# Patient Record
Sex: FEMALE | NOT HISPANIC OR LATINO | Employment: OTHER | URBAN - METROPOLITAN AREA
[De-identification: names, ages, dates, MRNs, and addresses within clinical notes are randomized per-mention and may not be internally consistent; named-entity substitution may affect disease eponyms.]

---

## 2017-01-06 DIAGNOSIS — E11.9 DIABETES MELLITUS, TYPE 2 (H): Primary | ICD-10-CM

## 2017-01-06 RX ORDER — BLOOD-GLUCOSE METER
EACH MISCELLANEOUS
Qty: 1 KIT | Refills: 0 | Status: SHIPPED | OUTPATIENT
Start: 2017-01-06

## 2017-01-06 RX ORDER — LANCETS
EACH MISCELLANEOUS
Qty: 3 BOX | Refills: 3 | Status: SHIPPED | OUTPATIENT
Start: 2017-01-06 | End: 2017-12-12

## 2017-01-10 ENCOUNTER — ANTICOAGULATION THERAPY VISIT (OUTPATIENT)
Dept: ANTICOAGULATION | Facility: CLINIC | Age: 57
End: 2017-01-10

## 2017-01-10 DIAGNOSIS — I48.91 ATRIAL FIBRILLATION, UNSPECIFIED TYPE (H): ICD-10-CM

## 2017-01-10 DIAGNOSIS — Z79.01 LONG-TERM (CURRENT) USE OF ANTICOAGULANTS: Primary | ICD-10-CM

## 2017-01-10 LAB — INR PPP: 1.7

## 2017-01-10 NOTE — PROGRESS NOTES
ANTICOAGULATION FOLLOW-UP CLINIC VISIT    Patient Name:  Arianna Siddiqi  Date:  1/10/2017  Contact Type:  Telephone    SUBJECTIVE:     Patient Findings     Positives Unexplained INR or factor level change           OBJECTIVE    INR   Date Value Ref Range Status   01/10/2017 1.7  Final       ASSESSMENT / PLAN  INR assessment SUB    Recheck INR In: 1 WEEK    INR Location [facility]R      Anticoagulation Summary as of 1/10/2017     INR goal 2.0-3.0   Selected INR 1.7! (1/10/2017)   Maintenance plan 4.5 mg (3 mg x 1.5) on Tue; 3 mg (3 mg x 1) all other days   Full instructions 1/10: 6 mg; Otherwise 4.5 mg on Tue; 3 mg all other days   Weekly total 22.5 mg   Plan last modified Ying Hollis RN (12/21/2016)   Next INR check 1/17/2017   Priority INR   Target end date     Indications   Long-term (current) use of anticoagulants [Z79.01] [Z79.01]  Atrial fibrillation (H) [I48.91] [I48.91]         Anticoagulation Episode Summary     INR check location Clinic Lab    Preferred lab     Send INR reminders to [facility] CL[facility]    Comments Patient to establish care with Amelia Angulo  Pt's voicemail box is full and pt is unable to retrieve voice messages       Anticoagulation Care Providers     Provider Role Specialty Phone number    Amelia Angulo MD PHD Responsible [facility] Practice [phone]            See the Encounter Report to view Anticoagulation Flowsheet and Dosing Calendar (Go to Encounters tab in chart review, and find the Anticoagulation Therapy Visit)    Spoke with Soraya WOOD.  She reports no changes in health, diet, medications, no missed warfarin doses.  She drinks one can of Boost/day, this is not a change.  Unexplained drop in INR.    Paradise Pizarro, RN

## 2017-01-17 ENCOUNTER — ANTICOAGULATION THERAPY VISIT (OUTPATIENT)
Dept: ANTICOAGULATION | Facility: CLINIC | Age: 57
End: 2017-01-17

## 2017-01-17 DIAGNOSIS — I48.91 ATRIAL FIBRILLATION, UNSPECIFIED TYPE (H): ICD-10-CM

## 2017-01-17 DIAGNOSIS — Z79.01 LONG-TERM (CURRENT) USE OF ANTICOAGULANTS: Primary | ICD-10-CM

## 2017-01-17 LAB — INR PPP: 2

## 2017-01-17 NOTE — PROGRESS NOTES
ANTICOAGULATION FOLLOW-UP CLINIC VISIT    Patient Name:  Arianna Siddiqi  Date:  1/17/2017  Contact Type:  Telephone    SUBJECTIVE:     Patient Findings     Positives No Problem Findings           OBJECTIVE    INR   Date Value Ref Range Status   01/17/2017 2.0  Final       ASSESSMENT / PLAN  INR assessment THER    Recheck INR In: 2 WEEKS    INR Location Clinic      Anticoagulation Summary as of 1/17/2017     INR goal 2.0-3.0   Selected INR 2.0 (1/17/2017)   Maintenance plan 4.5 mg (3 mg x 1.5) on Tue, Fri; 3 mg (3 mg x 1) all other days   Full instructions 4.5 mg on Tue, Fri; 3 mg all other days   Weekly total 24 mg   Plan last modified Ying Hollis RN (1/17/2017)   Next INR check 1/31/2017   Priority INR   Target end date     Indications   Long-term (current) use of anticoagulants [Z79.01] [Z79.01]  Atrial fibrillation (H) [I48.91] [I48.91]         Anticoagulation Episode Summary     INR check location Clinic Lab    Preferred lab     Send INR reminders to [facility]    Comments Patient to establish care with Amelia Angulo  Pt's voicemail box is full and pt is unable to retrieve voice messages       Anticoagulation Care Providers     Provider Role Specialty Phone number    Amelia Angulo MD PHD Responsible [facility] Practice [phone]            See the Encounter Report to view Anticoagulation Flowsheet and Dosing Calendar (Go to Encounters tab in chart review, and find the Anticoagulation Therapy Visit)    Spoke with Erick WOOD.    Ying Hollis, SONAL Suarez called back reporting that after 2/14 Home Care will not be able to do anymore visits. Pt is unable to go to a clinic to get labs drawn. Filled out paperwork for [facility] and gave ph# for pt to call [facility] will insurance info. BC

## 2017-01-30 ENCOUNTER — MEDICAL CORRESPONDENCE (OUTPATIENT)
Dept: HEALTH INFORMATION MANAGEMENT | Facility: CLINIC | Age: 57
End: 2017-01-30

## 2017-01-31 ENCOUNTER — ANTICOAGULATION THERAPY VISIT (OUTPATIENT)
Dept: ANTICOAGULATION | Facility: CLINIC | Age: 57
End: 2017-01-31

## 2017-01-31 DIAGNOSIS — I48.91 ATRIAL FIBRILLATION, UNSPECIFIED TYPE (H): Primary | ICD-10-CM

## 2017-01-31 DIAGNOSIS — I48.91 ATRIAL FIBRILLATION, UNSPECIFIED TYPE (H): ICD-10-CM

## 2017-01-31 DIAGNOSIS — Z79.01 LONG-TERM (CURRENT) USE OF ANTICOAGULANTS: Primary | ICD-10-CM

## 2017-01-31 LAB — INR PPP: 1.7

## 2017-01-31 RX ORDER — WARFARIN SODIUM 3 MG/1
TABLET ORAL
Qty: 110 TABLET | Refills: 1 | Status: SHIPPED | OUTPATIENT
Start: 2017-01-31 | End: 2017-07-05 | Stop reason: DRUGHIGH

## 2017-01-31 NOTE — PROGRESS NOTES
ANTICOAGULATION FOLLOW-UP CLINIC VISIT    Patient Name:  Arianna Siddiqi  Date:  1/31/2017  Contact Type:  Telephone    SUBJECTIVE:     Patient Findings     Positives No Problem Findings           OBJECTIVE    INR   Date Value Ref Range Status   01/31/2017 1.7  Final       ASSESSMENT / PLAN  INR assessment SUB    Recheck INR In: 1 WEEK    INR Location [facility] INR      Anticoagulation Summary as of 1/31/2017     INR goal 2.0-3.0   Selected INR 1.7! (1/31/2017)   Maintenance plan 4.5 mg (3 mg x 1.5) on Tue, Thu, Sat; 3 mg (3 mg x 1) all other days   Full instructions 4.5 mg on Tue, Thu, Sat; 3 mg all other days   Weekly total 25.5 mg   Plan last modified Carol Hoffman RN (1/31/2017)   Next INR check 2/7/2017   Priority INR   Target end date     Indications   Long-term (current) use of anticoagulants [Z79.01] [Z79.01]  Atrial fibrillation (H) [I48.91] [I48.91]         Anticoagulation Episode Summary     INR check location Clinic Lab    Preferred lab     Send INR reminders to [facility]    Comments 1/17 [employer] paperwork in process  Pt's voicemail box is full and pt is unable to retrieve voice messages       Anticoagulation Care Providers     Provider Role Specialty Phone number    Amelia Angulo MD PHD Responsible Family Practice [phone]            See the Encounter Report to view Anticoagulation Flowsheet and Dosing Calendar (Go to Encounters tab in chart review, and find the Anticoagulation Therapy Visit)  Spoke with [facility] nurse.    Carol Hoffman RN

## 2017-02-02 ENCOUNTER — PRE VISIT (OUTPATIENT)
Dept: PULMONOLOGY | Facility: CLINIC | Age: 57
End: 2017-02-02

## 2017-02-02 NOTE — TELEPHONE ENCOUNTER
1.  Date/reason for appt:  2/15/17   CHF    2.  Referring provider:  Internal, Dr Crabtree   --   Office Visit 11/16/17    3.  Call to patient (Yes / No - short description):  No, referred.  Records reviewed.  All records are in [facility] and imaging is in PACS.

## 2017-02-06 DIAGNOSIS — R06.02 SOB (SHORTNESS OF BREATH): Primary | ICD-10-CM

## 2017-02-07 ENCOUNTER — ANTICOAGULATION THERAPY VISIT (OUTPATIENT)
Dept: ANTICOAGULATION | Facility: CLINIC | Age: 57
End: 2017-02-07

## 2017-02-07 DIAGNOSIS — Z79.01 LONG-TERM (CURRENT) USE OF ANTICOAGULANTS: Primary | ICD-10-CM

## 2017-02-07 DIAGNOSIS — I48.91 ATRIAL FIBRILLATION (H): ICD-10-CM

## 2017-02-07 LAB — INR PPP: 1.9

## 2017-02-14 ENCOUNTER — DOCUMENTATION ONLY (OUTPATIENT)
Dept: CARE COORDINATION | Facility: CLINIC | Age: 57
End: 2017-02-14

## 2017-02-14 ENCOUNTER — ANTICOAGULATION THERAPY VISIT (OUTPATIENT)
Dept: ANTICOAGULATION | Facility: CLINIC | Age: 57
End: 2017-02-14

## 2017-02-14 DIAGNOSIS — I48.91 ATRIAL FIBRILLATION, UNSPECIFIED TYPE (H): ICD-10-CM

## 2017-02-14 DIAGNOSIS — Z79.01 LONG-TERM (CURRENT) USE OF ANTICOAGULANTS: ICD-10-CM

## 2017-02-14 LAB — INR PPP: 2.1

## 2017-02-14 NOTE — MR AVS SNAPSHOT
Arianna VALDIVIA Devang   2/14/2017   Anticoagulation Therapy Visit    Description:  56 year old female   Provider:  Carol Hoffman RN   Department[facility]           INR as of 2/14/2017     Today's INR 2.1      Anticoagulation Summary as of 2/14/2017     INR goal 2.0-3.0   Today's INR 2.1   Full instructions 4.5 mg on Tue, Thu, Sat; 3 mg all other days   Next INR check 2/28/2017    Indications   Long-term (current) use of anticoagulants [Z79.01] [Z79.01]  Atrial fibrillation (H) [I48.91] [I48.91]         February 2017 Details    Sun Mon Tue Wed Thu Fri Sat        1               2               3               4                 5               6               7               8               9               10               11                 12               13               14      4.5 mg   See details      15      3 mg         16      4.5 mg         17      3 mg         18      4.5 mg           19      3 mg         20      3 mg         21      4.5 mg         22      3 mg         23      4.5 mg         24      3 mg         25      4.5 mg           26      3 mg         27      3 mg         28                 Date Details   02/14 This INR check       Date of next INR:  2/28/2017         How to take your warfarin dose     To take:  3 mg Take 1 of the 3 mg tablets.    To take:  4.5 mg Take 1.5 of the 3 mg tablets.

## 2017-02-14 NOTE — PROGRESS NOTES
[facility] now requests orders and shares plan of care/discharge summaries for some patients through [employer].  Please REPLY TO THIS MESSAGE in order to give authorization for orders when needed.  This is considered a verbal order, you will still receive a faxed copy of orders for signature.  Thank you for your assistance in improving collaboration for our patients.      DISCHARGE SUMMARY    Summary of care  This is a 56 year old  female who was admitted to [facility] on 10/21/2016 following a 5 week hospitalization for increasing SOB, and acute respiratory failure.  She has a history of morbid obesity, CHF, AFib, DM 2, hypercholesterolemia, hypertension, and chronic kidney disease. During this home care episode she worked with SN, PT, OT and a HHA.  She had evals form both PT and OT and did not arrange any further visits.  SHe had a Home Health Aide for several weeks but discontinued that service as she did not feel it was neccesary.  She continued with skilled nursing for diet and disease education and INR draws.  She had no falls or ER visits during this episode.  She manages her medications independently.Her INR have been stable regularly within goal.  She recieves dinner at you door meal services.  She is independant with ADLS.  She walks with a two wheeled walker.  Her vital signs today are /64, P 76, R 16, T 97.1, O2 97 on room air.  Lungs sounds clear, heart sounds within normal limits, bowel sounds within normal limits.  She rates her knee pain as a 4 to 7 and her leg pain from a 7 to 10 of 10 for which she takes Gabapentin.  Her INR today is 2.1.  She is to recheck her INR on 2/28 and call it to the coumadin clinic for further instructions.  She will continue to check her own INR in home once [facility] sets up services.  They are scheduled to initiate services with her by the end of the week.  She is deemed appropirate to discharge to self care as of 2/14/2017

## 2017-02-14 NOTE — PROGRESS NOTES
ANTICOAGULATION FOLLOW-UP CLINIC VISIT    Patient Name:  Arianna Siddiqi  Date:  2/14/2017  Contact Type:  Telephone    SUBJECTIVE:     Patient Findings     Comments Patient will be discharged from [facility] , and will doing her INRs on a home monitor hereafter.           OBJECTIVE    INR   Date Value Ref Range Status   02/14/2017 2.1  Final       ASSESSMENT / PLAN  INR assessment THER    Recheck INR In: 2 WEEKS    INR Location [facility]R      Anticoagulation Summary as of 2/14/2017     INR goal 2.0-3.0   Today's INR 2.1   Maintenance plan 4.5 mg (3 mg x 1.5) on Tue, Thu, Sat; 3 mg (3 mg x 1) all other days   Full instructions 4.5 mg on Tue, Thu, Sat; 3 mg all other days   Weekly total 25.5 mg   Plan last modified Carol Hoffman RN (1/31/2017)   Next INR check 2/28/2017   Priority INR   Target end date     Indications   Long-term (current) use of anticoagulants [Z79.01] [Z79.01]  Atrial fibrillation (H) [I48.91] [I48.91]         Anticoagulation Episode Summary     INR check location Clinic Lab    Preferred lab     Send INR reminders to [facility] CL[facility]    Comments 1/17 [employer] to start on 2/28.  Pt's voicemail box is full and pt is unable to retrieve voice messages       Anticoagulation Care Providers     Provider Role Specialty Phone number    Amelia Angulo MD PhD Responsible [facility] Practice [phone]            See the Encounter Report to view Anticoagulation Flowsheet and Dosing Calendar (Go to Encounters tab in chart review, and find the Anticoagulation Therapy Visit)    Spoke with Leticia [facility] nurse.    Carol Hoffman RN

## 2017-02-28 ENCOUNTER — ANTICOAGULATION THERAPY VISIT (OUTPATIENT)
Dept: ANTICOAGULATION | Facility: CLINIC | Age: 57
End: 2017-02-28

## 2017-02-28 DIAGNOSIS — I48.91 ATRIAL FIBRILLATION, UNSPECIFIED TYPE (H): ICD-10-CM

## 2017-02-28 DIAGNOSIS — Z79.01 LONG-TERM (CURRENT) USE OF ANTICOAGULANTS: ICD-10-CM

## 2017-02-28 LAB — INR POINT OF CARE: 2.4 (ref 0.86–1.14)

## 2017-02-28 NOTE — PROGRESS NOTES
ANTICOAGULATION FOLLOW-UP CLINIC VISIT    Patient Name:  Arianna Siddiqi  Date:  2/28/2017  Contact Type:  Telephone    SUBJECTIVE:        OBJECTIVE    INR Protime   Date Value Ref Range Status   02/28/2017 2.4 (A) 0.86 - 1.14 Final       ASSESSMENT / PLAN  INR assessment THER    Recheck INR In: 2 WEEKS    INR Location Home INR      Anticoagulation Summary as of 2/28/2017     INR goal 2.0-3.0   Today's INR 2.4   Maintenance plan 4.5 mg (3 mg x 1.5) on Tue, Thu, Sat; 3 mg (3 mg x 1) all other days   Full instructions 4.5 mg on Tue, Thu, Sat; 3 mg all other days   Weekly total 25.5 mg   Plan last modified Carol Hoffman RN (1/31/2017)   Next INR check 3/14/2017   Priority INR   Target end date     Indications   Long-term (current) use of anticoagulants [Z79.01] [Z79.01]  Atrial fibrillation (H) [I48.91] [I48.91]         Anticoagulation Episode Summary     INR check location Clinic Lab    Preferred lab     Send INR reminders to [facility]    Comments 1/17 [employer] to start on 2/28.  Pt's voicemail box is full and pt is unable to retrieve voice messages       Anticoagulation Care Providers     Provider Role Specialty Phone number    Amelia Angulo MD PhD Responsible [facility] Practice [phone]            See the Encounter Report to view Anticoagulation Flowsheet and Dosing Calendar (Go to Encounters tab in chart review, and find the Anticoagulation Therapy Visit)    Spoke to Arianna with her INR result and dosing recommendation. It's her first time to use home monitoring device herself and it went pretty well. She checked twice and both are 2.4. No missed dose. Keep same dosing (4.5mg on Tue, Thu and Sat, 3mg on all other days) and recheck in 2 weeks.     Shirley Arroyo,PharmD student

## 2017-02-28 NOTE — MR AVS SNAPSHOT
Arianna VALDIVIA Devang   2/28/2017   Anticoagulation Therapy Visit    Description:  56 year old female   Provider:  Sina Lazcano, [facility]   Department:  [facility]           INR as of 2/28/2017     Today's INR 2.4      Anticoagulation Summary as of 2/28/2017     INR goal 2.0-3.0   Today's INR 2.4   Full instructions 4.5 mg on Tue, Thu, Sat; 3 mg all other days   Next INR check 3/14/2017    Indications   Long-term (current) use of anticoagulants [Z79.01] [Z79.01]  Atrial fibrillation (H) [I48.91] [I48.91]         February 2017 Details    Sun Mon Tue Wed Thu Fri Sat        1               2               3               4                 5               6               7               8               9               10               11                 12               13               14               15               16               17               18                 19               20               21               22               23               24               25                 26               27               28      4.5 mg   See details           Date Details   02/28 This INR check               How to take your warfarin dose     To take:  4.5 mg Take 1.5 of the 3 mg tablets.           March 2017 Details    Sun Mon Tue Wed Thu Fri Sat        1      3 mg         2      4.5 mg         3      3 mg         4      4.5 mg           5      3 mg         6      3 mg         7      4.5 mg         8      3 mg         9      4.5 mg         10      3 mg         11      4.5 mg           12      3 mg         13      3 mg         14            15               16               17               18                 19               20               21               22               23               24               25                 26               27               28               29               30               31                 Date Details   No additional details    Date of next INR:  3/14/2017         How to  take your warfarin dose     To take:  3 mg Take 1 of the 3 mg tablets.    To take:  4.5 mg Take 1.5 of the 3 mg tablets.

## 2017-03-14 ENCOUNTER — ANTICOAGULATION THERAPY VISIT (OUTPATIENT)
Dept: ANTICOAGULATION | Facility: CLINIC | Age: 57
End: 2017-03-14

## 2017-03-14 DIAGNOSIS — Z79.01 LONG-TERM (CURRENT) USE OF ANTICOAGULANTS: ICD-10-CM

## 2017-03-14 DIAGNOSIS — I48.91 ATRIAL FIBRILLATION, UNSPECIFIED TYPE (H): ICD-10-CM

## 2017-03-14 LAB — INR PPP: 2.2

## 2017-03-14 NOTE — MR AVS SNAPSHOT
Arianna VALDIVIA Devang   3/14/2017   Anticoagulation Therapy Visit    Description:  56 year old female   Provider:  Ying Hollis RN   [facility]  [facility]           INR as of 3/14/2017     Today's INR 2.2      Anticoagulation Summary as of 3/14/2017     INR goal 2.0-3.0   Today's INR 2.2   Full instructions 4.5 mg on Tue, Thu, Sat; 3 mg all other days   Next INR check 3/28/2017    Indications   Long-term (current) use of anticoagulants [Z79.01] [Z79.01]  Atrial fibrillation (H) [I48.91] [I48.91]         March 2017 Details    Sun Mon Tue Wed Thu Fri Sat        1               2               3               4                 5               6               7               8               9               10               11                 12               13               14      4.5 mg   See details      15      3 mg         16      4.5 mg         17      3 mg         18      4.5 mg           19      3 mg         20      3 mg         21      4.5 mg         22      3 mg         23      4.5 mg         24      3 mg         25      4.5 mg           26      3 mg         27      3 mg         28            29               30               31                 Date Details   03/14 This INR check       Date of next INR:  3/28/2017         How to take your warfarin dose     To take:  3 mg Take 1 of the 3 mg tablets.    To take:  4.5 mg Take 1.5 of the 3 mg tablets.

## 2017-03-14 NOTE — PROGRESS NOTES
ANTICOAGULATION FOLLOW-UP CLINIC VISIT    Patient Name:  Arianna Siddiqi  Date:  3/14/2017  Contact Type:  Telephone    SUBJECTIVE:     Patient Findings     Positives No Problem Findings           OBJECTIVE    INR   Date Value Ref Range Status   03/14/2017 2.2  Final       ASSESSMENT / PLAN  INR assessment THER    Recheck INR In: 2 WEEKS    INR Location Home INR      Anticoagulation Summary as of 3/14/2017     INR goal 2.0-3.0   Today's INR 2.2   Maintenance plan 4.5 mg (3 mg x 1.5) on Tue, Thu, Sat; 3 mg (3 mg x 1) all other days   Full instructions 4.5 mg on Tue, Thu, Sat; 3 mg all other days   Weekly total 25.5 mg   No change documented Ying Hollis, RN   Plan last modified Carol Hoffman RN (1/31/2017)   Next INR check 3/28/2017   Priority INR   Target end date     Indications   Long-term (current) use of anticoagulants [Z79.01] [Z79.01]  Atrial fibrillation (H) [I48.91] [I48.91]         Anticoagulation Episode Summary     INR check location Clinic Lab    Preferred lab     Send INR reminders to [facility]    Comments 1/17 [employer] to start on 2/28.  Pt's voicemail box is full and pt is unable to retrieve voice messages       Anticoagulation Care Providers     Provider Role Specialty Phone number    Amelia Angulo MD PhD Responsible [facility] Practice [phone]            See the Encounter Report to view Anticoagulation Flowsheet and Dosing Calendar (Go to Encounters tab in chart review, and find the Anticoagulation Therapy Visit)    Spoke with Jaycee Hollis, RN

## 2017-03-28 ENCOUNTER — ANTICOAGULATION THERAPY VISIT (OUTPATIENT)
Dept: ANTICOAGULATION | Facility: CLINIC | Age: 57
End: 2017-03-28

## 2017-03-28 DIAGNOSIS — Z79.01 LONG-TERM (CURRENT) USE OF ANTICOAGULANTS: ICD-10-CM

## 2017-03-28 DIAGNOSIS — I48.91 ATRIAL FIBRILLATION, UNSPECIFIED TYPE (H): ICD-10-CM

## 2017-03-28 LAB — INR PPP: 2

## 2017-03-28 NOTE — PROGRESS NOTES
"  ANTICOAGULATION FOLLOW-UP CLINIC VISIT    Patient Name:  Arianna Siddiqi  Date:  3/28/2017  Contact Type:  Telephone    SUBJECTIVE:     Patient Findings     Positives Change in diet/appetite (Patient is wanting to eat more Vit K rich foods, so we made  an adjustment to accommodate this.), OTC meds (Started \"Healthy Hair Skin and Nails\" similar to [employer])           OBJECTIVE    INR   Date Value Ref Range Status   03/28/2017 2.0  Final       ASSESSMENT / PLAN  INR assessment THER    Recheck INR In: 1 WEEK    INR Location Home INR      Anticoagulation Summary as of 3/28/2017     INR goal 2.0-3.0   Today's INR 2.0   Maintenance plan 3 mg (3 mg x 1) on Mon, Fri; 4.5 mg (3 mg x 1.5) all other days   Full instructions 3 mg on Mon, Fri; 4.5 mg all other days   Weekly total 28.5 mg   Plan last modified Carol Hoffman RN (3/28/2017)   Next INR check 4/4/2017   Priority INR   Target end date     Indications   Long-term (current) use of anticoagulants [Z79.01] [Z79.01]  Atrial fibrillation (H) [I48.91] [I48.91]         Anticoagulation Episode Summary     INR check location Clinic Lab    Preferred lab     Send INR reminders to [facility]    Comments 1/17 [employer] to start on 2/28.  Pt's voicemail box is full and pt is unable to retrieve voice messages       Anticoagulation Care Providers     Provider Role Specialty Phone number    Amelia Angulo MD PhD [facility] Practice [phone]            See the Encounter Report to view Anticoagulation Flowsheet and Dosing Calendar (Go to Encounters tab in chart review, and find the Anticoagulation Therapy Visit)    Spoke with patient.    Carol Hoffman RN               "

## 2017-03-28 NOTE — MR AVS SNAPSHOT
Arianna VALDIVIA Devang   3/28/2017   Anticoagulation Therapy Visit    Description:  56 year old female   Provider:  Carol Hoffman RN   Department:  [facility]           INR as of 3/28/2017     Today's INR 2.0      Anticoagulation Summary as of 3/28/2017     INR goal 2.0-3.0   Today's INR 2.0   Full instructions 3 mg on Mon, Fri; 4.5 mg all other days   Next INR check 4/4/2017    Indications   Long-term (current) use of anticoagulants [Z79.01] [Z79.01]  Atrial fibrillation (H) [I48.91] [I48.91]         March 2017 Details    Sun Mon Tue Wed Thu Fri Sat        1               2               3               4                 5               6               7               8               9               10               11                 12               13               14               15               16               17               18                 19               20               21               22               23               24               25                 26               27               28      4.5 mg   See details      29      4.5 mg         30      4.5 mg         31      3 mg           Date Details   03/28 This INR check               How to take your warfarin dose     To take:  3 mg Take 1 of the 3 mg tablets.    To take:  4.5 mg Take 1.5 of the 3 mg tablets.           April 2017 Details    Sun Mon Tue Wed Thu Fri Sat           1      4.5 mg           2      4.5 mg         3      3 mg         4            5               6               7               8                 9               10               11               12               13               14               15                 16               17               18               19               20               21               22                 23               24               25               26               27               28               29                 30                      Date Details   No additional details     Date of next INR:  4/4/2017         How to take your warfarin dose     To take:  3 mg Take 1 of the 3 mg tablets.    To take:  4.5 mg Take 1.5 of the 3 mg tablets.

## 2017-04-04 ENCOUNTER — ANTICOAGULATION THERAPY VISIT (OUTPATIENT)
Dept: ANTICOAGULATION | Facility: CLINIC | Age: 57
End: 2017-04-04

## 2017-04-04 DIAGNOSIS — Z79.01 LONG-TERM (CURRENT) USE OF ANTICOAGULANTS: ICD-10-CM

## 2017-04-04 DIAGNOSIS — I48.91 ATRIAL FIBRILLATION, UNSPECIFIED TYPE (H): ICD-10-CM

## 2017-04-04 LAB — INR PPP: 2.3

## 2017-04-04 NOTE — MR AVS SNAPSHOT
Arianna VALDIVIA Devang   4/4/2017   Anticoagulation Therapy Visit    Description:  56 year old female   Provider:  Carol Hoffman RN   Department[facility]  [facility]           INR as of 4/4/2017     Today's INR 2.3      Anticoagulation Summary as of 4/4/2017     INR goal 2.0-3.0   Today's INR 2.3   Full instructions 4.5 mg on Tue, Thu, Sat; 3 mg all other days   Next INR check 4/18/2017    Indications   Long-term (current) use of anticoagulants [Z79.01] [Z79.01]  Atrial fibrillation (H) [I48.91] [I48.91]         April 2017 Details    Sun Mon Tue Wed Thu Fri Sat           1                 2               3               4      4.5 mg   See details      5      3 mg         6      4.5 mg         7      3 mg         8      4.5 mg           9      3 mg         10      3 mg         11      4.5 mg         12      3 mg         13      4.5 mg         14      3 mg         15      4.5 mg           16      3 mg         17      3 mg         18            19               20               21               22                 23               24               25               26               27               28               29                 30                      Date Details   04/04 This INR check       Date of next INR:  4/18/2017         How to take your warfarin dose     To take:  3 mg Take 1 of the 3 mg tablets.    To take:  4.5 mg Take 1.5 of the 3 mg tablets.

## 2017-04-04 NOTE — PROGRESS NOTES
ANTICOAGULATION FOLLOW-UP CLINIC VISIT    Patient Name:  Arianna Siddiqi  Date:  4/4/2017  Contact Type:  Telephone    SUBJECTIVE:     Patient Findings     Positives No Problem Findings           OBJECTIVE    INR   Date Value Ref Range Status   04/04/2017 2.3  Final       ASSESSMENT / PLAN  INR assessment THER    Recheck INR In: 2 WEEKS    INR Location Clinic      Anticoagulation Summary as of 4/4/2017     INR goal 2.0-3.0   Today's INR 2.3   Maintenance plan 4.5 mg (3 mg x 1.5) on Tue, Thu, Sat; 3 mg (3 mg x 1) all other days   Full instructions 4.5 mg on Tue, Thu, Sat; 3 mg all other days   Weekly total 25.5 mg   Plan last modified Carol Hoffman RN (4/4/2017)   Next INR check 4/18/2017   Priority INR   Target end date     Indications   Long-term (current) use of anticoagulants [Z79.01] [Z79.01]  Atrial fibrillation (H) [I48.91] [I48.91]         Anticoagulation Episode Summary     INR check location Clinic Lab    Preferred lab     Send INR reminders to [facility]    Comments 1/17 [employer] to start on 2/28.  Pt's voicemail box is full and pt is unable to retrieve voice messages       Anticoagulation Care Providers     Provider Role Specialty Phone number    Amelia Angulo MD PhD Responsible [facility] [phone]            See the Encounter Report to view Anticoagulation Flowsheet and Dosing Calendar (Go to Encounters tab in chart review, and find the Anticoagulation Therapy Visit)  Spoke with patient.    Carol Hoffman RN

## 2017-04-18 ENCOUNTER — ANTICOAGULATION THERAPY VISIT (OUTPATIENT)
Dept: ANTICOAGULATION | Facility: CLINIC | Age: 57
End: 2017-04-18

## 2017-04-18 DIAGNOSIS — G62.9 PERIPHERAL POLYNEUROPATHY: ICD-10-CM

## 2017-04-18 DIAGNOSIS — I48.20 CHRONIC ATRIAL FIBRILLATION (H): Primary | ICD-10-CM

## 2017-04-18 DIAGNOSIS — I48.91 ATRIAL FIBRILLATION, UNSPECIFIED TYPE (H): ICD-10-CM

## 2017-04-18 DIAGNOSIS — Z79.01 LONG-TERM (CURRENT) USE OF ANTICOAGULANTS: ICD-10-CM

## 2017-04-18 LAB — INR PPP: 2

## 2017-04-18 RX ORDER — WARFARIN SODIUM 3 MG/1
TABLET ORAL
Qty: 120 TABLET | Refills: 1 | Status: SHIPPED | OUTPATIENT
Start: 2017-04-18 | End: 2017-07-05

## 2017-04-18 NOTE — PROGRESS NOTES
ANTICOAGULATION FOLLOW-UP CLINIC VISIT    Patient Name:  Arianna Siddiqi  Date:  4/18/2017  Contact Type:  Telephone    SUBJECTIVE:     Patient Findings     Positives No Problem Findings           OBJECTIVE    INR   Date Value Ref Range Status   04/18/2017 2.0  Final       ASSESSMENT / PLAN  INR assessment THER    Recheck INR In: 2 WEEKS    INR Location Home INR      Anticoagulation Summary as of 4/18/2017     INR goal 2.0-3.0   Today's INR 2.0   Maintenance plan 4.5 mg (3 mg x 1.5) on Tue, Thu, Sat; 3 mg (3 mg x 1) all other days   Full instructions 4.5 mg on Tue, Thu, Sat; 3 mg all other days   Weekly total 25.5 mg   Plan last modified Carol Hoffman RN (4/4/2017)   Next INR check 5/2/2017   Priority INR   Target end date     Indications   Long-term (current) use of anticoagulants [Z79.01] [Z79.01]  Atrial fibrillation (H) [I48.91] [I48.91]         Anticoagulation Episode Summary     INR check location Clinic Lab    Preferred lab     Send INR reminders to [facility]    Comments 1/17 [employer] to start on 2/28.  Pt's voicemail box is full and pt is unable to retrieve voice messages       Anticoagulation Care Providers     Provider Role Specialty Phone number    Amelia Angulo MD PhD Responsible [facility] [phone]            See the Encounter Report to view Anticoagulation Flowsheet and Dosing Calendar (Go to Encounters tab in chart review, and find the Anticoagulation Therapy Visit)    Spoke with patient.    Carol Hoffman RN

## 2017-04-18 NOTE — MR AVS SNAPSHOT
Arianna VALDIVIA Devang   4/18/2017   Anticoagulation Therapy Visit    Description:  57 year old female   Provider:  Carol Hoffman RN   Department:  [facility]           INR as of 4/18/2017     Today's INR 2.0      Anticoagulation Summary as of 4/18/2017     INR goal 2.0-3.0   Today's INR 2.0   Full instructions 4.5 mg on Tue, Thu, Sat; 3 mg all other days   Next INR check 5/2/2017    Indications   Long-term (current) use of anticoagulants [Z79.01] [Z79.01]  Atrial fibrillation (H) [I48.91] [I48.91]         April 2017 Details    Sun Mon Tue Wed Thu Fri Sat           1                 2               3               4               5               6               7               8                 9               10               11               12               13               14               15                 16               17               18      4.5 mg   See details      19      3 mg         20      4.5 mg         21      3 mg         22      4.5 mg           23      3 mg         24      3 mg         25      4.5 mg         26      3 mg         27      4.5 mg         28      3 mg         29      4.5 mg           30      3 mg                Date Details   04/18 This INR check               How to take your warfarin dose     To take:  3 mg Take 1 of the 3 mg tablets.    To take:  4.5 mg Take 1.5 of the 3 mg tablets.           May 2017 Details    Sun Mon Tue Wed Thu Fri Sat      1      3 mg         2            3               4               5               6                 7               8               9               10               11               12               13                 14               15               16               17               18               19               20                 21               22               23               24               25               26               27                 28               29               30               31                    Date Details   No additional details    Date of next INR:  5/2/2017         How to take your warfarin dose     To take:  3 mg Take 1 of the 3 mg tablets.    To take:  4.5 mg Take 1.5 of the 3 mg tablets.

## 2017-04-20 RX ORDER — GABAPENTIN 100 MG/1
300 CAPSULE ORAL 3 TIMES DAILY
Qty: 270 CAPSULE | Refills: 3 | Status: SHIPPED | OUTPATIENT
Start: 2017-04-20 | End: 2017-11-06

## 2017-05-02 ENCOUNTER — ANTICOAGULATION THERAPY VISIT (OUTPATIENT)
Dept: ANTICOAGULATION | Facility: CLINIC | Age: 57
End: 2017-05-02

## 2017-05-02 DIAGNOSIS — Z79.01 LONG-TERM (CURRENT) USE OF ANTICOAGULANTS: ICD-10-CM

## 2017-05-02 DIAGNOSIS — I48.91 ATRIAL FIBRILLATION, UNSPECIFIED TYPE (H): ICD-10-CM

## 2017-05-02 LAB — INR PPP: 2

## 2017-05-02 NOTE — MR AVS SNAPSHOT
Arianna VALDIVIA Devang   5/2/2017   Anticoagulation Therapy Visit    Description:  57 year old female   Provider:  Carol Hoffman RN   Department:  [facility]           INR as of 5/2/2017     Today's INR 2.0      Anticoagulation Summary as of 5/2/2017     INR goal 2.0-3.0   Today's INR 2.0   Full instructions 4.5 mg on Tue, Thu, Sat; 3 mg all other days   Next INR check 5/16/2017    Indications   Long-term (current) use of anticoagulants [Z79.01] [Z79.01]  Atrial fibrillation (H) [I48.91] [I48.91]         May 2017 Details    Sun Mon Tue Wed Thu Fri Sat      1               2      4.5 mg   See details      3      3 mg         4      4.5 mg         5      3 mg         6      4.5 mg           7      3 mg         8      3 mg         9      4.5 mg         10      3 mg         11      4.5 mg         12      3 mg         13      4.5 mg           14      3 mg         15      3 mg         16            17               18               19               20                 21               22               23               24               25               26               27                 28               29               30               31                   Date Details   05/02 This INR check       Date of next INR:  5/16/2017         How to take your warfarin dose     To take:  3 mg Take 1 of the 3 mg tablets.    To take:  4.5 mg Take 1.5 of the 3 mg tablets.

## 2017-05-02 NOTE — PROGRESS NOTES
ANTICOAGULATION FOLLOW-UP CLINIC VISIT    Patient Name:  Arianna Siddiqi  Date:  5/2/2017  Contact Type:  Telephone    SUBJECTIVE:        OBJECTIVE    INR   Date Value Ref Range Status   05/02/2017 2.0  Final       ASSESSMENT / PLAN  INR assessment THER    Recheck INR In: 2 WEEKS    INR Location Home INR      Anticoagulation Summary as of 5/2/2017     INR goal 2.0-3.0   Today's INR 2.0   Maintenance plan 4.5 mg (3 mg x 1.5) on Tue, Thu, Sat; 3 mg (3 mg x 1) all other days   Full instructions 4.5 mg on Tue, Thu, Sat; 3 mg all other days   Weekly total 25.5 mg   Plan last modified Carol Hoffman RN (4/4/2017)   Next INR check 5/16/2017   Priority INR   Target end date     Indications   Long-term (current) use of anticoagulants [Z79.01] [Z79.01]  Atrial fibrillation (H) [I48.91] [I48.91]         Anticoagulation Episode Summary     INR check location Clinic Lab    Preferred lab     Send INR reminders to [facility]    Comments 1/17 [employer] to start on 2/28.  Pt's voicemail box is full and pt is unable to retrieve voice messages       Anticoagulation Care Providers     Provider Role Specialty Phone number    Amelia Angulo MD PhD Responsible [facility] [phone]            See the Encounter Report to view Anticoagulation Flowsheet and Dosing Calendar (Go to Encounters tab in chart review, and find the Anticoagulation Therapy Visit)    Spoke with patient.    Carol Hoffman RN

## 2017-05-09 DIAGNOSIS — I50.43 ACUTE ON CHRONIC COMBINED SYSTOLIC AND DIASTOLIC CONGESTIVE HEART FAILURE (H): ICD-10-CM

## 2017-05-09 DIAGNOSIS — E87.6 HYPOKALEMIA: ICD-10-CM

## 2017-05-09 DIAGNOSIS — M10.9 GOUT, UNSPECIFIED CAUSE, UNSPECIFIED CHRONICITY, UNSPECIFIED SITE: ICD-10-CM

## 2017-05-12 RX ORDER — ALLOPURINOL 100 MG/1
100 TABLET ORAL 2 TIMES DAILY
Qty: 180 TABLET | Refills: 0 | Status: SHIPPED | OUTPATIENT
Start: 2017-05-12 | End: 2017-08-09

## 2017-05-12 RX ORDER — POTASSIUM CHLORIDE 1500 MG/1
40 TABLET, EXTENDED RELEASE ORAL DAILY
Qty: 180 TABLET | Refills: 0 | Status: SHIPPED | OUTPATIENT
Start: 2017-05-12 | End: 2017-08-09

## 2017-05-12 RX ORDER — METOPROLOL SUCCINATE 50 MG/1
75 TABLET, EXTENDED RELEASE ORAL DAILY
Qty: 135 TABLET | Refills: 0 | Status: SHIPPED | OUTPATIENT
Start: 2017-05-12 | End: 2017-08-11

## 2017-05-16 ENCOUNTER — ANTICOAGULATION THERAPY VISIT (OUTPATIENT)
Dept: ANTICOAGULATION | Facility: CLINIC | Age: 57
End: 2017-05-16

## 2017-05-16 DIAGNOSIS — I48.91 ATRIAL FIBRILLATION, UNSPECIFIED TYPE (H): ICD-10-CM

## 2017-05-16 DIAGNOSIS — Z79.01 LONG-TERM (CURRENT) USE OF ANTICOAGULANTS: ICD-10-CM

## 2017-05-16 LAB — INR PPP: 1.9

## 2017-05-16 NOTE — MR AVS SNAPSHOT
Arianna VALDIVIA Devang   5/16/2017   Anticoagulation Therapy Visit    Description:  57 year old female   Provider:  Tanna Dawkins, RN   Department:  [facility]           INR as of 5/16/2017     Today's INR 1.90!      Anticoagulation Summary as of 5/16/2017     INR goal 2.0-3.0   Today's INR 1.90!   Full instructions 4.5 mg on Tue, Thu, Sat; 3 mg all other days   Next INR check 5/30/2017    Indications   Long-term (current) use of anticoagulants [Z79.01] [Z79.01]  Atrial fibrillation (H) [I48.91] [I48.91]         May 2017 Details    Sun Mon Tue Wed Thu Fri Sat      1               2               3               4               5               6                 7               8               9               10               11               12               13                 14               15               16      4.5 mg   See details      17      3 mg         18      4.5 mg         19      3 mg         20      4.5 mg           21      3 mg         22      3 mg         23      4.5 mg         24      3 mg         25      4.5 mg         26      3 mg         27      4.5 mg           28      3 mg         29      3 mg         30            31                   Date Details   05/16 This INR check       Date of next INR:  5/30/2017         How to take your warfarin dose     To take:  3 mg Take 1 of the 3 mg tablets.    To take:  4.5 mg Take 1.5 of the 3 mg tablets.

## 2017-05-16 NOTE — PROGRESS NOTES
ANTICOAGULATION FOLLOW-UP CLINIC VISIT    Patient Name:  Arianna Siddiqi  Date:  5/16/2017  Contact Type:  Telephone    SUBJECTIVE:     Patient Findings     Positives Other complaints    Comments Pt reports having a cold and isn't taking anything OTC just letting is run it's course            OBJECTIVE    INR   Date Value Ref Range Status   05/16/2017 1.90  Final     Comment:     Home Monitoring Machine        ASSESSMENT / PLAN  INR assessment THER    Recheck INR In: 2 WEEKS    INR Location Home INR      Anticoagulation Summary as of 5/16/2017     INR goal 2.0-3.0   Today's INR 1.90!   Maintenance plan 4.5 mg (3 mg x 1.5) on Tue, Thu, Sat; 3 mg (3 mg x 1) all other days   Full instructions 4.5 mg on Tue, Thu, Sat; 3 mg all other days   Weekly total 25.5 mg   Plan last modified Carol Hoffman RN (4/4/2017)   Next INR check 5/30/2017   Priority INR   Target end date     Indications   Long-term (current) use of anticoagulants [Z79.01] [Z79.01]  Atrial fibrillation (H) [I48.91] [I48.91]         Anticoagulation Episode Summary     INR check location Clinic Lab    Preferred lab     [facility]    Comments 1/17 [employer] to start on 2/28.  Pt's voicemail box is full and pt is unable to retrieve voice messages       Anticoagulation Care Providers     Provider Role Specialty Phone number    mAelia Angulo MD PhD Responsible [facility] [phone]            See the Encounter Report to view Anticoagulation Flowsheet and Dosing Calendar (Go to Encounters tab in chart review, and find the Anticoagulation Therapy Visit)    Spoke with patient. Gave them their lab results and new warfarin recommendation.  No changes in health, medication, or diet. No missed doses, no falls. No signs or symptoms of bleed or clotting.     Tanna Dawkins, SONAL

## 2017-05-30 ENCOUNTER — ANTICOAGULATION THERAPY VISIT (OUTPATIENT)
Dept: ANTICOAGULATION | Facility: CLINIC | Age: 57
End: 2017-05-30

## 2017-05-30 DIAGNOSIS — Z79.01 LONG-TERM (CURRENT) USE OF ANTICOAGULANTS: ICD-10-CM

## 2017-05-30 DIAGNOSIS — I48.91 ATRIAL FIBRILLATION, UNSPECIFIED TYPE (H): ICD-10-CM

## 2017-05-30 LAB — INR PPP: 1.4

## 2017-05-30 NOTE — PROGRESS NOTES
ANTICOAGULATION FOLLOW-UP CLINIC VISIT    Patient Name:  Arianna Siddiqi  Date:  5/30/2017  Contact Type:  Telephone    SUBJECTIVE:     Patient Findings     Positives Change in diet/appetite ([place] with her C recently.)           OBJECTIVE    INR   Date Value Ref Range Status   05/30/2017 1.4  Final       ASSESSMENT / PLAN  INR assessment SUB    Recheck INR In: 6 DAYS    INR Location Home INR      Anticoagulation Summary as of 5/30/2017     INR goal 2.0-3.0   Today's INR 1.4!   Maintenance plan 4.5 mg (3 mg x 1.5) on Tue, Thu, Sat; 3 mg (3 mg x 1) all other days   Full instructions 5/30: 6 mg; 5/31: 6 mg; Otherwise 4.5 mg on Tue, Thu, Sat; 3 mg all other days   Weekly total 25.5 mg   Plan last modified Carol Hoffman RN (4/4/2017)   Next INR check 6/5/2017   Priority INR   Target end date     Indications   Long-term (current) use of anticoagulants [Z79.01] [Z79.01]  Atrial fibrillation (H) [I48.91] [I48.91]         Anticoagulation Episode Summary     INR check location Clinic Lab    Preferred lab     Send INR reminders to [facility]    Comments 1/17 [employer] to start on 2/28.  Pt's voicemail box is full and pt is unable to retrieve voice messages       Anticoagulation Care Providers     Provider Role Specialty Phone number    Amelia Angulo MD PhD Responsible [facility] [phone]            See the Encounter Report to view Anticoagulation Flowsheet and Dosing Calendar (Go to Encounters tab in chart review, and find the Anticoagulation Therapy Visit)  Spoke with patient.    Carol Hoffman, SONAL

## 2017-05-30 NOTE — MR AVS SNAPSHOT
Arianna VALDIVIA Devang   5/30/2017   Anticoagulation Therapy Visit    Description:  57 year old female   Provider:  Carol Hoffman RN   Department:  [facility]           INR as of 5/30/2017     Today's INR 1.4!      Anticoagulation Summary as of 5/30/2017     INR goal 2.0-3.0   Today's INR 1.4!   Full instructions 5/30: 6 mg; 5/31: 6 mg; Otherwise 4.5 mg on Tue, Thu, Sat; 3 mg all other days   Next INR check 6/5/2017    Indications   Long-term (current) use of anticoagulants [Z79.01] [Z79.01]  Atrial fibrillation (H) [I48.91] [I48.91]         May 2017 Details    Sun Mon Tue Wed Thu Fri Sat      1               2               3               4               5               6                 7               8               9               10               11               12               13                 14               15               16               17               18               19               20                 21               22               23               24               25               26               27                 28               29               30      6 mg   See details      31      6 mg             Date Details   05/30 This INR check               How to take your warfarin dose     To take:  6 mg Take 2 of the 3 mg tablets.           June 2017 Details    Sun Mon Tue Wed Thu Fri Sat         1      4.5 mg         2      3 mg         3      4.5 mg           4      3 mg         5            6               7               8               9               10                 11               12               13               14               15               16               17                 18               19               20               21               22               23               24                 25               26               27               28               29               30                 Date Details   No additional details    Date of next INR:  6/5/2017          How to take your warfarin dose     To take:  3 mg Take 1 of the 3 mg tablets.    To take:  4.5 mg Take 1.5 of the 3 mg tablets.

## 2017-06-05 ENCOUNTER — ANTICOAGULATION THERAPY VISIT (OUTPATIENT)
Dept: ANTICOAGULATION | Facility: CLINIC | Age: 57
End: 2017-06-05

## 2017-06-05 DIAGNOSIS — I48.91 ATRIAL FIBRILLATION, UNSPECIFIED TYPE (H): ICD-10-CM

## 2017-06-05 DIAGNOSIS — Z79.01 LONG-TERM (CURRENT) USE OF ANTICOAGULANTS: ICD-10-CM

## 2017-06-05 LAB — INR PPP: 2.4

## 2017-06-05 NOTE — PROGRESS NOTES
ANTICOAGULATION FOLLOW-UP CLINIC VISIT    Patient Name:  Arianna Siddiqi  Date:  6/5/2017  Contact Type:  Telephone    SUBJECTIVE:     Patient Findings     Positives No Problem Findings           OBJECTIVE    INR   Date Value Ref Range Status   06/05/2017 2.4  Final       ASSESSMENT / PLAN  INR assessment THER    Recheck INR In: 8 DAYS    INR Location Clinic      Anticoagulation Summary as of 6/5/2017     INR goal 2.0-3.0   Today's INR 2.4   Maintenance plan 4.5 mg (3 mg x 1.5) on Tue, Thu, Sat; 3 mg (3 mg x 1) all other days   Full instructions 4.5 mg on Tue, Thu, Sat; 3 mg all other days   Weekly total 25.5 mg   Plan last modified Carol Hoffman RN (4/4/2017)   Next INR check 6/13/2017   Priority INR   Target end date     Indications   Long-term (current) use of anticoagulants [Z79.01] [Z79.01]  Atrial fibrillation (H) [I48.91] [I48.91]         Anticoagulation Episode Summary     INR check location Clinic Lab    Preferred lab     Send INR reminders to [facility]    Comments 1/17 [employer] to start on 2/28.  Pt's voicemail box is full and pt is unable to retrieve voice messages       Anticoagulation Care Providers     Provider Role Specialty Phone number    Amelia Angulo MD PhD Responsible [facility] [phone]            See the Encounter Report to view Anticoagulation Flowsheet and Dosing Calendar (Go to Encounters tab in chart review, and find the Anticoagulation Therapy Visit)    Spoke with patient.    Carol Hoffman RN

## 2017-06-05 NOTE — MR AVS SNAPSHOT
Arianna VALDIVIA Devang   6/5/2017   Anticoagulation Therapy Visit    Description:  57 year old female   Provider:  Carol Hoffman RN   Department:  [facility]           INR as of 6/5/2017     Today's INR 2.4      Anticoagulation Summary as of 6/5/2017     INR goal 2.0-3.0   Today's INR 2.4   Full instructions 4.5 mg on Tue, Thu, Sat; 3 mg all other days   Next INR check 6/13/2017    Indications   Long-term (current) use of anticoagulants [Z79.01] [Z79.01]  Atrial fibrillation (H) [I48.91] [I48.91]         June 2017 Details    Sun Mon Tue Wed Thu Fri Sat         1               2               3                 4               5      3 mg   See details      6      4.5 mg         7      3 mg         8      4.5 mg         9      3 mg         10      4.5 mg           11      3 mg         12      3 mg         13            14               15               16               17                 18               19               20               21               22               23               24                 25               26               27               28               29               30                 Date Details   06/05 This INR check       Date of next INR:  6/13/2017         How to take your warfarin dose     To take:  3 mg Take 1 of the 3 mg tablets.    To take:  4.5 mg Take 1.5 of the 3 mg tablets.

## 2017-06-10 ENCOUNTER — HEALTH MAINTENANCE LETTER (OUTPATIENT)
Age: 57
End: 2017-06-10

## 2017-06-13 ENCOUNTER — ANTICOAGULATION THERAPY VISIT (OUTPATIENT)
Dept: ANTICOAGULATION | Facility: CLINIC | Age: 57
End: 2017-06-13

## 2017-06-13 DIAGNOSIS — I48.91 ATRIAL FIBRILLATION, UNSPECIFIED TYPE (H): ICD-10-CM

## 2017-06-13 DIAGNOSIS — Z79.01 LONG-TERM (CURRENT) USE OF ANTICOAGULANTS: ICD-10-CM

## 2017-06-13 LAB — INR PPP: 1.8

## 2017-06-13 NOTE — PROGRESS NOTES
ANTICOAGULATION FOLLOW-UP CLINIC VISIT    Patient Name:  Arianna Siddiqi  Date:  6/13/2017  Contact Type:  Telephone    SUBJECTIVE:     Patient Findings     Positives No Problem Findings           OBJECTIVE    INR   Date Value Ref Range Status   06/13/2017 1.8  Final       ASSESSMENT / PLAN  INR assessment SUB    Recheck INR In: 1 WEEK    INR Location Home INR      Anticoagulation Summary as of 6/13/2017     INR goal 2.0-3.0   Today's INR 1.8!   Maintenance plan 3 mg (3 mg x 1) on Mon, Wed, Fri; 4.5 mg (3 mg x 1.5) all other days   Full instructions 3 mg on Mon, Wed, Fri; 4.5 mg all other days   Weekly total 27 mg   Plan last modified Carol Hoffman RN (6/13/2017)   Next INR check 6/20/2017   Priority INR   Target end date     Indications   Long-term (current) use of anticoagulants [Z79.01] [Z79.01]  Atrial fibrillation (H) [I48.91] [I48.91]         Anticoagulation Episode Summary     INR check location Clinic Lab    Preferred lab     Send INR reminders to [facility]    Comments 1/17 [employer] Monitoring to start on 2/28.  Pt's voicemail box is full and pt is unable to retrieve voice messages       Anticoagulation Care Providers     Provider Role Specialty Phone number    Amelia Angulo MD PhD Responsible [facility] [phone]            See the Encounter Report to view Anticoagulation Flowsheet and Dosing Calendar (Go to Encounters tab in chart review, and find the Anticoagulation Therapy Visit)    Spoke with patient.    Carol Hoffman RN

## 2017-06-13 NOTE — MR AVS SNAPSHOT
Arianna VALDIVIA Devang   6/13/2017   Anticoagulation Therapy Visit    Description:  57 year old female   Provider:  Carol Hoffman RN   Department:  [facility]           INR as of 6/13/2017     Today's INR 1.8!      Anticoagulation Summary as of 6/13/2017     INR goal 2.0-3.0   Today's INR 1.8!   Full instructions 3 mg on Mon, Wed, Fri; 4.5 mg all other days   Next INR check 6/20/2017    Indications   Long-term (current) use of anticoagulants [Z79.01] [Z79.01]  Atrial fibrillation (H) [I48.91] [I48.91]         June 2017 Details    Sun Mon Tue Wed Thu Fri Sat         1               2               3                 4               5               6               7               8               9               10                 11               12               13      4.5 mg   See details      14      3 mg         15      4.5 mg         16      3 mg         17      4.5 mg           18      4.5 mg         19      3 mg         20            21               22               23               24                 25               26               27               28               29               30                 Date Details   06/13 This INR check       Date of next INR:  6/20/2017         How to take your warfarin dose     To take:  3 mg Take 1 of the 3 mg tablets.    To take:  4.5 mg Take 1.5 of the 3 mg tablets.

## 2017-06-20 ENCOUNTER — ANTICOAGULATION THERAPY VISIT (OUTPATIENT)
Dept: ANTICOAGULATION | Facility: CLINIC | Age: 57
End: 2017-06-20

## 2017-06-20 DIAGNOSIS — Z79.01 LONG-TERM (CURRENT) USE OF ANTICOAGULANTS: ICD-10-CM

## 2017-06-20 DIAGNOSIS — I48.91 ATRIAL FIBRILLATION, UNSPECIFIED TYPE (H): ICD-10-CM

## 2017-06-20 LAB — INR PPP: 1.9

## 2017-06-20 NOTE — PROGRESS NOTES
ANTICOAGULATION FOLLOW-UP CLINIC VISIT    Patient Name:  Arianna Siddiqi  Date:  6/20/2017  Contact Type:  Telephone    SUBJECTIVE:     Patient Findings     Positives No Problem Findings           OBJECTIVE    INR   Date Value Ref Range Status   06/20/2017 1.9  Final       ASSESSMENT / PLAN  INR assessment SUB    Recheck INR In: 2 WEEKS    INR Location Home INR      Anticoagulation Summary as of 6/20/2017     INR goal 2.0-3.0   Today's INR 1.9!   Maintenance plan 3 mg (3 mg x 1) on Mon, Wed, Fri; 4.5 mg (3 mg x 1.5) all other days   Full instructions 3 mg on Mon, Wed, Fri; 4.5 mg all other days   Weekly total 27 mg   Plan last modified Carol Hoffman RN (6/13/2017)   Next INR check 7/5/2017   Priority INR   Target end date     Indications   Long-term (current) use of anticoagulants [Z79.01] [Z79.01]  Atrial fibrillation (H) [I48.91] [I48.91]         Anticoagulation Episode Summary     INR check location Clinic Lab    Preferred lab     Send INR reminders to [facility]    Comments 1/17 [employer] Monitoring to start on 2/28.  Pt's voicemail box is full and pt is unable to retrieve voice messages       Anticoagulation Care Providers     Provider Role Specialty Phone number    Amelia Angulo MD PhD Responsible [facility] [phone]            See the Encounter Report to view Anticoagulation Flowsheet and Dosing Calendar (Go to Encounters tab in chart review, and find the Anticoagulation Therapy Visit)  Spoke with patient.    Carol Hoffman RN

## 2017-06-20 NOTE — MR AVS SNAPSHOT
Arianna VALDIVIA Devang   6/20/2017   Anticoagulation Therapy Visit    Description:  57 year old female   Provider:  Carol Hoffman RN   Department:  [facility]           INR as of 6/20/2017     Today's INR 1.9!      Anticoagulation Summary as of 6/20/2017     INR goal 2.0-3.0   Today's INR 1.9!   Full instructions 3 mg on Mon, Wed, Fri; 4.5 mg all other days   Next INR check 7/5/2017    Indications   Long-term (current) use of anticoagulants [Z79.01] [Z79.01]  Atrial fibrillation (H) [I48.91] [I48.91]         June 2017 Details    Sun Mon Tue Wed Thu Fri Sat         1               2               3                 4               5               6               7               8               9               10                 11               12               13               14               15               16               17                 18               19               20      4.5 mg   See details      21      3 mg         22      4.5 mg         23      3 mg         24      4.5 mg           25      4.5 mg         26      3 mg         27      4.5 mg         28      3 mg         29      4.5 mg         30      3 mg           Date Details   06/20 This INR check               How to take your warfarin dose     To take:  3 mg Take 1 of the 3 mg tablets.    To take:  4.5 mg Take 1.5 of the 3 mg tablets.           July 2017 Details    Sun Mon Tue Wed Thu Fri Sat           1      4.5 mg           2      4.5 mg         3      3 mg         4      4.5 mg         5            6               7               8                 9               10               11               12               13               14               15                 16               17               18               19               20               21               22                 23               24               25               26               27               28               29                 30               31                      Date Details   No additional details    Date of next INR:  7/5/2017         How to take your warfarin dose     To take:  3 mg Take 1 of the 3 mg tablets.    To take:  4.5 mg Take 1.5 of the 3 mg tablets.

## 2017-07-05 ENCOUNTER — ANTICOAGULATION THERAPY VISIT (OUTPATIENT)
Dept: ANTICOAGULATION | Facility: CLINIC | Age: 57
End: 2017-07-05

## 2017-07-05 DIAGNOSIS — I48.91 ATRIAL FIBRILLATION, UNSPECIFIED TYPE (H): ICD-10-CM

## 2017-07-05 DIAGNOSIS — Z79.01 LONG-TERM (CURRENT) USE OF ANTICOAGULANTS: ICD-10-CM

## 2017-07-05 DIAGNOSIS — I48.20 CHRONIC ATRIAL FIBRILLATION (H): ICD-10-CM

## 2017-07-05 LAB — INR PPP: 2.5

## 2017-07-05 RX ORDER — WARFARIN SODIUM 3 MG/1
TABLET ORAL
Qty: 180 TABLET | Refills: 1 | Status: SHIPPED | OUTPATIENT
Start: 2017-07-05 | End: 2017-11-06

## 2017-07-05 NOTE — MR AVS SNAPSHOT
Arianna VALDIVIA Devang   7/5/2017   Anticoagulation Therapy Visit    Description:  57 year old female   Provider:  Tanna Dawkins, RN   Department:  [facility]           INR as of 7/5/2017     Today's INR 2.50      Anticoagulation Summary as of 7/5/2017     INR goal 2.0-3.0   Today's INR 2.50   Full instructions 3 mg on Mon, Wed, Fri; 4.5 mg all other days   Next INR check 7/18/2017    Indications   Long-term (current) use of anticoagulants [Z79.01] [Z79.01]  Atrial fibrillation (H) [I48.91] [I48.91]         July 2017 Details    Sun Mon Tue Wed Thu Fri Sat           1                 2               3               4               5      3 mg   See details      6      4.5 mg         7      3 mg         8      4.5 mg           9      4.5 mg         10      3 mg         11      4.5 mg         12      3 mg         13      4.5 mg         14      3 mg         15      4.5 mg           16      4.5 mg         17      3 mg         18            19               20               21               22                 23               24               25               26               27               28               29                 30               31                     Date Details   07/05 This INR check       Date of next INR:  7/18/2017         How to take your warfarin dose     To take:  3 mg Take 1 of the 3 mg tablets.    To take:  4.5 mg Take 1.5 of the 3 mg tablets.

## 2017-07-05 NOTE — PROGRESS NOTES
ANTICOAGULATION FOLLOW-UP CLINIC VISIT    Patient Name:  Arianna Siddiqi  Date:  7/5/2017  Contact Type:  Telephone    SUBJECTIVE:     Patient Findings     Positives No Problem Findings           OBJECTIVE    INR   Date Value Ref Range Status   07/05/2017 2.50  Final     Comment:     Home Monitoring Machine        ASSESSMENT / PLAN  INR assessment THER    Recheck INR In: 2 WEEKS    INR Location Home INR      Anticoagulation Summary as of 7/5/2017     INR goal 2.0-3.0   Today's INR 2.50   Maintenance plan 3 mg (3 mg x 1) on Mon, Wed, Fri; 4.5 mg (3 mg x 1.5) all other days   Full instructions 3 mg on Mon, Wed, Fri; 4.5 mg all other days   Weekly total 27 mg   Plan last modified Carol Hoffman RN (6/13/2017)   Next INR check 7/18/2017   Priority INR   Target end date     Indications   Long-term (current) use of anticoagulants [Z79.01] [Z79.01]  Atrial fibrillation (H) [I48.91] [I48.91]         Anticoagulation Episode Summary     INR check location Clinic Lab    Preferred lab     Send INR reminders to [facility]    Comments 1/17 [employer] Monitoring to start on 2/28.  Pt's voicemail box is full and pt is unable to retrieve voice messages       Anticoagulation Care Providers     Provider Role Specialty Phone number    Amelia Angulo MD PhD Responsible [facility] [phone]            See the Encounter Report to view Anticoagulation Flowsheet and Dosing Calendar (Go to Encounters tab in chart review, and find the Anticoagulation Therapy Visit)    Spoke with patient. Gave them their lab results and new warfarin recommendation.  No changes in health, medication, or diet. No missed doses, no falls. No signs or symptoms of bleed or clotting.     Sending another prescription for Warfarin 3mg tablets to [facility]     Tanna Dawkins RN

## 2017-07-18 ENCOUNTER — ANTICOAGULATION THERAPY VISIT (OUTPATIENT)
Dept: ANTICOAGULATION | Facility: CLINIC | Age: 57
End: 2017-07-18

## 2017-07-18 DIAGNOSIS — I48.91 ATRIAL FIBRILLATION, UNSPECIFIED TYPE (H): ICD-10-CM

## 2017-07-18 DIAGNOSIS — Z79.01 LONG-TERM (CURRENT) USE OF ANTICOAGULANTS: ICD-10-CM

## 2017-07-18 LAB — INR PPP: 2.7

## 2017-07-18 NOTE — MR AVS SNAPSHOT
Arianna VALDIVIA Devang   7/18/2017   Anticoagulation Therapy Visit    Description:  57 year old female   Provider:  Carol Hoffman RN   Department:  [facility]           INR as of 7/18/2017     Today's INR 2.7      Anticoagulation Summary as of 7/18/2017     INR goal 2.0-3.0   Today's INR 2.7   Full instructions 3 mg on Mon, Wed, Fri; 4.5 mg all other days   Next INR check 8/1/2017    Indications   Long-term (current) use of anticoagulants [Z79.01] [Z79.01]  Atrial fibrillation (H) [I48.91] [I48.91]         July 2017 Details    Sun Mon Tue Wed Thu Fri Sat           1                 2               3               4               5               6               7               8                 9               10               11               12               13               14               15                 16               17               18      4.5 mg   See details      19      3 mg         20      4.5 mg         21      3 mg         22      4.5 mg           23      4.5 mg         24      3 mg         25      4.5 mg         26      3 mg         27      4.5 mg         28      3 mg         29      4.5 mg           30      4.5 mg         31      3 mg               Date Details   07/18 This INR check               How to take your warfarin dose     To take:  3 mg Take 1 of the 3 mg tablets.    To take:  4.5 mg Take 1.5 of the 3 mg tablets.           August 2017 Details    Sun Mon Tue Wed Thu Fri Sat       1            2               3               4               5                 6               7               8               9               10               11               12                 13               14               15               16               17               18               19                 20               21               22               23               24               25               26                 27               28               29               30                31                  Date Details   No additional details    Date of next INR:  8/1/2017         How to take your warfarin dose     To take:  4.5 mg Take 1.5 of the 3 mg tablets.

## 2017-07-18 NOTE — PROGRESS NOTES
ANTICOAGULATION FOLLOW-UP CLINIC VISIT    Patient Name:  Arianna Siddiqi  Date:  7/18/2017  Contact Type:  Telephone    SUBJECTIVE:     Patient Findings     Positives No Problem Findings           OBJECTIVE    INR   Date Value Ref Range Status   07/18/2017 2.7  Final       ASSESSMENT / PLAN  INR assessment THER    Recheck INR In: 2 WEEKS    INR Location Home INR      Anticoagulation Summary as of 7/18/2017     INR goal 2.0-3.0   Today's INR 2.7   Maintenance plan 3 mg (3 mg x 1) on Mon, Wed, Fri; 4.5 mg (3 mg x 1.5) all other days   Full instructions 3 mg on Mon, Wed, Fri; 4.5 mg all other days   Weekly total 27 mg   Plan last modified Carol Hoffman RN (6/13/2017)   Next INR check 8/1/2017   Priority INR   Target end date     Indications   Long-term (current) use of anticoagulants [Z79.01] [Z79.01]  Atrial fibrillation (H) [I48.91] [I48.91]         Anticoagulation Episode Summary     INR check location Clinic Lab    Preferred lab     Send INR reminders to [facility]    Comments 1/17 [employer] Home Monitoring to start on 2/28.  Pt's voicemail box is full and pt is unable to retrieve voice messages       Anticoagulation Care Providers     Provider Role Specialty Phone number    Amelia Angulo MD PhD [facility] [phone]            See the Encounter Report to view Anticoagulation Flowsheet and Dosing Calendar (Go to Encounters tab in chart review, and find the Anticoagulation Therapy Visit)  Spoke with patient.    Carol Hoffman RN

## 2017-08-01 ENCOUNTER — ANTICOAGULATION THERAPY VISIT (OUTPATIENT)
Dept: ANTICOAGULATION | Facility: CLINIC | Age: 57
End: 2017-08-01

## 2017-08-01 DIAGNOSIS — Z79.01 LONG-TERM (CURRENT) USE OF ANTICOAGULANTS: ICD-10-CM

## 2017-08-01 DIAGNOSIS — I48.91 ATRIAL FIBRILLATION, UNSPECIFIED TYPE (H): ICD-10-CM

## 2017-08-01 LAB — INR PPP: 2.4

## 2017-08-01 NOTE — PROGRESS NOTES
ANTICOAGULATION FOLLOW-UP CLINIC VISIT    Patient Name:  Arianna Siddiqi  Date:  8/1/2017  Contact Type:  Telephone    SUBJECTIVE:     Patient Findings     Positives No Problem Findings           OBJECTIVE    INR   Date Value Ref Range Status   08/01/2017 2.4  Final       ASSESSMENT / PLAN  INR assessment THER    Recheck INR In: 3 WEEKS    INR Location Home INR      Anticoagulation Summary as of 8/1/2017     INR goal 2.0-3.0   Today's INR 2.4   Maintenance plan 3 mg (3 mg x 1) on Mon, Wed, Fri; 4.5 mg (3 mg x 1.5) all other days   Full instructions 3 mg on Mon, Wed, Fri; 4.5 mg all other days   Weekly total 27 mg   Plan last modified Carol Hoffman RN (6/13/2017)   Next INR check 8/22/2017   Priority INR   Target end date     Indications   Long-term (current) use of anticoagulants [Z79.01] [Z79.01]  Atrial fibrillation (H) [I48.91] [I48.91]         Anticoagulation Episode Summary     INR check location Clinic Lab    Preferred lab     Send INR reminders to [facility]    Comments 1/17 [employer] Home Monitoring to start on 2/28.  Pt's voicemail box is full and pt is unable to retrieve voice messages       Anticoagulation Care Providers     Provider Role Specialty Phone number    Amelia Angulo MD PhD [facility] [phone]            See the Encounter Report to view Anticoagulation Flowsheet and Dosing Calendar (Go to Encounters tab in chart review, and find the Anticoagulation Therapy Visit)  Spoke with patient.    Carol Hoffman RN

## 2017-08-01 NOTE — MR AVS SNAPSHOT
Arianna VALDIVIA Devang   8/1/2017   Anticoagulation Therapy Visit    Description:  57 year old female   Provider:  Carol Hoffman RN   Department:  [facility]           INR as of 8/1/2017     Today's INR 2.4      Anticoagulation Summary as of 8/1/2017     INR goal 2.0-3.0   Today's INR 2.4   Full instructions 3 mg on Mon, Wed, Fri; 4.5 mg all other days   Next INR check 8/22/2017    Indications   Long-term (current) use of anticoagulants [Z79.01] [Z79.01]  Atrial fibrillation (H) [I48.91] [I48.91]         August 2017 Details    Sun Mon Tue Wed Thu Fri Sat       1      4.5 mg   See details      2      3 mg         3      4.5 mg         4      3 mg         5      4.5 mg           6      4.5 mg         7      3 mg         8      4.5 mg         9      3 mg         10      4.5 mg         11      3 mg         12      4.5 mg           13      4.5 mg         14      3 mg         15      4.5 mg         16      3 mg         17      4.5 mg         18      3 mg         19      4.5 mg           20      4.5 mg         21      3 mg         22            23               24               25               26                 27               28               29               30               31                  Date Details   08/01 This INR check       Date of next INR:  8/22/2017         How to take your warfarin dose     To take:  3 mg Take 1 of the 3 mg tablets.    To take:  4.5 mg Take 1.5 of the 3 mg tablets.

## 2017-08-09 DIAGNOSIS — E87.6 HYPOKALEMIA: ICD-10-CM

## 2017-08-09 DIAGNOSIS — I50.43 ACUTE ON CHRONIC COMBINED SYSTOLIC AND DIASTOLIC CONGESTIVE HEART FAILURE (H): ICD-10-CM

## 2017-08-09 DIAGNOSIS — M10.9 GOUT, UNSPECIFIED CAUSE, UNSPECIFIED CHRONICITY, UNSPECIFIED SITE: ICD-10-CM

## 2017-08-09 NOTE — TELEPHONE ENCOUNTER
potassium chloride       Last Written Prescription Date:  5/12/17  Last Fill Quantity: 180,   # refills: 0  Last Office Visit : 11/16/16  EM MORSE  Future Office visit:  NONE     allopurinol       Last Written Prescription Date:  5/12/17  Last Fill Quantity: 180,   # refills: 0     CBC RESULTS:   Recent Labs   Lab Test  11/16/16   1316   WBC  7.4   RBC  2.98*   HGB  7.4*   HCT  26.5*   MCV  89   MCH  24.8*   MCHC  27.9*   RDW  22.1*   PLT  364     Creatinine   Date Value Ref Range Status   11/16/2016 0.82 0.52 - 1.04 mg/dL Final

## 2017-08-10 ENCOUNTER — TELEPHONE (OUTPATIENT)
Dept: NURSING | Facility: CLINIC | Age: 57
End: 2017-08-10

## 2017-08-10 ENCOUNTER — NURSE TRIAGE (OUTPATIENT)
Dept: NURSING | Facility: CLINIC | Age: 57
End: 2017-08-10

## 2017-08-10 DIAGNOSIS — I50.43 ACUTE ON CHRONIC COMBINED SYSTOLIC AND DIASTOLIC CONGESTIVE HEART FAILURE (H): ICD-10-CM

## 2017-08-10 RX ORDER — METOPROLOL SUCCINATE 50 MG/1
TABLET, EXTENDED RELEASE ORAL
Qty: 135 TABLET | Refills: 0 | Status: CANCELLED | OUTPATIENT
Start: 2017-08-10

## 2017-08-10 NOTE — TELEPHONE ENCOUNTER
"Clinic Action Needed:Yes  Reason for Call: Patient needs a refill of her Metoprolol, Potassium, and Allopurinol. She only has enough for one more day. There are orders \"pending\" under the medication tab.  Routed to: *[facility] Refills - RAF aGmez RN  [facility]      "

## 2017-08-10 NOTE — TELEPHONE ENCOUNTER
Reason for Disposition    Caller requesting a NON-URGENT new prescription or refill and triager unable to refill per unit policy     Patient has enough for one more day.    Protocols used: MEDICATION QUESTION CALL-ADULT-

## 2017-08-11 RX ORDER — POTASSIUM CHLORIDE 1500 MG/1
40 TABLET, EXTENDED RELEASE ORAL DAILY
Qty: 60 TABLET | Refills: 0 | Status: SHIPPED | OUTPATIENT
Start: 2017-08-11 | End: 2017-08-11

## 2017-08-11 RX ORDER — ALLOPURINOL 100 MG/1
100 TABLET ORAL 2 TIMES DAILY
Qty: 60 TABLET | Refills: 0 | Status: SHIPPED | OUTPATIENT
Start: 2017-08-11 | End: 2017-08-11

## 2017-08-11 RX ORDER — ALLOPURINOL 100 MG/1
100 TABLET ORAL 2 TIMES DAILY
Qty: 120 TABLET | Refills: 0 | Status: SHIPPED | OUTPATIENT
Start: 2017-08-11 | End: 2017-11-10

## 2017-08-11 RX ORDER — METOPROLOL SUCCINATE 50 MG/1
75 TABLET, EXTENDED RELEASE ORAL DAILY
Qty: 90 TABLET | Refills: 0 | Status: SHIPPED | OUTPATIENT
Start: 2017-08-11 | End: 2017-11-10

## 2017-08-11 RX ORDER — POTASSIUM CHLORIDE 1500 MG/1
40 TABLET, EXTENDED RELEASE ORAL DAILY
Qty: 120 TABLET | Refills: 0 | Status: SHIPPED | OUTPATIENT
Start: 2017-08-11 | End: 2017-11-10

## 2017-08-11 RX ORDER — METOPROLOL SUCCINATE 50 MG/1
75 TABLET, EXTENDED RELEASE ORAL DAILY
Qty: 45 TABLET | Refills: 0 | Status: SHIPPED | OUTPATIENT
Start: 2017-08-11 | End: 2017-08-11

## 2017-08-11 NOTE — TELEPHONE ENCOUNTER
Patient informed of  departure. She is going to re establish care with . I have gotten approval to refill her medication until next available appointment.  Sejal Copeland RN

## 2017-08-22 ENCOUNTER — ANTICOAGULATION THERAPY VISIT (OUTPATIENT)
Dept: ANTICOAGULATION | Facility: CLINIC | Age: 57
End: 2017-08-22

## 2017-08-22 DIAGNOSIS — I48.91 ATRIAL FIBRILLATION, UNSPECIFIED TYPE (H): ICD-10-CM

## 2017-08-22 DIAGNOSIS — Z79.01 LONG-TERM (CURRENT) USE OF ANTICOAGULANTS: ICD-10-CM

## 2017-08-22 LAB — INR PPP: 2.2

## 2017-08-22 NOTE — MR AVS SNAPSHOT
Arianna VALDIVIA Devang   8/22/2017   Anticoagulation Therapy Visit    Description:  57 year old female   Provider:  Carol Hoffman RN   Department:  [facility]           INR as of 8/22/2017     Today's INR 2.2      Anticoagulation Summary as of 8/22/2017     INR goal 2.0-3.0   Today's INR 2.2   Full instructions 3 mg on Mon, Wed, Fri; 4.5 mg all other days   Next INR check 9/19/2017    Indications   Long-term (current) use of anticoagulants [Z79.01] [Z79.01]  Atrial fibrillation (H) [I48.91] [I48.91]         August 2017 Details    Sun Mon Tue Wed Thu Fri Sat       1               2               3               4               5                 6               7               8               9               10               11               12                 13               14               15               16               17               18               19                 20               21               22      4.5 mg   See details      23      3 mg         24      4.5 mg         25      3 mg         26      4.5 mg           27      4.5 mg         28      3 mg         29      4.5 mg         30      3 mg         31      4.5 mg            Date Details   08/22 This INR check               How to take your warfarin dose     To take:  3 mg Take 1 of the 3 mg tablets.    To take:  4.5 mg Take 1.5 of the 3 mg tablets.           September 2017 Details    Sun Mon Tue Wed Thu Fri Sat          1      3 mg         2      4.5 mg           3      4.5 mg         4      3 mg         5      4.5 mg         6      3 mg         7      4.5 mg         8      3 mg         9      4.5 mg           10      4.5 mg         11      3 mg         12      4.5 mg         13      3 mg         14      4.5 mg         15      3 mg         16      4.5 mg           17      4.5 mg         18      3 mg         19            20               21               22               23                 24               25               26                27               28               29               30                Date Details   No additional details    Date of next INR:  9/19/2017         How to take your warfarin dose     To take:  3 mg Take 1 of the 3 mg tablets.    To take:  4.5 mg Take 1.5 of the 3 mg tablets.

## 2017-08-22 NOTE — PROGRESS NOTES
ANTICOAGULATION FOLLOW-UP CLINIC VISIT    Patient Name:  Arianna Siddiqi  Date:  8/22/2017  Contact Type:  Telephone    SUBJECTIVE:     Patient Findings     Comments Pt has had a touch of GI flu, and had an emesis on Friday.  Starting to feel better today.           OBJECTIVE    INR   Date Value Ref Range Status   08/22/2017 2.2  Final       ASSESSMENT / PLAN  INR assessment THER    Recheck INR In: 4 WEEKS    INR Location Home INR      Anticoagulation Summary as of 8/22/2017     INR goal 2.0-3.0   Today's INR 2.2   Maintenance plan 3 mg (3 mg x 1) on Mon, Wed, Fri; 4.5 mg (3 mg x 1.5) all other days   Full instructions 3 mg on Mon, Wed, Fri; 4.5 mg all other days   Weekly total 27 mg   Plan last modified Carol Hoffman RN (6/13/2017)   Next INR check 9/19/2017   Priority INR   Target end date     Indications   Long-term (current) use of anticoagulants [Z79.01] [Z79.01]  Atrial fibrillation (H) [I48.91] [I48.91]         Anticoagulation Episode Summary     INR check location Clinic Lab    Preferred lab     Send INR reminders to [facility]    Comments 1/17 [employer] Home Monitoring to start on 2/28.  Pt's voicemail box is full and pt is unable to retrieve voice messages       Anticoagulation Care Providers     Provider Role Specialty Phone number    Amelia Angulo MD PhD Responsible [facility] [phone]            See the Encounter Report to view Anticoagulation Flowsheet and Dosing Calendar (Go to Encounters tab in chart review, and find the Anticoagulation Therapy Visit)    Spoke with patient.    Carol Hoffman RN

## 2017-09-05 ENCOUNTER — ANTICOAGULATION THERAPY VISIT (OUTPATIENT)
Dept: ANTICOAGULATION | Facility: CLINIC | Age: 57
End: 2017-09-05

## 2017-09-05 DIAGNOSIS — I48.91 ATRIAL FIBRILLATION, UNSPECIFIED TYPE (H): ICD-10-CM

## 2017-09-05 DIAGNOSIS — Z79.01 LONG-TERM (CURRENT) USE OF ANTICOAGULANTS: ICD-10-CM

## 2017-09-05 LAB — INR PPP: 2.1

## 2017-09-05 NOTE — PROGRESS NOTES
"  ANTICOAGULATION FOLLOW-UP CLINIC VISIT    Patient Name:  Arianna Siddiqi  Date:  9/5/2017  Contact Type:  Telephone    SUBJECTIVE:     Patient Findings     Positives No Problem Findings    Comments Arianna reports that [employer] has been calling her instructing her to check her INR Q 2 weeks. \"You signed a contract.\"           OBJECTIVE    INR   Date Value Ref Range Status   09/05/2017 2.1  Final       ASSESSMENT / PLAN  INR assessment THER    Recheck INR In: 2 WEEKS    INR Location Home INR      Anticoagulation Summary as of 9/5/2017     INR goal 2.0-3.0   Today's INR 2.1   Maintenance plan 3 mg (3 mg x 1) on Mon, Wed, Fri; 4.5 mg (3 mg x 1.5) all other days   Full instructions 3 mg on Mon, Wed, Fri; 4.5 mg all other days   Weekly total 27 mg   No change documented Ying Hollis RN   Plan last modified Carol Hoffman RN (6/13/2017)   Next INR check 9/19/2017   Priority INR   Target end date     Indications   Long-term (current) use of anticoagulants [Z79.01] [Z79.01]  Atrial fibrillation (H) [I48.91] [I48.91]         Anticoagulation Episode Summary     INR check location Clinic Lab    Preferred lab     Send INR reminders to [facility]    Comments 1/17 [employer] Home Monitoring to start on 2/28.  Pt's voicemail box is full and pt is unable to retrieve voice messages       Anticoagulation Care Providers     Provider Role Specialty Phone number    Amelia Angulo MD PhD Responsible [facility] [phone]            See the Encounter Report to view Anticoagulation Flowsheet and Dosing Calendar (Go to Encounters tab in chart review, and find the Anticoagulation Therapy Visit)    Spoke with Arianna.    Ying Hollis RN               "

## 2017-09-05 NOTE — MR AVS SNAPSHOT
Arianna VALDIVIA Devang   9/5/2017   Anticoagulation Therapy Visit    Description:  57 year old female   Provider:  Ying Hollis, RN   Department:  [facility]           INR as of 9/5/2017     Today's INR 2.1      Anticoagulation Summary as of 9/5/2017     INR goal 2.0-3.0   Today's INR 2.1   Full instructions 3 mg on Mon, Wed, Fri; 4.5 mg all other days   Next INR check 9/19/2017    Indications   Long-term (current) use of anticoagulants [Z79.01] [Z79.01]  Atrial fibrillation (H) [I48.91] [I48.91]         September 2017 Details    Sun Mon Tue Wed Thu Fri Sat          1               2                 3               4               5      4.5 mg   See details      6      3 mg         7      4.5 mg         8      3 mg         9      4.5 mg           10      4.5 mg         11      3 mg         12      4.5 mg         13      3 mg         14      4.5 mg         15      3 mg         16      4.5 mg           17      4.5 mg         18      3 mg         19            20               21               22               23                 24               25               26               27               28               29               30                Date Details   09/05 This INR check       Date of next INR:  9/19/2017         How to take your warfarin dose     To take:  3 mg Take 1 of the 3 mg tablets.    To take:  4.5 mg Take 1.5 of the 3 mg tablets.

## 2017-09-15 ENCOUNTER — MEDICAL CORRESPONDENCE (OUTPATIENT)
Dept: HEALTH INFORMATION MANAGEMENT | Facility: CLINIC | Age: 57
End: 2017-09-15

## 2017-09-19 ENCOUNTER — ANTICOAGULATION THERAPY VISIT (OUTPATIENT)
Dept: ANTICOAGULATION | Facility: CLINIC | Age: 57
End: 2017-09-19

## 2017-09-19 DIAGNOSIS — I48.91 ATRIAL FIBRILLATION, UNSPECIFIED TYPE (H): ICD-10-CM

## 2017-09-19 DIAGNOSIS — Z79.01 LONG-TERM (CURRENT) USE OF ANTICOAGULANTS: ICD-10-CM

## 2017-09-19 LAB — INR PPP: 2.3

## 2017-09-19 NOTE — MR AVS SNAPSHOT
Arianna VALDIVIA Devang   9/19/2017   Anticoagulation Therapy Visit    Description:  57 year old female   Provider:  Carol Hoffman RN   Department:  [facility]           INR as of 9/19/2017     Today's INR 2.3      Anticoagulation Summary as of 9/19/2017     INR goal 2.0-3.0   Today's INR 2.3   Full instructions 3 mg on Mon, Wed, Fri; 4.5 mg all other days   Next INR check 10/3/2017    Indications   Long-term (current) use of anticoagulants [Z79.01] [Z79.01]  Atrial fibrillation (H) [I48.91] [I48.91]         September 2017 Details    Sun Mon Tue Wed Thu Fri Sat          1               2                 3               4               5               6               7               8               9                 10               11               12               13               14               15               16                 17               18               19      4.5 mg   See details      20      3 mg         21      4.5 mg         22      3 mg         23      4.5 mg           24      4.5 mg         25      3 mg         26      4.5 mg         27      3 mg         28      4.5 mg         29      3 mg         30      4.5 mg          Date Details   09/19 This INR check               How to take your warfarin dose     To take:  3 mg Take 1 of the 3 mg tablets.    To take:  4.5 mg Take 1.5 of the 3 mg tablets.           October 2017 Details    Sun Mon Tue Wed Thu Fri Sat     1      4.5 mg         2      3 mg         3            4               5               6               7                 8               9               10               11               12               13               14                 15               16               17               18               19               20               21                 22               23               24               25               26               27               28                 29               30               31                     Date Details   No additional details    Date of next INR:  10/3/2017         How to take your warfarin dose     To take:  3 mg Take 1 of the 3 mg tablets.    To take:  4.5 mg Take 1.5 of the 3 mg tablets.

## 2017-09-19 NOTE — PROGRESS NOTES
ANTICOAGULATION FOLLOW-UP CLINIC VISIT    Patient Name:  Arianna Siddiqi  Date:  9/19/2017  Contact Type:  Telephone    SUBJECTIVE:     Patient Findings     Positives No Problem Findings           OBJECTIVE    INR   Date Value Ref Range Status   09/19/2017 2.3  Final       ASSESSMENT / PLAN  INR assessment THER    Recheck INR In: 2 WEEKS    INR Location Home INR      Anticoagulation Summary as of 9/19/2017     INR goal 2.0-3.0   Today's INR 2.3   Maintenance plan 3 mg (3 mg x 1) on Mon, Wed, Fri; 4.5 mg (3 mg x 1.5) all other days   Full instructions 3 mg on Mon, Wed, Fri; 4.5 mg all other days   Weekly total 27 mg   Plan last modified Carol Hoffman RN (6/13/2017)   Next INR check 10/3/2017   Priority INR   Target end date     Indications   Long-term (current) use of anticoagulants [Z79.01] [Z79.01]  Atrial fibrillation (H) [I48.91] [I48.91]         Anticoagulation Episode Summary     INR check location Clinic Lab    Preferred lab     Send INR reminders to [facility]    Comments 1/17 [employer] to start on 2/28.  Pt's voicemail box is full and pt is unable to retrieve voice messages       Anticoagulation Care Providers     Provider Role Specialty Phone number    Amelia Angulo MD PhD [facility] [phone]            See the Encounter Report to view Anticoagulation Flowsheet and Dosing Calendar (Go to Encounters tab in chart review, and find the Anticoagulation Therapy Visit)  Spoke with patient.    Carol Hoffman RN

## 2017-10-03 ENCOUNTER — ANTICOAGULATION THERAPY VISIT (OUTPATIENT)
Dept: ANTICOAGULATION | Facility: CLINIC | Age: 57
End: 2017-10-03

## 2017-10-03 DIAGNOSIS — I48.91 ATRIAL FIBRILLATION, UNSPECIFIED TYPE (H): ICD-10-CM

## 2017-10-03 DIAGNOSIS — Z79.01 LONG-TERM (CURRENT) USE OF ANTICOAGULANTS: ICD-10-CM

## 2017-10-03 LAB — INR PPP: 1.7

## 2017-10-03 NOTE — MR AVS SNAPSHOT
Arianna VALDIVIA Devang   10/3/2017   Anticoagulation Therapy Visit    Description:  57 year old female   Provider:  Paradise Pizarro, RN   Department:  [facility]           INR as of 10/3/2017     Today's INR 1.7!      Anticoagulation Summary as of 10/3/2017     INR goal 2.0-3.0   Today's INR 1.7!   Full instructions 10/3: 6 mg; Otherwise 3 mg on Mon, Fri; 4.5 mg all other days   Next INR check 10/10/2017    Indications   Long-term (current) use of anticoagulants [Z79.01] [Z79.01]  Atrial fibrillation (H) [I48.91] [I48.91]         October 2017 Details    Sun Mon Tue Wed Thu Fri Sat     1               2               3      6 mg   See details      4      4.5 mg         5      4.5 mg         6      3 mg         7      4.5 mg           8      4.5 mg         9      3 mg         10            11               12               13               14                 15               16               17               18               19               20               21                 22               23               24               25               26               27               28                 29               30               31                    Date Details   10/03 This INR check       Date of next INR:  10/10/2017         How to take your warfarin dose     To take:  3 mg Take 1 of the 3 mg tablets.    To take:  4.5 mg Take 1.5 of the 3 mg tablets.    To take:  6 mg Take 2 of the 3 mg tablets.

## 2017-10-10 ENCOUNTER — ANTICOAGULATION THERAPY VISIT (OUTPATIENT)
Dept: ANTICOAGULATION | Facility: CLINIC | Age: 57
End: 2017-10-10

## 2017-10-10 DIAGNOSIS — I48.91 ATRIAL FIBRILLATION, UNSPECIFIED TYPE (H): ICD-10-CM

## 2017-10-10 DIAGNOSIS — Z79.01 LONG-TERM (CURRENT) USE OF ANTICOAGULANTS: ICD-10-CM

## 2017-10-10 LAB — INR PPP: 2.4

## 2017-10-10 NOTE — MR AVS SNAPSHOT
Arianna VALDIVIA Devang   10/10/2017   Anticoagulation Therapy Visit    Description:  57 year old female   Provider:  Paradise Pizarro, RN   Department:  [facility]           INR as of 10/10/2017     Today's INR 2.4      Anticoagulation Summary as of 10/10/2017     INR goal 2.0-3.0   Today's INR 2.4   Full instructions 3 mg on Mon, Fri; 4.5 mg all other days   Next INR check 10/17/2017    Indications   Long-term (current) use of anticoagulants [Z79.01] [Z79.01]  Atrial fibrillation (H) [I48.91] [I48.91]         October 2017 Details    Sun Mon Tue Wed Thu Fri Sat     1               2               3               4               5               6               7                 8               9               10      4.5 mg   See details      11      4.5 mg         12      4.5 mg         13      3 mg         14      4.5 mg           15      4.5 mg         16      3 mg         17            18               19               20               21                 22               23               24               25               26               27               28                 29               30               31                    Date Details   10/10 This INR check       Date of next INR:  10/17/2017         How to take your warfarin dose     To take:  3 mg Take 1 of the 3 mg tablets.    To take:  4.5 mg Take 1.5 of the 3 mg tablets.

## 2017-10-10 NOTE — PROGRESS NOTES
ANTICOAGULATION FOLLOW-UP CLINIC VISIT    Patient Name:  Arianna Siddiqi  Date:  10/10/2017  Contact Type:  Telephone    SUBJECTIVE:     Patient Findings     Comments Continues drinking about 3 cans Ensure/week           OBJECTIVE    INR   Date Value Ref Range Status   10/10/2017 2.4  Final       ASSESSMENT / PLAN  INR assessment THER    Recheck INR In: 1 WEEK    INR Location Home INR      Anticoagulation Summary as of 10/10/2017     INR goal 2.0-3.0   Today's INR 2.4   Maintenance plan 3 mg (3 mg x 1) on Mon, Fri; 4.5 mg (3 mg x 1.5) all other days   Full instructions 3 mg on Mon, Fri; 4.5 mg all other days   Weekly total 28.5 mg   No change documented Paradise Pizarro RN   Plan last modified Paradise Pizarro RN (10/3/2017)   Next INR check 10/17/2017   Priority INR   Target end date     Indications   Long-term (current) use of anticoagulants [Z79.01] [Z79.01]  Atrial fibrillation (H) [I48.91] [I48.91]         Anticoagulation Episode Summary     INR check location Clinic Lab    Preferred lab     Send INR reminders to [facility]    Comments 1/17 [employer] to start on 2/28.  Pt's voicemail box is full and pt is unable to retrieve voice messages       Anticoagulation Care Providers     Provider Role Specialty Phone number    Amelia Angulo MD PhD Responsible [facility] [phone]            See the Encounter Report to view Anticoagulation Flowsheet and Dosing Calendar (Go to Encounters tab in chart review, and find the Anticoagulation Therapy Visit)    Spoke with Jaycee Pizarro, RN

## 2017-10-17 ENCOUNTER — ANTICOAGULATION THERAPY VISIT (OUTPATIENT)
Dept: ANTICOAGULATION | Facility: CLINIC | Age: 57
End: 2017-10-17

## 2017-10-17 DIAGNOSIS — Z79.01 LONG-TERM (CURRENT) USE OF ANTICOAGULANTS: ICD-10-CM

## 2017-10-17 DIAGNOSIS — I48.91 ATRIAL FIBRILLATION, UNSPECIFIED TYPE (H): ICD-10-CM

## 2017-10-17 LAB — INR PPP: 2.2

## 2017-10-17 NOTE — MR AVS SNAPSHOT
Arianna VALDIVIA Devang   10/17/2017   Anticoagulation Therapy Visit    Description:  57 year old female   Provider:  Paradise Pizarro, RN   Department:  [facility]           INR as of 10/17/2017     Today's INR 2.2      Anticoagulation Summary as of 10/17/2017     INR goal 2.0-3.0   Today's INR 2.2   Full instructions 3 mg on Mon, Fri; 4.5 mg all other days   Next INR check 10/31/2017    Indications   Long-term (current) use of anticoagulants [Z79.01] [Z79.01]  Atrial fibrillation (H) [I48.91] [I48.91]         October 2017 Details    Sun Mon Tue Wed Thu Fri Sat     1               2               3               4               5               6               7                 8               9               10               11               12               13               14                 15               16               17      4.5 mg   See details      18      4.5 mg         19      4.5 mg         20      3 mg         21      4.5 mg           22      4.5 mg         23      3 mg         24      4.5 mg         25      4.5 mg         26      4.5 mg         27      3 mg         28      4.5 mg           29      4.5 mg         30      3 mg         31                 Date Details   10/17 This INR check       Date of next INR:  10/31/2017         How to take your warfarin dose     To take:  3 mg Take 1 of the 3 mg tablets.    To take:  4.5 mg Take 1.5 of the 3 mg tablets.

## 2017-10-17 NOTE — PROGRESS NOTES
ANTICOAGULATION FOLLOW-UP CLINIC VISIT    Patient Name:  Arianna Siddiqi  Date:  10/17/2017  Contact Type:  Telephone    SUBJECTIVE:     Patient Findings     Positives No Problem Findings           OBJECTIVE    INR   Date Value Ref Range Status   10/17/2017 2.2  Final       ASSESSMENT / PLAN  INR assessment THER    Recheck INR In: 2 WEEKS    INR Location Home INR      Anticoagulation Summary as of 10/17/2017     INR goal 2.0-3.0   Today's INR 2.2   Maintenance plan 3 mg (3 mg x 1) on Mon, Fri; 4.5 mg (3 mg x 1.5) all other days   Full instructions 3 mg on Mon, Fri; 4.5 mg all other days   Weekly total 28.5 mg   No change documented Paradise Pizarro RN   Plan last modified Paradise Pizarro RN (10/3/2017)   Next INR check 10/31/2017   Priority INR   Target end date     Indications   Long-term (current) use of anticoagulants [Z79.01] [Z79.01]  Atrial fibrillation (H) [I48.91] [I48.91]         Anticoagulation Episode Summary     INR check location Clinic Lab    Preferred lab     Send INR reminders to [facility]    Comments 1/17 [employer] to start on 2/28.  Pt's voicemail box is full and pt is unable to retrieve voice messages       Anticoagulation Care Providers     Provider Role Specialty Phone number    Amelia Angulo MD PhD Responsible [facility] [phone]            See the Encounter Report to view Anticoagulation Flowsheet and Dosing Calendar (Go to Encounters tab in chart review, and find the Anticoagulation Therapy Visit)    Spoke with Arianna.  She reports no changes in health, diet, medications.      Paradise Pizarro RN

## 2017-10-31 ENCOUNTER — ANTICOAGULATION THERAPY VISIT (OUTPATIENT)
Dept: ANTICOAGULATION | Facility: CLINIC | Age: 57
End: 2017-10-31

## 2017-10-31 DIAGNOSIS — Z79.01 LONG-TERM (CURRENT) USE OF ANTICOAGULANTS: ICD-10-CM

## 2017-10-31 DIAGNOSIS — I48.91 ATRIAL FIBRILLATION, UNSPECIFIED TYPE (H): ICD-10-CM

## 2017-10-31 DIAGNOSIS — I48.20 CHRONIC ATRIAL FIBRILLATION (H): Primary | ICD-10-CM

## 2017-10-31 LAB — INR PPP: 2.1

## 2017-10-31 NOTE — PROGRESS NOTES
ANTICOAGULATION FOLLOW-UP CLINIC VISIT    Patient Name:  Arianna Siddiqi  Date:  10/31/2017  Contact Type:  Telephone    SUBJECTIVE:     Patient Findings     Positives No Problem Findings           OBJECTIVE    INR   Date Value Ref Range Status   10/31/2017 2.1  Final       ASSESSMENT / PLAN  INR assessment THER    Recheck INR In: 2 WEEKS    INR Location Home INR      Anticoagulation Summary as of 10/31/2017     INR goal 2.0-3.0   Today's INR 2.1   Maintenance plan 3 mg (3 mg x 1) on Mon, Fri; 4.5 mg (3 mg x 1.5) all other days   Full instructions 3 mg on Mon, Fri; 4.5 mg all other days   Weekly total 28.5 mg   Plan last modified Paradise Pizarro RN (10/3/2017)   Next INR check 11/14/2017   Priority INR   Target end date     Indications   Long-term (current) use of anticoagulants [Z79.01] [Z79.01]  Atrial fibrillation (H) [I48.91] [I48.91]         Anticoagulation Episode Summary     INR check location Clinic Lab    Preferred lab     Send INR reminders to [facility]    Comments 1/17 [employer] to start on 2/28.  Pt's voicemail box is full and pt is unable to retrieve voice messages       Anticoagulation Care Providers     Provider Role Specialty Phone number    Amelia Angulo MD PhD Responsible [facility] [phone]            See the Encounter Report to view Anticoagulation Flowsheet and Dosing Calendar (Go to Encounters tab in chart review, and find the Anticoagulation Therapy Visit)    Spoke with patient.    Carol Hoffman RN

## 2017-10-31 NOTE — MR AVS SNAPSHOT
Arianna VALDIVIA Devang   10/31/2017   Anticoagulation Therapy Visit    Description:  57 year old female   Provider:  Carol Hoffman RN   Department:  [facility]           INR as of 10/31/2017     Today's INR 2.1      Anticoagulation Summary as of 10/31/2017     INR goal 2.0-3.0   Today's INR 2.1   Full instructions 3 mg on Mon, Fri; 4.5 mg all other days   Next INR check 11/14/2017    Indications   Long-term (current) use of anticoagulants [Z79.01] [Z79.01]  Atrial fibrillation (H) [I48.91] [I48.91]         October 2017 Details    Sun Mon Tue Wed Thu Fri Sat     1               2               3               4               5               6               7                 8               9               10               11               12               13               14                 15               16               17               18               19               20               21                 22               23               24               25               26               27               28                 29               30               31      4.5 mg   See details           Date Details   10/31 This INR check               How to take your warfarin dose     To take:  4.5 mg Take 1.5 of the 3 mg tablets.           November 2017 Details    Sun Mon Tue Wed Thu Fri Sat        1      4.5 mg         2      4.5 mg         3      3 mg         4      4.5 mg           5      4.5 mg         6      3 mg         7      4.5 mg         8      4.5 mg         9      4.5 mg         10      3 mg         11      4.5 mg           12      4.5 mg         13      3 mg         14            15               16               17               18                 19               20               21               22               23               24               25                 26               27               28               29               30                  Date Details   No additional details     Date of next INR:  11/14/2017         How to take your warfarin dose     To take:  3 mg Take 1 of the 3 mg tablets.    To take:  4.5 mg Take 1.5 of the 3 mg tablets.

## 2017-11-06 DIAGNOSIS — I48.91 ATRIAL FIBRILLATION, UNSPECIFIED TYPE (H): ICD-10-CM

## 2017-11-06 DIAGNOSIS — E78.5 HYPERLIPIDEMIA, UNSPECIFIED HYPERLIPIDEMIA TYPE: ICD-10-CM

## 2017-11-06 DIAGNOSIS — E11.49 TYPE 2 DIABETES MELLITUS WITH OTHER NEUROLOGIC COMPLICATION, WITHOUT LONG-TERM CURRENT USE OF INSULIN (H): ICD-10-CM

## 2017-11-06 DIAGNOSIS — I50.43 ACUTE ON CHRONIC COMBINED SYSTOLIC AND DIASTOLIC CONGESTIVE HEART FAILURE (H): ICD-10-CM

## 2017-11-06 DIAGNOSIS — I48.20 CHRONIC ATRIAL FIBRILLATION (H): ICD-10-CM

## 2017-11-06 DIAGNOSIS — I50.22 CHRONIC SYSTOLIC HEART FAILURE (H): ICD-10-CM

## 2017-11-06 DIAGNOSIS — G62.9 PERIPHERAL POLYNEUROPATHY: ICD-10-CM

## 2017-11-06 RX ORDER — WARFARIN SODIUM 3 MG/1
TABLET ORAL
Qty: 180 TABLET | Refills: 0 | Status: SHIPPED | OUTPATIENT
Start: 2017-11-06 | End: 2017-12-12

## 2017-11-07 DIAGNOSIS — M10.9 GOUT, UNSPECIFIED CAUSE, UNSPECIFIED CHRONICITY, UNSPECIFIED SITE: ICD-10-CM

## 2017-11-07 DIAGNOSIS — I50.43 ACUTE ON CHRONIC COMBINED SYSTOLIC AND DIASTOLIC CONGESTIVE HEART FAILURE (H): ICD-10-CM

## 2017-11-09 RX ORDER — ALLOPURINOL 100 MG/1
100 TABLET ORAL 2 TIMES DAILY
Qty: 60 TABLET | Refills: 0 | OUTPATIENT
Start: 2017-11-09

## 2017-11-09 RX ORDER — METOPROLOL SUCCINATE 50 MG/1
75 TABLET, EXTENDED RELEASE ORAL DAILY
Qty: 45 TABLET | Refills: 0 | OUTPATIENT
Start: 2017-11-09

## 2017-11-10 DIAGNOSIS — I50.22 CHRONIC SYSTOLIC HEART FAILURE (H): ICD-10-CM

## 2017-11-10 DIAGNOSIS — I50.43 ACUTE ON CHRONIC COMBINED SYSTOLIC AND DIASTOLIC CONGESTIVE HEART FAILURE (H): ICD-10-CM

## 2017-11-10 DIAGNOSIS — E11.49 TYPE 2 DIABETES MELLITUS WITH OTHER NEUROLOGIC COMPLICATION, WITHOUT LONG-TERM CURRENT USE OF INSULIN (H): ICD-10-CM

## 2017-11-10 DIAGNOSIS — E78.5 HYPERLIPIDEMIA, UNSPECIFIED HYPERLIPIDEMIA TYPE: ICD-10-CM

## 2017-11-10 DIAGNOSIS — E87.6 HYPOKALEMIA: ICD-10-CM

## 2017-11-10 DIAGNOSIS — I48.91 ATRIAL FIBRILLATION, UNSPECIFIED TYPE (H): ICD-10-CM

## 2017-11-10 RX ORDER — GABAPENTIN 100 MG/1
CAPSULE ORAL
Qty: 270 CAPSULE | Refills: 0 | Status: SHIPPED | OUTPATIENT
Start: 2017-11-10 | End: 2017-11-29

## 2017-11-10 RX ORDER — BUMETANIDE 2 MG/1
TABLET ORAL
Qty: 30 TABLET | Refills: 0 | Status: SHIPPED | OUTPATIENT
Start: 2017-11-10 | End: 2017-12-12

## 2017-11-10 RX ORDER — LISINOPRIL 5 MG/1
TABLET ORAL
Qty: 30 TABLET | Refills: 0 | Status: SHIPPED | OUTPATIENT
Start: 2017-11-10 | End: 2017-12-12

## 2017-11-10 RX ORDER — POTASSIUM CHLORIDE 1500 MG/1
40 TABLET, EXTENDED RELEASE ORAL DAILY
Qty: 60 TABLET | Refills: 0 | Status: SHIPPED | OUTPATIENT
Start: 2017-11-10 | End: 2017-12-12

## 2017-11-10 RX ORDER — SITAGLIPTIN 50 MG/1
TABLET, FILM COATED ORAL
Qty: 30 TABLET | Refills: 0 | Status: SHIPPED | OUTPATIENT
Start: 2017-11-10 | End: 2017-12-12

## 2017-11-10 RX ORDER — BLOOD SUGAR DIAGNOSTIC
STRIP MISCELLANEOUS
Qty: 100 STRIP | Refills: 0 | Status: SHIPPED | OUTPATIENT
Start: 2017-11-10 | End: 2017-12-12

## 2017-11-10 RX ORDER — ALLOPURINOL 100 MG/1
100 TABLET ORAL 2 TIMES DAILY
Qty: 60 TABLET | Refills: 0 | Status: SHIPPED | OUTPATIENT
Start: 2017-11-10 | End: 2017-12-12

## 2017-11-10 RX ORDER — METOPROLOL SUCCINATE 50 MG/1
75 TABLET, EXTENDED RELEASE ORAL DAILY
Qty: 45 TABLET | Refills: 0 | Status: SHIPPED | OUTPATIENT
Start: 2017-11-10 | End: 2017-12-12

## 2017-11-10 RX ORDER — SIMVASTATIN 20 MG
TABLET ORAL
Qty: 30 TABLET | Refills: 0 | Status: SHIPPED | OUTPATIENT
Start: 2017-11-10 | End: 2017-12-12

## 2017-11-14 ENCOUNTER — ANTICOAGULATION THERAPY VISIT (OUTPATIENT)
Dept: ANTICOAGULATION | Facility: CLINIC | Age: 57
End: 2017-11-14

## 2017-11-14 DIAGNOSIS — I48.91 ATRIAL FIBRILLATION, UNSPECIFIED TYPE (H): ICD-10-CM

## 2017-11-14 DIAGNOSIS — Z79.01 LONG-TERM (CURRENT) USE OF ANTICOAGULANTS: ICD-10-CM

## 2017-11-14 LAB — INR PPP: 2.2

## 2017-11-14 NOTE — PROGRESS NOTES
"  ANTICOAGULATION FOLLOW-UP CLINIC VISIT    Patient Name:  Arianna Siddiqi  Date:  11/14/2017  Contact Type:  Telephone    SUBJECTIVE:     Patient Findings     Positives No Problem Findings    Comments Patient upset with clinic appt being cancelled today because she \"was 6 minutes late\".             OBJECTIVE    INR   Date Value Ref Range Status   11/14/2017 2.2  Final       ASSESSMENT / PLAN  INR assessment THER    Recheck INR In: 2 WEEKS    INR Location Clinic      Anticoagulation Summary as of 11/14/2017     INR goal 2.0-3.0   Today's INR 2.2   Maintenance plan 3 mg (3 mg x 1) on Mon, Fri; 4.5 mg (3 mg x 1.5) all other days   Full instructions 3 mg on Mon, Fri; 4.5 mg all other days   Weekly total 28.5 mg   No change documented Paradise Pizarro RN   Plan last modified Paradise Pizarro RN (10/3/2017)   Next INR check 11/28/2017   Priority INR   Target end date     Indications   Long-term (current) use of anticoagulants [Z79.01] [Z79.01]  Atrial fibrillation (H) [I48.91] [I48.91]         Anticoagulation Episode Summary     INR check location Clinic Lab    Preferred lab     [facility]    Comments 1/17 [employer] to start on 2/28.  Pt's voicemail box is full and pt is unable to retrieve voice messages       Anticoagulation Care Providers     Provider Role Specialty Phone number    Amelia Angulo MD PhD Responsible [facility] [phone]            See the Encounter Report to view Anticoagulation Flowsheet and Dosing Calendar (Go to Encounters tab in chart review, and find the Anticoagulation Therapy Visit)    Spoke with Jaycee Pizarro RN               "

## 2017-11-14 NOTE — MR AVS SNAPSHOT
Arianna VALDIVIA Devang   11/14/2017   Anticoagulation Therapy Visit    Description:  57 year old female   Provider:  Paradise Pizarro, RN   Department:  [facility]           INR as of 11/14/2017     Today's INR 2.2      Anticoagulation Summary as of 11/14/2017     INR goal 2.0-3.0   Today's INR 2.2   Full instructions 3 mg on Mon, Fri; 4.5 mg all other days   Next INR check 11/28/2017    Indications   Long-term (current) use of anticoagulants [Z79.01] [Z79.01]  Atrial fibrillation (H) [I48.91] [I48.91]         November 2017 Details    Sun Mon Tue Wed Thu Fri Sat        1               2               3               4                 5               6               7               8               9               10               11                 12               13               14      4.5 mg   See details      15      4.5 mg         16      4.5 mg         17      3 mg         18      4.5 mg           19      4.5 mg         20      3 mg         21      4.5 mg         22      4.5 mg         23      4.5 mg         24      3 mg         25      4.5 mg           26      4.5 mg         27      3 mg         28            29               30                  Date Details   11/14 This INR check       Date of next INR:  11/28/2017         How to take your warfarin dose     To take:  3 mg Take 1 of the 3 mg tablets.    To take:  4.5 mg Take 1.5 of the 3 mg tablets.

## 2017-11-16 RX ORDER — SIMVASTATIN 20 MG
TABLET ORAL
Qty: 90 TABLET | Refills: 3 | OUTPATIENT
Start: 2017-11-16

## 2017-11-16 RX ORDER — BUMETANIDE 2 MG/1
TABLET ORAL
Qty: 90 TABLET | Refills: 3 | OUTPATIENT
Start: 2017-11-16

## 2017-11-16 RX ORDER — LISINOPRIL 5 MG/1
TABLET ORAL
Qty: 90 TABLET | Refills: 3 | OUTPATIENT
Start: 2017-11-16

## 2017-11-16 RX ORDER — SITAGLIPTIN 50 MG/1
TABLET, FILM COATED ORAL
Qty: 90 TABLET | Refills: 3 | OUTPATIENT
Start: 2017-11-16

## 2017-11-28 ENCOUNTER — ANTICOAGULATION THERAPY VISIT (OUTPATIENT)
Dept: ANTICOAGULATION | Facility: CLINIC | Age: 57
End: 2017-11-28

## 2017-11-28 DIAGNOSIS — I48.91 ATRIAL FIBRILLATION, UNSPECIFIED TYPE (H): ICD-10-CM

## 2017-11-28 DIAGNOSIS — Z79.01 LONG-TERM (CURRENT) USE OF ANTICOAGULANTS: ICD-10-CM

## 2017-11-28 LAB — INR PPP: 3.3

## 2017-11-28 NOTE — PROGRESS NOTES
ANTICOAGULATION FOLLOW-UP CLINIC VISIT    Patient Name:  Arianna Siddiqi  Date:  11/28/2017  Contact Type:  Telephone    SUBJECTIVE:     Patient Findings     Positives No Problem Findings           OBJECTIVE    INR   Date Value Ref Range Status   11/28/2017 3.3  Final       ASSESSMENT / PLAN  INR assessment SUPRA    Recheck INR In: 2 WEEKS    INR Location Home INR      Anticoagulation Summary as of 11/28/2017     INR goal 2.0-3.0   Today's INR 3.3!   Maintenance plan 3 mg (3 mg x 1) on Mon, Fri; 4.5 mg (3 mg x 1.5) all other days   Full instructions 11/28: 3 mg; Otherwise 3 mg on Mon, Fri; 4.5 mg all other days   Weekly total 28.5 mg   Plan last modified Paradise Pizarro RN (10/3/2017)   Next INR check 12/12/2017   Priority INR   Target end date     Indications   Long-term (current) use of anticoagulants [Z79.01] [Z79.01]  Atrial fibrillation (H) [I48.91] [I48.91]         Anticoagulation Episode Summary     INR check location Clinic Lab    Preferred lab     Send INR reminders to [facility]    Comments 1/17 [employer] to start on 2/28.  Pt's voicemail box is full and pt is unable to retrieve voice messages       Anticoagulation Care Providers     Provider Role Specialty Phone number    Amelia Angulo MD PhD Responsible [facility] [phone]            See the Encounter Report to view Anticoagulation Flowsheet and Dosing Calendar (Go to Encounters tab in chart review, and find the Anticoagulation Therapy Visit)  Spoke with patient.    Carol Hoffman RN

## 2017-11-28 NOTE — MR AVS SNAPSHOT
Arianna VALDIVIA Devang   11/28/2017   Anticoagulation Therapy Visit    Description:  57 year old female   Provider:  Carol Hoffman RN   Department:  [facility]           INR as of 11/28/2017     Today's INR 3.3!      Anticoagulation Summary as of 11/28/2017     INR goal 2.0-3.0   Today's INR 3.3!   Full instructions 11/28: 3 mg; Otherwise 3 mg on Mon, Fri; 4.5 mg all other days   Next INR check 12/12/2017    Indications   Long-term (current) use of anticoagulants [Z79.01] [Z79.01]  Atrial fibrillation (H) [I48.91] [I48.91]         November 2017 Details    Sun Mon Tue Wed Thu Fri Sat        1               2               3               4                 5               6               7               8               9               10               11                 12               13               14               15               16               17               18                 19               20               21               22               23               24               25                 26               27               28      3 mg   See details      29      4.5 mg         30      4.5 mg            Date Details   11/28 This INR check               How to take your warfarin dose     To take:  3 mg Take 1 of the 3 mg tablets.    To take:  4.5 mg Take 1.5 of the 3 mg tablets.           December 2017 Details    Sun Mon Tue Wed Thu Fri Sat          1      3 mg         2      4.5 mg           3      4.5 mg         4      3 mg         5      4.5 mg         6      4.5 mg         7      4.5 mg         8      3 mg         9      4.5 mg           10      4.5 mg         11      3 mg         12            13               14               15               16                 17               18               19               20               21               22               23                 24               25               26               27               28               29               30                  31                      Date Details   No additional details    Date of next INR:  12/12/2017         How to take your warfarin dose     To take:  3 mg Take 1 of the 3 mg tablets.    To take:  4.5 mg Take 1.5 of the 3 mg tablets.

## 2017-11-29 DIAGNOSIS — G62.9 PERIPHERAL POLYNEUROPATHY: ICD-10-CM

## 2017-11-29 RX ORDER — GABAPENTIN 100 MG/1
CAPSULE ORAL
Qty: 270 CAPSULE | Refills: 0 | Status: SHIPPED | OUTPATIENT
Start: 2017-11-29 | End: 2017-12-12

## 2017-11-29 NOTE — TELEPHONE ENCOUNTER
gabapentin (NEURONTIN) 100 MG capsule  Last Written Prescription Date:  11/10/17  Last Fill Quantity:270,   # refills: 0  Last Office Visit : 11/16/16  Future Office visit:  12/12/17    Routing  Because:   gabapentin (NEURONTIN) 100 MG capsule. Not on new protocol. Pt has appt as new w/ DR Henry  12/12/17.

## 2017-12-11 DIAGNOSIS — I48.20 CHRONIC ATRIAL FIBRILLATION (H): ICD-10-CM

## 2017-12-11 DIAGNOSIS — I48.91 ATRIAL FIBRILLATION, UNSPECIFIED TYPE (H): ICD-10-CM

## 2017-12-11 DIAGNOSIS — I50.43 ACUTE ON CHRONIC COMBINED SYSTOLIC AND DIASTOLIC CONGESTIVE HEART FAILURE (H): ICD-10-CM

## 2017-12-11 DIAGNOSIS — M10.9 GOUT, UNSPECIFIED CAUSE, UNSPECIFIED CHRONICITY, UNSPECIFIED SITE: ICD-10-CM

## 2017-12-11 DIAGNOSIS — E11.49 TYPE 2 DIABETES MELLITUS WITH OTHER NEUROLOGIC COMPLICATION, WITHOUT LONG-TERM CURRENT USE OF INSULIN (H): ICD-10-CM

## 2017-12-11 DIAGNOSIS — E78.5 HYPERLIPIDEMIA, UNSPECIFIED HYPERLIPIDEMIA TYPE: ICD-10-CM

## 2017-12-11 DIAGNOSIS — I50.22 CHRONIC SYSTOLIC HEART FAILURE (H): ICD-10-CM

## 2017-12-11 DIAGNOSIS — E87.6 HYPOKALEMIA: ICD-10-CM

## 2017-12-11 RX ORDER — POTASSIUM CHLORIDE 1500 MG/1
40 TABLET, EXTENDED RELEASE ORAL DAILY
Qty: 14 TABLET | Refills: 0 | Status: CANCELLED | OUTPATIENT
Start: 2017-12-11

## 2017-12-11 RX ORDER — WARFARIN SODIUM 3 MG/1
TABLET ORAL
Qty: 11 TABLET | Refills: 0 | Status: CANCELLED | OUTPATIENT
Start: 2017-12-11

## 2017-12-11 RX ORDER — METOPROLOL SUCCINATE 50 MG/1
75 TABLET, EXTENDED RELEASE ORAL DAILY
Qty: 11 TABLET | Refills: 0 | Status: CANCELLED | OUTPATIENT
Start: 2017-12-11

## 2017-12-11 RX ORDER — SIMVASTATIN 20 MG
20 TABLET ORAL AT BEDTIME
Qty: 7 TABLET | Refills: 0 | Status: CANCELLED | OUTPATIENT
Start: 2017-12-11

## 2017-12-11 RX ORDER — LISINOPRIL 5 MG/1
5 TABLET ORAL DAILY
Qty: 7 TABLET | Refills: 0 | Status: CANCELLED | OUTPATIENT
Start: 2017-12-11

## 2017-12-11 RX ORDER — ALLOPURINOL 100 MG/1
100 TABLET ORAL 2 TIMES DAILY
Qty: 14 TABLET | Refills: 0 | Status: CANCELLED | OUTPATIENT
Start: 2017-12-11

## 2017-12-11 RX ORDER — BUMETANIDE 2 MG/1
2 TABLET ORAL DAILY
Qty: 7 TABLET | Refills: 0 | Status: CANCELLED | OUTPATIENT
Start: 2017-12-11

## 2017-12-11 NOTE — TELEPHONE ENCOUNTER
Will change pended qty to 1 week and then clinic RN will wait to see if patient is able to make it to tomorrows appointment before sending refills to pharmacy.      Kathleen M Doege RN

## 2017-12-11 NOTE — TELEPHONE ENCOUNTER
Spoke with patient. She has only been seen in [facility] once, and that was in November, 2016. Discussed with [facility] supervisor, Essence Copeland, who said that prescriptions could be refilled for additional week, but patient needs to get in for appointment during that time. Patient said that she will see if she feels up to making it to appointment tomorrow. If not, will call back.

## 2017-12-11 NOTE — TELEPHONE ENCOUNTER
Patient called and stated that she is very ill today and does not believe she will be able to make it in for tomorrows appointment. She had been told when she arrived too late to be seen for her last appointment that she must be seen to get any further refills.    Medications are pended for 30 days and will be routed to the clinic RN.    Kathleen M Doege RN

## 2017-12-12 ENCOUNTER — TELEPHONE (OUTPATIENT)
Dept: INTERNAL MEDICINE | Facility: CLINIC | Age: 57
End: 2017-12-12

## 2017-12-12 ENCOUNTER — ANTICOAGULATION THERAPY VISIT (OUTPATIENT)
Dept: ANTICOAGULATION | Facility: CLINIC | Age: 57
End: 2017-12-12

## 2017-12-12 ENCOUNTER — OFFICE VISIT (OUTPATIENT)
Dept: INTERNAL MEDICINE | Facility: CLINIC | Age: 57
End: 2017-12-12

## 2017-12-12 VITALS
DIASTOLIC BLOOD PRESSURE: 62 MMHG | WEIGHT: 293 LBS | SYSTOLIC BLOOD PRESSURE: 114 MMHG | BODY MASS INDEX: 73.6 KG/M2 | HEART RATE: 77 BPM | RESPIRATION RATE: 16 BRPM

## 2017-12-12 DIAGNOSIS — N92.0 MENORRHAGIA WITH REGULAR CYCLE: ICD-10-CM

## 2017-12-12 DIAGNOSIS — I50.22 CHRONIC SYSTOLIC HEART FAILURE (H): ICD-10-CM

## 2017-12-12 DIAGNOSIS — D50.0 IRON DEFICIENCY ANEMIA DUE TO CHRONIC BLOOD LOSS: ICD-10-CM

## 2017-12-12 DIAGNOSIS — I48.91 ATRIAL FIBRILLATION, UNSPECIFIED TYPE (H): ICD-10-CM

## 2017-12-12 DIAGNOSIS — E11.49 TYPE 2 DIABETES MELLITUS WITH OTHER NEUROLOGIC COMPLICATION, WITHOUT LONG-TERM CURRENT USE OF INSULIN (H): ICD-10-CM

## 2017-12-12 DIAGNOSIS — E78.2 MIXED HYPERLIPIDEMIA: ICD-10-CM

## 2017-12-12 DIAGNOSIS — I50.9 CONGESTIVE HEART FAILURE, UNSPECIFIED CONGESTIVE HEART FAILURE CHRONICITY, UNSPECIFIED CONGESTIVE HEART FAILURE TYPE: ICD-10-CM

## 2017-12-12 DIAGNOSIS — I48.91 ATRIAL FIBRILLATION (H): ICD-10-CM

## 2017-12-12 DIAGNOSIS — I10 BENIGN ESSENTIAL HYPERTENSION: ICD-10-CM

## 2017-12-12 DIAGNOSIS — E11.29 TYPE 2 DIABETES MELLITUS WITH OTHER DIABETIC KIDNEY COMPLICATION, WITHOUT LONG-TERM CURRENT USE OF INSULIN (H): ICD-10-CM

## 2017-12-12 DIAGNOSIS — Z79.01 LONG-TERM (CURRENT) USE OF ANTICOAGULANTS: ICD-10-CM

## 2017-12-12 DIAGNOSIS — I48.20 CHRONIC ATRIAL FIBRILLATION (H): ICD-10-CM

## 2017-12-12 DIAGNOSIS — E87.6 HYPOKALEMIA: ICD-10-CM

## 2017-12-12 DIAGNOSIS — Z13.5 SCREENING FOR DIABETIC RETINOPATHY: ICD-10-CM

## 2017-12-12 DIAGNOSIS — Z23 NEED FOR PROPHYLACTIC VACCINATION AND INOCULATION AGAINST INFLUENZA: ICD-10-CM

## 2017-12-12 DIAGNOSIS — E66.2 OBESITY HYPOVENTILATION SYNDROME (H): ICD-10-CM

## 2017-12-12 DIAGNOSIS — Z12.31 VISIT FOR SCREENING MAMMOGRAM: ICD-10-CM

## 2017-12-12 DIAGNOSIS — Z12.11 SCREEN FOR COLON CANCER: ICD-10-CM

## 2017-12-12 DIAGNOSIS — G62.9 PERIPHERAL POLYNEUROPATHY: ICD-10-CM

## 2017-12-12 DIAGNOSIS — N18.9 CHRONIC KIDNEY DISEASE, UNSPECIFIED CKD STAGE: ICD-10-CM

## 2017-12-12 DIAGNOSIS — I50.9 CONGESTIVE HEART FAILURE, UNSPECIFIED CONGESTIVE HEART FAILURE CHRONICITY, UNSPECIFIED CONGESTIVE HEART FAILURE TYPE: Primary | ICD-10-CM

## 2017-12-12 DIAGNOSIS — M10.9 GOUT, UNSPECIFIED CAUSE, UNSPECIFIED CHRONICITY, UNSPECIFIED SITE: ICD-10-CM

## 2017-12-12 DIAGNOSIS — E11.22 TYPE 2 DIABETES MELLITUS WITH CHRONIC KIDNEY DISEASE, WITHOUT LONG-TERM CURRENT USE OF INSULIN, UNSPECIFIED CKD STAGE (H): ICD-10-CM

## 2017-12-12 DIAGNOSIS — E66.01 MORBID OBESITY (H): ICD-10-CM

## 2017-12-12 DIAGNOSIS — Z11.59 NEED FOR HEPATITIS C SCREENING TEST: ICD-10-CM

## 2017-12-12 DIAGNOSIS — E78.5 HYPERLIPIDEMIA, UNSPECIFIED HYPERLIPIDEMIA TYPE: ICD-10-CM

## 2017-12-12 DIAGNOSIS — M79.605 PAIN OF LEFT LOWER EXTREMITY: ICD-10-CM

## 2017-12-12 DIAGNOSIS — I50.43 ACUTE ON CHRONIC COMBINED SYSTOLIC AND DIASTOLIC CONGESTIVE HEART FAILURE (H): ICD-10-CM

## 2017-12-12 DIAGNOSIS — R06.02 SOB (SHORTNESS OF BREATH): Primary | ICD-10-CM

## 2017-12-12 LAB
ALBUMIN SERPL-MCNC: 2.8 G/DL (ref 3.4–5)
ALP SERPL-CCNC: 82 U/L (ref 40–150)
ALT SERPL W P-5'-P-CCNC: 12 U/L (ref 0–50)
ANION GAP SERPL CALCULATED.3IONS-SCNC: 6 MMOL/L (ref 3–14)
AST SERPL W P-5'-P-CCNC: 13 U/L (ref 0–45)
BASOPHILS # BLD AUTO: 0 10E9/L (ref 0–0.2)
BASOPHILS NFR BLD AUTO: 0.2 %
BILIRUB SERPL-MCNC: 0.3 MG/DL (ref 0.2–1.3)
BUN SERPL-MCNC: 14 MG/DL (ref 7–30)
CALCIUM SERPL-MCNC: 8.4 MG/DL (ref 8.5–10.1)
CHLORIDE SERPL-SCNC: 103 MMOL/L (ref 94–109)
CHOLEST SERPL-MCNC: 100 MG/DL
CO2 SERPL-SCNC: 28 MMOL/L (ref 20–32)
CREAT SERPL-MCNC: 1.1 MG/DL (ref 0.52–1.04)
DIFFERENTIAL METHOD BLD: ABNORMAL
EOSINOPHIL # BLD AUTO: 0.1 10E9/L (ref 0–0.7)
EOSINOPHIL NFR BLD AUTO: 1.2 %
ERYTHROCYTE [DISTWIDTH] IN BLOOD BY AUTOMATED COUNT: 19.4 % (ref 10–15)
GFR SERPL CREATININE-BSD FRML MDRD: 51 ML/MIN/1.7M2
GLUCOSE SERPL-MCNC: 87 MG/DL (ref 70–99)
HCT VFR BLD AUTO: 19.5 % (ref 35–47)
HCV AB SERPL QL IA: NONREACTIVE
HDLC SERPL-MCNC: 58 MG/DL
HGB BLD-MCNC: 5 G/DL (ref 11.7–15.7)
IMM GRANULOCYTES # BLD: 0 10E9/L (ref 0–0.4)
IMM GRANULOCYTES NFR BLD: 0.4 %
INR PPP: 1.77 (ref 0.86–1.14)
LDLC SERPL CALC-MCNC: 24 MG/DL
LYMPHOCYTES # BLD AUTO: 1.4 10E9/L (ref 0.8–5.3)
LYMPHOCYTES NFR BLD AUTO: 14.4 %
MCH RBC QN AUTO: 22.9 PG (ref 26.5–33)
MCHC RBC AUTO-ENTMCNC: 25.6 G/DL (ref 31.5–36.5)
MCV RBC AUTO: 89 FL (ref 78–100)
MONOCYTES # BLD AUTO: 0.5 10E9/L (ref 0–1.3)
MONOCYTES NFR BLD AUTO: 5.5 %
NEUTROPHILS # BLD AUTO: 7.4 10E9/L (ref 1.6–8.3)
NEUTROPHILS NFR BLD AUTO: 78.3 %
NONHDLC SERPL-MCNC: 42 MG/DL
NRBC # BLD AUTO: 0 10*3/UL
NRBC BLD AUTO-RTO: 0 /100
PLATELET # BLD AUTO: 287 10E9/L (ref 150–450)
PLATELET # BLD EST: ABNORMAL 10*3/UL
POTASSIUM SERPL-SCNC: 4 MMOL/L (ref 3.4–5.3)
PROT SERPL-MCNC: 7.5 G/DL (ref 6.8–8.8)
RBC # BLD AUTO: 2.18 10E12/L (ref 3.8–5.2)
SODIUM SERPL-SCNC: 138 MMOL/L (ref 133–144)
TRIGL SERPL-MCNC: 87 MG/DL
WBC # BLD AUTO: 9.5 10E9/L (ref 4–11)

## 2017-12-12 PROCEDURE — G0472 HEP C SCREEN HIGH RISK/OTHER: HCPCS | Performed by: FAMILY MEDICINE

## 2017-12-12 RX ORDER — METOPROLOL SUCCINATE 50 MG/1
75 TABLET, EXTENDED RELEASE ORAL DAILY
Qty: 134 TABLET | Refills: 0 | Status: SHIPPED | OUTPATIENT
Start: 2017-12-12 | End: 2018-03-06

## 2017-12-12 RX ORDER — BUMETANIDE 2 MG/1
2 TABLET ORAL DAILY
Qty: 90 TABLET | Refills: 0 | Status: SHIPPED | OUTPATIENT
Start: 2017-12-12 | End: 2018-03-06

## 2017-12-12 RX ORDER — ALLOPURINOL 100 MG/1
100 TABLET ORAL 2 TIMES DAILY
Qty: 180 TABLET | Refills: 0 | Status: SHIPPED | OUTPATIENT
Start: 2017-12-12 | End: 2018-03-06

## 2017-12-12 RX ORDER — LISINOPRIL 5 MG/1
5 TABLET ORAL DAILY
Qty: 90 TABLET | Refills: 0 | Status: SHIPPED | OUTPATIENT
Start: 2017-12-12 | End: 2018-03-06

## 2017-12-12 RX ORDER — FERROUS GLUCONATE 324(37.5)
324 TABLET ORAL 3 TIMES DAILY
Qty: 270 TABLET | Refills: 1 | Status: SHIPPED | OUTPATIENT
Start: 2017-12-12 | End: 2018-04-20

## 2017-12-12 RX ORDER — LANCETS
EACH MISCELLANEOUS
Qty: 100 EACH | Refills: 3 | Status: SHIPPED | OUTPATIENT
Start: 2017-12-12 | End: 2019-04-02

## 2017-12-12 RX ORDER — SIMVASTATIN 20 MG
20 TABLET ORAL AT BEDTIME
Qty: 30 TABLET | Refills: 0 | Status: SHIPPED | OUTPATIENT
Start: 2017-12-12 | End: 2018-01-11

## 2017-12-12 RX ORDER — ACETAMINOPHEN 325 MG/1
650 TABLET ORAL EVERY 4 HOURS PRN
Qty: 100 TABLET | Refills: 0 | Status: SHIPPED | OUTPATIENT
Start: 2017-12-12 | End: 2018-03-06

## 2017-12-12 RX ORDER — GABAPENTIN 100 MG/1
CAPSULE ORAL
Qty: 270 CAPSULE | Refills: 0 | Status: SHIPPED | OUTPATIENT
Start: 2017-12-12 | End: 2018-02-09

## 2017-12-12 RX ORDER — POTASSIUM CHLORIDE 1500 MG/1
40 TABLET, EXTENDED RELEASE ORAL DAILY
Qty: 180 TABLET | Refills: 0 | Status: SHIPPED | OUTPATIENT
Start: 2017-12-12 | End: 2018-03-06

## 2017-12-12 RX ORDER — WARFARIN SODIUM 3 MG/1
TABLET ORAL
Qty: 180 TABLET | Refills: 0 | Status: SHIPPED | OUTPATIENT
Start: 2017-12-12 | End: 2018-03-06

## 2017-12-12 ASSESSMENT — PAIN SCALES - GENERAL: PAINLEVEL: MILD PAIN (3)

## 2017-12-12 NOTE — NURSING NOTE
Chief Complaint   Patient presents with     Medication Update     patient states she is here for a medication refill and derm problem     .kflsig

## 2017-12-12 NOTE — TELEPHONE ENCOUNTER
I called pt to update on critical labs for Hgb and Hct. Provider advises ER visit for treatment as no availability in infusion clinic. Unable to reach patient, unable to leave message as system not accepting messages.    Dottie Joyce RN    I spoke to pt today around 1446. Informed pt of low Hgb and Hct, advised ER visit for treatment. Pt stated her numbers are typically low. Informed pt that her lab results are lower than last year, and that treatment is typically blood infusion. Pt stated she already takes iron TID. Again informed pt that Yoselin is recommending [facility] infusion, but that since infusion clinic cannot add pt to schedule, she should follow up in ER. Pt stated that she would try to get a ride to the ER, stated she does not want to call 911 or call for an ambulance. Advised pt to try to obtain ride. She stated she would try to find a ride, but would like to know what can be done if she can't find one. Again advised transport to ER. Will ask provider to follow up with pt.     Per Yoselin, social work will try to find transport for pt.    Dottie Joyce, RN

## 2017-12-12 NOTE — NURSING NOTE
"Injectable Influenza Immunization Documentation    1.  Has the patient received the information for the injectable influenza vaccine? YES     2. Is the patient 6 months of age or older? YES     3. Does the patient have any of the following contraindications?         Severe allergy to eggs? No     Severe allergic reaction to previous influenza vaccines? No   Severe allergy to latex? No       History of Guillain-[place]ens[place] syndrome? No     Currently have a temperature greater than 100.4F? No        4.  Severely egg allergic patients should have flu vaccine eligibility assessed by an MD, RN, or pharmacist, and those who received flu vaccine should be observed for 15 min by an MD, RN, Pharmacist, Medical Technician, or member of clinic staff.\": YES    5. Latex-allergic patients should be given latex-free influenza vaccine No. Please reference the Vaccine latex table to determine if your clinic s product is latex-containing.       Vaccination given by JACKIE LONG at 11:52 AM on 12/12/2017.          "

## 2017-12-12 NOTE — MR AVS SNAPSHOT
Arianna VALDIVIA Devang   12/12/2017   Anticoagulation Therapy Visit    Description:  57 year old female   Provider:  Sina Lazcano, [facility]   Department:  [facility]           INR as of 12/12/2017     Today's INR 1.77!      Anticoagulation Summary as of 12/12/2017     INR goal 2.0-3.0   Today's INR 1.77!   Full instructions 3 mg on Mon, Fri; 4.5 mg all other days   Next INR check 12/19/2017    Indications   Long-term (current) use of anticoagulants [Z79.01] [Z79.01]  Atrial fibrillation (H) [I48.91] [I48.91]         December 2017 Details    Sun Mon Tue Wed Thu Fri Sat          1               2                 3               4               5               6               7               8               9                 10               11               12      4.5 mg   See details      13      4.5 mg         14      4.5 mg         15      3 mg         16      4.5 mg           17      4.5 mg         18      3 mg         19            20               21               22               23                 24               25               26               27               28               29               30                 31                      Date Details   12/12 This INR check       Date of next INR:  12/19/2017         How to take your warfarin dose     To take:  3 mg Take 1 of the 3 mg tablets.    To take:  4.5 mg Take 1.5 of the 3 mg tablets.

## 2017-12-12 NOTE — PROGRESS NOTES
Rooming Note    Health Maintenance  Health Maintenance Due   Topic Date Due     HF ACTION PLAN Q3 YR  1960     FOOT EXAM Q1 YEAR  04/17/1960     EYE EXAM Q1 YEAR  04/17/1960     MICROALBUMIN Q1 YEAR  04/17/1960     TETANUS IMMUNIZATION (SYSTEM ASSIGNED)  04/17/1977     HEPATITIS C SCREENING  04/17/1977     PAP SCREENING Q3 YR (SYSTEM ASSIGNED)  04/17/1980     MAMMO SCREEN Q2 YR (SYSTEM ASSIGNED)  04/17/2009     COLON CANCER SCREEN (SYSTEM ASSIGNED)  04/17/2009     ADVANCE DIRECTIVE PLANNING Q5 YRS  04/17/2014     A1C Q6 MO  03/10/2017     BMP Q6 MOS  05/16/2017     INFLUENZA VACCINE (SYSTEM ASSIGNED)  09/01/2017     LIPID MONITORING Q1 YEAR  09/11/2017     ALT Q1 YR  09/13/2017     CREATININE Q1 YEAR  11/16/2017     CBC Q1 YR  11/16/2017   Health maintenance items discussed and ordered/forwarded to PCP    JACKIE LONG at 10:59 AM on 12/12/2017.

## 2017-12-12 NOTE — PROGRESS NOTES
"Arianna Siddiqi is a 57 year old female who comes in for    CC: re-establish care, medication refills  HPI:    Ms. Siddiqi previously saw Dr. Crabtree [facility]. She has not been seen in >1 year, and is needing refills on her medications.    She has a history of CHF, DMII, CKD, CARLOS on BiPAP, atrial fib on warfarin, morbid obesity, HTN, hyperlipidemia, anemia, gout, insomnia, and poor adherence to medical therapy.     She is feeling well overall. Does feel tired with minimal activity.     She is currently on her menses, does not believe she has ever gone through menopause. Her period stopped for 1-2 months, was on Norethindrone for 3 years, now her bleeding is heavier. She is apparently followed by Dr. Pendleton, her gynecologist at [facility], believes she saw in Feb-March this year, but in [facility] it appears it has been >2 years since she has seen Gyn. She is on warfarin and aspirin.     At her last visit, she was recommended to schedule with pulmonology d/t her obesity hypoventilation syndrome, but has not done this yet.  She continues with her BiPap.    Rash--hands and forearms, started ~1 week ago, with \"baby bumps\" on the back of her hands, was itchy. Tried hydrocortisone cream and Eucerin lotion, rash seems to be fading.  She monitors her INR at home. Goal is 2-3 for her a-fib, but she typically has been running just below this.  She does not have a cardiologist to follow her CHF. She lost 60 lbs water weight while hospitalized last year. Her weight has been gradually increasing over the past year.     DM--\"well controlled\" checks BGs every morning. Will feel dizzy if her glucose gets low. Typically range .  She has cancelled f/u appointments here d/t difficulty getting a ride.  Her sister is her emergency contact, but she recently had surgery and is not able to drive.     A few years ago she learned she was adopted, which was a shock to her. She gained weight after this, \"went on a downward " "spiral,\" and was drinking more heavily. She does not know her family medical history.    Other issues discussed today:     Patient Active Problem List   Diagnosis     Acute respiratory acidosis     Anemia     Chronic kidney disease     Diabetes mellitus (H)     Gout     Hyperlipidemia     Hypertension     Morbid (severe) obesity due to excess calories (H)     CHF (congestive heart failure) (H)     Long-term (current) use of anticoagulants [Z79.01]     Atrial fibrillation (H) [I48.91]     Hypercapnemia       Current Outpatient Prescriptions   Medication Sig Dispense Refill     gabapentin (NEURONTIN) 100 MG capsule TAKE 3 CAPSULES (300 MG) BY MOUTH 3 TIMES DAILY 270 capsule 0     JANUVIA 50 MG tablet TAKE 1 TABLET BY MOUTH DAILY 30 tablet 0     lisinopril (PRINIVIL/ZESTRIL) 5 MG tablet TAKE 1 TABLET BY MOUTH DAILY 30 tablet 0     simvastatin (ZOCOR) 20 MG tablet TAKE 1 TABLET BY MOUTH AT BEDTIME 30 tablet 0     bumetanide (BUMEX) 2 MG tablet TAKE 1 TABLET BY MOUTH DAILY 30 tablet 0     aspirin 81 MG EC tablet TAKE 1 TABLET BY MOUTH DAILY 30 tablet 0     ACCU-CHEK NINA PLUS test strip USE TO TEST BLOOD SUGARS 3 TIMES DAILY OR AS DIRECTED. 100 strip 0     potassium chloride SA (KLOR-CON) 20 MEQ CR tablet Take 2 tablets (40 mEq) by mouth daily 60 tablet 0     allopurinol (ZYLOPRIM) 100 MG tablet Take 1 tablet (100 mg) by mouth 2 times daily Please establish care with new provider for further refills. 60 tablet 0     metoprolol (TOPROL-XL) 50 MG 24 hr tablet Take 1.5 tablets (75 mg) by mouth daily Needs to establish with new pcp 45 tablet 0     warfarin (COUMADIN) 3 MG tablet TAKE 1-1 & 1/2 TABLETS BY MOUTH DAILY FOR AS DIRECTED 180 tablet 0     blood glucose monitoring (ACCU-CHEK NINA PLUS) meter device kit Use to test blood sugars 3 times daily or as directed. 1 kit 0     blood glucose monitoring (SOFTCLIX) lancets Use to test blood sugar 3 times daily or as directed. 3 Box 3     ferrous gluconate 325 (36 FE) MG TABS " Take 1 tablet by mouth 3 times daily 90 tablet 11     acetaminophen (TYLENOL) 325 MG tablet Take 2 tablets (650 mg) by mouth every 4 hours as needed for mild pain 100 tablet          ALLERGIES: Penicillins    PAST MEDICAL HX:   Past Medical History:   Diagnosis Date     CHF (congestive heart failure) (H)      CKD (chronic kidney disease)      Compliance poor      Diabetes mellitus (H)      Gout      Hypertension      Insomnia      Morbid obesity (H)      CARLOS treated with BiPAP        PAST SURGICAL HX: No past surgical history on file.    IMMUNIZATION HX:   Immunization History   Administered Date(s) Administered     Influenza Vaccine IM 3yrs+ 4 Valent IIV4 09/21/2016     Pneumococcal 23 valent 09/22/2016     TDAP Vaccine (Boostrix) 05/01/2013       SOCIAL HX:   Social History     Social History Narrative       ROS:   CONSTITUTIONAL: no fatigue, no unexpected change in weight  SKIN: see HPI, no worrisome moles, no worrisome lesions  EYES: no acute vision problems or changes  ENT: no ear problems, no mouth problems, no throat problems  RESP: no significant cough, no shortness of breath  CV: no chest pain, no palpitations, no new or worsening peripheral edema  GI: no nausea, no vomiting, no constipation, +diarrhea    OBJECTIVE:  /62  Pulse 77  Resp 16   Wt Readings from Last 1 Encounters:   10/19/16 (!) 159.3 kg (351 lb 1.6 oz)     Constitutional: morbidly obese woman in wheelchair, no acute distress  Eyes: anicteric, conjunctiva pink, normal extra-ocular movements   Ears, Nose and Throat: tympanic membranes pearly gray with positive light reflex, EACs clear bilaterally, nose clear and free of lesions, throat clear, mucosa pink and moist, missing multiple teeth  Neck: supple with full range of motion, no thyromegaly, no lymphadenopathy   Cardiovascular: regular rate and rhythm, normal S1 and S2, no murmurs, rubs or gallops, peripheral pulses full and symmetric, cap refill <2 sec  Respiratory: clear to  auscultation with good air movement bilaterally, no wheezes or crackles, non-labored  Gastrointestinal: positive bowel sounds, nontender, no hepatosplenomegaly, no masses   Musculoskeletal: 3+ edema in BLE  Skin: no concerning lesions, no jaundice, temp normal, faint erythematous rash on bilateral dorsal hands  Neurological: cranial nerves grossly intact, speech is clear, no tremor   Psychological: appropriate mood, demonstrates fair judgment and logical thought process      ASSESSMENT/PLAN:    1. Congestive heart failure, unspecified congestive heart failure chronicity, unspecified congestive heart failure type (H)    2. Screen for colon cancer    3. Need for hepatitis C screening test    4. Visit for screening mammogram    5. Screening for diabetic retinopathy    6. Need for prophylactic vaccination and inoculation against influenza    7. Benign essential hypertension    8. Chronic kidney disease, unspecified CKD stage    9. Mixed hyperlipidemia    10. Type 2 diabetes mellitus with other diabetic kidney complication, without long-term current use of insulin (H)    11. Obesity hypoventilation syndrome (H)    12. Morbid obesity (H)    13. Menorrhagia with regular cycle    14. Peripheral polyneuropathy    15. Type 2 diabetes mellitus with other neurologic complication, without long-term current use of insulin (H)    16. Chronic systolic heart failure (H)    17. Hyperlipidemia, unspecified hyperlipidemia type    18. Acute on chronic combined systolic and diastolic congestive heart failure (H)    19. Atrial fibrillation, unspecified type (H)    20. Hypokalemia    21. Gout, unspecified cause, unspecified chronicity, unspecified site    22. Chronic atrial fibrillation (H)    23. Type 2 diabetes mellitus with chronic kidney disease, without long-term current use of insulin, unspecified CKD stage (H)    24. Iron deficiency anemia due to chronic blood loss    25. Pain of left lower extremity      Med refills provided. Labs  "checked today.  Referrals for pulmonology given hypoventilation syndrome, and [facility] for her CHF. Recommend follow-up with gynecology and obtaining a pelvic US given her vaginal bleeding. Should stop the aspirin.    After pt had left the clinic, I was notified regarding critical hemoglobin of 5.0. Ms. Siddiqi was contacted over the phone about this result. Unfortunately we were not able to schedule her in the infusion clinic today for a blood transfusion (consent would need to be obtained). She says she has been anemic \"for 20 years\", and refuses to go to the ED for transfusion and work-up for her blood loss, understanding the risk that she has vaginal bleeding on Warfarin. I discussed that on review of her records, her hemoglobin has never been this low before. Consulted with social work to see about transportation. I asked if I could talk with her sister (her emergency contact), but she refused, and I recommended that we then call an ambulance to transport her to the ED. Again, she refused this. She is coherent and appears capable of making her own decisions. She says she is \"too tired\" to leave the house again today; I emphasized that her tiredness is from her severe anemia, and she should feel better once she receives PRBCs. I discussed the risks of leaving this untreated, including death. She states she will maybe go to the ED tomorrow.     FOLLOW UP: following ED visit.  ALMA Arango CNP    "

## 2017-12-12 NOTE — TELEPHONE ENCOUNTER
"Social Work Brief Intervention  [facility]    Data/Intervention:    Patient Name:  Arianna Siddiqi  /Age:  1960 (57 year old)    Visit Type: telephone  Referral Source: [facility] provider, Yoselin Shook  Reason for Referral:  Transportation to ED    Collaborated With:    -Patient  -Yoselin Shook    Patient Concerns/Issues:   Patient was in clinic today and had labs drawn. Results came back at critical levels and per provider, Yoselin Shook, would like Patient to be transported to the ED. Patient does not drive and does not have anyone who can bring her.  called Patient who said that she has an [place] spenddown of $400/month and cannot use [facility] unless the spenddown is met. Patient does not want to pay that much out of pocket. Patient reported she found a ride service for $25/hour. She used that ride service for her appointment this morning. Patient reported she cannot use them with this short of notice and reported that she is \"too tired\" to come back to [place]. She reported she cannot handle leaving the house again today. Patient declined assistance with setting up a taxi and did not want to call 911. Patient reported she can come to the ED tomorrow.     Intervention/Education/Resources Provided:  Transportation services were declined by the Patient.  spoke with Yoselin Shook to update that Patient does not want to come to the ED today and that if Yoselin feels Patient is in imminent risk, to call 911 for Patient.     Assessment/Plan:  Provider, Yoselin Shook to decide the next steps for Patient's care.    Taty Barrow [facility]  Outpatient Specialty Clinics  Direct Phone: [phone]  Pager:  [phone]            "

## 2017-12-12 NOTE — MR AVS SNAPSHOT
After Visit Summary   12/12/2017    Arianna Siddiqi    MRN: [id number]           Patient Information     Date Of Birth          1960        Visit Information        Provider Department      12/12/2017 11:00 AM Yoselin Shook APRN [facility]        Today's Diagnoses     Congestive heart failure, unspecified congestive heart failure chronicity, unspecified congestive heart failure type (H)    -  1    Screen for colon cancer        Need for hepatitis C screening test        Visit for screening mammogram        Screening for diabetic retinopathy        Need for prophylactic vaccination and inoculation against influenza        Benign essential hypertension        Chronic kidney disease, unspecified CKD stage        Mixed hyperlipidemia        Type 2 diabetes mellitus with other diabetic kidney complication, without long-term current use of insulin (H)        Obesity hypoventilation syndrome (H)        Morbid obesity (H)        Menorrhagia with regular cycle        Peripheral polyneuropathy        Type 2 diabetes mellitus with other neurologic complication, without long-term current use of insulin (H)        Chronic systolic heart failure (H)        Hyperlipidemia, unspecified hyperlipidemia type        Acute on chronic combined systolic and diastolic congestive heart failure (H)        Atrial fibrillation, unspecified type (H)        Hypokalemia        Gout, unspecified cause, unspecified chronicity, unspecified site        Chronic atrial fibrillation (H)        Type 2 diabetes mellitus with chronic kidney disease, without long-term current use of insulin, unspecified CKD stage (H)        Iron deficiency anemia due to chronic blood loss        Pain of left lower extremity          Care Instructions    [facility]: [phone]     [facility] Medication Refill Request Information:  * Please contact your pharmacy regarding ANY request for medication  refills.  ** [facility] Prescription Fax = [phone]  * Please allow 3 business days for routine medication refills.  * Please allow 5 business days for controlled substance medication refills.     [facility] Test Result notification information:  *You will be notified with in 7-10 days of your appointment day regarding the results of your test.  If you are on MyChart you will be notified as soon as the provider has reviewed the results and signed off on them.            Follow-ups after your visit        Additional Services     BARIATRIC ADULT REFERRAL       Your provider has referred you to: [facility] [phone]. [email or web address]    Please be aware that coverage of these services is subject to the terms and limitations of your health insurance plan.  Call member services at your health plan with any benefit or coverage questions.      Please bring the following with you to your appointment:      (1) List of current medications   (2) This referral request   (3) Any documents/labs given to you for this referral            CARDIOLOGY EVAL ADULT REFERRAL       Your provider has referred you to:  [facility]: [facility] [phone]   [email or web address]    Please be aware that coverage of these services is subject to the terms and limitations of your health insurance plan.  Call member services at your health plan with any benefit or coverage questions.      Type of Referral:  Cardiology Follow Up    Timeframe requested:  Within 1 month    Please bring the following to your appointment:  >>   Any x-rays, CTs or MRIs which have been performed.  Contact the facility where they were done to arrange for  prior to your scheduled appointment.    >>   List of current medications  >>   This  referral request   >>   Any documents/labs given to you for this referral            OPHTHALMOLOGY ADULT REFERRAL       Your provider has referred you to: [facility]: [facility] [phone]   [email or web address]    Please be aware that coverage of these services is subject to the terms and limitations of your health insurance plan.  Call member services at your health plan with any benefit or coverage questions.      Please bring the following with you to your appointment:    (1) Any X-Rays, CTs or MRIs which have been performed.  Contact the facility where they were done to arrange for  prior to your scheduled appointment.    (2) List of current medications  (3) This referral request   (4) Any documents/labs given to you for this referral            PULMONARY MEDICINE REFERRAL       Your provider has referred you to: [facility]: [place] [facility] [place] [phone]   [email or web address]  [facility]: [facility] [phone]   [email or web address]    Please be aware that coverage of these services is subject to the terms and limitations of your health insurance plan.  Call member services at your health plan with any benefit or coverage questions.      Please bring the following with you to your appointment:    (1) Any X-Rays, CTs or MRIs which have been performed.  Contact the facility where they were done to arrange for  prior to your scheduled appointment.    (2) List of current medications   (3) This referral request   (4) Any documents/labs given to you for this referral                  Follow-up notes from your care team     Return in about 6 weeks (around 1/23/2018).      Your next 10 appointments already scheduled     Dec 12, 2017  1:00 PM CST   LAB with  LAB    Health Lab ([facility])    [address]  [address] [facility] [address]   [phone]           Please do not eat 10-12 hours before your appointment if you are coming in fasting for labs on lipids, cholesterol, or glucose (sugar). This does not apply to pregnant women. Water, hot tea and black coffee (with nothing added) are okay. Do not drink other fluids, diet soda or chew gum.            Jan 17, 2018 12:30 PM CST   FULL PULMONARY FUNCTION with  PFL C   [facility] Pulmonary Function Testing ([facility])    [address]  [address] [facility] [address]   [phone]            Jan 17, 2018  1:35 PM CST   (Arrive by 1:20 PM)   New Patient Visit with Warren Cano MD   [facility] ([facility])    [address]  [address] [facility] [address]   [phone]            Jan 23, 2018  9:30 AM CST   NEW RETINA with Susanne Burnham MD   Eye Clinic ([facility])    [address]  [address]  [address] [facility] [address]  [facility] [address]   [phone]            Jan 23, 2018  1:30 PM CST   (Arrive by 1:15 PM)   Return Visit with ALMA Conroy CNP   [facility] ([facility])    [address] [place] [address]  [address] [facility] [address]   [phone]              Future tests that were ordered for you today     Open Future Orders        Priority Expected Expires Ordered    CBC with platelets differential Routine 12/12/2017 12/26/2017 12/12/2017    Hepatitis C Screen Reflex to HCV RNA Quant and Genotype Routine 12/12/2017 12/12/2018 12/12/2017    MA SCREENING DIGITAL BILAT - Future  (s+30) Routine  12/12/2018 12/12/2017    HEMOGLOBIN A1C -(Today) Routine 12/12/2017 12/12/2018 12/12/2017    Lipid panel reflex to direct LDL Fasting Routine 12/12/2017 12/12/2018 12/12/2017    Albumin Random Urine Quantitative with Creat Ratio Routine  2017    Fecal colorectal cancer screen FIT - Future (S+30) Routine 2017    Comprehensive metabolic panel Routine 2017            Who to contact     Please call your clinic at [phone] to:    Ask questions about your health    Make or cancel appointments    Discuss your medicines    Learn about your test results    Speak to your doctor   If you have compliments or concerns about an experience at your clinic, or if you wish to file a complaint, please contact [facility] Patient Relations at [phone] or email us at [email or web address]         Additional Information About Your Visit        [employer] Information     [employer] is an electronic gateway that provides easy, online access to your medical records. With [employer], you can request a clinic appointment, read your test results, renew a prescription or communicate with your care team.     To sign up for [employer] visit the website at [email or web address]   You will be asked to enter the access code listed below, as well as some personal information. Please follow the directions to create your username and password.     Your access code is: [email or web address]  Expires: 2017  5:30 AM     Your access code will  in 90 days. If you need help or a new code, please contact your [facility] or call [phone] for assistance.        Care EveryWhere ID     This is your Care EveryWhere ID. This could be used by other organizations to access your [place] medical records  [id number]        Your Vitals Were     Pulse Respirations BMI (Body Mass Index)             77 16 73.6 kg/m2          Blood Pressure from Last 3 Encounters:   17 114/62   16 110/70   10/19/16 136/71    Weight from Last 3 Encounters:   17 (!) 182.5 kg (402 lb 6.4 oz)   10/19/16 (!) 159.3 kg (351 lb 1.6 oz)   10/09/16 (!) 163.9  kg (361 lb 6.4 oz)              We Performed the Following     BARIATRIC ADULT REFERRAL     CARDIOLOGY EVAL ADULT REFERRAL     FLU VACCINE, 3 YRS +, IM  [[id number]]     OPHTHALMOLOGY ADULT REFERRAL     PULMONARY MEDICINE REFERRAL          Today's Medication Changes          These changes are accurate as of: 12/12/17 12:10 PM.  If you have any questions, ask your nurse or doctor.               These medicines have changed or have updated prescriptions.        Dose/Directions    allopurinol 100 MG tablet   Commonly known as:  ZYLOPRIM   This may have changed:  additional instructions   Used for:  Gout, unspecified cause, unspecified chronicity, unspecified site   Changed by:  Yoselin Shook APRN CNP        Dose:  100 mg   Take 1 tablet (100 mg) by mouth 2 times daily   Quantity:  180 tablet   Refills:  0       aspirin 81 MG EC tablet   This may have changed:  See the new instructions.   Used for:  Atrial fibrillation, unspecified type (H), Acute on chronic combined systolic and diastolic congestive heart failure (H)   Changed by:  Yoselin Shook APRN CNP        Dose:  81 mg   Take 1 tablet (81 mg) by mouth daily   Quantity:  90 tablet   Refills:  0       blood glucose monitoring test strip   Commonly known as:  ACCU-CHEK NINA PLUS   This may have changed:  See the new instructions.   Used for:  Type 2 diabetes mellitus with other neurologic complication, without long-term current use of insulin (H)   Changed by:  Yoselin Shook APRN CNP        Dose:  100 strip   100 strips by In Vitro route 2 times daily Use to test blood sugar 2 times daily or as directed.   Quantity:  100 strip   Refills:  3       bumetanide 2 MG tablet   Commonly known as:  BUMEX   This may have changed:  See the new instructions.   Used for:  Acute on chronic combined systolic and diastolic congestive heart failure (H)   Changed by:  Yoselin Shook APRN CNP        Dose:  2 mg   Take 1 tablet (2 mg) by mouth daily    Quantity:  90 tablet   Refills:  0       Ferrous Gluconate 324 (37.5 FE) MG Tabs   This may have changed:    - medication strength  - how much to take   Used for:  Iron deficiency anemia due to chronic blood loss   Changed by:  Yoselin Shook APRN CNP        Dose:  324 mg   Take 324 mg by mouth 3 times daily   Quantity:  270 tablet   Refills:  1       lisinopril 5 MG tablet   Commonly known as:  PRINIVIL/ZESTRIL   This may have changed:  See the new instructions.   Used for:  Chronic systolic heart failure (H)   Changed by:  Yoselin Shook APRN CNP        Dose:  5 mg   Take 1 tablet (5 mg) by mouth daily   Quantity:  90 tablet   Refills:  0       metoprolol 50 MG 24 hr tablet   Commonly known as:  TOPROL-XL   This may have changed:  additional instructions   Used for:  Acute on chronic combined systolic and diastolic congestive heart failure (H)   Changed by:  Yoselin Shook APRN CNP        Dose:  75 mg   Take 1.5 tablets (75 mg) by mouth daily   Quantity:  134 tablet   Refills:  0       simvastatin 20 MG tablet   Commonly known as:  ZOCOR   This may have changed:  See the new instructions.   Used for:  Hyperlipidemia, unspecified hyperlipidemia type   Changed by:  Yoselin Shook APRN CNP        Dose:  20 mg   Take 1 tablet (20 mg) by mouth At Bedtime   Quantity:  30 tablet   Refills:  0       sitagliptin 50 MG tablet   Commonly known as:  JANUVIA   This may have changed:  See the new instructions.   Used for:  Type 2 diabetes mellitus with other neurologic complication, without long-term current use of insulin (H)   Changed by:  Yoselin Shook APRN CNP        Dose:  50 mg   Take 1 tablet (50 mg) by mouth daily   Quantity:  90 tablet   Refills:  0       warfarin 3 MG tablet   Commonly known as:  COUMADIN   This may have changed:  See the new instructions.   Used for:  Chronic atrial fibrillation (H)   Changed by:  Yoselin Shook APRN CNP        TAKE 1-1 & 1/2 TABLETS  BY MOUTH DAILY FOR AS DIRECTED   Quantity:  180 tablet   Refills:  0            Where to get your medicines      These medications were sent to [facility]/pharmacy #[id number] - [facility] [address]  [address] [address]     Phone:  [phone]     acetaminophen 325 MG tablet    allopurinol 100 MG tablet    aspirin 81 MG EC tablet    blood glucose monitoring lancets    blood glucose monitoring test strip    bumetanide 2 MG tablet    Ferrous Gluconate 324 (37.5 FE) MG Tabs    gabapentin 100 MG capsule    lisinopril 5 MG tablet    metoprolol 50 MG 24 hr tablet    potassium chloride SA 20 MEQ CR tablet    simvastatin 20 MG tablet    sitagliptin 50 MG tablet    warfarin 3 MG tablet                Primary Care Provider    None Specified       No primary provider on file.        Equal Access to Services     JAYLEN ATWOOD : Gaetano Lynne, waaxda roz, qaybta kaalmada jeremiah, nancy haider . So [facility] [phone].    ATENCIÓN: Si habla español, tiene a palma disposición servicios gratuitos de asistencia lingüística. [facility] [phone].    We comply with applicable federal civil rights laws and [place] laws. We do not discriminate on the basis of race, color, national origin, age, disability, sex, sexual orientation, or gender identity.            Thank you!     Thank you for choosing [facility]  for your care. Our goal is always to provide you with excellent care. Hearing back from our patients is one way we can continue to improve our services. Please take a few minutes to complete the written survey that you may receive in the mail after your visit with us. Thank you!             Your Updated Medication List - Protect others around you: Learn how to safely use, store and throw away your medicines at www.disposemymeds.org.          This list is accurate as of: 12/12/17 12:10 PM.  Always use your most recent  med list.                   Brand Name Dispense Instructions for use Diagnosis    acetaminophen 325 MG tablet    TYLENOL    100 tablet    Take 2 tablets (650 mg) by mouth every 4 hours as needed for mild pain    Pain of left lower extremity, Acute on chronic combined systolic and diastolic congestive heart failure (H)       allopurinol 100 MG tablet    ZYLOPRIM    180 tablet    Take 1 tablet (100 mg) by mouth 2 times daily    Gout, unspecified cause, unspecified chronicity, unspecified site       aspirin 81 MG EC tablet     90 tablet    Take 1 tablet (81 mg) by mouth daily    Atrial fibrillation, unspecified type (H), Acute on chronic combined systolic and diastolic congestive heart failure (H)       blood glucose monitoring lancets     100 each    Use to test blood sugar 3 times daily or as directed.    Type 2 diabetes mellitus with chronic kidney disease, without long-term current use of insulin, unspecified CKD stage (H)       blood glucose monitoring meter device kit     1 kit    Use to test blood sugars 3 times daily or as directed.    Diabetes mellitus, type 2 (H)       blood glucose monitoring test strip    ACCU-CHEK NINA PLUS    100 strip    100 strips by In Vitro route 2 times daily Use to test blood sugar 2 times daily or as directed.    Type 2 diabetes mellitus with other neurologic complication, without long-term current use of insulin (H)       bumetanide 2 MG tablet    BUMEX    90 tablet    Take 1 tablet (2 mg) by mouth daily    Acute on chronic combined systolic and diastolic congestive heart failure (H)       Ferrous Gluconate 324 (37.5 FE) MG Tabs     270 tablet    Take 324 mg by mouth 3 times daily    Iron deficiency anemia due to chronic blood loss       gabapentin 100 MG capsule    NEURONTIN    270 capsule    TAKE 3 CAPSULES (300 MG) BY MOUTH 3 TIMES DAILY    Peripheral polyneuropathy       lisinopril 5 MG tablet    PRINIVIL/ZESTRIL    90 tablet    Take 1 tablet (5 mg) by mouth daily    Chronic  systolic heart failure (H)       metoprolol 50 MG 24 hr tablet    TOPROL-XL    134 tablet    Take 1.5 tablets (75 mg) by mouth daily    Acute on chronic combined systolic and diastolic congestive heart failure (H)       potassium chloride SA 20 MEQ CR tablet    KLOR-CON    180 tablet    Take 2 tablets (40 mEq) by mouth daily    Hypokalemia       simvastatin 20 MG tablet    ZOCOR    30 tablet    Take 1 tablet (20 mg) by mouth At Bedtime    Hyperlipidemia, unspecified hyperlipidemia type       sitagliptin 50 MG tablet    JANUVIA    90 tablet    Take 1 tablet (50 mg) by mouth daily    Type 2 diabetes mellitus with other neurologic complication, without long-term current use of insulin (H)       warfarin 3 MG tablet    COUMADIN    180 tablet    TAKE 1-1 & 1/2 TABLETS BY MOUTH DAILY FOR AS DIRECTED    Chronic atrial fibrillation (H)

## 2017-12-12 NOTE — PATIENT INSTRUCTIONS
[facility]: [phone]     [facility] Medication Refill Request Information:  * Please contact your pharmacy regarding ANY request for medication refills.  ** [facility] Prescription Fax = [phone]  * Please allow 3 business days for routine medication refills.  * Please allow 5 business days for controlled substance medication refills.     [facility] Test Result notification information:  *You will be notified with in 7-10 days of your appointment day regarding the results of your test.  If you are on MyChart you will be notified as soon as the provider has reviewed the results and signed off on them.

## 2017-12-14 ENCOUNTER — TELEPHONE (OUTPATIENT)
Dept: INTERNAL MEDICINE | Facility: CLINIC | Age: 57
End: 2017-12-14

## 2017-12-14 NOTE — TELEPHONE ENCOUNTER
"Called Ms. Siddiqi to check in and see if she was able to go to a local ED. She did not go yesterday because she was achy and tired. She does not want to follow-up in clinic until her scheduled visit in January because she has \"a lot going on right now.\" I emphasized again that she cannot wait 6 weeks to determine her source of bleeding, and that she needs to stop her warfarin and receive blood transfusions. Discussed need to evaluate for uterine fibroid or cancer screenings. She agrees to go to the ED for further evaluation/work-up.  Yoselin Shook, APRN CNP    "

## 2017-12-19 ENCOUNTER — ANTICOAGULATION THERAPY VISIT (OUTPATIENT)
Dept: ANTICOAGULATION | Facility: CLINIC | Age: 57
End: 2017-12-19

## 2017-12-19 DIAGNOSIS — I48.91 ATRIAL FIBRILLATION (H): ICD-10-CM

## 2017-12-19 DIAGNOSIS — Z79.01 LONG-TERM (CURRENT) USE OF ANTICOAGULANTS: ICD-10-CM

## 2017-12-19 LAB — INR PPP: 2

## 2017-12-19 NOTE — MR AVS SNAPSHOT
Arianna VALDIVIA Devang   12/19/2017   Anticoagulation Therapy Visit    Description:  57 year old female   Provider:  Espinoza Rodriguez, RN   Department:  [facility]           INR as of 12/19/2017     Today's INR 2.0      Anticoagulation Summary as of 12/19/2017     INR goal 2.0-3.0   Today's INR 2.0   Full instructions 3 mg on Mon, Fri; 4.5 mg all other days   Next INR check 1/2/2018    Indications   Long-term (current) use of anticoagulants [Z79.01] [Z79.01]  Atrial fibrillation (H) [I48.91] [I48.91]         December 2017 Details    Sun Mon Tue Wed Thu Fri Sat          1               2                 3               4               5               6               7               8               9                 10               11               12               13               14               15               16                 17               18               19      4.5 mg   See details      20      4.5 mg         21      4.5 mg         22      3 mg         23      4.5 mg           24      4.5 mg         25      3 mg         26      4.5 mg         27      4.5 mg         28      4.5 mg         29      3 mg         30      4.5 mg           31      4.5 mg                Date Details   12/19 This INR check               How to take your warfarin dose     To take:  3 mg Take 1 of the 3 mg tablets.    To take:  4.5 mg Take 1.5 of the 3 mg tablets.           January 2018 Details    Sun Mon Tue Wed Thu Fri Sat      1      3 mg         2            3               4               5               6                 7               8               9               10               11               12               13                 14               15               16               17               18               19               20                 21               22               23               24               25               26               27                 28               29               30                31                   Date Details   No additional details    Date of next INR:  1/2/2018         How to take your warfarin dose     To take:  3 mg Take 1 of the 3 mg tablets.    To take:  4.5 mg Take 1.5 of the 3 mg tablets.

## 2017-12-19 NOTE — PROGRESS NOTES
ANTICOAGULATION FOLLOW-UP CLINIC VISIT    Patient Name:  Arianna Siddiqi  Date:  12/19/2017  Contact Type:  Telephone    SUBJECTIVE:     Patient Findings     Positives No Problem Findings    Comments Arianna phoned the [facility], she will check an INR at home in two weeks.             OBJECTIVE    INR   Date Value Ref Range Status   12/19/2017 2.0  Final     Comment:     Home monitoring       ASSESSMENT / PLAN  INR assessment THER    Recheck INR In: 2 WEEKS    INR Location Home INR      Anticoagulation Summary as of 12/19/2017     INR goal 2.0-3.0   Today's INR 2.0   Maintenance plan 3 mg (3 mg x 1) on Mon, Fri; 4.5 mg (3 mg x 1.5) all other days   Full instructions 3 mg on Mon, Fri; 4.5 mg all other days   Weekly total 28.5 mg   No change documented Espinoza Rodriguez RN   Plan last modified Paradise Pizarro RN (10/3/2017)   Next INR check 1/2/2018   Priority INR   Target end date     Indications   Long-term (current) use of anticoagulants [Z79.01] [Z79.01]  Atrial fibrillation (H) [I48.91] [I48.91]         Anticoagulation Episode Summary     INR check location Clinic Lab    Preferred lab     [facility]    Comments 1/17 [employer] [facility] to start on 2/28.  Pt's voicemail box is full and pt is unable to retrieve voice messages       Anticoagulation Care Providers     Provider Role Specialty Phone number    Amelia Angulo MD PhD Responsible [facility] [phone]            See the Encounter Report to view Anticoagulation Flowsheet and Dosing Calendar (Go to Encounters tab in chart review, and find the Anticoagulation Therapy Visit)    Spoke with patient. Gave them their lab results and new warfarin recommendation.  No changes in health, medication, or diet. No missed doses, no falls. No signs or symptoms of bleed or clotting.    Espinoza Rodriguez, RN

## 2018-01-01 NOTE — PROGRESS NOTES
ANTICOAGULATION FOLLOW-UP CLINIC VISIT    Patient Name:  Arianna Siddiqi  Date:  2/7/2017  Contact Type:  Telephone    SUBJECTIVE:        OBJECTIVE    INR   Date Value Ref Range Status   02/07/2017 1.9  Final       ASSESSMENT / PLAN  INR assessment THER    Recheck INR In: 1 WEEK    INR Location Homecare INR      Anticoagulation Summary as of 2/7/2017     INR goal 2.0-3.0   Selected INR 1.9! (2/7/2017)   Maintenance plan 4.5 mg (3 mg x 1.5) on Tue, Thu, Sat; 3 mg (3 mg x 1) all other days   Full instructions 4.5 mg on Tue, Thu, Sat; 3 mg all other days   Weekly total 25.5 mg   Plan last modified Carol Hoffman RN (1/31/2017)   Next INR check 2/14/2017   Priority INR   Target end date     Indications   Long-term (current) use of anticoagulants [Z79.01] [Z79.01]  Atrial fibrillation (H) [I48.91] [I48.91]         Anticoagulation Episode Summary     INR check location Clinic Lab    Preferred lab     Send INR reminders to [facility]    Comments 1/17 [employer] paperwork in process  Pt's voicemail box is full and pt is unable to retrieve voice messages       Anticoagulation Care Providers     Provider Role Specialty Phone number    Amelia Angulo MD PHD Responsible Family Practice [phone]            See the Encounter Report to view Anticoagulation Flowsheet and Dosing Calendar (Go to Encounters tab in chart review, and find the Anticoagulation Therapy Visit)    Spoke with Home care RN Erick. She said Arianna reports Insulin injection sites are bleeding more. More Nausea. Not feeling right.    Sina Lazcano [facility]                 
 nursery

## 2018-01-02 ENCOUNTER — ANTICOAGULATION THERAPY VISIT (OUTPATIENT)
Dept: ANTICOAGULATION | Facility: CLINIC | Age: 58
End: 2018-01-02

## 2018-01-02 DIAGNOSIS — I48.91 ATRIAL FIBRILLATION (H): ICD-10-CM

## 2018-01-02 DIAGNOSIS — Z79.01 LONG-TERM (CURRENT) USE OF ANTICOAGULANTS: ICD-10-CM

## 2018-01-02 LAB — INR PPP: 2.7

## 2018-01-02 NOTE — PROGRESS NOTES
ANTICOAGULATION FOLLOW-UP CLINIC VISIT    Patient Name:  Arianna Siddiqi  Date:  1/2/2018  Contact Type:  Telephone    SUBJECTIVE:     Patient Findings     Positives Other complaints (URI X3 -4 days,  Not taking any OTC meds.)           OBJECTIVE    INR   Date Value Ref Range Status   01/02/2018 2.7  Final       ASSESSMENT / PLAN  INR assessment THER    Recheck INR In: 2 WEEKS    INR Location Home INR      Anticoagulation Summary as of 1/2/2018     INR goal 2.0-3.0   Today's INR 2.7   Maintenance plan 3 mg (3 mg x 1) on Mon, Fri; 4.5 mg (3 mg x 1.5) all other days   Full instructions 3 mg on Mon, Fri; 4.5 mg all other days   Weekly total 28.5 mg   Plan last modified Paradise Pizarro RN (10/3/2017)   Next INR check 1/16/2018   Priority INR   Target end date     Indications   Long-term (current) use of anticoagulants [Z79.01] [Z79.01]  Atrial fibrillation (H) [I48.91] [I48.91]         Anticoagulation Episode Summary     INR check location Clinic Lab    Preferred lab     Send INR reminders to [facility]    Comments 1/17 [employer] to start on 2/28.  Pt's voicemail box is full and pt is unable to retrieve voice messages       Anticoagulation Care Providers     Provider Role Specialty Phone number    Amelia Angulo MD PhD Responsible [facility] [phone]            See the Encounter Report to view Anticoagulation Flowsheet and Dosing Calendar (Go to Encounters tab in chart review, and find the Anticoagulation Therapy Visit)    Spoke with patient.    Carol Hoffman RN

## 2018-01-02 NOTE — MR AVS SNAPSHOT
Arianna VALDIVIA Devang   1/2/2018   Anticoagulation Therapy Visit    Description:  57 year old female   Provider:  Carol Hoffman RN   Department:  [facility]           INR as of 1/2/2018     Today's INR 2.7      Anticoagulation Summary as of 1/2/2018     INR goal 2.0-3.0   Today's INR 2.7   Full instructions 3 mg on Mon, Fri; 4.5 mg all other days   Next INR check 1/16/2018    Indications   Long-term (current) use of anticoagulants [Z79.01] [Z79.01]  Atrial fibrillation (H) [I48.91] [I48.91]         January 2018 Details    Sun Mon Tue Wed Thu Fri Sat      1               2      4.5 mg   See details      3      4.5 mg         4      4.5 mg         5      3 mg         6      4.5 mg           7      4.5 mg         8      3 mg         9      4.5 mg         10      4.5 mg         11      4.5 mg         12      3 mg         13      4.5 mg           14      4.5 mg         15      3 mg         16            17               18               19               20                 21               22               23               24               25               26               27                 28               29               30               31                   Date Details   01/02 This INR check       Date of next INR:  1/16/2018         How to take your warfarin dose     To take:  3 mg Take 1 of the 3 mg tablets.    To take:  4.5 mg Take 1.5 of the 3 mg tablets.

## 2018-01-08 NOTE — TELEPHONE ENCOUNTER
APPT INFO    Date /Time: 1/17/18, 1:35pm   Reason for Appt: Congestive heart congestive failure    Ref Provider/Clinic: DEBBIE BELTRÁN   Are there internal records? Yes/No?  IF YES, list clinic names: [facility]   Are there outside records? Yes/No? no   Patient Contact (Y/N) & Call Details: No, referred    Action:

## 2018-01-11 DIAGNOSIS — E78.5 HYPERLIPIDEMIA, UNSPECIFIED HYPERLIPIDEMIA TYPE: ICD-10-CM

## 2018-01-12 RX ORDER — SIMVASTATIN 20 MG
20 TABLET ORAL AT BEDTIME
Qty: 90 TABLET | Refills: 3 | Status: SHIPPED | OUTPATIENT
Start: 2018-01-12 | End: 2018-12-06

## 2018-01-15 ENCOUNTER — PRE VISIT (OUTPATIENT)
Dept: CARDIOLOGY | Facility: CLINIC | Age: 58
End: 2018-01-15

## 2018-01-15 DIAGNOSIS — I50.9 CHF (CONGESTIVE HEART FAILURE) (H): Primary | ICD-10-CM

## 2018-01-16 ENCOUNTER — ANTICOAGULATION THERAPY VISIT (OUTPATIENT)
Dept: ANTICOAGULATION | Facility: CLINIC | Age: 58
End: 2018-01-16

## 2018-01-16 DIAGNOSIS — I48.91 ATRIAL FIBRILLATION (H): ICD-10-CM

## 2018-01-16 DIAGNOSIS — Z79.01 LONG-TERM (CURRENT) USE OF ANTICOAGULANTS: ICD-10-CM

## 2018-01-16 LAB — INR PPP: 3.2

## 2018-01-16 NOTE — PROGRESS NOTES
ANTICOAGULATION FOLLOW-UP CLINIC VISIT    Patient Name:  Arianna Siddiqi  Date:  1/16/2018  Contact Type:  Telephone    SUBJECTIVE:     Patient Findings     Positives Unexplained INR or factor level change           OBJECTIVE    INR   Date Value Ref Range Status   01/16/2018 3.2  Final       ASSESSMENT / PLAN  INR assessment SUPRA    Recheck INR In: 2 WEEKS    INR Location Outside lab      Anticoagulation Summary as of 1/16/2018     INR goal 2.0-3.0   Today's INR 3.2!   Maintenance plan 3 mg (3 mg x 1) on Mon, Fri; 4.5 mg (3 mg x 1.5) all other days   Full instructions 3 mg on Mon, Fri; 4.5 mg all other days   Weekly total 28.5 mg   No change documented Ying Hollis, RN   Plan last modified Paradise Pizarro RN (10/3/2017)   Next INR check 1/30/2018   Priority INR   Target end date     Indications   Long-term (current) use of anticoagulants [Z79.01] [Z79.01]  Atrial fibrillation (H) [I48.91] [I48.91]         Anticoagulation Episode Summary     INR check location Clinic Lab    Preferred lab     Send INR reminders to [facility]    Comments 1/17 [employer] to start on 2/28.  Pt's voicemail box is full and pt is unable to retrieve voice messages       Anticoagulation Care Providers     Provider Role Specialty Phone number    Amelia Angulo MD PhD Responsible [facility] [phone]            See the Encounter Report to view Anticoagulation Flowsheet and Dosing Calendar (Go to Encounters tab in chart review, and find the Anticoagulation Therapy Visit)    Left message for patient with results and dosing recommendations. Asked patient to call back to report any missed doses, falls, signs and symptoms of bleeding or clotting, any changes in health, medication, or diet. Asked patient to call back with any questions or concerns.     Ying Hollis, SONAL

## 2018-01-16 NOTE — MR AVS SNAPSHOT
Arianna VALDIVIA Devang   1/16/2018   Anticoagulation Therapy Visit    Description:  57 year old female   Provider:  Ying Hollis, RN   [facility]           INR as of 1/16/2018     Today's INR 3.2!      Anticoagulation Summary as of 1/16/2018     INR goal 2.0-3.0   Today's INR 3.2!   Full instructions 3 mg on Mon, Fri; 4.5 mg all other days   Next INR check 1/30/2018    Indications   Long-term (current) use of anticoagulants [Z79.01] [Z79.01]  Atrial fibrillation (H) [I48.91] [I48.91]         January 2018 Details    Sun Mon Tue Wed Thu Fri Sat      1               2               3               4               5               6                 7               8               9               10               11               12               13                 14               15               16      4.5 mg   See details      17      4.5 mg         18      4.5 mg         19      3 mg         20      4.5 mg           21      4.5 mg         22      3 mg         23      4.5 mg         24      4.5 mg         25      4.5 mg         26      3 mg         27      4.5 mg           28      4.5 mg         29      3 mg         30            31                   Date Details   01/16 This INR check       Date of next INR:  1/30/2018         How to take your warfarin dose     To take:  3 mg Take 1 of the 3 mg tablets.    To take:  4.5 mg Take 1.5 of the 3 mg tablets.

## 2018-01-17 ENCOUNTER — PRE VISIT (OUTPATIENT)
Dept: PULMONOLOGY | Facility: CLINIC | Age: 58
End: 2018-01-17

## 2018-01-30 ENCOUNTER — ANTICOAGULATION THERAPY VISIT (OUTPATIENT)
Dept: ANTICOAGULATION | Facility: CLINIC | Age: 58
End: 2018-01-30

## 2018-01-30 DIAGNOSIS — Z79.01 LONG-TERM (CURRENT) USE OF ANTICOAGULANTS: ICD-10-CM

## 2018-01-30 DIAGNOSIS — I48.91 ATRIAL FIBRILLATION (H): ICD-10-CM

## 2018-01-30 LAB — INR PPP: 2.5

## 2018-01-30 NOTE — MR AVS SNAPSHOT
Arianna VALDIVIA Devang   1/30/2018   Anticoagulation Therapy Visit    Description:  57 year old female   Provider:  Ying Hollis, RN   Department:  [facility]           INR as of 1/30/2018     Today's INR 2.5      Anticoagulation Summary as of 1/30/2018     INR goal 2.0-3.0   Today's INR 2.5   Full instructions 3 mg on Mon, Fri; 4.5 mg all other days   Next INR check 2/6/2018    Indications   Long-term (current) use of anticoagulants [Z79.01] [Z79.01]  Atrial fibrillation (H) [I48.91] [I48.91]         January 2018 Details    Sun Mon Tue Wed Thu Fri Sat      1               2               3               4               5               6                 7               8               9               10               11               12               13                 14               15               16               17               18               19               20                 21               22               23               24               25               26               27                 28               29               30      4.5 mg   See details      31      4.5 mg             Date Details   01/30 This INR check               How to take your warfarin dose     To take:  4.5 mg Take 1.5 of the 3 mg tablets.           February 2018 Details    Sun Mon Tue Wed Thu Fri Sat         1      4.5 mg         2      3 mg         3      4.5 mg           4      4.5 mg         5      3 mg         6            7               8               9               10                 11               12               13               14               15               16               17                 18               19               20               21               22               23               24                 25               26               27               28                   Date Details   No additional details    Date of next INR:  2/6/2018         How to take your warfarin dose     To take:   3 mg Take 1 of the 3 mg tablets.    To take:  4.5 mg Take 1.5 of the 3 mg tablets.

## 2018-01-30 NOTE — PROGRESS NOTES
ANTICOAGULATION FOLLOW-UP CLINIC VISIT    Patient Name:  Arianna Siddiqi  Date:  1/30/2018  Contact Type:  Telephone    SUBJECTIVE:     Patient Findings     Positives No Problem Findings           OBJECTIVE    INR   Date Value Ref Range Status   01/30/2018 2.5  Final       ASSESSMENT / PLAN  INR assessment THER    Recheck INR In: 2 WEEKS    INR Location Clinic      Anticoagulation Summary as of 1/30/2018     INR goal 2.0-3.0   Today's INR 2.5   Maintenance plan 3 mg (3 mg x 1) on Mon, Fri; 4.5 mg (3 mg x 1.5) all other days   Full instructions 3 mg on Mon, Fri; 4.5 mg all other days   Weekly total 28.5 mg   Plan last modified Paradise Pizarro RN (10/3/2017)   Next INR check 2/6/2018   Priority INR   Target end date     Indications   Long-term (current) use of anticoagulants [Z79.01] [Z79.01]  Atrial fibrillation (H) [I48.91] [I48.91]         Anticoagulation Episode Summary     INR check location Clinic Lab    Preferred lab     Send INR reminders to [facility]    Comments 1/17 [employer] to start on 2/28.  Pt's voicemail box is full and pt is unable to retrieve voice messages       Anticoagulation Care Providers     Provider Role Specialty Phone number    Amelia Angulo MD PhD Responsible [facility] [phone]            See the Encounter Report to view Anticoagulation Flowsheet and Dosing Calendar (Go to Encounters tab in chart review, and find the Anticoagulation Therapy Visit)    Spoke with AriannaMeghan Hollis, RN

## 2018-02-09 DIAGNOSIS — G62.9 PERIPHERAL POLYNEUROPATHY: ICD-10-CM

## 2018-02-11 NOTE — TELEPHONE ENCOUNTER
gabapentin (NEURONTIN) 100 MG capsule  Last Written Prescription Date:  12/12/17  Last Fill Quantity: 270,   # refills: 0  Last Office Visit : 12/12/17  Future Office visit:  none    Routed because: not on protocol

## 2018-02-13 ENCOUNTER — ANTICOAGULATION THERAPY VISIT (OUTPATIENT)
Dept: ANTICOAGULATION | Facility: CLINIC | Age: 58
End: 2018-02-13

## 2018-02-13 DIAGNOSIS — Z79.01 LONG-TERM (CURRENT) USE OF ANTICOAGULANTS: ICD-10-CM

## 2018-02-13 DIAGNOSIS — I48.91 ATRIAL FIBRILLATION, UNSPECIFIED TYPE (H): ICD-10-CM

## 2018-02-13 LAB — INR PPP: 3.5

## 2018-02-13 RX ORDER — GABAPENTIN 100 MG/1
CAPSULE ORAL
Qty: 810 CAPSULE | Refills: 0 | Status: SHIPPED | OUTPATIENT
Start: 2018-02-13 | End: 2018-04-20

## 2018-02-13 NOTE — PROGRESS NOTES
ANTICOAGULATION FOLLOW-UP CLINIC VISIT    Patient Name:  Arianna Siddiqi  Date:  2/13/2018  Contact Type:  Telephone    SUBJECTIVE:     Patient Findings     Positives Change in diet/appetite (pt plans to eat one moderate serving of broccolli today.)           OBJECTIVE    INR   Date Value Ref Range Status   02/13/2018 3.5  Final       ASSESSMENT / PLAN  INR assessment SUPRA    Recheck INR In: 2 WEEKS    INR Location Home INR      Anticoagulation Summary as of 2/13/2018     INR goal 2.0-3.0   Today's INR 3.5!   Maintenance plan 3 mg (3 mg x 1) on Mon, Fri; 4.5 mg (3 mg x 1.5) all other days   Full instructions 2/13: 3 mg; Otherwise 3 mg on Mon, Fri; 4.5 mg all other days   Weekly total 28.5 mg   Plan last modified Paradise Pizarro RN (10/3/2017)   Next INR check 2/27/2018   Priority INR   Target end date     Indications   Long-term (current) use of anticoagulants [Z79.01] [Z79.01]  Atrial fibrillation (H) [I48.91] [I48.91]         Anticoagulation Episode Summary     INR check location Clinic Lab    Preferred lab     Send INR reminders to [facility]    Comments 1/17 [employer] [facility] to start on 2/28.  Pt's voicemail box is full and pt is unable to retrieve voice messages       Anticoagulation Care Providers     Provider Role Specialty Phone number    Amelia Angulo MD PhD Responsible [facility] [phone]            See the Encounter Report to view Anticoagulation Flowsheet and Dosing Calendar (Go to Encounters tab in chart review, and find the Anticoagulation Therapy Visit)    Spoke with patient.    Carol Hoffman RN

## 2018-02-13 NOTE — MR AVS SNAPSHOT
Arianna VALDIVIA Devang   2/13/2018   Anticoagulation Therapy Visit    Description:  57 year old female   Provider:  Carol Hoffman RN   Department:  [facility]           INR as of 2/13/2018     Today's INR 3.5!      Anticoagulation Summary as of 2/13/2018     INR goal 2.0-3.0   Today's INR 3.5!   Full instructions 2/13: 3 mg; Otherwise 3 mg on Mon, Fri; 4.5 mg all other days   Next INR check 2/27/2018    Indications   Long-term (current) use of anticoagulants [Z79.01] [Z79.01]  Atrial fibrillation (H) [I48.91] [I48.91]         February 2018 Details    Sun Mon Tue Wed Thu Fri Sat         1               2               3                 4               5               6               7               8               9               10                 11               12               13      3 mg   See details      14      4.5 mg         15      4.5 mg         16      3 mg         17      4.5 mg           18      4.5 mg         19      3 mg         20      4.5 mg         21      4.5 mg         22      4.5 mg         23      3 mg         24      4.5 mg           25      4.5 mg         26      3 mg         27            28                   Date Details   02/13 This INR check       Date of next INR:  2/27/2018         How to take your warfarin dose     To take:  3 mg Take 1 of the 3 mg tablets.    To take:  4.5 mg Take 1.5 of the 3 mg tablets.

## 2018-02-27 ENCOUNTER — ANTICOAGULATION THERAPY VISIT (OUTPATIENT)
Dept: ANTICOAGULATION | Facility: CLINIC | Age: 58
End: 2018-02-27

## 2018-02-27 DIAGNOSIS — Z79.01 LONG-TERM (CURRENT) USE OF ANTICOAGULANTS: ICD-10-CM

## 2018-02-27 DIAGNOSIS — I48.91 ATRIAL FIBRILLATION, UNSPECIFIED TYPE (H): ICD-10-CM

## 2018-02-27 LAB — INR PPP: 2.9

## 2018-02-27 NOTE — PROGRESS NOTES
ANTICOAGULATION FOLLOW-UP CLINIC VISIT    Patient Name:  Arianna Siddiqi  Date:  2/27/2018  Contact Type:  Telephone    SUBJECTIVE:     Patient Findings     Positives Other complaints (lingering cough.)           OBJECTIVE    INR   Date Value Ref Range Status   02/27/2018 2.9  Final       ASSESSMENT / PLAN  INR assessment THER    Recheck INR In: 2 WEEKS    INR Location Home INR      Anticoagulation Summary as of 2/27/2018     INR goal 2.0-3.0   Today's INR 2.9   Maintenance plan 3 mg (3 mg x 1) on Mon, Fri; 4.5 mg (3 mg x 1.5) all other days   Full instructions 2/27: 3 mg; Otherwise 3 mg on Mon, Fri; 4.5 mg all other days   Weekly total 28.5 mg   Plan last modified Paradise Pizarro RN (10/3/2017)   Next INR check 3/13/2018   Priority INR   Target end date     Indications   Long-term (current) use of anticoagulants [Z79.01] [Z79.01]  Atrial fibrillation (H) [I48.91] [I48.91]         Anticoagulation Episode Summary     INR check location Clinic Lab    Preferred lab     Send INR reminders to [facility]    Comments 1/17 [employer] to start on 2/28.  Pt's voicemail box is full and pt is unable to retrieve voice messages       Anticoagulation Care Providers     Provider Role Specialty Phone number    Amelia Angulo MD PhD [facility] [phone]            See the Encounter Report to view Anticoagulation Flowsheet and Dosing Calendar (Go to Encounters tab in chart review, and find the Anticoagulation Therapy Visit)  Spoke with patient.    Carol Hoffman RN

## 2018-02-27 NOTE — MR AVS SNAPSHOT
Arianna VALDIVIA Devang   2/27/2018   Anticoagulation Therapy Visit    Description:  57 year old female   Provider:  Carol Hoffman RN   Department:  [facility]           INR as of 2/27/2018     Today's INR 2.9      Anticoagulation Summary as of 2/27/2018     INR goal 2.0-3.0   Today's INR 2.9   Full instructions 2/27: 3 mg; Otherwise 3 mg on Mon, Fri; 4.5 mg all other days   Next INR check 3/13/2018    Indications   Long-term (current) use of anticoagulants [Z79.01] [Z79.01]  Atrial fibrillation (H) [I48.91] [I48.91]         February 2018 Details    Sun Mon Tue Wed Thu Fri Sat         1               2               3                 4               5               6               7               8               9               10                 11               12               13               14               15               16               17                 18               19               20               21               22               23               24                 25               26               27      3 mg   See details      28      4.5 mg             Date Details   02/27 This INR check               How to take your warfarin dose     To take:  3 mg Take 1 of the 3 mg tablets.    To take:  4.5 mg Take 1.5 of the 3 mg tablets.           March 2018 Details    Sun Mon Tue Wed Thu Fri Sat         1      4.5 mg         2      3 mg         3      4.5 mg           4      4.5 mg         5      3 mg         6      4.5 mg         7      4.5 mg         8      4.5 mg         9      3 mg         10      4.5 mg           11      4.5 mg         12      3 mg         13            14               15               16               17                 18               19               20               21               22               23               24                 25               26               27               28               29               30               31                Date Details    No additional details    Date of next INR:  3/13/2018         How to take your warfarin dose     To take:  3 mg Take 1 of the 3 mg tablets.    To take:  4.5 mg Take 1.5 of the 3 mg tablets.

## 2018-03-06 DIAGNOSIS — M10.9 GOUT, UNSPECIFIED CAUSE, UNSPECIFIED CHRONICITY, UNSPECIFIED SITE: ICD-10-CM

## 2018-03-06 DIAGNOSIS — E11.49 TYPE 2 DIABETES MELLITUS WITH OTHER NEUROLOGIC COMPLICATION, WITHOUT LONG-TERM CURRENT USE OF INSULIN (H): ICD-10-CM

## 2018-03-06 DIAGNOSIS — I48.20 CHRONIC ATRIAL FIBRILLATION (H): ICD-10-CM

## 2018-03-06 DIAGNOSIS — M79.605 PAIN OF LEFT LOWER EXTREMITY: ICD-10-CM

## 2018-03-06 DIAGNOSIS — I50.22 CHRONIC SYSTOLIC HEART FAILURE (H): ICD-10-CM

## 2018-03-06 DIAGNOSIS — E87.6 HYPOKALEMIA: ICD-10-CM

## 2018-03-06 DIAGNOSIS — I50.43 ACUTE ON CHRONIC COMBINED SYSTOLIC AND DIASTOLIC CONGESTIVE HEART FAILURE (H): ICD-10-CM

## 2018-03-06 RX ORDER — WARFARIN SODIUM 3 MG/1
TABLET ORAL
Qty: 180 TABLET | Refills: 1 | Status: SHIPPED | OUTPATIENT
Start: 2018-03-06 | End: 2018-11-01

## 2018-03-06 RX ORDER — ACETAMINOPHEN 325 MG/1
650 TABLET ORAL EVERY 4 HOURS PRN
Qty: 100 TABLET | Refills: 5 | Status: SHIPPED | OUTPATIENT
Start: 2018-03-06 | End: 2018-11-02

## 2018-03-06 RX ORDER — WARFARIN SODIUM 3 MG/1
TABLET ORAL
Qty: 180 TABLET | Refills: 0 | Status: CANCELLED | OUTPATIENT
Start: 2018-03-06

## 2018-03-06 RX ORDER — METOPROLOL SUCCINATE 50 MG/1
75 TABLET, EXTENDED RELEASE ORAL DAILY
Qty: 135 TABLET | Refills: 2 | Status: SHIPPED | OUTPATIENT
Start: 2018-03-06 | End: 2018-11-02

## 2018-03-06 RX ORDER — POTASSIUM CHLORIDE 1500 MG/1
40 TABLET, EXTENDED RELEASE ORAL DAILY
Qty: 180 TABLET | Refills: 2 | Status: SHIPPED | OUTPATIENT
Start: 2018-03-06 | End: 2018-11-02

## 2018-03-06 NOTE — TELEPHONE ENCOUNTER
acetaminophen (TYLENOL) 325 MG tablet      Last Written Prescription Date:  12/12/17  Last Fill Quantity: 100,   # refills: o  Passed protocol    potassium chloride SA (KLOR-CON) 20 MEQ CR tablet      Last Written Prescription Date:  12/12/17  Last Fill Quantity: 180,   # refills: 0  Passed protocol    metoprolol (TOPROL-XL) 50 MG 24 hr tablet      Last Written Prescription Date:  12/12/17  Last Fill Quantity: 134,   # refills: 0  Passed protocol     allopurinol (ZYLOPRIM) 100 MG tablet      Last Written Prescription Date:  12/12/17  Last Fill Quantity: 180,   # refills: 0  Failed protocol: Abnormal labs- Creatinine & Uric Acid      lisinopril (PRINIVIL/ZESTRIL) 5 MG tablet      Last Written Prescription Date:  12/12/17  Last Fill Quantity: 90,   # refills: 0  Failed protocol: Abnormal lab- Creatinine    bumetanide (BUMEX) 2 MG tablet      Last Written Prescription Date:  12/12/17  Last Fill Quantity: 90,   # refills: 0  Failed protocol: Abnormal lab- Creatinine    sitagliptin (JANUVIA) 50 MG tablet      Last Written Prescription Date:  12/12/17  Last Fill Quantity: 50,   # refills: 0  Failed protocol: Labs-Abnormal Creatinine, A1C overdue, Microalbumin- not on file    aspirin 81 MG tablet      Last Written Prescription Date:  *not on med list  Med not on med list  Removed from med list by PCP stating alternate therapy, will not be filled.     Last Office Visit : 12/12/17  Future Office visit:  none    (refill request for Warfarin routed to the [facility])

## 2018-03-06 NOTE — TELEPHONE ENCOUNTER
warfarin (COUMADIN) 3 MG tablet      Last Written Prescription Date:  12/12/17  Last Fill Quantity: 180,   # refills: 0  Last Office Visit : 2/27/18 anti-coag clinic       Routing refill request to provider for review/approval because:  Med managed by [facility]

## 2018-03-08 RX ORDER — ALLOPURINOL 100 MG/1
100 TABLET ORAL 2 TIMES DAILY
Qty: 60 TABLET | Refills: 0 | Status: SHIPPED | OUTPATIENT
Start: 2018-03-08 | End: 2018-03-12

## 2018-03-08 RX ORDER — LISINOPRIL 5 MG/1
5 TABLET ORAL DAILY
Qty: 30 TABLET | Refills: 0 | Status: SHIPPED | OUTPATIENT
Start: 2018-03-08 | End: 2018-03-12

## 2018-03-08 RX ORDER — BUMETANIDE 2 MG/1
2 TABLET ORAL DAILY
Qty: 30 TABLET | Refills: 0 | Status: SHIPPED | OUTPATIENT
Start: 2018-03-08 | End: 2018-03-12

## 2018-03-09 ENCOUNTER — NURSE TRIAGE (OUTPATIENT)
Dept: NURSING | Facility: CLINIC | Age: 58
End: 2018-03-09

## 2018-03-10 NOTE — TELEPHONE ENCOUNTER
Patient calls stating she had called her clinic requesting refills on her medications, but was only given a 30 day supply. Patient states she was last seen in December 2017. Patient stated she did have an appointment scheduled that she was ten minutes late for and she was not able to be seen. Patient states she has to hire a car service to get to appointments and then needs a wheelchair to get to the check in desk for appointments. Patient is frustrated that she was not seen, when she was late. Patient will call her clinic on Monday to get a 90 day supply of her medications and speak with a supervisor about her scheduling problems for office visits.  Reason for Disposition    Caller requesting a NON-URGENT new prescription or refill and triager unable to refill per unit policy    Additional Information    Negative: [1] Caller is not with the adult (patient) AND [2] reporting urgent symptoms    Negative: Lab result questions    Medication questions    Negative: Drug overdose and nurse unable to answer question    Negative: Caller requesting information not related to medicine    Negative: Caller requesting a prescription for Strep throat and has a positive culture result    Negative: Rash while taking a medication or within 3 days of stopping it    Negative: Immunization reaction suspected    Negative: [1] Asthma and [2] having symptoms of asthma (cough, wheezing, etc)    Negative: MORE THAN A DOUBLE DOSE of a prescription or over-the-counter (OTC) drug    Negative: [1] DOUBLE DOSE (an extra dose or lesser amount) of over-the-counter (OTC) drug AND [2] any symptoms (e.g., dizziness, nausea, pain, sleepiness)    Negative: [1] DOUBLE DOSE (an extra dose or lesser amount) of prescription drug AND [2] any symptoms (e.g., dizziness, nausea, pain, sleepiness)    Negative: Took another person's prescription drug    Negative: [1] DOUBLE DOSE (an extra dose or lesser amount) of prescription drug AND [2] NO symptoms  "(Exception: a double dose of antibiotics)    Negative: Diabetes drug error or overdose (e.g., insulin or extra dose)    Negative: [1] Request for URGENT new prescription or refill of \"essential\" medication (i.e., likelihood of harm to patient if not taken) AND [2] triager unable to fill per unit policy    Negative: [1] Prescription not at pharmacy AND [2] was prescribed today by PCP    Negative: Pharmacy calling with prescription questions and triager unable to answer question    Negative: Caller has URGENT medication question about med that PCP prescribed and triager unable to answer question    Negative: Caller has NON-URGENT medication question about med that PCP prescribed and triager unable to answer question    Protocols used: MEDICATION QUESTION CALL-ADULT-, INFORMATION ONLY CALL-ADULT-    "

## 2018-03-12 ENCOUNTER — TELEPHONE (OUTPATIENT)
Dept: INTERNAL MEDICINE | Facility: CLINIC | Age: 58
End: 2018-03-12

## 2018-03-12 DIAGNOSIS — I50.43 ACUTE ON CHRONIC COMBINED SYSTOLIC AND DIASTOLIC CONGESTIVE HEART FAILURE (H): ICD-10-CM

## 2018-03-12 DIAGNOSIS — G62.9 PERIPHERAL POLYNEUROPATHY: ICD-10-CM

## 2018-03-12 DIAGNOSIS — E11.49 TYPE 2 DIABETES MELLITUS WITH OTHER NEUROLOGIC COMPLICATION, WITHOUT LONG-TERM CURRENT USE OF INSULIN (H): ICD-10-CM

## 2018-03-12 DIAGNOSIS — I50.22 CHRONIC SYSTOLIC HEART FAILURE (H): ICD-10-CM

## 2018-03-12 DIAGNOSIS — M10.9 GOUT, UNSPECIFIED CAUSE, UNSPECIFIED CHRONICITY, UNSPECIFIED SITE: ICD-10-CM

## 2018-03-12 RX ORDER — GABAPENTIN 100 MG/1
CAPSULE ORAL
Qty: 810 CAPSULE | Refills: 1 | Status: CANCELLED | OUTPATIENT
Start: 2018-03-12

## 2018-03-12 RX ORDER — LISINOPRIL 5 MG/1
5 TABLET ORAL DAILY
Qty: 90 TABLET | Refills: 0 | Status: SHIPPED | OUTPATIENT
Start: 2018-03-12 | End: 2018-06-08

## 2018-03-12 RX ORDER — BUMETANIDE 2 MG/1
2 TABLET ORAL DAILY
Qty: 90 TABLET | Refills: 0 | Status: SHIPPED | OUTPATIENT
Start: 2018-03-12 | End: 2018-06-13

## 2018-03-12 RX ORDER — ALLOPURINOL 100 MG/1
100 TABLET ORAL 2 TIMES DAILY
Qty: 180 TABLET | Refills: 0 | Status: SHIPPED | OUTPATIENT
Start: 2018-03-12 | End: 2018-05-09

## 2018-03-12 NOTE — TELEPHONE ENCOUNTER
I spoke to pharmacy regarding gabapentin refill request. Rx was prescribed by Neema Montemayor on 2/13, per  does not appear pt has picked up this rx yet.  Pharmacy confirmed pt does have a 90-day supply currently ready for pick-up. Refill not needed at this time.    Dottie Joyce RN

## 2018-03-12 NOTE — TELEPHONE ENCOUNTER
gabapentin (NEURONTIN) 100 MG capsule      Last Written Prescription Date:  2/13/18  Last Fill Quantity: 810,   # refills: 0  Last Office Visit : 12/12/17  Future Office visit:  none    Routing refill request to provider for review/approval because:  Drug not on the refill protocol

## 2018-03-12 NOTE — TELEPHONE ENCOUNTER
I spoke to Arianna today regarding 30-day refills that she was given. Arianna stated that she needs to have 90-day refills, stated that she always gets 90-day refills and it is cheaper for her this way. I informed pt that we will often limit supply when follow up is needed. Pt was advised to follow up and be seen in ED following last appt as her CBC showed very low Hgb. No more recent labs or follow up with [facility]. Pt canceled appts with pulmonary and cardiology. Pt stated she did go to an ED after her last appointment in primary care and was reportedly told a transfusion was not needed as her provider did not place orders. I asked for clarification, but pt stated she couldn't remember any other details.  Informed pt that a 90-day supply can be given for cost purposes, but she does need to follow up in clinic for further refills and for reassessment. Informed pt that no further refills will be given at this time beyond the 90-day supply, advised pt to schedule a follow up visit.     Pt stated that she has difficulties with transportation, and it costs her money every time she needs to be seen in clinic. Pt voiced frustration with needing appointments. Pt stated she was a few minutes late for an appointment in November and was turned away. Stated this created extra costs for her as she had to pay for transportation for that missed visit and the follow up visit with Yoselin in December. Pt stated she has to plan transportation at least 1-2 months in advance, has difficulty making any appointments on short notice. I informed pt that a 90-day refill would be sent on her medications filled last week, so that will allow time to schedule and set up transportation. Stated that she does need to schedule follow up in clinic, however.    Pt then voiced frustration with our clinic and with provider. Stated she did not care for the last provider she saw and doesn't want to see her anymore. I informed pt that is entirely her choice,  and she is more than welcome to schedule with another provider in the clinic or in another clinic. I again informed pt that a 90-day supply will be given. She can choose to follow up in primary care with any provider, or she can choose to be seen in another clinic. Pt voiced acknowledgement that she can schedule with another provider.     Dottie Joyce RN

## 2018-03-13 ENCOUNTER — ANTICOAGULATION THERAPY VISIT (OUTPATIENT)
Dept: ANTICOAGULATION | Facility: CLINIC | Age: 58
End: 2018-03-13

## 2018-03-13 DIAGNOSIS — Z79.01 LONG-TERM (CURRENT) USE OF ANTICOAGULANTS: ICD-10-CM

## 2018-03-13 DIAGNOSIS — I48.91 ATRIAL FIBRILLATION (H): ICD-10-CM

## 2018-03-13 LAB — INR PPP: 2.5

## 2018-03-13 NOTE — PROGRESS NOTES
ANTICOAGULATION FOLLOW-UP CLINIC VISIT    Patient Name:  Arianna Siddiqi  Date:  3/13/2018  Contact Type:  Telephone    SUBJECTIVE:     Patient Findings     Positives No Problem Findings           OBJECTIVE    INR   Date Value Ref Range Status   03/13/2018 2.5  Final       ASSESSMENT / PLAN  INR assessment THER    Recheck INR In: 2 WEEKS    INR Location Home INR      Anticoagulation Summary as of 3/13/2018     INR goal 2.0-3.0   Today's INR 2.5   Maintenance plan 3 mg (3 mg x 1) on Mon, Wed, Fri; 4.5 mg (3 mg x 1.5) all other days   Full instructions 3 mg on Mon, Wed, Fri; 4.5 mg all other days   Weekly total 27 mg   Plan last modified Sina Lazcano RPH (3/13/2018)   Next INR check 3/27/2018   Priority INR   Target end date     Indications   Long-term (current) use of anticoagulants [Z79.01] [Z79.01]  Atrial fibrillation (H) [I48.91] [I48.91]         Anticoagulation Episode Summary     INR check location Clinic Lab    Preferred lab     Send INR reminders to [facility]    Comments 1/17 [employer] [facility] to start on 2/28.  Pt's voicemail box is full and pt is unable to retrieve voice messages       Anticoagulation Care Providers     Provider Role Specialty Phone number    Amelia Angulo MD PhD Responsible [facility] [phone]            See the Encounter Report to view Anticoagulation Flowsheet and Dosing Calendar (Go to Encounters tab in chart review, and find the Anticoagulation Therapy Visit)    Spoke with patient. Gave them their new warfarin recommendation.  No changes in health, medication, or diet. No missed doses, no falls. No signs or symptoms of bleed or clotting.      Sina Lazcano RPH

## 2018-03-13 NOTE — MR AVS SNAPSHOT
Arianna VALDIVIA Devang   3/13/2018   Anticoagulation Therapy Visit    Description:  57 year old female   Provider:  Sina Lazcano [facility]   Department:  [facility]           INR as of 3/13/2018     Today's INR 2.5      Anticoagulation Summary as of 3/13/2018     INR goal 2.0-3.0   Today's INR 2.5   Full instructions 3 mg on Mon, Wed, Fri; 4.5 mg all other days   Next INR check 3/27/2018    Indications   Long-term (current) use of anticoagulants [Z79.01] [Z79.01]  Atrial fibrillation (H) [I48.91] [I48.91]         March 2018 Details    Sun Mon Tue Wed Thu Fri Sat         1               2               3                 4               5               6               7               8               9               10                 11               12               13      4.5 mg   See details      14      3 mg         15      4.5 mg         16      3 mg         17      4.5 mg           18      4.5 mg         19      3 mg         20      4.5 mg         21      3 mg         22      4.5 mg         23      3 mg         24      4.5 mg           25      4.5 mg         26      3 mg         27            28               29               30               31                Date Details   03/13 This INR check       Date of next INR:  3/27/2018         How to take your warfarin dose     To take:  3 mg Take 1 of the 3 mg tablets.    To take:  4.5 mg Take 1.5 of the 3 mg tablets.

## 2018-03-22 ENCOUNTER — TELEPHONE (OUTPATIENT)
Dept: INTERNAL MEDICINE | Facility: CLINIC | Age: 58
End: 2018-03-22

## 2018-03-22 NOTE — TELEPHONE ENCOUNTER
----- Message from Vee Chase RN sent at 3/21/2018  9:52 AM CDT -----  Regarding: FW: Rx request follow up   Contact: [phone]  I tried to reach this pt , voice mail is full.  She hasn't seen  since 2016 .  Vee Chase RN    ----- Message -----     From: Cr Beckman     Sent: 3/20/2018  10:25 AM       To: Vee Chase RN  Subject: Rx request follow up                             Pt states she got most of her refill but did not get her Aspirin. Wants to see if this can be refilled too. It looks like her Aspirin has been discontinued.     Re: aspirin 81 MG EC tablet     Pt states she did not want to speak with Dottie. Looks like she will be re-establishing care with Dr. Angulo. Can you help to see what to do with this.     She does not want to come in to be evaluated, states it cost too much for her.

## 2018-03-22 NOTE — TELEPHONE ENCOUNTER
I spoke to Arianna regarding her request for an aspirin refill. Informed pt that medication was removed from her medication list by Yoselin during clinic appointment, refill cannot be given. Pt stated she was never informed that she should not be taking aspirin. I apologized that she was not updated during the clinic visit, informed her that per the notes, it appears Yoselin dc'd medication stating pt is on alternate therapy. I stated it is likely r/t pt being on warfarin and pt also having low hgb with bleeding during last clinic appt. Pt voiced understanding. Pt is to continue warfarin and continue following up with INR clinic. She voiced understanding.    Pt then discussed call from last week. She stated she does not have an issue with any RN personally, stated she was simply confused as to why the refill could not be given. Pt stated she is used to [facility] where she typically received refills for an entire year. I informed pt that often times refills can be given for 6 months or a year, but it depends on the situation. Informed her that since she had a complicated visit (Hgb was low at 5.0), limiting refills can be necessary for proper follow up. Informed pt that we just want to ensure we are giving her the appropriate care. Informed her that for uncomplicated visits, greater refills are often times allowable. Pt voiced understanding. She stated she will keep her upcoming appointment with Dr. Angulo, confirmed she does have enough medication to last until that time. Pt also set up a visit with cardiology that day. No further questions or concerns at this time. Pt thanked me for call.    Dottie Joyce, SONAL

## 2018-03-27 ENCOUNTER — ANTICOAGULATION THERAPY VISIT (OUTPATIENT)
Dept: ANTICOAGULATION | Facility: CLINIC | Age: 58
End: 2018-03-27

## 2018-03-27 DIAGNOSIS — Z79.01 LONG-TERM (CURRENT) USE OF ANTICOAGULANTS: ICD-10-CM

## 2018-03-27 DIAGNOSIS — I48.91 ATRIAL FIBRILLATION, UNSPECIFIED TYPE (H): ICD-10-CM

## 2018-03-27 LAB — INR PPP: 2.6

## 2018-03-27 NOTE — PROGRESS NOTES
ANTICOAGULATION FOLLOW-UP CLINIC VISIT    Patient Name:  Arianna Siddiqi  Date:  3/27/2018  Contact Type:  Telephone    SUBJECTIVE:     Patient Findings     Positives Change in medications (D/Harris aspirin 81mg), Other complaints (Continues to have cold symptoms)           OBJECTIVE    INR   Date Value Ref Range Status   03/27/2018 2.6  Final       ASSESSMENT / PLAN  INR assessment THER    Recheck INR In: 2 WEEKS    INR Location Home[facility] INR      Anticoagulation Summary as of 3/27/2018     INR goal 2.0-3.0   Today's INR 2.6   Maintenance plan 3 mg (3 mg x 1) on Mon, Wed, Fri; 4.5 mg (3 mg x 1.5) all other days   Full instructions 3 mg on Mon, Wed, Fri; 4.5 mg all other days   Weekly total 27 mg   Plan last modified Sina Lazcano, [facility] (3/13/2018)   Next INR check 4/10/2018   Priority INR   Target end date     Indications   Long-term (current) use of anticoagulants [Z79.01] [Z79.01]  Atrial fibrillation (H) [I48.91] [I48.91]         Anticoagulation Episode Summary     INR check location Clinic Lab    Preferred lab     Send INR reminders to [facility]    Comments 1/17 [employer] [facility] Monitoring to start on 2/28.  Pt's voicemail box is full and pt is unable to retrieve voice messages       Anticoagulation Care Providers     Provider Role Specialty Phone number    Amelia Angulo MD PhD [facility] [phone]            See the Encounter Report to view Anticoagulation Flowsheet and Dosing Calendar (Go to Encounters tab in chart review, and find the Anticoagulation Therapy Visit)    Spoke with patient.    Carol Hoffman RN

## 2018-03-27 NOTE — MR AVS SNAPSHOT
Arianna VALDIVIA Devang   3/27/2018   Anticoagulation Therapy Visit    Description:  57 year old female   Provider:  Carol Hoffman RN   Department:  [facility]           INR as of 3/27/2018     Today's INR 2.6      Anticoagulation Summary as of 3/27/2018     INR goal 2.0-3.0   Today's INR 2.6   Full instructions 3 mg on Mon, Wed, Fri; 4.5 mg all other days   Next INR check 4/10/2018    Indications   Long-term (current) use of anticoagulants [Z79.01] [Z79.01]  Atrial fibrillation (H) [I48.91] [I48.91]         March 2018 Details    Sun Mon Tue Wed Thu Fri Sat         1               2               3                 4               5               6               7               8               9               10                 11               12               13               14               15               16               17                 18               19               20               21               22               23               24                 25               26               27      4.5 mg   See details      28      3 mg         29      4.5 mg         30      3 mg         31      4.5 mg          Date Details   03/27 This INR check               How to take your warfarin dose     To take:  3 mg Take 1 of the 3 mg tablets.    To take:  4.5 mg Take 1.5 of the 3 mg tablets.           April 2018 Details    Sun Mon Tue Wed Thu Fri Sat     1      4.5 mg         2      3 mg         3      4.5 mg         4      3 mg         5      4.5 mg         6      3 mg         7      4.5 mg           8      4.5 mg         9      3 mg         10            11               12               13               14                 15               16               17               18               19               20               21                 22               23               24               25               26               27               28                 29               30                     Date  Details   No additional details    Date of next INR:  4/10/2018         How to take your warfarin dose     To take:  3 mg Take 1 of the 3 mg tablets.    To take:  4.5 mg Take 1.5 of the 3 mg tablets.

## 2018-04-10 ENCOUNTER — ANTICOAGULATION THERAPY VISIT (OUTPATIENT)
Dept: ANTICOAGULATION | Facility: CLINIC | Age: 58
End: 2018-04-10

## 2018-04-10 DIAGNOSIS — Z79.01 LONG-TERM (CURRENT) USE OF ANTICOAGULANTS: ICD-10-CM

## 2018-04-10 DIAGNOSIS — I48.91 ATRIAL FIBRILLATION, UNSPECIFIED TYPE (H): ICD-10-CM

## 2018-04-10 LAB — INR PPP: 3.4

## 2018-04-10 NOTE — MR AVS SNAPSHOT
Arianna VALDIVIA Devang   4/10/2018   Anticoagulation Therapy Visit    Description:  57 year old female   Provider:  Tanna Dawkins, RN   Department:  [facility]           INR as of 4/10/2018     Today's INR 3.40!      Anticoagulation Summary as of 4/10/2018     INR goal 2.0-3.0   Today's INR 3.40!   Full instructions 4/10: 3 mg; Otherwise 3 mg on Mon, Wed, Fri; 4.5 mg all other days   Next INR check 4/17/2018    Indications   Long-term (current) use of anticoagulants [Z79.01] [Z79.01]  Atrial fibrillation (H) [I48.91] [I48.91]         April 2018 Details    Sun Mon Tue Wed Thu Fri Sat     1               2               3               4               5               6               7                 8               9               10      3 mg   See details      11      3 mg         12      4.5 mg         13      3 mg         14      4.5 mg           15      4.5 mg         16      3 mg         17            18               19               20               21                 22               23               24               25               26               27               28                 29               30                     Date Details   04/10 This INR check       Date of next INR:  4/17/2018         How to take your warfarin dose     To take:  3 mg Take 1 of the 3 mg tablets.    To take:  4.5 mg Take 1.5 of the 3 mg tablets.

## 2018-04-10 NOTE — PROGRESS NOTES
ANTICOAGULATION FOLLOW-UP CLINIC VISIT    Patient Name:  Arianna Siddiqi  Date:  4/10/2018  Contact Type:  Telephone    SUBJECTIVE:     Patient Findings     Positives Unexplained INR or factor level change    Comments Pt denies any changes and the only thing that was different was she ate a lot of sushi last night.           OBJECTIVE    INR   Date Value Ref Range Status   04/10/2018 3.40  Final     Comment:     Home Care        ASSESSMENT / PLAN  INR assessment SUPRA    Recheck INR In: 1 WEEK    INR Location Home INR      Anticoagulation Summary as of 4/10/2018     INR goal 2.0-3.0   Today's INR 3.40!   Maintenance plan 3 mg (3 mg x 1) on Mon, Wed, Fri; 4.5 mg (3 mg x 1.5) all other days   Full instructions 4/10: 3 mg; Otherwise 3 mg on Mon, Wed, Fri; 4.5 mg all other days   Weekly total 27 mg   Plan last modified Sina Lazcano, [facility] (3/13/2018)   Next INR check 4/17/2018   Priority INR   Target end date     Indications   Long-term (current) use of anticoagulants [Z79.01] [Z79.01]  Atrial fibrillation (H) [I48.91] [I48.91]         Anticoagulation Episode Summary     INR check location Clinic Lab    Preferred lab     [facility]    Comments 1/17 [employer] [facility] Monitoring to start on 2/28.  Pt's voicemail box is full and pt is unable to retrieve voice messages       Anticoagulation Care Providers     Provider Role Specialty Phone number    Amelia Angulo MD PhD Responsible [facility] [phone]            See the Encounter Report to view Anticoagulation Flowsheet and Dosing Calendar (Go to Encounters tab in chart review, and find the Anticoagulation Therapy Visit)    Spoke with patient. Gave them their lab results and new warfarin recommendation.  No changes in health, medication, or diet. No missed doses, no falls. No signs or symptoms of bleed or clotting.     Tanna Dawkins RN

## 2018-04-17 ENCOUNTER — ANTICOAGULATION THERAPY VISIT (OUTPATIENT)
Dept: ANTICOAGULATION | Facility: CLINIC | Age: 58
End: 2018-04-17

## 2018-04-17 DIAGNOSIS — I48.91 ATRIAL FIBRILLATION (H): ICD-10-CM

## 2018-04-17 DIAGNOSIS — Z79.01 LONG-TERM (CURRENT) USE OF ANTICOAGULANTS: ICD-10-CM

## 2018-04-17 LAB — INR PPP: 3

## 2018-04-17 NOTE — PROGRESS NOTES
ANTICOAGULATION FOLLOW-UP CLINIC VISIT    Patient Name:  Arianna Siddiqi  Date:  4/17/2018  Contact Type:  Telephone    SUBJECTIVE:     Patient Findings     Positives No Problem Findings           OBJECTIVE    INR   Date Value Ref Range Status   04/10/2018 3.40  Final     Comment:     Home Care        ASSESSMENT / PLAN  INR assessment THER    Recheck INR In: 2 WEEKS    INR Location Home INR      Anticoagulation Summary as of 4/17/2018     INR goal 2.0-3.0   Today's INR    Maintenance plan 3 mg (3 mg x 1) on Mon, Wed, Fri; 4.5 mg (3 mg x 1.5) all other days   Full instructions 3 mg on Mon, Wed, Fri; 4.5 mg all other days   Weekly total 27 mg   Plan last modified Sina Lazcano RPH (3/13/2018)   Next INR check 5/1/2018   Priority INR   Target end date     Indications   Long-term (current) use of anticoagulants [Z79.01] [Z79.01]  Atrial fibrillation (H) [I48.91] [I48.91]         Anticoagulation Episode Summary     INR check location Clinic Lab    Preferred lab     Send INR reminders to [facility]    Comments 1/17 [employer] [facility] Monitoring to start on 2/28.  Pt's voicemail box is full and pt is unable to retrieve voice messages       Anticoagulation Care Providers     Provider Role Specialty Phone number    Amelia Angulo MD PhD Responsible Family [facility] [phone]            See the Encounter Report to view Anticoagulation Flowsheet and Dosing Calendar (Go to Encounters tab in chart review, and find the Anticoagulation Therapy Visit)    Spoke with patient. Gave them their lab results and new warfarin recommendation.  No changes in health, medication, or diet. No missed doses, no falls. No signs or symptoms of bleed or clotting.      Sina Lazcano RPH

## 2018-04-17 NOTE — MR AVS SNAPSHOT
Arianna VALDIVIA Devang   4/17/2018   Anticoagulation Therapy Visit    Description:  58 year old female   Provider:  Sina Lazcano [facility]   Department:  [facility]           INR as of 4/17/2018     Today's INR       Anticoagulation Summary as of 4/17/2018     INR goal 2.0-3.0   Today's INR    Full instructions 3 mg on Mon, Wed, Fri; 4.5 mg all other days   Next INR check 5/1/2018    Indications   Long-term (current) use of anticoagulants [Z79.01] [Z79.01]  Atrial fibrillation (H) [I48.91] [I48.91]         April 2018 Details    Sun Mon Tue Wed Thu Fri Sat     1               2               3               4               5               6               7                 8               9               10               11               12               13               14                 15               16               17      4.5 mg   See details      18      3 mg         19      4.5 mg         20      3 mg         21      4.5 mg           22      4.5 mg         23      3 mg         24      4.5 mg         25      3 mg         26      4.5 mg         27      3 mg         28      4.5 mg           29      4.5 mg         30      3 mg               Date Details   04/17 This INR check               How to take your warfarin dose     To take:  3 mg Take 1 of the 3 mg tablets.    To take:  4.5 mg Take 1.5 of the 3 mg tablets.           May 2018 Details    Sun Mon Tue Wed Thu Fri Sat       1            2               3               4               5                 6               7               8               9               10               11               12                 13               14               15               16               17               18               19                 20               21               22               23               24               25               26                 27               28               29               30               31                  Date Details    No additional details    Date of next INR:  5/1/2018         How to take your warfarin dose     To take:  4.5 mg Take 1.5 of the 3 mg tablets.

## 2018-04-20 ENCOUNTER — TELEPHONE (OUTPATIENT)
Dept: INTERNAL MEDICINE | Facility: CLINIC | Age: 58
End: 2018-04-20

## 2018-04-20 DIAGNOSIS — D50.0 IRON DEFICIENCY ANEMIA DUE TO CHRONIC BLOOD LOSS: ICD-10-CM

## 2018-04-20 DIAGNOSIS — G62.9 PERIPHERAL POLYNEUROPATHY: ICD-10-CM

## 2018-04-20 RX ORDER — GABAPENTIN 100 MG/1
CAPSULE ORAL
Qty: 810 CAPSULE | Refills: 0 | Status: SHIPPED | OUTPATIENT
Start: 2018-04-20 | End: 2018-08-31

## 2018-04-20 RX ORDER — FERROUS GLUCONATE 324(37.5)
324 TABLET ORAL 3 TIMES DAILY
Qty: 270 TABLET | Refills: 0 | Status: SHIPPED | OUTPATIENT
Start: 2018-04-20 | End: 2018-08-07

## 2018-04-20 NOTE — TELEPHONE ENCOUNTER
April 20, 2018  10:04 AM    I called and spoke with patient about her concerns with care and refills. Patient will be seeing  on 5/30 to re establish care.  She will discuss need to frequent return visits due to transportation problems. Patient will run out of 2 medications prior to that appointment. Iron and Gabapentin. I informed her taht I will put through a refill for both medications and she can discuss further with provider at visit. Patient verbalized understanding.    Sejal Copeland RN

## 2018-05-01 ENCOUNTER — ANTICOAGULATION THERAPY VISIT (OUTPATIENT)
Dept: ANTICOAGULATION | Facility: CLINIC | Age: 58
End: 2018-05-01

## 2018-05-01 DIAGNOSIS — Z79.01 LONG-TERM (CURRENT) USE OF ANTICOAGULANTS: ICD-10-CM

## 2018-05-01 DIAGNOSIS — I48.91 ATRIAL FIBRILLATION (H): ICD-10-CM

## 2018-05-01 LAB — INR PPP: 3

## 2018-05-01 NOTE — MR AVS SNAPSHOT
Arianna VALDIVIA Devang   5/1/2018   Anticoagulation Therapy Visit    Description:  58 year old female   Provider:  Sina Lazcano, [facility]   Department:  [facility]           INR as of 5/1/2018     Today's INR 3.0      Anticoagulation Summary as of 5/1/2018     INR goal 2.0-3.0   Today's INR 3.0   Full instructions 3 mg on Mon, Wed, Fri; 4.5 mg all other days   Next INR check     Indications   Long-term (current) use of anticoagulants [Z79.01] [Z79.01]  Atrial fibrillation (H) [I48.91] [I48.91]         May 2018 Details    Sun Mon Tue Wed Thu Fri Sat       1      4.5 mg   See details      2      3 mg         3      4.5 mg         4      3 mg         5      4.5 mg           6      4.5 mg         7      3 mg         8      4.5 mg         9      3 mg         10      4.5 mg         11      3 mg         12      4.5 mg           13      4.5 mg         14      3 mg         15      4.5 mg         16      3 mg         17      4.5 mg         18      3 mg         19      4.5 mg           20      4.5 mg         21      3 mg         22      4.5 mg         23      3 mg         24      4.5 mg         25      3 mg         26      4.5 mg           27      4.5 mg         28      3 mg         29      4.5 mg         30      3 mg         31      4.5 mg            Date Details   05/01 This INR check      Date of next INR: No date specified         How to take your warfarin dose     To take:  3 mg Take 1 of the 3 mg tablets.    To take:  4.5 mg Take 1.5 of the 3 mg tablets.

## 2018-05-01 NOTE — PROGRESS NOTES
ANTICOAGULATION FOLLOW-UP CLINIC VISIT    Patient Name:  Arianna Siddiqi  Date:  5/1/2018  Contact Type:  Telephone    SUBJECTIVE:     Patient Findings     Comments Patient is recoverying from a chest cold, runny nose, Headache. Not on any medications , drinking a lot of water, OJ, soup. Appetite is lower           OBJECTIVE    INR   Date Value Ref Range Status   05/01/2018 3.0  Final       ASSESSMENT / PLAN  INR assessment THER    Recheck INR In: 2 WEEKS    INR Location Home INR      Anticoagulation Summary as of 5/1/2018     INR goal 2.0-3.0   Today's INR 3.0   Maintenance plan 3 mg (3 mg x 1) on Mon, Wed, Fri; 4.5 mg (3 mg x 1.5) all other days   Full instructions 3 mg on Mon, Wed, Fri; 4.5 mg all other days   Weekly total 27 mg   Plan last modified Sina Lazcano RPH (3/13/2018)   Next INR check    Priority INR   Target end date     Indications   Long-term (current) use of anticoagulants [Z79.01] [Z79.01]  Atrial fibrillation (H) [I48.91] [I48.91]         Anticoagulation Episode Summary     INR check location Clinic Lab    Preferred lab     Send INR reminders to [facility]    Comments 1/17 [employer] to start on 2/28.  Pt's voicemail box is full and pt is unable to retrieve voice messages       Anticoagulation Care Providers     Provider Role Specialty Phone number    Amelia Angulo MD PhD Responsible [facility] [phone]            See the Encounter Report to view Anticoagulation Flowsheet and Dosing Calendar (Go to Encounters tab in chart review, and find the Anticoagulation Therapy Visit)    Spoke with patient.  No missed doses, no falls. No signs or symptoms of bleed or clotting.  INR same as last time      Sina Lazcano RPH             Addendum 5/4/18 Pt is getting an INR on 5/15. Tanna Dawkins RN

## 2018-05-04 DIAGNOSIS — I50.22 CHRONIC SYSTOLIC HEART FAILURE (H): ICD-10-CM

## 2018-05-04 DIAGNOSIS — I50.43 ACUTE ON CHRONIC COMBINED SYSTOLIC AND DIASTOLIC CONGESTIVE HEART FAILURE (H): ICD-10-CM

## 2018-05-06 NOTE — TELEPHONE ENCOUNTER
bumetanide (BUMEX) 2 MG tablet      Last Written Prescription Date:  3/12/18  Last Fill Quantity: 90,   # refills: 0    lisinopril (PRINIVIL/ZESTRIL) 5 MG tablet      Last Written Prescription Date:  3/12/18  Last Fill Quantity: 90,   # refills: 0    Last Office Visit : 12/12/17  Future Office visit:  5/30/18 Deedee Meneses MD     Routing refill request to provider for review/approval because:  Both med's failed protocol- abnormal lab Creatinine

## 2018-05-08 DIAGNOSIS — E11.49 TYPE 2 DIABETES MELLITUS WITH OTHER NEUROLOGIC COMPLICATION, WITHOUT LONG-TERM CURRENT USE OF INSULIN (H): ICD-10-CM

## 2018-05-08 DIAGNOSIS — M10.9 GOUT, UNSPECIFIED CAUSE, UNSPECIFIED CHRONICITY, UNSPECIFIED SITE: ICD-10-CM

## 2018-05-08 RX ORDER — LISINOPRIL 5 MG/1
5 TABLET ORAL DAILY
Qty: 90 TABLET | Refills: 0 | OUTPATIENT
Start: 2018-05-08

## 2018-05-08 RX ORDER — BUMETANIDE 2 MG/1
2 TABLET ORAL DAILY
Qty: 90 TABLET | Refills: 0 | OUTPATIENT
Start: 2018-05-08

## 2018-05-09 NOTE — TELEPHONE ENCOUNTER
Patient has follow up in [facility] :appointment on 5/30/2018.       Failed lab protocol, route for approval.

## 2018-05-10 RX ORDER — ALLOPURINOL 100 MG/1
100 TABLET ORAL 2 TIMES DAILY
Qty: 180 TABLET | Refills: 0 | Status: SHIPPED | OUTPATIENT
Start: 2018-05-10 | End: 2018-11-02

## 2018-05-10 NOTE — TELEPHONE ENCOUNTER
Per provider, medications refused, patient must follow up in clinic. Patient received 90-day supply on 3/12, should have enough medication to last until clinic appointment on 5/30. Patient is overdue for visit and repeat labs. Refill refused. Pharmacy notified of medication refusal.    Dottie Joyce RN

## 2018-05-10 NOTE — TELEPHONE ENCOUNTER
[facility] Call Center    Phone Message    May a detailed message be left on voicemail: yes    Reason for Call: Medication Refill Request    Has the patient contacted the pharmacy for the refill? Yes   Name of medication being requested:   bumetanide (BUMEX) 2 MG tablet 90 tablet and    lisinopril (PRINIVIL/ZESTRIL) 5 MG tablet 90 tablet               Provider who prescribed the medication: Kev  Pharmacy: On file  Date medication is needed: 6/9/18         Action Taken: Message routed to:  [facility] Pt would like filled on 6/9/18 so she gets them before she runs out

## 2018-05-15 ENCOUNTER — ANTICOAGULATION THERAPY VISIT (OUTPATIENT)
Dept: ANTICOAGULATION | Facility: CLINIC | Age: 58
End: 2018-05-15

## 2018-05-15 DIAGNOSIS — Z79.01 LONG-TERM (CURRENT) USE OF ANTICOAGULANTS: ICD-10-CM

## 2018-05-15 DIAGNOSIS — I48.91 ATRIAL FIBRILLATION, UNSPECIFIED TYPE (H): ICD-10-CM

## 2018-05-15 LAB — INR PPP: 2

## 2018-05-15 NOTE — MR AVS SNAPSHOT
Arianna VALDIVIA Devang   5/15/2018   Anticoagulation Therapy Visit    Description:  58 year old female   Provider:  Carol Hoffman RN   Department:  [facility]           INR as of 5/15/2018     Today's INR 2.0      Anticoagulation Summary as of 5/15/2018     INR goal 2.0-3.0   Today's INR 2.0   Full instructions 3 mg on Mon, Wed, Fri; 4.5 mg all other days   Next INR check 5/29/2018    Indications   Long-term (current) use of anticoagulants [Z79.01] [Z79.01]  Atrial fibrillation (H) [I48.91] [I48.91]         May 2018 Details    Sun Mon Tue Wed Thu Fri Sat       1               2               3               4               5                 6               7               8               9               10               11               12                 13               14               15      4.5 mg   See details      16      3 mg         17      4.5 mg         18      3 mg         19      4.5 mg           20      4.5 mg         21      3 mg         22      4.5 mg         23      3 mg         24      4.5 mg         25      3 mg         26      4.5 mg           27      4.5 mg         28      3 mg         29            30               31                  Date Details   05/15 This INR check       Date of next INR:  5/29/2018         How to take your warfarin dose     To take:  3 mg Take 1 of the 3 mg tablets.    To take:  4.5 mg Take 1.5 of the 3 mg tablets.

## 2018-05-15 NOTE — PROGRESS NOTES
ANTICOAGULATION FOLLOW-UP CLINIC VISIT    Patient Name:  Arianna Siddiqi  Date:  5/15/2018  Contact Type:  Telephone    SUBJECTIVE:     Patient Findings     Positives Other complaints (Getting over an URI), OTC meds (ASA 81mg D/Harris)           OBJECTIVE    INR   Date Value Ref Range Status   05/15/2018 2.0  Final       ASSESSMENT / PLAN  INR assessment THER    Recheck INR In: 2 WEEKS    INR Location Home INR      Anticoagulation Summary as of 5/15/2018     INR goal 2.0-3.0   Today's INR 2.0   Maintenance plan 3 mg (3 mg x 1) on Mon, Wed, Fri; 4.5 mg (3 mg x 1.5) all other days   Full instructions 3 mg on Mon, Wed, Fri; 4.5 mg all other days   Weekly total 27 mg   Plan last modified Sina Lazcano, [facility] (3/13/2018)   Next INR check 5/29/2018   Priority INR   Target end date     Indications   Long-term (current) use of anticoagulants [Z79.01] [Z79.01]  Atrial fibrillation (H) [I48.91] [I48.91]         Anticoagulation Episode Summary     INR check location Clinic Lab    Preferred lab     Send INR reminders to [facility]    Comments 1/17 [employer] Monitoring to start on 2/28.  Pt's voicemail box is full and pt is unable to retrieve voice messages       Anticoagulation Care Providers     Provider Role Specialty Phone number    Amelia Angulo MD PhD [facility] [phone]            See the Encounter Report to view Anticoagulation Flowsheet and Dosing Calendar (Go to Encounters tab in chart review, and find the Anticoagulation Therapy Visit)  Spoke with patient.    Carol Hoffman RN

## 2018-05-29 ENCOUNTER — TELEPHONE (OUTPATIENT)
Dept: INTERNAL MEDICINE | Facility: CLINIC | Age: 58
End: 2018-05-29

## 2018-05-29 ENCOUNTER — ANTICOAGULATION THERAPY VISIT (OUTPATIENT)
Dept: ANTICOAGULATION | Facility: CLINIC | Age: 58
End: 2018-05-29

## 2018-05-29 DIAGNOSIS — Z79.01 LONG-TERM (CURRENT) USE OF ANTICOAGULANTS: ICD-10-CM

## 2018-05-29 DIAGNOSIS — I48.91 ATRIAL FIBRILLATION (H): ICD-10-CM

## 2018-05-29 LAB — INR PPP: 3.2

## 2018-05-29 NOTE — MR AVS SNAPSHOT
Arianna VALDIVIA Devang   5/29/2018   Anticoagulation Therapy Visit    Description:  58 year old female   Provider:  Sina Lazcano, [facility]   Department:  [facility]           INR as of 5/29/2018     Today's INR 3.2!      Anticoagulation Summary as of 5/29/2018     INR goal 2.0-3.0   Today's INR 3.2!   Full warfarin instructions 3 mg on Mon, Wed, Fri; 4.5 mg all other days   Next INR check 6/12/2018    Indications   Long-term (current) use of anticoagulants [Z79.01] [Z79.01]  Atrial fibrillation (H) [I48.91] [I48.91]         May 2018 Details    Sun Mon Tue Wed Thu Fri Sat       1               2               3               4               5                 6               7               8               9               10               11               12                 13               14               15               16               17               18               19                 20               21               22               23               24               25               26                 27               28               29      4.5 mg   See details      30      3 mg         31      4.5 mg            Date Details   05/29 This INR check               How to take your warfarin dose     To take:  3 mg Take 1 of the 3 mg tablets.    To take:  4.5 mg Take 1.5 of the 3 mg tablets.           June 2018 Details    Sun Mon Tue Wed Thu Fri Sat          1      3 mg         2      4.5 mg           3      4.5 mg         4      3 mg         5      4.5 mg         6      3 mg         7      4.5 mg         8      3 mg         9      4.5 mg           10      4.5 mg         11      3 mg         12            13               14               15               16                 17               18               19               20               21               22               23                 24               25               26               27               28               29               30                 Date Details   No additional details    Date of next INR:  6/12/2018         How to take your warfarin dose     To take:  3 mg Take 1 of the 3 mg tablets.    To take:  4.5 mg Take 1.5 of the 3 mg tablets.

## 2018-05-29 NOTE — TELEPHONE ENCOUNTER
[facility] Call Center    Phone Message    May a detailed message be left on voicemail: no    Reason for Call: Symptoms or Concerns     If patient has red-flag symptoms, warm transfer to triage line    Current symptom or concern: ***    Symptoms have been present for: {DAYS/WEEK(S)/MONTH(S):274696}    Has patient previously been seen for this? {NO OR YES:665719}    By {provider's name}: ***    Date: ***    Are there any new or worsening symptoms? {NO/YES:258329}    Other: ***     Action Taken: [facility]

## 2018-05-29 NOTE — PROGRESS NOTES
ANTICOAGULATION FOLLOW-UP CLINIC VISIT    Patient Name:  Arianna Siddiqi  Date:  5/29/2018  Contact Type:  Telephone    SUBJECTIVE:     Patient Findings     Comments Patient reports some difficulty breathing due to heat. Worried that her CHF maybe worse. I advised her to contact her provider with her concerns           OBJECTIVE    INR   Date Value Ref Range Status   05/29/2018 3.2  Final       ASSESSMENT / PLAN  No question data found.  Anticoagulation Summary as of 5/29/2018     INR goal 2.0-3.0   Today's INR 3.2!   Warfarin maintenance plan 3 mg (3 mg x 1) on Mon, Wed, Fri; 4.5 mg (3 mg x 1.5) all other days   Full warfarin instructions 3 mg on Mon, Wed, Fri; 4.5 mg all other days   Weekly warfarin total 27 mg   Plan last modified Sina Lazcano RPH (3/13/2018)   Next INR check 6/12/2018   Priority INR   Target end date     Indications   Long-term (current) use of anticoagulants [Z79.01] [Z79.01]  Atrial fibrillation (H) [I48.91] [I48.91]         Anticoagulation Episode Summary     INR check location Clinic Lab    Preferred lab     Send INR reminders to [facility]    Comments 1/17 [employer] Monitoring to start on 2/28.  Pt's voicemail box is full and pt is unable to retrieve voice messages       Anticoagulation Care Providers     Provider Role Specialty Phone number    Amelia Angulo MD PhD [facility] [phone]            See the Encounter Report to view Anticoagulation Flowsheet and Dosing Calendar (Go to Encounters tab in chart review, and find the Anticoagulation Therapy Visit)    Spoke with patient. Gave them their  new warfarin recommendation.  No changes in  medication, or diet. No missed doses, no falls. No signs or symptoms of bleed or clotting.      Sina Lazcano RPH

## 2018-05-29 NOTE — TELEPHONE ENCOUNTER
[facility] Call Center    Phone Message    May a detailed message be left on voicemail: no    Reason for Call: Other: Pt requesting call back to discuss scripts of Lisinopril and Bumetanide. Pt stated she does not want a call back from Dottie     Action Taken: Message routed to:  [facility] primary care     May 29, 2018  3:12 PM    I called and spoke with patient about her medication refill requests. I explained to patient that when we spoke on 4/20 that I was only able to authorize her RX until her visit with to establish care with  on 5/30.  At that time patient agreed to this appointment date/time. Patient informed that Yoselin is declining any further refills until she comes in for an appointment and patient has yet to establish with Dr. Angulo.  She stated she had to cancel for another appointment that was important. Patient offered to come in and see any other provider ASAP and she declined stating that she has transportation issues and her next available is not until August.  I offered to call transportation service and try and get her a ride soon, but patient declined assistance with any type of help. Patient stated she will just stop her medication until she comes in. I explained my concern and the importance of taking her medication, but her lack of coming to appointments is also important in her care.  Patient offered to call back at any time and we can get her an appointment sooner.    Sejal Copeland RN

## 2018-05-31 DIAGNOSIS — I50.43 ACUTE ON CHRONIC COMBINED SYSTOLIC AND DIASTOLIC CONGESTIVE HEART FAILURE (H): ICD-10-CM

## 2018-06-01 RX ORDER — BUMETANIDE 2 MG/1
2 TABLET ORAL DAILY
Qty: 90 TABLET | Refills: 2 | OUTPATIENT
Start: 2018-06-01

## 2018-06-01 NOTE — TELEPHONE ENCOUNTER
bumetanide (BUMEX) 2 MG tablet  Last Written Prescription Date:  3/12/18  Last Fill Quantity: 90,   # refills: 0  Last Office Visit : 12/12/17  Future Office visit:  8/1/18      Corwin  because: liz

## 2018-06-08 DIAGNOSIS — I50.22 CHRONIC SYSTOLIC HEART FAILURE (H): ICD-10-CM

## 2018-06-10 NOTE — TELEPHONE ENCOUNTER
lisinopril  5 MG   Last Written Prescription Date:  3/12/18  Last Fill Quantity: 90,   # refills: 0  Last Office Visit : 12/12/17  Future Office visit:  8/1/18    Routing refill request to provider for review/approval because:  JAY IQBAL

## 2018-06-12 ENCOUNTER — ANTICOAGULATION THERAPY VISIT (OUTPATIENT)
Dept: ANTICOAGULATION | Facility: CLINIC | Age: 58
End: 2018-06-12

## 2018-06-12 DIAGNOSIS — Z79.01 LONG-TERM (CURRENT) USE OF ANTICOAGULANTS: ICD-10-CM

## 2018-06-12 DIAGNOSIS — I48.91 ATRIAL FIBRILLATION, UNSPECIFIED TYPE (H): ICD-10-CM

## 2018-06-12 LAB — INR PPP: 2.2

## 2018-06-12 RX ORDER — LISINOPRIL 5 MG/1
5 TABLET ORAL DAILY
Qty: 30 TABLET | Refills: 0 | Status: SHIPPED | OUTPATIENT
Start: 2018-06-12 | End: 2018-08-06

## 2018-06-12 NOTE — MR AVS SNAPSHOT
Arianna VALDIVIA Devang   6/12/2018   Anticoagulation Therapy Visit    Description:  58 year old female   Provider:  Carol Hoffman RN   Department:  [facility]           INR as of 6/12/2018     Today's INR 2.2      Anticoagulation Summary as of 6/12/2018     INR goal 2.0-3.0   Today's INR 2.2   Full warfarin instructions 3 mg on Mon, Wed, Fri; 4.5 mg all other days   Next INR check 6/26/2018    Indications   Long-term (current) use of anticoagulants [Z79.01] [Z79.01]  Atrial fibrillation (H) [I48.91] [I48.91]         June 2018 Details    Sun Mon Tue Wed Thu Fri Sat          1               2                 3               4               5               6               7               8               9                 10               11               12      4.5 mg   See details      13      3 mg         14      4.5 mg         15      3 mg         16      4.5 mg           17      4.5 mg         18      3 mg         19      4.5 mg         20      3 mg         21      4.5 mg         22      3 mg         23      4.5 mg           24      4.5 mg         25      3 mg         26            27               28               29               30                Date Details   06/12 This INR check       Date of next INR:  6/26/2018         How to take your warfarin dose     To take:  3 mg Take 1 of the 3 mg tablets.    To take:  4.5 mg Take 1.5 of the 3 mg tablets.

## 2018-06-12 NOTE — PROGRESS NOTES
ANTICOAGULATION FOLLOW-UP CLINIC VISIT    Patient Name:  Arianna Siddiqi  Date:  6/12/2018  Contact Type:  Telephone    SUBJECTIVE:     Patient Findings     Positives No Problem Findings           OBJECTIVE    INR   Date Value Ref Range Status   06/12/2018 2.2  Final       ASSESSMENT / PLAN  INR assessment THER    Recheck INR In: 2 WEEKS    INR Location Home INR      Anticoagulation Summary as of 6/12/2018     INR goal 2.0-3.0   Today's INR 2.2   Warfarin maintenance plan 3 mg (3 mg x 1) on Mon, Wed, Fri; 4.5 mg (3 mg x 1.5) all other days   Full warfarin instructions 3 mg on Mon, Wed, Fri; 4.5 mg all other days   Weekly warfarin total 27 mg   Plan last modified Sina Lazcano, [facility] (3/13/2018)   Next INR check 6/26/2018   Priority INR   Target end date     Indications   Long-term (current) use of anticoagulants [Z79.01] [Z79.01]  Atrial fibrillation (H) [I48.91] [I48.91]         Anticoagulation Episode Summary     INR check location Clinic Lab    Preferred lab     Send INR reminders to [facility]    Comments 1/17 [employer] Monitoring to start on 2/28.  Pt's voicemail box is full and pt is unable to retrieve voice messages       Anticoagulation Care Providers     Provider Role Specialty Phone number    Amelia Angulo MD PhD [facility] [phone]            See the Encounter Report to view Anticoagulation Flowsheet and Dosing Calendar (Go to Encounters tab in chart review, and find the Anticoagulation Therapy Visit)    Spoke with patient.    Carol Hoffman RN

## 2018-06-13 ENCOUNTER — TELEPHONE (OUTPATIENT)
Dept: INTERNAL MEDICINE | Facility: CLINIC | Age: 58
End: 2018-06-13

## 2018-06-13 DIAGNOSIS — I50.43 ACUTE ON CHRONIC COMBINED SYSTOLIC AND DIASTOLIC CONGESTIVE HEART FAILURE (H): ICD-10-CM

## 2018-06-13 RX ORDER — BUMETANIDE 2 MG/1
2 TABLET ORAL DAILY
Qty: 50 TABLET | Refills: 0 | Status: SHIPPED | OUTPATIENT
Start: 2018-06-13 | End: 2018-08-09

## 2018-06-13 NOTE — TELEPHONE ENCOUNTER
[facility] Call Center    Phone Message    May a detailed message be left on voicemail: yes    Reason for Call: Other: Pt called to inquire about her refill of bumetanide (BUMEX) 2 MG tablet. Stated the pharmacy refilled her lisinopril but not the bumetanide. Pt is confused about why it was not refilled because she scheduled an appointment with Dr Angulo on 8/1. She stated she spoke with Sejal Copealnd on 5/29; requesting a call back.     Action Taken: Message routed to:  [facility]       Consulted with Dr Angulo and RX for 50 tablets of Bumex ordered. Pt needs to be seen in clinic for additional refills. Pt called and plan of care discussed.  Danette Mayfield RN 1:56 PM on 6/13/2018.

## 2018-07-10 ENCOUNTER — ANTICOAGULATION THERAPY VISIT (OUTPATIENT)
Dept: ANTICOAGULATION | Facility: CLINIC | Age: 58
End: 2018-07-10

## 2018-07-10 DIAGNOSIS — I48.91 ATRIAL FIBRILLATION (H): ICD-10-CM

## 2018-07-10 DIAGNOSIS — Z79.01 LONG-TERM (CURRENT) USE OF ANTICOAGULANTS: ICD-10-CM

## 2018-07-10 LAB — INR PPP: 2.5

## 2018-07-10 NOTE — PROGRESS NOTES
ANTICOAGULATION FOLLOW-UP CLINIC VISIT    Patient Name:  Arianna Siddiqi  Date:  7/10/2018  Contact Type:  Telephone    SUBJECTIVE:     Patient Findings     Positives No Problem Findings           OBJECTIVE    INR   Date Value Ref Range Status   07/10/2018 2.50  Final     Comment:     Home Monitoring Machine        ASSESSMENT / PLAN  INR assessment THER    Recheck INR In: 2 WEEKS    INR Location Home INR      Anticoagulation Summary as of 7/10/2018     INR goal 2.0-3.0   Today's INR 2.50   Warfarin maintenance plan 3 mg (3 mg x 1) on Mon, Wed, Fri; 4.5 mg (3 mg x 1.5) all other days   Full warfarin instructions 3 mg on Mon, Wed, Fri; 4.5 mg all other days   Weekly warfarin total 27 mg   Plan last modified Sina Lazcano, [facility] (3/13/2018)   Next INR check 7/24/2018   Priority INR   Target end date     Indications   Long-term (current) use of anticoagulants [Z79.01] [Z79.01]  Atrial fibrillation (H) [I48.91] [I48.91]         Anticoagulation Episode Summary     INR check location Clinic Lab    Preferred lab     Send INR reminders to [facility]    Comments 1/17 [employer] Monitoring to start on 2/28.  Pt's voicemail box is full and pt is unable to retrieve voice messages       Anticoagulation Care Providers     Provider Role Specialty Phone number    Amelia Angulo MD PhD Responsible [facility] [phone]            See the Encounter Report to view Anticoagulation Flowsheet and Dosing Calendar (Go to Encounters tab in chart review, and find the Anticoagulation Therapy Visit)    Spoke with patient. Gave them their lab results and new warfarin recommendation.  No changes in health, medication, or diet. No missed doses, no falls. No signs or symptoms of bleed or clotting.     Tanna Dawkins RN

## 2018-07-10 NOTE — MR AVS SNAPSHOT
Arianna VALDIVIA Devang   7/10/2018   Anticoagulation Therapy Visit    Description:  58 year old female   Provider:  Tanna Dawkins, RN   Department:   [facility]           INR as of 7/10/2018     Today's INR 2.50      Anticoagulation Summary as of 7/10/2018     INR goal 2.0-3.0   Today's INR 2.50   Full warfarin instructions 3 mg on Mon, Wed, Fri; 4.5 mg all other days   Next INR check 7/24/2018    Indications   Long-term (current) use of anticoagulants [Z79.01] [Z79.01]  Atrial fibrillation (H) [I48.91] [I48.91]         July 2018 Details    Sun Mon Tue Wed Thu Fri Sat     1               2               3               4               5               6               7                 8               9               10      4.5 mg   See details      11      3 mg         12      4.5 mg         13      3 mg         14      4.5 mg           15      4.5 mg         16      3 mg         17      4.5 mg         18      3 mg         19      4.5 mg         20      3 mg         21      4.5 mg           22      4.5 mg         23      3 mg         24            25               26               27               28                 29               30               31                    Date Details   07/10 This INR check       Date of next INR:  7/24/2018         How to take your warfarin dose     To take:  3 mg Take 1 of the 3 mg tablets.    To take:  4.5 mg Take 1.5 of the 3 mg tablets.

## 2018-07-24 ENCOUNTER — ANTICOAGULATION THERAPY VISIT (OUTPATIENT)
Dept: ANTICOAGULATION | Facility: CLINIC | Age: 58
End: 2018-07-24

## 2018-07-24 DIAGNOSIS — I48.91 ATRIAL FIBRILLATION (H): ICD-10-CM

## 2018-07-24 DIAGNOSIS — Z79.01 LONG-TERM (CURRENT) USE OF ANTICOAGULANTS: ICD-10-CM

## 2018-07-24 LAB — INR PPP: 3

## 2018-07-24 NOTE — MR AVS SNAPSHOT
Arianna VALDIVIA Devang   7/24/2018   Anticoagulation Therapy Visit    Description:  58 year old female   Provider:  Tanna Dawkins, RN   Department:  [facility]           INR as of 7/24/2018     Today's INR 3.00      Anticoagulation Summary as of 7/24/2018     INR goal 2.0-3.0   Today's INR 3.00   Full warfarin instructions 3 mg on Mon, Wed, Fri; 4.5 mg all other days   Next INR check 8/7/2018    Indications   Long-term (current) use of anticoagulants [Z79.01] [Z79.01]  Atrial fibrillation (H) [I48.91] [I48.91]         July 2018 Details    Sun Mon Tue Wed Thu Fri Sat     1               2               3               4               5               6               7                 8               9               10               11               12               13               14                 15               16               17               18               19               20               21                 22               23               24      4.5 mg   See details      25      3 mg         26      4.5 mg         27      3 mg         28      4.5 mg           29      4.5 mg         30      3 mg         31      4.5 mg              Date Details   07/24 This INR check               How to take your warfarin dose     To take:  3 mg Take 1 of the 3 mg tablets.    To take:  4.5 mg Take 1.5 of the 3 mg tablets.           August 2018 Details    Sun Mon Tue Wed Thu Fri Sat        1      3 mg         2      4.5 mg         3      3 mg         4      4.5 mg           5      4.5 mg         6      3 mg         7            8               9               10               11                 12               13               14               15               16               17               18                 19               20               21               22               23               24               25                 26               27               28               29               30               31                  Date Details   No additional details    Date of next INR:  8/7/2018         How to take your warfarin dose     To take:  3 mg Take 1 of the 3 mg tablets.    To take:  4.5 mg Take 1.5 of the 3 mg tablets.

## 2018-07-24 NOTE — PROGRESS NOTES
ANTICOAGULATION FOLLOW-UP CLINIC VISIT    Patient Name:  Arinana Siddiqi  Date:  7/24/2018  Contact Type:  Telephone    SUBJECTIVE:     Patient Findings     Positives No Problem Findings           OBJECTIVE    INR   Date Value Ref Range Status   07/24/2018 3.00  Final     Comment:     Home Monitoring Machine        ASSESSMENT / PLAN  INR assessment THER    Recheck INR In: 2 WEEKS    INR Location Home INR      Anticoagulation Summary as of 7/24/2018     INR goal 2.0-3.0   Today's INR 3.00   Warfarin maintenance plan 3 mg (3 mg x 1) on Mon, Wed, Fri; 4.5 mg (3 mg x 1.5) all other days   Full warfarin instructions 3 mg on Mon, Wed, Fri; 4.5 mg all other days   Weekly warfarin total 27 mg   Plan last modified Sina Lazcano, [facility] (3/13/2018)   Next INR check 8/7/2018   Priority INR   Target end date     Indications   Long-term (current) use of anticoagulants [Z79.01] [Z79.01]  Atrial fibrillation (H) [I48.91] [I48.91]         Anticoagulation Episode Summary     INR check location Clinic Lab    Preferred lab     Send INR reminders to [facility]    Comments 1/17 [employer] Home Monitoring to start on 2/28.  Pt's voicemail box is full and pt is unable to retrieve voice messages       Anticoagulation Care Providers     Provider Role Specialty Phone number    Amelia Angulo MD PhD Responsible [facility] [phone]            See the Encounter Report to view Anticoagulation Flowsheet and Dosing Calendar (Go to Encounters tab in chart review, and find the Anticoagulation Therapy Visit)    Spoke with patient. Gave them their lab results and new warfarin recommendation.  No changes in health, medication, or diet. No missed doses, no falls. No signs or symptoms of bleed or clotting.     Tanna Dawkins RN

## 2018-08-06 DIAGNOSIS — I50.22 CHRONIC SYSTOLIC HEART FAILURE (H): ICD-10-CM

## 2018-08-07 ENCOUNTER — ANTICOAGULATION THERAPY VISIT (OUTPATIENT)
Dept: ANTICOAGULATION | Facility: CLINIC | Age: 58
End: 2018-08-07

## 2018-08-07 DIAGNOSIS — I48.91 ATRIAL FIBRILLATION (H): ICD-10-CM

## 2018-08-07 DIAGNOSIS — Z79.01 LONG-TERM (CURRENT) USE OF ANTICOAGULANTS: ICD-10-CM

## 2018-08-07 DIAGNOSIS — D50.0 IRON DEFICIENCY ANEMIA DUE TO CHRONIC BLOOD LOSS: ICD-10-CM

## 2018-08-07 DIAGNOSIS — I50.22 CHRONIC SYSTOLIC HEART FAILURE (H): ICD-10-CM

## 2018-08-07 LAB — INR PPP: 1.9

## 2018-08-07 RX ORDER — LISINOPRIL 5 MG/1
5 TABLET ORAL DAILY
Qty: 90 TABLET | Refills: 0 | Status: SHIPPED | OUTPATIENT
Start: 2018-08-07 | End: 2018-11-01

## 2018-08-07 NOTE — TELEPHONE ENCOUNTER
LISINOPRIL 5 MG TABLET   Last Written Prescription Date:  6/12/2018  Last Fill Quantity: 30,   # refills: 0  Last Office Visit : 12/12/2018  Future Office visit:  10/24/2018    Routing refill request to provider for review/approval because:  ruben Hinojosa cancelled appts   12/12/17   1243    RASHEED  1.10*

## 2018-08-07 NOTE — PROGRESS NOTES
ANTICOAGULATION FOLLOW-UP CLINIC VISIT    Patient Name:  Arianna Siddiqi  Date:  8/7/2018  Contact Type:  Telephone    SUBJECTIVE:     Patient Findings     Positives Other complaints    Comments Pt reporting that she is unsure if she is coming down with a cold, but is having sore throat and wheezing. Thinks this is due to A/C and will call us if she goes in and get diagnosed with a cold.            OBJECTIVE    INR   Date Value Ref Range Status   08/07/2018 1.90  Final     Comment:     Home Monitoring Machine        ASSESSMENT / PLAN  INR assessment THER    Recheck INR In: 2 WEEKS    INR Location Home INR      Anticoagulation Summary as of 8/7/2018     INR goal 2.0-3.0   Today's INR 1.90!   Warfarin maintenance plan 3 mg (3 mg x 1) on Mon, Wed, Fri; 4.5 mg (3 mg x 1.5) all other days   Full warfarin instructions 3 mg on Mon, Wed, Fri; 4.5 mg all other days   Weekly warfarin total 27 mg   Plan last modified Sina Lazcano, [facility] (3/13/2018)   Next INR check 8/21/2018   Priority INR   Target end date     Indications   Long-term (current) use of anticoagulants [Z79.01] [Z79.01]  Atrial fibrillation (H) [I48.91] [I48.91]         Anticoagulation Episode Summary     INR check location Clinic Lab    Preferred lab     Send INR reminders to [facility]    Comments 1/17 [employer] Home Monitoring to start on 2/28.  Pt's voicemail box is full and pt is unable to retrieve voice messages       Anticoagulation Care Providers     Provider Role Specialty Phone number    Amelia Angulo MD PhD Responsible [facility] [phone]            See the Encounter Report to view Anticoagulation Flowsheet and Dosing Calendar (Go to Encounters tab in chart review, and find the Anticoagulation Therapy Visit)    Spoke with patient. Gave them their lab results and new warfarin recommendation.  No changes in health, medication, or diet. No missed doses, no falls. No signs or symptoms of bleed or clotting.    Tanna Dawkins RN

## 2018-08-07 NOTE — MR AVS SNAPSHOT
Arianna VALDIVIA Devang   8/7/2018   Anticoagulation Therapy Visit    Description:  58 year old female   Provider:  Tanna Dawkins, RN   Department:  [facility]           INR as of 8/7/2018     Today's INR 1.90!      Anticoagulation Summary as of 8/7/2018     INR goal 2.0-3.0   Today's INR 1.90!   Full warfarin instructions 3 mg on Mon, Wed, Fri; 4.5 mg all other days   Next INR check 8/21/2018    Indications   Long-term (current) use of anticoagulants [Z79.01] [Z79.01]  Atrial fibrillation (H) [I48.91] [I48.91]         August 2018 Details    Sun Mon Tue Wed Thu Fri Sat        1               2               3               4                 5               6               7      4.5 mg   See details      8      3 mg         9      4.5 mg         10      3 mg         11      4.5 mg           12      4.5 mg         13      3 mg         14      4.5 mg         15      3 mg         16      4.5 mg         17      3 mg         18      4.5 mg           19      4.5 mg         20      3 mg         21            22               23               24               25                 26               27               28               29               30               31                 Date Details   08/07 This INR check       Date of next INR:  8/21/2018         How to take your warfarin dose     To take:  3 mg Take 1 of the 3 mg tablets.    To take:  4.5 mg Take 1.5 of the 3 mg tablets.

## 2018-08-07 NOTE — TELEPHONE ENCOUNTER
"Note from 5/10/18    \"Per provider, medications refused, patient must follow up in clinic. Patient received 90-day supply on 3/12, should have enough medication to last until clinic appointment on 5/30. Patient is overdue for visit and repeat labs. Refill refused. Pharmacy notified of medication refusal.     Dottie Joyce RN\"    Patient was given a limited fill on 6/8/18.    Since next office visit is on 10/24/18, given bridge amount to last until that next appt.    Rahel Ram RN on 8/7/2018 at 5:00 PM    "

## 2018-08-08 RX ORDER — FERROUS GLUCONATE 324(38)MG
324 TABLET ORAL 3 TIMES DAILY
Qty: 270 TABLET | Refills: 1 | Status: SHIPPED | OUTPATIENT
Start: 2018-08-08 | End: 2019-04-12

## 2018-08-08 RX ORDER — LISINOPRIL 5 MG/1
TABLET ORAL
Qty: 30 TABLET | Refills: 0 | OUTPATIENT
Start: 2018-08-08

## 2018-08-09 DIAGNOSIS — E11.49 TYPE 2 DIABETES MELLITUS WITH OTHER NEUROLOGIC COMPLICATION, WITHOUT LONG-TERM CURRENT USE OF INSULIN (H): ICD-10-CM

## 2018-08-09 DIAGNOSIS — I50.43 ACUTE ON CHRONIC COMBINED SYSTOLIC AND DIASTOLIC CONGESTIVE HEART FAILURE (H): ICD-10-CM

## 2018-08-09 NOTE — TELEPHONE ENCOUNTER
bumetanide (BUMEX) 2 MG tablet  Last Written Prescription Date:  6/13/18  Last Fill Quantity: 50,   # refills: 0  Last Office Visit : 12/12/17  Future Office visit: 10/24/18     sitagliptin (JANUVIA) 50 MG tablet  Last Written Prescription Date:  5/10/18  Last Fill Quantity: 90,   # refills: 0

## 2018-08-10 RX ORDER — BUMETANIDE 2 MG/1
2 TABLET ORAL DAILY
Qty: 90 TABLET | Refills: 0 | Status: SHIPPED | OUTPATIENT
Start: 2018-08-10 | End: 2018-11-01

## 2018-08-21 ENCOUNTER — ANTICOAGULATION THERAPY VISIT (OUTPATIENT)
Dept: ANTICOAGULATION | Facility: CLINIC | Age: 58
End: 2018-08-21

## 2018-08-21 DIAGNOSIS — I48.91 ATRIAL FIBRILLATION (H): ICD-10-CM

## 2018-08-21 DIAGNOSIS — Z79.01 LONG-TERM (CURRENT) USE OF ANTICOAGULANTS: ICD-10-CM

## 2018-08-21 LAB — INR PPP: 2.5

## 2018-08-21 NOTE — PROGRESS NOTES
ANTICOAGULATION FOLLOW-UP CLINIC VISIT    Patient Name:  Arianna Siddiqi  Date:  8/21/2018  Contact Type:  Telephone    SUBJECTIVE:     Patient Findings     Comments Arianna has an upset stomach today--some diarrhea.  She will call the [facility] if is continues more than just today.           OBJECTIVE    INR   Date Value Ref Range Status   08/21/2018 2.5  Final       ASSESSMENT / PLAN  INR assessment THER    Recheck INR In: 2 WEEKS    INR Location Home INR      Anticoagulation Summary as of 8/21/2018     INR goal 2.0-3.0   Today's INR 2.5   Warfarin maintenance plan 3 mg (3 mg x 1) on Mon, Wed, Fri; 4.5 mg (3 mg x 1.5) all other days   Full warfarin instructions 3 mg on Mon, Wed, Fri; 4.5 mg all other days   Weekly warfarin total 27 mg   No change documented Paradise Pizarro RN   Plan last modified Sina Lazcano, [facility] (3/13/2018)   Next INR check 9/4/2018   Priority INR   Target end date     Indications   Long-term (current) use of anticoagulants [Z79.01] [Z79.01]  Atrial fibrillation (H) [I48.91] [I48.91]         Anticoagulation Episode Summary     INR check location Clinic Lab    Preferred lab     Send [facility]R reminders to [facility]    Comments 1/17 [employer] Home Monitoring to start on 2/28.  Pt's voicemail box is full and pt is unable to retrieve voice messages       Anticoagulation Care Providers     Provider Role Specialty Phone number    Amelia Angulo MD PhD Responsible [facility] [phone]            See the Encounter Report to view Anticoagulation Flowsheet and Dosing Calendar (Go to Encounters tab in chart review, and find the Anticoagulation Therapy Visit)    Spoke with Arianna.  She hasn't felt well this morning, has some diarrhea.  She will call the [facility] if this continues more than today.      Paradise Pizarro RN

## 2018-08-21 NOTE — MR AVS SNAPSHOT
Arianna VALDIVIA Devang   8/21/2018   Anticoagulation Therapy Visit    Description:  58 year old female   Provider:  Paradise Pizarro, RN   Department:  [facility]           INR as of 8/21/2018     Today's INR 2.5      Anticoagulation Summary as of 8/21/2018     INR goal 2.0-3.0   Today's INR 2.5   Full warfarin instructions 3 mg on Mon, Wed, Fri; 4.5 mg all other days   Next INR check 9/4/2018    Indications   Long-term (current) use of anticoagulants [Z79.01] [Z79.01]  Atrial fibrillation (H) [I48.91] [I48.91]         August 2018 Details    Sun Mon Tue Wed Thu Fri Sat        1               2               3               4                 5               6               7               8               9               10               11                 12               13               14               15               16               17               18                 19               20               21      4.5 mg   See details      22      3 mg         23      4.5 mg         24      3 mg         25      4.5 mg           26      4.5 mg         27      3 mg         28      4.5 mg         29      3 mg         30      4.5 mg         31      3 mg           Date Details   08/21 This INR check               How to take your warfarin dose     To take:  3 mg Take 1 of the 3 mg tablets.    To take:  4.5 mg Take 1.5 of the 3 mg tablets.           September 2018 Details    Sun Mon Tue Wed Thu Fri Sat           1      4.5 mg           2      4.5 mg         3      3 mg         4            5               6               7               8                 9               10               11               12               13               14               15                 16               17               18               19               20               21               22                 23               24               25               26               27               28               29                 30                       Date Details   No additional details    Date of next INR:  9/4/2018         How to take your warfarin dose     To take:  3 mg Take 1 of the 3 mg tablets.    To take:  4.5 mg Take 1.5 of the 3 mg tablets.

## 2018-08-31 ENCOUNTER — TELEPHONE (OUTPATIENT)
Dept: INTERNAL MEDICINE | Facility: CLINIC | Age: 58
End: 2018-08-31

## 2018-08-31 DIAGNOSIS — G62.9 PERIPHERAL POLYNEUROPATHY: ICD-10-CM

## 2018-09-04 ENCOUNTER — ANTICOAGULATION THERAPY VISIT (OUTPATIENT)
Dept: ANTICOAGULATION | Facility: CLINIC | Age: 58
End: 2018-09-04

## 2018-09-04 DIAGNOSIS — I48.91 ATRIAL FIBRILLATION (H): ICD-10-CM

## 2018-09-04 DIAGNOSIS — Z79.01 LONG-TERM (CURRENT) USE OF ANTICOAGULANTS: ICD-10-CM

## 2018-09-04 LAB — INR PPP: 2.2

## 2018-09-04 RX ORDER — GABAPENTIN 100 MG/1
300 CAPSULE ORAL 3 TIMES DAILY
Qty: 810 CAPSULE | Refills: 0 | Status: SHIPPED | OUTPATIENT
Start: 2018-09-04 | End: 2018-11-15

## 2018-09-04 NOTE — MR AVS SNAPSHOT
Arianna Siddiqi   9/4/2018   Anticoagulation Therapy Visit    Description:  58 year old female   Provider:  Espinoza Rodriguez, RN   Department:   [facility]           INR as of 9/4/2018     Today's INR 2.2      Anticoagulation Summary as of 9/4/2018     INR goal 2.0-3.0   Today's INR 2.2   Full warfarin instructions 3 mg on Mon, Wed, Fri; 4.5 mg all other days   Next INR check 9/18/2018    Indications   Long-term (current) use of anticoagulants [Z79.01] [Z79.01]  Atrial fibrillation (H) [I48.91] [I48.91]         September 2018 Details    Sun Mon Tue Wed Thu Fri Sat           1                 2               3               4      4.5 mg   See details      5      3 mg         6      4.5 mg         7      3 mg         8      4.5 mg           9      4.5 mg         10      3 mg         11      4.5 mg         12      3 mg         13      4.5 mg         14      3 mg         15      4.5 mg           16      4.5 mg         17      3 mg         18            19               20               21               22                 23               24               25               26               27               28               29                 30                      Date Details   09/04 This INR check       Date of next INR:  9/18/2018         How to take your warfarin dose     To take:  3 mg Take 1 of the 3 mg tablets.    To take:  4.5 mg Take 1.5 of the 3 mg tablets.

## 2018-09-04 NOTE — TELEPHONE ENCOUNTER
gabapentin (NEURONTIN) 100 MG capsule   Last Written Prescription Date:  4/20/18  Last Fill Quantity: 810,   # refills: 0  Last Office Visit : 12/12/17  Future Office visit:  10/24/18    Routing because:  Not on protocol

## 2018-09-04 NOTE — PROGRESS NOTES
ANTICOAGULATION FOLLOW-UP CLINIC VISIT    Patient Name:  Arianna Siddiqi  Date:  9/4/2018  Contact Type:  Telephone    SUBJECTIVE:     Patient Findings     Positives No Problem Findings           OBJECTIVE    INR   Date Value Ref Range Status   09/04/2018 2.2  Final     Comment:     Home monitor       ASSESSMENT / PLAN  INR assessment THER    Recheck INR In: 2 WEEKS    INR Location Home INR      Anticoagulation Summary as of 9/4/2018     INR goal 2.0-3.0   Today's INR 2.2   Warfarin maintenance plan 3 mg (3 mg x 1) on Mon, Wed, Fri; 4.5 mg (3 mg x 1.5) all other days   Full warfarin instructions 3 mg on Mon, Wed, Fri; 4.5 mg all other days   Weekly warfarin total 27 mg   No change documented Espinoza Rodriguez RN   Plan last modified Sina Lazcano, [facility] (3/13/2018)   Next INR check 9/18/2018   Priority INR   Target end date     Indications   Long-term (current) use of anticoagulants [Z79.01] [Z79.01]  Atrial fibrillation (H) [I48.91] [I48.91]         Anticoagulation Episode Summary     INR check location Clinic Lab    Preferred lab     Send INR reminders to [facility]    Comments 1/17 [employer] Home Monitoring to start on 2/28.  Pt's voicemail box is full and pt is unable to retrieve voice messages       Anticoagulation Care Providers     Provider Role Specialty Phone number    Amelia Angulo MD PhD [facility] [phone]            See the Encounter Report to view Anticoagulation Flowsheet and Dosing Calendar (Go to Encounters tab in chart review, and find the Anticoagulation Therapy Visit)    Spoke with patient. Gave them their lab results and new warfarin recommendation.  No changes in health, medication, or diet. No missed doses, no falls. No signs or symptoms of bleed or clotting.    Espinoza Rodriguez, RN

## 2018-09-04 NOTE — TELEPHONE ENCOUNTER
RN called patient and verified that she does have an appt with Dr. Angulo for 10/24/18.  RN let patient know the Gabapentin would be refilled for a 90 day supply.    Rahel Ram RN on 9/4/2018 at 12:26 PM

## 2018-09-06 DIAGNOSIS — M10.9 GOUT, UNSPECIFIED CAUSE, UNSPECIFIED CHRONICITY, UNSPECIFIED SITE: ICD-10-CM

## 2018-09-06 NOTE — TELEPHONE ENCOUNTER
allopurinol (ZYLOPRIM) 100 MG   Last Written Prescription Date:  5/10/18  Last Fill Quantity: 180,   # refills: 0  Last Office Visit : 12/12/17  Future Office visit:  10/24/18    Routing refill request to provider for review/approval because:   30 DAY RF  OVER DUE LAB/URIC ACID 2016 (REPEAT )  & ABN CR

## 2018-09-07 RX ORDER — ALLOPURINOL 100 MG/1
100 TABLET ORAL 2 TIMES DAILY
Qty: 60 TABLET | Refills: 0 | Status: CANCELLED | OUTPATIENT
Start: 2018-09-07

## 2018-09-07 NOTE — TELEPHONE ENCOUNTER
Refill denied. Patient has not been seen in clinic since December 2017 despite being advised to follow up sooner. Patient has canceled and rescheduled multiple appointments. No refill at this time, must be seen in clinic.    Dottie Joyce RN

## 2018-09-18 ENCOUNTER — ANTICOAGULATION THERAPY VISIT (OUTPATIENT)
Dept: ANTICOAGULATION | Facility: CLINIC | Age: 58
End: 2018-09-18

## 2018-09-18 DIAGNOSIS — I48.91 ATRIAL FIBRILLATION (H): ICD-10-CM

## 2018-09-18 DIAGNOSIS — Z79.01 LONG-TERM (CURRENT) USE OF ANTICOAGULANTS: ICD-10-CM

## 2018-09-18 LAB — INR PPP: 2.6

## 2018-09-18 NOTE — MR AVS SNAPSHOT
Arianna VALDIVIA Devang   9/18/2018   Anticoagulation Therapy Visit    Description:  58 year old female   Provider:  Sina Lazcano, [facility]   Department:  [facility]           INR as of 9/18/2018     Today's INR 2.6      Anticoagulation Summary as of 9/18/2018     INR goal 2.0-3.0   Today's INR 2.6   Full warfarin instructions 3 mg on Mon, Wed, Fri; 4.5 mg all other days   Next INR check 10/2/2018    Indications   Long-term (current) use of anticoagulants [Z79.01] [Z79.01]  Atrial fibrillation (H) [I48.91] [I48.91]         September 2018 Details    Sun Mon Tue Wed Thu Fri Sat           1                 2               3               4               5               6               7               8                 9               10               11               12               13               14               15                 16               17               18      4.5 mg   See details      19      3 mg         20      4.5 mg         21      3 mg         22      4.5 mg           23      4.5 mg         24      3 mg         25      4.5 mg         26      3 mg         27      4.5 mg         28      3 mg         29      4.5 mg           30      4.5 mg                Date Details   09/18 This INR check               How to take your warfarin dose     To take:  3 mg Take 1 of the 3 mg tablets.    To take:  4.5 mg Take 1.5 of the 3 mg tablets.           October 2018 Details    Sun Mon Tue Wed Thu Fri Sat      1      3 mg         2            3               4               5               6                 7               8               9               10               11               12               13                 14               15               16               17               18               19               20                 21               22               23               24               25               26               27                 28               29               30                31                   Date Details   No additional details    Date of next INR:  10/2/2018         How to take your warfarin dose     To take:  3 mg Take 1 of the 3 mg tablets.    To take:  4.5 mg Take 1.5 of the 3 mg tablets.

## 2018-09-18 NOTE — PROGRESS NOTES
ANTICOAGULATION FOLLOW-UP CLINIC VISIT    Patient Name:  Arianna Siddiqi  Date:  9/18/2018  Contact Type:  Telephone    SUBJECTIVE:     Patient Findings     Comments Having difficulty sleeping wants to try Tylenol PM.  Has a cough, getting a cold.           OBJECTIVE    INR   Date Value Ref Range Status   09/18/2018 2.6  Final       ASSESSMENT / PLAN  INR assessment THER    Recheck INR In: 2 WEEKS    INR Location Home INR      Anticoagulation Summary as of 9/18/2018     INR goal 2.0-3.0   Today's INR 2.6   Warfarin maintenance plan 3 mg (3 mg x 1) on Mon, Wed, Fri; 4.5 mg (3 mg x 1.5) all other days   Full warfarin instructions 3 mg on Mon, Wed, Fri; 4.5 mg all other days   Weekly warfarin total 27 mg   Plan last modified Sina Lazcano RPH (3/13/2018)   Next INR check 10/2/2018   Priority INR   Target end date     Indications   Long-term (current) use of anticoagulants [Z79.01] [Z79.01]  Atrial fibrillation (H) [I48.91] [I48.91]         Anticoagulation Episode Summary     INR check location Clinic Lab    Preferred lab     Send INR reminders to [facility]    Comments 1/17 [employer] Home Monitoring to start on 2/28.  Pt's voicemail box is full and pt is unable to retrieve voice messages       Anticoagulation Care Providers     Provider Role Specialty Phone number    Amelia Angulo MD PhD [facility] [phone]            See the Encounter Report to view Anticoagulation Flowsheet and Dosing Calendar (Go to Encounters tab in chart review, and find the Anticoagulation Therapy Visit)    Spoke with patient. Gave them their lab results and new warfarin recommendation.  No missed doses, no falls. No signs or symptoms of bleed or clotting.        Sina Lazcano RPH

## 2018-10-02 ENCOUNTER — ANTICOAGULATION THERAPY VISIT (OUTPATIENT)
Dept: ANTICOAGULATION | Facility: CLINIC | Age: 58
End: 2018-10-02

## 2018-10-02 DIAGNOSIS — I48.91 ATRIAL FIBRILLATION (H): ICD-10-CM

## 2018-10-02 LAB — INR PPP: 2.9

## 2018-10-02 NOTE — MR AVS SNAPSHOT
Arianna VALDIVIA Devang   10/2/2018   Anticoagulation Therapy Visit    Description:  58 year old female   Provider:  Paradise Pizarro, RN   Department:  [facility]           INR as of 10/2/2018     Today's INR 2.9      Anticoagulation Summary as of 10/2/2018     INR goal 2.0-3.0   Today's INR 2.9   Full warfarin instructions 3 mg on Mon, Wed, Fri; 4.5 mg all other days   Next INR check 10/16/2018    Indications   Long-term (current) use of anticoagulants [Z79.01] [Z79.01]  Atrial fibrillation (H) [I48.91] [I48.91]         October 2018 Details    Sun Mon Tue Wed Thu Fri Sat      1               2      4.5 mg   See details      3      3 mg         4      4.5 mg         5      3 mg         6      4.5 mg           7      4.5 mg         8      3 mg         9      4.5 mg         10      3 mg         11      4.5 mg         12      3 mg         13      4.5 mg           14      4.5 mg         15      3 mg         16            17               18               19               20                 21               22               23               24               25               26               27                 28               29               30               31                   Date Details   10/02 This INR check       Date of next INR:  10/16/2018         How to take your warfarin dose     To take:  3 mg Take 1 of the 3 mg tablets.    To take:  4.5 mg Take 1.5 of the 3 mg tablets.

## 2018-10-02 NOTE — PROGRESS NOTES
ANTICOAGULATION FOLLOW-UP CLINIC VISIT    Patient Name:  Arianna Siddiqi  Date:  10/2/2018  Contact Type:  Telephone    SUBJECTIVE:     Patient Findings     Positives No Problem Findings           OBJECTIVE    INR   Date Value Ref Range Status   10/02/2018 2.9  Final       ASSESSMENT / PLAN  INR assessment THER    Recheck INR In: 2 WEEKS    INR Location Home INR      Anticoagulation Summary as of 10/2/2018     INR goal 2.0-3.0   Today's INR 2.9   Warfarin maintenance plan 3 mg (3 mg x 1) on Mon, Wed, Fri; 4.5 mg (3 mg x 1.5) all other days   Full warfarin instructions 3 mg on Mon, Wed, Fri; 4.5 mg all other days   Weekly warfarin total 27 mg   No change documented Paradise Pziarro RN   Plan last modified Sina Lazcano, [facility] (3/13/2018)   Next INR check 10/16/2018   Priority INR   Target end date     Indications   Long-term (current) use of anticoagulants [Z79.01] [Z79.01]  Atrial fibrillation (H) [I48.91] [I48.91]         Anticoagulation Episode Summary     INR check location Clinic Lab    Preferred lab     Send INR reminders to [facility]    Comments 1/17 [employer] Home Monitoring to start on 2/28.  Pt's voicemail box is full and pt is unable to retrieve voice messages       Anticoagulation Care Providers     Provider Role Specialty Phone number    Amelia Angulo MD PhD Responsible [facility] [phone]            See the Encounter Report to view Anticoagulation Flowsheet and Dosing Calendar (Go to Encounters tab in chart review, and find the Anticoagulation Therapy Visit)    Spoke with Arianna.  She reports no changes in health, diet, medications.      Paradise Pizarro RN

## 2018-10-16 ENCOUNTER — ANTICOAGULATION THERAPY VISIT (OUTPATIENT)
Dept: ANTICOAGULATION | Facility: CLINIC | Age: 58
End: 2018-10-16

## 2018-10-16 DIAGNOSIS — I48.91 ATRIAL FIBRILLATION, UNSPECIFIED TYPE (H): ICD-10-CM

## 2018-10-16 LAB — INR PPP: 2.8

## 2018-10-16 NOTE — PROGRESS NOTES
ANTICOAGULATION FOLLOW-UP CLINIC VISIT    Patient Name:  Arianna Siddiqi  Date:  10/16/2018  Contact Type:  Telephone    SUBJECTIVE:     Patient Findings     Positives OTC meds (Occassionally takes Ty PM for sleep.)           OBJECTIVE    INR   Date Value Ref Range Status   10/16/2018 2.8  Final       ASSESSMENT / PLAN  INR assessment THER    Recheck INR In: 2 WEEKS    INR Location Home INR      Anticoagulation Summary as of 10/16/2018     INR goal 2.0-3.0   Today's INR 2.8   Warfarin maintenance plan 3 mg (3 mg x 1) on Mon, Wed, Fri; 4.5 mg (3 mg x 1.5) all other days   Full warfarin instructions 3 mg on Mon, Wed, Fri; 4.5 mg all other days   Weekly warfarin total 27 mg   Plan last modified Sina Lazcano, [facility] (3/13/2018)   Next INR check 10/30/2018   Priority INR   Target end date     Indications   Long-term (current) use of anticoagulants [Z79.01] [Z79.01]  Atrial fibrillation (H) [I48.91] [I48.91]         Anticoagulation Episode Summary     INR check location Clinic Lab    Preferred lab     Send INR reminders to [facility]    Comments 1/17 [employer] Home Monitoring to start on 2/28.  Pt's voicemail box is full and pt is unable to retrieve voice messages       Anticoagulation Care Providers     Provider Role Specialty Phone number    Amelia Angulo MD PhD [facility] [phone]            See the Encounter Report to view Anticoagulation Flowsheet and Dosing Calendar (Go to Encounters tab in chart review, and find the Anticoagulation Therapy Visit)  Spoke with patient.    Carol Hoffman RN

## 2018-10-16 NOTE — MR AVS SNAPSHOT
Arianna VALDIVIA Devang   10/16/2018   Anticoagulation Therapy Visit    Description:  58 year old female   Provider:  Carol Hoffman RN   Department:  [facility]           INR as of 10/16/2018     Today's INR 2.8      Anticoagulation Summary as of 10/16/2018     INR goal 2.0-3.0   Today's INR 2.8   Full warfarin instructions 3 mg on Mon, Wed, Fri; 4.5 mg all other days   Next INR check 10/30/2018    Indications   Long-term (current) use of anticoagulants [Z79.01] [Z79.01]  Atrial fibrillation (H) [I48.91] [I48.91]         October 2018 Details    Sun Mon Tue Wed Thu Fri Sat      1               2               3               4               5               6                 7               8               9               10               11               12               13                 14               15               16      4.5 mg   See details      17      3 mg         18      4.5 mg         19      3 mg         20      4.5 mg           21      4.5 mg         22      3 mg         23      4.5 mg         24      3 mg         25      4.5 mg         26      3 mg         27      4.5 mg           28      4.5 mg         29      3 mg         30            31                   Date Details   10/16 This INR check       Date of next INR:  10/30/2018         How to take your warfarin dose     To take:  3 mg Take 1 of the 3 mg tablets.    To take:  4.5 mg Take 1.5 of the 3 mg tablets.

## 2018-10-30 ENCOUNTER — ANTICOAGULATION THERAPY VISIT (OUTPATIENT)
Dept: ANTICOAGULATION | Facility: CLINIC | Age: 58
End: 2018-10-30

## 2018-10-30 DIAGNOSIS — I48.91 ATRIAL FIBRILLATION (H): ICD-10-CM

## 2018-10-30 LAB — INR PPP: 2.8 (ref 0.8–1.2)

## 2018-10-30 NOTE — MR AVS SNAPSHOT
Arianna VALDIVIA Devang   10/30/2018   Anticoagulation Therapy Visit    Description:  58 year old female   Provider:  Sina Lazcano, [facility]   Department:  [facility]           INR as of 10/30/2018     Today's INR 2.8      Anticoagulation Summary as of 10/30/2018     INR goal 2.0-3.0   Today's INR 2.8   Full warfarin instructions 3 mg on Mon, Wed, Fri; 4.5 mg all other days   Next INR check     Indications   Long-term (current) use of anticoagulants [Z79.01] [Z79.01]  Atrial fibrillation (H) [I48.91] [I48.91]         October 2018 Details    Sun Mon Tue Wed Thu Fri Sat      1               2               3               4               5               6                 7               8               9               10               11               12               13                 14               15               16               17               18               19               20                 21               22               23               24               25               26               27                 28               29               30      4.5 mg   See details      31      3 mg             Date Details   10/30 This INR check      Date of next INR: No date specified         How to take your warfarin dose     To take:  3 mg Take 1 of the 3 mg tablets.    To take:  4.5 mg Take 1.5 of the 3 mg tablets.

## 2018-10-30 NOTE — PROGRESS NOTES
Spoke with patient. Gave them their lab results and new warfarin recommendation.  No changes in health, medication, or diet. No missed doses, no falls. No signs or symptoms of bleed or clotting.

## 2018-11-01 DIAGNOSIS — E11.49 TYPE 2 DIABETES MELLITUS WITH OTHER NEUROLOGIC COMPLICATION, WITHOUT LONG-TERM CURRENT USE OF INSULIN (H): ICD-10-CM

## 2018-11-01 DIAGNOSIS — G62.9 PERIPHERAL POLYNEUROPATHY: ICD-10-CM

## 2018-11-01 DIAGNOSIS — I48.20 CHRONIC ATRIAL FIBRILLATION (H): ICD-10-CM

## 2018-11-01 DIAGNOSIS — I50.43 ACUTE ON CHRONIC COMBINED SYSTOLIC AND DIASTOLIC CONGESTIVE HEART FAILURE (H): ICD-10-CM

## 2018-11-01 DIAGNOSIS — I50.22 CHRONIC SYSTOLIC HEART FAILURE (H): ICD-10-CM

## 2018-11-02 DIAGNOSIS — I50.43 ACUTE ON CHRONIC COMBINED SYSTOLIC AND DIASTOLIC CONGESTIVE HEART FAILURE (H): ICD-10-CM

## 2018-11-02 DIAGNOSIS — M10.9 GOUT, UNSPECIFIED CAUSE, UNSPECIFIED CHRONICITY, UNSPECIFIED SITE: ICD-10-CM

## 2018-11-02 DIAGNOSIS — E87.6 HYPOKALEMIA: ICD-10-CM

## 2018-11-02 DIAGNOSIS — M79.605 PAIN OF LEFT LOWER EXTREMITY: ICD-10-CM

## 2018-11-02 RX ORDER — METOPROLOL SUCCINATE 50 MG/1
75 TABLET, EXTENDED RELEASE ORAL DAILY
Qty: 90 TABLET | Refills: 0 | Status: SHIPPED | OUTPATIENT
Start: 2018-11-02 | End: 2019-01-31

## 2018-11-02 RX ORDER — LISINOPRIL 5 MG/1
5 TABLET ORAL DAILY
Qty: 30 TABLET | Refills: 0 | Status: SHIPPED | OUTPATIENT
Start: 2018-11-02 | End: 2018-12-10

## 2018-11-02 RX ORDER — ACETAMINOPHEN 325 MG/1
650 TABLET ORAL EVERY 4 HOURS PRN
Qty: 100 TABLET | Refills: 1 | Status: SHIPPED | OUTPATIENT
Start: 2018-11-02 | End: 2019-03-11

## 2018-11-02 RX ORDER — POTASSIUM CHLORIDE 1500 MG/1
40 TABLET, EXTENDED RELEASE ORAL DAILY
Qty: 120 TABLET | Refills: 0 | Status: SHIPPED | OUTPATIENT
Start: 2018-11-02 | End: 2018-12-19

## 2018-11-02 RX ORDER — BUMETANIDE 2 MG/1
2 TABLET ORAL DAILY
Qty: 30 TABLET | Refills: 0 | Status: SHIPPED | OUTPATIENT
Start: 2018-11-02 | End: 2018-12-10

## 2018-11-02 RX ORDER — GABAPENTIN 100 MG/1
CAPSULE ORAL
Qty: 810 CAPSULE | Refills: 0 | OUTPATIENT
Start: 2018-11-02

## 2018-11-02 RX ORDER — WARFARIN SODIUM 3 MG/1
TABLET ORAL
Qty: 180 TABLET | Refills: 1 | Status: ON HOLD | OUTPATIENT
Start: 2018-11-02 | End: 2020-01-16

## 2018-11-02 NOTE — TELEPHONE ENCOUNTER
warfarin (COUMADIN) 3 MG tablet     Last Written Prescription Date:  3-6-18  Last Fill Quantity: 180,   # refills: 1  Last Office Visit : 12-27-17  Future Office visit:  12-7-18    Routing refill request to Anti coag clinic for review/approval because:  Failed protocol abnormal INR      lisinopril (PRINIVIL/ZESTRIL) 5 MG tablet      Last Written Prescription Date:  8-7-18  Last Fill Quantity: 90,   # refills: 0    Routing refill request to RN for review/approval because:  Drug failed protocol.  Abnormal Lab:  Cr    bumetanide (BUMEX) 2 MG tablet      Last Written Prescription Date:  8-10-18  Last Fill Quantity: 90,   # refills: 0    Routing refill request to RN for review/approval because:  Drug failed protocol  Abnormal lab: Cr    JANUVIA 50 MG tablet      Last Written Prescription Date:  8-10-18  Last Fill Quantity: 90,   # refills: 0    Routing refill request to provider for review/approval because:  Drug failed  Protocol  Abnormal lab: Cr  Overdue lab Hgb A1c, Micro Albumin  BP-no reading last 12 months  Past due appt- has cancelled multiple appt.

## 2018-11-02 NOTE — TELEPHONE ENCOUNTER
allopurinol (ZYLOPRIM) 100 MG tablet   Last Written Prescription Date:  5/10/18  Last Fill Quantity: 180,   # refills: 0  Last Office Visit : 12/12/17  Future Office visit:  12/7/18    Routing refill request to provider for review/approval because:  Alba CAO, uric acid past due

## 2018-11-07 RX ORDER — ALLOPURINOL 100 MG/1
100 TABLET ORAL 2 TIMES DAILY
Qty: 60 TABLET | Refills: 0 | Status: ON HOLD | OUTPATIENT
Start: 2018-11-07 | End: 2020-02-21

## 2018-11-13 ENCOUNTER — ANTICOAGULATION THERAPY VISIT (OUTPATIENT)
Dept: ANTICOAGULATION | Facility: CLINIC | Age: 58
End: 2018-11-13

## 2018-11-13 DIAGNOSIS — I48.91 ATRIAL FIBRILLATION (H): ICD-10-CM

## 2018-11-13 LAB — INR PPP: 3.1 (ref 0.8–1.2)

## 2018-11-13 NOTE — PROGRESS NOTES
ANTICOAGULATION FOLLOW-UP CLINIC VISIT    Patient Name:  Arianna Siddiqi  Date:  11/13/2018  Contact Type:  Telephone    SUBJECTIVE:     Patient Findings     Comments Arianna had some nausea and vomiting 11/10/18, just lasted one day.  She has got a new box of test strips(after recall).    She will eat a serving of dark green vegetables today.           OBJECTIVE    INR   Date Value Ref Range Status   11/13/2018 3.1 (A) 0.8 - 1.2 Final       ASSESSMENT / PLAN  INR assessment THER    Recheck INR In: 2 WEEKS    INR Location Home INR      Anticoagulation Summary as of 11/13/2018     INR goal 2.0-3.0   Today's INR 3.1!   Warfarin maintenance plan 3 mg (3 mg x 1) on Mon, Wed, Fri; 4.5 mg (3 mg x 1.5) all other days   Full warfarin instructions 3 mg on Mon, Wed, Fri; 4.5 mg all other days   Weekly warfarin total 27 mg   No change documented Paradise Pizarro RN   Plan last modified Sina Lazcano, [facility] (3/13/2018)   Next INR check 11/27/2018   Priority INR   Target end date     Indications   Long-term (current) use of anticoagulants [Z79.01] [Z79.01]  Atrial fibrillation (H) [I48.91] [I48.91]         Anticoagulation Episode Summary     INR check location Clinic Lab    Preferred lab     Send INR reminders to [facility]    Comments 1/17 [employer] Monitoring to start on 2/28.  Pt's voicemail box is full and pt is unable to retrieve voice messages       Anticoagulation Care Providers     Provider Role Specialty Phone number    Amelia Angulo MD PhD [facility] [phone]            See the Encounter Report to view Anticoagulation Flowsheet and Dosing Calendar (Go to Encounters tab in chart review, and find the Anticoagulation Therapy Visit)    Spoke with Arianna.  She had some nausea and vomiting 11/10/18, just lasted one day.  She has got a new box of test strips(after recall).    She will eat a serving of dark green vegetables today.    Paradise Pizarro RN

## 2018-11-15 DIAGNOSIS — G62.9 PERIPHERAL POLYNEUROPATHY: ICD-10-CM

## 2018-11-19 NOTE — TELEPHONE ENCOUNTER
gabapentin (NEURONTIN) 100 MG capsule      Last Written Prescription Date:  9-4-18  Last Fill Quantity: 810,   # refills: 0  Last Office Visit : 12-12-18  Future Office visit:  12-7-18    Routing refill request to provider for review/approval because:  Drug not on the [facility], [facility] or [facility] refill protocol.

## 2018-11-20 NOTE — TELEPHONE ENCOUNTER
Last filled on 9/4/18 with 90 day supply. Next refill date is 12/3/18, but pt needs to be seen for further refills. She has an upcoming appt on 12/7 for re establishing a care with Dr. Salamanca.

## 2018-11-27 ENCOUNTER — ANTICOAGULATION THERAPY VISIT (OUTPATIENT)
Dept: ANTICOAGULATION | Facility: CLINIC | Age: 58
End: 2018-11-27

## 2018-11-27 DIAGNOSIS — I48.91 ATRIAL FIBRILLATION (H): ICD-10-CM

## 2018-11-27 LAB — INR PPP: 2.2

## 2018-11-27 NOTE — MR AVS SNAPSHOT
Arianna VALDIVIA Devang   11/27/2018   Anticoagulation Therapy Visit    Description:  58 year old female   Provider:  Espinoza Rodriguez, RN   Department:  [facility]           INR as of 11/27/2018     Today's INR 2.2      Anticoagulation Summary as of 11/27/2018     INR goal 2.0-3.0   Today's INR 2.2   Full warfarin instructions 3 mg on Mon, Wed, Fri; 4.5 mg all other days   Next INR check 12/7/2018    Indications   Long-term (current) use of anticoagulants [Z79.01] [Z79.01]  Atrial fibrillation (H) [I48.91] [I48.91]         November 2018 Details    Sun Mon Tue Wed Thu Fri Sat         1               2               3                 4               5               6               7               8               9               10                 11               12               13               14               15               16               17                 18               19               20               21               22               23               24                 25               26               27      4.5 mg   See details      28      3 mg         29      4.5 mg         30      3 mg           Date Details   11/27 This INR check               How to take your warfarin dose     To take:  3 mg Take 1 of the 3 mg tablets.    To take:  4.5 mg Take 1.5 of the 3 mg tablets.           December 2018 Details    Sun Mon Tue Wed Thu Fri Sat           1      4.5 mg           2      4.5 mg         3      3 mg         4      4.5 mg         5      3 mg         6      4.5 mg         7            8                 9               10               11               12               13               14               15                 16               17               18               19               20               21               22                 23               24               25               26               27               28               29                 30               31                      Date Details   No additional details    Date of next INR:  12/7/2018         How to take your warfarin dose     To take:  3 mg Take 1 of the 3 mg tablets.    To take:  4.5 mg Take 1.5 of the 3 mg tablets.

## 2018-11-27 NOTE — PROGRESS NOTES
ANTICOAGULATION FOLLOW-UP CLINIC VISIT    Patient Name:  Arianna Siddiqi  Date:  11/27/2018  Contact Type:  Telephone    SUBJECTIVE:     Patient Findings     Positives No Problem Findings           OBJECTIVE    INR   Date Value Ref Range Status   11/27/2018 2.2  Final     Comment:     Home INR monitor       ASSESSMENT / PLAN  INR assessment THER    Recheck INR In: 10 DAYS    INR Location Home INR      Anticoagulation Summary as of 11/27/2018     INR goal 2.0-3.0   Today's INR 2.2   Warfarin maintenance plan 3 mg (3 mg x 1) on Mon, Wed, Fri; 4.5 mg (3 mg x 1.5) all other days   Full warfarin instructions 3 mg on Mon, Wed, Fri; 4.5 mg all other days   Weekly warfarin total 27 mg   No change documented Espinoza Rodriguez RN   Plan last modified Sina Lazcano, [facility] (3/13/2018)   Next INR check 12/7/2018   Priority INR   Target end date     Indications   Long-term (current) use of anticoagulants [Z79.01] [Z79.01]  Atrial fibrillation (H) [I48.91] [I48.91]         Anticoagulation Episode Summary     INR check location Clinic Lab    Preferred lab     Send INR reminders to [facility]    Comments 1/17 [employer] Monitoring to start on 2/28.  Pt's voicemail box is full and pt is unable to retrieve voice messages       Anticoagulation Care Providers     Provider Role Specialty Phone number    Amelia Angulo MD PhD [facility] [phone]            See the Encounter Report to view Anticoagulation Flowsheet and Dosing Calendar (Go to Encounters tab in chart review, and find the Anticoagulation Therapy Visit)    Spoke with patient. Gave them their lab results and new warfarin recommendation.  No changes in health, medication, or diet. No missed doses, no falls. No signs or symptoms of bleed or clotting.    Espinoza Rodriguez, RN

## 2018-11-29 ENCOUNTER — PATIENT OUTREACH (OUTPATIENT)
Dept: CARE COORDINATION | Facility: CLINIC | Age: 58
End: 2018-11-29

## 2018-12-05 DIAGNOSIS — I50.22 CHRONIC SYSTOLIC HEART FAILURE (H): ICD-10-CM

## 2018-12-05 DIAGNOSIS — E11.49 TYPE 2 DIABETES MELLITUS WITH OTHER NEUROLOGIC COMPLICATION, WITHOUT LONG-TERM CURRENT USE OF INSULIN (H): ICD-10-CM

## 2018-12-05 DIAGNOSIS — I50.43 ACUTE ON CHRONIC COMBINED SYSTOLIC AND DIASTOLIC CONGESTIVE HEART FAILURE (H): ICD-10-CM

## 2018-12-06 DIAGNOSIS — E78.5 HYPERLIPIDEMIA, UNSPECIFIED HYPERLIPIDEMIA TYPE: ICD-10-CM

## 2018-12-07 DIAGNOSIS — I50.22 CHRONIC SYSTOLIC HEART FAILURE (H): ICD-10-CM

## 2018-12-07 DIAGNOSIS — E11.49 TYPE 2 DIABETES MELLITUS WITH OTHER NEUROLOGIC COMPLICATION, WITHOUT LONG-TERM CURRENT USE OF INSULIN (H): Primary | ICD-10-CM

## 2018-12-07 DIAGNOSIS — I50.43 ACUTE ON CHRONIC COMBINED SYSTOLIC AND DIASTOLIC CONGESTIVE HEART FAILURE (H): ICD-10-CM

## 2018-12-07 NOTE — TELEPHONE ENCOUNTER
[facility]    Phone Message    May a detailed message be left on voicemail: yes    Reason for Call: Medication Refill Request    Has the patient contacted the pharmacy for the refill? Yes   Name of medication being requested:   bumetanide (BUMEX) 2 MG tablet  lisinopril (PRINIVIL/ZESTRIL) 5 MG tablet  sitagliptin (JANUVIA) 50 MG tablet  Provider who prescribed the medication: Yoselin Shook  Pharmacy: [address] [address]  Date medication is needed: ASAP         Action Taken: Message routed to:  [facility] PCC/Med Refill Team

## 2018-12-07 NOTE — TELEPHONE ENCOUNTER
[facility] Call [facility]    Phone Message    May a detailed message be left on voicemail: yes    Reason for Call: Medication Refill Request    Has the patient contacted the pharmacy for the refill? Yes   Name of medication being requested: simvastatin (ZOCOR) 20 MG tablet  Provider who prescribed the medication: Yoselin Shook   Pharmacy: [id number] [address]  Date medication is needed: ASAP, Pharmacy is stating they received a request from the patient to refill the prescription but we are stating she received a years worth of refills. Pharmacy is stating they have used all of her refills and she is requesting a new prescription for the medication. Please contact the pharmacy at the number provided or the patient at the number on file. Please Advise.          Action Taken: Message routed to:  [facility] CHRISTY

## 2018-12-08 RX ORDER — SIMVASTATIN 20 MG
20 TABLET ORAL AT BEDTIME
Qty: 90 TABLET | Refills: 0 | Status: ON HOLD | OUTPATIENT
Start: 2018-12-08 | End: 2020-01-27

## 2018-12-10 RX ORDER — LISINOPRIL 5 MG/1
5 TABLET ORAL DAILY
Qty: 14 TABLET | Refills: 0 | Status: SHIPPED | OUTPATIENT
Start: 2018-12-10 | End: 2018-12-19

## 2018-12-10 RX ORDER — SITAGLIPTIN 50 MG/1
TABLET, FILM COATED ORAL
Qty: 90 TABLET | Refills: 0 | OUTPATIENT
Start: 2018-12-10

## 2018-12-10 RX ORDER — BUMETANIDE 2 MG/1
TABLET ORAL
Qty: 90 TABLET | Refills: 0 | OUTPATIENT
Start: 2018-12-10

## 2018-12-10 RX ORDER — BUMETANIDE 2 MG/1
2 TABLET ORAL DAILY
Qty: 14 TABLET | Refills: 0 | Status: SHIPPED | OUTPATIENT
Start: 2018-12-10 | End: 2018-12-19

## 2018-12-10 RX ORDER — LISINOPRIL 5 MG/1
TABLET ORAL
Qty: 90 TABLET | Refills: 0 | OUTPATIENT
Start: 2018-12-10

## 2018-12-10 NOTE — TELEPHONE ENCOUNTER
Last Clinic Visit: 12-12-17    14 day maico refill sent to the pharmacy - including instructions for patient to call the clinic and schedule an appointment.    Scheduling has been notified to contact the pt for appointment.      Kathleen M Doege RN

## 2018-12-11 ENCOUNTER — ANTICOAGULATION THERAPY VISIT (OUTPATIENT)
Dept: ANTICOAGULATION | Facility: CLINIC | Age: 58
End: 2018-12-11

## 2018-12-11 DIAGNOSIS — I48.91 ATRIAL FIBRILLATION (H): ICD-10-CM

## 2018-12-11 LAB — INR PPP: 2.7 (ref 0.8–1.2)

## 2018-12-11 NOTE — PROGRESS NOTES
ANTICOAGULATION FOLLOW-UP CLINIC VISIT    Patient Name:  Arianna Siddiqi  Date:  2018  Contact Type:  Telephone    SUBJECTIVE:     Patient Findings     Positives:   No Problem Findings           OBJECTIVE    INR   Date Value Ref Range Status   2018 2.7 (A) 0.8 - 1.2 Final       ASSESSMENT / PLAN  INR assessment THER    Recheck INR In: 2 WEEKS    INR Location Home INR      Anticoagulation Summary  As of 2018    INR goal:   2.0-3.0   TTR:   74.7 % (2.1 y)   INR used for dosin.7 (2018)   Warfarin maintenance plan:   3 mg (3 mg x 1) every Mon, Wed, Fri; 4.5 mg (3 mg x 1.5) all other days   Full warfarin instructions:   3 mg every Mon, Wed, Fri; 4.5 mg all other days   Weekly warfarin total:   27 mg   No change documented:   Paradise Pizarro RN   Plan last modified:   Sina Lazcano, [facility] (3/13/2018)   Next INR check:   2018   Priority:   INR   Target end date:       Indications    Long-term (current) use of anticoagulants [Z79.01] [Z79.01]  Atrial fibrillation (H) [I48.91] [I48.91]             Anticoagulation Episode Summary     INR check location:   Clinic Lab    Preferred lab:       Send INR reminders to:   [facility]    Comments:    [employer] Monitoring to start on .  Pt's voicemail box is full and pt is unable to retrieve voice messages       Anticoagulation Care Providers     Provider Role Specialty Phone number    Amelia Angulo MD PhD Responsible [facility] [phone]            See the Encounter Report to view Anticoagulation Flowsheet and Dosing Calendar (Go to Encounters tab in chart review, and find the Anticoagulation Therapy Visit)    Spoke with Arianna.  She reports no changes in health, diet, medications.      Paradise Pizarro, RN

## 2018-12-12 RX ORDER — GABAPENTIN 100 MG/1
300 CAPSULE ORAL 3 TIMES DAILY
Qty: 90 CAPSULE | Refills: 0 | Status: SHIPPED | OUTPATIENT
Start: 2018-12-12 | End: 2018-12-19

## 2018-12-16 ENCOUNTER — TELEPHONE (OUTPATIENT)
Dept: INTERNAL MEDICINE | Facility: CLINIC | Age: 58
End: 2018-12-16

## 2018-12-16 DIAGNOSIS — I50.22 CHRONIC SYSTOLIC HEART FAILURE (H): ICD-10-CM

## 2018-12-16 DIAGNOSIS — I50.43 ACUTE ON CHRONIC COMBINED SYSTOLIC AND DIASTOLIC CONGESTIVE HEART FAILURE (H): ICD-10-CM

## 2018-12-16 DIAGNOSIS — E11.49 TYPE 2 DIABETES MELLITUS WITH OTHER NEUROLOGIC COMPLICATION, WITHOUT LONG-TERM CURRENT USE OF INSULIN (H): ICD-10-CM

## 2018-12-16 DIAGNOSIS — G62.9 PERIPHERAL POLYNEUROPATHY: ICD-10-CM

## 2018-12-16 DIAGNOSIS — E87.6 HYPOKALEMIA: ICD-10-CM

## 2018-12-18 RX ORDER — GABAPENTIN 100 MG/1
300 CAPSULE ORAL 3 TIMES DAILY
Qty: 270 CAPSULE | Refills: 2 | OUTPATIENT
Start: 2018-12-18

## 2018-12-18 NOTE — TELEPHONE ENCOUNTER
No refills to be given. Patient has been repeatedly advised to follow up in clinic and has failed to do so. No refills should be filled under Yoselin Shook's name. Patient needs to follow up in clinic.    Januvia was sent to the pharmacy by refill team this morning. I called the pharmacy to cancel the additional refills per discussion with Yoselin Shook. Patient needs to follow up in clinic. I will request a clinic coordinator to assist patient in scheduling a sooner appointment. Patient has canceled/rescheduled multiple follow up visits.    Dottie Joyce RN

## 2018-12-18 NOTE — TELEPHONE ENCOUNTER
gabapentin (NEURONTIN) 100 MG capsule      Last Written Prescription Date:  12/12/18  Last Fill Quantity: 90,   # refills: 0  Last Office Visit : 12/12/17  Dr Shook  Future Office visit:  3/13/19 *Re-establishing with new provider-Dr Angulo     Routing refill request to provider for review/approval because:  Drug not on the refill protocol.        Januvia: Labs past due- LDL, Microalbumin, A1C, Creatinine.  Message routed to clinic CMA  *Refill sent thru pending appointment date- 30 day increments (several cancelled appointments to re-establish care with Dr Angulo)

## 2018-12-19 RX ORDER — BUMETANIDE 2 MG/1
2 TABLET ORAL DAILY
Qty: 30 TABLET | Refills: 1 | Status: SHIPPED | OUTPATIENT
Start: 2018-12-19 | End: 2019-03-08

## 2018-12-19 RX ORDER — GABAPENTIN 100 MG/1
300 CAPSULE ORAL 3 TIMES DAILY
Qty: 90 CAPSULE | Refills: 0 | Status: ON HOLD | OUTPATIENT
Start: 2018-12-19 | End: 2020-02-09

## 2018-12-19 RX ORDER — POTASSIUM CHLORIDE 1500 MG/1
40 TABLET, EXTENDED RELEASE ORAL DAILY
Qty: 60 TABLET | Refills: 1 | Status: SHIPPED | OUTPATIENT
Start: 2018-12-19 | End: 2019-07-24

## 2018-12-19 RX ORDER — LISINOPRIL 5 MG/1
5 TABLET ORAL DAILY
Qty: 30 TABLET | Refills: 1 | Status: SHIPPED | OUTPATIENT
Start: 2018-12-19 | End: 2019-06-28

## 2018-12-20 NOTE — TELEPHONE ENCOUNTER
[facility]    Phone Message    May a detailed message be left on voicemail: yes    Reason for Call: Other: Pt states that the pharmacy still has not recieved her refills.  Pt requests that her refills last her until 1/30 her next appt. Please call this Pt back.     Action Taken: Message routed to:  [facility] CHRISTY

## 2018-12-21 NOTE — TELEPHONE ENCOUNTER
Pharmacy was called, and refills have been received for all 5 Rxs and patient has enough to last until next appt on 1/30/18.  Pharmacy has contacted patient to let her know refills can be picked up.    Rahel Ram RN on 12/21/2018 at 8:34 AM

## 2018-12-26 ENCOUNTER — ANTICOAGULATION THERAPY VISIT (OUTPATIENT)
Dept: ANTICOAGULATION | Facility: CLINIC | Age: 58
End: 2018-12-26

## 2018-12-26 DIAGNOSIS — I48.91 ATRIAL FIBRILLATION (H): ICD-10-CM

## 2018-12-26 LAB — INR POINT OF CARE: 3 (ref 0.86–1.14)

## 2018-12-26 NOTE — PROGRESS NOTES
ANTICOAGULATION FOLLOW-UP CLINIC VISIT    Patient Name:  Arianna Siddiqi  Date:  12/26/2018  Contact Type:  .telephone  SUBJECTIVE:        OBJECTIVE    INR Protime   Date Value Ref Range Status   12/26/2018 3 (A) 0.86 - 1.14 Final       ASSESSMENT / PLAN  No question data found.  Anticoagulation Summary  As of 12/26/2018    INR goal:   2.0-3.0   TTR:   75.2 % (2.2 y)   INR used for dosing:   3 (12/26/2018)   Warfarin maintenance plan:   3 mg (3 mg x 1) every Mon, Wed, Fri; 4.5 mg (3 mg x 1.5) all other days   Full warfarin instructions:   3 mg every Mon, Wed, Fri; 4.5 mg all other days   Weekly warfarin total:   27 mg   No change documented:   Rahel Muñiz RN   Plan last modified:   Snia Lazcano [facility] (3/13/2018)   Next INR check:   1/9/2019   Priority:   INR   Target end date:       Indications    Long-term (current) use of anticoagulants [Z79.01] [Z79.01]  Atrial fibrillation (H) [I48.91] [I48.91]             Anticoagulation Episode Summary     INR check location:   Clinic Lab    Preferred lab:       Send INR reminders to:   [facility]    Comments:   1/17 [employer] Monitoring to start on 2/28.  Pt's voicemail box is full and pt is unable to retrieve voice messages       Anticoagulation Care Providers     Provider Role Specialty Phone number    Amelia Agnulo MD PhD [facility] [phone]            See the Encounter Report to view Anticoagulation Flowsheet and Dosing Calendar (Go to Encounters tab in chart review, and find the Anticoagulation Therapy Visit)    Message is left on Fito's phone as unable to leave a message on the pt's phone    Rahel Muñiz RN    Addendum 12/26/18  I spoke with Arianna today.  No changes in health, medication, or diet. No missed doses, no falls. No signs or symptoms of bleed or clotting.  Jayjay Lazcano [facility]

## 2019-01-08 ENCOUNTER — ANTICOAGULATION THERAPY VISIT (OUTPATIENT)
Dept: ANTICOAGULATION | Facility: CLINIC | Age: 59
End: 2019-01-08

## 2019-01-08 DIAGNOSIS — I48.91 ATRIAL FIBRILLATION (H): ICD-10-CM

## 2019-01-08 LAB — INR PPP: 2.9

## 2019-01-08 RX ORDER — WARFARIN SODIUM 3 MG/1
TABLET ORAL
Qty: 180 TABLET | Refills: 1 | Status: SHIPPED | OUTPATIENT
Start: 2019-01-08 | End: 2019-07-03

## 2019-01-08 NOTE — PROGRESS NOTES
ANTICOAGULATION FOLLOW-UP CLINIC VISIT    Patient Name:  Arianna Siddiqi  Date:  2019  Contact Type:  Telephone    SUBJECTIVE:     Patient Findings     Positives:   No Problem Findings    Comments:   Spoke to patient.  Refilled 3mg tablets at [facility] pharmacy.  No change to patient's Coumadin dose recommended.           OBJECTIVE    INR   Date Value Ref Range Status   2019 2.9  Final     Comment:     Home monitor       ASSESSMENT / PLAN  INR assessment THER    Recheck INR In: 2 WEEKS    INR Location Home INR      Anticoagulation Summary  As of 2019    INR goal:   2.0-3.0   TTR:   75.6 % (2.2 y)   INR used for dosin.9 (2019)   Warfarin maintenance plan:   3 mg (3 mg x 1) every Mon, Wed, Fri; 4.5 mg (3 mg x 1.5) all other days   Full warfarin instructions:   3 mg every Mon, Wed, Fri; 4.5 mg all other days   Weekly warfarin total:   27 mg   No change documented:   Espinoza Rodriguez RN   Plan last modified:   Sina Lazcano, [facility] (3/13/2018)   Next INR check:   2019   Priority:   INR   Target end date:       Indications    Long-term (current) use of anticoagulants [Z79.01] [Z79.01]  Atrial fibrillation (H) [I48.91] [I48.91]             Anticoagulation Episode Summary     INR check location:   Clinic Lab    Preferred lab:       Send INR reminders to:   [facility]    Comments:    [employer] Monitoring to start on .  Pt's voicemail box is full and pt is unable to retrieve voice messages       Anticoagulation Care Providers     Provider Role Specialty Phone number    Amelia Angulo MD PhD Responsible [facility] Practice [phone]            See the Encounter Report to view Anticoagulation Flowsheet and Dosing Calendar (Go to Encounters tab in chart review, and find the Anticoagulation Therapy Visit)    Spoke with patient. Gave them their lab results and new warfarin recommendation.  No changes in health, medication, or diet. No missed doses, no falls. No signs or symptoms of  bleed or clotting.    Espinoza Rodriguez, RN

## 2019-01-22 ENCOUNTER — ANTICOAGULATION THERAPY VISIT (OUTPATIENT)
Dept: ANTICOAGULATION | Facility: CLINIC | Age: 59
End: 2019-01-22

## 2019-01-22 DIAGNOSIS — I48.91 ATRIAL FIBRILLATION (H): ICD-10-CM

## 2019-01-22 LAB — INR PPP: 2.1 (ref 0.9–1.1)

## 2019-01-22 NOTE — PROGRESS NOTES
ANTICOAGULATION FOLLOW-UP CLINIC VISIT    Patient Name:  Arianna Siddiqi  Date:  2019  Contact Type:  Telephone    SUBJECTIVE:     Patient Findings     Positives:   No Problem Findings           OBJECTIVE    INR   Date Value Ref Range Status   2019 2.1 (A) 0.9 - 1.1 Final       ASSESSMENT / PLAN  INR assessment THER    Recheck INR In: 2 WEEKS    INR Location Home INR      Anticoagulation Summary  As of 2019    INR goal:   2.0-3.0   TTR:   76.0 % (2.3 y)   INR used for dosin.1 (2019)   Warfarin maintenance plan:   3 mg (3 mg x 1) every Mon, Wed, Fri; 4.5 mg (3 mg x 1.5) all other days   Full warfarin instructions:   3 mg every Mon, Wed, Fri; 4.5 mg all other days   Weekly warfarin total:   27 mg   Plan last modified:   Sina Lazcano [facility] (3/13/2018)   Next INR check:   2019   Priority:   INR   Target end date:       Indications    Long-term (current) use of anticoagulants [Z79.01] [Z79.01]  Atrial fibrillation (H) [I48.91] [I48.91]             Anticoagulation Episode Summary     INR check location:   Clinic Lab    Preferred lab:       Send INR reminders to:   [facility]    Comments:    [employer] Monitoring to start on .  Pt's voicemail box is full and pt is unable to retrieve voice messages       Anticoagulation Care Providers     Provider Role Specialty Phone number    Amelia Angulo MD PhD Responsible [facility] [phone]            See the Encounter Report to view Anticoagulation Flowsheet and Dosing Calendar (Go to Encounters tab in chart review, and find the Anticoagulation Therapy Visit)    Spoke with patient and answered multiple questions. No changes in health, medication, or diet. No missed doses or signs/symptoms of bleeding or bruising.     Ileana Castillo, [facility] Resident

## 2019-01-31 DIAGNOSIS — I50.43 ACUTE ON CHRONIC COMBINED SYSTOLIC AND DIASTOLIC CONGESTIVE HEART FAILURE (H): ICD-10-CM

## 2019-02-02 RX ORDER — METOPROLOL SUCCINATE 50 MG/1
75 TABLET, EXTENDED RELEASE ORAL DAILY
Qty: 72 TABLET | Refills: 0 | Status: SHIPPED | OUTPATIENT
Start: 2019-02-02 | End: 2019-07-29

## 2019-02-05 ENCOUNTER — ANTICOAGULATION THERAPY VISIT (OUTPATIENT)
Dept: ANTICOAGULATION | Facility: CLINIC | Age: 59
End: 2019-02-05

## 2019-02-05 DIAGNOSIS — I48.91 ATRIAL FIBRILLATION (H): ICD-10-CM

## 2019-02-05 LAB — INR PPP: 2.9 (ref 0.9–1.1)

## 2019-02-05 NOTE — PROGRESS NOTES
ANTICOAGULATION FOLLOW-UP CLINIC VISIT    Patient Name:  Arianna Siddiqi  Date:  2019  Contact Type:  Telephone    SUBJECTIVE:     Patient Findings     Positives:   No Problem Findings           OBJECTIVE    INR   Date Value Ref Range Status   2019 2.9 (A) 0.9 - 1.1 Final       ASSESSMENT / PLAN  INR assessment THER    Recheck INR In: 2 WEEKS    INR Location Home INR      Anticoagulation Summary  As of 2019    INR goal:   2.0-3.0   TTR:   76.4 % (2.3 y)   INR used for dosin.9 (2019)   Warfarin maintenance plan:   3 mg (3 mg x 1) every Mon, Wed, Fri; 4.5 mg (3 mg x 1.5) all other days   Full warfarin instructions:   3 mg every Mon, Wed, Fri; 4.5 mg all other days   Weekly warfarin total:   27 mg   No change documented:   Paradise Pizarro RN   Plan last modified:   Sian Lazcano, [facility] (3/13/2018)   Next INR check:   2019   Priority:   INR   Target end date:       Indications    Long-term (current) use of anticoagulants [Z79.01] [Z79.01]  Atrial fibrillation (H) [I48.91] [I48.91]             Anticoagulation Episode Summary     INR check location:   Clinic Lab    Preferred lab:       Send INR reminders to:   [facility]    Comments:    [employer] Monitoring to start on .  Pt's voicemail box is full and pt is unable to retrieve voice messages       Anticoagulation Care Providers     Provider Role Specialty Phone number    Amelia Angulo MD PhD Responsible [facility] [phone]            See the Encounter Report to view Anticoagulation Flowsheet and Dosing Calendar (Go to Encounters tab in chart review, and find the Anticoagulation Therapy Visit)    Spoke with Arianna.  She reports no changes in health, diet, medications.    Paradise Pizarro, RN

## 2019-02-07 DIAGNOSIS — E11.49 TYPE 2 DIABETES MELLITUS WITH OTHER NEUROLOGIC COMPLICATION, WITHOUT LONG-TERM CURRENT USE OF INSULIN (H): ICD-10-CM

## 2019-02-10 NOTE — TELEPHONE ENCOUNTER
sitagliptin (JANUVIA) 50 MG tablet   Last Written Prescription Date:  12/19/18  Last Fill Quantity: 30,   # refills: 1  Last Office Visit : 12/12/17  Future Office visit: 3/20/19    Routing refill request to provider for review/approval because:labs, LDL , microalbumin A1C, creat

## 2019-02-19 ENCOUNTER — ANTICOAGULATION THERAPY VISIT (OUTPATIENT)
Dept: ANTICOAGULATION | Facility: CLINIC | Age: 59
End: 2019-02-19

## 2019-02-19 DIAGNOSIS — I48.91 ATRIAL FIBRILLATION (H): ICD-10-CM

## 2019-02-19 LAB — INR PPP: 2.3 (ref 0.9–1.1)

## 2019-02-19 NOTE — PROGRESS NOTES
ANTICOAGULATION FOLLOW-UP CLINIC VISIT    Patient Name:  Arianna Siddiqi  Date:  2019  Contact Type:  Telephone    SUBJECTIVE:     Patient Findings     Positives:   No Problem Findings           OBJECTIVE    INR   Date Value Ref Range Status   2019 2.30 (A) 0.9 - 1.1 Final     Comment:     Home Monitoring Machine        ASSESSMENT / PLAN  INR assessment THER    Recheck INR In: 2 WEEKS    INR Location Home INR      Anticoagulation Summary  As of 2019    INR goal:   2.0-3.0   TTR:   76.8 % (2.3 y)   INR used for dosin.30 (2019)   Warfarin maintenance plan:   3 mg (3 mg x 1) every Mon, Wed, Fri; 4.5 mg (3 mg x 1.5) all other days   Full warfarin instructions:   3 mg every Mon, Wed, Fri; 4.5 mg all other days   Weekly warfarin total:   27 mg   Plan last modified:   Sina Lazcano [facility] (3/13/2018)   Next INR check:   3/5/2019   Priority:   INR   Target end date:       Indications    Long-term (current) use of anticoagulants [Z79.01] [Z79.01]  Atrial fibrillation (H) [I48.91] [I48.91]             Anticoagulation Episode Summary     INR check location:   Clinic Lab    Preferred lab:       Send INR reminders to:   [facility]    Comments:    [employer] Monitoring to start on .  Pt's voicemail box is full and pt is unable to retrieve voice messages       Anticoagulation Care Providers     Provider Role Specialty Phone number    Amelia Angulo MD PhD Responsible [facility] [phone]            See the Encounter Report to view Anticoagulation Flowsheet and Dosing Calendar (Go to Encounters tab in chart review, and find the Anticoagulation Therapy Visit)    Spoke with patient. Gave them their lab results and new warfarin recommendation.  No changes in health, medication, or diet. No missed doses, no falls. No signs or symptoms of bleed or clotting.     Tanna Dawkins RN

## 2019-03-05 ENCOUNTER — ANTICOAGULATION THERAPY VISIT (OUTPATIENT)
Dept: ANTICOAGULATION | Facility: CLINIC | Age: 59
End: 2019-03-05

## 2019-03-05 DIAGNOSIS — I48.91 ATRIAL FIBRILLATION (H): ICD-10-CM

## 2019-03-05 LAB — INR PPP: 2.5 (ref 0.9–1.1)

## 2019-03-05 NOTE — PROGRESS NOTES
ANTICOAGULATION FOLLOW-UP CLINIC VISIT    Patient Name:  Arianna Siddiqi  Date:  3/5/2019  Contact Type:  Telephone    SUBJECTIVE:     Patient Findings     Positives:   No Problem Findings           OBJECTIVE    INR   Date Value Ref Range Status   2019 2.5 (A) 0.9 - 1.1 Final       ASSESSMENT / PLAN  INR assessment THER    Recheck INR In: 2 WEEKS    INR Location Clinic      Anticoagulation Summary  As of 3/5/2019    INR goal:   2.0-3.0   TTR:   77.2 % (2.4 y)   INR used for dosin.5 (3/5/2019)   Warfarin maintenance plan:   3 mg (3 mg x 1) every Mon, Wed, Fri; 4.5 mg (3 mg x 1.5) all other days   Full warfarin instructions:   3 mg every Mon, Wed, Fri; 4.5 mg all other days   Weekly warfarin total:   27 mg   No change documented:   Paradise Pizarro RN   Plan last modified:   Sina Lazcano, [facility] (3/13/2018)   Next INR check:   3/19/2019   Priority:   INR   Target end date:       Indications    Long-term (current) use of anticoagulants [Z79.01] [Z79.01]  Atrial fibrillation (H) [I48.91] [I48.91]             Anticoagulation Episode Summary     INR check location:   Clinic Lab    Preferred lab:       Send INR reminders to:   [facility]    Comments:    [employer] Monitoring to start on .  Pt's voicemail box is full and pt is unable to retrieve voice messages       Anticoagulation Care Providers     Provider Role Specialty Phone number    Amelia Angulo MD PhD Responsible [facility] [phone]            See the Encounter Report to view Anticoagulation Flowsheet and Dosing Calendar (Go to Encounters tab in chart review, and find the Anticoagulation Therapy Visit)    Spoke with Arianna.  She reports no changes in health,diet, medications.      Paradise Pizarro, RN

## 2019-03-08 DIAGNOSIS — I50.43 ACUTE ON CHRONIC COMBINED SYSTOLIC AND DIASTOLIC CONGESTIVE HEART FAILURE (H): ICD-10-CM

## 2019-03-11 DIAGNOSIS — G62.9 PERIPHERAL POLYNEUROPATHY: ICD-10-CM

## 2019-03-11 DIAGNOSIS — I50.43 ACUTE ON CHRONIC COMBINED SYSTOLIC AND DIASTOLIC CONGESTIVE HEART FAILURE (H): ICD-10-CM

## 2019-03-11 DIAGNOSIS — M79.605 PAIN OF LEFT LOWER EXTREMITY: ICD-10-CM

## 2019-03-11 RX ORDER — BUMETANIDE 2 MG/1
TABLET ORAL
Qty: 30 TABLET | Refills: 1 | Status: SHIPPED | OUTPATIENT
Start: 2019-03-11 | End: 2019-07-29

## 2019-03-11 NOTE — TELEPHONE ENCOUNTER
Last Clinic Visit: 12/12/2017 [facility]  Overdue appointment and labs. Future appointment 5/8/19.  Creat abnormal 1.1 from 12/12/17, provider reviewed. 2 month refill provided to get to appointment. Caroline in clinic notified overdue for BP and labs.

## 2019-03-13 RX ORDER — GABAPENTIN 100 MG/1
300 CAPSULE ORAL 3 TIMES DAILY
Qty: 270 CAPSULE | Refills: 0 | OUTPATIENT
Start: 2019-03-13

## 2019-03-13 RX ORDER — METOPROLOL SUCCINATE 50 MG/1
75 TABLET, EXTENDED RELEASE ORAL DAILY
Qty: 72 TABLET | Refills: 0 | OUTPATIENT
Start: 2019-03-13

## 2019-03-13 NOTE — TELEPHONE ENCOUNTER
neurontin   Last Written Prescription Date:  12/19/18  Last Fill Quantity: 90  # refills: 0  Last Office Visit : 12/12/17  Future Office visit:  5/8/19    Routing refill request to nurse for review/approval because:  Drug not on the [facility], [facility] or [facility] refill protocol or controlled substance

## 2019-03-14 RX ORDER — ACETAMINOPHEN 325 MG/1
650 TABLET ORAL EVERY 4 HOURS PRN
Qty: 100 TABLET | Refills: 0 | Status: SHIPPED | OUTPATIENT
Start: 2019-03-14 | End: 2019-04-12

## 2019-03-19 ENCOUNTER — ANTICOAGULATION THERAPY VISIT (OUTPATIENT)
Dept: ANTICOAGULATION | Facility: CLINIC | Age: 59
End: 2019-03-19

## 2019-03-19 DIAGNOSIS — I48.91 ATRIAL FIBRILLATION (H): ICD-10-CM

## 2019-03-19 LAB — INR PPP: 2.8

## 2019-03-19 NOTE — PROGRESS NOTES
ANTICOAGULATION FOLLOW-UP CLINIC VISIT    Patient Name:  Arianna Siddiqi  Date:  3/19/2019  Contact Type:  Telephone    SUBJECTIVE:     Patient Findings     Comments:   No Problems found. No Changes in Health, Medications, or diet. No Signs of bruising, bleeding or clotting.           OBJECTIVE    INR   Date Value Ref Range Status   2019 2.8  Final       ASSESSMENT / PLAN  INR assessment THER    Recheck INR In: 2 WEEKS    INR Location Home INR      Anticoagulation Summary  As of 3/19/2019    INR goal:   2.0-3.0   TTR:   77.6 % (2.4 y)   INR used for dosin.8 (3/19/2019)   Warfarin maintenance plan:   3 mg (3 mg x 1) every Mon, Wed, Fri; 4.5 mg (3 mg x 1.5) all other days   Full warfarin instructions:   3 mg every Mon, Wed, Fri; 4.5 mg all other days   Weekly warfarin total:   27 mg   Plan last modified:   Sina Lazcano RPH (3/13/2018)   Next INR check:   2019   Priority:   INR   Target end date:       Indications    Long-term (current) use of anticoagulants [Z79.01] [Z79.01]  Atrial fibrillation (H) [I48.91] [I48.91]             Anticoagulation Episode Summary     INR check location:   Clinic Lab    Preferred lab:       Send INR reminders to:   [facility]    Comments:    [employer] Monitoring to start on .  Pt's voicemail box is full and pt is unable to retrieve voice messages       Anticoagulation Care Providers     Provider Role Specialty Phone number    Amelia Angulo MD PhD [facility] [phone]            See the Encounter Report to view Anticoagulation Flowsheet and Dosing Calendar (Go to Encounters tab in chart review, and find the Anticoagulation Therapy Visit)    Spoke with patient. Gave them their lab results and new warfarin recommendation.  No changes in health, medication, or diet. No missed doses, no falls. No signs or symptoms of bleed or clotting.        Sina Lazcano RPH

## 2019-04-02 ENCOUNTER — ANTICOAGULATION THERAPY VISIT (OUTPATIENT)
Dept: ANTICOAGULATION | Facility: CLINIC | Age: 59
End: 2019-04-02

## 2019-04-02 DIAGNOSIS — E11.22 TYPE 2 DIABETES MELLITUS WITH CHRONIC KIDNEY DISEASE, WITHOUT LONG-TERM CURRENT USE OF INSULIN, UNSPECIFIED CKD STAGE (H): ICD-10-CM

## 2019-04-02 DIAGNOSIS — I48.91 ATRIAL FIBRILLATION (H): ICD-10-CM

## 2019-04-02 LAB — INR PPP: 2.8

## 2019-04-02 RX ORDER — LANCETS
EACH MISCELLANEOUS
Qty: 300 EACH | Refills: 0 | Status: SHIPPED | OUTPATIENT
Start: 2019-04-02

## 2019-04-02 NOTE — TELEPHONE ENCOUNTER
Patient is overdue for follow-up in clinic. Must keep appointment next month for future refills.  ALMA Arango CNP

## 2019-04-02 NOTE — PROGRESS NOTES
ANTICOAGULATION FOLLOW-UP CLINIC VISIT    Patient Name:  Arianna Siddiqi  Date:  2019  Contact Type:  Telephone    SUBJECTIVE:     Patient Findings     Comments:   No Problems found. No Changes in Health, Medications, or diet. No Signs of bruising, bleeding or clotting.           OBJECTIVE    INR   Date Value Ref Range Status   2019 2.8  Final       ASSESSMENT / PLAN  INR assessment THER    Recheck INR In: 2 WEEKS    INR Location Home INR      Anticoagulation Summary  As of 2019    INR goal:   2.0-3.0   TTR:   77.9 % (2.4 y)   INR used for dosin.8 (2019)   Warfarin maintenance plan:   3 mg (3 mg x 1) every Mon, Wed, Fri; 4.5 mg (3 mg x 1.5) all other days   Full warfarin instructions:   3 mg every Mon, Wed, Fri; 4.5 mg all other days   Weekly warfarin total:   27 mg   Plan last modified:   Sina Lazcano RPH (3/13/2018)   Next INR check:   2019   Priority:   INR   Target end date:       Indications    Long-term (current) use of anticoagulants [Z79.01] [Z79.01]  Atrial fibrillation (H) [I48.91] [I48.91]             Anticoagulation Episode Summary     INR check location:   Clinic Lab    Preferred lab:       Send INR reminders to:   [facility]    Comments:    [employer] Monitoring to start on .  Pt's voicemail box is full and pt is unable to retrieve voice messages       Anticoagulation Care Providers     Provider Role Specialty Phone number    Amelia Angulo MD PhD [facility] [phone]            See the Encounter Report to view Anticoagulation Flowsheet and Dosing Calendar (Go to Encounters tab in chart review, and find the Anticoagulation Therapy Visit)    Spoke with patient. Gave them their lab results and new warfarin recommendation.  No changes in health, medication, or diet. No missed doses, no falls. No signs or symptoms of bleed or clotting.      Sina Lazcano RPH

## 2019-04-04 DIAGNOSIS — E78.5 HYPERLIPIDEMIA, UNSPECIFIED HYPERLIPIDEMIA TYPE: ICD-10-CM

## 2019-04-04 RX ORDER — SIMVASTATIN 20 MG
20 TABLET ORAL AT BEDTIME
Qty: 90 TABLET | Refills: 0 | OUTPATIENT
Start: 2019-04-04

## 2019-04-04 NOTE — TELEPHONE ENCOUNTER
Patient must be seen for any refills. Patient last seen in 2017 and at that time provider recommended close follow up. Patient has been notified several times that an appointment is needed. She has canceled several appointments. No refills to be given.    Pharmacy updated of denials.    Dottie Joyce RN

## 2019-04-11 DIAGNOSIS — I50.22 CHRONIC SYSTOLIC HEART FAILURE (H): ICD-10-CM

## 2019-04-11 DIAGNOSIS — I50.43 ACUTE ON CHRONIC COMBINED SYSTOLIC AND DIASTOLIC CONGESTIVE HEART FAILURE (H): ICD-10-CM

## 2019-04-12 DIAGNOSIS — I50.43 ACUTE ON CHRONIC COMBINED SYSTOLIC AND DIASTOLIC CONGESTIVE HEART FAILURE (H): ICD-10-CM

## 2019-04-12 DIAGNOSIS — M79.605 PAIN OF LEFT LOWER EXTREMITY: ICD-10-CM

## 2019-04-12 DIAGNOSIS — D50.0 IRON DEFICIENCY ANEMIA DUE TO CHRONIC BLOOD LOSS: ICD-10-CM

## 2019-04-15 RX ORDER — BUMETANIDE 2 MG/1
2 TABLET ORAL DAILY
Qty: 30 TABLET | Refills: 0 | OUTPATIENT
Start: 2019-04-15

## 2019-04-15 RX ORDER — LISINOPRIL 5 MG/1
5 TABLET ORAL DAILY
Qty: 30 TABLET | Refills: 0 | OUTPATIENT
Start: 2019-04-15

## 2019-04-15 RX ORDER — FERROUS GLUCONATE 324(38)MG
324 TABLET ORAL 3 TIMES DAILY
Qty: 90 TABLET | Refills: 0 | Status: ON HOLD | OUTPATIENT
Start: 2019-04-15 | End: 2020-01-16

## 2019-04-15 NOTE — TELEPHONE ENCOUNTER
Medications refused. Patient must be evaluated in clinic for any medication refills.    Dottie Joyce RN

## 2019-04-15 NOTE — TELEPHONE ENCOUNTER
Last Clinic Visit: 12/12/2017 [facility]  Failed protocol for overdue Hgb. Last Hgb 5.0!  Appointment pending 5/8/19. 30 day maico approved and appointment reminder on Rx. FYI to clinic RN

## 2019-04-15 NOTE — TELEPHONE ENCOUNTER
bumetanide (BUMEX) 2 MG tablet  Last Written Prescription Date:  3/11/19  Last Fill Quantity: 30,   # refills: 1  Last Office Visit : 12/12/17  Future Office visit:  5/8/19    lisinopril (PRINIVIL/ZESTRIL) 5 MG tablet   Last Written Prescription Date:  12/19/18  Last Fill Quantity: 30,   # refills: 1         Routing refill request to provider for review/approval because:   Past due  Creatinine  Sodium   Potassium

## 2019-04-16 ENCOUNTER — ANTICOAGULATION THERAPY VISIT (OUTPATIENT)
Dept: ANTICOAGULATION | Facility: CLINIC | Age: 59
End: 2019-04-16

## 2019-04-16 DIAGNOSIS — I48.91 ATRIAL FIBRILLATION (H): ICD-10-CM

## 2019-04-16 LAB — INR PPP: 2.7 (ref 0.8–1.1)

## 2019-04-16 RX ORDER — ACETAMINOPHEN 325 MG/1
TABLET ORAL
Qty: 100 TABLET | Refills: 0 | Status: ON HOLD | OUTPATIENT
Start: 2019-04-16 | End: 2020-01-16

## 2019-04-16 NOTE — PROGRESS NOTES
ANTICOAGULATION FOLLOW-UP CLINIC VISIT    Patient Name:  Arianna Siddiqi  Date:  2019  Contact Type:  Telephone    SUBJECTIVE:     Patient Findings     Comments:   No Problems found. No Changes in Health, Medications, or diet. No Signs of bruising, bleeding or clotting.           OBJECTIVE    INR   Date Value Ref Range Status   2019 2.7 (A) 0.8 - 1.1 Final       ASSESSMENT / PLAN  INR assessment THER    Recheck INR In: 2 WEEKS    INR Location Home INR      Anticoagulation Summary  As of 2019    INR goal:   2.0-3.0   TTR:   78.3 % (2.5 y)   INR used for dosin.7 (2019)   Warfarin maintenance plan:   3 mg (3 mg x 1) every Mon, Wed, Fri; 4.5 mg (3 mg x 1.5) all other days   Full warfarin instructions:   3 mg every Mon, Wed, Fri; 4.5 mg all other days   Weekly warfarin total:   27 mg   Plan last modified:   Sina Lazcano RPH (3/13/2018)   Next INR check:   2019   Priority:   INR   Target end date:       Indications    Long-term (current) use of anticoagulants [Z79.01] [Z79.01]  Atrial fibrillation (H) [I48.91] [I48.91]             Anticoagulation Episode Summary     INR check location:   Clinic Lab    Preferred lab:       Send INR reminders to:   [facility]    Comments:    [employer] Monitoring to start on .  Pt's voicemail box is full and pt is unable to retrieve voice messages       Anticoagulation Care Providers     Provider Role Specialty Phone number    Amelia Angulo MD PhD Responsible [facility] [phone]            See the Encounter Report to view Anticoagulation Flowsheet and Dosing Calendar (Go to Encounters tab in chart review, and find the Anticoagulation Therapy Visit)    Spoke with patient. Gave them their lab results and new warfarin recommendation.  No changes in health, medication, or diet. No missed doses, no falls. No signs or symptoms of bleed or clotting.      Sina Lazcano RPH

## 2019-04-16 NOTE — TELEPHONE ENCOUNTER
Pharmacy was called and medication refill was cancelled, as patient needs to be seen before any further refills will be provided.  See telephone encounter from 4/4/19.    Rahel Ram RN on 4/16/2019 at 9:04 AM

## 2019-04-16 NOTE — TELEPHONE ENCOUNTER
Last Clinic Visit: 12/12/2017 [facility]

## 2019-04-16 NOTE — TELEPHONE ENCOUNTER
Pharmacy was called and medication refill was cancelled, as patient needs to be seen before any further refills will be provided.  See telephone encounter from 4/4/19.    Rahel Ram RN on 4/16/2019 at 9:03 AM

## 2019-04-19 ENCOUNTER — TELEPHONE (OUTPATIENT)
Dept: FAMILY MEDICINE | Facility: CLINIC | Age: 59
End: 2019-04-19

## 2019-04-19 NOTE — TELEPHONE ENCOUNTER
[facility]    Phone Message    May a detailed message be left on voicemail: yes    Reason for Call: Medication Refill Request    Has the patient contacted the pharmacy for the refill? Yes   Name of medication being requested: Medication Refills  Provider who prescribed the medication: Multiple [facility] providers  Pharmacy:   Date medication is needed: Patient stated she will run out of there medication by Tuesday of next week (04/23/19) stated she cant wait for her appointment on 05/08 for refills would like to know her options.         Action Taken: Message routed to:  [facility]

## 2019-04-22 NOTE — TELEPHONE ENCOUNTER
Called Arianna, advised her that the MD will not place a refill until the appointment on 5/8. She said she was disappointed with her service here. She asked what she should do in the meantime, I advised if she was starting to have symptoms to seek an urgent care or ER.  Mayelin Mueller on 4/22/2019 at 11:33 AM

## 2019-04-30 ENCOUNTER — ANTICOAGULATION THERAPY VISIT (OUTPATIENT)
Dept: ANTICOAGULATION | Facility: CLINIC | Age: 59
End: 2019-04-30

## 2019-04-30 DIAGNOSIS — I48.91 ATRIAL FIBRILLATION (H): ICD-10-CM

## 2019-04-30 LAB — INR PPP: 2.1 (ref 0.9–1.1)

## 2019-04-30 NOTE — PROGRESS NOTES
ANTICOAGULATION FOLLOW-UP CLINIC VISIT    Patient Name:  Arianna Siddiqi  Date:  2019  Contact Type:  Telephone    SUBJECTIVE:     Patient Findings     Comments:   Pt is establishing care with a new PCP Dr. Deedee Meneses on . Sent pool message to change orders to reflect new PCP           OBJECTIVE    INR   Date Value Ref Range Status   2019 2.10 (A) 0.9 - 1.1 Final     Comment:     Home Monitoring Machine        ASSESSMENT / PLAN  INR assessment THER    Recheck INR In: 2 WEEKS    INR Location Home INR      Anticoagulation Summary  As of 2019    INR goal:   2.0-3.0   TTR:   78.6 % (2.5 y)   INR used for dosin.10 (2019)   Warfarin maintenance plan:   3 mg (3 mg x 1) every Mon, Wed, Fri; 4.5 mg (3 mg x 1.5) all other days   Full warfarin instructions:   3 mg every Mon, Wed, Fri; 4.5 mg all other days   Weekly warfarin total:   27 mg   Plan last modified:   Sina Lazcano, [facility] (3/13/2018)   Next INR check:   2019   Priority:   INR   Target end date:       Indications    Long-term (current) use of anticoagulants [Z79.01] [Z79.01]  Atrial fibrillation (H) [I48.91] [I48.91]             Anticoagulation Episode Summary     INR check location:   Clinic Lab    Preferred lab:       Send INR reminders to:   [facility]    Comments:    [employer] Monitoring to start on .  Pt's voicemail box is full and pt is unable to retrieve voice messages       Anticoagulation Care Providers     Provider Role Specialty Phone number    Amelia Angulo MD PhD [facility] [phone]            See the Encounter Report to view Anticoagulation Flowsheet and Dosing Calendar (Go to Encounters tab in chart review, and find the Anticoagulation Therapy Visit)    Spoke with patient. Gave them their lab results and new warfarin recommendation.  No changes in health, medication, or diet. No missed doses, no falls. No signs or symptoms of bleed or clotting.     Tanna Dawkins  RN

## 2019-05-14 ENCOUNTER — ANTICOAGULATION THERAPY VISIT (OUTPATIENT)
Dept: ANTICOAGULATION | Facility: CLINIC | Age: 59
End: 2019-05-14

## 2019-05-14 DIAGNOSIS — I48.91 ATRIAL FIBRILLATION (H): ICD-10-CM

## 2019-05-14 LAB — INR PPP: 2.9

## 2019-05-14 NOTE — PROGRESS NOTES
ANTICOAGULATION FOLLOW-UP CLINIC VISIT    Patient Name:  Arianna Siddiqi  Date:  2019  Contact Type:  Telephone    SUBJECTIVE:  Patient Findings         Clinical Outcomes     Negatives:   Major bleeding event, Thromboembolic event, Anticoagulation-related hospital admission, Anticoagulation-related ED visit, Anticoagulation-related fatality           OBJECTIVE    INR   Date Value Ref Range Status   2019 2.9  Final     Comment:     Home INR monitor       ASSESSMENT / PLAN  INR assessment THER    Recheck INR In: 2 WEEKS    INR Location Home INR      Anticoagulation Summary  As of 2019    INR goal:   2.0-3.0   TTR:   78.9 % (2.6 y)   INR used for dosin.9 (2019)   Warfarin maintenance plan:   3 mg (3 mg x 1) every Mon, Wed, Fri; 4.5 mg (3 mg x 1.5) all other days   Full warfarin instructions:   3 mg every Mon, Wed, Fri; 4.5 mg all other days   Weekly warfarin total:   27 mg   No change documented:   Espinoza Rodriguez RN   Plan last modified:   Sina Lazcano, [facility] (3/13/2018)   Next INR check:   2019   Priority:   INR   Target end date:       Indications    Long-term (current) use of anticoagulants [Z79.01] [Z79.01]  Atrial fibrillation (H) [I48.91] [I48.91]             Anticoagulation Episode Summary     INR check location:   Clinic Lab    Preferred lab:       Send INR reminders to:   [facility]    Comments:    [employer] Home Monitoring to start on .  Pt's voicemail box is full and pt is unable to retrieve voice messages       Anticoagulation Care Providers     Provider Role Specialty Phone number    Amelia Angulo MD PhD [facility] [phone]            See the Encounter Report to view Anticoagulation Flowsheet and Dosing Calendar (Go to Encounters tab in chart review, and find the Anticoagulation Therapy Visit)    Spoke to Arianna.  Did not recommend any changes to dosing.  Patient is comfortable with the pattern.  Arianna has an appointment on  at  the clinic.  Please take into consideration at next INR draw on 5/28.     Espinoza Rodriguez, RN

## 2019-05-28 ENCOUNTER — ANTICOAGULATION THERAPY VISIT (OUTPATIENT)
Dept: ANTICOAGULATION | Facility: CLINIC | Age: 59
End: 2019-05-28

## 2019-05-28 DIAGNOSIS — I48.91 ATRIAL FIBRILLATION (H): ICD-10-CM

## 2019-05-28 LAB — INR PPP: 2.8 (ref 0.9–1.1)

## 2019-05-28 NOTE — PROGRESS NOTES
ANTICOAGULATION FOLLOW-UP CLINIC VISIT    Patient Name:  Arianna Siddiqi  Date:  2019  Contact Type:  Telephone    SUBJECTIVE:  Patient Findings     Comments:   Next INR will be at her appointment with Dr. Deedee Meneses        Clinical Outcomes     Negatives:   Major bleeding event, Thromboembolic event, Anticoagulation-related hospital admission, Anticoagulation-related ED visit, Anticoagulation-related fatality    Comments:   Next INR will be at her appointment with Dr. Deedee Meneses           OBJECTIVE    INR   Date Value Ref Range Status   2019 2.80 (A) 0.9 - 1.1 Final     Comment:     Home Monitoring Machine        ASSESSMENT / PLAN  INR assessment THER    Recheck INR In: 2 WEEKS    INR Location Home INR      Anticoagulation Summary  As of 2019    INR goal:   2.0-3.0   TTR:   79.2 % (2.6 y)   INR used for dosin.80 (2019)   Warfarin maintenance plan:   3 mg (3 mg x 1) every Mon, Wed, Fri; 4.5 mg (3 mg x 1.5) all other days   Full warfarin instructions:   3 mg every Mon, Wed, Fri; 4.5 mg all other days   Weekly warfarin total:   27 mg   Plan last modified:   Sina Lazcano, [facility] (3/13/2018)   Next INR check:   2019   Priority:   INR   Target end date:       Indications    Long-term (current) use of anticoagulants [Z79.01] [Z79.01]  Atrial fibrillation (H) [I48.91] [I48.91]             Anticoagulation Episode Summary     INR check location:   Clinic Lab    Preferred lab:       Send INR reminders to:   [facility]    Comments:    [employer] Home Monitoring to start on .  Pt's voicemail box is full and pt is unable to retrieve voice messages       Anticoagulation Care Providers     Provider Role Specialty Phone number    Amelia Angulo MD PhD Responsible Family Practice [phone]            See the Encounter Report to view Anticoagulation Flowsheet and Dosing Calendar (Go to Encounters tab in chart review, and find the Anticoagulation Therapy Visit)    Spoke with  patient. Gave them their lab results and new warfarin recommendation.  No changes in health, medication, or diet. No missed doses, no falls. No signs or symptoms of bleed or clotting.     Tanna Dawkins RN

## 2019-06-11 ENCOUNTER — ANTICOAGULATION THERAPY VISIT (OUTPATIENT)
Dept: ANTICOAGULATION | Facility: CLINIC | Age: 59
End: 2019-06-11

## 2019-06-11 DIAGNOSIS — I48.91 ATRIAL FIBRILLATION (H): ICD-10-CM

## 2019-06-11 LAB — INR PPP: 3

## 2019-06-11 NOTE — PROGRESS NOTES
ANTICOAGULATION FOLLOW-UP CLINIC VISIT    Patient Name:  Arianna Siddiqi  Date:  6/11/2019  Contact Type:  Telephone    SUBJECTIVE:  Patient Findings     Comments:   Today result is from [employer] home monitor.  Spoke to Arianna.  She will get an INR on Friday at the [place].  Did not adjust Coumadin.        Clinical Outcomes     Comments:   Today result is from [employer] home monitor.  Spoke to Arianna.  She will get an INR on Friday at the [place].  Did not adjust Coumadin.           OBJECTIVE    INR   Date Value Ref Range Status   06/11/2019 3.0  Final     Comment:     Home monitor       ASSESSMENT / PLAN  INR assessment THER    Recheck INR In: 3 DAYS    INR Location Home INR      Anticoagulation Summary  As of 6/11/2019    INR goal:   2.0-3.0   TTR:   79.5 % (2.6 y)   INR used for dosing:   3.0 (6/11/2019)   Warfarin maintenance plan:   3 mg (3 mg x 1) every Mon, Wed, Fri; 4.5 mg (3 mg x 1.5) all other days   Full warfarin instructions:   3 mg every Mon, Wed, Fri; 4.5 mg all other days   Weekly warfarin total:   27 mg   No change documented:   Espinoza Rodriguez, RN   Plan last modified:   Sina Lazcano [facility] (3/13/2018)   Next INR check:   6/14/2019   Priority:   INR   Target end date:       Indications    Long-term (current) use of anticoagulants [Z79.01] [Z79.01]  Atrial fibrillation (H) [I48.91] [I48.91]             Anticoagulation Episode Summary     INR check location:   Clinic Lab    Preferred lab:       Send INR reminders to:   [facility]    Comments:   1/17 [employer] Home Monitoring to start on 2/28.  Pt's voicemail box is full and pt is unable to retrieve voice messages       Anticoagulation Care Providers     Provider Role Specialty Phone number    Amelia Angulo MD PhD Responsible Family Practice [phone]            See the Encounter Report to view Anticoagulation Flowsheet and Dosing Calendar (Go to Encounters tab in chart review, and find the Anticoagulation Therapy Visit)    Spoke with  patient. Gave them their lab results and new warfarin recommendation.  No changes in health, medication, or diet. No missed doses, no falls. No signs or symptoms of bleed or clotting.    Espinoza Rodriguez, RN

## 2019-06-14 NOTE — PROGRESS NOTES
Addendum 6/14/19    Patient cancelled her appt to see Dr Deedee Meneses on 6/14/19.  We have a current INR Referral signed by Dr Angulo.  Patient was last seen in Primary Care Clinic on 12/12/17    I spoke with Arianna today she said her ride cancelled and  she does not drive and does not have the resources for a Taxi.  She has rescheduled her appt for 7/9/19 with Dr Henry.    On 7/9/19 we will nee to change our INR referral orders to a new provider    If patient needs more warfarin only give enough to get through her appt on 7/9/19    Jayjay Lazcano [facility]

## 2019-06-20 ENCOUNTER — TELEPHONE (OUTPATIENT)
Dept: INTERNAL MEDICINE | Facility: CLINIC | Age: 59
End: 2019-06-20

## 2019-06-20 DIAGNOSIS — M79.605 PAIN OF LEFT LOWER EXTREMITY: ICD-10-CM

## 2019-06-20 DIAGNOSIS — I50.43 ACUTE ON CHRONIC COMBINED SYSTOLIC AND DIASTOLIC CONGESTIVE HEART FAILURE (H): ICD-10-CM

## 2019-06-20 DIAGNOSIS — D50.0 IRON DEFICIENCY ANEMIA DUE TO CHRONIC BLOOD LOSS: ICD-10-CM

## 2019-06-20 DIAGNOSIS — E87.6 HYPOKALEMIA: ICD-10-CM

## 2019-06-20 NOTE — TELEPHONE ENCOUNTER
[facility]    Phone Message    May a detailed message be left on voicemail: yes, Pt states she is needing the medication asap, Pt states she had to reschedule apt due to MD had cancelled.     Reason for Call: Medication Refill Request    Has the patient contacted the pharmacy for the refill? Yes   Name of medication being requested:   -metoprolol succinate ER (TOPROL-XL) 50 MG 24 hr tablet,  -lisinopril (PRINIVIL/ZESTRIL) 5 MG tablet,  -bumetanide (BUMEX) 2 MG tablet,  -allopurinol (ZYLOPRIM) 100 MG tablet,  -potassium chloride ER (KLOR-CON) 20 MEQ CR tablet,  -Iron  Provider who prescribed the medication:art cr  Pharmacy: [id number] [address]  Date medication is needed: ASAP, Pt states does not want to be out of medication         Action Taken: Message routed to:  [facility] pcc

## 2019-06-21 DIAGNOSIS — E11.49 TYPE 2 DIABETES MELLITUS WITH OTHER NEUROLOGIC COMPLICATION, WITHOUT LONG-TERM CURRENT USE OF INSULIN (H): ICD-10-CM

## 2019-06-21 DIAGNOSIS — G62.9 PERIPHERAL POLYNEUROPATHY: ICD-10-CM

## 2019-06-21 DIAGNOSIS — I50.22 CHRONIC SYSTOLIC HEART FAILURE (H): ICD-10-CM

## 2019-06-24 RX ORDER — FERROUS GLUCONATE 324(38)MG
324 TABLET ORAL 3 TIMES DAILY
Qty: 270 TABLET | Refills: 1 | OUTPATIENT
Start: 2019-06-24

## 2019-06-24 RX ORDER — POTASSIUM CHLORIDE 1500 MG/1
TABLET, EXTENDED RELEASE ORAL
Qty: 60 TABLET | Refills: 1 | OUTPATIENT
Start: 2019-06-24

## 2019-06-24 RX ORDER — BUMETANIDE 2 MG/1
TABLET ORAL
Qty: 30 TABLET | Refills: 1 | OUTPATIENT
Start: 2019-06-24

## 2019-06-24 RX ORDER — LISINOPRIL 5 MG/1
TABLET ORAL
Qty: 30 TABLET | Refills: 1 | OUTPATIENT
Start: 2019-06-24

## 2019-06-24 RX ORDER — ACETAMINOPHEN 325 MG/1
TABLET ORAL
Qty: 100 TABLET | Refills: 0 | OUTPATIENT
Start: 2019-06-24

## 2019-06-24 RX ORDER — GABAPENTIN 100 MG/1
300 CAPSULE ORAL 3 TIMES DAILY
Qty: 810 CAPSULE | Refills: 0 | OUTPATIENT
Start: 2019-06-24

## 2019-06-24 NOTE — TELEPHONE ENCOUNTER
Patient has not been evaluated for >18 months. Must follow up in clinic for further refills.     Dottie Joyce RN

## 2019-06-24 NOTE — TELEPHONE ENCOUNTER
Patient's voicemail box is full, so I was unable to leave a message.    Patient has not been evaluated in clinic since December 2017. Patient has previously been notified many times that she needs to be evaluated in clinic prior to receiving any refills. Appointment 7/9 was rescheduled due to provider availability. Appointments with other providers were/are available on 7/9, though patient declined. Sooner appointments with other providers are also available, though patient had declined. Patient has canceled numerous appointments over the past year and a half. No refills can be given until patient is evaluated.    Dottie Joyce, RN

## 2019-06-24 NOTE — TELEPHONE ENCOUNTER
Patient called clinic, and we discussed that we are unable to fill any medications until she is evaluated in clinic. I informed Arianna that I understand appointment on 7/9 was canceled due to the clinician, though other providers did, and still do, have availability that day. Arianna does not want to see a resident, male provider, or NP. Her appointment was rescheduled to 7/17. Arianna then canceled the appointment on 7/17, rescheduled to 7/31.    Arianna and I discussed that it has been over 18 months since she was evaluated. The clinic is unable to provide any refills until she is evaluated. Arianna reported it takes 3 weeks for her to set up transportation. I voiced understanding of this, though informed her we are still unable to provide refills without evaluation, which Arianna has been informed of in the past.     I advised Arianna to keep her upcoming appointment. I informed her that sooner appointments with other providers are available if she would like sooner evaluation. I mentioned that we are happy to see her in our clinic, though she can establish elsewhere if she prefers or if another clinic location is more convenient.     Dottie Joyce RN

## 2019-06-25 ENCOUNTER — ANTICOAGULATION THERAPY VISIT (OUTPATIENT)
Dept: ANTICOAGULATION | Facility: CLINIC | Age: 59
End: 2019-06-25

## 2019-06-25 DIAGNOSIS — I48.91 ATRIAL FIBRILLATION (H): ICD-10-CM

## 2019-06-25 DIAGNOSIS — Z79.01 LONG TERM CURRENT USE OF ANTICOAGULANT THERAPY: ICD-10-CM

## 2019-06-25 LAB — INR PPP: 2.7 (ref 0.9–1.1)

## 2019-06-25 NOTE — PROGRESS NOTES
Pt rescheduled her appointment on 7/17 to 7/31 with Dr. Deedee Meneses. Please put in new INR referral orders under Dr. Deedee Meneses. Changing provider with [employer] Monitoring Machine also.

## 2019-06-25 NOTE — PROGRESS NOTES
ANTICOAGULATION FOLLOW-UP CLINIC VISIT    Patient Name:  Arianna Siddiqi  Date:  2019  Contact Type:  Telephone    SUBJECTIVE:  Patient Findings         Clinical Outcomes     Negatives:   Major bleeding event, Thromboembolic event, Anticoagulation-related hospital admission, Anticoagulation-related ED visit, Anticoagulation-related fatality           OBJECTIVE    INR   Date Value Ref Range Status   2019 2.70 (A) 0.9 - 1.1 Final     Comment:     Home Monitoring Machine        ASSESSMENT / PLAN  INR assessment THER    Recheck INR In: 2 WEEKS    INR Location Home INR      Anticoagulation Summary  As of 2019    INR goal:   2.0-3.0   TTR:   79.8 % (2.7 y)   INR used for dosin.70 (2019)   Warfarin maintenance plan:   3 mg (3 mg x 1) every Mon, Wed, Fri; 4.5 mg (3 mg x 1.5) all other days   Full warfarin instructions:   3 mg every Mon, Wed, Fri; 4.5 mg all other days   Weekly warfarin total:   27 mg   Plan last modified:   Sina Lazcano, [facility] (3/13/2018)   Next INR check:   2019   Priority:   INR   Target end date:       Indications    Long-term (current) use of anticoagulants [Z79.01] [Z79.01]  Atrial fibrillation (H) [I48.91] [I48.91]             Anticoagulation Episode Summary     INR check location:   Clinic Lab    Preferred lab:       Send INR reminders to:   [facility]    Comments:    [employer] Monitoring to start on .  Pt's voicemail box is full and pt is unable to retrieve voice messages       Anticoagulation Care Providers     Provider Role Specialty Phone number    Amelia Angulo MD PhD [facility] [phone]            See the Encounter Report to view Anticoagulation Flowsheet and Dosing Calendar (Go to Encounters tab in chart review, and find the Anticoagulation Therapy Visit)    Spoke with patient. Gave them their lab results and new warfarin recommendation.  No changes in health, medication, or diet. No missed doses, no falls. No signs  or symptoms of bleed or clotting.     Tanna Dawkins, RN

## 2019-06-28 DIAGNOSIS — I50.22 CHRONIC SYSTOLIC HEART FAILURE (H): ICD-10-CM

## 2019-06-28 DIAGNOSIS — D50.0 IRON DEFICIENCY ANEMIA DUE TO CHRONIC BLOOD LOSS: ICD-10-CM

## 2019-07-02 DIAGNOSIS — I50.43 ACUTE ON CHRONIC COMBINED SYSTOLIC AND DIASTOLIC CONGESTIVE HEART FAILURE (H): ICD-10-CM

## 2019-07-03 NOTE — TELEPHONE ENCOUNTER
ferrous gluconate (FERGON) 324 (38 Fe) MG tablet      Last Written Prescription Date:  4/15/19  Last Fill Quantity: 90,   # refills: 0  Last Office Visit : 12/12/17  Future Office visit:  7/31/19    Routing refill request to provider for review/approval because:  Per protocol      lisinopril (PRINIVIL/ZESTRIL) 5 MG tablet      Last Written Prescription Date:  12/19/18  Last Fill Quantity: 30,   # refills: 1  Last Office Visit : 12/12/17  Future Office visit:  7/31/19    Routing refill request to provider for review/approval because:  Per protocol

## 2019-07-05 RX ORDER — LISINOPRIL 5 MG/1
5 TABLET ORAL DAILY
Qty: 30 TABLET | Refills: 0 | Status: ON HOLD | OUTPATIENT
Start: 2019-07-05 | End: 2020-01-27

## 2019-07-05 RX ORDER — FERROUS GLUCONATE 324(38)MG
324 TABLET ORAL 3 TIMES DAILY
Qty: 90 TABLET | Refills: 0 | Status: ON HOLD | OUTPATIENT
Start: 2019-07-05 | End: 2020-02-09

## 2019-07-07 RX ORDER — BUMETANIDE 2 MG/1
TABLET ORAL
Qty: 30 TABLET | Refills: 1 | OUTPATIENT
Start: 2019-07-07

## 2019-07-08 DIAGNOSIS — E87.6 HYPOKALEMIA: ICD-10-CM

## 2019-07-08 DIAGNOSIS — M79.605 PAIN OF LEFT LOWER EXTREMITY: ICD-10-CM

## 2019-07-08 DIAGNOSIS — I50.43 ACUTE ON CHRONIC COMBINED SYSTOLIC AND DIASTOLIC CONGESTIVE HEART FAILURE (H): ICD-10-CM

## 2019-07-08 DIAGNOSIS — D50.0 IRON DEFICIENCY ANEMIA DUE TO CHRONIC BLOOD LOSS: ICD-10-CM

## 2019-07-09 ENCOUNTER — ANTICOAGULATION THERAPY VISIT (OUTPATIENT)
Dept: ANTICOAGULATION | Facility: CLINIC | Age: 59
End: 2019-07-09

## 2019-07-09 DIAGNOSIS — Z79.01 LONG TERM CURRENT USE OF ANTICOAGULANT THERAPY: ICD-10-CM

## 2019-07-09 DIAGNOSIS — I48.91 ATRIAL FIBRILLATION (H): ICD-10-CM

## 2019-07-09 LAB — INR PPP: 2.3 (ref 0.9–1.1)

## 2019-07-09 NOTE — PROGRESS NOTES
ANTICOAGULATION FOLLOW-UP CLINIC VISIT    Patient Name:  Arianna Siddiqi  Date:  2019  Contact Type:  Telephone    SUBJECTIVE:         OBJECTIVE    INR   Date Value Ref Range Status   2019 2.3 (A) 0.9 - 1.1 Final       ASSESSMENT / PLAN  INR assessment THER    Recheck INR In: 2 WEEKS    INR Location Clinic      Anticoagulation Summary  As of 2019    INR goal:   2.0-3.0   TTR:   80.1 % (2.7 y)   INR used for dosin.3 (2019)   Warfarin maintenance plan:   3 mg (3 mg x 1) every Mon, Wed, Fri; 4.5 mg (3 mg x 1.5) all other days   Full warfarin instructions:   3 mg every Mon, Wed, Fri; 4.5 mg all other days   Weekly warfarin total:   27 mg   No change documented:   Paradise Pizarro RN   Plan last modified:   Sina Lazcano, [facility] (3/13/2018)   Next INR check:   2019   Priority:   INR   Target end date:       Indications    Long-term (current) use of anticoagulants [Z79.01] [Z79.01]  Atrial fibrillation (H) [I48.91] [I48.91]             Anticoagulation Episode Summary     INR check location:   Clinic Lab    Preferred lab:       Send INR reminders to:   [facility]    Comments:    [employer] Monitoring to start on .  Pt's voicemail box is full and pt is unable to retrieve voice messages       Anticoagulation Care Providers     Provider Role Specialty Phone number    Amelia Angulo MD PhD Responsible [facility] [phone]            See the Encounter Report to view Anticoagulation Flowsheet and Dosing Calendar (Go to Encounters tab in chart review, and find the Anticoagulation Therapy Visit)    Spoke with Arianna.  She reports no changes in health, diet, medications.  She will continue same warfarin dose and recheck INR in 2 weeks.      Paradise Pizarro RN

## 2019-07-11 RX ORDER — METOPROLOL SUCCINATE 50 MG/1
75 TABLET, EXTENDED RELEASE ORAL DAILY
Qty: 72 TABLET | Refills: 0 | OUTPATIENT
Start: 2019-07-11

## 2019-07-11 RX ORDER — FERROUS GLUCONATE 324(38)MG
324 TABLET ORAL 3 TIMES DAILY
Qty: 90 TABLET | Refills: 0
Start: 2019-07-11

## 2019-07-11 RX ORDER — ACETAMINOPHEN 325 MG/1
TABLET ORAL
Qty: 100 TABLET | Refills: 0 | OUTPATIENT
Start: 2019-07-11

## 2019-07-11 RX ORDER — POTASSIUM CHLORIDE 1500 MG/1
TABLET, EXTENDED RELEASE ORAL
Qty: 60 TABLET | Refills: 0 | OUTPATIENT
Start: 2019-07-11

## 2019-07-11 NOTE — TELEPHONE ENCOUNTER
Patient must be seen in clinic for refills, has not been evaluated in >18 months. Appointment currently scheduled for 7/31/19. No refills until evaluated.    Dottie Joyce RN

## 2019-07-11 NOTE — TELEPHONE ENCOUNTER
[facility]  Last Written Prescription Date: 12/19/18  Last Fill Quantity: 60   # refills: 1  Last Office Visit : 12/12/17  Future Office visit: 7/31/19  Ferrous Gluconate  Last written 7/5/19  #90   Routing refill request to nurse for review/approval because:  Drug failed the [facility], [facility] or [facility] refill protocol

## 2019-07-11 NOTE — TELEPHONE ENCOUNTER
MAPAP    Last Written Prescription Date:  4/16/19  Last Fill Quantity: 100 # refills: 0  Last Office Visit : 12/12/17  Future Office visit:  7/31/19    Routing refill request to nurse for review/approval because:  Drug failed the [facility], [facility] or [facility] refill protocol last visit 12/2017

## 2019-07-16 DIAGNOSIS — E87.6 HYPOKALEMIA: ICD-10-CM

## 2019-07-17 ENCOUNTER — PRE VISIT (OUTPATIENT)
Dept: INTERNAL MEDICINE | Facility: CLINIC | Age: 59
End: 2019-07-17

## 2019-07-17 NOTE — TELEPHONE ENCOUNTER
potassium chloride ER (KLOR-CON) 20 MEQ CR tablet      Last Written Prescription Date:  12/19/18  Last Fill Quantity: 60,   # refills: 1  Last Office Visit : 12/12/17  Future Office visit:  7/31/19    Routing refill request to provider for review/approval because:  Failed protocol.    Appointment > 12 months    Lab overdue - K+   Lab Test 12/12/17  1243   POTASSIUM 4.0

## 2019-07-17 NOTE — TELEPHONE ENCOUNTER
[facility]  Dept: [phone]     Patient: Arianna Siddiqi   : 1960  MRN: [id number]  Encounter: 19       Pre Visit Assessment    Health Maintenance Due   Topic Date Due     HF ACTION PLAN  1960     MICROALBUMIN  1960     DIABETIC FOOT EXAM  1959     ADVANCE CARE PLANNING  1959     EYE EXAM  1959     MAMMO SCREENING  1959     PAP  1959     COLONOSCOPY  1969     HIV SCREENING  1974     ZOSTER IMMUNIZATION (1 of 2) 2009     A1C  03/10/2017     BMP  2018     TSH W/FREE T4 REFLEX  2018     ALT  2018     LIPID  2018     CBC  2018     PHQ-2  2019     Chart Reviewed for Labs needed/Health Maintenance: Yes   If labs needed, orders placed:  No,   Lab appointment scheduled:  N/A    Notes:  Provider will address need for test at the time of appointment.    Patient confirmed they will attend appointment:  N/A  Appointment rescheduled?  N/A      Nasrin Orta at 8:34 AM on 2019

## 2019-07-18 RX ORDER — POTASSIUM CHLORIDE 1500 MG/1
TABLET, EXTENDED RELEASE ORAL
Qty: 60 TABLET | Refills: 1 | OUTPATIENT
Start: 2019-07-18

## 2019-07-18 NOTE — TELEPHONE ENCOUNTER
Needs refill on Bumex, Metoprolol and Klor Con.  Pt called and refill requests are denied. Pt has not been seen by PCP or had labs drawn for BMP in over 17 months.  Pt has an appt set up for 07/31/2019 with Dr Meneses. Strongly suggested to pt that she needs to f/u with both  and labs. Pt states she DOES have transportation set up for 07/31/2019. Consulted with Dr Meneses and medication refills denied-Dr Meneses has never seen pt in clinic.  Pt called and numbers given for questions or concerns.  Danette Mayfield RN 11:42 AM on 7/18/2019.

## 2019-07-23 ENCOUNTER — ANTICOAGULATION THERAPY VISIT (OUTPATIENT)
Dept: ANTICOAGULATION | Facility: CLINIC | Age: 59
End: 2019-07-23

## 2019-07-23 DIAGNOSIS — Z79.01 LONG TERM CURRENT USE OF ANTICOAGULANT THERAPY: ICD-10-CM

## 2019-07-23 DIAGNOSIS — I48.91 ATRIAL FIBRILLATION (H): ICD-10-CM

## 2019-07-23 LAB — INR POINT OF CARE: 2.4 (ref 0.86–1.14)

## 2019-07-23 NOTE — PROGRESS NOTES
ANTICOAGULATION FOLLOW-UP CLINIC VISIT    Patient Name:  Arianna Siddiqi  Date:  2019  Contact Type:  Telephone    SUBJECTIVE:         OBJECTIVE    INR Protime   Date Value Ref Range Status   2019 2.4 (A) 0.86 - 1.14 Final       ASSESSMENT / PLAN  INR assessment THER    Recheck INR In: 2 WEEKS    INR Location Home INR      Anticoagulation Summary  As of 2019    INR goal:   2.0-3.0   TTR:   80.4 % (2.8 y)   INR used for dosin.4 (2019)   Warfarin maintenance plan:   3 mg (3 mg x 1) every Mon, Wed, Fri; 4.5 mg (3 mg x 1.5) all other days   Full warfarin instructions:   3 mg every Mon, Wed, Fri; 4.5 mg all other days   Weekly warfarin total:   27 mg   No change documented:   Rahel Muñiz RN   Plan last modified:   Sina Lazcano [facility] (3/13/2018)   Next INR check:   2019   Priority:   INR   Target end date:       Indications    Long-term (current) use of anticoagulants [Z79.01] [Z79.01]  Atrial fibrillation (H) [I48.91] [I48.91]             Anticoagulation Episode Summary     INR check location:   Clinic Lab    Preferred lab:       Send INR reminders to:   [facility]    Comments:    [employer] Home Monitoring to start on .  Pt's voicemail box is full and pt is unable to retrieve voice messages       Anticoagulation Care Providers     Provider Role Specialty Phone number    Amelia Angulo MD PhD [facility] [phone]            See the Encounter Report to view Anticoagulation Flowsheet and Dosing Calendar (Go to Encounters tab in chart review, and find the Anticoagulation Therapy Visit)    Left message for patient with results and dosing recommendations. Asked patient to call back to report any missed doses, falls, signs and symptoms of bleeding or clotting, any changes in health, medication, or diet. Asked patient to call back with any questions or concerns.      Rahel Muñiz RN

## 2019-07-24 DIAGNOSIS — E87.6 HYPOKALEMIA: ICD-10-CM

## 2019-07-25 RX ORDER — POTASSIUM CHLORIDE 1500 MG/1
40 TABLET, EXTENDED RELEASE ORAL DAILY
Qty: 180 TABLET | Refills: 0 | Status: ON HOLD | OUTPATIENT
Start: 2019-07-25 | End: 2020-01-27

## 2019-07-25 NOTE — TELEPHONE ENCOUNTER
Last Clinic Visit: 12/12/2017  [facility]  Scheduling has been notified to contact the pt for appointment.  Potassium overdue - clinic notified

## 2019-07-29 DIAGNOSIS — I50.43 ACUTE ON CHRONIC COMBINED SYSTOLIC AND DIASTOLIC CONGESTIVE HEART FAILURE (H): ICD-10-CM

## 2019-07-29 NOTE — TELEPHONE ENCOUNTER
Message from pharmacy:  Metoprolol Succ ER 50 mg tablet & Bumetanide 2 mg tablet   Fax received from [facility] requesting a refill of both meds with enough to get through until appointment scheduled on 07/31/19.     Dr Meneses previously refused these refills until seen in clinic.  Patient establishing care with Dr Meneses 7/31/19. Pended Rx's for 2 day supply only as requested-- if want to hold the refills until seen- adjust qty and # refills        Pt canceled appt with Dr Meneses. Has appt with Neema Montemayor. Needing refills on medication. Appt at the end of September with Neema Montemayor.

## 2019-07-30 RX ORDER — BUMETANIDE 2 MG/1
TABLET ORAL
Qty: 2 TABLET | Refills: 0 | Status: SHIPPED | OUTPATIENT
Start: 2019-07-30 | End: 2019-08-21

## 2019-07-30 RX ORDER — METOPROLOL SUCCINATE 50 MG/1
75 TABLET, EXTENDED RELEASE ORAL DAILY
Qty: 3 TABLET | Refills: 0 | Status: ON HOLD | OUTPATIENT
Start: 2019-07-30 | End: 2020-02-09

## 2019-07-31 DIAGNOSIS — I50.43 ACUTE ON CHRONIC COMBINED SYSTOLIC AND DIASTOLIC CONGESTIVE HEART FAILURE (H): ICD-10-CM

## 2019-07-31 NOTE — PROGRESS NOTES
Addendum 7/31/19    Patient rescheduled her appointment to see a PCP for 9/26/19  We need a new INR referral so that we can follow her.     Jayjay Lazcano [facility]

## 2019-08-01 ENCOUNTER — MEDICAL CORRESPONDENCE (OUTPATIENT)
Dept: HEALTH INFORMATION MANAGEMENT | Facility: CLINIC | Age: 59
End: 2019-08-01

## 2019-08-02 DIAGNOSIS — I50.43 ACUTE ON CHRONIC COMBINED SYSTOLIC AND DIASTOLIC CONGESTIVE HEART FAILURE (H): ICD-10-CM

## 2019-08-02 DIAGNOSIS — D50.0 IRON DEFICIENCY ANEMIA DUE TO CHRONIC BLOOD LOSS: ICD-10-CM

## 2019-08-02 DIAGNOSIS — I50.22 CHRONIC SYSTOLIC HEART FAILURE (H): ICD-10-CM

## 2019-08-02 RX ORDER — BUMETANIDE 2 MG/1
TABLET ORAL
Qty: 30 TABLET | Refills: 0 | Status: ON HOLD | OUTPATIENT
Start: 2019-08-02 | End: 2020-01-27

## 2019-08-02 NOTE — TELEPHONE ENCOUNTER
bumetanide (BUMEX) 2 MG tablet      Last Written Prescription Date:  7/30/19  Last Fill Quantity: 2,   # refills: 0  Last Office Visit : 12/12/17  Future Office visit:  9/26/19    Routing refill request to provider for review/approval because:  Failed protocol:  Overdue labs / office visit - 12/12/17

## 2019-08-03 DIAGNOSIS — E11.49 TYPE 2 DIABETES MELLITUS WITH OTHER NEUROLOGIC COMPLICATION, WITHOUT LONG-TERM CURRENT USE OF INSULIN (H): ICD-10-CM

## 2019-08-04 DIAGNOSIS — G62.9 PERIPHERAL POLYNEUROPATHY: ICD-10-CM

## 2019-08-05 RX ORDER — SITAGLIPTIN 50 MG/1
TABLET, FILM COATED ORAL
Qty: 30 TABLET | Refills: 0 | OUTPATIENT
Start: 2019-08-05

## 2019-08-05 RX ORDER — FERROUS GLUCONATE 324(38)MG
TABLET ORAL
Qty: 90 TABLET | Refills: 0 | OUTPATIENT
Start: 2019-08-05

## 2019-08-05 RX ORDER — GABAPENTIN 100 MG/1
CAPSULE ORAL
Qty: 90 CAPSULE | Refills: 0 | OUTPATIENT
Start: 2019-08-05

## 2019-08-05 RX ORDER — METOPROLOL SUCCINATE 50 MG/1
TABLET, EXTENDED RELEASE ORAL
Qty: 3 TABLET | Refills: 0 | OUTPATIENT
Start: 2019-08-05

## 2019-08-05 RX ORDER — LISINOPRIL 5 MG/1
TABLET ORAL
Qty: 30 TABLET | Refills: 0 | OUTPATIENT
Start: 2019-08-05

## 2019-08-05 NOTE — TELEPHONE ENCOUNTER
lisinopril (PRINIVIL/ZESTRIL) 5 MG tablet      Last Written Prescription Date:  7/5/19  Last Fill Quantity: 30,   # refills: 0  Last Office Visit : 12/12/17  Future Office visit:  9/26/19    ferrous gluconate (FERGON) 324 (38 Fe) MG tablet      Last Written Prescription Date:  7/5/19  Last Fill Quantity: 90,   # refills: 0    FYI to [facility] for overdue labs.    Routing refill request to provider for review/approval because:  Failed protocol.  Overdue labs/ appointment

## 2019-08-05 NOTE — TELEPHONE ENCOUNTER
Bumex rx from 8/02/19 (30-day supply) was canceled. Patient has not been evaluated since 2017. No refills to be given until she is evaluated in clinic.    Dottie Joyce RN

## 2019-08-05 NOTE — TELEPHONE ENCOUNTER
Patient has not been evaluated in clinic since 12/12/17. Patient has canceled multiple appointments. No refills to be given until patient is evaluated in clinic.    Dottie Joyce RN

## 2019-08-06 ENCOUNTER — ANTICOAGULATION THERAPY VISIT (OUTPATIENT)
Dept: ANTICOAGULATION | Facility: CLINIC | Age: 59
End: 2019-08-06

## 2019-08-06 DIAGNOSIS — I48.91 ATRIAL FIBRILLATION (H): ICD-10-CM

## 2019-08-06 DIAGNOSIS — Z79.01 LONG TERM CURRENT USE OF ANTICOAGULANT THERAPY: ICD-10-CM

## 2019-08-06 NOTE — PROGRESS NOTES
Addendum 9/17/19  Received a fax report INR on 9/17/19 is 2.8   No call was made to this patient.  We can not follow patient until she establishes care with a provider who will be responsible for her anticoagulation therapy.        Addendum 9/3/19   Received a fax reporting patient's INR of 2.7 today.   No call was made to this patient.  We can not follow patient until she establishes care with a provider who will be responsible for her anticoagulation therapy.    Jayjay Lazcano [facility]    ADDENDUM from 8/20:  A fax rec'd from [employer] today with INR result of 2.9.  Writer speaks with [facility] LIONEL Lazcano re: how to proceed, see previous notations.  Writer attempts to call the pt & unable to leave a message as the phone is currently not taking messages.  Previous documentation in Epic noted re: pt needing to see  Provider, and history of cancelling appointments.  No contact is made with the pt today, despite attempt.  Sridhar PRUITT        8/6/19    Spoke to Arianna.  She tested on her home monitor today.  INR is 2.8.  This writer reported to patient that the [facility] is unable to give her a recommendation.  She needs to be seen.  Arianna verbalized understanding.  Did not update calendar.    Espinoza Rodriguez RN

## 2019-08-14 DIAGNOSIS — I50.22 CHRONIC SYSTOLIC HEART FAILURE (H): ICD-10-CM

## 2019-08-14 DIAGNOSIS — D50.0 IRON DEFICIENCY ANEMIA DUE TO CHRONIC BLOOD LOSS: ICD-10-CM

## 2019-08-17 RX ORDER — FERROUS GLUCONATE 324(38)MG
324 TABLET ORAL 3 TIMES DAILY
Qty: 100 TABLET | Refills: 5 | OUTPATIENT
Start: 2019-08-17

## 2019-08-17 RX ORDER — LISINOPRIL 5 MG/1
TABLET ORAL
Qty: 30 TABLET | Refills: 0 | OUTPATIENT
Start: 2019-08-17

## 2019-08-20 DIAGNOSIS — I50.43 ACUTE ON CHRONIC COMBINED SYSTOLIC AND DIASTOLIC CONGESTIVE HEART FAILURE (H): ICD-10-CM

## 2019-08-20 DIAGNOSIS — M79.605 PAIN OF LEFT LOWER EXTREMITY: ICD-10-CM

## 2019-08-21 RX ORDER — ACETAMINOPHEN 325 MG/1
TABLET ORAL
Qty: 30 TABLET | Refills: 0 | Status: ON HOLD | OUTPATIENT
Start: 2019-08-21 | End: 2020-01-16

## 2019-08-21 RX ORDER — BUMETANIDE 2 MG/1
TABLET ORAL
Qty: 14 TABLET | Refills: 0 | OUTPATIENT
Start: 2019-08-21

## 2019-08-21 NOTE — TELEPHONE ENCOUNTER
bumetanide (BUMEX) 2 MG tablet      Last Written Prescription Date:  8/2/19  Last Fill Quantity: 30,   # refills: 0  Last Office Visit : 12/12/17  Future Office visit:  9/26/19    Routing refill request to provider for review/approval because:  Overdue appointment

## 2019-08-21 NOTE — TELEPHONE ENCOUNTER
MAPAP 325 MG tablet      Last Written Prescription Date:  4/16/19  Last Fill Quantity: 100,   # refills: 0  Last Office Visit : 12/12/17  Future Office visit:  9/26/19    Routing refill request to provider for review/approval because:    Overdue visit

## 2019-08-24 DIAGNOSIS — I50.43 ACUTE ON CHRONIC COMBINED SYSTOLIC AND DIASTOLIC CONGESTIVE HEART FAILURE (H): ICD-10-CM

## 2019-08-27 NOTE — TELEPHONE ENCOUNTER
bumetanide (BUMEX) 2 MG tablet      Last Written Prescription Date:  8/2/19  Last Fill Quantity: 30,   # refills: 0  Last Office Visit : 12/12/17  Future Office visit:  9/26/19    Routing refill request to provider for review/approval because:  Office visit needed.

## 2019-09-05 RX ORDER — BUMETANIDE 2 MG/1
TABLET ORAL
Qty: 30 TABLET | Refills: 0 | OUTPATIENT
Start: 2019-09-05

## 2019-09-09 DIAGNOSIS — I50.22 CHRONIC SYSTOLIC HEART FAILURE (H): ICD-10-CM

## 2019-09-10 DIAGNOSIS — M79.605 PAIN OF LEFT LOWER EXTREMITY: ICD-10-CM

## 2019-09-10 DIAGNOSIS — D50.0 IRON DEFICIENCY ANEMIA DUE TO CHRONIC BLOOD LOSS: ICD-10-CM

## 2019-09-10 DIAGNOSIS — E87.6 HYPOKALEMIA: ICD-10-CM

## 2019-09-10 DIAGNOSIS — I50.43 ACUTE ON CHRONIC COMBINED SYSTOLIC AND DIASTOLIC CONGESTIVE HEART FAILURE (H): ICD-10-CM

## 2019-09-10 DIAGNOSIS — E11.49 TYPE 2 DIABETES MELLITUS WITH OTHER NEUROLOGIC COMPLICATION, WITHOUT LONG-TERM CURRENT USE OF INSULIN (H): ICD-10-CM

## 2019-09-10 RX ORDER — LISINOPRIL 5 MG/1
5 TABLET ORAL DAILY
Qty: 90 TABLET | Refills: 0 | OUTPATIENT
Start: 2019-09-10

## 2019-09-11 RX ORDER — FERROUS GLUCONATE 324(38)MG
TABLET ORAL
Qty: 90 TABLET | Refills: 0 | OUTPATIENT
Start: 2019-09-11

## 2019-09-11 RX ORDER — POTASSIUM CHLORIDE 1500 MG/1
40 TABLET, EXTENDED RELEASE ORAL DAILY
Qty: 180 TABLET | Refills: 0 | OUTPATIENT
Start: 2019-09-11

## 2019-09-11 RX ORDER — ACETAMINOPHEN 325 MG/1
TABLET ORAL
Qty: 100 TABLET | Refills: 0 | OUTPATIENT
Start: 2019-09-11

## 2019-09-11 RX ORDER — SITAGLIPTIN 50 MG/1
TABLET, FILM COATED ORAL
Qty: 30 TABLET | Refills: 0 | OUTPATIENT
Start: 2019-09-11

## 2019-09-11 RX ORDER — BUMETANIDE 2 MG/1
TABLET ORAL
Qty: 14 TABLET | Refills: 0 | OUTPATIENT
Start: 2019-09-11

## 2019-09-26 NOTE — PROGRESS NOTES
Addendum 9/26/19    Patient cancelled appt with PCP Neema Montemayor today.  If Arianna does not come to the appt on 10/10/19 She will be inactivated from our monitoring service. She also needs a new INR referral.    Jayjay Romero [facility]

## 2019-10-10 ENCOUNTER — ANTICOAGULATION THERAPY VISIT (OUTPATIENT)
Dept: ANTICOAGULATION | Facility: CLINIC | Age: 59
End: 2019-10-10

## 2019-10-10 DIAGNOSIS — I48.91 ATRIAL FIBRILLATION (H): ICD-10-CM

## 2019-10-10 DIAGNOSIS — Z79.01 LONG TERM CURRENT USE OF ANTICOAGULANT THERAPY: ICD-10-CM

## 2019-10-10 NOTE — PROGRESS NOTES
10/10/19  Patient is being inactivated from the [facility] Monitoring Service. They have been non compliant in having the necessary lab work done for their anticoagulation therapy and have not responded to our letters or telephone calls.    Espinoza RodriguezRN    Addendum 10/14/19  Arianna has not seen her Primary Care Provider as requested. She has cancelled numerous  Appointments to be seen in Primary Care. We have told Arianna in the past that we can not monitor her warfarin until she reestablishes care. With a provider.    I spoke with Arianna today and she said she is going back to [facility] for her medical care. I will call [employer] and had her home INR Monitor taken away. Arianna understood that was necessary and was OK with it.  Jayjay Lazcano [facility]

## 2019-10-10 NOTE — Clinical Note
Update from [facility].  Inactivated patient from medication monitoring service per Jayjay Lazcano [facility]. Instructions.  XANDER, ACC RN

## 2019-11-15 DIAGNOSIS — I50.43 ACUTE ON CHRONIC COMBINED SYSTOLIC AND DIASTOLIC CONGESTIVE HEART FAILURE (H): ICD-10-CM

## 2019-11-15 DIAGNOSIS — M79.605 PAIN OF LEFT LOWER EXTREMITY: ICD-10-CM

## 2019-11-15 DIAGNOSIS — I50.22 CHRONIC SYSTOLIC HEART FAILURE (H): ICD-10-CM

## 2019-11-15 DIAGNOSIS — E11.49 TYPE 2 DIABETES MELLITUS WITH OTHER NEUROLOGIC COMPLICATION, WITHOUT LONG-TERM CURRENT USE OF INSULIN (H): ICD-10-CM

## 2019-11-15 DIAGNOSIS — D50.0 IRON DEFICIENCY ANEMIA DUE TO CHRONIC BLOOD LOSS: ICD-10-CM

## 2019-11-15 DIAGNOSIS — G62.9 PERIPHERAL POLYNEUROPATHY: ICD-10-CM

## 2019-11-15 DIAGNOSIS — E87.6 HYPOKALEMIA: ICD-10-CM

## 2019-11-18 RX ORDER — ACETAMINOPHEN 325 MG/1
TABLET ORAL
Qty: 100 TABLET | Refills: 0 | OUTPATIENT
Start: 2019-11-18

## 2019-11-18 RX ORDER — GABAPENTIN 100 MG/1
300 CAPSULE ORAL 3 TIMES DAILY
Qty: 810 CAPSULE | Refills: 0 | OUTPATIENT
Start: 2019-11-18

## 2019-11-18 RX ORDER — POTASSIUM CHLORIDE 1500 MG/1
40 TABLET, EXTENDED RELEASE ORAL DAILY
Qty: 60 TABLET | Refills: 0 | OUTPATIENT
Start: 2019-11-18

## 2019-11-18 RX ORDER — METOPROLOL SUCCINATE 50 MG/1
TABLET, EXTENDED RELEASE ORAL
OUTPATIENT
Start: 2019-11-18

## 2019-11-18 RX ORDER — FERROUS GLUCONATE 324(38)MG
TABLET ORAL
Qty: 90 TABLET | Refills: 0 | OUTPATIENT
Start: 2019-11-18

## 2019-11-18 RX ORDER — BUMETANIDE 2 MG/1
TABLET ORAL
Qty: 14 TABLET | Refills: 0 | OUTPATIENT
Start: 2019-11-18

## 2019-11-18 RX ORDER — LISINOPRIL 5 MG/1
TABLET ORAL
Qty: 30 TABLET | Refills: 0 | OUTPATIENT
Start: 2019-11-18

## 2019-11-18 RX ORDER — SITAGLIPTIN 50 MG/1
TABLET, FILM COATED ORAL
Qty: 30 TABLET | Refills: 0 | OUTPATIENT
Start: 2019-11-18

## 2019-11-18 NOTE — TELEPHONE ENCOUNTER
GABAPENTIN 100 MG CAPSULE    Last Written Prescription Date:  12/19/2018  Last Fill Quantity: 90,   # refills: 0  Last Office Visit : 12/12/2017  Future Office visit:  None    Routing refill request to provider for review/approval because:  Drug not on the [facility] or [facility] refill protocol or controlled substance  Over due for Office Visit & Labs      POTASSIUM CL ER 20 MEQ TABLET   Last Written Prescription Date:  7/5/2019  Last Fill Quantity: 30,   # refills: 0  Last Office Visit : 12/12/2017  Future Office visit:  None    Routing refill request to provider for review/approval because:  Over due Lab draw (BMP) & Office Visit         60 Tabs pended & sent to Provider  Nurse - this is a second refill request for medication with out or overdue labs. With last request pt was given 90 day maico and Caroline notified.     Sending to clinic for review         Brigida Velasquez RN  Central Triage Red Flags/Med Refills

## 2019-11-18 NOTE — TELEPHONE ENCOUNTER
MAPAP 325 MG TABLET   Last Written Prescription Date:  8/21/2019  Last Fill Quantity: 30,   # refills: 0  Last Office Visit : 12/12/2017  Future Office visit:  None    Routing refill request to provider for review/approval because:  Over due for Office Visit & Labs  Nurse - this is a [place] refill request for medication with out or overdue labs. With last request pt was given 30 day maico and Caroline notified.    Brigida Velasquez RN  Central Triage Red Flags/Med Refills

## 2019-11-18 NOTE — TELEPHONE ENCOUNTER
METOPROLOL SUCC ER 50 MG TAB    Last Written Prescription Date:  7/30/2019  Last Fill Quantity: 3,   # refills: 0  Last Office Visit : 12/12/2017  Future Office visit:  None    Routing refill request to provider for review/approval because:  Gap in office visits, Gap in Med regimen, Over due for Labs, & per PCP's note.  Pt needed to be seen before refills.  Was only given 3 Tabs back in July.    Sending to PCP for review.      Per Dr. Montemayor's note      Must be seen 7/31/19 for further refills. Dispense enough medication as requested until seen       Brigida Velasquez RN  Central Triage Red Flags/Med Refills

## 2019-11-18 NOTE — TELEPHONE ENCOUNTER
ferrous gluconate (FERGON) 324 (38 Fe) MG tablet      Last Written Prescription Date:  7/5/19  Last Fill Quantity: 90,   # refills: 0  Last Office Visit : 12/12/17  Future Office visit:  none    sitagliptin (JANUVIA) 50 MG tablet      Last Written Prescription Date:  12/19/18  Last Fill Quantity: 30,   # refills: 1    lisinopril (PRINIVIL/ZESTRIL) 5 MG tablet      Last Written Prescription Date:  7/5/19  Last Fill Quantity: 30,   # refills: 0  Routing refill request to provider for review/approval because:    bumetanide (BUMEX) 2 MG tablet  Last Written Prescription Date:  8/2/19  Last Fill Quantity: 30,   # refills: 0    Routing refill request to provider for review/approval because:    Overdue appointment - Per last refill(s), no more refills patient needs to be seen.     Overdue labs and appointment

## 2019-11-18 NOTE — TELEPHONE ENCOUNTER
Patient has not been evaluated in clinic since 2017. No refills will be provided.    Dottie Gutierrez RN ([facility])

## 2019-12-31 NOTE — TELEPHONE ENCOUNTER
Fax received from the pharmacy regarding simvastatin. Per pharmacy staff, patient has not filled statin therapy in the last 180 days. Pharmacy staff would like to know if it's appropriate to restart therapy.    Patient has not been evaluated in clinic since 2017. Patient will either need to follow up in clinic, or the pharmacy will need to check with her current primary if she has established elsewhere. Pharmacy updated with this information today at [id number].    Dottie Gutierrez RN ([facility])

## 2020-01-15 ENCOUNTER — HOSPITAL ENCOUNTER (INPATIENT)
Facility: CLINIC | Age: 60
LOS: 12 days | Discharge: NURSING, REHAB OR LONG-TERM CARE | DRG: 291 | End: 2020-01-27
Attending: EMERGENCY MEDICINE | Admitting: INTERNAL MEDICINE
Payer: MEDICARE

## 2020-01-15 ENCOUNTER — APPOINTMENT (OUTPATIENT)
Dept: GENERAL RADIOLOGY | Facility: CLINIC | Age: 60
DRG: 291 | End: 2020-01-15
Attending: EMERGENCY MEDICINE
Payer: MEDICARE

## 2020-01-15 DIAGNOSIS — I50.43 ACUTE ON CHRONIC COMBINED SYSTOLIC AND DIASTOLIC CONGESTIVE HEART FAILURE (H): ICD-10-CM

## 2020-01-15 DIAGNOSIS — R06.89 HYPERCAPNIA: ICD-10-CM

## 2020-01-15 DIAGNOSIS — M62.838 MUSCLE SPASM: ICD-10-CM

## 2020-01-15 DIAGNOSIS — E87.6 HYPOKALEMIA: ICD-10-CM

## 2020-01-15 DIAGNOSIS — I50.9 ACUTE ON CHRONIC CONGESTIVE HEART FAILURE, UNSPECIFIED HEART FAILURE TYPE (H): ICD-10-CM

## 2020-01-15 DIAGNOSIS — J96.02 ACUTE RESPIRATORY ACIDOSIS (H): ICD-10-CM

## 2020-01-15 DIAGNOSIS — K59.00 CONSTIPATION, UNSPECIFIED CONSTIPATION TYPE: Primary | ICD-10-CM

## 2020-01-15 DIAGNOSIS — E87.70 HYPERVOLEMIA, UNSPECIFIED HYPERVOLEMIA TYPE: ICD-10-CM

## 2020-01-15 DIAGNOSIS — D50.0 IRON DEFICIENCY ANEMIA DUE TO CHRONIC BLOOD LOSS: ICD-10-CM

## 2020-01-15 DIAGNOSIS — G47.00 INSOMNIA, UNSPECIFIED TYPE: ICD-10-CM

## 2020-01-15 DIAGNOSIS — G93.6: ICD-10-CM

## 2020-01-15 LAB
ALBUMIN SERPL-MCNC: 2.9 G/DL (ref 3.4–5)
ALP SERPL-CCNC: 97 U/L (ref 40–150)
ALT SERPL W P-5'-P-CCNC: 23 U/L (ref 0–50)
ANION GAP SERPL CALCULATED.3IONS-SCNC: 2 MMOL/L (ref 3–14)
APTT PPP: 48 SEC (ref 22–37)
AST SERPL W P-5'-P-CCNC: 25 U/L (ref 0–45)
BASOPHILS # BLD AUTO: 0.1 10E9/L (ref 0–0.2)
BASOPHILS NFR BLD AUTO: 0.7 %
BILIRUB SERPL-MCNC: 0.6 MG/DL (ref 0.2–1.3)
BUN SERPL-MCNC: 10 MG/DL (ref 7–30)
CA-I BLD-SCNC: 4.8 MG/DL (ref 4.4–5.2)
CALCIUM SERPL-MCNC: 9.1 MG/DL (ref 8.5–10.1)
CHLORIDE SERPL-SCNC: 102 MMOL/L (ref 94–109)
CO2 BLDCOV-SCNC: 35 MMOL/L (ref 21–28)
CO2 BLDCOV-SCNC: 36 MMOL/L (ref 21–28)
CO2 SERPL-SCNC: 34 MMOL/L (ref 20–32)
CREAT BLD-MCNC: 0.9 MG/DL (ref 0.52–1.04)
CREAT SERPL-MCNC: 0.92 MG/DL (ref 0.52–1.04)
DIFFERENTIAL METHOD BLD: ABNORMAL
EOSINOPHIL # BLD AUTO: 0.1 10E9/L (ref 0–0.7)
EOSINOPHIL NFR BLD AUTO: 1.9 %
ERYTHROCYTE [DISTWIDTH] IN BLOOD BY AUTOMATED COUNT: 22.9 % (ref 10–15)
GFR SERPL CREATININE-BSD FRML MDRD: 64 ML/MIN/{1.73_M2}
GFR SERPL CREATININE-BSD FRML MDRD: 67 ML/MIN/{1.73_M2}
GLUCOSE BLD-MCNC: 105 MG/DL (ref 70–99)
GLUCOSE SERPL-MCNC: 103 MG/DL (ref 70–99)
HCT VFR BLD AUTO: 45.2 % (ref 35–47)
HCT VFR BLD CALC: 45 %PCV (ref 35–47)
HGB BLD CALC-MCNC: 15.3 G/DL (ref 11.7–15.7)
HGB BLD-MCNC: 12 G/DL (ref 11.7–15.7)
IMM GRANULOCYTES # BLD: 0 10E9/L (ref 0–0.4)
IMM GRANULOCYTES NFR BLD: 0.3 %
INR PPP: 2.06 (ref 0.86–1.14)
LACTATE BLD-SCNC: 1.4 MMOL/L (ref 0.7–2.1)
LYMPHOCYTES # BLD AUTO: 0.8 10E9/L (ref 0.8–5.3)
LYMPHOCYTES NFR BLD AUTO: 10.6 %
MCH RBC QN AUTO: 23.6 PG (ref 26.5–33)
MCHC RBC AUTO-ENTMCNC: 26.5 G/DL (ref 31.5–36.5)
MCV RBC AUTO: 89 FL (ref 78–100)
MONOCYTES # BLD AUTO: 0.6 10E9/L (ref 0–1.3)
MONOCYTES NFR BLD AUTO: 7.7 %
NEUTROPHILS # BLD AUTO: 5.8 10E9/L (ref 1.6–8.3)
NEUTROPHILS NFR BLD AUTO: 78.8 %
NRBC # BLD AUTO: 0 10*3/UL
NRBC BLD AUTO-RTO: 0 /100
NT-PROBNP SERPL-MCNC: 5391 PG/ML (ref 0–900)
PCO2 BLDV: 61 MM HG (ref 40–50)
PCO2 BLDV: 65 MM HG (ref 40–50)
PH BLDV: 7.36 PH (ref 7.32–7.43)
PH BLDV: 7.36 PH (ref 7.32–7.43)
PLATELET # BLD AUTO: 270 10E9/L (ref 150–450)
PO2 BLDV: 38 MM HG (ref 25–47)
PO2 BLDV: 40 MM HG (ref 25–47)
POTASSIUM BLD-SCNC: 4.6 MMOL/L (ref 3.4–5.3)
POTASSIUM SERPL-SCNC: 4.1 MMOL/L (ref 3.4–5.3)
PROCALCITONIN SERPL-MCNC: <0.05 NG/ML
PROT SERPL-MCNC: 8.2 G/DL (ref 6.8–8.8)
RBC # BLD AUTO: 5.08 10E12/L (ref 3.8–5.2)
SAO2 % BLDV FROM PO2: 67 %
SAO2 % BLDV FROM PO2: 72 %
SODIUM BLD-SCNC: 139 MMOL/L (ref 133–144)
SODIUM SERPL-SCNC: 137 MMOL/L (ref 133–144)
TROPONIN I BLD-MCNC: 0 UG/L (ref 0–0.08)
TSH SERPL DL<=0.005 MIU/L-ACNC: 2.64 MU/L (ref 0.4–4)
WBC # BLD AUTO: 7.3 10E9/L (ref 4–11)

## 2020-01-15 PROCEDURE — 84443 ASSAY THYROID STIM HORMONE: CPT | Performed by: EMERGENCY MEDICINE

## 2020-01-15 PROCEDURE — 87077 CULTURE AEROBIC IDENTIFY: CPT | Performed by: EMERGENCY MEDICINE

## 2020-01-15 PROCEDURE — 40000275 ZZH STATISTIC RCP TIME EA 10 MIN

## 2020-01-15 PROCEDURE — 85025 COMPLETE CBC W/AUTO DIFF WBC: CPT | Performed by: EMERGENCY MEDICINE

## 2020-01-15 PROCEDURE — 25000128 H RX IP 250 OP 636

## 2020-01-15 PROCEDURE — 99223 1ST HOSP IP/OBS HIGH 75: CPT | Mod: AI | Performed by: INTERNAL MEDICINE

## 2020-01-15 PROCEDURE — 87186 SC STD MICRODIL/AGAR DIL: CPT | Performed by: EMERGENCY MEDICINE

## 2020-01-15 PROCEDURE — 99285 EMERGENCY DEPT VISIT HI MDM: CPT | Mod: 25 | Performed by: EMERGENCY MEDICINE

## 2020-01-15 PROCEDURE — 96365 THER/PROPH/DIAG IV INF INIT: CPT | Performed by: EMERGENCY MEDICINE

## 2020-01-15 PROCEDURE — 82803 BLOOD GASES ANY COMBINATION: CPT

## 2020-01-15 PROCEDURE — 96375 TX/PRO/DX INJ NEW DRUG ADDON: CPT | Performed by: EMERGENCY MEDICINE

## 2020-01-15 PROCEDURE — 87181 SC STD AGAR DILUTION PER AGT: CPT | Performed by: EMERGENCY MEDICINE

## 2020-01-15 PROCEDURE — 83880 ASSAY OF NATRIURETIC PEPTIDE: CPT | Performed by: EMERGENCY MEDICINE

## 2020-01-15 PROCEDURE — 36415 COLL VENOUS BLD VENIPUNCTURE: CPT | Performed by: EMERGENCY MEDICINE

## 2020-01-15 PROCEDURE — 93005 ELECTROCARDIOGRAM TRACING: CPT | Performed by: EMERGENCY MEDICINE

## 2020-01-15 PROCEDURE — 93010 ELECTROCARDIOGRAM REPORT: CPT | Mod: Z6 | Performed by: EMERGENCY MEDICINE

## 2020-01-15 PROCEDURE — 84484 ASSAY OF TROPONIN QUANT: CPT

## 2020-01-15 PROCEDURE — 94660 CPAP INITIATION&MGMT: CPT

## 2020-01-15 PROCEDURE — 84145 PROCALCITONIN (PCT): CPT | Performed by: EMERGENCY MEDICINE

## 2020-01-15 PROCEDURE — 40000501 ZZHCL STATISTIC HEMATOCRIT ED POCT

## 2020-01-15 PROCEDURE — 85730 THROMBOPLASTIN TIME PARTIAL: CPT | Performed by: EMERGENCY MEDICINE

## 2020-01-15 PROCEDURE — 87800 DETECT AGNT MULT DNA DIREC: CPT | Performed by: EMERGENCY MEDICINE

## 2020-01-15 PROCEDURE — 71045 X-RAY EXAM CHEST 1 VIEW: CPT

## 2020-01-15 PROCEDURE — 40000498 ZZHCL STATISTIC POTASSIUM ED POCT

## 2020-01-15 PROCEDURE — 82330 ASSAY OF CALCIUM: CPT

## 2020-01-15 PROCEDURE — 85610 PROTHROMBIN TIME: CPT | Performed by: EMERGENCY MEDICINE

## 2020-01-15 PROCEDURE — 80053 COMPREHEN METABOLIC PANEL: CPT | Performed by: EMERGENCY MEDICINE

## 2020-01-15 PROCEDURE — 83605 ASSAY OF LACTIC ACID: CPT

## 2020-01-15 PROCEDURE — 87040 BLOOD CULTURE FOR BACTERIA: CPT | Performed by: EMERGENCY MEDICINE

## 2020-01-15 PROCEDURE — 82565 ASSAY OF CREATININE: CPT

## 2020-01-15 PROCEDURE — 99291 CRITICAL CARE FIRST HOUR: CPT | Mod: 25 | Performed by: EMERGENCY MEDICINE

## 2020-01-15 PROCEDURE — 25000128 H RX IP 250 OP 636: Performed by: EMERGENCY MEDICINE

## 2020-01-15 PROCEDURE — 40000497 ZZHCL STATISTIC SODIUM ED POCT

## 2020-01-15 PROCEDURE — 40000502 ZZHCL STATISTIC GLUCOSE ED POCT

## 2020-01-15 PROCEDURE — 12000004 ZZH R&B IMCU UMMC

## 2020-01-15 PROCEDURE — 96366 THER/PROPH/DIAG IV INF ADDON: CPT | Performed by: EMERGENCY MEDICINE

## 2020-01-15 RX ORDER — NITROGLYCERIN 20 MG/100ML
.07-2 INJECTION INTRAVENOUS CONTINUOUS
Status: DISCONTINUED | OUTPATIENT
Start: 2020-01-15 | End: 2020-01-16

## 2020-01-15 RX ORDER — AMOXICILLIN 250 MG
1 CAPSULE ORAL 2 TIMES DAILY
Status: DISCONTINUED | OUTPATIENT
Start: 2020-01-15 | End: 2020-01-27 | Stop reason: HOSPADM

## 2020-01-15 RX ORDER — NICOTINE POLACRILEX 4 MG
15-30 LOZENGE BUCCAL
Status: DISCONTINUED | OUTPATIENT
Start: 2020-01-15 | End: 2020-01-27 | Stop reason: HOSPADM

## 2020-01-15 RX ORDER — FUROSEMIDE 10 MG/ML
80 INJECTION INTRAMUSCULAR; INTRAVENOUS ONCE
Status: COMPLETED | OUTPATIENT
Start: 2020-01-15 | End: 2020-01-15

## 2020-01-15 RX ORDER — DEXTROSE MONOHYDRATE 25 G/50ML
25-50 INJECTION, SOLUTION INTRAVENOUS
Status: DISCONTINUED | OUTPATIENT
Start: 2020-01-15 | End: 2020-01-27 | Stop reason: HOSPADM

## 2020-01-15 RX ORDER — NITROGLYCERIN 20 MG/100ML
INJECTION INTRAVENOUS
Status: COMPLETED
Start: 2020-01-15 | End: 2020-01-15

## 2020-01-15 RX ORDER — ACETAMINOPHEN 325 MG/1
650 TABLET ORAL EVERY 4 HOURS PRN
Status: DISCONTINUED | OUTPATIENT
Start: 2020-01-15 | End: 2020-01-19

## 2020-01-15 RX ORDER — LIDOCAINE 40 MG/G
CREAM TOPICAL
Status: DISCONTINUED | OUTPATIENT
Start: 2020-01-15 | End: 2020-01-16

## 2020-01-15 RX ORDER — NALOXONE HYDROCHLORIDE 0.4 MG/ML
.1-.4 INJECTION, SOLUTION INTRAMUSCULAR; INTRAVENOUS; SUBCUTANEOUS
Status: DISCONTINUED | OUTPATIENT
Start: 2020-01-15 | End: 2020-01-27 | Stop reason: HOSPADM

## 2020-01-15 RX ORDER — MORPHINE SULFATE 2 MG/ML
1 INJECTION, SOLUTION INTRAMUSCULAR; INTRAVENOUS EVERY 30 MIN PRN
Status: DISCONTINUED | OUTPATIENT
Start: 2020-01-15 | End: 2020-01-16

## 2020-01-15 RX ORDER — POLYETHYLENE GLYCOL 3350 17 G/17G
17 POWDER, FOR SOLUTION ORAL DAILY PRN
Status: DISCONTINUED | OUTPATIENT
Start: 2020-01-15 | End: 2020-01-27 | Stop reason: HOSPADM

## 2020-01-15 RX ORDER — AMOXICILLIN 250 MG
2 CAPSULE ORAL 2 TIMES DAILY
Status: DISCONTINUED | OUTPATIENT
Start: 2020-01-15 | End: 2020-01-27 | Stop reason: HOSPADM

## 2020-01-15 RX ADMIN — NITROGLYCERIN 0.07 MCG/KG/MIN: 20 INJECTION INTRAVENOUS at 21:52

## 2020-01-15 RX ADMIN — FUROSEMIDE 80 MG: 10 INJECTION, SOLUTION INTRAVENOUS at 22:40

## 2020-01-15 RX ADMIN — MORPHINE SULFATE 1 MG: 2 INJECTION, SOLUTION INTRAMUSCULAR; INTRAVENOUS at 22:18

## 2020-01-16 ENCOUNTER — APPOINTMENT (OUTPATIENT)
Dept: GENERAL RADIOLOGY | Facility: CLINIC | Age: 60
DRG: 291 | End: 2020-01-16
Payer: MEDICARE

## 2020-01-16 ENCOUNTER — APPOINTMENT (OUTPATIENT)
Dept: CARDIOLOGY | Facility: CLINIC | Age: 60
DRG: 291 | End: 2020-01-16
Payer: MEDICARE

## 2020-01-16 LAB
ALBUMIN SERPL-MCNC: 2.8 G/DL (ref 3.4–5)
ALBUMIN UR-MCNC: NEGATIVE MG/DL
ALP SERPL-CCNC: 97 U/L (ref 40–150)
ALT SERPL W P-5'-P-CCNC: 20 U/L (ref 0–50)
ANION GAP SERPL CALCULATED.3IONS-SCNC: 2 MMOL/L (ref 3–14)
ANION GAP SERPL CALCULATED.3IONS-SCNC: 4 MMOL/L (ref 3–14)
APPEARANCE UR: CLEAR
AST SERPL W P-5'-P-CCNC: 26 U/L (ref 0–45)
BASE EXCESS BLDA CALC-SCNC: 4.9 MMOL/L
BASE EXCESS BLDA CALC-SCNC: 5.1 MMOL/L
BASE EXCESS BLDV CALC-SCNC: 5.3 MMOL/L
BASE EXCESS BLDV CALC-SCNC: 6.5 MMOL/L
BASE EXCESS BLDV CALC-SCNC: 6.8 MMOL/L
BASE EXCESS BLDV CALC-SCNC: 7.5 MMOL/L
BASE EXCESS BLDV CALC-SCNC: 9.5 MMOL/L
BILIRUB SERPL-MCNC: 0.4 MG/DL (ref 0.2–1.3)
BILIRUB UR QL STRIP: NEGATIVE
BUN SERPL-MCNC: 10 MG/DL (ref 7–30)
BUN SERPL-MCNC: 8 MG/DL (ref 7–30)
CALCIUM SERPL-MCNC: 8.8 MG/DL (ref 8.5–10.1)
CALCIUM SERPL-MCNC: 8.8 MG/DL (ref 8.5–10.1)
CHLORIDE SERPL-SCNC: 100 MMOL/L (ref 94–109)
CHLORIDE SERPL-SCNC: 101 MMOL/L (ref 94–109)
CO2 SERPL-SCNC: 34 MMOL/L (ref 20–32)
CO2 SERPL-SCNC: 35 MMOL/L (ref 20–32)
COLOR UR AUTO: NORMAL
CREAT SERPL-MCNC: 0.85 MG/DL (ref 0.52–1.04)
CREAT SERPL-MCNC: 1.14 MG/DL (ref 0.52–1.04)
ERYTHROCYTE [DISTWIDTH] IN BLOOD BY AUTOMATED COUNT: 22.2 % (ref 10–15)
ERYTHROCYTE [DISTWIDTH] IN BLOOD BY AUTOMATED COUNT: 22.4 % (ref 10–15)
FLUAV+FLUBV RNA SPEC QL NAA+PROBE: NEGATIVE
FLUAV+FLUBV RNA SPEC QL NAA+PROBE: NEGATIVE
GFR SERPL CREATININE-BSD FRML MDRD: 52 ML/MIN/{1.73_M2}
GFR SERPL CREATININE-BSD FRML MDRD: 74 ML/MIN/{1.73_M2}
GLUCOSE BLDC GLUCOMTR-MCNC: 100 MG/DL (ref 70–99)
GLUCOSE BLDC GLUCOMTR-MCNC: 148 MG/DL (ref 70–99)
GLUCOSE BLDC GLUCOMTR-MCNC: 150 MG/DL (ref 70–99)
GLUCOSE BLDC GLUCOMTR-MCNC: 83 MG/DL (ref 70–99)
GLUCOSE BLDC GLUCOMTR-MCNC: 92 MG/DL (ref 70–99)
GLUCOSE SERPL-MCNC: 86 MG/DL (ref 70–99)
GLUCOSE SERPL-MCNC: 96 MG/DL (ref 70–99)
GLUCOSE UR STRIP-MCNC: NEGATIVE MG/DL
HCO3 BLD-SCNC: 36 MMOL/L (ref 21–28)
HCO3 BLD-SCNC: 37 MMOL/L (ref 21–28)
HCO3 BLDV-SCNC: 37 MMOL/L (ref 21–28)
HCO3 BLDV-SCNC: 38 MMOL/L (ref 21–28)
HCO3 BLDV-SCNC: 38 MMOL/L (ref 21–28)
HCO3 BLDV-SCNC: 39 MMOL/L (ref 21–28)
HCO3 BLDV-SCNC: 39 MMOL/L (ref 21–28)
HCT VFR BLD AUTO: 46.5 % (ref 35–47)
HCT VFR BLD AUTO: 46.6 % (ref 35–47)
HGB BLD-MCNC: 11.5 G/DL (ref 11.7–15.7)
HGB BLD-MCNC: 11.6 G/DL (ref 11.7–15.7)
HGB UR QL STRIP: NEGATIVE
INR PPP: 2.27 (ref 0.86–1.14)
INR PPP: 2.45 (ref 0.86–1.14)
INTERPRETATION ECG - MUSE: NORMAL
KETONES UR STRIP-MCNC: NEGATIVE MG/DL
LEUKOCYTE ESTERASE UR QL STRIP: NEGATIVE
MAGNESIUM SERPL-MCNC: 1.7 MG/DL (ref 1.6–2.3)
MCH RBC QN AUTO: 23.7 PG (ref 26.5–33)
MCH RBC QN AUTO: 23.7 PG (ref 26.5–33)
MCHC RBC AUTO-ENTMCNC: 24.7 G/DL (ref 31.5–36.5)
MCHC RBC AUTO-ENTMCNC: 24.9 G/DL (ref 31.5–36.5)
MCV RBC AUTO: 95 FL (ref 78–100)
MCV RBC AUTO: 96 FL (ref 78–100)
NITRATE UR QL: NEGATIVE
O2/TOTAL GAS SETTING VFR VENT: 4 %
O2/TOTAL GAS SETTING VFR VENT: 60 %
O2/TOTAL GAS SETTING VFR VENT: 66 %
O2/TOTAL GAS SETTING VFR VENT: 80 %
O2/TOTAL GAS SETTING VFR VENT: 90 %
O2/TOTAL GAS SETTING VFR VENT: ABNORMAL %
O2/TOTAL GAS SETTING VFR VENT: ABNORMAL %
PCO2 BLD: 107 MM HG (ref 35–45)
PCO2 BLD: 98 MM HG (ref 35–45)
PCO2 BLDV: 103 MM HG (ref 40–50)
PCO2 BLDV: 104 MM HG (ref 40–50)
PCO2 BLDV: 107 MM HG (ref 40–50)
PCO2 BLDV: 108 MM HG (ref 40–50)
PCO2 BLDV: 62 MM HG (ref 40–50)
PH BLD: 7.15 PH (ref 7.35–7.45)
PH BLD: 7.18 PH (ref 7.35–7.45)
PH BLDV: 7.15 PH (ref 7.32–7.43)
PH BLDV: 7.16 PH (ref 7.32–7.43)
PH BLDV: 7.18 PH (ref 7.32–7.43)
PH BLDV: 7.19 PH (ref 7.32–7.43)
PH BLDV: 7.38 PH (ref 7.32–7.43)
PH UR STRIP: 7 PH (ref 5–7)
PLATELET # BLD AUTO: 250 10E9/L (ref 150–450)
PLATELET # BLD AUTO: 258 10E9/L (ref 150–450)
PO2 BLD: 148 MM HG (ref 80–105)
PO2 BLD: 69 MM HG (ref 80–105)
PO2 BLDV: 41 MM HG (ref 25–47)
PO2 BLDV: 46 MM HG (ref 25–47)
PO2 BLDV: 51 MM HG (ref 25–47)
PO2 BLDV: 63 MM HG (ref 25–47)
PO2 BLDV: 64 MM HG (ref 25–47)
POTASSIUM SERPL-SCNC: 3.6 MMOL/L (ref 3.4–5.3)
POTASSIUM SERPL-SCNC: 4.4 MMOL/L (ref 3.4–5.3)
PROT SERPL-MCNC: 8.1 G/DL (ref 6.8–8.8)
RBC # BLD AUTO: 4.85 10E12/L (ref 3.8–5.2)
RBC # BLD AUTO: 4.9 10E12/L (ref 3.8–5.2)
RSV RNA SPEC NAA+PROBE: NEGATIVE
SODIUM SERPL-SCNC: 137 MMOL/L (ref 133–144)
SODIUM SERPL-SCNC: 138 MMOL/L (ref 133–144)
SOURCE: NORMAL
SP GR UR STRIP: 1 (ref 1–1.03)
SPECIMEN SOURCE: NORMAL
UROBILINOGEN UR STRIP-MCNC: NORMAL MG/DL (ref 0–2)
WBC # BLD AUTO: 7.7 10E9/L (ref 4–11)
WBC # BLD AUTO: 8.1 10E9/L (ref 4–11)

## 2020-01-16 PROCEDURE — 40000274 ZZH STATISTIC RCP CONSULT EA 30 MIN

## 2020-01-16 PROCEDURE — 36415 COLL VENOUS BLD VENIPUNCTURE: CPT | Performed by: STUDENT IN AN ORGANIZED HEALTH CARE EDUCATION/TRAINING PROGRAM

## 2020-01-16 PROCEDURE — 36415 COLL VENOUS BLD VENIPUNCTURE: CPT | Performed by: INTERNAL MEDICINE

## 2020-01-16 PROCEDURE — 93306 TTE W/DOPPLER COMPLETE: CPT | Mod: 26 | Performed by: INTERNAL MEDICINE

## 2020-01-16 PROCEDURE — 25500064 ZZH RX 255 OP 636: Performed by: INTERNAL MEDICINE

## 2020-01-16 PROCEDURE — 25000132 ZZH RX MED GY IP 250 OP 250 PS 637: Mod: GY | Performed by: STUDENT IN AN ORGANIZED HEALTH CARE EDUCATION/TRAINING PROGRAM

## 2020-01-16 PROCEDURE — 36569 INSJ PICC 5 YR+ W/O IMAGING: CPT

## 2020-01-16 PROCEDURE — 25000128 H RX IP 250 OP 636: Performed by: STUDENT IN AN ORGANIZED HEALTH CARE EDUCATION/TRAINING PROGRAM

## 2020-01-16 PROCEDURE — 80053 COMPREHEN METABOLIC PANEL: CPT | Performed by: STUDENT IN AN ORGANIZED HEALTH CARE EDUCATION/TRAINING PROGRAM

## 2020-01-16 PROCEDURE — 27211414 ZZ H ADHESIVE SKIN CLOSURE, DERMABOND

## 2020-01-16 PROCEDURE — 85610 PROTHROMBIN TIME: CPT | Performed by: STUDENT IN AN ORGANIZED HEALTH CARE EDUCATION/TRAINING PROGRAM

## 2020-01-16 PROCEDURE — 99233 SBSQ HOSP IP/OBS HIGH 50: CPT | Mod: GC | Performed by: INTERNAL MEDICINE

## 2020-01-16 PROCEDURE — 94660 CPAP INITIATION&MGMT: CPT

## 2020-01-16 PROCEDURE — 40000275 ZZH STATISTIC RCP TIME EA 10 MIN

## 2020-01-16 PROCEDURE — 71045 X-RAY EXAM CHEST 1 VIEW: CPT

## 2020-01-16 PROCEDURE — 27211393 ZZH DRESSING, STATSEAL DISC

## 2020-01-16 PROCEDURE — 36600 WITHDRAWAL OF ARTERIAL BLOOD: CPT

## 2020-01-16 PROCEDURE — 87631 RESP VIRUS 3-5 TARGETS: CPT | Performed by: STUDENT IN AN ORGANIZED HEALTH CARE EDUCATION/TRAINING PROGRAM

## 2020-01-16 PROCEDURE — 25000125 ZZHC RX 250: Performed by: STUDENT IN AN ORGANIZED HEALTH CARE EDUCATION/TRAINING PROGRAM

## 2020-01-16 PROCEDURE — 82803 BLOOD GASES ANY COMBINATION: CPT | Performed by: INTERNAL MEDICINE

## 2020-01-16 PROCEDURE — 40000281 ZZH STATISTIC TRANSPORT TIME EA 15 MIN

## 2020-01-16 PROCEDURE — 27211417 ZZ H KIT, VPS RHYTHM STYLET

## 2020-01-16 PROCEDURE — 40000986 XR CHEST PORT 1 VW

## 2020-01-16 PROCEDURE — 85610 PROTHROMBIN TIME: CPT | Performed by: INTERNAL MEDICINE

## 2020-01-16 PROCEDURE — 82803 BLOOD GASES ANY COMBINATION: CPT | Performed by: STUDENT IN AN ORGANIZED HEALTH CARE EDUCATION/TRAINING PROGRAM

## 2020-01-16 PROCEDURE — 81003 URINALYSIS AUTO W/O SCOPE: CPT | Performed by: EMERGENCY MEDICINE

## 2020-01-16 PROCEDURE — 25000128 H RX IP 250 OP 636: Performed by: EMERGENCY MEDICINE

## 2020-01-16 PROCEDURE — 00000146 ZZHCL STATISTIC GLUCOSE BY METER IP

## 2020-01-16 PROCEDURE — 71045 X-RAY EXAM CHEST 1 VIEW: CPT | Mod: 77

## 2020-01-16 PROCEDURE — 36592 COLLECT BLOOD FROM PICC: CPT | Performed by: STUDENT IN AN ORGANIZED HEALTH CARE EDUCATION/TRAINING PROGRAM

## 2020-01-16 PROCEDURE — 83735 ASSAY OF MAGNESIUM: CPT | Performed by: STUDENT IN AN ORGANIZED HEALTH CARE EDUCATION/TRAINING PROGRAM

## 2020-01-16 PROCEDURE — 99291 CRITICAL CARE FIRST HOUR: CPT | Performed by: INTERNAL MEDICINE

## 2020-01-16 PROCEDURE — 99291 CRITICAL CARE FIRST HOUR: CPT | Mod: 25 | Performed by: INTERNAL MEDICINE

## 2020-01-16 PROCEDURE — 85027 COMPLETE CBC AUTOMATED: CPT | Performed by: STUDENT IN AN ORGANIZED HEALTH CARE EDUCATION/TRAINING PROGRAM

## 2020-01-16 PROCEDURE — 12000004 ZZH R&B IMCU UMMC

## 2020-01-16 PROCEDURE — 25000132 ZZH RX MED GY IP 250 OP 250 PS 637: Mod: GY | Performed by: INTERNAL MEDICINE

## 2020-01-16 PROCEDURE — 27210300 ZZH CANNULA HIGH FLOW, ADULT

## 2020-01-16 PROCEDURE — 25000131 ZZH RX MED GY IP 250 OP 636 PS 637: Mod: GY | Performed by: STUDENT IN AN ORGANIZED HEALTH CARE EDUCATION/TRAINING PROGRAM

## 2020-01-16 PROCEDURE — 80048 BASIC METABOLIC PNL TOTAL CA: CPT | Performed by: STUDENT IN AN ORGANIZED HEALTH CARE EDUCATION/TRAINING PROGRAM

## 2020-01-16 PROCEDURE — 27211389 ZZ H KIT, 5 FR DL BIOFLO OPEN ENDED PICC

## 2020-01-16 RX ORDER — FUROSEMIDE 10 MG/ML
80 INJECTION INTRAMUSCULAR; INTRAVENOUS
Status: DISCONTINUED | OUTPATIENT
Start: 2020-01-16 | End: 2020-01-16

## 2020-01-16 RX ORDER — GABAPENTIN 300 MG/1
300 CAPSULE ORAL 3 TIMES DAILY
Status: DISCONTINUED | OUTPATIENT
Start: 2020-01-16 | End: 2020-01-16

## 2020-01-16 RX ORDER — ONDANSETRON 4 MG/1
4 TABLET, ORALLY DISINTEGRATING ORAL
Status: COMPLETED | OUTPATIENT
Start: 2020-01-16 | End: 2020-01-16

## 2020-01-16 RX ORDER — FERROUS GLUCONATE 324(38)MG
324 TABLET ORAL 3 TIMES DAILY
Status: DISCONTINUED | OUTPATIENT
Start: 2020-01-16 | End: 2020-01-27 | Stop reason: HOSPADM

## 2020-01-16 RX ORDER — LIDOCAINE 40 MG/G
CREAM TOPICAL
Status: DISCONTINUED | OUTPATIENT
Start: 2020-01-16 | End: 2020-01-16

## 2020-01-16 RX ORDER — SIMVASTATIN 20 MG
20 TABLET ORAL AT BEDTIME
Status: DISCONTINUED | OUTPATIENT
Start: 2020-01-16 | End: 2020-01-16

## 2020-01-16 RX ORDER — ASPIRIN 81 MG/1
81 TABLET, CHEWABLE ORAL DAILY
Status: ON HOLD | COMMUNITY
End: 2020-01-27

## 2020-01-16 RX ORDER — FUROSEMIDE 10 MG/ML
40 INJECTION INTRAMUSCULAR; INTRAVENOUS ONCE
Status: COMPLETED | OUTPATIENT
Start: 2020-01-16 | End: 2020-01-16

## 2020-01-16 RX ORDER — LISINOPRIL 2.5 MG/1
5 TABLET ORAL DAILY
Status: DISCONTINUED | OUTPATIENT
Start: 2020-01-16 | End: 2020-01-22

## 2020-01-16 RX ORDER — ALLOPURINOL 100 MG/1
200 TABLET ORAL DAILY
Status: DISCONTINUED | OUTPATIENT
Start: 2020-01-16 | End: 2020-01-27 | Stop reason: HOSPADM

## 2020-01-16 RX ORDER — WARFARIN SODIUM 3 MG/1
3 TABLET ORAL
Status: COMPLETED | OUTPATIENT
Start: 2020-01-16 | End: 2020-01-16

## 2020-01-16 RX ORDER — LISINOPRIL 10 MG/1
10 TABLET ORAL DAILY
Status: DISCONTINUED | OUTPATIENT
Start: 2020-01-16 | End: 2020-01-16

## 2020-01-16 RX ORDER — ATORVASTATIN CALCIUM 20 MG/1
20 TABLET, FILM COATED ORAL EVERY EVENING
Status: DISCONTINUED | OUTPATIENT
Start: 2020-01-16 | End: 2020-01-27 | Stop reason: HOSPADM

## 2020-01-16 RX ORDER — HEPARIN SODIUM,PORCINE 10 UNIT/ML
5-10 VIAL (ML) INTRAVENOUS
Status: DISCONTINUED | OUTPATIENT
Start: 2020-01-16 | End: 2020-01-27 | Stop reason: HOSPADM

## 2020-01-16 RX ORDER — ONDANSETRON 2 MG/ML
4 INJECTION INTRAMUSCULAR; INTRAVENOUS EVERY 6 HOURS PRN
Status: DISCONTINUED | OUTPATIENT
Start: 2020-01-16 | End: 2020-01-27 | Stop reason: HOSPADM

## 2020-01-16 RX ORDER — LIDOCAINE 40 MG/G
CREAM TOPICAL
Status: DISCONTINUED | OUTPATIENT
Start: 2020-01-16 | End: 2020-01-27 | Stop reason: HOSPADM

## 2020-01-16 RX ORDER — MULTIVITAMIN,THERAPEUTIC
1 TABLET ORAL DAILY
Status: ON HOLD | COMMUNITY
End: 2020-02-09

## 2020-01-16 RX ORDER — ACETAMINOPHEN 500 MG
500-1000 TABLET ORAL EVERY 6 HOURS PRN
Status: ON HOLD | COMMUNITY
End: 2020-02-21

## 2020-01-16 RX ORDER — HEPARIN SODIUM,PORCINE 10 UNIT/ML
5-10 VIAL (ML) INTRAVENOUS EVERY 24 HOURS
Status: DISCONTINUED | OUTPATIENT
Start: 2020-01-16 | End: 2020-01-27 | Stop reason: HOSPADM

## 2020-01-16 RX ORDER — METOPROLOL SUCCINATE 50 MG/1
50 TABLET, EXTENDED RELEASE ORAL DAILY
Status: DISCONTINUED | OUTPATIENT
Start: 2020-01-16 | End: 2020-01-16

## 2020-01-16 RX ORDER — HEPARIN SODIUM,PORCINE 10 UNIT/ML
2-5 VIAL (ML) INTRAVENOUS
Status: COMPLETED | OUTPATIENT
Start: 2020-01-16 | End: 2020-01-23

## 2020-01-16 RX ORDER — METOPROLOL SUCCINATE 25 MG/1
25 TABLET, EXTENDED RELEASE ORAL DAILY
Status: DISCONTINUED | OUTPATIENT
Start: 2020-01-16 | End: 2020-01-18

## 2020-01-16 RX ORDER — FUROSEMIDE 10 MG/ML
40 INJECTION INTRAMUSCULAR; INTRAVENOUS
Status: DISCONTINUED | OUTPATIENT
Start: 2020-01-16 | End: 2020-01-17

## 2020-01-16 RX ADMIN — ALLOPURINOL 200 MG: 100 TABLET ORAL at 08:12

## 2020-01-16 RX ADMIN — MICONAZOLE NITRATE: 20 POWDER TOPICAL at 08:22

## 2020-01-16 RX ADMIN — HUMAN ALBUMIN MICROSPHERES AND PERFLUTREN 6 ML: 10; .22 INJECTION, SOLUTION INTRAVENOUS at 09:15

## 2020-01-16 RX ADMIN — METOPROLOL SUCCINATE 25 MG: 25 TABLET, EXTENDED RELEASE ORAL at 08:20

## 2020-01-16 RX ADMIN — Medication 4.5 MG: at 07:15

## 2020-01-16 RX ADMIN — MORPHINE SULFATE 1 MG: 2 INJECTION, SOLUTION INTRAMUSCULAR; INTRAVENOUS at 01:36

## 2020-01-16 RX ADMIN — ONDANSETRON 4 MG: 2 INJECTION INTRAMUSCULAR; INTRAVENOUS at 22:01

## 2020-01-16 RX ADMIN — LIDOCAINE HYDROCHLORIDE 5 ML: 10 INJECTION, SOLUTION EPIDURAL; INFILTRATION; INTRACAUDAL; PERINEURAL at 17:11

## 2020-01-16 RX ADMIN — SIMVASTATIN 20 MG: 20 TABLET, FILM COATED ORAL at 02:22

## 2020-01-16 RX ADMIN — MICONAZOLE NITRATE: 20 POWDER TOPICAL at 21:17

## 2020-01-16 RX ADMIN — MORPHINE SULFATE 1 MG: 2 INJECTION, SOLUTION INTRAMUSCULAR; INTRAVENOUS at 08:33

## 2020-01-16 RX ADMIN — ONDANSETRON 4 MG: 4 TABLET, ORALLY DISINTEGRATING ORAL at 08:12

## 2020-01-16 RX ADMIN — Medication 5 ML: at 21:07

## 2020-01-16 RX ADMIN — INSULIN ASPART 1 UNITS: 100 INJECTION, SOLUTION INTRAVENOUS; SUBCUTANEOUS at 12:43

## 2020-01-16 RX ADMIN — FUROSEMIDE 40 MG: 10 INJECTION, SOLUTION INTRAVENOUS at 21:50

## 2020-01-16 RX ADMIN — INSULIN ASPART 1 UNITS: 100 INJECTION, SOLUTION INTRAVENOUS; SUBCUTANEOUS at 16:40

## 2020-01-16 RX ADMIN — GABAPENTIN 300 MG: 300 CAPSULE ORAL at 08:14

## 2020-01-16 RX ADMIN — FUROSEMIDE 40 MG: 10 INJECTION, SOLUTION INTRAVENOUS at 16:34

## 2020-01-16 RX ADMIN — Medication 5 ML: at 18:33

## 2020-01-16 RX ADMIN — SODIUM CHLORIDE, PRESERVATIVE FREE 10 ML: 5 INJECTION INTRAVENOUS at 20:53

## 2020-01-16 RX ADMIN — Medication 5 ML: at 22:11

## 2020-01-16 RX ADMIN — MORPHINE SULFATE 1 MG: 2 INJECTION, SOLUTION INTRAMUSCULAR; INTRAVENOUS at 03:59

## 2020-01-16 RX ADMIN — LISINOPRIL 5 MG: 2.5 TABLET ORAL at 08:18

## 2020-01-16 RX ADMIN — WARFARIN SODIUM 3 MG: 3 TABLET ORAL at 19:15

## 2020-01-16 RX ADMIN — FUROSEMIDE 40 MG: 10 INJECTION, SOLUTION INTRAVENOUS at 08:21

## 2020-01-16 RX ADMIN — PROCHLORPERAZINE EDISYLATE 5 MG: 5 INJECTION INTRAMUSCULAR; INTRAVENOUS at 09:32

## 2020-01-16 RX ADMIN — FERROUS GLUCONATE 324 MG: 324 TABLET ORAL at 08:13

## 2020-01-16 ASSESSMENT — ACTIVITIES OF DAILY LIVING (ADL)
ADLS_ACUITY_SCORE: 14
ADLS_ACUITY_SCORE: 13
ADLS_ACUITY_SCORE: 13
ADLS_ACUITY_SCORE: 14
ADLS_ACUITY_SCORE: 13

## 2020-01-16 ASSESSMENT — ENCOUNTER SYMPTOMS: SHORTNESS OF BREATH: 1

## 2020-01-16 ASSESSMENT — PAIN DESCRIPTION - DESCRIPTORS
DESCRIPTORS: HEADACHE
DESCRIPTORS: HEADACHE

## 2020-01-16 NOTE — PROVIDER NOTIFICATION
Time of notification: 10:15 AM  Provider notified: Alireza 5  Patient status: Increased O2 requirements. Sating 90-93% on 15L Oxi-Plus. Emesis x3. One time dose of Zofran and IV Compazine given. RT at bedside initiating HFNC.  Temp:  [97.6  F (36.4  C)-98.3  F (36.8  C)] 97.6  F (36.4  C)  Pulse:  [71-96] 84  Heart Rate:  [73-96] 85  Resp:  [17-34] 18  BP: (120-182)/() 120/67  FiO2 (%):  [23 %-30 %] 23 %  SpO2:  [76 %-100 %] 91 %  Orders received: No new orders at this time. Will continue with POC and notify team with any changes.

## 2020-01-16 NOTE — PROGRESS NOTES
[facility], [place]    Progress Note - Alireza 5 Service        Date of Admission:  1/15/2020    Assessment & Plan   59 year old female with medical history significant for HFrEF, T2DM, CKD, HTN, gout, OHS/CARLOS, morbid obesity who had not been seeing doctors for years (though could demonstrate some degree of medication compliance), presented to the ED on 1/15 with dyspnea.     # Acute hypoxic hypercarbic respiratory failure secondary to HFrEF exacerbation and obesity hyperventilation syndrome  Presented with dyspnea. In the setting of known OHS and uncontrolled hypertension. Appeared hypervolemic on exam. CXR revealed cardiomegaly and pulmonary edema. NT-proBNP 5391. VBG with pCO2 61. Flu negative. Previous TTE 09/2016 showed EF of 40-45% with mild diffuse hypokinesis. Questionable if that is due to ischemic cardiomyopathy. With regard to OHS/CARLOS, patient was referred to be evaluated by pulmonology since 2017 but that has never happened. Has been on BIPAP at night. Per pt, possibly as much as 60 lbs above baseline   - S/p lasix 80 mg IV x 1dose in ED   - Lasix 40 mg IV BID; goal I/O net negative at least 3L/day  - Bipap PRN  - TTE ordered  - Consider cardiology referral for ischemic evaluation   - Metoprolol succinate 25 mg po qday   - Daily weight and strict I/O     # Hypertensive emergency   BP 170s/90s on arrival, along with pulmonary edema. Patient was started on NTG gtt in the ED. SBP dropped from 170s to 120s during floor transfer. Likely related to volume overload  - stop NTG gtt  - Home lisinopril  - Metoprolol as above      # T2DM  No recent hemoglobin A1C. Home regimen: sitagliptin 50 mg po qday.  - A1C in AM  - hold sitagliptin; pt might benefit from metformin as well but would defer to PCP  - medium sliding scale insulin  - fingerstick glucose qid  - hypoglycemia protocol     # Paroxysmal atrial fibrillation   EKG on admission was in NSR but noted afib back in 2016. Has  been anticoagulated with warfarin. INR 2.06 on admission. Rate control with metoprolol succinate 75 mg po qday.  - pharmacy to dose warfarin   - continue metoprolol succinate 25 mg po qday  - INR daily      # Gout   - uric acid in AM  - continue allopurinol 200 mg po qday     # Intertrigo: miconazole powder     FEN: cardiac and carb consistent diet  DVT Prophylaxis:  On warfarin   Disposition: pending clinical improvement, diuresis  Code Status: Full     Diet: Low Saturated Fat Na <2400 mg    Fluids: 1500 restrict  Lines: PIV  DVT Prophylaxis: Warfarin  Baig Catheter: in place, indication: Strict 1-2 Hour I&O  Code Status: Full Code      Disposition Plan   Expected discharge: > 7 days, recommended to prior living arrangement once appropriately diuresed     Entered: Gina Pruett MD 01/16/2020, 4:55 PM       The patient's care was discussed with MD Mary Segal[address], [place]  Pager: [phone]  Please see sticky note for cross cover information  ______________________________________________________________________    Interval History   Breathing improved ON and off bipap sating well on nasal canula. Continues to have nausea and be uncomfortable. Notes breathing feels somewhat improved. Denies chest pain.    Data reviewed today: I reviewed all medications, new labs and imaging results over the last 24 hours.  Physical Exam   Vital Signs: Temp: 95.9  F (35.5  C) Temp src: Axillary BP: 108/68 Pulse: 99 Heart Rate: 102 Resp: 23 SpO2: 98 %  Oxygen Delivery: Other (Comments)(40 LPM)  Weight: 413 lbs 5.8 oz     General: morbidly obese, on BIPAP  HEENT:  PERRL, EOMI, MMM with out pharyngeal erythema  Cardiac: RRR. No m/r/g. Normal S1, S2. DP2+ bilaterally  Pulm: crackles at bilateral lung bases (R>L)  Abd: obese, +BS, soft, non distended, non tender  Skin: bruising at L groin under the pannus along with intertrigo  MSK: 2+ pitting edema at both legs and  feet  Neuro: AAO, no focal deficits  Psych: normal mood, full affect    Data   Reviewed

## 2020-01-16 NOTE — PROGRESS NOTES
"CLINICAL NUTRITION SERVICES - ASSESSMENT NOTE     Nutrition Prescription    RECOMMENDATIONS FOR MDs/PROVIDERS TO ORDER:  Please order hemoglobin A1C (last checked 2016)     Malnutrition Status:    Unable to assess     Recommendations already ordered by Registered Dietitian (RD):  None at this time     Future/Additional Recommendations:  Monitor need/willingess for low sodium education   Recommend outpatient follow up with weight management if patient desires.      REASON FOR ASSESSMENT  Arianna Siddiqi is a/an 59 year old female assessed by the dietitian for Provider Order - Morbid obesity     CLINICAL HISTORY   history of heart failure with reduced ejection fraction LVEF 40 to 45%, type 2 diabetes mellitus, chronic kidney disease stage I-II    NUTRITION HISTORY  Patient last seen by inpatient RD in 2016. Received low sodium education at that time.   Patient was sitting up in bed today but reported she was \"out of it\" and declined to talk to writer at this time.     CURRENT NUTRITION ORDERS  Diet: Low Saturated Fat/2400 mg Sodium  Intake/Tolerance: N/A     LABS  Labs reviewed    MEDICATIONS  Ferrous gluconate   Lasix  Novolog   Senna-Docusate    ANTHROPOMETRICS  Height: 5'2\" (per previous note 2016)  Most Recent Weight: (!) 187.5 kg (413 lb 5.8 oz)    IBW: 50 kg  BMI: Obesity Grade III BMI >40  Weight History:   Wt Readings from Last 15 Encounters:   01/16/20 (!) 187.5 kg (413 lb 5.8 oz)   12/12/17 (!) 182.5 kg (402 lb 6.4 oz)   10/19/16 (!) 159.3 kg (351 lb 1.6 oz)   10/09/16 (!) 163.9 kg (361 lb 6.4 oz)   10/05/16 (!) 162 kg (357 lb 1.6 oz)   09/23/16 (!) 167.5 kg (369 lb 4.3 oz)       Dosing Weight: 50 kg (IBW)     ASSESSED NUTRITION NEEDS  Estimated Energy Needs: 7357-4441 kcals/day (30 - 35 kcals/kg)  Justification: Obese  Estimated Protein Needs:  grams protein/day (1.5 - 2 grams of pro/kg)  Justification: Maintenance, increased needs due to obesity  Estimated Fluid Needs: Per Primary team    Justification: " Heart failure     MALNUTRITION  % Intake: Unable to assess  % Weight Loss: Unable to assess  Subcutaneous Fat Loss: Unable to assess  Muscle Loss: Unable to assess  Fluid Accumulation/Edema: Mild  Malnutrition Diagnosis: Unable to determine due to inability to complete assessment     NUTRITION DIAGNOSIS  Predicted excessive nutrient intake (sodium/fluid) related to CHF and CKD    INTERVENTIONS  Implementation  Nutrition Education: Not appropriate at this time due to patient condition   Collaboration with other providers     Goals  Patient to list 5 high sodium sodium and 5 ways to reduce sodium in diet      Monitoring/Evaluation  Progress toward goals will be monitored and evaluated per protocol.    Valerie Minaya RD, ABRIL  6B pager: [phone]     no

## 2020-01-16 NOTE — ED TRIAGE NOTES
Arrived to ED d/t SOB, hx of CHF, SOB started early this AM, worsened throughout the day, tried her CPAP at home with no improvement, EMS started CPAP and gave 4 sprays of nitroglycerin, , O2 sats 99% on CPAP, RR 32

## 2020-01-16 NOTE — PROVIDER NOTIFICATION
Time of notification: 12:55 PM  Provider notified: Gina Pruett  Patient status: Desated to 60% on HFNC at 100% FiO2 transferring via mechanical lift bed to bed. Bagged for 5 minutes with sats improving to 90%. HFNC placed at 100% FiO2 while maintaining sats 90-93%. Increased lethargy, arouses to voice/gentle tough. C/o SOB.  Temp:  [97.6  F (36.4  C)-98.3  F (36.8  C)] 97.8  F (36.6  C)  Pulse:  [71-96] 84  Heart Rate:  [] 102  Resp:  [17-34] 20  BP: (120-182)/() 145/84  FiO2 (%):  [23 %-100 %] 80 %  SpO2:  [76 %-100 %] 90 %  Orders received: STAT ABG ordered. BiPAP initiated with sitter at bedside for safety. Will continue with POC and notify team with any changes.

## 2020-01-16 NOTE — PROGRESS NOTES
SPIRITUAL HEALTH SERVICES  SPIRITUAL ASSESSMENT Progress Note  [facility] [place] [place]    REASON FOR CHAPLAINCY CARE: Hospital  request    Short interaction with Arianna as she indicated experiencing being in physical pain due to her desire to be repositioned in bed. She request follow-up chaplaincy care today.    PLAN: I will follow-up this afternoon and will inform the unit  as well.    Sergo Oconnor, MPH, M.Div., [facility]  Staff   Pager [phone]  [facility] remains available 24/7 for emergent requests/referrals, either by having the switchboard page the on-call  or by entering an ASAP/STAT consult in Epic (this will also page the on-call ).

## 2020-01-16 NOTE — ED NOTES
Bed: ED01  Expected date: 1/15/20  Expected time:   Means of arrival: Ambulance  Comments:  [address]  59 y F  CHF exacerbation, CPAP  Red

## 2020-01-16 NOTE — PROCEDURES
[facility], [place]    Double Lumen PICC Placement  Date/Time: 1/16/2020 5:25 PM  Performed by: Genie Burdick RN  Authorized by: Christine Mclaughlin MD   Indications: vascular access    UNIVERSAL PROTOCOL   Site Marked: Yes  Prior Images Obtained and Reviewed:  Yes  Required items: Required blood products, implants, devices and special equipment available    Patient identity confirmed:  Verbally with patient and arm band  NA - No sedation, light sedation, or local anesthesia  Confirmation Checklist:  Patient's identity using two indicators, relevant allergies, procedure was appropriate and matched the consent or emergent situation and correct equipment/implants were available  Time out: Immediately prior to the procedure a time out was called    Universal Protocol: the [facility] Universal Protocol was followed    Preparation: Patient was prepped and draped in usual sterile fashion           ANESTHESIA    Local Anesthetic: Lidocaine 1% without epinephrine  Anesthetic Total (mL):  3.5      SEDATION    Patient Sedated: No        Preparation: skin prepped with ChloraPrep  Skin prep agent: skin prep agent completely dried prior to procedure  Sterile barriers: maximum sterile barriers were used: cap, mask, sterile gown, sterile gloves, and large sterile sheet  Hand hygiene: hand hygiene performed prior to central venous catheter insertion  Type of line used: Power PICC  Catheter type: double lumen  Lumen type: non-valved  Catheter size: 5 Fr  : BioFlo.  Lot number: [id number]  Placement method: venipuncture, MST, ultrasound and tip confirmation system  Number of attempts: 1  Successful placement: yes  Orientation: right  Location: basilic vein (0.50 cm vein diameter)  : unable tomeasure, patient was uncooperative.  Visible catheter length: 1  Internal length: 41 cm  Total catheter length: 42  Dressing and securement: statlock, sterile dressing applied, thrombin hemostasis patch  applied and glue  PROCEDURE   Patient Tolerance:  Patient tolerated the procedure well with no immediate complications

## 2020-01-16 NOTE — PLAN OF CARE
Neuro: Lethargic, arouses to voice/gentle shaking. Restless at times. Inconsistent following commands. Sitter at bedside for safety.  Cardiac: SR/ST with HR's 's. SBP - 140's. Afebrile.   Respiratory: Sating >90% on % BiPAP. LS diminished.   GI/: Adequate UOP via antunez. No BM.  Diet/appetite: Tolerating Low Saturated Fat Na diet. Appetite poor. C/o of continuous nausea. Emesis x3. One time does of Zofran and IV Compazine given.  Activity: Assist of 2 + mechanical lift. Repositioned q2hrs + PRN.  Pain: C/o HA. Refused PRN Tylenol.   Skin: No new deficits noted.   LDA's: L PIV - SL, R PIV - SL.    Plan: Will continue with POC and notify primary team with any changes.

## 2020-01-16 NOTE — H&P
INTERNAL MEDICINE HISTORY & PHYSICAL   Arianna Siddiqi ([id number]) admitted on 1/15/2020  Primary care provider: No Ref-Primary, Physician  Physician Attestation   I, Christine Mclaughlin MD, saw this patient with the resident and agree with the resident/fellow's findings and plan of care as documented in the note.       I personally reviewed vital signs, medications, labs, and imaging.     Key findings: 59-year-old female who presented to the hospital for further evaluation of dyspnea at rest.  The patient has history of heart failure with reduced ejection fraction LVEF 40 to 45%, type 2 diabetes mellitus, chronic kidney disease stage I-II, documented history of nonadherence to medications, possible obstructive sleep apnea/obesity hypoventilation syndrome, and obesity class III.  The patient was found to have bilateral basal rales, S1-S2 and S3 gallop, and bilateral lower extremity pitting edema.  No JVD was noted.  Her BNP level was elevated.  Her echocardiography was reviewed.  In the ED, the patient was started on nitro drip given her systolic blood pressure of 170 and as 80 vasodilator and was also given 80 mg of intravenous furosemide.  The patient was also started on BiPAP.  The patient was admitted for further work-up and treatment.     1. Acute hypoxic hypercarbic respiratory failure secondary to acute decompensation of heart failure with reduced ejection fraction last known LVEF 40 to 45%, all are POA:  This is the main problem that led to this admission.  -Repeat echocardiography since her last study was in 2016   -Outpatient cardiology referral for ischemia evaluation  -Outpatient pulmonology referral for evaluation for obstructive sleep apnea/obesity hypoventilation syndrome  -Strict input and output  -Plan to discontinue BiPAP after achieving adequate diuresis with at least 2 L and lack of use of accessory muscles for breathing  -Started intravenous furosemide 80 mg twice daily  -Increase lisinopril to 10  mg daily from 5 mg daily that the patient was on prior to admission in an attempt to decrease her uncontrolled hypertension that may have led to this this decompensation and any return discontinue the nitro drip  -Cautious balance of medication that may decrease her blood pressure in the setting of a possible poor adherence so that not to decrease her systolic blood pressure more than 25% from her baseline so as not reduce her cerebral perfusion  -Continue metoprolol succinate 25 mg daily  -Spironolactone can be initiated outpatient  -If the patient shows evidence of compliance with her medications moving forward, and Entresto can be a good option instead of lisinopril  -No need for device therapy     Christine Mclaughlin MD  Date of Service (when I saw the patient): 01/15/20       Chief Complaint:     Dyspnea         History of Present Illness     Arianna Siddiqi is a 59 year old female with medical history significant for HFrEF, T2DM, CKD, HTN, gout, OHS/CARLOS, morbid obesity who presented to the ED with dyspnea.    Patient had not been seeing doctors for 2 years. She stated she had enough medication refills, has been getting meds through [facility] and has been taking it religiously though previous notes had mentioned about her poor compliance.  Patient reports having a flulike symptoms 3 to 4 weeks ago.  Those symptoms seem to be improving over time.  However, she noticed that she started to retain more fluid in her abdomen and thigh over the past couple weeks. About a week ago, she started to have orthopnea and dyspnea on exertion. At baseline, she was able to walk around her house. But over the past week, she could not walk even 5-10 feet distance. She felt like the symptoms would progressively get worse. She decided to call the ambulance.    ED course : Patient was brought in by the ambulance. Was put on CPAP en route, given 4 sprays of nitroglycerin. On arrival at the ED, patient was tachypnic with RR of 32, hypertensive  with SBP in 170s. Exam and CXR consistent with pulmonary edema. NT-proBNP was 5k. Troponin negative. EKG with NSR, T wave inversion in V1-V3. She was started on NTG gtt for BP control and given lasix 80 mg IV. Baig's catheter was also placed given her body habitus that she was unable to role herself to urinate.     ROS (+) headache, flu-like symptoms, SOB, PERDOOM, dry cough, diarrhea  ROS (-) Fever, chills, night sweats, lumps, bumps, rashes   Sore throat, abdominal pain, Nausea, vomitting, constipation    Remainder of 12pt-ROS negative except mentioned above         Past Medical History     Past Medical History:   Diagnosis Date     CHF (congestive heart failure) (H)      CKD (chronic kidney disease)      Compliance poor      Diabetes mellitus (H)      Gout      Hypertension      Insomnia      Morbid obesity (H)      CARLOS treated with BiPAP           Past Surgical History     History reviewed. No pertinent surgical history.          Medications     Prior to Admission Medications   Prior to Admission Medications   Prescriptions Last Dose Informant Patient Reported? Taking?   MAPAP 325 MG tablet   No No   Sig: TAKE 2 TABLETS (650 MG) BY MOUTH EVERY 4 HOURS AS NEEDED FOR MILD PAIN   MAPAP 325 MG tablet   No No   Sig: TAKE 2 TABLETS (650 MG) BY MOUTH EVERY 4 HOURS AS NEEDED FOR MILD PAIN   allopurinol (ZYLOPRIM) 100 MG tablet   No No   Sig: Take 1 tablet (100 mg) by mouth 2 times daily Patient needs to see primary provider and have labs for further refills.   blood glucose monitoring (ACCU-CHEK NINA PLUS) meter device kit   No No   Sig: Use to test blood sugars 3 times daily or as directed.   blood glucose monitoring (ACCU-CHEK NINA PLUS) test strip   No No   Si strips by [employer] route 2 times daily Use to test blood sugar 2 times daily or as directed.   blood glucose monitoring (SOFTCLIX) lancets   No No   Sig: Use to test blood sugar 3 times daily or as directed.   bumetanide (BUMEX) 2 MG tablet   No No   Sig:  TAKE 1 TABLET (2 MG) BY MOUTH DAILY   ferrous gluconate (FERGON) 324 (38 Fe) MG tablet   No No   Sig: TAKE 1 TABLET (324 MG) BY MOUTH 3 TIMES DAILY   ferrous gluconate (FERGON) 324 (38 Fe) MG tablet   No No   Sig: Take 1 tablet (324 mg) by mouth 3 times daily Patient needs to see primary provider and have labs for further refills.   gabapentin (NEURONTIN) 100 MG capsule   No No   Sig: Take 3 capsules (300 mg) by mouth 3 times daily Patient needs to see primary provider for further refills.   lisinopril (PRINIVIL/ZESTRIL) 5 MG tablet   No No   Sig: Take 1 tablet (5 mg) by mouth daily Patient needs to see primary provider and have labs for further refills.   metoprolol succinate ER (TOPROL-XL) 50 MG 24 hr tablet   No No   Sig: Take 1.5 tablets (75 mg) by mouth daily *Must be seen 7/31/19 for further refills. Dispense enough medication as requested until seen   potassium chloride ER (K-TAB) 20 MEQ CR tablet   No No   Sig: Take 2 tablets (40 mEq) by mouth daily   simvastatin (ZOCOR) 20 MG tablet   No No   Sig: Take 1 tablet (20 mg) by mouth At Bedtime   sitagliptin (JANUVIA) 50 MG tablet   No No   Sig: Take 1 tablet (50 mg) by mouth daily Patient needs to see primary provider and have labs for further refills.   sitagliptin (JANUVIA) 50 MG tablet   No No   Sig: Take 1 tablet (50 mg) by mouth daily   sitagliptin (JANUVIA) 50 MG tablet   No No   Sig: Take 1 tablet (50 mg) by mouth daily   warfarin (COUMADIN) 3 MG tablet   No No   Sig: TAKE ONE TO ONE AND ONE-HALF TABLETS BY MOUTH DAILY AS DIRECTED   warfarin (COUMADIN) 3 MG tablet   No No   Sig: TAKE 1-1.5 TABLETS DAILY OR AS DIRECTED BY THE COUMADIN CLINIC.      Facility-Administered Medications: None          Allergies     Allergies   Allergies   Allergen Reactions     Penicillins Itching and Rash          Social History     Social History     Tobacco Use     Smoking status: Former Smoker     Packs/day: 1.50     Years: 20.00     Pack years: 30.00     Types: Cigarettes      Last attempt to quit: 2002     Years since quittin.0     Smokeless tobacco: Never Used   Substance Use Topics     Alcohol use: No     Alcohol/week: 0.0 standard drinks     Drug use: No            Family History     Family History   Adopted: Yes   Family history unknown: Yes          Vitals and Exam     Physical Exam   Vital Signs: Temp: 98.3  F (36.8  C) Temp src: Oral BP: 133/74 Pulse: 76 Heart Rate: 78 Resp: 19 SpO2: 99 % O2 Device: BiPAP/CPAP      General: morbidly obese, on BIPAP  HEENT:  PERRL, EOMI, MMM with out pharyngeal erythema  Cardiac: RRR. No m/r/g. Normal S1, S2. DP2+ bilaterally  Pulm: crackles at bilateral lung bases (R>L)  Abd: obese, +BS, soft, non distended, non tender  Skin: bruising at L groin under the pannus along with intertrigo  MSK: 2+ pitting edema at both legs and feet  Neuro: CN grossly intact, no focal deficits  Psych: normal mood, full affect         Labs     CBC  Recent Labs   Lab 01/15/20  2203 01/15/20  2147   WBC  --  7.3   RBC  --  5.08   HGB 15.3 12.0   HCT  --  45.2   MCV  --  89   MCH  --  23.6*   MCHC  --  26.5*   RDW  --  22.9*   PLT  --  270       BMP  Recent Labs   Lab 01/15/20  2207 01/15/20  2203 01/15/20  2147   NA  --  139 137   POTASSIUM  --  4.6 4.1   CHLORIDE  --   --  102   CO2  --   --  34*   ANIONGAP  --   --  2*   GLC  --  105* 103*   BUN  --   --  10   CR  --   --  0.92   GFRESTIMATED 64  --  67   GFRESTBLACK 77  --  78   KADEN  --   --  9.1        INR  Recent Labs   Lab 01/15/20  2147   INR 2.06*       Liver panel  Recent Labs   Lab 01/15/20  2147   PROTTOTAL 8.2   ALBUMIN 2.9*   BILITOTAL 0.6   ALKPHOS 97   AST 25   ALT 23     Imaging/procedure results:  Recent Results (from the past 48 hour(s))   XR Chest Port 1 View    Narrative    EXAMINATION: XR CHEST PORT 1 VW, 1/15/2020 9:55 PM    COMPARISON: 10/8/2016    HISTORY: sob    FINDINGS: Heart is enlarged. Pulmonary vasculature is indistinct.  Dilation of the main pulmonary artery. Interstitial  predominant  opacities      Impression    IMPRESSION: Cardiomegaly and pulmonary edema.     TINY CRISTINA MD     EKG 1/15/2020  NSR, T wave inversion V1-V3, no ST-T changes          Assessment and Plans     59 year old female with medical history significant for HFrEF, T2DM, CKD, HTN, gout, OHS/CARLOS, morbid obesity who had not been seeing doctors for years (though could demonstrate some degree of medication compliance), presented to the ED on 1/15 with dyspnea.     # Acute hypoxic hypercarbic respiratory failure secondary to HFrEF exacerbation and obesity hyperventilation syndrome  Presented with dyspnea. In the setting of known OHS and uncontrolled hypertension. Appeared hypervolemic on exam. CXR revealed cardiomegaly and pulmonary edema. NT-proBNP 5391. VBG with pCO2 61. Flu negative. Previous TTE 09/2016 showed EF of 40-45% with mild diffuse hypokinesis. Questionable if that is due to ischemic cardiomyopathy. With regard to OHS/CARLOS, patient was referred to be evaluated by pulmonology since 2017 but that has never happened. Has been on BIPAP at night.   - S/p lasix 80 mg IV x 1dose in ED.   - Lasix 80 mg IV BID; goal I/O net negative at least 2L/day  - continue BIPAP for now; and pt might require BIPAP at night after volume overload is under control (will consider discontinue BIPAP once I/O net negative 2L and no accessory muscle use for breathing)  - TTE in AM  - Consider cardiology referral for ischemic evaluation   - Continue metoprolol succinate 75 mg po qday   - increase lisinopril to 10 mg po qday  - Daily weight and strict I/O  - Consider pulmonary referral as outpatient  - Pharmacy consult for med reconciliation    # Hypertensive emergency   BP 170s/90s on arrival, along with pulmonary edema. Patient was started on NTG gtt in the ED. SBP dropped from 170s to 120s during floor transfer.   - stop NTG gtt  - start lisinopril as above    # T2DM  No recent hemoglobin A1C. Home regimen: sitagliptin 50 mg po  qday.  - A1C in AM  - hold sitagliptin; pt might benefit from metformin as well but would defer to PCP  - medium sliding scale insulin  - fingerstick glucose qid  - hypoglycemia protocol    # Paroxysmal atrial fibrillation   EKG on admission was in NSR but noted afib back in 2016. Has been anticoagulated with warfarin. INR 2.06 on admission. Rate control with metoprolol succinate 75 mg po qday.  - pharmacy to dose warfarin   - continue metoprolol succinate 75 mg po qday  - INR daily     # Gout   - uric acid in AM  - continue allopurinol 200 mg po qday    # Intertrigo: miconazole powder    FEN: cardiac and carb consistent diet  DVT Prophylaxis:  On warfarin   Disposition: pending clinical improvement, diuresis  Code Status: Full    Seen and discussed with Dr. Mclaughlin who agrees with above assessment and plan.    Bravo Smith MD  PGY-3 Internal Medicine  Pager [phone]

## 2020-01-16 NOTE — PHARMACY-ANTICOAGULATION SERVICE
Clinical Pharmacy - Warfarin Dosing Consult     Pharmacy has been consulted to manage this patient s warfarin therapy.  Indication: Atrial Fibrillation  Therapy Goal: INR 2-3  OP Anticoag Clinic: Pt reports it is with [facility], however per chart review it is unclear if [facility] is still seeing pt  Warfarin Prior to Admission: Yes  Warfarin PTA Regimen: Per pt report; 4.5 mg on Sun, Tue, Thu, Sat, and 3 mg on Mon, Wed, Fri  Significant drug interactions: Allopurinol may increase the risk of bleeding  Recent documented change in oral intake/nutrition: Unknown  Dose Comments: INR 2.06, pt's last dose was on evening of 1/14. Will resume home regimen and give 4.5 mg now.    INR   Date Value Ref Range Status   01/15/2020 2.06 (H) 0.86 - 1.14 Final     INR Protime   Date Value Ref Range Status   07/23/2019 2.4 (A) 0.86 - 1.14 Final       Recommend warfarin 4.5 mg today.  Pharmacy will monitor Arianna Siddiqi daily and order warfarin doses to achieve specified goal.      Please contact pharmacy as soon as possible if the warfarin needs to be held for a procedure or if the warfarin goals change.

## 2020-01-16 NOTE — PROVIDER NOTIFICATION
-------------------CRITICAL LAB VALUE-------------------    Lab Value: pH - 7.15, pCO2 - 107  Time of notification: 1:45 PM  MD notified: Gina Pruett  Patient status:  Temp:  [97.6  F (36.4  C)-98.3  F (36.8  C)] 97.8  F (36.6  C)  Pulse:  [71-96] 84  Heart Rate:  [] 102  Resp:  [17-34] 20  BP: (120-182)/() 145/84  FiO2 (%):  [23 %-100 %] 60 %  SpO2:  [76 %-100 %] 92 %  Orders received: STAT VBG ordered. Will continue with POC and notify team with any changes.

## 2020-01-16 NOTE — PROVIDER NOTIFICATION
-------------------CRITICAL LAB VALUE-------------------    Lab Value: pH - 7.18, pCO2 - 98  Time of notification: 2:59 PM  MD notified: Gina Pruett  Patient status:  Temp:  [97.6  F (36.4  C)-98.3  F (36.8  C)] 97.8  F (36.6  C)  Pulse:  [71-96] 84  Heart Rate:  [] 102  Resp:  [17-34] 20  BP: (120-182)/() 145/84  FiO2 (%):  [23 %-100 %] 100 %  SpO2:  [76 %-100 %] 97 %  Orders received: No new orders at this time. Will continue with POC and notify team with any changes.

## 2020-01-16 NOTE — PROVIDER NOTIFICATION
-------------------CRITICAL LAB VALUE-------------------    Lab Value: Ph - 7.15, PCO2 - 107  Time of notification: 2:25 PM  MD notified: Gina Pruett  Patient status:  Temp:  [97.6  F (36.4  C)-98.3  F (36.8  C)] 97.8  F (36.6  C)  Pulse:  [71-96] 84  Heart Rate:  [] 102  Resp:  [17-34] 20  BP: (120-182)/() 145/84  FiO2 (%):  [23 %-100 %] 100 %  SpO2:  [76 %-100 %] 97 %  Orders received: Repeat VGB in 30 min. Will continue with POC and notify team with any changes.

## 2020-01-16 NOTE — PROGRESS NOTES
[facility], [place]  Acute Event Note - [facility] Hospitalist Service    Date of Admission:  1/15/2020  Today's Date: 01/16/2020    Called to patient's bedside for Rapid Response and Acutely Ill patient.    This is a 59 year old female with multiple comorbidities including Chronic Systolic Heart Failure and Morbid obesity who was admitted on 1/15/2020 for acute on chronic systolic heart failure. Nursing staff noted that patient had developed decreased level of consciousness, respiratory distress and increased oxygen demands around 1500.     Patient was endorsing malaise but otherwise disoriented.      Objective Data  Bedside exam was significant for lethargic, toxic appearing, tachypneic, decreased breath sounds    Pertinent vitals and labs include Hypoxia below 85%, RR greater than 20, afebrile    Last Arterial Blood Gas: Acidemia, Hypercapnia  pH Arterial   Date Value Ref Range Status   01/16/2020 7.18 (LL) 7.35 - 7.45 pH Final     Comment:     Critical Value called to and read back by  NOTIFIED [employer] [id number] AT 1456 ON 6B BY EK       pCO2 Arterial   Date Value Ref Range Status   01/16/2020 98 (HH) 35 - 45 mm Hg Final     Comment:     Critical Value called to and read back by  NOTIFIED CANDICE VOGT [id number] AT 1456 ON 6B BY EK       pO2 Arterial   Date Value Ref Range Status   01/16/2020 148 (H) 80 - 105 mm Hg Final     Bicarbonate Arterial   Date Value Ref Range Status   01/16/2020 36 (H) 21 - 28 mmol/L Final     Base Excess Art   Date Value Ref Range Status   01/16/2020 4.9 mmol/L Final     Comment:     Abnormal Result, Ref range: -9.0 to 1.8       The following diagnostics were ordered by me -- CXR - severe pulmonary edema with small R pleural effusion    Assessment and Plan:  This patient is critically ill with Acute Hypoxic/Hypercapnic Respiratory Failure, Flash Pulmonary Edema, Acute Systolic Heart Failure and Acute Encephalopathy with Obtundation likely due to CO2 retention and  persisent pulmonary edema worsened by other comorbidities including Chronic Systolic Heart Failure and Morbid Obesity. Features of acidemia and hypercapnia are alarming that patient is at imminent risk of life-threatening organ failure. Acute deterioration is being managed by the following critical interventions:  Full Face Noninvasive Positive Pressure (BiPAP), IV furosemide, 1:1 sitter, and central line access.     Case was discussed with ICU fellow. Patient will be in Step Down Unit. Patient may ultimately required endotracheal intubation but at high risk of prolonged intubation.       I have personally seen and examine the patient and spent 45 minutes rendering Critical Care Services solely for this patient.     Anshul Voss MD  Pager: [phone]

## 2020-01-16 NOTE — PHARMACY-ADMISSION MEDICATION HISTORY
"Admission medication history interview status for the 1/15/2020 admission is complete. See Epic admission navigator for allergy information, pharmacy, prior to admission medications and immunization status.     Medication history interview sources:  patient and [facility]    Changes made to PTA medication list (reason)  Added: multivitamin and aspirin 81 mg per patient  Deleted: ferrous gluconate (duplicate), acetaminophen 325 mg (duplicate), sitagliptin 50 mg (duplicate x 2), warfarin (duplicate)  Changed: Acetaminophen 325 mg (Take 2 tablets by mouth every 4 hours as needed) to acetaminophen 500 mg (Take 2 tablets by mouth every 6 hours as needed)    Additional medication history information:  - patient states she uses a pill box and does not miss any doses of her mediations. However the refill history from [facility] do support full medication adherence. Additionally, per Epic, there have been numerous refill requests have been denied due to lack of PCP follow up visits. Anticoagulation clinic note from 10/10/19 also states \"Patient is being inactivated from the [facility]ticoagulation [facility] Monitoring Service. They have been non compliant in having the necessary lab work done for their anticoagulation therapy and have not responded to our letters or telephone calls.\" It was reported she planned to go to [facility] for further medical care. It is unclear if she established herself as a patient with their warfarin clinic.    [facility] Fill History:   ---allopurinol (ZYLOPRIM) 100 MG tablet Last filled: 12/30/18 for 60 tabs  ---bumetanide (Bumex) 2 mg tablet Last filled: 8/2019 for 30 tabs  ---ferrous gluconate (FERGON)324 (38 Fe) mg tablet Last filled: 6/7/18 for 270 tabs  ---gabapentin (NEURONTIN) 100 mg capsule last filled: 7/2/19 for 90 caps  ---lisinopril (ZESTRIL) 5 mg tablet Last filled: 7/5/19 for 30 tabs  ---metoprolol succinate ER (TOPROL-XL) 50 MG 24 HR tablet Last filled 7/30 for 3 tabs  ---potassium chloride " ER (K-TAB) 20 MEQ CR tab last filled 7/2/19 for 180 tabs  ---Simvastatin (ZORCOR) 20 mg tab last filled: 12/22/2018 for 90 tabs  ---sitagliptin (JANUVIA) 50 mg tab last filled 7/7/19 for 30 tabs  ---warfarin (COUMADIN) 3 mg last filled 10/19/19 for 155 tabs    - Patient has been monitoring her own INR with a machine and calling the result in. She states that she takes 1.5 tabs(4.5 mg) every Sunday, Tuesday, Thursday, and Saturday and 1 tab (3 mg) Monday, Wednesday, and Friday. INR is therapeutic at admission.      Prior to Admission medications    Medication Sig Last Dose Taking? Auth Provider   acetaminophen (TYLENOL) 500 MG tablet Take 500-1,000 mg by mouth every 6 hours as needed for mild pain Past Week Yes Unknown, Entered By History   allopurinol (ZYLOPRIM) 100 MG tablet   Take 1 tablet (100 mg) by mouth 2 times daily Patient needs to see primary provider and have labs for further refills. 1/15/2020 at AM Yes Arianna Salamanca MD   aspirin (ASA) 81 MG chewable tablet Take 81 mg by mouth daily 1/15/2020 at AM Yes Unknown, Entered By History   bumetanide (BUMEX) 2 MG tablet TAKE 1 TABLET (2 MG) BY MOUTH DAILY 1/15/2020 at AM Yes Yoselin Shook APRN CNP   ferrous gluconate (FERGON) 324 (38 Fe) MG tablet Take 1 tablet (324 mg) by mouth 3 times daily Patient needs to see primary provider and have labs for further refills. 1/15/2020 at NOON Yes Yoselin Shook APRN CNP   gabapentin (NEURONTIN) 100 MG capsule Take 3 capsules (300 mg) by mouth 3 times daily Patient needs to see primary provider for further refills. 1/15/2020 at noon Yes Amelia Angulo MD PhD   lisinopril (PRINIVIL/ZESTRIL) 5 MG tablet Take 1 tablet (5 mg) by mouth daily Patient needs to see primary provider and have labs for further refills. 1/15/2020 at AM Yes Yoselin Shook APRN CNP   metoprolol succinate ER (TOPROL-XL) 50 MG 24 hr tablet Take 1.5 tablets (75 mg) by mouth daily *Must be seen 7/31/19 for further  refills. Dispense enough medication as requested until seen 1/15/2020 at AM Yes Neema Montemayor APRN CNP   multivitamin, therapeutic (THERA-VIT) TABS tablet Take 1 tablet by mouth daily 1/15/2020 at AM Yes Unknown, Entered By History   potassium chloride ER (K-TAB) 20 MEQ CR tablet Take 2 tablets (40 mEq) by mouth daily 1/15/2020 at AM Yes Amelia Angulo MD PhD   simvastatin (ZOCOR) 20 MG tablet Take 1 tablet (20 mg) by mouth At Bedtime 1/14/2020 at PM Yes Yoselin Shook APRN CNP   sitagliptin (JANUVIA) 50 MG tablet Take 1 tablet (50 mg) by mouth daily Patient needs to see primary provider and have labs for further refills. 1/15/2020 at AM Yes Yoselin Shook APRN CNP   warfarin (COUMADIN) 3 MG tablet TAKE 1-1.5 TABLETS DAILY OR AS DIRECTED BY THE COUMADIN CLINIC. 1/15/2020 at AM Yes Amelia Angulo MD PhD   blood glucose monitoring (ACCU-CHEK NINA PLUS) meter device kit Use to test blood sugars 3 times daily or as directed.   Yuko Crabtree MD   blood glucose monitoring (SOFTCLIX) lancets Use to test blood sugar 3 times daily or as directed.   Yoselin Shook APRN CNP         Medication history completed by:   Taty Harley  Pharm.D. Candidate  January 16, 2020    ===========================  Agree with medication history and note written by Taty Harley, PharmD Candidate  Jessica Linares, KerwinD, BCPS  Pager [phone]

## 2020-01-16 NOTE — PLAN OF CARE
Admission          1/16/2020  0043   -----------------------------------------------------------  Reason for admission: Respiratory failure from CHF exacerbation  Primary team notified of pt arrival.  Admitted from: ED  Via: stretcher  Accompanied by: self  Belongings: clothing in closet, pure on bedside table  Admission Profile: complete  Teaching: orientation to unit and call light- call light within reach, call don't fall, use of console, meal times, when to call for the RN, and enforced importance of safety   Access: 2 PIV  Telemetry:Placed on pt  2 RN Skin Assessment Completed By: Mikaela Gracia and Jazmin BARAJAS  Patient has excoriation and blanchable redness on bottom and in left abdominal skin fold.  Barrier cream put on the former and interdry placed in the latter.     Temp:  [97.6  F (36.4  C)-98.3  F (36.8  C)] 97.6  F (36.4  C)  Pulse:  [71-96] 84  Heart Rate:  [73-96] 85  Resp:  [17-34] 18  BP: (120-182)/() 120/67  FiO2 (%):  [23 %-30 %] 23 %  SpO2:  [93 %-100 %] 94 %     Neuro: A&Ox4.   Cardiac: SR with HR in 80s.  BP 120s-130s/60s. VSS.  3370ml diuresed from pt since arriving to unit.   Respiratory: Sating >92% on 4L NC; weaned from 30% Bipap.  Waiting on VBG results to see if therapy is effective.  Diet/appetite: NPO.  Activity: Max assist to help pt move is patient is not moving self.  Encourage patient to participate as much as possible.    Pain: Complaints of excruciating headache to team and nursing staff; PRN morphine given.  Skin: Excoriation on bottom and in left abdominal skin fold.  Lower extremities are extremely dry and flaky.    LDA's: Right and Left PIV SL.    Plan: Continue with POC. Notify primary team with changes.

## 2020-01-16 NOTE — ED NOTES
[facility] [place]   ED Nurse to Floor Handoff     Arianna iSddiqi is a 59 year old female who speaks English and lives alone,  in a home  They arrived in the ED by ambulance from home    ED Chief Complaint: Respiratory Distress    ED Dx;   Final diagnoses:   Acute on chronic congestive heart failure, unspecified heart failure type (H)         Needed?: No    Allergies:   Allergies   Allergen Reactions     Penicillins Itching and Rash   .  Past Medical Hx:   Past Medical History:   Diagnosis Date     CHF (congestive heart failure) (H)      CKD (chronic kidney disease)      Compliance poor      Diabetes mellitus (H)      Gout      Hypertension      Insomnia      Morbid obesity (H)      CARLOS treated with BiPAP       Baseline Mental status: WDL  Current Mental Status changes: at basesline    Infection present or suspected this encounter: no  Sepsis suspected: No  Isolation type: No active isolations     Activity level - Baseline/Home:  Unknown  Activity Level - Current:   Unknown    Bariatric equipment needed?: Yes    In the ED these meds were given:   Medications   nitroGLYcerin 50 mg in D5W 250 mL (adult std) infusion (0.08 mcg/kg/min × 196.8 kg (Dosing Weight) Intravenous Rate/Dose Change 1/15/20 2221)   morphine (PF) injection 1 mg (1 mg Intravenous Given 1/15/20 2218)       Drips running?  Yes    Home pump  No    Current LDAs  Peripheral IV 01/15/20 Left Upper forearm (Active)   Number of days: 0       Peripheral IV 01/15/20 Right Lower forearm (Active)   Site Assessment [facility] 1/15/2020  9:56 PM   Number of days: 0       Wound 09/24/16 Inner Buttocks Skin tear excoriation, OA between gluteal folds/saccral  (Active)   Number of days: 1208       Wound 09/24/16 Bilateral;Outer Abdomen Shear injury moisture related (Active)   Number of days: 1208       Wound 10/11/16 Posterior Buttocks Other (comment) monitor reddened blanchable area (Active)   Number of days: 1191       Labs results:    Labs Ordered and Resulted from Time of ED Arrival Up to the Time of Departure from the ED   CBC WITH PLATELETS DIFFERENTIAL - Abnormal; Notable for the following components:       Result Value    MCH 23.6 (*)     MCHC 26.5 (*)     RDW 22.9 (*)     All other components within normal limits   COMPREHENSIVE METABOLIC PANEL - Abnormal; Notable for the following components:    Carbon Dioxide 34 (*)     Anion Gap 2 (*)     Glucose 103 (*)     Albumin 2.9 (*)     All other components within normal limits   NT PROBNP INPATIENT - Abnormal; Notable for the following components:    N-Terminal Pro BNP Inpatient 5,391 (*)     All other components within normal limits   INR - Abnormal; Notable for the following components:    INR 2.06 (*)     All other components within normal limits   PARTIAL THROMBOPLASTIN TIME - Abnormal; Notable for the following components:    PTT 48 (*)     All other components within normal limits   ISTAT GASES ELEC ICA GLUC ALLEN POCT - Abnormal; Notable for the following components:    PCO2 Venous 61 (*)     Bicarbonate Venous 35 (*)     Glucose 105 (*)     All other components within normal limits   ISTAT  GASES LACTATE ALLEN POCT - Abnormal; Notable for the following components:    PCO2 Venous 65 (*)     Bicarbonate Venous 36 (*)     All other components within normal limits   TSH WITH FREE T4 REFLEX   PROCALCITONIN   CREATININE POCT   UA MACROSCOPIC WITH REFLEX TO MICRO AND CULTURE   PERIPHERAL IV CATHETER   ISTAT TROPONIN NURSING POCT   ISTAT CREATININE NURSING POCT   ISTAT CG4 GASES LACTATE ALLEN NURSING POCT   ISTAT CG8 GAS ELEC ICA GLUC ALLEN NURSE POCT   CARDIAC CONTINUOUS MONITORING   TROPONIN POCT   BLOOD CULTURE   BLOOD CULTURE       Imaging Studies:   Recent Results (from the past 24 hour(s))   XR Chest Port 1 View    Narrative    EXAMINATION: XR CHEST PORT 1 VW, 1/15/2020 9:55 PM    COMPARISON: 10/8/2016    HISTORY: sob    FINDINGS: Heart is enlarged. Pulmonary vasculature is indistinct.  Dilation  of the main pulmonary artery. Interstitial predominant  opacities      Impression    IMPRESSION: Cardiomegaly and pulmonary edema.     TINY CRISTINA MD       Recent vital signs:   /74   Pulse 76   Temp 98.3  F (36.8  C) (Oral)   Resp 19   SpO2 99%     [place] Coma Scale Score: 15 (01/15/20 2143)       Cardiac Rhythm: Normal Sinus  Pt needs tele? Yes- ordered in ED  Skin/wound Issues: None    Code Status: Full Code    Pain control: fair    Nausea control: pt had none    Abnormal labs/tests/findings requiring intervention: BNP 5391    Family present during ED course? No   Family Comments/Social Situation comments: n/a    Tasks needing completion: None    Aura Carranza RN  asc -- [phone] [place] ED  [phone] [facility] ED

## 2020-01-17 PROBLEM — N17.8 OTHER ACUTE KIDNEY FAILURE (H): Status: ACTIVE | Noted: 2020-01-17

## 2020-01-17 PROBLEM — N17.9 ACUTE KIDNEY FAILURE, UNSPECIFIED (H): Status: ACTIVE | Noted: 2020-01-17

## 2020-01-17 PROBLEM — N17.0 ACUTE KIDNEY FAILURE WITH TUBULAR NECROSIS (H): Status: ACTIVE | Noted: 2020-01-17

## 2020-01-17 LAB
ANION GAP SERPL CALCULATED.3IONS-SCNC: 2 MMOL/L (ref 3–14)
ANION GAP SERPL CALCULATED.3IONS-SCNC: 4 MMOL/L (ref 3–14)
BASE EXCESS BLDA CALC-SCNC: 4.8 MMOL/L
BASE EXCESS BLDA CALC-SCNC: 6.3 MMOL/L
BASE EXCESS BLDV CALC-SCNC: 6.7 MMOL/L
BASE EXCESS BLDV CALC-SCNC: 7.7 MMOL/L
BASE EXCESS BLDV CALC-SCNC: 9.1 MMOL/L
BUN SERPL-MCNC: 11 MG/DL (ref 7–30)
BUN SERPL-MCNC: 14 MG/DL (ref 7–30)
CALCIUM SERPL-MCNC: 8.6 MG/DL (ref 8.5–10.1)
CALCIUM SERPL-MCNC: 8.7 MG/DL (ref 8.5–10.1)
CHLORIDE SERPL-SCNC: 100 MMOL/L (ref 94–109)
CHLORIDE SERPL-SCNC: 99 MMOL/L (ref 94–109)
CO2 SERPL-SCNC: 36 MMOL/L (ref 20–32)
CO2 SERPL-SCNC: 38 MMOL/L (ref 20–32)
CREAT SERPL-MCNC: 1.19 MG/DL (ref 0.52–1.04)
CREAT SERPL-MCNC: 1.5 MG/DL (ref 0.52–1.04)
CREAT UR-MCNC: 120 MG/DL
ERYTHROCYTE [DISTWIDTH] IN BLOOD BY AUTOMATED COUNT: 21.8 % (ref 10–15)
GFR SERPL CREATININE-BSD FRML MDRD: 37 ML/MIN/{1.73_M2}
GFR SERPL CREATININE-BSD FRML MDRD: 49 ML/MIN/{1.73_M2}
GLUCOSE BLDC GLUCOMTR-MCNC: 64 MG/DL (ref 70–99)
GLUCOSE BLDC GLUCOMTR-MCNC: 79 MG/DL (ref 70–99)
GLUCOSE BLDC GLUCOMTR-MCNC: 79 MG/DL (ref 70–99)
GLUCOSE BLDC GLUCOMTR-MCNC: 82 MG/DL (ref 70–99)
GLUCOSE BLDC GLUCOMTR-MCNC: 85 MG/DL (ref 70–99)
GLUCOSE BLDC GLUCOMTR-MCNC: 86 MG/DL (ref 70–99)
GLUCOSE BLDC GLUCOMTR-MCNC: 87 MG/DL (ref 70–99)
GLUCOSE BLDC GLUCOMTR-MCNC: 88 MG/DL (ref 70–99)
GLUCOSE BLDC GLUCOMTR-MCNC: 92 MG/DL (ref 70–99)
GLUCOSE BLDC GLUCOMTR-MCNC: 94 MG/DL (ref 70–99)
GLUCOSE SERPL-MCNC: 82 MG/DL (ref 70–99)
GLUCOSE SERPL-MCNC: 87 MG/DL (ref 70–99)
HBA1C MFR BLD: 5.5 % (ref 0–5.6)
HCO3 BLD-SCNC: 37 MMOL/L (ref 21–28)
HCO3 BLD-SCNC: 38 MMOL/L (ref 21–28)
HCO3 BLDV-SCNC: 37 MMOL/L (ref 21–28)
HCO3 BLDV-SCNC: 38 MMOL/L (ref 21–28)
HCO3 BLDV-SCNC: 40 MMOL/L (ref 21–28)
HCT VFR BLD AUTO: 44.5 % (ref 35–47)
HGB BLD-MCNC: 11.2 G/DL (ref 11.7–15.7)
INR PPP: 2.84 (ref 0.86–1.14)
L PNEUMO1 AG UR QL IA: NORMAL
MAGNESIUM SERPL-MCNC: 2 MG/DL (ref 1.6–2.3)
MCH RBC QN AUTO: 23.4 PG (ref 26.5–33)
MCHC RBC AUTO-ENTMCNC: 25.2 G/DL (ref 31.5–36.5)
MCV RBC AUTO: 93 FL (ref 78–100)
MRSA DNA SPEC QL NAA+PROBE: NEGATIVE
O2/TOTAL GAS SETTING VFR VENT: 55 %
O2/TOTAL GAS SETTING VFR VENT: 60 %
O2/TOTAL GAS SETTING VFR VENT: 60 %
O2/TOTAL GAS SETTING VFR VENT: 65 %
O2/TOTAL GAS SETTING VFR VENT: 65 %
OXYHGB MFR BLDV: 41 %
OXYHGB MFR BLDV: 63 %
PCO2 BLD: 105 MM HG (ref 35–45)
PCO2 BLD: 110 MM HG (ref 35–45)
PCO2 BLDV: 101 MM HG (ref 40–50)
PCO2 BLDV: 84 MM HG (ref 40–50)
PCO2 BLDV: 94 MM HG (ref 40–50)
PH BLD: 7.14 PH (ref 7.35–7.45)
PH BLD: 7.17 PH (ref 7.35–7.45)
PH BLDV: 7.19 PH (ref 7.32–7.43)
PH BLDV: 7.24 PH (ref 7.32–7.43)
PH BLDV: 7.26 PH (ref 7.32–7.43)
PLATELET # BLD AUTO: 225 10E9/L (ref 150–450)
PO2 BLD: 163 MM HG (ref 80–105)
PO2 BLD: 70 MM HG (ref 80–105)
PO2 BLDV: 31 MM HG (ref 25–47)
PO2 BLDV: 40 MM HG (ref 25–47)
PO2 BLDV: 41 MM HG (ref 25–47)
POTASSIUM SERPL-SCNC: 4.2 MMOL/L (ref 3.4–5.3)
POTASSIUM SERPL-SCNC: 4.5 MMOL/L (ref 3.4–5.3)
PROCALCITONIN SERPL-MCNC: 0.07 NG/ML
RBC # BLD AUTO: 4.79 10E12/L (ref 3.8–5.2)
S PNEUM AG SPEC QL: NORMAL
SODIUM SERPL-SCNC: 139 MMOL/L (ref 133–144)
SODIUM SERPL-SCNC: 140 MMOL/L (ref 133–144)
SPECIMEN SOURCE: NORMAL
URATE SERPL-MCNC: 9.1 MG/DL (ref 2.6–6)
WBC # BLD AUTO: 7 10E9/L (ref 4–11)

## 2020-01-17 PROCEDURE — 27211419 ZZ H CIRCUIT VAPOTHERM

## 2020-01-17 PROCEDURE — 85610 PROTHROMBIN TIME: CPT | Performed by: INTERNAL MEDICINE

## 2020-01-17 PROCEDURE — 82805 BLOOD GASES W/O2 SATURATION: CPT | Performed by: NURSE PRACTITIONER

## 2020-01-17 PROCEDURE — 25800030 ZZH RX IP 258 OP 636: Performed by: INTERNAL MEDICINE

## 2020-01-17 PROCEDURE — 25000132 ZZH RX MED GY IP 250 OP 250 PS 637: Mod: GY | Performed by: STUDENT IN AN ORGANIZED HEALTH CARE EDUCATION/TRAINING PROGRAM

## 2020-01-17 PROCEDURE — 82803 BLOOD GASES ANY COMBINATION: CPT | Performed by: NURSE PRACTITIONER

## 2020-01-17 PROCEDURE — 25000125 ZZHC RX 250: Performed by: STUDENT IN AN ORGANIZED HEALTH CARE EDUCATION/TRAINING PROGRAM

## 2020-01-17 PROCEDURE — 85027 COMPLETE CBC AUTOMATED: CPT | Performed by: INTERNAL MEDICINE

## 2020-01-17 PROCEDURE — 83036 HEMOGLOBIN GLYCOSYLATED A1C: CPT | Performed by: INTERNAL MEDICINE

## 2020-01-17 PROCEDURE — 87040 BLOOD CULTURE FOR BACTERIA: CPT | Performed by: NURSE PRACTITIONER

## 2020-01-17 PROCEDURE — 80048 BASIC METABOLIC PNL TOTAL CA: CPT | Performed by: INTERNAL MEDICINE

## 2020-01-17 PROCEDURE — 87486 CHLMYD PNEUM DNA AMP PROBE: CPT | Performed by: STUDENT IN AN ORGANIZED HEALTH CARE EDUCATION/TRAINING PROGRAM

## 2020-01-17 PROCEDURE — 25000128 H RX IP 250 OP 636: Performed by: NURSE PRACTITIONER

## 2020-01-17 PROCEDURE — 25800030 ZZH RX IP 258 OP 636: Performed by: STUDENT IN AN ORGANIZED HEALTH CARE EDUCATION/TRAINING PROGRAM

## 2020-01-17 PROCEDURE — 00000146 ZZHCL STATISTIC GLUCOSE BY METER IP

## 2020-01-17 PROCEDURE — 80048 BASIC METABOLIC PNL TOTAL CA: CPT | Performed by: STUDENT IN AN ORGANIZED HEALTH CARE EDUCATION/TRAINING PROGRAM

## 2020-01-17 PROCEDURE — 87641 MR-STAPH DNA AMP PROBE: CPT | Performed by: STUDENT IN AN ORGANIZED HEALTH CARE EDUCATION/TRAINING PROGRAM

## 2020-01-17 PROCEDURE — 87899 AGENT NOS ASSAY W/OPTIC: CPT | Performed by: STUDENT IN AN ORGANIZED HEALTH CARE EDUCATION/TRAINING PROGRAM

## 2020-01-17 PROCEDURE — 25000128 H RX IP 250 OP 636: Performed by: INTERNAL MEDICINE

## 2020-01-17 PROCEDURE — 87581 M.PNEUMON DNA AMP PROBE: CPT | Performed by: STUDENT IN AN ORGANIZED HEALTH CARE EDUCATION/TRAINING PROGRAM

## 2020-01-17 PROCEDURE — 87633 RESP VIRUS 12-25 TARGETS: CPT | Performed by: STUDENT IN AN ORGANIZED HEALTH CARE EDUCATION/TRAINING PROGRAM

## 2020-01-17 PROCEDURE — 99291 CRITICAL CARE FIRST HOUR: CPT | Performed by: INTERNAL MEDICINE

## 2020-01-17 PROCEDURE — 84540 ASSAY OF URINE/UREA-N: CPT | Performed by: NURSE PRACTITIONER

## 2020-01-17 PROCEDURE — 84550 ASSAY OF BLOOD/URIC ACID: CPT | Performed by: INTERNAL MEDICINE

## 2020-01-17 PROCEDURE — 25000128 H RX IP 250 OP 636: Performed by: STUDENT IN AN ORGANIZED HEALTH CARE EDUCATION/TRAINING PROGRAM

## 2020-01-17 PROCEDURE — 36415 COLL VENOUS BLD VENIPUNCTURE: CPT | Performed by: NURSE PRACTITIONER

## 2020-01-17 PROCEDURE — 82803 BLOOD GASES ANY COMBINATION: CPT | Performed by: INTERNAL MEDICINE

## 2020-01-17 PROCEDURE — 87640 STAPH A DNA AMP PROBE: CPT | Performed by: STUDENT IN AN ORGANIZED HEALTH CARE EDUCATION/TRAINING PROGRAM

## 2020-01-17 PROCEDURE — 87081 CULTURE SCREEN ONLY: CPT | Performed by: STUDENT IN AN ORGANIZED HEALTH CARE EDUCATION/TRAINING PROGRAM

## 2020-01-17 PROCEDURE — 40000275 ZZH STATISTIC RCP TIME EA 10 MIN

## 2020-01-17 PROCEDURE — 83735 ASSAY OF MAGNESIUM: CPT | Performed by: INTERNAL MEDICINE

## 2020-01-17 PROCEDURE — 94660 CPAP INITIATION&MGMT: CPT

## 2020-01-17 PROCEDURE — 84145 PROCALCITONIN (PCT): CPT | Performed by: STUDENT IN AN ORGANIZED HEALTH CARE EDUCATION/TRAINING PROGRAM

## 2020-01-17 PROCEDURE — 94799 UNLISTED PULMONARY SVC/PX: CPT

## 2020-01-17 PROCEDURE — P9047 ALBUMIN (HUMAN), 25%, 50ML: HCPCS | Performed by: NURSE PRACTITIONER

## 2020-01-17 PROCEDURE — 36600 WITHDRAWAL OF ARTERIAL BLOOD: CPT

## 2020-01-17 PROCEDURE — 20000004 ZZH R&B ICU UMMC

## 2020-01-17 RX ORDER — ALBUMIN (HUMAN) 12.5 G/50ML
50 SOLUTION INTRAVENOUS ONCE
Status: COMPLETED | OUTPATIENT
Start: 2020-01-17 | End: 2020-01-17

## 2020-01-17 RX ORDER — MAGNESIUM SULFATE 1 G/100ML
1 INJECTION INTRAVENOUS ONCE
Status: COMPLETED | OUTPATIENT
Start: 2020-01-17 | End: 2020-01-17

## 2020-01-17 RX ORDER — METOLAZONE 10 MG/1
10 TABLET ORAL ONCE
Status: COMPLETED | OUTPATIENT
Start: 2020-01-17 | End: 2020-01-17

## 2020-01-17 RX ORDER — NALOXONE HYDROCHLORIDE 0.4 MG/ML
.1-.4 INJECTION, SOLUTION INTRAMUSCULAR; INTRAVENOUS; SUBCUTANEOUS
Status: DISCONTINUED | OUTPATIENT
Start: 2020-01-17 | End: 2020-01-17

## 2020-01-17 RX ORDER — CEFTRIAXONE 2 G/1
2 INJECTION, POWDER, FOR SOLUTION INTRAMUSCULAR; INTRAVENOUS EVERY 24 HOURS
Status: DISCONTINUED | OUTPATIENT
Start: 2020-01-17 | End: 2020-01-17

## 2020-01-17 RX ADMIN — CEFTRIAXONE SODIUM 2 G: 2 INJECTION, POWDER, FOR SOLUTION INTRAMUSCULAR; INTRAVENOUS at 08:23

## 2020-01-17 RX ADMIN — VANCOMYCIN HYDROCHLORIDE 3000 MG: 10 INJECTION, POWDER, LYOPHILIZED, FOR SOLUTION INTRAVENOUS at 09:56

## 2020-01-17 RX ADMIN — ACETAMINOPHEN 650 MG: 325 TABLET, FILM COATED ORAL at 05:00

## 2020-01-17 RX ADMIN — BUMETANIDE 0.5 MG/HR: 0.25 INJECTION, SOLUTION INTRAMUSCULAR; INTRAVENOUS at 13:56

## 2020-01-17 RX ADMIN — FERROUS GLUCONATE 324 MG: 324 TABLET ORAL at 13:54

## 2020-01-17 RX ADMIN — FERROUS GLUCONATE 324 MG: 324 TABLET ORAL at 20:34

## 2020-01-17 RX ADMIN — AZITHROMYCIN MONOHYDRATE 500 MG: 500 INJECTION, POWDER, LYOPHILIZED, FOR SOLUTION INTRAVENOUS at 13:47

## 2020-01-17 RX ADMIN — ATORVASTATIN CALCIUM 20 MG: 20 TABLET, FILM COATED ORAL at 20:34

## 2020-01-17 RX ADMIN — FUROSEMIDE 40 MG: 10 INJECTION, SOLUTION INTRAVENOUS at 08:37

## 2020-01-17 RX ADMIN — ALBUMIN HUMAN 50 G: 0.25 SOLUTION INTRAVENOUS at 17:32

## 2020-01-17 RX ADMIN — METOLAZONE 10 MG: 10 TABLET ORAL at 13:50

## 2020-01-17 RX ADMIN — MICONAZOLE NITRATE: 20 POWDER TOPICAL at 08:24

## 2020-01-17 RX ADMIN — SENNOSIDES AND DOCUSATE SODIUM 1 TABLET: 8.6; 5 TABLET ORAL at 20:34

## 2020-01-17 RX ADMIN — MICONAZOLE NITRATE: 20 POWDER TOPICAL at 20:34

## 2020-01-17 RX ADMIN — MAGNESIUM SULFATE 1 G: 1 INJECTION INTRAVENOUS at 12:19

## 2020-01-17 RX ADMIN — ACETAMINOPHEN 650 MG: 325 TABLET, FILM COATED ORAL at 20:51

## 2020-01-17 ASSESSMENT — ACTIVITIES OF DAILY LIVING (ADL)
ADLS_ACUITY_SCORE: 18

## 2020-01-17 ASSESSMENT — PAIN DESCRIPTION - DESCRIPTORS: DESCRIPTORS: BURNING

## 2020-01-17 NOTE — PROGRESS NOTES
MICU PROGRESS NOTE  January 17, 2020    ASSESSMENT:  59 year old female with medical history significant for HFrEF, T2DM, CKD, HTN, gout, OHS/CARLOS, morbid obesity who had not been seeing doctors for years (though could demonstrate some degree of medication compliance), presented to the ED on 1/15 with dyspnea, admitted to the MICU with Hypercapnic respiratory failure requiring BIPAP     24 hr events: This morning on Bipap 20/8 PH 7.14/110 however despite this mental status continued to improve throughout the morning-->  Patient was weaned on Bipap and transitioned to vapotherm. BC +POSITIVE for Staphylococci in which she was started on Vanco, and ceftriaxone in concern for CAP.     Changes   -Start bumex gtt for goal of -2L w/ additional x1 dose of metolazone   -STOP vanc and Ceftri START Azithro today   -Repeated BC, RVP, Strep/Legonella UAG  -BMP this afternoon   -Albumin 50G NOW   -SCV02 recheck after Albumin administration     Neuro   #Lethergy    -most likely 2/2 Hypercapnic respiratory Failure     CV  #Right sided CV dysfunction complicated by volume overload   With BNP > 5, 000 (approximately at baseline, per 2016 values, the last time it was checked.  -per patient Per pt, possibly as much as 60 lbs above baseline.   -ECHO  Ejection Fraction is estimated at 55-60%, Difficult to assess RV. Function probably at least mildly reduced  -NT-proBNP 5391.  -s/p total of 80mg of IV Lasix w/ total of -6L output   Plan   -Start w/ Bumex gtt at 1mg with x1 dose of metolazone today   -Goal -2 L today   -Strict I/O  []Potential for intravascular hypovolemia--> If hypotensive t/c giving albumin     # Paroxysmal atrial fibrillation   EKG on admission was in NSR but noted afib back in 2016. Has been anticoagulated with warfarin. INR 2.06 on admission. Rate control with metoprolol succinate 75 mg po qday.  -Holding warfarin today as INR 2.8   - continue metoprolol succinate 25 mg po qday w/ hold parameters   - INR  daily      # Resolved Hypertensive emergency   BP 170s/90s on arrival, along with pulmonary edema.   - s/p NTG gtt brief in the ED dropped from 170s --> 120 off on arrival to ICU   - Cont Home lisinopril and metoprolol w/ hold parameters      Pulm  # Acute hypoxic hypercarbic respiratory failure secondary to HFrEF exacerbation and obesity hyperventilation syndrome less likely PNA   -High RISK intubation w/ most likely difficult airway due to body habitus   -Presented with dyspnea known OHS and uncontrolled hypertension.  - CXR revealed cardiomegaly and pulmonary edema.   - Pc02 7.16/110   Vent: BIPAP initially 24/8 28 ---> now 60%/30L vapotherm   Workup: Flu -, RVP pending   Abx: Vanc/ Ceftriaxone x1 today, cont Azithromycin    Imaging : Cxray c/w significant Pul, edema   Volume: Bumex gtt, s/p metolazone x1 today   Plan  -aggressive diuresis as above   -Follow infectious workup   -Vapotherm w/ BIPAP at night   -START Azithro today for atypical coverage STOP Ceftri as low likelihood of PNA   -high risk for ARDS, Anh risk intubation   -HOB Elevated >45 with chin tipped forward   []recheck ABG w/ clinical changes     #OHS/CARLOS  - patient was referred to be evaluated by pulmonology since 2017 but that has never happened.   Plan   -BIPAP at night.   -HOB elevated      Skin  # Intertrigo: miconazole powder     Endo  # T2DM  No recent hemoglobin A1C. Home regimen: sitagliptin 50 mg po qday.  - hold sitagliptin; pt might benefit from metformin as well but would defer to PCP  - cont medium sliding scale insulin  - cont fingerstick glucose qid  - cont hypoglycemia protocol  - BG q4h started clear liquid diet     #TSH  -2.64     ID  #Concern for sepsis   -hypertensive on arrival, now slightly hypotensive, slight leukocytosis, a febrile however some concern for CAP vs most likely a viral infection   - No lactate, HD stable, slight leukocytosis   -Work up: +BC 1/2Staphylococci, Flu Neg, RVP pending  -ECHO: EF 55-60% w/ RV  dysfunction   -ABX: Azithro (1/17), S/p ABX: Ceftriaxone x1, Vanc x1   Plan:   -Start Azithromycin for atypical coverage   -Check Sv02 for indicator of profusion and resuscitation   -Keep MAP > 65, UOP >30cc/hr   -Discontinue Vanc and Ceftri restart if surveillance cx positive   []Follow up blood, urine, cultures, RVP panel   []For MAP <65 would start NE as first line pressor    [] Careful administration of IVF would prefer Albumin      GI  No active issues   -Start clear liquid diet if tolerating from respiratory perspective      Renal  #JASON 2/2 hypervolemia vs septic component vs diuresis   #Olguria   -Yesterday with total of -6LUOP, today now with oliguria   --Scr 1.10--> Now 1.50 on recheck   - Furea pending   Diuresis   -s/p total of 6L of urine yesterday now with decline   -Volume/Diuretics: on bumex gtt @0.5 w/ x1 dose of metolazone w/ 50G of albumin   Plan    - f/u Furea   -Give 50G of albumin to see improvement with UOP   -Cont Bumex gtt at 0.5 at this time   -Maintain UOP >30cc/hr   -Monitor electrolytes closely w/ BID BMP    Rheum  # Gout   -No active issues   - uric acid 9.1 this am   - continue allopurinol 200 mg po qday        FEN: cardiac and carb consistent diet if passes nurse bedside swallow  DVT Prophylaxis:  On warfarin (held 1/17)   Disposition: pending clinical improvement, diuresis  Code Status: Full     Diet: NPO, when gets diet again: Low Saturated Fat Na <2400 mg w 1500 ml fluid restriction  Lines: PIV  DVT Prophylaxis: Warfarin  Baig Catheter: in place, indication: Strict 1-2 Hour I&O  Code Status: Full Code       This patient seen and plan discussed with the attending physician Dr. Dao Daly   MICU VALERY     OBJECTIVE:   1. VITAL SIGNS:   Temp:  [94.4  F (34.7  C)-97.7  F (36.5  C)] 97.7  F (36.5  C)  Pulse:  [60-99] 76  Heart Rate:  [51-82] 71  Resp:  [12-29] 26  BP: ()/(43-74) 101/64  FiO2 (%):  [40 %-65 %] 60 %  SpO2:  [59 %-100 %] 94 %  FiO2 (%): 60 %  Resp:  26      2. INTAKE/ OUTPUT:   I/O last 3 completed shifts:  In: 1210 [P.O.:80; I.V.:1130]  Out: 670 [Urine:670]    3. PHYSICAL EXAMINATION:   General: somnolent woman awaking to voice, opening eyes, speaking but not making much sense  Head: Atraumatic.  Eyes: PERRL, conjunctiva clear & non-icteric, EMOI.  Mouth: under bipap  Neck: Supple  CVR: Distant secondary to habitus, RRR, S1,2, no m/r/g. Extremities wwp, cap refill <2 seconds.  Lungs: Distant secondary to habitus, could hear air movement, could not hear crackles given bipap noise and habitus.  Abdominal:  no tenderness, guarding or rebound with palpation.   Skin: intertrigo w/ venous changes present   Extremities: +2 BLE edema   Neuro: See general, above. Face symmetric. Moving all arms and legs.        ECG NSR   4. INVESTIGATIONS:   Arterial Blood Gases   Recent Labs   Lab 01/17/20  0941 01/17/20  0736 01/16/20  1440 01/16/20  1320   PH 7.17* 7.14* 7.18* 7.15*   PCO2 105* 110* 98* 107*   PO2 70* 163* 148* 69*   HCO3 38* 37* 36* 37*     Complete Blood Count   Recent Labs   Lab 01/17/20  0334 01/16/20  2325 01/16/20  2221 01/15/20  2203 01/15/20  2147   WBC 7.0 7.7 8.1  --  7.3   HGB 11.2* 11.5* 11.6* 15.3 12.0    258 250  --  270     Basic Metabolic Panel  Recent Labs   Lab 01/17/20  0334 01/16/20  2221 01/16/20  0711 01/15/20  2203 01/15/20  2147    137 138 139 137   POTASSIUM 4.2 4.4 3.6 4.6 4.1   CHLORIDE 100 100 101  --  102   CO2 36* 35* 34*  --  34*   BUN 11 10 8  --  10   CR 1.19* 1.14* 0.85  --  0.92   GLC 82 96 86 105* 103*     Liver Function Tests  Recent Labs   Lab 01/17/20  0334 01/16/20 2221 01/16/20  0829 01/15/20  2147   AST  --  26  --  25   ALT  --  20  --  23   ALKPHOS  --  97  --  97   BILITOTAL  --  0.4  --  0.6   ALBUMIN  --  2.8*  --  2.9*   INR 2.84* 2.45* 2.27* 2.06*     Pancreatic Enzymes  No lab results found in last 7 days.  Coagulation Profile  Recent Labs   Lab 01/17/20  0334 01/16/20 2221 01/16/20  0829  01/15/20  2147   INR 2.84* 2.45* 2.27* 2.06*   PTT  --   --   --  48*         5. RADIOLOGY:   Recent Results (from the past 24 hour(s))   XR Chest Port 1 View    Narrative    Exam:  XR CHEST PORT 1 VW, 1/16/2020 5:52 PM    History: PICC placement    Comparison:  1/16/2020    Findings:  Portable supine radiograph of the chest. Right arm PICC tip  is not well visualized, likely over the low SVC. Stable enlargement of  the cardiac silhouette. Small bilateral pleural effusions and adjacent  opacities. Diffuse mixed interstitial/airspace opacities. No  pneumothorax.      Impression    Impression:    1. Right arm PICC tip is not well visualized. Recommend repeat  radiograph centered over the lower chest.  2. Small bilateral pleural effusions and adjacent opacities.  3. Pulmonary edema.    I have personally reviewed the examination and initial interpretation  and I agree with the findings.    VIRIDIANA SINCLAIR MD   XR Chest Port 1 View    Narrative    Exam: XR CHEST PORT 1 , 1/16/2020 9:49 PM    Indication: f/u CXR; pulmonary edema    Comparison: 1/16/2020 performed earlier today    Findings:   Stable cardiomegaly Diffuse mixed opacities throughout both lungs are  similar. Right arm PICC tip in the high right atrium.      Impression    Impression:   1. Right arm PICC tip in the high right atrium.  2. Cardiomegaly with pulmonary edema is similar.    VIRIDIANA SINCLAIR MD     ECHO:  Regional wall motion is probably normal.  Difficult to assess RV. Function probably at least mildly reduced. Size might  be mildly dilated.  Valves not well visualized. No significant valvular abnormalities were noted  by Doppler.  Dilation of the inferior vena cava is present with abnormal respiratory  variation in diameter.  No pericardial effusion is present.             =========================================

## 2020-01-17 NOTE — PROGRESS NOTES
MEDICINE CROSSCOVER NOTE    S: Our team was notified that patient became more somnolent in the context of worsening hypercapnia over the course of the day. Patient was more awake, talking to the nursing staff earlier in the shift.     O:  VS: Afebrile, BP 110s/70s, HR 60s.  Remained on BIPAP with -400 ml though sometimes TV dropped to 100s.   Exam: obtunded, on BIPAP, did open her eyes to voice couple times. Could not follow all commands.  Appeared hypervolemic still    Labs: VBG: pH 7.16, pCO2 108 (has been uptrending since this AM)  CXR: pulmonary edema    A&P:  - Contact ICU team for transfer, highly appreciate recommendations and assistance   >> hold off on intubation for now, continue BIPAP with closer monitoring in ICU  - STAT CBC, CMP, INR  - Lasix 40 mg IV stat  - Updated sister over the phone    Bravo Smith MD  PGY-3 Internal Medicine  Pager [phone]

## 2020-01-17 NOTE — H&P
"[facility] History and Physical  MICU    Arianna Siddiqi MRN# [id number]   Age: 59 year old YOB: 1960     Date of Admission:  1/15/2020    Primary care provider: No Ref-Primary, Physician          Assessment and Plan:       Arianna Siddiqi \"April\" is a 59 year old female with medical history significant for HFrEF, T2DM, CKD, HTN, gout, OHS/ACRLOS, morbid obesity who had not been seeing doctors for years (though could demonstrate some degree of medication compliance), presented to the ED on 1/15 with dyspnea.      CV  #?Right sided CV dysfunction  With BNP > 5, 000 (approximately at baseline, per 2016 values, the last time it was checked.)     # Paroxysmal atrial fibrillation   EKG on admission was in NSR but noted afib back in 2016. Has been anticoagulated with warfarin. INR 2.06 on admission. Rate control with metoprolol succinate 75 mg po qday.  - pharmacy to dose warfarin   - continue metoprolol succinate 25 mg po qday  - INR daily     # Hypertensive emergency, resolved  BP 170s/90s on arrival, along with pulmonary edema. Patient was started on NTG gtt in the ED. SBP dropped from 170s to 120s during floor transfer. Likely related to volume overload  - stop NTG gtt  - Home lisinopril  - Metoprolol as above     Pulm    # Acute hypoxic hypercarbic respiratory failure secondary to HFrEF exacerbation and obesity hyperventilation syndrome  Presented with dyspnea. In the setting of known OHS and uncontrolled hypertension. Appeared hypervolemic on exam. CXR revealed cardiomegaly and pulmonary edema. NT-proBNP 5391. VBG with pCO2 61. Flu negative. Previous TTE 09/2016 showed EF of 40-45% with mild diffuse hypokinesis, now with TTE this admission showing possible right-heart dysfunction, suspect secondary to stress from acute on chronic volume overload. Questionable if that is due to ischemic cardiomyopathy. With regard to OHS/CARLOS, patient was referred to be evaluated by pulmonology since 2017 but that has never " happened. Has been on BIPAP at night. Per pt, possibly as much as 60 lbs above baseline.   - Lasix 40 mg IV BID; goal I/O net negative at least 3L/day  - Bipap PRN  - Consider cardiology referral for ischemic evaluation   - Metoprolol succinate 25 mg po qday   - Daily weight and strict I/O  - Fluid restriction 1500cc when eating again  -NPO for respiratory status until passes nursing bedside swallow  -trend lytes/Mg during diuresis  -trend VBG     Skin  # Intertrigo: miconazole powder    Endo  # T2DM  No recent hemoglobin A1C. Home regimen: sitagliptin 50 mg po qday.  - A1C in AM  - hold sitagliptin; pt might benefit from metformin as well but would defer to PCP  - medium sliding scale insulin  - fingerstick glucose qid  - hypoglycemia protocol  - BG q4h while NPO    #Morbid obesity  -bariatric mattress  -requiring extra cares/eqiupment    Rheum  # Gout   - uric acid in AM  - continue allopurinol 200 mg po qday    Neuro  #Somnolence  Likely related to acute on chronic hypercapnea, opened eyes to voice when evaluatd at bedside on 6B by resident, per nursing was even more awake, improving, after ICU transfer.     ID  No current concerns    GI  No current concerns.    Renal  No current concerns.    FEN: cardiac and carb consistent diet if passes nurse bedside swallow  DVT Prophylaxis:  On warfarin   Disposition: pending clinical improvement, diuresis  Code Status: Full     Diet: NPO, when gets diet again: Low Saturated Fat Na <2400 mg w 1500 ml fluid restriction  Lines: PIV  DVT Prophylaxis: Warfarin  Baig Catheter: in place, indication: Strict 1-2 Hour I&O  Code Status: Full Code      This patient seen and plan discussed with the attending physician, Dr. Dowd.     Michelle Looney, PGY-3  Internal Medicine/Pediatrics  [phone].              Chief Complaint:   Arianna Siddiqi is a 59 year old female with medical history significant for HFrEF, T2DM, CKD, HTN, gout, OHS/CARLOS, morbid obesity who presented to the ED  with dyspnea.     Indication for critical care admission: respiratory acidosis requiring close monitoring    Per H&P on 1/15, at time of admission:      Patient had not been seeing doctors for 2 years. She stated she had enough medication refills, had been getting meds through [facility] and had been taking it religiously though previous notes had mentioned about her poor compliance.  Patient reported having a flulike symptoms 3 to 4 weeks ago.  Those symptoms were improving over time.  However, she noticed that she started to retain more fluid in her abdomen and thigh over the past couple weeks. About a week prior to admission, she started to have orthopnea and dyspnea on exertion. At baseline, she was able to walk around her house. But over the past week, she could not walk even 5-10 feet distance. She felt like the symptoms would progressively get worse. She decided to call the ambulance.     ED course : Patient was brought in by the ambulance. Was put on CPAP en route, given 4 sprays of nitroglycerin. On arrival at the ED, patient was tachypnic with RR of 32, hypertensive with SBP in 170s. Exam and CXR consistent with pulmonary edema. NT-proBNP was 5k. Troponin negative. EKG with NSR, T wave inversion in V1-V3. She was started on NTG gtt for BP control and given lasix 80 mg IV. Baig's catheter was also placed given her body habitus that she was unable to role herself to urinate.      ROS (+) headache, flu-like symptoms, SOB, PERDOMO, dry cough, diarrhea  ROS (-) Fever, chills, night sweats, lumps, bumps, rashes                 Sore throat, abdominal pain, Nausea, vomitting, constipation       At time of ICU transfer on 1/16:     Patient was noted to have increasing somnolence, with stable hypercapneic respiratory acidosis, with pH ~7.1 and pCO2 ~80. She was transferred to ICU for one to one nursing and closer monitoring in case of intubation.             Past Medical History:   I have reviewed this patient's past medical  history          Past Surgical History:   I have reviewed this patient's past surgical history          Social History:   I have reviewed this patient's social history          Family History:   This patient has no significant family history          Immunizations:   Flu shot and zoster, per [facility]          Allergies:     PCN          Medications:     Current Facility-Administered Medications   Medication     acetaminophen (TYLENOL) tablet 650 mg     allopurinol (ZYLOPRIM) tablet 200 mg     atorvastatin (LIPITOR) tablet 20 mg     glucose gel 15-30 g    Or     dextrose 50 % injection 25-50 mL    Or     glucagon injection 1 mg     ferrous gluconate (FERGON) tablet 324 mg     furosemide (LASIX) injection 40 mg     heparin lock flush 10 UNIT/ML injection 2-5 mL     heparin lock flush 10 UNIT/ML injection 5-10 mL     heparin lock flush 10 UNIT/ML injection 5-10 mL     hypromellose-dextran (ARTIFICAL TEARS) 0.1-0.3 % ophthalmic solution 1 drop     insulin aspart (NovoLOG) inj (RAPID ACTING)     insulin aspart (NovoLOG) inj (RAPID ACTING)     lidocaine (LMX4) cream     lidocaine 1 % 0.1-1 mL     lisinopril (PRINIVIL/Zestril) tablet 5 mg     melatonin tablet 1 mg     metoprolol succinate ER (TOPROL-XL) 24 hr tablet 25 mg     miconazole (MICATIN/MICRO GUARD) 2 % powder     naloxone (NARCAN) injection 0.1-0.4 mg     No lozenges or gum should be given while patient on BIPAP/AVAPS/AVAPS AE     ondansetron (ZOFRAN) injection 4 mg     Patient is already receiving anticoagulation with heparin, enoxaparin (LOVENOX), warfarin (COUMADIN)  or other anticoagulant medication     Patient may continue current oral medications     polyethylene glycol (MIRALAX/GLYCOLAX) Packet 17 g     senna-docusate (SENOKOT-S/PERICOLACE) 8.6-50 MG per tablet 1 tablet    Or     senna-docusate (SENOKOT-S/PERICOLACE) 8.6-50 MG per tablet 2 tablet     sodium chloride (PF) 0.9% PF flush 10-20 mL     sodium chloride (PF) 0.9% PF flush 3 mL     sodium chloride  (PF) 0.9% PF flush 3 mL     Warfarin Therapy Reminder (Check START DATE - warfarin may be starting in the FUTURE)             Review of Systems:   Too somnolent for ROS.      BP 95/54   Pulse 60   Temp 97.6  F (36.4  C) (Axillary)   Resp 24   Wt (!) 187.5 kg (413 lb 5.8 oz)   SpO2 96%   BMI 75.60 kg/m      General: somnolent woman awaking to voice, opening eyes, speaking but not making much sense  Head: Atraumatic.  Eyes: PERRL, conjunctiva clear & non-icteric, EMOI.  Mouth: under bipap  Neck: Supple  CVR: Distant secondary to habitus, RRR, S1,2, no m/r/g. Extremities wwp, cap refill <2 seconds.  Lungs: Distant secondary to habitus, could hear air movement, could not hear crackles given bipap noise and habitus.  Abdominal:  no tenderness, guarding or rebound with palpation.   Skin: intertrigo  Extremities: No peripheral edema.  Neuro: See general, above. Face symmetric. Moving all arms and legs.             Data:   I have reviewed CXR and echo from 1/17      ECHO:  Regional wall motion is probably normal.  Difficult to assess RV. Function probably at least mildly reduced. Size might  be mildly dilated.  Valves not well visualized. No significant valvular abnormalities were noted  by Doppler.  Dilation of the inferior vena cava is present with abnormal respiratory  variation in diameter.  No pericardial effusion is present.

## 2020-01-17 NOTE — PHARMACY-ANTICOAGULATION SERVICE
Warfarin Therapy Hold Note  This patient is currently receiving warfarin for Atrial fibrillation.    Goal INR:  2-3  Goal CHROMOGENIC FACTOR 10 goal: not applicable  Goal FACTOR 2: not applicable    Anticoagulation Dose History     Recent Dosing and Labs Latest Ref Rng & Units 7/23/2019 1/15/2020 1/16/2020 1/16/2020 1/16/2020 1/16/2020 1/17/2020    ZZ IMS TEMPLATE - - - 4.5 mg - - - -    Warfarin 3 mg - - - - - 3 mg - -    INR 0.86 - 1.14 - 2.06(H) - 2.27(H) - 2.45(H) 2.84(H)    INR 0.86 - 1.14 2.4(A) - - - - - -          Bleeding Signs/Symptoms:  None    Assessment:  Current INR is therapeutic but increased rapidly over the last 24 hours from 2.06 to 2.84. This is most likely due to: nutrition changes and receiving two doses of warfarin (4.5 mg in AM and 3 mg in PM) on 1/17/20    Plan:  1) HOLD today s warfarin dose. An order has been placed in EPIC for  Warfarin- No Dose Today   2) Do not recommend reversal with vitamin K or FFP at this time.  3) Recheck next INR tomorrow with AM labs    The primary team has been contacted about the above plan/primary team is aware of need to hold dose.    Pham Cool, PharmD, MPH  PGY1 Pharmacy Practice Resident

## 2020-01-17 NOTE — PROGRESS NOTES
Blood pressure 110/73, pulse 71, temperature 96  F (35.6  C), temperature source Axillary, resp. rate 20, weight (!) 187.5 kg (413 lb 5.8 oz), SpO2 95 %.    Neuro: Pt A&O at the start of shift, now somnolent  Cardiac: SR, intermittently bradycardic.other,  VSS.   Respiratory: Sating above 90% on BiPAP @ 65% FiO2.  GI/: Baig patent- marginal output. No BM.  Diet/appetite: Not able to eat- on continuous BiPAP  Activity:  Bedrest.  Pain: At acceptable level on current regimen.   Skin: No new deficits noted.  LDA's: PICC, PIV x2    Plan: PICC placed today- pt tolerated well, transfer to higher level of care for respiratory management. Positive blood cultures- provider notified. Continue with POC. Notify primary team with changes.

## 2020-01-17 NOTE — PLAN OF CARE
Admitted/transferred from: 6B  Reason for admission/transfer: hypercapnia. Somnolence. Need for close respiratory monitoring  2 RN skin assessment: completed by Tabatha Rios RN & Rajan Marks RN  Result of skin assessment and interventions/actions: sore buttocks/ excoriated. Excoriation under pannus. Small open area to R inner leg.   Height, weight, drug calc weight: done  Patient belongings (see Flowsheet)  MDRO education added to care plan: NA     NEURO:drowsy but able to answer orientation questions appropriately. Using call light appropriately & able to make needs known. C/o HA & burning under pannus. Prn tylenol given w/ minimal improvement.   CV:SR/SB 50s-80s; frequent pvcs. BP soft but stable. Afebrile. Dependent edema  RESP:remains on bipap. Currently fio2 @ 50% to keep spo2 >90%. MICU MD Looney aware of AM VBG results this AM w/ minimal improvement from prior. Lungs diminished  GI:NPO. D/w MICU MD looney & pt OK to have meds since more awake & passed bedside swallow.   : via antunez cath. D/W micu Pt w/ marginal uop (20 mls/hr) no interventions at this time as pt is net negative 6 L & already receiving scheduled lasix?        PLAN:Continue to monitor pt & notify MD of any acute changes. Continue to closely monitor respiratory status.     Tabatha Rios RN on 1/17/2020 at 6:29 AM

## 2020-01-17 NOTE — PHARMACY-VANCOMYCIN DOSING SERVICE
Pharmacy Vancomycin Initial Note  Date of Service 2020  Patient's  1960  59 year old, female    Indication: Community Acquired Pneumonia    Current estimated CrCl = CrCl cannot be calculated (Unknown ideal weight.).    Creatinine for last 3 days  1/15/2020:  9:47 PM Creatinine 0.92 mg/dL  2020:  7:11 AM Creatinine 0.85 mg/dL; 10:21 PM Creatinine 1.14 mg/dL  2020:  3:34 AM Creatinine 1.19 mg/dL    Recent Vancomycin Level(s) for last 3 days  No results found for requested labs within last 72 hours.      Vancomycin IV Administrations (past 72 hours)      No vancomycin orders with administrations in past 72 hours.                Nephrotoxins and other renal medications (From now, onward)    Start     Dose/Rate Route Frequency Ordered Stop    20 0800  furosemide (LASIX) injection 40 mg      40 mg  over 1-2 Minutes Intravenous 2 TIMES DAILY. 20 0656      20 0800  lisinopril (PRINIVIL/Zestril) tablet 5 mg      5 mg Oral DAILY 20 0657            Contrast Orders - past 72 hours (72h ago, onward)    Start     Dose/Rate Route Frequency Ordered Stop    20 0915  perflutren diluted 1mL to 2mL with saline (OPTISON) diluted injection 6 mL      6 mL Intravenous ONCE 20 0904 20 0915                Plan:  1.  Load vancomycin 3000 mg IV (~16mg/kg). Plan to get a vancomycin level at 24 hours and adjust dose to 0905-7337 mg Q24h if vancomycin is continued.  2.  Goal Trough Level: 10-15 mg/L   3.  Pharmacy will check trough level at 24 hours.  4. Serum creatinine levels will be ordered daily for the first week of therapy and at least twice weekly for subsequent weeks.    5. [place] method utilized to dose vancomycin therapy: Method 1    Pham Cool, PharmD, MPH  PGY1 Pharmacy Practice Resident

## 2020-01-17 NOTE — PROVIDER NOTIFICATION
0650: d/w MICU pt spo2 randomly dropping to upper 70s/low 80s w/ good waveform for few seconds and then back to 90s.pt currently satting in upper 90s. Pt much more lethargic this AM as compared to throughout night. arousable to pain but not participating in orientation questions.     adden 0710:MICU updated pt continues w/ lethargy. writer inquired if team can come assess pt as soon as able. Pt currently only arousable to pain when was able to answer orientation questions overnight. Pt spo2 continues to occasionally dip down to upper 70s/low 80s very briefly and then back to upper 90s. ABG ordered. MICU to come assess pt.     Tabatha Rios RN on 1/17/2020 at 7:26 AM

## 2020-01-17 NOTE — PLAN OF CARE
4AB-PT:CXL: Pt transferred down from 6B today with inc respiratory needs, remains on BiPaP, not medically appropriate for therapy today. CXL PT. Will continue to follow and initiate as appropriate.

## 2020-01-17 NOTE — PROGRESS NOTES
"SPIRITUAL HEALTH SERVICES  SPIRITUAL ASSESSMENT Progress Note  [facility] 4A     PRIMARY FOCUS:     Symptom/pain management    Emotional/spiritual/Anglican distress    Support for coping    REFERRAL SOURCE: F/U from On call lang visit    ILLNESS CIRCUMSTANCES:   Reviewed documentation. Reflective conversation shared with pt's niece and great niece which integrated elements of illness and family narratives. Great niece's comments in quotes.    Resources for Support - Sister and niece, great niece.    DISTRESS:     Emotional/Spiritual/Existential Distress - \"My aunt has been up and down, sometimes upbeat and other times down. Lately, she has said she is not sure if she wants to go on.\"    Taoism Distress - None noted/dicsussed    Social/Cultural/Economic Distress - None noted/dicsussed    SPIRITUAL/Zoroastrianism COPING:     Buddhism/Vanessa - \"She is Mosque, but am not sure how much she has been involved with it.\"    Spiritual Practice(s) - \"She likes music.\"    Emotional/Relational/Existential Connections - \"She is close to her sister but sometimes they butt heads. She talks fairly freely with me.\"    GOALS OF CARE:    Goals of Care - Unable to ask.    Meaning/Sense-Making - \"She like music and TV.\"    I visited biefly with the pt who was sleepy. She asked is I was a  and I said yes. She accepted my offer of prayer and anointing.    PATIENT ANOINTED by Father Kevon Sharif 1/17/2020.    PLAN: I will follow while on 4th fl.    Kevon Sharif M.Div.     Pager [phone]    "

## 2020-01-18 ENCOUNTER — APPOINTMENT (OUTPATIENT)
Dept: GENERAL RADIOLOGY | Facility: CLINIC | Age: 60
DRG: 291 | End: 2020-01-18
Payer: MEDICARE

## 2020-01-18 ENCOUNTER — APPOINTMENT (OUTPATIENT)
Dept: OCCUPATIONAL THERAPY | Facility: CLINIC | Age: 60
DRG: 291 | End: 2020-01-18
Payer: MEDICARE

## 2020-01-18 LAB
ALBUMIN SERPL-MCNC: 3 G/DL (ref 3.4–5)
ALP SERPL-CCNC: 80 U/L (ref 40–150)
ALT SERPL W P-5'-P-CCNC: 19 U/L (ref 0–50)
ANION GAP SERPL CALCULATED.3IONS-SCNC: 3 MMOL/L (ref 3–14)
ANION GAP SERPL CALCULATED.3IONS-SCNC: <1 MMOL/L (ref 3–14)
AST SERPL W P-5'-P-CCNC: 16 U/L (ref 0–45)
BASE EXCESS BLDV CALC-SCNC: 8.8 MMOL/L
BILIRUB DIRECT SERPL-MCNC: 0.3 MG/DL (ref 0–0.2)
BILIRUB SERPL-MCNC: 0.5 MG/DL (ref 0.2–1.3)
BUN SERPL-MCNC: 16 MG/DL (ref 7–30)
BUN SERPL-MCNC: 17 MG/DL (ref 7–30)
C PNEUM DNA SPEC QL NAA+PROBE: NOT DETECTED
CALCIUM SERPL-MCNC: 8.7 MG/DL (ref 8.5–10.1)
CALCIUM SERPL-MCNC: 9 MG/DL (ref 8.5–10.1)
CHLORIDE SERPL-SCNC: 96 MMOL/L (ref 94–109)
CHLORIDE SERPL-SCNC: 98 MMOL/L (ref 94–109)
CO2 SERPL-SCNC: 38 MMOL/L (ref 20–32)
CO2 SERPL-SCNC: 39 MMOL/L (ref 20–32)
CREAT SERPL-MCNC: 1.19 MG/DL (ref 0.52–1.04)
CREAT SERPL-MCNC: 1.38 MG/DL (ref 0.52–1.04)
ERYTHROCYTE [DISTWIDTH] IN BLOOD BY AUTOMATED COUNT: 21.2 % (ref 10–15)
FLUAV H1 2009 PAND RNA SPEC QL NAA+PROBE: NOT DETECTED
FLUAV H1 RNA SPEC QL NAA+PROBE: NOT DETECTED
FLUAV H3 RNA SPEC QL NAA+PROBE: NOT DETECTED
FLUAV RNA SPEC QL NAA+PROBE: NOT DETECTED
FLUBV RNA SPEC QL NAA+PROBE: NOT DETECTED
GFR SERPL CREATININE-BSD FRML MDRD: 41 ML/MIN/{1.73_M2}
GFR SERPL CREATININE-BSD FRML MDRD: 49 ML/MIN/{1.73_M2}
GLUCOSE BLDC GLUCOMTR-MCNC: 82 MG/DL (ref 70–99)
GLUCOSE BLDC GLUCOMTR-MCNC: 84 MG/DL (ref 70–99)
GLUCOSE BLDC GLUCOMTR-MCNC: 91 MG/DL (ref 70–99)
GLUCOSE BLDC GLUCOMTR-MCNC: 93 MG/DL (ref 70–99)
GLUCOSE BLDC GLUCOMTR-MCNC: 94 MG/DL (ref 70–99)
GLUCOSE BLDC GLUCOMTR-MCNC: 95 MG/DL (ref 70–99)
GLUCOSE SERPL-MCNC: 100 MG/DL (ref 70–99)
GLUCOSE SERPL-MCNC: 91 MG/DL (ref 70–99)
HADV DNA SPEC QL NAA+PROBE: NOT DETECTED
HCO3 BLDV-SCNC: 38 MMOL/L (ref 21–28)
HCOV PNL SPEC NAA+PROBE: NOT DETECTED
HCT VFR BLD AUTO: 41.4 % (ref 35–47)
HGB BLD-MCNC: 10.6 G/DL (ref 11.7–15.7)
HMPV RNA SPEC QL NAA+PROBE: NOT DETECTED
HPIV1 RNA SPEC QL NAA+PROBE: NOT DETECTED
HPIV2 RNA SPEC QL NAA+PROBE: NOT DETECTED
HPIV3 RNA SPEC QL NAA+PROBE: NOT DETECTED
HPIV4 RNA SPEC QL NAA+PROBE: NOT DETECTED
INR PPP: 4.16 (ref 0.86–1.14)
M PNEUMO DNA SPEC QL NAA+PROBE: NOT DETECTED
MAGNESIUM SERPL-MCNC: 1.9 MG/DL (ref 1.6–2.3)
MAGNESIUM SERPL-MCNC: 2 MG/DL (ref 1.6–2.3)
MAGNESIUM SERPL-MCNC: 2 MG/DL (ref 1.6–2.3)
MCH RBC QN AUTO: 23.5 PG (ref 26.5–33)
MCHC RBC AUTO-ENTMCNC: 25.6 G/DL (ref 31.5–36.5)
MCV RBC AUTO: 92 FL (ref 78–100)
MICROBIOLOGIST REVIEW: NORMAL
PCO2 BLDV: 81 MM HG (ref 40–50)
PH BLDV: 7.28 PH (ref 7.32–7.43)
PHOSPHATE SERPL-MCNC: 3.2 MG/DL (ref 2.5–4.5)
PLATELET # BLD AUTO: 206 10E9/L (ref 150–450)
PO2 BLDV: 48 MM HG (ref 25–47)
POTASSIUM BLD-SCNC: 4.2 MMOL/L (ref 3.4–5.3)
POTASSIUM SERPL-SCNC: 3.8 MMOL/L (ref 3.4–5.3)
POTASSIUM SERPL-SCNC: 4 MMOL/L (ref 3.4–5.3)
PROT SERPL-MCNC: 7.7 G/DL (ref 6.8–8.8)
RBC # BLD AUTO: 4.51 10E12/L (ref 3.8–5.2)
RSV RNA SPEC QL NAA+PROBE: NOT DETECTED
RSV RNA SPEC QL NAA+PROBE: NOT DETECTED
RV+EV RNA SPEC QL NAA+PROBE: NOT DETECTED
SODIUM SERPL-SCNC: 137 MMOL/L (ref 133–144)
SODIUM SERPL-SCNC: 138 MMOL/L (ref 133–144)
UUN UR-MCNC: 112 MG/DL
UUN/CREAT 24H UR: 1 G/G CR
WBC # BLD AUTO: 6.6 10E9/L (ref 4–11)

## 2020-01-18 PROCEDURE — 00000146 ZZHCL STATISTIC GLUCOSE BY METER IP

## 2020-01-18 PROCEDURE — 25000125 ZZHC RX 250: Performed by: STUDENT IN AN ORGANIZED HEALTH CARE EDUCATION/TRAINING PROGRAM

## 2020-01-18 PROCEDURE — 25000128 H RX IP 250 OP 636: Performed by: STUDENT IN AN ORGANIZED HEALTH CARE EDUCATION/TRAINING PROGRAM

## 2020-01-18 PROCEDURE — 25000132 ZZH RX MED GY IP 250 OP 250 PS 637: Mod: GY | Performed by: NURSE PRACTITIONER

## 2020-01-18 PROCEDURE — 94660 CPAP INITIATION&MGMT: CPT

## 2020-01-18 PROCEDURE — 83735 ASSAY OF MAGNESIUM: CPT | Performed by: INTERNAL MEDICINE

## 2020-01-18 PROCEDURE — 25800030 ZZH RX IP 258 OP 636: Performed by: STUDENT IN AN ORGANIZED HEALTH CARE EDUCATION/TRAINING PROGRAM

## 2020-01-18 PROCEDURE — 97530 THERAPEUTIC ACTIVITIES: CPT | Mod: GO

## 2020-01-18 PROCEDURE — 80048 BASIC METABOLIC PNL TOTAL CA: CPT | Performed by: INTERNAL MEDICINE

## 2020-01-18 PROCEDURE — 99291 CRITICAL CARE FIRST HOUR: CPT | Mod: GC | Performed by: INTERNAL MEDICINE

## 2020-01-18 PROCEDURE — 97165 OT EVAL LOW COMPLEX 30 MIN: CPT | Mod: GO

## 2020-01-18 PROCEDURE — 85027 COMPLETE CBC AUTOMATED: CPT | Performed by: INTERNAL MEDICINE

## 2020-01-18 PROCEDURE — 40000275 ZZH STATISTIC RCP TIME EA 10 MIN

## 2020-01-18 PROCEDURE — 85610 PROTHROMBIN TIME: CPT | Performed by: INTERNAL MEDICINE

## 2020-01-18 PROCEDURE — 25000132 ZZH RX MED GY IP 250 OP 250 PS 637: Mod: GY | Performed by: INTERNAL MEDICINE

## 2020-01-18 PROCEDURE — 83735 ASSAY OF MAGNESIUM: CPT | Performed by: STUDENT IN AN ORGANIZED HEALTH CARE EDUCATION/TRAINING PROGRAM

## 2020-01-18 PROCEDURE — 20000004 ZZH R&B ICU UMMC

## 2020-01-18 PROCEDURE — 25000132 ZZH RX MED GY IP 250 OP 250 PS 637: Mod: GY | Performed by: STUDENT IN AN ORGANIZED HEALTH CARE EDUCATION/TRAINING PROGRAM

## 2020-01-18 PROCEDURE — 97535 SELF CARE MNGMENT TRAINING: CPT | Mod: GO

## 2020-01-18 PROCEDURE — 84132 ASSAY OF SERUM POTASSIUM: CPT | Performed by: INTERNAL MEDICINE

## 2020-01-18 PROCEDURE — 71045 X-RAY EXAM CHEST 1 VIEW: CPT

## 2020-01-18 PROCEDURE — 80048 BASIC METABOLIC PNL TOTAL CA: CPT | Performed by: STUDENT IN AN ORGANIZED HEALTH CARE EDUCATION/TRAINING PROGRAM

## 2020-01-18 PROCEDURE — 80076 HEPATIC FUNCTION PANEL: CPT | Performed by: INTERNAL MEDICINE

## 2020-01-18 PROCEDURE — 84100 ASSAY OF PHOSPHORUS: CPT | Performed by: STUDENT IN AN ORGANIZED HEALTH CARE EDUCATION/TRAINING PROGRAM

## 2020-01-18 PROCEDURE — 82803 BLOOD GASES ANY COMBINATION: CPT | Performed by: INTERNAL MEDICINE

## 2020-01-18 RX ORDER — ACETAZOLAMIDE 250 MG/1
500 TABLET ORAL EVERY 12 HOURS SCHEDULED
Status: COMPLETED | OUTPATIENT
Start: 2020-01-18 | End: 2020-01-18

## 2020-01-18 RX ORDER — METOPROLOL SUCCINATE 50 MG/1
50 TABLET, EXTENDED RELEASE ORAL DAILY
Status: DISCONTINUED | OUTPATIENT
Start: 2020-01-18 | End: 2020-01-19

## 2020-01-18 RX ADMIN — SENNOSIDES AND DOCUSATE SODIUM 1 TABLET: 8.6; 5 TABLET ORAL at 09:26

## 2020-01-18 RX ADMIN — ACETAMINOPHEN 650 MG: 325 TABLET, FILM COATED ORAL at 20:27

## 2020-01-18 RX ADMIN — FERROUS GLUCONATE 324 MG: 324 TABLET ORAL at 09:26

## 2020-01-18 RX ADMIN — Medication 1 MG: at 00:07

## 2020-01-18 RX ADMIN — ACETAZOLAMIDE 500 MG: 250 TABLET ORAL at 09:38

## 2020-01-18 RX ADMIN — ONDANSETRON 4 MG: 2 INJECTION INTRAMUSCULAR; INTRAVENOUS at 20:44

## 2020-01-18 RX ADMIN — ALLOPURINOL 200 MG: 100 TABLET ORAL at 09:27

## 2020-01-18 RX ADMIN — Medication 2 G: at 18:02

## 2020-01-18 RX ADMIN — ACETAZOLAMIDE 500 MG: 250 TABLET ORAL at 20:27

## 2020-01-18 RX ADMIN — LISINOPRIL 5 MG: 2.5 TABLET ORAL at 09:27

## 2020-01-18 RX ADMIN — FERROUS GLUCONATE 324 MG: 324 TABLET ORAL at 20:26

## 2020-01-18 RX ADMIN — ACETAMINOPHEN 650 MG: 325 TABLET, FILM COATED ORAL at 01:12

## 2020-01-18 RX ADMIN — AZITHROMYCIN MONOHYDRATE 500 MG: 500 INJECTION, POWDER, LYOPHILIZED, FOR SOLUTION INTRAVENOUS at 14:16

## 2020-01-18 RX ADMIN — MICONAZOLE NITRATE: 20 POWDER TOPICAL at 20:28

## 2020-01-18 RX ADMIN — METOPROLOL SUCCINATE 50 MG: 50 TABLET, EXTENDED RELEASE ORAL at 06:32

## 2020-01-18 RX ADMIN — MICONAZOLE NITRATE: 20 POWDER TOPICAL at 09:27

## 2020-01-18 RX ADMIN — ACETAMINOPHEN 650 MG: 325 TABLET, FILM COATED ORAL at 09:43

## 2020-01-18 RX ADMIN — ATORVASTATIN CALCIUM 20 MG: 20 TABLET, FILM COATED ORAL at 20:27

## 2020-01-18 RX ADMIN — FERROUS GLUCONATE 324 MG: 324 TABLET ORAL at 14:15

## 2020-01-18 ASSESSMENT — PAIN DESCRIPTION - DESCRIPTORS
DESCRIPTORS: ACHING
DESCRIPTORS: ACHING

## 2020-01-18 ASSESSMENT — ACTIVITIES OF DAILY LIVING (ADL)
ADLS_ACUITY_SCORE: 19
PREVIOUS_RESPONSIBILITIES: MEAL PREP;HOUSEKEEPING;LAUNDRY;SHOPPING;MEDICATION MANAGEMENT;FINANCES
ADLS_ACUITY_SCORE: 18
ADLS_ACUITY_SCORE: 17
ADLS_ACUITY_SCORE: 17
ADLS_ACUITY_SCORE: 18
ADLS_ACUITY_SCORE: 18

## 2020-01-18 NOTE — PLAN OF CARE
D/I: At the beginning of shift, pt lethargic, not following commands. RR mid 20s on Bipap, FiO2 increased to 65% d/t O2 saturating high 80s.. Notified MD and ABG checked. Pt was repositioned to 45deg in bed (upright) with neck down. CO2 110 this am and slowly down to 94. Pt more awake later in the afternoon. Alert and oriented x 4, following commands and voices needs. UO 10cc/hr... Albumin 25% 50g given. UO 30cc/hr now. To continue Bumex gtt per MD.    A: HR 70-80s, was 50-60s this am. Sbp 80-100s.  Pt stating uncomfortable having HOB at 45deg. Pt currently on Vapotherm, FiO2 at 60%, O2 saturating mid 90s.   P: Plan to recheck VBG. Report given to the night RN and will resume with cares. See flow sheet for details.

## 2020-01-18 NOTE — PROVIDER NOTIFICATION
PROVIDER NOTIFICATION    Date/Time 1/18/2020 at 1740   Provider Name/Title Joce Chang MD MICU   Method of Notification Verbal: Telephone   Notification Reason Request for electrolyte protocol      *Pt is on Bumex gtt with large diuresis: >4L today    *Continues to have runs of VTACH (>20 beats)   Intervention 2g IV Mag ordered 1x; verbal order     Plan of Action MD will reassess and possibly place electrolyte panel when available.

## 2020-01-18 NOTE — PLAN OF CARE
[id number]  NEURO: oriented. Not lethargic overnight. Using call light appropriately & able to make needs known. Generalized deconditioning. Prn tylenol given for back pain. Generalized discomfort overnight  CV:SR 70s-80s; BP stable overnight. No episodes of sustained hypotension. Vtach runs. Lytes wnl. Pt asymptomatic w/ runs.   RESP:vapotherm while awake. bipap noc. Lungs diminished. Pt able to cough up thick secretions  VBGs w/ slight improvement overnight. fio2 weaned to 50%  GI:CL. No issues swallowing. Advance diet today? No BM since 1/15  : adequate via antunez cath. diuresing Bumex gtt continues @ 0.5    SHIFT EVENTS:  -d/w MICU INR increasing this AM/ despite not receiving AC yesterday. No evidence of bleeding. hepatic panel ordered.     Please see MAR/flowsheets/writer previous note for further interventions/assessments     PLAN:Continue to monitor pt & notify MD of any acute changes. Continue to closely monitor respiratory status.    Tabatha Rios RN on 1/18/2020 at 6:30 AM

## 2020-01-18 NOTE — PROVIDER NOTIFICATION
-0500:d/w MICU this AM pt w/ 20 beat run of vtach followed by short frequent bursts of 5 beat runs & then sustained SR. Pt lytes normal this AM. Pt asymptomatic throughout. Will continue to monitor.   -0550: d/w MICU pt w/ 23 beat run of vtach. Followed by short brief runs of Vtach. MD in to see pt. VSS. Pt remained asymptomatic throughout event/talking w/ writer & denied SOB or other stx of discomfort. Metoprolol increased this AM.     vtach runs saved in smart disclosure and placed in pt chart/reviewed by MASON Rios RN on 1/18/2020 at 6:15 AM

## 2020-01-18 NOTE — PROGRESS NOTES
[facility] CRITICAL CARE STAFF NOTE    Acute Critical Care Issues/Key Findings:     Arianna Siddiqi remains critically ill due to acute on chronic hypercapnic and hypoxic respiratory failure requiring increased bipap, now improving, and AMS, now improving, in the setting of a history of HFpEF, DMII, HTN, gout, CARLOS, and morbid obesity.     1. Acute on chronic hypercapnic and hypoxic respiratory failure, CARLOS/OHS at baseline: tolerated bipap overnight, with improvement in mentation. Also tolerated vapotherm yesterday during the day. Will place back on vapotherm today as well. Patient came in with viral symptoms prior to admission. WBC count remains normal. No focal consolidation, but may have atypical pneumonia. Treat with antibiotics as below. Fluid overload likely causing a decompensation in her respiratory status as well. Diuresis as below. Work to place patient on home Trilogy ventilator settings at night (uses this at home, will need it at discharge). Try to determine home settings. One order from 2016 mentions AVAPS, but unclear if this is accurate.   2. Acute respiratory acidosis: improving, BiPAP with sleep and naps. Follow VBG.   2. HFpEF, volume overload: good urine output today on bumex drip. CXR today  appears about unchanged from 1/16 with ongoing pulmonary edema. CO2 climbing on BMP, will give a dose of acetazolamide today. Will repeat BMP BID given aggressive diuresis.   3. Resolved altered mental status/encephalopathy, probable CO2 narcosis: likely was due to hypercapnia, as this is now improved significantly.   4. Possible atypical pneumonia: with viral prodrome prior to admission, will continue a course of azithromycin. Follow cultures. Procal has been low/normal. One blood culture from 1/15 with staph capitis (CoAg neg), but repeat negative- likely contaminant.   5. Nutrition: currently on clear liquid diet, advance if stable on vapotherm later today.     The patient was seen and examined  with the resident/fellow physician.  We have discussed the patient in detail and I agree with the findings, assessment, and plan as documented in resident/fellow Dr. Chang's, note from today (January 18, 2020). Please see this note for additional details of physical exam, history, medications and labs.  I agree with assessment and plan as documented in said note, with main points listed above.   The plan was formulated in conjunction with pharmacy, ICU nurses, and respiratory therapist. I have personally reviewed today's vital signs, medications, laboratory and imaging results. I have reviewed all consults that have been ordered and are active for this patient.      Critical Care Time: 40 min.  I spent this time (excluding procedures) personally providing and directing critical care services at the bedside and on the critical care unit.      Date of Service (when I saw the patient): 01/18/20    Lev Melara MD    Department of Medicine, Division of Pulmonary, Allergy, Critical Care, and Sleep Medicine  Pager: [phone]

## 2020-01-18 NOTE — PLAN OF CARE
D/I: Pt on vapotherm at 60%FiO2, O2 saturating high 90s. Pt much more alert today as CO2 trending down. SR 60-70s w/frequent PVCs (pt had few runs of Vtach this am, asymptomatic) MD aware. Pt continues on Bumex gtt with good UO. (see I&O) Plan to check labs at 16:00  A: Pt up in chair and tolerating low Na diet. NO BM yet but +gas. Pt denies nausea. C/o some headache... medicated with PRN tylenol with some relief.   P: Continue to monitor respiratory and wean FiO2 as able. Report given to the next nurse and will resume with cares.

## 2020-01-18 NOTE — PLAN OF CARE
Discharge Planner OT   Patient plan for discharge: none stated  Current status: Pt Min A with rolling side to side for cares and sling placement, dependent lift from bed to chair with Ax2.  Pt completed simple grooming tasks while sitting in chair with set up.  Barriers to return to prior living situation: medical status, lack of support available  Recommendations for discharge: Anticipate pt may need a rehab stay  Rationale for recommendations: pt does not have support system at home and is unable to get to MD appointments.  Pt reports she hired a service for travel to appointments, however it was over $90 and she reports having difficulty paying this amount.  Pt would benefit from social work order to review assistance available.       Entered by: Dulce Dwyer 01/18/2020 9:52 AM

## 2020-01-18 NOTE — PROGRESS NOTES
"   01/18/20 0852   Quick Adds   Type of Visit Initial Occupational Therapy Evaluation   Living Environment   Lives With alone   Living Arrangements apartment   Home Accessibility no concerns   Living Environment Comment Pt lives alone in an apartment with a tub shower and elevator in building.   Self-Care   Usual Activity Tolerance fair   Current Activity Tolerance poor   Regular Exercise No   Equipment Currently Used at Home none   Activity/Exercise/Self-Care Comment pt reports SOB limiting activity.     Functional Level   Ambulation 0-->independent   Transferring 0-->independent   Toileting 0-->independent   Bathing 0-->independent   Dressing 0-->independent   Eating 0-->independent   Communication 0-->understands/communicates without difficulty   Swallowing 0-->swallows foods/liquids without difficulty   Cognition 0 - no cognition issues reported   Fall history within last six months no   Which of the above functional risks had a recent onset or change? ambulation;transferring;toileting;bathing;dressing   Prior Functional Level Comment Pt I at baseline, limited recently due to SOB   General Information   Onset of Illness/Injury or Date of Surgery - Date 01/15/20   Referring Physician Michelle Looney MD   Patient/Family Goals Statement to get better   Additional Occupational Profile Info/Pertinent History of Current Problem Arianna Siddiqi \"Nate" is a 59 year old female with medical history significant for HFrEF, T2DM, CKD, HTN, gout, OHS/CARLOS, morbid obesity who had not been seeing doctors for years (though could demonstrate some degree of medication compliance), presented to the ED on 1/15 with dyspnea.   Precautions/Limitations fall precautions   General Observations Pt with decreased I with ADLs, functional mobility and transfers.   Cognitive Status Examination   Orientation orientation to person, place and time   Level of Consciousness alert   Follows Commands (Cognition) WNL   Memory intact " "  Attention No deficits were identified   Organization/Problem Solving No deficits were identified   Executive Function No deficits were identified   Cognitive Comment continue to monitor as needed   Visual Perception   Visual Perception No deficits were identified  (wears reading glasses)   Sensory Examination   Sensory Quick Adds No deficits were identified   Pain Assessment   Patient Currently in Pain No   Mobility   Bed Mobility Comments Min A   Transfer Skills   Transfer Comments to be assessed   Instrumental Activities of Daily Living (IADL)   Previous Responsibilities meal prep;housekeeping;laundry;shopping;medication management;finances   Activities of Daily Living Analysis   Impairments Contributing to Impaired Activities of Daily Living strength decreased   ADL Comments deconditioning   Clinical Impression   Criteria for Skilled Therapeutic Interventions Met yes, treatment indicated   OT Diagnosis decreased I with ADLs, functional mobility and transfers   Influenced by the following impairments deconditioning, medical status   Assessment of Occupational Performance 1-3 Performance Deficits   Identified Performance Deficits dressing, functional mobility, g/h   Clinical Decision Making (Complexity) Low complexity   Therapy Frequency Daily   Predicted Duration of Therapy Intervention (days/wks) 1 week   Anticipated Discharge Disposition Home with Home Therapy;Transitional Care Facility;Acute Rehabilitation Facility   Risks and Benefits of Treatment have been explained. Yes   Patient, Family & other staff in agreement with plan of care Yes   [facility] AM-PAC  \"6 Clicks\" Daily Activity Inpatient Short Form   1. Putting on and taking off regular lower body clothing? 2 - A Lot   2. Bathing (including washing, rinsing, drying)? 3 - A Little   3. Toileting, which includes using toilet, bedpan or urinal? 1 - Total   4. Putting on and taking off regular upper body clothing? 3 - A Little   5. Taking care of " personal grooming such as brushing teeth? 3 - A Little   6. Eating meals? 3 - A Little   Daily Activity Raw Score (Score out of 24.Lower scores equate to lower levels of function) 15   Total Evaluation Time   Total Evaluation Time (Minutes) 6

## 2020-01-19 ENCOUNTER — APPOINTMENT (OUTPATIENT)
Dept: GENERAL RADIOLOGY | Facility: CLINIC | Age: 60
DRG: 291 | End: 2020-01-19
Payer: MEDICARE

## 2020-01-19 ENCOUNTER — APPOINTMENT (OUTPATIENT)
Dept: GENERAL RADIOLOGY | Facility: CLINIC | Age: 60
DRG: 291 | End: 2020-01-19
Attending: INTERNAL MEDICINE
Payer: MEDICARE

## 2020-01-19 LAB
ANION GAP SERPL CALCULATED.3IONS-SCNC: 4 MMOL/L (ref 3–14)
ANION GAP SERPL CALCULATED.3IONS-SCNC: 4 MMOL/L (ref 3–14)
ANION GAP SERPL CALCULATED.3IONS-SCNC: <1 MMOL/L (ref 3–14)
ANION GAP SERPL CALCULATED.3IONS-SCNC: <1 MMOL/L (ref 3–14)
BACTERIA SPEC CULT: ABNORMAL
BACTERIA SPEC CULT: NORMAL
BASE EXCESS BLDV CALC-SCNC: 10.1 MMOL/L
BASE EXCESS BLDV CALC-SCNC: 11 MMOL/L
BASE EXCESS BLDV CALC-SCNC: 11.4 MMOL/L
BASE EXCESS BLDV CALC-SCNC: 11.9 MMOL/L
BASE EXCESS BLDV CALC-SCNC: 14.2 MMOL/L
BASE EXCESS BLDV CALC-SCNC: 14.3 MMOL/L
BASE EXCESS BLDV CALC-SCNC: 14.3 MMOL/L
BUN SERPL-MCNC: 17 MG/DL (ref 7–30)
BUN SERPL-MCNC: 18 MG/DL (ref 7–30)
BUN SERPL-MCNC: 21 MG/DL (ref 7–30)
BUN SERPL-MCNC: 21 MG/DL (ref 7–30)
CALCIUM SERPL-MCNC: 8.8 MG/DL (ref 8.5–10.1)
CALCIUM SERPL-MCNC: 9 MG/DL (ref 8.5–10.1)
CALCIUM SERPL-MCNC: 9.1 MG/DL (ref 8.5–10.1)
CALCIUM SERPL-MCNC: 9.4 MG/DL (ref 8.5–10.1)
CHLORIDE SERPL-SCNC: 93 MMOL/L (ref 94–109)
CHLORIDE SERPL-SCNC: 93 MMOL/L (ref 94–109)
CHLORIDE SERPL-SCNC: 94 MMOL/L (ref 94–109)
CHLORIDE SERPL-SCNC: 94 MMOL/L (ref 94–109)
CO2 SERPL-SCNC: 39 MMOL/L (ref 20–32)
CO2 SERPL-SCNC: 41 MMOL/L (ref 20–32)
CO2 SERPL-SCNC: 43 MMOL/L (ref 20–32)
CO2 SERPL-SCNC: 43 MMOL/L (ref 20–32)
CREAT SERPL-MCNC: 0.97 MG/DL (ref 0.52–1.04)
CREAT SERPL-MCNC: 1.09 MG/DL (ref 0.52–1.04)
CREAT SERPL-MCNC: 1.17 MG/DL (ref 0.52–1.04)
CREAT SERPL-MCNC: 1.26 MG/DL (ref 0.52–1.04)
ERYTHROCYTE [DISTWIDTH] IN BLOOD BY AUTOMATED COUNT: 21.2 % (ref 10–15)
GFR SERPL CREATININE-BSD FRML MDRD: 46 ML/MIN/{1.73_M2}
GFR SERPL CREATININE-BSD FRML MDRD: 50 ML/MIN/{1.73_M2}
GFR SERPL CREATININE-BSD FRML MDRD: 55 ML/MIN/{1.73_M2}
GFR SERPL CREATININE-BSD FRML MDRD: 63 ML/MIN/{1.73_M2}
GLUCOSE BLDC GLUCOMTR-MCNC: 115 MG/DL (ref 70–99)
GLUCOSE BLDC GLUCOMTR-MCNC: 128 MG/DL (ref 70–99)
GLUCOSE BLDC GLUCOMTR-MCNC: 54 MG/DL (ref 70–99)
GLUCOSE BLDC GLUCOMTR-MCNC: 65 MG/DL (ref 70–99)
GLUCOSE BLDC GLUCOMTR-MCNC: 71 MG/DL (ref 70–99)
GLUCOSE BLDC GLUCOMTR-MCNC: 75 MG/DL (ref 70–99)
GLUCOSE BLDC GLUCOMTR-MCNC: 77 MG/DL (ref 70–99)
GLUCOSE BLDC GLUCOMTR-MCNC: 78 MG/DL (ref 70–99)
GLUCOSE BLDC GLUCOMTR-MCNC: 87 MG/DL (ref 70–99)
GLUCOSE BLDC GLUCOMTR-MCNC: 88 MG/DL (ref 70–99)
GLUCOSE BLDC GLUCOMTR-MCNC: 90 MG/DL (ref 70–99)
GLUCOSE BLDC GLUCOMTR-MCNC: 94 MG/DL (ref 70–99)
GLUCOSE BLDC GLUCOMTR-MCNC: 99 MG/DL (ref 70–99)
GLUCOSE SERPL-MCNC: 111 MG/DL (ref 70–99)
GLUCOSE SERPL-MCNC: 124 MG/DL (ref 70–99)
GLUCOSE SERPL-MCNC: 76 MG/DL (ref 70–99)
GLUCOSE SERPL-MCNC: 79 MG/DL (ref 70–99)
HCO3 BLDV-SCNC: 41 MMOL/L (ref 21–28)
HCO3 BLDV-SCNC: 43 MMOL/L (ref 21–28)
HCO3 BLDV-SCNC: 44 MMOL/L (ref 21–28)
HCO3 BLDV-SCNC: 44 MMOL/L (ref 21–28)
HCT VFR BLD AUTO: 42.3 % (ref 35–47)
HGB BLD-MCNC: 11 G/DL (ref 11.7–15.7)
INR PPP: 2.81 (ref 0.86–1.14)
Lab: ABNORMAL
Lab: NORMAL
MAGNESIUM SERPL-MCNC: 1.9 MG/DL (ref 1.6–2.3)
MAGNESIUM SERPL-MCNC: 2 MG/DL (ref 1.6–2.3)
MAGNESIUM SERPL-MCNC: 2 MG/DL (ref 1.6–2.3)
MAGNESIUM SERPL-MCNC: 2.1 MG/DL (ref 1.6–2.3)
MCH RBC QN AUTO: 24 PG (ref 26.5–33)
MCHC RBC AUTO-ENTMCNC: 26 G/DL (ref 31.5–36.5)
MCV RBC AUTO: 92 FL (ref 78–100)
O2/TOTAL GAS SETTING VFR VENT: 100 %
O2/TOTAL GAS SETTING VFR VENT: 40 %
O2/TOTAL GAS SETTING VFR VENT: 40 %
O2/TOTAL GAS SETTING VFR VENT: 50 %
O2/TOTAL GAS SETTING VFR VENT: 50 %
O2/TOTAL GAS SETTING VFR VENT: 70 %
O2/TOTAL GAS SETTING VFR VENT: ABNORMAL %
OXYHGB MFR BLDV: 64 %
PCO2 BLDV: 103 MM HG (ref 40–50)
PCO2 BLDV: 112 MM HG (ref 40–50)
PCO2 BLDV: 123 MM HG (ref 40–50)
PCO2 BLDV: 61 MM HG (ref 40–50)
PCO2 BLDV: 73 MM HG (ref 40–50)
PCO2 BLDV: 85 MM HG (ref 40–50)
PCO2 BLDV: 96 MM HG (ref 40–50)
PH BLDV: 7.16 PH (ref 7.32–7.43)
PH BLDV: 7.19 PH (ref 7.32–7.43)
PH BLDV: 7.23 PH (ref 7.32–7.43)
PH BLDV: 7.26 PH (ref 7.32–7.43)
PH BLDV: 7.32 PH (ref 7.32–7.43)
PH BLDV: 7.38 PH (ref 7.32–7.43)
PH BLDV: 7.44 PH (ref 7.32–7.43)
PHOSPHATE SERPL-MCNC: 5 MG/DL (ref 2.5–4.5)
PLATELET # BLD AUTO: 212 10E9/L (ref 150–450)
PO2 BLDV: 35 MM HG (ref 25–47)
PO2 BLDV: 37 MM HG (ref 25–47)
PO2 BLDV: 39 MM HG (ref 25–47)
PO2 BLDV: 41 MM HG (ref 25–47)
PO2 BLDV: 50 MM HG (ref 25–47)
PO2 BLDV: 50 MM HG (ref 25–47)
PO2 BLDV: 76 MM HG (ref 25–47)
POTASSIUM SERPL-SCNC: 3.3 MMOL/L (ref 3.4–5.3)
POTASSIUM SERPL-SCNC: 3.3 MMOL/L (ref 3.4–5.3)
POTASSIUM SERPL-SCNC: 3.5 MMOL/L (ref 3.4–5.3)
POTASSIUM SERPL-SCNC: 3.6 MMOL/L (ref 3.4–5.3)
RADIOLOGIST FLAGS: ABNORMAL
RBC # BLD AUTO: 4.58 10E12/L (ref 3.8–5.2)
SODIUM SERPL-SCNC: 136 MMOL/L (ref 133–144)
SODIUM SERPL-SCNC: 137 MMOL/L (ref 133–144)
SODIUM SERPL-SCNC: 137 MMOL/L (ref 133–144)
SODIUM SERPL-SCNC: 138 MMOL/L (ref 133–144)
SPECIMEN SOURCE: ABNORMAL
SPECIMEN SOURCE: NORMAL
WBC # BLD AUTO: 7.3 10E9/L (ref 4–11)

## 2020-01-19 PROCEDURE — 80048 BASIC METABOLIC PNL TOTAL CA: CPT | Performed by: INTERNAL MEDICINE

## 2020-01-19 PROCEDURE — 94002 VENT MGMT INPAT INIT DAY: CPT

## 2020-01-19 PROCEDURE — 20000004 ZZH R&B ICU UMMC

## 2020-01-19 PROCEDURE — 83735 ASSAY OF MAGNESIUM: CPT | Performed by: STUDENT IN AN ORGANIZED HEALTH CARE EDUCATION/TRAINING PROGRAM

## 2020-01-19 PROCEDURE — 99291 CRITICAL CARE FIRST HOUR: CPT | Mod: GC | Performed by: INTERNAL MEDICINE

## 2020-01-19 PROCEDURE — 25000125 ZZHC RX 250: Performed by: STUDENT IN AN ORGANIZED HEALTH CARE EDUCATION/TRAINING PROGRAM

## 2020-01-19 PROCEDURE — 82803 BLOOD GASES ANY COMBINATION: CPT | Performed by: STUDENT IN AN ORGANIZED HEALTH CARE EDUCATION/TRAINING PROGRAM

## 2020-01-19 PROCEDURE — 25000128 H RX IP 250 OP 636: Performed by: INTERNAL MEDICINE

## 2020-01-19 PROCEDURE — 83735 ASSAY OF MAGNESIUM: CPT | Performed by: INTERNAL MEDICINE

## 2020-01-19 PROCEDURE — 85027 COMPLETE CBC AUTOMATED: CPT | Performed by: INTERNAL MEDICINE

## 2020-01-19 PROCEDURE — 25000132 ZZH RX MED GY IP 250 OP 250 PS 637: Mod: GY | Performed by: STUDENT IN AN ORGANIZED HEALTH CARE EDUCATION/TRAINING PROGRAM

## 2020-01-19 PROCEDURE — 85610 PROTHROMBIN TIME: CPT | Performed by: INTERNAL MEDICINE

## 2020-01-19 PROCEDURE — 25000132 ZZH RX MED GY IP 250 OP 250 PS 637: Mod: GY | Performed by: NURSE PRACTITIONER

## 2020-01-19 PROCEDURE — 40000556 ZZH STATISTIC PERIPHERAL IV START W US GUIDANCE

## 2020-01-19 PROCEDURE — 40000986 XR ABDOMEN PORT 1 VW

## 2020-01-19 PROCEDURE — 82805 BLOOD GASES W/O2 SATURATION: CPT | Performed by: INTERNAL MEDICINE

## 2020-01-19 PROCEDURE — 80048 BASIC METABOLIC PNL TOTAL CA: CPT | Performed by: STUDENT IN AN ORGANIZED HEALTH CARE EDUCATION/TRAINING PROGRAM

## 2020-01-19 PROCEDURE — 25000132 ZZH RX MED GY IP 250 OP 250 PS 637: Mod: GY | Performed by: INTERNAL MEDICINE

## 2020-01-19 PROCEDURE — 25800025 ZZH RX 258: Performed by: STUDENT IN AN ORGANIZED HEALTH CARE EDUCATION/TRAINING PROGRAM

## 2020-01-19 PROCEDURE — 40000275 ZZH STATISTIC RCP TIME EA 10 MIN

## 2020-01-19 PROCEDURE — 25000128 H RX IP 250 OP 636: Performed by: STUDENT IN AN ORGANIZED HEALTH CARE EDUCATION/TRAINING PROGRAM

## 2020-01-19 PROCEDURE — 00000146 ZZHCL STATISTIC GLUCOSE BY METER IP

## 2020-01-19 PROCEDURE — 25000128 H RX IP 250 OP 636

## 2020-01-19 PROCEDURE — 25800030 ZZH RX IP 258 OP 636: Performed by: STUDENT IN AN ORGANIZED HEALTH CARE EDUCATION/TRAINING PROGRAM

## 2020-01-19 PROCEDURE — 40000986 XR CHEST PORT 1 VW

## 2020-01-19 PROCEDURE — 84100 ASSAY OF PHOSPHORUS: CPT | Performed by: INTERNAL MEDICINE

## 2020-01-19 PROCEDURE — 5A0945Z ASSISTANCE WITH RESPIRATORY VENTILATION, 24-96 CONSECUTIVE HOURS: ICD-10-PCS | Performed by: INTERNAL MEDICINE

## 2020-01-19 RX ORDER — POTASSIUM CL/LIDO/0.9 % NACL 10MEQ/0.1L
10 INTRAVENOUS SOLUTION, PIGGYBACK (ML) INTRAVENOUS
Status: DISCONTINUED | OUTPATIENT
Start: 2020-01-19 | End: 2020-01-21

## 2020-01-19 RX ORDER — ACETAMINOPHEN 650 MG/1
650 SUPPOSITORY RECTAL EVERY 4 HOURS PRN
Status: DISCONTINUED | OUTPATIENT
Start: 2020-01-19 | End: 2020-01-21

## 2020-01-19 RX ORDER — POTASSIUM CHLORIDE 29.8 MG/ML
20 INJECTION INTRAVENOUS
Status: DISCONTINUED | OUTPATIENT
Start: 2020-01-19 | End: 2020-01-21

## 2020-01-19 RX ORDER — POTASSIUM CHLORIDE 7.45 MG/ML
10 INJECTION INTRAVENOUS
Status: DISCONTINUED | OUTPATIENT
Start: 2020-01-19 | End: 2020-01-19

## 2020-01-19 RX ORDER — BUMETANIDE 0.25 MG/ML
4 INJECTION INTRAMUSCULAR; INTRAVENOUS EVERY 12 HOURS
Status: DISCONTINUED | OUTPATIENT
Start: 2020-01-19 | End: 2020-01-19

## 2020-01-19 RX ORDER — WARFARIN SODIUM 3 MG/1
3 TABLET ORAL
Status: COMPLETED | OUTPATIENT
Start: 2020-01-19 | End: 2020-01-19

## 2020-01-19 RX ORDER — METOPROLOL SUCCINATE 50 MG/1
50 TABLET, EXTENDED RELEASE ORAL
Status: DISCONTINUED | OUTPATIENT
Start: 2020-01-19 | End: 2020-01-20

## 2020-01-19 RX ORDER — ETOMIDATE 2 MG/ML
INJECTION INTRAVENOUS
Status: DISCONTINUED
Start: 2020-01-19 | End: 2020-01-19

## 2020-01-19 RX ORDER — POTASSIUM CHLORIDE 750 MG/1
20-40 TABLET, EXTENDED RELEASE ORAL
Status: DISCONTINUED | OUTPATIENT
Start: 2020-01-19 | End: 2020-01-21

## 2020-01-19 RX ORDER — BUMETANIDE 0.25 MG/ML
4 INJECTION INTRAMUSCULAR; INTRAVENOUS EVERY 8 HOURS
Status: DISCONTINUED | OUTPATIENT
Start: 2020-01-19 | End: 2020-01-19

## 2020-01-19 RX ORDER — POTASSIUM CHLORIDE 7.45 MG/ML
10 INJECTION INTRAVENOUS
Status: DISCONTINUED | OUTPATIENT
Start: 2020-01-19 | End: 2020-01-21

## 2020-01-19 RX ORDER — FENTANYL CITRATE 50 UG/ML
25-50 INJECTION, SOLUTION INTRAMUSCULAR; INTRAVENOUS
Status: DISCONTINUED | OUTPATIENT
Start: 2020-01-19 | End: 2020-01-25

## 2020-01-19 RX ORDER — POTASSIUM CHLORIDE 1.5 G/1.58G
20-40 POWDER, FOR SOLUTION ORAL
Status: DISCONTINUED | OUTPATIENT
Start: 2020-01-19 | End: 2020-01-21

## 2020-01-19 RX ORDER — DEXTROSE MONOHYDRATE 100 MG/ML
INJECTION, SOLUTION INTRAVENOUS CONTINUOUS
Status: DISCONTINUED | OUTPATIENT
Start: 2020-01-19 | End: 2020-01-22

## 2020-01-19 RX ORDER — PROPOFOL 10 MG/ML
5-75 INJECTION, EMULSION INTRAVENOUS CONTINUOUS
Status: DISCONTINUED | OUTPATIENT
Start: 2020-01-19 | End: 2020-01-20

## 2020-01-19 RX ORDER — PROPOFOL 10 MG/ML
INJECTION, EMULSION INTRAVENOUS
Status: COMPLETED
Start: 2020-01-19 | End: 2020-01-19

## 2020-01-19 RX ORDER — POTASSIUM CHLORIDE 29.8 MG/ML
20 INJECTION INTRAVENOUS
Status: DISPENSED | OUTPATIENT
Start: 2020-01-19 | End: 2020-01-19

## 2020-01-19 RX ADMIN — POTASSIUM CHLORIDE 20 MEQ: 29.8 INJECTION, SOLUTION INTRAVENOUS at 18:33

## 2020-01-19 RX ADMIN — AZITHROMYCIN MONOHYDRATE 500 MG: 500 INJECTION, POWDER, LYOPHILIZED, FOR SOLUTION INTRAVENOUS at 14:19

## 2020-01-19 RX ADMIN — FENTANYL CITRATE 25 MCG: 50 INJECTION, SOLUTION INTRAMUSCULAR; INTRAVENOUS at 10:33

## 2020-01-19 RX ADMIN — WARFARIN SODIUM 3 MG: 3 TABLET ORAL at 18:33

## 2020-01-19 RX ADMIN — PROPOFOL 15 MCG/KG/MIN: 10 INJECTION, EMULSION INTRAVENOUS at 15:02

## 2020-01-19 RX ADMIN — MICONAZOLE NITRATE: 20 POWDER TOPICAL at 12:32

## 2020-01-19 RX ADMIN — ATORVASTATIN CALCIUM 20 MG: 20 TABLET, FILM COATED ORAL at 20:03

## 2020-01-19 RX ADMIN — DEXTROSE 50 % IN WATER (D50W) INTRAVENOUS SYRINGE 50 ML: at 15:41

## 2020-01-19 RX ADMIN — PROPOFOL 20 MCG/KG/MIN: 10 INJECTION, EMULSION INTRAVENOUS at 20:05

## 2020-01-19 RX ADMIN — PROPOFOL 10 MCG/KG/MIN: 10 INJECTION, EMULSION INTRAVENOUS at 03:38

## 2020-01-19 RX ADMIN — ALLOPURINOL 200 MG: 100 TABLET ORAL at 12:31

## 2020-01-19 RX ADMIN — POTASSIUM CHLORIDE 20 MEQ: 29.8 INJECTION, SOLUTION INTRAVENOUS at 16:17

## 2020-01-19 RX ADMIN — Medication 2 G: at 17:18

## 2020-01-19 RX ADMIN — FENTANYL CITRATE 25 MCG: 50 INJECTION, SOLUTION INTRAMUSCULAR; INTRAVENOUS at 16:21

## 2020-01-19 RX ADMIN — LISINOPRIL 5 MG: 2.5 TABLET ORAL at 12:31

## 2020-01-19 RX ADMIN — SENNOSIDES AND DOCUSATE SODIUM 1 TABLET: 8.6; 5 TABLET ORAL at 20:03

## 2020-01-19 RX ADMIN — FERROUS GLUCONATE 324 MG: 324 TABLET ORAL at 20:03

## 2020-01-19 RX ADMIN — DEXTROSE 50 % IN WATER (D50W) INTRAVENOUS SYRINGE 25 ML: at 11:31

## 2020-01-19 RX ADMIN — POTASSIUM CHLORIDE 20 MEQ: 29.8 INJECTION, SOLUTION INTRAVENOUS at 11:44

## 2020-01-19 RX ADMIN — DEXTROSE 50 % IN WATER (D50W) INTRAVENOUS SYRINGE 25 ML: at 11:06

## 2020-01-19 RX ADMIN — DEXTROSE MONOHYDRATE: 100 INJECTION, SOLUTION INTRAVENOUS at 16:30

## 2020-01-19 RX ADMIN — PROPOFOL 12 MCG/KG/MIN: 10 INJECTION, EMULSION INTRAVENOUS at 09:54

## 2020-01-19 RX ADMIN — MICONAZOLE NITRATE: 20 POWDER TOPICAL at 20:03

## 2020-01-19 RX ADMIN — SENNOSIDES AND DOCUSATE SODIUM 1 TABLET: 8.6; 5 TABLET ORAL at 12:31

## 2020-01-19 ASSESSMENT — ACTIVITIES OF DAILY LIVING (ADL)
ADLS_ACUITY_SCORE: 19
ADLS_ACUITY_SCORE: 20
ADLS_ACUITY_SCORE: 19
ADLS_ACUITY_SCORE: 19
ADLS_ACUITY_SCORE: 20
ADLS_ACUITY_SCORE: 20

## 2020-01-19 NOTE — PLAN OF CARE
Neuro: arouses to name and able to open eyes. Moves all extremities. Pupils PERRLA.   CV: SR, 60-80s. Pulses can be difficult to find d/t edema and obesity. Afebrile. SBP ~120s-130s.  Resp: Pt was intubated at 0317. CMV 40%/400/18/5. Pt became more lethargic around 0000. Lungs are clear- diminished.   GI/: NPO. No bm overnight. Baig, adequate output.  Skin: L side of panis is excoriated. Coccyx has mepilex to help protect. R inner thigh has a skin tear, small amount of drainage out. Skin is rosa.   Plan: Stabilize pt. Continue to monitor fluid status and resp status.   Labs: Multiple VBGs sent. Pts PCO2 up to 123 before intubation.

## 2020-01-19 NOTE — PLAN OF CARE
Major Shift Events:  Pt intubated overnight, remains sedated with propofol and prn fentanyl. Vent settings unchanged, minimal secretions. Large autodiuresis continues, approx 2 L this shift.  Lytes drawn, replaced per orders, discussed with MICU resident.  Metoprolol d/c'd, HR 55-70s. Frequent PVCs/PACs. BG trending low, D50 given; continues low, D10 drip to start. No family present. Some bleeding from shantel area noted, unable to determine if vaginal or antunez trauma d/t body habitus; MD aware  Bilateral soft wrist restraints in place at start of shift.  MICU resident advised of need for restraint orders.   Pt pulling at tubes, lines when released, no evidence of understanding, pt sedated.  Plan: Continue to assess UO, electrolytes and call for replacements.  Will attempt PS trial this evening, will consider extubation tomorrow.   For vital signs and complete assessments, please see documentation flowsheets.

## 2020-01-19 NOTE — PROGRESS NOTES
[facility] CRITICAL CARE STAFF NOTE    Acute Critical Care Issues/Key Findings:     Arianna Siddiqi remains critically ill due to acute on chronic hypercapnic and hypoxic respiratory failure, s/p intubation on 1/19 (early morning), now improving, and AMS, in the setting of a history of HFpEF, atrial fibrillation, DMII, HTN, gout, CARLOS, and morbid obesity.     1. Acute on chronic hypoxic and hypercapnic respiratory failure: Unfortunately overnight, was not placed on Bipap initially during sleep, and she became more somnolent. PCO2 was 96, she was subsequently placed on Bipap, and after 1.5 hrs, PCO2 was 127. She was then intubated. After intubation, PCO2 has improved, on minimal vent settings. Will wean sedation and attempt PST if awake. Likely plan extubation tomorrow if stable, to give day of re-calibration of her elevated serum bicarb.   2. Resolved acute respiratory acidosis: recurred overnight, now resolved with ventilation. Although PCO2 is now 73, pH is normal.   3. Altered mental status, encephalopathy: since intubation, has not followed commands. On propofol (low dose). Somewhat agitated when propofol is turned off. Will add fentanyl PRN (25-50 mg). Will monitor status and minimize sedation.   4. HFpEF, volume overload: Very high urine output this AM. Will stop bumex drip. Likely having post ATN diuresis. Will monitor BMP and I/Os today. If UOP slows down this afternoon, will redose bumex x1.   5. Hypokalemia; due to diuresis. Follow, replace as needed.    6. Possible atypical pneumonia: with viral prodrome prior to admission, will continue a course of azithromycin 500 mg daily (day 3/3). Follow cultures. Procal has been low/normal. One blood culture from 1/15 with staph capitis (CoAg neg), but repeat negative- likely contaminant.   7. Acute kidney injury: Cr increased today to 1.26, likely secondary to over diuresis. Initially had JASON due to cardiorenal, which resolved with diuresis. Baseline  appears to be about 0.9. Monitor today. Stop diuretic drip as above. Follow BMP.     The patient was seen and examined with the resident/fellow physician.  We have discussed the patient in detail and I agree with the findings, assessment, and plan as documented in resident/fellow Dr. Chang's, note from today (January 19, 2020). Please see this note for additional details of physical exam, history, medications and labs.  I agree with assessment and plan as documented in said note, with main points listed above.     The plan was formulated in conjunction with pharmacy, ICU nurses, and respiratory therapist. I have personally reviewed today's vital signs, medications, laboratory and imaging results. I have reviewed all consults that have been ordered and are active for this patient.      Critical Care Time: 35 min.  I spent this time (excluding procedures) personally providing and directing critical care services at the bedside and on the critical care unit.      Date of Service (when I saw the patient): 01/19/20    Lev Melara MD    Department of Medicine, Division of Pulmonary, Allergy, Critical Care, and Sleep Medicine  Pager: [phone]

## 2020-01-19 NOTE — PROVIDER NOTIFICATION
Pt was more somnolent. Requested VBG from provider. PCO2 came back at 96, notified MICU provider. Switched pt to BiPap at 70%. After 1 hr, took another VBG. PH of 7.19 and PCO2 of 112, notified MICU provider.

## 2020-01-19 NOTE — PROGRESS NOTES
Assisted with endotracheal intubation for respiratory failure/airway protection. A #8 ETT was placed and secured at 27 cm @ lip. Positive color change noted with CO2 detector, breath sounds were diminished. Patient placed on full vent support, labs and CXR pending.    Isidra Jorge, RT, RT  1/19/2020 3:42 AM

## 2020-01-19 NOTE — PLAN OF CARE
4A PT: Hold; pt not medically appropriate for PT this date. Will hold services at this time, OT to follow when appropriate and include PT when indicated.

## 2020-01-20 LAB
ANION GAP SERPL CALCULATED.3IONS-SCNC: 1 MMOL/L (ref 3–14)
BASE EXCESS BLDV CALC-SCNC: 11.3 MMOL/L
BASE EXCESS BLDV CALC-SCNC: 11.7 MMOL/L
BASE EXCESS BLDV CALC-SCNC: 14 MMOL/L
BUN SERPL-MCNC: 17 MG/DL (ref 7–30)
CALCIUM SERPL-MCNC: 9.3 MG/DL (ref 8.5–10.1)
CHLORIDE SERPL-SCNC: 93 MMOL/L (ref 94–109)
CO2 SERPL-SCNC: 40 MMOL/L (ref 20–32)
CREAT SERPL-MCNC: 0.92 MG/DL (ref 0.52–1.04)
ERYTHROCYTE [DISTWIDTH] IN BLOOD BY AUTOMATED COUNT: 21.1 % (ref 10–15)
GFR SERPL CREATININE-BSD FRML MDRD: 67 ML/MIN/{1.73_M2}
GLUCOSE BLDC GLUCOMTR-MCNC: 104 MG/DL (ref 70–99)
GLUCOSE BLDC GLUCOMTR-MCNC: 107 MG/DL (ref 70–99)
GLUCOSE BLDC GLUCOMTR-MCNC: 111 MG/DL (ref 70–99)
GLUCOSE BLDC GLUCOMTR-MCNC: 120 MG/DL (ref 70–99)
GLUCOSE BLDC GLUCOMTR-MCNC: 122 MG/DL (ref 70–99)
GLUCOSE BLDC GLUCOMTR-MCNC: 99 MG/DL (ref 70–99)
GLUCOSE SERPL-MCNC: 103 MG/DL (ref 70–99)
HCO3 BLDV-SCNC: 40 MMOL/L (ref 21–28)
HCO3 BLDV-SCNC: 40 MMOL/L (ref 21–28)
HCO3 BLDV-SCNC: 41 MMOL/L (ref 21–28)
HCT VFR BLD AUTO: 40.2 % (ref 35–47)
HGB BLD-MCNC: 11.2 G/DL (ref 11.7–15.7)
INR PPP: 2.16 (ref 0.86–1.14)
MAGNESIUM SERPL-MCNC: 1.8 MG/DL (ref 1.6–2.3)
MAGNESIUM SERPL-MCNC: 2 MG/DL (ref 1.6–2.3)
MCH RBC QN AUTO: 24.1 PG (ref 26.5–33)
MCHC RBC AUTO-ENTMCNC: 27.9 G/DL (ref 31.5–36.5)
MCV RBC AUTO: 87 FL (ref 78–100)
O2/TOTAL GAS SETTING VFR VENT: 40 %
OXYHGB MFR BLDV: 74 %
PCO2 BLDV: 64 MM HG (ref 40–50)
PCO2 BLDV: 67 MM HG (ref 40–50)
PCO2 BLDV: 75 MM HG (ref 40–50)
PH BLDV: 7.34 PH (ref 7.32–7.43)
PH BLDV: 7.38 PH (ref 7.32–7.43)
PH BLDV: 7.42 PH (ref 7.32–7.43)
PLATELET # BLD AUTO: 230 10E9/L (ref 150–450)
PO2 BLDV: 38 MM HG (ref 25–47)
PO2 BLDV: 45 MM HG (ref 25–47)
PO2 BLDV: 45 MM HG (ref 25–47)
POTASSIUM SERPL-SCNC: 3 MMOL/L (ref 3.4–5.3)
POTASSIUM SERPL-SCNC: 3.4 MMOL/L (ref 3.4–5.3)
RBC # BLD AUTO: 4.65 10E12/L (ref 3.8–5.2)
SODIUM SERPL-SCNC: 134 MMOL/L (ref 133–144)
WBC # BLD AUTO: 6.8 10E9/L (ref 4–11)

## 2020-01-20 PROCEDURE — 99291 CRITICAL CARE FIRST HOUR: CPT | Mod: GC | Performed by: INTERNAL MEDICINE

## 2020-01-20 PROCEDURE — 25000132 ZZH RX MED GY IP 250 OP 250 PS 637: Mod: GY | Performed by: STUDENT IN AN ORGANIZED HEALTH CARE EDUCATION/TRAINING PROGRAM

## 2020-01-20 PROCEDURE — 25000132 ZZH RX MED GY IP 250 OP 250 PS 637: Mod: GY | Performed by: NURSE PRACTITIONER

## 2020-01-20 PROCEDURE — 84132 ASSAY OF SERUM POTASSIUM: CPT | Performed by: INTERNAL MEDICINE

## 2020-01-20 PROCEDURE — 94003 VENT MGMT INPAT SUBQ DAY: CPT

## 2020-01-20 PROCEDURE — 83735 ASSAY OF MAGNESIUM: CPT | Performed by: INTERNAL MEDICINE

## 2020-01-20 PROCEDURE — 00000146 ZZHCL STATISTIC GLUCOSE BY METER IP

## 2020-01-20 PROCEDURE — 25000128 H RX IP 250 OP 636: Performed by: STUDENT IN AN ORGANIZED HEALTH CARE EDUCATION/TRAINING PROGRAM

## 2020-01-20 PROCEDURE — 82805 BLOOD GASES W/O2 SATURATION: CPT | Performed by: NURSE PRACTITIONER

## 2020-01-20 PROCEDURE — 20000004 ZZH R&B ICU UMMC

## 2020-01-20 PROCEDURE — 80048 BASIC METABOLIC PNL TOTAL CA: CPT | Performed by: INTERNAL MEDICINE

## 2020-01-20 PROCEDURE — 25800025 ZZH RX 258: Performed by: STUDENT IN AN ORGANIZED HEALTH CARE EDUCATION/TRAINING PROGRAM

## 2020-01-20 PROCEDURE — 94660 CPAP INITIATION&MGMT: CPT

## 2020-01-20 PROCEDURE — 85610 PROTHROMBIN TIME: CPT | Performed by: INTERNAL MEDICINE

## 2020-01-20 PROCEDURE — 25000132 ZZH RX MED GY IP 250 OP 250 PS 637: Mod: GY | Performed by: INTERNAL MEDICINE

## 2020-01-20 PROCEDURE — 82803 BLOOD GASES ANY COMBINATION: CPT | Performed by: STUDENT IN AN ORGANIZED HEALTH CARE EDUCATION/TRAINING PROGRAM

## 2020-01-20 PROCEDURE — 25000125 ZZHC RX 250: Performed by: STUDENT IN AN ORGANIZED HEALTH CARE EDUCATION/TRAINING PROGRAM

## 2020-01-20 PROCEDURE — 27210339 ZZH CIRCUIT HUMIDITY W/CPAP BIP

## 2020-01-20 PROCEDURE — 85027 COMPLETE CBC AUTOMATED: CPT | Performed by: INTERNAL MEDICINE

## 2020-01-20 PROCEDURE — 25000128 H RX IP 250 OP 636: Performed by: INTERNAL MEDICINE

## 2020-01-20 PROCEDURE — 40000275 ZZH STATISTIC RCP TIME EA 10 MIN

## 2020-01-20 PROCEDURE — 27210338 ZZH CIRCUIT HUMID FACE/TRACH MSK

## 2020-01-20 PROCEDURE — 40000196 ZZH STATISTIC RAPCV CVP MONITORING

## 2020-01-20 RX ORDER — MAGNESIUM SULFATE HEPTAHYDRATE 40 MG/ML
4 INJECTION, SOLUTION INTRAVENOUS EVERY 4 HOURS PRN
Status: DISCONTINUED | OUTPATIENT
Start: 2020-01-20 | End: 2020-01-27 | Stop reason: HOSPADM

## 2020-01-20 RX ADMIN — POTASSIUM CHLORIDE 20 MEQ: 29.8 INJECTION, SOLUTION INTRAVENOUS at 00:33

## 2020-01-20 RX ADMIN — FERROUS GLUCONATE 324 MG: 324 TABLET ORAL at 19:42

## 2020-01-20 RX ADMIN — SENNOSIDES AND DOCUSATE SODIUM 2 TABLET: 8.6; 5 TABLET ORAL at 19:43

## 2020-01-20 RX ADMIN — SENNOSIDES AND DOCUSATE SODIUM 2 TABLET: 8.6; 5 TABLET ORAL at 08:35

## 2020-01-20 RX ADMIN — PROPOFOL 20 MCG/KG/MIN: 10 INJECTION, EMULSION INTRAVENOUS at 08:26

## 2020-01-20 RX ADMIN — POTASSIUM CHLORIDE 20 MEQ: 29.8 INJECTION, SOLUTION INTRAVENOUS at 18:41

## 2020-01-20 RX ADMIN — LISINOPRIL 5 MG: 2.5 TABLET ORAL at 08:34

## 2020-01-20 RX ADMIN — DEXTROSE MONOHYDRATE: 100 INJECTION, SOLUTION INTRAVENOUS at 10:20

## 2020-01-20 RX ADMIN — POTASSIUM CHLORIDE 20 MEQ: 29.8 INJECTION, SOLUTION INTRAVENOUS at 01:38

## 2020-01-20 RX ADMIN — MICONAZOLE NITRATE: 20 POWDER TOPICAL at 08:27

## 2020-01-20 RX ADMIN — Medication 4.5 MG: at 17:55

## 2020-01-20 RX ADMIN — POTASSIUM CHLORIDE 20 MEQ: 29.8 INJECTION, SOLUTION INTRAVENOUS at 16:03

## 2020-01-20 RX ADMIN — PROPOFOL 20 MCG/KG/MIN: 10 INJECTION, EMULSION INTRAVENOUS at 00:29

## 2020-01-20 RX ADMIN — FENTANYL CITRATE 25 MCG: 50 INJECTION, SOLUTION INTRAMUSCULAR; INTRAVENOUS at 08:33

## 2020-01-20 RX ADMIN — Medication 2 G: at 17:11

## 2020-01-20 RX ADMIN — ATORVASTATIN CALCIUM 20 MG: 20 TABLET, FILM COATED ORAL at 19:43

## 2020-01-20 RX ADMIN — MICONAZOLE NITRATE: 20 POWDER TOPICAL at 19:43

## 2020-01-20 RX ADMIN — ALLOPURINOL 200 MG: 100 TABLET ORAL at 08:34

## 2020-01-20 RX ADMIN — PROPOFOL 20 MCG/KG/MIN: 10 INJECTION, EMULSION INTRAVENOUS at 04:47

## 2020-01-20 ASSESSMENT — ACTIVITIES OF DAILY LIVING (ADL)
ADLS_ACUITY_SCORE: 20

## 2020-01-20 ASSESSMENT — PAIN DESCRIPTION - DESCRIPTORS: DESCRIPTORS: ACHING

## 2020-01-20 NOTE — PROGRESS NOTES
[facility]      MICU Progress Note  Arianna Siddiqi MRN: [id number]  1960  Date of Admission:1/15/2020  Primary care provider: No Ref-Primary, Physician      ASSESSMENT:  59 year old female with medical history significant for HFrEF, T2DM, CKD, OHS/CARLOS admitted to MICU for acute on chronic hypercarbic resp failure, intubated for persistent hypercarbia on bipap      24 hr events: did not wear bipap at night, became confused around midnight, PCO2 elevated to 90s. Placed pt on bipap, repeat gas in 90 minutes showed worsening PCO2 at 126. Pt somnolent, and intubated. Otherwise remained afebrile and HD stable. Sedated AM when seen.      Changes   - hold acetazolamide  - hold bumex  - BMP/Mg bid  - discontinue azithromycin     Neuro   #AMS, resolved  Confusion and lethargy in setting of PCO2 >90, likely CO2 narcosis. - vented     CV  #Right sided CV dysfunction complicated by volume overload   With BNP > 5, 000, per patient, possibly as much as 60 lbs above baseline. ECHO showed no WMA, LVEF 55-60%, Difficult to assess RV. Function probably at least mildly reduced. Takes lasix pta, s/p 80mg IV lasix w/ 6L uop, transitioned to bumex 1/hr and metolazone but had increase in cr. Developed post ATN diuresis on 1/18-19  - hold acetazolamide  - hold bumex  - lytes as below      # Paroxysmal atrial fibrillation   # nonsustained SVT, resolved  EKG on admission was in NSR but noted afib back in 2016. Has been anticoagulated with warfarin. INR 2.06 on admission. Rate control with metoprolol succinate, increased to 50mg bid after nonsustained SVT, pt HD stable and asxs. INR supratherapeutic at 4 today  - metoprolol succinate 50 bid  - holding warfarin  - INR daily     Pulm  # Acute hypoxic hypercarbic respiratory failure   # Respiratory acidosis  # Obesity hypoventilation  # CRALOS  Hypoxia 2/2 HFpEF exacerbation w/ high BNP, weight gain and pulm edema. Oxygenation improved w/ diuresis. Trop trended down, echo showed  normal LVEF w/ no WMA, but unable to assess RV. CXR showed cardiomegaly.Flu/RSV neg. Intubated due to worsening hypercarbia on bipap  - c/w mechanical vent, minimal vent support  - PST in AM of 1/20  - hold diuresis as below  - extubate to bipap when ready    GI  No active issues      Renal  #JASON, 2/2 cardiorenal vs hypovolemia  #Post-ATN diuresis  #Metabolic alkalosis  Baseline cr 0.8, peaked at 1.5 w/in 24hrs after presentation in setting of volume overload. Aggressively diuresed. Suspect initial JASON 2/2 cardiorenal. Cr continued to improve but developed post ATN diuresis night of 1/18. uop >10L between 1/18-19. CO2 rise to 40s despite acetazolamide, suspect contraction alkalosis from auto-diuresis  - hold acetazolamide  - hold bumex  - strict I/O  - bmp/Mg bid, K>4, Mg >2    ID  #Concern for sepsis   hypertensive on arrival w/ neg procal. BP stable since transfer. lactat neg, slight leukocytosis. CXR neg for focal infiltrate, increased interstitial marking likely 2/2 fluid overload, but w/ URI prodrome days pta, will treat empirically for atypical pna. Flu and RSV neg. Received vancx1 and cefrtriaxone x1. Blood and urine cx NGTD  - last day of azithromycin 11/19    Endo  # T2DM  # Hypoglycemia  Has been hyperglycemic on arrival. But NPO during most of the time after transfer while on bipap and intubated. Has had some hypoglcemic episodes into the 60s. Likely due to lack of po intake   - hold sitagliptin  - cont medium sliding scale insulin  - cont fingerstick glucose qid  - cont hypoglycemia protocol w/ D5 gtt     MSK/Skin  # Gout - stable  - continue allopurinol 200 mg po qday        FEN: heart healthy diet  DVT Prophylaxis:  pta warfarin  Disposition: ICU pending diuresis and stablization of resp status  Code Status: Full    Patient was seen and discussed with attending physician Dr. Davalos, who agrees with above assessment and plan.    Joce Chang MD  Internal Medicine PGY-2  [phone]      PHYSICAL  EXAM  Temp  Av.4  F (36.3  C)  Min: 94.4  F (34.7  C)  Max: 98.3  F (36.8  C)      Pulse  Av.8  Min: 60  Max: 99 Resp  Av.9  Min: 12  Max: 34  FiO2 (%)  Av.3 %  Min: 23 %  Max: 100 %  SpO2  Av.6 %  Min: 59 %  Max: 100 %         Intake/Output Summary (Last 24 hours) at 2020 1324  Last data filed at 2020 1200  Gross per 24 hour   Intake 1257.13 ml   Output 3788 ml   Net -2530.87 ml       Gen:vented, sedated  HEENT: PERRL, ETT in place  CV: bradycardic, normal S1/S2, no mrg  Pulm: mechanical breath sounds equal in all lung fields, symmetrical chest rise, no wheezing  Abd: obese, nontender, +BS  Ext: chronic venous stasis changes in LE, trace LE edema  Neuro: sedated, agitated when aroused, does not follow command

## 2020-01-20 NOTE — PLAN OF CARE
Neuro: Follows commands. Pulses +2. Pupils PERRLA.  CV: BP ~110s/60s. SR, 60s-70s. Afebrile.  Resp: CMV 40%/400/18/5. Lungs are clear-diminished. Moderate, thick secretions. Occasionally fights the vent.  GI/: Baig ~200 ml/hr. No BM. Hypoactive bowel sounds. NPO. BG ~100.   Skin: John. Panis is excoriated and has small skin tears, interdry is in place. Coccyx has a mepilex covering, under is a small skin tear. On pt's inner thigh is a skin tear as well.

## 2020-01-20 NOTE — PROGRESS NOTES
[facility]      MICU Progress Note  Arianna Siddiqi MRN: [id number]  1960  Date of Admission:1/15/2020  Primary care provider: No Ref-Primary, Physician      ASSESSMENT:  59 year old female with medical history significant for HFrEF, T2DM, CKD, OHS/CARLOS admitted to MICU for acute on chronic hypercarbic resp failure, intubated for persistent hypercarbia on bipap      24 hr events: remained intubated, afebrile, HD stable, on minimal vent support. Sedated on propofol. Weaned off propofol early in AM, but failed PST due to apnea. Continued to auto-diurese, is currently net 18L neg since admission, lytes stable.     Changes   - BMP/Mg in pm   - PST, attempt to extubate  - wean off propofol      Neuro   #AMS, resolved  Confusion and lethargy in setting of PCO2 >90, likely CO2 narcosis. - vented     CV  #Right sided CV dysfunction complicated by volume overload   With BNP > 5, 000, per patient, possibly as much as 60 lbs above baseline. ECHO showed no WMA, LVEF 55-60%, Difficult to assess RV. Function probably at least mildly reduced. Takes lasix pta, s/p 80mg IV lasix w/ 6L uop, transitioned to bumex 1/hr and metolazone but had increase in cr. Developed post ATN diuresis on 1/18, holding acetazolamide and bumex  - lytes as below  - strict I/O      # Paroxysmal atrial fibrillation   # nonsustained SVT, resolved  EKG on admission was in NSR but noted afib back in 2016. Has been anticoagulated with warfarin. INR 2.06 on admission. Rate control with metoprolol succinate, increased to 50mg bid after nonsustained SVT, pt HD stable and asxs.  - metoprolol succinate 50 bid  - warfarin per pharmD  - INR daily     Pulm  # Acute hypoxic hypercarbic respiratory failure   # Respiratory acidosis  # Obesity hypoventilation  # CARLOS  Hypoxia 2/2 HFpEF exacerbation w/ high BNP, weight gain and pulm edema. Oxygenation improved w/ diuresis. Trop trended down, echo showed normal LVEF w/ no WMA, but unable to assess RV. CXR showed  cardiomegaly.Flu/RSV neg. Intubated due to worsening hypercarbia on bipap, on minimal vent w/ gas returning to baseline  - attempt to PST again in PM  - wean off propofol   - extubate to bipap when ready    GI  No active issues      Renal  #JASON, 2/2 cardiorenal vs hypovolemia  #Post-ATN diuresis  #Metabolic alkalosis  Baseline cr 0.8, peaked at 1.5 w/in 24hrs after presentation in setting of volume overload. Aggressively diuresed. Suspect initial JASON 2/2 cardiorenal. Cr continued to improve but developed post ATN diuresis night of 1/18. uop >10L between 1/18-19. CO2 rise to 40s despite acetazolamide, suspect contraction alkalosis from auto-diuresis. Holding acetazolamide and bumex. Still net neg 3L over last 24hrs.   - continue to hold all diuretics  - BMP/Mg 1400 today, K>4, Mg >2  - strict I/O    ID  #Concern for sepsis   hypertensive on arrival w/ neg procal. BP stable since transfer. lactat neg, slight leukocytosis. CXR neg for focal infiltrate, increased interstitial marking likely 2/2 fluid overload, but w/ URI prodrome days pta, will treat empirically for atypical pna. Flu and RSV neg. Received vancx1 and cefrtriaxone x1. Urine cx NGTD, blood cx + diphtheroid species after 5 days of incubation, suspect contaminant     Endo  # T2DM  # Hypoglycemia  Has been hyperglycemic on arrival. But NPO during most of the time after transfer while on bipap and intubated. Has had some hypoglcemic episodes into the 60s. Likely due to lack of po intake   - hold sitagliptin  - cont medium sliding scale insulin  - cont fingerstick glucose qid  - cont hypoglycemia protocol w/ D5 gtt     MSK/Skin  # Gout - stable  - continue allopurinol 200 mg po qday        FEN: resume TF later in pm if not extubated  DVT Prophylaxis:  pta warfarin, add ppi if not extubated  Disposition: ICU pending extubation  Code Status: Full    Patient was seen and discussed with attending physician Dr. Davalos, who agrees with above assessment and  plan.    Joce Chang MD  Internal Medicine PGY-2  [phone]      PHYSICAL EXAM  Temp  Av.4  F (36.3  C)  Min: 94.4  F (34.7  C)  Max: 98.3  F (36.8  C)      Pulse  Av.8  Min: 60  Max: 99 Resp  Av.9  Min: 12  Max: 34  FiO2 (%)  Av.3 %  Min: 23 %  Max: 100 %  SpO2  Av.6 %  Min: 59 %  Max: 100 %         Intake/Output Summary (Last 24 hours) at 2020 1324  Last data filed at 2020 1200  Gross per 24 hour   Intake 1257.13 ml   Output 3788 ml   Net -2530.87 ml       Gen:vented, sedated  HEENT: PERRL, ETT in place  CV: bradycardic, normal S1/S2, no mrg  Pulm: mechanical breath sounds equal in all lung fields, symmetrical chest rise, no wheezing  Abd: obese, nontender, +BS  Ext: chronic venous stasis changes in LE, trace LE edema  Neuro: sedated, w/d to pain, does not wake to voice

## 2020-01-20 NOTE — PLAN OF CARE
Pt extubated at 1440 and bilat soft wrist restraints dc'd. Pt alert and oriented x 4.  P: continue to assess need for restraints.

## 2020-01-20 NOTE — PROGRESS NOTES
[facility], [place]  Procedure Note          Extubation:       Arianna Siddiqi  MRN# [id number]   [id number]     Date January 20, 2020   Patient extubated at: 14:40   Supplemental Oxygen: Via 40% AVAPS   Cough: The cough is strong   Secretion Mode: Inline and oral   Secretion Amount: Moderate amount, thick and white in color   Respiratory Exam:: Breath sounds: : clear and diminished     Location: Bilateral chest   Skin Exam:: Patient color: Natural    Patient Status: Currently Stable   Arterial Blood Gasses: ABG:   Last Arterial Blood Gas:  pH Arterial   Date Value Ref Range Status   01/17/2020 7.17 (LL) 7.35 - 7.45 pH Final     Comment:     Critical Value called to and read back by  SONAL AQUINO ON 01/17 AT 0949 BY YW       pCO2 Arterial   Date Value Ref Range Status   01/17/2020 105 (HH) 35 - 45 mm Hg Final     Comment:     Critical Value called to and read back by  SONAL AQUINO ON 01/17 AT 0949 BY YW       pO2 Arterial   Date Value Ref Range Status   01/17/2020 70 (L) 80 - 105 mm Hg Final     Bicarbonate Arterial   Date Value Ref Range Status   01/17/2020 38 (H) 21 - 28 mmol/L Final     Base Excess Art   Date Value Ref Range Status   01/17/2020 6.3 mmol/L Final     Comment:     Abnormal Result, Ref range: -9.0 to 1.8            Recorded by : Gabbi Sanchez RT

## 2020-01-21 ENCOUNTER — APPOINTMENT (OUTPATIENT)
Dept: OCCUPATIONAL THERAPY | Facility: CLINIC | Age: 60
DRG: 291 | End: 2020-01-21
Payer: MEDICARE

## 2020-01-21 LAB
ANION GAP SERPL CALCULATED.3IONS-SCNC: <1 MMOL/L (ref 3–14)
BASE EXCESS BLDV CALC-SCNC: 10.1 MMOL/L
BUN SERPL-MCNC: 14 MG/DL (ref 7–30)
CALCIUM SERPL-MCNC: 9.3 MG/DL (ref 8.5–10.1)
CHLORIDE SERPL-SCNC: 97 MMOL/L (ref 94–109)
CO2 SERPL-SCNC: 41 MMOL/L (ref 20–32)
CREAT SERPL-MCNC: 0.83 MG/DL (ref 0.52–1.04)
ERYTHROCYTE [DISTWIDTH] IN BLOOD BY AUTOMATED COUNT: 21 % (ref 10–15)
GFR SERPL CREATININE-BSD FRML MDRD: 76 ML/MIN/{1.73_M2}
GLUCOSE BLDC GLUCOMTR-MCNC: 102 MG/DL (ref 70–99)
GLUCOSE BLDC GLUCOMTR-MCNC: 125 MG/DL (ref 70–99)
GLUCOSE BLDC GLUCOMTR-MCNC: 87 MG/DL (ref 70–99)
GLUCOSE BLDC GLUCOMTR-MCNC: 88 MG/DL (ref 70–99)
GLUCOSE BLDC GLUCOMTR-MCNC: 91 MG/DL (ref 70–99)
GLUCOSE BLDC GLUCOMTR-MCNC: 94 MG/DL (ref 70–99)
GLUCOSE BLDC GLUCOMTR-MCNC: 98 MG/DL (ref 70–99)
GLUCOSE SERPL-MCNC: 95 MG/DL (ref 70–99)
HCO3 BLDV-SCNC: 39 MMOL/L (ref 21–28)
HCT VFR BLD AUTO: 43.2 % (ref 35–47)
HGB BLD-MCNC: 11.4 G/DL (ref 11.7–15.7)
INR PPP: 2.11 (ref 0.86–1.14)
MAGNESIUM SERPL-MCNC: 1.9 MG/DL (ref 1.6–2.3)
MAGNESIUM SERPL-MCNC: 2.1 MG/DL (ref 1.6–2.3)
MCH RBC QN AUTO: 23.4 PG (ref 26.5–33)
MCHC RBC AUTO-ENTMCNC: 26.4 G/DL (ref 31.5–36.5)
MCV RBC AUTO: 89 FL (ref 78–100)
O2/TOTAL GAS SETTING VFR VENT: ABNORMAL %
PCO2 BLDV: 71 MM HG (ref 40–50)
PH BLDV: 7.34 PH (ref 7.32–7.43)
PLATELET # BLD AUTO: 212 10E9/L (ref 150–450)
PO2 BLDV: 36 MM HG (ref 25–47)
POTASSIUM SERPL-SCNC: 3.3 MMOL/L (ref 3.4–5.3)
POTASSIUM SERPL-SCNC: 3.4 MMOL/L (ref 3.4–5.3)
POTASSIUM SERPL-SCNC: 3.8 MMOL/L (ref 3.4–5.3)
RBC # BLD AUTO: 4.88 10E12/L (ref 3.8–5.2)
SODIUM SERPL-SCNC: 138 MMOL/L (ref 133–144)
WBC # BLD AUTO: 5.5 10E9/L (ref 4–11)

## 2020-01-21 PROCEDURE — 82803 BLOOD GASES ANY COMBINATION: CPT | Performed by: STUDENT IN AN ORGANIZED HEALTH CARE EDUCATION/TRAINING PROGRAM

## 2020-01-21 PROCEDURE — 25800025 ZZH RX 258: Performed by: STUDENT IN AN ORGANIZED HEALTH CARE EDUCATION/TRAINING PROGRAM

## 2020-01-21 PROCEDURE — 25000128 H RX IP 250 OP 636: Performed by: INTERNAL MEDICINE

## 2020-01-21 PROCEDURE — 94660 CPAP INITIATION&MGMT: CPT

## 2020-01-21 PROCEDURE — 00000146 ZZHCL STATISTIC GLUCOSE BY METER IP

## 2020-01-21 PROCEDURE — 99233 SBSQ HOSP IP/OBS HIGH 50: CPT | Mod: GC | Performed by: INTERNAL MEDICINE

## 2020-01-21 PROCEDURE — 40000275 ZZH STATISTIC RCP TIME EA 10 MIN

## 2020-01-21 PROCEDURE — 85610 PROTHROMBIN TIME: CPT | Performed by: INTERNAL MEDICINE

## 2020-01-21 PROCEDURE — 25000132 ZZH RX MED GY IP 250 OP 250 PS 637: Mod: GY | Performed by: STUDENT IN AN ORGANIZED HEALTH CARE EDUCATION/TRAINING PROGRAM

## 2020-01-21 PROCEDURE — 83735 ASSAY OF MAGNESIUM: CPT | Performed by: NURSE PRACTITIONER

## 2020-01-21 PROCEDURE — 97110 THERAPEUTIC EXERCISES: CPT | Mod: GO

## 2020-01-21 PROCEDURE — 25000125 ZZHC RX 250: Performed by: STUDENT IN AN ORGANIZED HEALTH CARE EDUCATION/TRAINING PROGRAM

## 2020-01-21 PROCEDURE — 84132 ASSAY OF SERUM POTASSIUM: CPT | Performed by: STUDENT IN AN ORGANIZED HEALTH CARE EDUCATION/TRAINING PROGRAM

## 2020-01-21 PROCEDURE — 25000128 H RX IP 250 OP 636: Performed by: STUDENT IN AN ORGANIZED HEALTH CARE EDUCATION/TRAINING PROGRAM

## 2020-01-21 PROCEDURE — 83735 ASSAY OF MAGNESIUM: CPT | Performed by: STUDENT IN AN ORGANIZED HEALTH CARE EDUCATION/TRAINING PROGRAM

## 2020-01-21 PROCEDURE — 80048 BASIC METABOLIC PNL TOTAL CA: CPT | Performed by: INTERNAL MEDICINE

## 2020-01-21 PROCEDURE — 85027 COMPLETE CBC AUTOMATED: CPT | Performed by: INTERNAL MEDICINE

## 2020-01-21 PROCEDURE — 84132 ASSAY OF SERUM POTASSIUM: CPT | Performed by: NURSE PRACTITIONER

## 2020-01-21 PROCEDURE — 97535 SELF CARE MNGMENT TRAINING: CPT | Mod: GO

## 2020-01-21 PROCEDURE — 25000132 ZZH RX MED GY IP 250 OP 250 PS 637: Mod: GY | Performed by: INTERNAL MEDICINE

## 2020-01-21 PROCEDURE — 12000001 ZZH R&B MED SURG/OB UMMC

## 2020-01-21 PROCEDURE — 25000132 ZZH RX MED GY IP 250 OP 250 PS 637: Mod: GY | Performed by: NURSE PRACTITIONER

## 2020-01-21 RX ORDER — POTASSIUM CHLORIDE 7.45 MG/ML
10 INJECTION INTRAVENOUS
Status: DISCONTINUED | OUTPATIENT
Start: 2020-01-21 | End: 2020-01-27 | Stop reason: HOSPADM

## 2020-01-21 RX ORDER — POTASSIUM CHLORIDE 750 MG/1
20-40 TABLET, EXTENDED RELEASE ORAL
Status: DISCONTINUED | OUTPATIENT
Start: 2020-01-21 | End: 2020-01-27 | Stop reason: HOSPADM

## 2020-01-21 RX ORDER — POTASSIUM CHLORIDE 29.8 MG/ML
20 INJECTION INTRAVENOUS
Status: DISCONTINUED | OUTPATIENT
Start: 2020-01-21 | End: 2020-01-27 | Stop reason: HOSPADM

## 2020-01-21 RX ORDER — POTASSIUM CHLORIDE 1.5 G/1.58G
20-40 POWDER, FOR SOLUTION ORAL
Status: DISCONTINUED | OUTPATIENT
Start: 2020-01-21 | End: 2020-01-27 | Stop reason: HOSPADM

## 2020-01-21 RX ORDER — ACETAMINOPHEN 325 MG/1
650 TABLET ORAL EVERY 4 HOURS PRN
Status: DISCONTINUED | OUTPATIENT
Start: 2020-01-21 | End: 2020-01-27 | Stop reason: HOSPADM

## 2020-01-21 RX ORDER — POTASSIUM CL/LIDO/0.9 % NACL 10MEQ/0.1L
10 INTRAVENOUS SOLUTION, PIGGYBACK (ML) INTRAVENOUS
Status: DISCONTINUED | OUTPATIENT
Start: 2020-01-21 | End: 2020-01-27 | Stop reason: HOSPADM

## 2020-01-21 RX ADMIN — SENNOSIDES AND DOCUSATE SODIUM 2 TABLET: 8.6; 5 TABLET ORAL at 08:02

## 2020-01-21 RX ADMIN — DEXTROSE MONOHYDRATE: 100 INJECTION, SOLUTION INTRAVENOUS at 04:41

## 2020-01-21 RX ADMIN — POTASSIUM CHLORIDE 20 MEQ: 29.8 INJECTION, SOLUTION INTRAVENOUS at 06:10

## 2020-01-21 RX ADMIN — SENNOSIDES AND DOCUSATE SODIUM 2 TABLET: 8.6; 5 TABLET ORAL at 19:50

## 2020-01-21 RX ADMIN — LISINOPRIL 5 MG: 2.5 TABLET ORAL at 08:01

## 2020-01-21 RX ADMIN — POTASSIUM CHLORIDE 20 MEQ: 29.8 INJECTION, SOLUTION INTRAVENOUS at 07:34

## 2020-01-21 RX ADMIN — MICONAZOLE NITRATE: 20 POWDER TOPICAL at 19:52

## 2020-01-21 RX ADMIN — ACETAMINOPHEN 650 MG: 325 TABLET, FILM COATED ORAL at 20:12

## 2020-01-21 RX ADMIN — FERROUS GLUCONATE 324 MG: 324 TABLET ORAL at 19:50

## 2020-01-21 RX ADMIN — ACETAMINOPHEN 650 MG: 325 TABLET, FILM COATED ORAL at 14:21

## 2020-01-21 RX ADMIN — FERROUS GLUCONATE 324 MG: 324 TABLET ORAL at 08:01

## 2020-01-21 RX ADMIN — FERROUS GLUCONATE 324 MG: 324 TABLET ORAL at 14:04

## 2020-01-21 RX ADMIN — POTASSIUM CHLORIDE 20 MEQ: 29.8 INJECTION, SOLUTION INTRAVENOUS at 11:24

## 2020-01-21 RX ADMIN — Medication 4.5 MG: at 17:45

## 2020-01-21 RX ADMIN — Medication 2 G: at 12:26

## 2020-01-21 RX ADMIN — MICONAZOLE NITRATE: 20 POWDER TOPICAL at 09:14

## 2020-01-21 RX ADMIN — ALLOPURINOL 200 MG: 100 TABLET ORAL at 08:01

## 2020-01-21 RX ADMIN — ATORVASTATIN CALCIUM 20 MG: 20 TABLET, FILM COATED ORAL at 19:50

## 2020-01-21 ASSESSMENT — ACTIVITIES OF DAILY LIVING (ADL)
ADLS_ACUITY_SCORE: 19
ADLS_ACUITY_SCORE: 17
ADLS_ACUITY_SCORE: 22
ADLS_ACUITY_SCORE: 22
ADLS_ACUITY_SCORE: 17
ADLS_ACUITY_SCORE: 22

## 2020-01-21 ASSESSMENT — MIFFLIN-ST. JEOR: SCORE: 2457.85

## 2020-01-21 NOTE — PLAN OF CARE
D/I: Patient on unit 4A Surgical/Neuro ICU   Neuro- A/Ox4. PERRL. Moving all ext purposefully. Generalized weakness. Afebrile  CV- HR and BP stable. Tele: SR with occasional PACs and PVCs  Pulm- LS clear/diminished. Infrequent productive cough. Producing moderate amount of thick red streaked sputum. On CPAP overnight, currently on 3L O2 via oxymask satting low 90s.  GI- NPO. BS hypoactive. No BM overnight.   - Baig in place. Adequate output. Urine clear yellow.  Gtts- D10 infusing at 60ml/hr  Skin- Groin area raw, redness. Coccyx redness; mepilex in place.   Pain- Denies  IV's - R PICC infusing, PIV infusing  See flow sheets for further interventions and assessments.   A: Stable   P: Continue to monitor pt closely. Notify MD of significant changes

## 2020-01-21 NOTE — PLAN OF CARE
PT 4A: Cancel.  RN just getting pt back to bed.  Unable to check back.  Will reschedule tomorrow.

## 2020-01-21 NOTE — PLAN OF CARE
D/I: Pt extubated at 1440 and placed on BIPAP at 40%. Pt alert and oriented x4. Pt c/o pain in throat from ETT. Lung sounds clear in uppers and diminished in lowers. Pt has strong cough and assisted with IS. Pt up to chair with 2 assist and mech lift. Pt took small sips with 1800 meds and swallowed without difficulty. Urine output 200-300cc/hr. Electrolytes replaced per protocol. Pt's family updated by phone.  A:Pt stable post extubation.  P: Continue POC. See Epic flow sheets for VS and further assessments.

## 2020-01-21 NOTE — PROGRESS NOTES
"Social Work: Assessment with Discharge Plan    Patient Name:  Arianna Siddiqi \"April\"  :  1960  Age:  59 year old  MRN:  [id number]  Risk/Complexity Score:  Filed Complexity Screen Score: 6  Completed assessment with:  Pt    Presenting Information   Reason for Referral:  Discharge plan  Date of Intake:  2020  Referral Source:  Chart Review  Decision Maker:  Pt at baseline  Alternate Decision Maker:  Per [facility] policy  Health Care Directive:  Patient considering completing and Provided education  Living Situation:  Apartment  Previous Functional Status:  Independent  Patient and family understanding of hospitalization:  \"Congestive heart failure.\"  Cultural/Language/Spiritual Considerations:  Taoism  Adjustment to Illness:  Appropriate. Pt is encouraged that she's going to be moving upstairs when there's a bed available, knowing this means she's getting better.    Physical Health  Reason for Admission:    1. Acute on chronic congestive heart failure, unspecified heart failure type (H)      Services Needed/Recommended:  TCU    Mental Health/Chemical Dependency  Diagnosis:  None in chart  Support/Services in Place:  N/A  Services Needed/Recommended:  N/A    Support System  Significant relationship at present time:  Sister  Family of origin is available for support:  Yes  Other support available:  None identified  Gaps in support system:  None identified  Patient is caregiver to:  None     Provider Information   Primary Care Physician:  No Ref-Primary, Physician   None   Clinic:  No address on file      :  N/A    Financial   Income Source:  Not discussed  Financial Concerns:  Pt identifies difficulty getting affordable transportation. She says she has a spenddown with [place] so cannot use [employer]. SW suggested [employer]. Pt stated she believes [employer] is very expensive, which is not SW's understanding. Encouraged pt to follow up with primary care provider to see if a clinic SW knows of a " transportation resource in her area.  Insurance:    Payor/Plan Subscriber Name Rel Member # Group #   MEDICARE - MEDICARE DUSTIN FERNANDES SLF [id number]       ATTN CLAIMS, [address]   MEDICAID [place] - MEDIC[place]* DUSTIN FERNANDES  [id number]       [address] [address], [address]       Discharge Plan   Patient and family discharge goal:  TCU  Provided education on discharge plan:  YES  Patient agreeable to discharge plan:  YES  A list of Medicare Certified Facilities was provided to the patient and/or family to encourage patient choice. Patient's choices for facility are:    Pt requests [facility]. SW provided Medicare list for pt to make additional choices; pt stated she is only interested in [facility] but agreed to accept the list.    Pt/family was given the Medicare Compare list for SNF, with associated star ratings to assist with choice for referrals/discharge planning Yes    Education was given to pt/family that star ratings are updated/maintained by Medicare and can be reviewed by visiting www.medicare.gov Yes    Will NH provide Skilled rehabilitation or complex medical:  YES  General information regarding anticipated insurance coverage and possible out of pocket cost was discussed. Patient and patient's family are aware patient may incur the cost of transportation to the facility, pending insurance payment: YES  Barriers to discharge:  Medical readiness, bed availability    Discharge Recommendations   Anticipated Disposition:  Facility:  TCU  Transportation Needs:  Medical:  Other:  pending pt condition at discharge  Name of Transportation Company and Phone:  [employer] [phone]    Additional comments   JAVIER met 1:1 w/ pt in pt's room today. Pt presents as A+O and open to SW visit. SW provided education on discharge planning. Pt requested referral to [facility]. SW made referral via phone. No discharge date known yet. SW will continue to remain available for patient and family support, discharge planning, other resources  and support PRN.    TAMMY Chairez, [facility]  ICU    [facility]   P: [phone]  Pager: [phone]

## 2020-01-21 NOTE — PROGRESS NOTES
[facility]      MICU Progress Note  Arianna Siddiqi MRN: [id number]  1960  Date of Admission:1/15/2020  Primary care provider: No Ref-Primary, Physician      ASSESSMENT:  59 year old female with medical history significant for HFrEF, T2DM, CKD, OHS/CARLOS admitted to MICU for acute on chronic hypercarbic resp failure, intubated for persistent hypercarbia     24 hr events: extubated to bipap. Remained on bipap throughout night. Mentation at baseline, denies fever, chest pain, sob. HD stable     Changes   - transfer to medicine   - pta AVAPS setting when sleep  - holding diuretics  - keep antunez in for comfort  - strict I/O     Neuro   #AMS, resolved  Confusion and lethargy in setting of PCO2 >90, likely CO2 narcosis, resolved     CV  #Right sided CV dysfunction complicated by volume overload   With BNP > 5, 000, per patient, possibly as much as 60 lbs above baseline. ECHO showed no WMA, LVEF 55-60%, Difficult to assess RV. Function probably at least mildly reduced. Takes lasix pta, s/p 80mg IV lasix w/ 6L uop, transitioned to bumex 1/hr and metolazone but had increase in cr. Developed post ATN diuresis on 1/18, holding acetazolamide and bumex, continued to have large uop  - continue to hold diuretics  - lytes as below  - strict I/O      # Paroxysmal atrial fibrillation   # nonsustained SVT, resolved  EKG on admission was in NSR but noted afib back in 2016. Has been anticoagulated with warfarin. INR 2.06 on admission. Rate control with metoprolol succinate, increased to 50mg bid after nonsustained SVT, pt HD stable and asxs.  - metoprolol succinate 50 bid  - warfarin per pharmD  - INR daily     Pulm  # Acute hypoxic hypercarbic respiratory failure   # Respiratory acidosis  # Obesity hypoventilation  # CARLOS  Hypoxia 2/2 HFpEF exacerbation w/ high BNP, weight gain and pulm edema. Oxygenation improved w/ diuresis. Trop trended down, echo showed normal LVEF w/ no WMA, but unable to assess RV. CXR showed  cardiomegaly. Flu/RSV neg. Intubated due to worsening hypercarbia w/ PCO2 returning to baseline. Extubated to bipap, stable  - pta AVAPs setting when sleeping    GI  No active issues      Renal  #JASON, resolved  #Post-ATN diuresis  #Metabolic alkalosis  Baseline cr 0.8, peaked at 1.5 w/in 24hrs after presentation in setting of volume overload. Aggressively diuresed. Suspect initial JASON 2/2 cardiorenal. Cr continued to improve but developed post ATN diuresis night of , large uop w/ rise in bicarb 2/2 contraction alkalosis. Continued auto-diuresis but improving, net -21L since admission thus far  - continue to hold all diuretics  - BMP/Mg 1400 today, K>4, Mg >2  - strict I/O    ID  #Sepsis, resolved  hypertensive on arrival w/ neg procal. BP stable since transfer. lactat neg, slight leukocytosis. CXR neg for focal infiltrate, increased interstitial marking likely 2/2 fluid overload, but w/ URI prodrome days pta, will treat empirically for atypical pna. Flu and RSV neg. Received vancx1 and cefrtriaxone x1. Urine cx NGTD, blood cx + diphtheroid species after 5 days of incubation, suspect contaminant     Endo  # T2DM  # Hypoglycemia, improved  Has been hyperglycemic on arrival. But NPO during most of the time after transfer while on bipap and intubated. Has had some hypoglcemic episodes into the 60s. Likely due to lack of po intake   - hold sitagliptin  - cont medium sliding scale insulin  - cont fingerstick glucose qid  - titrate off D5 after restart po intake     MSK/Skin  # Gout - stable  - continue allopurinol 200 mg po qday        FEN: diet after passing bedside swallow  DVT Prophylaxis:  pta warfarin, ppi not indicated  Disposition: transfer to medicine  Code Status: Full    Patient was seen and discussed with attending physician Dr. Davalos, who agrees with above assessment and plan.    Joce Chang MD  Internal Medicine PGY-2  [phone]      PHYSICAL EXAM  Temp  Av.4  F (36.3  C)  Min: 94.4  F (34.7   C)  Max: 98.3  F (36.8  C)      Pulse  Av.8  Min: 60  Max: 99 Resp  Av.9  Min: 12  Max: 34  FiO2 (%)  Av.3 %  Min: 23 %  Max: 100 %  SpO2  Av.6 %  Min: 59 %  Max: 100 %         Intake/Output Summary (Last 24 hours) at 2020 1324  Last data filed at 2020 1200  Gross per 24 hour   Intake 1257.13 ml   Output 3788 ml   Net -2530.87 ml       Gen:sitting in chair, oxymask in place  CV: bradycardic, normal S1/S2, no mrg  Pulm: normal wob, CTA in b/l apices, diminished breath sounds in b/l bases, no wheezing  Abd: obese, nontender, +BS  Ext: chronic venous stasis changes in LE, trace LE edema  Neuro: AOx4, no focal signs

## 2020-01-21 NOTE — PLAN OF CARE
OT 4A  Discharge Planner OT   Patient plan for discharge: rehab  Current status: Pt is alert, oriented and appropriate in conversation. Pt completed seated g/h with SBA and set up required. Pt completed BUE ROM/strengthening exercises while seated upright in recliner.   Barriers to return to prior living situation: fatigue, weakness, acute medical needs, deconditioning  Recommendations for discharge: TCU  Rationale for recommendations: Pt is below baseline and would benefit from continued skilled therapy to increase activity tolerance and independence with ADLs.        Entered by: Kezia Alaniz 01/21/2020 10:18 AM

## 2020-01-21 NOTE — PROGRESS NOTES
[facility], [place]    Acceptance/ Progress Note - Alireza Murdock Service        Date of Admission:  1/15/2020    Assessment & Plan   #AMS, resolved  Confusion and lethargy in setting of PCO2 >90, likely CO2 narcosis, resolved       #Right sided CV dysfunction complicated by volume overload   Presenting with weeks of increased weight gain in 1 week of worsening dyspnea.  Chest x-ray consistent with pulmonary edema,  BNP > 5, 000, per patient, possibly as much as 60 lbs above baseline. ECHO showed no WMA, LVEF 55-60%, Difficult to assess RV. Function probably at least mildly reduced.  IV diuresis on admission, Has diuresed well through out stay.  Developed post ATN diuresis on 1/18, holding acetazolamide and bumex, continued to have large uop  - continue to hold diuretics  - lytes as below  - strict I/O     # Acute hypoxic hypercarbic respiratory failure   # Respiratory acidosis  # Obesity hypoventilation  # CARLOS  Worsening mental status day after admission with somnolence.  ABG with CO2 over 100.  Started on BiPAP with stabilization of gas but continued to have mentation that wax and wane.  Transition to ICU where she required a brief period of intubation.  Now on nasal cannula with BiPAP/APAP's at night.  Continues to be high risk for CO2 retaining given obesity and CARLOS.   - pta AVAPs setting when sleeping (AVAPS, pmax25, plow 12, epap 8, RR 12, TV 600cc)  -Monitor mental status, trend VBG as needed    # Paroxysmal atrial fibrillation   # nonsustained SVT, resolved  EKG on admission was in NSR but noted afib back in 2016. Has been anticoagulated with warfarin. INR 2.06 on admission. Rate control with metoprolol succinate, increased to 50mg bid after nonsustained SVT, pt HD stable and asxs.  - metoprolol succinate 50 bid  - warfarin per pharmD  - INR daily    #JASON, resolved  #Post-ATN diuresis  #Metabolic alkalosis  Baseline cr 0.8, peaked at 1.5 w/in 24hrs after presentation in setting of  volume overload. Aggressively diuresed. Suspect initial JASON 2/2 cardiorenal. Cr continued to improve but developed post ATN diuresis night of 1/18, large uop w/ rise in bicarb 2/2 contraction alkalosis. Continued auto-diuresis but improving  - continue to hold all diuretics  - BMP/Mg 1400 today, K>4, Mg >2  - strict I/O     #Sepsis, resolved  hypertensive on arrival w/ neg procal. BP stable since transfer. lactat neg, slight leukocytosis. CXR neg for focal infiltrate, increased interstitial marking likely 2/2 fluid overload, but w/ URI prodrome days pta, will treat empirically for atypical pna. Flu and RSV neg. Received vancx1 and cefrtriaxone x1. Urine cx NGTD, blood cx + diphtheroid species after 5 days of incubation, suspect contaminant      # T2DM  # Hypoglycemia, improved  Has been hyperglycemic on arrival. But NPO during most of the time after transfer while on bipap and intubated. Has had some hypoglcemic episodes into the 60s. Likely due to lack of po intake   - hold sitagliptin  - cont medium sliding scale insulin  - cont fingerstick glucose qid  - titrate off D5 after restart po intake       # Gout - stable  - continue allopurinol 200 mg po qday        FEN: diet after passing bedside swallow  DVT Prophylaxis:  pta warfarin, ppi not indicated  Code Status: Full  Disposition Plan     Expected discharge in 3 to 7 days pending diuresis and PT OT evaluation    The patient's care was discussed with MD Alireza Padilla  service  [facility], [place]  Pager: [phone]  Please see sticky note for cross cover information  ______________________________________________________________________    Interval History   Patient notes she is not having any issues with shortness of breath or chest pain, notes hospital bed is very uncomfortable and hopes she can get a new one on transfer to the floor out of the ICU.  Notes she continues to have pain in her legs but overall  improving, continues to feel she is swollen throughout but does note some improvement.  Appetite improving, anticipating dinner.  Denies nausea or abdominal pain.    Data reviewed today: I reviewed all medications, new labs and imaging results over the last 24 hours.    Physical Exam   Vital Signs: Temp: 98.4  F (36.9  C) Temp src: Axillary BP: (!) 104/93   Heart Rate: 76 Resp: 15 SpO2: 100 % O2 Device: Oxymizer cannula Oxygen Delivery: 3 LPM  Weight: 423 lbs 0 oz  General Appearance: Lying in bed, no acute distress  Respiratory: Clear to auscultation front fields, mild bibasilar crackles  regular   Cardiovascular:  rhythm with occasional ectopic beat, no murmur appreciated  GI: Bowel sounds positive, nontender, large with anasarca  Skin: Dry skin  Extremities: Bilateral lower extremity edema  Neuro : Alert and oriented, no focal deficits    Data   Reviewed

## 2020-01-22 ENCOUNTER — APPOINTMENT (OUTPATIENT)
Dept: PHYSICAL THERAPY | Facility: CLINIC | Age: 60
DRG: 291 | End: 2020-01-22
Payer: MEDICARE

## 2020-01-22 ENCOUNTER — APPOINTMENT (OUTPATIENT)
Dept: OCCUPATIONAL THERAPY | Facility: CLINIC | Age: 60
DRG: 291 | End: 2020-01-22
Payer: MEDICARE

## 2020-01-22 LAB
GLUCOSE BLDC GLUCOMTR-MCNC: 121 MG/DL (ref 70–99)
GLUCOSE BLDC GLUCOMTR-MCNC: 79 MG/DL (ref 70–99)
GLUCOSE BLDC GLUCOMTR-MCNC: 84 MG/DL (ref 70–99)
GLUCOSE BLDC GLUCOMTR-MCNC: 90 MG/DL (ref 70–99)
GLUCOSE BLDC GLUCOMTR-MCNC: 95 MG/DL (ref 70–99)
INR PPP: 2.55 (ref 0.86–1.14)
LACTATE BLD-SCNC: 0.8 MMOL/L (ref 0.7–2)
MAGNESIUM SERPL-MCNC: 1.9 MG/DL (ref 1.6–2.3)
POTASSIUM SERPL-SCNC: 3.3 MMOL/L (ref 3.4–5.3)

## 2020-01-22 PROCEDURE — 36415 COLL VENOUS BLD VENIPUNCTURE: CPT | Performed by: INTERNAL MEDICINE

## 2020-01-22 PROCEDURE — 12000012 ZZH R&B MS OVERFLOW UMMC

## 2020-01-22 PROCEDURE — 85610 PROTHROMBIN TIME: CPT | Performed by: INTERNAL MEDICINE

## 2020-01-22 PROCEDURE — 99233 SBSQ HOSP IP/OBS HIGH 50: CPT | Mod: GC | Performed by: INTERNAL MEDICINE

## 2020-01-22 PROCEDURE — 00000146 ZZHCL STATISTIC GLUCOSE BY METER IP

## 2020-01-22 PROCEDURE — 25000128 H RX IP 250 OP 636: Performed by: STUDENT IN AN ORGANIZED HEALTH CARE EDUCATION/TRAINING PROGRAM

## 2020-01-22 PROCEDURE — 25000132 ZZH RX MED GY IP 250 OP 250 PS 637: Mod: GY | Performed by: STUDENT IN AN ORGANIZED HEALTH CARE EDUCATION/TRAINING PROGRAM

## 2020-01-22 PROCEDURE — 40000275 ZZH STATISTIC RCP TIME EA 10 MIN

## 2020-01-22 PROCEDURE — 94660 CPAP INITIATION&MGMT: CPT

## 2020-01-22 PROCEDURE — 83605 ASSAY OF LACTIC ACID: CPT | Performed by: INTERNAL MEDICINE

## 2020-01-22 PROCEDURE — 97162 PT EVAL MOD COMPLEX 30 MIN: CPT | Mod: GP

## 2020-01-22 PROCEDURE — 83735 ASSAY OF MAGNESIUM: CPT | Performed by: INTERNAL MEDICINE

## 2020-01-22 PROCEDURE — 40000802 ZZH SITE CHECK

## 2020-01-22 PROCEDURE — 25000132 ZZH RX MED GY IP 250 OP 250 PS 637: Mod: GY | Performed by: NURSE PRACTITIONER

## 2020-01-22 PROCEDURE — 25000132 ZZH RX MED GY IP 250 OP 250 PS 637: Mod: GY

## 2020-01-22 PROCEDURE — 97530 THERAPEUTIC ACTIVITIES: CPT | Mod: GO | Performed by: OCCUPATIONAL THERAPIST

## 2020-01-22 PROCEDURE — 97530 THERAPEUTIC ACTIVITIES: CPT | Mod: GP

## 2020-01-22 PROCEDURE — 84132 ASSAY OF SERUM POTASSIUM: CPT | Performed by: INTERNAL MEDICINE

## 2020-01-22 PROCEDURE — 25800025 ZZH RX 258: Performed by: STUDENT IN AN ORGANIZED HEALTH CARE EDUCATION/TRAINING PROGRAM

## 2020-01-22 RX ORDER — FUROSEMIDE 10 MG/ML
80 INJECTION INTRAMUSCULAR; INTRAVENOUS ONCE
Status: COMPLETED | OUTPATIENT
Start: 2020-01-22 | End: 2020-01-22

## 2020-01-22 RX ORDER — WARFARIN SODIUM 3 MG/1
3 TABLET ORAL
Status: COMPLETED | OUTPATIENT
Start: 2020-01-22 | End: 2020-01-22

## 2020-01-22 RX ADMIN — FERROUS GLUCONATE 324 MG: 324 TABLET ORAL at 14:17

## 2020-01-22 RX ADMIN — MICONAZOLE NITRATE: 20 POWDER TOPICAL at 19:28

## 2020-01-22 RX ADMIN — FERROUS GLUCONATE 324 MG: 324 TABLET ORAL at 08:39

## 2020-01-22 RX ADMIN — POTASSIUM CHLORIDE 20 MEQ: 750 TABLET, EXTENDED RELEASE ORAL at 20:48

## 2020-01-22 RX ADMIN — Medication 12.5 MG: at 19:27

## 2020-01-22 RX ADMIN — SENNOSIDES AND DOCUSATE SODIUM 2 TABLET: 8.6; 5 TABLET ORAL at 08:39

## 2020-01-22 RX ADMIN — ALLOPURINOL 200 MG: 100 TABLET ORAL at 08:39

## 2020-01-22 RX ADMIN — POTASSIUM CHLORIDE 40 MEQ: 750 TABLET, EXTENDED RELEASE ORAL at 18:33

## 2020-01-22 RX ADMIN — WARFARIN SODIUM 3 MG: 3 TABLET ORAL at 18:33

## 2020-01-22 RX ADMIN — LISINOPRIL 5 MG: 2.5 TABLET ORAL at 08:39

## 2020-01-22 RX ADMIN — MICONAZOLE NITRATE: 20 POWDER TOPICAL at 08:38

## 2020-01-22 RX ADMIN — FUROSEMIDE 80 MG: 10 INJECTION, SOLUTION INTRAVENOUS at 10:59

## 2020-01-22 RX ADMIN — ONDANSETRON 4 MG: 2 INJECTION INTRAMUSCULAR; INTRAVENOUS at 10:59

## 2020-01-22 RX ADMIN — ACETAMINOPHEN 650 MG: 325 TABLET, FILM COATED ORAL at 02:15

## 2020-01-22 RX ADMIN — ATORVASTATIN CALCIUM 20 MG: 20 TABLET, FILM COATED ORAL at 19:27

## 2020-01-22 RX ADMIN — ACETAMINOPHEN 650 MG: 325 TABLET, FILM COATED ORAL at 14:57

## 2020-01-22 RX ADMIN — DEXTROSE MONOHYDRATE: 100 INJECTION, SOLUTION INTRAVENOUS at 00:37

## 2020-01-22 RX ADMIN — FERROUS GLUCONATE 324 MG: 324 TABLET ORAL at 19:29

## 2020-01-22 RX ADMIN — SODIUM CHLORIDE, PRESERVATIVE FREE 5 ML: 5 INJECTION INTRAVENOUS at 08:39

## 2020-01-22 ASSESSMENT — ACTIVITIES OF DAILY LIVING (ADL)
ADLS_ACUITY_SCORE: 17
ADLS_ACUITY_SCORE: 16
ADLS_ACUITY_SCORE: 18
ADLS_ACUITY_SCORE: 16
ADLS_ACUITY_SCORE: 17
ADLS_ACUITY_SCORE: 18

## 2020-01-22 ASSESSMENT — PAIN DESCRIPTION - DESCRIPTORS: DESCRIPTORS: ACHING

## 2020-01-22 ASSESSMENT — MIFFLIN-ST. JEOR: SCORE: 2489.13

## 2020-01-22 NOTE — PLAN OF CARE
D/I: Pt remains hemodynamically stable. Electrolytes replaced per protocol.  Pt alert and oriented x4. Pt using oxymixer NC at 3L when awake and BIPAP at 40% FiO2 when sleeping. VBG drawn at 1245. PCO2=75. Pt up to chair x 3 1/2 hrs. Pt encouraged to use IS. Pt taking a regular diet. Pt given Tylenol 650 mg po x 1 for generalized pain with fair relief. Urine output diminishing to 30cc/hr.  A: Pt stable with current cares.  P: Continue to monitor. See Epic flow sheets for VS and further assessments. Transfer to  when bed available.

## 2020-01-22 NOTE — PROVIDER NOTIFICATION
Notified [facility] cross cover regarding pt's heart rate intermittently dropping to 50s. Asymptomatic. No new orders placed, continue to monitor.

## 2020-01-22 NOTE — PLAN OF CARE
Discharge Planner OT   Patient plan for discharge: home  Current status: Skilled co-tx w/ PT to ensure proper body mechanics are upheld w/ 2 skilled therapist for pt safety  OT tx session focus on anxiety mgmt,. Pt supine> sitting EOB w/ max A x2, Pt total A x3 sling transfer EOB> chair, pt completed 2 STS transfers w/ max A x2. Pt's VSS throughout.   Barriers to return to prior living situation: medical status  Recommendations for discharge: TCU  Rationale for recommendations: to increase ind in ADLS/IADLS       Entered by: Wendy Rocha 01/22/2020 3:33 PM

## 2020-01-22 NOTE — PROGRESS NOTES
Pt arrived from  at 0145 and belongings are to remain with pt.    Neuro: A&Ox4. Calls appropriately.   Activity: Up with Ax2 and lift. Q2H repo.   Vitals: Afebrile. VSS.     LDAS: R DL PICC infusing D10 at 60mL/hr. L PIV SL.   Cardiac: Sinus with frequent PACs/PVCs.    Respiratory: Lung sounds clear. Wearing BiPAP at 40% FiO2 when sleeping and Oxy NC during the day at 3L.        GI/: Baig catheter in place- UOP at 30mL/hr. No BM this shift, hypoactive bowel sounds.    Skin: Excoriated groin area. Interdry between abdominal skin folds. Mepilex in groin. 2 RN skin assessment completed by Tabatha Fontana and Elizabeth Tapia.   Pain: C/o generalized achy pain, improved with PRN Tylenol.   Diet: Regular.   Labs/Imaging: Blood glucose- 84.   Plan: Continue to monitor and follow POC.

## 2020-01-22 NOTE — PROGRESS NOTES
01/22/20 1400   Quick Adds   Type of Visit Initial PT Evaluation   Living Environment   Lives With alone   Living Arrangements apartment   Home Accessibility no concerns   Living Environment Comment Pt lives alone in apartment with elevator.    Self-Care   Usual Activity Tolerance fair   Current Activity Tolerance poor   Regular Exercise No   Equipment Currently Used at Home shower chair   Activity/Exercise/Self-Care Comment Pt reports she was IND with all ADL's prior to admission.  Pt using a shower chair, sleeps in a recliner not a bed.    Functional Level Prior   Ambulation 0-->independent   Transferring 0-->independent   Toileting 0-->independent   Bathing 1-->assistive equipment   Communication 0-->understands/communicates without difficulty   Swallowing 0-->swallows foods/liquids without difficulty   Cognition 0 - no cognition issues reported   Fall history within last six months no   Which of the above functional risks had a recent onset or change? ambulation;transferring   Prior Functional Level Comment Pt IND with mobility at baseline.    General Information   Onset of Illness/Injury or Date of Surgery - Date 01/15/20   Referring Physician Michelle Looney MD   Patient/Family Goals Statement Pt wants to go home.    Pertinent History of Current Problem (include personal factors and/or comorbidities that impact the POC) 59 year old female with medical history significant for HFrEF, T2DM, CKD, HTN, gout, OHS/CARLOS, morbid obesity who presented to the ED with dyspnea   Precautions/Limitations fall precautions   Weight-Bearing Status - LUE full weight-bearing   Weight-Bearing Status - RUE full weight-bearing   Weight-Bearing Status - LLE full weight-bearing   Weight-Bearing Status - RLE full weight-bearing   General Observations On 3-4L oxymizertulio   General Info Comments Activity: up ad beatriz   Cognitive Status Examination   Orientation orientation to person, place and time   Level of  Consciousness alert   Follows Commands and Answers Questions 100% of the time   Personal Safety and Judgment intact   Memory intact   Pain Assessment   Patient Currently in Pain Yes, see Vital Sign flowsheet  (Per pt all over)   Integumentary/Edema   Integumentary/Edema Comments B LE ededma (worse in L side)   Posture    Posture Kyphosis   Range of Motion (ROM)   ROM Quick Adds No deficits were identified   Strength   Strength Comments R LE 4+/5, L LE 4/5, proximal trunk and hip strength noted   Bed Mobility   Bed Mobility Comments ModA x 2   Transfer Skills   Transfer Comments sit<>stand with mod-maxA x 2, knee blocking and extra assist at glutes needed   Gait   Gait Comments Unable to complete during evaluation   Balance   Balance Comments SBA for sitting balance, unsteady in standing due to LE weakness    Sensory Examination   Sensory Perception no deficits were identified   Coordination   Coordination no deficits were identified   Muscle Tone   Muscle Tone no deficits were identified   General Therapy Interventions   Planned Therapy Interventions bed mobility training;gait training;strengthening;transfer training;risk factor education;home program guidelines;progressive activity/exercise   Clinical Impression   Criteria for Skilled Therapeutic Intervention yes, treatment indicated   PT Diagnosis Impaired functional mobility   Influenced by the following impairments Decreased LE strength, impaired activity tolreance, O2 needs   Functional limitations due to impairments Inability to complete functional mobility at baseline level of functioning   Clinical Presentation Evolving/Changing   Clinical Presentation Rationale Medically complex, O2 needs   Clinical Decision Making (Complexity) Moderate complexity   Therapy Frequency 5x/week   Predicted Duration of Therapy Intervention (days/wks) 1 week   Anticipated Discharge Disposition Transitional Care Facility;Acute Rehabilitation Facility   Risk & Benefits of therapy  "have been explained Yes   Patient, Family & other staff in agreement with plan of care Yes   Clinical Impression Comments Pt would benefit from IP PT to progress strength and IND with functional mobility.    [facility]-PAC TM \"6 Clicks\"   2016, Trustees of [facility], under license to [employer].  All rights reserved.   6 Clicks Short Forms Basic Mobility Inpatient Short Form   [facility] AM-PAC  \"6 Clicks\" V.2 Basic Mobility Inpatient Short Form   1. Turning from your back to your side while in a flat bed without using bedrails? 2 - A Lot   2. Moving from lying on your back to sitting on the side of a flat bed without using bedrails? 2 - A Lot   3. Moving to and from a bed to a chair (including a wheelchair)? 1 - Total   4. Standing up from a chair using your arms (e.g., wheelchair, or bedside chair)? 2 - A Lot   5. To walk in hospital room? 1 - Total   6. Climbing 3-5 steps with a railing? 1 - Total   Basic Mobility Raw Score (Score out of 24.Lower scores equate to lower levels of function) 9   Total Evaluation Time   Total Evaluation Time (Minutes) 10     "

## 2020-01-22 NOTE — PLAN OF CARE
Discharge Planner PT   Patient plan for discharge: Rehab  Current status: PT evaluation completed and treatment initiated.  Pt demonstrating globalized weakness, was IND with mobility prior to admission.  Pt able to get to edge of bed with modA of 1-2.  Use of sling to get to recliner and completed sit<>stand x 3 reps with modA x 2, very anxious about standing.  Use of sling/lift for safety outside of therapy.   Barriers to return to prior living situation: Need for increased assist with transfers and ambulation  Recommendations for discharge: TCU  Rationale for recommendations: To progress strength and IND with mobility as pt was previously IND       Entered by: Milka Hu 01/22/2020 2:25 PM

## 2020-01-22 NOTE — PROGRESS NOTES
[facility], [place]    Acceptance/ Progress Note - Alireza Murdock Service        Date of Admission:  1/15/2020    Assessment & Plan       Arianna Siddiqi is a 59 year old female with medical history significant for HFrEF, T2DM, CKD, HTN, gout, OHS/CARLOS, morbid obesity who presented to the ED with dyspnea.    #AMS, resolved  Confusion and lethargy in setting of PCO2 >90, likely CO2 narcosis, resolved  - Monitor mental status and VBG as needed     #Right sided CV dysfunction complicated by volume overload   Presenting with weeks of increased weight gain in 1 week of worsening dyspnea.  Chest x-ray consistent with pulmonary edema,  BNP > 5, 000, per patient, possibly as much as 60 lbs above baseline. ECHO showed no WMA, LVEF 55-60%, Difficult to assess RV. Function probably at least mildly reduced.  IV diuresis on admission, Has diuresed well through out stay.  Developed post ATN diuresis on 1/18, holding acetazolamide and bumex, continued to have large uop  - 80 IV lasix, redose based on response, goal of net negative 2 L  - lytes as below  - strict I/O     # Acute hypoxic hypercarbic respiratory failure   # Respiratory acidosis  # Obesity hypoventilation  # CARLOS  Worsening mental status day after admission with somnolence.  ABG with CO2 over 100.  Started on BiPAP with stabilization of gas but continued to have mentation that wax and wane.  Transition to ICU where she required a brief period of intubation.  Now on nasal cannula with BiPAP/APAP's at night.  Continues to be high risk for CO2 retaining given obesity and CARLOS.   - pta AVAPs setting when sleeping (AVAPS, pmax25, plow 12, epap 8, RR 12, TV 600cc)  -Monitor mental status, trend VBG as needed    # Paroxysmal atrial fibrillation   # nonsustained SVT, resolved  EKG on admission was in NSR but noted afib back in 2016. Has been anticoagulated with warfarin. INR 2.06 on admission. Rate control with metoprolol succinate, increased to 50mg bid  after nonsustained SVT, pt HD stable and asxs.  - metoprolol succinate 12.5 bid, titrate as tolerates  - warfarin per pharmD  - INR daily    #JASON, resolved  #Post-ATN diuresis  #Metabolic alkalosis  Baseline cr 0.8, peaked at 1.5 w/in 24hrs after presentation in setting of volume overload. Aggressively diuresed. Suspect initial JASON 2/2 cardiorenal. Cr continued to improve but developed post ATN diuresis night of 1/18, large uop w/ rise in bicarb 2/2 contraction alkalosis. Continued auto-diuresis but improving  - diuretics as above   - monitor BMP/Mg  K>4, Mg >2  - strict I/O     #Sepsis, resolved  hypertensive on arrival w/ neg procal. BP stable since transfer. lactat neg, slight leukocytosis. CXR neg for focal infiltrate, increased interstitial marking likely 2/2 fluid overload, but w/ URI prodrome days pta, will treat empirically for atypical pna. Flu and RSV neg. Received vancx1 and cefrtriaxone x1. Urine cx NGTD, blood cx + diphtheroid species after 5 days of incubation, suspect contaminant      # T2DM  # Hypoglycemia, improved  Has been hyperglycemic on arrival. But NPO during most of the time after transfer while on bipap and intubated. Has had some hypoglcemic episodes into the 60s. Likely due to lack of po intake   - hold sitagliptin  - cont medium sliding scale insulin  - cont fingerstick glucose qid  - titrate off D5 after restart po intake       # Gout - stable  - continue allopurinol 200 mg po qday        FEN: diet after passing bedside swallow  DVT Prophylaxis:  pta warfarin, ppi not indicated  Code Status: Full  Disposition Plan     Expected discharge in 3 to 7 days pending diuresis and PT OT evaluation    The patient's care was discussed with MD Alireza Padilla 5 service  [facility], [place]  Pager: [phone]  Please see sticky note for cross cover information  ______________________________________________________________________    Interval History    NAEON. Breathing well, had breakfast and no nausea or abd pain. Denies chest pain or palpitations, still uncomfortable in bed. Urine slowing down. Slept well, without concerns today      Data reviewed today: I reviewed all medications, new labs and imaging results over the last 24 hours.    Physical Exam   Vital Signs: Temp: 98.2  F (36.8  C) Temp src: Oral BP: 97/74   Heart Rate: 103 Resp: 22 SpO2: 95 % O2 Device: Oxymizer cannula Oxygen Delivery: 5 LPM  Weight: 429 lbs 14.35 oz  General Appearance: Lying in bed, no acute distress  Respiratory: Clear to auscultation front fields, mild bibasilar crackles  regular   Cardiovascular:  rhythm with occasional ectopic beat, no murmur appreciated  GI: Bowel sounds positive, nontender, large with anasarca  Skin: Dry skin  Extremities: Bilateral lower extremity edema  Neuro : Alert and oriented, no focal deficits    Data   Reviewed

## 2020-01-22 NOTE — PROGRESS NOTES
Agree with Julio LEA ER Extern's notes and assessments.   Report given and pt transferred to  at approximately 0145. Float float and NST escorted pt to new room. Belongings sent with pt.

## 2020-01-22 NOTE — PLAN OF CARE
Discharge Planner PT   Patient plan for discharge: Rehab  Current status: PT evaluation completed and treatment initiated.  Pt needing mod-maxA x 2 for bed mobility, sitting edge of bed with CGA.  Use of sling to get to chair then completed STS x 2 reps with maxA x 2, fatigues quickly, dizziness noted.  Barriers to return to prior living situation: Need for increased assist with transfers and ambulation, was previously IND  Recommendations for discharge: TCU  Rationale for recommendations: To progress strength and IND with mobility       Entered by: Milka Hu 01/22/2020 3:58 PM

## 2020-01-22 NOTE — PROVIDER NOTIFICATION
Notified [facility] 5 team regarding low urine output and soft BPs. MD advised no IV bolus at this time. Ok with low urine output and SBP>80

## 2020-01-23 ENCOUNTER — APPOINTMENT (OUTPATIENT)
Dept: OCCUPATIONAL THERAPY | Facility: CLINIC | Age: 60
DRG: 291 | End: 2020-01-23
Payer: MEDICARE

## 2020-01-23 LAB
ANION GAP SERPL CALCULATED.3IONS-SCNC: 2 MMOL/L (ref 3–14)
BACTERIA SPEC CULT: ABNORMAL
BACTERIA SPEC CULT: NO GROWTH
BUN SERPL-MCNC: 32 MG/DL (ref 7–30)
CALCIUM SERPL-MCNC: 9 MG/DL (ref 8.5–10.1)
CHLORIDE SERPL-SCNC: 101 MMOL/L (ref 94–109)
CO2 SERPL-SCNC: 37 MMOL/L (ref 20–32)
CREAT SERPL-MCNC: 1.19 MG/DL (ref 0.52–1.04)
GFR SERPL CREATININE-BSD FRML MDRD: 49 ML/MIN/{1.73_M2}
GLUCOSE BLDC GLUCOMTR-MCNC: 108 MG/DL (ref 70–99)
GLUCOSE BLDC GLUCOMTR-MCNC: 137 MG/DL (ref 70–99)
GLUCOSE BLDC GLUCOMTR-MCNC: 79 MG/DL (ref 70–99)
GLUCOSE BLDC GLUCOMTR-MCNC: 90 MG/DL (ref 70–99)
GLUCOSE SERPL-MCNC: 140 MG/DL (ref 70–99)
INR PPP: 2.61 (ref 0.86–1.14)
Lab: ABNORMAL
Lab: NORMAL
MAGNESIUM SERPL-MCNC: 2.4 MG/DL (ref 1.6–2.3)
POTASSIUM SERPL-SCNC: 4.1 MMOL/L (ref 3.4–5.3)
POTASSIUM SERPL-SCNC: 4.2 MMOL/L (ref 3.4–5.3)
SODIUM SERPL-SCNC: 140 MMOL/L (ref 133–144)
SPECIMEN SOURCE: ABNORMAL
SPECIMEN SOURCE: NORMAL

## 2020-01-23 PROCEDURE — 40000802 ZZH SITE CHECK

## 2020-01-23 PROCEDURE — 00000146 ZZHCL STATISTIC GLUCOSE BY METER IP

## 2020-01-23 PROCEDURE — 40000275 ZZH STATISTIC RCP TIME EA 10 MIN

## 2020-01-23 PROCEDURE — 99233 SBSQ HOSP IP/OBS HIGH 50: CPT | Mod: GC | Performed by: INTERNAL MEDICINE

## 2020-01-23 PROCEDURE — 97535 SELF CARE MNGMENT TRAINING: CPT | Mod: GO | Performed by: OCCUPATIONAL THERAPIST

## 2020-01-23 PROCEDURE — 36592 COLLECT BLOOD FROM PICC: CPT | Performed by: INTERNAL MEDICINE

## 2020-01-23 PROCEDURE — 25000125 ZZHC RX 250: Performed by: STUDENT IN AN ORGANIZED HEALTH CARE EDUCATION/TRAINING PROGRAM

## 2020-01-23 PROCEDURE — 12000001 ZZH R&B MED SURG/OB UMMC

## 2020-01-23 PROCEDURE — 85610 PROTHROMBIN TIME: CPT | Performed by: INTERNAL MEDICINE

## 2020-01-23 PROCEDURE — 25000128 H RX IP 250 OP 636: Performed by: STUDENT IN AN ORGANIZED HEALTH CARE EDUCATION/TRAINING PROGRAM

## 2020-01-23 PROCEDURE — 80048 BASIC METABOLIC PNL TOTAL CA: CPT | Performed by: INTERNAL MEDICINE

## 2020-01-23 PROCEDURE — 25000132 ZZH RX MED GY IP 250 OP 250 PS 637: Mod: GY | Performed by: STUDENT IN AN ORGANIZED HEALTH CARE EDUCATION/TRAINING PROGRAM

## 2020-01-23 PROCEDURE — 94660 CPAP INITIATION&MGMT: CPT

## 2020-01-23 PROCEDURE — 84132 ASSAY OF SERUM POTASSIUM: CPT | Performed by: INTERNAL MEDICINE

## 2020-01-23 PROCEDURE — 93010 ELECTROCARDIOGRAM REPORT: CPT | Performed by: INTERNAL MEDICINE

## 2020-01-23 PROCEDURE — 83735 ASSAY OF MAGNESIUM: CPT | Performed by: INTERNAL MEDICINE

## 2020-01-23 PROCEDURE — 25000132 ZZH RX MED GY IP 250 OP 250 PS 637: Mod: GY

## 2020-01-23 RX ORDER — WARFARIN SODIUM 3 MG/1
3 TABLET ORAL
Status: COMPLETED | OUTPATIENT
Start: 2020-01-23 | End: 2020-01-23

## 2020-01-23 RX ORDER — BUMETANIDE 0.25 MG/ML
4 INJECTION INTRAMUSCULAR; INTRAVENOUS ONCE
Status: COMPLETED | OUTPATIENT
Start: 2020-01-23 | End: 2020-01-23

## 2020-01-23 RX ADMIN — ACETAMINOPHEN 650 MG: 325 TABLET, FILM COATED ORAL at 08:12

## 2020-01-23 RX ADMIN — Medication 12.5 MG: at 08:01

## 2020-01-23 RX ADMIN — ACETAMINOPHEN 650 MG: 325 TABLET, FILM COATED ORAL at 18:01

## 2020-01-23 RX ADMIN — FERROUS GLUCONATE 324 MG: 324 TABLET ORAL at 08:01

## 2020-01-23 RX ADMIN — ATORVASTATIN CALCIUM 20 MG: 20 TABLET, FILM COATED ORAL at 19:33

## 2020-01-23 RX ADMIN — FERROUS GLUCONATE 324 MG: 324 TABLET ORAL at 14:26

## 2020-01-23 RX ADMIN — MICONAZOLE NITRATE: 20 POWDER TOPICAL at 08:10

## 2020-01-23 RX ADMIN — Medication 5 ML: at 11:48

## 2020-01-23 RX ADMIN — Medication 12.5 MG: at 19:33

## 2020-01-23 RX ADMIN — Medication 1 MG: at 23:44

## 2020-01-23 RX ADMIN — ONDANSETRON 4 MG: 2 INJECTION INTRAMUSCULAR; INTRAVENOUS at 19:32

## 2020-01-23 RX ADMIN — WARFARIN SODIUM 3 MG: 3 TABLET ORAL at 18:01

## 2020-01-23 RX ADMIN — SENNOSIDES AND DOCUSATE SODIUM 1 TABLET: 8.6; 5 TABLET ORAL at 08:01

## 2020-01-23 RX ADMIN — Medication 5 ML: at 06:42

## 2020-01-23 RX ADMIN — SODIUM CHLORIDE, PRESERVATIVE FREE 10 ML: 5 INJECTION INTRAVENOUS at 08:01

## 2020-01-23 RX ADMIN — ALLOPURINOL 200 MG: 100 TABLET ORAL at 08:00

## 2020-01-23 RX ADMIN — SENNOSIDES AND DOCUSATE SODIUM 1 TABLET: 8.6; 5 TABLET ORAL at 19:33

## 2020-01-23 RX ADMIN — MICONAZOLE NITRATE: 20 POWDER TOPICAL at 20:55

## 2020-01-23 RX ADMIN — FERROUS GLUCONATE 324 MG: 324 TABLET ORAL at 19:33

## 2020-01-23 RX ADMIN — Medication 2 G: at 16:32

## 2020-01-23 RX ADMIN — Medication 1 MG: at 00:40

## 2020-01-23 RX ADMIN — ACETAMINOPHEN 650 MG: 325 TABLET, FILM COATED ORAL at 00:40

## 2020-01-23 RX ADMIN — BUMETANIDE 4 MG: 0.25 INJECTION INTRAMUSCULAR; INTRAVENOUS at 08:00

## 2020-01-23 ASSESSMENT — ACTIVITIES OF DAILY LIVING (ADL)
ADLS_ACUITY_SCORE: 18

## 2020-01-23 ASSESSMENT — PAIN DESCRIPTION - DESCRIPTORS: DESCRIPTORS: SHARP

## 2020-01-23 NOTE — PLAN OF CARE
A/O X4. Able to make needs known, uses call light appropriately. Reposition q2hrs, assist of 2. HR Afib 90-low 100's, VSS; afebrile; Lung sounds clear/dim on 4L of O2 per NC with sats maintained above 92%, Bipap @40% Fio2 w/ sleep. marginal urine output per antunez. No bm this evening. Up to recliner for supper with Ax2 and lift. Tolerating regular diet, good appetite. Will continue to monitor and follow plan of care.

## 2020-01-23 NOTE — PROGRESS NOTES
Social Work Services Progress Note    Hospital Day: 8  Date of Initial Social Work Evaluation:  1/21/2020 - please see for details  Collaborated with:  [facility] Liaison (Amada), [facility] (Rona), Chart Review    Data:  SW following for TCU placement at discharge. SW received update from RN[facility] (Rona) that Pt will potentially be stable for discharge over the weekend or Monday.    SW updated [facility] Liaison (Amada) re: possible discharge date. Per Amada, Pt is likely appropriate for [facility] at discharge. They will continue to follow via [facility] as discharge date is closer.     Referrals in Process:  -[facility] ([id number])    Intervention:    -Discharge planning    Assessment:  Pt requiring IP hospitalization at this time.    Plan:    Anticipated Disposition:  Facility:  TCU (D)    Barriers to d/c plan:  Medical stability    Follow Up:  SW to continue to follow and assist with discharge plan.    TAMMY Mak, LGSW  6B Intermediate Care Unit   [facility] [place]  Phone: [phone]  Pager: [phone]

## 2020-01-23 NOTE — PROGRESS NOTES
[facility], [place]    Acceptance/ Progress Note - Marmadeline 5 Service        Date of Admission:  1/15/2020    Assessment & Plan       Arianna Siddiqi is a 59 year old female with medical history significant for HFrEF, T2DM, CKD, HTN, gout, OHS/CARLOS, morbid obesity who presented to the ED with dyspnea.     #Right sided CV dysfunction complicated by volume overload   Presenting with weeks of increased weight gain in 1 week of worsening dyspnea.  Chest x-ray consistent with pulmonary edema,  BNP > 5, 000, per patient, possibly as much as 60 lbs above baseline. ECHO showed no WMA, LVEF 55-60%, Difficult to assess RV. Function probably at least mildly reduced.  IV diuresis on admission, Has diuresed well through out stay.  Developed post ATN diuresis on 1/18, holding acetazolamide and bumex, continued to have large uop  - 4mg IV bumex, held given JASON  - BNP to try to assess improvement with diuresis   - lytes as below  - strict I/O    #JASON, resolved  #Post-ATN diuresis  #Metabolic alkalosis  Baseline cr 0.8, peaked at 1.5 w/in 24hrs after presentation in setting of volume overload. Aggressively diuresed. Suspect initial JASON 2/2 cardiorenal. Cr continued to improve but developed post ATN diuresis night of 1/18, large uop w/ rise in bicarb 2/2 contraction alkalosis. Continued auto-diuresis but improving. repeate JASON 1/23, unclear if at dry weight  - Hold diuretics as above   - monitor BMP/Mg  K>4, Mg >2  - strict I/O     #AMS, resolved  Confusion and lethargy in setting of PCO2 >90, likely CO2 narcosis, resolved  - Monitor mental status and VBG as needed     # Acute hypoxic hypercarbic respiratory failure   # Respiratory acidosis  # Obesity hypoventilation  # CARLOS  Worsening mental status day after admission with somnolence.  ABG with CO2 over 100.  Started on BiPAP with stabilization of gas but continued to have mentation that wax and wane.  Transition to ICU where she required a brief period of  intubation.  Now on nasal cannula with BiPAP/APAP's at night.  Continues to be high risk for CO2 retaining given obesity and CARLOS.   - pta AVAPs setting when sleeping (AVAPS, pmax25, plow 12, epap 8, RR 12, TV 600cc)  -Monitor mental status, trend VBG as needed    # Paroxysmal atrial fibrillation   # nonsustained SVT, resolved  EKG on admission was in NSR but noted afib back in 2016. Has been anticoagulated with warfarin. INR 2.06 on admission. Rate control with metoprolol succinate, increased to 50mg bid after nonsustained SVT, pt HD stable and asxs.  - metoprolol succinate 12.5 bid, titrate as tolerates  - warfarin per pharmD  - INR daily      #Sepsis, resolved  hypertensive on arrival w/ neg procal. BP stable since transfer. lactat neg, slight leukocytosis. CXR neg for focal infiltrate, increased interstitial marking likely 2/2 fluid overload, but w/ URI prodrome days pta, will treat empirically for atypical pna. Flu and RSV neg. Received vancx1 and cefrtriaxone x1. Urine cx NGTD, blood cx + diphtheroid species after 5 days of incubation, suspect contaminant      # T2DM  # Hypoglycemia, improved  Has been hyperglycemic on arrival. But NPO during most of the time after transfer while on bipap and intubated. Has had some hypoglcemic episodes into the 60s. Likely due to lack of po intake   - hold sitagliptin  - cont medium sliding scale insulin  - cont fingerstick glucose qid  - titrate off D5 after restart po intake       # Gout - stable  - continue allopurinol 200 mg po qday        FEN: diet after passing bedside swallow  DVT Prophylaxis:  pta warfarin, ppi not indicated  Code Status: Full  Disposition Plan     Expected discharge in 3 to 7 days pending diuresis and PT OT evaluation    The patient's care was discussed with MD Alireza Padilla [address] [place] [address], [place]  Pager: [phone]  Please see sticky note for cross cover  information  ______________________________________________________________________    Interval History   NAEON. Breathing well, had breakfast and no nausea or abd pain. Denies chest pain or palpitations. Still feels she is more swollen and abdomen is more tense than prior. Peeing more today. Interested in when she will be able to leave     Data reviewed today: I reviewed all medications, new labs and imaging results over the last 24 hours.    Physical Exam   Vital Signs: Temp: 97.8  F (36.6  C) Temp src: Oral BP: 117/63   Heart Rate: 87 Resp: 16 SpO2: 94 % O2 Device: BiPAP/CPAP Oxygen Delivery: 4 LPM  Weight: 429 lbs 14.35 oz  General Appearance: Lying in bed, no acute distress  Respiratory: Clear to auscultation front fields, mild bibasilar crackles  regular   Cardiovascular:  rhythm with occasional ectopic beat, no murmur appreciated  GI: Bowel sounds positive, nontender, large with anasarca  Skin: Dry skin  Extremities: Bilateral lower extremity edema  Neuro : Alert and oriented, no focal deficits    Data   Reviewed

## 2020-01-23 NOTE — PLAN OF CARE
Discharge Planner OT   Patient plan for discharge: home if able, pt open to rehab  Current status: Pt completed UB washing/rinsing/drying w/ min A and LB washing/rinsing/drying w/ max A supine, rolling w/ min A, total A shantel cares and washign back side. VSS throughout on 4L NC.  Barriers to return to prior living situation: medical status  Recommendations for discharge: TCU  Rationale for recommendations: to increase ind in ADLS/IADLS       Entered by: Wendy Rocha 01/23/2020 3:08 PM

## 2020-01-23 NOTE — PLAN OF CARE
Neuro: A&Ox4. Calls appropriately.   Activity: Up with Ax2 and lift. Q2H repo. Pt sat up in recliner for breakfast.    Vitals: Afebrile. VSS on 4L, NSR     LDAS: Right DL PICC heparin locked. Left PIV saline locked. Baig catheter.   Cardiac: Converted to NSR last night.   Respiratory: Lung sounds clear, diminished at the bases. 4L O2 via NC, BiPAP at 40% FiO2 when sleeping.  GI/: Baig catheter in place.   Skin:  Interdry and miconazole powder between abdominal skin folds. Preventative mepilex covering coccyx.  Pain: C/o generalized achy pain, relieved by tylenol.   Diet: Regular.   Plan: Continue to monitor and follow POC. Transferring to 5A.

## 2020-01-23 NOTE — PROGRESS NOTES
[id number]:  Neuro: A&Ox4. Calls appropriately.   Activity: Up with Ax2 and lift. Q2H repo.   Vitals: Afebrile. VSS.     LDAS: R DL PICC, Hep locked. L PIV SL.   Cardiac: A-fib/A-flutter, converted to sinus with occasional PVCs at 0330.     Respiratory: Lung sounds clear. Wearing BiPAP at 40% FiO2 when sleeping and Oxy NC during the day at 3L.        GI/: Baig catheter in place, with marginal output. No BM this shift, hypoactive bowel sounds.    Skin: Excoriated groin area. Interdry between abdominal skin folds. Mepilex in groin.   Pain: C/o generalized achy pain, improved with PRN Tylenol.   Diet: Regular.   Labs/Imaging: Blood glucose- 108.   Plan: Continue to monitor and follow POC.

## 2020-01-24 ENCOUNTER — APPOINTMENT (OUTPATIENT)
Dept: OCCUPATIONAL THERAPY | Facility: CLINIC | Age: 60
DRG: 291 | End: 2020-01-24
Payer: MEDICARE

## 2020-01-24 ENCOUNTER — APPOINTMENT (OUTPATIENT)
Dept: PHYSICAL THERAPY | Facility: CLINIC | Age: 60
DRG: 291 | End: 2020-01-24
Payer: MEDICARE

## 2020-01-24 LAB
ANION GAP SERPL CALCULATED.3IONS-SCNC: 1 MMOL/L (ref 3–14)
BUN SERPL-MCNC: 34 MG/DL (ref 7–30)
CALCIUM SERPL-MCNC: 9 MG/DL (ref 8.5–10.1)
CHLORIDE SERPL-SCNC: 100 MMOL/L (ref 94–109)
CO2 SERPL-SCNC: 37 MMOL/L (ref 20–32)
CREAT SERPL-MCNC: 1.05 MG/DL (ref 0.52–1.04)
ERYTHROCYTE [DISTWIDTH] IN BLOOD BY AUTOMATED COUNT: 20.7 % (ref 10–15)
GFR SERPL CREATININE-BSD FRML MDRD: 57 ML/MIN/{1.73_M2}
GLUCOSE BLDC GLUCOMTR-MCNC: 111 MG/DL (ref 70–99)
GLUCOSE BLDC GLUCOMTR-MCNC: 128 MG/DL (ref 70–99)
GLUCOSE BLDC GLUCOMTR-MCNC: 142 MG/DL (ref 70–99)
GLUCOSE BLDC GLUCOMTR-MCNC: 73 MG/DL (ref 70–99)
GLUCOSE BLDC GLUCOMTR-MCNC: 94 MG/DL (ref 70–99)
GLUCOSE SERPL-MCNC: 90 MG/DL (ref 70–99)
HCT VFR BLD AUTO: 44.9 % (ref 35–47)
HGB BLD-MCNC: 11.7 G/DL (ref 11.7–15.7)
INR PPP: 2.65 (ref 0.86–1.14)
INTERPRETATION ECG - MUSE: NORMAL
MCH RBC QN AUTO: 23.7 PG (ref 26.5–33)
MCHC RBC AUTO-ENTMCNC: 26.1 G/DL (ref 31.5–36.5)
MCV RBC AUTO: 91 FL (ref 78–100)
NT-PROBNP SERPL-MCNC: 6567 PG/ML (ref 0–900)
PLATELET # BLD AUTO: 182 10E9/L (ref 150–450)
POTASSIUM SERPL-SCNC: 4.1 MMOL/L (ref 3.4–5.3)
RBC # BLD AUTO: 4.93 10E12/L (ref 3.8–5.2)
SODIUM SERPL-SCNC: 138 MMOL/L (ref 133–144)
WBC # BLD AUTO: 5.1 10E9/L (ref 4–11)

## 2020-01-24 PROCEDURE — 80048 BASIC METABOLIC PNL TOTAL CA: CPT | Performed by: INTERNAL MEDICINE

## 2020-01-24 PROCEDURE — 40000983 ZZH STATISTIC HFNC ADULT NON-CPAP

## 2020-01-24 PROCEDURE — 97110 THERAPEUTIC EXERCISES: CPT | Mod: GP

## 2020-01-24 PROCEDURE — 25000132 ZZH RX MED GY IP 250 OP 250 PS 637: Mod: GY | Performed by: STUDENT IN AN ORGANIZED HEALTH CARE EDUCATION/TRAINING PROGRAM

## 2020-01-24 PROCEDURE — 99232 SBSQ HOSP IP/OBS MODERATE 35: CPT | Mod: GC | Performed by: INTERNAL MEDICINE

## 2020-01-24 PROCEDURE — 94660 CPAP INITIATION&MGMT: CPT

## 2020-01-24 PROCEDURE — 85027 COMPLETE CBC AUTOMATED: CPT | Performed by: INTERNAL MEDICINE

## 2020-01-24 PROCEDURE — 12000001 ZZH R&B MED SURG/OB UMMC

## 2020-01-24 PROCEDURE — 85610 PROTHROMBIN TIME: CPT | Performed by: INTERNAL MEDICINE

## 2020-01-24 PROCEDURE — 36415 COLL VENOUS BLD VENIPUNCTURE: CPT | Performed by: INTERNAL MEDICINE

## 2020-01-24 PROCEDURE — 40000275 ZZH STATISTIC RCP TIME EA 10 MIN

## 2020-01-24 PROCEDURE — 93005 ELECTROCARDIOGRAM TRACING: CPT

## 2020-01-24 PROCEDURE — 25000128 H RX IP 250 OP 636: Performed by: STUDENT IN AN ORGANIZED HEALTH CARE EDUCATION/TRAINING PROGRAM

## 2020-01-24 PROCEDURE — 99207 ZZC CDG-MDM COMPONENT: MEETS LOW - DOWN CODED: CPT | Performed by: INTERNAL MEDICINE

## 2020-01-24 PROCEDURE — 25000132 ZZH RX MED GY IP 250 OP 250 PS 637: Mod: GY | Performed by: INTERNAL MEDICINE

## 2020-01-24 PROCEDURE — 00000146 ZZHCL STATISTIC GLUCOSE BY METER IP

## 2020-01-24 PROCEDURE — 83880 ASSAY OF NATRIURETIC PEPTIDE: CPT | Performed by: INTERNAL MEDICINE

## 2020-01-24 PROCEDURE — 97530 THERAPEUTIC ACTIVITIES: CPT | Mod: GP

## 2020-01-24 PROCEDURE — 97110 THERAPEUTIC EXERCISES: CPT | Mod: GO

## 2020-01-24 RX ORDER — METOPROLOL TARTRATE 25 MG/1
25 TABLET, FILM COATED ORAL 2 TIMES DAILY
Status: DISCONTINUED | OUTPATIENT
Start: 2020-01-24 | End: 2020-01-25

## 2020-01-24 RX ORDER — WARFARIN SODIUM 3 MG/1
3 TABLET ORAL
Status: COMPLETED | OUTPATIENT
Start: 2020-01-24 | End: 2020-01-24

## 2020-01-24 RX ORDER — BUMETANIDE 2 MG/1
4 TABLET ORAL DAILY
Status: DISCONTINUED | OUTPATIENT
Start: 2020-01-24 | End: 2020-01-27 | Stop reason: HOSPADM

## 2020-01-24 RX ORDER — HYDROXYZINE HYDROCHLORIDE 25 MG/1
25 TABLET, FILM COATED ORAL ONCE
Status: COMPLETED | OUTPATIENT
Start: 2020-01-24 | End: 2020-01-24

## 2020-01-24 RX ORDER — GABAPENTIN 300 MG/1
300 CAPSULE ORAL 3 TIMES DAILY
Status: DISCONTINUED | OUTPATIENT
Start: 2020-01-24 | End: 2020-01-27 | Stop reason: HOSPADM

## 2020-01-24 RX ORDER — LANOLIN ALCOHOL/MO/W.PET/CERES
6 CREAM (GRAM) TOPICAL
Status: DISCONTINUED | OUTPATIENT
Start: 2020-01-24 | End: 2020-01-27 | Stop reason: HOSPADM

## 2020-01-24 RX ADMIN — HYDROXYZINE HYDROCHLORIDE 25 MG: 25 TABLET ORAL at 02:17

## 2020-01-24 RX ADMIN — Medication 12.5 MG: at 08:10

## 2020-01-24 RX ADMIN — Medication 5 ML: at 05:39

## 2020-01-24 RX ADMIN — GABAPENTIN 300 MG: 300 CAPSULE ORAL at 21:42

## 2020-01-24 RX ADMIN — MICONAZOLE NITRATE: 20 POWDER TOPICAL at 08:12

## 2020-01-24 RX ADMIN — ACETAMINOPHEN 650 MG: 325 TABLET, FILM COATED ORAL at 07:01

## 2020-01-24 RX ADMIN — BUMETANIDE 4 MG: 2 TABLET ORAL at 09:51

## 2020-01-24 RX ADMIN — ALLOPURINOL 200 MG: 100 TABLET ORAL at 08:10

## 2020-01-24 RX ADMIN — FERROUS GLUCONATE 324 MG: 324 TABLET ORAL at 08:10

## 2020-01-24 RX ADMIN — ACETAMINOPHEN 650 MG: 325 TABLET, FILM COATED ORAL at 20:40

## 2020-01-24 RX ADMIN — SENNOSIDES AND DOCUSATE SODIUM 1 TABLET: 8.6; 5 TABLET ORAL at 08:12

## 2020-01-24 RX ADMIN — MELATONIN TAB 3 MG 6 MG: 3 TAB at 23:29

## 2020-01-24 RX ADMIN — ACETAMINOPHEN 650 MG: 325 TABLET, FILM COATED ORAL at 14:01

## 2020-01-24 RX ADMIN — FERROUS GLUCONATE 324 MG: 324 TABLET ORAL at 19:11

## 2020-01-24 RX ADMIN — MICONAZOLE NITRATE: 20 POWDER TOPICAL at 19:11

## 2020-01-24 RX ADMIN — WARFARIN SODIUM 3 MG: 3 TABLET ORAL at 18:11

## 2020-01-24 RX ADMIN — FERROUS GLUCONATE 324 MG: 324 TABLET ORAL at 13:36

## 2020-01-24 RX ADMIN — ATORVASTATIN CALCIUM 20 MG: 20 TABLET, FILM COATED ORAL at 19:11

## 2020-01-24 RX ADMIN — SENNOSIDES AND DOCUSATE SODIUM 2 TABLET: 8.6; 5 TABLET ORAL at 19:11

## 2020-01-24 RX ADMIN — METOPROLOL TARTRATE 25 MG: 25 TABLET ORAL at 19:11

## 2020-01-24 ASSESSMENT — ACTIVITIES OF DAILY LIVING (ADL)
ADLS_ACUITY_SCORE: 18

## 2020-01-24 NOTE — PLAN OF CARE
Discharge Planner PT   Patient plan for discharge: Rehab  Current status: Sling used to transfer to chair due to soft bed, requires mod-A to roll. Mod-A of 2 sit<>stand to bariatric walker, dizziness with standing, /71.  Barriers to return to prior living situation: Need for increased assist with transfers and ambulation, was previously IND. Decreased activity tolerance  Recommendations for discharge: TCU  Rationale for recommendations: Pt currently below baseline mobility and will need ongoing skilled PT intervention to improve independence and safety with functional mobility skills prior to returning home.       Entered by: Ernie Craig 01/24/2020 1:45 PM

## 2020-01-24 NOTE — PLAN OF CARE
Pt A/Oxc4, VSS on 3L nasal canula (Pt uses BIPAP when sleeping). Pt does not use O2 at baseline. On tele, reading A flutter today just like last night. Pt denies any signs or symptoms related to hrt rhythm. On reg diet, nutrition is following. Pt working with PT and OT. Pt was able to do standing exercises, at baseline pt can walk at home. Pt on warfarin, INR 2.65 this AM. PCD's located on pts thighs, tolerating well. Coccyx has mepliex in place to prevent pressure injury. Pt currently refusing turn q2 and is on a foam bariatric mattress. Antunez is in for strict I/O's, antunez care done this AM. Pt got 4mg PO bumex today. Cr improving, was 1.05 today. Plan is to continue to diurese pt and will discharge to [facility] once medically stable (most likely early next week).

## 2020-01-24 NOTE — PLAN OF CARE
EKG came back - discussed results with Md on the phone plan to monitor pt for symptoms overnight with no further interventions.

## 2020-01-24 NOTE — PLAN OF CARE
[id number]. Alert and oriented x4. repositioned as needed. Pain present upon movement.  VSS on 4.5 L o2 oxymask and wears bipap overnight. Pt has been in aflutter with BBB throughout the night. MD is aware. Neuros have been intact overnight. Denies any shortness of breath. Denies chest pain. Pt monitored very closely to ensure pt was not symptomatic dt heart rhythm.     Gave atarax and melatonin dt trouble falling asleep. Given tylenol x1 for headache. PCDs on. Pulsate mattress ordered but did not come up overnight. No BM overnight. Baig catheter positional with good urine output.     Will continue to monitor and follow plan of care.     Deidra Matta RN on 1/24/2020 at 7:46 AM

## 2020-01-24 NOTE — PROVIDER NOTIFICATION
Noted telemetry changes: Atrial flutter with HRs fluctuating between 90s-110's. Pt 88-90% on 3L O2 NC, but denies SOB.  Bumped to 4L with sats of 91-92%.  's/80's. Denies chest pain, palpitations, SOB, light-headedness, dizziness, weakness, numbness or tingling.  MD notified; EKG rx.

## 2020-01-24 NOTE — PROGRESS NOTES
CLINICAL NUTRITION SERVICES - REASSESSMENT NOTE     Nutrition Prescription    RECOMMENDATIONS FOR MDs/PROVIDERS TO ORDER:  None at this time    Malnutrition Status:    Non-severe malnutrition in the context of acute on chronic condition    Recommendations already ordered by Registered Dietitian (RD):  Ordered high protein snacks in between meals:  10:00 am: peeled hard boiled egg/ pepper  2:00 pm: Lite yogurt strawberry  HS: Bowl of chicken salad with crackers     Future/Additional Recommendations:  None     EVALUATION OF THE PROGRESS TOWARD GOALS   Diet: Regular    Intake: visited with patient today. She reports a fair po and appetite. Attributed to 5 days of NPO status initially during admission and SBO/volume up status. Patient is ordering 3 meals daily, consuming little over 50%. In agreement with a trial of high protein snacks in between meals. Also patient requesting ability to order oral supplements prn.     Per RN/flow sheet, patient is consuming 50-75% of meals     NEW FINDINGS   --Chart reviewed: history significant for HFrEF, T2DM, CKD, HTN, gout, OHS/CARLOS, morbid obesity who presented to the ED with dyspnea. Continues to be high risk for CO2 retaining given obesity and CARLOS.   --Extremities: Bilateral lower extremity edema    --Labs noted: BUN: 34, Cr: 1.05 - trending down   B (H)    - Wt trend: Current wt> admit wt , likely related to volume status changes      MALNUTRITION  % Intake: < 75% for > 7 days (non-severe)  % Weight Loss: None noted  Subcutaneous Fat Loss: None observed  Muscle Loss: None observed  Fluid Accumulation/Edema: Moderate  Malnutrition Diagnosis: Non-severe malnutrition in the context of acute on chronic condition    Previous Goals   Patient to list 5 high sodium and 5 ways to reduce sodium in diet   Evaluation: Unable to evaluate    Previous Nutrition Diagnosis  Predicted excessive nutrient intake (sodium/fluid) related to CHF and CKD  Evaluation: No change    CURRENT NUTRITION  DIAGNOSIS  Inadequate oral intake related to SOB and volume up, hindering ability to take sufficient PO as evidenced by patients report of slow improvement in PO, consuming a little over 50% of meals      INTERVENTIONS  Implementation  Modify composition of meals/snacks; encouraged patient to consume minimum 3 meals daily. Encouraged high protein snacks to improve intake.     Goals  Patient to consume % of nutritionally adequate meal trays TID, or the equivalent with supplements/snacks.    Monitoring/Evaluation  Progress toward goals will be monitored and evaluated per protocol.    Kedar Joiner RD/ABRIL  Pager [phone]

## 2020-01-24 NOTE — PLAN OF CARE
Discharge Planner OT   Patient plan for discharge: TCU  Current status: completed arm exercises with 5lbs dowel for total of x10 exercises of x15 reps each exercise on 3L O2, HR noted to be in afib throughout session  - HR ranging from 111 bpm to 203 bpm with pt asymptomatic  Barriers to return to prior living situation: medical status, deconditioning, level of A, lives alone  Recommendations for discharge: TCU   Rationale for recommendations: to progress ADL/IADL IND, strength,endurance, and functional mobility.        Entered by: Yaritza Dwyer 01/24/2020 4:26 PM

## 2020-01-24 NOTE — PLAN OF CARE
"Time: 1375-5761     Reason for admission/Dx:   Acute on chronic congestive heart failure, unspecified heart failure type (H) [I50.9]     /57 (BP Location: Left arm)   Pulse 76   Temp 98.4  F (36.9  C) (Oral)   Resp 16   Ht 1.6 m (5' 3\")   Wt (!) 195 kg (429 lb 14.4 oz)   SpO2 91%   BMI 76.15 kg/m      Shift changes: Transferred from . Throughout majority of shift, pt VSS, on 3L O2 NC; sats 93-94%; Tele showed SR, with PVCs, at the time, however converted to A-flutter @ 21:30 (90s-110's bpm) BP's stable, however pt requiring more O2; despite denying SOB.  Pt desat to 88-90% on 3L NC: subsequently transitioned to 4.5L Oxymask with sats 93-94%.  MD notified. AOx4.  Ambulates at BL; however unable to ambulate currently 2/2 dyspnea and weakness.  Tolerating regular diet; 75% meals, last BM yesterday. Zofran given with meals, d/t nausea.  Baig in place, good output. PICC heparin locked. Miconazole and interdry applied to pannus folds. Tylenol 1x for HA, with relief.    Plan: Diurese with Bumex.   "

## 2020-01-25 LAB
ANION GAP SERPL CALCULATED.3IONS-SCNC: 2 MMOL/L (ref 3–14)
BUN SERPL-MCNC: 30 MG/DL (ref 7–30)
CALCIUM SERPL-MCNC: 9.4 MG/DL (ref 8.5–10.1)
CHLORIDE SERPL-SCNC: 97 MMOL/L (ref 94–109)
CO2 SERPL-SCNC: 38 MMOL/L (ref 20–32)
CREAT SERPL-MCNC: 0.87 MG/DL (ref 0.52–1.04)
GFR SERPL CREATININE-BSD FRML MDRD: 72 ML/MIN/{1.73_M2}
GLUCOSE BLDC GLUCOMTR-MCNC: 141 MG/DL (ref 70–99)
GLUCOSE BLDC GLUCOMTR-MCNC: 144 MG/DL (ref 70–99)
GLUCOSE BLDC GLUCOMTR-MCNC: 74 MG/DL (ref 70–99)
GLUCOSE BLDC GLUCOMTR-MCNC: 78 MG/DL (ref 70–99)
GLUCOSE BLDC GLUCOMTR-MCNC: 92 MG/DL (ref 70–99)
GLUCOSE SERPL-MCNC: 88 MG/DL (ref 70–99)
INR PPP: 2.83 (ref 0.86–1.14)
MAGNESIUM SERPL-MCNC: 2 MG/DL (ref 1.6–2.3)
POTASSIUM SERPL-SCNC: 3.9 MMOL/L (ref 3.4–5.3)
SODIUM SERPL-SCNC: 137 MMOL/L (ref 133–144)

## 2020-01-25 PROCEDURE — 80048 BASIC METABOLIC PNL TOTAL CA: CPT | Performed by: INTERNAL MEDICINE

## 2020-01-25 PROCEDURE — 25000132 ZZH RX MED GY IP 250 OP 250 PS 637: Mod: GY | Performed by: STUDENT IN AN ORGANIZED HEALTH CARE EDUCATION/TRAINING PROGRAM

## 2020-01-25 PROCEDURE — 99232 SBSQ HOSP IP/OBS MODERATE 35: CPT | Mod: GC | Performed by: INTERNAL MEDICINE

## 2020-01-25 PROCEDURE — 85610 PROTHROMBIN TIME: CPT | Performed by: INTERNAL MEDICINE

## 2020-01-25 PROCEDURE — 94660 CPAP INITIATION&MGMT: CPT

## 2020-01-25 PROCEDURE — 12000001 ZZH R&B MED SURG/OB UMMC

## 2020-01-25 PROCEDURE — 00000146 ZZHCL STATISTIC GLUCOSE BY METER IP

## 2020-01-25 PROCEDURE — 25000132 ZZH RX MED GY IP 250 OP 250 PS 637: Mod: GY | Performed by: INTERNAL MEDICINE

## 2020-01-25 PROCEDURE — 40000275 ZZH STATISTIC RCP TIME EA 10 MIN

## 2020-01-25 PROCEDURE — 40000802 ZZH SITE CHECK

## 2020-01-25 PROCEDURE — 36415 COLL VENOUS BLD VENIPUNCTURE: CPT | Performed by: INTERNAL MEDICINE

## 2020-01-25 PROCEDURE — 25000125 ZZHC RX 250: Performed by: STUDENT IN AN ORGANIZED HEALTH CARE EDUCATION/TRAINING PROGRAM

## 2020-01-25 PROCEDURE — 83735 ASSAY OF MAGNESIUM: CPT | Performed by: INTERNAL MEDICINE

## 2020-01-25 PROCEDURE — 25000128 H RX IP 250 OP 636: Performed by: STUDENT IN AN ORGANIZED HEALTH CARE EDUCATION/TRAINING PROGRAM

## 2020-01-25 RX ORDER — CYCLOBENZAPRINE HCL 5 MG
5 TABLET ORAL
Status: DISCONTINUED | OUTPATIENT
Start: 2020-01-25 | End: 2020-01-27 | Stop reason: HOSPADM

## 2020-01-25 RX ORDER — WARFARIN SODIUM 2 MG/1
2 TABLET ORAL
Status: COMPLETED | OUTPATIENT
Start: 2020-01-25 | End: 2020-01-25

## 2020-01-25 RX ORDER — METOPROLOL TARTRATE 50 MG
50 TABLET ORAL 2 TIMES DAILY
Status: DISCONTINUED | OUTPATIENT
Start: 2020-01-25 | End: 2020-01-27 | Stop reason: HOSPADM

## 2020-01-25 RX ORDER — WARFARIN SODIUM 5 MG/1
5 TABLET ORAL
Status: DISCONTINUED | OUTPATIENT
Start: 2020-01-25 | End: 2020-01-25

## 2020-01-25 RX ADMIN — CYCLOBENZAPRINE HYDROCHLORIDE 5 MG: 5 TABLET, FILM COATED ORAL at 22:16

## 2020-01-25 RX ADMIN — GABAPENTIN 300 MG: 300 CAPSULE ORAL at 07:47

## 2020-01-25 RX ADMIN — BUMETANIDE 4 MG: 2 TABLET ORAL at 07:47

## 2020-01-25 RX ADMIN — GABAPENTIN 300 MG: 300 CAPSULE ORAL at 20:09

## 2020-01-25 RX ADMIN — POTASSIUM CHLORIDE 20 MEQ: 1.5 POWDER, FOR SOLUTION ORAL at 21:21

## 2020-01-25 RX ADMIN — MELATONIN TAB 3 MG 6 MG: 3 TAB at 22:16

## 2020-01-25 RX ADMIN — FERROUS GLUCONATE 324 MG: 324 TABLET ORAL at 07:47

## 2020-01-25 RX ADMIN — ACETAMINOPHEN 650 MG: 325 TABLET, FILM COATED ORAL at 10:11

## 2020-01-25 RX ADMIN — ATORVASTATIN CALCIUM 20 MG: 20 TABLET, FILM COATED ORAL at 20:09

## 2020-01-25 RX ADMIN — ONDANSETRON 4 MG: 2 INJECTION INTRAMUSCULAR; INTRAVENOUS at 20:09

## 2020-01-25 RX ADMIN — SODIUM CHLORIDE, PRESERVATIVE FREE 10 ML: 5 INJECTION INTRAVENOUS at 07:48

## 2020-01-25 RX ADMIN — SENNOSIDES AND DOCUSATE SODIUM 2 TABLET: 8.6; 5 TABLET ORAL at 07:46

## 2020-01-25 RX ADMIN — ACETAMINOPHEN 650 MG: 325 TABLET, FILM COATED ORAL at 20:09

## 2020-01-25 RX ADMIN — MICONAZOLE NITRATE: 20 POWDER TOPICAL at 21:21

## 2020-01-25 RX ADMIN — MICONAZOLE NITRATE: 20 POWDER TOPICAL at 07:45

## 2020-01-25 RX ADMIN — FERROUS GLUCONATE 324 MG: 324 TABLET ORAL at 20:09

## 2020-01-25 RX ADMIN — METOPROLOL TARTRATE 50 MG: 50 TABLET, FILM COATED ORAL at 20:09

## 2020-01-25 RX ADMIN — ALLOPURINOL 200 MG: 100 TABLET ORAL at 07:47

## 2020-01-25 RX ADMIN — WARFARIN SODIUM 2 MG: 2 TABLET ORAL at 17:06

## 2020-01-25 RX ADMIN — SENNOSIDES AND DOCUSATE SODIUM 2 TABLET: 8.6; 5 TABLET ORAL at 20:09

## 2020-01-25 RX ADMIN — ONDANSETRON 4 MG: 2 INJECTION INTRAMUSCULAR; INTRAVENOUS at 13:37

## 2020-01-25 RX ADMIN — METOPROLOL TARTRATE 50 MG: 50 TABLET, FILM COATED ORAL at 07:46

## 2020-01-25 RX ADMIN — Medication 2 G: at 22:16

## 2020-01-25 RX ADMIN — GABAPENTIN 300 MG: 300 CAPSULE ORAL at 13:24

## 2020-01-25 RX ADMIN — Medication 5 ML: at 13:36

## 2020-01-25 RX ADMIN — FERROUS GLUCONATE 324 MG: 324 TABLET ORAL at 13:24

## 2020-01-25 ASSESSMENT — ACTIVITIES OF DAILY LIVING (ADL)
ADLS_ACUITY_SCORE: 18
ADLS_ACUITY_SCORE: 18
ADLS_ACUITY_SCORE: 16

## 2020-01-25 NOTE — PROGRESS NOTES
[facility], [place]    Acceptance/ Progress Note - Alireza 5 Service        Date of Admission:  1/15/2020    Assessment & Plan       Arianna Siddiqi is a 59 year old female with medical history significant for HFrEF, T2DM, CKD, HTN, gout, OHS/CARLOS, morbid obesity who presented to the ED with dyspnea.     #Right sided CV dysfunction complicated by volume overload   Presenting with weeks of increased weight gain in 1 week of worsening dyspnea.  Chest x-ray consistent with pulmonary edema,  BNP > 5, 000, per patient, possibly as much as 60 lbs above baseline. ECHO showed no WMA, LVEF 55-60%, Difficult to assess RV. Function probably at least mildly reduced.  IV diuresis on admission, Has diuresed well through out stay.  Developed post ATN diuresis on 1/18, held diuresis and restarted when improved. Continues to have fluctuation of Cr and unclear intravascular volume status  - 4mg PO bumex  - lytes as below  - strict I/O    #JASON, resolved  #Post-ATN diuresis  #Metabolic alkalosis  Baseline cr 0.8, peaked at 1.5 w/in 24hrs after presentation in setting of volume overload. Aggressively diuresed. Suspect initial JASON 2/2 cardiorenal. Cr continued to improve but developed post ATN diuresis night of 1/18, large uop w/ rise in bicarb 2/2 contraction alkalosis. Continued auto-diuresis but improving. repeate JASON 1/23, unclear if at dry weight  - diuretics as above  - monitor BMP/Mg  K>4, Mg >2  - strict I/O     #AMS, resolved  Confusion and lethargy in setting of PCO2 >90, likely CO2 narcosis, resolved  - Monitor mental status and VBG as needed     # Acute hypoxic hypercarbic respiratory failure   # Respiratory acidosis  # Obesity hypoventilation  # CARLOS  Worsening mental status day after admission with somnolence.  ABG with CO2 over 100.  Started on BiPAP with stabilization of gas but continued to have mentation that wax and wane.  Transition to ICU where she required a brief period of intubation.   Now on nasal cannula with BiPAP/APAP's at night.  Continues to be high risk for CO2 retaining given obesity and CARLOS.   - pta AVAPs setting when sleeping (AVAPS, pmax25, plow 12, epap 8, RR 12, TV 600cc)  -Monitor mental status, trend VBG as needed    # Paroxysmal atrial fibrillation   # nonsustained SVT, resolved  EKG on admission was in NSR but noted afib back in 2016. Has been anticoagulated with warfarin. INR 2.06 on admission. Rate control with metoprolol succinate, increased to 50mg bid after nonsustained SVT, pt HD stable and asxs.  - metoprolol succinate 12.5 bid, titrate as tolerates  - warfarin per pharmD  - INR daily    #Sepsis, resolved  hypertensive on arrival w/ neg procal. BP stable since transfer. lactat neg, slight leukocytosis. CXR neg for focal infiltrate, increased interstitial marking likely 2/2 fluid overload, but w/ URI prodrome days pta, will treat empirically for atypical pna. Flu and RSV neg. Received vancx1 and cefrtriaxone x1. Urine cx NGTD, blood cx + diphtheroid species after 5 days of incubation, suspect contaminant      # T2DM  # Hypoglycemia, improved  Has been hyperglycemic on arrival. But NPO during most of the time after transfer while on bipap and intubated. Has had some hypoglcemic episodes into the 60s. Likely due to lack of po intake   - hold sitagliptin  - cont medium sliding scale insulin  - cont fingerstick glucose qid  - titrate off D5 after restart po intake       # Gout - stable  - continue allopurinol 200 mg po qday        FEN: diet after passing bedside swallow  DVT Prophylaxis:  pta warfarin, ppi not indicated  Code Status: Full  Disposition Plan     Expected discharge in 2-3 days pending diuresis , to TCU    The patient's care was discussed with MD Alireza Padilla  service  [facility], [place]  Pager: [phone]  Please see sticky note for cross cover  information  ______________________________________________________________________    Interval History   NAEON. Breathing well, no chest pain or palpitations. Notes back pain and difficulty sleeping due to this overnight. Appetite worse today. Excited to be getting closer to leaving, hoping to stay out of the hospital and motivated to lose weight and get more independent     Data reviewed today: I reviewed all medications, new labs and imaging results over the last 24 hours.    Physical Exam   Vital Signs: Temp: 97.9  F (36.6  C) Temp src: Oral BP: (!) 142/61 Pulse: 73 Heart Rate: 73 Resp: 20 SpO2: 90 % O2 Device: Nasal cannula Oxygen Delivery: 2 LPM  Weight: 429 lbs 14.35 oz  General Appearance: Lying in bed, no acute distress  Respiratory: Clear to auscultation front fields, mild bibasilar crackles  regular   Cardiovascular:  rhythm with occasional ectopic beat, no murmur appreciated  GI: Bowel sounds positive, nontender, large with anasarca  Skin: Dry skin  Extremities: Bilateral lower extremity edema  Neuro : Alert and oriented, no focal deficits    Data   Reviewed

## 2020-01-25 NOTE — PLAN OF CARE
Pt A/Oxc4, VSS on 1.5L nasal canula (Pt uses BIPAP when sleeping). Pt does not use O2 at baseline. On tele, reading A flutter today intermittently, running NSR otherwise. Pt denies any signs or symptoms related to hrt rhythm. On reg diet, nutrition is following. Pt working with PT and OT. Pt was able to do standing exercises, at baseline pt can walk at home. Pt on warfarin, INR 2.83 this AM. PCD's located on pts thighs, tolerating well. Pt currently refusing turn q2 when asleep but turns very well and is on a foam bariatric mattress. Safia is in for strict I/O's, antunez care done this AM. Pt got 4mg PO bumex today. Cr improving, was 0.87 today. Plan is to continue to diurese pt and will discharge to [facility] once medically stable (most likely early next week).

## 2020-01-25 NOTE — PLAN OF CARE
Temp: 97.9  F (36.6  C) Temp src: Oral BP: (!) 142/61 Pulse: 73 Heart Rate: 73 Resp: 20 SpO2: 90 % O2 Device: Nasal cannula Oxygen Delivery: 2 LPM      Time: 0793-9187  Reason for admission: Volume overload  Neuro: A/o X4  Activity: A2 mechanical lift- baseline independent at home  Lines: R PICC double lumen. Heparin locked.  Pain: C/o back pain- repositioning offered, tylenol given, aqua k pad ordered, ice packs in place  Cardiac: Tele reading NSR  Respiratory: Wore bipap overnight, dyspnea on exertion, 3L NC when up during the day- Does not use O2 at baseline  GI/: Baig in place for strict I/Os, last BM 1/23  Skin/Wounds: John, blanchable redness on bottom- educated pt on repositioning. Meplex on coccyx.    Diet: Regular diet- fair appetite  Labs/Imaging: INR 2.65, Creatinine 1.05    New changes this shift: Pt restless throughout night, up to chair around 0500. C/o back pain. Ice packs applied and aqua k pad ordered. Gabapentin restarted.    Plan: Continue to diurese, will discharge to TCU once medically stable.     Continue to monitor and follow POC.

## 2020-01-25 NOTE — PROGRESS NOTES
[facility], [place]    Acceptance/ Progress Note - Alireza 5 Service        Date of Admission:  1/15/2020    Assessment & Plan       Arianna Siddiqi is a 59 year old female with medical history significant for HFrEF, T2DM, CKD, HTN, gout, OHS/CARLOS, morbid obesity who presented to the ED with dyspnea.     #Right sided CV dysfunction complicated by volume overload   Presenting with weeks of increased weight gain in 1 week of worsening dyspnea.  Chest x-ray consistent with pulmonary edema,  BNP > 5, 000, per patient, possibly as much as 60 lbs above baseline. ECHO showed no WMA, LVEF 55-60%, Difficult to assess RV. Function probably at least mildly reduced.  IV diuresis on admission, Has diuresed well through out stay.  Developed post ATN diuresis on 1/18, held diuresis and restarted when improved. Continues to have fluctuation of Cr and unclear intravascular volume status  - 4mgPO bumex  - lytes as below  - strict I/O    #JASON, resolved  #Post-ATN diuresis  #Metabolic alkalosis  Baseline cr 0.8, peaked at 1.5 w/in 24hrs after presentation in setting of volume overload. Aggressively diuresed. Suspect initial JASON 2/2 cardiorenal. Cr continued to improve but developed post ATN diuresis night of 1/18, large uop w/ rise in bicarb 2/2 contraction alkalosis. Continued auto-diuresis but improving. repeate JASON 1/23, unclear if at dry weight  - PO diuretics today   - monitor BMP/Mg  K>4, Mg >2  - strict I/O     #AMS, resolved  Confusion and lethargy in setting of PCO2 >90, likely CO2 narcosis, resolved  - Monitor mental status and VBG as needed     # Acute hypoxic hypercarbic respiratory failure   # Respiratory acidosis  # Obesity hypoventilation  # CARLOS  Worsening mental status day after admission with somnolence.  ABG with CO2 over 100.  Started on BiPAP with stabilization of gas but continued to have mentation that wax and wane.  Transition to ICU where she required a brief period of intubation.   Now on nasal cannula with BiPAP/APAP's at night.  Continues to be high risk for CO2 retaining given obesity and CARLOS.   - pta AVAPs setting when sleeping (AVAPS, pmax25, plow 12, epap 8, RR 12, TV 600cc)  -Monitor mental status, trend VBG as needed    # Paroxysmal atrial fibrillation   # nonsustained SVT, resolved  EKG on admission was in NSR but noted afib back in 2016. Has been anticoagulated with warfarin. INR 2.06 on admission. Rate control with metoprolol succinate, increased to 50mg bid after nonsustained SVT, pt HD stable and asxs.  - metoprolol succinate 12.5 bid, titrate as tolerates  - warfarin per pharmD  - INR daily      #Sepsis, resolved  hypertensive on arrival w/ neg procal. BP stable since transfer. lactat neg, slight leukocytosis. CXR neg for focal infiltrate, increased interstitial marking likely 2/2 fluid overload, but w/ URI prodrome days pta, will treat empirically for atypical pna. Flu and RSV neg. Received vancx1 and cefrtriaxone x1. Urine cx NGTD, blood cx + diphtheroid species after 5 days of incubation, suspect contaminant      # T2DM  # Hypoglycemia, improved  Has been hyperglycemic on arrival. But NPO during most of the time after transfer while on bipap and intubated. Has had some hypoglcemic episodes into the 60s. Likely due to lack of po intake   - hold sitagliptin  - cont medium sliding scale insulin  - cont fingerstick glucose qid  - titrate off D5 after restart po intake       # Gout - stable  - continue allopurinol 200 mg po qday        FEN: diet after passing bedside swallow  DVT Prophylaxis:  pta warfarin, ppi not indicated  Code Status: Full  Disposition Plan     Expected discharge in 3 to 7 days pending diuresis and PT OT evaluation    The patient's care was discussed with MD Alireza Padilla  service  [facility], [place]  Pager: [phone]  Please see sticky note for cross cover  information  ______________________________________________________________________    Interval History   NAEON. Breathing well, worked with PT and continues to do exercises in bed. No CP o nausea. Increasing intake of food and working with nutrition to get good protein. Increasing strength and no confusion     Data reviewed today: I reviewed all medications, new labs and imaging results over the last 24 hours.    Physical Exam   Vital Signs: Temp: 95.8  F (35.4  C) Temp src: Oral BP: 113/44 Pulse: 95 Heart Rate: 56 Resp: 18 SpO2: 95 % O2 Device: Oxymizer cannula Oxygen Delivery: 3 LPM  Weight: 429 lbs 14.35 oz  General Appearance: Lying in bed, no acute distress  Respiratory: Clear to auscultation front fields, mild bibasilar crackles  regular   Cardiovascular:  rhythm with occasional ectopic beat, no murmur appreciated  GI: Bowel sounds positive, nontender, large with anasarca  Skin: Dry skin  Extremities: Bilateral lower extremity edema  Neuro : Alert and oriented, no focal deficits    Data   Reviewed

## 2020-01-26 ENCOUNTER — APPOINTMENT (OUTPATIENT)
Dept: PHYSICAL THERAPY | Facility: CLINIC | Age: 60
DRG: 291 | End: 2020-01-26
Payer: MEDICARE

## 2020-01-26 LAB
ANION GAP SERPL CALCULATED.3IONS-SCNC: <1 MMOL/L (ref 3–14)
BUN SERPL-MCNC: 28 MG/DL (ref 7–30)
CALCIUM SERPL-MCNC: 9.4 MG/DL (ref 8.5–10.1)
CHLORIDE SERPL-SCNC: 98 MMOL/L (ref 94–109)
CO2 SERPL-SCNC: 41 MMOL/L (ref 20–32)
CREAT SERPL-MCNC: 0.89 MG/DL (ref 0.52–1.04)
GFR SERPL CREATININE-BSD FRML MDRD: 70 ML/MIN/{1.73_M2}
GLUCOSE BLDC GLUCOMTR-MCNC: 127 MG/DL (ref 70–99)
GLUCOSE BLDC GLUCOMTR-MCNC: 137 MG/DL (ref 70–99)
GLUCOSE BLDC GLUCOMTR-MCNC: 70 MG/DL (ref 70–99)
GLUCOSE BLDC GLUCOMTR-MCNC: 75 MG/DL (ref 70–99)
GLUCOSE SERPL-MCNC: 81 MG/DL (ref 70–99)
INR PPP: 3.34 (ref 0.86–1.14)
MAGNESIUM SERPL-MCNC: 2.1 MG/DL (ref 1.6–2.3)
POTASSIUM SERPL-SCNC: 4 MMOL/L (ref 3.4–5.3)
SODIUM SERPL-SCNC: 139 MMOL/L (ref 133–144)

## 2020-01-26 PROCEDURE — 40000802 ZZH SITE CHECK

## 2020-01-26 PROCEDURE — 85610 PROTHROMBIN TIME: CPT | Performed by: INTERNAL MEDICINE

## 2020-01-26 PROCEDURE — 25000132 ZZH RX MED GY IP 250 OP 250 PS 637: Mod: GY | Performed by: STUDENT IN AN ORGANIZED HEALTH CARE EDUCATION/TRAINING PROGRAM

## 2020-01-26 PROCEDURE — 94660 CPAP INITIATION&MGMT: CPT

## 2020-01-26 PROCEDURE — 36592 COLLECT BLOOD FROM PICC: CPT | Performed by: INTERNAL MEDICINE

## 2020-01-26 PROCEDURE — 97530 THERAPEUTIC ACTIVITIES: CPT | Mod: GP

## 2020-01-26 PROCEDURE — 00000146 ZZHCL STATISTIC GLUCOSE BY METER IP

## 2020-01-26 PROCEDURE — 40000558 ZZH STATISTIC CVC DRESSING CHANGE

## 2020-01-26 PROCEDURE — 40000275 ZZH STATISTIC RCP TIME EA 10 MIN

## 2020-01-26 PROCEDURE — 83735 ASSAY OF MAGNESIUM: CPT | Performed by: INTERNAL MEDICINE

## 2020-01-26 PROCEDURE — 99232 SBSQ HOSP IP/OBS MODERATE 35: CPT | Performed by: INTERNAL MEDICINE

## 2020-01-26 PROCEDURE — 25000128 H RX IP 250 OP 636: Performed by: STUDENT IN AN ORGANIZED HEALTH CARE EDUCATION/TRAINING PROGRAM

## 2020-01-26 PROCEDURE — 25000132 ZZH RX MED GY IP 250 OP 250 PS 637: Mod: GY | Performed by: INTERNAL MEDICINE

## 2020-01-26 PROCEDURE — 80048 BASIC METABOLIC PNL TOTAL CA: CPT | Performed by: INTERNAL MEDICINE

## 2020-01-26 PROCEDURE — 12000001 ZZH R&B MED SURG/OB UMMC

## 2020-01-26 RX ORDER — WARFARIN SODIUM 1 MG/1
1 TABLET ORAL
Status: COMPLETED | OUTPATIENT
Start: 2020-01-26 | End: 2020-01-26

## 2020-01-26 RX ORDER — DIPHENHYDRAMINE HCL 25 MG
25 CAPSULE ORAL ONCE
Status: COMPLETED | OUTPATIENT
Start: 2020-01-26 | End: 2020-01-26

## 2020-01-26 RX ADMIN — FERROUS GLUCONATE 324 MG: 324 TABLET ORAL at 13:59

## 2020-01-26 RX ADMIN — MICONAZOLE NITRATE: 20 POWDER TOPICAL at 07:44

## 2020-01-26 RX ADMIN — ALLOPURINOL 200 MG: 100 TABLET ORAL at 07:42

## 2020-01-26 RX ADMIN — ACETAMINOPHEN 650 MG: 325 TABLET, FILM COATED ORAL at 21:50

## 2020-01-26 RX ADMIN — FERROUS GLUCONATE 324 MG: 324 TABLET ORAL at 21:45

## 2020-01-26 RX ADMIN — WARFARIN SODIUM 1 MG: 1 TABLET ORAL at 19:03

## 2020-01-26 RX ADMIN — FERROUS GLUCONATE 324 MG: 324 TABLET ORAL at 07:43

## 2020-01-26 RX ADMIN — GABAPENTIN 300 MG: 300 CAPSULE ORAL at 13:59

## 2020-01-26 RX ADMIN — SENNOSIDES AND DOCUSATE SODIUM 1 TABLET: 8.6; 5 TABLET ORAL at 07:42

## 2020-01-26 RX ADMIN — Medication 5 ML: at 07:32

## 2020-01-26 RX ADMIN — MICONAZOLE NITRATE: 20 POWDER TOPICAL at 21:45

## 2020-01-26 RX ADMIN — BUMETANIDE 4 MG: 2 TABLET ORAL at 07:42

## 2020-01-26 RX ADMIN — GABAPENTIN 300 MG: 300 CAPSULE ORAL at 07:43

## 2020-01-26 RX ADMIN — ACETAMINOPHEN 650 MG: 325 TABLET, FILM COATED ORAL at 04:37

## 2020-01-26 RX ADMIN — SENNOSIDES AND DOCUSATE SODIUM 2 TABLET: 8.6; 5 TABLET ORAL at 21:45

## 2020-01-26 RX ADMIN — CYCLOBENZAPRINE HYDROCHLORIDE 5 MG: 5 TABLET, FILM COATED ORAL at 21:50

## 2020-01-26 RX ADMIN — ATORVASTATIN CALCIUM 20 MG: 20 TABLET, FILM COATED ORAL at 21:45

## 2020-01-26 RX ADMIN — DIPHENHYDRAMINE HYDROCHLORIDE 25 MG: 25 CAPSULE ORAL at 22:31

## 2020-01-26 RX ADMIN — METOPROLOL TARTRATE 50 MG: 50 TABLET, FILM COATED ORAL at 21:45

## 2020-01-26 RX ADMIN — METOPROLOL TARTRATE 50 MG: 50 TABLET, FILM COATED ORAL at 07:43

## 2020-01-26 RX ADMIN — GABAPENTIN 300 MG: 300 CAPSULE ORAL at 21:45

## 2020-01-26 ASSESSMENT — ACTIVITIES OF DAILY LIVING (ADL)
ADLS_ACUITY_SCORE: 17
ADLS_ACUITY_SCORE: 18
ADLS_ACUITY_SCORE: 18
ADLS_ACUITY_SCORE: 17
ADLS_ACUITY_SCORE: 18
ADLS_ACUITY_SCORE: 18

## 2020-01-26 NOTE — PLAN OF CARE
Pt A/Oxc4, VSS on 2L nasal canula (Pt uses BIPAP when sleeping). Pt does not use O2 at baseline. On tele, reading A flutter today intermittently, running NSR otherwise. Pt denies any signs or symptoms related to hrt rhythm. On reg diet, nutrition is following. Pt working with PT and OT. Pt was able to do standing exercises, at baseline pt can walk at home. Pt on warfarin, INR 3.34 this AM.  Pt currently refusing turn q2 when asleep but turns very well and is on a foam bariatric mattress. No BM since 1/22, pt is passing gas. Safia is in for strict I/O's, antunez care done this AM. Pt got 4mg PO bumex today. Plan is to continue to diurese pt and will discharge to [facility] once bed is confirmed.

## 2020-01-26 NOTE — PROGRESS NOTES
General acute hospital, [facility] Progress Note - Hospitalist Service, Alireza Murdock       Date of Admission:  1/15/2020  Assessment & Plan   Arianna Siddiqi is a 59 year old female with medical history significant for HFrEF, T2DM, CKD, HTN, gout, OHS/CARLOS on AVAP, morbid obesity who presented to the ED with dyspnea.     #Right sided CV dysfunction complicated by volume overload   Presenting with weeks of increased weight gain in 1 week of worsening dyspnea.  Chest x-ray consistent with pulmonary edema,  BNP > 5, 000, per patient, possibly as much as 60 lbs above baseline. ECHO showed no WMA, LVEF 55-60%, Difficult to assess RV. Function probably at least mildly reduced.  IV diuresis on admission, Has diuresed well through out stay.  Developed post ATN diuresis on 1/18, held diuresis and restarted when improved. Cre improving currently on oral bumex. Goal 500-1000ml negative daily  - continue 4mg PO bumex (increase from PTA dose of 2mg)  - lytes as below  - strict I/O     #JASON, resolved  #Post-ATN diuresis  #Metabolic alkalosis  Baseline cr 0.8, peaked at 1.5 w/in 24hrs after presentation in setting of volume overload. Aggressively diuresed. Suspect initial JASON 2/2 cardiorenal. Cr continued to improve but developed post ATN diuresis night of 1/18, large uop w/ rise in bicarb 2/2 contraction alkalosis. Tolerating oral bumex  - monitor BMP/Mg  K>4, Mg >2  - strict I/O  - check VBG in am to assess CO2 retention     #AMS, resolved  Confusion and lethargy in setting of PCO2 >90, likely CO2 narcosis, resolved  - Monitor mental status     # Acute hypoxic hypercarbic respiratory failure   # Respiratory acidosis  # Obesity hypoventilation with chronic CO2 retention on blood gas  # CARLOS  Worsening mental status day after admission with somnolence.  ABG with CO2 over 100.  Started on BiPAP with stabilization of gas but continued to have mentation that wax and wane.  Transition to ICU where she required a brief  period of intubation.  Now on nasal cannula with BiPAP/APAP's at night.  Continues to be high risk for CO2 retaining given obesity and CARLOS.   - pta AVAPs setting when sleeping (AVAPS, pmax25, plow 12, epap 8, RR 12, TV 600cc)  -Monitor mental status  - noted bicarb increase, will check VBG with am lab to assess her CO2 retention, appears ~70 at BL     # Paroxysmal atrial fibrillation   # nonsustained SVT, resolved  EKG on admission was in NSR but noted afib back in 2016. Has been anticoagulated with warfarin. INR 2.06 on admission. Rate control with metoprolol succinate, increased to 50mg bid after nonsustained SVT, pt HD stable and asxs.  - metoprolol succinate 12.5 bid, titrate as tolerates  - warfarin per pharmD  - INR daily, supratherapeutic today     #Sepsis, resolved  hypertensive on arrival w/ neg procal. BP stable since transfer. lactat neg, slight leukocytosis. CXR neg for focal infiltrate, increased interstitial marking likely 2/2 fluid overload, but w/ URI prodrome days pta, will treat empirically for atypical pna. Flu and RSV neg. Received vancx1 and cefrtriaxone x1. Urine cx NGTD, blood cx + diphtheroid species after 5 days of incubation, suspect contaminant      # T2DM  # Hypoglycemia, improved  Has been hyperglycemic on arrival. But NPO during most of the time after transfer while on bipap and intubated. Has had some hypoglcemic episodes into the 60s. Likely due to lack of po intake   - hold sitagliptin  - cont medium sliding scale insulin  - cont fingerstick glucose qid     # Gout - stable  - continue allopurinol 200 mg po qday      Diet: Regular Diet Adult  Snacks/Supplements Adult: Boost Glucose Control; With Meals  Snacks/Supplements Adult: Other; See below; Between Meals    DVT Prophylaxis: Warfarin  Baig Catheter: in place, indication: Strict 1-2 Hour I&O  Code Status: Full Code      Disposition Plan   Expected discharge: Tomorrow, recommended to transitional care unit once safe disposition  plan/ TCU bed available.  Entered: Maggy Hoffmann MD 01/26/2020, 10:52 AM       The patient's care was discussed with the Patient.    Maggy Hoffmann MD  Hospitalist Service, [address], [place]  Pager: [phone]  Please see sticky note for cross cover information  ______________________________________________________________________    Interval History   No acute event, flexeril worked well for back pain and had a better sleep. No SOB, other 4 point ROS negative.    Data reviewed today: I reviewed all medications, new labs and imaging results over the last 24 hours.    Physical Exam   Vital Signs: Temp: 95.2  F (35.1  C) Temp src: Axillary BP: 102/83   Heart Rate: 87 Resp: 20 SpO2: 91 % O2 Device: Oxymizer cannula Oxygen Delivery: 2 LPM  Weight: 429 lbs 14.35 oz  General Appearance: Alert and oriented, not in distress  Respiratory: clear anteriorly, no wheezing  Cardiovascular: RRR no murmur  GI: soft non tender  Skin: chronic stasis dermatitis    Data   Recent Labs   Lab 01/26/20  0738 01/25/20  0620 01/24/20  0540  01/21/20  0337  01/20/20  0403   WBC  --   --  5.1  --  5.5  --  6.8   HGB  --   --  11.7  --  11.4*  --  11.2*   MCV  --   --  91  --  89  --  87   PLT  --   --  182  --  212  --  230   INR 3.34* 2.83* 2.65*   < > 2.11*  --  2.16*    137 138   < > 138  --  134   POTASSIUM 4.0 3.9 4.1   < > 3.3*   < > 3.4   CHLORIDE 98 97 100   < > 97  --  93*   CO2 41* 38* 37*   < > 41*  --  40*   BUN 28 30 34*   < > 14  --  17   CR 0.89 0.87 1.05*   < > 0.83  --  0.92   ANIONGAP <1* 2* 1*   < > <1*  --  1*   KADEN 9.4 9.4 9.0   < > 9.3  --  9.3   GLC 81 88 90   < > 95  --  103*    < > = values in this interval not displayed.     No results found for this or any previous visit (from the past 24 hour(s)).

## 2020-01-26 NOTE — PLAN OF CARE
PT - Sit<>stand with Ax2 and bariwalker. Recommend nursing continue to use OH ceiling lift for transfers  Discharge Planner PT   Patient plan for discharge: not discussed  Current status: Pt dependently lifted from bed to recliner using OH ceiling lift. Pt needing mod A in bed for rolling to place sling. Pt performs repeated sit<>stand from recliner with mod Ax2 with use of bariwalker. Pt completes ~5 forward/backwards with min Ax2 and bariwalker. Tolerates standing for a few minutes with each trial  Barriers to return to prior living situation: medical status  Recommendations for discharge: TCU  Rationale for recommendations: Pt is below baseline for mobility. Pt would benefit from continued skilled PT in order to progress activity tolerance, strength, gait, and performance of functional mobility and ADLs.  Entered by: Jaki Galvan 01/26/2020 12:32 PM

## 2020-01-26 NOTE — PLAN OF CARE
Pt admitted for volume overload and dyspnea. A&Ox4. VSS on 1.5-2L via NC and BiPAP overnight. Tele showing NSR, w/ intermittent flutter. C/o back pain, prn flexeril and tylenol given w/ some relief. C/o nausea during evening, prn Zofran given w/ relief. Tolerates diet well, w/ fair appetite. BG stable. PICC SL w/ good blood return. Mag and K replaced this evening, rechecks this AM. Baig WNL, cath wipe done. No BM this shift, last 22nd. Attempted to go on bed pan twice this shift w/ no success. Refused repositoning overnight, pt educated on risks of pressure ulcers and still refused. Powder applied to groin and abd folds. Moderate edema in BLE. Able to make needs known. Plan to discharge early this week to [facility]. Will continue w/ POC.

## 2020-01-27 ENCOUNTER — APPOINTMENT (OUTPATIENT)
Dept: OCCUPATIONAL THERAPY | Facility: CLINIC | Age: 60
DRG: 291 | End: 2020-01-27
Payer: MEDICARE

## 2020-01-27 ENCOUNTER — APPOINTMENT (OUTPATIENT)
Dept: PHYSICAL THERAPY | Facility: CLINIC | Age: 60
DRG: 291 | End: 2020-01-27
Payer: MEDICARE

## 2020-01-27 ENCOUNTER — HOSPITAL ENCOUNTER (INPATIENT)
Facility: SKILLED NURSING FACILITY | Age: 60
LOS: 13 days | Discharge: ANOTHER HOSPITAL | DRG: 291 | End: 2020-02-09
Attending: INTERNAL MEDICINE | Admitting: INTERNAL MEDICINE
Payer: MEDICARE

## 2020-01-27 VITALS
BODY MASS INDEX: 51.91 KG/M2 | OXYGEN SATURATION: 94 % | DIASTOLIC BLOOD PRESSURE: 59 MMHG | HEART RATE: 68 BPM | WEIGHT: 293 LBS | SYSTOLIC BLOOD PRESSURE: 98 MMHG | TEMPERATURE: 97.7 F | RESPIRATION RATE: 22 BRPM | HEIGHT: 63 IN

## 2020-01-27 DIAGNOSIS — B49 FUNGAL INFECTION: ICD-10-CM

## 2020-01-27 DIAGNOSIS — R11.10 VOMITING, INTRACTABILITY OF VOMITING NOT SPECIFIED, PRESENCE OF NAUSEA NOT SPECIFIED, UNSPECIFIED VOMITING TYPE: ICD-10-CM

## 2020-01-27 DIAGNOSIS — R05.9 COUGH: ICD-10-CM

## 2020-01-27 DIAGNOSIS — R10.13 DYSPEPSIA: ICD-10-CM

## 2020-01-27 DIAGNOSIS — R06.00 DYSPNEA, UNSPECIFIED TYPE: ICD-10-CM

## 2020-01-27 DIAGNOSIS — J02.9 SORE THROAT: ICD-10-CM

## 2020-01-27 DIAGNOSIS — E87.6 HYPOKALEMIA: ICD-10-CM

## 2020-01-27 DIAGNOSIS — E61.1 IRON DEFICIENCY: ICD-10-CM

## 2020-01-27 DIAGNOSIS — E11.9 DIABETES MELLITUS (H): Primary | ICD-10-CM

## 2020-01-27 DIAGNOSIS — E78.5 HYPERLIPIDEMIA, UNSPECIFIED HYPERLIPIDEMIA TYPE: ICD-10-CM

## 2020-01-27 DIAGNOSIS — I10 HYPERTENSION, UNSPECIFIED TYPE: ICD-10-CM

## 2020-01-27 DIAGNOSIS — K59.00 CONSTIPATION, UNSPECIFIED CONSTIPATION TYPE: ICD-10-CM

## 2020-01-27 DIAGNOSIS — J11.1 INFLUENZA: ICD-10-CM

## 2020-01-27 LAB
ANION GAP SERPL CALCULATED.3IONS-SCNC: <1 MMOL/L (ref 3–14)
BASE EXCESS BLDV CALC-SCNC: 14.2 MMOL/L
BUN SERPL-MCNC: 27 MG/DL (ref 7–30)
CALCIUM SERPL-MCNC: 9 MG/DL (ref 8.5–10.1)
CHLORIDE SERPL-SCNC: 97 MMOL/L (ref 94–109)
CO2 SERPL-SCNC: 44 MMOL/L (ref 20–32)
CREAT SERPL-MCNC: 0.9 MG/DL (ref 0.52–1.04)
GFR SERPL CREATININE-BSD FRML MDRD: 69 ML/MIN/{1.73_M2}
GLUCOSE BLDC GLUCOMTR-MCNC: 102 MG/DL (ref 70–99)
GLUCOSE BLDC GLUCOMTR-MCNC: 128 MG/DL (ref 70–99)
GLUCOSE BLDC GLUCOMTR-MCNC: 71 MG/DL (ref 70–99)
GLUCOSE BLDC GLUCOMTR-MCNC: 78 MG/DL (ref 70–99)
GLUCOSE SERPL-MCNC: 84 MG/DL (ref 70–99)
HCO3 BLDV-SCNC: 44 MMOL/L (ref 21–28)
INR PPP: 2.77 (ref 0.86–1.14)
O2/TOTAL GAS SETTING VFR VENT: ABNORMAL %
PCO2 BLDV: 88 MM HG (ref 40–50)
PH BLDV: 7.31 PH (ref 7.32–7.43)
PO2 BLDV: 38 MM HG (ref 25–47)
POTASSIUM SERPL-SCNC: 3.8 MMOL/L (ref 3.4–5.3)
SODIUM SERPL-SCNC: 137 MMOL/L (ref 133–144)

## 2020-01-27 PROCEDURE — 25000132 ZZH RX MED GY IP 250 OP 250 PS 637: Mod: GY | Performed by: STUDENT IN AN ORGANIZED HEALTH CARE EDUCATION/TRAINING PROGRAM

## 2020-01-27 PROCEDURE — 00000146 ZZHCL STATISTIC GLUCOSE BY METER IP

## 2020-01-27 PROCEDURE — 82803 BLOOD GASES ANY COMBINATION: CPT | Performed by: INTERNAL MEDICINE

## 2020-01-27 PROCEDURE — 12000022 ZZH R&B SNF

## 2020-01-27 PROCEDURE — 40000275 ZZH STATISTIC RCP TIME EA 10 MIN

## 2020-01-27 PROCEDURE — 40000802 ZZH SITE CHECK

## 2020-01-27 PROCEDURE — 97530 THERAPEUTIC ACTIVITIES: CPT | Mod: GP | Performed by: REHABILITATION PRACTITIONER

## 2020-01-27 PROCEDURE — 99239 HOSP IP/OBS DSCHRG MGMT >30: CPT | Mod: GC | Performed by: INTERNAL MEDICINE

## 2020-01-27 PROCEDURE — 97530 THERAPEUTIC ACTIVITIES: CPT | Mod: GO

## 2020-01-27 PROCEDURE — 25000132 ZZH RX MED GY IP 250 OP 250 PS 637: Mod: GY | Performed by: INTERNAL MEDICINE

## 2020-01-27 PROCEDURE — 36592 COLLECT BLOOD FROM PICC: CPT | Performed by: INTERNAL MEDICINE

## 2020-01-27 PROCEDURE — 85610 PROTHROMBIN TIME: CPT | Performed by: INTERNAL MEDICINE

## 2020-01-27 PROCEDURE — 25000128 H RX IP 250 OP 636: Performed by: STUDENT IN AN ORGANIZED HEALTH CARE EDUCATION/TRAINING PROGRAM

## 2020-01-27 PROCEDURE — 97116 GAIT TRAINING THERAPY: CPT | Mod: GP | Performed by: REHABILITATION PRACTITIONER

## 2020-01-27 PROCEDURE — 94660 CPAP INITIATION&MGMT: CPT

## 2020-01-27 PROCEDURE — 97535 SELF CARE MNGMENT TRAINING: CPT | Mod: GO

## 2020-01-27 PROCEDURE — 80048 BASIC METABOLIC PNL TOTAL CA: CPT | Performed by: INTERNAL MEDICINE

## 2020-01-27 RX ORDER — SENNOSIDES 8.6 MG
8.6 TABLET ORAL 2 TIMES DAILY PRN
Status: DISCONTINUED | OUTPATIENT
Start: 2020-01-27 | End: 2020-02-09 | Stop reason: HOSPADM

## 2020-01-27 RX ORDER — POTASSIUM CHLORIDE 7.45 MG/ML
10 INJECTION INTRAVENOUS
Status: DISCONTINUED | OUTPATIENT
Start: 2020-01-27 | End: 2020-02-09 | Stop reason: HOSPADM

## 2020-01-27 RX ORDER — POLYETHYLENE GLYCOL 3350 17 G/17G
17 POWDER, FOR SOLUTION ORAL DAILY PRN
Status: ON HOLD | DISCHARGE
Start: 2020-01-27 | End: 2020-02-21

## 2020-01-27 RX ORDER — MULTIVITAMIN,THERAPEUTIC
1 TABLET ORAL DAILY
Status: DISCONTINUED | OUTPATIENT
Start: 2020-01-28 | End: 2020-02-09 | Stop reason: HOSPADM

## 2020-01-27 RX ORDER — ACETAMINOPHEN 325 MG/1
650 TABLET ORAL EVERY 4 HOURS PRN
Status: DISCONTINUED | OUTPATIENT
Start: 2020-01-27 | End: 2020-02-02

## 2020-01-27 RX ORDER — ALUMINA, MAGNESIA, AND SIMETHICONE 2400; 2400; 240 MG/30ML; MG/30ML; MG/30ML
15 SUSPENSION ORAL EVERY 4 HOURS PRN
Status: DISCONTINUED | OUTPATIENT
Start: 2020-01-27 | End: 2020-02-09 | Stop reason: HOSPADM

## 2020-01-27 RX ORDER — CYCLOBENZAPRINE HCL 5 MG
5 TABLET ORAL
Status: ON HOLD | DISCHARGE
Start: 2020-01-27 | End: 2020-02-21

## 2020-01-27 RX ORDER — BUMETANIDE 1 MG/1
4 TABLET ORAL DAILY
Status: DISCONTINUED | OUTPATIENT
Start: 2020-01-28 | End: 2020-01-30

## 2020-01-27 RX ORDER — BUMETANIDE 2 MG/1
4 TABLET ORAL DAILY
Status: ON HOLD | DISCHARGE
Start: 2020-01-28 | End: 2020-02-09

## 2020-01-27 RX ORDER — METOPROLOL TARTRATE 25 MG/1
50 TABLET, FILM COATED ORAL 2 TIMES DAILY
Status: DISCONTINUED | OUTPATIENT
Start: 2020-01-27 | End: 2020-02-09 | Stop reason: HOSPADM

## 2020-01-27 RX ORDER — POTASSIUM CHLORIDE 1500 MG/1
20-40 TABLET, EXTENDED RELEASE ORAL
Status: DISCONTINUED | OUTPATIENT
Start: 2020-01-27 | End: 2020-02-09 | Stop reason: HOSPADM

## 2020-01-27 RX ORDER — ONDANSETRON 2 MG/ML
4 INJECTION INTRAMUSCULAR; INTRAVENOUS EVERY 6 HOURS PRN
Status: DISCONTINUED | OUTPATIENT
Start: 2020-01-27 | End: 2020-02-09 | Stop reason: HOSPADM

## 2020-01-27 RX ORDER — ACETAMINOPHEN 500 MG
500-1000 TABLET ORAL EVERY 6 HOURS PRN
Status: DISCONTINUED | OUTPATIENT
Start: 2020-01-27 | End: 2020-02-09 | Stop reason: HOSPADM

## 2020-01-27 RX ORDER — MAGNESIUM SULFATE HEPTAHYDRATE 40 MG/ML
4 INJECTION, SOLUTION INTRAVENOUS EVERY 4 HOURS PRN
Status: DISCONTINUED | OUTPATIENT
Start: 2020-01-27 | End: 2020-02-09 | Stop reason: HOSPADM

## 2020-01-27 RX ORDER — POTASSIUM CHLORIDE 1.5 G/1.58G
20-40 POWDER, FOR SOLUTION ORAL
Status: DISCONTINUED | OUTPATIENT
Start: 2020-01-27 | End: 2020-02-09 | Stop reason: HOSPADM

## 2020-01-27 RX ORDER — POTASSIUM CHLORIDE 29.8 MG/ML
20 INJECTION INTRAVENOUS
Status: DISCONTINUED | OUTPATIENT
Start: 2020-01-27 | End: 2020-02-09 | Stop reason: HOSPADM

## 2020-01-27 RX ORDER — ACETAMINOPHEN 650 MG/1
650 SUPPOSITORY RECTAL EVERY 4 HOURS PRN
Status: DISCONTINUED | OUTPATIENT
Start: 2020-01-27 | End: 2020-02-09 | Stop reason: HOSPADM

## 2020-01-27 RX ORDER — ONDANSETRON 4 MG/1
4 TABLET, ORALLY DISINTEGRATING ORAL EVERY 6 HOURS PRN
Status: DISCONTINUED | OUTPATIENT
Start: 2020-01-27 | End: 2020-02-09 | Stop reason: HOSPADM

## 2020-01-27 RX ORDER — POTASSIUM CHLORIDE 1500 MG/1
10 TABLET, EXTENDED RELEASE ORAL DAILY
Qty: 180 TABLET | Refills: 0 | Status: ON HOLD | DISCHARGE
Start: 2020-01-27 | End: 2020-02-09

## 2020-01-27 RX ORDER — ATORVASTATIN CALCIUM 20 MG/1
20 TABLET, FILM COATED ORAL EVERY EVENING
Status: DISCONTINUED | OUTPATIENT
Start: 2020-01-27 | End: 2020-02-09 | Stop reason: HOSPADM

## 2020-01-27 RX ORDER — GABAPENTIN 300 MG/1
300 CAPSULE ORAL 3 TIMES DAILY
Status: DISCONTINUED | OUTPATIENT
Start: 2020-01-27 | End: 2020-01-30

## 2020-01-27 RX ORDER — LANOLIN ALCOHOL/MO/W.PET/CERES
6 CREAM (GRAM) TOPICAL
Status: ON HOLD | DISCHARGE
Start: 2020-01-27 | End: 2020-02-21

## 2020-01-27 RX ORDER — ALLOPURINOL 100 MG/1
200 TABLET ORAL DAILY
Status: DISCONTINUED | OUTPATIENT
Start: 2020-01-28 | End: 2020-02-09 | Stop reason: HOSPADM

## 2020-01-27 RX ORDER — POTASSIUM CL/LIDO/0.9 % NACL 10MEQ/0.1L
10 INTRAVENOUS SOLUTION, PIGGYBACK (ML) INTRAVENOUS
Status: DISCONTINUED | OUTPATIENT
Start: 2020-01-27 | End: 2020-02-09 | Stop reason: HOSPADM

## 2020-01-27 RX ORDER — CYCLOBENZAPRINE HCL 5 MG
5 TABLET ORAL
Status: DISCONTINUED | OUTPATIENT
Start: 2020-01-27 | End: 2020-02-09 | Stop reason: HOSPADM

## 2020-01-27 RX ORDER — FERROUS GLUCONATE 324(38)MG
324 TABLET ORAL 3 TIMES DAILY
Status: DISCONTINUED | OUTPATIENT
Start: 2020-01-27 | End: 2020-02-09 | Stop reason: HOSPADM

## 2020-01-27 RX ORDER — WARFARIN SODIUM 2 MG/1
2 TABLET ORAL
Status: COMPLETED | OUTPATIENT
Start: 2020-01-27 | End: 2020-01-27

## 2020-01-27 RX ORDER — GABAPENTIN 300 MG/1
300 CAPSULE ORAL 3 TIMES DAILY
Status: DISCONTINUED | OUTPATIENT
Start: 2020-01-27 | End: 2020-01-27

## 2020-01-27 RX ADMIN — MICONAZOLE NITRATE: 20 POWDER TOPICAL at 20:45

## 2020-01-27 RX ADMIN — METOPROLOL TARTRATE 50 MG: 25 TABLET ORAL at 20:45

## 2020-01-27 RX ADMIN — CYCLOBENZAPRINE HYDROCHLORIDE 5 MG: 5 TABLET, FILM COATED ORAL at 20:44

## 2020-01-27 RX ADMIN — WARFARIN SODIUM 2 MG: 2 TABLET ORAL at 17:36

## 2020-01-27 RX ADMIN — FERROUS GLUCONATE 324 MG: 324 TABLET ORAL at 08:52

## 2020-01-27 RX ADMIN — BUMETANIDE 4 MG: 2 TABLET ORAL at 08:51

## 2020-01-27 RX ADMIN — GABAPENTIN 300 MG: 300 CAPSULE ORAL at 20:45

## 2020-01-27 RX ADMIN — SENNOSIDES AND DOCUSATE SODIUM 2 TABLET: 8.6; 5 TABLET ORAL at 08:59

## 2020-01-27 RX ADMIN — ACETAMINOPHEN 650 MG: 325 TABLET, FILM COATED ORAL at 14:09

## 2020-01-27 RX ADMIN — GABAPENTIN 300 MG: 300 CAPSULE ORAL at 08:51

## 2020-01-27 RX ADMIN — MICONAZOLE NITRATE: 20 POWDER TOPICAL at 09:00

## 2020-01-27 RX ADMIN — ATORVASTATIN CALCIUM 20 MG: 20 TABLET, FILM COATED ORAL at 20:44

## 2020-01-27 RX ADMIN — Medication 5 ML: at 06:51

## 2020-01-27 RX ADMIN — METOPROLOL TARTRATE 50 MG: 50 TABLET, FILM COATED ORAL at 08:51

## 2020-01-27 RX ADMIN — ALLOPURINOL 200 MG: 100 TABLET ORAL at 08:52

## 2020-01-27 RX ADMIN — GABAPENTIN 300 MG: 300 CAPSULE ORAL at 14:09

## 2020-01-27 RX ADMIN — SODIUM CHLORIDE, PRESERVATIVE FREE 5 ML: 5 INJECTION INTRAVENOUS at 09:01

## 2020-01-27 RX ADMIN — FERROUS GLUCONATE 324 MG: 324 TABLET ORAL at 20:44

## 2020-01-27 RX ADMIN — FERROUS GLUCONATE 324 MG: 324 TABLET ORAL at 14:09

## 2020-01-27 ASSESSMENT — MIFFLIN-ST. JEOR: SCORE: 2245.11

## 2020-01-27 ASSESSMENT — ACTIVITIES OF DAILY LIVING (ADL)
ADLS_ACUITY_SCORE: 18

## 2020-01-27 NOTE — PHARMACY-ANTICOAGULATION SERVICE
Clinical Pharmacy - Warfarin Dosing Consult     Pharmacy has been consulted to manage this patient s warfarin therapy.  Indication: Atrial Fibrillation  Therapy Goal: INR 2-3  Warfarin Prior to Admission: Yes  Warfarin PTA Regimen: please see note, previously 4.5 mg SuTTH and 3 mg MWF per pt    Anticoagulation Dose History     Recent Dosing and Labs Latest Ref Rng & Units 1/21/2020 1/22/2020 1/23/2020 1/24/2020 1/25/2020 1/26/2020 1/27/2020    ZZ IMS TEMPLATE - 4.5 mg - - - - - -    Warfarin 1 mg - - - - - - 1 mg -    Warfarin 2 mg - - - - - 2 mg - -    Warfarin 3 mg - - 3 mg 3 mg 3 mg - - -    INR 0.86 - 1.14 2.11(H) 2.55(H) 2.61(H) 2.65(H) 2.83(H) 3.34(H) 2.77(H)    INR 0.86 - 1.14 - - - - - - -          INR   Date Value Ref Range Status   01/27/2020 2.77 (H) 0.86 - 1.14 Final   01/26/2020 3.34 (H) 0.86 - 1.14 Final       Recommend warfarin 2 mg today.  Pharmacy will monitor Arianna Siddiqi daily and order warfarin doses to achieve specified goal.      Please contact pharmacy as soon as possible if the warfarin needs to be held for a procedure or if the warfarin goals change.

## 2020-01-27 NOTE — PROGRESS NOTES
Social Work Services Discharge Note      Patient Name:  Arianna Siddiqi     Anticipated Discharge Date:  1/27/2020    Discharge Disposition:   TCU:   TCU    Following MD:  Per facility      Pre-Admission Screening (PAS) online form has been completed.  The Level of Care (LOC) is:  Determined  Confirmation Code is:  [id number]  Patient/caregiver informed of referral to [facility] Line for Pre-Admission Screening for skilled nursing facility (SNF) placement and to expect a phone call post discharge from SNF.     Additional Services/Equipment Arranged:      JAVIER paged  TCU Liaison ([place]) indicating pt is stable for discharge and inquired about bed availability. JAVIER received information that  Would like to speak with medical team regarding pt before officially accepting. JAVIER paged Alireza Murdock team information to speak with      [facility] (Ph: [phone]) stretcher ride set up for 1445. PCS completed and placed in pt's chart.     JAVIER met with pt at bedside. Pt indicated that she was wondering about a wheel chair when returning home. SW indicated she will inform TCU of her request.      Patient / Family response to discharge plan:  Pt is favor of discharge      Persons notified of above discharge plan:  Pt at bedside, bedside nurse, charge nurse, primary team, NST,  TCU     Staff Discharge Instructions:  Discharge orders and signed hard scripts for any controlled substances in Epic   Please print a packet and send with patient.   Please call for nurse to nurse [phone]    CTS Handoff completed:  NO    Medicare Notice of Rights provided to the patient/family:  YES    TAMMY Szymanski, [facility]  Acute Care Float   [facility]  Pager: [phone]

## 2020-01-27 NOTE — PLAN OF CARE
PT -     Discharge Planner PT   Patient plan for discharge: TCU today at 2:45  Physical Therapy Discharge Summary  Reason for discharge:   Discharge from hospital to TCU planned today at 2:45  Progress towards goals: See goals on Care Plan in [facility] electronic health record for goal details.  Goals partially met. Current status: OH lift to recliner due to would be unsafe to exit roni airbed via EOB as high potential to slide off to ground.  Pt sit <> stand with FWW with mod A improving to min A 2nd rep.  Pt progressed to gait in laws with FWW and CGAx1, A of 2nd for managing linens. Pt very pleased with her progress.  Barriers to achieving goals (and if applicable, to prior living situation): limited tolerance, fatigue, LE weakness, deconditioning.  Recommendation(s):   Continued therapy is recommended. Rationale/Recommendations: TCU to increase safety and independence with basic functional mobility, and to address unmet goals.  Entered by: Rahel Lentz 01/27/2020 12:21 PM

## 2020-01-27 NOTE — PLAN OF CARE
Patient is a 59 year old female  admitted to room 405 via stretcher.  Patient is alert and oriented X 3. See Epic for VS and assessment.  Patient is able to transfer 2 person using lift. Patient was settled into their room, shown call light, tv, mealtimes etc. Oriented to unit. Will continue monitoring pain level and VS. Notifying MD with any concerns.  Follow MD orders for cares and medications.    Level of Schooling:technical  Ethnicity:  Marital Status:  Dentures: No  Hearing Aid: No  Smoker:  No  Glasses: Yes reader  Occupation: nursing assistant for 22 yrs  Falls 0-1 mo: 0 2-6 mo: 0  Stairs prior function: Independent  Prior device use: Other using walker when going to the clinic    Advanced Care Directive Referral to Social Work?Yes

## 2020-01-27 NOTE — PLAN OF CARE
Discharge Planner OT   Patient plan for discharge: TCU.  Current status: Patient tolerated OT treatment session well this morning. Motivated to participate. To safely transfer patient out of bariatric bed to recliner chair, the universal sling with ceiling lift were utilized. O2 stable at 92% or greater with positional changes on 3L Oxymizer cannula. Only set-up required to complete grooming/hygiene from supported/unsupported seated positions.  Barriers to return to prior living situation: Medical status, strength, endurance, balance.  Recommendations for discharge: TCU.  Rationale for recommendations: OT to continue to follow patient while in hospital to progress functional independence.       Entered by: Brigida Calixto 01/27/2020 11:46 AM

## 2020-01-27 NOTE — PLAN OF CARE
Shift [id number]: Pt A/Ox4, VSS on 2L nasal canula, Pt uses BIPAP when sleeping. Pt does not use O2 at baseline. On tele NSR this shift w/ A flutter intermittently. Pt denies any signs or symptoms related to hrt rhythm. On reg diet, tolerating well wit adequate intake. Pt on warfarin, INR 3.34 this AM, decreased warfarin to 1mg this evening.  Pt currently refusing turn q2 when asleep but turns very well and is on a foam bariatric mattress. No BM since 1/22, pt is passing gas, on bowel regimen with some streaking noted this shift. Antunez is in for strict I/O's, antunez care done this evening. Skin is rosa and itchy, one time dose of benadryl given this evening. Plan is to continue to diurese pt and will discharge to [facility] once bed is confirmed.

## 2020-01-27 NOTE — PLAN OF CARE
1900-0730: VSS on 2-4L oxymizer while awake, bipap while sleeping. A&Ox4. Rechecking VBGs this AM to look at CO2 retention. Denies SOB or chest pain. NSR, on coumadin for hx a.fib. 1/24 had a run of A.Flutter non-sustained.Tolerating regular diet. BG AC, HS no coverage ordered or needed. Diuresing with oral bumex. Baig catheter for strict I&Os, good output overnight. LBM 1/22, on stool softeners possibly needing supp today? Denies nausea. Ax2, lift. Working with PT/OT. PRN Tylenol and Flexeril for pain, non requested overnight.  Awaiting available bed at  TCU preferably when patient stable and off of O2.   Will continue to monitor and follow POC.

## 2020-01-27 NOTE — DISCHARGE SUMMARY
[facility], [place]  Discharge Summary - Medicine & Pediatrics       Date of Admission:  1/15/2020  Date of Discharge:  1/27/2020  Discharging Provider: Dr Hoffmann  Discharge Service: Alireza 5    Discharge Diagnoses   Right sided CV dysfunction  Heart failure exacerbation  Acute on chronic hypercarbic respiratory failure   Acute kidney injury   Acute metabolic encephalopathy   Type 2 diabetes    Follow-ups Needed After Discharge   Follow-up Appointments     Follow Up and recommended labs and tests      Follow up with [facility] physician.  The following labs/tests are   recommended: BMP.         Follow up with cardiology and pulmonary after discharge     Unresulted Labs Ordered in the Past 30 Days of this Admission     No orders found from 12/16/2019 to 1/16/2020.        Discharge Disposition   Discharged to short-term care facility  Condition at discharge: Stable    Hospital Course   Arianna Siddiqi is a 59 year old female with medical history significant for HFrEF, T2DM, CKD, HTN, gout, OHS/CARLOS on AVAP, morbid obesity who presented to the ED with dyspnea.     1) Right sided CV dysfunction complicated by volume overload; Heart failure exacerbation   Presenting with weeks of increased weight gain in 1 week of worsening dyspnea.  Chest x-ray consistent with pulmonary edema,  BNP > 5, 000, per patient, possibly as much as 60 lbs above baseline. ECHO showed no WMA, LVEF 55-60%, Difficult to assess RV. Function probably at least mildly reduced.  IV diuresis on admission with good output, decreased diuretics due to concern of renal injury. Discharging with plan for slow PO diuresis with 4mg bumex and goal of 0.5-1L net negative daily. Should have BMP check within 1 week to look at Cr and K, adjustments to be made as needed to potassium supplement and bumex.  Holding lisinopril int he setting of some softer BP but should be restarted when safe.  Should follow up with cardiology after discharge  for further changes to medicine and optimization to HF regiment.      2) JASON, resolved; Post-ATN diuresis  Baseline cr 0.8, peaked at 1.5 w/in 24hrs after presentation in setting of volume overload. Aggressively diuresed. Suspect initial JASON 2/2 cardiorenal. Cr continued to improve but developed post ATN diuresis night of 1/18, large uop w/ rise in bicarb 2/2 contraction alkalosis. Tolerating oral bumex     3) Acute hypoxic hypercarbic respiratory failure; Respiratory acidosis; Obesity hypoventilation with chronic CO2 retention on blood gas: CARLOS; Metabolic encephalopathy   Worsening mental status day after admission with somnolence.  ABG with CO2 over 100.  Started on BiPAP with stabilization of gas but continued to have mentation that wax and wane.  Transition to ICU where she required a brief period of intubation.  Now on nasal cannula with BiPAP/APAP's at night.  Continues to be high risk for CO2 retaining given obesity and CARLOS. Should follow up with pulmonary once discharged to ensure monitoring and optimization. Home AVAPS, pmax25, plow 12, epap 8, RR 12, TV 600cc.      4) Paroxysmal atrial fibrillation; nonsustained SVT, resolved  EKG on admission was in NSR but noted afib back in 2016. Has been anticoagulated with warfarin. INR 2.06 on admission. Rate control with metoprolol succinate. On warfarin.     5)Sepsis, resolved  hypertensive on arrival w/ neg procal. BP stable since transfer. lactat neg, slight leukocytosis. CXR neg for focal infiltrate, increased interstitial marking likely 2/2 fluid overload, but w/ URI prodrome days pta, will treat empirically for atypical pna. Flu and RSV neg. Received vancx1 and cefrtriaxone x1. Urine cx NGTD, blood cx + diphtheroid species after 5 days of incubation, suspect contaminant      6) T2DM  Has been hyperglycemic on arrival. But NPO during most of the time after transfer while on bipap and intubated. Has had some hypoglcemic episodes into the 60s. Likely due to lack  of po intake. Stable after extubation, OK to resume home regiment      7) Gout - stable  - continue allopurinol 200 mg po qday     Consultations This Hospital Stay   NUTRITION SERVICES ADULT IP CONSULT  MEDICATION HISTORY IP PHARMACY CONSULT  PHARMACY TO DOSE WARFARIN  VASCULAR ACCESS ADULT IP CONSULT  PHYSICAL THERAPY ADULT IP CONSULT  OCCUPATIONAL THERAPY ADULT IP CONSULT  PHARMACY TO DOSE VANCO  VASCULAR ACCESS CARE ADULT IP CONSULT  PHYSICAL THERAPY ADULT IP CONSULT  OCCUPATIONAL THERAPY ADULT IP CONSULT    Code Status   Full Code       The patient was discussed with MD Mary Morton[address]  Pager: [phone]  ______________________________________________________________________    Physical Exam   Vital Signs: Temp: 98.6  F (37  C) Temp src: Oral BP: 131/72 Pulse: 68 Heart Rate: 77 Resp: 24 SpO2: 94 % O2 Device: Oxymizer cannula Oxygen Delivery: 3 LPM  Weight: 429 lbs 14.35 oz  General Appearance:  Lying in bed, no acute distress  Respiratory: Clear to auscultation front fields, mild bibasilar crackles  regular   Cardiovascular:  rhythm with occasional ectopic beat, no murmur appreciated  GI: Bowel sounds positive, nontender, large with anasarca  Skin: Dry skin  Extremities: Bilateral lower extremity edema  Neuro : Alert and oriented, no focal deficits      Primary Care Physician   Physician No Ref-Primary    Discharge Orders      CARDIOLOGY EVAL ADULT REFERRAL      PULMONARY MEDICINE REFERRAL      General info for SNF    Length of Stay Estimate: Short Term Care: Estimated # of Days <30  Condition at Discharge: Improving  Level of care:skilled   Rehabilitation Potential: Fair  Admission H&P remains valid and up-to-date: Yes  Recent Chemotherapy: N/A  Use Nursing Home Standing Orders: Yes     Reason for your hospital stay    You were admitted for significant volume overload and increased confusion due to poor ventilation. You underwent  significant diuresis and were on AVAPS/BIPAP with a short course of intubation. Your breathing stabilized     Glucose monitor nursing POCT    Before meals and at bedtime     Additional Discharge Instructions    Bumex dose was changed from 2mg to 4mg daily.   Stop taking lisinopril  Start taking 10meq potassium daily.   You should follow up with a TCU doctor with labs within 1 week/ They will help determine if changes need to be  Mad to these medications  You should follow up with the cardiology team after discharge  Continue to wear the AVAPs BIPAP whenever sleeping. You should follow up with pulmonary    AVAPS, pmax25, plow 12, epap 8, RR 12, TV 600cc     Activity - Up with assistive device     Follow Up and recommended labs and tests    Follow up with [facility] physician.  The following labs/tests are recommended: BMP.     Full Code     Physical Therapy Adult Consult    Evaluate and treat as clinically indicated.    Reason:  Weakness/deconditioning     Occupational Therapy Adult Consult    Evaluate and treat as clinically indicated.    Reason:  Difficulty with ADLs     Non Invasive Ventilator    AVAPS, pmax25, plow 12, epap 8, RR 12, TV 600cc    Vent Documentation:   In effort to reduce and/or prevent hospitalizations and emergency room visits, we are recommending a(n) Non Invasive Ventilator for home use. Arianna Siddiqi has had numerous hospitalizations recently due to chronic respiratory failure. The patient would benefit from Non Invasive Ventilator with portability for continuous support at night.    I, the undersigned, certify that the above prescribed supplies are medically necessary for this patient and is both reasonable and necessary in reference to accepted standards of medical and necessary in reference to accepted standards of medical practice in the treatment of this patient's condition and is not prescribed as a convenience.     Advance Diet as Tolerated    Follow this diet upon discharge: Orders  Placed This Encounter      Snacks/Supplements Adult: Boost Glucose Control; With Meals      Snacks/Supplements Adult: Other; See below; Between Meals      Regular Diet Adult       Significant Results and Procedures   Most Recent 3 CBC's:  Recent Labs   Lab Test 01/24/20  0540 01/21/20  0337 01/20/20  0403   WBC 5.1 5.5 6.8   HGB 11.7 11.4* 11.2*   MCV 91 89 87    212 230     Most Recent 3 BMP's:  Recent Labs   Lab Test 01/27/20  0652 01/26/20  0738 01/25/20  0620    139 137   POTASSIUM 3.8 4.0 3.9   CHLORIDE 97 98 97   CO2 44* 41* 38*   BUN 27 28 30   CR 0.90 0.89 0.87   ANIONGAP <1* <1* 2*   KADEN 9.0 9.4 9.4   GLC 84 81 88     Most Recent 2 LFT's:  Recent Labs   Lab Test 01/18/20  0321 01/16/20  2221   AST 16 26   ALT 19 20   ALKPHOS 80 97   BILITOTAL 0.5 0.4     Most Recent ABG:  Recent Labs   Lab Test 01/17/20  0941   PH 7.17*   PO2 70*   PCO2 105*   HCO3 38*   TETE 6.3       Discharge Medications   Current Discharge Medication List      START taking these medications    Details   cyclobenzaprine (FLEXERIL) 5 MG tablet Take 1 tablet (5 mg) by mouth nightly as needed for muscle spasms    Associated Diagnoses: Muscle spasm      polyethylene glycol (MIRALAX/GLYCOLAX) packet Take 17 g by mouth daily as needed for constipation    Associated Diagnoses: Constipation, unspecified constipation type         CONTINUE these medications which have CHANGED    Details   bumetanide (BUMEX) 2 MG tablet Take 2 tablets (4 mg) by mouth daily    Associated Diagnoses: Hypervolemia, unspecified hypervolemia type      potassium chloride ER (K-TAB) 20 MEQ CR tablet Take 0.5 tablets (10 mEq) by mouth daily  Qty: 180 tablet, Refills: 0    Associated Diagnoses: Hypokalemia         CONTINUE these medications which have NOT CHANGED    Details   acetaminophen (TYLENOL) 500 MG tablet Take 500-1,000 mg by mouth every 6 hours as needed for mild pain      allopurinol (ZYLOPRIM) 100 MG tablet Take 1 tablet (100 mg) by mouth 2 times daily  Patient needs to see primary provider and have labs for further refills.  Qty: 60 tablet, Refills: 0    Comments: Patient needs to see primary provider and have labs for further refills. Please keep the appointment on 12/7/18  Associated Diagnoses: Gout, unspecified cause, unspecified chronicity, unspecified site      ferrous gluconate (FERGON) 324 (38 Fe) MG tablet Take 1 tablet (324 mg) by mouth 3 times daily Patient needs to see primary provider and have labs for further refills.  Qty: 90 tablet, Refills: 0    Comments: Patient needs to see primary provider and have labs for further refills. NO more refill. Please keep the appointment on 7/31.  Associated Diagnoses: Iron deficiency anemia due to chronic blood loss      gabapentin (NEURONTIN) 100 MG capsule Take 3 capsules (300 mg) by mouth 3 times daily Patient needs to see primary provider for further refills.  Qty: 90 capsule, Refills: 0    Associated Diagnoses: Peripheral polyneuropathy      melatonin 3 MG tablet Take 2 tablets (6 mg) by mouth nightly as needed for sleep    Associated Diagnoses: Insomnia, unspecified type      metoprolol succinate ER (TOPROL-XL) 50 MG 24 hr tablet Take 1.5 tablets (75 mg) by mouth daily *Must be seen 7/31/19 for further refills. Dispense enough medication as requested until seen  Qty: 3 tablet, Refills: 0    Associated Diagnoses: Acute on chronic combined systolic and diastolic congestive heart failure (H)      multivitamin, therapeutic (THERA-VIT) TABS tablet Take 1 tablet by mouth daily      simvastatin (ZOCOR) 20 MG tablet Take 1 tablet (20 mg) by mouth At Bedtime  Qty: 90 tablet, Refills: 0    Comments: Please keep appt 3/13/19  Associated Diagnoses: Hyperlipidemia, unspecified hyperlipidemia type      sitagliptin (JANUVIA) 50 MG tablet Take 1 tablet (50 mg) by mouth daily Patient needs to see primary provider and have labs for further refills.  Qty: 14 tablet, Refills: 0    Comments: Call clinic to schedule follow up  appointment. [phone]  Associated Diagnoses: Type 2 diabetes mellitus with other neurologic complication, without long-term current use of insulin (H)      warfarin (COUMADIN) 3 MG tablet TAKE 1-1.5 TABLETS DAILY OR AS DIRECTED BY THE COUMADIN CLINIC.  Qty: 135 tablet, Refills: 1    Comments: DX Code Needed  .  Associated Diagnoses: Atrial fibrillation (H)      blood glucose monitoring (ACCU-CHEK NINA PLUS) meter device kit Use to test blood sugars 3 times daily or as directed.  Qty: 1 kit, Refills: 0    Associated Diagnoses: Diabetes mellitus, type 2 (H)      blood glucose monitoring (SOFTCLIX) lancets Use to test blood sugar 3 times daily or as directed.  Qty: 300 each, Refills: 0    Associated Diagnoses: Type 2 diabetes mellitus with chronic kidney disease, without long-term current use of insulin, unspecified CKD stage (H)         STOP taking these medications       aspirin (ASA) 81 MG chewable tablet Comments:   Reason for Stopping:         lisinopril (PRINIVIL/ZESTRIL) 5 MG tablet Comments:   Reason for Stopping:             Allergies   Allergies   Allergen Reactions     Penicillins Itching and Rash     Tolerated ceftriaxone 1/17/2020

## 2020-01-27 NOTE — PROVIDER NOTIFICATION
MD notified about critical VBG result, CO2 88.  MD returned call to acknowledge, no change in orders at this time.

## 2020-01-28 LAB
GLUCOSE BLDC GLUCOMTR-MCNC: 126 MG/DL (ref 70–99)
GLUCOSE BLDC GLUCOMTR-MCNC: 72 MG/DL (ref 70–99)
GLUCOSE BLDC GLUCOMTR-MCNC: 90 MG/DL (ref 70–99)
INR PPP: 2.6 (ref 0.86–1.14)

## 2020-01-28 PROCEDURE — 85610 PROTHROMBIN TIME: CPT | Performed by: INTERNAL MEDICINE

## 2020-01-28 PROCEDURE — 00000146 ZZHCL STATISTIC GLUCOSE BY METER IP

## 2020-01-28 PROCEDURE — 99306 1ST NF CARE HIGH MDM 50: CPT | Mod: AI | Performed by: INTERNAL MEDICINE

## 2020-01-28 PROCEDURE — 97535 SELF CARE MNGMENT TRAINING: CPT | Mod: GO | Performed by: OCCUPATIONAL THERAPIST

## 2020-01-28 PROCEDURE — 97530 THERAPEUTIC ACTIVITIES: CPT | Mod: GP

## 2020-01-28 PROCEDURE — 25000125 ZZHC RX 250: Performed by: INTERNAL MEDICINE

## 2020-01-28 PROCEDURE — 97162 PT EVAL MOD COMPLEX 30 MIN: CPT | Mod: GP

## 2020-01-28 PROCEDURE — 12000022 ZZH R&B SNF

## 2020-01-28 PROCEDURE — 25000132 ZZH RX MED GY IP 250 OP 250 PS 637: Mod: GY | Performed by: INTERNAL MEDICINE

## 2020-01-28 PROCEDURE — 25000128 H RX IP 250 OP 636: Performed by: INTERNAL MEDICINE

## 2020-01-28 PROCEDURE — 97166 OT EVAL MOD COMPLEX 45 MIN: CPT | Mod: GO | Performed by: OCCUPATIONAL THERAPIST

## 2020-01-28 PROCEDURE — 90682 RIV4 VACC RECOMBINANT DNA IM: CPT | Performed by: INTERNAL MEDICINE

## 2020-01-28 PROCEDURE — 36415 COLL VENOUS BLD VENIPUNCTURE: CPT | Performed by: INTERNAL MEDICINE

## 2020-01-28 RX ORDER — WARFARIN SODIUM 2.5 MG/1
2.5 TABLET ORAL
Status: COMPLETED | OUTPATIENT
Start: 2020-01-28 | End: 2020-01-28

## 2020-01-28 RX ADMIN — ONDANSETRON 4 MG: 4 TABLET, ORALLY DISINTEGRATING ORAL at 07:50

## 2020-01-28 RX ADMIN — ALLOPURINOL 200 MG: 100 TABLET ORAL at 07:50

## 2020-01-28 RX ADMIN — Medication 5 UNITS: at 11:45

## 2020-01-28 RX ADMIN — FERROUS GLUCONATE 324 MG: 324 TABLET ORAL at 07:50

## 2020-01-28 RX ADMIN — CYCLOBENZAPRINE HYDROCHLORIDE 5 MG: 5 TABLET, FILM COATED ORAL at 22:57

## 2020-01-28 RX ADMIN — FERROUS GLUCONATE 324 MG: 324 TABLET ORAL at 20:22

## 2020-01-28 RX ADMIN — THERA TABS 1 TABLET: TAB at 07:50

## 2020-01-28 RX ADMIN — ONDANSETRON 4 MG: 4 TABLET, ORALLY DISINTEGRATING ORAL at 14:18

## 2020-01-28 RX ADMIN — WARFARIN SODIUM 2.5 MG: 2.5 TABLET ORAL at 17:51

## 2020-01-28 RX ADMIN — GABAPENTIN 300 MG: 300 CAPSULE ORAL at 07:50

## 2020-01-28 RX ADMIN — GABAPENTIN 300 MG: 300 CAPSULE ORAL at 20:23

## 2020-01-28 RX ADMIN — ACETAMINOPHEN 1000 MG: 500 TABLET ORAL at 01:51

## 2020-01-28 RX ADMIN — GABAPENTIN 300 MG: 300 CAPSULE ORAL at 14:18

## 2020-01-28 RX ADMIN — FERROUS GLUCONATE 324 MG: 324 TABLET ORAL at 14:18

## 2020-01-28 RX ADMIN — ACETAMINOPHEN 1000 MG: 500 TABLET ORAL at 07:51

## 2020-01-28 RX ADMIN — ALUMINUM HYDROXIDE, MAGNESIUM HYDROXIDE, AND DIMETHICONE 15 ML: 400; 400; 40 SUSPENSION ORAL at 01:51

## 2020-01-28 RX ADMIN — BUMETANIDE 4 MG: 1 TABLET ORAL at 07:49

## 2020-01-28 RX ADMIN — METOPROLOL TARTRATE 50 MG: 25 TABLET ORAL at 07:51

## 2020-01-28 RX ADMIN — METOPROLOL TARTRATE 50 MG: 25 TABLET ORAL at 20:23

## 2020-01-28 RX ADMIN — INFLUENZA A VIRUS A/BRISBANE/02/2018 (H1N1) RECOMBINANT HEMAGGLUTININ ANTIGEN, INFLUENZA A VIRUS A/KANSAS/14/2017 (H3N2) RECOMBINANT HEMAGGLUTININ ANTIGEN, INFLUENZA B VIRUS B/PHUKET/3073/2013 RECOMBINANT HEMAGGLUTININ ANTIGEN, AND INFLUENZA B VIRUS B/MARYLAND/15/2016 RECOMBINANT HEMAGGLUTININ ANTIGEN 0.5 ML: 45; 45; 45; 45 INJECTION INTRAMUSCULAR at 11:24

## 2020-01-28 RX ADMIN — MICONAZOLE NITRATE: 20 POWDER TOPICAL at 20:28

## 2020-01-28 RX ADMIN — ACETAMINOPHEN 1000 MG: 500 TABLET ORAL at 14:18

## 2020-01-28 RX ADMIN — SITAGLIPTIN 50 MG: 50 TABLET, FILM COATED ORAL at 07:50

## 2020-01-28 RX ADMIN — ATORVASTATIN CALCIUM 20 MG: 20 TABLET, FILM COATED ORAL at 20:22

## 2020-01-28 RX ADMIN — MICONAZOLE NITRATE: 20 POWDER TOPICAL at 07:53

## 2020-01-28 ASSESSMENT — ACTIVITIES OF DAILY LIVING (ADL): PREVIOUS_RESPONSIBILITIES: MEAL PREP;HOUSEKEEPING;LAUNDRY;SHOPPING;MEDICATION MANAGEMENT;FINANCES;DRIVING

## 2020-01-28 NOTE — PLAN OF CARE
VSS. On continuous O2 at 3lpm via nasal oxymizer. Alert and orientedx4. Conversant and cooperative. Able to used call light appropriately and verbalized needs. A-2 to 3 person using lift for transfer. Able to turn sides independently. Continent of bowel and bladder. Used bed pan. BG at HS was 128. Bladder scan with 28 mls. Pt has good urine output. +BM large this shift. Abdominal folds and bilateral groin are moist and red, miconazole powder applied. Pt has scaly rough, dry skin, with irritated itchy skin from scratching to inner bilateral thigh, wash area with soap and water lotion applied (sticky note for skin irritation). Coccyx, buttocks intact. Dark skin to all skin folds, and behind neck. Still has menstruation.  Will continue plan of care.  Vital signs:  Temp: 96.8  F (36  C) Temp src: Oral BP: 138/57 Pulse: 82   Resp: 20 SpO2: 92 % O2 Device: Oxymizer cannula Oxygen Delivery: 3 LPM

## 2020-01-28 NOTE — PLAN OF CARE
"RN: Updated Md r/t oxygen sats below 90% on 3-4 liters oxygen with oxymizer. MD responded, see new notes. Pt baseline mid to upper 80's to low 90's with oxymizer on.  Pt should be wearing bipap when resting in bed during day time. Pt presently sitting upright and eating breakfast, oxymizer on 5 liters and oxygen sat 90%. Pt alert and oriented x 4, \"I just feel weak\". Zofran given prn prior to breakfast, no nausea at this time, pt states has been getting nauseated daily, so requesting zofran this morning as preventative. Will Continue to assess and monitor.   Pt new fluid restriction order this morning also, JENNA and pt updated.     Continue to monitor resp status.  Using Bipap while napping, no new problems.  Has menstrual cycle and pink blood on pad this morning along with 1 inch clot x1/2 inch approximately.  "

## 2020-01-28 NOTE — PLAN OF CARE
Occupational Therapy Discharge Summary    Reason for therapy discharge:    Discharged to transitional care facility.    Progress towards therapy goal(s). See goals on Care Plan in [facility] electronic health record for goal details.  Goals not met.  Barriers to achieving goals:   discharge from facility.    Therapy recommendation(s):    Continued therapy is recommended.  Rationale/Recommendations:  TCU to progress functional independence.

## 2020-01-28 NOTE — PROGRESS NOTES
"   01/28/20 0800   Quick Adds   Quick Adds Certification   Type of Visit Initial PT Evaluation      Language English   Living Environment   Lives With alone   Living Arrangements apartment;other (see comments)  (elevator)   Home Accessibility no concerns   Transportation Anticipated family or friend will provide;health plan transportation   Living Environment Comment PT: Pt lives alone in apartment with elevator, no stair requirement. Pt [place] not drive [place] and describes ongoing concerns regarding finding affordable transportation to appts   Self-Care   Usual Activity Tolerance fair   Current Activity Tolerance poor   Regular Exercise No   Equipment Currently Used at Home shower chair   Activity/Exercise/Self-Care Comment PT: Pt is IND with all mobility PTA, sleeps in recliner - no bed, does have one platform step outside of home that she performs usually with her FWW   Functional Level Prior   Ambulation 0-->independent   Transferring 0-->independent   Toileting 0-->independent   Bathing 1-->assistive equipment   Communication 0-->understands/communicates without difficulty   Swallowing 0-->swallows foods/liquids without difficulty   Cognition 0 - no cognition issues reported   Fall history within last six months no   Which of the above functional risks had a recent onset or change? ambulation;transferring;toileting;bathing;dressing   Prior Functional Level Comment PT: Typically IND with all mobility and tasks   General Information   Onset of Illness/Injury or Date of Surgery - Date 01/15/20   Referring Physician MD Gasper   Patient/Family Goals Statement To go home   Pertinent History of Current Problem (include personal factors and/or comorbidities that impact the POC) Taken per chart review: \"Arianna Siddiqi is a 59 year old female with medical history significant for HFrEF, T2DM, CKD, HTN, gout, OHS/CARLOS on AVAP, morbid obesity who presented to the ED with dyspnea.\"   Precautions/Limitations " "fall precautions   General Observations PT: Pt is lying semi supine eating breakfast at the start of the session, able to communicate verbally without concern.    Cognitive Status Examination   Orientation orientation to person, place and time   Level of Consciousness alert   Follows Commands and Answers Questions 100% of the time   Personal Safety and Judgment intact   Memory intact   Pain Assessment   Patient Currently in Pain No   Integumentary/Edema   Integumentary/Edema Comments PT: 2+ pitting edema in LE, pt describes herself as \"fluid overload\"   Posture    Posture Forward head position   Range of Motion (ROM)   ROM Comment PT: normal ROM of LE is impaired d/t adipose tissue   Strength   Strength Comments PT: B Ankles: 4-/5, B knees: 3-/5, B HF: 3-/5   Bed Mobility   Bed Mobility Comments PT: Min A for assist with trunk and LE, heavy use of bed controls, increased time to perform   Transfer Skills   Transfer Comments PT: Pt stands with mod A x 2 at EOB x 2 stands total to weigh. Once in standing pt is able to remain standing for ~ 15 seconds holding onto scale for support, SBA   Gait   Gait Comments PT: Unable to perform d/t weakness and safety concerns   Balance   Balance Comments PT: PT is able to sit unsupported at EOB, requires CGA when in standing with UE support   Sensory Examination   Sensory Perception Comments PT: Intact fine touch to B LEs   Coordination   Coordination Comments PT: Unable to perform B heel/shin test d/t weakness   Muscle Tone   Muscle Tone no deficits were identified   Modality Interventions   Planned Modality Interventions TENS;Cryotherapy   General Therapy Interventions   Planned Therapy Interventions balance training;bed mobility training;cognition;fine motor coordination training;gait training;groups;joint mobilization;manual therapy;motor coordination training;neuromuscular re-education;orthotic fitting/training;prosthetic fitting/training;ROM;strengthening;stretching;transfer " training;visual perception;wheelchair management/propulsion training;risk factor education;home program guidelines;progressive activity/exercise   Clinical Impression   Criteria for Skilled Therapeutic Intervention yes, treatment indicated   PT Diagnosis Decreased endurance, strength, and tolerance for activity from baseline values   Influenced by the following impairments See above, medical diagoniss   Functional limitations due to impairments Ambulation, transfers, out of bed activity   Clinical Presentation Evolving/Changing   Clinical Presentation Rationale Age, PMH, recent hospitalization.    Clinical Decision Making (Complexity) Moderate complexity   Therapy Frequency 6x/week   Predicted Duration of Therapy Intervention (days/wks) 13 days   Anticipated Discharge Disposition Home with Home Therapy   Risk & Benefits of therapy have been explained Yes   Patient, Family & other staff in agreement with plan of care Yes   Clinical Impression Comments PT: Pt is a 58 y/o female who presents for PT evaluation following hospitalization for dyspnea. PT evaluation revealed decreased endurance and strength with poor activity tolerance from reported baseline values. Pt will benefit from skilled PT intervention to allow for improve long term health outcomes, aide in appropriate hospital discharge and restore relatively high PLOF.   Therapy Certification   Start of care date 01/28/20   Certification date from 01/28/20   Certification date to 02/20/20   Medical Diagnosis CHF exacerbation   Certification I certify the need for these services furnished under this plan of treatment and while under my care.  (Physician co-signature of this document indicates review and certification of the therapy plan).    Total Evaluation Time   Total Evaluation Time (Minutes) 15

## 2020-01-28 NOTE — PLAN OF CARE
Patient is a new admit as of yesterday 1/27. Alert and oriented x4, makes needs known. Requested and received prn Tylenol for pain and prn Mylanta for upset stomach, both with good relief. Sleeping well in between cares. BiPAP on while sleeping, Oximyzer while she was awake. Continent of bladder, assist of 1 to bedpan. PVR was 0. LBM was 1/27. Called appropriately for assistance. Continue POC.

## 2020-01-28 NOTE — PLAN OF CARE
PT: Eval complete, treatment initiated  Pt lives alone in apartment with 1 platform stair to enter, using FWW IND at baseline for mobility.    Current status:  Pt is having concerns regarding oxygen desating with little activity, remains on 5L NC. This is being monitored by medical team and should be monitored by therapy when pt is working in session.     Bed mobility: Min A  Sit <> stands from EOB to FWW: Mod/min A x 2    Based on pt's performance with OT (see POC note) pt does not need to be a lift unless significantly fatigued.     ELOS: 13 days

## 2020-01-28 NOTE — PLAN OF CARE
Discharge Planner OT   Patient plan for discharge: home alone to apartment with services  Current status: Mod A toilet transfer, LE dressing,stand/pivot transfers Min A, hygiene/grooming CGA, sit<> stand Min A  Barriers to return to prior living situation: medical status, deconditioning. Level of assistance, lives alone-home management endurance/balance, need for supplemental oxygen (5 L at present)  Recommendations for discharge: continue with [facility] groceries, transportation assist, PT/OT/HHA/SN  Rationale for recommendations: patient would benefit from skilled OT to address basic ADL/self cares and above areas for a safe home discharge plan       Entered by: Dora Van 01/28/2020 11:22 AM

## 2020-01-28 NOTE — H&P
Transitional Care Unit  Internal Medicine H&P      Patient Name: Arianna Siddiqi MRN# [id number]   Age: 59 year old YOB: 1960     Date of Admission: 1/27/2020    Hospital Discharge Team: Alireza Murdock  Home Clinic:   Primary Care Provider: Julisa Ref-Primary, Physician         Assessment and Plan:   Arianna Siddiqi is a 59 year old female with medical history significant for HFrEF, T2DM, CKD, HTN, gout, OHS/CARLOS on AVAP, morbid obesity who presented to the ED with dyspnea and found to be in acute on chronic rt heart failure. She was optimized and sent to TCU for rehabilitation and      1) Right sided CV dysfunction complicated by volume overload; Heart failure exacerbation   Presenting with weeks of increased weight gain in 1 week of worsening dyspnea.  Chest x-ray consistent with pulmonary edema,  BNP > 5, 000, per patient, possibly as much as 60 lbs above baseline. ECHO showed no WMA, LVEF 55-60%, Difficult to assess RV. Function probably at least mildly reduced.  IV diuresis on admission with good output, decreased diuretics due to concern of renal injury. Discharging with plan for slow PO diuresis with 4mg bumex and goal of 0.5-1L net negative daily. Should have BMP check within 1 week to look at Cr and K, adjustments to be made as needed to potassium supplement and bumex.  Holding lisinopril int he setting of some softer BP but should be restarted when safe.  Should follow up with cardiology after discharge for further changes to medicine and optimization to HF regiment.   Weekly BNP checks  Daily weights  2 gms Na restricted diet      2) JASON, resolved; Post-ATN diuresis  Baseline cr 0.8, peaked at 1.5 w/in 24hrs after presentation in setting of volume overload. Aggressively diuresed. Suspect initial JASON 2/2 cardiorenal. Cr continued to improve but developed post ATN diuresis night of 1/18, large uop w/ rise in bicarb 2/2 contraction alkalosis. Tolerating oral bumex     3) Acute hypoxic hypercarbic respiratory  failure; Respiratory acidosis; Obesity hypoventilation with chronic CO2 retention on blood gas: CARLOS; Metabolic encephalopathy   Worsening mental status day after admission with somnolence.  ABG with CO2 over 100.  Started on BiPAP with stabilization of gas but continued to have mentation that wax and wane.  Transition to ICU where she required a brief period of intubation.  Now on nasal cannula with BiPAP/APAP's at night.  Continues to be high risk for CO2 retaining given obesity and CARLOS. Should follow up with pulmonary once discharged to ensure monitoring and optimization.   Home AVAPS, pmax25, plow 12, epap 8, RR 12, TV 600cc.   Baseline Co2 on VBG  in the 80's      4) Paroxysmal atrial fibrillation; nonsustained SVT, resolved  EKG on admission was in NSR but noted afib back in 2016. Has been anticoagulated with warfarin. INR 2.06 on admission.   Rate control with metoprolol succinate. On warfarin.      5)Sepsis, resolved  hypertensive on arrival w/ neg procal. BP stable since transfer. lactat neg, slight leukocytosis. CXR neg for focal infiltrate, increased interstitial marking likely 2/2 fluid overload, but w/ URI prodrome days pta, will treat empirically for atypical pna. Flu and RSV neg. Received vancx1 and cefrtriaxone x1. Urine cx NGTD, blood cx + diphtheroid species after 5 days of incubation, suspect contaminant      6) T2DM  Has been hyperglycemic on arrival. But NPO during most of the time after transfer while on bipap and intubated. Has had some hypoglcemic episodes into the 60s. Likely due to lack of po intake. Stable after extubation.  Resume home dose of Januvia 50 mg      7) Gout - stable  - continue allopurinol 200 mg po qday            CODE: Full   Diet: Regular adult diet   DVT: On Warfarin   Indication for Psychotropic Medications: Patient is not on Psychotropic medications   Disposition: To be determined after PT/OT evaluation   Immunizations: Up to date with Penumovax       Dr PURNIMA Jackman MD,  [facility]  Hospitalist ( Internal medicine)  Pager: [phone]             Chief Complaint:   Admission to TCU for PT/OT and Respiratory optimization in the setting of Rt heart failure          HPI:   Arianna Siddiqi is a 59 year old female with medical history significant for HFrEF, T2DM, CKD, HTN, gout, OHS/CARLOS on AVAP, morbid obesity who presented to the ED with dyspnea on 1/15 and was found to be in Rt heart failure.  She was aggressively diuresed. She was also found to be in cardiorenal syndrome  She made an eventual recovery and was sent to TCU for continued PT/OT and respiratory optimization  When I went to see patient, she was resting comfortably. Says that Oxygen use has been new for her and that she has used between 2-4 lts here in the hospital  She denies nay chest pain or SOB at rest. Able to have a normal conversation. She uses the trilogy machine at home.  Over night , she said that her BiPAP machine kept alarming. No subjective symptoms  Eating and drinking well   looking forward to therapy  Denies fevers or chills   Denies nausea, vomiting or diarrhea  Eating and drinking well               Review of Systems:   A 10 point ROS was performed and negative unless otherwise noted in HPI.          Past Medical History:   Reviewed and updated in Epic.  Past Medical History:   Diagnosis Date     CHF (congestive heart failure) (H)      CKD (chronic kidney disease)      Compliance poor      Diabetes mellitus (H)      Gout      Hypertension      Insomnia      Morbid obesity (H)      CARLOS treated with BiPAP             Past Surgical History:   Reviewed and updated in Epic.           Social History:   Reviewed and updated in [employer].  Social History     Socioeconomic History     Marital status: Single     Spouse name: Not on file     Number of children: Not on file     Years of education: Not on file     Highest education level: Not on file   Occupational History     Not on file   Social Needs     Financial resource  strain: Not on file     Food insecurity:     Worry: Not on file     Inability: Not on file     Transportation needs:     Medical: Not on file     Non-medical: Not on file   Tobacco Use     Smoking status: Former Smoker     Packs/day: 1.50     Years: 20.00     Pack years: 30.00     Types: Cigarettes     Last attempt to quit: 2002     Years since quittin.0     Smokeless tobacco: Never Used   Substance and Sexual Activity     Alcohol use: No     Alcohol/week: 0.0 standard drinks     Drug use: No     Sexual activity: Not on file   Lifestyle     Physical activity:     Days per week: Not on file     Minutes per session: Not on file     Stress: Not on file   Relationships     Social connections:     Talks on phone: Not on file     Gets together: Not on file     Attends Bahai service: Not on file     Active member of club or organization: Not on file     Attends meetings of clubs or organizations: Not on file     Relationship status: Not on file     Intimate partner violence:     Fear of current or ex partner: Not on file     Emotionally abused: Not on file     Physically abused: Not on file     Forced sexual activity: Not on file   Other Topics Concern     Not on file   Social History Narrative     Not on file            Family History:   Patient was adopted          Allergies:      Allergies   Allergen Reactions     Penicillins Itching and Rash     Tolerated ceftriaxone 2020            Medications:     Medications Prior to Admission   Medication Sig Dispense Refill Last Dose     acetaminophen (TYLENOL) 500 MG tablet Take 500-1,000 mg by mouth every 6 hours as needed for mild pain   Past Week     allopurinol (ZYLOPRIM) 100 MG tablet Take 1 tablet (100 mg) by mouth 2 times daily Patient needs to see primary provider and have labs for further refills. 60 tablet 0 1/15/2020 at AM     blood glucose monitoring (ACCU-CHEK NINA PLUS) meter device kit Use to test blood sugars 3 times daily or as directed. 1 kit  0 Taking     blood glucose monitoring (SOFTCLIX) lancets Use to test blood sugar 3 times daily or as directed. 300 each 0      bumetanide (BUMEX) 2 MG tablet Take 2 tablets (4 mg) by mouth daily        cyclobenzaprine (FLEXERIL) 5 MG tablet Take 1 tablet (5 mg) by mouth nightly as needed for muscle spasms        ferrous gluconate (FERGON) 324 (38 Fe) MG tablet Take 1 tablet (324 mg) by mouth 3 times daily Patient needs to see primary provider and have labs for further refills. 90 tablet 0 1/15/2020 at NOON     gabapentin (NEURONTIN) 100 MG capsule Take 3 capsules (300 mg) by mouth 3 times daily Patient needs to see primary provider for further refills. 90 capsule 0 1/15/2020 at noon     melatonin 3 MG tablet Take 2 tablets (6 mg) by mouth nightly as needed for sleep        metoprolol succinate ER (TOPROL-XL) 50 MG 24 hr tablet Take 1.5 tablets (75 mg) by mouth daily *Must be seen 7/31/19 for further refills. Dispense enough medication as requested until seen 3 tablet 0 1/15/2020 at AM     multivitamin, therapeutic (THERA-VIT) TABS tablet Take 1 tablet by mouth daily   1/15/2020 at AM     polyethylene glycol (MIRALAX/GLYCOLAX) packet Take 17 g by mouth daily as needed for constipation        potassium chloride ER (K-TAB) 20 MEQ CR tablet Take 0.5 tablets (10 mEq) by mouth daily 180 tablet 0      sitagliptin (JANUVIA) 50 MG tablet Take 1 tablet (50 mg) by mouth daily Patient needs to see primary provider and have labs for further refills. 14 tablet 0 1/15/2020 at AM     warfarin (COUMADIN) 3 MG tablet TAKE 1-1.5 TABLETS DAILY OR AS DIRECTED BY THE COUMADIN CLINIC. 135 tablet 1 1/15/2020 at AM             Physical Exam:   Blood pressure 129/79, pulse 84, temperature 95.8  F (35.4  C), temperature source Oral, resp. rate 18, weight (!) 174.5 kg (384 lb 9.6 oz), SpO2 (P) 92 %.  GENERAL: Alert and oriented x 3.Not in distress   HEENT: Anicteric sclera. PERRL. Mucous membranes moist and without lesions.   CV: RRR. S1, S2.  No murmurs appreciated.   RESPIRATORY: Effort normal on room air. Lungs CTAB with no wheezing,  Coarse crackles at the bases  GI: Abdomen soft and non distended, bowel sounds present. No tenderness, rebound, guarding.   MUSCULOSKELETAL: Bilateral lower extremity edema noted with chronic venous stasis changes. Also, chronic skin changes on the medial side of the thighs    NEUROLOGICAL: No focal deficits. Strength 5/5 bilaterally in upper and lower extremities.   SKIN: No jaundice. No rashes.     Lines/Tubes/Drains:   PICC Double Lumen 01/16/20 Right Basilic (Active)   External Cath Length (cm) 1 cm 1/26/2020  3:51 PM   Dressing Intervention Chlorhexidine patch;Transparent 1/27/2020 12:00 AM   Dressing Change Due 02/02/20 1/27/2020 10:04 AM   PICC Comment [place] 1/27/2020 10:04 AM   Lumen A - Color PURPLE 1/27/2020  7:30 AM   Lumen A - Status heparin locked;blood return noted 1/27/2020  7:30 AM   Lumen A - Cap Change Due 02/02/20 1/27/2020 12:00 AM   Lumen B - Color GRAY 1/27/2020  7:30 AM   Lumen B - Status heparin locked;blood return noted 1/27/2020  7:30 AM   Lumen B - Cap Change Due 02/02/20 1/27/2020 12:00 AM   Extravasation? No 1/27/2020 12:00 AM   Line Necessity Yes, meets criteria 1/27/2020 12:00 AM   Number of days: 12            Data:   I personally reviewed the following studies:  BMP  Recent Labs   Lab 01/27/20  0652 01/26/20  0738 01/25/20  0620 01/24/20  0540    139 137 138   POTASSIUM 3.8 4.0 3.9 4.1   CHLORIDE 97 98 97 100   KADEN 9.0 9.4 9.4 9.0   CO2 44* 41* 38* 37*   BUN 27 28 30 34*   CR 0.90 0.89 0.87 1.05*   GLC 84 81 88 90     CBC  Recent Labs   Lab 01/24/20  0540   WBC 5.1   RBC 4.93   HGB 11.7   HCT 44.9   MCV 91   MCH 23.7*   MCHC 26.1*   RDW 20.7*        INR  Recent Labs   Lab 01/28/20  0643 01/27/20  0652 01/26/20  0738 01/25/20  0620   INR 2.60* 2.77* 3.34* 2.83*     LFTsNo lab results found in last 7 days.   PANCNo lab results found in last 7 days.    Unresulted Labs Ordered  in the Past 30 Days of this Admission     No orders found for last 31 day(s).

## 2020-01-28 NOTE — PROGRESS NOTES
Social Work: Initial Assessment with Discharge Plan    Patient Name: Arianna Siddiqi  : 1960  Age: 59 year old  MRN: [id number]  Completed assessment with: Patient  Admitted to TCU: 2020    Presenting Information   Date of  assessment: 2020  Health Care Directive: Declined completing  Primary Health Care Agent: Patient  Secondary Health Care Agent: Defer to next-of-kin in absence of health care directive.   Living Situation: Lives alone in apartment with elevator access.  Previous Functional Status: Independent with ADLs and IADLs. Patient has a walker, shower chair, tub bench (but does not use). Has [employer] set up. Patient states she needs a wheelchair.  shared that therapies will assess this and if patient qualifies for a wheelchair, then TCU can order. If not, therapies can provide patient information on where patient can purchase a wheelchair.   DME available: See therapy notes.  Patient and family understanding of hospitalization: Patient has a good understanding of her hospitalization.  Cultural/Language/Spiritual Considerations: English speaking.  Abuse concerns: None indicated.  BIMS: Pt scored 15 on BIMS indicating cognitively intact  PHQ-9: Pt scored 4 on PHQ-9 indicating minimal depressive symptoms  PAS: confirmation number- [id number]  Has there been a level II screen?  No  Were there any recommendations in the screen? N/A  If yes, will the recommendations we incorporated into the Plan of Care?  N/A  Physical Health  Reason for admission: Heart failure      Provider Information   Primary Care Physician:No Ref-Primary, Physician - states she has primary care doctor at [facility], but does not remember provider's name.  : None    Mental Health:   Diagnosis: None reported  Current Support/Services: NA  Previous Services: NA  Services Needed/Recommended: Health psychologist and spiritual health services available in TCU if interseted.    Substance  Use:  Diagnosis: None reported  Current Support/Services: NA  Previous Services: NA  Services Needed/Recommended: NA    Support System  Marital Status: Single  Family support: SisterBrigida  Other support available: Some friends  Gaps in support system: Limited support system. Lives alone.    Community Resources  Current in home services: Meal delivery with [employer].  Previous services: [facility] SN/PT/OT    Financial/Employment/Education  Employment Status: Disability  Income Source: SSDI   Education: High school  Financial Concerns: Patient discussed difficulty getting affordable transportation. She has a spenddown with MA so she cannot use [employer]. Patient applied for [employer], but says she did not qualify due to her income and had to pay a very high fee. Provided patient with [email or web address] Transportation resource packet.  Insurance: Medicare and Medicaid      Discharge Plan   Patient and family discharge goal: Patient's goals is to return home with [facility] SN/PT/OT/HHA.  Provided Education on discharge plan: YES  Patient agreeable to discharge plan:  YES  A list of Medicare Certified Facilities was provided to the patient and/or family to encourage patient choice. Based on location and rating, patient would like referrals made to: [facility]  General information regarding anticipated insurance coverage and possible out of pocket cost was discussed. Patient and patient's family are aware patient may incur the cost of transportation to the facility, pending insurance payment: YES  Barriers to discharge: Medical clearance, therapy goals met, safe discharge plan.    Discharge Recommendations   Disposition: Home  Transportation Needs: Family can provide  Name of Transportation Company and Phone: NA    Additional comments   SW introduced self and role of SW to patient. Provided patient with [email or web address] [email or web address] transportation resource packet. Patient will receive care plan goals and orders tomorrow.  SW will continue to follow and assist as needed.    TAMMY Douglas,[facility]  TCU    Phone: [phone]  Pager: [phone]

## 2020-01-28 NOTE — PROGRESS NOTES
01/28/20 1615   Visit Information   Visit Made By Staff    Type of Visit Initial;Staff consultation/triage   Visited Patient/family declined at this time   Visit Location (if NOT Inpatient)   Transitional Care Unit   Interventions   Plan of Care Review   With patient/family/proxy   Basic Spiritual Interventions    introduction/orientation to Spiritual Health Services   SPIRITUAL HEALTH SERVICES   Spiritual Assessment Progress Note   [facility] ([facility] [place]) 4R   REFERRAL SOURCE: Hospital  request.   On this visit, Arianna said that she was too tired to connect just now.   PLAN: Will see Arianna on Friday on TCU.    Rev. ShunO Reagan  Staff   Spiritual Health Services  Pager: [phone]  Office: [phone]  * [facility] remains available 24/7 for emergent requests/referrals, either by having the switchboard page the on-call  or by entering an ASAP/STAT consult in Epic (this will also page the on-call ).*

## 2020-01-28 NOTE — PHARMACY-MEDICATION REGIMEN REVIEW
Pharmacy Medication Regimen Review  Arianna Siddiqi is a 59 year old female who is currently in the Transitional Care Unit.    Assessment: All medications have an appropriate indications, durations and no unnecessary use was found.  INR goal 2-3    Plan:   Continue current treatment.  Attending provider will be sent this note for review.  If there are any emergent issues noted above, pharmacist will contact provider directly by phone.      Pharmacy will periodically review the resident's medication regimen for any PRN medications not administered in > 72 hours and discontinue them. The pharmacist will discuss gradual dose reductions of psychopharmacologic medications with interdisciplinary team on a regular basis.    Please contact pharmacy if the above does not answer specific medication questions/concerns.    Background:  A pharmacist has reviewed all medications and pertinent medical history today.  Medications were reviewed for appropriate use and any irregularities found are listed with recommendations.      Current Facility-Administered Medications:      [START ON 1/30/2020] - Skin Test Reading -, , Does not apply, Q21 Days, Miguel Ángel Blair MD     acetaminophen (TYLENOL) Suppository 650 mg, 650 mg, Rectal, Q4H PRN, Miguel Ángel Blair MD     acetaminophen (TYLENOL) tablet 500-1,000 mg, 500-1,000 mg, Oral, Q6H PRN, Miguel Ángel Blair MD, 1,000 mg at 01/28/20 0751     acetaminophen (TYLENOL) tablet 650 mg, 650 mg, Oral, Q4H PRN, Miguel Ángel Blair MD     allopurinol (ZYLOPRIM) tablet 200 mg, 200 mg, Oral, Daily, Miguel Ángel Blair MD, 200 mg at 01/28/20 0750     alum & mag hydroxide-simethicone (MYLANTA ES/MAALOX  ES) suspension 15 mL, 15 mL, Oral, Q4H PRN, Miguel Ángel Blair MD, 15 mL at 01/28/20 0151     atorvastatin (LIPITOR) tablet 20 mg, 20 mg, Oral, QPM, Miguel Ángel Blair MD, 20 mg at 01/27/20 2044     bumetanide (BUMEX) tablet 4  mg, 4 mg, Oral, Daily, Miguel Ángel Blair MD, 4 mg at 01/28/20 0749     cyclobenzaprine (FLEXERIL) tablet 5 mg, 5 mg, Oral, At Bedtime PRN, Miguel Ángel Blair MD, 5 mg at 01/27/20 2044     ferrous gluconate (FERGON) tablet 324 mg, 324 mg, Oral, TID, Miguel Ángel Blair MD, 324 mg at 01/28/20 0750     gabapentin (NEURONTIN) capsule 300 mg, 300 mg, Oral, TID, Miguel Ángel Blair MD, 300 mg at 01/28/20 0750     magnesium sulfate 4 g in 100 mL sterile water (premade), 4 g, Intravenous, Q4H PRN, Miguel Ángel Blair MD     metoprolol tartrate (LOPRESSOR) tablet 50 mg, 50 mg, Oral, BID, Miguel Ángel Blair MD, 50 mg at 01/28/20 0751     miconazole (MICATIN/MICRO GUARD) 2 % powder, , Topical, BID, Miguel Ángel Blair MD     multivitamin, therapeutic (THERA-VIT) tablet 1 tablet, 1 tablet, Oral, Daily, Miguel Ángel Blair MD, 1 tablet at 01/28/20 0750     Nurse may request from Pharmacy a change of form of medication (e.g. Liquid to tablet)., , Does not apply, Continuous PRN, Miguel Ángel Blair MD     ondansetron (ZOFRAN-ODT) ODT tab 4 mg, 4 mg, Oral, Q6H PRN, 4 mg at 01/28/20 0750 **OR** ondansetron (ZOFRAN) injection 4 mg, 4 mg, Intravenous, Q6H PRN, Miguel Ángel Blair MD     potassium chloride (KLOR-CON) Packet 20-40 mEq, 20-40 mEq, Oral or Feeding Tube, Q2H PRN, Miguel Ángel Blair MD     potassium chloride 10 mEq in 100 mL intermittent infusion with 10 mg lidocaine, 10 mEq, Intravenous, Q1H PRN, Miguel Ángel Blair MD     potassium chloride 10 mEq in 100 mL sterile water intermittent infusion (premix), 10 mEq, Intravenous, Q1H PRN, Miguel Ángel Blair MD     potassium chloride 20 mEq in 50 mL intermittent infusion, 20 mEq, Intravenous, Q1H PRN, Miguel Ángel Blair MD     potassium chloride ER (K-DUR/KLOR-CON M) CR tablet 20-40 mEq, 20-40 mEq, Oral, Q2H PRN, Rukhsana Jackman,  MD Miguel Ángel     sennosides (SENOKOT) tablet 8.6 mg, 8.6 mg, Oral, BID PRN, Miguel Ángel Blair MD     sitagliptin (JANUVIA) tablet 50 mg, 50 mg, Oral, Daily, Miguel Ángel Blair MD, 50 mg at 01/28/20 0750     tuberculin injection 5 Units, 5 Units, Intradermal, Q21 Days, Miguel Ángel Blair MD, 5 Units at 01/28/20 1145     warfarin ANTICOAGULANT (COUMADIN) tablet 2.5 mg, 2.5 mg, Oral, ONCE at 18:00, Miguel Ángel Blair MD     Warfarin Therapy Reminder (Check START DATE - warfarin may be starting in the FUTURE), 1 each, Does not apply, Continuous PRN, Miguel Ángel Blair MD  No current outpatient prescriptions on file.   PMH: Right sided CV dysfunction complicated by volume overload; Heart failure exacerbation; JASON, resolved; Post-ATN diuresis, Acute hypoxic hypercarbic respiratory failure; Respiratory acidosis; Obesity, hypoventilation with chronic CO2 retention on blood gas: CARLOS; Metabolic encephalopathy, Paroxysmal atrial fibrillation; nonsustained SVT, resolved; Sepsis, resolved; T2DM; and GOUT

## 2020-01-28 NOTE — PROGRESS NOTES
01/28/20 0651   Quick Adds   Quick Adds Certification   Type of Visit Initial Occupational Therapy Evaluation   Living Environment   Lives With alone   Living Arrangements apartment  (elevator)   Home Accessibility no concerns   Living Environment Comment patient lives alone in an apartment with an elevator, has a tub/shower combo  patient does not drive at her baseline    Self-Care   Usual Activity Tolerance fair   Current Activity Tolerance poor   Regular Exercise No   Equipment Currently Used at Home shower chair   Activity/Exercise/Self-Care Comment patient reports she was indpendent with all  ADL's prior to admission - patient was using a shower chair. Patient sleeps in a recliner , not a bed   Functional Level   Ambulation 0-->independent   Transferring 0-->independent   Toileting 0-->independent   Bathing 1-->assistive equipment   Dressing 0-->independent   Eating 0-->independent   Communication 0-->understands/communicates without difficulty   Swallowing 0-->swallows foods/liquids without difficulty   Cognition 0 - no cognition issues reported   Fall history within last six months no   Which of the above functional risks had a recent onset or change? ambulation;transferring;toileting;bathing;dressing   Prior Functional Level Comment patietn indpendent with mobility at her baseline - limited recently due to SOB uses transportation - does not drive, has [employer] for g[employer]ies delivered, does own finanaces and medicaiton setup  cooking and cleaning, laundry is in the [place] her apt.       Present no   Language English   General Information   Onset of Illness/Injury or Date of Surgery - Date 01/15/20   Referring Physician Dr. Gasper Mccurdy, Dr. Rosie Jimenez   Additional Occupational Profile Info/Pertinent History of Current Problem patient is a 59 y.o. female with medical history significant  for HFrEF, T2DM, CKD, HTN, gout, OHS/CARLOS, morbid obesity who had not been seeing  doctors for years (though could demonstrate some degree of medication compliance, presented t=1/15 wth dyspnea   Precautions/Limitations fall precautions;oxygen therapy device and L/min  (fluid restriciton, bipap on during day when in bed resting 5)   Weight-Bearing Status - LUE full weight-bearing   Weight-Bearing Status - RUE full weight-bearing   Weight-Bearing Status - LLE full weight-bearing   Weight-Bearing Status - RLE full weight-bearing   Heart Disease Risk Factors Diabetes;Lack of physical activity;Overweight;Medical history   General Observations sitting up EOB p/pleasant   General Info Comments patient does not use O2 at home at baseline   Cognitive Status Examination   Orientation orientation to person, place and time   Level of Consciousness alert   Follows Commands (Cognition) WNL   Memory intact   Attention No deficits were identified   Organization/Problem Solving No deficits were identified   Executive Function No deficits were identified   Cognitive Comment apears good historian - will further assess   Visual Perception   Visual Perception Wears glasses   Visual Perception Comments wears glasses for reading - will further assess   Sensory Examination   Sensory Comments uses gabapentin for pain LE's - reports no tingling or numbness hands or feet at present   Pain Assessment   Patient Currently in Pain Yes, see Vital Sign flowsheet  (abdominal)   Integumentary/Edema   Integumentary/Edema Comments B Le edema noted feet and legs   Posture   Posture forward head position;protracted shoulders   Range of Motion (ROM)   ROM Comment B UE's WNL's   Strength   Strength Comments B UE's 4-/5 thropughout   Hand Strength   Hand Strength Comments good grasp for holding all self care items   Muscle Tone Assessment   Muscle Tone Quick Adds No deficits were identified   Coordination   Coordination Comments slow iwth finger/thumb opposition noted B Ue's WFL's   Mobility   Bed Mobility Bed mobility skill: Sit to  supine;Bed mobility skill: Supine to sit   Bed Mobility Skill: Sit to Supine   Level of [place]: Sit/Supine minimum assist (75% patients effort)   Physical Assist/Nonphysical Assist: Sit/Supine set-up required;supervision;verbal cues;1 person assist   Assistive Device: Sit/Supine bedrail   Bed Mobility Skill: Supine to Sit   Level of [place]: Supine/Sit contact guard   Physical Assist/Nonphysical Assist: Supine/Sit set-up required;supervision;verbal cues;1 person assist   Assistive Device: Supine/Sit bedrail   Transfer Skill: Bed to Chair/Chair to Bed   Level of [place]: Bed to Chair minimum assist (75% patients effort)   Physical Assist/Nonphysical Assist: Bed to Chair set-up required;supervision;verbal cues;1 person assist   Weight-Bearing Restrictions full weight-bearing   Assistive Device - Transfer Skill Bed to Chair Chair to Bed Rehab Eval rolling walker   Transfer Skill: Sit to Stand   Level of [place]: Sit/Stand minimum assist (75% patients effort)   Physical Assist/Nonphysical Assist: Sit/Stand set-up required;supervision;verbal cues;1 person assist   Transfer Skill: Sit to Stand full weight-bearing   Assistive Device for Transfer: Sit/Stand rolling walker   Transfer Skill: Toilet Transfer   Level of [place]: Toilet moderate assist (50% patients effort)   Physical Assist/Nonphysical Assist: Toilet set-up required;supervision;verbal cues;1 person assist   Weight-Bearing Restrictions: Toilet full weight-bearing   Assistive Device rolling walker   Tub/Shower Transfer   Tub/Shower Transfer Comments NT- has a tub/shweor combo with a chair no bars willfurther assess   Balance   Balance Comments appears stable/steadyin staic standing - will further assess dynamic movements   Bathing   Level of [place] contact guard   Physical Assist/Nonphysical Assist set-up required;supervision;verbal cues;1 person assist   Upper Body Dressing   Level of [place]: Dress Upper Body contact guard    Physical Assist/Nonphysical Assist: Dress Upper Body set-up required;supervision;verbal cues;1 person assist   Lower Body Dressing   Level of [place]: Dress Lower Body moderate assist (50% patients effort)   Physical Assist/Nonphysical Assist: Dress Lower Body set-up required;supervision;verbal cues;1 person assist   Toileting   Level of [place]: Toilet moderate assist (50% patients effort)   Physical Assist/Nonphysical Assist: Toilet set-up required;supervision;verbal cues;1 person assist   Grooming   Level of [place]: Grooming contact guard   Physical Assist/Nonphysical Assist: Grooming set-up required;supervision;verbal cues;1 person assist   Eating/Self Feeding   Level of [place]: Eating independent  (fluid restriction)   Instrumental Activities of Daily Living (IADL)   Previous Responsibilities meal prep;housekeeping;laundry;shopping;medication management;finances;driving   Activities of Daily Living Analysis   Impairments Contributing to Impaired Activities of Daily Living balance impaired;coordination impaired;fear and anxiety;flexibility decreased;pain;strength decreased   General Therapy Interventions   Planned Therapy Interventions ADL retraining;IADL retraining;balance training;bed mobility training;cognition;groups;strengthening;stretching;transfer training;visual perception;home program guidelines;progressive activity/exercise;risk factor education   Clinical Impression   Criteria for Skilled Therapeutic Interventions Met yes, treatment indicated   OT Diagnosis decreased IND with ADL's, functional mobilty and transfers   Influenced by the following impairments deconditioning, medical status,    Assessment of Occupational Performance 3-5 Performance Deficits   Identified Performance Deficits dressing, bathing, toileting, functional mobilty /gait   Clinical Decision Making (Complexity) Moderate complexity   Therapy Frequency 6x/week   Predicted Duration of Therapy Intervention  (days/wks) 10 days   Anticipated Equipment Needs at Discharge dressing equipment   Anticipated Discharge Disposition Home;Home with Assist;Home with Home Therapy   Risks and Benefits of Treatment have been explained. Yes   Patient, Family & other staff in agreement with plan of care Yes   Clinical Impression Comments patient would benefit from skilled OT to address basic ADL/self cares and [facility]   Therapy Certification   Start of Care Date 01/15/20   Certification date from 01/28/20   Certification date to 02/28/20   Medical Diagnosis see above for PMHX   Certification I certify the need for these services furnished under this plan of treatment and while under my care.  (Physician co-signature of this document indicates review and certification of the therapy plan).   Total Evaluation Time   Total Evaluation Time (Minutes) 30

## 2020-01-29 ENCOUNTER — APPOINTMENT (OUTPATIENT)
Dept: PHYSICAL THERAPY | Facility: SKILLED NURSING FACILITY | Age: 60
End: 2020-01-29
Payer: MEDICARE

## 2020-01-29 ENCOUNTER — APPOINTMENT (OUTPATIENT)
Dept: OCCUPATIONAL THERAPY | Facility: SKILLED NURSING FACILITY | Age: 60
End: 2020-01-29
Payer: MEDICARE

## 2020-01-29 LAB
GLUCOSE BLDC GLUCOMTR-MCNC: 115 MG/DL (ref 70–99)
GLUCOSE BLDC GLUCOMTR-MCNC: 143 MG/DL (ref 70–99)
GLUCOSE BLDC GLUCOMTR-MCNC: 78 MG/DL (ref 70–99)
GLUCOSE BLDC GLUCOMTR-MCNC: 97 MG/DL (ref 70–99)
INR PPP: 2.6 (ref 0.86–1.14)

## 2020-01-29 PROCEDURE — 25000132 ZZH RX MED GY IP 250 OP 250 PS 637: Mod: GY | Performed by: INTERNAL MEDICINE

## 2020-01-29 PROCEDURE — 97110 THERAPEUTIC EXERCISES: CPT | Mod: GP | Performed by: PHYSICAL THERAPIST

## 2020-01-29 PROCEDURE — 97530 THERAPEUTIC ACTIVITIES: CPT | Mod: GP | Performed by: PHYSICAL THERAPIST

## 2020-01-29 PROCEDURE — 12000022 ZZH R&B SNF

## 2020-01-29 PROCEDURE — 40000275 ZZH STATISTIC RCP TIME EA 10 MIN

## 2020-01-29 PROCEDURE — 97535 SELF CARE MNGMENT TRAINING: CPT | Mod: GO | Performed by: OCCUPATIONAL THERAPIST

## 2020-01-29 PROCEDURE — 25000128 H RX IP 250 OP 636: Performed by: INTERNAL MEDICINE

## 2020-01-29 PROCEDURE — 99207 ZZC CDG-CHARGE REQUIRED MANUAL ENTRY: CPT | Performed by: INTERNAL MEDICINE

## 2020-01-29 PROCEDURE — 00000146 ZZHCL STATISTIC GLUCOSE BY METER IP

## 2020-01-29 PROCEDURE — 85610 PROTHROMBIN TIME: CPT | Performed by: INTERNAL MEDICINE

## 2020-01-29 PROCEDURE — 36415 COLL VENOUS BLD VENIPUNCTURE: CPT | Performed by: INTERNAL MEDICINE

## 2020-01-29 PROCEDURE — 99307 SBSQ NF CARE SF MDM 10: CPT | Performed by: INTERNAL MEDICINE

## 2020-01-29 RX ORDER — WARFARIN SODIUM 2.5 MG/1
2.5 TABLET ORAL
Status: COMPLETED | OUTPATIENT
Start: 2020-01-29 | End: 2020-01-29

## 2020-01-29 RX ADMIN — FERROUS GLUCONATE 324 MG: 324 TABLET ORAL at 15:23

## 2020-01-29 RX ADMIN — SITAGLIPTIN 50 MG: 50 TABLET, FILM COATED ORAL at 08:03

## 2020-01-29 RX ADMIN — ONDANSETRON 4 MG: 4 TABLET, ORALLY DISINTEGRATING ORAL at 12:45

## 2020-01-29 RX ADMIN — WARFARIN SODIUM 2.5 MG: 2.5 TABLET ORAL at 18:07

## 2020-01-29 RX ADMIN — THERA TABS 1 TABLET: TAB at 08:02

## 2020-01-29 RX ADMIN — MICONAZOLE NITRATE: 20 POWDER TOPICAL at 08:06

## 2020-01-29 RX ADMIN — FERROUS GLUCONATE 324 MG: 324 TABLET ORAL at 08:06

## 2020-01-29 RX ADMIN — GABAPENTIN 300 MG: 300 CAPSULE ORAL at 15:22

## 2020-01-29 RX ADMIN — ALUMINUM HYDROXIDE, MAGNESIUM HYDROXIDE, AND DIMETHICONE 15 ML: 400; 400; 40 SUSPENSION ORAL at 12:45

## 2020-01-29 RX ADMIN — MICONAZOLE NITRATE: 20 POWDER TOPICAL at 21:43

## 2020-01-29 RX ADMIN — METOPROLOL TARTRATE 50 MG: 25 TABLET ORAL at 08:03

## 2020-01-29 RX ADMIN — FERROUS GLUCONATE 324 MG: 324 TABLET ORAL at 21:42

## 2020-01-29 RX ADMIN — ATORVASTATIN CALCIUM 20 MG: 20 TABLET, FILM COATED ORAL at 21:42

## 2020-01-29 RX ADMIN — BUMETANIDE 4 MG: 1 TABLET ORAL at 08:01

## 2020-01-29 RX ADMIN — ALLOPURINOL 200 MG: 100 TABLET ORAL at 08:03

## 2020-01-29 RX ADMIN — CYCLOBENZAPRINE HYDROCHLORIDE 5 MG: 5 TABLET, FILM COATED ORAL at 21:39

## 2020-01-29 RX ADMIN — ACETAMINOPHEN 650 MG: 325 TABLET, FILM COATED ORAL at 16:13

## 2020-01-29 RX ADMIN — ACETAMINOPHEN 650 MG: 325 TABLET, FILM COATED ORAL at 12:45

## 2020-01-29 RX ADMIN — GABAPENTIN 300 MG: 300 CAPSULE ORAL at 21:43

## 2020-01-29 RX ADMIN — GABAPENTIN 300 MG: 300 CAPSULE ORAL at 08:02

## 2020-01-29 RX ADMIN — METOPROLOL TARTRATE 50 MG: 25 TABLET ORAL at 21:43

## 2020-01-29 NOTE — PLAN OF CARE
Pt wears Bi PAP at the beginning of the shift while taking a nap. O2 sats at 92%.   Denies chest pain, numbness, N/V, dizziness, headache. Alert and oriented x4.   A-2 for lift transfer. Pt used bed pain for B&B, continent of both. Able to used call light appropriately. Able to turn side to side independently.   Pt switch off to nasal oxymizer prior supper, O2 sats at 90%. With PERDOMO.   Abdominal folds, groin, perineal area were washed with soap and water. Miconazole powder applied.   Sponge bath done with NA  Lotion to all extremities applied.   BG 90 and 126.

## 2020-01-29 NOTE — PROGRESS NOTES
Met with patient twice today for intermittent drop in saturations and dizziness.  patient drops saturations with activity as well as when Lying down   No altered mentation  No Chest pain  O/E, equal air entry to both lungs, decreased at the bases.  Patient is on fluid restriction, low salt diet and Bumex as well   BP (!) 143/75 (BP Location: Right arm)   Pulse 63   Temp 95.4  F (35.2  C) (Axillary)   Resp 20   Wt (!) 172.1 kg (379 lb 8 oz)   SpO2 97%   BMI 67.23 kg/m      Plan:  Wear BiPAP every time patient is lying down  OK for Oxygen saturation > 87%  Titrate Oxygen from 5 lts to 3 lts to maintain a window of 87%-92%  Ensure BiPAP mask fits snugly on the face   Ease fluid restriction to 2 lts       Dr PURNIMA Jackman MD, [facility]  Hospitalist ( Internal medicine)  Pager: [phone]

## 2020-01-29 NOTE — PLAN OF CARE
PT;pt improved with sit>stand only needing 1 person mod A from EOB. Able to pivot to recliner chair with min of 1--had another SBA just in case. Pt states will do isometric exercises in bed

## 2020-01-29 NOTE — PLAN OF CARE
OT- Patient with significant changes in O2 sats on 1.5 L initially when sitting EOB - decreased to 64% continued to fluctuate to mid 80's and back down - increased to 5 L and notified nurse and MD - MD came to see during session with recommendation patient to be on bi-pap at rest during the day, can be on O2 with mobility and therapy. /75 once back in bed patient O2 sats returned to 87% (again fluctuating) prior to bi-pap placement

## 2020-01-29 NOTE — PLAN OF CARE
Patient  O2 sats went down during OT and when she was up in the chair,MD saw patient both times.Patient had Bipap on most of the shift sats up 99 %.During PT sats went down to 64 and when she was in the chair sats 86-87 %,O2  3 LPM. Had nausea but no emesis,Zofran PRN given with relief.Tylenol PRN for pain in both legs.Eating and drinking okay.Patient is continent of both bowels as well as bladder.Lift for transfers.

## 2020-01-29 NOTE — PLAN OF CARE
Patient alert and oriented x 4. CPAP on all shift,  mask was leaking but this RN able to tighten the velcro strap. Patient Tolerated CPAP, no respiratory distress noted. No complaints of pain. Will continue with current plan of care.

## 2020-01-30 ENCOUNTER — APPOINTMENT (OUTPATIENT)
Dept: PHYSICAL THERAPY | Facility: SKILLED NURSING FACILITY | Age: 60
End: 2020-01-30
Payer: MEDICARE

## 2020-01-30 ENCOUNTER — APPOINTMENT (OUTPATIENT)
Dept: OCCUPATIONAL THERAPY | Facility: SKILLED NURSING FACILITY | Age: 60
End: 2020-01-30
Payer: MEDICARE

## 2020-01-30 LAB
ALBUMIN SERPL-MCNC: 2.9 G/DL (ref 3.4–5)
ANION GAP SERPL CALCULATED.3IONS-SCNC: <1 MMOL/L (ref 3–14)
BUN SERPL-MCNC: 44 MG/DL (ref 7–30)
CALCIUM SERPL-MCNC: 9 MG/DL (ref 8.5–10.1)
CHLORIDE SERPL-SCNC: 96 MMOL/L (ref 94–109)
CO2 SERPL-SCNC: 41 MMOL/L (ref 20–32)
CREAT SERPL-MCNC: 1.38 MG/DL (ref 0.52–1.04)
ERYTHROCYTE [DISTWIDTH] IN BLOOD BY AUTOMATED COUNT: 20.1 % (ref 10–15)
GFR SERPL CREATININE-BSD FRML MDRD: 41 ML/MIN/{1.73_M2}
GLUCOSE BLDC GLUCOMTR-MCNC: 109 MG/DL (ref 70–99)
GLUCOSE BLDC GLUCOMTR-MCNC: 150 MG/DL (ref 70–99)
GLUCOSE BLDC GLUCOMTR-MCNC: 76 MG/DL (ref 70–99)
GLUCOSE BLDC GLUCOMTR-MCNC: 83 MG/DL (ref 70–99)
GLUCOSE BLDC GLUCOMTR-MCNC: 90 MG/DL (ref 70–99)
GLUCOSE SERPL-MCNC: 93 MG/DL (ref 70–99)
HCT VFR BLD AUTO: 46.4 % (ref 35–47)
HGB BLD-MCNC: 12 G/DL (ref 11.7–15.7)
INR PPP: 2.28 (ref 0.86–1.14)
MAGNESIUM SERPL-MCNC: 2.5 MG/DL (ref 1.6–2.3)
MCH RBC QN AUTO: 24.7 PG (ref 26.5–33)
MCHC RBC AUTO-ENTMCNC: 25.9 G/DL (ref 31.5–36.5)
MCV RBC AUTO: 96 FL (ref 78–100)
NT-PROBNP SERPL-MCNC: 7428 PG/ML (ref 0–900)
PHOSPHATE SERPL-MCNC: 4.8 MG/DL (ref 2.5–4.5)
PLATELET # BLD AUTO: 172 10E9/L (ref 150–450)
POTASSIUM SERPL-SCNC: 4.9 MMOL/L (ref 3.4–5.3)
RBC # BLD AUTO: 4.86 10E12/L (ref 3.8–5.2)
SODIUM SERPL-SCNC: 137 MMOL/L (ref 133–144)
WBC # BLD AUTO: 5 10E9/L (ref 4–11)

## 2020-01-30 PROCEDURE — 25000132 ZZH RX MED GY IP 250 OP 250 PS 637: Mod: GY | Performed by: INTERNAL MEDICINE

## 2020-01-30 PROCEDURE — 97535 SELF CARE MNGMENT TRAINING: CPT | Mod: GO | Performed by: OCCUPATIONAL THERAPIST

## 2020-01-30 PROCEDURE — 00000146 ZZHCL STATISTIC GLUCOSE BY METER IP

## 2020-01-30 PROCEDURE — 83880 ASSAY OF NATRIURETIC PEPTIDE: CPT | Performed by: INTERNAL MEDICINE

## 2020-01-30 PROCEDURE — 12000022 ZZH R&B SNF

## 2020-01-30 PROCEDURE — 99307 SBSQ NF CARE SF MDM 10: CPT | Performed by: INTERNAL MEDICINE

## 2020-01-30 PROCEDURE — 36415 COLL VENOUS BLD VENIPUNCTURE: CPT | Performed by: INTERNAL MEDICINE

## 2020-01-30 PROCEDURE — 97530 THERAPEUTIC ACTIVITIES: CPT | Mod: GP

## 2020-01-30 PROCEDURE — 97110 THERAPEUTIC EXERCISES: CPT | Mod: GP

## 2020-01-30 PROCEDURE — 83735 ASSAY OF MAGNESIUM: CPT | Performed by: INTERNAL MEDICINE

## 2020-01-30 PROCEDURE — 99207 ZZC CDG-CHARGE REQUIRED MANUAL ENTRY: CPT | Performed by: INTERNAL MEDICINE

## 2020-01-30 PROCEDURE — 85610 PROTHROMBIN TIME: CPT | Performed by: INTERNAL MEDICINE

## 2020-01-30 PROCEDURE — 85027 COMPLETE CBC AUTOMATED: CPT | Performed by: INTERNAL MEDICINE

## 2020-01-30 PROCEDURE — 80069 RENAL FUNCTION PANEL: CPT | Performed by: INTERNAL MEDICINE

## 2020-01-30 RX ORDER — GABAPENTIN 300 MG/1
300 CAPSULE ORAL
Status: DISCONTINUED | OUTPATIENT
Start: 2020-01-30 | End: 2020-02-02

## 2020-01-30 RX ORDER — BUMETANIDE 2 MG/1
4 TABLET ORAL
Status: DISCONTINUED | OUTPATIENT
Start: 2020-01-30 | End: 2020-02-01

## 2020-01-30 RX ORDER — WARFARIN SODIUM 2 MG/1
4 TABLET ORAL
Status: COMPLETED | OUTPATIENT
Start: 2020-01-30 | End: 2020-01-30

## 2020-01-30 RX ADMIN — MICONAZOLE NITRATE: 20 POWDER TOPICAL at 21:32

## 2020-01-30 RX ADMIN — SITAGLIPTIN 50 MG: 50 TABLET, FILM COATED ORAL at 08:27

## 2020-01-30 RX ADMIN — ALLOPURINOL 200 MG: 100 TABLET ORAL at 08:26

## 2020-01-30 RX ADMIN — ACETAMINOPHEN 650 MG: 325 TABLET, FILM COATED ORAL at 08:23

## 2020-01-30 RX ADMIN — BUMETANIDE 4 MG: 1 TABLET ORAL at 08:28

## 2020-01-30 RX ADMIN — MICONAZOLE NITRATE: 20 POWDER TOPICAL at 12:07

## 2020-01-30 RX ADMIN — FERROUS GLUCONATE 324 MG: 324 TABLET ORAL at 14:41

## 2020-01-30 RX ADMIN — FERROUS GLUCONATE 324 MG: 324 TABLET ORAL at 08:26

## 2020-01-30 RX ADMIN — ATORVASTATIN CALCIUM 20 MG: 20 TABLET, FILM COATED ORAL at 21:25

## 2020-01-30 RX ADMIN — THERA TABS 1 TABLET: TAB at 08:27

## 2020-01-30 RX ADMIN — CYCLOBENZAPRINE HYDROCHLORIDE 5 MG: 5 TABLET, FILM COATED ORAL at 21:25

## 2020-01-30 RX ADMIN — ACETAMINOPHEN 650 MG: 325 TABLET, FILM COATED ORAL at 17:26

## 2020-01-30 RX ADMIN — FERROUS GLUCONATE 324 MG: 324 TABLET ORAL at 21:25

## 2020-01-30 RX ADMIN — METOPROLOL TARTRATE 50 MG: 25 TABLET ORAL at 08:27

## 2020-01-30 RX ADMIN — WARFARIN SODIUM 4 MG: 2 TABLET ORAL at 17:25

## 2020-01-30 NOTE — PLAN OF CARE
Patient alert and oriented x 4. Bipap on all shift, tolerating well, no respiratory distress noted. No complaints of pain and discomfort all shift. Oxygen sats- 94 % on Bipap. Patient sleeps in between cares.

## 2020-01-30 NOTE — PROGRESS NOTES
Chart reviewed and briefly met with patient  She has episodic twitches and mild dizziness this morning  Otto fever or chills  Otherwise stable overnight with no major fluctuations in Oxygen saturations  /67 (BP Location: Right arm)   Pulse 63   Temp 95.2  F (35.1  C) (Oral)   Resp 18   Wt (!) 172.1 kg (379 lb 8 oz)   SpO2 94%   BMI 67.23 kg/m     labs reviewed.   JASON with creatinine of 1.38  BNP at 7428     Likely cause of Dizziness is due to being dry (  Although Bumex was orderd to be held, it was given this morning. Hold for tomorrow )  Twitches likely secondary to gabapentin, in the setting of JASON  ( Hold Gabapentin today). Temporarily allow 2500 mls of oral fluids today  Check for Phosphorus levels. Calcium  And magnbesium appears to be within normal limits    No much improvement in BNP. There is a likely component of pulmonary HTN as well.    Dr PURNIMA Jackman MD, [facility]  Hospitalist ( Internal medicine)  Pager: [phone]

## 2020-01-30 NOTE — PLAN OF CARE
OT: Completed upright ADLs in bedside recliner. Pt initially at 85% O2 sat on 3L NC with activity, however declined to 65-low 70s with activity. Pt reporting mild dizziness, otherwise asymptomatic. No improvement with breathing techniques and increasing O2 L. Donned BiPAP and pt sats improving to 95-97%.

## 2020-01-30 NOTE — PROGRESS NOTES
01/30/20 1300   General Information   Patient Profile Review See Profile for full history and prior level of function   Daily Contact with Relatives or Friends Phone call   Pets   (does not like animals)   Observations Pt very pleasant and conversant. Enjoys 1:1 conversation versus group activities.   Community Involvement   Community Involvement Disabled   Spiritual Practice Justen   [facility] ? Yes   Outings Movies   Hobbies/Interests   Cards Other (see comments)  (susan)   Music   Music Preferences R&B   Listens to Recorded Music Yes   Brought Own Equipment No   Media   Newspapers Daily   TV / Movies TV;Movie list   Impression   Open to Socializing with Others Independent   Barriers to Leisure No barriers / independent   Patient, family and / or staff in agreement with Plan of Care Yes   Treatment Plan   Interested in Unit [place]? No   Type of Intervention 1:1 intervention   Equipment and Supplies While on Unit Newspaper;Universal remote;DVD player;Movies  (cards)   Assessment Assessment completed. Pt interested in hand massage while on unit. Provided patient with daily newspaper, deck of cards, universal remote, and movie list.

## 2020-01-30 NOTE — PLAN OF CARE
RN: Pt alert and oriented, VSS. Pt reports feeling tired tired, rested in bed for entire of the shift. Pt has BiPAP on at all the time except eating or taking medication. Pt remains on 2000ML FR.   Pt is in her period, perineal skin care done per routine. Slight redness in all the skin folds, antifungal powder applied. Pt C/O lower back pain, PRN Tylenol and Flexeril given with good effects. Continue with POC.

## 2020-01-30 NOTE — PLAN OF CARE
Patient c/o pain 5/10 Tylenol given with relief. Patient up in chair after breakfast. Used O2 via NC in chair. O2 Sats <90%. Patient maintained fluid restriction. Used bed pain for void. Ayanna care, general hygiene, and lotioned back. Applied bi-pap in bed. Patient wants to take a nap this afternoon. Patient settled in bed with call light and bi-pap. Patient has been calm and cooperative all shift.

## 2020-01-30 NOTE — PLAN OF CARE
Patient was having muscle tremors in her hands. MD held Gabapentin due to suspected toxicity. Bumex with held, but EMAR allowed administration. AM 0800 dose of Bumex was given, but is now on hold. MD and pharmacist notified.

## 2020-01-31 ENCOUNTER — APPOINTMENT (OUTPATIENT)
Dept: PHYSICAL THERAPY | Facility: SKILLED NURSING FACILITY | Age: 60
End: 2020-01-31
Payer: MEDICARE

## 2020-01-31 ENCOUNTER — APPOINTMENT (OUTPATIENT)
Dept: OCCUPATIONAL THERAPY | Facility: SKILLED NURSING FACILITY | Age: 60
End: 2020-01-31
Payer: MEDICARE

## 2020-01-31 LAB
GLUCOSE BLDC GLUCOMTR-MCNC: 106 MG/DL (ref 70–99)
GLUCOSE BLDC GLUCOMTR-MCNC: 112 MG/DL (ref 70–99)
GLUCOSE BLDC GLUCOMTR-MCNC: 143 MG/DL (ref 70–99)
GLUCOSE BLDC GLUCOMTR-MCNC: 75 MG/DL (ref 70–99)
INR PPP: 2.13 (ref 0.86–1.14)

## 2020-01-31 PROCEDURE — 12000022 ZZH R&B SNF

## 2020-01-31 PROCEDURE — 40000275 ZZH STATISTIC RCP TIME EA 10 MIN

## 2020-01-31 PROCEDURE — 97110 THERAPEUTIC EXERCISES: CPT | Mod: GP

## 2020-01-31 PROCEDURE — 85610 PROTHROMBIN TIME: CPT | Performed by: INTERNAL MEDICINE

## 2020-01-31 PROCEDURE — 97530 THERAPEUTIC ACTIVITIES: CPT | Mod: GP

## 2020-01-31 PROCEDURE — 00000146 ZZHCL STATISTIC GLUCOSE BY METER IP

## 2020-01-31 PROCEDURE — 25000132 ZZH RX MED GY IP 250 OP 250 PS 637: Mod: GY | Performed by: INTERNAL MEDICINE

## 2020-01-31 PROCEDURE — 97110 THERAPEUTIC EXERCISES: CPT | Mod: GO

## 2020-01-31 PROCEDURE — 36415 COLL VENOUS BLD VENIPUNCTURE: CPT | Performed by: INTERNAL MEDICINE

## 2020-01-31 RX ORDER — WARFARIN SODIUM 2 MG/1
4 TABLET ORAL
Status: COMPLETED | OUTPATIENT
Start: 2020-01-31 | End: 2020-01-31

## 2020-01-31 RX ADMIN — METOPROLOL TARTRATE 50 MG: 25 TABLET ORAL at 20:59

## 2020-01-31 RX ADMIN — ALLOPURINOL 200 MG: 100 TABLET ORAL at 09:18

## 2020-01-31 RX ADMIN — MICONAZOLE NITRATE: 20 POWDER TOPICAL at 09:23

## 2020-01-31 RX ADMIN — ATORVASTATIN CALCIUM 20 MG: 20 TABLET, FILM COATED ORAL at 20:59

## 2020-01-31 RX ADMIN — THERA TABS 1 TABLET: TAB at 09:19

## 2020-01-31 RX ADMIN — CYCLOBENZAPRINE HYDROCHLORIDE 5 MG: 5 TABLET, FILM COATED ORAL at 22:54

## 2020-01-31 RX ADMIN — FERROUS GLUCONATE 324 MG: 324 TABLET ORAL at 13:55

## 2020-01-31 RX ADMIN — WARFARIN SODIUM 4 MG: 2 TABLET ORAL at 18:52

## 2020-01-31 RX ADMIN — ACETAMINOPHEN 650 MG: 325 TABLET, FILM COATED ORAL at 09:17

## 2020-01-31 RX ADMIN — FERROUS GLUCONATE 324 MG: 324 TABLET ORAL at 20:59

## 2020-01-31 RX ADMIN — SITAGLIPTIN 50 MG: 50 TABLET, FILM COATED ORAL at 09:20

## 2020-01-31 RX ADMIN — METOPROLOL TARTRATE 50 MG: 25 TABLET ORAL at 09:20

## 2020-01-31 RX ADMIN — MICONAZOLE NITRATE: 20 POWDER TOPICAL at 20:59

## 2020-01-31 RX ADMIN — FERROUS GLUCONATE 324 MG: 324 TABLET ORAL at 09:19

## 2020-01-31 NOTE — PLAN OF CARE
Patient was up in chair x1 today. Used NC with O2 @ 4 LPM, oxygen saturations were 86%. Applied O2 @ 4 LPM via oxyplus mask, and oxygen saturations increased to 93%. Patient was able to participate in therapies as scheduled. Patient back in bed with Bi-Pap for afternoon nap.

## 2020-01-31 NOTE — UTILIZATION REVIEW
1. Have you had pain or hurting at any time in the last 5 days?     [x]YES  []NO  []UNABLE TO ANSWER    2. How much of the time have you experienced pain or hurting over the last 5 days?    []ALMOST CONSTANTLY [x]FREQUENTLY []OCCASIONALLY []RARELY []UNABLE TO ANSWER    3. Over the past 5 days, has pain made it hard for you to sleep at night?     []YES  [x]NO  []UNABLE TO ANSWER    4. Over the past 5 days, have you limited your day-to-day activities because of pain?     [x]YES  []NO  []UNABLE TO ANSWER    5. Pain intensity (Numeric scale used first-if patient unable to answer, verbal scale to be used.)    Numeric Scale: Please rate your worst pain over the last 5 days on a zero to ten scale, with zero being no pain and ten being the worst pain you can imagine.     Number:          6/10    Verbal Scale: Please rate the intensity of your worst pain over the last 5 days.     []MILD []MODERATE []SEVERE []VERY SEVERE/HORRIBLE []UNABLE TO ANSWER

## 2020-01-31 NOTE — PLAN OF CARE
RN: Pt A/O x4, VSS. Pt rested in bed for entire of the shift, BiPAP applied at all the time. Appetite good. Pt had bed bath this shift. Metoprolol tartrate held per order parameters. Pt continue to have lower back pain, PRN med given with good effects. Continue with POC.

## 2020-01-31 NOTE — PLAN OF CARE
Patient is sleeping well in between cares. No complaints of pain. BiPAP in place and patient is tolerating well. Reported no problems with B/B, assist of 2 with bedpan use. LBM 1/30. Calls appropriately for assistance. Continue POC.

## 2020-01-31 NOTE — PROGRESS NOTES
Charts reviewed and briefly met with patient.  She slept well overnight ( which she normally does not) and had hallucinations last night  No hallucinations today  Has twitches  She was discussed at the MDT meeting   /55 (BP Location: Right arm)   Pulse 98   Temp 96  F (35.6  C) (Oral)   Resp 24   Wt (!) 172.1 kg (379 lb 8 oz)   SpO2 97%   BMI 67.23 kg/m    Plan:  Holding bumex today.   No further hallucinations   Repeat BMP tomorrow

## 2020-02-01 ENCOUNTER — APPOINTMENT (OUTPATIENT)
Dept: PHYSICAL THERAPY | Facility: SKILLED NURSING FACILITY | Age: 60
End: 2020-02-01
Payer: MEDICARE

## 2020-02-01 LAB
ANION GAP SERPL CALCULATED.3IONS-SCNC: <1 MMOL/L (ref 3–14)
BUN SERPL-MCNC: 35 MG/DL (ref 7–30)
CALCIUM SERPL-MCNC: 9.2 MG/DL (ref 8.5–10.1)
CHLORIDE SERPL-SCNC: 98 MMOL/L (ref 94–109)
CO2 SERPL-SCNC: 40 MMOL/L (ref 20–32)
CREAT SERPL-MCNC: 0.84 MG/DL (ref 0.52–1.04)
GFR SERPL CREATININE-BSD FRML MDRD: 76 ML/MIN/{1.73_M2}
GLUCOSE BLDC GLUCOMTR-MCNC: 100 MG/DL (ref 70–99)
GLUCOSE BLDC GLUCOMTR-MCNC: 80 MG/DL (ref 70–99)
GLUCOSE SERPL-MCNC: 86 MG/DL (ref 70–99)
INR PPP: 2.66 (ref 0.86–1.14)
POTASSIUM SERPL-SCNC: 4.4 MMOL/L (ref 3.4–5.3)
SODIUM SERPL-SCNC: 137 MMOL/L (ref 133–144)

## 2020-02-01 PROCEDURE — 36415 COLL VENOUS BLD VENIPUNCTURE: CPT | Performed by: INTERNAL MEDICINE

## 2020-02-01 PROCEDURE — 80048 BASIC METABOLIC PNL TOTAL CA: CPT | Performed by: INTERNAL MEDICINE

## 2020-02-01 PROCEDURE — 97110 THERAPEUTIC EXERCISES: CPT | Mod: GP

## 2020-02-01 PROCEDURE — 40000275 ZZH STATISTIC RCP TIME EA 10 MIN

## 2020-02-01 PROCEDURE — 85610 PROTHROMBIN TIME: CPT | Performed by: INTERNAL MEDICINE

## 2020-02-01 PROCEDURE — 25000132 ZZH RX MED GY IP 250 OP 250 PS 637: Mod: GY | Performed by: INTERNAL MEDICINE

## 2020-02-01 PROCEDURE — 00000146 ZZHCL STATISTIC GLUCOSE BY METER IP

## 2020-02-01 PROCEDURE — 12000022 ZZH R&B SNF

## 2020-02-01 RX ORDER — BUMETANIDE 1 MG/1
3 TABLET ORAL DAILY
Status: DISCONTINUED | OUTPATIENT
Start: 2020-02-01 | End: 2020-02-09

## 2020-02-01 RX ORDER — WARFARIN SODIUM 2 MG/1
2 TABLET ORAL
Status: COMPLETED | OUTPATIENT
Start: 2020-02-01 | End: 2020-02-01

## 2020-02-01 RX ADMIN — CYCLOBENZAPRINE HYDROCHLORIDE 5 MG: 5 TABLET, FILM COATED ORAL at 20:19

## 2020-02-01 RX ADMIN — FERROUS GLUCONATE 324 MG: 324 TABLET ORAL at 09:52

## 2020-02-01 RX ADMIN — MICONAZOLE NITRATE: 20 POWDER TOPICAL at 20:21

## 2020-02-01 RX ADMIN — WARFARIN SODIUM 2 MG: 2 TABLET ORAL at 17:03

## 2020-02-01 RX ADMIN — FERROUS GLUCONATE 324 MG: 324 TABLET ORAL at 20:19

## 2020-02-01 RX ADMIN — ACETAMINOPHEN 1000 MG: 500 TABLET ORAL at 09:50

## 2020-02-01 RX ADMIN — ALLOPURINOL 200 MG: 100 TABLET ORAL at 09:51

## 2020-02-01 RX ADMIN — SITAGLIPTIN 50 MG: 50 TABLET, FILM COATED ORAL at 09:51

## 2020-02-01 RX ADMIN — ATORVASTATIN CALCIUM 20 MG: 20 TABLET, FILM COATED ORAL at 20:19

## 2020-02-01 RX ADMIN — FERROUS GLUCONATE 324 MG: 324 TABLET ORAL at 17:03

## 2020-02-01 RX ADMIN — BUMETANIDE 3 MG: 1 TABLET ORAL at 09:50

## 2020-02-01 RX ADMIN — METOPROLOL TARTRATE 50 MG: 25 TABLET ORAL at 09:52

## 2020-02-01 RX ADMIN — THERA TABS 1 TABLET: TAB at 09:52

## 2020-02-01 NOTE — PROGRESS NOTES
Met with patient  Charts reviewed.  No new complaints. Both dizziness and twitchings much better  BP (!) 141/57   Pulse 65   Temp 95.9  F (35.5  C) (Oral)   Resp 16   Wt (!) 152 kg (335 lb)   SpO2 (!) 89%   BMI 59.34 kg/m    Labs reviewed. JASON resolved    Plan:  Restart Bumex at 3 mg daily  Resume Fluid restriction at 1800 mls

## 2020-02-01 NOTE — PLAN OF CARE
PTA: Pt declined any OOB activity d/t having not slept well last night, still feeling sleepy and reporting some dizziness after using bed pan with nursing. Pt stating she did not feel well this am. Will re-attempt this afternoon as schedule allows. -30 min.

## 2020-02-01 NOTE — PLAN OF CARE
"Pt VSS, however O2 sats below recommended level of 87% this afternoon after getting up to the chair. Pt also reported feeling woozy, placed on bipap and level back to 94%. Pt placed back onto oxymask, levels still 91-92%. Pt reported less dizziness/\"wooziness.\" Little PO intake for breakfast, pt has ordered a late lunch. Voiding adequately, given bumex per order. Continues to have menstruation. Pt is a lift transfer. Pt expressed frustration and sadness about her situation, stating, \"this is depressing\" and that she \"had a normal life\" and \"wants it back.\" RN spent time validating pt feelings and encouraged her to take it step by step, day by day, and continue working and doing her part. Will continue to monitor pt status, and assess needs.   "

## 2020-02-01 NOTE — PLAN OF CARE
Alert and oriented x4. No complaints of acute concerns. Patient asleep intermittently in between cares. No complaints of pain. BiPAP in place and patient is tolerating well. Reported no problems with B/B, assist of 1-2 with bedpan use. LBM 1/31. Calls appropriately for assistance. Continue POC.

## 2020-02-01 NOTE — PLAN OF CARE
Woke up from afternoon nap and sat up on the side of the bed, imagining she could get up to use the toilet without the lift; pt became self-aware of momentary confusion. Otherwise alert and oriented; alarm on for safety. VSS. BG readings were 106, 143. Appetite fair, she reports the 2 g Na diet makes the food taste flavorless at times. 2500 FR per MD note; order is slightly confusing, note left for provider to clarify. Encouraged fluids up to that amount. Denies pain, no respiratory distress. Wore BiPAP majority of shift; RT helped adjust fitting and flow as intermittent alarms were sounding. Wore NC at 3 LPM around dinner time. 1-2 assist with bedpan. Continent of B/B; LBM 1/31. She is menstruating; reports having a complicated menopause / mentioned previous appointments with PCP about this. Skin intact, areas of ruddiness, blanchable redness, and scabbing. Miconazole powder applied to skin folds for protection. Weight obtained earlier today was significantly different than the last; bed scale showed the same weight as entered (335 lb). Pt declined to get out of bed this shift so unable to zero bed weight and retry; will need to re-check tomorrow.

## 2020-02-01 NOTE — PLAN OF CARE
OT: -30 d/t patient recently hooked up to BiPap d/t low oxygen sats per nursing. Pt stated she is not feeling well today. Plan to continue with OT on Monday.

## 2020-02-02 LAB
GLUCOSE BLDC GLUCOMTR-MCNC: 77 MG/DL (ref 70–99)
INR PPP: 2.89 (ref 0.86–1.14)

## 2020-02-02 PROCEDURE — 00000146 ZZHCL STATISTIC GLUCOSE BY METER IP

## 2020-02-02 PROCEDURE — 85610 PROTHROMBIN TIME: CPT | Performed by: INTERNAL MEDICINE

## 2020-02-02 PROCEDURE — 12000022 ZZH R&B SNF

## 2020-02-02 PROCEDURE — 99309 SBSQ NF CARE MODERATE MDM 30: CPT | Performed by: INTERNAL MEDICINE

## 2020-02-02 PROCEDURE — 40000275 ZZH STATISTIC RCP TIME EA 10 MIN

## 2020-02-02 PROCEDURE — 25000132 ZZH RX MED GY IP 250 OP 250 PS 637: Mod: GY | Performed by: INTERNAL MEDICINE

## 2020-02-02 PROCEDURE — 36415 COLL VENOUS BLD VENIPUNCTURE: CPT | Performed by: INTERNAL MEDICINE

## 2020-02-02 PROCEDURE — 99207 ZZC CDG-MDM COMPONENT: MEETS MODERATE - UP CODED: CPT | Performed by: INTERNAL MEDICINE

## 2020-02-02 RX ORDER — WARFARIN SODIUM 1 MG/1
1 TABLET ORAL
Status: COMPLETED | OUTPATIENT
Start: 2020-02-02 | End: 2020-02-02

## 2020-02-02 RX ORDER — GABAPENTIN 300 MG/1
300 CAPSULE ORAL 2 TIMES DAILY
Status: DISCONTINUED | OUTPATIENT
Start: 2020-02-02 | End: 2020-02-09

## 2020-02-02 RX ADMIN — ALLOPURINOL 200 MG: 100 TABLET ORAL at 09:08

## 2020-02-02 RX ADMIN — BUMETANIDE 3 MG: 1 TABLET ORAL at 09:07

## 2020-02-02 RX ADMIN — THERA TABS 1 TABLET: TAB at 09:06

## 2020-02-02 RX ADMIN — FERROUS GLUCONATE 324 MG: 324 TABLET ORAL at 20:15

## 2020-02-02 RX ADMIN — ATORVASTATIN CALCIUM 20 MG: 20 TABLET, FILM COATED ORAL at 20:15

## 2020-02-02 RX ADMIN — METOPROLOL TARTRATE 50 MG: 25 TABLET ORAL at 09:07

## 2020-02-02 RX ADMIN — CYCLOBENZAPRINE HYDROCHLORIDE 5 MG: 5 TABLET, FILM COATED ORAL at 20:15

## 2020-02-02 RX ADMIN — METOPROLOL TARTRATE 50 MG: 25 TABLET ORAL at 20:15

## 2020-02-02 RX ADMIN — SITAGLIPTIN 50 MG: 50 TABLET, FILM COATED ORAL at 09:06

## 2020-02-02 RX ADMIN — WARFARIN SODIUM 1 MG: 1 TABLET ORAL at 17:17

## 2020-02-02 RX ADMIN — FERROUS GLUCONATE 324 MG: 324 TABLET ORAL at 14:11

## 2020-02-02 RX ADMIN — MICONAZOLE NITRATE: 20 POWDER TOPICAL at 20:16

## 2020-02-02 RX ADMIN — MICONAZOLE NITRATE: 20 POWDER TOPICAL at 09:07

## 2020-02-02 RX ADMIN — FERROUS GLUCONATE 324 MG: 324 TABLET ORAL at 09:08

## 2020-02-02 NOTE — PLAN OF CARE
Patient alert and oriented x 4. BIPAP on and tolerating it well. Denied pain and discomfort. Bed alarm on, no attempt to get out of bed. No respiratory distress noted. Sleeps in between cares. Patient uses call light approprietly. Will continue with current plan of care.

## 2020-02-02 NOTE — PROGRESS NOTES
RT NOTE:    AVAPS currently on standby. Noticed redness on bridge of nose. Given replacement Geiko skin barrier pad and upsized from small to medium facemask for future use.     Nancy Holland, RRT-NPS

## 2020-02-02 NOTE — PLAN OF CARE
"Pt VSS. Denied pain today. On Bipap when napping in bed, O2 via NC 4 L when awake and seated. Sats >90%. Pt reported feeling \"so much better\" today. Voiding frequently, no BM this shift. BLE edema + 3, skin cracked and rosa/dusky. Pt concerned about \"bump\" from edema on L side of abdomen/pannus, she spoke with MD regarding this.  Pt alert and oriented, calling appropriately.   "

## 2020-02-02 NOTE — PLAN OF CARE
"Alert and oriented, VSS. Denies pain, no respiratory distress. Wore O2 via oxymask at 4 LPM most of shift while sitting up in chair and bed, switched to BiPAP at bedtime with RT setup. BG reading before late lunch/early dinner was 100; writer communicated with staff that orders to check BG have been discontinued. Appetite fair; did not eat much of late lunch, reporting that it was \"burnt,\" and not hungry for dinner. Continent of B/B; LBM 2/1. Minimal menstrual discharge.   Pt reports not sleeping well the night before; calm environment promoted and PRN flexeril given at bedtime per pt request. Bed scale zeroed and weight obtained.   "

## 2020-02-03 ENCOUNTER — APPOINTMENT (OUTPATIENT)
Dept: PHYSICAL THERAPY | Facility: SKILLED NURSING FACILITY | Age: 60
End: 2020-02-03
Payer: MEDICARE

## 2020-02-03 ENCOUNTER — APPOINTMENT (OUTPATIENT)
Dept: OCCUPATIONAL THERAPY | Facility: SKILLED NURSING FACILITY | Age: 60
End: 2020-02-03
Payer: MEDICARE

## 2020-02-03 LAB
ANION GAP SERPL CALCULATED.3IONS-SCNC: 4 MMOL/L (ref 3–14)
BUN SERPL-MCNC: 20 MG/DL (ref 7–30)
CALCIUM SERPL-MCNC: 9 MG/DL (ref 8.5–10.1)
CHLORIDE SERPL-SCNC: 97 MMOL/L (ref 94–109)
CO2 SERPL-SCNC: 40 MMOL/L (ref 20–32)
CREAT SERPL-MCNC: 0.79 MG/DL (ref 0.52–1.04)
ERYTHROCYTE [DISTWIDTH] IN BLOOD BY AUTOMATED COUNT: 19.8 % (ref 10–15)
GFR SERPL CREATININE-BSD FRML MDRD: 81 ML/MIN/{1.73_M2}
GLUCOSE SERPL-MCNC: 84 MG/DL (ref 70–99)
HCT VFR BLD AUTO: 40.5 % (ref 35–47)
HGB BLD-MCNC: 11.1 G/DL (ref 11.7–15.7)
INR PPP: 2.47 (ref 0.86–1.14)
MAGNESIUM SERPL-MCNC: 1.8 MG/DL (ref 1.6–2.3)
MCH RBC QN AUTO: 24.3 PG (ref 26.5–33)
MCHC RBC AUTO-ENTMCNC: 27.4 G/DL (ref 31.5–36.5)
MCV RBC AUTO: 89 FL (ref 78–100)
PLATELET # BLD AUTO: 225 10E9/L (ref 150–450)
POTASSIUM SERPL-SCNC: 3.8 MMOL/L (ref 3.4–5.3)
RBC # BLD AUTO: 4.57 10E12/L (ref 3.8–5.2)
SODIUM SERPL-SCNC: 141 MMOL/L (ref 133–144)
WBC # BLD AUTO: 4.9 10E9/L (ref 4–11)

## 2020-02-03 PROCEDURE — 25000132 ZZH RX MED GY IP 250 OP 250 PS 637: Mod: GY | Performed by: INTERNAL MEDICINE

## 2020-02-03 PROCEDURE — 12000022 ZZH R&B SNF

## 2020-02-03 PROCEDURE — 80048 BASIC METABOLIC PNL TOTAL CA: CPT | Performed by: INTERNAL MEDICINE

## 2020-02-03 PROCEDURE — 83735 ASSAY OF MAGNESIUM: CPT | Performed by: INTERNAL MEDICINE

## 2020-02-03 PROCEDURE — 99307 SBSQ NF CARE SF MDM 10: CPT | Performed by: INTERNAL MEDICINE

## 2020-02-03 PROCEDURE — 00220000 ZZH SNF RUG CODE OPNP

## 2020-02-03 PROCEDURE — 97530 THERAPEUTIC ACTIVITIES: CPT | Mod: GP

## 2020-02-03 PROCEDURE — 97110 THERAPEUTIC EXERCISES: CPT | Mod: GO | Performed by: OCCUPATIONAL THERAPIST

## 2020-02-03 PROCEDURE — 85610 PROTHROMBIN TIME: CPT | Performed by: INTERNAL MEDICINE

## 2020-02-03 PROCEDURE — 85027 COMPLETE CBC AUTOMATED: CPT | Performed by: INTERNAL MEDICINE

## 2020-02-03 PROCEDURE — 99207 ZZC CDG-CHARGE REQUIRED MANUAL ENTRY: CPT | Performed by: INTERNAL MEDICINE

## 2020-02-03 PROCEDURE — 36415 COLL VENOUS BLD VENIPUNCTURE: CPT | Performed by: INTERNAL MEDICINE

## 2020-02-03 PROCEDURE — 97535 SELF CARE MNGMENT TRAINING: CPT | Mod: GO | Performed by: OCCUPATIONAL THERAPIST

## 2020-02-03 RX ORDER — GUAIFENESIN/DEXTROMETHORPHAN 100-10MG/5
10 SYRUP ORAL EVERY 4 HOURS PRN
Status: DISCONTINUED | OUTPATIENT
Start: 2020-02-03 | End: 2020-02-09 | Stop reason: HOSPADM

## 2020-02-03 RX ORDER — WARFARIN SODIUM 2 MG/1
2 TABLET ORAL
Status: COMPLETED | OUTPATIENT
Start: 2020-02-03 | End: 2020-02-03

## 2020-02-03 RX ADMIN — POTASSIUM CHLORIDE 20 MEQ: 1.5 POWDER, FOR SOLUTION ORAL at 20:34

## 2020-02-03 RX ADMIN — GABAPENTIN 300 MG: 300 CAPSULE ORAL at 08:11

## 2020-02-03 RX ADMIN — METOPROLOL TARTRATE 50 MG: 25 TABLET ORAL at 20:33

## 2020-02-03 RX ADMIN — FERROUS GLUCONATE 324 MG: 324 TABLET ORAL at 14:26

## 2020-02-03 RX ADMIN — GABAPENTIN 300 MG: 300 CAPSULE ORAL at 20:34

## 2020-02-03 RX ADMIN — GUAIFENESIN AND DEXTROMETHORPHAN 10 ML: 100; 10 SYRUP ORAL at 22:02

## 2020-02-03 RX ADMIN — THERA TABS 1 TABLET: TAB at 08:11

## 2020-02-03 RX ADMIN — ALLOPURINOL 200 MG: 100 TABLET ORAL at 08:11

## 2020-02-03 RX ADMIN — SITAGLIPTIN 50 MG: 50 TABLET, FILM COATED ORAL at 08:09

## 2020-02-03 RX ADMIN — FERROUS GLUCONATE 324 MG: 324 TABLET ORAL at 08:08

## 2020-02-03 RX ADMIN — FERROUS GLUCONATE 324 MG: 324 TABLET ORAL at 20:34

## 2020-02-03 RX ADMIN — ATORVASTATIN CALCIUM 20 MG: 20 TABLET, FILM COATED ORAL at 20:34

## 2020-02-03 RX ADMIN — WARFARIN SODIUM 2 MG: 2 TABLET ORAL at 17:56

## 2020-02-03 RX ADMIN — MICONAZOLE NITRATE: 20 POWDER TOPICAL at 08:11

## 2020-02-03 RX ADMIN — CYCLOBENZAPRINE HYDROCHLORIDE 5 MG: 5 TABLET, FILM COATED ORAL at 20:34

## 2020-02-03 RX ADMIN — BUMETANIDE 3 MG: 1 TABLET ORAL at 08:08

## 2020-02-03 RX ADMIN — METOPROLOL TARTRATE 50 MG: 25 TABLET ORAL at 08:09

## 2020-02-03 NOTE — PLAN OF CARE
Patient alert and oriented x 4. Bipap on when laying down and sleeping. Patient tolerating well. No respiratory distress noted. Bed alarm on, no attempt to get out of bed. Patient appear sleeping resting in bed during rounds. Will continue with current plan of care.

## 2020-02-03 NOTE — PLAN OF CARE
"Alert and oriented, VSS. Reports depressed mood, feeling overwhelmed with her health and apprehensive about the future. No SI. Writer provided emotional support and empathetic listening. Writer asked about previous coping mechanisms, none noted. Informed pt of unit resources and activities and found an appropriate wheelchair to increase mobility going forward. Looking forward to therapies tomorrow.      Lift used to transfer pt to recliner for dinner. Appetite good this evening but reports dissatisfaction with food choices and not eating adequate overall last couple of days. She would also like the option of snacks in between meals. Writer suggested nutrition consult.     Wearing O2 via NC at 4 LPM most of shift while sitting up in bed/chair. BiPAP applied at bedtime. No respiratory distress but infrequent congestive cough noted with scant brown mucous. Reports dizziness with rolling in bed. Writer encouraged fluids up to 1800 mL FR.     Continent of B/B; LBM 2/1. Reports \"hardness\" and burning discomfort to L abdominal pannus; no distension observed, but skin feels more dense with palpation. She reports mentioning this to provider earlier and thinks it could be fluids shifting; L pannus supported with pillows. No pain medication requested.     PRN flexeril given per request at bedtime.   Note left for provider to update on changes and suggestions to care plan.   "

## 2020-02-03 NOTE — PROGRESS NOTES
Saw and examined patient briefly   Continues to have some pain in the left pannus.  No fevers or chills  Dicussed in length abut Gabpentin dose. She refused her gabapentin dose yesterday because she is worried that the signs of gabapentin toxicity would come back  /57   Pulse 65   Temp 96.1  F (35.6  C) (Oral)   Resp 18   Wt (!) 170.6 kg (376 lb)   SpO2 96%   BMI 66.61 kg/m    O/E.  No redness seen on the pannus. Edematous however      Labs reviwed. Stable. JASON completely resolved.    Plan:   gabapentin resumed at 300 mg BID ( patient is willing to take this dose)   Continue to watch for panniculitis       Dr PURNIMA Jackman MD, [facility]  Hospitalist ( Internal medicine)  Pager: [phone]

## 2020-02-03 NOTE — PLAN OF CARE
OT- Patient able to transfer to recliner with setup and WW Min A - another person SBA. On 4 L O2 staying at 96% with activity

## 2020-02-03 NOTE — PLAN OF CARE
PT: Pt performed ambulation for endurance and CV training with 4 L of NC. Using FWW, CGA pt was able to ambulate 12 ft x 1, 20 ft x 1, 25 ft x 1, and then 15 ft x 1. O2 sats remained above 93% during all activities with pt subjective report of no adverse sxs.     Continue to progress pt's functional tolerance with ambulation and general mobility

## 2020-02-03 NOTE — PROGRESS NOTES
"CLINICAL NUTRITION SERVICES - ASSESSMENT NOTE     Nutrition Prescription    RECOMMENDATIONS FOR MDs/PROVIDERS TO ORDER:  None at this time    Malnutrition Status:    Patient does not meet two of the established criteria necessary for diagnosing malnutrition    Recommendations already ordered by Registered Dietitian (RD):  - Added a manager check to Arianna's trays  - Strawberry yogurt at 2 pm     Future/Additional Recommendations:  Monitor PO intake and wt trends.      REASON FOR ASSESSMENT  Arianna Siddiqi is a/an 59 year old female assessed by the dietitian for LOS    PMH: HFrEF, T2DM, CKD, HTN, gout, OHS/CARLOS on AVAP, morbid obesity who presented to the ED with dyspnea and found to be in acute on chronic rt heart failure    NUTRITION HISTORY  - Pt last seen by clinical nutrition services 1/24. At that time Arianna had a fair appetite and PO intake.   - Arianna reports that she normally eats 2 meals a day. Her appetite has been lower than normal due to fluid on board.     CURRENT NUTRITION ORDERS  Diet: 2 g Sodium, fluid restriction 1800 mL  Snack: Boost Glucose control as requested    Intake/Tolerance: 25-75 % of meals per flowsheet. On average over the past 6 days, pt is ordering 2190 kcal and 89 g protein daily per [employer]. This is likely meeting 100% minimum energy and 91% protein needs (assuming 100% intake). Meets 67% minimum energy needs and 45% minimum protein needs (assuming 50% intake).   - Pt reports that it has been difficult to order off the 2 g Na diet menu. Arianna also reports that some food items have been missing on her tray and that she has had a couple burnt items. Arianna reports that she has been able to tolerate eggs pretty well. Pt reports that she has been drinking 1 Boost GC daily.     LABS  Labs reviewed    MEDICATIONS  Medications reviewed  Bumex  Fergon  Thera-vit  Coumadin     ANTHROPOMETRICS  Ht Readings from Last 1 Encounters:   01/20/20 1.6 m (5' 3\")     Most Recent Weight: (!) 170.6 kg (376 " lb)  IBW: 52.3 kg (326% IBW)  BMI: Obesity Grade III BMI >40 (66.61 kg/m^2)   Weight History: Weight stable over the past week. Pt reports no recent weight changes. Weights variability likely due to fluid status and high weight.   Wt Readings from Last 10 Encounters:   02/02/20 (!) 170.6 kg (376 lb)   01/27/20 (!) 170.6 kg (376 lb 1.6 oz)   12/12/17 (!) 182.5 kg (402 lb 6.4 oz)   10/19/16 (!) 159.3 kg (351 lb 1.6 oz)   10/09/16 (!) 163.9 kg (361 lb 6.4 oz)   10/05/16 (!) 162 kg (357 lb 1.6 oz)   09/23/16 (!) 167.5 kg (369 lb 4.3 oz)     02/02/20 0935 170.6 kg (376 lb)Abnormal  DL   02/01/20 1712 170.1 kg (375 lb)Abnormal  KS   01/31/20 1300 152 kg (335 lb)Abnormal  DL   01/29/20 1253 172.1 kg (379 lb 8 oz)Abnormal  MG   01/28/20 1010 171.5 kg (378 lb)Abnormal  NH   01/27/20 1536 174.5 kg (384 lb 9.6 oz)Abnormal  MM         Dosing Weight: 82 kg (adjusted based on most recent wt and IBW)     ASSESSED NUTRITION NEEDS  Estimated Energy Needs: 1650-1876 kcals/day (20 - 25 kcals/kg)  Justification: Obese  Estimated Protein Needs:  grams protein/day (1.2 - 1.5 grams of pro/kg)  Justification: Obesity guidelines  Estimated Fluid Needs: 5518-6651 mL/day (25 - 30 mL/kg)   Justification: Maintenance    PHYSICAL FINDINGS  See malnutrition section below.    MALNUTRITION  % Intake: < 75% for > 7 days (non-severe)  % Weight Loss: None noted  Subcutaneous Fat Loss: None observed (dififuclt to assess with body habitus)   Muscle Loss: None observed (dififuclt to assess with body habitus)   Fluid Accumulation/Edema: Moderate per flowsheet   Malnutrition Diagnosis: Patient does not meet two of the established criteria necessary for diagnosing malnutrition    NUTRITION DIAGNOSIS  Inadequate oral intake related to decreased appetite 2/2 medical course as evidenced by flowsheet and meal ordering system showing intake meeting 67% minimum energy needs and 45% minimum protein needs over the past 6 days.       INTERVENTIONS  Implementation  Nutrition Education: Discussed current PO/appetite and role of RD. Discussed food items that fit in low sodium diet. Encouraged pt to order meals at least BID and to focus on protein intake. Encouraged pt to drink an extra Boost GC if she is not able to eat 2 meals daily.   Modify composition of meals/snacks: See above.      Goals  Patient to consume % of nutritionally adequate meal trays TID, or the equivalent with supplements/snacks.     Monitoring/Evaluation  Progress toward goals will be monitored and evaluated per protocol.    Michelle Gutierrez RD,LD  Unit Pager: [phone]

## 2020-02-04 ENCOUNTER — APPOINTMENT (OUTPATIENT)
Dept: PHYSICAL THERAPY | Facility: SKILLED NURSING FACILITY | Age: 60
End: 2020-02-04
Payer: MEDICARE

## 2020-02-04 ENCOUNTER — APPOINTMENT (OUTPATIENT)
Dept: OCCUPATIONAL THERAPY | Facility: SKILLED NURSING FACILITY | Age: 60
End: 2020-02-04
Payer: MEDICARE

## 2020-02-04 LAB
HGB BLD-MCNC: 12.3 G/DL (ref 11.7–15.7)
INR PPP: 2.3 (ref 0.86–1.14)
POTASSIUM SERPL-SCNC: 3.9 MMOL/L (ref 3.4–5.3)

## 2020-02-04 PROCEDURE — 25000132 ZZH RX MED GY IP 250 OP 250 PS 637: Mod: GY | Performed by: INTERNAL MEDICINE

## 2020-02-04 PROCEDURE — 12000022 ZZH R&B SNF

## 2020-02-04 PROCEDURE — 36415 COLL VENOUS BLD VENIPUNCTURE: CPT | Performed by: INTERNAL MEDICINE

## 2020-02-04 PROCEDURE — 85018 HEMOGLOBIN: CPT | Performed by: INTERNAL MEDICINE

## 2020-02-04 PROCEDURE — 97110 THERAPEUTIC EXERCISES: CPT | Mod: GP | Performed by: REHABILITATION PRACTITIONER

## 2020-02-04 PROCEDURE — 97110 THERAPEUTIC EXERCISES: CPT | Mod: GO | Performed by: OCCUPATIONAL THERAPIST

## 2020-02-04 PROCEDURE — 85610 PROTHROMBIN TIME: CPT | Performed by: INTERNAL MEDICINE

## 2020-02-04 PROCEDURE — 99308 SBSQ NF CARE LOW MDM 20: CPT | Performed by: INTERNAL MEDICINE

## 2020-02-04 PROCEDURE — 97530 THERAPEUTIC ACTIVITIES: CPT | Mod: GP

## 2020-02-04 PROCEDURE — 97110 THERAPEUTIC EXERCISES: CPT | Mod: GP

## 2020-02-04 PROCEDURE — 97530 THERAPEUTIC ACTIVITIES: CPT | Mod: GP | Performed by: REHABILITATION PRACTITIONER

## 2020-02-04 PROCEDURE — 84132 ASSAY OF SERUM POTASSIUM: CPT | Performed by: INTERNAL MEDICINE

## 2020-02-04 PROCEDURE — 97535 SELF CARE MNGMENT TRAINING: CPT | Mod: GO | Performed by: OCCUPATIONAL THERAPIST

## 2020-02-04 RX ORDER — WARFARIN SODIUM 2 MG/1
2 TABLET ORAL
Status: COMPLETED | OUTPATIENT
Start: 2020-02-04 | End: 2020-02-04

## 2020-02-04 RX ADMIN — FERROUS GLUCONATE 324 MG: 324 TABLET ORAL at 08:37

## 2020-02-04 RX ADMIN — THERA TABS 1 TABLET: TAB at 08:37

## 2020-02-04 RX ADMIN — FERROUS GLUCONATE 324 MG: 324 TABLET ORAL at 21:21

## 2020-02-04 RX ADMIN — FERROUS GLUCONATE 324 MG: 324 TABLET ORAL at 14:29

## 2020-02-04 RX ADMIN — ACETAMINOPHEN 1000 MG: 500 TABLET ORAL at 14:34

## 2020-02-04 RX ADMIN — METOPROLOL TARTRATE 50 MG: 25 TABLET ORAL at 21:21

## 2020-02-04 RX ADMIN — WARFARIN SODIUM 2 MG: 2 TABLET ORAL at 17:38

## 2020-02-04 RX ADMIN — GUAIFENESIN AND DEXTROMETHORPHAN 10 ML: 100; 10 SYRUP ORAL at 18:30

## 2020-02-04 RX ADMIN — GUAIFENESIN AND DEXTROMETHORPHAN 10 ML: 100; 10 SYRUP ORAL at 14:35

## 2020-02-04 RX ADMIN — ALLOPURINOL 200 MG: 100 TABLET ORAL at 08:36

## 2020-02-04 RX ADMIN — CYCLOBENZAPRINE HYDROCHLORIDE 5 MG: 5 TABLET, FILM COATED ORAL at 22:39

## 2020-02-04 RX ADMIN — SITAGLIPTIN 50 MG: 50 TABLET, FILM COATED ORAL at 08:36

## 2020-02-04 RX ADMIN — ATORVASTATIN CALCIUM 20 MG: 20 TABLET, FILM COATED ORAL at 21:21

## 2020-02-04 RX ADMIN — MICONAZOLE NITRATE: 20 POWDER TOPICAL at 08:38

## 2020-02-04 RX ADMIN — BUMETANIDE 3 MG: 1 TABLET ORAL at 08:36

## 2020-02-04 RX ADMIN — MICONAZOLE NITRATE: 20 POWDER TOPICAL at 21:27

## 2020-02-04 RX ADMIN — POTASSIUM CHLORIDE 20 MEQ: 1500 TABLET, EXTENDED RELEASE ORAL at 09:18

## 2020-02-04 RX ADMIN — GABAPENTIN 300 MG: 300 CAPSULE ORAL at 21:21

## 2020-02-04 RX ADMIN — GABAPENTIN 300 MG: 300 CAPSULE ORAL at 08:37

## 2020-02-04 RX ADMIN — METOPROLOL TARTRATE 50 MG: 25 TABLET ORAL at 08:36

## 2020-02-04 RX ADMIN — GUAIFENESIN AND DEXTROMETHORPHAN 10 ML: 100; 10 SYRUP ORAL at 22:39

## 2020-02-04 NOTE — PLAN OF CARE
Patient has been up walking in hallway with therapy today. Patient sitting in chair eating late lunch. Patient received a slightly more elevated bariatric commode today to enable ease of transfer from commode. Patient reports she has been evaluated by MD, with Rickie PRUITT present, for vaginal bleeding. No clots present. Referral for GYN consult given. Patient expressed pleasure with her progress today and continues to be motivated to fully participate in therapy to get back home.

## 2020-02-04 NOTE — PROGRESS NOTES
"S: Reports ongoing issue with vaginal bleed/clot  Reports she had had for couple of weeks  Some blood clots last night  No lightheadedness or dizziness  Patient reports she is still having period    O: Vital signs:    Blood pressure 130/64, pulse 65, temperature 95.9  F (35.5  C), temperature source Oral, resp. rate 12, weight (!) 169.2 kg (373 lb), SpO2 96 %.  Estimated body mass index is 66.07 kg/m  as calculated from the following:    Height as of 1/20/20: 1.6 m (5' 3\").    Weight as of this encounter: 169.2 kg (373 lb).      Alert,awake  B/L CTA  Soft, obese abdomen  Vulva without any lesions    A/P    Vaginal bleed: Hemodynamics and Hg stable  Etiology broad including malignancy  - Gyne clinic visit  - Request  to arrange clinic visit    Mamadou James  Internal Medicine  Hospitalist  Pager no: [phone]   "

## 2020-02-04 NOTE — PLAN OF CARE
RN: Pt has no c/o pain or discomfort so far this shift. Tolerating CPAP while sleeping. No c/o SOB or no other respiratory symptoms noted. No bleeding reported or noted by staff. Using call light appropriately. Continue with plan of care.

## 2020-02-04 NOTE — PLAN OF CARE
PT: Pt able to ambulate 60 ft x 2 in hallway with FWW, SBA with w/c follow for fatigue. O2 sats remained above 93% during session.   Pt is making good progress toward therapy goals of IND with mobility at apartment level.

## 2020-02-04 NOTE — PLAN OF CARE
Alert and oriented, VSS. Denies pain, no respiratory distress. Congestive cough noted; MD paged for cough suppressant/decongestant which was given at bedtime to promote sleep. Wore O2 via NC most of shift at 4 LPM, BiPAP at bedtime with gel nose bridge protector. Potassium 3.8, replaced per protocol; scheduled to be rechecked tomorrow AM. Appetite good, reports feeling better about nutrition options. Encouraged fluids up to 1800 mL FR.   Continent of B/B; LBM 2/3. She has been spotting the past couple days; passed 2 large gelatinous vaginal blood clots, note left for provider. PRN flexeril given at bedtime. Declined to transfer from bed shift, unable to obtain an accurate bed weight.

## 2020-02-04 NOTE — PLAN OF CARE
PT: pt demo multi sit to stand to  for standing support. Pt demo seated and standing thee x program x 10. Pt on 4L 02 during PT session. Pt SATs 94%-96% during PT session.

## 2020-02-05 ENCOUNTER — APPOINTMENT (OUTPATIENT)
Dept: OCCUPATIONAL THERAPY | Facility: SKILLED NURSING FACILITY | Age: 60
End: 2020-02-05
Payer: MEDICARE

## 2020-02-05 ENCOUNTER — APPOINTMENT (OUTPATIENT)
Dept: PHYSICAL THERAPY | Facility: SKILLED NURSING FACILITY | Age: 60
End: 2020-02-05
Payer: MEDICARE

## 2020-02-05 LAB
INR PPP: 2.03 (ref 0.86–1.14)
POTASSIUM SERPL-SCNC: 3.6 MMOL/L (ref 3.4–5.3)

## 2020-02-05 PROCEDURE — 97116 GAIT TRAINING THERAPY: CPT | Mod: GP | Performed by: PHYSICAL THERAPIST

## 2020-02-05 PROCEDURE — 84132 ASSAY OF SERUM POTASSIUM: CPT | Performed by: INTERNAL MEDICINE

## 2020-02-05 PROCEDURE — 40000275 ZZH STATISTIC RCP TIME EA 10 MIN

## 2020-02-05 PROCEDURE — 97110 THERAPEUTIC EXERCISES: CPT | Mod: GO | Performed by: OCCUPATIONAL THERAPIST

## 2020-02-05 PROCEDURE — 36415 COLL VENOUS BLD VENIPUNCTURE: CPT | Performed by: INTERNAL MEDICINE

## 2020-02-05 PROCEDURE — 97110 THERAPEUTIC EXERCISES: CPT | Mod: GP | Performed by: PHYSICAL THERAPIST

## 2020-02-05 PROCEDURE — 12000022 ZZH R&B SNF

## 2020-02-05 PROCEDURE — 94660 CPAP INITIATION&MGMT: CPT

## 2020-02-05 PROCEDURE — 97110 THERAPEUTIC EXERCISES: CPT | Mod: GP

## 2020-02-05 PROCEDURE — A7035 POS AIRWAY PRESS HEADGEAR: HCPCS

## 2020-02-05 PROCEDURE — 25000132 ZZH RX MED GY IP 250 OP 250 PS 637: Mod: GY | Performed by: INTERNAL MEDICINE

## 2020-02-05 PROCEDURE — 97530 THERAPEUTIC ACTIVITIES: CPT | Mod: GO | Performed by: OCCUPATIONAL THERAPIST

## 2020-02-05 PROCEDURE — 85610 PROTHROMBIN TIME: CPT | Performed by: INTERNAL MEDICINE

## 2020-02-05 RX ORDER — WARFARIN SODIUM 3 MG/1
3 TABLET ORAL
Status: COMPLETED | OUTPATIENT
Start: 2020-02-05 | End: 2020-02-05

## 2020-02-05 RX ORDER — IPRATROPIUM BROMIDE AND ALBUTEROL SULFATE 2.5; .5 MG/3ML; MG/3ML
3 SOLUTION RESPIRATORY (INHALATION) EVERY 4 HOURS PRN
Status: DISCONTINUED | OUTPATIENT
Start: 2020-02-05 | End: 2020-02-09 | Stop reason: HOSPADM

## 2020-02-05 RX ADMIN — GABAPENTIN 300 MG: 300 CAPSULE ORAL at 20:48

## 2020-02-05 RX ADMIN — FERROUS GLUCONATE 324 MG: 324 TABLET ORAL at 08:43

## 2020-02-05 RX ADMIN — GABAPENTIN 300 MG: 300 CAPSULE ORAL at 08:47

## 2020-02-05 RX ADMIN — GUAIFENESIN AND DEXTROMETHORPHAN 10 ML: 100; 10 SYRUP ORAL at 11:50

## 2020-02-05 RX ADMIN — ATORVASTATIN CALCIUM 20 MG: 20 TABLET, FILM COATED ORAL at 20:48

## 2020-02-05 RX ADMIN — POTASSIUM CHLORIDE 20 MEQ: 1500 TABLET, EXTENDED RELEASE ORAL at 15:28

## 2020-02-05 RX ADMIN — THERA TABS 1 TABLET: TAB at 08:43

## 2020-02-05 RX ADMIN — BUMETANIDE 3 MG: 1 TABLET ORAL at 08:42

## 2020-02-05 RX ADMIN — ACETAMINOPHEN 1000 MG: 500 TABLET ORAL at 18:03

## 2020-02-05 RX ADMIN — MICONAZOLE NITRATE: 20 POWDER TOPICAL at 08:43

## 2020-02-05 RX ADMIN — MICONAZOLE NITRATE: 20 POWDER TOPICAL at 22:41

## 2020-02-05 RX ADMIN — GUAIFENESIN AND DEXTROMETHORPHAN 10 ML: 100; 10 SYRUP ORAL at 22:41

## 2020-02-05 RX ADMIN — SITAGLIPTIN 50 MG: 50 TABLET, FILM COATED ORAL at 08:42

## 2020-02-05 RX ADMIN — FERROUS GLUCONATE 324 MG: 324 TABLET ORAL at 20:48

## 2020-02-05 RX ADMIN — METOPROLOL TARTRATE 50 MG: 25 TABLET ORAL at 08:47

## 2020-02-05 RX ADMIN — ALLOPURINOL 200 MG: 100 TABLET ORAL at 08:42

## 2020-02-05 RX ADMIN — ACETAMINOPHEN 1000 MG: 500 TABLET ORAL at 11:49

## 2020-02-05 RX ADMIN — GUAIFENESIN AND DEXTROMETHORPHAN 10 ML: 100; 10 SYRUP ORAL at 18:03

## 2020-02-05 RX ADMIN — FERROUS GLUCONATE 324 MG: 324 TABLET ORAL at 14:01

## 2020-02-05 RX ADMIN — CYCLOBENZAPRINE HYDROCHLORIDE 5 MG: 5 TABLET, FILM COATED ORAL at 22:41

## 2020-02-05 RX ADMIN — WARFARIN SODIUM 3 MG: 3 TABLET ORAL at 18:03

## 2020-02-05 ASSESSMENT — PAIN DESCRIPTION - DESCRIPTORS: DESCRIPTORS: SORE;ACHING

## 2020-02-05 NOTE — PLAN OF CARE
PT: Pt engaged in standing there ex on 3 L O2, able to maintain sats above 93% during active exercise, lowest desat 88% upon immediate initial seated rest but able to get above 90% within seconds with cues for breathing. Cont with POC.

## 2020-02-05 NOTE — PLAN OF CARE
Pt.A&Ox4. Sleeping between cares/rounds. Needed bipap mask adjusted a few times, o/w tolerated through the night. Continent using bedpan - per CNA, only small smear of blood observed. Pt.denies pain, discomfort, CP and SOB.

## 2020-02-05 NOTE — PLAN OF CARE
Pt A&Ox4, able to make needs known. Denies chest pain. SOB with exertion. Wears 4L O2 per nasal cannula while OOB. Wears bipap while sleeping. Given PRN Robitussin q4 hours for productive cough. Given PRN flexeril at bedtime. Pt reports poor appetite. Ax1 ww and gait belt to bedside commode. Pt sat up in chair for most of shift. Continent of bowel and bladder. Call light within reach, will continue to monitor.

## 2020-02-05 NOTE — PLAN OF CARE
Patient c/o continued cough and congestion. PRN Robitussin given. Patient encouraged to use incentive spirometer. Lung sounds posterior inspiratory wheezing.

## 2020-02-06 ENCOUNTER — APPOINTMENT (OUTPATIENT)
Dept: PHYSICAL THERAPY | Facility: SKILLED NURSING FACILITY | Age: 60
End: 2020-02-06
Payer: MEDICARE

## 2020-02-06 ENCOUNTER — APPOINTMENT (OUTPATIENT)
Dept: GENERAL RADIOLOGY | Facility: CLINIC | Age: 60
End: 2020-02-06
Attending: INTERNAL MEDICINE
Payer: MEDICARE

## 2020-02-06 LAB
ANION GAP SERPL CALCULATED.3IONS-SCNC: 1 MMOL/L (ref 3–14)
BUN SERPL-MCNC: 20 MG/DL (ref 7–30)
C PNEUM DNA SPEC QL NAA+PROBE: NOT DETECTED
CALCIUM SERPL-MCNC: 8.6 MG/DL (ref 8.5–10.1)
CHLORIDE SERPL-SCNC: 95 MMOL/L (ref 94–109)
CO2 SERPL-SCNC: 42 MMOL/L (ref 20–32)
CREAT SERPL-MCNC: 0.89 MG/DL (ref 0.52–1.04)
DEPRECATED S PYO AG THROAT QL EIA: NORMAL
ERYTHROCYTE [DISTWIDTH] IN BLOOD BY AUTOMATED COUNT: 20.1 % (ref 10–15)
FLUAV H1 2009 PAND RNA SPEC QL NAA+PROBE: ABNORMAL
FLUAV H1 RNA SPEC QL NAA+PROBE: NOT DETECTED
FLUAV H3 RNA SPEC QL NAA+PROBE: NOT DETECTED
FLUAV RNA SPEC QL NAA+PROBE: ABNORMAL
FLUBV RNA SPEC QL NAA+PROBE: NOT DETECTED
GFR SERPL CREATININE-BSD FRML MDRD: 70 ML/MIN/{1.73_M2}
GLUCOSE SERPL-MCNC: 69 MG/DL (ref 70–99)
GRAM STN SPEC: NORMAL
HADV DNA SPEC QL NAA+PROBE: NOT DETECTED
HCOV PNL SPEC NAA+PROBE: NOT DETECTED
HCT VFR BLD AUTO: 41.4 % (ref 35–47)
HGB BLD-MCNC: 11.2 G/DL (ref 11.7–15.7)
HMPV RNA SPEC QL NAA+PROBE: NOT DETECTED
HPIV1 RNA SPEC QL NAA+PROBE: NOT DETECTED
HPIV2 RNA SPEC QL NAA+PROBE: NOT DETECTED
HPIV3 RNA SPEC QL NAA+PROBE: NOT DETECTED
HPIV4 RNA SPEC QL NAA+PROBE: NOT DETECTED
INR PPP: 1.87 (ref 0.86–1.14)
Lab: NORMAL
M PNEUMO DNA SPEC QL NAA+PROBE: NOT DETECTED
MAGNESIUM SERPL-MCNC: 1.9 MG/DL (ref 1.6–2.3)
MCH RBC QN AUTO: 24.7 PG (ref 26.5–33)
MCHC RBC AUTO-ENTMCNC: 27.1 G/DL (ref 31.5–36.5)
MCV RBC AUTO: 91 FL (ref 78–100)
MICROBIOLOGIST REVIEW: ABNORMAL
NT-PROBNP SERPL-MCNC: 2085 PG/ML (ref 0–900)
PLATELET # BLD AUTO: 185 10E9/L (ref 150–450)
POTASSIUM SERPL-SCNC: 3.8 MMOL/L (ref 3.4–5.3)
PROCALCITONIN SERPL-MCNC: <0.05 NG/ML
RBC # BLD AUTO: 4.54 10E12/L (ref 3.8–5.2)
RSV RNA SPEC QL NAA+PROBE: NOT DETECTED
RSV RNA SPEC QL NAA+PROBE: NOT DETECTED
RV+EV RNA SPEC QL NAA+PROBE: NOT DETECTED
SODIUM SERPL-SCNC: 138 MMOL/L (ref 133–144)
SPECIMEN SOURCE: NORMAL
SPECIMEN SOURCE: NORMAL
WBC # BLD AUTO: 3.4 10E9/L (ref 4–11)

## 2020-02-06 PROCEDURE — 87486 CHLMYD PNEUM DNA AMP PROBE: CPT | Performed by: INTERNAL MEDICINE

## 2020-02-06 PROCEDURE — 25000125 ZZHC RX 250: Performed by: INTERNAL MEDICINE

## 2020-02-06 PROCEDURE — 99207 ZZC CDG-MDM COMPONENT: MEETS MODERATE - UP CODED: CPT | Performed by: INTERNAL MEDICINE

## 2020-02-06 PROCEDURE — 83880 ASSAY OF NATRIURETIC PEPTIDE: CPT | Performed by: INTERNAL MEDICINE

## 2020-02-06 PROCEDURE — 80048 BASIC METABOLIC PNL TOTAL CA: CPT | Performed by: INTERNAL MEDICINE

## 2020-02-06 PROCEDURE — 25000132 ZZH RX MED GY IP 250 OP 250 PS 637: Mod: GY | Performed by: NURSE PRACTITIONER

## 2020-02-06 PROCEDURE — 25000132 ZZH RX MED GY IP 250 OP 250 PS 637: Mod: GY | Performed by: INTERNAL MEDICINE

## 2020-02-06 PROCEDURE — 87070 CULTURE OTHR SPECIMN AEROBIC: CPT | Performed by: INTERNAL MEDICINE

## 2020-02-06 PROCEDURE — 97110 THERAPEUTIC EXERCISES: CPT | Mod: GP | Performed by: PHYSICAL THERAPIST

## 2020-02-06 PROCEDURE — 94640 AIRWAY INHALATION TREATMENT: CPT

## 2020-02-06 PROCEDURE — 87581 M.PNEUMON DNA AMP PROBE: CPT | Performed by: INTERNAL MEDICINE

## 2020-02-06 PROCEDURE — 87880 STREP A ASSAY W/OPTIC: CPT | Performed by: INTERNAL MEDICINE

## 2020-02-06 PROCEDURE — 85027 COMPLETE CBC AUTOMATED: CPT | Performed by: INTERNAL MEDICINE

## 2020-02-06 PROCEDURE — 87106 FUNGI IDENTIFICATION YEAST: CPT | Performed by: INTERNAL MEDICINE

## 2020-02-06 PROCEDURE — 84145 PROCALCITONIN (PCT): CPT | Performed by: INTERNAL MEDICINE

## 2020-02-06 PROCEDURE — 87205 SMEAR GRAM STAIN: CPT | Performed by: INTERNAL MEDICINE

## 2020-02-06 PROCEDURE — 97530 THERAPEUTIC ACTIVITIES: CPT | Mod: GP | Performed by: PHYSICAL THERAPIST

## 2020-02-06 PROCEDURE — 99309 SBSQ NF CARE MODERATE MDM 30: CPT | Performed by: INTERNAL MEDICINE

## 2020-02-06 PROCEDURE — 71046 X-RAY EXAM CHEST 2 VIEWS: CPT

## 2020-02-06 PROCEDURE — 85610 PROTHROMBIN TIME: CPT | Performed by: INTERNAL MEDICINE

## 2020-02-06 PROCEDURE — 36415 COLL VENOUS BLD VENIPUNCTURE: CPT | Performed by: INTERNAL MEDICINE

## 2020-02-06 PROCEDURE — 87633 RESP VIRUS 12-25 TARGETS: CPT | Performed by: INTERNAL MEDICINE

## 2020-02-06 PROCEDURE — 94640 AIRWAY INHALATION TREATMENT: CPT | Mod: 76

## 2020-02-06 PROCEDURE — 40000275 ZZH STATISTIC RCP TIME EA 10 MIN

## 2020-02-06 PROCEDURE — 97116 GAIT TRAINING THERAPY: CPT | Mod: GP | Performed by: PHYSICAL THERAPIST

## 2020-02-06 PROCEDURE — 83735 ASSAY OF MAGNESIUM: CPT | Performed by: INTERNAL MEDICINE

## 2020-02-06 PROCEDURE — 87081 CULTURE SCREEN ONLY: CPT | Performed by: INTERNAL MEDICINE

## 2020-02-06 PROCEDURE — 12000022 ZZH R&B SNF

## 2020-02-06 RX ORDER — OSELTAMIVIR PHOSPHATE 75 MG/1
75 CAPSULE ORAL 2 TIMES DAILY
Status: DISCONTINUED | OUTPATIENT
Start: 2020-02-06 | End: 2020-02-09

## 2020-02-06 RX ORDER — LISINOPRIL 2.5 MG/1
5 TABLET ORAL DAILY
Status: DISCONTINUED | OUTPATIENT
Start: 2020-02-07 | End: 2020-02-09

## 2020-02-06 RX ORDER — WARFARIN SODIUM 3 MG/1
3 TABLET ORAL
Status: COMPLETED | OUTPATIENT
Start: 2020-02-06 | End: 2020-02-06

## 2020-02-06 RX ADMIN — ATORVASTATIN CALCIUM 20 MG: 20 TABLET, FILM COATED ORAL at 20:36

## 2020-02-06 RX ADMIN — ALLOPURINOL 200 MG: 100 TABLET ORAL at 09:13

## 2020-02-06 RX ADMIN — BENZOCAINE AND MENTHOL 1 LOZENGE: 15; 3.6 LOZENGE ORAL at 09:13

## 2020-02-06 RX ADMIN — IPRATROPIUM BROMIDE AND ALBUTEROL SULFATE 3 ML: .5; 3 SOLUTION RESPIRATORY (INHALATION) at 14:23

## 2020-02-06 RX ADMIN — MICONAZOLE NITRATE: 20 POWDER TOPICAL at 09:21

## 2020-02-06 RX ADMIN — ACETAMINOPHEN 1000 MG: 500 TABLET ORAL at 09:13

## 2020-02-06 RX ADMIN — GUAIFENESIN AND DEXTROMETHORPHAN 10 ML: 100; 10 SYRUP ORAL at 18:21

## 2020-02-06 RX ADMIN — THERA TABS 1 TABLET: TAB at 09:13

## 2020-02-06 RX ADMIN — FERROUS GLUCONATE 324 MG: 324 TABLET ORAL at 09:13

## 2020-02-06 RX ADMIN — GUAIFENESIN AND DEXTROMETHORPHAN 10 ML: 100; 10 SYRUP ORAL at 22:20

## 2020-02-06 RX ADMIN — BUMETANIDE 3 MG: 1 TABLET ORAL at 09:13

## 2020-02-06 RX ADMIN — OSELTAMIVIR PHOSPHATE 75 MG: 75 CAPSULE ORAL at 20:36

## 2020-02-06 RX ADMIN — IPRATROPIUM BROMIDE AND ALBUTEROL SULFATE 3 ML: .5; 3 SOLUTION RESPIRATORY (INHALATION) at 20:54

## 2020-02-06 RX ADMIN — GUAIFENESIN AND DEXTROMETHORPHAN 10 ML: 100; 10 SYRUP ORAL at 13:15

## 2020-02-06 RX ADMIN — FERROUS GLUCONATE 324 MG: 324 TABLET ORAL at 20:36

## 2020-02-06 RX ADMIN — GABAPENTIN 300 MG: 300 CAPSULE ORAL at 20:36

## 2020-02-06 RX ADMIN — GABAPENTIN 300 MG: 300 CAPSULE ORAL at 09:13

## 2020-02-06 RX ADMIN — METOPROLOL TARTRATE 50 MG: 25 TABLET ORAL at 09:13

## 2020-02-06 RX ADMIN — MICONAZOLE NITRATE: 20 POWDER TOPICAL at 22:20

## 2020-02-06 RX ADMIN — OSELTAMIVIR PHOSPHATE 75 MG: 75 CAPSULE ORAL at 15:35

## 2020-02-06 RX ADMIN — FERROUS GLUCONATE 324 MG: 324 TABLET ORAL at 13:15

## 2020-02-06 RX ADMIN — CYCLOBENZAPRINE HYDROCHLORIDE 5 MG: 5 TABLET, FILM COATED ORAL at 22:20

## 2020-02-06 RX ADMIN — WARFARIN SODIUM 3 MG: 3 TABLET ORAL at 18:21

## 2020-02-06 RX ADMIN — ACETAMINOPHEN 1000 MG: 500 TABLET ORAL at 18:21

## 2020-02-06 RX ADMIN — SITAGLIPTIN 50 MG: 50 TABLET, FILM COATED ORAL at 09:13

## 2020-02-06 RX ADMIN — METOPROLOL TARTRATE 50 MG: 25 TABLET ORAL at 20:37

## 2020-02-06 NOTE — PLAN OF CARE
RN: Pt has no c/o pain or discomfort so far this shift. Has been tolerating BIPAP while sleeping. Spot O2 check 99% via ear probe. Switched to a Medium size mask around 0400 for better fit per pt. Assist of 1 for bedpan use. Staff provided shantel-cares. No c/o SOB and no other respiratory symptoms noted. Appear to be sleeping/resting between cares. Using call light appropriately. Continue with plan of care.

## 2020-02-06 NOTE — PLAN OF CARE
Pt VSS. On 3-4 L O2 via NC, sats WNL. Afebrile. Pt c/o congestion, loose/congested cough, sore throat. PRN robitussin given x2 this shift. RN contacted MD who ordered cepacol lozenge this evening. Note left for provider as pt requesting something for congestion/to loosen cough. Up in chair about half of shift. Good PO intake. + BM this shift, voiding into BSC. Pt now ambulating with A1, steady. PRN flexeril given at HS. Awake, bipap at sleep. Oncoming RN aware.

## 2020-02-06 NOTE — PROGRESS NOTES
"[facility], [place]   Internal Medicine Daily Note           Interval History/Events     Overnight events reviewed  Reports she feels congested, and has cough. She produces minimal cough which is white, to brownish. Denies any shortness of breath or chest pain. She also reports having minimal runny nose. These symptoms started yesterday  No lightheadedness or dizziness  No fever, chills.        Review of Systems        4 point ROS including Respiratory, CV, GI and , other than that noted above is negative      Medications   I have reviewed current medications  in the \"current medication\" section of Epic.  Relevant changes include:     Physical Exam   General:       Vital signs:    Blood pressure (!) 156/78, pulse 63, temperature 95.1  F (35.1  C), temperature source Axillary, resp. rate 16, weight (!) 170.2 kg (375 lb 4.8 oz), SpO2 96 %.  Estimated body mass index is 66.48 kg/m  as calculated from the following:    Height as of 1/20/20: 1.6 m (5' 3\").    Weight as of this encounter: 170.2 kg (375 lb 4.8 oz).      Intake/Output Summary (Last 24 hours) at 2/6/2020 1104  Last data filed at 2/5/2020 2300  Gross per 24 hour   Intake 1440 ml   Output --   Net 1440 ml        Constitutional: Laying in bed in no acute distress  Eye: No icterus, no pallor  Mouth/ENT: Whitish substance on the tongue, buccal mucosa. Posterior pharyngeal wall looks within normal limits  Cardiovascular: S1, S2 normal.   Respiratory: B/l wheezing. No accessory muscle use.   GI: Soft, Obese abdomen. BS+  : No antunez's catheter  Neurology: Alert, awake,and oriented.   Psych: Mood stable.   MSK:   Integumentary: b/l thickening, and discoloration of skin.   Heme/Lymph/Imm:      Laboratory and Imaging Studies     I have reviewed  laboratory and imaging studies in the Epic. Pertinent findings are as below:    BMP  Recent Labs   Lab 02/06/20  0704 02/05/20  1109 02/04/20  0646 02/03/20  0536 02/01/20  0617     --   " --  141 137   POTASSIUM 3.8 3.6 3.9 3.8 4.4   CHLORIDE 95  --   --  97 98   KADEN 8.6  --   --  9.0 9.2   CO2 42*  --   --  40* 40*   BUN 20  --   --  20 35*   CR 0.89  --   --  0.79 0.84   GLC 69*  --   --  84 86     CBC  Recent Labs   Lab 02/06/20  0704  02/03/20  0536   WBC 3.4*  --  4.9   RBC 4.54  --  4.57   HGB 11.2*   < > 11.1*   HCT 41.4  --  40.5   MCV 91  --  89   MCH 24.7*  --  24.3*   MCHC 27.1*  --  27.4*   RDW 20.1*  --  19.8*     --  225    < > = values in this interval not displayed.     INR  Recent Labs   Lab 02/06/20  0704 02/05/20  0610 02/04/20  0646 02/03/20  0536   INR 1.87* 2.03* 2.30* 2.47*     LFTsNo lab results found in last 7 days.   PANCNo lab results found in last 7 days.        Impression/Plan          Arianna Siddiqi is a 59 year old female with medical history significant for HFrEF, T2DM, CKD, HTN, gout, OHS/CARLOS on AVAP, morbid obesity who presented to the ED with dyspnea and found to be in acute on chronic rt heart failure. She was optimized and sent to TCU for rehabilitation      # Chest congestion, cough, runny nose, sore throat since 02/05: From symptoms most likely vital etiology. Denies any chest pain or shortness of breath. Oxygen requirement has been stable around 4 liters. Used 5 liters this AM. No accessory muscle use.   NT Pro BNP is 2085 on 02/06 which is improvement on her prior values. White count is slightly low at 3.4. Afebrile, and hemodynamics are stable (BP,and HR)  - Check Procalcitonin  - Throat swab for Strep  - RSV viral panel  - Duoneb nebulization PRN  - Guaifenesin PRN  - X ray chest  - Droplet precaution  - No indication to transfer at this time, however given her extensive cardiovascular disease she does posses higher risk of decompensation     # Acute decompensated HFrEF,  Right sided CV dysfunction:  Presenting to the acute hospital with weeks of increased weight gain in 1 week of worsening dyspnea.  Chest x-ray consistent with pulmonary edema,  BNP  > 5, 000, per patient, possibly as much as 60 lbs above baseline. ECHO showed no WMA, LVEF 55-60%, Difficult to assess RV. Function probably at least mildly reduced.  IV diuresis on admission with good output, decreased diuretics due to concern of renal injury.     Lisinopril was held in the setting  of some softer BP but should be restarted when safe.    Bumetanide was  reduced to 3 mg on 2/1 after holding dose on 1/31 due to JASON  Wt 170. 2 kg on 02/06. Wt has been stable around 170 kg with some wide differences which are most likely errors.   Pro BNP 2085 on 02/06 which is improvement on her prior values.  - Continue Bumetanide 3 mg as renal function is stable  - Pro BNP every week   - Daily weights  - Low salt diet (2 gms Na)  - Start Lisinopril at low dose, and titrate up if tolerated by BP/renal function  - Continue Metoprolol tartrate 50 mg BID  - Fluid restriction at 1800 ml  - Follow up with Cardiology after discharge from TCU    # Acute hypoxic hypercarbic respiratory failure; Respiratory acidosis; Obesity hypoventilation with chronic CO2 retention on blood gas: CARLOS; Metabolic encephalopathy   Worsening mental status day after admission in acute hospital with somnolence.  ABG with CO2 over 100.  Started on BiPAP with stabilization of gas but continued to have mentation that wax and wane. Transferred to to ICU where she required a brief period of intubation.  She was subsequently transitioned to nasal cannula with BiPAP/APAP's at night.  Continues to be high risk for CO2 retaining given obesity and CARLOS. Should follow up with pulmonary once discharged to ensure monitoring and optimization.   Home AVAPS, pmax25, plow 12, epap 8, RR 12, TV 600cc. Baseline Co2 on VBG  in the 80's  Currently on 4 liters of oxygen continous   - Patinet to wear BiPAP at all times when lying down  - Wean off oxygen as able  - Follow up with Pulmonology after discharge from TCU     #  JASON, resolved; Post-ATN diuresis  Baseline cr  0.8, peaked at 1.5 w/in 24hrs after presentation in setting of volume overload. Aggressively diuresed. Suspect initial JASON 2/2 cardiorenal. Cr continued to improve but developed post ATN diuresis night of 1/18, large uop w/ rise in bicarb 2/2 contraction alkalosis. Subsequently went back to JASON with 4 mg Bumex. Bumetanide was  held for 1 days and resumed at 3 mg  Renal function has normalized, and stable. Creatinine level 0.89 on 02/06  - Continue to monitor renal function every M Th      #  Paroxysmal atrial fibrillation; nonsustained SVT, resolved  EKG on admission was in NSR but noted Afib back in 2016. Has been anticoagulated with warfarin. Rate controlled with metoprolol succinate. On warfarin.   INR at 1.87 on 02/06  - Continue Warfarin. Discussed with [facility] to adjust dose  - Continue Metoprolol tartrate 50 mg BID     # Sepsis, resolved  Hypertensive on arrival w/ neg procal. BP stable since transfer. lactat neg, slight leukocytosis. CXR neg for focal infiltrate, increased interstitial marking likely 2/2 fluid overload, but w/ URI prodrome days pta, will treat empirically for atypical pna. Flu and RSV neg. Received vancx1 and cefrtriaxone x1. Urine cx NGTD, blood cx + diphtheroid species after 5 days of incubation, suspect contaminant      # DM 2: She was  hyperglycemic during hospitalization.  But NPO during most of the time after transfer while on bipap and intubated. Has had some hypoglcemic episodes into the 60s. Likely due to lack of po intake. Stable after extubation.  - Continue Sitagliptin     #  Gout: stable  - continue Allopurinol 200 mg po qday      #  Twitches:  Patient has intermittent jerky twitches of her upper and lower extremities. This appeared very much like gabapentin toxicityPatient was also in JASON during this time. Twitches resolved with holding Gabapentin and correcting renal function  Gabapentin was resumed at 300 mg BID ( as opposed to TID)  - Continue Gabapentin 300 mg BID    # Vaginal  bleeding: Per patient ongoing for couple of weeks. No external lesion noted  Hg and Hemodynamics have been stable. Etiology broad including malignancy  - Gyn follow up in clinic    # Hyperlipidemia: PTA on Atorvastatin  - Continue     # Hx of MAG: PTA on Ferrous gluconate  - Continue     # Intertrigo groin: On Miconazole powder BID  - Continue        # Diet: Snacks/Supplements Adult: Boost Glucose Control; With Meals  2 Gram Sodium Diet  Fluid restriction 1800 ML FLUID    # DVT Prophylaxis: Warfarin  # Baig Catheter: not present  # Code Status: Full Code          Disposition Plan   Expected discharge TBD, depending on PT/OT   Entered: Mamadou James 02/06/2020, 11:04 AM            Pt's care was discussed with bedside RN, patient and  during Care Team Rounds.               Mamadou James MD  Hospitalist ( Internal medicine)  Pager: [phone]

## 2020-02-06 NOTE — PLAN OF CARE
OT: Pt finishing breakfast and leaving for x-ray during scheduled OT session. Plan to continue with OT tomorrow.

## 2020-02-06 NOTE — PROVIDER NOTIFICATION
Received call from lab, pt is positive for influenza A. Provider Sonia De La Cruz notified who contacted Dr. James. Pt was already placed on droplet precautions earlier today. Awaiting any new orders.

## 2020-02-06 NOTE — PLAN OF CARE
Patient alert and oriented x4, able to make needs known. Complaints of congestion, sore throat and cough. X-ray completed this afternoon, see note. Strep test and respiratory panel culture completed, awaiting results. Ordered to get sputum test completed, specimen container in room for pt, unable to cough up sputum at this time. On oxygen NC 4L, SOB noted due to congestion. Stats remained  >90. PRN nebulizer given this afternoon. Vaginal bleeding still present. Urgency up to commode. Assist of 1 in room. PRN robotussin, cepacal drop, and tylenol given for throat pain and cough. No other concerns.     Lab work results returned, positive for Influenza A. Charge and MD notified.

## 2020-02-06 NOTE — PLAN OF CARE
PT: pt states feeling worse this afternoon, more congested; but willing to amb in room--went 45 ft which made her more fatigued. Moved down to 2L O2 but dropped to 86% so went back to 3L then stayed above 90 %. Per MD chest xray looked good other than some fluid that was there prior; awaiting some more results for viruses. Shortened session due to pt not feeling as well

## 2020-02-07 ENCOUNTER — APPOINTMENT (OUTPATIENT)
Dept: OCCUPATIONAL THERAPY | Facility: SKILLED NURSING FACILITY | Age: 60
End: 2020-02-07
Payer: MEDICARE

## 2020-02-07 ENCOUNTER — APPOINTMENT (OUTPATIENT)
Dept: PHYSICAL THERAPY | Facility: SKILLED NURSING FACILITY | Age: 60
End: 2020-02-07
Payer: MEDICARE

## 2020-02-07 LAB — INR PPP: 1.92 (ref 0.86–1.14)

## 2020-02-07 PROCEDURE — 25000128 H RX IP 250 OP 636: Performed by: INTERNAL MEDICINE

## 2020-02-07 PROCEDURE — 25000132 ZZH RX MED GY IP 250 OP 250 PS 637: Mod: GY | Performed by: NURSE PRACTITIONER

## 2020-02-07 PROCEDURE — 25000132 ZZH RX MED GY IP 250 OP 250 PS 637: Mod: GY | Performed by: INTERNAL MEDICINE

## 2020-02-07 PROCEDURE — 85610 PROTHROMBIN TIME: CPT | Performed by: INTERNAL MEDICINE

## 2020-02-07 PROCEDURE — 97110 THERAPEUTIC EXERCISES: CPT | Mod: GP | Performed by: PHYSICAL THERAPIST

## 2020-02-07 PROCEDURE — 97535 SELF CARE MNGMENT TRAINING: CPT | Mod: GO | Performed by: OCCUPATIONAL THERAPIST

## 2020-02-07 PROCEDURE — 12000022 ZZH R&B SNF

## 2020-02-07 PROCEDURE — 97110 THERAPEUTIC EXERCISES: CPT | Mod: GO | Performed by: OCCUPATIONAL THERAPIST

## 2020-02-07 PROCEDURE — 36415 COLL VENOUS BLD VENIPUNCTURE: CPT | Performed by: INTERNAL MEDICINE

## 2020-02-07 PROCEDURE — 97530 THERAPEUTIC ACTIVITIES: CPT | Mod: GP | Performed by: PHYSICAL THERAPIST

## 2020-02-07 RX ORDER — WARFARIN SODIUM 3 MG/1
3 TABLET ORAL
Status: COMPLETED | OUTPATIENT
Start: 2020-02-07 | End: 2020-02-07

## 2020-02-07 RX ADMIN — OSELTAMIVIR PHOSPHATE 75 MG: 75 CAPSULE ORAL at 20:36

## 2020-02-07 RX ADMIN — MICONAZOLE NITRATE: 20 POWDER TOPICAL at 09:20

## 2020-02-07 RX ADMIN — THERA TABS 1 TABLET: TAB at 09:19

## 2020-02-07 RX ADMIN — OSELTAMIVIR PHOSPHATE 75 MG: 75 CAPSULE ORAL at 09:20

## 2020-02-07 RX ADMIN — FERROUS GLUCONATE 324 MG: 324 TABLET ORAL at 13:36

## 2020-02-07 RX ADMIN — MICONAZOLE NITRATE: 20 POWDER TOPICAL at 20:36

## 2020-02-07 RX ADMIN — ONDANSETRON 4 MG: 4 TABLET, ORALLY DISINTEGRATING ORAL at 22:25

## 2020-02-07 RX ADMIN — ATORVASTATIN CALCIUM 20 MG: 20 TABLET, FILM COATED ORAL at 20:36

## 2020-02-07 RX ADMIN — LISINOPRIL 5 MG: 2.5 TABLET ORAL at 09:19

## 2020-02-07 RX ADMIN — FERROUS GLUCONATE 324 MG: 324 TABLET ORAL at 20:36

## 2020-02-07 RX ADMIN — BUMETANIDE 3 MG: 1 TABLET ORAL at 09:20

## 2020-02-07 RX ADMIN — METOPROLOL TARTRATE 50 MG: 25 TABLET ORAL at 20:36

## 2020-02-07 RX ADMIN — ACETAMINOPHEN 1000 MG: 500 TABLET ORAL at 00:28

## 2020-02-07 RX ADMIN — GABAPENTIN 300 MG: 300 CAPSULE ORAL at 20:36

## 2020-02-07 RX ADMIN — GABAPENTIN 300 MG: 300 CAPSULE ORAL at 09:20

## 2020-02-07 RX ADMIN — METOPROLOL TARTRATE 50 MG: 25 TABLET ORAL at 09:20

## 2020-02-07 RX ADMIN — CYCLOBENZAPRINE HYDROCHLORIDE 5 MG: 5 TABLET, FILM COATED ORAL at 20:44

## 2020-02-07 RX ADMIN — BENZOCAINE AND MENTHOL 1 LOZENGE: 15; 3.6 LOZENGE ORAL at 09:19

## 2020-02-07 RX ADMIN — FERROUS GLUCONATE 324 MG: 324 TABLET ORAL at 09:20

## 2020-02-07 RX ADMIN — SITAGLIPTIN 50 MG: 50 TABLET, FILM COATED ORAL at 09:19

## 2020-02-07 RX ADMIN — ALLOPURINOL 200 MG: 100 TABLET ORAL at 09:19

## 2020-02-07 RX ADMIN — WARFARIN SODIUM 3 MG: 3 TABLET ORAL at 17:26

## 2020-02-07 RX ADMIN — GUAIFENESIN AND DEXTROMETHORPHAN 10 ML: 100; 10 SYRUP ORAL at 20:44

## 2020-02-07 RX ADMIN — GUAIFENESIN AND DEXTROMETHORPHAN 10 ML: 100; 10 SYRUP ORAL at 13:49

## 2020-02-07 NOTE — PLAN OF CARE
PT: Pt still feels congested, but was able to participate with PT for ambulation training for CP endurance, strengthening.  Pt needing rest breaks seated, on 3 L O2/min, sats 96% and above with activity.  Pt on droplet precautions, wearing mask out of room in laws with ambulation. Pt amb 60 ft x 2 in laws, and within room with standing and short dist ambulation activities. Cont to progress as able.

## 2020-02-07 NOTE — PLAN OF CARE
Patient alert and able to make needs needs known, verbalized feeling frustrated with current changes in her health regarding flu and heart, is coughing infrequently, patient requested for and took PRN Tylenol, Bi-pap on and patient was able to fall sleep, call light is within reach, continue to monitor.

## 2020-02-07 NOTE — PLAN OF CARE
RN: Denies pain.  Cough continues, prn robitussin effective control.  Tamiflu given per order.  Droplet and contact precautions maintained.  Pt plans to discharge to home on Tuesday. No resp distress.

## 2020-02-07 NOTE — PLAN OF CARE
Pt A&Ox4, able to make needs known. Positive for influenza A, on droplet precautions. Denies chest pain, body aches and chills. Afebrile. SOB on exertion. When transferring from chair to bed, pt O2 sat went down to low 80s. Once in bed sats increased to 96 on 5 L of O2 per NC. Pt O2 sats at rest in mid 90s. Pt requested PRN neb. Complaining of nasal congestion and cough. Gave robitussin x2 for cough, tylenol x1 and flexeril at bedtime. Wears bipap when sleeping. Sputum sample sent to lab. Ax1 ww and gait belt to [facility]. Call light within reach, will continue to monitor.

## 2020-02-07 NOTE — PROGRESS NOTES
Chart reviewed  X ray chest shows some pulmonary edema (not new)  Started on Oseltamivir for Influenza A on 02/06  Continue current plan of care    Mamadou James  Internal Medicine  Hospitalist  Pager no: [phone]

## 2020-02-07 NOTE — PLAN OF CARE
Discharge Planner OT   Patient plan for discharge: not discussed at time of session  Current status: pt. Indep. After setup with UB cares. ModA with LB. CGA/SBA with bed-chair transf. Using FWW for standing/amb/transf.  Barriers to return to prior living situation: below baseline with ADLs, activity madalyn., mobility  Recommendations for discharge: home with A; HHC/OP services prn at time of discharge   Rationale for recommendations: to max. Safety/indpe. With ADLs/mobility in home/community setting.       Entered by: Dori Foley 02/07/2020 9:35 AM

## 2020-02-08 ENCOUNTER — APPOINTMENT (OUTPATIENT)
Dept: PHYSICAL THERAPY | Facility: SKILLED NURSING FACILITY | Age: 60
End: 2020-02-08
Payer: MEDICARE

## 2020-02-08 LAB
BACTERIA SPEC CULT: ABNORMAL
BACTERIA SPEC CULT: ABNORMAL
BACTERIA SPEC CULT: NORMAL
INR PPP: 2 (ref 0.86–1.14)
Lab: NORMAL
SPECIMEN SOURCE: ABNORMAL
SPECIMEN SOURCE: NORMAL

## 2020-02-08 PROCEDURE — 97110 THERAPEUTIC EXERCISES: CPT | Mod: GP

## 2020-02-08 PROCEDURE — 25000132 ZZH RX MED GY IP 250 OP 250 PS 637: Mod: GY | Performed by: INTERNAL MEDICINE

## 2020-02-08 PROCEDURE — 36415 COLL VENOUS BLD VENIPUNCTURE: CPT | Performed by: INTERNAL MEDICINE

## 2020-02-08 PROCEDURE — 97530 THERAPEUTIC ACTIVITIES: CPT | Mod: GP | Performed by: PHYSICAL THERAPIST

## 2020-02-08 PROCEDURE — 25000132 ZZH RX MED GY IP 250 OP 250 PS 637: Mod: GY | Performed by: NURSE PRACTITIONER

## 2020-02-08 PROCEDURE — 12000022 ZZH R&B SNF

## 2020-02-08 PROCEDURE — 97110 THERAPEUTIC EXERCISES: CPT | Mod: GP | Performed by: PHYSICAL THERAPIST

## 2020-02-08 PROCEDURE — 85610 PROTHROMBIN TIME: CPT | Performed by: INTERNAL MEDICINE

## 2020-02-08 PROCEDURE — 97530 THERAPEUTIC ACTIVITIES: CPT | Mod: GP

## 2020-02-08 RX ORDER — SODIUM CHLORIDE 9 MG/ML
INJECTION, SOLUTION INTRAVENOUS CONTINUOUS
Status: DISPENSED | OUTPATIENT
Start: 2020-02-08 | End: 2020-02-09

## 2020-02-08 RX ORDER — WARFARIN SODIUM 3 MG/1
3 TABLET ORAL
Status: COMPLETED | OUTPATIENT
Start: 2020-02-08 | End: 2020-02-08

## 2020-02-08 RX ADMIN — FERROUS GLUCONATE 324 MG: 324 TABLET ORAL at 15:09

## 2020-02-08 RX ADMIN — GUAIFENESIN AND DEXTROMETHORPHAN 10 ML: 100; 10 SYRUP ORAL at 15:09

## 2020-02-08 RX ADMIN — GUAIFENESIN AND DEXTROMETHORPHAN 10 ML: 100; 10 SYRUP ORAL at 08:13

## 2020-02-08 RX ADMIN — METOPROLOL TARTRATE 50 MG: 25 TABLET ORAL at 20:52

## 2020-02-08 RX ADMIN — OSELTAMIVIR PHOSPHATE 75 MG: 75 CAPSULE ORAL at 20:52

## 2020-02-08 RX ADMIN — ALLOPURINOL 200 MG: 100 TABLET ORAL at 08:13

## 2020-02-08 RX ADMIN — SITAGLIPTIN 50 MG: 50 TABLET, FILM COATED ORAL at 08:14

## 2020-02-08 RX ADMIN — BUMETANIDE 3 MG: 1 TABLET ORAL at 08:13

## 2020-02-08 RX ADMIN — ACETAMINOPHEN 1000 MG: 500 TABLET ORAL at 03:00

## 2020-02-08 RX ADMIN — LISINOPRIL 5 MG: 2.5 TABLET ORAL at 08:13

## 2020-02-08 RX ADMIN — GABAPENTIN 300 MG: 300 CAPSULE ORAL at 08:13

## 2020-02-08 RX ADMIN — MICONAZOLE NITRATE: 20 POWDER TOPICAL at 08:14

## 2020-02-08 RX ADMIN — OSELTAMIVIR PHOSPHATE 75 MG: 75 CAPSULE ORAL at 08:13

## 2020-02-08 RX ADMIN — THERA TABS 1 TABLET: TAB at 08:13

## 2020-02-08 RX ADMIN — MICONAZOLE NITRATE: 20 POWDER TOPICAL at 20:55

## 2020-02-08 RX ADMIN — CYCLOBENZAPRINE HYDROCHLORIDE 5 MG: 5 TABLET, FILM COATED ORAL at 21:00

## 2020-02-08 RX ADMIN — ACETAMINOPHEN 1000 MG: 500 TABLET ORAL at 15:09

## 2020-02-08 RX ADMIN — ATORVASTATIN CALCIUM 20 MG: 20 TABLET, FILM COATED ORAL at 20:52

## 2020-02-08 RX ADMIN — BENZOCAINE AND MENTHOL 1 LOZENGE: 15; 3.6 LOZENGE ORAL at 08:13

## 2020-02-08 RX ADMIN — FERROUS GLUCONATE 324 MG: 324 TABLET ORAL at 08:14

## 2020-02-08 RX ADMIN — FERROUS GLUCONATE 324 MG: 324 TABLET ORAL at 20:52

## 2020-02-08 RX ADMIN — METOPROLOL TARTRATE 50 MG: 25 TABLET ORAL at 08:13

## 2020-02-08 RX ADMIN — WARFARIN SODIUM 3 MG: 3 TABLET ORAL at 17:49

## 2020-02-08 NOTE — PLAN OF CARE
Patient alert and oriented x4, able to make needs known. No complaints of pain, SOB, chest pain or nausea. Congested, cough, and sore throat. Positive for Influenza A. Tamiflu given this evening. Up with assist of one. Good appetite. BIPAP in bed. 5L oxygen per NC when sitting up in chair, stats remained >90. No complaints of SOB. Will continue to monitor.

## 2020-02-08 NOTE — PLAN OF CARE
PT: pt not tolerating much activity due to flu--did sit on EOB 8 minutes with ex. Initial plan was for home 2/10 but will have to see if medically and physically able. Pt is concerned her sister/niece will be upset if she changes ride arrangements--unclear if she has already set it up with them.

## 2020-02-08 NOTE — PLAN OF CARE
RN: OMKAR knee pain comfortably managed with Tylenol 1000 mg tab x 1 this shift.A of 1-  to use bed pan. Staff assisted with shantel-cares. Also got up for weight with A of 1. Tolerated well without O2 supplement. Wears BIPAP when sleeping.   + Cough noted when awake but pt claimed it has gotten better. Droplet Precaution maintained d/t Flu. On Tamiflu. Appear to be sleeping/resting between cares.

## 2020-02-08 NOTE — PLAN OF CARE
RN: New order to wean oxygen down. Pt using 5 liters oxygen nasal cannula yesterday and 3 liters nasal cannula oxygen today, no respiratory distress noted, will continue to monitor. Continues on contact and droplet precautions and receiving tamiflu.

## 2020-02-09 ENCOUNTER — HOSPITAL ENCOUNTER (INPATIENT)
Facility: CLINIC | Age: 60
LOS: 6 days | Discharge: NURSING, REHAB OR LONG-TERM CARE | DRG: 682 | End: 2020-02-15
Attending: HOSPITALIST | Admitting: INTERNAL MEDICINE
Payer: MEDICARE

## 2020-02-09 VITALS
RESPIRATION RATE: 16 BRPM | TEMPERATURE: 96.8 F | HEART RATE: 66 BPM | OXYGEN SATURATION: 93 % | WEIGHT: 293 LBS | DIASTOLIC BLOOD PRESSURE: 70 MMHG | SYSTOLIC BLOOD PRESSURE: 126 MMHG | BODY MASS INDEX: 67.3 KG/M2

## 2020-02-09 DIAGNOSIS — M10.9 GOUT, UNSPECIFIED CAUSE, UNSPECIFIED CHRONICITY, UNSPECIFIED SITE: ICD-10-CM

## 2020-02-09 DIAGNOSIS — I50.21 ACUTE SYSTOLIC CONGESTIVE HEART FAILURE (H): ICD-10-CM

## 2020-02-09 DIAGNOSIS — E87.6 HYPOKALEMIA: ICD-10-CM

## 2020-02-09 DIAGNOSIS — E11.9 TYPE 2 DIABETES, HBA1C GOAL < 7% (H): Primary | ICD-10-CM

## 2020-02-09 PROBLEM — N17.9 ACUTE KIDNEY INJURY (H): Status: ACTIVE | Noted: 2020-02-09

## 2020-02-09 LAB
ALBUMIN UR-MCNC: 30 MG/DL
ANION GAP SERPL CALCULATED.3IONS-SCNC: 1 MMOL/L (ref 3–14)
ANION GAP SERPL CALCULATED.3IONS-SCNC: 2 MMOL/L (ref 3–14)
APPEARANCE UR: ABNORMAL
BILIRUB UR QL STRIP: NEGATIVE
BUN SERPL-MCNC: 29 MG/DL (ref 7–30)
BUN SERPL-MCNC: 32 MG/DL (ref 7–30)
CALCIUM SERPL-MCNC: 8.5 MG/DL (ref 8.5–10.1)
CALCIUM SERPL-MCNC: 8.5 MG/DL (ref 8.5–10.1)
CHLORIDE SERPL-SCNC: 93 MMOL/L (ref 94–109)
CHLORIDE SERPL-SCNC: 93 MMOL/L (ref 94–109)
CO2 SERPL-SCNC: 39 MMOL/L (ref 20–32)
CO2 SERPL-SCNC: 41 MMOL/L (ref 20–32)
COLOR UR AUTO: ABNORMAL
CREAT SERPL-MCNC: 1.78 MG/DL (ref 0.52–1.04)
CREAT SERPL-MCNC: 2.06 MG/DL (ref 0.52–1.04)
CREAT UR-MCNC: 313 MG/DL
FRACT EXCRET NA UR+SERPL-RTO: <0.1 %
GFR SERPL CREATININE-BSD FRML MDRD: 25 ML/MIN/{1.73_M2}
GFR SERPL CREATININE-BSD FRML MDRD: 30 ML/MIN/{1.73_M2}
GLUCOSE BLDC GLUCOMTR-MCNC: 101 MG/DL (ref 70–99)
GLUCOSE SERPL-MCNC: 104 MG/DL (ref 70–99)
GLUCOSE SERPL-MCNC: 85 MG/DL (ref 70–99)
GLUCOSE UR STRIP-MCNC: NEGATIVE MG/DL
HGB UR QL STRIP: ABNORMAL
INR PPP: 2.3 (ref 0.86–1.14)
KETONES UR STRIP-MCNC: NEGATIVE MG/DL
LEUKOCYTE ESTERASE UR QL STRIP: ABNORMAL
NITRATE UR QL: NEGATIVE
NT-PROBNP SERPL-MCNC: 7473 PG/ML (ref 0–900)
PH UR STRIP: 5 PH (ref 5–7)
PLATELET # BLD AUTO: 139 10E9/L (ref 150–450)
POTASSIUM SERPL-SCNC: 4.7 MMOL/L (ref 3.4–5.3)
POTASSIUM SERPL-SCNC: 4.8 MMOL/L (ref 3.4–5.3)
RBC #/AREA URNS AUTO: >182 /HPF (ref 0–2)
SODIUM SERPL-SCNC: 134 MMOL/L (ref 133–144)
SODIUM SERPL-SCNC: 135 MMOL/L (ref 133–144)
SODIUM UR-SCNC: 11 MMOL/L
SODIUM UR-SCNC: 11 MMOL/L
SODIUM UR-SCNC: 7 MMOL/L
SOURCE: ABNORMAL
SP GR UR STRIP: 1.02 (ref 1–1.03)
SQUAMOUS #/AREA URNS AUTO: 6 /HPF (ref 0–1)
UROBILINOGEN UR STRIP-MCNC: NORMAL MG/DL (ref 0–2)
WBC #/AREA URNS AUTO: 51 /HPF (ref 0–5)

## 2020-02-09 PROCEDURE — 84300 ASSAY OF URINE SODIUM: CPT | Performed by: PHYSICIAN ASSISTANT

## 2020-02-09 PROCEDURE — 99207 ZZC APP CREDIT; MD BILLING SHARED VISIT: CPT | Performed by: PHYSICIAN ASSISTANT

## 2020-02-09 PROCEDURE — 25000132 ZZH RX MED GY IP 250 OP 250 PS 637: Mod: GY | Performed by: INTERNAL MEDICINE

## 2020-02-09 PROCEDURE — 25800030 ZZH RX IP 258 OP 636: Performed by: INTERNAL MEDICINE

## 2020-02-09 PROCEDURE — 36415 COLL VENOUS BLD VENIPUNCTURE: CPT | Performed by: INTERNAL MEDICINE

## 2020-02-09 PROCEDURE — 12000001 ZZH R&B MED SURG/OB UMMC

## 2020-02-09 PROCEDURE — 81001 URINALYSIS AUTO W/SCOPE: CPT | Performed by: PHYSICIAN ASSISTANT

## 2020-02-09 PROCEDURE — 80048 BASIC METABOLIC PNL TOTAL CA: CPT | Performed by: INTERNAL MEDICINE

## 2020-02-09 PROCEDURE — 99316 NF DSCHRG MGMT 30 MIN+: CPT | Performed by: INTERNAL MEDICINE

## 2020-02-09 PROCEDURE — 94660 CPAP INITIATION&MGMT: CPT

## 2020-02-09 PROCEDURE — 83880 ASSAY OF NATRIURETIC PEPTIDE: CPT | Performed by: INTERNAL MEDICINE

## 2020-02-09 PROCEDURE — 85049 AUTOMATED PLATELET COUNT: CPT | Performed by: PHYSICIAN ASSISTANT

## 2020-02-09 PROCEDURE — 25000132 ZZH RX MED GY IP 250 OP 250 PS 637: Mod: GY | Performed by: PHYSICIAN ASSISTANT

## 2020-02-09 PROCEDURE — 25800030 ZZH RX IP 258 OP 636: Performed by: PHYSICIAN ASSISTANT

## 2020-02-09 PROCEDURE — 82570 ASSAY OF URINE CREATININE: CPT | Performed by: INTERNAL MEDICINE

## 2020-02-09 PROCEDURE — 00000146 ZZHCL STATISTIC GLUCOSE BY METER IP

## 2020-02-09 PROCEDURE — 84300 ASSAY OF URINE SODIUM: CPT | Performed by: INTERNAL MEDICINE

## 2020-02-09 PROCEDURE — 85610 PROTHROMBIN TIME: CPT | Performed by: INTERNAL MEDICINE

## 2020-02-09 PROCEDURE — 87086 URINE CULTURE/COLONY COUNT: CPT | Performed by: INTERNAL MEDICINE

## 2020-02-09 PROCEDURE — 25000128 H RX IP 250 OP 636: Performed by: PHYSICIAN ASSISTANT

## 2020-02-09 PROCEDURE — 40000275 ZZH STATISTIC RCP TIME EA 10 MIN

## 2020-02-09 PROCEDURE — 36415 COLL VENOUS BLD VENIPUNCTURE: CPT | Performed by: PHYSICIAN ASSISTANT

## 2020-02-09 PROCEDURE — 99223 1ST HOSP IP/OBS HIGH 75: CPT | Mod: AI | Performed by: INTERNAL MEDICINE

## 2020-02-09 RX ORDER — ALUMINA, MAGNESIA, AND SIMETHICONE 2400; 2400; 240 MG/30ML; MG/30ML; MG/30ML
15 SUSPENSION ORAL EVERY 4 HOURS PRN
Status: ON HOLD | DISCHARGE
Start: 2020-02-09 | End: 2020-02-21

## 2020-02-09 RX ORDER — POTASSIUM CHLORIDE 7.45 MG/ML
10 INJECTION INTRAVENOUS
DISCHARGE
Start: 2020-02-09 | End: 2020-02-09

## 2020-02-09 RX ORDER — SODIUM CHLORIDE 9 MG/ML
INJECTION, SOLUTION INTRAVENOUS
Status: DISCONTINUED
Start: 2020-02-09 | End: 2020-02-09 | Stop reason: WASHOUT

## 2020-02-09 RX ORDER — OSELTAMIVIR PHOSPHATE 30 MG/1
30 CAPSULE ORAL DAILY
Status: DISCONTINUED | OUTPATIENT
Start: 2020-02-10 | End: 2020-02-10

## 2020-02-09 RX ORDER — SODIUM CHLORIDE 9 MG/ML
INJECTION, SOLUTION INTRAVENOUS CONTINUOUS
Status: DISCONTINUED | OUTPATIENT
Start: 2020-02-09 | End: 2020-02-10

## 2020-02-09 RX ORDER — NALOXONE HYDROCHLORIDE 0.4 MG/ML
.1-.4 INJECTION, SOLUTION INTRAMUSCULAR; INTRAVENOUS; SUBCUTANEOUS
Status: DISCONTINUED | OUTPATIENT
Start: 2020-02-09 | End: 2020-02-15 | Stop reason: HOSPADM

## 2020-02-09 RX ORDER — SENNOSIDES 8.6 MG
1 TABLET ORAL 2 TIMES DAILY PRN
Status: ON HOLD | DISCHARGE
Start: 2020-02-09 | End: 2020-02-21

## 2020-02-09 RX ORDER — CYCLOBENZAPRINE HCL 5 MG
5 TABLET ORAL
Status: DISCONTINUED | OUTPATIENT
Start: 2020-02-09 | End: 2020-02-15 | Stop reason: HOSPADM

## 2020-02-09 RX ORDER — ATORVASTATIN CALCIUM 20 MG/1
20 TABLET, FILM COATED ORAL EVERY EVENING
Status: DISCONTINUED | OUTPATIENT
Start: 2020-02-09 | End: 2020-02-15 | Stop reason: HOSPADM

## 2020-02-09 RX ORDER — MULTIVITAMIN,THERAPEUTIC
1 TABLET ORAL DAILY
Status: DISCONTINUED | OUTPATIENT
Start: 2020-02-10 | End: 2020-02-15 | Stop reason: HOSPADM

## 2020-02-09 RX ORDER — ONDANSETRON 4 MG/1
4 TABLET, ORALLY DISINTEGRATING ORAL EVERY 6 HOURS PRN
Status: DISCONTINUED | OUTPATIENT
Start: 2020-02-09 | End: 2020-02-15 | Stop reason: HOSPADM

## 2020-02-09 RX ORDER — SODIUM CHLORIDE 9 MG/ML
INJECTION, SOLUTION INTRAVENOUS CONTINUOUS
Status: ACTIVE | OUTPATIENT
Start: 2020-02-09 | End: 2020-02-09

## 2020-02-09 RX ORDER — DEXTROSE MONOHYDRATE 25 G/50ML
25-50 INJECTION, SOLUTION INTRAVENOUS
Status: DISCONTINUED | OUTPATIENT
Start: 2020-02-09 | End: 2020-02-15 | Stop reason: HOSPADM

## 2020-02-09 RX ORDER — SODIUM CHLORIDE 9 MG/ML
INJECTION, SOLUTION INTRAVENOUS CONTINUOUS
Status: DISCONTINUED | OUTPATIENT
Start: 2020-02-09 | End: 2020-02-09

## 2020-02-09 RX ORDER — POTASSIUM CL/LIDO/0.9 % NACL 10MEQ/0.1L
10 INTRAVENOUS SOLUTION, PIGGYBACK (ML) INTRAVENOUS
DISCHARGE
Start: 2020-02-09 | End: 2020-02-09

## 2020-02-09 RX ORDER — GUAIFENESIN/DEXTROMETHORPHAN 100-10MG/5
10 SYRUP ORAL EVERY 4 HOURS PRN
Status: DISCONTINUED | OUTPATIENT
Start: 2020-02-09 | End: 2020-02-15 | Stop reason: HOSPADM

## 2020-02-09 RX ORDER — ONDANSETRON 4 MG/1
4 TABLET, ORALLY DISINTEGRATING ORAL EVERY 6 HOURS PRN
Status: ON HOLD | DISCHARGE
Start: 2020-02-09 | End: 2020-02-21

## 2020-02-09 RX ORDER — ACETAMINOPHEN 500 MG
500-1000 TABLET ORAL EVERY 6 HOURS PRN
Status: DISCONTINUED | OUTPATIENT
Start: 2020-02-09 | End: 2020-02-15 | Stop reason: HOSPADM

## 2020-02-09 RX ORDER — ALUMINA, MAGNESIA, AND SIMETHICONE 2400; 2400; 240 MG/30ML; MG/30ML; MG/30ML
15 SUSPENSION ORAL EVERY 4 HOURS PRN
Status: DISCONTINUED | OUTPATIENT
Start: 2020-02-09 | End: 2020-02-15 | Stop reason: HOSPADM

## 2020-02-09 RX ORDER — MULTIVITAMIN,THERAPEUTIC
1 TABLET ORAL DAILY
Status: ON HOLD | DISCHARGE
Start: 2020-02-10 | End: 2020-02-21

## 2020-02-09 RX ORDER — WARFARIN SODIUM 3 MG/1
3 TABLET ORAL
Status: COMPLETED | OUTPATIENT
Start: 2020-02-09 | End: 2020-02-09

## 2020-02-09 RX ORDER — OSELTAMIVIR PHOSPHATE 30 MG/1
30 CAPSULE ORAL DAILY
Status: ON HOLD | DISCHARGE
Start: 2020-02-09 | End: 2020-02-15

## 2020-02-09 RX ORDER — FERROUS GLUCONATE 324(38)MG
324 TABLET ORAL 3 TIMES DAILY
Status: DISCONTINUED | OUTPATIENT
Start: 2020-02-09 | End: 2020-02-15 | Stop reason: HOSPADM

## 2020-02-09 RX ORDER — GUAIFENESIN/DEXTROMETHORPHAN 100-10MG/5
10 SYRUP ORAL EVERY 4 HOURS PRN
Status: ON HOLD | DISCHARGE
Start: 2020-02-09 | End: 2020-02-15

## 2020-02-09 RX ORDER — NICOTINE POLACRILEX 4 MG
15-30 LOZENGE BUCCAL
Status: DISCONTINUED | OUTPATIENT
Start: 2020-02-09 | End: 2020-02-15 | Stop reason: HOSPADM

## 2020-02-09 RX ORDER — POLYETHYLENE GLYCOL 3350 17 G/17G
17 POWDER, FOR SOLUTION ORAL DAILY PRN
Status: DISCONTINUED | OUTPATIENT
Start: 2020-02-09 | End: 2020-02-15 | Stop reason: HOSPADM

## 2020-02-09 RX ORDER — METOPROLOL TARTRATE 50 MG
50 TABLET ORAL 2 TIMES DAILY
Status: ON HOLD | DISCHARGE
Start: 2020-02-09 | End: 2020-02-21

## 2020-02-09 RX ORDER — IPRATROPIUM BROMIDE AND ALBUTEROL SULFATE 2.5; .5 MG/3ML; MG/3ML
3 SOLUTION RESPIRATORY (INHALATION) EVERY 4 HOURS PRN
Status: ON HOLD | DISCHARGE
Start: 2020-02-09 | End: 2020-02-21

## 2020-02-09 RX ORDER — ATORVASTATIN CALCIUM 20 MG/1
20 TABLET, FILM COATED ORAL EVERY EVENING
Status: ON HOLD | DISCHARGE
Start: 2020-02-09 | End: 2020-02-21

## 2020-02-09 RX ORDER — METOPROLOL TARTRATE 50 MG
50 TABLET ORAL 2 TIMES DAILY
Status: DISCONTINUED | OUTPATIENT
Start: 2020-02-09 | End: 2020-02-15 | Stop reason: HOSPADM

## 2020-02-09 RX ORDER — SENNOSIDES 8.6 MG
1 TABLET ORAL 2 TIMES DAILY PRN
Status: DISCONTINUED | OUTPATIENT
Start: 2020-02-09 | End: 2020-02-15 | Stop reason: HOSPADM

## 2020-02-09 RX ORDER — FERROUS GLUCONATE 324(38)MG
324 TABLET ORAL 3 TIMES DAILY
Status: ON HOLD | DISCHARGE
Start: 2020-02-09 | End: 2020-02-21

## 2020-02-09 RX ORDER — LIDOCAINE 40 MG/G
CREAM TOPICAL
Status: DISCONTINUED | OUTPATIENT
Start: 2020-02-09 | End: 2020-02-15 | Stop reason: HOSPADM

## 2020-02-09 RX ORDER — POTASSIUM CHLORIDE 29.8 MG/ML
20 INJECTION INTRAVENOUS
DISCHARGE
Start: 2020-02-09 | End: 2020-02-09

## 2020-02-09 RX ORDER — HEPARIN SODIUM 5000 [USP'U]/.5ML
5000 INJECTION, SOLUTION INTRAVENOUS; SUBCUTANEOUS EVERY 12 HOURS
Status: DISCONTINUED | OUTPATIENT
Start: 2020-02-09 | End: 2020-02-10

## 2020-02-09 RX ORDER — OSELTAMIVIR PHOSPHATE 30 MG/1
30 CAPSULE ORAL DAILY
Status: DISCONTINUED | OUTPATIENT
Start: 2020-02-09 | End: 2020-02-09 | Stop reason: HOSPADM

## 2020-02-09 RX ORDER — POTASSIUM CHLORIDE 1.5 G/1.58G
20-40 POWDER, FOR SOLUTION ORAL
Status: ON HOLD | DISCHARGE
Start: 2020-02-09 | End: 2020-02-15

## 2020-02-09 RX ORDER — IPRATROPIUM BROMIDE AND ALBUTEROL SULFATE 2.5; .5 MG/3ML; MG/3ML
3 SOLUTION RESPIRATORY (INHALATION) EVERY 4 HOURS PRN
Status: DISCONTINUED | OUTPATIENT
Start: 2020-02-09 | End: 2020-02-15 | Stop reason: HOSPADM

## 2020-02-09 RX ORDER — SODIUM CHLORIDE AND POTASSIUM CHLORIDE 150; 900 MG/100ML; MG/100ML
INJECTION, SOLUTION INTRAVENOUS CONTINUOUS
Status: DISCONTINUED | OUTPATIENT
Start: 2020-02-09 | End: 2020-02-09

## 2020-02-09 RX ADMIN — MICONAZOLE NITRATE: 20 POWDER TOPICAL at 09:04

## 2020-02-09 RX ADMIN — GUAIFENESIN AND DEXTROMETHORPHAN 10 ML: 100; 10 SYRUP ORAL at 08:58

## 2020-02-09 RX ADMIN — FERROUS GLUCONATE 324 MG: 324 TABLET ORAL at 15:01

## 2020-02-09 RX ADMIN — ACETAMINOPHEN 500 MG: 500 TABLET, FILM COATED ORAL at 21:48

## 2020-02-09 RX ADMIN — FERROUS GLUCONATE 324 MG: 324 TABLET ORAL at 21:43

## 2020-02-09 RX ADMIN — FERROUS GLUCONATE 324 MG: 324 TABLET ORAL at 09:04

## 2020-02-09 RX ADMIN — ATORVASTATIN CALCIUM 20 MG: 20 TABLET, FILM COATED ORAL at 20:55

## 2020-02-09 RX ADMIN — ACETAMINOPHEN 1000 MG: 500 TABLET ORAL at 09:05

## 2020-02-09 RX ADMIN — SODIUM CHLORIDE: 9 INJECTION, SOLUTION INTRAVENOUS at 11:48

## 2020-02-09 RX ADMIN — METOPROLOL TARTRATE 50 MG: 50 TABLET, FILM COATED ORAL at 20:55

## 2020-02-09 RX ADMIN — WARFARIN SODIUM 3 MG: 3 TABLET ORAL at 17:16

## 2020-02-09 RX ADMIN — SODIUM CHLORIDE, PRESERVATIVE FREE: 5 INJECTION INTRAVENOUS at 20:17

## 2020-02-09 RX ADMIN — THERA TABS 1 TABLET: TAB at 08:58

## 2020-02-09 RX ADMIN — GUAIFENESIN AND DEXTROMETHORPHAN 10 ML: 100; 10 SYRUP ORAL at 16:04

## 2020-02-09 RX ADMIN — SODIUM CHLORIDE: 9 INJECTION, SOLUTION INTRAVENOUS at 01:21

## 2020-02-09 RX ADMIN — HEPARIN SODIUM 5000 UNITS: 5000 INJECTION, SOLUTION INTRAVENOUS; SUBCUTANEOUS at 20:55

## 2020-02-09 ASSESSMENT — ACTIVITIES OF DAILY LIVING (ADL)
SWALLOWING: 0-->SWALLOWS FOODS/LIQUIDS WITHOUT DIFFICULTY
AMBULATION: 0-->INDEPENDENT
WHICH_OF_THE_ABOVE_FUNCTIONAL_RISKS_HAD_A_RECENT_ONSET_OR_CHANGE?: AMBULATION;TRANSFERRING;TOILETING;BATHING;DRESSING
COGNITION: 0 - NO COGNITION ISSUES REPORTED
FALL_HISTORY_WITHIN_LAST_SIX_MONTHS: NO
TRANSFERRING: 0-->INDEPENDENT
RETIRED_EATING: 0-->INDEPENDENT
ADLS_ACUITY_SCORE: 15
BATHING: 1-->ASSISTIVE EQUIPMENT
RETIRED_COMMUNICATION: 0-->UNDERSTANDS/COMMUNICATES WITHOUT DIFFICULTY
DRESS: 0-->INDEPENDENT
TOILETING: 0-->INDEPENDENT

## 2020-02-09 NOTE — PLAN OF CARE
RN: Lab results for this morning reported to micheal MORSE. Awaiting new orders, will continue to monitor and assess.     Pt resting in  Bed most of shift, up in recliner with walker and 1 assist, pivot transfer.  Baig inserted this morning, low urine output continues, dark edgar. IV fluids infusing 75 ml/ hour. Scant bloody vaginal bleeding continues. Using bipap during naps.  Nasal cannula oxygen 5 liters, pt transferring to 5 A, report called, awaiting room availability. Continue to monitor labs, continues with congested cough, using prn tylenol for general aches, robitussin, effective. Has been complaining of noted BUE tremors x 2 days. See MAR for held medications this morning. No resp distress noted. Sats baseline upper 80's with 3-5 liters oxygen. Pt updated on transfer to 5 A and in agreement and states she will update her sister.

## 2020-02-09 NOTE — PLAN OF CARE
The moonlighter was informed via web page about the patient status. 50 CC output this shift. Intake 800 cc. Urine edgar color. PVR 0. Denies abdominal fullness or discomfort. Bumex 3 mg daily. Will wait for a response from the MD.

## 2020-02-09 NOTE — PLAN OF CARE
Occupational Therapy Discharge Summary    Reason for therapy discharge:    Change in medical status.    Progress towards therapy goal(s). See goals on Care Plan in [facility] electronic health record for goal details.  Goals partially met.  Barriers to achieving goals:   discharge from facility.    Therapy recommendation(s):    Continued therapy is recommended.  Rationale/Recommendations:  Discharging to hospital - change medical status. Goals partially met - recommend continued therapy as able once medically stable.  Continue home exercise program.

## 2020-02-09 NOTE — PLAN OF CARE
Patient discharged to [facility] via [facility] transport at 1750. All belongings taken by [facility] transport and bed hold policy was given to the patient.

## 2020-02-09 NOTE — PLAN OF CARE
Patient alert and oriented x4, able to make needs known. No complaints of pain, SOB, chest pain or nausea. BIPAP while in bed, NC while sitting up in chair on 4L stats remained >90. Droplet precautions for Influenza A. Up with assist. PRN flexeril given @ bedtime. Pt refused gabapentin this sebastián reporting that pt feels shaky after taking it. No other concerns.   Pt reported only voiding 50mL this sebastián and once unmeasured this am, urine edgar colored, clear with no foul odor. Input pt reported to be around 800 mL for day. Bladder scan after void, 0 mL. Charge notified, paged moonlighter. Will report to sebastián PRUITT.

## 2020-02-09 NOTE — DISCHARGE SUMMARY
Discharge Summary    Arianna Siddiqi MRN# [id number]   YOB: 1960 Age: 59 year old     Date of Admission:  1/27/2020  Date of Discharge:  2/9/2020  Admitting Physician:  Miguel Ángel Jackman MD  Discharge Physician:  Mamadou James MD   Discharging Service:  Internal Medicine     Primary Provider: No Ref-Primary, Physician          Discharge Diagnosis:     Acute kidney injury  Influenza A                    Brief History of Illness:     Arianna Siddiqi is a 59 year old female who was admitted for rehabilitation after hospitalization at [facility] for hypoxic respiratory failure, volume overload    For more details, please refer to the admission H&P on 1/27/2020        TCU Course:       Arianna Siddiqi is a 59 year old female with medical history significant for HFrEF, T2DM, CKD, HTN, gout, OHS/CARLOS on AVAP, morbid obesity who presented to the ED with dyspnea and found to be in acute on chronic rt heart failure. She was optimized and sent to TCU for rehabilitation      # Acute kidney Injury: Differential Pre renal disease, cardiorenal, ATN  Low urine output in last 24 hours with elevation of creatinine to 2.06 from 0.89 on 02/06. Level has almost double. Volume status difficult to assess d/t body habitus. Unsure if she has low intravascular volume. Baig's catheter placed which shows concentrated urine.   - Will send Urine Na, and FENA  - Will give NS infusion at 75 ml/hr. Cautious use d/t heart failure. May need diuresis if no improvement  - Avoid Nephrotoxins  - Transfer to [facility] for further management  - Check BMP at 5 PM, if patient still in TCU d/t issues with beds/transfers    Discussed with Dr. Nunez ([facility] Triage). Patient care notified. Charge RN,and Bedside RN notified.         # Influenza A: On Tamiflu and  Droplet precaution  - Continue Tamiflu for 5 days  - Continue Droplet precaution     # Acute decompensated HFrEF,  Right sided CV dysfunction:  Presented to the acute hospital with  weeks of increased weight gain in 1 week of worsening dyspnea.  Chest x-ray consistent with pulmonary edema,  BNP > 5, 000, per patient, possibly as much as 60 lbs above baseline. ECHO showed no WMA, LVEF 55-60%, Difficult to assess RV. Function probably at least mildly reduced.  IV diuresis on admission with good output, decreased diuretics due to concern of renal injury.   Lisinopril was held in the setting  of some softer BP     Bumetanide was  reduced to 3 mg on 2/1 after holding dose on 1/31 due to JASON  Wt 172. 3 kg on 02/09. Wt has been stable around 170 kg with some wide differences which are most likely errors.   Pro BNP 7473 on 02/09 which is elevated from 2085 on 02/06   - Hold Bumetanide for now  - Hold Lisinopril  - Pro BNP every week    - Daily weights  - Low salt diet (2 gms Na)  - Continue Metoprolol tartrate 50 mg BID  - Fluid restriction at 1800 ml  - Follow up with Cardiology after discharge from TCU     # Acute hypoxic hypercarbic respiratory failure; Respiratory acidosis; Obesity hypoventilation with chronic CO2 retention on blood gas: CARLOS; Metabolic encephalopathy   Worsening mental status day after admission in acute hospital with somnolence.  ABG with CO2 over 100.  Started on BiPAP with stabilization of gas but continued to have mentation that wax and wane. Transferred to to ICU where she required a brief period of intubation.  She was subsequently transitioned to nasal cannula with BiPAP/APAP's at night.  Continues to be high risk for CO2 retaining given obesity and CARLOS. Should follow up with pulmonary once discharged to ensure monitoring and optimization.   Home AVAPS, pmax25, plow 12, epap 8, RR 12, TV 600cc. Baseline Co2 on VBG  in the 80's  Currently on 3-4 liters of oxygen continous   - Patinet to wear BiPAP at all times when lying down  - Wean off oxygen as able  - Follow up with Pulmonology after discharge from TCU       #  Paroxysmal atrial fibrillation; nonsustained SVT,  resolved  EKG on admission was in NSR but noted Afib back in 2016. Has been anticoagulated with warfarin. Rate controlled with metoprolol succinate. On warfarin.   INR at 2.3 on 02/09  - Continue Warfarin.  - Continue Metoprolol tartrate 50 mg BID     # Sepsis, resolved  Hypertensive on arrival w/ neg procal. BP stable since transfer. lactat neg, slight leukocytosis. CXR neg for focal infiltrate, increased interstitial marking likely 2/2 fluid overload, but w/ URI prodrome days pta, will treat empirically for atypical pna. Flu and RSV neg. Received vancx1 and cefrtriaxone x1. Urine cx NGTD, blood cx + diphtheroid species after 5 days of incubation, suspect contaminant      # DM 2: She was  hyperglycemic during hospitalization.  But NPO during most of the time after transfer while on bipap and intubated. Has had some hypoglcemic episodes into the 60s. Likely due to lack of po intake. Stable after extubation.  - Continue Sitagliptin     #  Gout: stable  - continue Allopurinol 200 mg po qday      #  Twitches:  Patient has intermittent jerky twitches of her upper and lower extremities. This appeared very much like gabapentin toxicityPatient was also in JASON during this time. Twitches resolved with holding Gabapentin and correcting renal function  Gabapentin was resumed at 300 mg BID ( as opposed to TID)  - Continue Gabapentin 300 mg BID     # Vaginal bleeding: Per patient ongoing for couple of weeks. No external lesion noted  Hg and Hemodynamics have been stable. Etiology broad including malignancy  - Gyn follow up in clinic     # Hyperlipidemia: PTA on Atorvastatin  - Continue      # Hx of MAG: PTA on Ferrous gluconate  - Continue      # Intertrigo groin: On Miconazole powder BID  - Continue         # Diet: Snacks/Supplements Adult: Boost Glucose Control; With Meals  2 Gram Sodium Diet  Fluid restriction 1800 ML FLUID    # DVT Prophylaxis: Warfarin  # Baig Catheter: not present  # Code Status: Full  Code                       Discharge Medications:     Current Discharge Medication List      START taking these medications    Details   alum & mag hydroxide-simethicone (MYLANTA ES/MAALOX  ES) 400-400-40 MG/5ML SUSP suspension Take 15 mLs by mouth every 4 hours as needed for other (gastric distress.)    Associated Diagnoses: Dyspepsia      atorvastatin (LIPITOR) 20 MG tablet Take 1 tablet (20 mg) by mouth every evening    Associated Diagnoses: Hyperlipidemia, unspecified hyperlipidemia type      benzocaine-menthol (CEPACOL) 15-3.6 MG lozenge Place 1 lozenge inside cheek every hour as needed for sore throat    Associated Diagnoses: Sore throat      guaiFENesin-dextromethorphan (ROBITUSSIN DM) 100-10 MG/5ML syrup Take 10 mLs by mouth every 4 hours as needed for cough or congestion    Associated Diagnoses: Cough      ipratropium - albuterol 0.5 mg/2.5 mg/3 mL (DUONEB) 0.5-2.5 (3) MG/3ML neb solution Take 1 vial (3 mLs) by nebulization every 4 hours as needed for wheezing    Associated Diagnoses: Dyspnea, unspecified type      metoprolol tartrate (LOPRESSOR) 50 MG tablet Take 1 tablet (50 mg) by mouth 2 times daily    Associated Diagnoses: Hypertension, unspecified type      miconazole (MICATIN/MICRO GUARD) 2 % external powder Apply topically 2 times daily    Associated Diagnoses: Fungal infection      ondansetron (ZOFRAN-ODT) 4 MG ODT tab Take 1 tablet (4 mg) by mouth every 6 hours as needed for nausea or vomiting    Associated Diagnoses: Vomiting, intractability of vomiting not specified, presence of nausea not specified, unspecified vomiting type      oseltamivir (TAMIFLU) 30 MG capsule Take 1 capsule (30 mg) by mouth daily    Associated Diagnoses: Influenza      potassium chloride (KLOR-CON) 20 MEQ packet 20-40 mEq by Oral or Feeding Tube route every 2 hours as needed for potassium supplementation    Associated Diagnoses: Hypokalemia      sennosides (SENOKOT) 8.6 MG tablet Take 1 tablet by mouth 2 times daily as  needed for constipation    Associated Diagnoses: Constipation, unspecified constipation type         CONTINUE these medications which have CHANGED    Details   ferrous gluconate (FERGON) 324 (38 Fe) MG tablet Take 1 tablet (324 mg) by mouth 3 times daily    Associated Diagnoses: Iron deficiency      multivitamin, therapeutic (THERA-VIT) TABS tablet Take 1 tablet by mouth daily    Associated Diagnoses: Iron deficiency         CONTINUE these medications which have NOT CHANGED    Details   acetaminophen (TYLENOL) 500 MG tablet Take 500-1,000 mg by mouth every 6 hours as needed for mild pain      allopurinol (ZYLOPRIM) 100 MG tablet Take 1 tablet (100 mg) by mouth 2 times daily Patient needs to see primary provider and have labs for further refills.  Qty: 60 tablet, Refills: 0    Comments: Patient needs to see primary provider and have labs for further refills. Please keep the appointment on 12/7/18  Associated Diagnoses: Gout, unspecified cause, unspecified chronicity, unspecified site      blood glucose monitoring (ACCU-CHEK NINA PLUS) meter device kit Use to test blood sugars 3 times daily or as directed.  Qty: 1 kit, Refills: 0    Associated Diagnoses: Diabetes mellitus, type 2 (H)      blood glucose monitoring (SOFTCLIX) lancets Use to test blood sugar 3 times daily or as directed.  Qty: 300 each, Refills: 0    Associated Diagnoses: Type 2 diabetes mellitus with chronic kidney disease, without long-term current use of insulin, unspecified CKD stage (H)      cyclobenzaprine (FLEXERIL) 5 MG tablet Take 1 tablet (5 mg) by mouth nightly as needed for muscle spasms    Associated Diagnoses: Muscle spasm      melatonin 3 MG tablet Take 2 tablets (6 mg) by mouth nightly as needed for sleep    Associated Diagnoses: Insomnia, unspecified type      polyethylene glycol (MIRALAX/GLYCOLAX) packet Take 17 g by mouth daily as needed for constipation    Associated Diagnoses: Constipation, unspecified constipation type       sitagliptin (JANUVIA) 50 MG tablet Take 1 tablet (50 mg) by mouth daily Patient needs to see primary provider and have labs for further refills.  Qty: 14 tablet, Refills: 0    Comments: Call clinic to schedule follow up appointment. [phone]  Associated Diagnoses: Type 2 diabetes mellitus with other neurologic complication, without long-term current use of insulin (H)      warfarin (COUMADIN) 3 MG tablet TAKE 1-1.5 TABLETS DAILY OR AS DIRECTED BY THE COUMADIN CLINIC.  Qty: 135 tablet, Refills: 1    Comments: DX Code Needed  .  Associated Diagnoses: Atrial fibrillation (H)         STOP taking these medications       bumetanide (BUMEX) 2 MG tablet Comments:   Reason for Stopping:         gabapentin (NEURONTIN) 100 MG capsule Comments:   Reason for Stopping:         metoprolol succinate ER (TOPROL-XL) 50 MG 24 hr tablet Comments:   Reason for Stopping:         potassium chloride ER (K-TAB) 20 MEQ CR tablet Comments:   Reason for Stopping:                   Procedures/Consultations:   No other procedures performed during this admission  Consultation during this admission received from PT/OT           Final Day of Progress before Discharge:     Reports feeling tired  Breathing is more or less same. Has shortness of breath with exertion  No nausea, vomiting  No fever, chills  No lightheadedness or dizziness    Physical Exam:  Blood pressure 100/43, pulse 64, temperature 95  F (35  C), temperature source Oral, resp. rate 16, weight (!) 172.3 kg (379 lb 14.4 oz), SpO2 98 %.      Constitutional: Laying in bed in no acute distress  Eye: No icterus, no pallor  Mouth/ENT: Whitish substance on the tongue, buccal mucosa. Posterior pharyngeal wall looks within normal limits  Cardiovascular: S1, S2 normal.   Respiratory: B/l wheezing. No accessory muscle use.   GI: Soft, Obese abdomen. BS+  : No antunez's catheter  Neurology: Alert, awake,and oriented.   Psych: Mood stable.   MSK:   Integumentary: b/l thickening, and  discoloration of skin.   Heme/Lymph/Imm  Data:  All laboratory data reviewed             Condition on Discharge:   Discharge condition: Satisfactory   Code status on discharge: Full Code                Discharge Disposition:   Transferred to [facility]               Pending Results:   Unresulted Labs Ordered in the Past 30 Days of this Admission     No orders found from 12/28/2019 to 1/28/2020.                  Discharge Instructions and Follow-Up:     Discharge Procedure Orders   Reason for your hospital stay   Order Comments: Admitted for rehabilitation     Activity - Up with nursing assistance     Order Specific Question Answer Comments   Is discharge order? Yes      Follow Up ([facility])   Order Comments: Tx to [place]    Appointments on [place] and/or [facility] (with [facility] or [facility] provider or service). Call [phone] if you haven't heard regarding these appointments within 7 days of discharge.     Full Code     Order Specific Question Answer Comments   Code status determined by: Discussion with patient/legal decision maker      Advance Diet as Tolerated   Order Comments: Follow this diet upon discharge: Orders Placed This Encounter      Snacks/Supplements Adult: Boost Glucose Control; With Meals      Fluid restriction 1800 ML FLUID      Snacks/Supplements Adult: Other; 2 pm- strawberry yogurt; Between Meals      Snacks/Supplements Adult: Boost Glucose Control; Between Meals      2 Gram Sodium Diet     Order Specific Question Answer Comments   Is discharge order? Yes           Attestation:  Mamadou James MD.    Time spent on patient: 55 minutes total including face to face and coordinating care time reviewing current illness, any medication changes, and the care plan for today.

## 2020-02-09 NOTE — PLAN OF CARE
OT Cancel a.m. therapy session - patient has change in medical status per nursing. Will reschedule as able or continue on 2/10

## 2020-02-09 NOTE — PROGRESS NOTES
Called for low urine output 50 ml/hr for 5 hours  Will check BMP in AM    Mamadou Smithhal  Internal Medicine  Hospitalist  Pager no: [phone]

## 2020-02-09 NOTE — PLAN OF CARE
Pt alert and oriented x4. Able to make needs known. Denies pain, cp or SOB. RN from ED came and placed a PIV on R hand. Continuous NS started running at 50ml/hr. Pt tolerating it well. Pt woken up around 0600 to use BSC. Urine output of 25ml, bladder scan shows 0. HO paged. Called back and said we wait for the BMP results to assess her kidney function and go from there. He also said he will let the morning doctor know to follow up. Morning RN notified as well. BiPAP on throughout the night. Call light in reach. Continue w/ POC.

## 2020-02-10 LAB
ALBUMIN SERPL-MCNC: 2.9 G/DL (ref 3.4–5)
ALP SERPL-CCNC: 109 U/L (ref 40–150)
ALT SERPL W P-5'-P-CCNC: 15 U/L (ref 0–50)
ANION GAP SERPL CALCULATED.3IONS-SCNC: <1 MMOL/L (ref 3–14)
AST SERPL W P-5'-P-CCNC: 21 U/L (ref 0–45)
BILIRUB DIRECT SERPL-MCNC: 0.2 MG/DL (ref 0–0.2)
BILIRUB SERPL-MCNC: 0.4 MG/DL (ref 0.2–1.3)
BUN SERPL-MCNC: 32 MG/DL (ref 7–30)
CALCIUM SERPL-MCNC: 8.7 MG/DL (ref 8.5–10.1)
CHLORIDE SERPL-SCNC: 95 MMOL/L (ref 94–109)
CO2 SERPL-SCNC: 40 MMOL/L (ref 20–32)
CREAT SERPL-MCNC: 1.31 MG/DL (ref 0.52–1.04)
ERYTHROCYTE [DISTWIDTH] IN BLOOD BY AUTOMATED COUNT: 20 % (ref 10–15)
GFR SERPL CREATININE-BSD FRML MDRD: 44 ML/MIN/{1.73_M2}
GLUCOSE BLDC GLUCOMTR-MCNC: 100 MG/DL (ref 70–99)
GLUCOSE BLDC GLUCOMTR-MCNC: 77 MG/DL (ref 70–99)
GLUCOSE BLDC GLUCOMTR-MCNC: 85 MG/DL (ref 70–99)
GLUCOSE BLDC GLUCOMTR-MCNC: 89 MG/DL (ref 70–99)
GLUCOSE BLDC GLUCOMTR-MCNC: 97 MG/DL (ref 70–99)
GLUCOSE SERPL-MCNC: 96 MG/DL (ref 70–99)
HCT VFR BLD AUTO: 44.8 % (ref 35–47)
HGB BLD-MCNC: 11.3 G/DL (ref 11.7–15.7)
INR PPP: 2.64 (ref 0.86–1.14)
MCH RBC QN AUTO: 24.1 PG (ref 26.5–33)
MCHC RBC AUTO-ENTMCNC: 25.2 G/DL (ref 31.5–36.5)
MCV RBC AUTO: 96 FL (ref 78–100)
PLATELET # BLD AUTO: 145 10E9/L (ref 150–450)
POTASSIUM SERPL-SCNC: 4.8 MMOL/L (ref 3.4–5.3)
PROT SERPL-MCNC: 8.5 G/DL (ref 6.8–8.8)
RBC # BLD AUTO: 4.69 10E12/L (ref 3.8–5.2)
SODIUM SERPL-SCNC: 136 MMOL/L (ref 133–144)
WBC # BLD AUTO: 5.6 10E9/L (ref 4–11)

## 2020-02-10 PROCEDURE — 25000128 H RX IP 250 OP 636: Performed by: PHYSICIAN ASSISTANT

## 2020-02-10 PROCEDURE — 40000275 ZZH STATISTIC RCP TIME EA 10 MIN

## 2020-02-10 PROCEDURE — 25800030 ZZH RX IP 258 OP 636: Performed by: PHYSICIAN ASSISTANT

## 2020-02-10 PROCEDURE — 85610 PROTHROMBIN TIME: CPT | Performed by: HOSPITALIST

## 2020-02-10 PROCEDURE — 80048 BASIC METABOLIC PNL TOTAL CA: CPT | Performed by: HOSPITALIST

## 2020-02-10 PROCEDURE — 00000146 ZZHCL STATISTIC GLUCOSE BY METER IP

## 2020-02-10 PROCEDURE — 25000132 ZZH RX MED GY IP 250 OP 250 PS 637: Mod: GY | Performed by: PHYSICIAN ASSISTANT

## 2020-02-10 PROCEDURE — 25000125 ZZHC RX 250: Performed by: PHYSICIAN ASSISTANT

## 2020-02-10 PROCEDURE — 25000132 ZZH RX MED GY IP 250 OP 250 PS 637: Mod: GY | Performed by: HOSPITALIST

## 2020-02-10 PROCEDURE — 12000001 ZZH R&B MED SURG/OB UMMC

## 2020-02-10 PROCEDURE — 94660 CPAP INITIATION&MGMT: CPT

## 2020-02-10 PROCEDURE — 94640 AIRWAY INHALATION TREATMENT: CPT

## 2020-02-10 PROCEDURE — 85027 COMPLETE CBC AUTOMATED: CPT | Performed by: HOSPITALIST

## 2020-02-10 PROCEDURE — 36415 COLL VENOUS BLD VENIPUNCTURE: CPT | Performed by: HOSPITALIST

## 2020-02-10 PROCEDURE — 99233 SBSQ HOSP IP/OBS HIGH 50: CPT | Performed by: HOSPITALIST

## 2020-02-10 PROCEDURE — 99207 ZZC APP CREDIT; MD BILLING SHARED VISIT: CPT | Performed by: PHYSICIAN ASSISTANT

## 2020-02-10 PROCEDURE — 80076 HEPATIC FUNCTION PANEL: CPT | Performed by: HOSPITALIST

## 2020-02-10 RX ORDER — BUMETANIDE 1 MG/1
3 TABLET ORAL DAILY
Status: ON HOLD | COMMUNITY
End: 2020-02-15

## 2020-02-10 RX ORDER — WARFARIN SODIUM 2 MG/1
2 TABLET ORAL
Status: COMPLETED | OUTPATIENT
Start: 2020-02-10 | End: 2020-02-10

## 2020-02-10 RX ORDER — OSELTAMIVIR PHOSPHATE 75 MG/1
75 CAPSULE ORAL DAILY
Status: COMPLETED | OUTPATIENT
Start: 2020-02-10 | End: 2020-02-10

## 2020-02-10 RX ORDER — LISINOPRIL 5 MG/1
5 TABLET ORAL DAILY
Status: ON HOLD | COMMUNITY
End: 2020-02-21

## 2020-02-10 RX ADMIN — WARFARIN SODIUM 2 MG: 2 TABLET ORAL at 18:15

## 2020-02-10 RX ADMIN — OSELTAMIVIR PHOSPHATE 75 MG: 75 CAPSULE ORAL at 09:43

## 2020-02-10 RX ADMIN — IPRATROPIUM BROMIDE AND ALBUTEROL SULFATE 3 ML: .5; 3 SOLUTION RESPIRATORY (INHALATION) at 00:18

## 2020-02-10 RX ADMIN — METOPROLOL TARTRATE 50 MG: 50 TABLET, FILM COATED ORAL at 09:17

## 2020-02-10 RX ADMIN — ONDANSETRON 4 MG: 4 TABLET, ORALLY DISINTEGRATING ORAL at 04:08

## 2020-02-10 RX ADMIN — FERROUS GLUCONATE 324 MG: 324 TABLET ORAL at 20:38

## 2020-02-10 RX ADMIN — FERROUS GLUCONATE 324 MG: 324 TABLET ORAL at 09:18

## 2020-02-10 RX ADMIN — METOPROLOL TARTRATE 50 MG: 50 TABLET, FILM COATED ORAL at 20:38

## 2020-02-10 RX ADMIN — FERROUS GLUCONATE 324 MG: 324 TABLET ORAL at 13:28

## 2020-02-10 RX ADMIN — SODIUM CHLORIDE, PRESERVATIVE FREE: 5 INJECTION INTRAVENOUS at 04:08

## 2020-02-10 RX ADMIN — THERA TABS 1 TABLET: TAB at 09:18

## 2020-02-10 RX ADMIN — HEPARIN SODIUM 5000 UNITS: 5000 INJECTION, SOLUTION INTRAVENOUS; SUBCUTANEOUS at 09:18

## 2020-02-10 RX ADMIN — ACETAMINOPHEN 1000 MG: 500 TABLET, FILM COATED ORAL at 04:08

## 2020-02-10 RX ADMIN — ATORVASTATIN CALCIUM 20 MG: 20 TABLET, FILM COATED ORAL at 20:38

## 2020-02-10 ASSESSMENT — ACTIVITIES OF DAILY LIVING (ADL)
ADLS_ACUITY_SCORE: 16

## 2020-02-10 NOTE — PROGRESS NOTES
Time: 0859-5669    Reason for admit: Acute renal failure     Neuro: A&Ox4, able to make her needs known, pt tired and sleepy   Activity: Assist of 2 with lift  Pain: c/o of pain in left hip from transport, pt repositioned, prn tylenol given   Cardiac: Denies chest pain, WNL, tachycardic, hypertensive   Respiratory: on 6L NC, wears bipap at night   GI/: antunez in for strict I&O, +BS, pt report last BM 2/8/2020  Diet: Regular  Lines: R PIV SL   Skin: Dry, cracked, brownish, overall clean, dry and intact, blanchable redness    Events: pt switched over to bariatric bed, resting between cares     Plan: Continue to monitor and follow POC.

## 2020-02-10 NOTE — PROGRESS NOTES
Pt was taken off the BiPAP and switched to 6 L NC in the AM to take morning medications. Pt desatted to 84% but oxygenation went up to 92% with 6 L NC. Gold team was notified and pt placed back on AVAPS.

## 2020-02-10 NOTE — PROGRESS NOTES
[facility] [place]    Medicine Progress Note - Hospitalist Service, Gold 7       Date of Admission:  2/9/2020  Assessment & Plan   Arianna Siddiqi is a 59 year old morbidly obese female admitted [facility] 2[facility]9/2020 [facility] She has a past medical history significant for HFrEF, T2DM, CKD, HTN, gout, OHS/CARLOS on AVAP, morbid obesity who was recently admitted to the TCU after she developed acute hypoxic respiratory failure from influenza A and decompensated heart failure exacerbation requiring a brief stay in the ICU and she was ultimately discharged from the hospital on . During her time at the TCU, she was treeated with Tamilfu, nebulizers and her dry weight improved to 172.3kg.   Today, she was noted to have acute renal failure with a Cr of 2.0, baseline normal and was sent for direct admission to the hospital for further work up and care.      JASON. Likely 2/2 diuresis from recent heart failure exacerbation and likely poor po intake with recent influenza dx. Per chart review, it appears BL Cr 0.9-1.2. Cr upto 2.06 on admission. S/p IVF and holding diuretics & nephrotoxic agents overnight with improvement in Cr to 1.31 today.   - ADAT with 1.8L FR, stop IVF  - Cont to hold diuretics and lisinopril   - BMP tomorrow  - Resume gabapentin once JASON resolves (has hx of twitching from gabapentin toxicity)     Influenza A. RVP 2/6 + influenza A. S/p tamiflu x 5d through today. Afebrile. No respiratory symptoms. Cont droplet precautions.      History of diastolic dysfunction, resolved (TTE with EF 55-60%, no RWMA with right sided strain). BNP was elevated to 7k on admission, from 2k a few days prior, but per chart review historically higher. O2 sats at baseline.   - Stop IVF as tolerating po intake  - Cont to hold bumex and lisinopril 5mg daily  - Cont metoprolol 50mg BID  - Cont 1.8L FR  - Follow up with Cardiology after discharge from TCU     Acute hypoxic hypercarbic  "respiratory failure  Respiratory acidosis  Obesity hypoventilation with chronic CO2 retention on blood gas  CARLOS  Metabolic encephalopathy   Her mental status was waxing and waning at hospital prior to discharge to TCU and continued to wax/wane at TCU, appeared to improve with use of BIPAP/AVAPs while sleeping & resting and required ~4L of oxygen while awake and with activity. Bicarb seems to be ~40 at baseline. BL CO2 on VBG in the 80s. Was more somnolent this morning, but was taken off of BIPAP despite being tired. Now A&O x 3, requiring 4L with stable O2 sats.   - Cont supplemental O2 to keep sats >90%  - Cont home BiPAP/AVAPS at all times when lying down or sleeping (home AVAP: pmax25, plow 12, epap 8, RR 12, TV 600cc)  - Follow up with Pulmonology after discharge from TCU       Paroxysmal atrial fibrillation; nonsustained SVT, resolved. No acute issues. Cont metoprolol 50mg BID. Cont warfarin with goal INR 2-3. INR 2.3 today.     DM 2 . PTA on sitagliptin 50mg daily (held on admission d/t poor po intake). Continues on novolog \"med\" sliding scale insulin. BGs 70-100s.      Hx of recent vaginal bleeding? Per chart review. Hgb stable. Cont to monitor, needs close OP monitoring for further eval.      HLD. Cont statin.      Hx of MAG. Hgb stable at 11.3, no s/sx of bleeding. Cont pta ferrous gluconate.      Intertrigo groin. Cont Miconazole powder BID    Hx of gout. Resume allopurinol when JASON resolves.     Deconditioning. Will consult PT/OT now that mental status improved.     Diet: Combination Diet Regular Diet Adult    DVT Prophylaxis: warfarin  Baig Catheter: in place, indication: Retention  Code Status: Full Code      Disposition Plan   Expected discharge: 2 - 3 days, recommended to transitional care unit once JASON resolved, diuretics resumed.  Entered: Yessi Vital PA-C 02/10/2020, 2:15 PM       The patient's care was discussed with the Attending Physician, Dr. Nunez.    Yessi Vital, " KAE  Hospitalist Service, Gold 7  [facility], [place]  Pager: [phone]  Please see sticky note for cross cover information  ______________________________________________________________________    Interval History   Arianna was very somnolent this morning, she reported she had a late night and wants to keep resting. A&O x 3. Denies any worsening shortness of breath, chest pain, dyspnea, fevers or chills. No cough or sore throat. No other acute concerns.     Physical Exam   Vital Signs: Temp: 96.2  F (35.7  C) Temp src: Axillary BP: 121/62 Pulse: 74 Heart Rate: 74 Resp: 25 SpO2: 96 % O2 Device: BiPAP/CPAP Oxygen Delivery: 4 LPM  Weight: 385 lbs 0 oz  GENERAL: Alert and oriented x 3. Appears tired, resting in bed.   HEENT: MMM  CV: RRR.  RESPIRATORY: Effort normal on 4L per NC. Lungs CTAB with distant sounds throughout.   GI: Abdomen soft and non distended with normoactive bowel sounds present in all quadrants. No tenderness, rebound, guarding.   NEUROLOGICAL: No focal deficits. Moves all extremities.    EXTREMITIES: No peripheral edema. Intact bilateral pedal pulses.   SKIN: No jaundice or rashes on exposed areas of skin

## 2020-02-10 NOTE — PHARMACY-ANTICOAGULATION SERVICE
Clinical Pharmacy - Warfarin Dosing Consult     Pharmacy has been consulted to manage this patient s warfarin therapy.  Indication: Atrial Fibrillation  Therapy Goal: INR 2-3  Warfarin Prior to Admission: Yes  Warfarin PTA Regimen: 2-3mg most recently (past week)  Recent documented change in oral intake/nutrition: Unknown    INR   Date Value Ref Range Status   02/10/2020 2.64 (H) 0.86 - 1.14 Final   02/09/2020 2.30 (H) 0.86 - 1.14 Final       Recommend warfarin 2 mg today.  Pharmacy will monitor Arianna Siddiqi daily and order warfarin doses to achieve specified goal.      Please contact pharmacy as soon as possible if the warfarin needs to be held for a procedure or if the warfarin goals change.

## 2020-02-10 NOTE — PHARMACY-ADMISSION MEDICATION HISTORY
Admission medication history interview status for the 2/9/2020 admission is complete. See Epic admission navigator for allergy information, pharmacy, prior to admission medications and immunization status.     Medication history interview sources:  [facility] MAR    Changes made to PTA medication list (reason)  Added: lisinopril 5mg daily (appears started on 2/7, took 2/8 dose- none since), Bumex 3mg po daily  Deleted: none  Changed: updated last given times per MAR if seen, updated most recent warfarin dosing    Additional medication history information (including reliability of information, actions taken by pharmacist):         Prior to Admission medications    Medication Sig Last Dose Taking? Auth Provider   acetaminophen (TYLENOL) 500 MG tablet Take 500-1,000 mg by mouth every 6 hours as needed for mild pain  at prn Yes Unknown, Entered By History   allopurinol (ZYLOPRIM) 100 MG tablet Take 1 tablet (100 mg) by mouth 2 times daily Patient needs to see primary provider and have labs for further refills.  Patient taking differently: Take 200 mg by mouth daily Patient needs to see primary provider and have labs for further refills. 2/8/2020 at 0800 Yes Arianna Salamanca MD   alum & mag hydroxide-simethicone (MYLANTA ES/MAALOX  ES) 400-400-40 MG/5ML SUSP suspension Take 15 mLs by mouth every 4 hours as needed for other (gastric distress.)  at prn Yes Mamadou James MD   atorvastatin (LIPITOR) 20 MG tablet Take 1 tablet (20 mg) by mouth every evening 2/8/2020 at PM Yes Mamadou James MD   benzocaine-menthol (CEPACOL) 15-3.6 MG lozenge Place 1 lozenge inside cheek every hour as needed for sore throat 2/8/2020 at AM Yes Mamadou James MD   bumetanide (BUMEX) 1 MG tablet Take 3 mg by mouth daily 2/8/2020 at AM Yes Unknown, Entered By History   cyclobenzaprine (FLEXERIL) 5 MG tablet Take 1 tablet (5 mg) by mouth nightly as needed for muscle spasms 2/8/2020 at 2100 Yes Gina Pruett MD   ferrous gluconate  (FERGON) 324 (38 Fe) MG tablet Take 1 tablet (324 mg) by mouth 3 times daily 2/8/2020 at 1500 Yes Mamadou James MD   guaiFENesin-dextromethorphan (ROBITUSSIN DM) 100-10 MG/5ML syrup Take 10 mLs by mouth every 4 hours as needed for cough or congestion 2/8/2020 at 1600 Yes Mamadou James MD   ipratropium - albuterol 0.5 mg/2.5 mg/3 mL (DUONEB) 0.5-2.5 (3) MG/3ML neb solution Take 1 vial (3 mLs) by nebulization every 4 hours as needed for wheezing 2/6/2020 at PM Yes Mamadou James MD   lisinopril (PRINIVIL/ZESTRIL) 5 MG tablet Take 5 mg by mouth daily 2/8/2020 at AM Yes Unknown, Entered By History   melatonin 3 MG tablet Take 2 tablets (6 mg) by mouth nightly as needed for sleep  at prn Yes Gina Pruett MD   metoprolol tartrate (LOPRESSOR) 50 MG tablet Take 1 tablet (50 mg) by mouth 2 times daily 2/8/2020 at 2100 Yes Mamadou James MD   miconazole (MICATIN/MICRO GUARD) 2 % external powder Apply topically 2 times daily 2/8/2020 at 0900 Yes Mamadou James MD   multivitamin, therapeutic (THERA-VIT) TABS tablet Take 1 tablet by mouth daily 2/8/2020 at 0900 Yes Mamadou James MD   ondansetron (ZOFRAN-ODT) 4 MG ODT tab Take 1 tablet (4 mg) by mouth every 6 hours as needed for nausea or vomiting 2/7/2020 at 2200 Yes Mamadou James MD   oseltamivir (TAMIFLU) 30 MG capsule Take 1 capsule (30 mg) by mouth daily  Patient taking differently: Take 75 mg by mouth 2 times daily  2/8/2020 at 2100 Yes Mamadou James MD   polyethylene glycol (MIRALAX/GLYCOLAX) packet Take 17 g by mouth daily as needed for constipation  at prn Yes Gina Pruett MD   potassium chloride (KLOR-CON) 20 MEQ packet 20-40 mEq by Oral or Feeding Tube route every 2 hours as needed for potassium supplementation 2/5/2020 at 1500 Yes Mamadou James MD   sennosides (SENOKOT) 8.6 MG tablet Take 1 tablet by mouth 2 times daily as needed for constipation  at prn Yes Mamadou James MD   sitagliptin (JANUVIA) 50 MG tablet Take 1 tablet (50 mg)  by mouth daily Patient needs to see primary provider and have labs for further refills. 2/8/2020 at 0800 Yes Yoselin Shook APRN CNP   warfarin (COUMADIN) 3 MG tablet TAKE 1-1.5 TABLETS DAILY OR AS DIRECTED BY THE COUMADIN CLINIC.  Patient taking differently: 2/9/20 admission- has been receiving 2-3mg daily recently at [facility] 2/8/2020 at 1700 Yes Amelia Angulo MD PhD   blood glucose monitoring (ACCU-CHEK NINA PLUS) meter device kit Use to test blood sugars 3 times daily or as directed.   Yuko Crabtree MD   blood glucose monitoring (SOFTCLIX) lancets Use to test blood sugar 3 times daily or as directed.   Yoselin Shook APRN CNP         Medication history completed by:      Feng SureshD, [facility]    [phone]  Pager [phone]

## 2020-02-10 NOTE — H&P
[facility]    History and Physical - Hospitalist Service, Gold Night Service       Date of Admission:  2/9/2020    Assessment & Plan   Arianna Siddiqi is a 59 year old morbidly obese female admitted on 2/9/2020 [facility]. She has a past medical history significant for HFrEF, T2DM, CKD, HTN, gout, OHS/CARLOS on AVAP, morbid obesity who was recently admitted to the TCU after she developed acute hypoxic respiratory failure from influenza A and decompensated heart failure exacerbation requiring a brief stay in the ICU and she was ultimately discharged from the hospital on . During her time at the TCU, she was treeated with Tamilfu, nebulizers and her dry weight improved to 172.3kg.   Today, she was noted to have acute renal failure with a Cr of 2.0, baseline normal and was sent for direct admission to the hospital for further work up and care.     1. Acute kidney Injury likely secondary to diuresis from heart failure exacerbation  Low urine output in last 24 hours with elevation of creatinine to 2.06 from 0.89 on 02/06.   -Check UA, urine lytes  -Give 0.9 NS 100ml/hr x 10hr as she appears volume deplete.   -Maintain stricti I/O, daily weights, dose adjust medications accordingly     2. Influenza A  - Continue Tamiflu for 5 days  - Continue Droplet precaution     3. History of diastolic dysfunction, resolved (TTE with EF 55-60%, no RWMA with right sided strain)  -Pro BNP 7473 on 02/09 which is elevated from 2085 on 02/06   - Hold Bumetanide for now and restart once kidney function improves  - Hold Lisinopril 5mg by mouth once daily  - Continue Metoprolol tartrate 50 mg BID  - Fluid restriction at 1800 ml  - Follow up with Cardiology after discharge from TCU     4. Acute hypoxic hypercarbic respiratory failure; Respiratory acidosis; Obesity hypoventilation with chronic CO2 retention on blood gas: CARLOS; Metabolic encephalopathy   Her mental status continue to wax  and wane throughout her stay at the TCU. She was originally started on BiPAP in the hospital for rising CO2. Transferred to to ICU where she required a brief period of intubation.  She was subsequently transitioned to nasal cannula with BiPAP/APAP's at night.  Should follow up with pulmonary once discharged to ensure monitoring and optimization.   -Home AVAPS, pmax25, plow 12, epap 8, RR 12, TV 600cc. Baseline Co2 on VBG  in the 80's  Currently on 3-4 liters of oxygen continous   - Patinet to wear BiPAP at all times when lying down  - Follow up with Pulmonology after discharge from TCU       5. Paroxysmal atrial fibrillation; nonsustained SVT, resolved  -No acute issues. Continue rate control with Metoprolol tartrate 50mg twice daily.   -Continue Warfarin with goal INR 2-3. INR today is 2.3.     6. DM 2 (hemoglobin A1c 5.55%)  -She is on Sitagliptin at home. We will continue that medication for now and add sliding scale insulin Aspart QID.      7. Twitches, perhaps related to previous gabapentin toxicity  -Patient had twitches from Gabapentin the last time she had JASON. Given her JASON now, we will hold Gabapentin until her JASON resolves and then restart     8. Vaginal bleeding  Per patient ongoing for couple of weeks. No external lesion noted  Hg and Hemodynamics have been stable. Etiology broad including malignancy  - Gyn follow up in clinic     9. Hyperlipidemia  -Continue PTA on Atorvastatin     11. Hx of MAG  -Continue PTA on Ferrous gluconate     12. Intertrigo groin  -Continue Miconazole powder BID     Diet: Snacks/Supplements Adult: Boost Glucose Control; With Meals  2 Gram Sodium Diet  Fluid restriction 1800 ML FLUID    DVT Prophylaxis: Warfarin  Code Status: Full Code          Diet: Cardiac diet with 2L fluid restriction  DVT Prophylaxis: Warfarin  Baig Catheter: in place, indication: Strict 1-2 Hour I&O  Code Status: FULL CODE       RENE Kim  [facility],  [place]  Pager: [phone]  Please see sticky note for cross cover information  ______________________________________________________________________    Chief Complaint   History is obtained from the patient    History of Present Illness   Arianna Siddiqi is a 59 year old female admitted on 2020 as a direct admission from [facility]. She has a past medical history significant for HFrEF, T2DM, CKD, HTN, gout, OHS/CARLOS on AVAP, morbid obesity who was recently admitted to the TCU after developed acute hypoxic respiratory failure from influenza A and decompensated heart failure exacerbation. During her time at the TCU, she was treeated with Tamilfu, nebulizers and her dry weight improved to 172.3kg.   Today, she was noted to have acute renal failure with a Cr of 2.0, baseline normal and was sent for direct admission to the hospital for further work up and care.     Review of Systems    The 10 point Review of Systems is negative other than noted in the HPI or here.     Past Medical History    I have reviewed this patient's medical history and updated it with pertinent information if needed.   Past Medical History:   Diagnosis Date     CHF (congestive heart failure) (H)      CKD (chronic kidney disease)      Compliance poor      Diabetes mellitus (H)      Gout      Hypertension      Insomnia      Morbid obesity (H)      CARLOS treated with BiPAP        Past Surgical History   I have reviewed this patient's surgical history and updated it with pertinent information if needed.  No past surgical history on file.    Social History   I have reviewed this patient's social history and updated it with pertinent information if needed.  Social History     Tobacco Use     Smoking status: Former Smoker     Packs/day: 1.50     Years: 20.00     Pack years: 30.00     Types: Cigarettes     Last attempt to quit: 2002     Years since quittin.1     Smokeless tobacco: Never Used   Substance Use Topics     Alcohol use: No      Alcohol/week: 0.0 standard drinks     Drug use: No       Family History   I have reviewed this patient's family history and updated it with pertinent information if needed.   Family History   Adopted: Yes   Family history unknown: Yes       Prior to Admission Medications   Prior to Admission Medications   Prescriptions Last Dose Informant Patient Reported? Taking?   acetaminophen (TYLENOL) 500 MG tablet   Yes No   Sig: Take 500-1,000 mg by mouth every 6 hours as needed for mild pain   allopurinol (ZYLOPRIM) 100 MG tablet   No No   Sig: Take 1 tablet (100 mg) by mouth 2 times daily Patient needs to see primary provider and have labs for further refills.   alum & mag hydroxide-simethicone (MYLANTA ES/MAALOX  ES) 400-400-40 MG/5ML SUSP suspension   No No   Sig: Take 15 mLs by mouth every 4 hours as needed for other (gastric distress.)   atorvastatin (LIPITOR) 20 MG tablet   No No   Sig: Take 1 tablet (20 mg) by mouth every evening   benzocaine-menthol (CEPACOL) 15-3.6 MG lozenge   No No   Sig: Place 1 lozenge inside cheek every hour as needed for sore throat   blood glucose monitoring (ACCU-CHEK NINA PLUS) meter device kit   No No   Sig: Use to test blood sugars 3 times daily or as directed.   blood glucose monitoring (SOFTCLIX) lancets   No No   Sig: Use to test blood sugar 3 times daily or as directed.   cyclobenzaprine (FLEXERIL) 5 MG tablet   No No   Sig: Take 1 tablet (5 mg) by mouth nightly as needed for muscle spasms   ferrous gluconate (FERGON) 324 (38 Fe) MG tablet   No No   Sig: Take 1 tablet (324 mg) by mouth 3 times daily   guaiFENesin-dextromethorphan (ROBITUSSIN DM) 100-10 MG/5ML syrup   No No   Sig: Take 10 mLs by mouth every 4 hours as needed for cough or congestion   ipratropium - albuterol 0.5 mg/2.5 mg/3 mL (DUONEB) 0.5-2.5 (3) MG/3ML neb solution   No No   Sig: Take 1 vial (3 mLs) by nebulization every 4 hours as needed for wheezing   melatonin 3 MG tablet   No No   Sig: Take 2 tablets (6 mg) by  mouth nightly as needed for sleep   metoprolol tartrate (LOPRESSOR) 50 MG tablet   No No   Sig: Take 1 tablet (50 mg) by mouth 2 times daily   miconazole (MICATIN/MICRO GUARD) 2 % external powder   No No   Sig: Apply topically 2 times daily   multivitamin, therapeutic (THERA-VIT) TABS tablet   No No   Sig: Take 1 tablet by mouth daily   ondansetron (ZOFRAN-ODT) 4 MG ODT tab   No No   Sig: Take 1 tablet (4 mg) by mouth every 6 hours as needed for nausea or vomiting   oseltamivir (TAMIFLU) 30 MG capsule   No No   Sig: Take 1 capsule (30 mg) by mouth daily   polyethylene glycol (MIRALAX/GLYCOLAX) packet   No No   Sig: Take 17 g by mouth daily as needed for constipation   potassium chloride (KLOR-CON) 20 MEQ packet   No No   Si-40 mEq by Oral or Feeding Tube route every 2 hours as needed for potassium supplementation   sennosides (SENOKOT) 8.6 MG tablet   No No   Sig: Take 1 tablet by mouth 2 times daily as needed for constipation   sitagliptin (JANUVIA) 50 MG tablet   No No   Sig: Take 1 tablet (50 mg) by mouth daily Patient needs to see primary provider and have labs for further refills.   warfarin (COUMADIN) 3 MG tablet   No No   Sig: TAKE 1-1.5 TABLETS DAILY OR AS DIRECTED BY THE COUMADIN CLINIC.      Facility-Administered Medications: None     Allergies   Allergies   Allergen Reactions     Penicillins Itching and Rash     Tolerated ceftriaxone 2020       Physical Exam   Vital Signs: Temp: 96.5  F (35.8  C) Temp src: Oral BP: (!) 148/51 Pulse: 71   Resp: 16 SpO2: 94 % O2 Device: Nasal cannula Oxygen Delivery: 6 LPM  Weight: 0 lbs 0 oz    General Appearance: 59 year old female resting in bed, no acute distress, denies pain  HEENT: normocephalic atraumatic, pupils are equal in size, equally reactive to light bilaterally, anicteric sclerae  Respiratory: breathing comfortably on room air, no adventitious sounds to bilateral auscultation  Cardiovascular: regular rate and rhythm, no appreciable murmurs, rubs or  gallops  GI: faint bowel sounds present, soft, non-tender to palpation throughout, no rebound or guarding  Skin: warm, dry, no open sores, lesions or ulcerations  Psychiatric: alert, oriented to name, date, hospital and recent events    Data   Data reviewed today: I reviewed all medications, new labs and imaging results over the last 24 hours.     Most Recent 3 CBC's:  Recent Labs   Lab Test 02/06/20  0704 02/04/20  0945 02/03/20  0536 01/30/20  0612   WBC 3.4*  --  4.9 5.0   HGB 11.2* 12.3 11.1* 12.0   MCV 91  --  89 96     --  225 172     Most Recent 3 BMP's:  Recent Labs   Lab Test 02/09/20  0709 02/06/20  0704 02/05/20  1109  02/03/20  0536    138  --   --  141   POTASSIUM 4.7 3.8 3.6   < > 3.8   CHLORIDE 93* 95  --   --  97   CO2 41* 42*  --   --  40*   BUN 29 20  --   --  20   CR 2.06* 0.89  --   --  0.79   ANIONGAP 1* 1*  --   --  4   KADEN 8.5 8.6  --   --  9.0   GLC 85 69*  --   --  84    < > = values in this interval not displayed.     Most Recent 2 LFT's:  Recent Labs   Lab Test 01/18/20  0321 01/16/20  2221   AST 16 26   ALT 19 20   ALKPHOS 80 97   BILITOTAL 0.5 0.4     Most Recent 3 INR's:  Recent Labs   Lab Test 02/09/20  0709 02/08/20  0707 02/07/20  0630   INR 2.30* 2.00* 1.92*     No results found for this or any previous visit (from the past 24 hour(s)).

## 2020-02-10 NOTE — PLAN OF CARE
/62 (BP Location: Left arm)   Pulse 74   Temp 96.2  F (35.7  C) (Axillary)   Resp 25   Wt (!) 174.6 kg (385 lb)   SpO2 96%   BMI 68.20 kg/m     Time: 7864-9074     Reason for admission: JASON  Vitals: VSS on AVAPS or 6 L NC  Activity: Repo q 2 hrs. Lift.  Pain: No complaints of pain.   Neuro: Lethargic throughout the day. One episode where pt was A&Ox3.   Cardiac: Distant heart sounds.   Respiratory: Coarse crackles present.   GI/: Oliguric with red urine output. Some vaginal bleeding noted.   Diet: Regular diet, no appetite. Ordered Ensure.   Lines: Right PIV SL  Wounds: Crusted dermatitis on bilateral LE's. Some redness where BiPAP mask, applied to gecko to nose.  Labs/imaging: Creat improving. BG trending down, MD aware.      New changes this shift: Finished IVMF bolus. Kept on AVAPS throughout the day. Started on Tamiflu.      Plan:. Continue to monitor renal and respiratory function.      Continue to monitor and follow POC

## 2020-02-10 NOTE — PROGRESS NOTES
Pt able to tolerate being off AVAPS this afternoon for 30 minutes with 6 L NC (Provider was notified). Pt became more lethargic and writer placed pt back on AVAPS.

## 2020-02-10 NOTE — PLAN OF CARE
Time: 0612-2610    Reason for admission/Dx:   Acute Renal Failure  Acute kidney injury (H)     BP (!) 145/80 (BP Location: Other (Comment))   Pulse 70   Temp 96.9  F (36.1  C) (Oral)   Resp 24   Wt (!) 174.6 kg (385 lb)   SpO2 96%   BMI 68.20 kg/m      Shift changes: Transferred from  TCU for JASON, 2/2 recent diuresis for CHF exacerbation.  Mild HTN, with intermittent tachypnea (mid-20s), sats 95%+ on 4L NC, with humidification.  CARLOS, AVAP rx for hs. Influenza A (+); droplet iso maintained.  Initial 1L NS Bolus (over 10 hrs) was found not connected to pt, but infusing openly onto bed. New bag/line placed and will finish at approx 1400, then plan to discontinue MIVF. UA collected and sent; results positive; with hematuria noted. Somnolent and flat, however oriented x4, lift pt d/t dyspnea and weakness. Reg diet. 1800mL FR. Scant dried blood noted around perineal/vaginal area.     Plan:Trend Creatinine, Giving IV fluids, Continue Tamiflu

## 2020-02-11 ENCOUNTER — APPOINTMENT (OUTPATIENT)
Dept: GENERAL RADIOLOGY | Facility: CLINIC | Age: 60
DRG: 682 | End: 2020-02-11
Attending: PHYSICIAN ASSISTANT
Payer: MEDICARE

## 2020-02-11 LAB
ANION GAP SERPL CALCULATED.3IONS-SCNC: <1 MMOL/L (ref 3–14)
BACTERIA SPEC CULT: NO GROWTH
BASE EXCESS BLDV CALC-SCNC: 13.8 MMOL/L
BASE EXCESS BLDV CALC-SCNC: 19.9 MMOL/L
BUN SERPL-MCNC: 25 MG/DL (ref 7–30)
CALCIUM SERPL-MCNC: 9.4 MG/DL (ref 8.5–10.1)
CHLORIDE SERPL-SCNC: 96 MMOL/L (ref 94–109)
CO2 SERPL-SCNC: 42 MMOL/L (ref 20–32)
CREAT SERPL-MCNC: 0.87 MG/DL (ref 0.52–1.04)
ERYTHROCYTE [DISTWIDTH] IN BLOOD BY AUTOMATED COUNT: 20 % (ref 10–15)
GFR SERPL CREATININE-BSD FRML MDRD: 72 ML/MIN/{1.73_M2}
GLUCOSE BLDC GLUCOMTR-MCNC: 100 MG/DL (ref 70–99)
GLUCOSE BLDC GLUCOMTR-MCNC: 85 MG/DL (ref 70–99)
GLUCOSE BLDC GLUCOMTR-MCNC: 85 MG/DL (ref 70–99)
GLUCOSE BLDC GLUCOMTR-MCNC: 90 MG/DL (ref 70–99)
GLUCOSE BLDC GLUCOMTR-MCNC: 95 MG/DL (ref 70–99)
GLUCOSE SERPL-MCNC: 84 MG/DL (ref 70–99)
HCO3 BLDV-SCNC: 44 MMOL/L (ref 21–28)
HCO3 BLDV-SCNC: 48 MMOL/L (ref 21–28)
HCT VFR BLD AUTO: 45.6 % (ref 35–47)
HGB BLD-MCNC: 11.3 G/DL (ref 11.7–15.7)
INR PPP: 3.28 (ref 0.86–1.14)
MCH RBC QN AUTO: 23.9 PG (ref 26.5–33)
MCHC RBC AUTO-ENTMCNC: 24.8 G/DL (ref 31.5–36.5)
MCV RBC AUTO: 97 FL (ref 78–100)
O2/TOTAL GAS SETTING VFR VENT: 40 %
O2/TOTAL GAS SETTING VFR VENT: ABNORMAL %
PCO2 BLDV: 72 MM HG (ref 40–50)
PCO2 BLDV: 89 MM HG (ref 40–50)
PH BLDV: 7.3 PH (ref 7.32–7.43)
PH BLDV: 7.43 PH (ref 7.32–7.43)
PLATELET # BLD AUTO: 146 10E9/L (ref 150–450)
PO2 BLDV: 41 MM HG (ref 25–47)
PO2 BLDV: 60 MM HG (ref 25–47)
POTASSIUM SERPL-SCNC: 4.9 MMOL/L (ref 3.4–5.3)
RBC # BLD AUTO: 4.72 10E12/L (ref 3.8–5.2)
SODIUM SERPL-SCNC: 138 MMOL/L (ref 133–144)
SPECIMEN SOURCE: NORMAL
WBC # BLD AUTO: 6.1 10E9/L (ref 4–11)

## 2020-02-11 PROCEDURE — 25000132 ZZH RX MED GY IP 250 OP 250 PS 637: Mod: GY | Performed by: HOSPITALIST

## 2020-02-11 PROCEDURE — 25000132 ZZH RX MED GY IP 250 OP 250 PS 637: Mod: GY | Performed by: NURSE PRACTITIONER

## 2020-02-11 PROCEDURE — 12000004 ZZH R&B IMCU UMMC

## 2020-02-11 PROCEDURE — 99207 ZZC APP CREDIT; MD BILLING SHARED VISIT: CPT | Performed by: PHYSICIAN ASSISTANT

## 2020-02-11 PROCEDURE — 40000275 ZZH STATISTIC RCP TIME EA 10 MIN

## 2020-02-11 PROCEDURE — 80048 BASIC METABOLIC PNL TOTAL CA: CPT | Performed by: PHYSICIAN ASSISTANT

## 2020-02-11 PROCEDURE — 25000128 H RX IP 250 OP 636: Performed by: PHYSICIAN ASSISTANT

## 2020-02-11 PROCEDURE — 25000132 ZZH RX MED GY IP 250 OP 250 PS 637: Mod: GY | Performed by: PHYSICIAN ASSISTANT

## 2020-02-11 PROCEDURE — 82803 BLOOD GASES ANY COMBINATION: CPT | Performed by: NURSE PRACTITIONER

## 2020-02-11 PROCEDURE — 99233 SBSQ HOSP IP/OBS HIGH 50: CPT | Performed by: INTERNAL MEDICINE

## 2020-02-11 PROCEDURE — 00000146 ZZHCL STATISTIC GLUCOSE BY METER IP

## 2020-02-11 PROCEDURE — 85610 PROTHROMBIN TIME: CPT | Performed by: PHYSICIAN ASSISTANT

## 2020-02-11 PROCEDURE — 85027 COMPLETE CBC AUTOMATED: CPT | Performed by: PHYSICIAN ASSISTANT

## 2020-02-11 PROCEDURE — 82803 BLOOD GASES ANY COMBINATION: CPT | Performed by: PHYSICIAN ASSISTANT

## 2020-02-11 PROCEDURE — 36415 COLL VENOUS BLD VENIPUNCTURE: CPT | Performed by: PHYSICIAN ASSISTANT

## 2020-02-11 PROCEDURE — 36415 COLL VENOUS BLD VENIPUNCTURE: CPT | Performed by: NURSE PRACTITIONER

## 2020-02-11 PROCEDURE — 94660 CPAP INITIATION&MGMT: CPT

## 2020-02-11 PROCEDURE — 93010 ELECTROCARDIOGRAM REPORT: CPT | Performed by: INTERNAL MEDICINE

## 2020-02-11 PROCEDURE — 71045 X-RAY EXAM CHEST 1 VIEW: CPT

## 2020-02-11 PROCEDURE — 25800025 ZZH RX 258: Performed by: PHYSICIAN ASSISTANT

## 2020-02-11 RX ORDER — QUETIAPINE FUMARATE 25 MG/1
25 TABLET, FILM COATED ORAL ONCE
Status: COMPLETED | OUTPATIENT
Start: 2020-02-11 | End: 2020-02-11

## 2020-02-11 RX ORDER — BUMETANIDE 0.25 MG/ML
2 INJECTION INTRAMUSCULAR; INTRAVENOUS ONCE
Status: COMPLETED | OUTPATIENT
Start: 2020-02-11 | End: 2020-02-11

## 2020-02-11 RX ORDER — WARFARIN SODIUM 1 MG/1
1 TABLET ORAL
Status: COMPLETED | OUTPATIENT
Start: 2020-02-11 | End: 2020-02-11

## 2020-02-11 RX ADMIN — DEXTROSE 50 % IN WATER (D50W) INTRAVENOUS SYRINGE 50 ML: at 12:27

## 2020-02-11 RX ADMIN — WARFARIN SODIUM 1 MG: 1 TABLET ORAL at 17:30

## 2020-02-11 RX ADMIN — METOPROLOL TARTRATE 50 MG: 50 TABLET, FILM COATED ORAL at 08:05

## 2020-02-11 RX ADMIN — BUMETANIDE 2 MG: 0.25 INJECTION INTRAMUSCULAR; INTRAVENOUS at 09:43

## 2020-02-11 RX ADMIN — ATORVASTATIN CALCIUM 20 MG: 20 TABLET, FILM COATED ORAL at 19:46

## 2020-02-11 RX ADMIN — METOPROLOL TARTRATE 50 MG: 50 TABLET, FILM COATED ORAL at 19:46

## 2020-02-11 RX ADMIN — CYCLOBENZAPRINE HYDROCHLORIDE 5 MG: 5 TABLET, FILM COATED ORAL at 21:43

## 2020-02-11 RX ADMIN — FERROUS GLUCONATE 324 MG: 324 TABLET ORAL at 19:46

## 2020-02-11 RX ADMIN — QUETIAPINE FUMARATE 25 MG: 25 TABLET ORAL at 21:28

## 2020-02-11 RX ADMIN — BUMETANIDE 2 MG: 0.25 INJECTION INTRAMUSCULAR; INTRAVENOUS at 15:51

## 2020-02-11 ASSESSMENT — ACTIVITIES OF DAILY LIVING (ADL)
ADLS_ACUITY_SCORE: 16
ADLS_ACUITY_SCORE: 15
ADLS_ACUITY_SCORE: 15
ADLS_ACUITY_SCORE: 16
ADLS_ACUITY_SCORE: 16
ADLS_ACUITY_SCORE: 17

## 2020-02-11 NOTE — PLAN OF CARE
Pt is lethargic but arouses to voice, orientated X 4, night shift reports pt hallucinating, none noted, pt is forgetful, on cont AVAP at 50%, float nurse weaned to 35% but sats dropping to 87% and so increased back to 50% FIO2 to keep  sats greater then 88%. When giving morning meds, on 7 liters nasal canula and sats 87-92%, pt is refusing repositioning, lung sounds diminished throughout, P.A. to bedside to help assess resp status, VBG, CXR and Bumex ordered, plan to transfer to  today, AVAP mask with difficult seal and needs to be repositioned frequently as patient opens mouth and shifts mask, antunez patient draining small amount pink colored urine

## 2020-02-11 NOTE — PROVIDER NOTIFICATION
"MD paged because patient kept taking AVAPS mask off and wanted \"to get out of here\". She wanted to talk to the doctor about why she is still here and when she gets to leave. She kept telling the RN about the \"cookoo man in the back corner\" when there was nobody else in the room. MD told about this. Pt unable to keep O2 sats above 87% without being on 7-8 L O2 via nasal cannula with humidification when patient is taking mask off. Finally allowed nurse to place mask with 50% FiO2 back on. MD told this RN that he will pass this on to the day team. MD told this RN that patient needs to be on the AVAPS machine 24/7. No other interventions at this time. This RN will notify charge nurse as patient may need higher level of care if O2 needs keep increasing.  "

## 2020-02-11 NOTE — PROGRESS NOTES
[facility], [facility] Progress Note - Hospitalist Service, Gold 7       Date of Admission:  2/9/2020  Assessment & Plan   Arianna Siddiqi is a 59 year old morbidly obese female admitted on 2/9/2020 as a direct [facility]. She has a past medical history significant for HFrEF, T2DM, CKD, HTN, gout, OHS/CARLOS on AVAP, morbid obesity who was initially admitted to medicine for decompensated heart failure exacerbation requiring a brief stay in the ICU, [facility] 1/27-2/9 and then readmitted to medicine for JASON and acute hypoxic hypercarbic respiratory failure.     Acute hypoxic hypercarbic respiratory failure  Respiratory acidosis  Obesity hypoventilation with chronic CO2 retention on blood gas  Pulmonary edema  Influenza A  CARLOS  Continues to require 4L oxygen while awake and AVAPs while resting at TCU. BL CO2 on VBG in the 80s. Diuretics (bumex 4mg daily) held 2/9-2/10 in setting of JASON that was felt to be pre-renal d/t diuresis in setting of influenza and poor po intake. Completed 5d of tamiflu 2/10. More somnolent over past few days requiring continuous AVAPs. CXR today shows slightly increased pulmonary edema. O2 currently 93% at 35% FiO2 via AVAPs. VBG pH 7.3, pCO2 89 today. A&O x 4, tolerating mask.  WBC wnl, afebfrile.   - Cont AVAPs at all times (home AVAP: pmax25, plow 12, epap 8, RR 12, TV 600cc)  - Will try to transition to supplemental O2 once mentation and hypoxia improves  - Give IV bumex 2mg x 1 now and an additional 2mg at 1400  - Follow up with Pulmonology after discharge from TCU  - Transfer to   - Droplet precautions    Metabolic encephalopathy. In setting of above. Mental status continues to wax and wane as it did prior, improving with use of AVAPs while sleeping and resting. Continue AVAPs as above.       JASON. Resolved. Likely 2/2 diuresis from recent heart failure exacerbation and likely poor po intake with recent  "influenza dx. Per chart review, it appears BL Cr 0.9-1.2. Cr upto 2.06 on admission. S/p gentle IVF 2/9 and holding diuretics & nephrotoxic agents 2/9-2/10 with improvement in Cr to 0.87 today.   - Cont 1.8L FR  - Resume diuretics as above  - Cont to hold lisinopril   - BMP tomorrow  - Resume gabapentin once JASON resolves (has hx of twitching from gabapentin toxicity)     History of diastolic dysfunction, resolved (TTE with EF 55-60%, no RWMA with right sided strain). BNP was elevated to 7k on admission, from 2k a few days prior, but per chart review historically higher. Acute respiratory failure per above.   - Resuming bumex as above  - Cont to hold lisinopril 5mg daily  - Cont metoprolol 50mg BID  - Cont 1.8L FR  - Follow up with Cardiology after discharge from TCU       Paroxysmal atrial fibrillation; nonsustained SVT, resolved. No acute issues. Cont metoprolol 50mg BID. Cont warfarin with goal INR 2-3. INR 3.28 today.     DM 2  Hypoglycemia  PTA on sitagliptin 50mg daily (held on admission d/t poor po intake). Episode of hypoglycemia to 58 today in setting of not eating while on AVAPs s/p dextrose with improvement in BG to 100. Continues on novolog \"med\" sliding scale insulin.     Hx of recent vaginal bleeding? Per chart review. Hgb stable. Cont to monitor, needs close OP monitoring for further eval.      HLD. Cont statin.      Hx of MAG. Hgb stable at 11.3, no s/sx of bleeding. Cont pta ferrous gluconate.      Intertrigo groin. Cont Miconazole powder BID    Hx of gout. Resume allopurinol when JASON resolves.     Deconditioning. PT, OT consulted.     Diet: Combination Diet Regular Diet Adult    DVT Prophylaxis: Warfarin  Baig Catheter: in place, indication: Strict 1-2 Hour I&O  Code Status: Full Code      Disposition Plan   Expected discharge: 4 - 7 days, recommended to transitional care unit once mental status stable, respiratory status stable, Cr stable.  Entered: Yessi Vital PA-C 02/11/2020, 12:56 " PM       The patient's care was discussed with the Attending Physician, Dr. Scott.    Yessi Vital PA-C  Hospitalist Service, [address], [place]  Pager: [phone]  Please see sticky note for cross cover information  ______________________________________________________________________    Interval History   Arianna continues to be tired this morning but does feel better on the AVAPs and is A&O x 4 as long as she continues to wear her mask. She denies any chest pain or palpitations. No new cough. No runny nose. No sore throat.    Physical Exam   Vital Signs: Temp: 95  F (35  C) Temp src: Axillary BP: (!) 158/73 Pulse: 74 Heart Rate: 64 Resp: 18 SpO2: 93 % O2 Device: BiPAP/CPAP    Weight: 385 lbs 0 oz  GENERAL: chronically ill appearing, resting in bed, tired, A&O x 3  HEENT: MMM  CV: RRR.  RESPIRATORY: Effort normal on AVAPS at 35% FiO2. Lungs CTAB with crackles in bases.   GI: Abdomen soft and non distended with normoactive bowel sounds present in all quadrants. No ttp, rebound or guarding.   NEUROLOGICAL: No focal deficits.   EXTREMITIES: trace peripheral edema. Intact bilateral pedal pulses.   SKIN: No jaundice or rashes on exposed areas of skin

## 2020-02-11 NOTE — PLAN OF CARE
Pt lethargic and confused overnight. MD paged and aware. Using AVAPS with 50% FiO2 the entire shift per MD recommendation, no blood gases ordered at this time. Patient taking mask off intermittently and de-sats very quickly. Giving patient a couple of breaks from mask overnight and using 6-8 L O2 via humidified nasal cannula to keep O2 sats above 90. Other vital signs stable. Up with mechanical lift, patient refusing repositioning during most of night shift after education of importance of offloading pressure. Baig in place with red urine output, team aware. Catheter cares completed with wipes and new stat lock applied to patient's leg. Regular diet with not much appetite. BG overnight 89 and 90. Droplet isolation maintained for positive influenza A. Right PIV saline locked. Plan for TCU once JASON resolved and diuretics resumed per MD note. Continue with POC.

## 2020-02-11 NOTE — PLAN OF CARE
BG 58 at 1220, gave 50 ml dextrose IV, recheck on , updated medical team, will continue to monitor

## 2020-02-11 NOTE — PLAN OF CARE
OT5A: CX: Pt with worsening respiratory status, transferring to 6B soon. Will check back for therapy tomorrow as appropriate.

## 2020-02-12 LAB
ALBUMIN UR-MCNC: 100 MG/DL
ANION GAP SERPL CALCULATED.3IONS-SCNC: 2 MMOL/L (ref 3–14)
APPEARANCE UR: CLEAR
BASE EXCESS BLDV CALC-SCNC: 19.5 MMOL/L
BASE EXCESS BLDV CALC-SCNC: 21.2 MMOL/L
BILIRUB UR QL STRIP: NEGATIVE
BUN SERPL-MCNC: 20 MG/DL (ref 7–30)
CALCIUM SERPL-MCNC: 9.3 MG/DL (ref 8.5–10.1)
CHLORIDE SERPL-SCNC: 97 MMOL/L (ref 94–109)
CO2 SERPL-SCNC: 42 MMOL/L (ref 20–32)
COLOR UR AUTO: ABNORMAL
CREAT SERPL-MCNC: 0.71 MG/DL (ref 0.52–1.04)
ERYTHROCYTE [DISTWIDTH] IN BLOOD BY AUTOMATED COUNT: 20.2 % (ref 10–15)
GFR SERPL CREATININE-BSD FRML MDRD: >90 ML/MIN/{1.73_M2}
GLUCOSE BLDC GLUCOMTR-MCNC: 102 MG/DL (ref 70–99)
GLUCOSE BLDC GLUCOMTR-MCNC: 132 MG/DL (ref 70–99)
GLUCOSE BLDC GLUCOMTR-MCNC: 85 MG/DL (ref 70–99)
GLUCOSE BLDC GLUCOMTR-MCNC: 87 MG/DL (ref 70–99)
GLUCOSE BLDC GLUCOMTR-MCNC: 89 MG/DL (ref 70–99)
GLUCOSE BLDC GLUCOMTR-MCNC: 92 MG/DL (ref 70–99)
GLUCOSE BLDC GLUCOMTR-MCNC: 95 MG/DL (ref 70–99)
GLUCOSE SERPL-MCNC: 83 MG/DL (ref 70–99)
GLUCOSE UR STRIP-MCNC: NEGATIVE MG/DL
HCO3 BLDV-SCNC: 48 MMOL/L (ref 21–28)
HCO3 BLDV-SCNC: 50 MMOL/L (ref 21–28)
HCT VFR BLD AUTO: 42 % (ref 35–47)
HGB BLD-MCNC: 11.1 G/DL (ref 11.7–15.7)
HGB UR QL STRIP: ABNORMAL
INR PPP: 4.61 (ref 0.86–1.14)
INTERPRETATION ECG - MUSE: NORMAL
KETONES UR STRIP-MCNC: NEGATIVE MG/DL
LEUKOCYTE ESTERASE UR QL STRIP: ABNORMAL
MCH RBC QN AUTO: 24.3 PG (ref 26.5–33)
MCHC RBC AUTO-ENTMCNC: 26.4 G/DL (ref 31.5–36.5)
MCV RBC AUTO: 92 FL (ref 78–100)
MUCOUS THREADS #/AREA URNS LPF: PRESENT /LPF
NITRATE UR QL: NEGATIVE
O2/TOTAL GAS SETTING VFR VENT: 45 %
O2/TOTAL GAS SETTING VFR VENT: ABNORMAL %
PCO2 BLDV: 81 MM HG (ref 40–50)
PCO2 BLDV: 82 MM HG (ref 40–50)
PH BLDV: 7.39 PH (ref 7.32–7.43)
PH BLDV: 7.4 PH (ref 7.32–7.43)
PH UR STRIP: 8 PH (ref 5–7)
PLATELET # BLD AUTO: 165 10E9/L (ref 150–450)
PO2 BLDV: 35 MM HG (ref 25–47)
PO2 BLDV: 58 MM HG (ref 25–47)
POTASSIUM SERPL-SCNC: 4.1 MMOL/L (ref 3.4–5.3)
RBC # BLD AUTO: 4.56 10E12/L (ref 3.8–5.2)
RBC #/AREA URNS AUTO: >182 /HPF (ref 0–2)
SODIUM SERPL-SCNC: 141 MMOL/L (ref 133–144)
SOURCE: ABNORMAL
SP GR UR STRIP: 1.02 (ref 1–1.03)
UROBILINOGEN UR STRIP-MCNC: 2 MG/DL (ref 0–2)
WBC # BLD AUTO: 6.8 10E9/L (ref 4–11)
WBC #/AREA URNS AUTO: 71 /HPF (ref 0–5)

## 2020-02-12 PROCEDURE — 25000132 ZZH RX MED GY IP 250 OP 250 PS 637: Mod: GY | Performed by: PHYSICIAN ASSISTANT

## 2020-02-12 PROCEDURE — 82803 BLOOD GASES ANY COMBINATION: CPT | Performed by: PHYSICIAN ASSISTANT

## 2020-02-12 PROCEDURE — 82803 BLOOD GASES ANY COMBINATION: CPT | Performed by: INTERNAL MEDICINE

## 2020-02-12 PROCEDURE — 99207 ZZC APP CREDIT; MD BILLING SHARED VISIT: CPT | Performed by: PHYSICIAN ASSISTANT

## 2020-02-12 PROCEDURE — 36415 COLL VENOUS BLD VENIPUNCTURE: CPT | Performed by: INTERNAL MEDICINE

## 2020-02-12 PROCEDURE — 25000128 H RX IP 250 OP 636: Performed by: PHYSICIAN ASSISTANT

## 2020-02-12 PROCEDURE — 94660 CPAP INITIATION&MGMT: CPT

## 2020-02-12 PROCEDURE — 87088 URINE BACTERIA CULTURE: CPT | Performed by: INTERNAL MEDICINE

## 2020-02-12 PROCEDURE — 27211419 ZZ H CIRCUIT VAPOTHERM

## 2020-02-12 PROCEDURE — 40000275 ZZH STATISTIC RCP TIME EA 10 MIN

## 2020-02-12 PROCEDURE — 87186 SC STD MICRODIL/AGAR DIL: CPT | Performed by: INTERNAL MEDICINE

## 2020-02-12 PROCEDURE — 80048 BASIC METABOLIC PNL TOTAL CA: CPT | Performed by: INTERNAL MEDICINE

## 2020-02-12 PROCEDURE — 12000004 ZZH R&B IMCU UMMC

## 2020-02-12 PROCEDURE — 00000146 ZZHCL STATISTIC GLUCOSE BY METER IP

## 2020-02-12 PROCEDURE — 85027 COMPLETE CBC AUTOMATED: CPT | Performed by: INTERNAL MEDICINE

## 2020-02-12 PROCEDURE — 36415 COLL VENOUS BLD VENIPUNCTURE: CPT | Performed by: PHYSICIAN ASSISTANT

## 2020-02-12 PROCEDURE — 85610 PROTHROMBIN TIME: CPT | Performed by: INTERNAL MEDICINE

## 2020-02-12 PROCEDURE — 87086 URINE CULTURE/COLONY COUNT: CPT | Performed by: INTERNAL MEDICINE

## 2020-02-12 PROCEDURE — 81001 URINALYSIS AUTO W/SCOPE: CPT | Performed by: PHYSICIAN ASSISTANT

## 2020-02-12 PROCEDURE — 99233 SBSQ HOSP IP/OBS HIGH 50: CPT | Performed by: INTERNAL MEDICINE

## 2020-02-12 RX ORDER — HALOPERIDOL 5 MG/ML
0.5 INJECTION INTRAMUSCULAR ONCE
Status: DISCONTINUED | OUTPATIENT
Start: 2020-02-12 | End: 2020-02-12

## 2020-02-12 RX ORDER — BUMETANIDE 0.25 MG/ML
2 INJECTION INTRAMUSCULAR; INTRAVENOUS ONCE
Status: COMPLETED | OUTPATIENT
Start: 2020-02-12 | End: 2020-02-12

## 2020-02-12 RX ORDER — OLANZAPINE 2.5 MG/1
2.5 TABLET, FILM COATED ORAL AT BEDTIME
Status: DISCONTINUED | OUTPATIENT
Start: 2020-02-12 | End: 2020-02-15

## 2020-02-12 RX ORDER — OLANZAPINE 2.5 MG/1
2.5 TABLET, FILM COATED ORAL DAILY PRN
Status: DISCONTINUED | OUTPATIENT
Start: 2020-02-12 | End: 2020-02-15 | Stop reason: HOSPADM

## 2020-02-12 RX ADMIN — FERROUS GLUCONATE 324 MG: 324 TABLET ORAL at 19:41

## 2020-02-12 RX ADMIN — ACETAMINOPHEN 1000 MG: 500 TABLET, FILM COATED ORAL at 17:10

## 2020-02-12 RX ADMIN — OLANZAPINE 2.5 MG: 2.5 TABLET, FILM COATED ORAL at 17:10

## 2020-02-12 RX ADMIN — BUMETANIDE 2 MG: 0.25 INJECTION INTRAMUSCULAR; INTRAVENOUS at 11:32

## 2020-02-12 RX ADMIN — METOPROLOL TARTRATE 50 MG: 50 TABLET, FILM COATED ORAL at 19:40

## 2020-02-12 RX ADMIN — ATORVASTATIN CALCIUM 20 MG: 20 TABLET, FILM COATED ORAL at 19:41

## 2020-02-12 RX ADMIN — OLANZAPINE 2.5 MG: 2.5 TABLET, FILM COATED ORAL at 19:41

## 2020-02-12 RX ADMIN — METOPROLOL TARTRATE 50 MG: 50 TABLET, FILM COATED ORAL at 11:29

## 2020-02-12 ASSESSMENT — PAIN DESCRIPTION - DESCRIPTORS: DESCRIPTORS: ACHING

## 2020-02-12 ASSESSMENT — ACTIVITIES OF DAILY LIVING (ADL)
ADLS_ACUITY_SCORE: 15
ADLS_ACUITY_SCORE: 17
ADLS_ACUITY_SCORE: 15
ADLS_ACUITY_SCORE: 13

## 2020-02-12 NOTE — PLAN OF CARE
Blood pressure 115/87, pulse 74, temperature 97.9  F (36.6  C), temperature source Axillary, resp. rate 12, weight (!) 170.6 kg (376 lb), SpO2 98 %.    Neuro: A&Ox4. Keep bed alarm on, needs continuing education on call light use.  Cardiac: SR. Flipped into a-fib/a-flutter for roughly 1hr, MD aware, no new interventions, converted back to SR. VSS.   Respiratory: Sating >95% on vapotherm 60%. Should go on bipap/avaps at night.  GI/: Adequate urine output via antunez catheter, large urine output post bumex. BM X1. Bloody vaginal discharge.  Diet/appetite: Tolerating 2g Na diet, poor PO intake, refusing intake. 1.8L FR.  Activity:  Assist of 2/lift.  Pain: Pt states she is aching all over, rest and repositioning.  Skin: No new deficits noted.  LDA's: Right PIV SL. Antunez catheter.    Plan: Continue to monitor VBG's.Continue with POC. Notify primary team with changes.

## 2020-02-12 NOTE — PLAN OF CARE
Physical Therapy Discharge Summary    Reason for therapy discharge:    Discharged to hospital 2/9--flu     Progress towards therapy goal(s). See goals on Care Plan in [facility] electronic health record for goal details.  Goals not met.  Barriers to achieving goals:   discharge from facility.    Therapy recommendation(s):    Continued therapy is recommended.  Rationale/Recommendations:  for transfers, gait as medical condition allows.

## 2020-02-12 NOTE — PROVIDER NOTIFICATION
1314:  Notified Yessi from Gold 7 that pt switched into a-fib/a-flutter, HR 's, /87. PA promptly called back and let RN know there are no new changes. PA also aware of critical VBG of CO2 of 81 and bicarb of 50.    1335:  Notified Yessi that patient has converted back into SR.

## 2020-02-12 NOTE — PLAN OF CARE
"Neuro: Orientated to self; intermittently knows she's in the hospital but at the same time thinks she's not in a hospital room/bed and needs to get to one.  At one point told staff she was in a \"barn.\"  Did not believe the RN was the nurse even though RN has had patient before and showed her her badge multiple times.  Needed a lot of redirection; setting off bed alarm, screaming, taking bipap off, heart monitor off, pulse ox off, and would've pulled out antunez had the tubes not  instead.  Patient is also hallucinating.  PRN seroquel was given around 2145 and Haldol ordered around 0545.    Cardiac: SR with HR 60s-70s.  BP 140s/50s-70s.  VSS.   Respiratory: Sating >92% on 45% Bipap when on; desats to 70s when patient takes it off.  Diminished lung sounds.  Refusing gecko pad; taking it off and pulling it apart/shredding it.  GI/: Adequate urine output via antunez; clots in urine.  Vaginal bleeding.  Last BM 2/11.  Diet/appetite: Regular diet with 1.8L FR.    Activity:  Assist of 2-lift.  Did sit on edge of bed independently this AM when bed alarm didn't go off and thus staff wasn't able to redirect before it got to that point.  However patient is too confused to participate much with repositioning.  Pain: Denies pain.  Skin: no new defecits.  LDA's: Right hand PIV SL.      Plan: Continue with POC. Notify primary team with changes.   "

## 2020-02-12 NOTE — PROGRESS NOTES
[facility] Medicine Crosscover note    Was called to patients bedside due to confusion and agitation.  Initially was redirectable however recently was found screaming, trying to pull her antunez out, and removing her Bipap.  She had gotten seroquel earlier in the evening, which did not seem to change her behavior.  Currently, she appeared to be resting comfortably on bipap, so I did not examine her out of fear of waking her.       - Recheck VBG since has been pulling bipap mask off   - EKG showed no QT prolongation   - 0.5 mg IV haldol prn x1 for agitation which does not redirect.      Marvin Brumfield MD PhD  2/12/2020 6:15 AM.

## 2020-02-12 NOTE — PROGRESS NOTES
[facility], [place]    Medicine Progress Note - Hospitalist Service, Gold 7       Date of Admission:  2/9/2020  Assessment & Plan   Arianna Siddiqi is a 59 year old morbidly obese female admitted on 2/9/2020 as a direct admission from [place] [facility]. She has a past medical history significant for HFrEF, T2DM, CKD, HTN, gout, OHS/CARLOS on AVAP, morbid obesity who was initially admitted to medicine for decompensated heart failure exacerbation requiring a brief stay in the ICU, transferred to  TCU for rehabilitation 1/27-2/9 and then readmitted to medicine for JASON and acute hypoxic hypercarbic respiratory failure.      Acute hypoxic hypercarbic respiratory failure  Respiratory acidosis  Obesity hypoventilation with chronic CO2 retention on blood gas  Pulmonary edema  Influenza A  CARLOS  PTA was requiring 4L oxygen while awake and AVAPS while sleeping. BL CO2 on VBG in the 80s. Diuretics (bumex 4mg daily) held 2/9-2/10 in setting of JASON that was felt to be pre-renal d/t diuresis in setting of influenza and poor po intake. Completed 5d of tamiflu 2/10. No concern for secondary infection. More somnolent over past few days requiring continuous AVAPS. CXR 2/11 with slightly increased pulmonary edema. S/p 4mg IV bumex 2/11 with >4L UOP. Most recent VBG shows improvement: pH 7.39, pCO2 81. Pt intermittently pulls off AVAPS, thus will try vapotherm today.   - Will trial vapotherm with repeat VBG following to ensure tolerating, AVAPS as much as pt tolerates and always while sleeping  - IV bumex 2mg this morning, goal for net neg 2L daily. If UOP >1.5L by 1500, will hold off on addition dose  - Droplet precautions      Toxic metabolic encephalopathy  Delirium  Recent influenza as above. Hypercarbic resp failure as above, pCO2 improving with AVAPs. Mental status waxing and waning, had episodes of agitation overnight c/w delirium, requiring seroquel (with little effect) and then haldol (with good effect). QTc  "452 2/12. A&O x 3 today, improved from yesterday.   - Cont AVAPs or vapotherm as per above  - Schedule zyprexa 2.5mg at 2000 and an additional 2.5mg available prn  - Hold gabapentin until mental status returns to baseline  - Delirium precautions    JASON. Resolved. Likely 2/2 diuresis from recent heart failure exacerbation and likely poor po intake with recent influenza dx. Per chart review, it appears BL Cr 0.9-1.2. Cr upto 2.06 on admission. S/p gentle IVF 2/9 and holding diuretics & nephrotoxic agents 2/9-2/10 with resolution of JASON. Cr 0.71 today.   - Cont 1.8L FR  - Diuretics as above  - Cont to hold lisinopril   - BMP tomorrow     History of diastolic dysfunction, resolved (TTE with EF 55-60%, no RWMA with right sided strain). BNP was elevated to 7k on admission, from 2k a few days prior, but per chart review historically higher. Acute respiratory failure per above r/t pulmonary edema. BPs slightly elevated - but received diuretics and metoprolol later today.   - Bumex as above  - Cont to hold lisinopril 5mg daily  - Cont metoprolol 50mg BID  - Cont 1.8L FR, 2g Na diet  - Follow up with Cardiology after discharge from TCU       Paroxysmal atrial fibrillation. Converted into afib around 1300 today. HRs 80-110s, BPs normotensive. Will cont metoprolol 50mg BID.  Cont warfarin with goal INR 2-3. INR 4.61 today.      DM 2  Hypoglycemia  PTA on sitagliptin 50mg daily (on hold d/t poor po intake). Episode of hypoglycemia 2/11 in setting of not eating while on AVAPs, resolved with dextrose and has not recurred. BGs 80-100s. Continues on novolog \"med\" sliding scale insulin.     Hx of recent vaginal bleeding? Per chart review. Hgb stable. Cont to monitor, needs close OP monitoring for further eval.      HLD. Cont statin.      Hx of MAG. Hgb stable at 11.1, no s/sx of bleeding. Cont pta ferrous gluconate.      Intertrigo groin. Cont Miconazole powder BID     Hx of gout. Resume allopurinol in next few days. No e/o acute " flare.      Deconditioning. PT, OT consulted.      Diet: Combination Diet 2 gm NA Diet    DVT Prophylaxis: warfarin  Baig Catheter: in place, indication: Strict 1-2 Hour I&O  Code Status: Full Code      Disposition Plan   Expected discharge: 4 - 7 days, recommended to transitional care unit once medically stable.  Entered: Yessi Vital PA-C 02/12/2020, 12:54 PM       The patient's care was discussed with the Attending Physician, Dr. Scott.    Yessi Vital PA-C  Hospitalist Service, [address], [place]  Pager: [phone]  Please see sticky note for cross cover information  ______________________________________________________________________    Interval History   Arianna reports she is doing better today. She remembers getting upset last night stating that they were playing games with her. She is alert and oriented to person, place and time. Feels breathing is slowly improving. Denies active cough, sore throat. No chest pain or palpitations. No other acute concerns.     Physical Exam   Vital Signs: Temp: 97.9  F (36.6  C) Temp src: Axillary BP: (!) 163/84   Heart Rate: 83 Resp: 12 SpO2: 98 % O2 Device: BiPAP/CPAP    Weight: 376 lbs 0 oz  GENERAL: chronically ill appearing, effort normal on vapotherm, A&O x 3  HEENT: MMM  CV: RRR.  RESPIRATORY: Effort normal on vapotherm, lungs CTAB with crackles in bases. No wheezing appreciated.   GI: Abdomen soft and non distended with normoactive bowel sounds present in all quadrants. No tenderness, rebound, guarding.   NEUROLOGICAL: No focal deficits. Moves all extremities.    EXTREMITIES: trace peripheral edema in BLE  SKIN: chronic venous stasis changes to BLE

## 2020-02-12 NOTE — PLAN OF CARE
OT: Per RN, pt not appropriate for therapy this AM, OT will check back as available/appropriate or reschedule for 2/13/20.

## 2020-02-12 NOTE — PLAN OF CARE
"/82 (BP Location: Right arm)   Pulse 74   Temp 97.9  F (36.6  C) (Axillary)   Resp 21   Wt (!) 174.6 kg (385 lb)   SpO2 98%   BMI 68.20 kg/m       Pt transferred to [facility] from  around 1645 for increased O2 needs. All belongings, meds, and chart sent with pt.     Neuro: A/Ox2-3. Forgetful. Making illogical statements at times. \"Am I dead?!\" \"why is the TV talking to me?!\"   Cardiac: VSS. SR. Afebrile.   Respiratory: BiPAP AVAPS 30-40% fiO2. RR 20-22. Pt states breathing has improved.   GI/: Voiding good amounts via antunez. Receiving bumex. Last BM today prior to transfer  Diet/Appetite: Regular diet. Did not eat anything this evening d/t being on BIPAP. Denies nausea  Skin: Blanchable redness noted on bridge of nose from BiPAP, gecko applied. No other new skin deficits noted.   LDA: PIV saline locked.   Activity: Assist of 2 and lift. Attempts to get OOB at times without assistance. Bed alarm on for safety. Turned/repo q2h.  Pain: Denies  Plan: Continue to monitor and follow POC. Notify MD with any changes or concerns.     "

## 2020-02-13 ENCOUNTER — APPOINTMENT (OUTPATIENT)
Dept: OCCUPATIONAL THERAPY | Facility: CLINIC | Age: 60
DRG: 682 | End: 2020-02-13
Attending: PHYSICIAN ASSISTANT
Payer: MEDICARE

## 2020-02-13 ENCOUNTER — APPOINTMENT (OUTPATIENT)
Dept: PHYSICAL THERAPY | Facility: CLINIC | Age: 60
DRG: 682 | End: 2020-02-13
Attending: PHYSICIAN ASSISTANT
Payer: MEDICARE

## 2020-02-13 LAB
ANION GAP SERPL CALCULATED.3IONS-SCNC: ABNORMAL MMOL/L (ref 3–14)
BUN SERPL-MCNC: 16 MG/DL (ref 7–30)
CALCIUM SERPL-MCNC: 9.4 MG/DL (ref 8.5–10.1)
CHLORIDE SERPL-SCNC: 94 MMOL/L (ref 94–109)
CO2 SERPL-SCNC: >45 MMOL/L (ref 20–32)
CREAT SERPL-MCNC: 0.69 MG/DL (ref 0.52–1.04)
ERYTHROCYTE [DISTWIDTH] IN BLOOD BY AUTOMATED COUNT: 20.7 % (ref 10–15)
GFR SERPL CREATININE-BSD FRML MDRD: >90 ML/MIN/{1.73_M2}
GLUCOSE BLDC GLUCOMTR-MCNC: 119 MG/DL (ref 70–99)
GLUCOSE BLDC GLUCOMTR-MCNC: 83 MG/DL (ref 70–99)
GLUCOSE BLDC GLUCOMTR-MCNC: 90 MG/DL (ref 70–99)
GLUCOSE BLDC GLUCOMTR-MCNC: 91 MG/DL (ref 70–99)
GLUCOSE SERPL-MCNC: 85 MG/DL (ref 70–99)
HCT VFR BLD AUTO: 41.5 % (ref 35–47)
HGB BLD-MCNC: 11 G/DL (ref 11.7–15.7)
INR PPP: 4.14 (ref 0.86–1.14)
MCH RBC QN AUTO: 23.8 PG (ref 26.5–33)
MCHC RBC AUTO-ENTMCNC: 26.5 G/DL (ref 31.5–36.5)
MCV RBC AUTO: 90 FL (ref 78–100)
PLATELET # BLD AUTO: 180 10E9/L (ref 150–450)
POTASSIUM SERPL-SCNC: 3.7 MMOL/L (ref 3.4–5.3)
RBC # BLD AUTO: 4.62 10E12/L (ref 3.8–5.2)
SODIUM SERPL-SCNC: 141 MMOL/L (ref 133–144)
WBC # BLD AUTO: 6.6 10E9/L (ref 4–11)

## 2020-02-13 PROCEDURE — 85027 COMPLETE CBC AUTOMATED: CPT | Performed by: INTERNAL MEDICINE

## 2020-02-13 PROCEDURE — 40000275 ZZH STATISTIC RCP TIME EA 10 MIN

## 2020-02-13 PROCEDURE — 85610 PROTHROMBIN TIME: CPT | Performed by: INTERNAL MEDICINE

## 2020-02-13 PROCEDURE — 36415 COLL VENOUS BLD VENIPUNCTURE: CPT | Performed by: INTERNAL MEDICINE

## 2020-02-13 PROCEDURE — 97530 THERAPEUTIC ACTIVITIES: CPT | Mod: GP | Performed by: PHYSICAL THERAPIST

## 2020-02-13 PROCEDURE — 12000004 ZZH R&B IMCU UMMC

## 2020-02-13 PROCEDURE — 99233 SBSQ HOSP IP/OBS HIGH 50: CPT | Performed by: INTERNAL MEDICINE

## 2020-02-13 PROCEDURE — 25000132 ZZH RX MED GY IP 250 OP 250 PS 637: Mod: GY | Performed by: PHYSICIAN ASSISTANT

## 2020-02-13 PROCEDURE — 97161 PT EVAL LOW COMPLEX 20 MIN: CPT | Mod: GP | Performed by: PHYSICAL THERAPIST

## 2020-02-13 PROCEDURE — 97535 SELF CARE MNGMENT TRAINING: CPT | Mod: GO

## 2020-02-13 PROCEDURE — 94660 CPAP INITIATION&MGMT: CPT

## 2020-02-13 PROCEDURE — 99207 ZZC APP CREDIT; MD BILLING SHARED VISIT: CPT | Performed by: PHYSICIAN ASSISTANT

## 2020-02-13 PROCEDURE — 25000128 H RX IP 250 OP 636: Performed by: PHYSICIAN ASSISTANT

## 2020-02-13 PROCEDURE — 97165 OT EVAL LOW COMPLEX 30 MIN: CPT | Mod: GO

## 2020-02-13 PROCEDURE — 80048 BASIC METABOLIC PNL TOTAL CA: CPT | Performed by: INTERNAL MEDICINE

## 2020-02-13 PROCEDURE — 00000146 ZZHCL STATISTIC GLUCOSE BY METER IP

## 2020-02-13 RX ORDER — LISINOPRIL 2.5 MG/1
5 TABLET ORAL DAILY
Status: DISCONTINUED | OUTPATIENT
Start: 2020-02-13 | End: 2020-02-15 | Stop reason: HOSPADM

## 2020-02-13 RX ORDER — BUMETANIDE 0.25 MG/ML
1 INJECTION INTRAMUSCULAR; INTRAVENOUS ONCE
Status: COMPLETED | OUTPATIENT
Start: 2020-02-13 | End: 2020-02-13

## 2020-02-13 RX ADMIN — FERROUS GLUCONATE 324 MG: 324 TABLET ORAL at 20:27

## 2020-02-13 RX ADMIN — FERROUS GLUCONATE 324 MG: 324 TABLET ORAL at 07:53

## 2020-02-13 RX ADMIN — METOPROLOL TARTRATE 50 MG: 50 TABLET, FILM COATED ORAL at 20:27

## 2020-02-13 RX ADMIN — BUMETANIDE 1 MG: 0.25 INJECTION INTRAMUSCULAR; INTRAVENOUS at 10:56

## 2020-02-13 RX ADMIN — ATORVASTATIN CALCIUM 20 MG: 20 TABLET, FILM COATED ORAL at 20:27

## 2020-02-13 RX ADMIN — LISINOPRIL 5 MG: 2.5 TABLET ORAL at 13:08

## 2020-02-13 RX ADMIN — OLANZAPINE 2.5 MG: 2.5 TABLET, FILM COATED ORAL at 20:27

## 2020-02-13 RX ADMIN — METOPROLOL TARTRATE 50 MG: 50 TABLET, FILM COATED ORAL at 07:53

## 2020-02-13 RX ADMIN — THERA TABS 1 TABLET: TAB at 07:53

## 2020-02-13 RX ADMIN — FERROUS GLUCONATE 324 MG: 324 TABLET ORAL at 13:08

## 2020-02-13 ASSESSMENT — ACTIVITIES OF DAILY LIVING (ADL)
ADLS_ACUITY_SCORE: 13
ADLS_ACUITY_SCORE: 14

## 2020-02-13 NOTE — PROGRESS NOTES
02/13/20 1500   Quick Adds   Type of Visit Initial PT Evaluation   Living Environment   Lives With alone   Living Arrangements apartment   Home Accessibility stairs to enter home   Number of Stairs, Main Entrance 1   Stair Railings, Main Entrance none   Transportation Anticipated family or friend will provide   Living Environment Comment pt lives by herself, admitted from U   Self-Care   Usual Activity Tolerance moderate   Current Activity Tolerance fair   Regular Exercise No   Equipment Currently Used at Home shower chair   Activity/Exercise/Self-Care Comment pt has been needing walker at TCU but does not use at baseline    Functional Level Prior   Ambulation 0-->independent   Transferring 0-->independent   Toileting 0-->independent   Bathing 1-->assistive equipment   Fall history within last six months no   Which of the above functional risks had a recent onset or change? ambulation;transferring   Prior Functional Level Comment Pt generally IND at baseline   General Information   Onset of Illness/Injury or Date of Surgery - Date 02/09/20   Referring Physician Sid Scott MD   Patient/Family Goals Statement to go back to TCU, to walk   Pertinent History of Current Problem (include personal factors and/or comorbidities that impact the POC)  59 year old morbidly obese female admitted on 2/9/2020 as a direct admission from [facility]. She has a past medical history significant for HFrEF, T2DM, CKD, HTN, gout, OHS/CARLOS on AVAP, morbid obesity who was recently admitted to the TCU after she developed acute hypoxic respiratory failure from influenza A and decompensated heart failure exacerbation requiring a brief stay in the ICU and she was ultimately discharged from the hospital on . During her time at the TCU, she was treeated with Tamilfu, nebulizers and her dry weight improved to 172.3kg.   Today, she was noted to have acute renal failure with a Cr of 2.0, baseline normal and was sent for direct admission to  the hospital for further work up and care.   Precautions/Limitations fall precautions;oxygen therapy device and L/min   Heart Disease Risk Factors Lack of physical activity;High blood pressure;Diabetes;Overweight;Medical history   General Observations pt supine in roni bed   General Info Comments activity orders: up with assist   Cognitive Status Examination   Orientation orientation to person, place and time   Level of Consciousness alert   Follows Commands and Answers Questions 100% of the time   Personal Safety and Judgment intact   Memory intact   Pain Assessment   Patient Currently in Pain No   Posture    Posture Forward head position;Protracted shoulders   Range of Motion (ROM)   ROM Quick Adds No deficits were identified   Strength   Strength Comments >3/5 per mobility    Bed Mobility   Bed Mobility Comments mod A supine to sit    Transfer Skills   Transfer Comments mod A x 2 STS from EOB    Gait   Gait Comments pt unable to take steps   Balance   Balance Comments needs UE support in standing    Sensory Examination   Sensory Perception no deficits were identified   General Therapy Interventions   Planned Therapy Interventions progressive activity/exercise;home program guidelines;risk factor education;transfer training;strengthening;gait training;bed mobility training;balance training   Clinical Impression   Criteria for Skilled Therapeutic Intervention yes, treatment indicated   PT Diagnosis impaired mobility    Influenced by the following impairments deconditioning   Functional limitations due to impairments gait, transfers    Clinical Presentation Stable/Uncomplicated   Clinical Presentation Rationale pt with clear presentation    Clinical Decision Making (Complexity) Low complexity   Therapy Frequency 5x/week   Predicted Duration of Therapy Intervention (days/wks) 2 weeks    Anticipated Discharge Disposition Transitional Care Facility   Risk & Benefits of therapy have been explained Yes   Patient, Family &  "other staff in agreement with plan of care Yes   Clinical Impression Comments pt agreeable to return to TCU   [facility] AM-PAC  \"6 Clicks\" V.2 Basic Mobility Inpatient Short Form   1. Turning from your back to your side while in a flat bed without using bedrails? 2 - A Lot   2. Moving from lying on your back to sitting on the side of a flat bed without using bedrails? 2 - A Lot   3. Moving to and from a bed to a chair (including a wheelchair)? 1 - Total   4. Standing up from a chair using your arms (e.g., wheelchair, or bedside chair)? 2 - A Lot   5. To walk in hospital room? 1 - Total   6. Climbing 3-5 steps with a railing? 1 - Total   Basic Mobility Raw Score (Score out of 24.Lower scores equate to lower levels of function) 9   Total Evaluation Time   Total Evaluation Time (Minutes) 6     "

## 2020-02-13 NOTE — PLAN OF CARE
Neuro: A&Ox4.   Cardiac: SR. VSS.   Respiratory: Sating >88% on Vapotherm 40-50%. Bipap AVAPS at rest.   GI/: Adequate urine output. BM X1-smear.  Diet/appetite: Tolerating 2gm Na diet. Eating fair.  Activity:  Assist of 2, weight shift in bed this shift.  Pain: At acceptable level on current regimen.   Skin: No new deficits noted.  LDA's: ROLA Baig.    Plan: No acute issues. Encourage pulmonary hygiene cough/deep breathe. Continue with POC. Notify primary team with changes.

## 2020-02-13 NOTE — PLAN OF CARE
/70 (BP Location: Right arm)   Pulse 74   Temp 98  F (36.7  C) (Axillary)   Resp 20   Wt (!) 170.6 kg (376 lb)   SpO2 97%   BMI 66.61 kg/m      Shift: 4564-9899   Reason for Admission: JASON and acute hypoxic hypercarbic respiratory failure.  VS: VSS on 30L Vapotherm @ 60% FiO2. SR with PVCs  Neuros: A/Ox4, able to  make needs known. Pt suspicious and irritated with staff. Slightly agitated- PRN Zyprexa given  GI/: No BMs this shift, Baig in place  Diet: Reg diet. Poor appetite  Drains/Lines: PIV SL  Activity: A2 + lift. Pt refusing to reposition when offered  Pain/Nausea: c/o of generalized pain- PRN Tylenol given.   Respiratory: Sats >97% on 30L with 60% FiO2 Vapotherm  Skin: Pink/dry areas on bilateral thighs  Plan of care: Continue to monitor VBG's. Will continue to monitor and follow POC.

## 2020-02-13 NOTE — PLAN OF CARE
Blood pressure (!) 143/75, pulse 74, temperature 98.3  F (36.8  C), temperature source Oral, resp. rate 20, weight (!) 169.2 kg (373 lb), SpO2 95 %.    Neuro: A&Ox4. Calls appropriately.   Cardiac: SR. VSS.    Respiratory: Sating >95% on vapotherm 40%. Bipap/avaps at night.  GI/: Adequate urine output via antunez catheter, red/clots present, MD aware. Small bloody vaginal discharge.  Diet/appetite: Tolerating 2g Na diet,fair PO intake. 1.8L FR.  Activity:  Assist of 2, standing/pivoting at edge of bed to recliner.  Pain: Denies.  Skin: No new deficits noted.  LDA's: Right PIV SL. Antunez catheter.     Plan: Continue to wean O2 and encourage activity. Continue to monitor VBG's.Continue with POC. Notify primary team with changes

## 2020-02-13 NOTE — PLAN OF CARE
Major Shift Events:  Patient intermittently impulsive and irritable, easily redirectable. High flow nasal cannula (vapotherm) until midnight, at which time patient was switched to Bipap. Respiratory status stable overnight, per MD no VBG necessary unless showing signs of acute decompensation. MD notified regarding patient urine becoming pink, red, and containing clots. UA sent. Antunez remains in place overnight.   Plan: Potential to remove antunez to prevent urethral trauma, per MD. Will continue to monitor.   For vital signs and complete assessments, please see documentation flowsheets.

## 2020-02-13 NOTE — PHARMACY-ANTICOAGULATION SERVICE
Warfarin Therapy Hold Note  This patient is currently receiving warfarin for paroxysmal Atrial fibrillation.    Goal INR:  2-3.      Anticoagulation Dose History     Recent Dosing and Labs Latest Ref Rng & Units 2/7/2020 2/8/2020 2/9/2020 2/10/2020 2/11/2020 2/12/2020 2/13/2020    Warfarin 1 mg - - - - - 1 mg - -    Warfarin 2 mg - - - - 2 mg - - -    Warfarin 3 mg - 3 mg 3 mg 3 mg - - - -    INR 0.86 - 1.14 1.92(H) 2.00(H) 2.30(H) 2.64(H) 3.28(H) 4.61(H) 4.14(H)    INR 0.86 - 1.14 - - - - - - -            Bleeding Signs/Symptoms: RN notes today 2/13 mention red clots present in urine via Baig catheter, and small bloody vaginal discharge.    Assessment:  Current INR is supratherapeutic.  This is most likely due to:  stress from acute illness. Pt resumed a sodium-restricted diet on 2/12.    Plan:  1) HOLD today s warfarin dose.   An order has been placed in EPIC for  Warfarin- No Dose Today    2) Do not recommend reversal with vitamin K or FFP at this time.  3) Recheck next INR tomorrow with AM labs.    The primary team has been notified of the above plan to hold warfarin again today 2/13 (last dose 1 mg given 2/11). Pharmacy will continue to follow.    Feng Ambriz, PharmD, BCPS

## 2020-02-13 NOTE — PLAN OF CARE
PT 6B: PT evaluation completed, treatment initiated.   Discharge Planner PT   Patient plan for discharge: return to TCU  Current status: pt using 40% FiO2 via Vapotherm during session, needs mod A x 2 for supine to sit transfer, mod A x 2 for STS transfer from EOB x 4 reps. Pt tolerates standing for up to 2 minutes but unable to take steps today, engaged in weightshifting.   Barriers to return to prior living situation: medical status, assist for mobility, stair at home, pt lives alone   Recommendations for discharge: TCU  Rationale for recommendations: pt far below her baseline mobility needing continued therapy to maximize functional independence.       Entered by: Rhonda Zurita 02/13/2020 4:35 PM

## 2020-02-13 NOTE — PROGRESS NOTES
[facility], [place]    Medicine Progress Note - Hospitalist Service, Gold 7       Date of Admission:  2/9/2020  Assessment & Plan   Arianna Siddiqi is a 59 year old morbidly obese female [facility] 2[facility]2020 [facility] She has a past medical history significant for HFrEF, T2DM, CKD, HTN, gout, OHS/CARLOS on AVAP, morbid obesity who was initially admitted to medicine for decompensated heart failure exacerbation requiring a brief stay in the ICU, transferred to  TCU for rehabilitation 1/27-2/9 and then readmitted to medicine for JASON and acute hypoxic hypercarbic respiratory failure.      Acute hypoxic hypercarbic respiratory failure  Respiratory acidosis  Obesity hypoventilation with chronic CO2 retention on blood gas  Pulmonary edema  Influenza A  CARLOS  PTA was requiring 4L oxygen while awake and AVAPS while sleeping. BL CO2 on VBG in the 80s. Diuretics (bumex 4mg daily) held 2/9-2/10 in setting of JASON that was felt to be pre-renal d/t diuresis in setting of influenza and poor po intake. Completed 5d of tamiflu 2/10. No concern for secondary infection. More somnolent on admission that required continuous AVAPS. CXR 2/11 with slightly increased pulmonary edema. S/p 4mg IV bumex 2/11 with >4L UOP, then 2mg IV bumex 2/12 with 3.6L UOP. A&O x 3 over past 24h, tolerating alternating between vapotherm and AVAPS.  - Continue vapotherm while awake and AVAPS while sleeping  - IV bumex 1mg this morning, goal for net neg 2L daily - will try to switch to oral diuretics regimen tomorrow  - Droplet precautions      Toxic metabolic encephalopathy  Delirium  Recent influenza as above. Hypercarbic resp failure as above, pCO2 improving with AVAPs. Mental status was waxing and waning concerning for delirium, thus zyprexa scheduled at night 2/12 with resolution of nighttime restlessness. A&O x 3 over past 24h.   - Cont AVAPs or vapotherm as per above  - ContSchedule zyprexa 2.5mg at  "2000 and an additional 2.5mg available prn  - Hold gabapentin until mental status returns to baseline  - Delirium precautions    JASON. Resolved. Likely 2/2 diuresis from recent heart failure exacerbation and likely poor po intake with recent influenza dx. Per chart review, it appears BL Cr 0.9-1.2. Cr upto 2.06 on admission. S/p gentle IVF 2/9 and holding diuretics & nephrotoxic agents 2/9-2/10 with  resolution of JASON. Cr 0.69 today.   - Cont 1.8L FR  - Diuretics as above  - Resume home lsinopril 5mg daily  - BMP tomorrow     History of diastolic dysfunction, resolved (TTE with EF 55-60%, no RWMA with right sided strain). BNP was elevated to 7k on admission, from 2k a few days prior, but per chart review historically higher. Acute respiratory failure per above r/t pulmonary edema.    - Bumex as above  - Resume home lisinopril 5mg daily  - Cont metoprolol 50mg BID  - Cont 1.8L FR, 2g Na diet  - Follow up with Cardiology after discharge from TCU       Paroxysmal atrial fibrillation. Converted into asymtpomatic afib on 2/12 with HRs  and BPs normotensive. Converted back to NSR 30min later. Will cont metoprolol 50mg BID.  Cont warfarin with goal INR 2-3. INR 4.61 today.      DM 2  Hypoglycemia  PTA on sitagliptin 50mg daily (on hold d/t poor po intake). Episode of hypoglycemia 2/11 in setting of not eating while on AVAPs, resolved with dextrose and has not recurred. BGs 90-100s. Continues on novolog \"med\" sliding scale insulin.     Hx of recent vaginal bleeding? Per chart review. Hgb stable. Cont to monitor, needs close OP monitoring for further eval.      HLD. Cont statin.      Hx of MAG. Hgb stable at 11, no s/sx of bleeding. Cont pta ferrous gluconate.      Intertrigo groin. Cont Miconazole powder BID.     Hx of gout. Resume allopurinol in next few days. No e/o acute flare.      Deconditioning. PT, OT consulted, will likely need TCU on discharge.     Hematuria. ?traumatic antunez placement vs UTI. UA with " "significant RBCs, WBC, +LE, - nitrite. No urinary symptoms. UCx pending. Will treat if positive. Will need to watch hematuria closely in future, may need close urology follow up.      Diet: Combination Diet 2 gm NA Diet    DVT Prophylaxis: warfarin  Baig Catheter: in place, indication: Strict 1-2 Hour I&O  Code Status: Full Code      Disposition Plan   Expected discharge: 2 - 3 days, recommended to transitional care unit once resp status stable, diuretic plan established, TCU plan made.  Entered: Yessi Vital PA-C 02/13/2020, 12:28 PM       The patient's care was discussed with the Attending Physician, Dr. Scott.    Yessi Vital PA-C  Hospitalist Service, [address], [place]  Pager: [phone]  Please see sticky note for cross cover information  ______________________________________________________________________    Interval History   Arianna is doing well today. She is A&O x 3. She is able to recall why she is here. She reports she slept well last night. Her breathing feels \"fine.\" Denies any cough, rhinorrhea, sore throat. No chest pain or palpitations. Eating well. No other acute concerns.     Physical Exam   Vital Signs: Temp: 98.3  F (36.8  C) Temp src: Oral BP: (!) 143/75   Heart Rate: 66 Resp: 20 SpO2: 95 % O2 Device: High Flow Nasal Cannula (HFNC) Oxygen Delivery: Other (Comments)(30 LPM)  Weight: 373 lbs 0 oz  GENERAL: chronically ill appearing, effort normal on vapotherm, A&O x 3  HEENT: MMM  CV: RRR.  RESPIRATORY: Effort normal on vapotherm. Lungs CTAB with diminished sounds at bases, no wheezing appreciated  GI: Abdomen soft and non distended with normoactive bowel sounds present in all quadrants. No tenderness, rebound, guarding.   NEUROLOGICAL: No focal deficits. Moves all extremities.    EXTREMITIES: soft tissue edema in BLE  SKIN: chronic venous stasis changes to BLE    "

## 2020-02-13 NOTE — PLAN OF CARE
Discharge Planner OT   Patient plan for discharge: TCU  Current status: Pt supine inclined in bed upon arrival. Evaluation completed and treatment initiated. Therapist educated pt on OT role and discussed POC/Goals for therapy. Therapist educated pt on good body mechanics and transfer supine<->seated EOB. Pt completed transfer supine<->seated EOB with Mod-Max A and vc's. Pt completed transfer sit<->standing x 2 and standing pivot transfer x 1 with Mod A x2 and vc's. Pt completed self cares with setup, and vc's. Pt tolerated therapy session well. VSS. Vapotherm, 60%, 30lpm.   Barriers to return to prior living situation: Decreased independence with functional transfers/ADLs. Decreased strength/endurance, fatigue, Respiratory status.   Recommendations for discharge: TCU  Rationale for recommendations: Pt will benefit from continued therapy to address barriers above and to maximize functional independence.        Entered by: Anibal Martines 02/13/2020 2:22 PM

## 2020-02-14 ENCOUNTER — APPOINTMENT (OUTPATIENT)
Dept: OCCUPATIONAL THERAPY | Facility: CLINIC | Age: 60
DRG: 682 | End: 2020-02-14
Attending: HOSPITALIST
Payer: MEDICARE

## 2020-02-14 ENCOUNTER — APPOINTMENT (OUTPATIENT)
Dept: PHYSICAL THERAPY | Facility: CLINIC | Age: 60
DRG: 682 | End: 2020-02-14
Attending: HOSPITALIST
Payer: MEDICARE

## 2020-02-14 LAB
ANION GAP SERPL CALCULATED.3IONS-SCNC: 2 MMOL/L (ref 3–14)
BUN SERPL-MCNC: 15 MG/DL (ref 7–30)
CALCIUM SERPL-MCNC: 9.2 MG/DL (ref 8.5–10.1)
CHLORIDE SERPL-SCNC: 94 MMOL/L (ref 94–109)
CO2 SERPL-SCNC: 44 MMOL/L (ref 20–32)
CREAT SERPL-MCNC: 0.79 MG/DL (ref 0.52–1.04)
ERYTHROCYTE [DISTWIDTH] IN BLOOD BY AUTOMATED COUNT: 20.9 % (ref 10–15)
GFR SERPL CREATININE-BSD FRML MDRD: 81 ML/MIN/{1.73_M2}
GLUCOSE BLDC GLUCOMTR-MCNC: 102 MG/DL (ref 70–99)
GLUCOSE BLDC GLUCOMTR-MCNC: 148 MG/DL (ref 70–99)
GLUCOSE BLDC GLUCOMTR-MCNC: 91 MG/DL (ref 70–99)
GLUCOSE BLDC GLUCOMTR-MCNC: 95 MG/DL (ref 70–99)
GLUCOSE SERPL-MCNC: 88 MG/DL (ref 70–99)
HCT VFR BLD AUTO: 42.2 % (ref 35–47)
HGB BLD-MCNC: 11.3 G/DL (ref 11.7–15.7)
INR PPP: 3.29 (ref 0.86–1.14)
MCH RBC QN AUTO: 24.1 PG (ref 26.5–33)
MCHC RBC AUTO-ENTMCNC: 26.8 G/DL (ref 31.5–36.5)
MCV RBC AUTO: 90 FL (ref 78–100)
PLATELET # BLD AUTO: 215 10E9/L (ref 150–450)
POTASSIUM SERPL-SCNC: 3.3 MMOL/L (ref 3.4–5.3)
POTASSIUM SERPL-SCNC: 3.6 MMOL/L (ref 3.4–5.3)
RBC # BLD AUTO: 4.68 10E12/L (ref 3.8–5.2)
SODIUM SERPL-SCNC: 140 MMOL/L (ref 133–144)
WBC # BLD AUTO: 7 10E9/L (ref 4–11)

## 2020-02-14 PROCEDURE — 25000132 ZZH RX MED GY IP 250 OP 250 PS 637: Mod: GY | Performed by: INTERNAL MEDICINE

## 2020-02-14 PROCEDURE — 84132 ASSAY OF SERUM POTASSIUM: CPT | Performed by: INTERNAL MEDICINE

## 2020-02-14 PROCEDURE — 99207 ZZC APP CREDIT; MD BILLING SHARED VISIT: CPT | Performed by: PHYSICIAN ASSISTANT

## 2020-02-14 PROCEDURE — 97110 THERAPEUTIC EXERCISES: CPT | Mod: GO

## 2020-02-14 PROCEDURE — 97535 SELF CARE MNGMENT TRAINING: CPT | Mod: GO

## 2020-02-14 PROCEDURE — 85610 PROTHROMBIN TIME: CPT | Performed by: INTERNAL MEDICINE

## 2020-02-14 PROCEDURE — 97530 THERAPEUTIC ACTIVITIES: CPT | Mod: GP | Performed by: PHYSICAL THERAPIST

## 2020-02-14 PROCEDURE — 12000004 ZZH R&B IMCU UMMC

## 2020-02-14 PROCEDURE — 40000275 ZZH STATISTIC RCP TIME EA 10 MIN

## 2020-02-14 PROCEDURE — 25000132 ZZH RX MED GY IP 250 OP 250 PS 637: Mod: GY | Performed by: PHYSICIAN ASSISTANT

## 2020-02-14 PROCEDURE — 99233 SBSQ HOSP IP/OBS HIGH 50: CPT | Performed by: INTERNAL MEDICINE

## 2020-02-14 PROCEDURE — 94660 CPAP INITIATION&MGMT: CPT

## 2020-02-14 PROCEDURE — 80048 BASIC METABOLIC PNL TOTAL CA: CPT | Performed by: INTERNAL MEDICINE

## 2020-02-14 PROCEDURE — 00000146 ZZHCL STATISTIC GLUCOSE BY METER IP

## 2020-02-14 PROCEDURE — 85027 COMPLETE CBC AUTOMATED: CPT | Performed by: INTERNAL MEDICINE

## 2020-02-14 PROCEDURE — 36415 COLL VENOUS BLD VENIPUNCTURE: CPT | Performed by: INTERNAL MEDICINE

## 2020-02-14 RX ORDER — POTASSIUM CHLORIDE 29.8 MG/ML
20 INJECTION INTRAVENOUS
Status: DISCONTINUED | OUTPATIENT
Start: 2020-02-14 | End: 2020-02-15 | Stop reason: HOSPADM

## 2020-02-14 RX ORDER — BUMETANIDE 2 MG/1
2 TABLET ORAL DAILY
Status: DISCONTINUED | OUTPATIENT
Start: 2020-02-14 | End: 2020-02-15 | Stop reason: HOSPADM

## 2020-02-14 RX ORDER — POTASSIUM CHLORIDE 1.5 G/1.58G
20-40 POWDER, FOR SOLUTION ORAL
Status: DISCONTINUED | OUTPATIENT
Start: 2020-02-14 | End: 2020-02-15 | Stop reason: HOSPADM

## 2020-02-14 RX ORDER — POTASSIUM CL/LIDO/0.9 % NACL 10MEQ/0.1L
10 INTRAVENOUS SOLUTION, PIGGYBACK (ML) INTRAVENOUS
Status: DISCONTINUED | OUTPATIENT
Start: 2020-02-14 | End: 2020-02-15 | Stop reason: HOSPADM

## 2020-02-14 RX ORDER — POTASSIUM CHLORIDE 7.45 MG/ML
10 INJECTION INTRAVENOUS
Status: DISCONTINUED | OUTPATIENT
Start: 2020-02-14 | End: 2020-02-15 | Stop reason: HOSPADM

## 2020-02-14 RX ORDER — POTASSIUM CHLORIDE 750 MG/1
20-40 TABLET, EXTENDED RELEASE ORAL
Status: DISCONTINUED | OUTPATIENT
Start: 2020-02-14 | End: 2020-02-15 | Stop reason: HOSPADM

## 2020-02-14 RX ORDER — WARFARIN SODIUM 1 MG/1
1 TABLET ORAL
Status: COMPLETED | OUTPATIENT
Start: 2020-02-14 | End: 2020-02-14

## 2020-02-14 RX ADMIN — LISINOPRIL 5 MG: 2.5 TABLET ORAL at 09:06

## 2020-02-14 RX ADMIN — CYCLOBENZAPRINE HYDROCHLORIDE 5 MG: 5 TABLET, FILM COATED ORAL at 00:25

## 2020-02-14 RX ADMIN — METOPROLOL TARTRATE 50 MG: 50 TABLET, FILM COATED ORAL at 19:33

## 2020-02-14 RX ADMIN — METOPROLOL TARTRATE 50 MG: 50 TABLET, FILM COATED ORAL at 09:06

## 2020-02-14 RX ADMIN — OLANZAPINE 2.5 MG: 2.5 TABLET, FILM COATED ORAL at 19:32

## 2020-02-14 RX ADMIN — THERA TABS 1 TABLET: TAB at 09:06

## 2020-02-14 RX ADMIN — POTASSIUM CHLORIDE 40 MEQ: 750 TABLET, EXTENDED RELEASE ORAL at 10:49

## 2020-02-14 RX ADMIN — FERROUS GLUCONATE 324 MG: 324 TABLET ORAL at 13:09

## 2020-02-14 RX ADMIN — FERROUS GLUCONATE 324 MG: 324 TABLET ORAL at 19:32

## 2020-02-14 RX ADMIN — ATORVASTATIN CALCIUM 20 MG: 20 TABLET, FILM COATED ORAL at 19:32

## 2020-02-14 RX ADMIN — BUMETANIDE 2 MG: 2 TABLET ORAL at 09:16

## 2020-02-14 RX ADMIN — POTASSIUM CHLORIDE 20 MEQ: 750 TABLET, EXTENDED RELEASE ORAL at 12:45

## 2020-02-14 RX ADMIN — FERROUS GLUCONATE 324 MG: 324 TABLET ORAL at 09:06

## 2020-02-14 RX ADMIN — WARFARIN SODIUM 1 MG: 1 TABLET ORAL at 18:12

## 2020-02-14 ASSESSMENT — ACTIVITIES OF DAILY LIVING (ADL)
ADLS_ACUITY_SCORE: 14

## 2020-02-14 NOTE — PROGRESS NOTES
Social Work Services Progress Note    Hospital Day: 5  Date of Initial Social Work Evaluation:  1/28/2020 at TCU - please see for details  Collaborated with:  Reza Venegas),  TCU (Amada), Chart Review    Data:  SW following for return to TCU when medically stable. PT/OT completed evaluations and continue to recommend TCU placement at discharge. Pt is admitted from [facility] and they are following for return.     Per Reza LÓPEZ (Yessi) Pt is switching to PO diuretics, and will be working on weaning off vapotherm. If all goes well, pt will potentially be stable for discharge this weekend.     SW updated [facility] TCU Liaison (Amada). Per Amada, they will likely have an available bed, and Pt does not need insurance authorization. Wknd SW to f/u with [facility] TCU Admissions if she is stable for discharge.     Intervention:    -Discharge planning    Assessment:  Pt requiring IP hospitalization at this time.    Plan:    Anticipated Disposition:  Facility:   TCU    Barriers to d/c plan:  Medical stability    Follow Up:  SW to continue to follow and assist with discharge plan.    TAMMY Mak, YUDELKASW  [facility] Intermediate [facility] Unit   [place]  Phone: [phone]  Pager: [phone]

## 2020-02-14 NOTE — PROVIDER NOTIFICATION
Time of notification: 1:00 AM  Provider notified: Overnight Gold team  Patient status: Patient's urine was edgar at beginning of shift but is becoming more red/pink tinged.  Temp:  [98.2  F (36.8  C)-98.5  F (36.9  C)] 98.3  F (36.8  C)  Heart Rate:  [64-98] 98  Resp:  [18-20] 18  BP: (105-143)/(54-87) 111/87  FiO2 (%):  [40 %-70 %] 40 %  SpO2:  [85 %-99 %] 97 %  Orders received: Continue to monitor

## 2020-02-14 NOTE — PLAN OF CARE
Neuro: A&Ox4.   Cardiac: SR. VSS.   Respiratory: Sating >88% on 4-6L NC.  GI/: Adequate urine output, urine intermittently blood tinged and clots. No BM this shift.  Diet/appetite: Tolerating 2gm Na diet with 1.8L FR. Eating fair.=.  Activity:  Assist of 2, up to chair.  Pain: At acceptable level on current regimen.   Skin: No new deficits noted.  LDA's: JASMINE Baig.    Plan: No acute issues this shift. Started PO bumex. Weaned to 4-5L NC. Plan for Bipap AVAPS on pts home settings tonight to eval for D.C. Continue with POC. Notify primary team with changes.

## 2020-02-14 NOTE — PLAN OF CARE
Neuro: A&Ox4.   Cardiac: Occasional PVC's.../87 (BP Location: Right arm)   Pulse 74   Temp 98.3  F (36.8  C) (Oral)   Resp 18   Wt (!) 169.2 kg (373 lb)   SpO2 91%   BMI 66.07 kg/m     Respiratory: Sating 88%-92% via Bipap @ 45% FIO2 at night and Vapotherm via 40%-50% during day.  GI/: Adequate urine output. No BM during shift.  Diet/appetite: Tolerating 2G Na+ diet. Eating well.  Activity:  Assist of Lift up to chair and in halls.  Pain: At acceptable level on current regimen.   Skin: No new deficits noted.   LDA's: Baig, Bipap, Vapotherm, PIV    Plan: Continue with POC. Notify primary team with changes.

## 2020-02-14 NOTE — PLAN OF CARE
6B: Discharge Planner PT   Patient plan for discharge: return to TCU  Current status: pt using 4L O2 via NC throughout session, SpO2 maintained >92%. Pt completes STS x 6 reps total from recliner needing min-mod A x 2 with FWW. Pt progresses to taking steps in room for a total of 8 ft each bout. Pt fatigued but making good progress.   Barriers to return to prior living situation: medical status, assist for mobility, deconditioning   Recommendations for discharge: TCU  Rationale for recommendations: pt motivated to participate and may be good ARU candidate however pt declines ARU as she reports she has been there in the past and it was too structured/strenuous. Pt would benefit from continued therapy to maximize functional IND and gait.        Entered by: Rhonda Zurita 02/14/2020 3:58 PM

## 2020-02-14 NOTE — PROGRESS NOTES
[facility], [place]    Medicine Progress Note - Hospitalist Service, Gold 7       Date of Admission:  2/9/2020  Assessment & Plan   Arianna Siddiqi is a 59 year old morbidly obese female [facility] She has a past medical history significant for HFrEF, T2DM, CKD, HTN, gout, OHS/CARLOS on AVAP, morbid obesity who was initially admitted to medicine for decompensated heart failure exacerbation requiring a brief stay in the ICU, transferred to  TCU for rehabilitation 1/27-2/9 and then readmitted to medicine for JASON and acute hypoxic hypercarbic respiratory failure.      Acute hypoxic hypercarbic respiratory failure  Respiratory acidosis  Obesity hypoventilation with chronic CO2 retention on blood gas  Pulmonary edema  Influenza A  CARLOS  PTA was requiring 4L oxygen while awake and AVAPS while sleeping. BL CO2 on VBG in the 80s. Diuretics (bumex 4mg daily) held 2/9-2/10 in setting of JASON that was felt to be pre-renal d/t diuresis in setting of influenza and poor po intake. Completed 5d of tamiflu 2/10. No concern for secondary infection. More somnolent on admission that required continuous AVAPS. CXR 2/11 with slightly increased pulmonary edema. S/p IV bumex 4mg on 2/11, 2mg on 2/12, and 1mg on 2/13 with significant UOP and steady improvement in weights - 373lbs (385lbs on admission). A&O x 3 over past 48 hours, tolerating alternating between vapotherm and AVAPS.   - Try to wean to supplemental O2 during day and home setting of AVAPS at night and whenever sleeping during day  - Resume po bumex at 2mg daily (previously on 4mg oral pta) - will likely need to uptitrate pending po intake  - Droplet precautions      Toxic metabolic encephalopathy - resolved  Delirium - resolved  Recent influenza as above. Hypercarbic resp failure as above, pCO2 improving with AVAPs. Mental status was waxing and waning concerning for delirium, thus zyprexa scheduled at  "night 2/12 with resolution of nighttime restlessness. A&O x 3 over past 48 hours.   - Cont supplemental O2 and AVAPS as above  - Cont scheduled zyprexa 2.5mg at 2000, can be weaned in next few days  - Hold gabapentin until mental status returns to baseline  - Delirium precautions    JASON. Resolved. Likely 2/2 diuresis from recent heart failure exacerbation and likely poor po intake with recent influenza dx. Per chart review, it appears BL Cr 0.9-1.2. Cr upto 2.06 on admission. S/p gentle IVF 2/9 and holding diuretics & nephrotoxic agents 2/9-2/10 with resolution of JASON. Cr 0.79 today.   - Cont 1.8L FR  - Diuretics as above     History of diastolic dysfunction, resolved (TTE with EF 55-60%, no RWMA with right sided strain). BNP was elevated to 7k on admission, from 2k a few days prior, but per chart review historically higher. Acute respiratory failure as above r/t pulmonary edema.  Diuresing well with bumex, wt improved to 373lbs (from 385lbs on admission).   - Bumex as above  - Cont lisinopril 5mg daily  - Cont metoprolol 50mg BID  - Cont 1.8L FR, 2g Na diet  - Follow up with Cardiology after discharge from TCU       Paroxysmal atrial fibrillation. Briefly went into asymtpomatic afib on 2/12 with HRs  and BPs normotensive. Converted back to NSR 30min later. Will cont metoprolol 50mg BID.  Cont warfarin with goal INR 2-3. INR 3.29 today.      DM 2  Hypoglycemia  PTA on sitagliptin 50mg daily (on hold d/t poor po intake). Episode of hypoglycemia 2/11 in setting of not eating while on AVAPs, resolved with dextrose and has not recurred. BGs 90-100s. Continues on novolog \"med\" sliding scale insulin.     Deconditioning. PT, OT following - will need TCU on discharge.     Hematuria. ?traumatic antunez placement in setting of supratherapeutic INR (4.46) vs UTI. UA with significant RBCs, WBC, +LE, - nitrite. No urinary symptoms. UCx with only 10-50K NLFGNRs. Will await final speciation. Will need to watch hematuria " closely in future, may need close urology follow up.     Hypokalemia. K 3.3 today. In setting of recent diuretics. Will start K replacement protocol, will likely need some daily K supplementation while on bumex.     Chronic stable medical issues  Hx of recent vaginal bleeding? Per chart review this occurred during last hospitalization, has not recurred this admission. Hgb stable. Cont to monitor, needs close OP monitoring for further eval.   HLD. Cont statin.   Hx of MAG. Hgb stable at 11.3, hematuria as below. Cont pta ferrous gluconate.   Intertrigo groin. Cont Miconazole powder BID.  Hx of gout. Resume allopurinol in next few days. No e/o acute flare.     Diet: Combination Diet 2 gm NA Diet    DVT Prophylaxis: warfarin  Baig Catheter: in place, indication: Strict 1-2 Hour I&O  Code Status: Full Code      Disposition Plan   Expected discharge: 2 - 3 days, recommended to transitional care unit once diuretic regimen established, vapotherm weaned.  Entered: Yessi Vital PA-C 02/14/2020, 8:18 AM       The patient's care was discussed with the Attending Physician, Dr. Scott.    Yessi Vital PA-C  Hospitalist Service, [address], [place]  Pager: [phone]  Please see sticky note for cross cover information  ______________________________________________________________________    Interval History   Arianna is doing okay today. She slept well last night. She is alert and oriented to person, place and time. She knows why she is in the hospital. She feels she is getting better. Her breathing feels near her baseline. She denies any cough, sore throat, runny nose, chest pain or palpitations. She is looking forward to working with therapy and trying to eat more today. No other acute concerns.     Physical Exam   Vital Signs: Temp: 98.3  F (36.8  C) Temp src: Oral BP: 130/79   Heart Rate: 70 Resp: 19 SpO2: 91 % O2 Device: BiPAP/CPAP Oxygen Delivery: Other (Comments)(30  LPM)  Weight: 373 lbs 0 oz  GENERAL: Alert and oriented x 3. NAD. Resting in bed. AVAPS on.   HEENT: MMM.   CV: RRR.   RESPIRATORY: Effort normal on AVAPS. Lungs CTAB, distant sounds in bases .  GI: Abdomen soft and non distended with normoactive bowel sounds present in all quadrants. No tenderness, rebound, guarding.   NEUROLOGICAL: No focal deficits. Moves all extremities.    EXTREMITIES: soft tissue edema in BLE. Intact bilateral pedal pulses.   SKIN: chronic venous stasis changes to BLE.   : antunez with blood tinged, edgar colored urine

## 2020-02-15 ENCOUNTER — HOSPITAL ENCOUNTER (INPATIENT)
Facility: SKILLED NURSING FACILITY | Age: 60
LOS: 8 days | Discharge: HOME HEALTH | DRG: 292 | End: 2020-02-23
Attending: INTERNAL MEDICINE | Admitting: INTERNAL MEDICINE
Payer: MEDICARE

## 2020-02-15 VITALS
WEIGHT: 293 LBS | OXYGEN SATURATION: 97 % | HEART RATE: 81 BPM | TEMPERATURE: 97.7 F | SYSTOLIC BLOOD PRESSURE: 125 MMHG | RESPIRATION RATE: 18 BRPM | DIASTOLIC BLOOD PRESSURE: 63 MMHG | BODY MASS INDEX: 62.18 KG/M2

## 2020-02-15 DIAGNOSIS — M19.90 ARTHRITIS: ICD-10-CM

## 2020-02-15 DIAGNOSIS — K59.00 CONSTIPATION, UNSPECIFIED CONSTIPATION TYPE: ICD-10-CM

## 2020-02-15 DIAGNOSIS — I10 ESSENTIAL HYPERTENSION: ICD-10-CM

## 2020-02-15 DIAGNOSIS — I48.91 ATRIAL FIBRILLATION (H): ICD-10-CM

## 2020-02-15 DIAGNOSIS — R06.00 DYSPNEA, UNSPECIFIED TYPE: ICD-10-CM

## 2020-02-15 DIAGNOSIS — R10.13 DYSPEPSIA: ICD-10-CM

## 2020-02-15 DIAGNOSIS — I50.31 ACUTE DIASTOLIC CONGESTIVE HEART FAILURE (H): ICD-10-CM

## 2020-02-15 DIAGNOSIS — I48.91 ATRIAL FIBRILLATION, UNSPECIFIED TYPE (H): ICD-10-CM

## 2020-02-15 DIAGNOSIS — B49 FUNGAL INFECTION: ICD-10-CM

## 2020-02-15 DIAGNOSIS — M62.838 MUSCLE SPASM: ICD-10-CM

## 2020-02-15 DIAGNOSIS — I50.21 ACUTE SYSTOLIC CONGESTIVE HEART FAILURE (H): ICD-10-CM

## 2020-02-15 DIAGNOSIS — E78.5 HYPERLIPIDEMIA, UNSPECIFIED HYPERLIPIDEMIA TYPE: ICD-10-CM

## 2020-02-15 DIAGNOSIS — E61.1 IRON DEFICIENCY: ICD-10-CM

## 2020-02-15 DIAGNOSIS — R11.10 VOMITING, INTRACTABILITY OF VOMITING NOT SPECIFIED, PRESENCE OF NAUSEA NOT SPECIFIED, UNSPECIFIED VOMITING TYPE: ICD-10-CM

## 2020-02-15 DIAGNOSIS — I89.0 LYMPHEDEMA: ICD-10-CM

## 2020-02-15 DIAGNOSIS — I10 HYPERTENSION, UNSPECIFIED TYPE: ICD-10-CM

## 2020-02-15 DIAGNOSIS — M10.9 GOUT, UNSPECIFIED CAUSE, UNSPECIFIED CHRONICITY, UNSPECIFIED SITE: ICD-10-CM

## 2020-02-15 DIAGNOSIS — E87.6 HYPOKALEMIA: ICD-10-CM

## 2020-02-15 DIAGNOSIS — I50.9 CHF (CONGESTIVE HEART FAILURE) (H): Primary | ICD-10-CM

## 2020-02-15 LAB
ANION GAP SERPL CALCULATED.3IONS-SCNC: 3 MMOL/L (ref 3–14)
BACTERIA SPEC CULT: ABNORMAL
BUN SERPL-MCNC: 16 MG/DL (ref 7–30)
CALCIUM SERPL-MCNC: 9.1 MG/DL (ref 8.5–10.1)
CHLORIDE SERPL-SCNC: 95 MMOL/L (ref 94–109)
CO2 SERPL-SCNC: 42 MMOL/L (ref 20–32)
CREAT SERPL-MCNC: 0.82 MG/DL (ref 0.52–1.04)
ERYTHROCYTE [DISTWIDTH] IN BLOOD BY AUTOMATED COUNT: 21.2 % (ref 10–15)
GFR SERPL CREATININE-BSD FRML MDRD: 78 ML/MIN/{1.73_M2}
GLUCOSE BLDC GLUCOMTR-MCNC: 102 MG/DL (ref 70–99)
GLUCOSE BLDC GLUCOMTR-MCNC: 111 MG/DL (ref 70–99)
GLUCOSE BLDC GLUCOMTR-MCNC: 69 MG/DL (ref 70–99)
GLUCOSE BLDC GLUCOMTR-MCNC: 92 MG/DL (ref 70–99)
GLUCOSE SERPL-MCNC: 88 MG/DL (ref 70–99)
HCT VFR BLD AUTO: 44.3 % (ref 35–47)
HGB BLD-MCNC: 11.7 G/DL (ref 11.7–15.7)
INR PPP: 2.49 (ref 0.86–1.14)
MCH RBC QN AUTO: 24.3 PG (ref 26.5–33)
MCHC RBC AUTO-ENTMCNC: 26.4 G/DL (ref 31.5–36.5)
MCV RBC AUTO: 92 FL (ref 78–100)
PLATELET # BLD AUTO: 193 10E9/L (ref 150–450)
POTASSIUM SERPL-SCNC: 3.3 MMOL/L (ref 3.4–5.3)
RBC # BLD AUTO: 4.81 10E12/L (ref 3.8–5.2)
SODIUM SERPL-SCNC: 140 MMOL/L (ref 133–144)
SPECIMEN SOURCE: ABNORMAL
WBC # BLD AUTO: 7 10E9/L (ref 4–11)

## 2020-02-15 PROCEDURE — 25000132 ZZH RX MED GY IP 250 OP 250 PS 637: Mod: GY | Performed by: PHYSICIAN ASSISTANT

## 2020-02-15 PROCEDURE — 94660 CPAP INITIATION&MGMT: CPT

## 2020-02-15 PROCEDURE — 99239 HOSP IP/OBS DSCHRG MGMT >30: CPT | Performed by: INTERNAL MEDICINE

## 2020-02-15 PROCEDURE — 85027 COMPLETE CBC AUTOMATED: CPT | Performed by: INTERNAL MEDICINE

## 2020-02-15 PROCEDURE — 36415 COLL VENOUS BLD VENIPUNCTURE: CPT | Performed by: INTERNAL MEDICINE

## 2020-02-15 PROCEDURE — 00000146 ZZHCL STATISTIC GLUCOSE BY METER IP

## 2020-02-15 PROCEDURE — 25000132 ZZH RX MED GY IP 250 OP 250 PS 637: Mod: GY | Performed by: INTERNAL MEDICINE

## 2020-02-15 PROCEDURE — 85610 PROTHROMBIN TIME: CPT | Performed by: INTERNAL MEDICINE

## 2020-02-15 PROCEDURE — 80048 BASIC METABOLIC PNL TOTAL CA: CPT | Performed by: INTERNAL MEDICINE

## 2020-02-15 PROCEDURE — 99207 ZZC APP CREDIT; MD BILLING SHARED VISIT: CPT | Performed by: PHYSICIAN ASSISTANT

## 2020-02-15 PROCEDURE — 25000128 H RX IP 250 OP 636: Performed by: INTERNAL MEDICINE

## 2020-02-15 PROCEDURE — 12000022 ZZH R&B SNF

## 2020-02-15 PROCEDURE — 84132 ASSAY OF SERUM POTASSIUM: CPT | Performed by: INTERNAL MEDICINE

## 2020-02-15 RX ORDER — ALLOPURINOL 100 MG/1
200 TABLET ORAL DAILY
Status: DISCONTINUED | OUTPATIENT
Start: 2020-02-15 | End: 2020-02-23 | Stop reason: HOSPADM

## 2020-02-15 RX ORDER — ONDANSETRON 4 MG/1
4 TABLET, ORALLY DISINTEGRATING ORAL EVERY 6 HOURS PRN
Status: DISCONTINUED | OUTPATIENT
Start: 2020-02-15 | End: 2020-02-23 | Stop reason: HOSPADM

## 2020-02-15 RX ORDER — LISINOPRIL 2.5 MG/1
5 TABLET ORAL DAILY
Status: DISCONTINUED | OUTPATIENT
Start: 2020-02-15 | End: 2020-02-23 | Stop reason: HOSPADM

## 2020-02-15 RX ORDER — POTASSIUM CHLORIDE 1.5 G/1.58G
40 POWDER, FOR SOLUTION ORAL DAILY
Status: ON HOLD | DISCHARGE
Start: 2020-02-15 | End: 2020-02-21

## 2020-02-15 RX ORDER — BUMETANIDE 1 MG/1
2 TABLET ORAL DAILY
Status: DISCONTINUED | OUTPATIENT
Start: 2020-02-15 | End: 2020-02-23 | Stop reason: HOSPADM

## 2020-02-15 RX ORDER — FERROUS GLUCONATE 324(38)MG
324 TABLET ORAL 3 TIMES DAILY
Status: DISCONTINUED | OUTPATIENT
Start: 2020-02-15 | End: 2020-02-23 | Stop reason: HOSPADM

## 2020-02-15 RX ORDER — ATORVASTATIN CALCIUM 20 MG/1
20 TABLET, FILM COATED ORAL EVERY EVENING
Status: DISCONTINUED | OUTPATIENT
Start: 2020-02-15 | End: 2020-02-23 | Stop reason: HOSPADM

## 2020-02-15 RX ORDER — WARFARIN SODIUM 2 MG/1
2 TABLET ORAL
Status: DISCONTINUED | OUTPATIENT
Start: 2020-02-15 | End: 2020-02-15 | Stop reason: HOSPADM

## 2020-02-15 RX ORDER — CYCLOBENZAPRINE HCL 5 MG
5 TABLET ORAL
Status: DISCONTINUED | OUTPATIENT
Start: 2020-02-15 | End: 2020-02-23 | Stop reason: HOSPADM

## 2020-02-15 RX ORDER — SENNOSIDES 8.6 MG
1 TABLET ORAL 2 TIMES DAILY PRN
Status: DISCONTINUED | OUTPATIENT
Start: 2020-02-15 | End: 2020-02-23 | Stop reason: HOSPADM

## 2020-02-15 RX ORDER — POLYETHYLENE GLYCOL 3350 17 G/17G
17 POWDER, FOR SOLUTION ORAL DAILY PRN
Status: DISCONTINUED | OUTPATIENT
Start: 2020-02-15 | End: 2020-02-23 | Stop reason: HOSPADM

## 2020-02-15 RX ORDER — IPRATROPIUM BROMIDE AND ALBUTEROL SULFATE 2.5; .5 MG/3ML; MG/3ML
3 SOLUTION RESPIRATORY (INHALATION) EVERY 4 HOURS PRN
Status: DISCONTINUED | OUTPATIENT
Start: 2020-02-15 | End: 2020-02-23 | Stop reason: HOSPADM

## 2020-02-15 RX ORDER — DEXTROSE MONOHYDRATE 25 G/50ML
25-50 INJECTION, SOLUTION INTRAVENOUS
Status: DISCONTINUED | OUTPATIENT
Start: 2020-02-15 | End: 2020-02-23 | Stop reason: HOSPADM

## 2020-02-15 RX ORDER — NICOTINE POLACRILEX 4 MG
15-30 LOZENGE BUCCAL
Status: DISCONTINUED | OUTPATIENT
Start: 2020-02-15 | End: 2020-02-23 | Stop reason: HOSPADM

## 2020-02-15 RX ORDER — POTASSIUM CHLORIDE 1.5 G/1.58G
40 POWDER, FOR SOLUTION ORAL DAILY
Status: DISCONTINUED | OUTPATIENT
Start: 2020-02-15 | End: 2020-02-16

## 2020-02-15 RX ORDER — BUMETANIDE 1 MG/1
2 TABLET ORAL DAILY
Status: ON HOLD | DISCHARGE
Start: 2020-02-15 | End: 2020-02-21

## 2020-02-15 RX ORDER — METOPROLOL TARTRATE 25 MG/1
50 TABLET, FILM COATED ORAL 2 TIMES DAILY
Status: DISCONTINUED | OUTPATIENT
Start: 2020-02-15 | End: 2020-02-23 | Stop reason: HOSPADM

## 2020-02-15 RX ORDER — ALUMINA, MAGNESIA, AND SIMETHICONE 2400; 2400; 240 MG/30ML; MG/30ML; MG/30ML
15 SUSPENSION ORAL EVERY 4 HOURS PRN
Status: DISCONTINUED | OUTPATIENT
Start: 2020-02-15 | End: 2020-02-23 | Stop reason: HOSPADM

## 2020-02-15 RX ORDER — MULTIVITAMIN,THERAPEUTIC
1 TABLET ORAL DAILY
Status: DISCONTINUED | OUTPATIENT
Start: 2020-02-15 | End: 2020-02-23 | Stop reason: HOSPADM

## 2020-02-15 RX ORDER — WARFARIN SODIUM 2 MG/1
2 TABLET ORAL
Status: COMPLETED | OUTPATIENT
Start: 2020-02-15 | End: 2020-02-15

## 2020-02-15 RX ORDER — ACETAMINOPHEN 500 MG
500-1000 TABLET ORAL EVERY 6 HOURS PRN
Status: DISCONTINUED | OUTPATIENT
Start: 2020-02-15 | End: 2020-02-23 | Stop reason: HOSPADM

## 2020-02-15 RX ADMIN — FERROUS GLUCONATE 324 MG: 324 TABLET ORAL at 20:29

## 2020-02-15 RX ADMIN — CYCLOBENZAPRINE HYDROCHLORIDE 5 MG: 5 TABLET, FILM COATED ORAL at 20:29

## 2020-02-15 RX ADMIN — LISINOPRIL 5 MG: 2.5 TABLET ORAL at 16:33

## 2020-02-15 RX ADMIN — BUMETANIDE 2 MG: 2 TABLET ORAL at 08:59

## 2020-02-15 RX ADMIN — METOPROLOL TARTRATE 50 MG: 50 TABLET, FILM COATED ORAL at 08:59

## 2020-02-15 RX ADMIN — POTASSIUM CHLORIDE 20 MEQ: 750 TABLET, EXTENDED RELEASE ORAL at 11:24

## 2020-02-15 RX ADMIN — ATORVASTATIN CALCIUM 20 MG: 20 TABLET, FILM COATED ORAL at 20:29

## 2020-02-15 RX ADMIN — POTASSIUM CHLORIDE 40 MEQ: 1.5 POWDER, FOR SOLUTION ORAL at 16:33

## 2020-02-15 RX ADMIN — MICONAZOLE NITRATE: 2 POWDER TOPICAL at 20:29

## 2020-02-15 RX ADMIN — LISINOPRIL 5 MG: 2.5 TABLET ORAL at 08:59

## 2020-02-15 RX ADMIN — WARFARIN SODIUM 2 MG: 2 TABLET ORAL at 17:24

## 2020-02-15 RX ADMIN — ONDANSETRON 4 MG: 4 TABLET, ORALLY DISINTEGRATING ORAL at 23:55

## 2020-02-15 RX ADMIN — CYCLOBENZAPRINE HYDROCHLORIDE 5 MG: 5 TABLET, FILM COATED ORAL at 01:47

## 2020-02-15 RX ADMIN — FERROUS GLUCONATE 324 MG: 324 TABLET ORAL at 08:59

## 2020-02-15 RX ADMIN — BUMETANIDE 2 MG: 1 TABLET ORAL at 16:32

## 2020-02-15 RX ADMIN — POTASSIUM CHLORIDE 40 MEQ: 750 TABLET, EXTENDED RELEASE ORAL at 08:58

## 2020-02-15 RX ADMIN — METOPROLOL TARTRATE 50 MG: 25 TABLET ORAL at 20:29

## 2020-02-15 RX ADMIN — THERA TABS 1 TABLET: TAB at 16:33

## 2020-02-15 RX ADMIN — THERA TABS 1 TABLET: TAB at 08:59

## 2020-02-15 RX ADMIN — FERROUS GLUCONATE 324 MG: 324 TABLET ORAL at 16:32

## 2020-02-15 RX ADMIN — FERROUS GLUCONATE 324 MG: 324 TABLET ORAL at 14:21

## 2020-02-15 RX ADMIN — ALLOPURINOL 200 MG: 100 TABLET ORAL at 16:32

## 2020-02-15 ASSESSMENT — ACTIVITIES OF DAILY LIVING (ADL)
ADLS_ACUITY_SCORE: 14

## 2020-02-15 NOTE — PLAN OF CARE
Discharge Planner OT   Patient plan for discharge: TCU  Current status: Pt supine inclined in bed upon arrival. Pt agreeable to therapy session. Pt required Mod A for positioning to EOB shifting hips. Pt required Mod A x 2 and Max vc's for transfer sit<->standing pivot transfer to bedside chair. Pt completed 9 BUE AROM exercises x 15 reps each motion with vc's, demo and Platinum A. Pt tolerated this activity well with several rest breaks due to fatigue. VSS. 5L O2 NC  Barriers to return to prior living situation: Decreased independence with functional transfers/ADLs. Decreased strength and endurance. Fatigue.   Recommendations for discharge: TCU  Rationale for recommendations: Pt will benefit from continued therapy to address barriers above and to maximize functional independence.        Entered by: Anibal Martines 02/14/2020 10:32 PM

## 2020-02-15 NOTE — PLAN OF CARE
[facility] hypoxic hypercarbic respiratory [facility]. Postive for flu, completed tamiflu; bumex IV doses done, transitioned to PO. Monitoring weight.     Neuro: A&Ox4.   Cardiac: SR. VSS.  Respiratory: Sating 93+ on 3L  GI/: Adequate urine output, red and dark edgar colored. No Bm, patient states normal is every three days.   Diet/appetite: Tolerating 2gm sodium diet with 1.8FR. Eating well.  Activity:  Assist of 2 with walker, up to chair.  Pain: Denies pain other than neuropathy. Flexeril given at bedtime.   Skin: No new deficits noted.  LDA's: PIV saline locked.     Plan: Continue with POC. Notify primary team with changes.    /73   Pulse 81   Temp 97.4  F (36.3  C) (Axillary)   Resp 24   Wt (!) 159.2 kg (351 lb)   SpO2 100%   BMI 62.18 kg/m

## 2020-02-15 NOTE — PROGRESS NOTES
Social Work Services Discharge Note      Patient Name:  Arianna Siddiqi     Anticipated Discharge Date:  Today - 2/15/2020    Discharge Disposition:   TCU:  [facility]   [address], [place] [address]  Nursing Station: [phone]    Following MD:  Per facilities designation     Pre-Admission Screening (PAS) online form has been completed.  The Level of Care (LOC) is:  Determined  Confirmation Code is:  N/A - Returning to TCU  Patient/caregiver informed of referral to [facility] for Pre-Admission Screening for skilled nursing facility (SNF) placement and to expect a phone call post discharge from SNF.     Additional Services/Equipment Arranged:    -Transportation:  arranged stretcher ride through [employer] (Ph: [phone]) for p/u @ 1445. Pt needs to be ready for discharge upon [facility]'s arrival as they have another ride immediately following this one.      Patient / Family response to discharge plan:  In agreement     Persons notified of above discharge plan:  Pt notified by 6B Nursing (Dora), 6B Nursing Staff,  TCU, Gold 7 PA (Yessi)    Staff Discharge Instructions:  Please complete nurse to nurse prior to discharge.     CTS Handoff completed:  NO - No PCP    For weekend social work needs, contact information below and available on [employer]:  4A, 4C, 4E, 5A, 5B  pager [phone]  6A, 6B, 6C, 6D  pager [phone]  7A, 7B, 7C, 7D, 5C  pager [phone]    For weekend RN care coordinator needs (home discharge with needs including home care, assisted living facility returns, durable medical equip, IV antibiotics) - page via job code [phone]    TAMMY Mak, CLAUDIA  Weekend  on 6A, 6B, 6C and 6D on 2/15/2020  [facility]  Pager: [phone]

## 2020-02-15 NOTE — PLAN OF CARE
DISCHARGE        2/15/2020  1454  ----------------------------------------------------------------------------  Discharged to: 4th floor TCU  Via: [employer] stretcher  Discharge Instructions: 2G NA diet and 1.8L FR, activity, medications, follow up appointments, when to call the MD, aftercare instructions.  Belongings: All sent with pt  IV: d/c'd  Telemetry: d/c'd  Pt exhibits understanding of above discharge instructions; all questions answered.    Discharge Paperwork: Sent in packet with patient.      Patient ready for transfer back to TCU. Baig removed at 1430 with patient understanding she is due to void by 2030. Reviewed recent hematuria plan with patient as this writer thinks her bleeding is urethral vs vaginal. She is aware and will monitor and follow up with providers as needed. [facility] here to take patient via stretcher at 1445. Pt able to stand and pivot to stretcher with Ax1-2. All belongings sent with patient. Report will be called to evening RN after 1500. This writer provided [facility] phone number to offgoing RN with understanding that accepting RN will call for report. Patient off floor at 1455.

## 2020-02-15 NOTE — PLAN OF CARE
Neuro: A&Ox4.   Cardiac: SR. VSS.   Respiratory: Sating 95% on 5L NC  GI/: Adequate urine output.   Diet/appetite: Tolerating 2gm Na diet. Eating well.  Activity:  Assist of 2, up to chair LIFT back to bed  Pain: At acceptable level on current regimen.   Skin: No new deficits noted.  LDA's: antunez    Plan: Continue with POC. Notify primary team with changes.     Pt to be on home setting for AVAP tonight.

## 2020-02-15 NOTE — DISCHARGE SUMMARY
[facility], [place]  Hospitalist Discharge Summary       Date of Admission:  2/9/2020  Date of Discharge:  2/15/2020  Discharging Provider: Yessi Vital PA-C/Dr Scott  Discharge Team: Hospitalist Service Gold 7    Discharge Diagnoses   Acute hypoxic hypercarbic respiratory failure  Respiratory acidosis - resolved  Obesity hypoventilation with chronic CO2 retention on blood gas  Pulmonary edema - improved  Influenza A - resolved  CARLOS  Toxic metabolic encephalopathy - resolved  Delirium - resolved  JASON - resolved  History of diastolic dysfunction, resolved (TTE with EF 55-60%, no RWMA with right sided strain  Paroxysmal atrial fibrillation  DM II with recent hypoglycemia  Hematuria in setting of supratherapeutic INR - improved  Hypokalemia - improved  Hx of recent vaginal bleeding?  HLD  Hx of MAG  Intertrigo groin  Hx of gout    Follow-ups Needed After Discharge   Follow-up Appointments     Follow Up and recommended labs and tests      - Follow BMP twice weekly while at TCU            Follow up with cardiology upon discharge from TCU  Follow up with primary care provider upon discharge from TCU    Discharge Disposition   Discharged to rehabilitation facility  Condition at discharge: Stable    Hospital Course   Arianna Siddiqi is a 59 year old morbidly obese female [facility] 2[facility] She has a past medical history significant for HFrEF, T2DM, CKD, HTN, gout, OHS/CARLOS on AVAP, morbid obesity who was initially admitted to medicine for decompensated heart failure exacerbation requiring a brief stay in the ICU, transferred to  TCU for rehabilitation 1/27-2/9 and then readmitted to medicine for JASON and acute hypoxic hypercarbic respiratory failure.      Acute hypoxic hypercarbic respiratory failure  Respiratory acidosis - resolved  Obesity hypoventilation with chronic CO2 retention on blood gas  Pulmonary edema  Influenza A  CARLOS  PTA was  requiring 4L oxygen while awake and AVAPS while sleeping. BL CO2 on VBG in the 80s. Diuretics (bumex 4mg daily) held 2/9-2/10 in setting of JASON that was felt to be pre-renal d/t diuresis in setting of influenza and poor po intake. Completed 5d of tamiflu 2/10. No concern for secondary infection. More somnolent on admission that required continuous AVAPS. CXR 2/11 with slightly increased pulmonary edema. S/p IV bumex 4mg on 2/11, 2mg on 2/12, and 1mg on 2/13 with significant UOP and steady improvement in weights and resp status. She was changed to oral bumex 2mg daily on 2/14 with good UOP and ongoing improvement in weights - 159.2kg today (pt reports baseline is 160kg). She continues to use 3-4L supplemental O2 during the day and AVAPS per home (PTA) settings at night and tolerated quite well.   - Continue supplemental O2 during the day to keep sats >90% and AVAPS at night or whenever sleeping during the day  --> Home AVAPS, pmax25, plow 12, epap 8, RR 12, TV 600cc  - Continue po bumex 2mg daily, monitor daily weights and twice weekly BMP and adjust as needed  - Droplet precautions     Toxic metabolic encephalopathy - resolved  Delirium - resolved  Recent influenza as above. Hypercarbic resp failure as above, pCO2 improving with AVAPs. Mental status was waxing and waning concerning for delirium, thus zyprexa scheduled at night 2/12 with resolution of nighttime restlessness. A&O x 3 over past 72 hours.   - Cont supplemental O2 and AVAPS as above  - Stop zyprexa as no indication to continue now that mental status has been at baseline x 3d  - Delirium precautions     JASON. Resolved. Likely 2/2 diuresis from recent heart failure exacerbation and likely poor po intake with recent influenza dx. Per chart review, it appears BL Cr 0.9-1.2. Cr upto 2.06 on admission. S/p gentle IVF 2/9 and holding diuretics & nephrotoxic agents 2/9-2/10 with resolution of JASON. Bumex was resumed on 2/11 at higher doses and adjusted to oral  dose on 2/14 with ongoing stable Cr. Cr 0.82 today. Diuretics as above. Monitor BMP twice weekly at TCU.      History of diastolic dysfunction, resolved (TTE with EF 55-60%, no RWMA with right sided strain). BNP was elevated to 7k on admission, from 2k a few days prior, but per chart review historically higher. Acute respiratory failure as above r/t pulmonary edema that resolved with diureses. Wt has continued to improve, changed to oral bumex 2/14. Wt 159.2kg today, pt feels her baseline is about 160kg.   - Continue bumex 2mg daily with KCl 40meq daily  - Cont lisinopril 5mg daily  - Cont metoprolol 50mg BID  - Cont 1.8L FR, 2g Na diet  - Follow up with Cardiology after discharge from TCU       Paroxysmal atrial fibrillation. Briefly went into asymtpomatic afib on 2/12 with HRs  and BPs normotensive. Converted back to NSR 30min later and has remained in NSR since. Will cont metoprolol 50mg BID.  Cont warfarin with goal INR 2-3. INR 2.49 today.      DM 2  Hypoglycemia  PTA on sitagliptin 50mg daily which has been held d/t poor po intake. Pt has one low BG to 69 on 2/15 in setting of still being on AVAPS and not yet eating, she had no symptoms of hypoglycemia at this time. Cont to hold sitagliptan and continue novolog sliding scale insulin until po intake improves.       Deconditioning. PT, OT consulted, pt was discharged to  TCU for ongoing rehabilitation.      Hematuria. Improved. Likely 2/2 traumatic antunez placement in setting of supratherapeutic INR (4.46). UA with significant RBCs, WBC, +LE, - nitrite. UCx with only 10-50K proteus - felt to be likely contaminant given low colonies, no urinary symptoms and improvement in overall status without antibiotic treatment. Remove antunez prior to discharge. Monitor for recurrence - consider repeat UA in 1-2 weeks and refer to urology if hematuria recurs.      Hypokalemia. In setting of bumex. Replaced per protocol during admission. Will start KCl 40meq daily to be  continued while on bumex. Trend BMP twice weekly at TCU.      Chronic stable medical issues  Hx of recent vaginal bleeding? Per chart review this occurred during last hospitalization, has not recurred this admission but does have hematuria as above so ? If source was not vaginal and urethral. Hgb stable. Cont to monitor, needs close OP monitoring for further eval.   HLD. Cont statin.   Hx of MAG. Hgb stable at 11.7. Had some hematuria while INR was supratherapeutic. Cont pta ferrous gluconate TID.   Intertrigo groin. Cont Miconazole powder BID.  Hx of gout. Cont pta allopurinol on discharge.     Consultations This Hospital Stay   PHYSICAL THERAPY ADULT IP CONSULT  OCCUPATIONAL THERAPY ADULT IP CONSULT  PHARMACY TO DOSE WARFARIN  MEDICATION HISTORY IP PHARMACY CONSULT  PHYSICAL THERAPY ADULT IP CONSULT  OCCUPATIONAL THERAPY ADULT IP CONSULT    Code Status   Full Code    Time Spent on this Encounter   I, Yessi Vital PA-C, personally saw the patient today and spent greater than 30 minutes discharging this patient.       Yessi Vital PA-C  [facility], [place]  ______________________________________________________________________    Physical Exam   Vital Signs: Temp: 97.7  F (36.5  C) Temp src: Oral BP: 125/63 Pulse: 81 Heart Rate: 83 Resp: 18 SpO2: 97 % O2 Device: Nasal cannula Oxygen Delivery: 3 LPM  Weight: 351 lbs 0 oz  GENERAL: Alert and oriented x 3. NAD. Pleasant and cooperative. In good spirits. Sitting up in bed.   HEENT: MMM  CV: RRR.   RESPIRATORY: Effort normal on 3L per NC. Lungs CTAB, distant sounds in bases.   GI: Abdomen soft, obese, and non distended with normoactive bowel sounds present in all quadrants. No tenderness, rebound, guarding.   NEUROLOGICAL: No focal deficits. Moves all extremities.    EXTREMITIES: soft tissue edema in BLE, peripheral edema. Intact bilateral pedal pulses.   SKIN: No jaundice. No rashes.         Primary Care Physician    Physician No Ref-Primary    Discharge Orders      Medication Therapy Management Referral      General info for SNF    Length of Stay Estimate: Short Term Care: Estimated # of Days <30  Condition at Discharge: Improving  Level of care:skilled   Rehabilitation Potential: Good  Admission H&P remains valid and up-to-date: Yes  Recent Chemotherapy: N/A  Use Nursing Home Standing Orders: Yes     Mantoux instructions    Give two-step Mantoux (PPD) Per Facility Policy Yes     Reason for your hospital stay    You were admitted for acute hypoxic respiratory failure and JASON in setting of recent influenza and adjustments made to your diuretics. Your breathing improved to baseline of 2-4L at rest and using AVAPS at night once your diuretics were adjusted. [facility]     Glucose monitor nursing POCT    Before meals and at bedtime     Daily weights    Call Provider for weight gain of more than 2 pounds per day or 5 pounds per week.     Follow Up and recommended labs and tests    - Follow BMP twice weekly while at TCU     Activity - Up with nursing assistance     Physical Therapy Adult Consult    Evaluate and treat as clinically indicated.    Reason:  deconditioning     Occupational Therapy Adult Consult    Evaluate and treat as clinically indicated.    Reason:  deconditioning     Droplet Isolation     Oxygen Adult/Peds    Oxygen Documentation:   I certify that this patient, Arianna Siddiqi has been under my care and that I, or a nurse practitioner or physician's assistant working with me, had a face-to-face encounter that meets face-to-face encounter requirements with this patient on February 15, 2020.    Arianna Siddiqi is now in a chronic stable state and continues to require supplemental oxygen due to continued oxygen desaturation.  This patient has been treated in part, or in whole for the following medical condition(s):  Chronic Heart Failure I50  Treatments tried and failed or ruled out to  treat hypoxemia include diuretics, AVAPS.  If portability is ordered, is the patient mobile within the home? yes    **Patients who qualify for home O2 coverage under the CMS guidelines require ABG tests or O2 sat readings obtained closest to, but no earlier than 2 days prior to the discharge, as evidence of the need for home oxygen therapy. Testing must be performed while patient is in the chronic stable state. See notes for O2 sats.**     Non Invasive Ventilator    AVAPS, pmax25, plow 12, epap 8, RR 12, TV 600cc    Vent Documentation:   In effort to reduce and/or prevent hospitalizations and emergency room visits, we are recommending a(n) Non Invasive Ventilator for home use. Arianna Siddiqi has had numerous hospitalizations recently due to chronic respiratory failure. The patient would benefit from Non Invasive Ventilator with portability for continuous support at night.    I, the undersigned, certify that the above prescribed supplies are medically necessary for this patient and is both reasonable and necessary in reference to accepted standards of medical and necessary in reference to accepted standards of medical practice in the treatment of this patient's condition and is not prescribed as a convenience.     Advance Diet as Tolerated    Follow this diet upon discharge: 1800mL fluid restriction, 2gram Na diet       Significant Results and Procedures   Results for orders placed or performed during the hospital encounter of 02/09/20   XR Chest Port 1 View    Narrative    Exam: XR CHEST PORT 1 VW, 2/11/2020 9:15 AM    Indication: eval pulm edema    Comparison: 2/6/2020    Findings:   Single AP view of the chest. Lung volumes are low normal. Stable  enlargement of the cardiac silhouette and enlargement of the main  pulmonary artery. Slightly increased diffuse interstitial opacities  with more pronounced bibasilar airspace opacities. Small bilateral  pleural effusions. No pneumothorax. Visualized upper abdomen  is  unremarkable.      Impression    Impression:   1. Cardiomegaly with slightly increased diffuse mixed opacities,  likely representing a combination of pulmonary edema and  atelectasis/infection.  2. Small bilateral pleural effusions.    I have personally reviewed the examination and initial interpretation  and I agree with the findings.    SMITA LOPEZ MD       Discharge Medications   Current Discharge Medication List      START taking these medications    Details   !! insulin aspart (NOVOLOG PEN) 100 UNIT/ML pen Inject 1-7 Units Subcutaneous 3 times daily (before meals)    Associated Diagnoses: Type 2 diabetes, HbA1c goal < 7% (H)      !! insulin aspart (NOVOLOG PEN) 100 UNIT/ML pen Inject 1-5 Units Subcutaneous At Bedtime    Associated Diagnoses: Type 2 diabetes, HbA1c goal < 7% (H)       !! - Potential duplicate medications found. Please discuss with provider.      CONTINUE these medications which have CHANGED    Details   bumetanide (BUMEX) 1 MG tablet Take 2 tablets (2 mg) by mouth daily    Associated Diagnoses: Acute systolic congestive heart failure (H)      potassium chloride (KLOR-CON) 20 MEQ packet Take 40 mEq by mouth daily    Associated Diagnoses: Hypokalemia         CONTINUE these medications which have NOT CHANGED    Details   acetaminophen (TYLENOL) 500 MG tablet Take 500-1,000 mg by mouth every 6 hours as needed for mild pain      allopurinol (ZYLOPRIM) 100 MG tablet Take 1 tablet (100 mg) by mouth 2 times daily Patient needs to see primary provider and have labs for further refills.  Qty: 60 tablet, Refills: 0    Comments: Patient needs to see primary provider and have labs for further refills. Please keep the appointment on 12/7/18  Associated Diagnoses: Gout, unspecified cause, unspecified chronicity, unspecified site      alum & mag hydroxide-simethicone (MYLANTA ES/MAALOX  ES) 400-400-40 MG/5ML SUSP suspension Take 15 mLs by mouth every 4 hours as needed for other (gastric distress.)     Associated Diagnoses: Dyspepsia      atorvastatin (LIPITOR) 20 MG tablet Take 1 tablet (20 mg) by mouth every evening    Associated Diagnoses: Hyperlipidemia, unspecified hyperlipidemia type      cyclobenzaprine (FLEXERIL) 5 MG tablet Take 1 tablet (5 mg) by mouth nightly as needed for muscle spasms    Associated Diagnoses: Muscle spasm      ferrous gluconate (FERGON) 324 (38 Fe) MG tablet Take 1 tablet (324 mg) by mouth 3 times daily    Associated Diagnoses: Iron deficiency      ipratropium - albuterol 0.5 mg/2.5 mg/3 mL (DUONEB) 0.5-2.5 (3) MG/3ML neb solution Take 1 vial (3 mLs) by nebulization every 4 hours as needed for wheezing    Associated Diagnoses: Dyspnea, unspecified type      lisinopril (PRINIVIL/ZESTRIL) 5 MG tablet Take 5 mg by mouth daily      melatonin 3 MG tablet Take 2 tablets (6 mg) by mouth nightly as needed for sleep    Associated Diagnoses: Insomnia, unspecified type      metoprolol tartrate (LOPRESSOR) 50 MG tablet Take 1 tablet (50 mg) by mouth 2 times daily    Associated Diagnoses: Hypertension, unspecified type      miconazole (MICATIN/MICRO GUARD) 2 % external powder Apply topically 2 times daily    Associated Diagnoses: Fungal infection      multivitamin, therapeutic (THERA-VIT) TABS tablet Take 1 tablet by mouth daily    Associated Diagnoses: Iron deficiency      ondansetron (ZOFRAN-ODT) 4 MG ODT tab Take 1 tablet (4 mg) by mouth every 6 hours as needed for nausea or vomiting    Associated Diagnoses: Vomiting, intractability of vomiting not specified, presence of nausea not specified, unspecified vomiting type      polyethylene glycol (MIRALAX/GLYCOLAX) packet Take 17 g by mouth daily as needed for constipation    Associated Diagnoses: Constipation, unspecified constipation type      sennosides (SENOKOT) 8.6 MG tablet Take 1 tablet by mouth 2 times daily as needed for constipation    Associated Diagnoses: Constipation, unspecified constipation type      warfarin (COUMADIN) 3 MG tablet  TAKE 1-1.5 TABLETS DAILY OR AS DIRECTED BY THE COUMADIN CLINIC.  Qty: 135 tablet, Refills: 1    Comments: DX Code Needed  .  Associated Diagnoses: Atrial fibrillation (H)      blood glucose monitoring (ACCU-CHEK NINA PLUS) meter device kit Use to test blood sugars 3 times daily or as directed.  Qty: 1 kit, Refills: 0    Associated Diagnoses: Diabetes mellitus, type 2 (H)      blood glucose monitoring (SOFTCLIX) lancets Use to test blood sugar 3 times daily or as directed.  Qty: 300 each, Refills: 0    Associated Diagnoses: Type 2 diabetes mellitus with chronic kidney disease, without long-term current use of insulin, unspecified CKD stage (H)         STOP taking these medications       benzocaine-menthol (CEPACOL) 15-3.6 MG lozenge Comments:   Reason for Stopping:         guaiFENesin-dextromethorphan (ROBITUSSIN DM) 100-10 MG/5ML syrup Comments:   Reason for Stopping:         oseltamivir (TAMIFLU) 30 MG capsule Comments:   Reason for Stopping:         sitagliptin (JANUVIA) 50 MG tablet Comments:   Reason for Stopping:             Allergies   Allergies   Allergen Reactions     Penicillins Itching and Rash     Tolerated ceftriaxone 1/17/2020

## 2020-02-15 NOTE — PHARMACY-ANTICOAGULATION SERVICE
Clinical Pharmacy - Warfarin Dosing Consult     Pharmacy has been consulted to manage this patient s warfarin therapy.       INR   Date Value Ref Range Status   02/15/2020 2.49 (H) 0.86 - 1.14 Final   02/14/2020 3.29 (H) 0.86 - 1.14 Final       Recommend warfarin 2mg today.  Pharmacy will monitor Arianna Siddiqi daily and order warfarin doses to achieve specified goal.  Goal INR is 2-3 with indication of afib.  Arianna was on warfarin PTA.     Please contact pharmacy as soon as possible if the warfarin needs to be held for a procedure or if the warfarin goals change.

## 2020-02-16 LAB
ANION GAP SERPL CALCULATED.3IONS-SCNC: 3 MMOL/L (ref 3–14)
BUN SERPL-MCNC: 18 MG/DL (ref 7–30)
CALCIUM SERPL-MCNC: 8.7 MG/DL (ref 8.5–10.1)
CHLORIDE SERPL-SCNC: 97 MMOL/L (ref 94–109)
CO2 SERPL-SCNC: 40 MMOL/L (ref 20–32)
CREAT SERPL-MCNC: 0.94 MG/DL (ref 0.52–1.04)
ERYTHROCYTE [DISTWIDTH] IN BLOOD BY AUTOMATED COUNT: 20.6 % (ref 10–15)
GFR SERPL CREATININE-BSD FRML MDRD: 65 ML/MIN/{1.73_M2}
GLUCOSE BLDC GLUCOMTR-MCNC: 107 MG/DL (ref 70–99)
GLUCOSE BLDC GLUCOMTR-MCNC: 124 MG/DL (ref 70–99)
GLUCOSE BLDC GLUCOMTR-MCNC: 87 MG/DL (ref 70–99)
GLUCOSE BLDC GLUCOMTR-MCNC: 98 MG/DL (ref 70–99)
GLUCOSE SERPL-MCNC: 86 MG/DL (ref 70–99)
HCT VFR BLD AUTO: 41.8 % (ref 35–47)
HGB BLD-MCNC: 11.4 G/DL (ref 11.7–15.7)
INR PPP: 2.51 (ref 0.86–1.14)
MCH RBC QN AUTO: 25.1 PG (ref 26.5–33)
MCHC RBC AUTO-ENTMCNC: 27.3 G/DL (ref 31.5–36.5)
MCV RBC AUTO: 92 FL (ref 78–100)
PLATELET # BLD AUTO: 199 10E9/L (ref 150–450)
POTASSIUM SERPL-SCNC: 4 MMOL/L (ref 3.4–5.3)
RBC # BLD AUTO: 4.54 10E12/L (ref 3.8–5.2)
SODIUM SERPL-SCNC: 140 MMOL/L (ref 133–144)
WBC # BLD AUTO: 7 10E9/L (ref 4–11)

## 2020-02-16 PROCEDURE — 97116 GAIT TRAINING THERAPY: CPT | Mod: GP | Performed by: PHYSICAL THERAPIST

## 2020-02-16 PROCEDURE — 99207 ZZC CDG-MDM COMPONENT: MEETS LOW - DOWN CODED: CPT | Performed by: INTERNAL MEDICINE

## 2020-02-16 PROCEDURE — 40000275 ZZH STATISTIC RCP TIME EA 10 MIN

## 2020-02-16 PROCEDURE — 97162 PT EVAL MOD COMPLEX 30 MIN: CPT | Mod: GP | Performed by: PHYSICAL THERAPIST

## 2020-02-16 PROCEDURE — 00000146 ZZHCL STATISTIC GLUCOSE BY METER IP

## 2020-02-16 PROCEDURE — 97110 THERAPEUTIC EXERCISES: CPT | Mod: GP | Performed by: PHYSICAL THERAPIST

## 2020-02-16 PROCEDURE — 85027 COMPLETE CBC AUTOMATED: CPT | Performed by: INTERNAL MEDICINE

## 2020-02-16 PROCEDURE — 36415 COLL VENOUS BLD VENIPUNCTURE: CPT | Performed by: INTERNAL MEDICINE

## 2020-02-16 PROCEDURE — 97530 THERAPEUTIC ACTIVITIES: CPT | Mod: GP | Performed by: PHYSICAL THERAPIST

## 2020-02-16 PROCEDURE — 12000022 ZZH R&B SNF

## 2020-02-16 PROCEDURE — 85610 PROTHROMBIN TIME: CPT | Performed by: INTERNAL MEDICINE

## 2020-02-16 PROCEDURE — 80048 BASIC METABOLIC PNL TOTAL CA: CPT | Performed by: INTERNAL MEDICINE

## 2020-02-16 PROCEDURE — 25000125 ZZHC RX 250: Performed by: INTERNAL MEDICINE

## 2020-02-16 PROCEDURE — 99305 1ST NF CARE MODERATE MDM 35: CPT | Mod: AI | Performed by: INTERNAL MEDICINE

## 2020-02-16 PROCEDURE — 25000128 H RX IP 250 OP 636: Performed by: INTERNAL MEDICINE

## 2020-02-16 PROCEDURE — 25000132 ZZH RX MED GY IP 250 OP 250 PS 637: Mod: GY | Performed by: INTERNAL MEDICINE

## 2020-02-16 RX ORDER — WARFARIN SODIUM 2 MG/1
2 TABLET ORAL
Status: COMPLETED | OUTPATIENT
Start: 2020-02-16 | End: 2020-02-16

## 2020-02-16 RX ORDER — POTASSIUM CHLORIDE 750 MG/1
40 TABLET, EXTENDED RELEASE ORAL DAILY
Status: DISCONTINUED | OUTPATIENT
Start: 2020-02-16 | End: 2020-02-23 | Stop reason: HOSPADM

## 2020-02-16 RX ADMIN — FERROUS GLUCONATE 324 MG: 324 TABLET ORAL at 20:13

## 2020-02-16 RX ADMIN — MICONAZOLE NITRATE: 2 POWDER TOPICAL at 10:39

## 2020-02-16 RX ADMIN — ALLOPURINOL 200 MG: 100 TABLET ORAL at 10:33

## 2020-02-16 RX ADMIN — ATORVASTATIN CALCIUM 20 MG: 20 TABLET, FILM COATED ORAL at 20:13

## 2020-02-16 RX ADMIN — BUMETANIDE 2 MG: 1 TABLET ORAL at 10:32

## 2020-02-16 RX ADMIN — ONDANSETRON 4 MG: 4 TABLET, ORALLY DISINTEGRATING ORAL at 14:46

## 2020-02-16 RX ADMIN — THERA TABS 1 TABLET: TAB at 10:33

## 2020-02-16 RX ADMIN — METOPROLOL TARTRATE 50 MG: 25 TABLET ORAL at 10:33

## 2020-02-16 RX ADMIN — ACETAMINOPHEN 1000 MG: 500 TABLET ORAL at 10:44

## 2020-02-16 RX ADMIN — FERROUS GLUCONATE 324 MG: 324 TABLET ORAL at 10:33

## 2020-02-16 RX ADMIN — LISINOPRIL 5 MG: 2.5 TABLET ORAL at 10:33

## 2020-02-16 RX ADMIN — FERROUS GLUCONATE 324 MG: 324 TABLET ORAL at 14:46

## 2020-02-16 RX ADMIN — POTASSIUM CHLORIDE 40 MEQ: 10 TABLET, EXTENDED RELEASE ORAL at 11:00

## 2020-02-16 RX ADMIN — Medication 5 UNITS: at 10:55

## 2020-02-16 RX ADMIN — ACETAMINOPHEN 1000 MG: 500 TABLET ORAL at 04:38

## 2020-02-16 RX ADMIN — WARFARIN SODIUM 2 MG: 2 TABLET ORAL at 18:02

## 2020-02-16 RX ADMIN — MICONAZOLE NITRATE: 2 POWDER TOPICAL at 20:13

## 2020-02-16 NOTE — PLAN OF CARE
Patient is a 59 year old female admitted to room 410 via stretcher. Patient is alert and oriented X 3. See Epic for VS and assessment. Patient denies pain on admission.  Patient is able to transfer with walker and 2 assist. Patient was settled into their room, shown call light, tv, mealtimes etc.Oriented to unit. Full skin assessment. New dressing applied to abrasion/skin tear wound on right lateral lower leg. WOCN consult initiated. Will continue monitoring pain level and VS. Notifying MD with any concerns. Follow MD orders for cares and medications.    Level of Schooling: Technical School  Ethnicity:   Marital Status:   Vascular Access:   Dentures: none  Smoker: no   Glasses: yes - [place]  Occupation: disability   Falls 0-1 mo:

## 2020-02-16 NOTE — PROGRESS NOTES
"   02/16/20 0821   Quick Adds   Quick Adds Certification   Type of Visit Initial PT Evaluation   Living Environment   Lives With alone   Living Arrangements apartment   Home Accessibility no concerns   Number of Stairs, Main Entrance other (see comments)  (1\" threshold only.)   Stair Railings, Main Entrance none  (threshold)   Transportation Anticipated family or friend will provide   Living Environment Comment alone in apt. Sleeps in recliner   Self-Care   Usual Activity Tolerance moderate   Current Activity Tolerance fair   Regular Exercise No   Equipment Currently Used at Home shower chair   Activity/Exercise/Self-Care Comment states has a ww at home from 3 yrs ago but is narrower (has gained weight since)--said she purchased it from here for $80--question if was the copay. Explained to pt possible insurance may not cover   Functional Level Prior   Ambulation 0-->independent   Transferring 0-->independent   Toileting 0-->independent   Bathing 1-->assistive equipment   Communication 0-->understands/communicates without difficulty   Swallowing 0-->swallows foods/liquids without difficulty   Cognition 0 - no cognition issues reported   Fall history within last six months no   Which of the above functional risks had a recent onset or change? ambulation;transferring   Prior Functional Level Comment IND at baseline without AD; mainly amb indoors ~100.   General Information   Onset of Illness/Injury or Date of Surgery - Date 02/09/20   Referring Physician Canelo Rubio MD; Dr Rosie Jimenez   Patient/Family Goals Statement get home next weekend   Pertinent History of Current Problem (include personal factors and/or comorbidities that impact the POC) 59 year old morbidly obese female admitted on 2/9/2020 as a direct admission from [facility]. She has a past medical history significant for HFrEF, T2DM, CKD, HTN, gout, OHS/CARLOS on AVAP, morbid obesity who was initially admitted to medicine for decompensated heart " failure exacerbation requiring a brief stay in the ICU, transferred to  TCU for rehabilitation 1/27-2/9 and then readmitted to medicine for JASON and acute hypoxic hypercarbic respiratory failure   General Observations pt lying in bed; agreeable to PT; on 3L O2   General Info Comments pt reports feels good   Cognitive Status Examination   Orientation orientation to person, place and time   Level of Consciousness alert   Follows Commands and Answers Questions 100% of the time   Personal Safety and Judgment intact   Memory intact   Pain Assessment   Patient Currently in Pain No   Integumentary/Edema   Integumentary/Edema Comments discoloration on lower legs   Posture    Posture Forward head position;Protracted shoulders   Range of Motion (ROM)   ROM Comment WFL--limited by body habitus   Strength   Strength Comments functional for activities today; MMT not done   Bed Mobility   Bed Mobility Comments SBA sup>sit; min A for LE sit>sup. Pt doesn't use bed at home--sleeps in recliner   Transfer Skills   Transfer Comments SBA sit<>stand--pushes down on ww from bed; used armrests in w/c   Gait   Gait Comments amb with ww 25 ft; SBA; decreased pace and step length   Balance   Balance Comments able to stand briefly without UE support   Muscle Tone   Muscle Tone no deficits were identified   General Therapy Interventions   Planned Therapy Interventions ADL retraining;gait training;groups;neuromuscular re-education;strengthening;transfer training;manual therapy;home program guidelines;progressive activity/exercise   Clinical Impression   Criteria for Skilled Therapeutic Intervention yes, treatment indicated   PT Diagnosis deconditioning   Influenced by the following impairments decreased activity tolerance, strength, balance; O2 needs   Functional limitations due to impairments transfers and gait below IND baseline without AD   Clinical Presentation Evolving/Changing   Clinical Presentation Rationale clinical judgment of  function; comorbidities; participation limitation   Clinical Decision Making (Complexity) Moderate complexity   Therapy Frequency 6x/week   Predicted Duration of Therapy Intervention (days/wks) 2/21/2020   Anticipated Equipment Needs at Discharge   (possibly wider ww)   Anticipated Discharge Disposition Home with Home Therapy   Risk & Benefits of therapy have been explained Yes   Patient, Family & other staff in agreement with plan of care Yes   Clinical Impression Comments pt returns after brief hospital stay for acute hypoxic hypercarbic respiratory failure. Is at SBA level for transfers/gait with ww. Pt feeling and looking much better. Will benefit from skilled PT to return home--pt desires home this coming weekend; is achievable   Therapy Certification   Start of care date 02/16/20   Certification date from 02/16/20   Certification date to 03/07/20   Medical Diagnosis acute hypoxic hypercarbic respiratory failure.   Certification I certify the need for these services furnished under this plan of treatment and while under my care.  (Physician co-signature of this document indicates review and certification of the therapy plan).    Total Evaluation Time   Total Evaluation Time (Minutes) 20

## 2020-02-16 NOTE — PLAN OF CARE
"A &O x4, able to communicate needs. Denies chest pain, dizziness, or discomfort. SOB w/ activity or lying flat, improves w/ rest and oxygen. Complained of nausea and requested zofran at 1445. A2 w/ lift and walker. 2gNa diet, no appetite for lunch. Continent of bladder and bowel, LBM 2/15. BG 98 and 107. 3L NC. Requested to see RT tonight to adjust Bipap d/t \"beeping all night last night\" oncoming RN updated. Pleasant and cooperative. Will continue with plan of care.   "

## 2020-02-16 NOTE — PLAN OF CARE
Patient is a readmit as of yesterday 02/16. Alert and oriented x4. Medicated with prn Tylenol 1000 mg for generalized pain with relief. Slept off and on in between cares. BiPAP on for the overnight, tolerates well although notes frequent alarms from the BiPAP. Reported no problems with B/B. Calls appropriately for assistance. Continue POC.

## 2020-02-16 NOTE — PHARMACY-MEDICATION REGIMEN REVIEW
Pharmacy Medication Regimen Review  Arianna Siddiqi is a 59 year old female who is currently in the Transitional Care Unit.    Assessment: Upon review of the medications and patient chart the following irregularities were found:     Melatonin 6 mg PO PRN sleep has not been reordered, however, this is appropriate at this time. This may reordered and reassessed periodically as a psychotropic medication.     Medications that require additional monitoring include: warfarin. Pharmacists will monitor and dose warfarin daily while patient is admitted.     Duplicate therapies: sennosides and Miralax. Administration instructions have been added to specify which medication should be used first in a step-wise order.     Plan:   Continue medication therapy as planned.     Attending provider will be sent this note for review.  If there are any emergent issues noted above, pharmacist will contact provider directly by phone.      Pharmacy will periodically review the resident's medication regimen for any PRN medications not administered in > 72 hours and discontinue them. The pharmacist will discuss gradual dose reductions of psychopharmacologic medications with interdisciplinary team on a regular basis.    Please contact pharmacy if the above does not answer specific medication questions/concerns.    Background:  A pharmacist has reviewed all medications and pertinent medical history today.  Medications were reviewed for appropriate use and any irregularities found are listed with recommendations.      Current Facility-Administered Medications:      [START ON 2/18/2020] - Skin Test Reading -, , Does not apply, Q21 Days, Canelo Rubio MD     acetaminophen (TYLENOL) tablet 500-1,000 mg, 500-1,000 mg, Oral, Q6H PRN, Canelo Rubio MD, 1,000 mg at 02/16/20 0438     allopurinol (ZYLOPRIM) tablet 200 mg, 200 mg, Oral, Daily, Canelo Rubio MD, 200 mg at 02/15/20 1632     alum & mag hydroxide-simethicone (MAALOX  ES) suspension 15  mL, 15 mL, Oral, Q4H PRN, Canelo Rubio MD     atorvastatin (LIPITOR) tablet 20 mg, 20 mg, Oral, QPM, Canelo Rubio MD, 20 mg at 02/15/20 2029     bumetanide (BUMEX) tablet 2 mg, 2 mg, Oral, Daily, Canelo Rubio MD, 2 mg at 02/15/20 1632     cyclobenzaprine (FLEXERIL) tablet 5 mg, 5 mg, Oral, At Bedtime PRN, Canelo Rubio MD, 5 mg at 02/15/20 2029     glucose gel 15-30 g, 15-30 g, Oral, Q15 Min PRN **OR** dextrose 50 % injection 25-50 mL, 25-50 mL, Intravenous, Q15 Min PRN **OR** glucagon injection 1 mg, 1 mg, Subcutaneous, Q15 Min PRN, Canelo Rubio MD     eucerin cream, , Topical, Q1H PRN, Canelo Rubio MD     ferrous gluconate (FERGON) tablet 324 mg, 324 mg, Oral, TID, Canelo Rubio MD, 324 mg at 02/15/20 2029     insulin aspart (NovoLOG) inj (RAPID ACTING), 1-3 Units, Subcutaneous, TID AC, Canelo Rubio MD     insulin aspart (NovoLOG) inj (RAPID ACTING), 1-3 Units, Subcutaneous, At Bedtime, Canelo Rubio MD     ipratropium - albuterol 0.5 mg/2.5 mg/3 mL (DUONEB) neb solution 3 mL, 3 mL, Nebulization, Q4H PRN, Canelo Rubio MD     lisinopril (ZESTRIL) tablet 5 mg, 5 mg, Oral, Daily, Canelo Rubio MD, 5 mg at 02/15/20 1633     metoprolol tartrate (LOPRESSOR) tablet 50 mg, 50 mg, Oral, BID, Canelo Rubio MD, 50 mg at 02/15/20 2029     miconazole (MICATIN) 2 % powder, , Topical, BID, Canelo Rubio MD     multivitamin, therapeutic (THERA-VIT) tablet 1 tablet, 1 tablet, Oral, Daily, Canelo Rubio MD, 1 tablet at 02/15/20 1633     Nurse may request from Pharmacy a change of form of medication (e.g. Liquid to tablet)., , Does not apply, Continuous PRNJoanne Santosh, MD     ondansetron (ZOFRAN-ODT) ODT tab 4 mg, 4 mg, Oral, Q6H PRNJoanne Santosh, MD, 4 mg at 02/15/20 7366     Patient is already receiving anticoagulation with heparin, enoxaparin (LOVENOX), warfarin (COUMADIN)  or other anticoagulant medication, , Does not apply, Continuous PRN, Canelo Rubio MD      polyethylene glycol (MIRALAX) Packet 17 g, 17 g, Oral, Daily PRN, Canelo Rubio MD     potassium chloride (KLOR-CON) Packet 40 mEq, 40 mEq, Oral, Daily, Canelo Rubio MD, 40 mEq at 02/15/20 1633     sennosides (SENOKOT) tablet 1 tablet, 1 tablet, Oral, BID PRN, Canelo Rubio MD     tuberculin injection 5 Units, 5 Units, Intradermal, Q21 Days, Canelo Rubio MD     Warfarin Therapy Reminder (Check START DATE - warfarin may be starting in the FUTURE), 1 each, Does not apply, Continuous PRN, Canelo Rubio MD  No current outpatient prescriptions on file.   PMH:   Past Medical History:   Diagnosis Date     CHF (congestive heart failure) (H)      CKD (chronic kidney disease)      Compliance poor      Diabetes mellitus (H)      Gout      Hypertension      Insomnia      Morbid obesity (H)      CARLOS treated with BiPAP      Daniel Castle, Louise  2/16/2020

## 2020-02-16 NOTE — H&P
[facility] ([facility])    History and Physical - Hospitalist Service       Date of Admission:  2/15/2020    Assessment & Plan   Arianna Siddiqi is a 59 year old female admitted on 2/15/2020.     Arianna Siddiqi is a 59 year old morbidly obese female admitted on 2/9/2020 as a direct admission from [facility]. She has a past medical history significant for HFrEF, T2DM, CKD, HTN, gout, OHS/CARLOS on AVAP, morbid obesity who was initially admitted to medicine for decompensated heart failure exacerbation requiring a brief stay in the ICU, transferred to  TCU for rehabilitation 1/27-2/9 and then readmitted to [facility] 02.09  for JASON and acute hypoxic hypercarbic respiratory failure.     Transferred to TCU 2/15/2020 for ongoing care and rehab needs.        Physical deconditioning:   PT/OT consult.   Therapy as tolerated.   Fall Precautions.     Acute hypoxic hypercarbic respiratory failure  Respiratory acidosis - resolved  Obesity hypoventilation with chronic CO2 retention on blood gas  Pulmonary edema  Influenza A  CARLOS  PTA was requiring 4L oxygen while awake and AVAPS while sleeping. BL CO2 on VBG in the 80s. Diuretics (bumex 4mg daily) held 2/9-2/10 in setting of JASON that was felt to be pre-renal d/t diuresis in setting of influenza and poor po intake. Completed 5d of tamiflu 2/10. No concern for secondary infection. More somnolent on admission that required continuous AVAPS. CXR 2/11 with slightly increased pulmonary edema. S/p IV bumex 4mg on 2/11, 2mg on 2/12, and 1mg on 2/13 with significant UOP and steady improvement in weights and resp status. She was changed to oral bumex 2mg daily on 2/14 with good UOP and ongoing improvement in weights - 159.2kg today (pt reports baseline is 160kg). She continues to use 3-4L supplemental O2 during the day and AVAPS per home (PTA) settings at night and tolerated quite well.     - Continue supplemental O2 during the day to keep sats >90% and AVAPS at night or whenever sleeping  during the day  * Home AVAPS setting: pmax25, plow 12, epap 8, RR 12, TV 600cc  - Continue po bumex 2mg daily, and adjust as needed  - Nebs prn.   - Encourage IS     Toxic metabolic encephalopathy - resolved  Delirium - resolved  Recent influenza as above. Hypercarbic resp failure as above, pCO2 improving with AVAPs. Mental status was waxing and waning concerning for delirium, thus zyprexa scheduled at night 2/12 with resolution of nighttime restlessness. A&O x 3 over past 72 hours.   - Cont supplemental O2 and AVAPS as above  - Zyprexa discontinued prior to discharge to TCU  - Delirium precautions     JASON. Resolved. Likely 2/2 diuresis from recent heart failure exacerbation and likely poor po intake with recent influenza dx. Per chart review, it appears BL Cr 0.9-1.2. Cr upto 2.06 on admission, resolved. 0.82 on TCU adm.   Stable Cr.  Diuretics as above.   Monitor BMP twice weekly at TCU.      History of diastolic dysfunction, resolved (TTE with EF 55-60%, no RWMA with right sided strain). BNP was elevated to 7k on admission, from 2k a few days prior, but per chart review historically higher. Acute respiratory failure as above r/t pulmonary edema that resolved with diureses. Wt has continued to improve, changed to oral bumex 2/14. Wt 159.2kg today, pt feels her baseline is about 160kg.   - Continue bumex 2mg daily with KCl 40meq daily  - Cont lisinopril 5mg daily  - Cont metoprolol 50mg BID  - Cont 1.8L FR, 2g Na diet  - follow-up bmp, lytes, daily weight  - Follow up with Cardiology after discharge from TCU       Paroxysmal atrial fibrillation. Briefly went into asymtpomatic afib on 2/12 with HRs  and BPs normotensive. Converted back to NSR 30min later and has remained in NSR since.   - Cont metoprolol 50mg BID.    - Cont warfarin with goal INR 2-3. INR therapeutic.       DM 2, (A1C: 5.5  01.17.20)  Hypoglycemia  PTA on sitagliptin 50mg daily which has been held d/t poor po intake- low BG  Low sliding scale  insulin  Hypoglycemia protocol  Monitor po intake.   Recent Labs   Lab 02/16/20  0924 02/16/20  0643 02/15/20  2140 02/15/20  1724 02/15/20  0923 02/15/20  0904 02/15/20  0559 02/14/20  2144  02/14/20  0540  02/13/20  0537  02/12/20  0549  02/11/20  0611   GLC  --  86  --   --   --   --  88  --   --  88  --  85  --  83  --  84   BGM 98  --  102* 111* 92 69*  --  148*   < >  --    < >  --    < >  --    < >  --     < > = values in this interval not displayed.         Hematuria. Improved. Likely 2/2 traumatic antunez placement in setting of supratherapeutic INR (4.46). UA with significant RBCs, WBC, +LE, - nitrite. UCx with only 10-50K proteus - felt to be likely contaminant given low colonies, no urinary symptoms and improvement in overall status without antibiotic treatment.   Removed antunez prior to discharge.  Monitor for recurrence   Urology follow-up prn        Chronic stable medical issues  Hx of recent vaginal bleeding? Per chart review this occurred during last hospitalization, has not recurred this admission but does have hematuria as above so ? If source was not vaginal and urethral. Hgb stable. Cont to monitor, needs close OP monitoring for further eval.   HLD. Cont statin.   Hx of MAG. Hgb stable ~11s. Had some hematuria while INR was supratherapeutic. Cont pta ferrous gluconate TID.   Intertrigo groin. Cont Miconazole powder BID.  Hx of gout. Cont pta allopurinol.         Diet: Combination Diet 2 gm NA Diet  Fluid restriction 1800 ML FLUID    DVT Prophylaxis: Warfarin  Antunez Catheter: not present  Code Status: Full Code      Disposition Plan   Expected discharge: TBD, pending therapy eval.  Entered: Canelo Rubio MD 02/16/2020, 9:14 AM      Indication for Psychotropic Medications: Patient is not on Psychotropic medications   Immunizations: Up to date with Penumovax     The patient's care was discussed with the Bedside Nurse and Patient.    Canelo Rubio MD  [facility]  ([facility])    ______________________________________________________________________    Chief Complaint     Physical deconditioning.     History is obtained from the patient, electronic health record and discharging provider.     History of Present Illness     Arianna Siddiqi is a 59 year old morbidly obese female [facility] She has a past medical history significant for HFrEF, T2DM, CKD, HTN, gout, OHS/CARLOS on AVAP, morbid obesity who was initially admitted to medicine for decompensated heart failure exacerbation requiring a brief stay in the ICU, [facility] and then readmitted to [facility] 02.09  for JASON and acute hypoxic hypercarbic respiratory failure.     Transferred to TCU 2/15/2020 for ongoing care and rehab needs.     See above plus discharge summary for details of the hospital course.   Currently, gen weakness. Otherwise overall doing better, in good spirits.     Denies fever or chills. Mild intermittent cough. No cp or sob. On 3l NC. No LH or dizziness. No NV. No dysuria or bowel complaint. No new sensory or motor complaint. No new rash. Wt stable. No other concern.     Review of Systems    The 10 point Review of Systems is negative other than noted in the HPI or here.     Past Medical History    I have reviewed this patient's medical history and updated it with pertinent information if needed.   Past Medical History:   Diagnosis Date     CHF (congestive heart failure) (H)      CKD (chronic kidney disease)      Compliance poor      Diabetes mellitus (H)      Gout      Hypertension      Insomnia      Morbid obesity (H)      CARLOS treated with BiPAP        Past Surgical History   I have reviewed this patient's surgical history and updated it with pertinent information if needed.  No past surgical history on file.    Social History   I have reviewed this patient's social history and updated it with pertinent information if  needed.  Social History     Tobacco Use     Smoking status: Former Smoker     Packs/day: 1.50     Years: 20.00     Pack years: 30.00     Types: Cigarettes     Last attempt to quit: 2002     Years since quittin.1     Smokeless tobacco: Never Used   Substance Use Topics     Alcohol use: No     Alcohol/week: 0.0 standard drinks     Drug use: No       Family History   I have reviewed this patient's family history and updated it with pertinent information if needed.   Family History   Adopted: Yes   Family history unknown: Yes   no pertinent hx reported.     Prior to Admission Medications   Prior to Admission Medications   Prescriptions Last Dose Informant Patient Reported? Taking?   acetaminophen (TYLENOL) 500 MG tablet   Yes No   Sig: Take 500-1,000 mg by mouth every 6 hours as needed for mild pain   allopurinol (ZYLOPRIM) 100 MG tablet   No No   Sig: Take 1 tablet (100 mg) by mouth 2 times daily Patient needs to see primary provider and have labs for further refills.   Patient taking differently: Take 200 mg by mouth daily Patient needs to see primary provider and have labs for further refills.   alum & mag hydroxide-simethicone (MYLANTA ES/MAALOX  ES) 400-400-40 MG/5ML SUSP suspension   No No   Sig: Take 15 mLs by mouth every 4 hours as needed for other (gastric distress.)   atorvastatin (LIPITOR) 20 MG tablet   No No   Sig: Take 1 tablet (20 mg) by mouth every evening   blood glucose monitoring (ACCU-CHEK NINA PLUS) meter device kit   No No   Sig: Use to test blood sugars 3 times daily or as directed.   blood glucose monitoring (SOFTCLIX) lancets   No No   Sig: Use to test blood sugar 3 times daily or as directed.   bumetanide (BUMEX) 1 MG tablet   No No   Sig: Take 2 tablets (2 mg) by mouth daily   cyclobenzaprine (FLEXERIL) 5 MG tablet   No No   Sig: Take 1 tablet (5 mg) by mouth nightly as needed for muscle spasms   ferrous gluconate (FERGON) 324 (38 Fe) MG tablet   No No   Sig: Take 1 tablet (324 mg)  by mouth 3 times daily   insulin aspart (NOVOLOG PEN) 100 UNIT/ML pen   No No   Sig: Inject 1-7 Units Subcutaneous 3 times daily (before meals)   insulin aspart (NOVOLOG PEN) 100 UNIT/ML pen   No No   Sig: Inject 1-5 Units Subcutaneous At Bedtime   ipratropium - albuterol 0.5 mg/2.5 mg/3 mL (DUONEB) 0.5-2.5 (3) MG/3ML neb solution   No No   Sig: Take 1 vial (3 mLs) by nebulization every 4 hours as needed for wheezing   lisinopril (PRINIVIL/ZESTRIL) 5 MG tablet   Yes No   Sig: Take 5 mg by mouth daily   melatonin 3 MG tablet   No No   Sig: Take 2 tablets (6 mg) by mouth nightly as needed for sleep   metoprolol tartrate (LOPRESSOR) 50 MG tablet   No No   Sig: Take 1 tablet (50 mg) by mouth 2 times daily   miconazole (MICATIN/MICRO GUARD) 2 % external powder   No No   Sig: Apply topically 2 times daily   multivitamin, therapeutic (THERA-VIT) TABS tablet   No No   Sig: Take 1 tablet by mouth daily   ondansetron (ZOFRAN-ODT) 4 MG ODT tab   No No   Sig: Take 1 tablet (4 mg) by mouth every 6 hours as needed for nausea or vomiting   polyethylene glycol (MIRALAX/GLYCOLAX) packet   No No   Sig: Take 17 g by mouth daily as needed for constipation   potassium chloride (KLOR-CON) 20 MEQ packet   No No   Sig: Take 40 mEq by mouth daily   sennosides (SENOKOT) 8.6 MG tablet   No No   Sig: Take 1 tablet by mouth 2 times daily as needed for constipation   warfarin (COUMADIN) 3 MG tablet   No No   Sig: TAKE 1-1.5 TABLETS DAILY OR AS DIRECTED BY THE COUMADIN CLINIC.   Patient taking differently: 2/9/20 admission- has been receiving 2-3mg daily [facility]      Facility-Administered Medications: None     Allergies   Allergies   Allergen Reactions     Penicillins Itching and Rash     Tolerated ceftriaxone 1/17/2020       Physical Exam   Vital Signs: Temp: 98.1  F (36.7  C) Temp src: Oral BP: 127/75 Pulse: 77   Resp: 18 SpO2: 95 % O2 Device: Nasal cannula    Weight: 0 lbs 0 oz      General: alert, interactive, NAD, morbidly obese.  NC oxygen.   HEENT: AT/NC, Anicteric, Moist MM  Neck: Supple.   Respi/Chest: Non labored, CTA BL.  CVS/Heart: S1S2 regular, no murmur.   GI/Abd: Soft, non tender, obese,  No g/r.  MSK/Extremities: Distally warm, well perfused.   bl pedal edema +  Neuro: AO x 4, Grossly non focal.   Psychiatry: Stable mood.   Skin: no new rash on exposed areas. Mild lacerations R lower lateral leg. On dressing.         Data   Data reviewed today: I reviewed all medications, new labs and imaging results over the last 24 hours. I personally reviewed no images or EKG's today.    Recent Labs   Lab 02/16/20  0643 02/15/20  0559 02/14/20  1339 02/14/20  0540  02/10/20  0731   WBC 7.0 7.0  --  7.0   < > 5.6   HGB 11.4* 11.7  --  11.3*   < > 11.3*   MCV 92 92  --  90   < > 96    193  --  215   < > 145*   INR 2.51* 2.49*  --  3.29*   < >  --     140  --  140   < > 136   POTASSIUM 4.0 3.3* 3.6 3.3*   < > 4.8   CHLORIDE 97 95  --  94   < > 95   CO2 40* 42*  --  44*   < > 40*   BUN 18 16  --  15   < > 32*   CR 0.94 0.82  --  0.79   < > 1.31*   ANIONGAP 3 3  --  2*   < > <1*   KADEN 8.7 9.1  --  9.2   < > 8.7   GLC 86 88  --  88   < > 96   ALBUMIN  --   --   --   --   --  2.9*   PROTTOTAL  --   --   --   --   --  8.5   BILITOTAL  --   --   --   --   --  0.4   ALKPHOS  --   --   --   --   --  109   ALT  --   --   --   --   --  15   AST  --   --   --   --   --  21    < > = values in this interval not displayed.     No results found for this or any previous visit (from the past 24 hour(s)).

## 2020-02-16 NOTE — PHARMACY-TCU REVIEW OF H&P
I have reviewed this patient's TCU admission History & Physical for medication related changes/recommendations identified by the admitting provider.  I am confirming that there are no recommendations requiring changes to medication orders are indicated at this time based on the provider recommendations in the H&P.    Daniel Castle, PharmD  2/16/2020

## 2020-02-16 NOTE — PLAN OF CARE
Eitan completed. Pt at SBA level for transfer and amb with ww36 ft. Pt needed help to get LE back into bed but sleeps in recliner at home so not absolutely necessary for home. Pt maintained SaO2 >91% on 3L. Will likely need O2 at home.   Discharge Planner PT   Patient plan for discharge: home; pt would like to return home 2/22 or 23.  Current status: above  Barriers to return to prior living situation: possible new use of O2 at home.  Recommendations for discharge: home PT. Pt was issued a ww ~3 yrs ago but pt states it is not a wide one  Rationale for recommendations: pt presentation       Entered by: Shruthi Howell 02/16/2020 1:03 PM

## 2020-02-17 ENCOUNTER — APPOINTMENT (OUTPATIENT)
Dept: LAB | Facility: CLINIC | Age: 60
End: 2020-02-17
Attending: INTERNAL MEDICINE
Payer: MEDICARE

## 2020-02-17 ENCOUNTER — APPOINTMENT (OUTPATIENT)
Dept: PHYSICAL THERAPY | Facility: SKILLED NURSING FACILITY | Age: 60
End: 2020-02-17
Payer: MEDICARE

## 2020-02-17 ENCOUNTER — APPOINTMENT (OUTPATIENT)
Dept: OCCUPATIONAL THERAPY | Facility: SKILLED NURSING FACILITY | Age: 60
End: 2020-02-17
Payer: MEDICARE

## 2020-02-17 LAB
GLUCOSE BLDC GLUCOMTR-MCNC: 120 MG/DL (ref 70–99)
GLUCOSE BLDC GLUCOMTR-MCNC: 77 MG/DL (ref 70–99)
GLUCOSE BLDC GLUCOMTR-MCNC: 80 MG/DL (ref 70–99)
GLUCOSE BLDC GLUCOMTR-MCNC: 81 MG/DL (ref 70–99)
GLUCOSE BLDC GLUCOMTR-MCNC: 87 MG/DL (ref 70–99)
INR PPP: 2.87 (ref 0.86–1.14)

## 2020-02-17 PROCEDURE — G0463 HOSPITAL OUTPT CLINIC VISIT: HCPCS

## 2020-02-17 PROCEDURE — 97110 THERAPEUTIC EXERCISES: CPT | Mod: GP

## 2020-02-17 PROCEDURE — 25000128 H RX IP 250 OP 636: Performed by: INTERNAL MEDICINE

## 2020-02-17 PROCEDURE — 85610 PROTHROMBIN TIME: CPT | Performed by: INTERNAL MEDICINE

## 2020-02-17 PROCEDURE — 40000274 ZZH STATISTIC RCP CONSULT EA 30 MIN

## 2020-02-17 PROCEDURE — 00000146 ZZHCL STATISTIC GLUCOSE BY METER IP

## 2020-02-17 PROCEDURE — 12000022 ZZH R&B SNF

## 2020-02-17 PROCEDURE — 97165 OT EVAL LOW COMPLEX 30 MIN: CPT | Mod: GO | Performed by: OCCUPATIONAL THERAPIST

## 2020-02-17 PROCEDURE — 97535 SELF CARE MNGMENT TRAINING: CPT | Mod: GO | Performed by: OCCUPATIONAL THERAPIST

## 2020-02-17 PROCEDURE — 97530 THERAPEUTIC ACTIVITIES: CPT | Mod: GP

## 2020-02-17 PROCEDURE — 36415 COLL VENOUS BLD VENIPUNCTURE: CPT | Performed by: INTERNAL MEDICINE

## 2020-02-17 PROCEDURE — 25000132 ZZH RX MED GY IP 250 OP 250 PS 637: Mod: GY | Performed by: INTERNAL MEDICINE

## 2020-02-17 RX ORDER — GUAIFENESIN/DEXTROMETHORPHAN 100-10MG/5
10 SYRUP ORAL EVERY 4 HOURS PRN
Status: DISCONTINUED | OUTPATIENT
Start: 2020-02-17 | End: 2020-02-23 | Stop reason: HOSPADM

## 2020-02-17 RX ORDER — WARFARIN SODIUM 1 MG/1
1 TABLET ORAL
Status: COMPLETED | OUTPATIENT
Start: 2020-02-17 | End: 2020-02-17

## 2020-02-17 RX ADMIN — MICONAZOLE NITRATE: 2 POWDER TOPICAL at 08:57

## 2020-02-17 RX ADMIN — FERROUS GLUCONATE 324 MG: 324 TABLET ORAL at 20:36

## 2020-02-17 RX ADMIN — WARFARIN SODIUM 1 MG: 1 TABLET ORAL at 18:17

## 2020-02-17 RX ADMIN — BUMETANIDE 2 MG: 1 TABLET ORAL at 08:59

## 2020-02-17 RX ADMIN — FERROUS GLUCONATE 324 MG: 324 TABLET ORAL at 08:57

## 2020-02-17 RX ADMIN — ATORVASTATIN CALCIUM 20 MG: 20 TABLET, FILM COATED ORAL at 20:36

## 2020-02-17 RX ADMIN — GUAIFENESIN AND DEXTROMETHORPHAN 10 ML: 100; 10 SYRUP ORAL at 19:03

## 2020-02-17 RX ADMIN — CYCLOBENZAPRINE HYDROCHLORIDE 5 MG: 5 TABLET, FILM COATED ORAL at 23:01

## 2020-02-17 RX ADMIN — POTASSIUM CHLORIDE 40 MEQ: 10 TABLET, EXTENDED RELEASE ORAL at 08:56

## 2020-02-17 RX ADMIN — ONDANSETRON 4 MG: 4 TABLET, ORALLY DISINTEGRATING ORAL at 13:15

## 2020-02-17 RX ADMIN — THERA TABS 1 TABLET: TAB at 08:57

## 2020-02-17 RX ADMIN — MICONAZOLE NITRATE: 2 POWDER TOPICAL at 20:36

## 2020-02-17 RX ADMIN — FERROUS GLUCONATE 324 MG: 324 TABLET ORAL at 13:15

## 2020-02-17 RX ADMIN — METOPROLOL TARTRATE 50 MG: 25 TABLET ORAL at 20:36

## 2020-02-17 RX ADMIN — ACETAMINOPHEN 1000 MG: 500 TABLET ORAL at 13:15

## 2020-02-17 RX ADMIN — ALLOPURINOL 200 MG: 100 TABLET ORAL at 08:57

## 2020-02-17 RX ADMIN — LISINOPRIL 5 MG: 2.5 TABLET ORAL at 08:59

## 2020-02-17 RX ADMIN — METOPROLOL TARTRATE 50 MG: 25 TABLET ORAL at 08:59

## 2020-02-17 ASSESSMENT — ACTIVITIES OF DAILY LIVING (ADL): PREVIOUS_RESPONSIBILITIES: MEAL PREP;LAUNDRY;HOUSEKEEPING

## 2020-02-17 NOTE — PROGRESS NOTES
EMR reviewed.   Vitals stable. No new concern.     B/P: 121/50, T: 96.3, P: 83, R: 16  Temp (24hrs), Av.3  F (35.7  C), Min:96.3  F (35.7  C), Max:96.3  F (35.7  C)

## 2020-02-17 NOTE — PLAN OF CARE
PT: Pt showed good participation in therapy this date, ambulating 54ft, then 47 ft in hallway with FWW, SBA with w/c follow, requiring seated rest break for fatigue each time. O2 sats were mid-low 80s with exercise but resolved to 93-95% with rest, remains on 3L throughout session.   Continues to progress to goals.

## 2020-02-17 NOTE — PLAN OF CARE
Physical Therapy Discharge Summary    Reason for therapy discharge:    Discharged to transitional care facility.    Progress towards therapy goal(s). See goals on Care Plan in [facility] electronic health record for goal details.  Goals not met.  Barriers to achieving goals:   discharge from facility.    Therapy recommendation(s):    Continued therapy is recommended.  Rationale/Recommendations:  pt far below her baseline mobility needing continued therapy to maximize functional independence..

## 2020-02-17 NOTE — PLAN OF CARE
"Pt A&Ox4, able to make needs known. Denies chest pain. SOB with ambulation. Sating appropriately on 3 L of O2 per NC. Wears bipap at night. Given PRN tylenol x1 for LLE wound pain. WOC nurse changed dressing to LLE. Given PRN zofran for nausea x1. BG 81 and 77. Recheck was 87, pt denies adverse symptoms and went to OT session, oncoming nurse aware. Fair appetite. 1800 fluid restriction. 2 gram sodium diet. Up to commode x1 this shift. LBM 2/16. Requesting to see RT tonight to adjust bipap for \"beeping all night\". Oncoming nurse made aware. Call light within reach, will continue to monitor.   "

## 2020-02-17 NOTE — PLAN OF CARE
Occupational Therapy Discharge Summary    Reason for therapy discharge:    Discharged to transitional care facility.    Progress towards therapy goal(s). See goals on Care Plan in [facility] electronic health record for goal details.  Goals partially met.  Barriers to achieving goals:   discharge from facility.    Therapy recommendation(s):    Continued therapy is recommended.  Rationale/Recommendations:  Continue OT at TCU.

## 2020-02-17 NOTE — PROGRESS NOTES
Social Work: Initial Assessment with Discharge Plan    Patient Name: Arianna Siddiqi  : 1960  Age: 59 year old  MRN: [id number]  Completed assessment with: Patient  Admitted to TCU: 2/15/20    Presenting Information   Date of SW assessment: 2020  Health Care Directive: Declined completing  Primary Health Care Agent: Patient  Secondary Health Care Agent: NOEMY  Living Situation: Apt on 3rd flr w/elevator access, alone  Previous Functional Status: Independent w/ ADL's/IADL's  DME available: would like to discuss  Patient and family understanding of hospitalization: CHF/ O2 levels off  Cultural/Language/Spiritual Considerations: Latter-day  Abuse concerns: None indicated  BIMS: Pt scored 12 on BIMS indicating   PHQ-9: Pt scored 0 on PHQ-9 indicating No depressive symptoms  PAS: confirmation number- [id number]  Has there been a level II screen?  No  Were there any recommendations in the screen? N/A  If yes, will the recommendations we incorporated into the Plan of Care?  N/A  Physical Health  Reason for admission: No diagnosis found.    Provider Information   Primary Care Physician:No Ref-Primary, Physician Sees [facility] Doctor  : None    Mental Health:   Diagnosis: None reported  Current Support/Services: NA  Previous Services: NA  Services Needed/Recommended: TBD    Substance Use:  Diagnosis: None reported  Current Support/Services: NA  Previous Services: NA  Services Needed/Recommended: NA    Support System  Marital Status: Single  Family support: Siblings  Other support available: friends  Gaps in support system: lives alone, doesn't drive    Community Resources  Current in home services: meal delivery  Previous services: [facility]    Financial/Employment/Education  Employment Status: Disabled  Income Source: SSDI  Education: High School  Financial Concerns:  SNAP benefits reduced/ Transportation to appointments is expensive  Insurance: MA/Medicare      Discharge Plan   Patient and  family discharge goal: Return home with [facility] support  Provided Education on discharge plan: YES  Patient agreeable to discharge plan:  YES  A list of Medicare Certified Facilities was provided to the patient and/or family to encourage patient choice. Based on location and rating, patient would like referrals made to: NA  General information regarding anticipated insurance coverage and possible out of pocket cost was discussed. Patient and patient's family are aware patient may incur the cost of transportation to the facility, pending insurance payment: YES  Barriers to discharge: Medical stability    Discharge Recommendations   Disposition: TBD  Transportation Needs: Will need assistance with scheduling if family not available  Name of Transportation Company and Phone: NA    Additional comments   Patient is familiar with TCU stay and feels supported with care. Patient declines having family present for care conferences. Patient requests help getting walker and shower chair if recommended by therapies.    Molly RIZVI [facility]  Weekend SW  [phone]

## 2020-02-17 NOTE — PROGRESS NOTES
WO Nurse Inpatient Wound Assessment   Reason for consultation: R LE and R medial thigh wound     Assessment  RLE wound due to Skin Tear  Status: initial assessment     R medial thigh:  chronic skin changes due to lymphedema and  inadvertent self injury ( scratch)  Status: initial assessment   Treatment Plan  RLE  wound: Every other day:  cleanse with Microklenz, apply No sting to surrounding skin, Cover with Mepilex dressing  , Monitor medial thigh skin for changes  Orders Written  Recommended provider order: Lymphedema consult  WO Nurse follow-up plan:weekly  Nursing to notify the Provider(s) and re-consult the WOC Nurse if wound(s) deteriorates or new skin concern.    Patient History  According to provider note(s):  59 year old morbidly obese female [facility] She has a past medical history significant for HFrEF, T2DM, CKD, HTN, gout, OHS/CARLOS on AVAP, morbid obesity who was initially admitted to medicine for decompensated heart failure exacerbation requiring a brief stay in the ICU, transferred to  TCU for rehabilitation 1/27-2/9 and then readmitted to [facility] 02.09  for JASON and acute hypoxic hypercarbic respiratory failure. Transferred to TCU 2/15/2020 for ongoing care and rehab needs.        Objective Data  Containment of urine/stool: Continent of bladder and Continent of bowel    Active Diet Order  Orders Placed This Encounter      Combination Diet 2 gm NA Diet      Output:   I/O last 3 completed shifts:  In: 720 [P.O.:720]  Out: 200 [Urine:200]    Risk Assessment:   Sensory Perception: 4-->no impairment  Moisture: 3-->occasionally moist  Activity: 2-->chairfast  Mobility: 2-->very limited  Nutrition: 2-->probably inadequate  Friction and Shear: 2-->potential problem  Johnnie Score: 15                          Labs:   Recent Labs   Lab 02/17/20  0702 02/16/20  0643   HGB  --  11.4*   INR 2.87* 2.51*   WBC  --  7.0       Physical Exam  Skin inspection: focused  RLE/ R medialthigh    RLE  2/17      Wound Location:  RLE  Date of last photo 2/17  Wound History: She inadvertently bumped shin area 4 days ago  Measurements (length x width x depth, in cm) 2.0  x 3.4  x  <0.1 cm   Wound Base: 100 % dermis  Palpation of the wound bed: normal   Periwound skin: macerated  Periwound Color: pale  Periwound Temperature: normal   Drainage:, moderate  Description of drainage: serosanguinous  Odor: none  Pain: absent, none    Medial thigh:      R medial thigh 2/17    HX:  Pateint reports she accidentally scratched this area due to pruritus, she is noted to have chronic skin changes due to lymphedema with dry encrusted patchy skin and reports that she has previously been treated with lymphedema wraps prior to hospitalization    Blotchy area of dark pink erythema :  3.0 x 1.5cm ( skin intact)  Interventions  Current support surface: Standard  Atmos Air mattress  Current off-loading measures: Chair cushion  Visual inspection and assessment completed   Wound Care: done per plan of care  Supplies: reviewed  Education provided: plan of care  Discussed plan of care with Patient    LOUISE WILCOX RN, CWON

## 2020-02-17 NOTE — PLAN OF CARE
Patient was place on her Bi-pap at the start of shift, no report of pain, machine alarm with air flow from around mask, RT is aware and informed writer that mask had space around patient chin. No sign of respiratory distress, patient slept throughout this shift, continue current plan of care.

## 2020-02-18 ENCOUNTER — APPOINTMENT (OUTPATIENT)
Dept: PHYSICAL THERAPY | Facility: SKILLED NURSING FACILITY | Age: 60
End: 2020-02-18
Payer: MEDICARE

## 2020-02-18 ENCOUNTER — APPOINTMENT (OUTPATIENT)
Dept: OCCUPATIONAL THERAPY | Facility: SKILLED NURSING FACILITY | Age: 60
End: 2020-02-18
Payer: MEDICARE

## 2020-02-18 LAB
GLUCOSE BLDC GLUCOMTR-MCNC: 110 MG/DL (ref 70–99)
GLUCOSE BLDC GLUCOMTR-MCNC: 71 MG/DL (ref 70–99)
GLUCOSE BLDC GLUCOMTR-MCNC: 81 MG/DL (ref 70–99)
GLUCOSE BLDC GLUCOMTR-MCNC: 83 MG/DL (ref 70–99)
INR PPP: 2.66 (ref 0.86–1.14)

## 2020-02-18 PROCEDURE — 99309 SBSQ NF CARE MODERATE MDM 30: CPT | Performed by: HOSPITALIST

## 2020-02-18 PROCEDURE — 97110 THERAPEUTIC EXERCISES: CPT | Mod: GP

## 2020-02-18 PROCEDURE — 97110 THERAPEUTIC EXERCISES: CPT | Mod: GO | Performed by: OCCUPATIONAL THERAPIST

## 2020-02-18 PROCEDURE — 25000132 ZZH RX MED GY IP 250 OP 250 PS 637: Mod: GY | Performed by: INTERNAL MEDICINE

## 2020-02-18 PROCEDURE — 36415 COLL VENOUS BLD VENIPUNCTURE: CPT | Performed by: INTERNAL MEDICINE

## 2020-02-18 PROCEDURE — 85610 PROTHROMBIN TIME: CPT | Performed by: INTERNAL MEDICINE

## 2020-02-18 PROCEDURE — 12000022 ZZH R&B SNF

## 2020-02-18 PROCEDURE — 99207 ZZC CDG-MDM COMPONENT: MEETS MODERATE - UP CODED: CPT | Performed by: HOSPITALIST

## 2020-02-18 PROCEDURE — 00000146 ZZHCL STATISTIC GLUCOSE BY METER IP

## 2020-02-18 PROCEDURE — 97530 THERAPEUTIC ACTIVITIES: CPT | Mod: GO | Performed by: OCCUPATIONAL THERAPIST

## 2020-02-18 RX ADMIN — BUMETANIDE 2 MG: 1 TABLET ORAL at 09:17

## 2020-02-18 RX ADMIN — ALLOPURINOL 200 MG: 100 TABLET ORAL at 09:18

## 2020-02-18 RX ADMIN — MICONAZOLE NITRATE: 2 POWDER TOPICAL at 09:17

## 2020-02-18 RX ADMIN — CYCLOBENZAPRINE HYDROCHLORIDE 5 MG: 5 TABLET, FILM COATED ORAL at 20:24

## 2020-02-18 RX ADMIN — METOPROLOL TARTRATE 50 MG: 25 TABLET ORAL at 09:18

## 2020-02-18 RX ADMIN — FERROUS GLUCONATE 324 MG: 324 TABLET ORAL at 14:30

## 2020-02-18 RX ADMIN — LISINOPRIL 5 MG: 2.5 TABLET ORAL at 09:17

## 2020-02-18 RX ADMIN — THERA TABS 1 TABLET: TAB at 09:18

## 2020-02-18 RX ADMIN — FERROUS GLUCONATE 324 MG: 324 TABLET ORAL at 20:24

## 2020-02-18 RX ADMIN — FERROUS GLUCONATE 324 MG: 324 TABLET ORAL at 09:18

## 2020-02-18 RX ADMIN — ACETAMINOPHEN 1000 MG: 500 TABLET ORAL at 14:30

## 2020-02-18 RX ADMIN — POTASSIUM CHLORIDE 40 MEQ: 10 TABLET, EXTENDED RELEASE ORAL at 09:18

## 2020-02-18 RX ADMIN — Medication 1.5 MG: at 18:18

## 2020-02-18 RX ADMIN — ATORVASTATIN CALCIUM 20 MG: 20 TABLET, FILM COATED ORAL at 20:24

## 2020-02-18 NOTE — PLAN OF CARE
The patient is alert and oriented x 3. VSS. Sat O2 96% on 3L NC. Able to make needs known. Denies pain. Ambulate with walker and assist of 1 to  Bedside commode. Dressing change to the right lower leg wound by the [facility] Nurse today. Appetite is good. C/o poor sleep for two nights due to CPAP beeping all night. RT modified settings and would be back at HS to ensure settings are right. Medicated with Robitussin x 1 for nonproductive cough. Continue to monitor for comfort.

## 2020-02-18 NOTE — PROGRESS NOTES
02/17/20 1449   Quick Adds   Quick Adds Certification   Type of Visit Initial Occupational Therapy Evaluation   Living Environment   Lives With alone   Living Arrangements apartment   Home Accessibility no concerns   Number of Stairs, Main Entrance none   Transportation Anticipated family or friend will provide   Living Environment Comment Pt. lives alone in an apartment that has elevator.    Self-Care   Usual Activity Tolerance moderate   Current Activity Tolerance fair   Regular Exercise No   Equipment Currently Used at Home shower chair   Activity/Exercise/Self-Care Comment Prior to admit, pt. was living alone in an apartment and was independent with functional mobility and ADL's.   Functional Level   Ambulation 0-->independent   Transferring 0-->independent   Toileting 0-->independent   Bathing 1-->assistive equipment   Dressing 0-->independent   Eating 0-->independent   Communication 0-->understands/communicates without difficulty   Swallowing 0-->swallows foods/liquids without difficulty   Cognition 0 - no cognition issues reported   Fall history within last six months no   Which of the above functional risks had a recent onset or change? ambulation;transferring;toileting;bathing;dressing   Prior Functional Level Comment Prior to admit, pt. was living alone in an apartment and was independent with functional mobility and ADL's.   General Information   Onset of Illness/Injury or Date of Surgery - Date 02/15/20  (Date of re-admission to rehab.)   Additional Occupational Profile Info/Pertinent History of Current Problem Pt.has a past medical history significant for HFrEF, T2DM, CKD, HTN, gout, OHS/CARLOS on AVAP, morbid obesity who was initially admitted to medicine for decompensated heart failure exacerbation requiring a brief stay in the ICU, transferred to  TCU for rehabilitation 1/27-2/9 and then readmitted to medicine for JASON and acute hypoxic hypercarbic respiratory failure   Cognitive Status Examination    Orientation orientation to person, place and time   Level of Consciousness alert   Follows Commands (Cognition) WNL   Memory intact   Attention No deficits were identified   Organization/Problem Solving No deficits were identified   Executive Function No deficits were identified   Cognitive Comment Intact   Pain Assessment   Patient Currently in Pain No   Posture   Posture not impaired   Range of Motion (ROM)   ROM Comment B UE ROM is intact.    Strength   Strength Comments Pt. reports actual UE strength is WFL, however overall endurance is decreased.   Mobility   Bed Mobility Comments Pt. reports she sleeps in a recliner.   Transfer Skill: Bed to Chair/Chair to Bed   Level of [place]: Bed to Chair stand-by assist   Physical Assist/Nonphysical Assist: Bed to Chair supervision;verbal cues;set-up required   Transfer Skill: Sit to Stand   Level of [place]: Sit/Stand stand-by assist   Physical Assist/Nonphysical Assist: Sit/Stand verbal cues;set-up required   Transfer Skill: Toilet Transfer   Level of [place]: Toilet stand-by assist   Physical Assist/Nonphysical Assist: Toilet set-up required;verbal cues;supervision   Tub/Shower Transfer   Tub/Shower Transfer Comments Pt.has walk in shower at home and has a shower chair.   Upper Body Dressing   Level of [place]: Dress Upper Body stand-by assist   Physical Assist/Nonphysical Assist: Dress Upper Body set-up required;verbal cues   Lower Body Dressing   Level of [place]: Dress Lower Body minimum assist (75% patients effort)   Physical Assist/Nonphysical Assist: Dress Lower Body 1 person assist;set-up required;supervision;verbal cues   Toileting   Level of [place]: Toilet stand-by assist   Physical Assist/Nonphysical Assist: Toilet verbal cues;set-up required;supervision   Grooming   Level of [place]: Grooming stand-by assist   Physical Assist/Nonphysical Assist: Grooming verbal cues;supervision;set-up required   Instrumental Activities  of Daily Living (IADL)   Previous Responsibilities meal prep;laundry;housekeeping   General Therapy Interventions   Planned Therapy Interventions ADL retraining;IADL retraining;balance training;strengthening;transfer training;visual perception;home program guidelines   Clinical Impression   OT Diagnosis Decreased independence with functional mobility and ADL's.    Assessment of Occupational Performance 3-5 Performance Deficits   Identified Performance Deficits Decreased independence with grooming, dressing, toileting and toilet transfers, shower transfers and bathing.   Clinical Decision Making (Complexity) Low complexity   Therapy Frequency Daily   Predicted Duration of Therapy Intervention (days/wks) 5-7 days   Anticipated Equipment Needs at Discharge shower chair   Anticipated Discharge Disposition Home with Home Therapy   Risks and Benefits of Treatment have been explained. Yes   Patient, Family & other staff in agreement with plan of care No   Therapy Certification   Start of Care Date 02/17/20   Certification date from 02/17/20   Certification date to 03/02/20   Medical Diagnosis Decreased independence with functional mobility and ADL's.   Certification I certify the need for these services furnished under this plan of treatment and while under my care.  (Physician co-signature of this document indicates review and certification of the therapy plan).   Total Evaluation Time   Total Evaluation Time (Minutes) 10  (25' self care and 10' eval.)

## 2020-02-18 NOTE — PLAN OF CARE
RN: Pt has no c/o pain or discomfort so far this shift. Awake at midnight asking if there is any other mask available as pt had to strap the current mask tight as to prevent any alarm for leaking. This does create a bump on her forehead by the morning per pt. Paged RT related to the mask as well as intermittent alarm in the machine. Per RT, since the pt setting is very high pressure, it has tendency to leak and will have the alarm. This has been the same issue before pt was transferred to the [facility] a week ago. RT also doesn't have any specific item that will provide cushion between device and pt's forehead. Unit can use any regular wound dressing that can serve the purpose. Pt was aready sound asleep when this nurse came back to her room with Gecko/ mepilex dressing and was noted to have folded tissue placed between the mask and her forehead. Will continue to monitor for any skin issue r/t mask. Continue with plan of care.

## 2020-02-18 NOTE — PLAN OF CARE
PT: Pt on 3 L O2 throughout session, began using finger monitor for O2 sats in which sats dropped down to low 80's with activity and immediately after activity and above 90% with verbal cuing and focused breathing, switched to ear monitor and O2 sats remained above 90% for remainder of session with and without activity. Pt ambulated up to 60' with FWW, SBA with w/c follow. -25 minutes due to schedule conflict and provider visit

## 2020-02-18 NOTE — PROGRESS NOTES
[facility] ([facility])    TCU Progress Note      Assessment & Plan        Arianna Siddiqi is a 59 year old morbidly obese female with past medical history significant for HFrEF, T2DM, CKD, HTN, gout, OHS/CARLOS on AVAP,who was initially admitted to medicine for decompensated heart failure exacerbation requiring a brief stay in the ICU, transferred to  TCU for rehabilitation 1/27-2/9 and then readmitted to [facility] 02.09  for JASON and acute hypoxic hypercarbic respiratory failure from influenza A in the setting of poor respiratory reserve.     She got transferred back to TCU 2/15/2020 for ongoing care and rehab needs.      # Physical deconditioning:     PT/OT consult.   Therapy as tolerated.   Fall Precautions.      # Acute hypoxic hypercarbic respiratory failure  # Respiratory acidosis - resolved  # Obesity hypoventilation with chronic CO2 retention on blood gas  # Pulmonary edema  # Influenza A  # CARLOS    PTA was requiring 4L oxygen while awake and AVAPS while sleeping. BL CO2 on VBG in the 80s. Diuretics (bumex 4mg daily) held 2/9-2/10 in setting of JASON that was felt to be pre-renal d/t diuresis in setting of influenza and poor po intake. Completed 5d of tamiflu 2/10. No concern for secondary infection. More somnolent on admission that required continuous AVAPS. CXR 2/11 with slightly increased pulmonary edema. S/p IV bumex 4mg on 2/11, 2mg on 2/12, and 1mg on 2/13 with significant UOP and steady improvement in weights and resp status. She was changed to oral bumex 2mg daily on 2/14 with good UOP and ongoing improvement in weights - 159.2kg today (pt reports baseline is 160kg). She continues to use 3-4L supplemental O2 during the day and AVAPS per home (PTA) settings at night and tolerated quite well.      - Continue supplemental O2 during the day to keep sats >90% and AVAPS at night or whenever sleeping during the day  * Home AVAPS setting: pmax25, plow 12, epap 8, RR 12, TV 600cc  - Continue po bumex 2mg daily,  and adjust as needed  - Nebs prn.   - Encourage IS     # Toxic metabolic encephalopathy - resolved  # Delirium - resolved    Recent influenza as above. Hypercarbic resp failure as above, pCO2 improving with AVAPs. Mental status was waxing and waning concerning for delirium, thus zyprexa scheduled at night 2/12 with resolution of nighttime restlessness. A&O x 3 over past 72 hours.     - Cont supplemental O2 and AVAPS as above  - Zyprexa discontinued prior to discharge to TCU  - Delirium precautions     # JASON. Resolved. Likely 2/2 diuresis from recent heart failure exacerbation and likely poor po intake with recent influenza dx. Per chart review, it appears BL Cr 0.9-1.2. Cr upto 2.06 on admission, resolved. 0.82 on TCU adm.   Stable Cr.  -Diuretics as above.   -Monitor BMP twice weekly at TCU.      # History of diastolic dysfunction, resolved (TTE with EF 55-60%, no RWMA with right sided strain). BNP was elevated to 7k on admission, from 2k a few days prior, but per chart review historically higher. Acute respiratory failure as above r/t pulmonary edema that resolved with diureses. Wt has continued to improve, changed to oral bumex 2/14. Wt 159.2kg today, pt feels her baseline is about 160kg.   - Continue bumex 2mg daily with KCl 40meq daily  - Cont lisinopril 5mg daily  - Cont metoprolol 50mg BID  - Cont 1.8L FR, 2g Na diet  - follow-up bmp, lytes, daily weight  - Follow up with Cardiology after discharge from TCU       # Paroxysmal atrial fibrillation. Briefly went into asymtpomatic afib on 2/12 with HRs  and BPs normotensive. Converted back to NSR 30min later and has remained in NSR since.   - Cont metoprolol 50mg BID.    - Cont warfarin with goal INR 2-3. INR therapeutic.       # DM 2, (A1C: 5.5  01.17.20)  # Hypoglycemia  PTA on sitagliptin 50mg daily which has been held d/t poor po intake- low BG  Low sliding scale insulin  Hypoglycemia protocol  Monitor po intake.     # Hematuria. Improved. Likely 2/2  traumatic antunez placement in setting of supratherapeutic INR (4.46). UA with significant RBCs, WBC, +LE, - nitrite. UCx with only 10-50K proteus - felt to be likely contaminant given low colonies, no urinary symptoms and improvement in overall status without antibiotic treatment.   Removed antunez prior to discharge.  Monitor for recurrence   Urology follow-up prn        Chronic stable medical issues    Hx of recent vaginal bleeding? Per chart review this occurred during last hospitalization, has not recurred this admission but does have hematuria as above so ? If source was not vaginal and urethral. Hgb stable. Cont to monitor, needs close OP monitoring for further eval.   HLD. Cont statin.   Hx of MAG. Hgb stable ~11s. Had some hematuria while INR was supratherapeutic. Cont pta ferrous gluconate TID.   Intertrigo groin. Cont Miconazole powder BID.  Hx of gout. Cont pta allopurinol.       Diet: Combination Diet 2 gm NA Diet  Fluid restriction 1800 ML FLUID      DVT Prophylaxis: warfarin  Code Status: Full Code  Disposition:  TBD  Antunez Catheter: not present    Antipsychotic USE: Off zyprexa  Immunization status:  Pneumo-poly  09/22/2016  1 of 2  Pneumovax 23  Full    No      Vaccine is uptodate for her    Elif Gordon MD  Text Page  (7am - 5pm, M-F)    Interval History       Overnight Events reviewed, Chart Reviewed  No major issues reported.   Feels better.   SOb is better. No chest pain.     -Data reviewed today: I reviewed all new labs and imaging results over the last 24 hours.     Physical Exam   Temp: 96.7  F (35.9  C) Temp src: Oral BP: 119/44 Pulse: 81   Resp: 18 SpO2: 90 % O2 Device: Nasal cannula Oxygen Delivery: 3 LPM  Vitals:    02/16/20 0900 02/17/20 1300 02/18/20 0949   Weight: (!) 162.7 kg (358 lb 9.6 oz) (!) 163.7 kg (360 lb 14.3 oz) (!) 163.9 kg (361 lb 6.4 oz)     Vital Signs with Ranges  Temp:  [95.8  F (35.4  C)-96.7  F (35.9  C)] 96.7  F (35.9  C)  Pulse:  [62-81] 81  Resp:  [16-18] 18  BP:  (100-127)/(44-64) 119/44  SpO2:  [88 %-100 %] 90 %  I/O last 3 completed shifts:  In: 480 [P.O.:480]  Out: -     Constitutional: No distress noted, Alert, Answering questions appropriately  Respiratory: AE is goog on both sides, no wheezing onr crackles  Cardiovascular: S1S2 normal, no new murmur  GI: Soft, non tender  Skin/Integumen: Venous stasis changes in legs.  Leg edema is noted      Medications     - MEDICATION INSTRUCTIONS -       - MEDICATION INSTRUCTIONS -       Warfarin Therapy Reminder         - Skin Test Reading (tuberculin) -   Does not apply Q21 Days     allopurinol  200 mg Oral Daily     atorvastatin  20 mg Oral QPM     bumetanide  2 mg Oral Daily     ferrous gluconate  324 mg Oral TID     insulin aspart  1-3 Units Subcutaneous TID AC     insulin aspart  1-3 Units Subcutaneous At Bedtime     lisinopril  5 mg Oral Daily     metoprolol tartrate  50 mg Oral BID     miconazole   Topical BID     multivitamin, therapeutic  1 tablet Oral Daily     potassium chloride  40 mEq Oral Daily     tuberculin  5 Units Intradermal Q21 Days     warfarin ANTICOAGULANT  1.5 mg Oral ONCE at 18:00       Data   Recent Labs   Lab 02/18/20  0701 02/17/20  0702 02/16/20  0643 02/15/20  0559 02/14/20  1339 02/14/20  0540   WBC  --   --  7.0 7.0  --  7.0   HGB  --   --  11.4* 11.7  --  11.3*   MCV  --   --  92 92  --  90   PLT  --   --  199 193  --  215   INR 2.66* 2.87* 2.51* 2.49*  --  3.29*   NA  --   --  140 140  --  140   POTASSIUM  --   --  4.0 3.3* 3.6 3.3*   CHLORIDE  --   --  97 95  --  94   CO2  --   --  40* 42*  --  44*   BUN  --   --  18 16  --  15   CR  --   --  0.94 0.82  --  0.79   ANIONGAP  --   --  3 3  --  2*   KADEN  --   --  8.7 9.1  --  9.2   GLC  --   --  86 88  --  88       No results found for this or any previous visit (from the past 24 hour(s)).

## 2020-02-18 NOTE — PLAN OF CARE
Discharge Planner OT   Patient plan for discharge: home with A  Current status: pt. SBA with functional mobility, amb. Throughout room with FWW. Pt. Tolerated session well on 3L nc. O2 ranging 91%-99%.  Barriers to return to prior living situation: below baseline with ADLs/mobility/activity madalyn.  Recommendations for discharge: home with A prn; ext. tub bench and walker for home.  Rationale for recommendations: to max. Safety/indep. With ADLs/mobility in home/community setting.       Entered by: Dori Foley 02/18/2020 2:46 PM

## 2020-02-18 NOTE — PLAN OF CARE
Discharge Planner OT   Patient plan for discharge: Home with home services  Current status: Pt completing functional mobility with SBA.  Barriers to return to prior living situation: Lives alone.  Recommendations for discharge: Home with increased home services.  Rationale for recommendations: Pt will benefit from continued OT to maximize level of independence with functional mobility and ADL's.        Entered by: Komal Mathew 02/17/2020 7:59 PM

## 2020-02-19 ENCOUNTER — APPOINTMENT (OUTPATIENT)
Dept: PHYSICAL THERAPY | Facility: SKILLED NURSING FACILITY | Age: 60
End: 2020-02-19
Payer: MEDICARE

## 2020-02-19 LAB
GLUCOSE BLDC GLUCOMTR-MCNC: 101 MG/DL (ref 70–99)
GLUCOSE BLDC GLUCOMTR-MCNC: 116 MG/DL (ref 70–99)
GLUCOSE BLDC GLUCOMTR-MCNC: 125 MG/DL (ref 70–99)
GLUCOSE BLDC GLUCOMTR-MCNC: 65 MG/DL (ref 70–99)
GLUCOSE BLDC GLUCOMTR-MCNC: 70 MG/DL (ref 70–99)
GLUCOSE BLDC GLUCOMTR-MCNC: 79 MG/DL (ref 70–99)
GLUCOSE BLDC GLUCOMTR-MCNC: 81 MG/DL (ref 70–99)
GLUCOSE BLDC GLUCOMTR-MCNC: 84 MG/DL (ref 70–99)
INR PPP: 2.33 (ref 0.86–1.14)

## 2020-02-19 PROCEDURE — 25000132 ZZH RX MED GY IP 250 OP 250 PS 637: Mod: GY | Performed by: INTERNAL MEDICINE

## 2020-02-19 PROCEDURE — 00000146 ZZHCL STATISTIC GLUCOSE BY METER IP

## 2020-02-19 PROCEDURE — 12000022 ZZH R&B SNF

## 2020-02-19 PROCEDURE — 85610 PROTHROMBIN TIME: CPT | Performed by: INTERNAL MEDICINE

## 2020-02-19 PROCEDURE — 97110 THERAPEUTIC EXERCISES: CPT | Mod: GP | Performed by: PHYSICAL THERAPIST

## 2020-02-19 RX ADMIN — BUMETANIDE 2 MG: 1 TABLET ORAL at 09:13

## 2020-02-19 RX ADMIN — FERROUS GLUCONATE 324 MG: 324 TABLET ORAL at 09:13

## 2020-02-19 RX ADMIN — ATORVASTATIN CALCIUM 20 MG: 20 TABLET, FILM COATED ORAL at 20:37

## 2020-02-19 RX ADMIN — CYCLOBENZAPRINE HYDROCHLORIDE 5 MG: 5 TABLET, FILM COATED ORAL at 22:01

## 2020-02-19 RX ADMIN — FERROUS GLUCONATE 324 MG: 324 TABLET ORAL at 20:37

## 2020-02-19 RX ADMIN — MICONAZOLE NITRATE: 2 POWDER TOPICAL at 20:39

## 2020-02-19 RX ADMIN — Medication 1.5 MG: at 18:19

## 2020-02-19 RX ADMIN — ACETAMINOPHEN 1000 MG: 500 TABLET ORAL at 12:16

## 2020-02-19 RX ADMIN — ACETAMINOPHEN 1000 MG: 500 TABLET ORAL at 18:19

## 2020-02-19 RX ADMIN — FERROUS GLUCONATE 324 MG: 324 TABLET ORAL at 13:43

## 2020-02-19 RX ADMIN — POTASSIUM CHLORIDE 40 MEQ: 10 TABLET, EXTENDED RELEASE ORAL at 09:13

## 2020-02-19 RX ADMIN — THERA TABS 1 TABLET: TAB at 09:13

## 2020-02-19 RX ADMIN — METOPROLOL TARTRATE 50 MG: 25 TABLET ORAL at 09:23

## 2020-02-19 RX ADMIN — ALLOPURINOL 200 MG: 100 TABLET ORAL at 09:13

## 2020-02-19 RX ADMIN — METOPROLOL TARTRATE 50 MG: 25 TABLET ORAL at 20:37

## 2020-02-19 RX ADMIN — LISINOPRIL 5 MG: 2.5 TABLET ORAL at 09:13

## 2020-02-19 NOTE — PLAN OF CARE
The patient is alert and oriented x 3. VSS. Sat O2 95% on 3L NC. Able to make needs known. Appetite is good. Medicated with Tylenol x 1 for bilateral shoulder pain control. Ambulate with walker and assist of 1 to Bedside chair and commode. The dressing to the right lower leg is intact and due for a change later today. Patient had a shower. Continue to monitor for comfort.

## 2020-02-19 NOTE — PLAN OF CARE
OT: Intervention planned to work on full shower and self cares. Pt. BS 65 at this time. Checked back with patient x 3 times following BS checcks and continued to be low and due to blood sugars unable to participate in this session. Session cancelled

## 2020-02-19 NOTE — PLAN OF CARE
Patient appears sleeping during rounds. Patient no complaints of pain and discomfort all shift. AVAPS on, no leaking or beeping noted, tolerating well. No respiratory distress noted. Will continue with current plan of care.

## 2020-02-19 NOTE — PLAN OF CARE
The patient is alert and oriented x 3. VSS. Sat O2 97% on 3L NC. Able to make needs known. Denies pain at this time. Ambulate with walker and assist of 1 to Bedside chair and commode. The dressing to the right lower leg is intact and due for a change tomorrow. Appetite is good. Continue to monitor for comfort.

## 2020-02-19 NOTE — PLAN OF CARE
PT: Pt is participating well with PT, and gradually increasing her amb distances.  Pt needing 3 L O2/min with amb, to 90% after amb, and down to 86% on 2 LO2/min (for one circuit of amb). Cont to build CP endurance and LE, core strength.

## 2020-02-20 ENCOUNTER — APPOINTMENT (OUTPATIENT)
Dept: PHYSICAL THERAPY | Facility: SKILLED NURSING FACILITY | Age: 60
End: 2020-02-20
Payer: MEDICARE

## 2020-02-20 ENCOUNTER — APPOINTMENT (OUTPATIENT)
Dept: OCCUPATIONAL THERAPY | Facility: SKILLED NURSING FACILITY | Age: 60
End: 2020-02-20
Payer: MEDICARE

## 2020-02-20 LAB
ANION GAP SERPL CALCULATED.3IONS-SCNC: <1 MMOL/L (ref 3–14)
BUN SERPL-MCNC: 20 MG/DL (ref 7–30)
CALCIUM SERPL-MCNC: 8.9 MG/DL (ref 8.5–10.1)
CHLORIDE SERPL-SCNC: 99 MMOL/L (ref 94–109)
CO2 SERPL-SCNC: 41 MMOL/L (ref 20–32)
CREAT SERPL-MCNC: 0.8 MG/DL (ref 0.52–1.04)
GFR SERPL CREATININE-BSD FRML MDRD: 79 ML/MIN/{1.73_M2}
GLUCOSE BLDC GLUCOMTR-MCNC: 139 MG/DL (ref 70–99)
GLUCOSE BLDC GLUCOMTR-MCNC: 80 MG/DL (ref 70–99)
GLUCOSE BLDC GLUCOMTR-MCNC: 92 MG/DL (ref 70–99)
GLUCOSE BLDC GLUCOMTR-MCNC: 96 MG/DL (ref 70–99)
GLUCOSE SERPL-MCNC: 84 MG/DL (ref 70–99)
INR PPP: 2.07 (ref 0.86–1.14)
POTASSIUM SERPL-SCNC: 4.2 MMOL/L (ref 3.4–5.3)
SODIUM SERPL-SCNC: 139 MMOL/L (ref 133–144)

## 2020-02-20 PROCEDURE — 00000146 ZZHCL STATISTIC GLUCOSE BY METER IP

## 2020-02-20 PROCEDURE — 97530 THERAPEUTIC ACTIVITIES: CPT | Mod: GP

## 2020-02-20 PROCEDURE — 97535 SELF CARE MNGMENT TRAINING: CPT | Mod: GO | Performed by: OCCUPATIONAL THERAPIST

## 2020-02-20 PROCEDURE — 97110 THERAPEUTIC EXERCISES: CPT | Mod: GP

## 2020-02-20 PROCEDURE — 25000132 ZZH RX MED GY IP 250 OP 250 PS 637: Mod: GY | Performed by: INTERNAL MEDICINE

## 2020-02-20 PROCEDURE — 36415 COLL VENOUS BLD VENIPUNCTURE: CPT | Performed by: INTERNAL MEDICINE

## 2020-02-20 PROCEDURE — 12000022 ZZH R&B SNF

## 2020-02-20 PROCEDURE — 80048 BASIC METABOLIC PNL TOTAL CA: CPT | Performed by: INTERNAL MEDICINE

## 2020-02-20 PROCEDURE — 85610 PROTHROMBIN TIME: CPT | Performed by: INTERNAL MEDICINE

## 2020-02-20 RX ORDER — WARFARIN SODIUM 2 MG/1
2 TABLET ORAL
Status: COMPLETED | OUTPATIENT
Start: 2020-02-20 | End: 2020-02-20

## 2020-02-20 RX ADMIN — ACETAMINOPHEN 1000 MG: 500 TABLET ORAL at 18:17

## 2020-02-20 RX ADMIN — METOPROLOL TARTRATE 50 MG: 25 TABLET ORAL at 08:17

## 2020-02-20 RX ADMIN — ALLOPURINOL 200 MG: 100 TABLET ORAL at 08:22

## 2020-02-20 RX ADMIN — THERA TABS 1 TABLET: TAB at 08:22

## 2020-02-20 RX ADMIN — FERROUS GLUCONATE 324 MG: 324 TABLET ORAL at 20:54

## 2020-02-20 RX ADMIN — FERROUS GLUCONATE 324 MG: 324 TABLET ORAL at 08:22

## 2020-02-20 RX ADMIN — LISINOPRIL 5 MG: 2.5 TABLET ORAL at 08:18

## 2020-02-20 RX ADMIN — Medication 25 MG: at 19:36

## 2020-02-20 RX ADMIN — POTASSIUM CHLORIDE 40 MEQ: 10 TABLET, EXTENDED RELEASE ORAL at 08:21

## 2020-02-20 RX ADMIN — MICONAZOLE NITRATE: 2 POWDER TOPICAL at 20:58

## 2020-02-20 RX ADMIN — BUMETANIDE 2 MG: 1 TABLET ORAL at 08:22

## 2020-02-20 RX ADMIN — FERROUS GLUCONATE 324 MG: 324 TABLET ORAL at 13:49

## 2020-02-20 RX ADMIN — MICONAZOLE NITRATE: 2 POWDER TOPICAL at 08:22

## 2020-02-20 RX ADMIN — WARFARIN SODIUM 2 MG: 2 TABLET ORAL at 18:17

## 2020-02-20 RX ADMIN — ACETAMINOPHEN 1000 MG: 500 TABLET ORAL at 10:59

## 2020-02-20 RX ADMIN — ATORVASTATIN CALCIUM 20 MG: 20 TABLET, FILM COATED ORAL at 20:54

## 2020-02-20 RX ADMIN — METOPROLOL TARTRATE 50 MG: 25 TABLET ORAL at 20:54

## 2020-02-20 NOTE — PLAN OF CARE
Alert and oriented, VSS. No respiratory distress, on O2 via NC at 3 LPM while awake. BG readings were 101, 125. Appetite good. Reports generalized body soreness from therapies and throbbing to RLE abrasion; PRN tylenol given x1 and PRN flexeril given at bedtime. Reports neuropathic pain to BLE and wondering what other options are available for control; note left for provider. RLE abrasion wound care done. Lotion applied to dry feet and inner thighs. Ambulated SBA with walker in room; continent of B/B; LBM 2/19. CPAP applied at bedtime.

## 2020-02-20 NOTE — PLAN OF CARE
RN: Pt has no c/o pain or discomfort so far this shift. Tolerating AVAPS. Denies SOB and no respiratory symptoms/issues noted. Appear to be sleeping/resting. Continue with plan of care.

## 2020-02-20 NOTE — PLAN OF CARE
Discharge Planner OT   Patient plan for discharge: home with A  Current status: completed full shower with CGA/SBA using shower chair,HHS and grab bars. Pt. tolerated session well on 2-3L nc, o2 94%-99%.  Barriers to return to prior living situation: below baseline with ADLs/mobility/activity madalyn.  Recommendations for discharge: home with A prn; ext. tub bench and walker for home.  Rationale for recommendations: to max. Safety/indep. With ADLs/mobility in home/community setting.

## 2020-02-20 NOTE — PLAN OF CARE
Patient up in the chair during meals,complains of pain in LE's.Tylenol prn given x 1 with relief.Right leg wound dressing changed,no drainage noted from site.SBA with a walker to toilet,participating in therapies.Appetite fair eating and drinking well.  Patient skin is very dry,lotion applied.Left leg has a spot that was bleeding due to patient feeling itchy and scratching off dry skin,site cleansed and dressing applied.MD aware and will order   Lymphedema consult.Cherelle.

## 2020-02-21 ENCOUNTER — APPOINTMENT (OUTPATIENT)
Dept: PHYSICAL THERAPY | Facility: SKILLED NURSING FACILITY | Age: 60
End: 2020-02-21
Payer: MEDICARE

## 2020-02-21 ENCOUNTER — APPOINTMENT (OUTPATIENT)
Dept: OCCUPATIONAL THERAPY | Facility: SKILLED NURSING FACILITY | Age: 60
End: 2020-02-21
Payer: MEDICARE

## 2020-02-21 LAB
GLUCOSE BLDC GLUCOMTR-MCNC: 108 MG/DL (ref 70–99)
GLUCOSE BLDC GLUCOMTR-MCNC: 109 MG/DL (ref 70–99)
GLUCOSE BLDC GLUCOMTR-MCNC: 113 MG/DL (ref 70–99)
GLUCOSE BLDC GLUCOMTR-MCNC: 66 MG/DL (ref 70–99)
GLUCOSE BLDC GLUCOMTR-MCNC: 70 MG/DL (ref 70–99)
GLUCOSE BLDC GLUCOMTR-MCNC: 84 MG/DL (ref 70–99)
GLUCOSE BLDC GLUCOMTR-MCNC: 98 MG/DL (ref 70–99)
GLUCOSE BLDC GLUCOMTR-MCNC: 99 MG/DL (ref 70–99)
INR PPP: 2 (ref 0.86–1.14)

## 2020-02-21 PROCEDURE — 25000132 ZZH RX MED GY IP 250 OP 250 PS 637: Mod: GY | Performed by: PHYSICIAN ASSISTANT

## 2020-02-21 PROCEDURE — 97116 GAIT TRAINING THERAPY: CPT | Mod: GP

## 2020-02-21 PROCEDURE — 97535 SELF CARE MNGMENT TRAINING: CPT | Mod: GO | Performed by: OCCUPATIONAL THERAPIST

## 2020-02-21 PROCEDURE — 25000132 ZZH RX MED GY IP 250 OP 250 PS 637: Mod: GY | Performed by: INTERNAL MEDICINE

## 2020-02-21 PROCEDURE — 99316 NF DSCHRG MGMT 30 MIN+: CPT | Performed by: HOSPITALIST

## 2020-02-21 PROCEDURE — 36415 COLL VENOUS BLD VENIPUNCTURE: CPT | Performed by: INTERNAL MEDICINE

## 2020-02-21 PROCEDURE — 25000128 H RX IP 250 OP 636: Performed by: INTERNAL MEDICINE

## 2020-02-21 PROCEDURE — 97110 THERAPEUTIC EXERCISES: CPT | Mod: GP

## 2020-02-21 PROCEDURE — 97530 THERAPEUTIC ACTIVITIES: CPT | Mod: GO | Performed by: OCCUPATIONAL THERAPIST

## 2020-02-21 PROCEDURE — 97530 THERAPEUTIC ACTIVITIES: CPT | Mod: GP

## 2020-02-21 PROCEDURE — 12000022 ZZH R&B SNF

## 2020-02-21 PROCEDURE — 00000146 ZZHCL STATISTIC GLUCOSE BY METER IP

## 2020-02-21 PROCEDURE — 85610 PROTHROMBIN TIME: CPT | Performed by: INTERNAL MEDICINE

## 2020-02-21 RX ORDER — BUMETANIDE 1 MG/1
2 TABLET ORAL DAILY
Qty: 60 TABLET | Refills: 0 | Status: SHIPPED | OUTPATIENT
Start: 2020-02-21 | End: 2020-03-26

## 2020-02-21 RX ORDER — MULTIVITAMIN,THERAPEUTIC
1 TABLET ORAL DAILY
Qty: 30 TABLET | Refills: 0 | Status: SHIPPED | OUTPATIENT
Start: 2020-02-21 | End: 2020-03-22

## 2020-02-21 RX ORDER — METOPROLOL TARTRATE 50 MG
50 TABLET ORAL 2 TIMES DAILY
Qty: 60 TABLET | Refills: 0 | Status: SHIPPED | OUTPATIENT
Start: 2020-02-21 | End: 2020-03-26

## 2020-02-21 RX ORDER — FERROUS GLUCONATE 324(38)MG
324 TABLET ORAL 3 TIMES DAILY
Qty: 90 TABLET | Refills: 0 | Status: SHIPPED | OUTPATIENT
Start: 2020-02-21 | End: 2020-03-26

## 2020-02-21 RX ORDER — ALLOPURINOL 100 MG/1
200 TABLET ORAL DAILY
Qty: 60 TABLET | Refills: 0 | Status: SHIPPED | OUTPATIENT
Start: 2020-02-21 | End: 2020-03-26

## 2020-02-21 RX ORDER — ACETAMINOPHEN 500 MG
500-1000 TABLET ORAL EVERY 4 HOURS PRN
Refills: 0
Start: 2020-02-21

## 2020-02-21 RX ORDER — POLYETHYLENE GLYCOL 3350 17 G/17G
17 POWDER, FOR SOLUTION ORAL DAILY PRN
Qty: 30 PACKET | Refills: 0 | Status: SHIPPED | OUTPATIENT
Start: 2020-02-21 | End: 2020-03-26

## 2020-02-21 RX ORDER — WARFARIN SODIUM 2 MG/1
2 TABLET ORAL DAILY
Qty: 1 TABLET | Refills: 0 | Status: SHIPPED | OUTPATIENT
Start: 2020-02-21 | End: 2020-02-21

## 2020-02-21 RX ORDER — ATORVASTATIN CALCIUM 20 MG/1
20 TABLET, FILM COATED ORAL EVERY EVENING
Qty: 30 TABLET | Refills: 0 | Status: SHIPPED | OUTPATIENT
Start: 2020-02-21 | End: 2020-03-26

## 2020-02-21 RX ORDER — WARFARIN SODIUM 1 MG/1
2 TABLET ORAL DAILY
Qty: 60 TABLET | Refills: 0 | Status: SHIPPED | OUTPATIENT
Start: 2020-02-21 | End: 2020-03-26

## 2020-02-21 RX ORDER — NALOXONE HYDROCHLORIDE 0.4 MG/ML
.1-.4 INJECTION, SOLUTION INTRAMUSCULAR; INTRAVENOUS; SUBCUTANEOUS
Status: DISCONTINUED | OUTPATIENT
Start: 2020-02-21 | End: 2020-02-23 | Stop reason: HOSPADM

## 2020-02-21 RX ORDER — ONDANSETRON 4 MG/1
4 TABLET, ORALLY DISINTEGRATING ORAL EVERY 6 HOURS PRN
Qty: 10 TABLET | Refills: 0 | Status: SHIPPED | OUTPATIENT
Start: 2020-02-21 | End: 2021-12-07

## 2020-02-21 RX ORDER — SENNOSIDES 8.6 MG
1 TABLET ORAL 2 TIMES DAILY PRN
Qty: 60 TABLET | Refills: 0 | Status: SHIPPED | OUTPATIENT
Start: 2020-02-21

## 2020-02-21 RX ORDER — WARFARIN SODIUM 2 MG/1
2 TABLET ORAL
Status: COMPLETED | OUTPATIENT
Start: 2020-02-21 | End: 2020-02-21

## 2020-02-21 RX ORDER — LISINOPRIL 5 MG/1
5 TABLET ORAL DAILY
Qty: 30 TABLET | Refills: 0 | Status: SHIPPED | OUTPATIENT
Start: 2020-02-21 | End: 2020-03-26

## 2020-02-21 RX ORDER — CYCLOBENZAPRINE HCL 5 MG
5 TABLET ORAL
Qty: 30 TABLET | Refills: 0 | Status: SHIPPED | OUTPATIENT
Start: 2020-02-21 | End: 2020-03-26

## 2020-02-21 RX ORDER — POTASSIUM CHLORIDE 1.5 G/1.58G
40 POWDER, FOR SOLUTION ORAL DAILY
Qty: 30 TABLET | Refills: 0 | Status: SHIPPED | OUTPATIENT
Start: 2020-02-21 | End: 2020-03-26

## 2020-02-21 RX ORDER — ALUMINA, MAGNESIA, AND SIMETHICONE 2400; 2400; 240 MG/30ML; MG/30ML; MG/30ML
15 SUSPENSION ORAL EVERY 4 HOURS PRN
Qty: 355 ML | Refills: 0 | Status: SHIPPED | OUTPATIENT
Start: 2020-02-21

## 2020-02-21 RX ORDER — TRAMADOL HYDROCHLORIDE 50 MG/1
50 TABLET ORAL EVERY 6 HOURS PRN
Status: DISCONTINUED | OUTPATIENT
Start: 2020-02-21 | End: 2020-02-23 | Stop reason: HOSPADM

## 2020-02-21 RX ADMIN — FERROUS GLUCONATE 324 MG: 324 TABLET ORAL at 08:20

## 2020-02-21 RX ADMIN — METOPROLOL TARTRATE 50 MG: 25 TABLET ORAL at 08:19

## 2020-02-21 RX ADMIN — LISINOPRIL 5 MG: 2.5 TABLET ORAL at 08:19

## 2020-02-21 RX ADMIN — POTASSIUM CHLORIDE 40 MEQ: 10 TABLET, EXTENDED RELEASE ORAL at 08:20

## 2020-02-21 RX ADMIN — TRAMADOL HYDROCHLORIDE 50 MG: 50 TABLET, FILM COATED ORAL at 18:09

## 2020-02-21 RX ADMIN — MICONAZOLE NITRATE: 2 POWDER TOPICAL at 08:21

## 2020-02-21 RX ADMIN — ACETAMINOPHEN 1000 MG: 500 TABLET ORAL at 03:09

## 2020-02-21 RX ADMIN — ACETAMINOPHEN 1000 MG: 500 TABLET ORAL at 21:36

## 2020-02-21 RX ADMIN — ONDANSETRON 4 MG: 4 TABLET, ORALLY DISINTEGRATING ORAL at 11:02

## 2020-02-21 RX ADMIN — MICONAZOLE NITRATE: 2 POWDER TOPICAL at 21:28

## 2020-02-21 RX ADMIN — WARFARIN SODIUM 2 MG: 2 TABLET ORAL at 18:09

## 2020-02-21 RX ADMIN — FERROUS GLUCONATE 324 MG: 324 TABLET ORAL at 15:08

## 2020-02-21 RX ADMIN — FERROUS GLUCONATE 324 MG: 324 TABLET ORAL at 21:23

## 2020-02-21 RX ADMIN — ATORVASTATIN CALCIUM 20 MG: 20 TABLET, FILM COATED ORAL at 21:23

## 2020-02-21 RX ADMIN — ALLOPURINOL 200 MG: 100 TABLET ORAL at 08:21

## 2020-02-21 RX ADMIN — METOPROLOL TARTRATE 50 MG: 25 TABLET ORAL at 21:23

## 2020-02-21 RX ADMIN — BUMETANIDE 2 MG: 1 TABLET ORAL at 08:20

## 2020-02-21 RX ADMIN — THERA TABS 1 TABLET: TAB at 08:21

## 2020-02-21 NOTE — PLAN OF CARE
Patient has been assessed for Home Oxygen needs. Oxygen readings:    *Pulse oximetry (SpO2) = 93% on room air at rest while awake.    *SpO2 = 84% on room air during activity/with exercise.    *SpO2 improved to 93% on *2.5 liters/minute during activity/with exercise.

## 2020-02-21 NOTE — PLAN OF CARE
Patient alert and oriented x 4. Patient assist of 1 with transfer and ambulation. AVAPS on while sleeping, tolerating well. Patient complained of throbbing pain to her L lower leg, tylenol given and effective. No respiratory distress noted. Will continue with current plan of care.

## 2020-02-21 NOTE — UTILIZATION REVIEW
1. Have you had pain or hurting at any time in the last 5 days?     [x]YES  []NO  []UNABLE TO ANSWER    2. How much of the time have you experienced pain or hurting over the last 5 days?    []ALMOST CONSTANTLY [x]FREQUENTLY []OCCASIONALLY []RARELY []UNABLE TO ANSWER    3. Over the past 5 days, has pain made it hard for you to sleep at night?     [x]YES  []NO  []UNABLE TO ANSWER    4. Over the past 5 days, have you limited your day-to-day activities because of pain?     [x]YES  []NO  []UNABLE TO ANSWER    5. Pain intensity (Numeric scale used first-if patient unable to answer, verbal scale to be used.)    Numeric Scale: Please rate your worst pain over the last 5 days on a zero to ten scale, with zero being no pain and ten being the worst pain you can imagine.     Number:          8/10    Verbal Scale: Please rate the intensity of your worst pain over the last 5 days.     []MILD []MODERATE []SEVERE []VERY SEVERE/HORRIBLE []UNABLE TO ANSWER

## 2020-02-21 NOTE — DISCHARGE SUMMARY
[facility]  Discharge Summary    Arianna Siddiqi MRN# [id number]   YOB: 1960 Age: 59 year old     Date of Admission:  2/15/2020  Date of Discharge:  02/21/2020.  Admitting Physician:  Canelo Rubio MD  Discharge Physician:  Elif Gordon MD  Discharging Service:  Internal Medicine     Primary Provider: Yoselin Shook              Discharge Diagnosis:     Primary Discharge Diagnosis:    # Physical deconditioning:   # Acute hypoxic hypercarbic respiratory failure  # Respiratory acidosis - resolved  # Obesity hypoventilation with chronic CO2 retention on blood gas  # Pulmonary edema  # Influenza A  # CARLOS   # Toxic metabolic encephalopathy - resolved  # Delirium - resolved  # JASON. Resolved.  # History of diastolic dysfunction, resolved (TTE with EF 55-60%, no RWMA with right sided strain).   # Paroxysmal atrial fibrillation.  # DM 2, (A1C: 5.5  01.17.20)  # Hypoglycemia  # Hematuria    Chronic stable medical issues     Hx of recent vaginal bleeding  HLD. Cont statin.   Hx of MAG.   Intertrigo groin.   Hx of gout        Past Medical History:   Diagnosis Date     CHF (congestive heart failure) (H)      CKD (chronic kidney disease)      Compliance poor      Diabetes mellitus (H)      Gout      Hypertension      Insomnia      Morbid obesity (H)      CARLOS treated with BiPAP                 Discharge Medications:     Current Discharge Medication List      CONTINUE these medications which have CHANGED    Details   acetaminophen (TYLENOL) 500 MG tablet Take 1-2 tablets (500-1,000 mg) by mouth every 4 hours as needed for mild pain  Refills: 0    Associated Diagnoses: Arthritis      allopurinol (ZYLOPRIM) 100 MG tablet Take 2 tablets (200 mg) by mouth daily Patient needs to see primary provider and have labs for further refills.  Qty: 60 tablet, Refills: 0    Associated Diagnoses: Gout, unspecified cause, unspecified chronicity, unspecified site      alum & mag hydroxide-simethicone  (MAALOX  ES) 400-400-40 MG/5ML SUSP suspension Take 15 mLs by mouth every 4 hours as needed for other (gastric distress.)  Qty: 355 mL, Refills: 0    Associated Diagnoses: Dyspepsia      atorvastatin (LIPITOR) 20 MG tablet Take 1 tablet (20 mg) by mouth every evening  Qty: 30 tablet, Refills: 0    Associated Diagnoses: Hyperlipidemia, unspecified hyperlipidemia type      bumetanide (BUMEX) 1 MG tablet Take 2 tablets (2 mg) by mouth daily  Qty: 60 tablet, Refills: 0    Associated Diagnoses: Acute systolic congestive heart failure (H)      cyclobenzaprine (FLEXERIL) 5 MG tablet Take 1 tablet (5 mg) by mouth nightly as needed for muscle spasms  Qty: 30 tablet, Refills: 0    Associated Diagnoses: Muscle spasm      ferrous gluconate (FERGON) 324 (38 Fe) MG tablet Take 1 tablet (324 mg) by mouth 3 times daily  Qty: 90 tablet, Refills: 0    Associated Diagnoses: Iron deficiency      lisinopril (ZESTRIL) 5 MG tablet Take 1 tablet (5 mg) by mouth daily  Qty: 30 tablet, Refills: 0    Associated Diagnoses: Essential hypertension      metoprolol tartrate (LOPRESSOR) 50 MG tablet Take 1 tablet (50 mg) by mouth 2 times daily  Qty: 60 tablet, Refills: 0    Associated Diagnoses: Hypertension, unspecified type      miconazole (MICATIN) 2 % external powder Apply topically 2 times daily  Qty: 180 g, Refills: 0    Associated Diagnoses: Fungal infection      multivitamin, therapeutic (THERA-VIT) TABS tablet Take 1 tablet by mouth daily  Qty: 30 tablet, Refills: 0    Associated Diagnoses: Iron deficiency      ondansetron (ZOFRAN-ODT) 4 MG ODT tab Take 1 tablet (4 mg) by mouth every 6 hours as needed for nausea or vomiting  Qty: 10 tablet, Refills: 0    Associated Diagnoses: Vomiting, intractability of vomiting not specified, presence of nausea not specified, unspecified vomiting type      polyethylene glycol (MIRALAX) packet Take 17 g by mouth daily as needed for constipation  Qty: 30 packet, Refills: 0    Associated Diagnoses:  Constipation, unspecified constipation type      potassium chloride (KLOR-CON) 20 MEQ packet Take 40 mEq by mouth daily  Qty: 30 tablet, Refills: 0    Associated Diagnoses: Hypokalemia      sennosides (SENOKOT) 8.6 MG tablet Take 1 tablet by mouth 2 times daily as needed for constipation  Qty: 60 tablet, Refills: 0    Associated Diagnoses: Constipation, unspecified constipation type      !! warfarin ANTICOAGULANT (COUMADIN) 1 MG tablet Take 2 tablets (2 mg) by mouth daily Use  As directed by coumadin clinic  Qty: 60 tablet, Refills: 0    Associated Diagnoses: Atrial fibrillation, unspecified type (H)      !! warfarin ANTICOAGULANT (COUMADIN) 2.5 MG tablet Take 1 tablet (2.5 mg) by mouth daily for 2 days Take 2.5 mg po daily  On 02/22/20 and 02/23/20, check INR on Monday. Then doses per INR clinic  Qty: 2 tablet, Refills: 0    Associated Diagnoses: Atrial fibrillation, unspecified type (H)       !! - Potential duplicate medications found. Please discuss with provider.      CONTINUE these medications which have NOT CHANGED    Details   blood glucose monitoring (ACCU-CHEK NINA PLUS) meter device kit Use to test blood sugars 3 times daily or as directed.  Qty: 1 kit, Refills: 0    Associated Diagnoses: Diabetes mellitus, type 2 (H)      blood glucose monitoring (SOFTCLIX) lancets Use to test blood sugar 3 times daily or as directed.  Qty: 300 each, Refills: 0    Associated Diagnoses: Type 2 diabetes mellitus with chronic kidney disease, without long-term current use of insulin, unspecified CKD stage (H)         STOP taking these medications       insulin aspart (NOVOLOG PEN) 100 UNIT/ML pen Comments:   Reason for Stopping:         insulin aspart (NOVOLOG PEN) 100 UNIT/ML pen Comments:   Reason for Stopping:         ipratropium - albuterol 0.5 mg/2.5 mg/3 mL (DUONEB) 0.5-2.5 (3) MG/3ML neb solution Comments:   Reason for Stopping:         melatonin 3 MG tablet Comments:   Reason for Stopping:                    Hospital  Course:   Arianna Siddiqi is a 59 year old morbidly obese female with past medical history significant for HFrEF, T2DM, CKD, HTN, gout, OHS/CARLOS on AVAP,who was initially admitted to medicine for decompensated heart failure exacerbation requiring a brief stay in the ICU, transferred to  TCU for rehabilitation 1/27-2/9 and then readmitted to [facility] 02.09  for JASON and acute hypoxic hypercarbic respiratory failure from influenza A in the setting of poor respiratory reserve.      She got transferred back to TCU 2/15/2020 for ongoing care and rehab needs.      # Physical deconditioning:      She met TCU Goal for therapies. Being discharged home.      # Acute hypoxic hypercarbic respiratory failure  # Respiratory acidosis - resolved  # Obesity hypoventilation with chronic CO2 retention on blood gas  # Pulmonary edema  # Influenza A  # CARLOS     PTA was requiring 4L oxygen while awake and AVAPS while sleeping. BL CO2 on VBG in the 80s. Diuretics (bumex 4mg daily) held 2/9-2/10 in setting of JASON that was felt to be pre-renal d/t diuresis in setting of influenza and poor po intake. Completed 5d of tamiflu 2/10. No concern for secondary infection. More somnolent on admission that required continuous AVAPS. CXR 2/11 with slightly increased pulmonary edema. S/p IV bumex 4mg on 2/11, 2mg on 2/12, and 1mg on 2/13 with significant UOP and steady improvement in weights and resp status. She was changed to oral bumex 2mg daily on 2/14 with good UOP and ongoing improvement in weights - 159.2kg today (pt reports baseline is 160kg). She continues to use 3-4L supplemental O2 during the day and AVAPS per home (PTA) settings at night and tolerated quite well.      - She had 6 minute walk test for home oxygen eval. She was fine on RA did not need supplementation. With activity she dropped to 84 %. Correct with 2.5 L Oxygen to 93%. Recommend starting her on 2.5 L oxygne with activity. Needs to be reassessed after discharge at follow up in a months  time. She uses AVAPS at night or whenever sleeping during the day. Ct that.   * Home AVAPS setting: pmax25, plow 12, epap 8, RR 12, TV 600cc  - Continue po bumex 2mg daily. If weight goes up more than 3lbs overnight or more than 5 lbs from discharge weight, please call PCP for further direction.   - Ct Nebs prn.   - Ct Encourage IS     # Toxic metabolic encephalopathy - resolved  # Delirium - resolved     Recent influenza as above. Hypercarbic resp failure as above, pCO2 improving with AVAPs. Mental status was waxing and waning concerning for delirium, thus zyprexa scheduled at night 2/12 with resolution of nighttime restlessness. A&O x 3 over past 72 hours.      - Cont supplemental O2 and AVAPS as above  - Zyprexa discontinued prior to discharge to TCU     # JASON. Resolved. Likely 2/2 diuresis from recent heart failure exacerbation and likely poor po intake with recent influenza dx. Per chart review, it appears BL Cr 0.9-1.2. Cr upto 2.06 on admission, resolved. 0.82 on TCU adm.   Stable Cr.  -Diuretics as above.      # History of diastolic dysfunction, resolved (TTE with EF 55-60%, no RWMA with right sided strain). BNP was elevated to 7k on admission, from 2k a few days prior, but per chart review historically higher. Acute respiratory failure as above r/t pulmonary edema that resolved with diureses. Wt has continued to improve, changed to oral bumex 2/14. Wt 159.2kg today, pt feels her baseline is about 160kg.   - Continue bumex 2mg daily with KCl 40meq daily  - Cont lisinopril 5mg daily  - Cont metoprolol 50mg BID  - Cont 1.8L FR, 2g Na diet  - Follow up with Cardiology after discharge from TCU       # Paroxysmal atrial fibrillation. Briefly went into asymtpomatic afib on 2/12 with HRs  and BPs normotensive. Converted back to NSR 30min later and has remained in NSR since.   - Cont metoprolol 50mg BID.    - Cont warfarin with goal INR 2-3. INR therapeutic.       # DM 2, (A1C: 5.5  01.17.20)  #  Hypoglycemia  PTA on sitagliptin 50mg daily which has been held d/t poor po intake- low BG  Low sliding scale insulin  Hypoglycemia protocol  Monitor po intake.      # Hematuria. Improved. Likely 2/2 traumatic antunez placement in setting of supratherapeutic INR (4.46). UA with significant RBCs, WBC, +LE, - nitrite. UCx with only 10-50K proteus - felt to be likely contaminant given low colonies, no urinary symptoms and improvement in overall status without antibiotic treatment.   Removed antunez prior to discharge.  Monitor for recurrence   Urology follow-up prn       Chronic stable medical issues     Hx of recent vaginal bleeding? Per chart review this occurred during last hospitalization, has not recurred this admission but does have hematuria as above so ? If source was not vaginal and urethral. Hgb stable. Cont to monitor, needs close OP monitoring for further eval.   HLD. Cont statin.   Hx of MAG. Hgb stable ~11s. Had some hematuria while INR was supratherapeutic. Cont pta ferrous gluconate TID.   Intertrigo groin. Cont Miconazole powder BID.  Hx of gout. Cont pta allopurinol.       Diet: Combination Diet 2 gm NA Diet  Fluid restriction 1800 ML FLUID       DVT Prophylaxis: warfarin  Code Status: Full Code  Disposition:  TBD  Antunez Catheter: not present                 Discharge Disposition:   Discharged to home           Condition on Discharge:   Discharge condition: Stable   Code status on discharge: Full Code            Final Day of Progress before Discharge:       Physical Exam:  Blood pressure (!) 143/46, pulse 81, temperature 95.7  F (35.4  C), resp. rate 18, weight (!) 163.2 kg (359 lb 11.2 oz), SpO2 95 %.  GENERAL: Alert and oriented x 3. NAD.   HEENT: Anicteric sclera. PERRL. Mucous membranes moist and without lesions.   CV: RRR. S1, S2. No murmurs appreciated.   RESPIRATORY: Effort normal. Lungs CTAB with no wheezing, rales, rhonchi.   GI: Abdomen soft and non distended with normoactive bowel sounds  present in all quadrants. No tenderness, rebound, guarding.     Data:    All laboratory data reviewed             Significant Results:     Results for orders placed or performed during the hospital encounter of 02/15/20   Glucose by meter     Status: Abnormal   Result Value Ref Range    Glucose 111 (H) 70 - 99 mg/dL   Glucose by meter     Status: Abnormal   Result Value Ref Range    Glucose 102 (H) 70 - 99 mg/dL   INR     Status: Abnormal   Result Value Ref Range    INR 2.51 (H) 0.86 - 1.14   Basic metabolic panel     Status: Abnormal   Result Value Ref Range    Sodium 140 133 - 144 mmol/L    Potassium 4.0 3.4 - 5.3 mmol/L    Chloride 97 94 - 109 mmol/L    Carbon Dioxide 40 (H) 20 - 32 mmol/L    Anion Gap 3 3 - 14 mmol/L    Glucose 86 70 - 99 mg/dL    Urea Nitrogen 18 7 - 30 mg/dL    Creatinine 0.94 0.52 - 1.04 mg/dL    GFR Estimate 65 >60 mL/min/[1.73_m2]    GFR Estimate If Black 75 >60 mL/min/[1.73_m2]    Calcium 8.7 8.5 - 10.1 mg/dL   CBC with platelets     Status: Abnormal   Result Value Ref Range    WBC 7.0 4.0 - 11.0 10e9/L    RBC Count 4.54 3.8 - 5.2 10e12/L    Hemoglobin 11.4 (L) 11.7 - 15.7 g/dL    Hematocrit 41.8 35.0 - 47.0 %    MCV 92 78 - 100 fl    MCH 25.1 (L) 26.5 - 33.0 pg    MCHC 27.3 (L) 31.5 - 36.5 g/dL    RDW 20.6 (H) 10.0 - 15.0 %    Platelet Count 199 150 - 450 10e9/L   Glucose by meter     Status: None   Result Value Ref Range    Glucose 98 70 - 99 mg/dL   Glucose by meter     Status: Abnormal   Result Value Ref Range    Glucose 107 (H) 70 - 99 mg/dL   Glucose by meter     Status: None   Result Value Ref Range    Glucose 87 70 - 99 mg/dL   Glucose by meter     Status: Abnormal   Result Value Ref Range    Glucose 124 (H) 70 - 99 mg/dL   INR     Status: Abnormal   Result Value Ref Range    INR 2.87 (H) 0.86 - 1.14   Glucose by meter     Status: None   Result Value Ref Range    Glucose 81 70 - 99 mg/dL   Glucose by meter     Status: None   Result Value Ref Range    Glucose 77 70 - 99 mg/dL    Glucose by meter     Status: None   Result Value Ref Range    Glucose 87 70 - 99 mg/dL   Glucose by meter     Status: None   Result Value Ref Range    Glucose 80 70 - 99 mg/dL   Glucose by meter     Status: Abnormal   Result Value Ref Range    Glucose 120 (H) 70 - 99 mg/dL   INR     Status: Abnormal   Result Value Ref Range    INR 2.66 (H) 0.86 - 1.14   Glucose by meter     Status: None   Result Value Ref Range    Glucose 71 70 - 99 mg/dL   Glucose by meter     Status: None   Result Value Ref Range    Glucose 81 70 - 99 mg/dL   Glucose by meter     Status: None   Result Value Ref Range    Glucose 83 70 - 99 mg/dL   Glucose by meter     Status: Abnormal   Result Value Ref Range    Glucose 110 (H) 70 - 99 mg/dL   INR     Status: Abnormal   Result Value Ref Range    INR 2.33 (H) 0.86 - 1.14   Glucose by meter     Status: Abnormal   Result Value Ref Range    Glucose 65 (L) 70 - 99 mg/dL   Glucose by meter     Status: None   Result Value Ref Range    Glucose 81 70 - 99 mg/dL   Glucose by meter     Status: None   Result Value Ref Range    Glucose 70 70 - 99 mg/dL   Glucose by meter     Status: None   Result Value Ref Range    Glucose 79 70 - 99 mg/dL   Glucose by meter     Status: Abnormal   Result Value Ref Range    Glucose 116 (H) 70 - 99 mg/dL   Glucose by meter     Status: None   Result Value Ref Range    Glucose 84 70 - 99 mg/dL   Glucose by meter     Status: Abnormal   Result Value Ref Range    Glucose 101 (H) 70 - 99 mg/dL   Glucose by meter     Status: Abnormal   Result Value Ref Range    Glucose 125 (H) 70 - 99 mg/dL   INR     Status: Abnormal   Result Value Ref Range    INR 2.07 (H) 0.86 - 1.14   Basic metabolic panel     Status: Abnormal   Result Value Ref Range    Sodium 139 133 - 144 mmol/L    Potassium 4.2 3.4 - 5.3 mmol/L    Chloride 99 94 - 109 mmol/L    Carbon Dioxide 41 (H) 20 - 32 mmol/L    Anion Gap <1 (L) 3 - 14 mmol/L    Glucose 84 70 - 99 mg/dL    Urea Nitrogen 20 7 - 30 mg/dL    Creatinine 0.80  0.52 - 1.04 mg/dL    GFR Estimate 79 >60 mL/min/[1.73_m2]    GFR Estimate If Black >90 >60 mL/min/[1.73_m2]    Calcium 8.9 8.5 - 10.1 mg/dL   Glucose by meter     Status: None   Result Value Ref Range    Glucose 80 70 - 99 mg/dL   Glucose by meter     Status: None   Result Value Ref Range    Glucose 92 70 - 99 mg/dL   Glucose by meter     Status: None   Result Value Ref Range    Glucose 96 70 - 99 mg/dL   Glucose by meter     Status: Abnormal   Result Value Ref Range    Glucose 139 (H) 70 - 99 mg/dL   INR     Status: Abnormal   Result Value Ref Range    INR 2.00 (H) 0.86 - 1.14   Glucose by meter     Status: None   Result Value Ref Range    Glucose 70 70 - 99 mg/dL   Glucose by meter     Status: Abnormal   Result Value Ref Range    Glucose 66 (L) 70 - 99 mg/dL   Glucose by meter     Status: None   Result Value Ref Range    Glucose 98 70 - 99 mg/dL   Glucose by meter     Status: None   Result Value Ref Range    Glucose 84 70 - 99 mg/dL   Glucose by meter     Status: Abnormal   Result Value Ref Range    Glucose 108 (H) 70 - 99 mg/dL      No results found for this or any previous visit (from the past 48 hour(s)).             Pending Results:   Unresulted Labs Ordered in the Past 30 Days of this Admission     No orders found from 1/16/2020 to 2/16/2020.                  Discharge Instructions and Follow-Up:     Discharge Procedure Orders   Home Care PT Referral for Hospital Discharge   Referral Priority: Routine Referral Type: Home Health Therapies & Aides   Number of Visits Requested: 1     Home Care OT Referral for Hospital Discharge   Referral Priority: Routine   Number of Visits Requested: 1     Home care nursing referral   Referral Priority: Routine Referral Type: Home Health Therapies & Aides   Number of Visits Requested: 1     Oxygen Adult/Peds   Order Comments: Oxygen Documentation:   I certify that this patient, Arianna Siddiqi has been under my care and that I, or a nurse practitioner or physician's assistant  working with me, had a face-to-face encounter that meets face-to-face encounter requirements with this patient on February 21, 2020.    Arianna Siddiqi is now in a chronic stable state and continues to require supplemental oxygen due to continued oxygen desaturation.  This patient has been treated in part, or in whole for the following medical condition(s):  Chronic Heart Failure I50  Treatments tried and failed or ruled out to treat hypoxemia include medications and therapies.  If portability is ordered, is the patient mobile within the home? yes    **Patients who qualify for home O2 coverage under the CMS guidelines require ABG tests or O2 sat readings obtained closest to, but no earlier than 2 days prior to the discharge, as evidence of the need for home oxygen therapy. Testing must be performed while patient is in the chronic stable state. See notes for O2 sats.**     Order Specific Question Answer Comments   DME Provider: Other (Comments) [facility]   Did the patient have SpO2 (sat) testing? Yes    Length of Need: Lifetime    Frequency of Use: Continuous    Mode of Delivery - Continuous Nasal Cannula    Liter Flow - Continuous 2.5    Need for Portability: Yes    Evaluate for Conserving Device: Yes    Maintain Sats >= 90%    The face to face evaluation was performed on: 2/21/2020           Elif Gordon MD  Internal Medicine Staff Hospitalist  [phone]  February 21, 2020      Time spent on patient: 45 minutes total including face to face and coordinating care time reviewing current illness, any medication changes, and the care plan for today.

## 2020-02-21 NOTE — PLAN OF CARE
Patient's BS was 70 at 0827, prior to breakfast. Patient ordered breakfast to be ordered at 0930, to arrive after PT session. Patient drank 240 mL apple juice. BS was rechecked at 0845 and was 66. Patients was alert and had no S/S of hypoglycemia. Patient participated in PT and started complaining of nausea and dizziness. BS rechecked at 0917, was 98 and was given 120 mL of apple juice. BS was rechecked at 0938, before breakfast arrived, and was 84. Patient ate breakfast, then BS was rechecked at 1045 and was 108. The RN caring for patient was notified throughout the BS checks.

## 2020-02-21 NOTE — PLAN OF CARE
Physical Therapy Discharge Summary    Reason for therapy discharge:    Discharged to home with home therapy. Home 2/22/2020    Progress towards therapy goal(s). See goals on Care Plan in [facility] electronic health record for goal details.  Goals met    Therapy recommendation(s):    Home PT for progressing ambulation, safety; wean off O2 as able. O2 new for pt at home--assess safety with tubing.

## 2020-02-21 NOTE — PLAN OF CARE
Alert and oriented, VSS. No respiratory distress, on O2 via NC at 2.5 LPM when awake and CPAP at bedtime. BG readings were 96, 139. Appetite good. Reported throbbing pain to RLE abrasion, surrounding area noted to be edematous and warm. No relief with PRN tylenol so MD paged, tramadol given once with relief noted. RLE dressing CDI, last changed this morning. Lotion applied to dry skin on BLE. Continent of urine, LBM 2/19. Anticipating discharge 2/22.

## 2020-02-21 NOTE — PROGRESS NOTES
Met w/ patient to discuss discharge plan. Pt requireing home oxygen at discharge. [facility] [phone] to provide home O2. Orders, chart notes and walk test faxed, today. Home care w/ [facility] to provide skilled nursing, home health aid to assist with bathing and grooming, home OT and home PT. Pt had no other concerns/questions r/t discharge. Plan to discharge tomorrow, 2/22. Maki Trotter RN on 2/21/2020 at 1:22 PM

## 2020-02-22 VITALS
SYSTOLIC BLOOD PRESSURE: 124 MMHG | DIASTOLIC BLOOD PRESSURE: 61 MMHG | RESPIRATION RATE: 18 BRPM | BODY MASS INDEX: 63.72 KG/M2 | HEART RATE: 68 BPM | OXYGEN SATURATION: 97 % | WEIGHT: 293 LBS | TEMPERATURE: 95.9 F

## 2020-02-22 LAB
GLUCOSE BLDC GLUCOMTR-MCNC: 122 MG/DL (ref 70–99)
GLUCOSE BLDC GLUCOMTR-MCNC: 61 MG/DL (ref 70–99)
GLUCOSE BLDC GLUCOMTR-MCNC: 64 MG/DL (ref 70–99)
GLUCOSE BLDC GLUCOMTR-MCNC: 67 MG/DL (ref 70–99)
GLUCOSE BLDC GLUCOMTR-MCNC: 81 MG/DL (ref 70–99)
GLUCOSE BLDC GLUCOMTR-MCNC: 84 MG/DL (ref 70–99)
GLUCOSE BLDC GLUCOMTR-MCNC: 84 MG/DL (ref 70–99)
GLUCOSE BLDC GLUCOMTR-MCNC: 85 MG/DL (ref 70–99)
GLUCOSE BLDC GLUCOMTR-MCNC: 86 MG/DL (ref 70–99)
GLUCOSE BLDC GLUCOMTR-MCNC: 98 MG/DL (ref 70–99)
INR PPP: 1.98 (ref 0.86–1.14)

## 2020-02-22 PROCEDURE — 25000128 H RX IP 250 OP 636: Performed by: INTERNAL MEDICINE

## 2020-02-22 PROCEDURE — 25000132 ZZH RX MED GY IP 250 OP 250 PS 637: Mod: GY | Performed by: INTERNAL MEDICINE

## 2020-02-22 PROCEDURE — 00220000 ZZH SNF RUG CODE OPNP

## 2020-02-22 PROCEDURE — 12000022 ZZH R&B SNF

## 2020-02-22 PROCEDURE — 00000146 ZZHCL STATISTIC GLUCOSE BY METER IP

## 2020-02-22 PROCEDURE — 85610 PROTHROMBIN TIME: CPT | Performed by: INTERNAL MEDICINE

## 2020-02-22 PROCEDURE — 36415 COLL VENOUS BLD VENIPUNCTURE: CPT | Performed by: INTERNAL MEDICINE

## 2020-02-22 PROCEDURE — 25000132 ZZH RX MED GY IP 250 OP 250 PS 637: Mod: GY | Performed by: PHYSICIAN ASSISTANT

## 2020-02-22 RX ORDER — WARFARIN SODIUM 2.5 MG/1
2.5 TABLET ORAL DAILY
Qty: 2 TABLET | Refills: 0 | Status: SHIPPED | OUTPATIENT
Start: 2020-02-22 | End: 2020-04-14 | Stop reason: DRUGHIGH

## 2020-02-22 RX ORDER — WARFARIN SODIUM 2.5 MG/1
2.5 TABLET ORAL
Status: COMPLETED | OUTPATIENT
Start: 2020-02-22 | End: 2020-02-22

## 2020-02-22 RX ADMIN — TRAMADOL HYDROCHLORIDE 50 MG: 50 TABLET, FILM COATED ORAL at 00:22

## 2020-02-22 RX ADMIN — METOPROLOL TARTRATE 50 MG: 25 TABLET ORAL at 20:47

## 2020-02-22 RX ADMIN — MICONAZOLE NITRATE: 2 POWDER TOPICAL at 20:47

## 2020-02-22 RX ADMIN — LISINOPRIL 5 MG: 2.5 TABLET ORAL at 09:30

## 2020-02-22 RX ADMIN — ONDANSETRON 4 MG: 4 TABLET, ORALLY DISINTEGRATING ORAL at 20:46

## 2020-02-22 RX ADMIN — FERROUS GLUCONATE 324 MG: 324 TABLET ORAL at 20:46

## 2020-02-22 RX ADMIN — METOPROLOL TARTRATE 50 MG: 25 TABLET ORAL at 09:32

## 2020-02-22 RX ADMIN — TRAMADOL HYDROCHLORIDE 50 MG: 50 TABLET, FILM COATED ORAL at 17:14

## 2020-02-22 RX ADMIN — ALLOPURINOL 200 MG: 100 TABLET ORAL at 09:31

## 2020-02-22 RX ADMIN — MICONAZOLE NITRATE: 2 POWDER TOPICAL at 09:34

## 2020-02-22 RX ADMIN — FERROUS GLUCONATE 324 MG: 324 TABLET ORAL at 09:31

## 2020-02-22 RX ADMIN — POTASSIUM CHLORIDE 40 MEQ: 10 TABLET, EXTENDED RELEASE ORAL at 09:30

## 2020-02-22 RX ADMIN — ATORVASTATIN CALCIUM 20 MG: 20 TABLET, FILM COATED ORAL at 20:43

## 2020-02-22 RX ADMIN — FERROUS GLUCONATE 324 MG: 324 TABLET ORAL at 14:20

## 2020-02-22 RX ADMIN — BUMETANIDE 2 MG: 1 TABLET ORAL at 09:33

## 2020-02-22 RX ADMIN — WARFARIN SODIUM 2.5 MG: 2.5 TABLET ORAL at 17:14

## 2020-02-22 RX ADMIN — THERA TABS 1 TABLET: TAB at 09:31

## 2020-02-22 NOTE — PLAN OF CARE
Patient alert and oriented x 4,up in the chair having breakfast.Blood sugar has been low,after several interventions BG up to 98 from 64.Appetite good,she ate most of her meal.RLE  Wound dressing intact due for change before discharge this afternoon.Denies pain.Continue wit POC.

## 2020-02-22 NOTE — PLAN OF CARE
Pt alert and oriented x4. Able to make needs known. Denies cp or SOB. Pain managed w/ PRN tramadol, administered x1. AVAPS on throughout the night. Slept well throughout the night. Call light in reach. Continue w/ POC.

## 2020-02-22 NOTE — PLAN OF CARE
Patient has been doing okay,going home tomorrow.Tramadol 50 mg every 6 hours has helped  Pain.Up in the chair during meals.SBA with a walker to bathroom.Pleasant.

## 2020-02-22 NOTE — PHARMACY-ANTICOAGULATION SERVICE
Clinical Pharmacy- Warfarin Discharge Note  This patient is currently on warfarin for the treatment of Atrial fibrillation.  INR Goal= 2-3  Expected length of therapy lifetime.           Anticoagulation Dose History     Recent Dosing and Labs Latest Ref Rng & Units 2/16/2020 2/17/2020 2/18/2020 2/19/2020 2/20/2020 2/21/2020 2/22/2020    Warfarin 1 mg - - 1 mg - - - - -    Warfarin 1.5 mg - - - 1.5 mg 1.5 mg - - -    Warfarin 2 mg - 2 mg - - - 2 mg 2 mg -    INR 0.86 - 1.14 2.51(H) 2.87(H) 2.66(H) 2.33(H) 2.07(H) 2.00(H) 1.98(H)    INR 0.86 - 1.14 - - - - - - -          Vitamin K doses administered during the last 7 days: N/A  FFP administered during the last 7 days: N/A  Recommend discharging the patient on a warfarin regimen of 2.5 mg with a prescription for warfarin 2.5mg tablets.      The patient should have an INR checked in 2-3 days.

## 2020-02-23 PROCEDURE — 25000132 ZZH RX MED GY IP 250 OP 250 PS 637: Mod: GY | Performed by: PHYSICIAN ASSISTANT

## 2020-02-23 RX ADMIN — TRAMADOL HYDROCHLORIDE 50 MG: 50 TABLET, FILM COATED ORAL at 00:39

## 2020-02-23 NOTE — PLAN OF CARE
RN: Pt alert and oriented, VSS. Pt was up in chair for dinner. Dressing changed to right lower leg. Writer went through all the discharge instruction with patient, pt offered no question at this time. Pt received all the discharge med. Pt is waiting for family to come to pick her up. Pt denies SOB and has no complaints. Continue with POC.

## 2020-02-23 NOTE — DISCHARGE INSTRUCTIONS
Weight daily on standingt scale. Write down first weight after going home. That is your baseline weight.   If weight goes up by more than 3 lb overnight or 5 lb in general, call PCP or [facility]    The patient should have an INR checked in 2-3 days.

## 2020-02-23 NOTE — PLAN OF CARE
Pt alert and oriented x4. Denies cp or SOB. Pain managed w/ PRN tramadol. Reported no questions or concerns about discharge instructions. Family picked her around 0110.

## 2020-02-24 ENCOUNTER — TELEPHONE (OUTPATIENT)
Dept: INTERNAL MEDICINE | Facility: CLINIC | Age: 60
End: 2020-02-24

## 2020-02-24 ENCOUNTER — ANTICOAGULATION THERAPY VISIT (OUTPATIENT)
Dept: ANTICOAGULATION | Facility: CLINIC | Age: 60
End: 2020-02-24

## 2020-02-24 DIAGNOSIS — I48.91 ATRIAL FIBRILLATION, UNSPECIFIED TYPE (H): ICD-10-CM

## 2020-02-24 NOTE — PROGRESS NOTES
Patient is inactivated from [facility] clinic until Maki figures out who will be responsible for warfarin.

## 2020-02-24 NOTE — TELEPHONE ENCOUNTER
[facility] Call Center    Phone Message    May a detailed message be left on voicemail: yes     Reason for Call: Other: Rhonda from  Home Care states the Pt was just discharged from the TCU and doesn't have an appt. with Yoselin Bouchra until March 17.  Rhonda is wondering if the clinic will follow her with home care orders until her next appt.  [phone]     Action Taken: Message routed to:  [facility] ([facility]    Travel Screening: Not Applicable

## 2020-02-25 ENCOUNTER — ALLIED HEALTH/NURSE VISIT (OUTPATIENT)
Dept: PHARMACY | Facility: CLINIC | Age: 60
End: 2020-02-25
Payer: MEDICAID

## 2020-02-25 DIAGNOSIS — I48.20 CHRONIC ATRIAL FIBRILLATION (H): Primary | ICD-10-CM

## 2020-02-25 DIAGNOSIS — E11.9 TYPE 2 DIABETES MELLITUS WITHOUT COMPLICATION, WITHOUT LONG-TERM CURRENT USE OF INSULIN (H): ICD-10-CM

## 2020-02-25 DIAGNOSIS — E87.6 HYPOKALEMIA: ICD-10-CM

## 2020-02-25 PROCEDURE — 99607 MTMS BY PHARM ADDL 15 MIN: CPT | Performed by: PHARMACIST

## 2020-02-25 PROCEDURE — 99605 MTMS BY PHARM NP 15 MIN: CPT | Performed by: PHARMACIST

## 2020-02-25 NOTE — PROGRESS NOTES
MTM ENCOUNTER  SUBJECTIVE/OBJECTIVE:                Arianna Siddiqi is a 59 year old female called for a transitions of care visit.  She was discharged from [facility] on 2/15/2020 for:    Acute hypoxic hypercarbic respiratory failure  Respiratory acidosis - resolved  Obesity hypoventilation with chronic CO2 retention on blood gas  Pulmonary edema - improved  Influenza A - resolved     She was transferred to [facility] from 2/15/2020-2/23/2020.    Chief Complaint: None. She is feeling overwhelmed by the number of calls she has gotten since leaving the hospital. She feels she is being forced into making appointments that she doesn't think she can accommodate. She expresses dissatisfaction with her treatment at the hospital, and has been in touch with patient relations. She declines my offer to connect with Maki PRUITT and says she will speak with her directly next time she calls.     Arianna states that a discharge nurse went over all her medications with her, including changes. She reports being on a majority of these medications for a long time. She does not feel the appointment is necessary, but is agreeable to having me answer a few questions for her. Therefore, our appointment is limited today by the topics Arianna wishes to discuss.    Allergies/ADRs: Reviewed in Epic  Tobacco:  reports that she quit smoking about 18 years ago. Her smoking use included cigarettes. She has a 30.00 pack-year smoking history. She has never used smokeless tobacco.  Alcohol: not currently using    Afib:   metoprolol tartrate 50 mg BID  warfarin 3 mg daily      She is currently scheduled to see primary care on 2/28, but she says she was forced into this appointment and will not be able to make it due to inability to drive and expense of taking a cab so far. Due to her previous experience with the clinic, she may establish care elsewhere.    Discharge notes indicate check INR on Monday (2/24), however, anticoagulation note from  "yesterday states \"patient is inactivated from [facility] until Maki figures out who will be responsible for warfarin.\" Arianna is aware she is not currently managed by any anticoagulation clinic. She checks her INR at home, and her value yesterday was 2.4 so she continued her current dose.    Notes indicate that Arianna should call PC if weight goes up more than 3 lbs overnight or more than 5 lbs from discharge weight.     Wt Readings from Last 2 Encounters:   02/21/20 (!) 359 lb 11.2 oz (163.2 kg)   02/15/20 (!) 351 lb (159.2 kg)     Lab Results   Component Value Date    INR 1.98 02/22/2020    INR 2.00 02/21/2020    INR 2.07 02/20/2020    INR 2.33 02/19/2020    INR 2.66 02/18/2020     T2DM:   No current therapy.    She asks today why her Januvia was stopped. She noticed this was not in her bag. She doesn't recall them mentioning a reason for this. Per hospital discharge notes, patient was previously on sitagliptin 50 mg daily which was being held due to poor oral intake and low blood glucose - we reviewed this today. She has testing supplies at home, and is willing to check home readings. Per chart review, she was previously on metformin but this was stopped due to elevated creatinine.     Lab Results   Component Value Date    A1C 5.5 01/17/2020    A1C 6.2 09/10/2016     Hypokalemia:   Potassium chloride 40 mEq daily     She expresses frustration with the potassium packets. She states that she prefers the tablets, and still has some on hand. She has no difficulty swallowing the large tablets, since she has for years. She will discuss refill with new provider, as she was only given a 10 day supply from the discharge pharmacy for packets. She is aware of her current dose.    ASSESSMENT:                 Medication Adherence: unable to fully assess today    Afib: Unable to fully assess due to patient request for questions only. She is aware she needs to establish with a provider and aligning anticoagulation clinic, but " fortunately her INR yesterday was in range.     T2DM: Stable based on report. We discussed that based on her most recent A1c, she may not need sitagliptin going forward. In the short term, I feel it is reasonable for her to continue to hold until she is feeling better given her most recent A1c and low blood sugars in the hospital. We reviewed diabetes blood glucose goals today, and I encouraged her to continue with SMBG.    Hypokalemia: Unchanged. Encouraged her to request potassium tablets from her provider at follow-up, as she appears to have enough at home for now to continue recommended dose.    PLAN:                Post Discharge Medication Reconciliation Status: unable to reconcile discharge medications due to patient declined going over all medications.    1. Arianna to establish with primary care provider as planned (and anticoagulation clinic)    2. Arianna to discuss need for sitagliptin going forward with new establishing provider    3. Arianna to request potassium tablet refill from PCP at follow-up    I spent 24 minutes with this patient today.  A copy of the visit note was provided to the patient's primary care provider.    Pt declined follow-up.    The patient declined a summary of these recommendations.     Courtney SureshD, BCACP  MTM Pharmacist    [facility]  Phone: [phone]

## 2020-02-25 NOTE — TELEPHONE ENCOUNTER
Left a message for Rhonda that we will not be able to follow this patient for home care orders until this patient comes in and completes her appointment scheduled on 3/17/2020, as this patient has not been in for an appointment since 12/12/17 in our clinic. Trena Monzon LPN 2/25/2020 8:18 AM

## 2020-02-26 NOTE — PROGRESS NOTES
Patient has primary care appointment this Friday 2/26. Left voicemail for patient providing transportation resources in the [facility] and through [employer]. Provided patient with SW call back number. SW will continue to follow and assist as needed.    TAMMY Douglas,[facility]    Phone: [phone]  Pager: [phone]

## 2020-02-27 NOTE — PROGRESS NOTES
"[facility] informed writer that they were unable to see pt until primary care provider appointment, per [facility] RN. Spoke with patient on 2/24 to ensure she would be at PCP appointment on Friday. Pt stated that she was not sure she needed a primary care provider appointment as she was able to check her INR at home. Writer emphasized the importance of being seen by a PCP both for [facility] as well as to ensure pt was able to receive home care services. Pt stated that she was not sure she would be able to find transportation. Writer offered to reschedule appointment at [facility] - closer to patient's house. Pt stated that she \"needed to see if they had wheelchairs there\" and would call writer back. Pt did not call back. Writer attempted to follow up on 2/26 to inquire about whether the wheelchairs that were available at the clinic would meet her needs. Pt did not answer. SW also called writer to provide additional transportation services. Will continue to follow up with patient.     2/27: Attempted to contact patient to discuss transportation resources. Pt did not answer phone and has not returned any phone calls. Of note - pt's PCP appointment scheduled for tomorrow was cancelled.  "

## 2020-03-06 NOTE — MR AVS SNAPSHOT
Arianna VALDIVIA Devang   11/13/2019   Anticoagulation Therapy Visit    Description:  58 year old female   Provider:  Paradise Pizarro, RN   Department:  [facility]           INR as of 11/13/2018     Today's INR 3.1!      Anticoagulation Summary as of 11/13/2018     INR goal 2.0-3.0   Today's INR 3.1!   Full warfarin instructions 3 mg on Mon, Wed, Fri; 4.5 mg all other days   Next INR check 11/27/2018    Indications   Long-term (current) use of anticoagulants [Z79.01] [Z79.01]  Atrial fibrillation (H) [I48.91] [I48.91]         November 2018 Details    Sun Mon Tue Wed Thu Fri Sat         1               2               3                 4               5               6               7               8               9               10                 11               12               13      4.5 mg   See details      14      3 mg         15      4.5 mg         16      3 mg         17      4.5 mg           18      4.5 mg         19      3 mg         20      4.5 mg         21      3 mg         22      4.5 mg         23      3 mg         24      4.5 mg           25      4.5 mg         26      3 mg         27            28               29               30                 Date Details   11/13 This INR check       Date of next INR:  11/27/2018         How to take your warfarin dose     To take:  3 mg Take 1 of the 3 mg tablets.    To take:  4.5 mg Take 1.5 of the 3 mg tablets.           
The patient is a 1y7m Female complaining of ear pain.

## 2020-03-16 DIAGNOSIS — E78.5 HYPERLIPIDEMIA, UNSPECIFIED HYPERLIPIDEMIA TYPE: ICD-10-CM

## 2020-03-16 DIAGNOSIS — E61.1 IRON DEFICIENCY: ICD-10-CM

## 2020-03-16 DIAGNOSIS — E11.49 TYPE 2 DIABETES MELLITUS WITH OTHER NEUROLOGIC COMPLICATION, WITHOUT LONG-TERM CURRENT USE OF INSULIN (H): ICD-10-CM

## 2020-03-16 DIAGNOSIS — I50.43 ACUTE ON CHRONIC COMBINED SYSTOLIC AND DIASTOLIC CONGESTIVE HEART FAILURE (H): ICD-10-CM

## 2020-03-16 DIAGNOSIS — E87.6 HYPOKALEMIA: ICD-10-CM

## 2020-03-16 DIAGNOSIS — M79.605 PAIN OF LEFT LOWER EXTREMITY: ICD-10-CM

## 2020-03-16 DIAGNOSIS — M10.9 GOUT, UNSPECIFIED CAUSE, UNSPECIFIED CHRONICITY, UNSPECIFIED SITE: ICD-10-CM

## 2020-03-16 DIAGNOSIS — I48.91 ATRIAL FIBRILLATION (H): ICD-10-CM

## 2020-03-16 DIAGNOSIS — I10 ESSENTIAL HYPERTENSION: ICD-10-CM

## 2020-03-17 ENCOUNTER — TELEPHONE (OUTPATIENT)
Dept: INTERNAL MEDICINE | Facility: CLINIC | Age: 60
End: 2020-03-17

## 2020-03-17 RX ORDER — ALLOPURINOL 100 MG/1
100 TABLET ORAL 2 TIMES DAILY
Qty: 60 TABLET | Refills: 0 | OUTPATIENT
Start: 2020-03-17

## 2020-03-17 RX ORDER — BUMETANIDE 2 MG/1
2 TABLET ORAL DAILY
Qty: 30 TABLET | Refills: 0 | OUTPATIENT
Start: 2020-03-17

## 2020-03-17 RX ORDER — WARFARIN SODIUM 3 MG/1
TABLET ORAL
Qty: 90 TABLET | OUTPATIENT
Start: 2020-03-17

## 2020-03-17 RX ORDER — METOPROLOL SUCCINATE 50 MG/1
75 TABLET, EXTENDED RELEASE ORAL DAILY
Qty: 45 TABLET | Refills: 0 | OUTPATIENT
Start: 2020-03-17

## 2020-03-17 RX ORDER — SIMVASTATIN 20 MG
20 TABLET ORAL AT BEDTIME
Qty: 30 TABLET | OUTPATIENT
Start: 2020-03-17

## 2020-03-17 RX ORDER — FERROUS GLUCONATE 324(38)MG
324 TABLET ORAL
Qty: 90 TABLET | Refills: 0 | OUTPATIENT
Start: 2020-03-17

## 2020-03-17 RX ORDER — ACETAMINOPHEN 325 MG/1
TABLET ORAL
Qty: 30 TABLET | Refills: 0 | OUTPATIENT
Start: 2020-03-17

## 2020-03-17 RX ORDER — POTASSIUM CHLORIDE 1500 MG/1
40 TABLET, EXTENDED RELEASE ORAL DAILY
Qty: 60 TABLET | Refills: 0 | OUTPATIENT
Start: 2020-03-17

## 2020-03-17 RX ORDER — LISINOPRIL 5 MG/1
5 TABLET ORAL DAILY
Qty: 30 TABLET | Refills: 0 | OUTPATIENT
Start: 2020-03-17

## 2020-03-17 NOTE — TELEPHONE ENCOUNTER
No refills will be provided unless patient is evaluated in clinic. Last clinic visit on 12/12/17.    Dottie Gutierrez RN ([facility])

## 2020-03-17 NOTE — TELEPHONE ENCOUNTER
Spoke to patient who states that she was discharged from the hospital and will be out of her medications in a week. Patient states that the other appointments that were scheduled for her prior to the appointment on 4/7/2020 were scheduled by the hospital and she did not approve of them. Patient states that she was scheduled for a telephone visit and told to submit all of her medications that she needed refills on and they would get refilled for her. Advised patient that we would need to look into this and get back to her, as she has not been seen in the clinic since 12/12/17 and it would be irresponsible for the clinic to prescribe medications for her if we have not seen her. Trena Monzon LPN 3/17/2020 1:43 PM

## 2020-03-17 NOTE — TELEPHONE ENCOUNTER
[facility] Call [facility]    Phone Message    May a detailed message be left on voicemail: yes     Reason for Call: Medication Question or concern regarding medication   Prescription Clarification  Name of Medication: All of her medications on her list.   Prescribing Provider: [facility]    Pharmacy: [id number] [address]     What on the order needs clarification? Pt was told to call her pharmacy to request for refills but the pharmacy told pt that they cannot refill her medications without her seeing a doctor first. Pt tried explaining to them about not being able to come in because of the virus but they told pt there must be a misunderstanding because she needs to be seen. Please call pt to advise. Thank you.     Telephone appointment 04/07/2020      Action Taken: Message routed to:  [facility] CHRISTY    Travel Screening: Not Applicable

## 2020-03-17 NOTE — TELEPHONE ENCOUNTER
METOPROLOL SUCC ER 50 MG TAB , ALLOPURINOL 100 MG TABLET, JANUVIA 50 MG TABLET , FERROUS GLUCONATE 324 MG TAB ,   BUMETANIDE 2 MG TABLET , SIMVASTATIN 20 MG TABLET , ACETAMINOPHEN 325 MG TABLET , LISINOPRIL 5 MG TABLET , POTASSIUM CL ER 20 MEQ TABLET,  WARFARIN SODIUM 3 MG TABLET   Last Written Prescription Date:  All dated 2/21/20 for 30 day supply upon discharge  Warfarin 3 mg tab, simvastatin, Januvia, not on med list, Tylenol is different strength being requested from what is in chart.  Allopurinol sig being requested is different from what EMR reflects, no changes made to requested refill  Metprolol requested sig is different from what is in EMR, no changes made to requested refill  Many of the RX's have notations about not refilling meds due to frequent no shows and cancellations of appointments  Last Office Visit : 12/12/17  Future Office visit:  4/7/20    Routing refill request to provider for review/approval because:  Drug not active on patient's medication list  Over due for LDL, last uric acid level elevated  Lab Test 12/12/17  1243   LDL 24     Lab Test 01/17/20  0334   URIC 9.1*     Note on warfarin flow sheet:  Patient is inactivated from [facility] until Maki figures out who will be responsible for warfarin

## 2020-03-19 NOTE — TELEPHONE ENCOUNTER
Called and spoke to patient to reschedule telephone appointment on April 7 with NP Neema Montemayor to a MD. Informed patient that we are scheduling 4-5 weeks out. Patient states she need multiple medications refill and that she was in the hospital. Advised patient she has not been seen in our clinic since 12/12/17 as a result we cannot refill her medications until she is seen by a Provider. Options of appointment date and time were given. Patients states [facility] had called her to cancel her appointment with them on 3-25-20. Schedule an appointment on 4-20-20 in Primary Care.

## 2020-03-19 NOTE — TELEPHONE ENCOUNTER
Patient called stating she tried to refill her medications and was told by her pharmacy that they will not be able to refill those medications she was given while in the hospital. Patient states she will be out of medication as of Saturday 3/21/2020. Please call patient asap to discuss how she will get those medications.

## 2020-03-19 NOTE — TELEPHONE ENCOUNTER
See providers note below:    Neema Montemayor APRN CNP  You 2 days ago      She is a pretty complicated pat and was discharged mid Feb! She should see an M.D.     Neema MARQUEZ CNP      Patient is currently schedule for an appointment with Natalia Mishra at [facility] in [facility] for 3/25/20. Patient will need to schedule an in clinic appointment with an MD before we would be able to prescribe medications for patient. If patient is going to be seen at [facility]'s, then the patient does not need to schedule appt with clinic. Trena Monzon LPN 3/19/2020 9:23 AM

## 2020-03-23 ENCOUNTER — TELEPHONE (OUTPATIENT)
Dept: PHARMACY | Facility: CLINIC | Age: 60
End: 2020-03-23

## 2020-03-23 DIAGNOSIS — M10.9 GOUT, UNSPECIFIED CAUSE, UNSPECIFIED CHRONICITY, UNSPECIFIED SITE: ICD-10-CM

## 2020-03-23 DIAGNOSIS — E11.49 TYPE 2 DIABETES MELLITUS WITH OTHER NEUROLOGIC COMPLICATION, WITHOUT LONG-TERM CURRENT USE OF INSULIN (H): ICD-10-CM

## 2020-03-23 DIAGNOSIS — M79.605 PAIN OF LEFT LOWER EXTREMITY: ICD-10-CM

## 2020-03-23 DIAGNOSIS — I50.43 ACUTE ON CHRONIC COMBINED SYSTOLIC AND DIASTOLIC CONGESTIVE HEART FAILURE (H): ICD-10-CM

## 2020-03-23 DIAGNOSIS — I10 ESSENTIAL HYPERTENSION: ICD-10-CM

## 2020-03-23 DIAGNOSIS — E87.6 HYPOKALEMIA: ICD-10-CM

## 2020-03-23 DIAGNOSIS — E61.1 IRON DEFICIENCY: ICD-10-CM

## 2020-03-23 DIAGNOSIS — E78.5 HYPERLIPIDEMIA, UNSPECIFIED HYPERLIPIDEMIA TYPE: ICD-10-CM

## 2020-03-23 DIAGNOSIS — I48.91 ATRIAL FIBRILLATION (H): ICD-10-CM

## 2020-03-23 NOTE — TELEPHONE ENCOUNTER
Arianna called x 2.     She is out of medication and doesn't know what to do. She had two appointments scheduled (one with [facility] 3/25 and one at the [facility] 4/7), however, these were both canceled as she needs to be seen in person and they are not taking new patients. She has brought this up in several encounters, and was eventually scheduled again on 4/20 with Dr. Edwards. Medication refill requests were refused as she has not been seen in clinic since 2017. Patient was inactivated from [facility] Monitoring Services 10/10/2019. She has continued to take her INR via home INR machine, which was not taken away from her, rather inactivated from service.    At the time of our first call, the 3/25 appointment appeared to be in place. I contacted [facility] to confirm - and this appointment was actually canceled as well. I explained the situation, and they were willing to have her speak with an MD today. A provider called her and said due to her complexity and recent hospital discharge, she should keep her 4/20 appointment with the [facility]. They were not willing to fill her medications until she is seen in clinic, and welcomed her to schedule an appointment down the road if she would like to be seen there.

## 2020-03-23 NOTE — TELEPHONE ENCOUNTER
Given number of encounters regarding this same issue - this is being handled in 3/17 encounter with medication question. Will close this encounter.

## 2020-03-23 NOTE — TELEPHONE ENCOUNTER
Patient has been asked several times to follow up in clinic and has been noncompliant with coming to clinic for appointments and will cancel her appointments. Patient was last seen in clinic on 12/12/17, patient has not been receiving care in our clinic.  Per clinic manager, patient will need to come into clinic for an appointment and patient will need to keep her appointment that is currently scheduled for 4/20/20 with MD. As per Neema Montemayor's note,  patient is too complex for NP and will need to see MD. Patient did not want to continue seeing Yoselin Shook and cancelled her appointments with her. We will not be able to bridge patient on her medications at this time. Trena Monzon LPN 3/23/2020 3:29 PM

## 2020-03-23 NOTE — TELEPHONE ENCOUNTER
Arianna called me directly. Documenting in this encounter for continuity. Unfortunately, her appointment with [facility] was canceled. I was able to get her a telephone appointment today with a provider at [facility], however, they said due to her complexity and recent hospital discharge she should continue to work with us and be seen as scheduled. They were unwilling to refill her medications and are not seeing new patients at this time per her report.    Will route to primary care team to see if we could get her a short term fill of medications to bridge her to the 4/20 appointment, or perhaps a phone visit until she can be seen 4/20. She is on a number of medications that should not be stopped- such as warfarin, metoprolol, and bumetanide.

## 2020-03-24 RX ORDER — LISINOPRIL 5 MG/1
TABLET ORAL
Qty: 30 TABLET | Refills: 0 | OUTPATIENT
Start: 2020-03-24

## 2020-03-24 RX ORDER — METOPROLOL SUCCINATE 50 MG/1
TABLET, EXTENDED RELEASE ORAL
Qty: 3 TABLET | Refills: 0 | OUTPATIENT
Start: 2020-03-24

## 2020-03-24 RX ORDER — POTASSIUM CHLORIDE 1500 MG/1
40 TABLET, EXTENDED RELEASE ORAL DAILY
Qty: 180 TABLET | Refills: 0 | OUTPATIENT
Start: 2020-03-24

## 2020-03-24 RX ORDER — WARFARIN SODIUM 3 MG/1
TABLET ORAL
Qty: 135 TABLET | Refills: 1 | OUTPATIENT
Start: 2020-03-24

## 2020-03-24 RX ORDER — FERROUS GLUCONATE 324(38)MG
324 TABLET ORAL 3 TIMES DAILY
Qty: 100 TABLET | Refills: 5 | OUTPATIENT
Start: 2020-03-24

## 2020-03-24 RX ORDER — ALLOPURINOL 100 MG/1
TABLET ORAL
Qty: 60 TABLET | Refills: 0 | OUTPATIENT
Start: 2020-03-24

## 2020-03-24 RX ORDER — BUMETANIDE 2 MG/1
TABLET ORAL
Qty: 30 TABLET | Refills: 0 | OUTPATIENT
Start: 2020-03-24

## 2020-03-24 RX ORDER — SIMVASTATIN 20 MG
20 TABLET ORAL AT BEDTIME
Qty: 90 TABLET | Refills: 0 | OUTPATIENT
Start: 2020-03-24

## 2020-03-24 RX ORDER — SITAGLIPTIN 50 MG/1
TABLET, FILM COATED ORAL
Qty: 30 TABLET | Refills: 0 | OUTPATIENT
Start: 2020-03-24

## 2020-03-24 RX ORDER — ACETAMINOPHEN 325 MG/1
TABLET ORAL
Qty: 100 TABLET | Refills: 0 | OUTPATIENT
Start: 2020-03-24

## 2020-03-26 ENCOUNTER — ANTICOAGULATION THERAPY VISIT (OUTPATIENT)
Dept: ANTICOAGULATION | Facility: CLINIC | Age: 60
End: 2020-03-26

## 2020-03-26 ENCOUNTER — TELEPHONE (OUTPATIENT)
Dept: INTERNAL MEDICINE | Facility: CLINIC | Age: 60
End: 2020-03-26

## 2020-03-26 ENCOUNTER — VIRTUAL VISIT (OUTPATIENT)
Dept: INTERNAL MEDICINE | Facility: CLINIC | Age: 60
End: 2020-03-26
Payer: MEDICARE

## 2020-03-26 DIAGNOSIS — I48.91 ATRIAL FIBRILLATION, UNSPECIFIED TYPE (H): ICD-10-CM

## 2020-03-26 DIAGNOSIS — Z79.01 LONG TERM CURRENT USE OF ANTICOAGULANT THERAPY: ICD-10-CM

## 2020-03-26 DIAGNOSIS — K59.00 CONSTIPATION, UNSPECIFIED CONSTIPATION TYPE: ICD-10-CM

## 2020-03-26 DIAGNOSIS — I50.21 ACUTE SYSTOLIC CONGESTIVE HEART FAILURE (H): ICD-10-CM

## 2020-03-26 DIAGNOSIS — I50.9 CONGESTIVE HEART FAILURE (CHF) (H): Primary | ICD-10-CM

## 2020-03-26 DIAGNOSIS — M62.838 MUSCLE SPASM: ICD-10-CM

## 2020-03-26 DIAGNOSIS — M10.9 GOUT, UNSPECIFIED CAUSE, UNSPECIFIED CHRONICITY, UNSPECIFIED SITE: ICD-10-CM

## 2020-03-26 DIAGNOSIS — E61.1 IRON DEFICIENCY: ICD-10-CM

## 2020-03-26 DIAGNOSIS — I48.20 CHRONIC ATRIAL FIBRILLATION (H): ICD-10-CM

## 2020-03-26 DIAGNOSIS — D64.9 ANEMIA, UNSPECIFIED TYPE: ICD-10-CM

## 2020-03-26 DIAGNOSIS — I10 HYPERTENSION, UNSPECIFIED TYPE: ICD-10-CM

## 2020-03-26 DIAGNOSIS — R73.09 INCREASED GLUCOSE LEVEL: Primary | ICD-10-CM

## 2020-03-26 DIAGNOSIS — E78.5 HYPERLIPIDEMIA, UNSPECIFIED HYPERLIPIDEMIA TYPE: ICD-10-CM

## 2020-03-26 DIAGNOSIS — I10 ESSENTIAL HYPERTENSION: ICD-10-CM

## 2020-03-26 DIAGNOSIS — E87.6 HYPOKALEMIA: ICD-10-CM

## 2020-03-26 LAB — INR PPP: 2.7 (ref 0.9–1.1)

## 2020-03-26 RX ORDER — BUMETANIDE 1 MG/1
2 TABLET ORAL DAILY
Qty: 60 TABLET | Refills: 1 | Status: SHIPPED | OUTPATIENT
Start: 2020-03-26 | End: 2020-04-20

## 2020-03-26 RX ORDER — CYCLOBENZAPRINE HCL 5 MG
5 TABLET ORAL
Qty: 30 TABLET | Refills: 1 | Status: SHIPPED | OUTPATIENT
Start: 2020-03-26 | End: 2020-04-27

## 2020-03-26 RX ORDER — METOPROLOL TARTRATE 50 MG
50 TABLET ORAL 2 TIMES DAILY
Qty: 60 TABLET | Refills: 1 | Status: SHIPPED | OUTPATIENT
Start: 2020-03-26 | End: 2020-04-20

## 2020-03-26 RX ORDER — ATORVASTATIN CALCIUM 20 MG/1
20 TABLET, FILM COATED ORAL EVERY EVENING
Qty: 30 TABLET | Refills: 1 | Status: SHIPPED | OUTPATIENT
Start: 2020-03-26 | End: 2020-04-20

## 2020-03-26 RX ORDER — WARFARIN SODIUM 1 MG/1
2 TABLET ORAL DAILY
Qty: 60 TABLET | Refills: 1 | Status: SHIPPED | OUTPATIENT
Start: 2020-03-26 | End: 2020-04-14 | Stop reason: DRUGHIGH

## 2020-03-26 RX ORDER — POLYETHYLENE GLYCOL 3350 17 G/17G
17 POWDER, FOR SOLUTION ORAL DAILY PRN
Qty: 30 PACKET | Refills: 1 | Status: SHIPPED | OUTPATIENT
Start: 2020-03-26

## 2020-03-26 RX ORDER — FERROUS GLUCONATE 324(38)MG
324 TABLET ORAL DAILY
Qty: 90 TABLET | Refills: 0 | Status: SHIPPED | OUTPATIENT
Start: 2020-03-26 | End: 2020-04-27

## 2020-03-26 RX ORDER — LISINOPRIL 5 MG/1
5 TABLET ORAL DAILY
Qty: 30 TABLET | Refills: 1 | Status: SHIPPED | OUTPATIENT
Start: 2020-03-26 | End: 2020-04-20

## 2020-03-26 RX ORDER — POTASSIUM CHLORIDE 1.5 G/1.58G
40 POWDER, FOR SOLUTION ORAL DAILY
Qty: 60 TABLET | Refills: 1 | Status: SHIPPED | OUTPATIENT
Start: 2020-03-26 | End: 2020-04-16

## 2020-03-26 RX ORDER — ALLOPURINOL 100 MG/1
200 TABLET ORAL DAILY
Qty: 60 TABLET | Refills: 1 | Status: SHIPPED | OUTPATIENT
Start: 2020-03-26 | End: 2020-04-20

## 2020-03-26 NOTE — PROGRESS NOTES
Telephone visit    Discussed with pt. This is telephone visit and she is agreeable to this.     Pt. With h/o DM2 (A1c 5.5% on 1/17/2010), afib (on coumadin), obesity with discharge from hospital 2/21/2020 for respiratory failure. She tested positive for influenza A 2/6/2020. She had hematuria in the hospital with either equivocal or negative urine cultures. The hematuria was felt due to urinary catheter trauma. She was discharged on Bumex/potassium replacement. She did have elevated Cr in the hospital but Cr was 0.80 on 2/20/2020. Recommended she follow up in Cardiology for afib, volume status and diastolic dysfunction  (last echo, technically difficult study; EF possibly 55-60% on 1/15/2020). She is on home non invasive ventilation (AVAPS)? Apparently, insulin and diabetic medications have been discontinued because of low blood glucose? No reported recent mammogram or colonoscopy? Taking iron for iron deficiency anemia? Pharmacy notes from Ms. Ortega in the chart (2/25/2020).     Overall Arianna states she is doing well. Her breathing is stable. She continues to use O2 as needed. She remains on her ventilation when she sleeps or naps. She does not smoke nor abuse EtOH. She stopped smoking 2002. She has not had mammogram for several years. She has not had colonoscopy for more than 10 years. Immunizations need to be updated. She is staying at home with limited personal exposure. Her wt. Is 350 today. She continues on her same medications including Bumex, potassium replacement, and coumadin. Her INR by her machine today? Is 2.7. She states her glucose today is 98. She did see a SW in the hospital. She does have trouble with transportation. She states her sister works and can only infrequently assist with transportation. No other HEENT, cardiopulmonary, abdominal, , GYN, neurological, skin, vascular, systemic, endocrine, musculoskeletal complaints.     Past Medical History:   Diagnosis Date     CHF (congestive heart  failure) (H)      CKD (chronic kidney disease)      Compliance poor      Diabetes mellitus (H)      Gout      Hypertension      Insomnia      Morbid obesity (H)      CARLOS treated with BiPAP      No past surgical history on file.    A/P:    1. DM2? Currently not on medication and most recent A1c was normal. Will follow labs  2. Cardiac; afib, volume issues. Will get her back in with the coumadin clinic. Ordered labs (and will try to get home nursing to draw labs at home). Refer to cardiology. She will remain on Bumex and potassium replacement. It seems her volume is stable. She remains on Atorvastatin  3. In the future, get up to date on immunizations, colonoscopy and mammogram   4. Pulmonary/sleep. She remains on O2 and home ventilation. No current complaints  5. She would like a refill on cyclobenzaprine  6. She remains on allopurinol.  7. Will follow renal function  8. Possible iron deficiency anemia? She will remain on iron (reduce to once a day) and check future labs.       Will have telephone visit in 1-2 weeks to follow up on above    CAROL Quiles MD    Starting time 10.35 AM  Finished 10:56 AM

## 2020-03-26 NOTE — TELEPHONE ENCOUNTER
Dear Rahel;    I did a telephone visit with Arianna today:    (1) Please have SW give her a call for transportation issues    (2) Please see if we get home nursing to draw labs at her home (labs ordered today)    Thanks, CAROL Quiles

## 2020-03-26 NOTE — NURSING NOTE
Chief Complaint   Patient presents with     Recheck Medication     Pt has telephone visit for medication refills and rechecks      ZHANG Gurrola at 10:13 AM sign on 3/26/2020

## 2020-03-26 NOTE — TELEPHONE ENCOUNTER
I called [facility] to determine if patient is established within their system, though staff stated they do not have an active file on this patient.     I called Arianna to determine if she has home care through another company. She reported that this was unable to be set up after discharge as she did not have a PCP. She does not currently have home care services.    Arianna did updated that she does have a respiratory therapist that visits once per month due to her trilogy ventilator.     Has a respiratory therapy with trilogy ventilator. She reported the RT checked her pulse ox today. Heart rate of 84, oxygen level of 96%. She wanted Dr. Quiles to be aware.    Dottie Gutierrez RN ([facility])

## 2020-03-26 NOTE — PROGRESS NOTES
ANTICOAGULATION FOLLOW-UP CLINIC VISIT    Patient Name:  Arianna Siddiqi  Date:  3/26/2020  Contact Type:  Telephone    SUBJECTIVE:  Patient Findings     Comments:   Patient reports she has been taking 2mg daily of warfarin since the middle .  Call received from Dr. Quiles requested that patient is again enrolled on [facility] monitoring service.         Clinical Outcomes     Comments:   Patient reports she has been taking 2mg daily of warfarin since the middle .  Call received from Dr. Quiles requested that patient is again enrolled on [facility] monitoring service.            OBJECTIVE    INR   Date Value Ref Range Status   2020 2.7 (A) 0.90 - 1.10 Final       ASSESSMENT / PLAN  INR assessment THER    Recheck INR In: 1 DAY    INR Location Clinic      Anticoagulation Summary  As of 3/26/2020    INR goal:   2.0-3.0   TTR:   100.0 % (3.9 mo)   INR used for dosin.7 (3/26/2020)   Warfarin maintenance plan:   2 mg (2 mg x 1) every day   Full warfarin instructions:   2 mg every day   Weekly warfarin total:   14 mg   Plan last modified:   Ying Hollis RN (3/26/2020)   Next INR check:   2020   Target end date:       Indications    Long-term (current) use of anticoagulants [Z79.01] [Z79.01]  Atrial fibrillation (H) [I48.91] [I48.91]             Anticoagulation Episode Summary     INR check location:   Clinic Lab    Preferred lab:       Send INR reminders to:   [facility]    Comments:    [facility] Monitoring to start on .  Pt's voicemail box is full and pt is unable to retrieve voice messages       Anticoagulation Care Providers     Provider Role Specialty Phone number    Amelia Angulo MD PhD [facility] [phone]            See the Encounter Report to view Anticoagulation Flowsheet and Dosing Calendar (Go to Encounters tab in chart review, and find the Anticoagulation Therapy Visit)    Spoke with patient.     Ying Hollis RN

## 2020-03-28 NOTE — TELEPHONE ENCOUNTER
Dear Dottie;    OK for home care referral and see if we can get labs drawn    Thanks, CAROL Quiles

## 2020-03-30 ENCOUNTER — PATIENT OUTREACH (OUTPATIENT)
Dept: CARE COORDINATION | Facility: CLINIC | Age: 60
End: 2020-03-30

## 2020-03-30 NOTE — PROGRESS NOTES
"Social Work Telephone Message Note  [facility]    Patient Name:  Arianna Siddiqi  /Age:  1960 (59 year old)    Referral Source: [facility]  Reason for Referral:  Transportation concerns to and from clinic appointments.     contacted Patient via telephone on 3/30/2020. Message was left on Patient's voicemail to be called back.  will await Patient's return phone call and will provide assistance at that time.    Per chart review, it seems this has been an ongoing issue for Patient due to her having an [place] Spenddown.  checked with the [facility] website and found that:    \"Non-emergency medical transportation (NEMT) and the related ancillary service charges ARE NOT applied to an existing spenddown obligation of a member. Members with a spenddown obligation are eligible for NEMT and related ancillary services to get them to and from their medical appointments.\"    Transportation booking through [facility] should not be a problem if the Patient's concern is the spenddown.    Taty Barrow [facility]  Outpatient Specialty Clinics  Direct Phone: [phone]  Pager:  [phone]        "

## 2020-03-31 ENCOUNTER — TELEPHONE (OUTPATIENT)
Dept: INTERNAL MEDICINE | Facility: CLINIC | Age: 60
End: 2020-03-31

## 2020-03-31 ENCOUNTER — ANTICOAGULATION THERAPY VISIT (OUTPATIENT)
Dept: ANTICOAGULATION | Facility: CLINIC | Age: 60
End: 2020-03-31

## 2020-03-31 DIAGNOSIS — I48.91 ATRIAL FIBRILLATION, UNSPECIFIED TYPE (H): ICD-10-CM

## 2020-03-31 DIAGNOSIS — Z79.01 LONG TERM CURRENT USE OF ANTICOAGULANT THERAPY: ICD-10-CM

## 2020-03-31 LAB — INR PPP: 2.2 (ref 0.9–1.1)

## 2020-03-31 NOTE — TELEPHONE ENCOUNTER
[facility] Call Center    Phone Message    May a detailed message be left on voicemail: yes     Reason for Call: Order(s): Home Care Orders: Other: . requesting updated order to reflect OK to start home care on 4/6/20.    Action Taken: Message routed to:  [facility]    Travel Screening: Not Applicable

## 2020-03-31 NOTE — TELEPHONE ENCOUNTER
Verbal orders given to Aminah from [facility], per Dr. Quiles, for Skilled Nursing: requesting updated order to reflect OK to start home care on 4/6/20. Trena Monzon LPN 3/31/2020 10:42 AM

## 2020-03-31 NOTE — PROGRESS NOTES
ANTICOAGULATION FOLLOW-UP CLINIC VISIT    Patient Name:  Arianna Siddiqi  Date:  3/31/2020  Contact Type:  Telephone    SUBJECTIVE:         OBJECTIVE    INR   Date Value Ref Range Status   2020 2.2 (A) 0.90 - 1.10 Final       ASSESSMENT / PLAN  INR assessment THER    Recheck INR In: 1 WEEK    INR Location Home INR      Anticoagulation Summary  As of 3/31/2020    INR goal:   2.0-3.0   TTR:   100.0 % (3.9 mo)   INR used for dosin.2 (3/31/2020)   Warfarin maintenance plan:   3 mg (2 mg x 1.5) every Tue, Fri; 2 mg (2 mg x 1) all other days   Full warfarin instructions:   3 mg every Tue, Fri; 2 mg all other days   Weekly warfarin total:   16 mg   Plan last modified:   Carol Hoffman RN (3/31/2020)   Next INR check:   2020   Target end date:   3/26/2020    Indications    Long-term (current) use of anticoagulants [Z79.01] [Z79.01]  Atrial fibrillation (H) [I48.91] [I48.91]  Atrial fibrillation  unspecified type (H) [I48.91]             Anticoagulation Episode Summary     INR check location:   Clinic Lab    Preferred lab:       Send INR reminders to:   [facility]    Comments:    [facility] Monitoring to start on .  Pt's voicemail box is full and pt is unable to retrieve voice messages       Anticoagulation Care Providers     Provider Role Specialty Phone number    Gagan Quiles MD Referring Internal Medicine [phone]    Amelia Angulo MD PhD Responsible Family Practice [phone]            See the Encounter Report to view Anticoagulation Flowsheet and Dosing Calendar (Go to Encounters tab in chart review, and find the Anticoagulation Therapy Visit)  Spoke with patient.    Carol Hoffman RN

## 2020-04-03 ENCOUNTER — TELEPHONE (OUTPATIENT)
Dept: INTERNAL MEDICINE | Facility: CLINIC | Age: 60
End: 2020-04-03

## 2020-04-03 ENCOUNTER — VIRTUAL VISIT (OUTPATIENT)
Dept: INTERNAL MEDICINE | Facility: CLINIC | Age: 60
End: 2020-04-03
Payer: MEDICARE

## 2020-04-03 VITALS — WEIGHT: 293 LBS | BODY MASS INDEX: 62 KG/M2

## 2020-04-03 DIAGNOSIS — Z76.0 ENCOUNTER FOR MEDICATION REFILL: Primary | ICD-10-CM

## 2020-04-03 ASSESSMENT — PAIN SCALES - GENERAL: PAINLEVEL: NO PAIN (0)

## 2020-04-03 NOTE — TELEPHONE ENCOUNTER
I called [facility] to confirm start date of services will still be Monday, determine if labs can be drawn that day or after services start. Staff stated patient does have an assigned , though this file is not yet open. She will have someone call me back when possible.    I called Arianna to discuss cardiology referral. Clinic coordinator called her last week for scheduling, though at that time she reported she would schedule cardiology appointment on her own. Today, she stated that if visit will be over the phone, this can be scheduled for her. She would like a call after the appointment is scheduled to confirm date/time.     Dottie Gutierrez RN ([facility])

## 2020-04-03 NOTE — TELEPHONE ENCOUNTER
[facility] Call Center    Phone Message    May a detailed message be left on voicemail: yes     Reason for Call: Other: Aminah from  homecare returned call to [place], requesting call back     Action Taken: Message routed to:  [facility] michelle    Travel Screening: Not Applicable

## 2020-04-03 NOTE — TELEPHONE ENCOUNTER
Dear Rahel;    No bedolla. I had a telephone visit with Arianna today and she is doing well. A few minor issues:    (1) She still needs labs drawn by home health nursing (labs ordered last visit 3/26/2020    (2) Placed cardiology referral 3/26/2020 and this could be done via Video/telephone    ThanksCAROL

## 2020-04-03 NOTE — TELEPHONE ENCOUNTER
Aminah stated services are still set for Monday. Labs (ordered by Dr. Quiles) can be drawn on Monday during assessment.    Dottie Gutierrez RN ([facility])

## 2020-04-03 NOTE — NURSING NOTE
Chief Complaint   Patient presents with     Medication Problem     Pt states the Iron medication is wrote out wrong. Pt states she should be taking 3 tablets 324mg.      Heidi Ojeda LPN at 9:59 AM on 4/3/2020.

## 2020-04-03 NOTE — PROGRESS NOTES
Telephone vist    Ms. Siddiqi agrees to a telephone visit.     I did initial telephone visit with Arianna 3/26/2020 and there is a detailed note in the chart. Overall she is doing well.. SW called her 3/30/2020. She is back established in the coumadin clinic and checking her INR's. She is getting some home nursing assistance. She is clinically stable and denies any new HEENT, cardiopulmonary, abdominal, , GYN, neurological, systemic, psychiatric, lymphatic, endocrine complaints. She states she is compliant with here medications. She states her wt. Is stable. She states she does have a home pulmonary therapist as well. She would like her KCL changed from packet form to pill form. Two 20 meq pills. Telephone visit in 2 weeks.     CAROL Quiles MD    Spent 5-11 minutes on the phone

## 2020-04-06 ENCOUNTER — MEDICAL CORRESPONDENCE (OUTPATIENT)
Dept: HEALTH INFORMATION MANAGEMENT | Facility: CLINIC | Age: 60
End: 2020-04-06

## 2020-04-06 ENCOUNTER — TELEPHONE (OUTPATIENT)
Dept: INTERNAL MEDICINE | Facility: CLINIC | Age: 60
End: 2020-04-06

## 2020-04-06 DIAGNOSIS — D64.9 ANEMIA, UNSPECIFIED TYPE: Primary | ICD-10-CM

## 2020-04-06 DIAGNOSIS — Z79.01 ANTICOAGULATED ON COUMADIN: ICD-10-CM

## 2020-04-06 DIAGNOSIS — R52 PAIN: ICD-10-CM

## 2020-04-06 LAB
ALBUMIN SERPL-MCNC: 2.9 G/DL (ref 3.4–5)
ALP SERPL-CCNC: 122 U/L (ref 40–150)
ALT SERPL W P-5'-P-CCNC: 17 U/L (ref 0–50)
ANION GAP SERPL CALCULATED.3IONS-SCNC: 3 MMOL/L (ref 3–14)
AST SERPL W P-5'-P-CCNC: 24 U/L (ref 0–45)
BILIRUB SERPL-MCNC: 0.4 MG/DL (ref 0.2–1.3)
BUN SERPL-MCNC: 17 MG/DL (ref 7–30)
CALCIUM SERPL-MCNC: 9 MG/DL (ref 8.5–10.1)
CHLORIDE SERPL-SCNC: 104 MMOL/L (ref 94–109)
CO2 SERPL-SCNC: 29 MMOL/L (ref 20–32)
CREAT SERPL-MCNC: 0.89 MG/DL (ref 0.52–1.04)
ERYTHROCYTE [DISTWIDTH] IN BLOOD BY AUTOMATED COUNT: 22 % (ref 10–15)
FERRITIN SERPL-MCNC: 28 NG/ML (ref 8–252)
FOLATE SERPL-MCNC: 15.2 NG/ML
GFR SERPL CREATININE-BSD FRML MDRD: 70 ML/MIN/{1.73_M2}
GLUCOSE SERPL-MCNC: 102 MG/DL (ref 70–99)
HCT VFR BLD AUTO: 29.8 % (ref 35–47)
HGB BLD-MCNC: 8.2 G/DL (ref 11.7–15.7)
MCH RBC QN AUTO: 28.3 PG (ref 26.5–33)
MCHC RBC AUTO-ENTMCNC: 27.5 G/DL (ref 31.5–36.5)
MCV RBC AUTO: 103 FL (ref 78–100)
PLATELET # BLD AUTO: 371 10E9/L (ref 150–450)
POTASSIUM SERPL-SCNC: 3.5 MMOL/L (ref 3.4–5.3)
PROT SERPL-MCNC: 8.4 G/DL (ref 6.8–8.8)
RBC # BLD AUTO: 2.9 10E12/L (ref 3.8–5.2)
SODIUM SERPL-SCNC: 136 MMOL/L (ref 133–144)
WBC # BLD AUTO: 7.5 10E9/L (ref 4–11)

## 2020-04-06 PROCEDURE — 80053 COMPREHEN METABOLIC PANEL: CPT | Performed by: INTERNAL MEDICINE

## 2020-04-06 PROCEDURE — 85027 COMPLETE CBC AUTOMATED: CPT | Performed by: INTERNAL MEDICINE

## 2020-04-06 PROCEDURE — 82746 ASSAY OF FOLIC ACID SERUM: CPT | Performed by: INTERNAL MEDICINE

## 2020-04-06 PROCEDURE — 82728 ASSAY OF FERRITIN: CPT | Performed by: INTERNAL MEDICINE

## 2020-04-06 RX ORDER — TRAMADOL HYDROCHLORIDE 50 MG/1
50 TABLET ORAL
Qty: 30 TABLET | Refills: 0 | Status: SHIPPED | OUTPATIENT
Start: 2020-04-06 | End: 2020-09-10

## 2020-04-06 NOTE — TELEPHONE ENCOUNTER
Dear Lien Salmeron;    I called Arianna today regarding her labs. She states h/o anemia (range 7 to 11) for many years and has been taking iron. She states last colonoscopy in 2002 or 2004. She is unaware of ever having an EGD. She is unaware of ever being evaluated in Hematology. She continues on coumadin. She has chronic dark stools from taking iron. She states occ. Brown stools. She o/w denies any complaints today. She would like Tramadol for leg pain and I will send in an Rx. For this.    (1) She still needs cardiology new pt. Visit for afib/failure and placed referral this encounter.     (2) She states she had have labs drawn next week by home nursing and placed large list THIS encounter and please see if we can get this done    ThanksCAROL

## 2020-04-06 NOTE — TELEPHONE ENCOUNTER
[facility] home care nurse, Paloma was updated the recommendations below.    Pt will have the labs done next week and will be scheduled with cardiology.

## 2020-04-07 ENCOUNTER — ANTICOAGULATION THERAPY VISIT (OUTPATIENT)
Dept: ANTICOAGULATION | Facility: CLINIC | Age: 60
End: 2020-04-07

## 2020-04-07 DIAGNOSIS — Z79.01 LONG TERM CURRENT USE OF ANTICOAGULANT THERAPY: ICD-10-CM

## 2020-04-07 DIAGNOSIS — I48.91 ATRIAL FIBRILLATION, UNSPECIFIED TYPE (H): ICD-10-CM

## 2020-04-07 LAB — INR PPP: 1.4 (ref 0.9–1.1)

## 2020-04-07 NOTE — TELEPHONE ENCOUNTER
RECORDS RECEIVED FROM: Internal   DATE RECEIVED: 5-11-20   NOTES STATUS DETAILS   OFFICE NOTE from referring provider    Internal 4-6-20 Dr. Quiles   OFFICE NOTE from other cardiologist    N/A    DISCHARGE SUMMARY from hospital    Internal 2-9-20 [facility]   DISCHARGE REPORT from the ER   N/A    OPERATIVE REPORT    N/A    MEDICATION LIST   Internal    LABS     BMP   Internal 2-20-20   CBC   Internal 4-6-20   CMP   Internal 4-6-20   Lipids   N/A    TSH   Internal 1-15-20   DIAGNOSTIC PROCEDURES     EKG   Internal 2-11-20   Monitor Reports   N/A    IMAGING (DISC & REPORT)      Echo   Internal 1-16-20   Stress Tests   N/A    Cath   N/A    MRI/MRA   N/A    CT/CTA   N/A

## 2020-04-07 NOTE — PROGRESS NOTES
ANTICOAGULATION FOLLOW-UP CLINIC VISIT    Patient Name:  Arianna Siddiqi  Date:  2020  Contact Type:  Telephone    SUBJECTIVE:  Patient Findings     Positives:   Change in medications (PRN Flexeril. Zofran ODT, and Tramadal)             OBJECTIVE    INR   Date Value Ref Range Status   2020 1.4 (A) 0.90 - 1.10 Final       ASSESSMENT / PLAN  INR assessment SUB    Recheck INR In: 3 DAYS    INR Location Home INR      Anticoagulation Summary  As of 2020    INR goal:   2.0-3.0   TTR:   95.6 % (3.9 mo)   INR used for dosin.4! (2020)   Warfarin maintenance plan:   3 mg (2 mg x 1.5) every Tue, Fri; 2 mg (2 mg x 1) all other days   Full warfarin instructions:   : 4 mg; : 4 mg; : 3 mg; Otherwise 3 mg every Tue, Fri; 2 mg all other days   Weekly warfarin total:   16 mg   Plan last modified:   Carol Hoffman RN (3/31/2020)   Next INR check:   4/10/2020   Target end date:   3/26/2020    Indications    Long-term (current) use of anticoagulants [Z79.01] [Z79.01]  Atrial fibrillation (H) [I48.91] [I48.91]  Atrial fibrillation  unspecified type (H) [I48.91]             Anticoagulation Episode Summary     INR check location:   Clinic Lab    Preferred lab:       Send INR reminders to:   [facility]    Comments:    [facility] Monitoring to start on .  Pt's voicemail box is full and pt is unable to retrieve voice messages       Anticoagulation Care Providers     Provider Role Specialty Phone number    Gagan Quiles MD Referring Internal Medicine [phone]    Amelia Angulo MD PhD Responsible Family Practice [phone]            See the Encounter Report to view Anticoagulation Flowsheet and Dosing Calendar (Go to Encounters tab in chart review, and find the Anticoagulation Therapy Visit)  Spoke with patient.    Carol Hoffman RN

## 2020-04-10 ENCOUNTER — ANTICOAGULATION THERAPY VISIT (OUTPATIENT)
Dept: ANTICOAGULATION | Facility: CLINIC | Age: 60
End: 2020-04-10

## 2020-04-10 DIAGNOSIS — Z79.01 LONG TERM CURRENT USE OF ANTICOAGULANT THERAPY: ICD-10-CM

## 2020-04-10 DIAGNOSIS — I48.91 ATRIAL FIBRILLATION, UNSPECIFIED TYPE (H): ICD-10-CM

## 2020-04-10 LAB — INR PPP: 1.4 (ref 0.9–1.1)

## 2020-04-10 NOTE — PROGRESS NOTES
ANTICOAGULATION FOLLOW-UP CLINIC VISIT    Patient Name:  Arianna Siddiqi  Date:  4/10/2020  Contact Type:  Telephone    SUBJECTIVE:  Patient Findings     Comments:   Spoke with Arianna.  INR today was 1.4, same as last check.  Before Arianna was hospitalized, her warfarin dosing was 3 mg MWF and 4.5 mg all other days of the week.  She was given an RX for tramadol, but does not have it yet.  She has only taken flexeril twice in the last month.    Will recommend increasing warfarin dose closer to what she was taking before she was hospitalized, and she will recheck INR in 4 days.        Clinical Outcomes     Comments:   Spoke with Arianna.  INR today was 1.4, same as last check.  Before Arianna was hospitalized, her warfarin dosing was 3 mg MWF and 4.5 mg all other days of the week.  She was given an RX for tramadol, but does not have it yet.  She has only taken flexeril twice in the last month.    Will recommend increasing warfarin dose closer to what she was taking before she was hospitalized, and she will recheck INR in 4 days.           OBJECTIVE    INR   Date Value Ref Range Status   04/10/2020 1.4 (A) 0.90 - 1.10 Final       ASSESSMENT / PLAN  INR assessment SUB    Recheck INR In: 4 DAYS    INR Location Home INR      Anticoagulation Summary  As of 4/10/2020    INR goal:   2.0-3.0   TTR:   93.0 % (3.9 mo)   INR used for dosin.4! (4/10/2020)   Warfarin maintenance plan:   3 mg (2 mg x 1.5) every Tue, Fri; 2 mg (2 mg x 1) all other days   Full warfarin instructions:   4/10: 4.5 mg; : 3 mg; : 4.5 mg; : 3 mg; Otherwise 3 mg every Tue, Fri; 2 mg all other days   Weekly warfarin total:   16 mg   Plan last modified:   Paradise Pizarro RN (4/10/2020)   Next INR check:   2020   Priority:   High   Target end date:   3/26/2020    Indications    Long-term (current) use of anticoagulants [Z79.01] [Z79.01]  Atrial fibrillation (H) [I48.91] [I48.91]  Atrial fibrillation  unspecified type (H) [I48.91]              Anticoagulation Episode Summary     INR check location:   Clinic Lab    Preferred lab:       Send INR reminders to:   [facility]    Comments:   1/17 [facility] Monitoring to start on 2/28.  Pt's voicemail box is full and pt is unable to retrieve voice messages       Anticoagulation Care Providers     Provider Role Specialty Phone number    Gagan Quiles MD Referring Internal Medicine [phone]    Amelia Angulo MD PhD Responsible Family Practice [phone]            See the Encounter Report to view Anticoagulation Flowsheet and Dosing Calendar (Go to Encounters tab in chart review, and find the Anticoagulation Therapy Visit)    Spoke with Arianna.    INR today was 1.4, same as last check.  Before Arianna was hospitalized, her warfarin dosing was 3 mg MWF and 4.5 mg all other days of the week.  She was given an RX for tramadol, but does not have it yet.  She has only taken flexeril twice in the last month.    Will recommend increasing warfarin dose closer to what she was taking before she was hospitalized, and she will recheck INR in 4 days.    Paradise Pizarro RN

## 2020-04-14 ENCOUNTER — ANTICOAGULATION THERAPY VISIT (OUTPATIENT)
Dept: ANTICOAGULATION | Facility: CLINIC | Age: 60
End: 2020-04-14

## 2020-04-14 DIAGNOSIS — I48.91 ATRIAL FIBRILLATION, UNSPECIFIED TYPE (H): ICD-10-CM

## 2020-04-14 DIAGNOSIS — Z79.01 LONG TERM CURRENT USE OF ANTICOAGULANT THERAPY: ICD-10-CM

## 2020-04-14 LAB — INR PPP: 1.7 (ref 0.9–1.1)

## 2020-04-14 RX ORDER — WARFARIN SODIUM 3 MG/1
TABLET ORAL
Qty: 135 TABLET | Refills: 5 | Status: SHIPPED | OUTPATIENT
Start: 2020-04-14 | End: 2021-11-12

## 2020-04-14 NOTE — PROGRESS NOTES
ANTICOAGULATION FOLLOW-UP CLINIC VISIT    Patient Name:  Arianna Siddiqi  Date:  2020  Contact Type:  Telephone    SUBJECTIVE:  Patient Findings     Comments:   Pt wants to try to go back to her previous dose of 3mg MWF and 4.5mg TuThSaSu and will recheck an INR in a week. Sending over a script for Warfarin 3mg tablets which pt has some at home from her last prescription         Clinical Outcomes     Negatives:   Major bleeding event, Thromboembolic event, Anticoagulation-related hospital admission, Anticoagulation-related ED visit, Anticoagulation-related fatality    Comments:   Pt wants to try to go back to her previous dose of 3mg MWF and 4.5mg TuThSaSu and will recheck an INR in a week. Sending over a script for Warfarin 3mg tablets which pt has some at home from her last prescription            OBJECTIVE    INR   Date Value Ref Range Status   2020 1.7 (A) 0.90 - 1.10 Final     Comment:     Home Monitoring Machine        ASSESSMENT / PLAN  INR assessment SUB    Recheck INR In: 1 WEEK    INR Location Home INR      Anticoagulation Summary  As of 2020    INR goal:   2.0-3.0   TTR:   89.6 % (3.9 mo)   INR used for dosin.7! (2020)   Warfarin maintenance plan:   3 mg (3 mg x 1) every Mon, Wed, Fri; 4.5 mg (3 mg x 1.5) all other days   Full warfarin instructions:   3 mg every Mon, Wed, Fri; 4.5 mg all other days   Weekly warfarin total:   27 mg   Plan last modified:   Tanna Dawkins RN (2020)   Next INR check:   2020   Priority:   High   Target end date:   3/26/2020    Indications    Long-term (current) use of anticoagulants [Z79.01] [Z79.01]  Atrial fibrillation (H) [I48.91] [I48.91]  Atrial fibrillation  unspecified type (H) [I48.91]             Anticoagulation Episode Summary     INR check location:   Home Draw    Preferred lab:       Send INR reminders to:   [facility]    Comments:    [facility] Monitoring to start on . Has 3mg tablets   Pt's voicemail box is full  and pt is unable to retrieve voice messages       Anticoagulation Care Providers     Provider Role Specialty Phone number    Gagan Quiles MD Referring Internal Medicine [phone]            See the Encounter Report to view Anticoagulation Flowsheet and Dosing Calendar (Go to Encounters tab in chart review, and find the Anticoagulation Therapy Visit)    Spoke with patient. Gave them their lab results and new warfarin recommendation.  No changes in health, medication, or diet. No missed doses, no falls. No signs or symptoms of bleed or clotting.     Tanna Dawkins RN

## 2020-04-16 ENCOUNTER — TELEPHONE (OUTPATIENT)
Dept: INTERNAL MEDICINE | Facility: CLINIC | Age: 60
End: 2020-04-16

## 2020-04-16 ENCOUNTER — MEDICAL CORRESPONDENCE (OUTPATIENT)
Dept: HEALTH INFORMATION MANAGEMENT | Facility: CLINIC | Age: 60
End: 2020-04-16

## 2020-04-16 DIAGNOSIS — E87.6 HYPOKALEMIA: ICD-10-CM

## 2020-04-16 LAB
ALBUMIN SERPL-MCNC: 3 G/DL (ref 3.4–5)
ALP SERPL-CCNC: 127 U/L (ref 40–150)
ALT SERPL W P-5'-P-CCNC: 23 U/L (ref 0–50)
ANION GAP SERPL CALCULATED.3IONS-SCNC: 5 MMOL/L (ref 3–14)
AST SERPL W P-5'-P-CCNC: 24 U/L (ref 0–45)
BASOPHILS # BLD AUTO: 0 10E9/L (ref 0–0.2)
BASOPHILS NFR BLD AUTO: 0.4 %
BILIRUB SERPL-MCNC: 0.3 MG/DL (ref 0.2–1.3)
BUN SERPL-MCNC: 23 MG/DL (ref 7–30)
CALCIUM SERPL-MCNC: 9.5 MG/DL (ref 8.5–10.1)
CHLORIDE SERPL-SCNC: 105 MMOL/L (ref 94–109)
CO2 SERPL-SCNC: 28 MMOL/L (ref 20–32)
CREAT SERPL-MCNC: 0.84 MG/DL (ref 0.52–1.04)
DIFFERENTIAL METHOD BLD: ABNORMAL
EOSINOPHIL # BLD AUTO: 0.3 10E9/L (ref 0–0.7)
EOSINOPHIL NFR BLD AUTO: 3.6 %
ERYTHROCYTE [DISTWIDTH] IN BLOOD BY AUTOMATED COUNT: 18.4 % (ref 10–15)
FOLATE SERPL-MCNC: 10.7 NG/ML
GFR SERPL CREATININE-BSD FRML MDRD: 75 ML/MIN/{1.73_M2}
GLUCOSE SERPL-MCNC: 89 MG/DL (ref 70–99)
HBA1C MFR BLD: 5 % (ref 0–5.6)
HCT VFR BLD AUTO: 33.1 % (ref 35–47)
HGB BLD-MCNC: 9.5 G/DL (ref 11.7–15.7)
IMM GRANULOCYTES # BLD: 0 10E9/L (ref 0–0.4)
IMM GRANULOCYTES NFR BLD: 0.1 %
INR PPP: NORMAL (ref 0.86–1.14)
IRON SATN MFR SERPL: 6 % (ref 15–46)
IRON SERPL-MCNC: 26 UG/DL (ref 35–180)
LYMPHOCYTES # BLD AUTO: 1.4 10E9/L (ref 0.8–5.3)
LYMPHOCYTES NFR BLD AUTO: 19.8 %
MCH RBC QN AUTO: 28.8 PG (ref 26.5–33)
MCHC RBC AUTO-ENTMCNC: 28.7 G/DL (ref 31.5–36.5)
MCV RBC AUTO: 100 FL (ref 78–100)
MONOCYTES # BLD AUTO: 0.4 10E9/L (ref 0–1.3)
MONOCYTES NFR BLD AUTO: 6.1 %
NEUTROPHILS # BLD AUTO: 4.8 10E9/L (ref 1.6–8.3)
NEUTROPHILS NFR BLD AUTO: 70 %
NRBC # BLD AUTO: 0 10*3/UL
NRBC BLD AUTO-RTO: 0 /100
PLATELET # BLD AUTO: 373 10E9/L (ref 150–450)
POTASSIUM SERPL-SCNC: 3.9 MMOL/L (ref 3.4–5.3)
PROT SERPL-MCNC: 8.5 G/DL (ref 6.8–8.8)
RBC # BLD AUTO: 3.3 10E12/L (ref 3.8–5.2)
RETICS # AUTO: 117.1 10E9/L (ref 25–95)
RETICS/RBC NFR AUTO: 3.6 % (ref 0.5–2)
SODIUM SERPL-SCNC: 138 MMOL/L (ref 133–144)
TIBC SERPL-MCNC: 402 UG/DL (ref 240–430)
TSH SERPL DL<=0.005 MIU/L-ACNC: 1.66 MU/L (ref 0.4–4)
VIT B12 SERPL-MCNC: 516 PG/ML (ref 193–986)
WBC # BLD AUTO: 6.9 10E9/L (ref 4–11)

## 2020-04-16 PROCEDURE — 83036 HEMOGLOBIN GLYCOSYLATED A1C: CPT | Mod: GZ | Performed by: INTERNAL MEDICINE

## 2020-04-16 PROCEDURE — 84443 ASSAY THYROID STIM HORMONE: CPT | Performed by: INTERNAL MEDICINE

## 2020-04-16 PROCEDURE — 83550 IRON BINDING TEST: CPT | Performed by: INTERNAL MEDICINE

## 2020-04-16 PROCEDURE — 85610 PROTHROMBIN TIME: CPT | Performed by: INTERNAL MEDICINE

## 2020-04-16 PROCEDURE — 85025 COMPLETE CBC W/AUTO DIFF WBC: CPT | Performed by: INTERNAL MEDICINE

## 2020-04-16 PROCEDURE — 80053 COMPREHEN METABOLIC PANEL: CPT | Performed by: INTERNAL MEDICINE

## 2020-04-16 PROCEDURE — 82746 ASSAY OF FOLIC ACID SERUM: CPT | Performed by: INTERNAL MEDICINE

## 2020-04-16 PROCEDURE — 82607 VITAMIN B-12: CPT | Performed by: INTERNAL MEDICINE

## 2020-04-16 PROCEDURE — 85045 AUTOMATED RETICULOCYTE COUNT: CPT | Performed by: INTERNAL MEDICINE

## 2020-04-16 PROCEDURE — 83540 ASSAY OF IRON: CPT | Performed by: INTERNAL MEDICINE

## 2020-04-16 RX ORDER — POTASSIUM CHLORIDE 1.5 G/1.58G
40 POWDER, FOR SOLUTION ORAL DAILY
Qty: 60 TABLET | Refills: 2 | Status: SHIPPED | OUTPATIENT
Start: 2020-04-16 | End: 2020-04-20 | Stop reason: DRUGHIGH

## 2020-04-16 NOTE — TELEPHONE ENCOUNTER
"[facility] Call Center    Phone Message    May a detailed message be left on voicemail: yes     Reason for Call: Medication Question or concern regarding medication   Prescription Clarification  Name of Medication: potassium chloride (KLOR-CON) 20 MEQ packet, iron supplement (not found), hydrocortisone cream  Prescribing Provider: Dr. Quiles   Pharmacy: [facility]Y #[id number] [address]   What on the order needs clarification? Sarah with [facility] requesting a call to discuss medications. She saw pt for the first time today at home. Pt's iron tablet (not found on chart) has instructions to take 1x daily, but pt would like to take this 3x a day. Pt would need the orders updated and sent to her pharmacy.  Second, pt would like the potassium liquid form to be changed to a tablet. Sarah states the liquid is not covered by insurance anyway, and pharmacy will need a new order.  Last, Sarah states pt has a rash on her chest and arms that has been present \"for a while\". Pt has been using hydrocortisone cream 3x daily, but has not found relief from the itching. Pt has complained it keeps her awake at night. Sarah recommended taking benadryl, but pt does not have any in the house and it is difficult for her to leave and go to the pharmacy. Please call to discuss.    Action Taken: Message routed to:  [facility]    Travel Screening: Not Applicable     Left message for Sarah from home care to call back.  Danette Mayfield RN 3:26 PM on 4/16/2020.    Left message for Sarah to call back.  Danette Mayfield RN 2:27 PM on 4/17/2020.    "

## 2020-04-17 NOTE — TELEPHONE ENCOUNTER
[facility] Call Center    Phone Message    May a detailed message be left on voicemail: yes     Reason for Call: Other: Sarah from  homecare returned call to Danette, requesting call back     Action Taken: Message routed to:  [facility] michelle    Travel Screening: Not Applicable

## 2020-04-18 DIAGNOSIS — I10 HYPERTENSION, UNSPECIFIED TYPE: ICD-10-CM

## 2020-04-18 DIAGNOSIS — I50.21 ACUTE SYSTOLIC CONGESTIVE HEART FAILURE (H): ICD-10-CM

## 2020-04-18 DIAGNOSIS — E78.5 HYPERLIPIDEMIA, UNSPECIFIED HYPERLIPIDEMIA TYPE: ICD-10-CM

## 2020-04-18 DIAGNOSIS — I10 ESSENTIAL HYPERTENSION: ICD-10-CM

## 2020-04-18 DIAGNOSIS — I48.91 ATRIAL FIBRILLATION, UNSPECIFIED TYPE (H): ICD-10-CM

## 2020-04-18 DIAGNOSIS — M10.9 GOUT, UNSPECIFIED CAUSE, UNSPECIFIED CHRONICITY, UNSPECIFIED SITE: ICD-10-CM

## 2020-04-20 RX ORDER — ALLOPURINOL 100 MG/1
200 TABLET ORAL DAILY
Qty: 60 TABLET | Refills: 1 | Status: SHIPPED | OUTPATIENT
Start: 2020-04-20 | End: 2020-05-15

## 2020-04-20 RX ORDER — METOPROLOL TARTRATE 50 MG
TABLET ORAL
Qty: 60 TABLET | Refills: 1 | Status: SHIPPED | OUTPATIENT
Start: 2020-04-20 | End: 2020-05-04

## 2020-04-20 RX ORDER — BUMETANIDE 1 MG/1
TABLET ORAL
Qty: 60 TABLET | Refills: 1 | Status: SHIPPED | OUTPATIENT
Start: 2020-04-20 | End: 2020-05-04

## 2020-04-20 RX ORDER — ATORVASTATIN CALCIUM 20 MG/1
TABLET, FILM COATED ORAL
Qty: 30 TABLET | Refills: 1 | Status: SHIPPED | OUTPATIENT
Start: 2020-04-20 | End: 2020-05-04

## 2020-04-20 RX ORDER — LISINOPRIL 5 MG/1
TABLET ORAL
Qty: 30 TABLET | Refills: 1 | Status: SHIPPED | OUTPATIENT
Start: 2020-04-20 | End: 2020-05-04

## 2020-04-20 RX ORDER — WARFARIN SODIUM 1 MG/1
2 TABLET ORAL DAILY
Qty: 60 TABLET | Refills: 1 | Status: SHIPPED | OUTPATIENT
Start: 2020-04-20 | End: 2020-05-15

## 2020-04-20 RX ORDER — POTASSIUM CHLORIDE 1500 MG/1
20 TABLET, EXTENDED RELEASE ORAL
Qty: 180 TABLET | Refills: 1 | Status: SHIPPED | OUTPATIENT
Start: 2020-04-20 | End: 2020-09-21

## 2020-04-20 NOTE — TELEPHONE ENCOUNTER
Left message for Sarah to call back.  Danette Mayfield RN 11:15 AM on 4/20/2020.    Spoke to both Sarah and pt. Renewed medication and sent in new RX for Potassium.  Danette Mayfeild RN 11:52 AM on 4/20/2020.

## 2020-04-21 ENCOUNTER — ANTICOAGULATION THERAPY VISIT (OUTPATIENT)
Dept: ANTICOAGULATION | Facility: CLINIC | Age: 60
End: 2020-04-21

## 2020-04-21 DIAGNOSIS — Z79.01 LONG TERM CURRENT USE OF ANTICOAGULANT THERAPY: ICD-10-CM

## 2020-04-21 DIAGNOSIS — I48.91 ATRIAL FIBRILLATION, UNSPECIFIED TYPE (H): ICD-10-CM

## 2020-04-21 LAB — INR PPP: 2.4 (ref 0.9–1.1)

## 2020-04-21 NOTE — PROGRESS NOTES
ANTICOAGULATION FOLLOW-UP CLINIC VISIT    Patient Name:  Arianna Siddiqi  Date:  2020  Contact Type:  Telephone    SUBJECTIVE:  Patient Findings     Comments:   Arianna reports she has not had changes in health, diet, medications.  She has not picked up the RX for tramadol.  She said tramadol is a PRN medication and does not think she will take it very often.          Clinical Outcomes     Comments:   Arianna reports she has not had changes in health, diet, medications.  She has not picked up the RX for tramadol.  She said tramadol is a PRN medication and does not think she will take it very often.             OBJECTIVE    INR   Date Value Ref Range Status   2020 2.4 (A) 0.90 - 1.10 Final       ASSESSMENT / PLAN  INR assessment THER    Recheck INR In: 1 WEEK    INR Location Home INR      Anticoagulation Summary  As of 2020    INR goal:   2.0-3.0   TTR:   87.1 % (3.9 mo)   INR used for dosin.4 (2020)   Warfarin maintenance plan:   3 mg (3 mg x 1) every Mon, Wed, Fri; 4.5 mg (3 mg x 1.5) all other days   Full warfarin instructions:   3 mg every Mon, Wed, Fri; 4.5 mg all other days   Weekly warfarin total:   27 mg   No change documented:   Paradise Pizarro RN   Plan last modified:   Tanna Dawkins RN (2020)   Next INR check:   2020   Priority:   High   Target end date:   3/26/2020    Indications    Long-term (current) use of anticoagulants [Z79.01] [Z79.01]  Atrial fibrillation (H) [I48.91] [I48.91]  Atrial fibrillation  unspecified type (H) [I48.91]             Anticoagulation Episode Summary     INR check location:   Home Draw    Preferred lab:       Send INR reminders to:   [facility]    Comments:    [facility] [facility] Monitoring to start on . Has 3mg tablets previously had 2mg tablets   Pt's voicemail box is full and pt is unable to retrieve voice messages       Anticoagulation Care Providers     Provider Role Specialty Phone number    Gagan Quiles MD Referring Internal  Medicine [phone]            See the Encounter Report to view Anticoagulation Flowsheet and Dosing Calendar (Go to Encounters tab in chart review, and find the Anticoagulation Therapy Visit)    Spoke with Jaycee Pizarro RN

## 2020-04-22 ENCOUNTER — MEDICAL CORRESPONDENCE (OUTPATIENT)
Dept: HEALTH INFORMATION MANAGEMENT | Facility: CLINIC | Age: 60
End: 2020-04-22

## 2020-04-27 DIAGNOSIS — E61.1 IRON DEFICIENCY: ICD-10-CM

## 2020-04-27 DIAGNOSIS — M62.838 MUSCLE SPASM: ICD-10-CM

## 2020-04-27 RX ORDER — FERROUS GLUCONATE 324(38)MG
324 TABLET ORAL DAILY
Qty: 90 TABLET | Refills: 3 | Status: SHIPPED | OUTPATIENT
Start: 2020-04-27 | End: 2020-12-08

## 2020-04-27 NOTE — TELEPHONE ENCOUNTER
cyclobenzaprine (FLEXERIL) 5 MG tablet       Last Written Prescription Date:  3/26/2020  Last Fill Quantity: 30,   # refills: 1  Last Office Visit : 4/3/2020  Future Office visit:  none    Routing refill request to provider for review/approval because:  Medication not on the [facility] refill protocol.

## 2020-04-28 ENCOUNTER — TELEPHONE (OUTPATIENT)
Dept: INTERNAL MEDICINE | Facility: CLINIC | Age: 60
End: 2020-04-28

## 2020-04-28 ENCOUNTER — ANTICOAGULATION THERAPY VISIT (OUTPATIENT)
Dept: ANTICOAGULATION | Facility: CLINIC | Age: 60
End: 2020-04-28

## 2020-04-28 DIAGNOSIS — I48.91 ATRIAL FIBRILLATION, UNSPECIFIED TYPE (H): ICD-10-CM

## 2020-04-28 DIAGNOSIS — Z79.01 LONG TERM CURRENT USE OF ANTICOAGULANT THERAPY: ICD-10-CM

## 2020-04-28 LAB — INR PPP: 2.1 (ref 0.9–1.1)

## 2020-04-28 RX ORDER — CYCLOBENZAPRINE HCL 5 MG
5 TABLET ORAL
Qty: 30 TABLET | Refills: 5 | Status: SHIPPED | OUTPATIENT
Start: 2020-04-28 | End: 2020-11-05

## 2020-04-28 NOTE — PROGRESS NOTES
ANTICOAGULATION FOLLOW-UP CLINIC VISIT    Patient Name:  Arianna Siddiqi  Date:  2020  Contact Type:  Telephone    SUBJECTIVE:  Patient Findings     Positives:   Change in medications (Taking PRN Benadryl occassionally for a rash on chest and arms.)             OBJECTIVE    INR   Date Value Ref Range Status   2020 2.1 (A) 0.90 - 1.10 Final       ASSESSMENT / PLAN  INR assessment THER    Recheck INR In: 2 WEEKS    INR Location Home INR      Anticoagulation Summary  As of 2020    INR goal:   2.0-3.0   TTR:   87.1 % (3.9 mo)   INR used for dosin.1 (2020)   Warfarin maintenance plan:   3 mg (3 mg x 1) every Mon, Wed, Fri; 4.5 mg (3 mg x 1.5) all other days   Full warfarin instructions:   3 mg every Mon, Wed, Fri; 4.5 mg all other days   Weekly warfarin total:   27 mg   Plan last modified:   Tanna Dawkins RN (2020)   Next INR check:   2020   Priority:   High   Target end date:   3/26/2020    Indications    Long-term (current) use of anticoagulants [Z79.01] [Z79.01]  Atrial fibrillation (H) [I48.91] [I48.91]  Atrial fibrillation  unspecified type (H) [I48.91]             Anticoagulation Episode Summary     INR check location:   Home Draw    Preferred lab:       Send INR reminders to:   [facility]    Comments:    [employer] Home Monitoring to start on . Has 3mg tablets previously had 2mg tablets   Pt's voicemail box is full and pt is unable to retrieve voice messages       Anticoagulation Care Providers     Provider Role Specialty Phone number    Gagan Quiles MD Referring Internal Medicine [phone]            See the Encounter Report to view Anticoagulation Flowsheet and Dosing Calendar (Go to Encounters tab in chart review, and find the Anticoagulation Therapy Visit)    Spoke with patient.    Carol Hoffman RN

## 2020-04-28 NOTE — TELEPHONE ENCOUNTER
[facility] Call Center    Phone Message    May a detailed message be left on voicemail: yes     Reason for Call: Other: Testing supply for INR draw to [facility], phone: [phone] and PT doesn't have the fax #.  Please reach out to the PT if any questions.      Action Taken: Message routed to:  [facility] Primary Care    Travel Screening: Not Applicable

## 2020-04-28 NOTE — TELEPHONE ENCOUNTER
Called [facility] to get a fax number.  Was on hold for 10 minutes, so I hung up.       Elver Sevilla CMA ([facility]) at 2:30 PM on 4/28/2020

## 2020-04-28 NOTE — TELEPHONE ENCOUNTER
[employer] company was called, and RN spoke to staff who relayed that they are needing a form to be signed by Dr. Quiles.  After that is received, they will be able to supply the patient with test strips for INR monitoring.  [facility] fax number was given, so form can be sent to [facility] clinic.    Rahel Ram RN on 4/28/2020 at 2:53 PM

## 2020-04-29 ENCOUNTER — MEDICAL CORRESPONDENCE (OUTPATIENT)
Dept: HEALTH INFORMATION MANAGEMENT | Facility: CLINIC | Age: 60
End: 2020-04-29

## 2020-04-29 NOTE — TELEPHONE ENCOUNTER
Fax was received from [employer] and completed.  Dr. Quiles signed form, and it was faxed to [employer] @ [phone].    Rahel Ram RN on 4/29/2020 at 8:28 AM

## 2020-05-01 ENCOUNTER — MEDICAL CORRESPONDENCE (OUTPATIENT)
Dept: HEALTH INFORMATION MANAGEMENT | Facility: CLINIC | Age: 60
End: 2020-05-01

## 2020-05-02 ENCOUNTER — TELEPHONE (OUTPATIENT)
Dept: INTERNAL MEDICINE | Facility: CLINIC | Age: 60
End: 2020-05-02

## 2020-05-02 ENCOUNTER — MEDICAL CORRESPONDENCE (OUTPATIENT)
Dept: HEALTH INFORMATION MANAGEMENT | Facility: CLINIC | Age: 60
End: 2020-05-02

## 2020-05-02 DIAGNOSIS — D50.8 OTHER IRON DEFICIENCY ANEMIA: Primary | ICD-10-CM

## 2020-05-02 NOTE — TELEPHONE ENCOUNTER
Dear Danette;    I reviewed Ms. Siddiqi' labs again. Still anemic and low iron. She probably needs colonoscopy/EGD? Placed GI referral this encounter.    Please give her a call and help coordinate this.    Thanks, CAROL Quiles    Pt called and plan of care discussed. Pt will call back if she doesn't hear back from the GI clinic about her colonoscopy/ECD. Clinic numbers given.  Danette Mayfield RN 11:31 AM on 5/4/2020.

## 2020-05-04 ENCOUNTER — VIRTUAL VISIT (OUTPATIENT)
Dept: CARDIOLOGY | Facility: CLINIC | Age: 60
End: 2020-05-04
Attending: INTERNAL MEDICINE
Payer: MEDICARE

## 2020-05-04 VITALS — BODY MASS INDEX: 62 KG/M2 | WEIGHT: 293 LBS

## 2020-05-04 DIAGNOSIS — E78.5 HYPERLIPIDEMIA, UNSPECIFIED HYPERLIPIDEMIA TYPE: ICD-10-CM

## 2020-05-04 DIAGNOSIS — I10 ESSENTIAL HYPERTENSION: ICD-10-CM

## 2020-05-04 DIAGNOSIS — I48.0 PAF (PAROXYSMAL ATRIAL FIBRILLATION) (H): Primary | ICD-10-CM

## 2020-05-04 DIAGNOSIS — I50.30 HEART FAILURE WITH PRESERVED EJECTION FRACTION, NYHA CLASS II (H): ICD-10-CM

## 2020-05-04 PROCEDURE — 99443 ZZC PHYSICIAN TELEPHONE EVALUATION 21-30 MIN: CPT | Performed by: INTERNAL MEDICINE

## 2020-05-04 RX ORDER — LISINOPRIL 5 MG/1
5 TABLET ORAL DAILY
Qty: 90 TABLET | Refills: 3 | Status: SHIPPED | OUTPATIENT
Start: 2020-05-04 | End: 2020-12-08

## 2020-05-04 RX ORDER — DIPHENHYDRAMINE HCL 25 MG
25 TABLET ORAL EVERY 6 HOURS PRN
Status: ON HOLD | COMMUNITY
End: 2022-01-01

## 2020-05-04 RX ORDER — BUMETANIDE 2 MG/1
2 TABLET ORAL DAILY
Qty: 90 TABLET | Refills: 3 | Status: SHIPPED | OUTPATIENT
Start: 2020-05-04 | End: 2020-12-08

## 2020-05-04 RX ORDER — METOPROLOL SUCCINATE 100 MG/1
100 TABLET, EXTENDED RELEASE ORAL DAILY
Qty: 90 TABLET | Refills: 3 | Status: SHIPPED | OUTPATIENT
Start: 2020-05-04 | End: 2020-12-08

## 2020-05-04 RX ORDER — ATORVASTATIN CALCIUM 20 MG/1
20 TABLET, FILM COATED ORAL DAILY
Qty: 90 TABLET | Refills: 3 | Status: SHIPPED | OUTPATIENT
Start: 2020-05-04 | End: 2020-12-08

## 2020-05-04 ASSESSMENT — PAIN SCALES - GENERAL: PAINLEVEL: NO PAIN (0)

## 2020-05-04 NOTE — PROGRESS NOTES
"Arianna Siddiqi is a 60 year old female who is being evaluated via a billable telephone visit.      The patient has been notified of following:     \"This telephone visit will be conducted via a call between you and your physician/provider. We have found that certain health care needs can be provided without the need for a physical exam.  This service lets us provide the care you need with a short phone conversation.  If a prescription is necessary we can send it directly to your pharmacy.  If lab work is needed we can place an order for that and you can then stop by our lab to have the test done at a later time.    Telephone visits are billed at different rates depending on your insurance coverage. During this emergency period, for some insurers they may be billed the same as an in-person visit.  Please reach out to your insurance provider with any questions.    If during the course of the call the physician/provider feels a telephone visit is not appropriate, you will not be charged for this service.\"    Patient has given verbal consent for Telephone visit?  Yes    What phone number would you like to be contacted at? [phone]    How would you like to obtain your AVS? Mail a copy    Phone call duration: 28 minutes    HPI: Ms. Arianna Siddiqi is a 60 year old  female with PMH significant for morbid obesity, paroxysmal atrial fibrillation, heart failure with preserved ejection fraction, type 2 diabetes? , obesity hypoventilation syndrome, CARLOS on CPAP, and hypertension.    The patient is being seen today at request of Dr. Quiles.    Patient was first diagnosed with heart failure and atrial fibrillation in 2016 and she was started on Bumex and warfarin. Since then the patient has been on these medications.  In November 2019 she ran out of Bumex for couple of months and could not refill the medication.  She ended up gaining weight and hospitalized in January of this year with heart failure exacerbation and respiratory " decompensation.  She was 60 pounds up from her normal weight.  She was treated with IV diuresis and CPAP. Patient had an asymptomatic paroxysmal atrial flutter episode during this admission.    Unfortunately patient admitted to the hospital recently on 2/15/2020 with heart failure decompensation and respiratory failure in the setting of influenza A.  Patient`s symptoms improved with noninvasive ventilation.      Patient tells me today that she is feeling stronger and breathing better since last hospital discharge.  She is able to do her daily activities.  She has home health nurse that checks on her once a week.  Denies chest pain, lower extremity edema, palpitations, dizziness or syncope.  She weighs herself daily.  Today she was 349 pounds.  She checks her INR at home.  During the day she no longer needs oxygen though she still has an oxygen tube at home.  She uses CPAP at night.    No prior history of CAD. No prior history of stroke. Blood pressure is well controlled with lisinopril 5 mg. She was a heavy smoker until 2002 with 30-pack-year smoking.  No alcohol abuse.  Diabetes is listed in patient's past medical history however last hemoglobin A1c is 5 on 4/16/2020 and she is not on antidiabetics.    Patient is currently on Bumex 2 mg once a day, atorvastatin 20 mg, lisinopril 5 mg, metoprolol 100 mg, potassium chloride 20 mEq twice daily, warfarin, allopurinol, diphenhydramine and cyclobenzaprine.    Patient's last EKG on 2/11/2020 shows sinus rhythm with right bundle branch block.  EKG on 1/23/2020 shows atrial flutter.    Echocardiogram on 1/16/2020 showed LVEF of 55 to 60%.  RV showed mild dilation with mildly reduced function.  No significant valve disease noted.  Prior echocardiogram in 2016 showed reduced EF at 40 to 45%.    Medications, personal, family, and social history reviewed with patient and revised.    PAST MEDICAL HISTORY:  Past Medical History:   Diagnosis Date     CHF (congestive heart failure)  (H)      CKD (chronic kidney disease)      Compliance poor      Diabetes mellitus (H)      Gout      Hypertension      Insomnia      Morbid obesity (H)      CARLOS treated with BiPAP        CURRENT MEDICATIONS:  Current Outpatient Medications   Medication Sig Dispense Refill     acetaminophen (TYLENOL) 500 MG tablet Take 1-2 tablets (500-1,000 mg) by mouth every 4 hours as needed for mild pain  0     allopurinol (ZYLOPRIM) 100 MG tablet TAKE 2 TABLETS (200 MG) BY MOUTH DAILY PATIENT NEEDS TO SEE PRIMARY PROVIDER AND HAVE LABS FOR FURTHER REFILLS. 60 tablet 1     alum & mag hydroxide-simethicone (MAALOX  ES) 400-400-40 MG/5ML SUSP suspension Take 15 mLs by mouth every 4 hours as needed for other (gastric distress.) 355 mL 0     atorvastatin (LIPITOR) 20 MG tablet TAKE 1 TABLET BY MOUTH EVERY EVENING 30 tablet 1     blood glucose monitoring (ACCU-CHEK NINA PLUS) meter device kit Use to test blood sugars 3 times daily or as directed. 1 kit 0     blood glucose monitoring (SOFTCLIX) lancets Use to test blood sugar 3 times daily or as directed. 300 each 0     bumetanide (BUMEX) 1 MG tablet TAKE 2 TABLETS BY MOUTH EVERY DAY 60 tablet 1     cyclobenzaprine (FLEXERIL) 5 MG tablet Take 1 tablet (5 mg) by mouth nightly as needed for muscle spasms 30 tablet 5     diphenhydrAMINE (BENADRYL) 25 MG tablet Take 25 mg by mouth every 6 hours as needed for itching or allergies       ferrous gluconate (FERGON) 324 (38 Fe) MG tablet Take 1 tablet (324 mg) by mouth daily 90 tablet 3     lisinopril (ZESTRIL) 5 MG tablet TAKE 1 TABLET BY MOUTH EVERY DAY 30 tablet 1     metoprolol tartrate (LOPRESSOR) 50 MG tablet TAKE 1 TABLET BY MOUTH TWICE A DAY 60 tablet 1     miconazole (MICATIN) 2 % external powder Apply topically 2 times daily 180 g 0     ondansetron (ZOFRAN-ODT) 4 MG ODT tab Take 1 tablet (4 mg) by mouth every 6 hours as needed for nausea or vomiting 10 tablet 0     polyethylene glycol (MIRALAX) packet Take 17 g by mouth daily as  needed for constipation 30 packet 1     potassium chloride ER (K-TAB) 20 MEQ CR tablet Take 1 tablet (20 mEq) by mouth 2 times daily 180 tablet 1     sennosides (SENOKOT) 8.6 MG tablet Take 1 tablet by mouth 2 times daily as needed for constipation 60 tablet 0     traMADol (ULTRAM) 50 MG tablet Take 1 tablet (50 mg) by mouth nightly as needed for severe pain 30 tablet 0     warfarin ANTICOAGULANT (COUMADIN) 1 MG tablet TAKE 2 TABLETS (2 MG) BY MOUTH DAILY USE AS DIRECTED BY COUMADIN CLINIC 60 tablet 1     warfarin ANTICOAGULANT (COUMADIN) 3 MG tablet Take one to one in a half tablets daily or as directed by the Coumadin clinic 135 tablet 5       PAST SURGICAL HISTORY:  No past surgical history on file.    ALLERGIES:     Allergies   Allergen Reactions     Penicillins Itching and Rash     Tolerated ceftriaxone 2020       FAMILY HISTORY:  Family History   Adopted: Yes   Family history unknown: Yes         SOCIAL HISTORY:  Social History     Tobacco Use     Smoking status: Former Smoker     Packs/day: 1.50     Years: 20.00     Pack years: 30.00     Types: Cigarettes     Last attempt to quit: 2002     Years since quittin.3     Smokeless tobacco: Never Used   Substance Use Topics     Alcohol use: No     Alcohol/week: 0.0 standard drinks     Drug use: No       ROS:   Constitutional: No fever, chills, or sweats. Weight stable.   ENT: No visual disturbance, ear ache, epistaxis, sore throat.   Cardiovascular: As per HPI.   Respiratory: No cough, hemoptysis.    GI: No nausea, vomiting, hematemesis, melena, or hematochezia.   : No hematuria.   Integument: Negative.   Psychiatric: Negative.   Hematologic:  No easy bruising, no easy bleeding.  Neuro: Negative.   Endocrinology: No significant heat or cold intolerance   Musculoskeletal: No myalgia.    I have reviewed the labs and personally reviewed the imaging below and made my comment in the assessment and plan.    Labs:  CBC RESULTS:   Lab Results   Component  Value Date    WBC 6.9 04/16/2020    RBC 3.30 (L) 04/16/2020    HGB 9.5 (L) 04/16/2020    HCT 33.1 (L) 04/16/2020     04/16/2020    MCH 28.8 04/16/2020    MCHC 28.7 (L) 04/16/2020    RDW 18.4 (H) 04/16/2020     04/16/2020       BMP RESULTS:  Lab Results   Component Value Date     04/16/2020    POTASSIUM 3.9 04/16/2020    CHLORIDE 105 04/16/2020    CO2 28 04/16/2020    ANIONGAP 5 04/16/2020    GLC 89 04/16/2020    BUN 23 04/16/2020    CR 0.84 04/16/2020    GFRESTIMATED 75 04/16/2020    GFRESTBLACK 86 04/16/2020    KADEN 9.5 04/16/2020        INR RESULTS:  Lab Results   Component Value Date    INR 2.1 (A) 04/28/2020    INR 2.4 (A) 04/21/2020    INR Canceled, Test credited 04/16/2020    INR 1.7 (A) 04/14/2020         Echocardiogram 1/2020  Technically difficult study.  The Ejection Fraction is estimated at 55-60% by visual estimate.  Regional wall motion is probably normal.  Difficult to assess RV. Function probably at least mildly reduced. Size might  be mildly dilated.  Valves not well visualized. No significant valvular abnormalities were noted  by Doppler.  Dilation of the inferior vena cava is present with abnormal respiratory  variation in diameter.  No pericardial effusion is present.    Patient's last EKG on 2/11/2020 shows sinus rhythm with right bundle branch block.  EKG on 1/23/2020 shows atrial flutter.    Assessment and Plan:   Ms. Arianna Siddiqi is a 60 year old  female with PMH significant for former heavy smoking history for 30 pack years quit date 2002, morbid obesity, paroxysmal atrial fibrillation, heart failure with preserved ejection fraction, type 2 diabetes?  Not on medications, obesity hypoventilation syndrome, obstructive sleep apnea on CPAP, and hypertension.    1.  Heart failure with preserved ejection fraction: Unfortunately patient had 2 recent hospitalizations in January and February of this year. The former hospital admission occurred because she ran out of diuretic for few  months.  The second hospitalization appears due to influenza infection. The patient is currently on Bumex 2 mg daily.  Patient reports stable weights at 349 pounds.  She weighs herself daily.  When she was discharged from the hospital she was 350 pounds.  She is at her dry weight.  Denies lower extremity edema.  Reports feeling stronger and breathing better.  She does not need oxygen during the day.  Since patient is doing well now I did not make any medication changes today.    2.  Paroxysmal atrial fibrillation: The patient has history of A. fib since 2016.  Chads BASC score is 4 (diabetes, hypertension, heart failure, female).  She has been on warfarin since then.  She has home INR device.  No history of stroke.  She is on rate control with metoprolol.  Recommended to continue current treatment.  Normal LV function per last echocardiogram in January 2020.    3.  Morbid obesity: Has multiple complications like obstructive sleep apnea and obesity hypoventilation syndrome.  A. fib is likely triggered by her obesity and sleep apnea.  Appears asymptomatic from A. fib.    4.  Hypertension: Well-controlled with lisinopril 5 mg and metoprolol 100 mg.    5.  Former heavy smoker: No prior history of CAD.  Recommended patient to discuss low-dose CT scanning for lung cancer screening with her PCP.    I did not make any medication changes today.    Recommended follow-up in 6 months.    A total of  28 minutes through telephone encounter today with greater than 50% of the time spent in counseling and coordinating cares of the issues above.     Please donot hesitate to contact me if you have any questions or concerns. Again, thank you for allowing me to participate in the care of your patient.    Duane CHAPA MD  [facility] Division of Cardiology  Pager [phone]

## 2020-05-05 NOTE — TELEPHONE ENCOUNTER
RECORDS RECEIVED FROM: Internal    DATE RECEIVED: 5/13/20 1PM   NOTES STATUS DETAILS   OFFICE NOTE from referring provider Internal Referral 5/2/20   OFFICE NOTE from other specialist N/A    DISCHARGE SUMMARY from hospital Internal ED note 1/15/20   OPERATIVE REPORT N/A    MEDICATION LIST N/A         ENDOSCOPY  N/A Per note - needs to [facility]    COLONOSCOPY N/A Per note- needs to [facility]    PERTINENT LABS Internal    PATHOLOGY REPORTS (RELATED) N/A    IMAGING (CT, MRI, EGD) N/A      REFERRAL INFORMATION    Date referral was placed: 5/13/20   Date all records received:    Date records were scanned into Epic:    Date records were sent to Provider to review:    Date and recommendation received from provider:  LETTER SENT  SCHEDULE APPOINTMENT   Date patient was contacted to schedule: 5/4/20

## 2020-05-11 ENCOUNTER — PRE VISIT (OUTPATIENT)
Dept: CARDIOLOGY | Facility: CLINIC | Age: 60
End: 2020-05-11

## 2020-05-12 ENCOUNTER — ANTICOAGULATION THERAPY VISIT (OUTPATIENT)
Dept: ANTICOAGULATION | Facility: CLINIC | Age: 60
End: 2020-05-12

## 2020-05-12 DIAGNOSIS — Z79.01 LONG TERM CURRENT USE OF ANTICOAGULANT THERAPY: ICD-10-CM

## 2020-05-12 DIAGNOSIS — I48.91 ATRIAL FIBRILLATION, UNSPECIFIED TYPE (H): ICD-10-CM

## 2020-05-12 LAB — INR PPP: 2.5 (ref 0.9–1.1)

## 2020-05-12 NOTE — PROGRESS NOTES
ANTICOAGULATION FOLLOW-UP CLINIC VISIT    Patient Name:  Arianna Siddiqi  Date:  2020  Contact Type:  Telephone    SUBJECTIVE:  Patient Findings     Comments:   Arianna called in with her INR result of 2.5--she has [employer] Monitor.  She reports no changes in health, diet, medications.        Clinical Outcomes     Comments:   Arianna called in with her INR result of 2.5--she has [employer] Home Monitor.  She reports no changes in health, diet, medications.           OBJECTIVE    Recent labs: (last 7 days)     20   INR 2.5*       ASSESSMENT / PLAN  INR assessment THER    Recheck INR In: 2 WEEKS    INR Location Home INR      Anticoagulation Summary  As of 2020    INR goal:   2.0-3.0   TTR:   87.1 % (3.9 mo)   INR used for dosin.5 (2020)   Warfarin maintenance plan:   3 mg (3 mg x 1) every Mon, Wed, Fri; 4.5 mg (3 mg x 1.5) all other days   Full warfarin instructions:   3 mg every Mon, Wed, Fri; 4.5 mg all other days   Weekly warfarin total:   27 mg   No change documented:   Paradise Pizarro RN   Plan last modified:   Tanna Dawkins, RN (2020)   Next INR check:   2020   Priority:   High   Target end date:   3/26/2020    Indications    Long-term (current) use of anticoagulants [Z79.01] [Z79.01]  Atrial fibrillation (H) [I48.91] [I48.91]  Atrial fibrillation  unspecified type (H) [I48.91]             Anticoagulation Episode Summary     INR check location:   Home Draw    Preferred lab:       Send INR reminders to:   [facility]    Comments:    [employer] to start on . Has 3mg tablets previously had 2mg tablets   Pt's voicemail box is full and pt is unable to retrieve voice messages       Anticoagulation Care Providers     Provider Role Specialty Phone number    Gagan Quiles MD Referring Internal Medicine [phone]            See the Encounter Report to view Anticoagulation Flowsheet and Dosing Calendar (Go to Encounters tab in chart review, and find the  Anticoagulation Therapy Visit)    Spoke with Jaycee Pizarro RN

## 2020-05-13 ENCOUNTER — PRE VISIT (OUTPATIENT)
Dept: GASTROENTEROLOGY | Facility: CLINIC | Age: 60
End: 2020-05-13

## 2020-05-13 ENCOUNTER — VIRTUAL VISIT (OUTPATIENT)
Dept: GASTROENTEROLOGY | Facility: CLINIC | Age: 60
End: 2020-05-13
Attending: INTERNAL MEDICINE
Payer: MEDICARE

## 2020-05-13 VITALS — HEIGHT: 62 IN | WEIGHT: 293 LBS | BODY MASS INDEX: 53.92 KG/M2

## 2020-05-13 DIAGNOSIS — M10.9 GOUT, UNSPECIFIED CAUSE, UNSPECIFIED CHRONICITY, UNSPECIFIED SITE: ICD-10-CM

## 2020-05-13 DIAGNOSIS — D50.8 OTHER IRON DEFICIENCY ANEMIA: ICD-10-CM

## 2020-05-13 DIAGNOSIS — I48.91 ATRIAL FIBRILLATION, UNSPECIFIED TYPE (H): ICD-10-CM

## 2020-05-13 DIAGNOSIS — I50.21 ACUTE SYSTOLIC CONGESTIVE HEART FAILURE (H): ICD-10-CM

## 2020-05-13 DIAGNOSIS — I10 HYPERTENSION, UNSPECIFIED TYPE: ICD-10-CM

## 2020-05-13 ASSESSMENT — PAIN SCALES - GENERAL: PAINLEVEL: SEVERE PAIN (7)

## 2020-05-13 ASSESSMENT — MIFFLIN-ST. JEOR: SCORE: 2101.3

## 2020-05-13 NOTE — PROGRESS NOTES
"Arianna Sididqi is a 60 year old female who is being evaluated via a billable telephone visit during the COVID-19 pandemic and clinic response to limit in-person visits.  The patient has been notified of following:   \"This telephone visit will be conducted via a call between you and your physician/provider. We have found that certain health care needs can be provided without the need for a physical exam.  This service lets us provide the care you need with a short phone conversation.  If a prescription is necessary we can send it directly to your pharmacy.  If lab work is needed we can place an order for that and you can then stop by our lab to have the test done at a later time.  If during the course of the call the physician/provider feels a telephone visit is not appropriate, you will not be charged for this service.\" Telephone visits are billed at different rates depending on your insurance coverage. During this emergency period, for some insurers they may be billed the same as an in-person visit.  Please reach out to your insurance provider with any questions.  Consent has been obtained for this service by 1 care team member: yes  I have reviewed and updated the patient's Past Medical History, Social History, Family History and Medication List.    What phone number would you like to be contacted at?     Phone call contact time  Call Started at 1238  Call Ended at 12:59  On phone patient      GI CLINIC VISIT    CC/REFERRING MD:  Gagan Quiles  REASON FOR CONSULTATION: anemia    ASSESSMENT/PLAN:  Ms. Arianna Siddiqi is a 60 year old  female with PMH significant for former heavy smoking history for 30 pack years quit date 2002, morbid obesity, paroxysmal atrial fibrillation on warfarin, heart failure with preserved ejection fraction, type 2 diabetes?  Not on medications, obesity hypoventilation syndrome, obstructive sleep apnea on CPAP, hypertension and chronic anemia who presents to the GI clinic for consutlation " regarding ongoing anemia.     1. Chronic anemia:  Recent more dramatic drop from 11.4 to 8.2 over the course of 2 months eariler this uear and as of last month slight increase to 9.5 (4/16/20).  She certainly has been in this range previously.  Her MCV is within normal limits. Absolute retic count is >2%. Need to consider blood loss as possible source. We will proceed with Colonoscopy, EGD and video capsule to further assess possible GI source.    -- EGD, colonoscopy and video capsule study to be completed. Pending results will determine next steps in anemia management. May require heme consult.     2. Colorectal cancer screening: Last colonoscopy reported by patient was in 2002, so patient would be due for dysplasia screening. Patient is adopted and does not know family history.    RTC pending results of endoscopy    Thank you for this consultation.  It was a pleasure to participate in the care of this patient; please contact us with any further questions.       Fabian Weber PA-C  Division of Gastroenterology, Hepatology and Nutrition  [facility]      HPI  Ms. Arianna Siddiqi is a 60 year old  female with PMH significant for former heavy smoking history for 30 pack years quit date 2002, morbid obesity, paroxysmal atrial fibrillation on warfarin, heart failure with preserved ejection fraction, type 2 diabetes?  Not on medications, obesity hypoventilation syndrome, obstructive sleep apnea on CPAP, hypertension and chronic anemia who presents to the GI clinic for consutlation regarding ongoing anemia.     Patient reports she has had anemia for years.  Patient is unaware of previous GI work up for a cause in the past.  Currently she has 1 Bm every morning, like clockwork. Well formed and is brown in color. Occasional bowel movement in the evening. Denies any upper GI sxs. No nausea, vomiting or heartburn. Denies feelings lightheaded.     With hx of anemia, she has been told to take 3 iron pills per day which  she has done for many years.    Reports [facility] (patient reports it was normal).     ROS:    No fevers or chills  No weight loss  No blurry vision, double vision or change in vision  No sore throat  No lymphadenopathy  + headache (occasional)  No shortness of breath or wheezing  No chest pain or pressure  + arthralgias or myalgias (both knees)  No rashes or skin changes  No odynophagia or dysphagia  No BRBPR, hematochezia, melena  No dysuria, frequency or urgency  No hot/cold intolerance or polyria  No anxiety or depression    PROBLEM LIST  Patient Active Problem List    Diagnosis Date Noted     Atrial fibrillation, unspecified type (H) 03/26/2020     Priority: Medium     Acute kidney injury (H) 02/09/2020     Priority: Medium     Acute kidney failure with tubular necrosis (H) 01/17/2020     Priority: Medium     Acute kidney failure, unspecified (H) 01/17/2020     Priority: Medium     Other acute kidney failure (H) 01/17/2020     Priority: Medium     Volume overload 01/15/2020     Priority: Medium     Chronic atrial fibrillation 12/12/2017     Priority: Medium     Hypercapnemia 10/06/2016     Priority: Medium     Long-term (current) use of anticoagulants [Z79.01] 09/29/2016     Priority: Medium     Atrial fibrillation (H) [I48.91] 09/29/2016     Priority: Medium     CHF (congestive heart failure) (H) 09/24/2016     Priority: Medium     Anemia 09/11/2016     Priority: Medium     Diabetes mellitus (H) 09/11/2016     Priority: Medium     Gout 09/11/2016     Priority: Medium     Hyperlipidemia 09/11/2016     Priority: Medium     Hypertension 09/11/2016     Priority: Medium     Acute respiratory acidosis 09/10/2016     Priority: Medium     Morbid (severe) obesity due to excess calories (H) 07/22/2013     Priority: Medium     Chronic kidney disease 05/22/2013     Priority: Medium       PERTINENT PAST MEDICAL HISTORY:  Past Medical History:   Diagnosis Date     CHF (congestive heart  failure) (H)      CKD (chronic kidney disease)      Compliance poor      Diabetes mellitus (H)      Gout      Hypertension      Insomnia      Morbid obesity (H)      CARLOS treated with BiPAP        PREVIOUS SURGERIES:  None  No past surgical history on file.    PREVIOUS ENDOSCOPY:  2002?    ALLERGIES:     Allergies   Allergen Reactions     Penicillins Itching and Rash     Tolerated ceftriaxone 1/17/2020       PERTINENT MEDICATIONS:    Current Outpatient Medications:      acetaminophen (TYLENOL) 500 MG tablet, Take 1-2 tablets (500-1,000 mg) by mouth every 4 hours as needed for mild pain, Disp: , Rfl: 0     allopurinol (ZYLOPRIM) 100 MG tablet, TAKE 2 TABLETS (200 MG) BY MOUTH DAILY PATIENT NEEDS TO SEE PRIMARY PROVIDER AND HAVE LABS FOR FURTHER REFILLS., Disp: 60 tablet, Rfl: 1     alum & mag hydroxide-simethicone (MAALOX  ES) 400-400-40 MG/5ML SUSP suspension, Take 15 mLs by mouth every 4 hours as needed for other (gastric distress.), Disp: 355 mL, Rfl: 0     atorvastatin (LIPITOR) 20 MG tablet, Take 1 tablet (20 mg) by mouth daily, Disp: 90 tablet, Rfl: 3     blood glucose monitoring (ACCU-CHEK NINA PLUS) meter device kit, Use to test blood sugars 3 times daily or as directed., Disp: 1 kit, Rfl: 0     blood glucose monitoring (SOFTCLIX) lancets, Use to test blood sugar 3 times daily or as directed., Disp: 300 each, Rfl: 0     bumetanide (BUMEX) 2 MG tablet, Take 1 tablet (2 mg) by mouth daily, Disp: 90 tablet, Rfl: 3     cyclobenzaprine (FLEXERIL) 5 MG tablet, Take 1 tablet (5 mg) by mouth nightly as needed for muscle spasms, Disp: 30 tablet, Rfl: 5     diphenhydrAMINE (BENADRYL) 25 MG tablet, Take 25 mg by mouth every 6 hours as needed for itching or allergies, Disp: , Rfl:      ferrous gluconate (FERGON) 324 (38 Fe) MG tablet, Take 1 tablet (324 mg) by mouth daily, Disp: 90 tablet, Rfl: 3     lisinopril (ZESTRIL) 5 MG tablet, Take 1 tablet (5 mg) by mouth daily, Disp: 90 tablet, Rfl: 3     metoprolol succinate ER  (TOPROL-XL) 100 MG 24 hr tablet, Take 1 tablet (100 mg) by mouth daily, Disp: 90 tablet, Rfl: 3     miconazole (MICATIN) 2 % external powder, Apply topically 2 times daily, Disp: 180 g, Rfl: 0     ondansetron (ZOFRAN-ODT) 4 MG ODT tab, Take 1 tablet (4 mg) by mouth every 6 hours as needed for nausea or vomiting, Disp: 10 tablet, Rfl: 0     polyethylene glycol (MIRALAX) packet, Take 17 g by mouth daily as needed for constipation, Disp: 30 packet, Rfl: 1     potassium chloride ER (K-TAB) 20 MEQ CR tablet, Take 1 tablet (20 mEq) by mouth 2 times daily, Disp: 180 tablet, Rfl: 1     sennosides (SENOKOT) 8.6 MG tablet, Take 1 tablet by mouth 2 times daily as needed for constipation, Disp: 60 tablet, Rfl: 0     traMADol (ULTRAM) 50 MG tablet, Take 1 tablet (50 mg) by mouth nightly as needed for severe pain, Disp: 30 tablet, Rfl: 0     warfarin ANTICOAGULANT (COUMADIN) 1 MG tablet, TAKE 2 TABLETS (2 MG) BY MOUTH DAILY USE AS DIRECTED BY COUMADIN CLINIC, Disp: 60 tablet, Rfl: 1     warfarin ANTICOAGULANT (COUMADIN) 3 MG tablet, Take one to one in a half tablets daily or as directed by the Coumadin clinic, Disp: 135 tablet, Rfl: 5    SOCIAL HISTORY:  Lives alone, 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  Social History     Socioeconomic History     Marital status: Single     Spouse name: Not on file     Number of children: Not on file     Years of education: Not on file     Highest education level: Not on file   Occupational History     Not on file   Social Needs     Financial resource strain: Not on file     Food insecurity     Worry: Not on file     Inability: Not on file     Transportation needs     Medical: Not on file     Non-medical: Not on file   Tobacco Use     Smoking status: Former Smoker     Packs/day: 1.50     Years: 20.00     Pack years: 30.00     Types: Cigarettes     Last attempt to quit: 2002     Years since quittin.3     Smokeless tobacco: Never Used  "  Substance and Sexual Activity     Alcohol use: No     Alcohol/week: 0.0 standard drinks     Drug use: No     Sexual activity: Not on file   Lifestyle     Physical activity     Days per week: Not on file     Minutes per session: Not on file     Stress: Not on file   Relationships     Social connections     Talks on phone: Not on file     Gets together: Not on file     Attends Muslim service: Not on file     Active member of club or organization: Not on file     Attends meetings of clubs or organizations: Not on file     Relationship status: Not on file     Intimate partner violence     Fear of current or ex partner: Not on file     Emotionally abused: Not on file     Physically abused: Not on file     Forced sexual activity: Not on file   Other Topics Concern     Not on file   Social History Narrative     Not on file       FAMILY HISTORY:  FH of CRC: Unknown about family hx  FH of IBD: Unknown about family hx  Family History   Adopted: Yes   Family history unknown: Yes       Past/family/social history reviewed and no changes    PHYSICAL EXAMINATION:  Constitutional: aaox3, cooperative, pleasant, not dyspneic/diaphoretic, no acute distress  Vitals reviewed: Ht 1.575 m (5' 2\")   Wt (!) 158.3 kg (349 lb)   BMI 63.83 kg/m    Wt:   Wt Readings from Last 2 Encounters:   05/13/20 (!) 158.3 kg (349 lb)   05/04/20 (!) 158.8 kg (350 lb)      This visit was conducted through a telephone visit. The physical exam was deferred.      PERTINENT STUDIES:    Anticoagulation Therapy Visit on 05/12/2020   Component Date Value Ref Range Status     INR 05/12/2020 2.5* 0.90 - 1.10 Final         "

## 2020-05-13 NOTE — NURSING NOTE
"Chief Complaint   Patient presents with     Consult     New appointment for iron deficiency and anemia.       Vitals:    05/13/20 1229   Weight: (!) 158.3 kg (349 lb)   Height: 1.575 m (5' 2\")       Body mass index is 63.83 kg/m .                            MARIA DEL CARMEN CERNA, EMT    "

## 2020-05-14 RX ORDER — BUMETANIDE 1 MG/1
TABLET ORAL
Qty: 60 TABLET | Refills: 1 | OUTPATIENT
Start: 2020-05-14

## 2020-05-15 ENCOUNTER — MEDICAL CORRESPONDENCE (OUTPATIENT)
Dept: HEALTH INFORMATION MANAGEMENT | Facility: CLINIC | Age: 60
End: 2020-05-15

## 2020-05-15 ENCOUNTER — TELEPHONE (OUTPATIENT)
Dept: INTERNAL MEDICINE | Facility: CLINIC | Age: 60
End: 2020-05-15

## 2020-05-15 LAB
ALBUMIN SERPL-MCNC: 3 G/DL (ref 3.4–5)
ALP SERPL-CCNC: 135 U/L (ref 40–150)
ALT SERPL W P-5'-P-CCNC: 30 U/L (ref 0–50)
ANION GAP SERPL CALCULATED.3IONS-SCNC: 6 MMOL/L (ref 3–14)
AST SERPL W P-5'-P-CCNC: 30 U/L (ref 0–45)
BASOPHILS # BLD AUTO: 0 10E9/L (ref 0–0.2)
BASOPHILS NFR BLD AUTO: 0.3 %
BILIRUB SERPL-MCNC: 0.3 MG/DL (ref 0.2–1.3)
BUN SERPL-MCNC: 24 MG/DL (ref 7–30)
CALCIUM SERPL-MCNC: 9.2 MG/DL (ref 8.5–10.1)
CHLORIDE SERPL-SCNC: 103 MMOL/L (ref 94–109)
CO2 SERPL-SCNC: 29 MMOL/L (ref 20–32)
CREAT SERPL-MCNC: 1.18 MG/DL (ref 0.52–1.04)
DIFFERENTIAL METHOD BLD: ABNORMAL
EOSINOPHIL # BLD AUTO: 0.3 10E9/L (ref 0–0.7)
EOSINOPHIL NFR BLD AUTO: 3.9 %
ERYTHROCYTE [DISTWIDTH] IN BLOOD BY AUTOMATED COUNT: 14.7 % (ref 10–15)
FERRITIN SERPL-MCNC: 17 NG/ML (ref 8–252)
GFR SERPL CREATININE-BSD FRML MDRD: 50 ML/MIN/{1.73_M2}
GLUCOSE SERPL-MCNC: 88 MG/DL (ref 70–99)
HCT VFR BLD AUTO: 34.9 % (ref 35–47)
HGB BLD-MCNC: 10.5 G/DL (ref 11.7–15.7)
IMM GRANULOCYTES # BLD: 0 10E9/L (ref 0–0.4)
IMM GRANULOCYTES NFR BLD: 0.3 %
IRON SATN MFR SERPL: 6 % (ref 15–46)
IRON SERPL-MCNC: 25 UG/DL (ref 35–180)
LYMPHOCYTES # BLD AUTO: 1.5 10E9/L (ref 0.8–5.3)
LYMPHOCYTES NFR BLD AUTO: 19.3 %
MCH RBC QN AUTO: 28.9 PG (ref 26.5–33)
MCHC RBC AUTO-ENTMCNC: 30.1 G/DL (ref 31.5–36.5)
MCV RBC AUTO: 96 FL (ref 78–100)
MONOCYTES # BLD AUTO: 0.5 10E9/L (ref 0–1.3)
MONOCYTES NFR BLD AUTO: 7.1 %
NEUTROPHILS # BLD AUTO: 5.3 10E9/L (ref 1.6–8.3)
NEUTROPHILS NFR BLD AUTO: 69.1 %
NRBC # BLD AUTO: 0 10*3/UL
NRBC BLD AUTO-RTO: 0 /100
PLATELET # BLD AUTO: 296 10E9/L (ref 150–450)
POTASSIUM SERPL-SCNC: 4 MMOL/L (ref 3.4–5.3)
PROT SERPL-MCNC: 8.6 G/DL (ref 6.8–8.8)
RBC # BLD AUTO: 3.63 10E12/L (ref 3.8–5.2)
SODIUM SERPL-SCNC: 138 MMOL/L (ref 133–144)
TIBC SERPL-MCNC: 398 UG/DL (ref 240–430)
WBC # BLD AUTO: 7.6 10E9/L (ref 4–11)

## 2020-05-15 PROCEDURE — 80053 COMPREHEN METABOLIC PANEL: CPT | Performed by: INTERNAL MEDICINE

## 2020-05-15 PROCEDURE — 85025 COMPLETE CBC W/AUTO DIFF WBC: CPT | Performed by: INTERNAL MEDICINE

## 2020-05-15 PROCEDURE — 83550 IRON BINDING TEST: CPT | Performed by: INTERNAL MEDICINE

## 2020-05-15 PROCEDURE — 83540 ASSAY OF IRON: CPT | Performed by: INTERNAL MEDICINE

## 2020-05-15 PROCEDURE — 82728 ASSAY OF FERRITIN: CPT | Performed by: INTERNAL MEDICINE

## 2020-05-15 RX ORDER — WARFARIN SODIUM 1 MG/1
2 TABLET ORAL DAILY
Qty: 60 TABLET | Refills: 1 | Status: ON HOLD | OUTPATIENT
Start: 2020-05-15 | End: 2021-11-17

## 2020-05-15 RX ORDER — ALLOPURINOL 100 MG/1
200 TABLET ORAL DAILY
Qty: 60 TABLET | Refills: 1 | Status: SHIPPED | OUTPATIENT
Start: 2020-05-15 | End: 2020-07-09

## 2020-05-15 RX ORDER — METOPROLOL TARTRATE 50 MG
TABLET ORAL
Qty: 60 TABLET | Refills: 1 | OUTPATIENT
Start: 2020-05-15

## 2020-05-15 NOTE — TELEPHONE ENCOUNTER
Spoke to Sarah and answered her question about diagnoses associated with the labs being drawn today.   Carrie Baig, EMT at 10:43 AM on 5/15/2020.

## 2020-05-15 NOTE — TELEPHONE ENCOUNTER
ALLOPURINOL 100 MG TABLET       Last Written Prescription Date:  4/20/20  Last Fill Quantity: 60,   # refills: 1  Last Virtual Visit : 4/3/20  Future Office visit:  None scheduled    Routing refill request to provider for review/approval because:  Uric Acid abnormal, last uric acid was 3 years ago in 7 range  Per protocol, routing for review    Lab Test 01/17/20  0334   URIC 9.1*         Warfarin refill for antico clinic

## 2020-05-15 NOTE — TELEPHONE ENCOUNTER
[facility] Call Center    Phone Message    May a detailed message be left on voicemail: yes     Reason for Call: Order(s): Home Care Orders: Other: Sarah needs to talk to Dr Rivas nurse about the labs that need to be drawn for this patient. She is going to be going there about 11am. She needs a call before that time. She needs an ICD code as well for lab.    Action Taken: Message routed to:  [facility]    Travel Screening: Not Applicable

## 2020-05-18 ENCOUNTER — TELEPHONE (OUTPATIENT)
Dept: INTERNAL MEDICINE | Facility: CLINIC | Age: 60
End: 2020-05-18

## 2020-05-18 ENCOUNTER — TELEPHONE (OUTPATIENT)
Dept: ONCOLOGY | Facility: CLINIC | Age: 60
End: 2020-05-18

## 2020-05-18 DIAGNOSIS — D64.9 ANEMIA, UNSPECIFIED TYPE: Primary | ICD-10-CM

## 2020-05-18 NOTE — TELEPHONE ENCOUNTER
Called patient.Left  with hours and phone. Letter sent for follow up. Referral in [facility].

## 2020-05-18 NOTE — TELEPHONE ENCOUNTER
Dear Dnaette;    Please see results letter I sent to Arianna:      (1) Please give her a call to set up a hematology appt.    (2) At some point I would like an in-person visit with her. Let's try for the end of June    ThanksCAROL      Dear Arianna;    I reviewed your recent Cardiology and Gastrointestinal consult notes. You hemoglobin and iron are still low. Ben recommend we have you evaluated by the Hematology providers. I will have my nurse Danette help coordinate this.     CAROL Quiles MD    Spoke to pt and plan of care discussed. Clinic numbers given for questions or concerns . Pt will be called for appt in Hematology.  Danette Mayfield RN 11:10 AM on 5/18/2020.

## 2020-05-18 NOTE — TELEPHONE ENCOUNTER
Patient schedule with Hematology Tuesday, June 2nd at 8:30 AM with Dr. Kianna Schwartz to be drawn at hematology appt.  Danette Mayfield RN 11:06 AM on 5/27/2020.

## 2020-05-20 ENCOUNTER — MEDICAL CORRESPONDENCE (OUTPATIENT)
Dept: HEALTH INFORMATION MANAGEMENT | Facility: CLINIC | Age: 60
End: 2020-05-20

## 2020-05-20 ENCOUNTER — TELEPHONE (OUTPATIENT)
Dept: INTERNAL MEDICINE | Facility: CLINIC | Age: 60
End: 2020-05-20

## 2020-05-20 NOTE — TELEPHONE ENCOUNTER
Called Sarah:  Per Dr. Quiles, gave verbal orders to Sarah for Order(s): Home Care Orders: Skilled Nursing: requesting 1 visit for month of June, for last monthly lab draw and discharge visit.      Elver Sevilla CMA (MA) at 11:33 AM on 5/20/2020

## 2020-05-20 NOTE — TELEPHONE ENCOUNTER
[facility]    Phone Message    May a detailed message be left on voicemail: yes     Reason for Call: Order(s): Home Care Orders: Skilled Nursing: requesting 1 visit for month of June, for last monthly lab draw and discharge visit. Please call when available.     Action Taken: Message routed to:  [facility]    Travel Screening: Not Applicable

## 2020-05-21 ENCOUNTER — TELEPHONE (OUTPATIENT)
Dept: INTERNAL MEDICINE | Facility: CLINIC | Age: 60
End: 2020-05-21

## 2020-05-21 DIAGNOSIS — B49 FUNGAL INFECTION: ICD-10-CM

## 2020-05-21 NOTE — TELEPHONE ENCOUNTER
[facility]    Phone Message    May a detailed message be left on voicemail: yes     Reason for Call: Medication Question or concern regarding medication   Prescription Clarification  Name of Medication: desenex-miconazole powder, 2% antifungal. This is a new RX - it was given to her while she was inpat - she gets moisture under her breasts and then itches - this helps avoid that.     Prescribing Provider: Kb   Pharmacy: [facility] in [place]   What on the order needs clarification? New rx-see above          Action Taken: Message routed to:  [facility]    Travel Screening: Not Applicable

## 2020-05-22 ENCOUNTER — TELEPHONE (OUTPATIENT)
Dept: INTERNAL MEDICINE | Facility: CLINIC | Age: 60
End: 2020-05-22

## 2020-05-22 NOTE — TELEPHONE ENCOUNTER
[facility]    Phone Message    May a detailed message be left on voicemail: yes     Reason for Call: Other:      Sarah is returning a call to Sasha to try to confirm what labs should be drawn in June.     Please call back to discuss.       Action Taken: Message routed to:  [facility]    Travel Screening: Not Applicable

## 2020-05-22 NOTE — TELEPHONE ENCOUNTER
[facility]    Phone Message    May a detailed message be left on voicemail: yes     Reason for Call: Order(s): Home Care Orders: Other: Sarah is asking for home lab orders for June. Please call the nurse at [phone] and that is a conf vm. Thanks.    Action Taken: Message routed to:  [facility]    Travel Screening: Not Applicable     Agree  Matt Mccall MD

## 2020-05-22 NOTE — TELEPHONE ENCOUNTER
I called and approved skilled nursing order for Jane lab draw.   Carrie Baig, EMT at 12:58 PM on 5/22/2020.

## 2020-05-26 NOTE — TELEPHONE ENCOUNTER
ONCOLOGY INTAKE: Records Information      APPT INFORMATION:  Referring provider:  Dr. Gagan Quiles MD  Referring provider s clinic:  [facility]  Reason for visit/diagnosis:  Anemia, unspecified type [D64.9]  Has patient been notified of appointment date and time?: Yes    RECORDS INFORMATION:  Were the records received with the referral (via [employer])? No,Internal Referral      Has patient been seen for any external appt for this diagnosis? No    If yes, where? NA      ADDITIONAL INFORMATION:  Pt requested a telephone visit cause she can't make it to the clinic.

## 2020-05-27 NOTE — TELEPHONE ENCOUNTER
I called and left  for Sarah letting her know that no current labs needed.   Carrie Baig, EMT at 12:08 PM on 5/27/2020.

## 2020-06-05 ENCOUNTER — PRE VISIT (OUTPATIENT)
Dept: ONCOLOGY | Facility: CLINIC | Age: 60
End: 2020-06-05

## 2020-06-09 DIAGNOSIS — M10.9 GOUT, UNSPECIFIED CAUSE, UNSPECIFIED CHRONICITY, UNSPECIFIED SITE: ICD-10-CM

## 2020-06-09 DIAGNOSIS — I48.91 ATRIAL FIBRILLATION, UNSPECIFIED TYPE (H): ICD-10-CM

## 2020-06-11 RX ORDER — WARFARIN SODIUM 1 MG/1
2 TABLET ORAL DAILY
Qty: 60 TABLET | Refills: 1 | OUTPATIENT
Start: 2020-06-11

## 2020-06-11 RX ORDER — ALLOPURINOL 100 MG/1
TABLET ORAL
Qty: 60 TABLET | Refills: 1 | OUTPATIENT
Start: 2020-06-11

## 2020-06-12 ENCOUNTER — MEDICAL CORRESPONDENCE (OUTPATIENT)
Dept: HEALTH INFORMATION MANAGEMENT | Facility: CLINIC | Age: 60
End: 2020-06-12

## 2020-06-23 ENCOUNTER — ANTICOAGULATION THERAPY VISIT (OUTPATIENT)
Dept: ANTICOAGULATION | Facility: CLINIC | Age: 60
End: 2020-06-23

## 2020-06-23 DIAGNOSIS — Z79.01 LONG TERM CURRENT USE OF ANTICOAGULANT THERAPY: ICD-10-CM

## 2020-06-23 DIAGNOSIS — I48.91 ATRIAL FIBRILLATION, UNSPECIFIED TYPE (H): ICD-10-CM

## 2020-06-23 LAB
INR PPP: 2.3 (ref 0.9–1.1)
INR PPP: 2.4 (ref 0.9–1.1)
INR PPP: 2.9 (ref 0.9–1.1)

## 2020-06-23 NOTE — PROGRESS NOTES
ANTICOAGULATION FOLLOW-UP CLINIC VISIT    Patient Name:  Arianna Siddiqi  Date:  2020  Contact Type:  Telephone    SUBJECTIVE:  Patient Findings     Comments:   Spoke with Arianna.  She reports no changes in health, diet, medications.  She reports she has not been taking tramadol-she doesn't like how it makes her feel.            Clinical Outcomes     Comments:   Spoke with Arianna.  She reports no changes in health, diet, medications.  She reports she has not been taking tramadol-she doesn't like how it makes her feel.               OBJECTIVE    Recent labs: (last 7 days)     20   INR 2.9*       ASSESSMENT / PLAN  INR assessment THER    Recheck INR In: 2 WEEKS    INR Location Home INR      Anticoagulation Summary  As of 2020    INR goal:   2.0-3.0   TTR:   87.1 % (3.9 mo)   INR used for dosin.9 (2020)   Warfarin maintenance plan:   3 mg (3 mg x 1) every Mon, Wed, Fri; 4.5 mg (3 mg x 1.5) all other days   Full warfarin instructions:   3 mg every Mon, Wed, Fri; 4.5 mg all other days   Weekly warfarin total:   27 mg   No change documented:   Paradise Pizarro, RN   Plan last modified:   Tanna Dawkins, RN (2020)   Next INR check:   2020   Priority:   High   Target end date:   3/26/2020    Indications    Long-term (current) use of anticoagulants [Z79.01] [Z79.01]  Atrial fibrillation (H) [I48.91] [I48.91]  Atrial fibrillation  unspecified type (H) [I48.91]             Anticoagulation Episode Summary     INR check location:   Home Draw    Preferred lab:       Send INR reminders to:   [facility]    Comments:    [employer] Home Monitoring to start on . Has 3mg tablets previously had 2mg tablets   Pt's voicemail box is full and pt is unable to retrieve voice messages       Anticoagulation Care Providers     Provider Role Specialty Phone number    Gagan Quiles MD Referring Internal Medicine [phone]            See the Encounter Report to view Anticoagulation Flowsheet and  Dosing Calendar (Go to Encounters tab in chart review, and find the Anticoagulation Therapy Visit)    Spoke with Arianna.  She reports she received a reminder letter from the [facility] and she has been checking her INR.  Last two INRs documented in Epic.  Reminded Arianna that if she checks her INR and does not hear from the [facility], she should call.  She states she has straightened out the situation with [employer] and the reports should be coming through now.  She will call the [facility] also with her results.     Paradise Pizarro RN

## 2020-07-05 DIAGNOSIS — M10.9 GOUT, UNSPECIFIED CAUSE, UNSPECIFIED CHRONICITY, UNSPECIFIED SITE: ICD-10-CM

## 2020-07-05 DIAGNOSIS — I48.91 ATRIAL FIBRILLATION, UNSPECIFIED TYPE (H): ICD-10-CM

## 2020-07-07 ENCOUNTER — ANTICOAGULATION THERAPY VISIT (OUTPATIENT)
Dept: ANTICOAGULATION | Facility: CLINIC | Age: 60
End: 2020-07-07

## 2020-07-07 DIAGNOSIS — I48.91 ATRIAL FIBRILLATION, UNSPECIFIED TYPE (H): ICD-10-CM

## 2020-07-07 DIAGNOSIS — Z79.01 LONG TERM CURRENT USE OF ANTICOAGULANT THERAPY: ICD-10-CM

## 2020-07-07 LAB — INR PPP: 2.9 (ref 0.9–1.1)

## 2020-07-07 NOTE — PROGRESS NOTES
ANTICOAGULATION FOLLOW-UP CLINIC VISIT    Patient Name:  Arianna Siddiqi  Date:  2020  Contact Type:  Telephone    SUBJECTIVE:  Patient Findings     Comments:   Spoke to Arianna.        Clinical Outcomes     Comments:   Spoke to Arianna.           OBJECTIVE    Recent labs: (last 7 days)     20   INR 2.9*       ASSESSMENT / PLAN  INR assessment THER    Recheck INR In: 2 WEEKS    INR Location Home INR      Anticoagulation Summary  As of 2020    INR goal:   2.0-3.0   TTR:   87.1 % (3.9 mo)   INR used for dosin.9 (2020)   Warfarin maintenance plan:   3 mg (3 mg x 1) every Mon, Wed, Fri; 4.5 mg (3 mg x 1.5) all other days   Full warfarin instructions:   3 mg every Mon, Wed, Fri; 4.5 mg all other days   Weekly warfarin total:   27 mg   No change documented:   Espinoza Rodriguez RN   Plan last modified:   Tanna Dawkins RN (2020)   Next INR check:   2020   Priority:   High   Target end date:   3/26/2020    Indications    Long-term (current) use of anticoagulants [Z79.01] [Z79.01]  Atrial fibrillation (H) [I48.91] [I48.91]  Atrial fibrillation  unspecified type (H) [I48.91]             Anticoagulation Episode Summary     INR check location:   Home Draw    Preferred lab:       Send INR reminders to:   [facility]    Comments:    [employer] Home Monitoring to start on . Has 3mg tablets previously had 2mg tablets   Pt's voicemail box is full and pt is unable to retrieve voice messages       Anticoagulation Care Providers     Provider Role Specialty Phone number    Gagan Quiles MD Referring Internal Medicine [phone]            See the Encounter Report to view Anticoagulation Flowsheet and Dosing Calendar (Go to Encounters tab in chart review, and find the Anticoagulation Therapy Visit)    INR/CFX/F2 RESULT: INR result is 2.9 per home monitor    ASSESSMENT:No Problems found. No Changes in Health, Medications, or diet. No Signs of bruising, bleeding or clotting.    DOSING ADJUSTMENT:  continue pattern of dosing    NEXT INR/FACTOR X OR FACTOR II:7/21 per [facility]    PROTOCOL FOLLOWED:goal 2-3    Espinoza Rodriguez, RN

## 2020-07-09 DIAGNOSIS — Z79.01 LONG TERM CURRENT USE OF ANTICOAGULANT THERAPY: ICD-10-CM

## 2020-07-09 DIAGNOSIS — I48.91 ATRIAL FIBRILLATION, UNSPECIFIED TYPE (H): ICD-10-CM

## 2020-07-09 RX ORDER — WARFARIN SODIUM 3 MG/1
TABLET ORAL
Qty: 90 TABLET | Refills: 3 | Status: ON HOLD | OUTPATIENT
Start: 2020-07-09 | End: 2021-11-17

## 2020-07-09 NOTE — TELEPHONE ENCOUNTER
7/9/20 Received request from Kathy Doege to refill Warfarin tablets.  Sent 3mg tablets to [facility] (90 tablets, with 3 refills).  Patient has a home monitor.  SONAL Batista

## 2020-07-10 ENCOUNTER — TELEPHONE (OUTPATIENT)
Dept: INTERNAL MEDICINE | Facility: CLINIC | Age: 60
End: 2020-07-10

## 2020-07-10 RX ORDER — WARFARIN SODIUM 1 MG/1
2 TABLET ORAL DAILY
Qty: 60 TABLET | Refills: 1 | OUTPATIENT
Start: 2020-07-10

## 2020-07-10 RX ORDER — ALLOPURINOL 100 MG/1
TABLET ORAL
Qty: 60 TABLET | Refills: 1 | Status: SHIPPED | OUTPATIENT
Start: 2020-07-10 | End: 2020-09-13

## 2020-07-10 NOTE — TELEPHONE ENCOUNTER
Dear Danette;    No bedolla, but I would like to see Ms. Siddiqi in person sometime in 8/2020.    Thanks, CAROL Quiles    Left message for pt to call back.  Danette Mayfield RN 1:17 PM on 7/10/2020.

## 2020-07-10 NOTE — TELEPHONE ENCOUNTER
allopurinol (ZYLOPRIM) 100 MG tablet      Last Written Prescription Date:  5-  Last Fill Quantity: 60,   # refills: 1  Last Office Visit : 4-3-2020  Future Office visit:  none    Routing refill request to provider for review/approval because:  Abnormal Creat and Uric Acid      Kathleen M Doege RN

## 2020-07-16 ENCOUNTER — MEDICAL CORRESPONDENCE (OUTPATIENT)
Dept: HEALTH INFORMATION MANAGEMENT | Facility: CLINIC | Age: 60
End: 2020-07-16

## 2020-07-19 DIAGNOSIS — I50.21 ACUTE SYSTOLIC CONGESTIVE HEART FAILURE (H): ICD-10-CM

## 2020-07-21 ENCOUNTER — ANTICOAGULATION THERAPY VISIT (OUTPATIENT)
Dept: ANTICOAGULATION | Facility: CLINIC | Age: 60
End: 2020-07-21

## 2020-07-21 DIAGNOSIS — I48.91 ATRIAL FIBRILLATION, UNSPECIFIED TYPE (H): ICD-10-CM

## 2020-07-21 DIAGNOSIS — Z79.01 LONG TERM CURRENT USE OF ANTICOAGULANT THERAPY: ICD-10-CM

## 2020-07-21 LAB — INR PPP: 2.8 (ref 0.9–1.1)

## 2020-07-21 NOTE — PROGRESS NOTES
ANTICOAGULATION FOLLOW-UP CLINIC VISIT    Patient Name:  Arianna Siddiqi  Date:  2020  Contact Type:  Telephone    SUBJECTIVE:         OBJECTIVE    Recent labs: (last 7 days)     20   INR 2.8*       ASSESSMENT / PLAN  No question data found.  Anticoagulation Summary  As of 2020    INR goal:   2.0-3.0   TTR:   87.1 % (3.9 mo)   INR used for dosin.8 (2020)   Warfarin maintenance plan:   3 mg (3 mg x 1) every Mon, Wed, Fri; 4.5 mg (3 mg x 1.5) all other days   Full warfarin instructions:   3 mg every Mon, Wed, Fri; 4.5 mg all other days   Weekly warfarin total:   27 mg   No change documented:   Ying Hollis RN   Plan last modified:   Tanna Dawkins RN (2020)   Next INR check:   2020   Priority:   High   Target end date:   3/26/2020    Indications    Long-term (current) use of anticoagulants [Z79.01] [Z79.01]  Atrial fibrillation (H) [I48.91] [I48.91]  Atrial fibrillation  unspecified type (H) [I48.91]             Anticoagulation Episode Summary     INR check location:   Home Draw    Preferred lab:       Send INR reminders to:   [facility]    Comments:    [facility] Monitoring to start on . Has 3mg tablets previously had 2mg tablets   Pt's voicemail box is full and pt is unable to retrieve voice messages       Anticoagulation Care Providers     Provider Role Specialty Phone number    Gagan Quiles MD Referring Internal Medicine [phone]            See the Encounter Report to view Anticoagulation Flowsheet and Dosing Calendar (Go to Encounters tab in chart review, and find the Anticoagulation Therapy Visit)    Spoke with patient.     Ying Hollis, RN

## 2020-07-23 RX ORDER — BUMETANIDE 1 MG/1
TABLET ORAL
Qty: 60 TABLET | Refills: 1 | OUTPATIENT
Start: 2020-07-23

## 2020-07-23 NOTE — TELEPHONE ENCOUNTER
BUMETANIDE 1 MG TABLET   bumetanide (BUMEX) 2 MG tablet  90 tablet  3  5/4/2020   No    Sig - Route: Take 1 tablet (2 mg) by mouth daily - Oral    Sent to pharmacy as: Bumetanide 2 MG Oral Tablet (BUMEX)    Class: E-Prescribe    Order: [id number]    E-Prescribing Status: Receipt confirmed by pharmacy (5/4/2020 12:56 PM CDT)      Brigida Velasquez RN  Central Triage Red Flags/Med Refills

## 2020-07-28 ENCOUNTER — VIRTUAL VISIT (OUTPATIENT)
Dept: ONCOLOGY | Facility: CLINIC | Age: 60
End: 2020-07-28
Attending: INTERNAL MEDICINE
Payer: MEDICARE

## 2020-07-28 DIAGNOSIS — D50.0 IRON DEFICIENCY ANEMIA DUE TO CHRONIC BLOOD LOSS: Primary | ICD-10-CM

## 2020-07-28 PROCEDURE — 99443 ZZC PHYSICIAN TELEPHONE EVALUATION 21-30 MIN: CPT | Performed by: INTERNAL MEDICINE

## 2020-07-28 PROCEDURE — 40001009 ZZH VIDEO/TELEPHONE VISIT; NO CHARGE

## 2020-07-28 NOTE — PROGRESS NOTES
"Arianna Siddiqi is a 60 year old female who is being evaluated via a billable telephone visit.      The patient has been notified of following:     \"This telephone visit will be conducted via a call between you and your physician/provider. We have found that certain health care needs can be provided without the need for a physical exam.  This service lets us provide the care you need with a short phone conversation.  If a prescription is necessary we can send it directly to your pharmacy.  If lab work is needed we can place an order for that and you can then stop by our lab to have the test done at a later time.    Telephone visits are billed at different rates depending on your insurance coverage. During this emergency period, for some insurers they may be billed the same as an in-person visit.  Please reach out to your insurance provider with any questions.    If during the course of the call the physician/provider feels a telephone visit is not appropriate, you will not be charged for this service.\"    Patient has given verbal consent for Telephone visit?  Yes    What phone number would you like to be contacted at? [phone]    How would you like to obtain your AVS? Mail a copy    Vitals - Patient Reported  Weight (Patient Reported): 160.1 kg (353 lb)  Height (Patient Reported): 160 cm (5' 3\")  BMI (Based on Pt Reported Ht/Wt): 62.53  Pain Score: Mild Pain (2)  Pain Loc: Knee    Jewel Allan LPN          [facility] Hematology Consultation    Outpatient Visit Note:    Patient: Arianna Siddiqi  MRN: [id number]  : 1960  LUISANA: 2020    Reason for Consultation:  Arianna Siddiqi is a referred by Dr Quiles for evaluation and treatment of iron deficiency anemia.    History of Present Illness:  Arianna Siddiqi is a 60 year old woman who reports a long history of iron deficiency.  She notes no history of melena or hematochezia.  Does not follow a vegan or vegetarian diet.  She does note some dietary " intolerances but tolerates gluten well.  No abdominal pain.  No other bleeding symptoms.  Chews on ice frequently (attributes to thirst rather than craving chewing on ice).  No restless legs but is restless anyway due to diabetic neuropathy and CARLOS requiring BiPAP.    Past Medical History:  Past Medical History:   Diagnosis Date     CHF (congestive heart failure) (H)      CKD (chronic kidney disease)      Compliance poor      Diabetes mellitus (H)      Gout      Hypertension      Insomnia      Morbid obesity (H)      CARLOS treated with BiPAP        Past Surgical History:  No past surgical history on file.    Medications:  Current Outpatient Medications   Medication Sig     acetaminophen (TYLENOL) 500 MG tablet Take 1-2 tablets (500-1,000 mg) by mouth every 4 hours as needed for mild pain     allopurinol (ZYLOPRIM) 100 MG tablet TAKE 2 TABLETS BY MOUTH EVERY DAY     alum & mag hydroxide-simethicone (MAALOX  ES) 400-400-40 MG/5ML SUSP suspension Take 15 mLs by mouth every 4 hours as needed for other (gastric distress.)     atorvastatin (LIPITOR) 20 MG tablet Take 1 tablet (20 mg) by mouth daily     blood glucose monitoring (ACCU-CHEK NINA PLUS) meter device kit Use to test blood sugars 3 times daily or as directed.     blood glucose monitoring (SOFTCLIX) lancets Use to test blood sugar 3 times daily or as directed.     bumetanide (BUMEX) 2 MG tablet Take 1 tablet (2 mg) by mouth daily     cyclobenzaprine (FLEXERIL) 5 MG tablet Take 1 tablet (5 mg) by mouth nightly as needed for muscle spasms     diphenhydrAMINE (BENADRYL) 25 MG tablet Take 25 mg by mouth every 6 hours as needed for itching or allergies     ferrous gluconate (FERGON) 324 (38 Fe) MG tablet Take 1 tablet (324 mg) by mouth daily     lisinopril (ZESTRIL) 5 MG tablet Take 1 tablet (5 mg) by mouth daily     metoprolol succinate ER (TOPROL-XL) 100 MG 24 hr tablet Take 1 tablet (100 mg) by mouth daily     miconazole (MICATIN) 2 % external powder Apply topically  2 times daily     ondansetron (ZOFRAN-ODT) 4 MG ODT tab Take 1 tablet (4 mg) by mouth every 6 hours as needed for nausea or vomiting     polyethylene glycol (MIRALAX) packet Take 17 g by mouth daily as needed for constipation     potassium chloride ER (K-TAB) 20 MEQ CR tablet Take 1 tablet (20 mEq) by mouth 2 times daily     sennosides (SENOKOT) 8.6 MG tablet Take 1 tablet by mouth 2 times daily as needed for constipation     traMADol (ULTRAM) 50 MG tablet Take 1 tablet (50 mg) by mouth nightly as needed for severe pain     warfarin ANTICOAGULANT (COUMADIN) 1 MG tablet TAKE 2 TABLETS (2 MG) BY MOUTH DAILY USE AS DIRECTED BY COUMADIN CLINIC     warfarin ANTICOAGULANT (COUMADIN) 3 MG tablet Take 1 to 1.5 (one and one-half) tablets by mouth daily or as directed by the Coumadin clinic.     warfarin ANTICOAGULANT (COUMADIN) 3 MG tablet Take one to one in a half tablets daily or as directed by the Coumadin clinic     No current facility-administered medications for this visit.        Allergies:  Allergies   Allergen Reactions     Penicillins Itching and Rash     Tolerated ceftriaxone 1/17/2020       ROS:  A 14 point ROS is negative except as stated in the HPI    Social History:  Denies any tobacco use. No significant alcohol use. Denies any illicit drug use.  She is disabled and lives alone    Family History:  None of blood disorders      Objective:  By telephone she appears in no distress, comfortable.  No shortness of breath or cough.  Affect is normal and speech is normal.    Labs:   Results for DUSTIN FERNANDES (MRN [id number]) as of 7/28/2020 10:42   Ref. Range 11/16/2016 13:16 4/6/2020 09:35 5/15/2020 10:28   Ferritin Latest Ref Range: 8 - 252 ng/mL 13 28 17     Results for DUSTIN FERNANDES (MRN [id number]) as of 7/28/2020 10:42   Ref. 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  I think she would benefit from IV iron, however she is concerned about the risk of COVID-19 right now and wants to defer this until a safer time.    Plan:  1. Majority of today's visit was spent counseling the patient regarding causes and treatment of iron deficiency anemia.  2. Discussed IV iron treatments today including risks and treatment schedule of iron dextran (1 dose) vs ferrous carboxymaltose (2 short infusions).  She says that given transportation challenges she would prefer Iron Dextran but will wait until risk of COVID19 improves.   3. She will call us if symptoms worsen or she wants to proceed with IV iron and gave her our contact information today.  4. She plans to followup with GI for colonoscopy and EGD in near future.    The patient is given our center's contact information and is instructed to call if she should have any further questions or concerns.      Jorge Dumont MD   of Medicine  [facility] [employer]     Phone call duration: 23 minutes

## 2020-07-28 NOTE — LETTER
"    2020         RE: Arianna Siddiqi  [address]  [place] [id number]        Dear Colleague,    Thank you for referring your patient, Arianna Siddiqi, to the [facility]. Please see a copy of my visit note below.    Arianna Siddiqi is a 60 year old female who is being evaluated via a billable telephone visit.        Vitals - Patient Reported  Weight (Patient Reported): 160.1 kg (353 lb)  Height (Patient Reported): 160 cm (5' 3\")  BMI (Based on Pt Reported Ht/Wt): 62.53  Pain Score: Mild Pain (2)  Pain Loc: Knee    Jewel Brendan Sealssanju [facility] Hematology Consultation    Outpatient Visit Note:    Patient: Arianna Siddiqi  MRN: [id number]  : 1960  LUISANA: 2020    Reason for Consultation:  Arianna Siddiqi is a referred by Dr Quiles for evaluation and treatment of iron deficiency anemia.    History of Present Illness:  Arianna Siddiqi is a 60 year old woman who reports a long history of iron deficiency.  She notes no history of melena or hematochezia.  Does not follow a vegan or vegetarian diet.  She does note some dietary intolerances but tolerates gluten well.  No abdominal pain.  No other bleeding symptoms.  Chews on ice frequently (attributes to thirst rather than craving chewing on ice).  No restless legs but is restless anyway due to diabetic neuropathy and CARLOS requiring BiPAP.    Past Medical History:  Past Medical History:   Diagnosis Date     CHF (congestive heart failure) (H)      CKD (chronic kidney disease)      Compliance poor      Diabetes mellitus (H)      Gout      Hypertension      Insomnia      Morbid obesity (H)      CARLOS treated with BiPAP        Past Surgical History:  No past surgical history on file.    Medications:  Current Outpatient Medications   Medication Sig     acetaminophen (TYLENOL) 500 MG tablet Take 1-2 tablets (500-1,000 mg) by mouth every 4 hours as needed for mild pain     allopurinol (ZYLOPRIM) 100 MG tablet TAKE 2 " TABLETS BY MOUTH EVERY DAY     alum & mag hydroxide-simethicone (MAALOX  ES) 400-400-40 MG/5ML SUSP suspension Take 15 mLs by mouth every 4 hours as needed for other (gastric distress.)     atorvastatin (LIPITOR) 20 MG tablet Take 1 tablet (20 mg) by mouth daily     blood glucose monitoring (ACCU-CHEK NINA PLUS) meter device kit Use to test blood sugars 3 times daily or as directed.     blood glucose monitoring (SOFTCLIX) lancets Use to test blood sugar 3 times daily or as directed.     bumetanide (BUMEX) 2 MG tablet Take 1 tablet (2 mg) by mouth daily     cyclobenzaprine (FLEXERIL) 5 MG tablet Take 1 tablet (5 mg) by mouth nightly as needed for muscle spasms     diphenhydrAMINE (BENADRYL) 25 MG tablet Take 25 mg by mouth every 6 hours as needed for itching or allergies     ferrous gluconate (FERGON) 324 (38 Fe) MG tablet Take 1 tablet (324 mg) by mouth daily     lisinopril (ZESTRIL) 5 MG tablet Take 1 tablet (5 mg) by mouth daily     metoprolol succinate ER (TOPROL-XL) 100 MG 24 hr tablet Take 1 tablet (100 mg) by mouth daily     miconazole (MICATIN) 2 % external powder Apply topically 2 times daily     ondansetron (ZOFRAN-ODT) 4 MG ODT tab Take 1 tablet (4 mg) by mouth every 6 hours as needed for nausea or vomiting     polyethylene glycol (MIRALAX) packet Take 17 g by mouth daily as needed for constipation     potassium chloride ER (K-TAB) 20 MEQ CR tablet Take 1 tablet (20 mEq) by mouth 2 times daily     sennosides (SENOKOT) 8.6 MG tablet Take 1 tablet by mouth 2 times daily as needed for constipation     traMADol (ULTRAM) 50 MG tablet Take 1 tablet (50 mg) by mouth nightly as needed for severe pain     warfarin ANTICOAGULANT (COUMADIN) 1 MG tablet TAKE 2 TABLETS (2 MG) BY MOUTH DAILY USE AS DIRECTED BY COUMADIN CLINIC     warfarin ANTICOAGULANT (COUMADIN) 3 MG tablet Take 1 to 1.5 (one and one-half) tablets by mouth daily or as directed by the Coumadin clinic.     warfarin ANTICOAGULANT (COUMADIN) 3 MG tablet  Take one to one in a half tablets daily or as directed by the Coumadin clinic     No current facility-administered medications for this visit.        Allergies:  Allergies   Allergen Reactions     Penicillins Itching and Rash     Tolerated ceftriaxone 1/17/2020       ROS:  A 14 point ROS is negative except as stated in the HPI    Social History:  Denies any tobacco use. No significant alcohol use. Denies any illicit drug use.  She is disabled and lives alone    Family History:  None of blood disorders      Objective:  By telephone she appears in no distress, comfortable.  No shortness of breath or cough.  Affect is normal and speech is normal.    Labs:   Results for DUSTIN FERNANDES (MRN [id number]) as of 7/28/2020 10:42   Ref. Range 11/16/2016 13:16 4/6/2020 09:35 5/15/2020 10:28   Ferritin Latest Ref Range: 8 - 252 ng/mL 13 28 17     Results for DUSTIN FERNANDES (MRN [id number]) as of 7/28/2020 10:42   Ref. 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  I think she would benefit from IV iron, however she is concerned about the risk of COVID-19 right now and wants to defer this until a safer time.    Plan:  1. Majority of today's visit was spent counseling the patient regarding causes and treatment of iron deficiency anemia.  2. Discussed IV iron treatments today including risks and treatment schedule of iron dextran (1 dose) vs ferrous carboxymaltose (2 short infusions).  She says that given transportation challenges she would prefer Iron Dextran but will wait until risk of COVID19 improves.   3. She will call us if symptoms worsen or she wants to proceed with IV iron and gave her our contact information today.  4. She plans to  followup with GI for colonoscopy and EGD in near future.    The patient is given our center's contact information and is instructed to call if she should have any further questions or concerns.      Jorge Dumont MD   of Medicine  [facility] [employer]     Phone call duration: 23 minutes

## 2020-08-06 ENCOUNTER — ANTICOAGULATION THERAPY VISIT (OUTPATIENT)
Dept: ANTICOAGULATION | Facility: CLINIC | Age: 60
End: 2020-08-06

## 2020-08-06 DIAGNOSIS — Z79.01 LONG TERM CURRENT USE OF ANTICOAGULANT THERAPY: ICD-10-CM

## 2020-08-06 DIAGNOSIS — I48.91 ATRIAL FIBRILLATION, UNSPECIFIED TYPE (H): ICD-10-CM

## 2020-08-06 LAB — INR PPP: 2.6 (ref 0.9–1.1)

## 2020-08-06 NOTE — PROGRESS NOTES
ANTICOAGULATION FOLLOW-UP CLINIC VISIT    Patient Name:  Arianna Siddiqi  Date:  2020  Contact Type:  Telephone    SUBJECTIVE:         OBJECTIVE    Recent labs: (last 7 days)     20   INR 2.6*       ASSESSMENT / PLAN  No question data found.  Anticoagulation Summary  As of 2020    INR goal:   2.0-3.0   TTR:   88.5 % (4.4 mo)   INR used for dosin.6 (2020)   Warfarin maintenance plan:   3 mg (3 mg x 1) every Mon, Wed, Fri; 4.5 mg (3 mg x 1.5) all other days   Full warfarin instructions:   3 mg every Mon, Wed, Fri; 4.5 mg all other days   Weekly warfarin total:   27 mg   Plan last modified:   Tanna Dawkins RN (2020)   Next INR check:   2020   Priority:   High   Target end date:   3/26/2020    Indications    Long-term (current) use of anticoagulants [Z79.01] [Z79.01]  Atrial fibrillation (H) [I48.91] [I48.91]  Atrial fibrillation  unspecified type (H) [I48.91]             Anticoagulation Episode Summary     INR check location:   Home Draw    Preferred lab:       Send INR reminders to:   [facility]    Comments:    [facility] Monitoring to start on . Has 3mg tablets previously had 2mg tablets   Pt's voicemail box is full and pt is unable to retrieve voice messages       Anticoagulation Care Providers     Provider Role Specialty Phone number    Gagan Quiles MD Referring Internal Medicine [phone]            See the Encounter Report to view Anticoagulation Flowsheet and Dosing Calendar (Go to Encounters tab in chart review, and find the Anticoagulation Therapy Visit)    Spoke with patient.     Ying Hollis RN

## 2020-08-08 DIAGNOSIS — M10.9 GOUT, UNSPECIFIED CAUSE, UNSPECIFIED CHRONICITY, UNSPECIFIED SITE: ICD-10-CM

## 2020-08-12 RX ORDER — ALLOPURINOL 100 MG/1
TABLET ORAL
Qty: 60 TABLET | Refills: 1 | OUTPATIENT
Start: 2020-08-12

## 2020-08-15 DIAGNOSIS — B49 FUNGAL INFECTION: ICD-10-CM

## 2020-08-15 DIAGNOSIS — I50.21 ACUTE SYSTOLIC CONGESTIVE HEART FAILURE (H): ICD-10-CM

## 2020-08-18 ENCOUNTER — ANTICOAGULATION THERAPY VISIT (OUTPATIENT)
Dept: ANTICOAGULATION | Facility: CLINIC | Age: 60
End: 2020-08-18

## 2020-08-18 DIAGNOSIS — I48.91 ATRIAL FIBRILLATION, UNSPECIFIED TYPE (H): ICD-10-CM

## 2020-08-18 DIAGNOSIS — Z79.01 LONG TERM CURRENT USE OF ANTICOAGULANT THERAPY: ICD-10-CM

## 2020-08-18 LAB — INR PPP: 2.3 (ref 0.9–1.1)

## 2020-08-18 NOTE — PROGRESS NOTES
ANTICOAGULATION FOLLOW-UP CLINIC VISIT    Patient Name:  Arianna Siddiqi  Date:  2020  Contact Type:  Telephone    SUBJECTIVE:         OBJECTIVE    Recent labs: (last 7 days)     20   INR 2.3*       ASSESSMENT / PLAN  INR assessment THER    Recheck INR In: 2 WEEKS    INR Location Home INR      Anticoagulation Summary  As of 2020    INR goal:   2.0-3.0   TTR:   89.5 % (4.8 mo)   INR used for dosin.3 (2020)   Warfarin maintenance plan:   3 mg (3 mg x 1) every Mon, Wed, Fri; 4.5 mg (3 mg x 1.5) all other days   Full warfarin instructions:   3 mg every Mon, Wed, Fri; 4.5 mg all other days   Weekly warfarin total:   27 mg   Plan last modified:   Tanna Dawkins RN (2020)   Next INR check:   2020   Priority:   High   Target end date:   3/26/2020    Indications    Long-term (current) use of anticoagulants [Z79.01] [Z79.01]  Atrial fibrillation (H) [I48.91] [I48.91]  Atrial fibrillation  unspecified type (H) [I48.91]             Anticoagulation Episode Summary     INR check location:   Home Draw    Preferred lab:       Send INR reminders to:   [facility]    Comments:    [facility] Monitoring to start on . Has 3mg tablets previously had 2mg tablets   Pt's voicemail box is full and pt is unable to retrieve voice messages       Anticoagulation Care Providers     Provider Role Specialty Phone number    Gagan Quiles MD Referring Internal Medicine [phone]            See the Encounter Report to view Anticoagulation Flowsheet and Dosing Calendar (Go to Encounters tab in chart review, and find the Anticoagulation Therapy Visit)  Spoke with patient.  Carol Hoffman RN

## 2020-08-19 RX ORDER — BUMETANIDE 1 MG/1
TABLET ORAL
Qty: 60 TABLET | Refills: 1 | OUTPATIENT
Start: 2020-08-19

## 2020-08-19 NOTE — TELEPHONE ENCOUNTER
[facility]     Last Written Prescription Date:  5/23/2020  Last Fill Quantity: 180,   # refills: 1  Last Office Visit : 4/3/2020  Future Office visit:  8/24/2020    Routing refill request to provider for review/approval because:  Drug not on the [facility], P or  Health refill protocol or controlled substance      Brigida Velasquez RN  Central Triage Red Flags/Med Refills    BUMETANIDE 1 MG TABLET   bumetanide (BUMEX) 2 MG tablet  90 tablet  3  5/4/2020   No    Sig - Route: Take 1 tablet (2 mg) by mouth daily - Oral    Sent to pharmacy as: Bumetanide 2 MG Oral Tablet (BUMEX)    Class: E-Prescribe    Order: [id number]    E-Prescribing Status: Receipt confirmed by pharmacy (5/4/2020 12:56 PM CDT)

## 2020-08-20 DIAGNOSIS — I50.21 ACUTE SYSTOLIC CONGESTIVE HEART FAILURE (H): ICD-10-CM

## 2020-08-25 RX ORDER — BUMETANIDE 1 MG/1
TABLET ORAL
Qty: 60 TABLET | Refills: 1 | OUTPATIENT
Start: 2020-08-25

## 2020-08-30 DIAGNOSIS — D64.9 ANEMIA, UNSPECIFIED TYPE: ICD-10-CM

## 2020-08-30 DIAGNOSIS — Z79.01 ANTICOAGULATED ON COUMADIN: ICD-10-CM

## 2020-08-30 DIAGNOSIS — R52 PAIN: ICD-10-CM

## 2020-09-01 ENCOUNTER — ANTICOAGULATION THERAPY VISIT (OUTPATIENT)
Dept: ANTICOAGULATION | Facility: CLINIC | Age: 60
End: 2020-09-01

## 2020-09-01 DIAGNOSIS — Z79.01 LONG TERM CURRENT USE OF ANTICOAGULANT THERAPY: ICD-10-CM

## 2020-09-01 DIAGNOSIS — I48.91 ATRIAL FIBRILLATION, UNSPECIFIED TYPE (H): ICD-10-CM

## 2020-09-01 LAB — INR PPP: 2.2 (ref 0.9–1.1)

## 2020-09-01 NOTE — PROGRESS NOTES
ANTICOAGULATION FOLLOW-UP CLINIC VISIT    Patient Name:  Arianna Siddiqi  Date:  2020  Contact Type:  Telephone    SUBJECTIVE:         OBJECTIVE    Recent labs: (last 7 days)     20   INR 2.2*       ASSESSMENT / PLAN  INR assessment THER    Recheck INR In: 2 WEEKS    INR Location Home INR      Anticoagulation Summary  As of 2020    INR goal:   2.0-3.0   TTR:   90.4 % (5.3 mo)   INR used for dosin.2 (2020)   Warfarin maintenance plan:   3 mg (3 mg x 1) every Mon, Wed, Fri; 4.5 mg (3 mg x 1.5) all other days   Full warfarin instructions:   3 mg every Mon, Wed, Fri; 4.5 mg all other days   Weekly warfarin total:   27 mg   No change documented:   Carol Hoffman RN   Plan last modified:   Tanna Dawkins RN (2020)   Next INR check:   9/15/2020   Priority:   High   Target end date:   3/26/2020    Indications    Long-term (current) use of anticoagulants [Z79.01] [Z79.01]  Atrial fibrillation (H) [I48.91] [I48.91]  Atrial fibrillation  unspecified type (H) [I48.91]             Anticoagulation Episode Summary     INR check location:   Home Draw    Preferred lab:       Send INR reminders to:   [facility]    Comments:    [facility] Monitoring to start on . Has 3mg tablets previously had 2mg tablets   Pt's voicemail box is full and pt is unable to retrieve voice messages       Anticoagulation Care Providers     Provider Role Specialty Phone number    Gagan Quiles MD Referring Internal Medicine [phone]            See the Encounter Report to view Anticoagulation Flowsheet and Dosing Calendar (Go to Encounters tab in chart review, and find the Anticoagulation Therapy Visit)  Spoke with patient.    Carol Hoffman, RN

## 2020-09-08 NOTE — TELEPHONE ENCOUNTER
Patient Requested  traMADol (ULTRAM) 50 MG tablet   Last Filled  04/06/2020  Last Office Visit  08/24/2020  Next Office Visit     Checked  09/08/2020    DX:     Pharmacy:  MYNOR LONG CMA at 9:51 AM on 9/8/2020.

## 2020-09-09 DIAGNOSIS — M10.9 GOUT, UNSPECIFIED CAUSE, UNSPECIFIED CHRONICITY, UNSPECIFIED SITE: ICD-10-CM

## 2020-09-10 RX ORDER — TRAMADOL HYDROCHLORIDE 50 MG/1
TABLET ORAL
Qty: 7 TABLET | Refills: 4 | Status: SHIPPED | OUTPATIENT
Start: 2020-09-10 | End: 2021-04-22

## 2020-09-13 NOTE — TELEPHONE ENCOUNTER
allopurinol (ZYLOPRIM) 100 MG tablet   Last Written Prescription Date:  7/10/20  Last Fill Quantity: 60,   # refills: 1  Last Office Visit : 8/24/20  Future Office visit:  None    liz CAO  Reviewed By   Gagan Quiles MD on 5/18/2020  8:34 AM      Routing refill request to provider for review/approval because:  abnormal uric acid,   Ordered by other provider.

## 2020-09-15 ENCOUNTER — ANTICOAGULATION THERAPY VISIT (OUTPATIENT)
Dept: ANTICOAGULATION | Facility: CLINIC | Age: 60
End: 2020-09-15

## 2020-09-15 DIAGNOSIS — Z79.01 LONG TERM CURRENT USE OF ANTICOAGULANT THERAPY: ICD-10-CM

## 2020-09-15 DIAGNOSIS — I48.91 ATRIAL FIBRILLATION, UNSPECIFIED TYPE (H): ICD-10-CM

## 2020-09-15 LAB — INR PPP: 2.4 (ref 0.9–1.1)

## 2020-09-15 RX ORDER — WARFARIN SODIUM 3 MG/1
TABLET ORAL
Qty: 90 TABLET | Refills: 1 | Status: SHIPPED | OUTPATIENT
Start: 2020-09-15 | End: 2020-12-17

## 2020-09-15 NOTE — PROGRESS NOTES
ANTICOAGULATION FOLLOW-UP CLINIC VISIT    Patient Name:  Arianna Siddiqi  Date:  9/15/2020  Contact Type:  Telephone    SUBJECTIVE:  Patient Findings     Comments:   Arianna hollis.  Updated calendar.  Sent 3mg Warfarin tablets to [facility] in [place] per patient request.        Clinical Outcomes     Comments:   Arianna hollis.  Updated calendar.  Sent 3mg Warfarin tablets to [facility] in [place] per patient request.           OBJECTIVE    Recent labs: (last 7 days)     09/15/20   INR 2.4*       ASSESSMENT / PLAN  INR assessment THER    Recheck INR In: 2 WEEKS    INR Location Home INR      Anticoagulation Summary  As of 9/15/2020    INR goal:   2.0-3.0   TTR:   91.2 % (5.8 mo)   INR used for dosin.4 (9/15/2020)   Warfarin maintenance plan:   3 mg (3 mg x 1) every Mon, Wed, Fri; 4.5 mg (3 mg x 1.5) all other days   Full warfarin instructions:   3 mg every Mon, Wed, Fri; 4.5 mg all other days   Weekly warfarin total:   27 mg   No change documented:   Espinoza Rodriguez RN   Plan last modified:   Tanna Dawkins RN (2020)   Next INR check:   2020   Priority:   High   Target end date:   3/26/2020    Indications    Long-term (current) use of anticoagulants [Z79.01] [Z79.01]  Atrial fibrillation (H) [I48.91] [I48.91]  Atrial fibrillation  unspecified type (H) [I48.91]             Anticoagulation Episode Summary     INR check location:   Home Draw    Preferred lab:       Send INR reminders to:   [facility]    Comments:    [facility] to start on . Has 3mg tablets previously had 2mg tablets   Pt's voicemail box is full and pt is unable to retrieve voice messages       Anticoagulation Care Providers     Provider Role Specialty Phone number    Gagan Quiles MD Referring Internal Medicine [phone]            See the Encounter Report to view Anticoagulation Flowsheet and Dosing Calendar (Go to Encounters tab in chart review, and find the Anticoagulation Therapy  Visit)    INR/CFX/F2 RESULT:INR result is 2.4 per patient on home monitor    ASSESSMENT:Spoke with patient. Gave them their lab results and new warfarin recommendation.  No changes in health, medication, or diet. No missed doses, no falls. No signs or symptoms of bleed or clotting.    DOSING ADJUSTMENT: continue pattern of dosing    NEXT INR/FACTOR X OR FACTOR II:9/29    PROTOCOL FOLLOWED:goal 2-3    Espinoza Rodriguez RN

## 2020-09-16 RX ORDER — ALLOPURINOL 100 MG/1
200 TABLET ORAL DAILY
Qty: 60 TABLET | Refills: 1 | Status: SHIPPED | OUTPATIENT
Start: 2020-09-16 | End: 2020-10-13

## 2020-09-17 DIAGNOSIS — E78.5 HYPERLIPIDEMIA, UNSPECIFIED HYPERLIPIDEMIA TYPE: ICD-10-CM

## 2020-09-17 DIAGNOSIS — M10.9 GOUT, UNSPECIFIED CAUSE, UNSPECIFIED CHRONICITY, UNSPECIFIED SITE: ICD-10-CM

## 2020-09-17 DIAGNOSIS — I10 HYPERTENSION, UNSPECIFIED TYPE: ICD-10-CM

## 2020-09-17 DIAGNOSIS — I10 ESSENTIAL HYPERTENSION: ICD-10-CM

## 2020-09-17 DIAGNOSIS — I50.21 ACUTE SYSTOLIC CONGESTIVE HEART FAILURE (H): Primary | ICD-10-CM

## 2020-09-17 DIAGNOSIS — I48.91 ATRIAL FIBRILLATION, UNSPECIFIED TYPE (H): ICD-10-CM

## 2020-09-18 ENCOUNTER — DOCUMENTATION ONLY (OUTPATIENT)
Dept: CARE COORDINATION | Facility: CLINIC | Age: 60
End: 2020-09-18

## 2020-09-21 RX ORDER — POTASSIUM CHLORIDE 1500 MG/1
20 TABLET, EXTENDED RELEASE ORAL 2 TIMES DAILY
Qty: 180 TABLET | Refills: 3 | Status: SHIPPED | OUTPATIENT
Start: 2020-09-21 | End: 2020-12-08

## 2020-09-29 ENCOUNTER — ANTICOAGULATION THERAPY VISIT (OUTPATIENT)
Dept: ANTICOAGULATION | Facility: CLINIC | Age: 60
End: 2020-09-29

## 2020-09-29 DIAGNOSIS — I48.91 ATRIAL FIBRILLATION, UNSPECIFIED TYPE (H): ICD-10-CM

## 2020-09-29 DIAGNOSIS — Z79.01 LONG TERM CURRENT USE OF ANTICOAGULANT THERAPY: ICD-10-CM

## 2020-09-29 LAB — INR PPP: 2.4 (ref 0.9–1.1)

## 2020-09-29 NOTE — PROGRESS NOTES
ANTICOAGULATION FOLLOW-UP CLINIC VISIT    Patient Name:  Arianna Siddiqi  Date:  2020  Contact Type:  Telephone    SUBJECTIVE:  Patient Findings     Comments:   Arianna hollis.  Updated calendar.        Clinical Outcomes     Comments:   Arianna hollis.  Updated calendar.           OBJECTIVE    Recent labs: (last 7 days)     20   INR 2.4*       ASSESSMENT / PLAN  INR assessment THER    Recheck INR In: 2 WEEKS    INR Location Home INR      Anticoagulation Summary  As of 2020    INR goal:   2.0-3.0   TTR:   91.8 % (6.2 mo)   INR used for dosin.4 (2020)   Warfarin maintenance plan:   3 mg (3 mg x 1) every Mon, Wed, Fri; 4.5 mg (3 mg x 1.5) all other days   Full warfarin instructions:   3 mg every Mon, Wed, Fri; 4.5 mg all other days   Weekly warfarin total:   27 mg   No change documented:   Espinoza Rodriguez RN   Plan last modified:   Tanna Dawkins RN (2020)   Next INR check:   10/13/2020   Priority:   High   Target end date:   3/26/2020    Indications    Long-term (current) use of anticoagulants [Z79.01] [Z79.01]  Atrial fibrillation (H) [I48.91] [I48.91]  Atrial fibrillation  unspecified type (H) [I48.91]             Anticoagulation Episode Summary     INR check location:   Home Draw    Preferred lab:       Send INR reminders to:   [facility]    Comments:    [employer] [facility] Monitoring to start on . Has 3mg tablets previously had 2mg tablets   Pt's voicemail box is full and pt is unable to retrieve voice messages       Anticoagulation Care Providers     Provider Role Specialty Phone number    Gagan Quiles MD Referring Internal Medicine [phone]            See the Encounter Report to view Anticoagulation Flowsheet and Dosing Calendar (Go to Encounters tab in chart review, and find the Anticoagulation Therapy Visit)    INR/CFX/F2 RESULT: INR result is 2.4 per home monitor    ASSESSMENT:No Problems found. No Changes in Health, Medications, or diet. No Signs of bruising,  bleeding or clotting.    DOSING ADJUSTMENT:continue pattern of dosing    NEXT INR/FACTOR X OR FACTOR II:10/13    PROTOCOL FOLLOWED:goal 2-3    Espinoza Rodriguez, RN

## 2020-10-09 DIAGNOSIS — M10.9 GOUT, UNSPECIFIED CAUSE, UNSPECIFIED CHRONICITY, UNSPECIFIED SITE: ICD-10-CM

## 2020-10-13 ENCOUNTER — ANTICOAGULATION THERAPY VISIT (OUTPATIENT)
Dept: ANTICOAGULATION | Facility: CLINIC | Age: 60
End: 2020-10-13

## 2020-10-13 DIAGNOSIS — Z79.01 LONG TERM CURRENT USE OF ANTICOAGULANT THERAPY: ICD-10-CM

## 2020-10-13 DIAGNOSIS — I48.91 ATRIAL FIBRILLATION, UNSPECIFIED TYPE (H): ICD-10-CM

## 2020-10-13 LAB — INR PPP: 2.3 (ref 0.9–1.1)

## 2020-10-13 RX ORDER — ALLOPURINOL 100 MG/1
200 TABLET ORAL DAILY
Qty: 60 TABLET | Refills: 1 | Status: SHIPPED | OUTPATIENT
Start: 2020-10-13 | End: 2020-12-02

## 2020-10-13 NOTE — TELEPHONE ENCOUNTER
ALLOPURINOL 100 MG TABLET      Last Written Prescription Date:  9/16/20  Last Fill Quantity: 60,   # refills: 1  Last Office Visit : 4/3/20 with 2 week telephone visit recommended  Future Office visit: None scheduled, no showed 8/24/20    Routing refill request to provider for review/approval because:  Abnormal labs    Lab Test 01/17/20  0334   URIC 9.1*     Lab Test 05/15/20  1028 01/15/20  2207 01/15/20  2207   CR 1.18*   < >  --    CREAT  --   --  0.9     Creatinine prior to 5/15/20 within normal limits  Uric acid done in patient

## 2020-10-13 NOTE — PROGRESS NOTES
ANTICOAGULATION FOLLOW-UP CLINIC VISIT    Patient Name:  Arianna Siddiqi  Date:  10/13/2020  Contact Type:  Telephone    SUBJECTIVE:         OBJECTIVE    Recent labs: (last 7 days)     10/13/20   INR 2.3*       ASSESSMENT / PLAN  INR assessment THER    Recheck INR In: 2 WEEKS    INR Location Home INR      Anticoagulation Summary  As of 10/13/2020    INR goal:  2.0-3.0   TTR:  92.4 % (6.7 mo)   INR used for dosin.3 (10/13/2020)   Warfarin maintenance plan:  3 mg (3 mg x 1) every Mon, Wed, Fri; 4.5 mg (3 mg x 1.5) all other days   Full warfarin instructions:  3 mg every Mon, Wed, Fri; 4.5 mg all other days   Weekly warfarin total:  27 mg   No change documented:  Carol Hoffman RN   Plan last modified:  Tanna Dawkins RN (2020)   Next INR check:  10/27/2020   Priority:  High   Target end date:  3/26/2020    Indications    Long-term (current) use of anticoagulants [Z79.01] [Z79.01]  Atrial fibrillation (H) [I48.91] [I48.91]  Atrial fibrillation  unspecified type (H) [I48.91]             Anticoagulation Episode Summary     INR check location:  Home Draw    Preferred lab:      Send INR reminders to:  [facility]    Comments:   [employer] [facility] to start on . Has 3mg tablets previously had 2mg tablets   Pt's voicemail box is full and pt is unable to retrieve voice messages       Anticoagulation Care Providers     Provider Role Specialty Phone number    Gagan Quiles MD Referring Internal Medicine [phone]            See the Encounter Report to view Anticoagulation Flowsheet and Dosing Calendar (Go to Encounters tab in chart review, and find the Anticoagulation Therapy Visit)  Spoke with patient.    Carol Hoffman, RN

## 2020-10-27 ENCOUNTER — TRANSFERRED RECORDS (OUTPATIENT)
Dept: HEALTH INFORMATION MANAGEMENT | Facility: CLINIC | Age: 60
End: 2020-10-27

## 2020-10-27 ENCOUNTER — ANTICOAGULATION THERAPY VISIT (OUTPATIENT)
Dept: ANTICOAGULATION | Facility: CLINIC | Age: 60
End: 2020-10-27

## 2020-10-27 DIAGNOSIS — I48.91 ATRIAL FIBRILLATION, UNSPECIFIED TYPE (H): ICD-10-CM

## 2020-10-27 DIAGNOSIS — Z79.01 LONG TERM CURRENT USE OF ANTICOAGULANT THERAPY: ICD-10-CM

## 2020-10-27 LAB — INR PPP: 2.9 (ref 0.9–1.1)

## 2020-10-27 NOTE — PROGRESS NOTES
ANTICOAGULATION FOLLOW-UP CLINIC VISIT    Patient Name:  Arianna Siddiqi  Date:  10/27/2020  Contact Type:  Telephone    SUBJECTIVE:  Patient Findings     Comments:  Spoke to Arianna.        Clinical Outcomes     Comments:  Spoke to Arianna.           OBJECTIVE    Recent labs: (last 7 days)     10/27/20   INR 2.9*       ASSESSMENT / PLAN  INR assessment THER    Recheck INR In: 2 WEEKS    INR Location Home INR      Anticoagulation Summary  As of 10/27/2020    INR goal:  2.0-3.0   TTR:  92.9 % (7.2 mo)   INR used for dosin.9 (10/27/2020)   Warfarin maintenance plan:  3 mg (3 mg x 1) every Mon, Wed, Fri; 4.5 mg (3 mg x 1.5) all other days   Full warfarin instructions:  3 mg every Mon, Wed, Fri; 4.5 mg all other days   Weekly warfarin total:  27 mg   No change documented:  Espinoza Rodriguez RN   Plan last modified:  Tanna Dawkins RN (2020)   Next INR check:  11/10/2020   Priority:  High   Target end date:  3/26/2020    Indications    Long-term (current) use of anticoagulants [Z79.01] [Z79.01]  Atrial fibrillation (H) [I48.91] [I48.91]  Atrial fibrillation  unspecified type (H) [I48.91]             Anticoagulation Episode Summary     INR check location:  Home Draw    Preferred lab:      Send INR reminders to:  [facility]    Comments:   [employer] [facility] to start on . Has 3mg tablets previously had 2mg tablets   Pt's voicemail box is full and pt is unable to retrieve voice messages       Anticoagulation Care Providers     Provider Role Specialty Phone number    Gagan Quiles MD Referring Internal Medicine [phone]            See the Encounter Report to view Anticoagulation Flowsheet and Dosing Calendar (Go to Encounters tab in chart review, and find the Anticoagulation Therapy Visit)    INR/CFX/F2 RESULT:INR result is 2.9 on home monitor      ASSESSMENT: Spoke with patient. Gave them their lab results and new warfarin recommendation.  No changes in health, medication, or diet. No missed  doses, no falls. No signs or symptoms of bleed or clotting.    DOSING ADJUSTMENT:continue maintenance dose    NEXT INR/FACTOR X OR FACTOR II: 11/10    PROTOCOL FOLLOWED:goal 2-3    Espinoza Rodriguez RN

## 2020-10-28 ENCOUNTER — TELEPHONE (OUTPATIENT)
Dept: GASTROENTEROLOGY | Facility: CLINIC | Age: 60
End: 2020-10-28

## 2020-10-28 NOTE — TELEPHONE ENCOUNTER
Patient not ready to schedule Colonoscopy at this time. Looking to do it sometime in 2021. Will call back to schedule when ready.    Procedure: Lower Endoscopy    Lower Endoscopy Type: Colonoscopy    Purpose of Colonoscopy Procedure: Screening    Colonoscopy Sedation: Conscious/Moderate    Preferred Location: [facility]           Associated Diagnoses    Visit for screening mammogram [Z12.31]  - Primary       Anemia, unspecified type [D64.9]       Increased glucose level [R73.09]

## 2020-11-02 ENCOUNTER — MEDICAL CORRESPONDENCE (OUTPATIENT)
Dept: HEALTH INFORMATION MANAGEMENT | Facility: CLINIC | Age: 60
End: 2020-11-02

## 2020-11-02 DIAGNOSIS — M10.9 GOUT, UNSPECIFIED CAUSE, UNSPECIFIED CHRONICITY, UNSPECIFIED SITE: ICD-10-CM

## 2020-11-02 DIAGNOSIS — M62.838 MUSCLE SPASM: ICD-10-CM

## 2020-11-05 RX ORDER — CYCLOBENZAPRINE HCL 5 MG
5 TABLET ORAL
Qty: 30 TABLET | Refills: 5 | Status: SHIPPED | OUTPATIENT
Start: 2020-11-05 | End: 2021-04-19

## 2020-11-05 RX ORDER — ALLOPURINOL 100 MG/1
TABLET ORAL
Qty: 60 TABLET | Refills: 1 | OUTPATIENT
Start: 2020-11-05

## 2020-11-05 NOTE — TELEPHONE ENCOUNTER
CYCLOBENZAPRINE 5 MG TABLET      Last Written Prescription Date:  4/28/20  Last Fill Quantity: 30,   # refills: 5  Last Office Visit : 8/24/20  Future Office visit:  none    Routing refill request to provider for review/approval because:  Drug not on the [facility], [facility] or [facility] refill protocol or controlled substance  Thank-you, Arianna NAVARRO RN Medication Refill Team

## 2020-11-10 ENCOUNTER — ANTICOAGULATION THERAPY VISIT (OUTPATIENT)
Dept: ANTICOAGULATION | Facility: CLINIC | Age: 60
End: 2020-11-10

## 2020-11-10 DIAGNOSIS — I48.91 ATRIAL FIBRILLATION, UNSPECIFIED TYPE (H): ICD-10-CM

## 2020-11-10 DIAGNOSIS — Z79.01 LONG TERM CURRENT USE OF ANTICOAGULANT THERAPY: ICD-10-CM

## 2020-11-10 LAB — INR PPP: 2.7 (ref 0.9–1.1)

## 2020-11-10 NOTE — PROGRESS NOTES
ANTICOAGULATION FOLLOW-UP CLINIC VISIT    Patient Name:  Arianna Siddiqi  Date:  11/10/2020  Contact Type:  Telephone    SUBJECTIVE:  Patient Findings     Positives:  Change in medications (occassionally uses PRN muscle relaxant)             OBJECTIVE    Recent labs: (last 7 days)     11/10/20   INR 2.7*       ASSESSMENT / PLAN  INR assessment THER    Recheck INR In: 2 WEEKS    INR Location Home INR      Anticoagulation Summary  As of 11/10/2020    INR goal:  2.0-3.0   TTR:  93.3 % (7.6 mo)   INR used for dosin.7 (11/10/2020)   Warfarin maintenance plan:  3 mg (3 mg x 1) every Mon, Wed, Fri; 4.5 mg (3 mg x 1.5) all other days   Full warfarin instructions:  3 mg every Mon, Wed, Fri; 4.5 mg all other days   Weekly warfarin total:  27 mg   Plan last modified:  Tanna Dawkins RN (2020)   Next INR check:  2020   Priority:  High   Target end date:  3/26/2020    Indications    Long-term (current) use of anticoagulants [Z79.01] [Z79.01]  Atrial fibrillation (H) [I48.91] [I48.91]  Atrial fibrillation  unspecified type (H) [I48.91]             Anticoagulation Episode Summary     INR check location:  Home Draw    Preferred lab:      Send INR reminders to:  [facility]    Comments:   [employer] [facility] Monitoring to start on . Has 3mg tablets previously had 2mg tablets   Pt's voicemail box is full and pt is unable to retrieve voice messages       Anticoagulation Care Providers     Provider Role Specialty Phone number    Gagan Quiles MD Referring Internal Medicine [phone]            See the Encounter Report to view Anticoagulation Flowsheet and Dosing Calendar (Go to Encounters tab in chart review, and find the Anticoagulation Therapy Visit)  Spoke with patient.    Carol Hoffman RN

## 2020-11-23 ENCOUNTER — TELEPHONE (OUTPATIENT)
Dept: CARDIOLOGY | Facility: CLINIC | Age: 60
End: 2020-11-23

## 2020-11-23 NOTE — TELEPHONE ENCOUNTER
[facility]    Phone Message    May a detailed message be left on voicemail: no     Reason for Call: Other: Pt called in regarding her appt today at 2pm w/ Dr. Uriarte. Pt stated she did not receive any call for check in or contact from clinic for appt. Notes in appt state that Pt completed appt and are written as though Pt completed appt with the provider. Not sure what situation occurred but ph # was verified with Pt as sole and only ph # to reach her. Please call Pt back.    Action Taken: Message routed to:  [facility]    Travel Screening: Not Applicable

## 2020-11-24 ENCOUNTER — TRANSFERRED RECORDS (OUTPATIENT)
Dept: HEALTH INFORMATION MANAGEMENT | Facility: CLINIC | Age: 60
End: 2020-11-24

## 2020-11-24 ENCOUNTER — ANTICOAGULATION THERAPY VISIT (OUTPATIENT)
Dept: ANTICOAGULATION | Facility: CLINIC | Age: 60
End: 2020-11-24

## 2020-11-24 DIAGNOSIS — I48.91 ATRIAL FIBRILLATION, UNSPECIFIED TYPE (H): ICD-10-CM

## 2020-11-24 DIAGNOSIS — Z79.01 LONG TERM CURRENT USE OF ANTICOAGULANT THERAPY: ICD-10-CM

## 2020-11-24 LAB — INR PPP: 2.9 (ref 0.9–1.1)

## 2020-11-24 NOTE — PROGRESS NOTES
ANTICOAGULATION FOLLOW-UP CLINIC VISIT    Patient Name:  Arianna Siddiqi  Date:  2020  Contact Type:  Telephone    SUBJECTIVE:         OBJECTIVE    Recent labs: (last 7 days)     20   INR 2.9*       ASSESSMENT / PLAN  INR assessment THER    Recheck INR In: 2 WEEKS    INR Location Home INR      Anticoagulation Summary  As of 2020    INR goal:  2.0-3.0   TTR:  93.7 % (8.1 mo)   INR used for dosin.9 (2020)   Warfarin maintenance plan:  3 mg (3 mg x 1) every Mon, Wed, Fri; 4.5 mg (3 mg x 1.5) all other days   Full warfarin instructions:  3 mg every Mon, Wed, Fri; 4.5 mg all other days   Weekly warfarin total:  27 mg   No change documented:  Carol Hoffman RN   Plan last modified:  Tanna Dawkins RN (2020)   Next INR check:  2020   Priority:  High   Target end date:  3/26/2020    Indications    Long-term (current) use of anticoagulants [Z79.01] [Z79.01]  Atrial fibrillation (H) [I48.91] [I48.91]  Atrial fibrillation  unspecified type (H) [I48.91]             Anticoagulation Episode Summary     INR check location:  Home Draw    Preferred lab:      Send INR reminders to:  [facility]    Comments:   [employer] [facility] Monitoring to start on . Has 3mg tablets previously had 2mg tablets   Pt's voicemail box is full and pt is unable to retrieve voice messages       Anticoagulation Care Providers     Provider Role Specialty Phone number    Gagan Quiles MD Referring Internal Medicine [phone]            See the Encounter Report to view Anticoagulation Flowsheet and Dosing Calendar (Go to Encounters tab in chart review, and find the Anticoagulation Therapy Visit)  Spoke with patient.    Carol Hoffman, RN

## 2020-11-24 NOTE — TELEPHONE ENCOUNTER
Spoke to patient.   Verified correct phone number in chart, apologized that we did not successfully connect with her even though our documentation shows 2 calls placed, with no answer both times. Number is a landline. Patient rescheduled to a day and time that she is happy with.     Explained that the notes call center employee was referring to were Dr. Uriarte's visit notes from her last visit which Dr. Uriarte had placed in today's visit as a preparation for the visit, and that this does not indicate that we were saying she was seen today.    Patient denies further questions/concerns.

## 2020-11-29 DIAGNOSIS — M10.9 GOUT, UNSPECIFIED CAUSE, UNSPECIFIED CHRONICITY, UNSPECIFIED SITE: ICD-10-CM

## 2020-12-02 NOTE — TELEPHONE ENCOUNTER
allopurinol (ZYLOPRIM) 100 MG tablet   Take 2 tablets (200 mg) by mouth daily      Last Written Prescription Date:  10/13/20  Last Fill Quantity: 60,   # refills: 1  Last Office Visit : 8/24/20  Future Office visit:  none    Routing refill request to provider for review/approval because:  Failed protocol - abnormal labs: uric acid and creatinine

## 2020-12-03 RX ORDER — ALLOPURINOL 100 MG/1
200 TABLET ORAL DAILY
Qty: 60 TABLET | Refills: 1 | Status: SHIPPED | OUTPATIENT
Start: 2020-12-03 | End: 2021-01-05

## 2020-12-07 ENCOUNTER — TRANSFERRED RECORDS (OUTPATIENT)
Dept: HEALTH INFORMATION MANAGEMENT | Facility: CLINIC | Age: 60
End: 2020-12-07

## 2020-12-08 ENCOUNTER — VIRTUAL VISIT (OUTPATIENT)
Dept: CARDIOLOGY | Facility: CLINIC | Age: 60
End: 2020-12-08
Attending: INTERNAL MEDICINE
Payer: MEDICARE

## 2020-12-08 ENCOUNTER — ANTICOAGULATION THERAPY VISIT (OUTPATIENT)
Dept: ANTICOAGULATION | Facility: CLINIC | Age: 60
End: 2020-12-08

## 2020-12-08 DIAGNOSIS — I10 HYPERTENSION, UNSPECIFIED TYPE: ICD-10-CM

## 2020-12-08 DIAGNOSIS — E78.5 HYPERLIPIDEMIA, UNSPECIFIED HYPERLIPIDEMIA TYPE: ICD-10-CM

## 2020-12-08 DIAGNOSIS — Z79.01 LONG TERM CURRENT USE OF ANTICOAGULANT THERAPY: ICD-10-CM

## 2020-12-08 DIAGNOSIS — M10.9 GOUT, UNSPECIFIED CAUSE, UNSPECIFIED CHRONICITY, UNSPECIFIED SITE: ICD-10-CM

## 2020-12-08 DIAGNOSIS — E66.01 MORBID OBESITY (H): ICD-10-CM

## 2020-12-08 DIAGNOSIS — I48.0 PAF (PAROXYSMAL ATRIAL FIBRILLATION) (H): Primary | ICD-10-CM

## 2020-12-08 DIAGNOSIS — I48.91 ATRIAL FIBRILLATION, UNSPECIFIED TYPE (H): ICD-10-CM

## 2020-12-08 DIAGNOSIS — D50.9 IRON DEFICIENCY ANEMIA, UNSPECIFIED IRON DEFICIENCY ANEMIA TYPE: ICD-10-CM

## 2020-12-08 DIAGNOSIS — I50.30 HEART FAILURE WITH PRESERVED EJECTION FRACTION, NYHA CLASS II (H): ICD-10-CM

## 2020-12-08 DIAGNOSIS — I10 ESSENTIAL HYPERTENSION: ICD-10-CM

## 2020-12-08 LAB — INR PPP: 2.5 (ref 0.9–1.1)

## 2020-12-08 PROCEDURE — 99442 PR PHYSICIAN TELEPHONE EVALUATION 11-20 MIN: CPT | Mod: 95 | Performed by: INTERNAL MEDICINE

## 2020-12-08 RX ORDER — FERROUS GLUCONATE 324(38)MG
324 TABLET ORAL DAILY
Qty: 90 TABLET | Refills: 3 | Status: SHIPPED | OUTPATIENT
Start: 2020-12-08 | End: 2021-10-26

## 2020-12-08 RX ORDER — LISINOPRIL 5 MG/1
5 TABLET ORAL DAILY
Qty: 90 TABLET | Refills: 3 | Status: SHIPPED | OUTPATIENT
Start: 2020-12-08 | End: 2021-01-01

## 2020-12-08 RX ORDER — POTASSIUM CHLORIDE 1500 MG/1
20 TABLET, EXTENDED RELEASE ORAL 2 TIMES DAILY
Qty: 180 TABLET | Refills: 3 | Status: SHIPPED | OUTPATIENT
Start: 2020-12-08 | End: 2021-12-07

## 2020-12-08 RX ORDER — ATORVASTATIN CALCIUM 20 MG/1
20 TABLET, FILM COATED ORAL DAILY
Qty: 90 TABLET | Refills: 3 | Status: SHIPPED | OUTPATIENT
Start: 2020-12-08 | End: 2021-01-01

## 2020-12-08 RX ORDER — BUMETANIDE 2 MG/1
2 TABLET ORAL DAILY
Qty: 90 TABLET | Refills: 3 | Status: ON HOLD | OUTPATIENT
Start: 2020-12-08 | End: 2021-11-18

## 2020-12-08 RX ORDER — METOPROLOL SUCCINATE 100 MG/1
100 TABLET, EXTENDED RELEASE ORAL DAILY
Qty: 90 TABLET | Refills: 3 | Status: SHIPPED | OUTPATIENT
Start: 2020-12-08 | End: 2022-01-01

## 2020-12-08 NOTE — LETTER
"12/8/2020      RE: Arianna Siddiqi  [address]  [place] [address]       Dear Colleague,    Thank you for the opportunity to participate in the care of your patient, Arianna Siddiqi, at the [facility]. Please see a copy of my visit note below.    Arianna Siddiqi is a 60 year old female who is being evaluated via a billable telephone visit.      The patient has been notified of following:     \"This telephone visit will be conducted via a call between you and your physician/provider. We have found that certain health care needs can be provided without the need for a physical exam.  This service lets us provide the care you need with a short phone conversation.  If a prescription is necessary we can send it directly to your pharmacy.  If lab work is needed we can place an order for that and you can then stop by our lab to have the test done at a later time.    Telephone visits are billed at different rates depending on your insurance coverage. During this emergency period, for some insurers they may be billed the same as an in-person visit.  Please reach out to your insurance provider with any questions.    If during the course of the call the physician/provider feels a telephone visit is not appropriate, you will not be charged for this service.\"    Patient has given verbal consent for Telephone visit?  Yes    What phone number would you like to be contacted at? [phone]    How would you like to obtain your AVS? Mail a copy    Phone call duration:15 minutes (phone call started 1:30 PM)    HPI: Ms. Arianna Siddiqi is a 60 year old  female with PMH significant for morbid obesity, paroxysmal atrial fibrillation, heart failure with preserved ejection fraction, type 2 diabetes? , obesity hypoventilation syndrome, CARLOS on CPAP, and hypertension.    The patient is being seen today at request of Dr. Quiles.    Patient was first " diagnosed with heart failure and atrial fibrillation in 2016 and she was started on Bumex and warfarin. Since then the patient has been on these medications.  In November 2019 she ran out of Bumex for couple of months and could not refill the medication.  She ended up gaining weight and hospitalized in January of this year with heart failure exacerbation and respiratory decompensation.  She was 60 pounds up from her normal weight.  She was treated with IV diuresis and CPAP. Patient had an asymptomatic paroxysmal atrial flutter episode during this admission.    Unfortunately patient admitted to the hospital recently on 2/15/2020 with heart failure decompensation and respiratory failure in the setting of influenza A.  Patient`s symptoms improved with noninvasive ventilation.      Patient tells me today that she is feeling stronger and breathing better since last hospital discharge.  She is able to do her daily activities.  She has home health nurse that checks on her once a week.  Denies chest pain, lower extremity edema, palpitations, dizziness or syncope.  She weighs herself daily.  Today she was 349 pounds.  She checks her INR at home.  During the day she no longer needs oxygen though she still has an oxygen tube at home.  She uses CPAP at night.    No prior history of CAD. No prior history of stroke. Blood pressure is well controlled with lisinopril 5 mg. She was a heavy smoker until 2002 with 30-pack-year smoking.  No alcohol abuse.  Diabetes is listed in patient's past medical history however last hemoglobin A1c is 5 on 4/16/2020 and she is not on antidiabetics.    Patient is currently on Bumex 2 mg once a day, atorvastatin 20 mg, lisinopril 5 mg, metoprolol 100 mg, potassium chloride 20 mEq twice daily, warfarin, allopurinol, diphenhydramine and cyclobenzaprine.    Patient's last EKG on 2/11/2020 shows sinus rhythm with right bundle branch block.  EKG on 1/23/2020 shows atrial flutter.    Echocardiogram on  1/16/2020 showed LVEF of 55 to 60%.  RV showed mild dilation with mildly reduced function.  No significant valve disease noted.  Prior echocardiogram in 2016 showed reduced EF at 40 to 45%.    Medications, personal, family, and social history reviewed with patient and revised.    Interval history 12/8/2020:  Today 6-month follow-up visit is via telephone.  She reports feeling well.  Denies chest pain, palpitations, syncope, dizziness or lower extremity edema.  Reports shortness of breath with exertion particularly with strenuous activities.  Compliant with medications and CPAP.  Patient follows with Dr. Quiles regarding iron deficiency anemia.  She was recommended EGD and colonoscopy when she was last seen in August of this year however she was not able to do any of these.  She tells me that she is very concerned to go to hospital to do any tests due to current pandemic.    PAST MEDICAL HISTORY:  Past Medical History:   Diagnosis Date     CHF (congestive heart failure) (H)      CKD (chronic kidney disease)      Compliance poor      Diabetes mellitus (H)      Gout      Hypertension      Insomnia      Morbid obesity (H)      CARLOS treated with BiPAP        CURRENT MEDICATIONS:  Current Outpatient Medications   Medication Sig Dispense Refill     acetaminophen (TYLENOL) 500 MG tablet Take 1-2 tablets (500-1,000 mg) by mouth every 4 hours as needed for mild pain  0     allopurinol (ZYLOPRIM) 100 MG tablet TAKE 2 TABLETS (200 MG) BY MOUTH DAILY PATIENT NEEDS TO SEE PRIMARY PROVIDER AND HAVE LABS FOR FURTHER REFILLS. 60 tablet 1     alum & mag hydroxide-simethicone (MAALOX  ES) 400-400-40 MG/5ML SUSP suspension Take 15 mLs by mouth every 4 hours as needed for other (gastric distress.) 355 mL 0     atorvastatin (LIPITOR) 20 MG tablet TAKE 1 TABLET BY MOUTH EVERY EVENING 30 tablet 1     blood glucose monitoring (ACCU-CHEK NINA PLUS) meter device kit Use to test blood sugars 3 times daily or as directed. 1 kit 0     blood  glucose monitoring (SOFTCLIX) lancets Use to test blood sugar 3 times daily or as directed. 300 each 0     bumetanide (BUMEX) 1 MG tablet TAKE 2 TABLETS BY MOUTH EVERY DAY 60 tablet 1     cyclobenzaprine (FLEXERIL) 5 MG tablet Take 1 tablet (5 mg) by mouth nightly as needed for muscle spasms 30 tablet 5     diphenhydrAMINE (BENADRYL) 25 MG tablet Take 25 mg by mouth every 6 hours as needed for itching or allergies       ferrous gluconate (FERGON) 324 (38 Fe) MG tablet Take 1 tablet (324 mg) by mouth daily 90 tablet 3     lisinopril (ZESTRIL) 5 MG tablet TAKE 1 TABLET BY MOUTH EVERY DAY 30 tablet 1     metoprolol tartrate (LOPRESSOR) 50 MG tablet TAKE 1 TABLET BY MOUTH TWICE A DAY 60 tablet 1     miconazole (MICATIN) 2 % external powder Apply topically 2 times daily 180 g 0     ondansetron (ZOFRAN-ODT) 4 MG ODT tab Take 1 tablet (4 mg) by mouth every 6 hours as needed for nausea or vomiting 10 tablet 0     polyethylene glycol (MIRALAX) packet Take 17 g by mouth daily as needed for constipation 30 packet 1     potassium chloride ER (K-TAB) 20 MEQ CR tablet Take 1 tablet (20 mEq) by mouth 2 times daily 180 tablet 1     sennosides (SENOKOT) 8.6 MG tablet Take 1 tablet by mouth 2 times daily as needed for constipation 60 tablet 0     traMADol (ULTRAM) 50 MG tablet Take 1 tablet (50 mg) by mouth nightly as needed for severe pain 30 tablet 0     warfarin ANTICOAGULANT (COUMADIN) 1 MG tablet TAKE 2 TABLETS (2 MG) BY MOUTH DAILY USE AS DIRECTED BY COUMADIN CLINIC 60 tablet 1     warfarin ANTICOAGULANT (COUMADIN) 3 MG tablet Take one to one in a half tablets daily or as directed by the Coumadin clinic 135 tablet 5       PAST SURGICAL HISTORY:  No past surgical history on file.    ALLERGIES:     Allergies   Allergen Reactions     Penicillins Itching and Rash     Tolerated ceftriaxone 1/17/2020       FAMILY HISTORY:  Family History   Adopted: Yes   Family history unknown: Yes         SOCIAL HISTORY:  Social History     Tobacco  Use     Smoking status: Former Smoker     Packs/day: 1.50     Years: 20.00     Pack years: 30.00     Types: Cigarettes     Last attempt to quit: 2002     Years since quittin.3     Smokeless tobacco: Never Used   Substance Use Topics     Alcohol use: No     Alcohol/week: 0.0 standard drinks     Drug use: No       I have reviewed the labs  and made my comment in the assessment and plan.    Labs:  CBC RESULTS:   Lab Results   Component Value Date    WBC 6.9 2020    RBC 3.30 (L) 2020    HGB 9.5 (L) 2020    HCT 33.1 (L) 2020     2020    MCH 28.8 2020    MCHC 28.7 (L) 2020    RDW 18.4 (H) 2020     2020       BMP RESULTS:  Lab Results   Component Value Date     2020    POTASSIUM 3.9 2020    CHLORIDE 105 2020    CO2 28 2020    ANIONGAP 5 2020    GLC 89 2020    BUN 23 2020    CR 0.84 2020    GFRESTIMATED 75 2020    GFRESTBLACK 86 2020    KADEN 9.5 2020        INR RESULTS:  Lab Results   Component Value Date    INR 2.1 (A) 2020    INR 2.4 (A) 2020    INR Canceled, Test credited 2020    INR 1.7 (A) 2020     Echocardiogram 2020  Technically difficult study.  The Ejection Fraction is estimated at 55-60% by visual estimate.  Regional wall motion is probably normal.  Difficult to assess RV. Function probably at least mildly reduced. Size might  be mildly dilated.  Valves not well visualized. No significant valvular abnormalities were noted  by Doppler.  Dilation of the inferior vena cava is present with abnormal respiratory  variation in diameter.  No pericardial effusion is present.    Patient's last EKG on 2020 shows sinus rhythm with right bundle branch block.  EKG on 2020 shows atrial flutter.    Assessment and Plan:   Ms. Arianna Siddiqi is a 60 year old  female with PMH significant for former heavy smoking history for 30 pack years quit date ,  morbid obesity, paroxysmal atrial fibrillation, heart failure with preserved ejection fraction, type 2 diabetes?  Not on medications, obesity hypoventilation syndrome, obstructive sleep apnea on CPAP, iron deficiency anemia and hypertension.    I called and talked to the patient on the phone today for 6-month follow-up.  Overall reports doing well with no concerning cardiac symptoms at this time.    1.  Heart failure with preserved ejection fraction: NYHA functional class II.  Patient was last hospitalized in February of this year.  Doing well since then.  Monitors her weight at home.  Stable.  Continue current treatment with Bumex 2 mg daily.      2.  Paroxysmal atrial fibrillation: The patient has history of paroxysmal A. fib since 2016.  Chads VASC score is 4 (diabetes, hypertension, heart failure, female).  On warfarin.  No history of stroke.  She is on rate control with metoprolol.  Recommended to continue current treatment.  Normal LV function per last echocardiogram in January 2020.    3.  Morbid obesity: Has multiple complications like obstructive sleep apnea and obesity hypoventilation syndrome.      4.  Hypertension: Well-controlled with lisinopril 5 mg and metoprolol 100 mg.    5.  Former heavy smoker: No prior history of CAD.     6.  Iron deficiency anemia: On iron treatment.  Follows with Dr. Quiles    I did not make any medication changes today.    Recommended to clinic 1 year or earlier as needed.    A total of 15 minutes through telephone encounter today with greater than 50% of the time spent in counseling and coordinating cares of the issues above.     Please donot hesitate to contact me if you have any questions or concerns. Again, thank you for allowing me to participate in the care of your patient.    Duane CHAPA MD  [facility] Division of Cardiology  Pager [phone]

## 2020-12-08 NOTE — PROGRESS NOTES
ANTICOAGULATION FOLLOW-UP CLINIC VISIT    Patient Name:  Arianna Siddiqi  Date:  2020  Contact Type:  Telephone    SUBJECTIVE:  Patient Findings         Clinical Outcomes     Negatives:  Major bleeding event, Thromboembolic event, Anticoagulation-related hospital admission, Anticoagulation-related ED visit, Anticoagulation-related fatality           OBJECTIVE    Recent labs: (last 7 days)     20   INR 2.5*       ASSESSMENT / PLAN  INR assessment THER    Recheck INR In: 2 WEEKS    INR Location Home INR      Anticoagulation Summary  As of 2020    INR goal:  2.0-3.0   TTR:  94.1 % (8.6 mo)   INR used for dosin.5 (2020)   Warfarin maintenance plan:  3 mg (3 mg x 1) every Mon, Wed, Fri; 4.5 mg (3 mg x 1.5) all other days   Full warfarin instructions:  3 mg every Mon, Wed, Fri; 4.5 mg all other days   Weekly warfarin total:  27 mg   No change documented:  Tanna Dawkins, RN   Plan last modified:  Tanna Dawkins RN (2020)   Next INR check:  2020   Priority:  High   Target end date:      Indications    Long-term (current) use of anticoagulants [Z79.01] [Z79.01]  Atrial fibrillation (H) [I48.91] [I48.91]  Atrial fibrillation  unspecified type (H) [I48.91]             Anticoagulation Episode Summary     INR check location:  Home Draw    Preferred lab:      Send INR reminders to:  [facility]    Comments:   [employer] [facility] Monitoring to start on . Has 3mg tablets previously had 2mg tablets   Pt's voicemail box is full and pt is unable to retrieve voice messages       Anticoagulation Care Providers     Provider Role Specialty Phone number    Gagan Quiles MD Referring Internal Medicine [phone]            See the Encounter Report to view Anticoagulation Flowsheet and Dosing Calendar (Go to Encounters tab in chart review, and find the Anticoagulation Therapy Visit)    Spoke with patient. Gave them their lab results and new warfarin recommendation.  No changes in health,  medication, or diet. No missed doses, no falls. No signs or symptoms of bleed or clotting.     Tanna Dawkins RN

## 2020-12-08 NOTE — NURSING NOTE
Vitals - Patient Reported  Systolic (Patient Reported): 127  Diastolic (Patient Reported): 72  Weight (Patient Reported): 161.5 kg (356 lb)  SpO2 (Patient Reported): 92  Pulse (Patient Reported): 88    Leticia Rausch MA

## 2020-12-08 NOTE — PROGRESS NOTES
"Arianna Siddiqi is a 60 year old female who is being evaluated via a billable telephone visit.      The patient has been notified of following:     \"This telephone visit will be conducted via a call between you and your physician/provider. We have found that certain health care needs can be provided without the need for a physical exam.  This service lets us provide the care you need with a short phone conversation.  If a prescription is necessary we can send it directly to your pharmacy.  If lab work is needed we can place an order for that and you can then stop by our lab to have the test done at a later time.    Telephone visits are billed at different rates depending on your insurance coverage. During this emergency period, for some insurers they may be billed the same as an in-person visit.  Please reach out to your insurance provider with any questions.    If during the course of the call the physician/provider feels a telephone visit is not appropriate, you will not be charged for this service.\"    Patient has given verbal consent for Telephone visit?  Yes    What phone number would you like to be contacted at? [phone]    How would you like to obtain your AVS? Mail a copy    Phone call duration:15 minutes (phone call started 1:30 PM)    HPI: Ms. Arianna Siddiqi is a 60 year old  female with PMH significant for morbid obesity, paroxysmal atrial fibrillation, heart failure with preserved ejection fraction, type 2 diabetes? , obesity hypoventilation syndrome, CARLOS on CPAP, and hypertension.    The patient is being seen today at request of Dr. Quiles.    Patient was first diagnosed with heart failure and atrial fibrillation in 2016 and she was started on Bumex and warfarin. Since then the patient has been on these medications.  In November 2019 she ran out of Bumex for couple of months and could not refill the medication.  She ended up gaining weight and hospitalized in January of this year with heart failure " exacerbation and respiratory decompensation.  She was 60 pounds up from her normal weight.  She was treated with IV diuresis and CPAP. Patient had an asymptomatic paroxysmal atrial flutter episode during this admission.    Unfortunately patient admitted to the hospital recently on 2/15/2020 with heart failure decompensation and respiratory failure in the setting of influenza A.  Patient`s symptoms improved with noninvasive ventilation.      Patient tells me today that she is feeling stronger and breathing better since last hospital discharge.  She is able to do her daily activities.  She has home health nurse that checks on her once a week.  Denies chest pain, lower extremity edema, palpitations, dizziness or syncope.  She weighs herself daily.  Today she was 349 pounds.  She checks her INR at home.  During the day she no longer needs oxygen though she still has an oxygen tube at home.  She uses CPAP at night.    No prior history of CAD. No prior history of stroke. Blood pressure is well controlled with lisinopril 5 mg. She was a heavy smoker until 2002 with 30-pack-year smoking.  No alcohol abuse.  Diabetes is listed in patient's past medical history however last hemoglobin A1c is 5 on 4/16/2020 and she is not on antidiabetics.    Patient is currently on Bumex 2 mg once a day, atorvastatin 20 mg, lisinopril 5 mg, metoprolol 100 mg, potassium chloride 20 mEq twice daily, warfarin, allopurinol, diphenhydramine and cyclobenzaprine.    Patient's last EKG on 2/11/2020 shows sinus rhythm with right bundle branch block.  EKG on 1/23/2020 shows atrial flutter.    Echocardiogram on 1/16/2020 showed LVEF of 55 to 60%.  RV showed mild dilation with mildly reduced function.  No significant valve disease noted.  Prior echocardiogram in 2016 showed reduced EF at 40 to 45%.    Medications, personal, family, and social history reviewed with patient and revised.    Interval history 12/8/2020:  Today 6-month follow-up visit is via  telephone.  She reports feeling well.  Denies chest pain, palpitations, syncope, dizziness or lower extremity edema.  Reports shortness of breath with exertion particularly with strenuous activities.  Compliant with medications and CPAP.  Patient follows with Dr. Quiles regarding iron deficiency anemia.  She was recommended EGD and colonoscopy when she was last seen in August of this year however she was not able to do any of these.  She tells me that she is very concerned to go to hospital to do any tests due to current pandemic.    PAST MEDICAL HISTORY:  Past Medical History:   Diagnosis Date     CHF (congestive heart failure) (H)      CKD (chronic kidney disease)      Compliance poor      Diabetes mellitus (H)      Gout      Hypertension      Insomnia      Morbid obesity (H)      CARLOS treated with BiPAP        CURRENT MEDICATIONS:  Current Outpatient Medications   Medication Sig Dispense Refill     acetaminophen (TYLENOL) 500 MG tablet Take 1-2 tablets (500-1,000 mg) by mouth every 4 hours as needed for mild pain  0     allopurinol (ZYLOPRIM) 100 MG tablet TAKE 2 TABLETS (200 MG) BY MOUTH DAILY PATIENT NEEDS TO SEE PRIMARY PROVIDER AND HAVE LABS FOR FURTHER REFILLS. 60 tablet 1     alum & mag hydroxide-simethicone (MAALOX  ES) 400-400-40 MG/5ML SUSP suspension Take 15 mLs by mouth every 4 hours as needed for other (gastric distress.) 355 mL 0     atorvastatin (LIPITOR) 20 MG tablet TAKE 1 TABLET BY MOUTH EVERY EVENING 30 tablet 1     blood glucose monitoring (ACCU-CHEK NINA PLUS) meter device kit Use to test blood sugars 3 times daily or as directed. 1 kit 0     blood glucose monitoring (SOFTCLIX) lancets Use to test blood sugar 3 times daily or as directed. 300 each 0     bumetanide (BUMEX) 1 MG tablet TAKE 2 TABLETS BY MOUTH EVERY DAY 60 tablet 1     cyclobenzaprine (FLEXERIL) 5 MG tablet Take 1 tablet (5 mg) by mouth nightly as needed for muscle spasms 30 tablet 5     diphenhydrAMINE (BENADRYL) 25 MG tablet  Take 25 mg by mouth every 6 hours as needed for itching or allergies       ferrous gluconate (FERGON) 324 (38 Fe) MG tablet Take 1 tablet (324 mg) by mouth daily 90 tablet 3     lisinopril (ZESTRIL) 5 MG tablet TAKE 1 TABLET BY MOUTH EVERY DAY 30 tablet 1     metoprolol tartrate (LOPRESSOR) 50 MG tablet TAKE 1 TABLET BY MOUTH TWICE A DAY 60 tablet 1     miconazole (MICATIN) 2 % external powder Apply topically 2 times daily 180 g 0     ondansetron (ZOFRAN-ODT) 4 MG ODT tab Take 1 tablet (4 mg) by mouth every 6 hours as needed for nausea or vomiting 10 tablet 0     polyethylene glycol (MIRALAX) packet Take 17 g by mouth daily as needed for constipation 30 packet 1     potassium chloride ER (K-TAB) 20 MEQ CR tablet Take 1 tablet (20 mEq) by mouth 2 times daily 180 tablet 1     sennosides (SENOKOT) 8.6 MG tablet Take 1 tablet by mouth 2 times daily as needed for constipation 60 tablet 0     traMADol (ULTRAM) 50 MG tablet Take 1 tablet (50 mg) by mouth nightly as needed for severe pain 30 tablet 0     warfarin ANTICOAGULANT (COUMADIN) 1 MG tablet TAKE 2 TABLETS (2 MG) BY MOUTH DAILY USE AS DIRECTED BY COUMADIN CLINIC 60 tablet 1     warfarin ANTICOAGULANT (COUMADIN) 3 MG tablet Take one to one in a half tablets daily or as directed by the Coumadin clinic 135 tablet 5       PAST SURGICAL HISTORY:  No past surgical history on file.    ALLERGIES:     Allergies   Allergen Reactions     Penicillins Itching and Rash     Tolerated ceftriaxone 2020       FAMILY HISTORY:  Family History   Adopted: Yes   Family history unknown: Yes         SOCIAL HISTORY:  Social History     Tobacco Use     Smoking status: Former Smoker     Packs/day: 1.50     Years: 20.00     Pack years: 30.00     Types: Cigarettes     Last attempt to quit: 2002     Years since quittin.3     Smokeless tobacco: Never Used   Substance Use Topics     Alcohol use: No     Alcohol/week: 0.0 standard drinks     Drug use: No       I have reviewed the labs   and made my comment in the assessment and plan.    Labs:  CBC RESULTS:   Lab Results   Component Value Date    WBC 6.9 04/16/2020    RBC 3.30 (L) 04/16/2020    HGB 9.5 (L) 04/16/2020    HCT 33.1 (L) 04/16/2020     04/16/2020    MCH 28.8 04/16/2020    MCHC 28.7 (L) 04/16/2020    RDW 18.4 (H) 04/16/2020     04/16/2020       BMP RESULTS:  Lab Results   Component Value Date     04/16/2020    POTASSIUM 3.9 04/16/2020    CHLORIDE 105 04/16/2020    CO2 28 04/16/2020    ANIONGAP 5 04/16/2020    GLC 89 04/16/2020    BUN 23 04/16/2020    CR 0.84 04/16/2020    GFRESTIMATED 75 04/16/2020    GFRESTBLACK 86 04/16/2020    KADEN 9.5 04/16/2020        INR RESULTS:  Lab Results   Component Value Date    INR 2.1 (A) 04/28/2020    INR 2.4 (A) 04/21/2020    INR Canceled, Test credited 04/16/2020    INR 1.7 (A) 04/14/2020     Echocardiogram 1/2020  Technically difficult study.  The Ejection Fraction is estimated at 55-60% by visual estimate.  Regional wall motion is probably normal.  Difficult to assess RV. Function probably at least mildly reduced. Size might  be mildly dilated.  Valves not well visualized. No significant valvular abnormalities were noted  by Doppler.  Dilation of the inferior vena cava is present with abnormal respiratory  variation in diameter.  No pericardial effusion is present.    Patient's last EKG on 2/11/2020 shows sinus rhythm with right bundle branch block.  EKG on 1/23/2020 shows atrial flutter.    Assessment and Plan:   Ms. Arianna Siddiqi is a 60 year old  female with PMH significant for former heavy smoking history for 30 pack years quit date 2002, morbid obesity, paroxysmal atrial fibrillation, heart failure with preserved ejection fraction, type 2 diabetes?  Not on medications, obesity hypoventilation syndrome, obstructive sleep apnea on CPAP, iron deficiency anemia and hypertension.    I called and talked to the patient on the phone today for 6-month follow-up.  Overall reports doing well  with no concerning cardiac symptoms at this time.    1.  Heart failure with preserved ejection fraction: NYHA functional class II.  Patient was last hospitalized in February of this year.  Doing well since then.  Monitors her weight at home.  Stable.  Continue current treatment with Bumex 2 mg daily.      2.  Paroxysmal atrial fibrillation: The patient has history of paroxysmal A. fib since 2016.  Chads VASC score is 4 (diabetes, hypertension, heart failure, female).  On warfarin.  No history of stroke.  She is on rate control with metoprolol.  Recommended to continue current treatment.  Normal LV function per last echocardiogram in January 2020.    3.  Morbid obesity: Has multiple complications like obstructive sleep apnea and obesity hypoventilation syndrome.      4.  Hypertension: Well-controlled with lisinopril 5 mg and metoprolol 100 mg.    5.  Former heavy smoker: No prior history of CAD.     6.  Iron deficiency anemia: On iron treatment.  Follows with Dr. Quiles    I did not make any medication changes today.    Recommended to clinic 1 year or earlier as needed.    A total of 15 minutes through telephone encounter today with greater than 50% of the time spent in counseling and coordinating cares of the issues above.     Please donot hesitate to contact me if you have any questions or concerns. Again, thank you for allowing me to participate in the care of your patient.    Duane CHAPA MD  [facility] Division of Cardiology  Pager [phone]

## 2020-12-08 NOTE — PATIENT INSTRUCTIONS
Thank you for coming to the [facility] [place] [facility]; please note the following instructions:    1. Recommended to clinic 1 year or earlier as needed.        If you have any questions regarding your visit please contact your care team:     Cardiology  Telephone Number   Ambika STANLEY, RN  Rahel ALLAN, RN   Yu BOLAND, XAVI CUNNINGHAM, RMHANNY FLOWERS, LPN   [phone] (option 1)   For scheduling appts:     [phone] (select option 1)       For the Device Clinic (Pacemakers and ICD's)  RN's :  Dulce Burr   During business hours: [phone]    *After business hours:  [phone] (select option 4)      Normal test result notifications will be released via [employer] or mailed within 7 business days.  All other test results, will be communicated via telephone once reviewed by your cardiologist.    If you need a medication refill please contact your pharmacy.  Please allow 3 business days for your refill to be completed.    As always, thank you for trusting us with your health care needs!     no

## 2020-12-22 ENCOUNTER — ANTICOAGULATION THERAPY VISIT (OUTPATIENT)
Dept: ANTICOAGULATION | Facility: CLINIC | Age: 60
End: 2020-12-22

## 2020-12-22 DIAGNOSIS — Z79.01 LONG TERM CURRENT USE OF ANTICOAGULANT THERAPY: ICD-10-CM

## 2020-12-22 DIAGNOSIS — I48.91 ATRIAL FIBRILLATION, UNSPECIFIED TYPE (H): ICD-10-CM

## 2020-12-22 LAB — INR PPP: 2.5 (ref 0.9–1.1)

## 2020-12-22 NOTE — PROGRESS NOTES
ANTICOAGULATION FOLLOW-UP CLINIC VISIT    Patient Name:  Arianna Siddiqi  Date:  2020  Contact Type:  Telephone    SUBJECTIVE:         OBJECTIVE    Recent labs: (last 7 days)     20   INR 2.5*       ASSESSMENT / PLAN  INR assessment THER    Recheck INR In: 2 WEEKS    INR Location Home INR      Anticoagulation Summary  As of 2020    INR goal:  2.0-3.0   TTR:  94.4 % (9 mo)   INR used for dosin.5 (2020)   Warfarin maintenance plan:  3 mg (3 mg x 1) every Mon, Wed, Fri; 4.5 mg (3 mg x 1.5) all other days   Full warfarin instructions:  3 mg every Mon, Wed, Fri; 4.5 mg all other days   Weekly warfarin total:  27 mg   Plan last modified:  Tanna Dawkins RN (2020)   Next INR check:  2021   Priority:  High   Target end date:      Indications    Long-term (current) use of anticoagulants [Z79.01] [Z79.01]  Atrial fibrillation (H) [I48.91] [I48.91]  Atrial fibrillation  unspecified type (H) [I48.91]             Anticoagulation Episode Summary     INR check location:  Home Draw    Preferred lab:      Send INR reminders to:  [facility]    Comments:   [employer] [facility] Monitoring to start on . Has 3mg tablets previously had 2mg tablets   Pt's voicemail box is full and pt is unable to retrieve voice messages       Anticoagulation Care Providers     Provider Role Specialty Phone number    Gagan Quiles MD Referring Internal Medicine [phone]            See the Encounter Report to view Anticoagulation Flowsheet and Dosing Calendar (Go to Encounters tab in chart review, and find the Anticoagulation Therapy Visit)  Spoke with patient.    Carol Hoffman RN

## 2020-12-31 DIAGNOSIS — M10.9 GOUT, UNSPECIFIED CAUSE, UNSPECIFIED CHRONICITY, UNSPECIFIED SITE: ICD-10-CM

## 2021-01-01 ENCOUNTER — ANTICOAGULATION THERAPY VISIT (OUTPATIENT)
Dept: ANTICOAGULATION | Facility: CLINIC | Age: 61
End: 2021-01-01

## 2021-01-01 ENCOUNTER — MEDICAL CORRESPONDENCE (OUTPATIENT)
Dept: HEALTH INFORMATION MANAGEMENT | Facility: CLINIC | Age: 61
End: 2021-01-01
Payer: MEDICARE

## 2021-01-01 ENCOUNTER — VIRTUAL VISIT (OUTPATIENT)
Dept: CARDIOLOGY | Facility: CLINIC | Age: 61
End: 2021-01-01
Attending: INTERNAL MEDICINE
Payer: MEDICARE

## 2021-01-01 DIAGNOSIS — I50.30 HEART FAILURE WITH PRESERVED EJECTION FRACTION, NYHA CLASS II (H): ICD-10-CM

## 2021-01-01 DIAGNOSIS — I48.0 PAF (PAROXYSMAL ATRIAL FIBRILLATION) (H): Primary | ICD-10-CM

## 2021-01-01 DIAGNOSIS — E61.1 IRON DEFICIENCY: ICD-10-CM

## 2021-01-01 DIAGNOSIS — I10 ESSENTIAL HYPERTENSION: ICD-10-CM

## 2021-01-01 DIAGNOSIS — I48.20 CHRONIC ATRIAL FIBRILLATION (H): ICD-10-CM

## 2021-01-01 DIAGNOSIS — I48.91 ATRIAL FIBRILLATION, UNSPECIFIED TYPE (H): ICD-10-CM

## 2021-01-01 DIAGNOSIS — E66.01 MORBID OBESITY (H): ICD-10-CM

## 2021-01-01 DIAGNOSIS — E78.5 HYPERLIPIDEMIA, UNSPECIFIED HYPERLIPIDEMIA TYPE: ICD-10-CM

## 2021-01-01 DIAGNOSIS — D50.9 IRON DEFICIENCY ANEMIA, UNSPECIFIED IRON DEFICIENCY ANEMIA TYPE: ICD-10-CM

## 2021-01-01 DIAGNOSIS — I48.91 ATRIAL FIBRILLATION (H): ICD-10-CM

## 2021-01-01 DIAGNOSIS — Z79.01 LONG TERM CURRENT USE OF ANTICOAGULANT THERAPY: Primary | ICD-10-CM

## 2021-01-01 DIAGNOSIS — G47.33 OSA (OBSTRUCTIVE SLEEP APNEA): ICD-10-CM

## 2021-01-01 PROCEDURE — 99443 PR PHYSICIAN TELEPHONE EVALUATION 21-30 MIN: CPT | Mod: 95 | Performed by: INTERNAL MEDICINE

## 2021-01-01 RX ORDER — ATORVASTATIN CALCIUM 20 MG/1
20 TABLET, FILM COATED ORAL DAILY
Qty: 90 TABLET | Refills: 3 | Status: SHIPPED | OUTPATIENT
Start: 2021-01-01

## 2021-01-01 RX ORDER — LISINOPRIL 5 MG/1
5 TABLET ORAL DAILY
Qty: 90 TABLET | Refills: 3 | Status: SHIPPED | OUTPATIENT
Start: 2021-01-01

## 2021-01-01 RX ORDER — ASPIRIN 81 MG/1
81 TABLET ORAL DAILY
COMMUNITY
End: 2021-01-01

## 2021-01-01 RX ORDER — POTASSIUM CHLORIDE 1500 MG/1
20 TABLET, EXTENDED RELEASE ORAL DAILY
Qty: 90 TABLET | Refills: 3 | Status: ON HOLD | OUTPATIENT
Start: 2021-01-01 | End: 2022-01-01

## 2021-01-01 RX ORDER — BUMETANIDE 2 MG/1
2 TABLET ORAL DAILY
Qty: 90 TABLET | Refills: 3 | Status: ON HOLD | OUTPATIENT
Start: 2021-01-01 | End: 2022-01-01

## 2021-01-01 RX ORDER — FERROUS GLUCONATE 324(38)MG
324 TABLET ORAL DAILY
Qty: 90 TABLET | Refills: 3 | Status: SHIPPED | OUTPATIENT
Start: 2021-01-01 | End: 2022-01-01

## 2021-01-05 ENCOUNTER — TRANSFERRED RECORDS (OUTPATIENT)
Dept: HEALTH INFORMATION MANAGEMENT | Facility: CLINIC | Age: 61
End: 2021-01-05

## 2021-01-05 ENCOUNTER — ANTICOAGULATION THERAPY VISIT (OUTPATIENT)
Dept: ANTICOAGULATION | Facility: CLINIC | Age: 61
End: 2021-01-05

## 2021-01-05 DIAGNOSIS — I48.91 ATRIAL FIBRILLATION, UNSPECIFIED TYPE (H): ICD-10-CM

## 2021-01-05 DIAGNOSIS — Z79.01 LONG TERM CURRENT USE OF ANTICOAGULANT THERAPY: ICD-10-CM

## 2021-01-05 LAB — INR PPP: 2.3 (ref 0.9–1.1)

## 2021-01-05 NOTE — PROGRESS NOTES
ANTICOAGULATION FOLLOW-UP CLINIC VISIT    Patient Name:  Arianna Siddiqi  Date:  2021  Contact Type:  Telephone    SUBJECTIVE:         OBJECTIVE    Recent labs: (last 7 days)     21   INR 2.3*       ASSESSMENT / PLAN  INR assessment THER    Recheck INR In: 2 WEEKS    INR Location Home INR      Anticoagulation Summary  As of 2021    INR goal:  2.0-3.0   TTR:  94.6 % (9.5 mo)   INR used for dosin.3 (2021)   Warfarin maintenance plan:  3 mg (3 mg x 1) every Mon, Wed, Fri; 4.5 mg (3 mg x 1.5) all other days   Full warfarin instructions:  3 mg every Mon, Wed, Fri; 4.5 mg all other days   Weekly warfarin total:  27 mg   No change documented:  Carol Hoffman RN   Plan last modified:  Tanna Dawkins RN (2020)   Next INR check:  2021   Priority:  High   Target end date:      Indications    Long-term (current) use of anticoagulants [Z79.01] [Z79.01]  Atrial fibrillation (H) [I48.91] [I48.91]  Atrial fibrillation  unspecified type (H) [I48.91]             Anticoagulation Episode Summary     INR check location:  Home Draw    Preferred lab:      Send INR reminders to:  [facility]    Comments:   [employer] [facility] Monitoring to start on . Has 3mg tablets previously had 2mg tablets   Pt's voicemail box is full and pt is unable to retrieve voice messages       Anticoagulation Care Providers     Provider Role Specialty Phone number    Gagan Quiles MD Referring Internal Medicine [phone]            See the Encounter Report to view Anticoagulation Flowsheet and Dosing Calendar (Go to Encounters tab in chart review, and find the Anticoagulation Therapy Visit)  Spoke with patient.    Carol Hoffman, SONAL

## 2021-01-05 NOTE — TELEPHONE ENCOUNTER
allopurinol (ZYLOPRIM) 100 MG tablet      Last Written Prescription Date:  12/3/20  Last Fill Quantity: 60,   # refills: 1  Last Office Visit : 8/24/20 No show; 4/3/20  Future Office visit:  none    Routing refill request to provider for review/approval because:  Failed protocol   Uric acid > 6 and abnormal creatinine   Recent Labs   Lab Test 01/17/20  0334   URIC 9.1*       Abnormal creatinine   Lab Test 05/15/20  1028   CR 1.18*   CREAT  --

## 2021-01-06 RX ORDER — ALLOPURINOL 100 MG/1
200 TABLET ORAL DAILY
Qty: 120 TABLET | Refills: 0 | Status: SHIPPED | OUTPATIENT
Start: 2021-01-06 | End: 2021-03-04

## 2021-01-19 ENCOUNTER — ANTICOAGULATION THERAPY VISIT (OUTPATIENT)
Dept: ANTICOAGULATION | Facility: CLINIC | Age: 61
End: 2021-01-19

## 2021-01-19 ENCOUNTER — TRANSFERRED RECORDS (OUTPATIENT)
Dept: HEALTH INFORMATION MANAGEMENT | Facility: CLINIC | Age: 61
End: 2021-01-19

## 2021-01-19 DIAGNOSIS — Z79.01 LONG TERM CURRENT USE OF ANTICOAGULANT THERAPY: ICD-10-CM

## 2021-01-19 DIAGNOSIS — I48.91 ATRIAL FIBRILLATION, UNSPECIFIED TYPE (H): ICD-10-CM

## 2021-01-19 LAB — INR PPP: 2.7 (ref 0.9–1.1)

## 2021-01-19 NOTE — PROGRESS NOTES
ANTICOAGULATION FOLLOW-UP CLINIC VISIT    Patient Name:  Arianna Siddiqi  Date:  2021  Contact Type:  Telephone    SUBJECTIVE:         OBJECTIVE    Recent labs: (last 7 days)     21   INR 2.7*       ASSESSMENT / PLAN  INR assessment THER    Recheck INR In: 2 WEEKS    INR Location Home INR      Anticoagulation Summary  As of 2021    INR goal:  2.0-3.0   TTR:  94.9 % (10 mo)   INR used for dosin.7 (2021)   Warfarin maintenance plan:  3 mg (3 mg x 1) every Mon, Wed, Fri; 4.5 mg (3 mg x 1.5) all other days   Full warfarin instructions:  3 mg every Mon, Wed, Fri; 4.5 mg all other days   Weekly warfarin total:  27 mg   No change documented:  Carol Hoffman RN   Plan last modified:  Tanna Dawkins RN (2020)   Next INR check:  2021   Priority:  High   Target end date:      Indications    Long-term (current) use of anticoagulants [Z79.01] [Z79.01]  Atrial fibrillation (H) [I48.91] [I48.91]  Atrial fibrillation  unspecified type (H) [I48.91]             Anticoagulation Episode Summary     INR check location:  Home Draw    Preferred lab:      Send INR reminders to:  [facility]    Comments:   [employer] Monitoring to start on . Has 3mg tablets previously had 2mg tablets   Pt's voicemail box is full and pt is unable to retrieve voice messages       Anticoagulation Care Providers     Provider Role Specialty Phone number    Gagan Quiles MD Referring Internal Medicine [phone]            See the Encounter Report to view Anticoagulation Flowsheet and Dosing Calendar (Go to Encounters tab in chart review, and find the Anticoagulation Therapy Visit)    Spoke with patient.    Carol Hoffman, SONAL

## 2021-01-23 DIAGNOSIS — M10.9 GOUT, UNSPECIFIED CAUSE, UNSPECIFIED CHRONICITY, UNSPECIFIED SITE: ICD-10-CM

## 2021-01-23 DIAGNOSIS — B49 FUNGAL INFECTION: ICD-10-CM

## 2021-01-27 RX ORDER — ALLOPURINOL 100 MG/1
TABLET ORAL
Qty: 120 TABLET | Refills: 0 | OUTPATIENT
Start: 2021-01-27

## 2021-01-27 NOTE — CONFIDENTIAL NOTE
miconazole (MICATIN) 2 % external powder  Last Written Prescription Date:  8/19/20  Last Fill Quantity: 142g,   # refills: 2  Last Office Visit : 4/3/20  Future Office visit:  None    Telephone visit in 2 weeks.     Scheduling has been notified to contact the pt for appointment.    Danette Mayfield RN 7:45 AM on 1/28/2021.

## 2021-02-02 ENCOUNTER — ANTICOAGULATION THERAPY VISIT (OUTPATIENT)
Dept: ANTICOAGULATION | Facility: CLINIC | Age: 61
End: 2021-02-02

## 2021-02-02 ENCOUNTER — TRANSFERRED RECORDS (OUTPATIENT)
Dept: HEALTH INFORMATION MANAGEMENT | Facility: CLINIC | Age: 61
End: 2021-02-02

## 2021-02-02 DIAGNOSIS — I48.91 ATRIAL FIBRILLATION, UNSPECIFIED TYPE (H): ICD-10-CM

## 2021-02-02 DIAGNOSIS — Z79.01 LONG TERM CURRENT USE OF ANTICOAGULANT THERAPY: ICD-10-CM

## 2021-02-02 LAB — INR PPP: 2.7 (ref 0.9–1.1)

## 2021-02-02 NOTE — PROGRESS NOTES
ANTICOAGULATION FOLLOW-UP CLINIC VISIT    Patient Name:  Arianna Siddiqi  Date:  2021  Contact Type:  Telephone    SUBJECTIVE:  Patient Findings     Comments:  Spoke to Arianna.        Clinical Outcomes     Comments:  Spoke to Arianna.           OBJECTIVE    Recent labs: (last 7 days)     21   INR 2.7*       ASSESSMENT / PLAN  INR assessment THER    Recheck INR In: 2 WEEKS    INR Location Home INR      Anticoagulation Summary  As of 2021    INR goal:  2.0-3.0   TTR:  95.1 % (10.4 mo)   INR used for dosin.7 (2021)   Warfarin maintenance plan:  3 mg (3 mg x 1) every Mon, Wed, Fri; 4.5 mg (3 mg x 1.5) all other days   Full warfarin instructions:  3 mg every Mon, Wed, Fri; 4.5 mg all other days   Weekly warfarin total:  27 mg   No change documented:  Espinoza Rodriguez RN   Plan last modified:  Tanna Dawkins RN (2020)   Next INR check:  2021   Priority:  Maintenance   Target end date:      Indications    Long-term (current) use of anticoagulants [Z79.01] [Z79.01]  Atrial fibrillation (H) [I48.91] [I48.91]  Atrial fibrillation  unspecified type (H) [I48.91]             Anticoagulation Episode Summary     INR check location:  Home Draw    Preferred lab:      Send INR reminders to:  [facility]    Comments:   [employer] [facility] Monitoring to start on . Has 3mg tablets previously had 2mg tablets   Pt's voicemail box is full and pt is unable to retrieve voice messages       Anticoagulation Care Providers     Provider Role Specialty Phone number    Gagan Quiles MD Referring Internal Medicine [phone]            See the Encounter Report to view Anticoagulation Flowsheet and Dosing Calendar (Go to Encounters tab in chart review, and find the Anticoagulation Therapy Visit)    INR/CFX/F2 RESULT:INR result is 2.7 per patient on home monitor    ASSESSMENT:No Problems found. No Changes in Health, Medications, or diet. No Signs of bruising, bleeding or clotting.    DOSING ADJUSTMENT:continue  pattern of dosing    NEXT INR/FACTOR X OR FACTOR II:2/16    PROTOCOL FOLLOWED:goal 2-3    Espinoza Rodriguez, RN

## 2021-02-16 ENCOUNTER — ANTICOAGULATION THERAPY VISIT (OUTPATIENT)
Dept: ANTICOAGULATION | Facility: CLINIC | Age: 61
End: 2021-02-16

## 2021-02-16 ENCOUNTER — TRANSFERRED RECORDS (OUTPATIENT)
Dept: HEALTH INFORMATION MANAGEMENT | Facility: CLINIC | Age: 61
End: 2021-02-16

## 2021-02-16 DIAGNOSIS — I48.91 ATRIAL FIBRILLATION, UNSPECIFIED TYPE (H): ICD-10-CM

## 2021-02-16 DIAGNOSIS — Z79.01 LONG TERM CURRENT USE OF ANTICOAGULANT THERAPY: ICD-10-CM

## 2021-02-16 LAB — INR PPP: 2.8 (ref 0.9–1.1)

## 2021-02-16 NOTE — PROGRESS NOTES
ANTICOAGULATION FOLLOW-UP CLINIC VISIT    Patient Name:  Arianna Siddiqi  Date:  2021  Contact Type:  Telephone    SUBJECTIVE:         OBJECTIVE    Recent labs: (last 7 days)     21   INR 2.8*       ASSESSMENT / PLAN  INR assessment THER    Recheck INR In: 2 WEEKS    INR Location Home INR      Anticoagulation Summary  As of 2021    INR goal:  2.0-3.0   TTR:  95.3 % (10.9 mo)   INR used for dosin.8 (2021)   Warfarin maintenance plan:  3 mg (3 mg x 1) every Mon, Wed, Fri; 4.5 mg (3 mg x 1.5) all other days   Full warfarin instructions:  3 mg every Mon, Wed, Fri; 4.5 mg all other days   Weekly warfarin total:  27 mg   Plan last modified:  Tanna Dawkins RN (2020)   Next INR check:  3/2/2021   Priority:  Maintenance   Target end date:      Indications    Long-term (current) use of anticoagulants [Z79.01] [Z79.01]  Atrial fibrillation (H) [I48.91] [I48.91]  Atrial fibrillation  unspecified type (H) [I48.91]             Anticoagulation Episode Summary     INR check location:  Home Draw    Preferred lab:      Send INR reminders to:  [facility]    Comments:   [employer] [facility] Monitoring to start on . Has 3mg tablets previously had 2mg tablets   Pt's voicemail box is full and pt is unable to retrieve voice messages       Anticoagulation Care Providers     Provider Role Specialty Phone number    Gagan Quiles MD Referring Internal Medicine [phone]            See the Encounter Report to view Anticoagulation Flowsheet and Dosing Calendar (Go to Encounters tab in chart review, and find the Anticoagulation Therapy Visit)  Spoke with patient.    Carol Hoffman RN

## 2021-02-28 DIAGNOSIS — M10.9 GOUT, UNSPECIFIED CAUSE, UNSPECIFIED CHRONICITY, UNSPECIFIED SITE: ICD-10-CM

## 2021-03-02 ENCOUNTER — ANTICOAGULATION THERAPY VISIT (OUTPATIENT)
Dept: ANTICOAGULATION | Facility: CLINIC | Age: 61
End: 2021-03-02

## 2021-03-02 DIAGNOSIS — I48.91 ATRIAL FIBRILLATION, UNSPECIFIED TYPE (H): ICD-10-CM

## 2021-03-02 DIAGNOSIS — Z79.01 LONG TERM CURRENT USE OF ANTICOAGULANT THERAPY: ICD-10-CM

## 2021-03-02 LAB — INR PPP: 2.5 (ref 0.9–1.1)

## 2021-03-02 NOTE — PROGRESS NOTES
ANTICOAGULATION FOLLOW-UP CLINIC VISIT    Patient Name:  Arianna Siddiqi  Date:  3/2/2021  Contact Type:  Telephone    SUBJECTIVE:         OBJECTIVE    Recent labs: (last 7 days)     21   INR 2.5*       ASSESSMENT / PLAN  INR assessment THER    Recheck INR In: 2 WEEKS    INR Location Home INR      Anticoagulation Summary  As of 3/2/2021    INR goal:  2.0-3.0   TTR:  95.5 % (11.4 mo)   INR used for dosin.5 (3/2/2021)   Warfarin maintenance plan:  3 mg (3 mg x 1) every Mon, Wed, Fri; 4.5 mg (3 mg x 1.5) all other days   Full warfarin instructions:  3 mg every Mon, Wed, Fri; 4.5 mg all other days   Weekly warfarin total:  27 mg   No change documented:  Carol Hoffman RN   Plan last modified:  Tanna Dawkins RN (2020)   Next INR check:  3/16/2021   Priority:  Maintenance   Target end date:      Indications    Long-term (current) use of anticoagulants [Z79.01] [Z79.01]  Atrial fibrillation (H) [I48.91] [I48.91]  Atrial fibrillation  unspecified type (H) [I48.91]             Anticoagulation Episode Summary     INR check location:  Home Draw    Preferred lab:      Send INR reminders to:  [facility]    Comments:   [employer] Home Monitoring to start on . Has 3mg tablets previously had 2mg tablets   Pt's voicemail box is full and pt is unable to retrieve voice messages       Anticoagulation Care Providers     Provider Role Specialty Phone number    Gagan Quiles MD Referring Internal Medicine [phone]            See the Encounter Report to view Anticoagulation Flowsheet and Dosing Calendar (Go to Encounters tab in chart review, and find the Anticoagulation Therapy Visit)  Spoke with patient.    Carol Hoffman, SONAL

## 2021-03-04 RX ORDER — ALLOPURINOL 100 MG/1
TABLET ORAL
Qty: 120 TABLET | Refills: 0 | Status: SHIPPED | OUTPATIENT
Start: 2021-03-04 | End: 2021-04-22

## 2021-03-16 ENCOUNTER — ANTICOAGULATION THERAPY VISIT (OUTPATIENT)
Dept: ANTICOAGULATION | Facility: CLINIC | Age: 61
End: 2021-03-16

## 2021-03-16 ENCOUNTER — TRANSFERRED RECORDS (OUTPATIENT)
Dept: HEALTH INFORMATION MANAGEMENT | Facility: CLINIC | Age: 61
End: 2021-03-16

## 2021-03-16 DIAGNOSIS — Z79.01 LONG TERM CURRENT USE OF ANTICOAGULANT THERAPY: ICD-10-CM

## 2021-03-16 DIAGNOSIS — I48.91 ATRIAL FIBRILLATION, UNSPECIFIED TYPE (H): ICD-10-CM

## 2021-03-16 LAB — INR PPP: 2.4 (ref 0.9–1.1)

## 2021-03-16 NOTE — PROGRESS NOTES
ANTICOAGULATION FOLLOW-UP CLINIC VISIT    Patient Name:  Arianna Siddiqi  Date:  3/16/2021  Contact Type:  Telephone    SUBJECTIVE:  Patient Findings     Positives:  Signs/symptoms of bleeding, Change in medications    Comments:  Pt reports cutting herself on a tin pan, but was able to stop this with pressure. Pt also reports she will start Vitamin D supplement. Per the literature this doesn't interact with Warfarin.         Clinical Outcomes     Comments:  Pt reports cutting herself on a tin pan, but was able to stop this with pressure. Pt also reports she will start Vitamin D supplement. Per the literature this doesn't interact with Warfarin.            OBJECTIVE    Recent labs: (last 7 days)     21   INR 2.4*       ASSESSMENT / PLAN  INR assessment THER    Recheck INR In: 2 WEEKS    INR Location Home INR      Anticoagulation Summary  As of 3/16/2021    INR goal:  2.0-3.0   TTR:  95.7 % (11.8 mo)   INR used for dosin.4 (3/16/2021)   Warfarin maintenance plan:  3 mg (3 mg x 1) every Mon, Wed, Fri; 4.5 mg (3 mg x 1.5) all other days   Full warfarin instructions:  3 mg every Mon, Wed, Fri; 4.5 mg all other days   Weekly warfarin total:  27 mg   No change documented:  Tanna Dawkins, RN   Plan last modified:  Tanna Dawkins, RN (2020)   Next INR check:  3/30/2021   Priority:  Maintenance   Target end date:      Indications    Long-term (current) use of anticoagulants [Z79.01] [Z79.01]  Atrial fibrillation (H) [I48.91] [I48.91]  Atrial fibrillation  unspecified type (H) [I48.91]             Anticoagulation Episode Summary     INR check location:  Home Draw    Preferred lab:      Send INR reminders to:  [facility]    Comments:   [employer] [facility] to start on . Has 3mg tablets previously had 2mg tablets   Pt's voicemail box is full and pt is unable to retrieve voice messages       Anticoagulation Care Providers     Provider Role Specialty Phone number    Gagan Quiels MD Referring  Internal Medicine [phone]            See the Encounter Report to view Anticoagulation Flowsheet and Dosing Calendar (Go to Encounters tab in chart review, and find the Anticoagulation Therapy Visit)    Spoke with patient. Gave them their lab results and new warfarin recommendation.  No changes in health, medication, or diet. No missed doses, no falls. No signs or symptoms of bleed or clotting.     Tanna Dawkins RN

## 2021-03-30 ENCOUNTER — ANTICOAGULATION THERAPY VISIT (OUTPATIENT)
Dept: ANTICOAGULATION | Facility: CLINIC | Age: 61
End: 2021-03-30

## 2021-03-30 ENCOUNTER — DOCUMENTATION ONLY (OUTPATIENT)
Dept: ANTICOAGULATION | Facility: CLINIC | Age: 61
End: 2021-03-30

## 2021-03-30 DIAGNOSIS — I48.20 CHRONIC ATRIAL FIBRILLATION (H): Primary | ICD-10-CM

## 2021-03-30 DIAGNOSIS — Z79.01 LONG TERM CURRENT USE OF ANTICOAGULANT THERAPY: ICD-10-CM

## 2021-03-30 DIAGNOSIS — I48.91 ATRIAL FIBRILLATION, UNSPECIFIED TYPE (H): ICD-10-CM

## 2021-03-30 LAB — INR PPP: 3 (ref 0.9–1.1)

## 2021-03-30 NOTE — PROGRESS NOTES
ANTICOAGULATION MANAGEMENT      Arianna VALDIVIA Devang due for annual renewal of referral to anticoagulation monitoring. Order pended for your review and signature.      ANTICOAGULATION SUMMARY      Warfarin indication(s)     Atrial fibrillation    Heart valve present?  NO       Current goal range   INR: 2.0-3.0     Goal appropriate for indication? Yes, INR 2-3 appropriate for hx of DVT, PE, hypercoagulable state, Afib, LVAD, or bileaflet AVR without risk factors     Current duration of therapy Indefinite/long term therapy   Time in Therapeutic Range (TTR)  (Goal > 60%) 95.8   %       Office visit with referring provider's group within last year yes on 12/8-selene Hoffman RN

## 2021-03-30 NOTE — PROGRESS NOTES
ANTICOAGULATION FOLLOW-UP CLINIC VISIT    Patient Name:  Arianna Siddiqi  Date:  3/30/2021  Contact Type:  Telephone    SUBJECTIVE:  Patient Findings     Positives:  Change in health (having difficulty sleeping.)             OBJECTIVE    Recent labs: (last 7 days)     03/30/21   INR 3.0*       ASSESSMENT / PLAN  INR assessment THER    Recheck INR In: 2 WEEKS    INR Location Home INR      Anticoagulation Summary  As of 3/30/2021    INR goal:  2.0-3.0   TTR:  95.8 % (1 y)   INR used for dosing:  3.0 (3/30/2021)   Warfarin maintenance plan:  3 mg (3 mg x 1) every Mon, Wed, Fri; 4.5 mg (3 mg x 1.5) all other days   Full warfarin instructions:  3/30: 3 mg; Otherwise 3 mg every Mon, Wed, Fri; 4.5 mg all other days   Weekly warfarin total:  27 mg   Plan last modified:  Tanna Dawkins RN (4/14/2020)   Next INR check:  4/13/2021   Priority:  Maintenance   Target end date:      Indications    Long-term (current) use of anticoagulants [Z79.01] [Z79.01]  Atrial fibrillation (H) [I48.91] [I48.91]  Atrial fibrillation  unspecified type (H) [I48.91]             Anticoagulation Episode Summary     INR check location:  Home Draw    Preferred lab:      Send INR reminders to:  [facility]    Comments:  1/17 [employer] to start on 2/28. Has 3mg tablets previously had 2mg tablets   Pt's voicemail box is full and pt is unable to retrieve voice messages       Anticoagulation Care Providers     Provider Role Specialty Phone number    Gagan Quiles MD Referring Internal Medicine [phone]            See the Encounter Report to view Anticoagulation Flowsheet and Dosing Calendar (Go to Encounters tab in chart review, and find the Anticoagulation Therapy Visit)  Spoke with patient.    Carol Hoffman, SONAL

## 2021-04-13 ENCOUNTER — ANTICOAGULATION THERAPY VISIT (OUTPATIENT)
Dept: ANTICOAGULATION | Facility: CLINIC | Age: 61
End: 2021-04-13

## 2021-04-13 DIAGNOSIS — I48.20 CHRONIC ATRIAL FIBRILLATION (H): ICD-10-CM

## 2021-04-13 DIAGNOSIS — I48.91 ATRIAL FIBRILLATION, UNSPECIFIED TYPE (H): ICD-10-CM

## 2021-04-13 DIAGNOSIS — Z79.01 LONG TERM CURRENT USE OF ANTICOAGULANT THERAPY: ICD-10-CM

## 2021-04-13 LAB — INR PPP: 1.9 (ref 0.9–1.1)

## 2021-04-13 NOTE — PROGRESS NOTES
ANTICOAGULATION FOLLOW-UP CLINIC VISIT    Patient Name:  Arianna Siddiqi  Date:  2021  Contact Type:  Telephone    SUBJECTIVE:  Patient Findings     Positives:  Change in diet/appetite    Comments:  Pt reports eating a lot of Michaelerika Panchal sandwiches and putting a lot of lettuce on them. Pt reports this will not continue. Since pt is taking a bigger dose today will leave dosing alone.         Clinical Outcomes     Comments:  Pt reports eating a lot of Michael Abdulkadir sandwiches and putting a lot of lettuce on them. Pt reports this will not continue. Since pt is taking a bigger dose today will leave dosing alone.            OBJECTIVE    Recent labs: (last 7 days)     21   INR 1.9*       ASSESSMENT / PLAN  INR assessment SUB    Recheck INR In: 2 WEEKS    INR Location Home INR      Anticoagulation Summary  As of 2021    INR goal:  2.0-3.0   TTR:  98.6 % (1 y)   INR used for dosin.9 (2021)   Warfarin maintenance plan:  3 mg (3 mg x 1) every Mon, Wed, Fri; 4.5 mg (3 mg x 1.5) all other days   Full warfarin instructions:  3 mg every Mon, Wed, Fri; 4.5 mg all other days   Weekly warfarin total:  27 mg   No change documented:  Tanna Dawkins, RN   Plan last modified:  Tanna Dawkins, RN (2020)   Next INR check:  2021   Priority:  Maintenance   Target end date:  Indefinite    Indications    Long-term (current) use of anticoagulants [Z79.01] [Z79.01]  Atrial fibrillation (H) [I48.91] [I48.91]  Atrial fibrillation  unspecified type (H) [I48.91]  Chronic atrial fibrillation (H) [I48.20]             Anticoagulation Episode Summary     INR check location:  Home Draw    Preferred lab:      Send INR reminders to:  [facility]    Comments:   [employer] [facility] to start on . Has 3mg tablets previously had 2mg tablets   Pt's voicemail box is full and pt is unable to retrieve voice messages       Anticoagulation Care Providers     Provider Role Specialty Phone number    Gagan Quiles MD  Referring Internal Medicine [phone]            See the Encounter Report to view Anticoagulation Flowsheet and Dosing Calendar (Go to Encounters tab in chart review, and find the Anticoagulation Therapy Visit)    Spoke with patient. Gave them their lab results and new warfarin recommendation.  No changes in health, medication, or diet. No missed doses, no falls. No signs or symptoms of bleed or clotting.     Tanna Dawkins RN

## 2021-04-16 DIAGNOSIS — Z79.01 LONG TERM CURRENT USE OF ANTICOAGULANT THERAPY: Primary | ICD-10-CM

## 2021-04-16 DIAGNOSIS — M62.838 MUSCLE SPASM: ICD-10-CM

## 2021-04-16 RX ORDER — WARFARIN SODIUM 3 MG/1
TABLET ORAL
Qty: 45 TABLET | Refills: 3 | Status: SHIPPED | OUTPATIENT
Start: 2021-04-16 | End: 2021-07-19

## 2021-04-19 NOTE — TELEPHONE ENCOUNTER
CYCLOBENZAPRINE 5 MG TABLET      Last Written Prescription Date:  11/15/20  Last Fill Quantity: 30,   # refills: 5  Last Office Visit : 8/24/20  Future Office visit:  None scheduled    Routing refill request to provider for review/approval because:  Drug not on primary care refill protocol

## 2021-04-20 DIAGNOSIS — B49 FUNGAL INFECTION: ICD-10-CM

## 2021-04-20 DIAGNOSIS — R52 PAIN: ICD-10-CM

## 2021-04-20 DIAGNOSIS — D64.9 ANEMIA, UNSPECIFIED TYPE: ICD-10-CM

## 2021-04-20 DIAGNOSIS — M10.9 GOUT, UNSPECIFIED CAUSE, UNSPECIFIED CHRONICITY, UNSPECIFIED SITE: ICD-10-CM

## 2021-04-20 DIAGNOSIS — Z79.01 ANTICOAGULATED ON COUMADIN: ICD-10-CM

## 2021-04-20 RX ORDER — CYCLOBENZAPRINE HCL 5 MG
5 TABLET ORAL
Qty: 30 TABLET | Refills: 5 | Status: SHIPPED | OUTPATIENT
Start: 2021-04-20 | End: 2021-10-05

## 2021-04-22 RX ORDER — ALLOPURINOL 100 MG/1
TABLET ORAL
Qty: 120 TABLET | Refills: 0 | Status: SHIPPED | OUTPATIENT
Start: 2021-04-22 | End: 2021-06-20

## 2021-04-22 NOTE — TELEPHONE ENCOUNTER
ALLOPURINOL 100 MG TABLET      Last Written Prescription Date:  3-4-21  Last Fill Quantity: 120,   # refills: 0  Last Office Visit : 8-24-20  Future Office visit:  none    Abnormal Cr: 5-15-20, reviewed by provider    Routing refill request to provider for review/approval because:  Last fill 30 day:0 RF,  ? rf  Past due lab: uric acid ( last done 1-17-20, abn 9.1)    miconazole (MICATIN) 2 % external powder  RF per protocol  Overridden by Gagan Quiles MD on Jan 28, 2021 7:51 AM   Drug-Drug   1. TOPICAL IMIDAZOLES / ANTICOAGULANTS [Level: Major]   Other Orders: warfarin ANTICOAGULANT (COUMADIN) 3 MG tablet          TRAMADOL HCL 50 MG TABLET  Controlled med   [facility] please address   no nose bleeding/no skin lumps/no gum bleeding

## 2021-04-23 RX ORDER — TRAMADOL HYDROCHLORIDE 50 MG/1
TABLET ORAL
Qty: 7 TABLET | Refills: 0 | Status: ON HOLD | OUTPATIENT
Start: 2021-04-23 | End: 2022-01-01

## 2021-04-23 NOTE — TELEPHONE ENCOUNTER
Patient Requested  traMADol (ULTRAM) 50 MG tablet   Last Filled  09/10/2020  Last Office Visit  08/24/2020  Next Office Visit     Checked  04/23/2021    DX:     Pharmacy:  [facility] at 7:35 AM on 4/23/2021.

## 2021-04-27 ENCOUNTER — ANTICOAGULATION THERAPY VISIT (OUTPATIENT)
Dept: ANTICOAGULATION | Facility: CLINIC | Age: 61
End: 2021-04-27

## 2021-04-27 DIAGNOSIS — Z79.01 LONG TERM CURRENT USE OF ANTICOAGULANT THERAPY: ICD-10-CM

## 2021-04-27 DIAGNOSIS — I48.20 CHRONIC ATRIAL FIBRILLATION (H): ICD-10-CM

## 2021-04-27 DIAGNOSIS — I48.91 ATRIAL FIBRILLATION, UNSPECIFIED TYPE (H): ICD-10-CM

## 2021-04-27 LAB — INR PPP: 2.3 (ref 0.9–1.1)

## 2021-05-11 ENCOUNTER — ANTICOAGULATION THERAPY VISIT (OUTPATIENT)
Dept: ANTICOAGULATION | Facility: CLINIC | Age: 61
End: 2021-05-11

## 2021-05-11 DIAGNOSIS — Z79.01 LONG TERM CURRENT USE OF ANTICOAGULANT THERAPY: ICD-10-CM

## 2021-05-11 DIAGNOSIS — I48.20 CHRONIC ATRIAL FIBRILLATION (H): ICD-10-CM

## 2021-05-11 DIAGNOSIS — I48.91 ATRIAL FIBRILLATION, UNSPECIFIED TYPE (H): ICD-10-CM

## 2021-05-11 LAB — INR PPP: 2.8 (ref 0.9–1.1)

## 2021-05-11 NOTE — PROGRESS NOTES
ANTICOAGULATION FOLLOW-UP CLINIC VISIT    Patient Name:  Arianna Siddiqi  Date:  2021  Contact Type:  Telephone    SUBJECTIVE:  Patient Findings     Comments:  Spoke to Arianna.        Clinical Outcomes     Comments:  Spoke to Arianna.           OBJECTIVE    Recent labs: (last 7 days)     21   INR 2.8*       ASSESSMENT / PLAN  INR assessment THER    Recheck INR In: 2 WEEKS    INR Location Home INR      Anticoagulation Summary  As of 2021    INR goal:  2.0-3.0   TTR:  98.7 % (1 y)   INR used for dosin.8 (2021)   Warfarin maintenance plan:  3 mg (3 mg x 1) every Mon, Wed, Fri; 4.5 mg (3 mg x 1.5) all other days   Full warfarin instructions:  3 mg every Mon, Wed, Fri; 4.5 mg all other days   Weekly warfarin total:  27 mg   No change documented:  Espinoza Rodriguez RN   Plan last modified:  Tanna Dawkins RN (2020)   Next INR check:  2021   Priority:  Maintenance   Target end date:  Indefinite    Indications    Long-term (current) use of anticoagulants [Z79.01] [Z79.01]  Atrial fibrillation (H) [I48.91] [I48.91]  Atrial fibrillation  unspecified type (H) [I48.91]  Chronic atrial fibrillation (H) [I48.20]             Anticoagulation Episode Summary     INR check location:  Home Draw    Preferred lab:      Send INR reminders to:  [facility]    Comments:   [employer] [facility] to start on . Has 3mg tablets previously had 2mg tablets   Pt's voicemail box is full and pt is unable to retrieve voice messages       Anticoagulation Care Providers     Provider Role Specialty Phone number    Gagan Quiles MD Referring Internal Medicine [phone]            See the Encounter Report to view Anticoagulation Flowsheet and Dosing Calendar (Go to Encounters tab in chart review, and find the Anticoagulation Therapy Visit)    INR/CFX/F2 RESULT:INR result is 2.8    ASSESSMENT:Spoke with patient. Gave them their lab results and new warfarin recommendation.  No changes in health,  medication, or diet. No missed doses, no falls. No signs or symptoms of bleed or clotting.    DOSING ADJUSTMENT:continue pattern of dosing    NEXT INR/FACTOR X OR FACTOR II:5/25    PROTOCOL FOLLOWED:goal 2-3    Espinoza Rodriguez RN

## 2021-05-25 ENCOUNTER — ANTICOAGULATION THERAPY VISIT (OUTPATIENT)
Dept: ANTICOAGULATION | Facility: CLINIC | Age: 61
End: 2021-05-25

## 2021-05-25 DIAGNOSIS — Z79.01 LONG TERM CURRENT USE OF ANTICOAGULANT THERAPY: ICD-10-CM

## 2021-05-25 DIAGNOSIS — I48.91 ATRIAL FIBRILLATION, UNSPECIFIED TYPE (H): ICD-10-CM

## 2021-05-25 DIAGNOSIS — I48.20 CHRONIC ATRIAL FIBRILLATION (H): ICD-10-CM

## 2021-05-25 LAB — INR PPP: 3 (ref 0.9–1.1)

## 2021-05-25 NOTE — PROGRESS NOTES
ANTICOAGULATION FOLLOW-UP CLINIC VISIT    Patient Name:  Arianna Siddiqi  Date:  5/25/2021  Contact Type:  Telephone    SUBJECTIVE:  Patient Findings     Comments:  Spoke to Arianna.  At time of encounter did not make any adjustments to pattern of dosing.        Clinical Outcomes     Comments:  Spoke to Jaycee  At time of encounter did not make any adjustments to pattern of dosing.           OBJECTIVE    Recent labs: (last 7 days)     05/25/21   INR 3.0*       ASSESSMENT / PLAN  INR assessment THER    Recheck INR In: 2 WEEKS    INR Location Home INR      Anticoagulation Summary  As of 5/25/2021    INR goal:  2.0-3.0   TTR:  98.7 % (1 y)   INR used for dosing:  3.0 (5/25/2021)   Warfarin maintenance plan:  3 mg (3 mg x 1) every Mon, Wed, Fri; 4.5 mg (3 mg x 1.5) all other days   Full warfarin instructions:  3 mg every Mon, Wed, Fri; 4.5 mg all other days   Weekly warfarin total:  27 mg   No change documented:  Espinoza Phillips RN   Plan last modified:  Tanna Dawkins RN (4/14/2020)   Next INR check:  6/8/2021   Priority:  Maintenance   Target end date:  Indefinite    Indications    Long-term (current) use of anticoagulants [Z79.01] [Z79.01]  Atrial fibrillation (H) [I48.91] [I48.91]  Atrial fibrillation  unspecified type (H) [I48.91]  Chronic atrial fibrillation (H) [I48.20]             Anticoagulation Episode Summary     INR check location:  Home Draw    Preferred lab:      Send INR reminders to:  [facility]    Comments:  1/17 [employer] [facility] to start on 2/28. Has 3mg tablets previously had 2mg tablets   Pt's voicemail box is full and pt is unable to retrieve voice messages       Anticoagulation Care Providers     Provider Role Specialty Phone number    Gagan Quiles MD Referring Internal Medicine [phone]            See the Encounter Report to view Anticoagulation Flowsheet and Dosing Calendar (Go to Encounters tab in chart review, and find the Anticoagulation Therapy Visit)    INR/CFX/F2 RESULT:INR  result is 3.0 per patient    ASSESSMENT:Spoke with patient. Gave them their lab results and new warfarin recommendation.  No changes in health, medication, or diet. No missed doses, no falls. No signs or symptoms of bleed or clotting.    DOSING ADJUSTMENT:continue pattern of dosing    NEXT INR/FACTOR X OR FACTOR II:6/8    PROTOCOL FOLLOWED:goal 2-3    Espinoza Phillips, RN

## 2021-06-08 ENCOUNTER — ANTICOAGULATION THERAPY VISIT (OUTPATIENT)
Dept: ANTICOAGULATION | Facility: CLINIC | Age: 61
End: 2021-06-08

## 2021-06-08 DIAGNOSIS — Z79.01 LONG TERM CURRENT USE OF ANTICOAGULANT THERAPY: ICD-10-CM

## 2021-06-08 DIAGNOSIS — I48.20 CHRONIC ATRIAL FIBRILLATION (H): ICD-10-CM

## 2021-06-08 DIAGNOSIS — I48.91 ATRIAL FIBRILLATION, UNSPECIFIED TYPE (H): ICD-10-CM

## 2021-06-08 LAB — INR PPP: 3 (ref 0.9–1.1)

## 2021-06-08 NOTE — PROGRESS NOTES
ANTICOAGULATION FOLLOW-UP CLINIC VISIT    Patient Name:  Arianna Siddiqi  Date:  6/8/2021  Contact Type:  Telephone    SUBJECTIVE:  Patient Findings     Comments:  Arianna hollis.  No adjustment to dosing pattern.        Clinical Outcomes     Comments:  Arianna hollis.  No adjustment to dosing pattern.           OBJECTIVE    Recent labs: (last 7 days)     06/08/21   INR 3.0*       ASSESSMENT / PLAN  INR assessment THER    Recheck INR In: 2 WEEKS    INR Location Home INR      Anticoagulation Summary  As of 6/8/2021    INR goal:  2.0-3.0   TTR:  98.7 % (1 y)   INR used for dosing:  3.0 (6/8/2021)   Warfarin maintenance plan:  3 mg (3 mg x 1) every Mon, Wed, Fri; 4.5 mg (3 mg x 1.5) all other days   Full warfarin instructions:  3 mg every Mon, Wed, Fri; 4.5 mg all other days   Weekly warfarin total:  27 mg   No change documented:  Espinoza Phillips RN   Plan last modified:  Tanna Dawkins RN (4/14/2020)   Next INR check:  6/22/2021   Priority:  Maintenance   Target end date:  Indefinite    Indications    Long-term (current) use of anticoagulants [Z79.01] [Z79.01]  Atrial fibrillation (H) [I48.91] [I48.91]  Atrial fibrillation  unspecified type (H) [I48.91]  Chronic atrial fibrillation (H) [I48.20]             Anticoagulation Episode Summary     INR check location:  Home Draw    Preferred lab:      Send INR reminders to:  [facility]    Comments:  1/17 [employer] [facility] to start on 2/28. Has 3mg tablets previously had 2mg tablets   Pt's voicemail box is full and pt is unable to retrieve voice messages       Anticoagulation Care Providers     Provider Role Specialty Phone number    Gagan Quiles MD Referring Internal Medicine [phone]            See the Encounter Report to view Anticoagulation Flowsheet and Dosing Calendar (Go to Encounters tab in chart review, and find the Anticoagulation Therapy Visit)    INR/CFX/F2 RESULT:INR result is 3.0 per patient.    ASSESSMENT:Spoke with patient. Gave them their lab  results and new warfarin recommendation.  No changes in health, medication, or diet. No missed doses, no falls. No signs or symptoms of bleed or clotting.    DOSING ADJUSTMENT:continue pattern of dosing    NEXT INR/FACTOR X OR FACTOR II:6/22    PROTOCOL FOLLOWED:goal 2-3    Espinoza Phillips, RN

## 2021-06-17 DIAGNOSIS — M10.9 GOUT, UNSPECIFIED CAUSE, UNSPECIFIED CHRONICITY, UNSPECIFIED SITE: ICD-10-CM

## 2021-06-18 ENCOUNTER — MEDICAL CORRESPONDENCE (OUTPATIENT)
Dept: HEALTH INFORMATION MANAGEMENT | Facility: CLINIC | Age: 61
End: 2021-06-18

## 2021-06-20 RX ORDER — ALLOPURINOL 100 MG/1
200 TABLET ORAL DAILY
Qty: 180 TABLET | Refills: 0 | Status: SHIPPED | OUTPATIENT
Start: 2021-06-20 | End: 2021-09-15

## 2021-06-20 NOTE — TELEPHONE ENCOUNTER
ALLOPURINOL 100 MG TABLET      Last Written Prescription Date:  4/22/21  Last Fill Quantity: 120,   # refills: 0  Last Office Visit : 8/24/20  Future Office visit:  None scheduled    Routing refill request to provider for review/approval because:  Overdue for labs  90 day refill provided per protocol, routed to clinic for follow up

## 2021-06-22 ENCOUNTER — ANTICOAGULATION THERAPY VISIT (OUTPATIENT)
Dept: ANTICOAGULATION | Facility: CLINIC | Age: 61
End: 2021-06-22

## 2021-06-22 DIAGNOSIS — I48.91 ATRIAL FIBRILLATION, UNSPECIFIED TYPE (H): ICD-10-CM

## 2021-06-22 DIAGNOSIS — Z79.01 LONG TERM CURRENT USE OF ANTICOAGULANT THERAPY: ICD-10-CM

## 2021-06-22 DIAGNOSIS — I48.20 CHRONIC ATRIAL FIBRILLATION (H): ICD-10-CM

## 2021-06-22 LAB — INR PPP: 2.3 (ref 0.9–1.1)

## 2021-06-22 NOTE — PROGRESS NOTES
ANTICOAGULATION MANAGEMENT     Patient Name:  Arianna Siddiqi  Date:  2021    ASSESSMENT /SUBJECTIVE:    Today's INR result of 2.3 is therapeutic. Goal INR of 2.0-3.0      Warfarin dose taken: Warfarin taken as instructed    Diet: No new diet changes identified    Medication changes/ interactions: No new medications/supplements affecting INR    Previous INR: Therapeutic     S/S of bleeding or thromboembolism: No    New injury or illness: No    Upcoming surgery, procedure or cardioversion: No    Additional findings: None      PLAN:    Warfarin Dosing Instructions: Continue your current warfarin dose 3mg MWF and 4.5mg TuThSaSu    Instructed patient to follow up no later than: 2 weeks  Patient to recheck with home meter    Education provided: None required    Telephone call with Arianna whom verbalizes understanding and agrees to plan    Instructed to call the Anticoagulation Clinic for any changes, questions or concerns. (#[phone])        Tanna Dawkins RN      OBJECTIVE:  Recent labs: (last 7 days)     21   INR 2.3*         No question data found.  Anticoagulation Summary  As of 2021    INR goal:  2.0-3.0   TTR:  98.7 % (1 y)   INR used for dosin.3 (2021)   Warfarin maintenance plan:  3 mg (3 mg x 1) every Mon, Wed, Fri; 4.5 mg (3 mg x 1.5) all other days   Full warfarin instructions:  3 mg every Mon, Wed, Fri; 4.5 mg all other days   Weekly warfarin total:  27 mg   No change documented:  Tanna Dawkins RN   Plan last modified:  Tanna Dawkins RN (2020)   Next INR check:  2021   Priority:  Maintenance   Target end date:  Indefinite    Indications    Long-term (current) use of anticoagulants [Z79.01] [Z79.01]  Atrial fibrillation (H) [I48.91] [I48.91]  Atrial fibrillation  unspecified type (H) [I48.91]  Chronic atrial fibrillation (H) [I48.20]             Anticoagulation Episode Summary     INR check location:  Home Draw    Preferred lab:      Send INR reminders to:  [facility]    Comments:  1/17 [employer] [facility] to start on 2/28. Has 3mg tablets previously had 2mg tablets   Pt's voicemail box is full and pt is unable to retrieve voice messages       Anticoagulation Care Providers     Provider Role Specialty Phone number    Gagan Quiles MD Referring Internal Medicine [phone]

## 2021-07-06 LAB — INR PPP: 2.3

## 2021-07-15 ENCOUNTER — TELEPHONE (OUTPATIENT)
Dept: ANTICOAGULATION | Facility: CLINIC | Age: 61
End: 2021-07-15

## 2021-07-15 NOTE — TELEPHONE ENCOUNTER
ANTICOAGULATION     Arianna Siddiqi is overdue for INR check.      Left message  reminding patient to check INR with their home meter and call results to the home monitoring company as soon as possible.     Espinoza Phillips, RN

## 2021-07-16 ENCOUNTER — ANTICOAGULATION THERAPY VISIT (OUTPATIENT)
Dept: ANTICOAGULATION | Facility: CLINIC | Age: 61
End: 2021-07-16

## 2021-07-16 DIAGNOSIS — Z79.01 LONG TERM CURRENT USE OF ANTICOAGULANT THERAPY: Primary | ICD-10-CM

## 2021-07-16 DIAGNOSIS — I48.91 ATRIAL FIBRILLATION, UNSPECIFIED TYPE (H): ICD-10-CM

## 2021-07-16 DIAGNOSIS — I48.20 CHRONIC ATRIAL FIBRILLATION (H): ICD-10-CM

## 2021-07-16 DIAGNOSIS — I48.91 ATRIAL FIBRILLATION (H): ICD-10-CM

## 2021-07-16 LAB — INR PPP: 2.6

## 2021-07-16 NOTE — PROGRESS NOTES
ANTICOAGULATION MANAGEMENT     Arianna Siddiqi 61 year old female is on warfarin with therapeutic INR result. (Goal INR 2.0-3.0)    Recent labs: (last 7 days)     21  0000   INR 2.6       ASSESSMENT     Source(s): Chart Review and Patient/Caregiver Call       Warfarin doses taken: Warfarin taken as instructed    Diet: No new diet changes identified    New illness, injury, or hospitalization: No    Medication/supplement changes: None noted    Signs or symptoms of bleeding or clotting: No    Previous INR: Therapeutic last 2(+) visits    Additional findings: None     PLAN     Recommended plan for no diet, medication or health factor changes affecting INR     Dosing Instructions: Continue your current warfarin dose 3 mg every Mon, Wed, Fri; 4.5 mg all other days with next INR in 4 days         Telephone call with Arianna who verbalizes understanding and agrees to plan, who agrees to plan and repeated back plan correctly.    Patient to recheck with home meter    Education provided: Please call back if any changes to your diet, medications or how you've been taking warfarin and Contact [phone] with any changes, questions or concerns.     Plan made per [facility] anticoagulation protocol    DIANA THOMAS RN  Anticoagulation Clinic  2021    _______________________________________________________________________     Anticoagulation Summary  As of 2021    INR goal:  2.0-3.0   TTR:  98.7 % (1 y)   INR used for dosin.6 (2021)   Warfarin maintenance plan:  3 mg (3 mg x 1) every Mon, Wed, Fri; 4.5 mg (3 mg x 1.5) all other days   Full warfarin instructions:  3 mg every Mon, Wed, Fri; 4.5 mg all other days   Weekly warfarin total:  27 mg   No change documented:  Diana Thomas RN   Plan last modified:  Tanna Dawkins RN (2020)   Next INR check:  2021   Priority:  Maintenance   Target end date:  Indefinite    Indications    Long-term (current) use of anticoagulants [Z79.01] [Z79.01]  Atrial fibrillation  (H) [I48.91] [I48.91]  Atrial fibrillation  unspecified type (H) [I48.91]  Chronic atrial fibrillation (H) [I48.20]             Anticoagulation Episode Summary     INR check location:  Home Draw    Preferred lab:      Send INR reminders to:  [facility]    Comments:  1/17 [employer] to start on 2/28. Has 3mg tablets previously had 2mg tablets   Pt's voicemail box is full and pt is unable to retrieve voice messages       Anticoagulation Care Providers     Provider Role Specialty Phone number    Gagan Quiles MD Referring Internal Medicine [phone]

## 2021-07-17 DIAGNOSIS — Z79.01 LONG TERM CURRENT USE OF ANTICOAGULANT THERAPY: ICD-10-CM

## 2021-07-19 RX ORDER — WARFARIN SODIUM 3 MG/1
TABLET ORAL
Qty: 135 TABLET | Refills: 1 | Status: SHIPPED | OUTPATIENT
Start: 2021-07-19 | End: 2021-11-12

## 2021-07-20 ENCOUNTER — ANTICOAGULATION THERAPY VISIT (OUTPATIENT)
Dept: ANTICOAGULATION | Facility: CLINIC | Age: 61
End: 2021-07-20

## 2021-07-20 ENCOUNTER — TRANSFERRED RECORDS (OUTPATIENT)
Dept: HEALTH INFORMATION MANAGEMENT | Facility: CLINIC | Age: 61
End: 2021-07-20

## 2021-07-20 DIAGNOSIS — I48.20 CHRONIC ATRIAL FIBRILLATION (H): ICD-10-CM

## 2021-07-20 DIAGNOSIS — I48.91 ATRIAL FIBRILLATION (H): ICD-10-CM

## 2021-07-20 DIAGNOSIS — I48.91 ATRIAL FIBRILLATION, UNSPECIFIED TYPE (H): ICD-10-CM

## 2021-07-20 DIAGNOSIS — Z79.01 LONG TERM CURRENT USE OF ANTICOAGULANT THERAPY: Primary | ICD-10-CM

## 2021-07-20 LAB — INR PPP: 3

## 2021-07-20 NOTE — PROGRESS NOTES
ANTICOAGULATION MANAGEMENT     Arianna Siddiqi 61 year old female is on warfarin with therapeutic INR result. (Goal INR 2.0-3.0)    Recent labs: (last 7 days)     07/20/21  0000   INR 3.0       ASSESSMENT     Source(s): Chart Review and Patient/Caregiver Call       Warfarin doses taken: Warfarin taken as instructed    Diet: No new diet changes identified    New illness, injury, or hospitalization: No    Medication/supplement changes: None noted    Signs or symptoms of bleeding or clotting: No    Previous INR: Therapeutic last 2(+) visits    Additional findings: None     PLAN     Recommended plan for no diet, medication or health factor changes affecting INR     Dosing Instructions: Continue your current warfarin dose with next INR in 2 weeks       Summary  As of 7/20/2021    Full warfarin instructions:  3 mg every Mon, Wed, Fri; 4.5 mg all other days   Next INR check:  8/3/2021             Telephone call with Arianna who verbalizes understanding and agrees to plan    Patient to recheck with home meter    Education provided: None required    Plan made per [facility] anticoagulation protocol    Carol Hoffman RN  Anticoagulation Clinic  7/20/2021    _______________________________________________________________________     Anticoagulation Episode Summary     Current INR goal:  2.0-3.0   TTR:  98.7 % (1 y)   Target end date:  Indefinite   Send INR reminders to:  [facility]    Indications    Long-term (current) use of anticoagulants [Z79.01] [Z79.01]  Atrial fibrillation (H) [I48.91] [I48.91]  Atrial fibrillation  unspecified type (H) [I48.91]  Chronic atrial fibrillation (H) [I48.20]           Comments:  1/17 [employer] to start on 2/28. Has 3mg tablets previously had 2mg tablets   Pt's voicemail box is full and pt is unable to retrieve voice messages          Anticoagulation Care Providers     Provider Role Specialty Phone number    Gagan Quiles MD Referring Internal Medicine [phone]

## 2021-08-03 ENCOUNTER — ANTICOAGULATION THERAPY VISIT (OUTPATIENT)
Dept: ANTICOAGULATION | Facility: CLINIC | Age: 61
End: 2021-08-03

## 2021-08-03 DIAGNOSIS — I48.91 ATRIAL FIBRILLATION, UNSPECIFIED TYPE (H): ICD-10-CM

## 2021-08-03 DIAGNOSIS — I48.20 CHRONIC ATRIAL FIBRILLATION (H): ICD-10-CM

## 2021-08-03 DIAGNOSIS — I48.91 ATRIAL FIBRILLATION (H): ICD-10-CM

## 2021-08-03 DIAGNOSIS — Z79.01 LONG TERM CURRENT USE OF ANTICOAGULANT THERAPY: Primary | ICD-10-CM

## 2021-08-03 LAB — INR (EXTERNAL): 2.6 (ref 0.9–1.1)

## 2021-08-03 NOTE — PROGRESS NOTES
ANTICOAGULATION MANAGEMENT     Arianna Siddiqi 61 year old female is on warfarin with therapeutic INR result. (Goal INR 2.0-3.0)    Recent labs: (last 7 days)     08/03/21  0800   INR 2.6*       ASSESSMENT     Source(s): Chart Review and Patient/Caregiver Call       Warfarin doses taken: Warfarin taken as instructed    Diet: No new diet changes identified    New illness, injury, or hospitalization: No    Medication/supplement changes: None noted    Signs or symptoms of bleeding or clotting: No    Previous INR: Therapeutic last 2(+) visits    Additional findings: head cold x1 week     PLAN     Recommended plan for no diet, medication or health factor changes affecting INR     Dosing Instructions: Continue your current warfarin dose with next INR in 2 weeks       Summary  As of 8/3/2021    Full warfarin instructions:  3 mg every Mon, Wed, Fri; 4.5 mg all other days   Next INR check:  8/17/2021             Telephone call with Arianna who verbalizes understanding and agrees to plan and who agrees to plan and repeated back plan correctly    Patient to recheck with home meter    Education provided: Contact [phone] with any changes, questions or concerns.     Plan made per [facility] anticoagulation protocol    RONNIE THOMAS RN  Anticoagulation Clinic  8/3/2021    _______________________________________________________________________     Anticoagulation Episode Summary     Current INR goal:  2.0-3.0   TTR:  98.7 % (1 y)   Target end date:  Indefinite   Send INR reminders to:  [facility]    Indications    Long-term (current) use of anticoagulants [Z79.01] [Z79.01]  Atrial fibrillation (H) [I48.91] [I48.91]  Atrial fibrillation  unspecified type (H) [I48.91]  Chronic atrial fibrillation (H) [I48.20]           Comments:  1/17 [employer] [facility] [employer] to start on 2/28. Has 3mg tablets previously had 2mg tablets   Pt's voicemail box is full and pt is unable to retrieve voice messages          Anticoagulation Care Providers      Provider Role Specialty Phone number    Gagan Quiles MD Referring Internal Medicine [phone]

## 2021-08-17 ENCOUNTER — ANTICOAGULATION THERAPY VISIT (OUTPATIENT)
Dept: ANTICOAGULATION | Facility: CLINIC | Age: 61
End: 2021-08-17

## 2021-08-17 DIAGNOSIS — I48.20 CHRONIC ATRIAL FIBRILLATION (H): ICD-10-CM

## 2021-08-17 DIAGNOSIS — I48.91 ATRIAL FIBRILLATION, UNSPECIFIED TYPE (H): ICD-10-CM

## 2021-08-17 DIAGNOSIS — Z79.01 LONG TERM CURRENT USE OF ANTICOAGULANT THERAPY: Primary | ICD-10-CM

## 2021-08-17 DIAGNOSIS — I48.91 ATRIAL FIBRILLATION (H): ICD-10-CM

## 2021-08-17 LAB — INR (EXTERNAL): 2.6 (ref 2–3)

## 2021-08-17 NOTE — PROGRESS NOTES
ANTICOAGULATION MANAGEMENT     Arianna Siddiqi 61 year old female is on warfarin with therapeutic INR result. (Goal INR 2.0-3.0)    Recent labs: (last 7 days)     08/17/21  1109   INR 2.6       ASSESSMENT     Source(s): Chart Review and Patient/Caregiver Call       Warfarin doses taken: Warfarin taken as instructed    Diet: No new diet changes identified    New illness, injury, or hospitalization: No    Medication/supplement changes: None noted    Signs or symptoms of bleeding or clotting: No    Previous INR: Therapeutic last 2(+) visits    Additional findings: pt complaining of hair loss.     PLAN     Recommended plan for no diet, medication or health factor changes affecting INR     Dosing Instructions: Continue your current warfarin dose with next INR in 2 weeks       Summary  As of 8/17/2021    Full warfarin instructions:  3 mg every Mon, Wed, Fri; 4.5 mg all other days   Next INR check:  8/31/2021             Telephone call with Arianna who verbalizes understanding and agrees to plan    Patient to recheck with home meter    Education provided: None required    Plan made per [facility] anticoagulation protocol    Carol Hoffman RN  Anticoagulation Clinic  8/17/2021    _______________________________________________________________________     Anticoagulation Episode Summary     Current INR goal:  2.0-3.0   TTR:  98.7 % (1 y)   Target end date:  Indefinite   Send INR reminders to:  [facility]    Indications    Long-term (current) use of anticoagulants [Z79.01] [Z79.01]  Atrial fibrillation (H) [I48.91] [I48.91]  Atrial fibrillation  unspecified type (H) [I48.91]  Chronic atrial fibrillation (H) [I48.20]           Comments:  1/17 [employer] to start on 2/28. Has 3mg tablets previously had 2mg tablets   Pt's voicemail box is full and pt is unable to retrieve voice messages          Anticoagulation Care Providers     Provider Role Specialty Phone number    Gagan Quiles MD Referring Internal Medicine  [phone]

## 2021-08-31 ENCOUNTER — ANTICOAGULATION THERAPY VISIT (OUTPATIENT)
Dept: ANTICOAGULATION | Facility: CLINIC | Age: 61
End: 2021-08-31

## 2021-08-31 DIAGNOSIS — I48.20 CHRONIC ATRIAL FIBRILLATION (H): ICD-10-CM

## 2021-08-31 DIAGNOSIS — I48.91 ATRIAL FIBRILLATION, UNSPECIFIED TYPE (H): ICD-10-CM

## 2021-08-31 DIAGNOSIS — Z79.01 LONG TERM CURRENT USE OF ANTICOAGULANT THERAPY: Primary | ICD-10-CM

## 2021-08-31 DIAGNOSIS — I48.91 ATRIAL FIBRILLATION (H): ICD-10-CM

## 2021-08-31 LAB — INR (EXTERNAL): 2.3 (ref 0.9–1.1)

## 2021-08-31 NOTE — PROGRESS NOTES
ANTICOAGULATION MANAGEMENT     Arianna Siddiqi 61 year old female is on warfarin with therapeutic INR result. (Goal INR 2.0-3.0)    Recent labs: (last 7 days)     08/31/21  0000   INR 2.3*       ASSESSMENT     Source(s): Patient/Caregiver Call       Warfarin doses taken: Warfarin taken as instructed    Diet: No new diet changes identified    New illness, injury, or hospitalization: No    Medication/supplement changes: None noted    Signs or symptoms of bleeding or clotting: No    Previous INR: Therapeutic last 2(+) visits    Additional findings: None     PLAN     Recommended plan for no diet, medication or health factor changes affecting INR     Dosing Instructions: Continue your current warfarin dose with next INR in 2 weeks       Summary  As of 8/31/2021    Full warfarin instructions:  3 mg every Mon, Wed, Fri; 4.5 mg all other days   Next INR check:               Telephone call with Arianna who verbalizes understanding and agrees to plan and who agrees to plan and repeated back plan correctly    Patient to recheck with home meter    Education provided: None required    Plan made per [facility] anticoagulation protocol    Tanna Dawkins RN  Anticoagulation Clinic  8/31/2021    _______________________________________________________________________     Anticoagulation Episode Summary     Current INR goal:  2.0-3.0   TTR:  98.7 % (1 y)   Target end date:  Indefinite   Send INR reminders to:  [facility]    Indications    Long-term (current) use of anticoagulants [Z79.01] [Z79.01]  Atrial fibrillation (H) [I48.91] [I48.91]  Atrial fibrillation  unspecified type (H) [I48.91]  Chronic atrial fibrillation (H) [I48.20]           Comments:  1/17 [employer] to start on 2/28. Has 3mg tablets previously had 2mg tablets   Pt's voicemail box is full and pt is unable to retrieve voice messages          Anticoagulation Care Providers     Provider Role Specialty Phone number    Gagan Quiles MD Referring Internal  Medicine [phone]

## 2021-09-12 DIAGNOSIS — M10.9 GOUT, UNSPECIFIED CAUSE, UNSPECIFIED CHRONICITY, UNSPECIFIED SITE: ICD-10-CM

## 2021-09-14 ENCOUNTER — TRANSFERRED RECORDS (OUTPATIENT)
Dept: HEALTH INFORMATION MANAGEMENT | Facility: CLINIC | Age: 61
End: 2021-09-14

## 2021-09-14 ENCOUNTER — ANTICOAGULATION THERAPY VISIT (OUTPATIENT)
Dept: ANTICOAGULATION | Facility: CLINIC | Age: 61
End: 2021-09-14

## 2021-09-14 DIAGNOSIS — I48.20 CHRONIC ATRIAL FIBRILLATION (H): ICD-10-CM

## 2021-09-14 DIAGNOSIS — I48.91 ATRIAL FIBRILLATION (H): ICD-10-CM

## 2021-09-14 DIAGNOSIS — I48.91 ATRIAL FIBRILLATION, UNSPECIFIED TYPE (H): ICD-10-CM

## 2021-09-14 DIAGNOSIS — Z79.01 LONG TERM CURRENT USE OF ANTICOAGULANT THERAPY: Primary | ICD-10-CM

## 2021-09-14 LAB — INR (EXTERNAL): 2.2 (ref 0.9–1.1)

## 2021-09-14 NOTE — PROGRESS NOTES
ANTICOAGULATION MANAGEMENT     Arianna Siddiqi 61 year old female is on warfarin with therapeutic INR result. (Goal INR 2.0-3.0)    Recent labs: (last 7 days)     09/14/21  0000   INR 2.2*       ASSESSMENT     Source(s): Patient/Caregiver Call       Warfarin doses taken: Warfarin taken as instructed    Diet: No new diet changes identified    New illness, injury, or hospitalization: No    Medication/supplement changes: None noted    Signs or symptoms of bleeding or clotting: No    Previous INR: Therapeutic last 2(+) visits    Additional findings: None     PLAN     Recommended plan for no diet, medication or health factor changes affecting INR     Dosing Instructions: Continue your current warfarin dose with next INR in 2 weeks       Summary  As of 9/14/2021    Full warfarin instructions:  3 mg every Mon, Wed, Fri; 4.5 mg all other days   Next INR check:               Telephone call with Arianna who verbalizes understanding and agrees to plan and who agrees to plan and repeated back plan correctly    Patient to recheck with home meter    Education provided: None required    Plan made per [facility] anticoagulation protocol    Tanna Dawkins RN  Anticoagulation Clinic  9/14/2021    _______________________________________________________________________     Anticoagulation Episode Summary     Current INR goal:  2.0-3.0   TTR:  98.7 % (1 y)   Target end date:  Indefinite   Send INR reminders to:  [facility]    Indications    Long-term (current) use of anticoagulants [Z79.01] [Z79.01]  Atrial fibrillation (H) [I48.91] [I48.91]  Atrial fibrillation  unspecified type (H) [I48.91]  Chronic atrial fibrillation (H) [I48.20]           Comments:  1/17 [employer] to start on 2/28. Has 3mg tablets previously had 2mg tablets   Pt's voicemail box is full and pt is unable to retrieve voice messages          Anticoagulation Care Providers     Provider Role Specialty Phone number    Gagan Quiles MD Referring Internal  Medicine [phone]

## 2021-09-15 RX ORDER — ALLOPURINOL 100 MG/1
TABLET ORAL
Qty: 60 TABLET | Refills: 1 | Status: SHIPPED | OUTPATIENT
Start: 2021-09-15 | End: 2021-10-15

## 2021-09-15 NOTE — TELEPHONE ENCOUNTER
ALLOPURINOL 100 MG TABLET      Last Written Prescription Date:  6-20-21  Last Fill Quantity: 180,   # refills: 0  Last Office Visit : 8-24-20  Future Office visit:  none    Routing refill request to provider for review/approval because:  Overdue labs: CR, CBC URIC ACID,  -- FYI to clinic    Previous 90 day maico RF    Scheduling has been notified to contact the pt for appointment.

## 2021-09-28 ENCOUNTER — ANTICOAGULATION THERAPY VISIT (OUTPATIENT)
Dept: ANTICOAGULATION | Facility: CLINIC | Age: 61
End: 2021-09-28

## 2021-09-28 DIAGNOSIS — Z79.01 LONG TERM CURRENT USE OF ANTICOAGULANT THERAPY: Primary | ICD-10-CM

## 2021-09-28 DIAGNOSIS — I48.91 ATRIAL FIBRILLATION, UNSPECIFIED TYPE (H): ICD-10-CM

## 2021-09-28 DIAGNOSIS — I48.20 CHRONIC ATRIAL FIBRILLATION (H): ICD-10-CM

## 2021-09-28 DIAGNOSIS — I48.91 ATRIAL FIBRILLATION (H): ICD-10-CM

## 2021-09-28 LAB — INR (EXTERNAL): 2.4 (ref 0.9–1.1)

## 2021-09-28 NOTE — PROGRESS NOTES
ANTICOAGULATION MANAGEMENT     Arianna Siddiqi 61 year old female is on warfarin with therapeutic INR result. (Goal INR 2.0-3.0)    Recent labs: (last 7 days)     09/28/21  0000   INR 2.4*       ASSESSMENT     Source(s): Patient/Caregiver Call       Warfarin doses taken: Warfarin taken as instructed    Diet: No new diet changes identified    New illness, injury, or hospitalization: No    Medication/supplement changes: None noted    Signs or symptoms of bleeding or clotting: No    Previous INR: Therapeutic last 2(+) visits    Additional findings: None     PLAN     Recommended plan for no diet, medication or health factor changes affecting INR     Dosing Instructions: Continue your current warfarin dose with next INR in 2 weeks       Summary  As of 9/28/2021    Full warfarin instructions:  3 mg every Mon, Wed, Fri; 4.5 mg all other days   Next INR check:               Telephone call with Arianna who verbalizes understanding and agrees to plan and who agrees to plan and repeated back plan correctly    Patient to recheck with home meter    Education provided: None required    Plan made per [facility] anticoagulation protocol    Tanna Dawkins RN  Anticoagulation Clinic  9/28/2021    _______________________________________________________________________     Anticoagulation Episode Summary     Current INR goal:  2.0-3.0   TTR:  98.7 % (1 y)   Target end date:  Indefinite   Send INR reminders to:  [facility]    Indications    Long-term (current) use of anticoagulants [Z79.01] [Z79.01]  Atrial fibrillation (H) [I48.91] [I48.91]  Atrial fibrillation  unspecified type (H) [I48.91]  Chronic atrial fibrillation (H) [I48.20]           Comments:  1/17 [employer] to start on 2/28. Has 3mg tablets previously had 2mg tablets   Pt's voicemail box is full and pt is unable to retrieve voice messages          Anticoagulation Care Providers     Provider Role Specialty Phone number    Gagan Quiles MD Referring Internal  Medicine [phone]

## 2021-10-05 ENCOUNTER — TELEPHONE (OUTPATIENT)
Dept: INTERNAL MEDICINE | Facility: CLINIC | Age: 61
End: 2021-10-05
Payer: MEDICARE

## 2021-10-05 DIAGNOSIS — M62.838 MUSCLE SPASM: ICD-10-CM

## 2021-10-05 RX ORDER — CYCLOBENZAPRINE HCL 5 MG
5 TABLET ORAL
Qty: 30 TABLET | Refills: 5 | Status: SHIPPED | OUTPATIENT
Start: 2021-10-05 | End: 2022-01-01

## 2021-10-08 DIAGNOSIS — B49 FUNGAL INFECTION: ICD-10-CM

## 2021-10-12 ENCOUNTER — NURSE TRIAGE (OUTPATIENT)
Dept: NURSING | Facility: CLINIC | Age: 61
End: 2021-10-12

## 2021-10-12 ENCOUNTER — ANTICOAGULATION THERAPY VISIT (OUTPATIENT)
Dept: ANTICOAGULATION | Facility: CLINIC | Age: 61
End: 2021-10-12

## 2021-10-12 DIAGNOSIS — I48.91 ATRIAL FIBRILLATION, UNSPECIFIED TYPE (H): ICD-10-CM

## 2021-10-12 DIAGNOSIS — M10.9 GOUT, UNSPECIFIED CAUSE, UNSPECIFIED CHRONICITY, UNSPECIFIED SITE: ICD-10-CM

## 2021-10-12 DIAGNOSIS — Z79.01 LONG TERM CURRENT USE OF ANTICOAGULANT THERAPY: Primary | ICD-10-CM

## 2021-10-12 DIAGNOSIS — I48.20 CHRONIC ATRIAL FIBRILLATION (H): ICD-10-CM

## 2021-10-12 DIAGNOSIS — I48.91 ATRIAL FIBRILLATION (H): ICD-10-CM

## 2021-10-12 LAB — INR (EXTERNAL): 2.8 (ref 0.9–1.1)

## 2021-10-12 NOTE — PROGRESS NOTES
ANTICOAGULATION MANAGEMENT     Arianna Siddiqi 61 year old female is on warfarin with therapeutic INR result. (Goal INR 2.0-3.0)    Recent labs: (last 7 days)     10/12/21  0000   INR 2.8*       ASSESSMENT     Source(s): Patient/Caregiver Call       Warfarin doses taken: Warfarin taken as instructed    Diet: No new diet changes identified    New illness, injury, or hospitalization: No    Medication/supplement changes: None noted    Signs or symptoms of bleeding or clotting: No    Previous INR: Therapeutic last 2(+) visits    Additional findings: None     PLAN     Recommended plan for no diet, medication or health factor changes affecting INR     Dosing Instructions: Continue your current warfarin dose with next INR in 2 weeks       Summary  As of 10/12/2021    Full warfarin instructions:  3 mg every Mon, Wed, Fri; 4.5 mg all other days   Next INR check:               Telephone call with Arianna who verbalizes understanding and agrees to plan and who agrees to plan and repeated back plan correctly    Patient to recheck with home meter    Education provided: None required    Plan made per [facility] anticoagulation protocol    Tanna Dawkins RN  Anticoagulation Clinic  10/12/2021    _______________________________________________________________________     Anticoagulation Episode Summary     Current INR goal:  2.0-3.0   TTR:  98.7 % (1 y)   Target end date:  Indefinite   Send INR reminders to:  [facility]    Indications    Long-term (current) use of anticoagulants [Z79.01] [Z79.01]  Atrial fibrillation (H) [I48.91] [I48.91]  Atrial fibrillation  unspecified type (H) [I48.91]  Chronic atrial fibrillation (H) [I48.20]           Comments:  1/17 [employer] to start on 2/28. Has 3mg tablets previously had 2mg tablets   Pt's voicemail box is full and pt is unable to retrieve voice messages          Anticoagulation Care Providers     Provider Role Specialty Phone number    Gagan Quiles MD Referring Internal  Medicine [phone]

## 2021-10-13 NOTE — TELEPHONE ENCOUNTER
Pt is calling.    She is returning a call from earlier today.  They did not leave a message as her mailbox is full.  INR was done today. Unsure if related to that.  She is aware of those results.   I advised her that the only notes in her chart are from Tanna regarding her INR. She stated that she already spoke to her.  I advised her that they should call back if needed.  She verbalized understanding.    Dulce Blank RN  [facility] Nurse Advisor  10/12/2021 at 7:09 PM

## 2021-10-13 NOTE — TELEPHONE ENCOUNTER
miconazole (MICATIN) 2 % external powder    APPLY TO AFFECTED AREA TWICE A DAY    Last Written Prescription Date:  4/22/21  Last Fill Quantity: 142 g,   # refills: 0  Last Office Visit : 4/30/20  Future Office visit:  none    Routing refill request to provider for review/approval because:  Overdue visit. Refill to pharmacy.     Scheduling has been notified to contact the pt for appointment.    Warnings Override History for miconazole (MICATIN) 2 % external powder [[id number]]    Overridden by Tanna Dawkins RN on Jul 19, 2021 8:22 AM   Drug-Drug   1. VITAMIN K ANTAGONISTS / TOPICAL IMIDAZOLES [Level: Major]   Other Orders: warfarin ANTICOAGULANT (COUMADIN) 3 MG tablet         Overridden by Juliette Barth RN on Apr 22, 2021 12:39 PM   Drug-Drug   1. TOPICAL IMIDAZOLES / ANTICOAGULANTS [Level: Major]   Other Orders: warfarin ANTICOAGULANT (COUMADIN) 3 MG tablet      2. TOPICAL IMIDAZOLES / ANTICOAGULANTS [Level: Major]   Other Orders: warfarin ANTICOAGULANT (COUMADIN) 3 MG tablet      3. TOPICAL IMIDAZOLES / ANTICOAGULANTS [Level: Major]   Other Orders: warfarin ANTICOAGULANT (COUMADIN) 3 MG tablet      4. TOPICAL IMIDAZOLES / ANTICOAGULANTS [Level: Major]   Other Orders: warfarin ANTICOAGULANT (COUMADIN) 3 MG tablet      5. TOPICAL IMIDAZOLES / ANTICOAGULANTS [Level: Major]   Other Orders: warfarin ANTICOAGULANT (COUMADIN) 1 MG tablet

## 2021-10-14 ENCOUNTER — TELEPHONE (OUTPATIENT)
Dept: INTERNAL MEDICINE | Facility: CLINIC | Age: 61
End: 2021-10-14

## 2021-10-14 NOTE — TELEPHONE ENCOUNTER
Spoke with patient and she declined to schedule at this time. She states she is not comfortable coming in.

## 2021-10-14 NOTE — TELEPHONE ENCOUNTER
Call patient to schedule annual with PCP. No answered and mail box is fill. Unable to leave a message.

## 2021-10-15 NOTE — TELEPHONE ENCOUNTER
ALLOPURINOL 100 MG TABLET      Last Written Prescription Date:  9/15/21  Last Fill Quantity: 60,   # refills: 1  Last Office Visit : 8/24/20  Future Office visit:  None scheduled    Routing refill request to provider for review/approval because:  Overdue for labs  Lab Test 05/15/20  1028   WBC 7.6   RBC 3.63*   HGB 10.5*   HCT 34.9*        Lab Test 05/15/20  1028   ALT 30     Lab Test 01/17/20  0334   URIC 9.1*     Lab Test 05/15/20  1028 01/16/20  0711 01/15/20  2207   CR 1.18*   < >  --    CREAT  --   --  0.9     Nothing more recent in care everywhere nor media  No future orders in lab queue  Has received 90 and 60 day maico refills with this medication

## 2021-10-18 RX ORDER — ALLOPURINOL 100 MG/1
200 TABLET ORAL DAILY
Qty: 60 TABLET | Refills: 1 | Status: ON HOLD | OUTPATIENT
Start: 2021-10-18 | End: 2021-11-17

## 2021-10-25 DIAGNOSIS — E61.1 IRON DEFICIENCY: ICD-10-CM

## 2021-10-26 ENCOUNTER — ANTICOAGULATION THERAPY VISIT (OUTPATIENT)
Dept: ANTICOAGULATION | Facility: CLINIC | Age: 61
End: 2021-10-26

## 2021-10-26 DIAGNOSIS — I48.91 ATRIAL FIBRILLATION, UNSPECIFIED TYPE (H): ICD-10-CM

## 2021-10-26 DIAGNOSIS — I48.20 CHRONIC ATRIAL FIBRILLATION (H): ICD-10-CM

## 2021-10-26 DIAGNOSIS — Z79.01 LONG TERM CURRENT USE OF ANTICOAGULANT THERAPY: Primary | ICD-10-CM

## 2021-10-26 DIAGNOSIS — I48.91 ATRIAL FIBRILLATION (H): ICD-10-CM

## 2021-10-26 LAB — INR (EXTERNAL): 2.3 (ref 0.9–1.1)

## 2021-10-26 RX ORDER — FERROUS GLUCONATE 324(38)MG
324 TABLET ORAL DAILY
Qty: 90 TABLET | Refills: 0 | Status: SHIPPED | OUTPATIENT
Start: 2021-10-26 | End: 2021-01-01

## 2021-10-26 NOTE — PROGRESS NOTES
ANTICOAGULATION MANAGEMENT     Arianna Siddiqi 61 year old female is on warfarin with therapeutic INR result. (Goal INR 2.0-3.0)    Recent labs: (last 7 days)     10/26/21  0000   INR 2.3*       ASSESSMENT     Source(s): Patient/Caregiver Call       Warfarin doses taken: Warfarin taken as instructed    Diet: No new diet changes identified    New illness, injury, or hospitalization: No    Medication/supplement changes: None noted    Signs or symptoms of bleeding or clotting: No    Previous INR: Therapeutic last 2(+) visits    Additional findings: None     PLAN     Recommended plan for no diet, medication or health factor changes affecting INR     Dosing Instructions: Continue your current warfarin dose with next INR in 2 weeks       Summary  As of 10/26/2021    Full warfarin instructions:  3 mg every Mon, Wed, Fri; 4.5 mg all other days   Next INR check:               Telephone call with Arianna who verbalizes understanding and agrees to plan and who agrees to plan and repeated back plan correctly    Patient to recheck with home meter    Education provided: None required    Plan made per [facility] anticoagulation protocol    Tanna Dawkins RN  Anticoagulation Clinic  10/26/2021    _______________________________________________________________________     Anticoagulation Episode Summary     Current INR goal:  2.0-3.0   TTR:  98.7 % (1 y)   Target end date:  Indefinite   Send INR reminders to:  [facility]    Indications    Long-term (current) use of anticoagulants [Z79.01] [Z79.01]  Atrial fibrillation (H) [I48.91] [I48.91]  Atrial fibrillation  unspecified type (H) [I48.91]  Chronic atrial fibrillation (H) [I48.20]           Comments:  1/17 [employer] to start on 2/28. Has 3mg tablets previously had 2mg tablets   Pt's voicemail box is full and pt is unable to retrieve voice messages          Anticoagulation Care Providers     Provider Role Specialty Phone number    Gagan Quiles MD Referring Internal  Medicine [phone]

## 2021-10-29 NOTE — PROGRESS NOTES
ANTICOAGULATION FOLLOW-UP CLINIC VISIT    Patient Name:  Arianna Siddiqi  Date:  2021  Contact Type:  Telephone    SUBJECTIVE:         OBJECTIVE    Recent labs: (last 7 days)     21   INR 2.3*       ASSESSMENT / PLAN  INR assessment THER    Recheck INR In: 2 WEEKS    INR Location Home INR      Anticoagulation Summary  As of 2021    INR goal:  2.0-3.0   TTR:  98.7 % (1 y)   INR used for dosin.3 (2021)   Warfarin maintenance plan:  3 mg (3 mg x 1) every Mon, Wed, Fri; 4.5 mg (3 mg x 1.5) all other days   Full warfarin instructions:  3 mg every Mon, Wed, Fri; 4.5 mg all other days   Weekly warfarin total:  27 mg   No change documented:  Carol Hoffman RN   Plan last modified:  Tanna Dawkins RN (2020)   Next INR check:  2021   Priority:  Maintenance   Target end date:  Indefinite    Indications    Long-term (current) use of anticoagulants [Z79.01] [Z79.01]  Atrial fibrillation (H) [I48.91] [I48.91]  Atrial fibrillation  unspecified type (H) [I48.91]  Chronic atrial fibrillation (H) [I48.20]             Anticoagulation Episode Summary     INR check location:  Home Draw    Preferred lab:      Send INR reminders to:  [facility]    Comments:   [employer] to start on . Has 3mg tablets previously had 2mg tablets   Pt's voicemail box is full and pt is unable to retrieve voice messages       Anticoagulation Care Providers     Provider Role Specialty Phone number    Gagan Quiles MD Referring Internal Medicine [phone]            See the Encounter Report to view Anticoagulation Flowsheet and Dosing Calendar (Go to Encounters tab in chart review, and find the Anticoagulation Therapy Visit)  Spoke with patient.    Carol Hoffman, SONAL                  Detail Level: Zone

## 2021-11-09 ENCOUNTER — ANTICOAGULATION THERAPY VISIT (OUTPATIENT)
Dept: ANTICOAGULATION | Facility: CLINIC | Age: 61
End: 2021-11-09
Payer: MEDICARE

## 2021-11-09 DIAGNOSIS — I48.91 ATRIAL FIBRILLATION, UNSPECIFIED TYPE (H): ICD-10-CM

## 2021-11-09 DIAGNOSIS — Z79.01 LONG TERM CURRENT USE OF ANTICOAGULANT THERAPY: Primary | ICD-10-CM

## 2021-11-09 DIAGNOSIS — I48.20 CHRONIC ATRIAL FIBRILLATION (H): ICD-10-CM

## 2021-11-09 DIAGNOSIS — I48.91 ATRIAL FIBRILLATION (H): ICD-10-CM

## 2021-11-09 LAB — INR (EXTERNAL): 2.4 (ref 0.9–1.1)

## 2021-11-09 RX ORDER — WARFARIN SODIUM 3 MG/1
TABLET ORAL
Qty: 135 TABLET | Refills: 1 | Status: ON HOLD | OUTPATIENT
Start: 2021-11-09 | End: 2021-11-17

## 2021-11-09 NOTE — PROGRESS NOTES
ANTICOAGULATION MANAGEMENT     Arianna Siddiqi 61 year old female is on warfarin with therapeutic INR result. (Goal INR 2.0-3.0)    Recent labs: (last 7 days)     11/09/21  1205   INR 2.4*       ASSESSMENT     Source(s): Chart Review and Patient/Caregiver Call       Warfarin doses taken: Warfarin taken as instructed    Diet: No new diet changes identified    New illness, injury, or hospitalization: No    Medication/supplement changes: None noted    Signs or symptoms of bleeding or clotting: No    Previous INR: Therapeutic last 2(+) visits    Additional findings: None     PLAN     Recommended plan for no diet, medication or health factor changes affecting INR     Dosing Instructions: Continue your current warfarin dose with next INR in 2 weeks       Summary  As of 11/9/2021    Full warfarin instructions:  3 mg every Mon, Wed, Fri; 4.5 mg all other days   Next INR check:  11/23/2021             Telephone call with Arianna who agrees to plan and repeated back plan correctly    Patient to recheck with home meter    Education provided: Please call back if any changes to your diet, medications or how you've been taking warfarin    Plan made per [facility] anticoagulation protocol    Espinoza Phillips, RN  Anticoagulation Clinic  11/9/2021    _______________________________________________________________________     Anticoagulation Episode Summary     Current INR goal:  2.0-3.0   TTR:  98.7 % (1 y)   Target end date:  Indefinite   Send INR reminders to:  [facility]    Indications    Long-term (current) use of anticoagulants [Z79.01] [Z79.01]  Atrial fibrillation (H) [I48.91] [I48.91]  Atrial fibrillation  unspecified type (H) [I48.91]  Chronic atrial fibrillation (H) [I48.20]           Comments:  1/17 [employer] to start on 2/28. Has 3mg tablets previously had 2mg tablets   Pt's voicemail box is full and pt is unable to retrieve voice messages          Anticoagulation Care Providers     Provider Role Specialty Phone  number    Gagan Quiles MD [facility] Internal Medicine [phone]

## 2021-11-11 ENCOUNTER — HOSPITAL ENCOUNTER (INPATIENT)
Facility: CLINIC | Age: 61
LOS: 6 days | Discharge: HOME HEALTH | DRG: 744 | End: 2021-11-18
Attending: EMERGENCY MEDICINE | Admitting: HOSPITALIST
Payer: MEDICARE

## 2021-11-11 ENCOUNTER — APPOINTMENT (OUTPATIENT)
Dept: GENERAL RADIOLOGY | Facility: CLINIC | Age: 61
DRG: 744 | End: 2021-11-11
Attending: EMERGENCY MEDICINE
Payer: MEDICARE

## 2021-11-11 DIAGNOSIS — Z11.52 ENCOUNTER FOR SCREENING LABORATORY TESTING FOR SEVERE ACUTE RESPIRATORY SYNDROME CORONAVIRUS 2 (SARS-COV-2): ICD-10-CM

## 2021-11-11 DIAGNOSIS — N93.9 ABNORMAL VAGINAL BLEEDING: Primary | ICD-10-CM

## 2021-11-11 DIAGNOSIS — I50.9 CONGESTIVE HEART FAILURE, UNSPECIFIED HF CHRONICITY, UNSPECIFIED HEART FAILURE TYPE (H): ICD-10-CM

## 2021-11-11 DIAGNOSIS — I48.91 ATRIAL FIBRILLATION, UNSPECIFIED TYPE (H): ICD-10-CM

## 2021-11-11 DIAGNOSIS — I50.21 ACUTE SYSTOLIC CONGESTIVE HEART FAILURE (H): ICD-10-CM

## 2021-11-11 DIAGNOSIS — I10 ESSENTIAL HYPERTENSION: ICD-10-CM

## 2021-11-11 DIAGNOSIS — R79.1 SUPRATHERAPEUTIC INR: ICD-10-CM

## 2021-11-11 DIAGNOSIS — I48.0 PAF (PAROXYSMAL ATRIAL FIBRILLATION) (H): ICD-10-CM

## 2021-11-11 DIAGNOSIS — N17.9 ACUTE KIDNEY INJURY (H): ICD-10-CM

## 2021-11-11 DIAGNOSIS — D62 ANEMIA DUE TO BLOOD LOSS, ACUTE: ICD-10-CM

## 2021-11-11 PROCEDURE — 83880 ASSAY OF NATRIURETIC PEPTIDE: CPT | Performed by: EMERGENCY MEDICINE

## 2021-11-11 PROCEDURE — 250N000011 HC RX IP 250 OP 636: Performed by: EMERGENCY MEDICINE

## 2021-11-11 PROCEDURE — 30283B1 TRANSFUSION OF NONAUTOLOGOUS 4-FACTOR PROTHROMBIN COMPLEX CONCENTRATE INTO VEIN, PERCUTANEOUS APPROACH: ICD-10-PCS | Performed by: EMERGENCY MEDICINE

## 2021-11-11 PROCEDURE — 96365 THER/PROPH/DIAG IV INF INIT: CPT | Performed by: EMERGENCY MEDICINE

## 2021-11-11 PROCEDURE — 36430 TRANSFUSION BLD/BLD COMPNT: CPT | Performed by: EMERGENCY MEDICINE

## 2021-11-11 PROCEDURE — 71045 X-RAY EXAM CHEST 1 VIEW: CPT | Mod: 26 | Performed by: RADIOLOGY

## 2021-11-11 PROCEDURE — 71045 X-RAY EXAM CHEST 1 VIEW: CPT

## 2021-11-11 PROCEDURE — 84466 ASSAY OF TRANSFERRIN: CPT

## 2021-11-11 PROCEDURE — 84484 ASSAY OF TROPONIN QUANT: CPT | Performed by: EMERGENCY MEDICINE

## 2021-11-11 PROCEDURE — 87637 SARSCOV2&INF A&B&RSV AMP PRB: CPT | Performed by: EMERGENCY MEDICINE

## 2021-11-11 PROCEDURE — 86900 BLOOD TYPING SEROLOGIC ABO: CPT | Performed by: EMERGENCY MEDICINE

## 2021-11-11 PROCEDURE — 36415 COLL VENOUS BLD VENIPUNCTURE: CPT | Performed by: EMERGENCY MEDICINE

## 2021-11-11 PROCEDURE — 84443 ASSAY THYROID STIM HORMONE: CPT

## 2021-11-11 PROCEDURE — 93005 ELECTROCARDIOGRAM TRACING: CPT | Performed by: EMERGENCY MEDICINE

## 2021-11-11 PROCEDURE — 96375 TX/PRO/DX INJ NEW DRUG ADDON: CPT | Performed by: EMERGENCY MEDICINE

## 2021-11-11 PROCEDURE — 83550 IRON BINDING TEST: CPT

## 2021-11-11 PROCEDURE — 82248 BILIRUBIN DIRECT: CPT

## 2021-11-11 PROCEDURE — C9803 HOPD COVID-19 SPEC COLLECT: HCPCS | Performed by: EMERGENCY MEDICINE

## 2021-11-11 PROCEDURE — 99291 CRITICAL CARE FIRST HOUR: CPT | Mod: 25 | Performed by: EMERGENCY MEDICINE

## 2021-11-11 PROCEDURE — 82728 ASSAY OF FERRITIN: CPT

## 2021-11-11 PROCEDURE — 82247 BILIRUBIN TOTAL: CPT

## 2021-11-11 PROCEDURE — 85025 COMPLETE CBC W/AUTO DIFF WBC: CPT | Performed by: EMERGENCY MEDICINE

## 2021-11-11 PROCEDURE — 80048 BASIC METABOLIC PNL TOTAL CA: CPT | Performed by: EMERGENCY MEDICINE

## 2021-11-11 PROCEDURE — 93010 ELECTROCARDIOGRAM REPORT: CPT | Performed by: EMERGENCY MEDICINE

## 2021-11-11 PROCEDURE — 85610 PROTHROMBIN TIME: CPT | Performed by: EMERGENCY MEDICINE

## 2021-11-11 RX ORDER — BUMETANIDE 0.25 MG/ML
2 INJECTION INTRAMUSCULAR; INTRAVENOUS ONCE
Status: COMPLETED | OUTPATIENT
Start: 2021-11-11 | End: 2021-11-11

## 2021-11-11 RX ADMIN — BUMETANIDE 2 MG: 0.25 INJECTION, SOLUTION INTRAMUSCULAR; INTRAVENOUS at 23:39

## 2021-11-11 ASSESSMENT — ENCOUNTER SYMPTOMS
ABDOMINAL PAIN: 0
COUGH: 1
FEVER: 1
SHORTNESS OF BREATH: 1

## 2021-11-11 ASSESSMENT — MIFFLIN-ST. JEOR: SCORE: 2359.39

## 2021-11-11 NOTE — LETTER
Transition Communication Hand-off for Care Transitions to Next Level of Care Provider    Name: Arianna Siddiqi  : 1960  MRN #: [id number]  Primary Care Provider: Gagan Quiles     Primary Clinic: [address] [place] [id number]     Reason for Hospitalization:  Abnormal vaginal bleeding [N93.9]  Acute systolic congestive heart failure (H) [I50.21]  Anemia due to blood loss, acute [D62]  Supratherapeutic INR [R79.1]  Atrial fibrillation, unspecified type (H) [I48.91]  Congestive heart failure, unspecified HF chronicity, unspecified heart failure type (H) [I50.9]  Encounter for screening laboratory testing for severe acute respiratory syndrome coronavirus 2 (SARS-CoV-2) [Z20.822]  Admit Date/Time: 2021 10:30 PM  Discharge Date: 2021  Payor Source: Payor: MEDICARE / Plan: MEDICARE / Product Type: Medicare /           Discharge Plan:  Home with home care:    Please fax discharge orders to [facility], [place]     Ph: [phone]     Fax: [phone]     Skilled home care RN for initial home safety evaluation and 1-3 times a week to evaluate medication management, nutrition and hydration evaluation, endurance evaluation, and general status evaluation after discharge from the acute care hospital setting.     Skilled home care Physical Therapy and Occupational Therapy to evaluate and treat in home setting per recommendations of the inpatient Rehabilitation consult team.     Home Care agency to provide a Home Health Aide for bathing and grooming 3 times a week for 2 weeks after discharge at which time skilled home care RN to evaluate for ongoing bathing assistance needs.        Transportation Benefits     [facility] Non Emergency Transportation     Phone: 0       [facility]    Ph:  [phone]    Home Oxygen per MD orders    Discharge Needs Assessment:  Needs      Most Recent Value   Equipment Currently Used at Home tub bench,  shower chair, walker, standard        Follow-up plan:    Future Appointments   Date Time Provider Department Center   11/19/2021  8:30 AM Harmony Day OT [facility]   12/7/2021  8:00 AM  LAB [facility]   12/7/2021  8:30 AM Dora Otero NP [facility]   12/17/2021  8:30 AM Gagan Quiles MD [facility]   12/22/2021 12:00 PM Duane Uriarte MD [facility]       Any outstanding tests or procedures:        Radiology & Cardiology Orders     Future Labs/Procedures Complete By Expires    EKG 12-lead, tracing only  As directed             Supplies     Future Labs/Procedures    Oxygen Adult/Peds     Process Instructions:    By signing this order, the Authorizing Provider is attesting that they have completed a face-to-face evaluation on the patient to determine their need for this equipment in the last 60 days. A new face-to-face evaluation is required each time  A new prescription for one of the specified items is ordered.   If an additional provider completed the evaluation, please indicate their name below.     **As of 2018, an order requisition and face sheet will print for all DME orders. Please give printed order and face sheet to patient if not obtaining product from [facility] DME closet.     Comments:    Oxygen Documentation:   I certify that this patient, Arianna Siddiqi has been under my care (or a nurse practitioner or physican's assistant working with me). This is the face-to-face encounter for oxygen medical necessity.      Arianna Siddiqi is now in a chronic stable state and continues to require supplemental oxygen. Patient has continued oxygen desaturation due to Chronic Heart Failure I50 and CARLOS/OHS    Alternative treatment(s) tried or considered and deemed clinically infective for treatment of Chronic Heart Failure I50  And OHS/CARLOS include diuretic therapy, overnight ventilator therapy.   If portability is ordered, is the patient mobile within the home? yes    **Patients  who qualify for home O2 coverage under the CMS guidelines require ABG tests or O2 sat readings obtained closest to, but no earlier than 2 days prior to the discharge, as evidence of the need for home oxygen therapy. Testing must be performed while patient is in the chronic stable state. See notes for O2 sats.**        Callie Leblanc RN

## 2021-11-12 ENCOUNTER — APPOINTMENT (OUTPATIENT)
Dept: ULTRASOUND IMAGING | Facility: CLINIC | Age: 61
DRG: 744 | End: 2021-11-12
Payer: MEDICARE

## 2021-11-12 ENCOUNTER — APPOINTMENT (OUTPATIENT)
Dept: CT IMAGING | Facility: CLINIC | Age: 61
DRG: 744 | End: 2021-11-12
Payer: MEDICARE

## 2021-11-12 PROBLEM — D62 ANEMIA DUE TO BLOOD LOSS, ACUTE: Status: ACTIVE | Noted: 2021-11-12

## 2021-11-12 PROBLEM — R79.1 SUPRATHERAPEUTIC INR: Status: ACTIVE | Noted: 2021-11-12

## 2021-11-12 LAB
ABO/RH(D): NORMAL
ALBUMIN SERPL-MCNC: 2.3 G/DL (ref 3.4–5)
ALP SERPL-CCNC: 84 U/L (ref 40–150)
ALT SERPL W P-5'-P-CCNC: 16 U/L (ref 0–50)
ANION GAP SERPL CALCULATED.3IONS-SCNC: 4 MMOL/L (ref 3–14)
ANION GAP SERPL CALCULATED.3IONS-SCNC: 5 MMOL/L (ref 3–14)
ANTIBODY SCREEN: NEGATIVE
APTT PPP: 30 SECONDS (ref 22–38)
AST SERPL W P-5'-P-CCNC: 15 U/L (ref 0–45)
ATRIAL RATE - MUSE: 87 BPM
BASOPHILS # BLD AUTO: 0 10E3/UL (ref 0–0.2)
BASOPHILS NFR BLD AUTO: 0 %
BILIRUB DIRECT SERPL-MCNC: <0.1 MG/DL (ref 0–0.2)
BILIRUB DIRECT SERPL-MCNC: <0.1 MG/DL (ref 0–0.2)
BILIRUB SERPL-MCNC: 0.2 MG/DL (ref 0.2–1.3)
BILIRUB SERPL-MCNC: 0.2 MG/DL (ref 0.2–1.3)
BLD PROD TYP BPU: NORMAL
BLOOD COMPONENT TYPE: NORMAL
BUN SERPL-MCNC: 44 MG/DL (ref 7–30)
BUN SERPL-MCNC: 51 MG/DL (ref 7–30)
CALCIUM SERPL-MCNC: 8.4 MG/DL (ref 8.5–10.1)
CALCIUM SERPL-MCNC: 8.7 MG/DL (ref 8.5–10.1)
CHLORIDE BLD-SCNC: 106 MMOL/L (ref 94–109)
CHLORIDE BLD-SCNC: 106 MMOL/L (ref 94–109)
CO2 SERPL-SCNC: 26 MMOL/L (ref 20–32)
CO2 SERPL-SCNC: 28 MMOL/L (ref 20–32)
CODING SYSTEM: NORMAL
CREAT SERPL-MCNC: 1.33 MG/DL (ref 0.52–1.04)
CREAT SERPL-MCNC: 1.46 MG/DL (ref 0.52–1.04)
CROSSMATCH: NORMAL
DIASTOLIC BLOOD PRESSURE - MUSE: NORMAL MMHG
EOSINOPHIL # BLD AUTO: 0.1 10E3/UL (ref 0–0.7)
EOSINOPHIL NFR BLD AUTO: 1 %
ERYTHROCYTE [DISTWIDTH] IN BLOOD BY AUTOMATED COUNT: 20.4 % (ref 10–15)
FERRITIN SERPL-MCNC: 6 NG/ML (ref 8–252)
FIBRINOGEN PPP-MCNC: 506 MG/DL (ref 170–490)
FLUAV RNA SPEC QL NAA+PROBE: NEGATIVE
FLUBV RNA RESP QL NAA+PROBE: NEGATIVE
GFR SERPL CREATININE-BSD FRML MDRD: 38 ML/MIN/1.73M2
GFR SERPL CREATININE-BSD FRML MDRD: 43 ML/MIN/1.73M2
GLUCOSE BLD-MCNC: 100 MG/DL (ref 70–99)
GLUCOSE BLD-MCNC: 97 MG/DL (ref 70–99)
HCT VFR BLD AUTO: 12.2 % (ref 35–47)
HGB BLD-MCNC: 3.1 G/DL (ref 11.7–15.7)
HGB BLD-MCNC: 5 G/DL (ref 11.7–15.7)
HGB BLD-MCNC: 5.8 G/DL (ref 11.7–15.7)
IMM GRANULOCYTES # BLD: 0.1 10E3/UL
IMM GRANULOCYTES NFR BLD: 1 %
INR PPP: 1.19 (ref 0.85–1.15)
INR PPP: 1.31 (ref 0.85–1.15)
INR PPP: 5.84 (ref 0.85–1.15)
INTERPRETATION ECG - MUSE: NORMAL
IRON SATN MFR SERPL: 2 % (ref 15–46)
IRON SERPL-MCNC: 9 UG/DL (ref 35–180)
ISSUE DATE AND TIME: NORMAL
LYMPHOCYTES # BLD AUTO: 1.2 10E3/UL (ref 0.8–5.3)
LYMPHOCYTES NFR BLD AUTO: 10 %
MCH RBC QN AUTO: 20.7 PG (ref 26.5–33)
MCHC RBC AUTO-ENTMCNC: 25.4 G/DL (ref 31.5–36.5)
MCV RBC AUTO: 81 FL (ref 78–100)
MONOCYTES # BLD AUTO: 0.7 10E3/UL (ref 0–1.3)
MONOCYTES NFR BLD AUTO: 6 %
NEUTROPHILS # BLD AUTO: 10.6 10E3/UL (ref 1.6–8.3)
NEUTROPHILS NFR BLD AUTO: 82 %
NRBC # BLD AUTO: 0.1 10E3/UL
NRBC BLD AUTO-RTO: 1 /100
NT-PROBNP SERPL-MCNC: 1093 PG/ML (ref 0–900)
P AXIS - MUSE: 62 DEGREES
PLATELET # BLD AUTO: 367 10E3/UL (ref 150–450)
POTASSIUM BLD-SCNC: 4.4 MMOL/L (ref 3.4–5.3)
POTASSIUM BLD-SCNC: 5.1 MMOL/L (ref 3.4–5.3)
PR INTERVAL - MUSE: 182 MS
PROT SERPL-MCNC: 6.8 G/DL (ref 6.8–8.8)
QRS DURATION - MUSE: 86 MS
QT - MUSE: 376 MS
QTC - MUSE: 452 MS
R AXIS - MUSE: 9 DEGREES
RBC # BLD AUTO: 1.5 10E6/UL (ref 3.8–5.2)
RSV RNA SPEC NAA+PROBE: NEGATIVE
SARS-COV-2 RNA RESP QL NAA+PROBE: NEGATIVE
SODIUM SERPL-SCNC: 137 MMOL/L (ref 133–144)
SODIUM SERPL-SCNC: 138 MMOL/L (ref 133–144)
SPECIMEN EXPIRATION DATE: NORMAL
SYSTOLIC BLOOD PRESSURE - MUSE: NORMAL MMHG
T AXIS - MUSE: 5 DEGREES
TIBC SERPL-MCNC: 460 UG/DL (ref 240–430)
TRANSFERRIN SERPL-MCNC: 296 MG/DL (ref 210–360)
TROPONIN I SERPL-MCNC: <0.015 UG/L (ref 0–0.04)
TSH SERPL DL<=0.005 MIU/L-ACNC: 2.07 MU/L (ref 0.4–4)
UNIT ABO/RH: NORMAL
UNIT NUMBER: NORMAL
UNIT STATUS: NORMAL
UNIT TYPE ISBT: 5100
UNIT TYPE ISBT: 5100
UNIT TYPE ISBT: 6200
VENTRICULAR RATE- MUSE: 87 BPM
WBC # BLD AUTO: 12.8 10E3/UL (ref 4–11)

## 2021-11-12 PROCEDURE — 250N000011 HC RX IP 250 OP 636: Performed by: EMERGENCY MEDICINE

## 2021-11-12 PROCEDURE — 96375 TX/PRO/DX INJ NEW DRUG ADDON: CPT | Performed by: EMERGENCY MEDICINE

## 2021-11-12 PROCEDURE — G1004 CDSM NDSC: HCPCS | Mod: GC | Performed by: RADIOLOGY

## 2021-11-12 PROCEDURE — 36415 COLL VENOUS BLD VENIPUNCTURE: CPT

## 2021-11-12 PROCEDURE — 999N000128 HC STATISTIC PERIPHERAL IV START W/O US GUIDANCE

## 2021-11-12 PROCEDURE — C9803 HOPD COVID-19 SPEC COLLECT: HCPCS | Performed by: EMERGENCY MEDICINE

## 2021-11-12 PROCEDURE — 85730 THROMBOPLASTIN TIME PARTIAL: CPT

## 2021-11-12 PROCEDURE — 36415 COLL VENOUS BLD VENIPUNCTURE: CPT | Performed by: INTERNAL MEDICINE

## 2021-11-12 PROCEDURE — 80048 BASIC METABOLIC PNL TOTAL CA: CPT

## 2021-11-12 PROCEDURE — 76830 TRANSVAGINAL US NON-OB: CPT | Mod: 26 | Performed by: RADIOLOGY

## 2021-11-12 PROCEDURE — 85018 HEMOGLOBIN: CPT | Performed by: INTERNAL MEDICINE

## 2021-11-12 PROCEDURE — 258N000003 HC RX IP 258 OP 636: Performed by: EMERGENCY MEDICINE

## 2021-11-12 PROCEDURE — 85610 PROTHROMBIN TIME: CPT | Performed by: EMERGENCY MEDICINE

## 2021-11-12 PROCEDURE — 120N000002 HC R&B MED SURG/OB UMMC

## 2021-11-12 PROCEDURE — 74174 CTA ABD&PLVS W/CONTRAST: CPT | Mod: MG

## 2021-11-12 PROCEDURE — 76856 US EXAM PELVIC COMPLETE: CPT

## 2021-11-12 PROCEDURE — 76856 US EXAM PELVIC COMPLETE: CPT | Mod: 26 | Performed by: RADIOLOGY

## 2021-11-12 PROCEDURE — 76856 US EXAM PELVIC COMPLETE: CPT | Mod: 26 | Performed by: STUDENT IN AN ORGANIZED HEALTH CARE EDUCATION/TRAINING PROGRAM

## 2021-11-12 PROCEDURE — 99221 1ST HOSP IP/OBS SF/LOW 40: CPT | Mod: GC | Performed by: OBSTETRICS & GYNECOLOGY

## 2021-11-12 PROCEDURE — 250N000011 HC RX IP 250 OP 636: Performed by: HOSPITALIST

## 2021-11-12 PROCEDURE — 250N000009 HC RX 250: Performed by: HOSPITALIST

## 2021-11-12 PROCEDURE — 85018 HEMOGLOBIN: CPT

## 2021-11-12 PROCEDURE — P9016 RBC LEUKOCYTES REDUCED: HCPCS | Performed by: EMERGENCY MEDICINE

## 2021-11-12 PROCEDURE — 86923 COMPATIBILITY TEST ELECTRIC: CPT | Performed by: EMERGENCY MEDICINE

## 2021-11-12 PROCEDURE — 85384 FIBRINOGEN ACTIVITY: CPT

## 2021-11-12 PROCEDURE — 250N000013 HC RX MED GY IP 250 OP 250 PS 637: Performed by: STUDENT IN AN ORGANIZED HEALTH CARE EDUCATION/TRAINING PROGRAM

## 2021-11-12 PROCEDURE — 250N000015 HC RX FACTOR IP MED 636 OP 636: Performed by: EMERGENCY MEDICINE

## 2021-11-12 PROCEDURE — 85610 PROTHROMBIN TIME: CPT

## 2021-11-12 PROCEDURE — 250N000013 HC RX MED GY IP 250 OP 250 PS 637

## 2021-11-12 PROCEDURE — 76830 TRANSVAGINAL US NON-OB: CPT

## 2021-11-12 PROCEDURE — 76856 US EXAM PELVIC COMPLETE: CPT | Mod: 77

## 2021-11-12 PROCEDURE — 74174 CTA ABD&PLVS W/CONTRAST: CPT | Mod: 26 | Performed by: RADIOLOGY

## 2021-11-12 RX ORDER — POTASSIUM CHLORIDE 1500 MG/1
20 TABLET, EXTENDED RELEASE ORAL 2 TIMES DAILY
Status: DISCONTINUED | OUTPATIENT
Start: 2021-11-12 | End: 2021-11-18 | Stop reason: HOSPADM

## 2021-11-12 RX ORDER — DIPHENHYDRAMINE HCL 25 MG
25 CAPSULE ORAL EVERY 6 HOURS PRN
Status: DISCONTINUED | OUTPATIENT
Start: 2021-11-12 | End: 2021-11-18 | Stop reason: HOSPADM

## 2021-11-12 RX ORDER — LIDOCAINE 40 MG/G
CREAM TOPICAL
Status: DISCONTINUED | OUTPATIENT
Start: 2021-11-12 | End: 2021-11-18 | Stop reason: HOSPADM

## 2021-11-12 RX ORDER — IOPAMIDOL 755 MG/ML
95 INJECTION, SOLUTION INTRAVASCULAR ONCE
Status: COMPLETED | OUTPATIENT
Start: 2021-11-12 | End: 2021-11-12

## 2021-11-12 RX ORDER — METOPROLOL SUCCINATE 50 MG/1
100 TABLET, EXTENDED RELEASE ORAL DAILY
Status: DISCONTINUED | OUTPATIENT
Start: 2021-11-12 | End: 2021-11-18 | Stop reason: HOSPADM

## 2021-11-12 RX ORDER — ONDANSETRON 4 MG/1
4 TABLET, ORALLY DISINTEGRATING ORAL EVERY 6 HOURS PRN
Status: DISCONTINUED | OUTPATIENT
Start: 2021-11-12 | End: 2021-11-18 | Stop reason: HOSPADM

## 2021-11-12 RX ORDER — ALLOPURINOL 100 MG/1
200 TABLET ORAL DAILY
Status: DISCONTINUED | OUTPATIENT
Start: 2021-11-12 | End: 2021-11-18 | Stop reason: HOSPADM

## 2021-11-12 RX ORDER — MEDROXYPROGESTERONE ACETATE 10 MG
20 TABLET ORAL 2 TIMES DAILY
Status: DISCONTINUED | OUTPATIENT
Start: 2021-11-12 | End: 2021-11-14

## 2021-11-12 RX ORDER — ACETAMINOPHEN 325 MG/1
650 TABLET ORAL EVERY 6 HOURS PRN
Status: DISCONTINUED | OUTPATIENT
Start: 2021-11-12 | End: 2021-11-13

## 2021-11-12 RX ORDER — POLYETHYLENE GLYCOL 3350 17 G/17G
17 POWDER, FOR SOLUTION ORAL DAILY PRN
Status: DISCONTINUED | OUTPATIENT
Start: 2021-11-12 | End: 2021-11-15

## 2021-11-12 RX ORDER — CYCLOBENZAPRINE HCL 5 MG
5 TABLET ORAL
Status: DISCONTINUED | OUTPATIENT
Start: 2021-11-12 | End: 2021-11-13

## 2021-11-12 RX ORDER — ACETAMINOPHEN 500 MG
500-1000 TABLET ORAL EVERY 4 HOURS PRN
Status: DISCONTINUED | OUTPATIENT
Start: 2021-11-12 | End: 2021-11-12

## 2021-11-12 RX ORDER — ATORVASTATIN CALCIUM 20 MG/1
20 TABLET, FILM COATED ORAL DAILY
Status: DISCONTINUED | OUTPATIENT
Start: 2021-11-12 | End: 2021-11-18 | Stop reason: HOSPADM

## 2021-11-12 RX ORDER — LISINOPRIL 2.5 MG/1
5 TABLET ORAL DAILY
Status: DISCONTINUED | OUTPATIENT
Start: 2021-11-12 | End: 2021-11-18 | Stop reason: HOSPADM

## 2021-11-12 RX ADMIN — MEDROXYPROGESTERONE ACETATE 20 MG: 10 TABLET ORAL at 15:32

## 2021-11-12 RX ADMIN — MICONAZOLE NITRATE: 20 POWDER TOPICAL at 12:04

## 2021-11-12 RX ADMIN — IOPAMIDOL 95 ML: 755 INJECTION, SOLUTION INTRAVENOUS at 18:28

## 2021-11-12 RX ADMIN — MICONAZOLE NITRATE: 20 POWDER TOPICAL at 20:41

## 2021-11-12 RX ADMIN — ACETAMINOPHEN 650 MG: 325 TABLET, FILM COATED ORAL at 17:24

## 2021-11-12 RX ADMIN — PROTHROMBIN, COAGULATION FACTOR VII HUMAN, COAGULATION FACTOR IX HUMAN, COAGULATION FACTOR X HUMAN, PROTEIN C, PROTEIN S HUMAN, AND WATER 3706 UNITS: KIT at 04:34

## 2021-11-12 RX ADMIN — CYCLOBENZAPRINE HYDROCHLORIDE 5 MG: 5 TABLET, FILM COATED ORAL at 20:40

## 2021-11-12 RX ADMIN — ATORVASTATIN CALCIUM 20 MG: 20 TABLET, FILM COATED ORAL at 08:14

## 2021-11-12 RX ADMIN — MEDROXYPROGESTERONE ACETATE 20 MG: 10 TABLET ORAL at 20:41

## 2021-11-12 RX ADMIN — PHYTONADIONE 10 MG: 10 INJECTION, EMULSION INTRAMUSCULAR; INTRAVENOUS; SUBCUTANEOUS at 04:35

## 2021-11-12 RX ADMIN — ACETAMINOPHEN 650 MG: 325 TABLET, FILM COATED ORAL at 06:41

## 2021-11-12 RX ADMIN — SODIUM CHLORIDE, PRESERVATIVE FREE 135 ML: 5 INJECTION INTRAVENOUS at 18:28

## 2021-11-12 RX ADMIN — DIPHENHYDRAMINE HYDROCHLORIDE 25 MG: 25 CAPSULE ORAL at 20:40

## 2021-11-12 RX ADMIN — ALLOPURINOL 200 MG: 100 TABLET ORAL at 08:14

## 2021-11-12 ASSESSMENT — ACTIVITIES OF DAILY LIVING (ADL)
ADLS_ACUITY_SCORE: 9

## 2021-11-12 NOTE — PHARMACY-ADMISSION MEDICATION HISTORY
Admission Medication History Completed by Pharmacy    See [facility] Admission Navigator for allergy information, preferred outpatient pharmacy, prior to admission medications and immunization status.     Medication History Sources:     Patient and med scribe    Changes made to PTA medication list (reason):    Added: None    Deleted: None    Changed: None    Additional Information:    None    Prior to Admission medications    Medication Sig Last Dose Taking? Auth Provider   acetaminophen (TYLENOL) 500 MG tablet Take 1-2 tablets (500-1,000 mg) by mouth every 4 hours as needed for mild pain  at prn Yes Elif Gordon MD   allopurinol (ZYLOPRIM) 100 MG tablet Take 2 tablets (200 mg) by mouth daily For additional refills, please schedule annual appointment at [phone], overdue for labs Past Week at Unknown time Yes Gagan Quiles MD   atorvastatin (LIPITOR) 20 MG tablet Take 1 tablet (20 mg) by mouth daily Past Week at Unknown time Yes Duane Uriarte MD   bumetanide (BUMEX) 2 MG tablet Take 1 tablet (2 mg) by mouth daily Past Week at Unknown time Yes Duane Uriarte MD   ferrous gluconate (FERGON) 324 (38 Fe) MG tablet Take 1 tablet (324 mg) by mouth daily  Yes Duane Uriarte MD   lisinopril (ZESTRIL) 5 MG tablet Take 1 tablet (5 mg) by mouth daily Past Week at Unknown time Yes Duane Uriarte MD   metoprolol succinate ER (TOPROL-XL) 100 MG 24 hr tablet Take 1 tablet (100 mg) by mouth daily Past Week at Unknown time Yes Duane Uriarte MD   miconazole (MICATIN) 2 % external powder APPLY TO AFFECTED AREA TWICE A DAY Past Week at Unknown time Yes Gagan Quiles MD   ondansetron (ZOFRAN-ODT) 4 MG ODT tab Take 1 tablet (4 mg) by mouth every 6 hours as needed for nausea or vomiting  at prn Yes Elif Gordon MD   polyethylene glycol (MIRALAX) packet Take 17 g by mouth daily as needed for constipation  at prn Yes Gagan Quiles MD   potassium chloride ER (K-TAB) 20 MEQ CR tablet Take 1 tablet (20 mEq) by mouth 2 times  daily Past Week at Unknown time Yes Duane Uriarte MD   sennosides (SENOKOT) 8.6 MG tablet Take 1 tablet by mouth 2 times daily as needed for constipation  at prn Yes Elif Gordon MD   traMADol (ULTRAM) 50 MG tablet TAKE 1 TABLET (50 MG) BY MOUTH NIGHTLY AS NEEDED FOR SEVERE PAIN *INS LIMIT 7 DAYS  at prn Yes Gagan Quiles MD   warfarin ANTICOAGULANT (COUMADIN) 3 MG tablet Take 1 to 1.5 (one and one-half) tablets by mouth daily or as directed by the Coumadin clinic.  Patient taking differently: 3 mg every Mon, Wed, Fri; 4.5 mg all other days Past Week at Unknown time Yes Gagan Quiles MD   alum & mag hydroxide-simethicone (MAALOX  ES) 400-400-40 MG/5ML SUSP suspension Take 15 mLs by mouth every 4 hours as needed for other (gastric distress.)  at prn  Elif Gordon MD   blood glucose monitoring (ACCU-CHEK NINA PLUS) meter device kit Use to test blood sugars 3 times daily or as directed.   Yuko Crabtree MD   blood glucose monitoring (SOFTCLIX) lancets Use to test blood sugar 3 times daily or as directed.   Yoselin Shook APRN CNP   cyclobenzaprine (FLEXERIL) 5 MG tablet Take 1 tablet (5 mg) by mouth nightly as needed for muscle spasms  at prn  Gagan Quiles MD   diphenhydrAMINE (BENADRYL) 25 MG tablet Take 25 mg by mouth every 6 hours as needed for itching or allergies  at prn  Reported, Patient   warfarin ANTICOAGULANT (COUMADIN) 3 MG tablet Take 3 mg every Mon, Wed, Fri; 4.5 mg all other days.   Gagan Quiles MD       Date completed: 11/12/21    Medication history completed by: Jean Mirza [facility]

## 2021-11-12 NOTE — ED PROVIDER NOTES
[place] EMERGENCY DEPARTMENT ([facility])  11/11/21    History     Chief Complaint   Patient presents with     Shortness of Breath     Vaginal Bleeding     HPI  Arianna Siddiqi is a 61 year old female who presents to the ER with complaints of 2 days of worsening shortness of breath.  Patient states that she has felt slightly feverish lately and states that she has had a intermittent cough but nothing new for her.  Patient states she has had no chest pain or pressure but states that she has been admitted here for CHF recently 2 times.  Patient states that she noticed today that she is was having some vaginal spotting and is on Coumadin.  Patient states that she has not had a normal period for 4 months and states that she has not completely gone through menopause yet.  The patient denies any abdominal pain.    This part of the medical record was transcribed by Jem Noble, Medical Scribe, from a dictation done by Tristin Rodriguez MD.     Past Medical History  Past Medical History:   Diagnosis Date     CHF (congestive heart failure) (H)      CKD (chronic kidney disease)      Compliance poor      Diabetes mellitus (H)      Gout      Hypertension      Insomnia      Morbid obesity (H)      CARLOS treated with BiPAP      History reviewed. No pertinent surgical history.     acetaminophen (TYLENOL) 500 MG tablet  allopurinol (ZYLOPRIM) 100 MG tablet  alum & mag hydroxide-simethicone (MAALOX  ES) 400-400-40 MG/5ML SUSP suspension  atorvastatin (LIPITOR) 20 MG tablet  blood glucose monitoring (ACCU-CHEK NINA PLUS) meter device kit  blood glucose monitoring (SOFTCLIX) lancets  bumetanide (BUMEX) 2 MG tablet  cyclobenzaprine (FLEXERIL) 5 MG tablet  diphenhydrAMINE (BENADRYL) 25 MG tablet  ferrous gluconate (FERGON) 324 (38 Fe) MG tablet  lisinopril (ZESTRIL) 5 MG tablet  metoprolol succinate ER (TOPROL-XL) 100 MG 24 hr tablet  miconazole (MICATIN) 2 % external powder  ondansetron (ZOFRAN-ODT) 4 MG ODT  "tab  polyethylene glycol (MIRALAX) packet  potassium chloride ER (K-TAB) 20 MEQ CR tablet  sennosides (SENOKOT) 8.6 MG tablet  traMADol (ULTRAM) 50 MG tablet  warfarin ANTICOAGULANT (COUMADIN) 1 MG tablet  warfarin ANTICOAGULANT (COUMADIN) 3 MG tablet  warfarin ANTICOAGULANT (COUMADIN) 3 MG tablet  warfarin ANTICOAGULANT (COUMADIN) 3 MG tablet  warfarin ANTICOAGULANT (COUMADIN) 3 MG tablet  warfarin ANTICOAGULANT (COUMADIN) 3 MG tablet      Allergies   Allergen Reactions     Penicillins Itching and Rash     Tolerated ceftriaxone 2020     Past medical history, past surgical history, medications, and allergies were reviewed with the patient. Additional pertinent items: None    Family History  Family History   Adopted: Yes   Family history unknown: Yes     Family history was reviewed with the patient. Additional pertinent items: None    Social History  Social History     Tobacco Use     Smoking status: Former Smoker     Packs/day: 1.50     Years: 20.00     Pack years: 30.00     Types: Cigarettes     Quit date: 2002     Years since quittin.8     Smokeless tobacco: Never Used   Substance Use Topics     Alcohol use: No     Alcohol/week: 0.0 standard drinks     Drug use: No      Social history was reviewed with the patient. Additional pertinent items: None      Review of Systems   Constitutional: Positive for fever (subjective).   Respiratory: Positive for cough (chronic) and shortness of breath.    Cardiovascular: Negative for chest pain.   Gastrointestinal: Negative for abdominal pain.   Genitourinary: Positive for vaginal bleeding.   All other systems reviewed and are negative.    A complete review of systems was performed with pertinent positives and negatives noted in the HPI, and all other systems negative.    Physical Exam   BP: 109/50  Pulse: 87  Temp: 98.2  F (36.8  C)  Resp: 20  Height: 162.6 cm (5' 4\")  Weight: (!) 181.4 kg (400 lb)  SpO2: 98 %  Physical Exam  Vitals and nursing note reviewed. "   Constitutional:       Comments: Pleasant conversant   HENT:      Head: Atraumatic.   Eyes:      Extraocular Movements: Extraocular movements intact.      Pupils: Pupils are equal, round, and reactive to light.   Pulmonary:      Comments: Difficulty hearing secondary to body habitus  Abdominal:      Comments: Obese but nontender   Musculoskeletal:      Right lower leg: Edema present.      Left lower leg: Edema present.   Neurological:      General: No focal deficit present.      Mental Status: She is alert and oriented to person, place, and time.   Psychiatric:         Mood and Affect: Mood normal.         ED Course      Procedures         Patient was placed on cardiac monitor and oximetry.    EKG revealed a normal sinus rhythm at a rate of 87 with a NY interval 0.182 and a QRS duration of 0.086.  The patient had a borderline axis with some T wave inversions in the anterior chest leads consistent with previous EKGs with no acute ST or T wave changes significant for ischemia.  This was read by me personally.    Results for orders placed or performed during the hospital encounter of 11/11/21 (from the past 24 hour(s))   XR Chest Port 1 View    Narrative    EXAM: XR CHEST PORT 1 VIEW  LOCATION: [facility]  DATE/TIME: 11/11/2021 10:51 PM    INDICATION: Shortness of breath.  COMPARISON: 2/6/2010.    FINDINGS: The heart is enlarged. There is pulmonary vascular congestion without pulmonary edema. The lungs are clear. No pneumothorax. Degenerative disease in the spine.      Impression    IMPRESSION: Cardiomegaly and pulmonary vascular congestion.   EKG 12-lead, tracing only   Result Value Ref Range    Systolic Blood Pressure  mmHg    Diastolic Blood Pressure  mmHg    Ventricular Rate 87 BPM    Atrial Rate 87 BPM    NY Interval 182 ms    QRS Duration 86 ms     ms    QTc 452 ms    P Axis 62 degrees    R AXIS 9 degrees    T Axis 5 degrees    Interpretation ECG       Sinus  rhythm  Nonspecific T wave abnormality  Abnormal ECG     CBC with platelets differential    Narrative    The following orders were created for panel order CBC with platelets differential.  Procedure                               Abnormality         Status                     ---------                               -----------         ------                     CBC with platelets and d...[[id number]]  Abnormal            Final result                 Please view results for these tests on the individual orders.   Basic metabolic panel   Result Value Ref Range    Sodium 137 133 - 144 mmol/L    Potassium 5.1 3.4 - 5.3 mmol/L    Chloride 106 94 - 109 mmol/L    Carbon Dioxide (CO2) 26 20 - 32 mmol/L    Anion Gap 5 3 - 14 mmol/L    Urea Nitrogen 51 (H) 7 - 30 mg/dL    Creatinine 1.46 (H) 0.52 - 1.04 mg/dL    Calcium 8.7 8.5 - 10.1 mg/dL    Glucose 97 70 - 99 mg/dL    GFR Estimate 38 (L) >60 mL/min/1.73m2   Troponin I   Result Value Ref Range    Troponin I <0.015 0.000 - 0.045 ug/L   Nt probnp inpatient (BNP)   Result Value Ref Range    N terminal Pro BNP Inpatient 1,093 (H) 0 - 900 pg/mL   CBC with platelets and differential   Result Value Ref Range    WBC Count 12.8 (H) 4.0 - 11.0 10e3/uL    RBC Count 1.50 (L) 3.80 - 5.20 10e6/uL    Hemoglobin 3.1 (LL) 11.7 - 15.7 g/dL    Hematocrit 12.2 (L) 35.0 - 47.0 %    MCV 81 78 - 100 fL    MCH 20.7 (L) 26.5 - 33.0 pg    MCHC 25.4 (L) 31.5 - 36.5 g/dL    RDW 20.4 (H) 10.0 - 15.0 %    Platelet Count 367 150 - 450 10e3/uL    % Neutrophils 82 %    % Lymphocytes 10 %    % Monocytes 6 %    % Eosinophils 1 %    % Basophils 0 %    % Immature Granulocytes 1 %    NRBCs per 100 WBC 1 (H) <1 /100    Absolute Neutrophils 10.6 (H) 1.6 - 8.3 10e3/uL    Absolute Lymphocytes 1.2 0.8 - 5.3 10e3/uL    Absolute Monocytes 0.7 0.0 - 1.3 10e3/uL    Absolute Eosinophils 0.1 0.0 - 0.7 10e3/uL    Absolute Basophils 0.0 0.0 - 0.2 10e3/uL    Absolute Immature Granulocytes 0.1 (H) <=0.0 10e3/uL    Absolute  NRBCs 0.1 10e3/uL   ABO/Rh type and screen    Narrative    The following orders were created for panel order ABO/Rh type and screen.  Procedure                               Abnormality         Status                     ---------                               -----------         ------                     Adult Type and Screen[[id number]]                            In process                   Please view results for these tests on the individual orders.   INR   Result Value Ref Range    INR 5.84 (HH) 0.85 - 1.15     Medications   HOLD: warfarin (COUMADIN) therapy (has no administration in time range)   phytonadione 5 mg in sodium chloride 0.9 % 50 mL intermittent infusion (has no administration in time range)   prothrombin 4 factor complex concentrate (KCENTRA) infusion 5,000 Units (has no administration in time range)   bumetanide (BUMEX) injection 2 mg (2 mg Intravenous Given 11/11/21 3892)         Assessments & Plan (with Medical Decision Making)     I have reviewed the nursing notes.    Patient presents with shortness of breath light of her recent admissions for heart failure.  Recently the patient over the past 2 days has had some vaginal bleeding and presents with a history of some dysfunctional uterine bleeding.  Patient found to be in some mild failure and was given Bumex but hemoglobin came back at 3 and will now be transfused and her INR corrected.    Patient will be admitted to the medicine service.    I have reviewed the findings, diagnosis, plan and need for follow up with the patient.        Final diagnoses:   Anemia due to blood loss, acute   Supratherapeutic INR   Congestive heart failure, unspecified HF chronicity, unspecified heart failure type (H)     This case required critical care of greater than 30 minutes independent of procedures.    Tristin Rodriguez MD, MD    IJem, am serving as a trained medical scribe to document services personally performed by Tristin Campuzano  MD Michael, based on the provider's statements to me.     I, Tristin Rodriguez MD, was physically present and have reviewed and verified the accuracy of this note documented by Jem Noble.    --  Tristin Rodriguez MD  [facility] EMERGENCY DEPARTMENT  11/11/2021     Tristin Rodriguez MD  11/12/21 0103

## 2021-11-12 NOTE — H&P
[facility]    History and Physical - [facility]        Date of Admission:  11/11/2021    Assessment & Plan   Arianna Siddiqi is a 61 year old female with a past medical history significant for HFpEF (LVEF of 55-60% on 01/2020),morbid obesity, paroxysmal atrial fibrillation, HTN, gout, OHS/CARLOS on AVAP presenting with complaints of shortness of breath and vaginal bleeding. Found to have a Hemoglobin of 3.1 and currently admitted for anemia 2/2 uterine bleeding.      #Anemia 2/2 to Vaginal Bleeding   #Supratherapeutic INR (5.84) on Warfarin  #Acute Hypoxic Respiratory Failure 2/2 Anemia  Premenopausal female presenting with acute onset of vaginal bleeding and worsening dyspnea that started 3 days prior to presentation on 11/12/11. Last menstrual period was in 07/2021. States to have had a previous episode of vaginal bleeding 2 weeks prior to presentation that lasted for 1 day and also back in 2019/2020 during a hospital admission for CHF exacerbation. Been on warfarin since 2016 for atrial fibrillation with INR checked every 2 weeks. Takes 27mg of warfarin every week (3mg M,W,F and 4.5mg all other days); last INR was 2.4 on 11/09/21.INR on presentation at 5.84. Patient denied any other major bleeding episodes apart from the vaginal bleeding in the last 3 days, no previous anticoagulation related hospital admission and no changes to diet or new medication.  Placed on 6L NC on presentation due to SOB. Warfarin associated bleeding remains on the differential but also high suspicion for abnormal uterine bleeding related to a gynecological etiology.   -4units pRBC. Currently receiving 2/4  -Hgb Q4H  -Repeat INR  -Gyn consult, appreciate recs  >Transvaginal Ultrasound ordered    #JASON  Baseline 0.84. Creatinine on presentation 1.45 likely  prerenal in the setting of vomiting and dehydration.  -Holding PTA Lisinopril  -Avoid nephrotoxic meds    #HFpEF (TTE with EF  55-60%, 01/2020).   BNP mildly elevated at 1,093 on presentation. Patient started on IV bumex on presentation due to initial concern for acute decompensated HF given c/o SOB. CXR without pulmonary edema and no signs of hypervolemia on physical exam.   -Holding PTA Bumex  -Holding PTA Lisinopril  -Holding PTA Metoprolol        Chronic Problems  #Paroxysmal Atrial Fibrillation  -Holding PTA Metoprolol Succinate  -Holding PTA Wafarin    #HTN  -Holding PTA Lisinopril    #Hyperlipidemia:   - PTA on Atorvastatin    #Gout  -PTA Allopurinol    #Intertrigo groin  -PTA Miconazole     #OHS/CARLOS  -CPAP at night          Diet: 2g Na Diet  DVT Prophylaxis: Pneumatic Compression Devices  Baig Catheter: Not present  Fluids: None  Central Lines: None  Code Status:  Full Code        Disposition Plan   Expected discharge: Discharge to prior living arrangement once hemoglobin stable.     The patient will be staffed in the am    MD Alireza [facility]ane [facility]  [facility]  Securely message with the Vocera Web Console (learn more here)  Text page via [employer] Paging/Directory    Please see sign in/sign out for up to date coverage information  ______________________________________________________________________    Chief Complaint   Shortness of Breath   Vaginal Bleeding    History is obtained from the patient    History of Present Illness   Arianna Siddiqi is a 61 year old female with a past medical history significant for HFpEF (LVEF of 55-60% on 01/2020),morbid obesity, paroxysmal atrial fibrillation,, HTN, gout, OHS/CARLOS on AVAP presenting with complaints of shortness of breath and vaginal bleeding.    Per patient, she has intermittent shortness of breath at baseline however in the last 2 days it has worsened both at rest and on exertion. She states to be complaint with her bumex and states her shortness of breath seems similar to when she was previously admitted for CHF  "exacerbation. Endorses waking up intermittently at night with SOB but states it mostly due to pain associated with sleeping on a recliner. Per patient, she has been sleeping on a recliner for a couple of years.     Patient also endorses heavy vaginal bleeding for the past 3 days. She states her last menstrual cycle was about 4 months ago however in the last 3 days, she has experienced copious amount of vaginal bleeding and huge blood clots requiring her to use about 3 \"thick big towels\" and has had to change the towels every 1-2 hours. She states pads were not thick enough to absorb the bleeding. Per patient, although she does have heavy bleeding during her period that lasts about 2 weeks, this episode of heavy bleeding is significantly way more than usual.States melo she had also had heavy vaginal bleeding about 2 weeks ago but it only lasted for 1 day. She is not on any hormonal therapy and no changes to diet or new medication.  She endorsed a previous episode back in 2019/2020 when she was admitted for CHF exacerbation but her hemoglobin remained stable during that admission and she was supposed to follow up with Ob/gyn however she did not due to the pandemic. Last menstrual period was in July/2021. She denies abdominal cramps but endorses dizziness, headaches, nausea and 2 episodes of vomiting(no blood). Also denies any other major bleeding episodes apart from the vaginal bleeding in the last 3 days and no previous anticoagulation related hospital admission     ED COURSE  -IV Bumex 2mg  -Hgb 3.1  -INR 5.84  -Warfarin held  -Kcentra and Vit K      Review of Systems    The 10 point Review of Systems is negative other than noted in the HPI.    Past Medical History    I have reviewed this patient's medical history and updated it with pertinent information if needed.   Past Medical History:   Diagnosis Date     CHF (congestive heart failure) (H)      CKD (chronic kidney disease)      Compliance poor      Diabetes " mellitus (H)      Gout      Hypertension      Insomnia      Morbid obesity (H)      CARLOS treated with BiPAP         Past Surgical History   I have reviewed this patient's surgical history and updated it with pertinent information if needed.  History reviewed. No pertinent surgical history.     Social History   I have reviewed this patient's social history and updated it with pertinent information if needed. Arianna Siddiqi  reports that she quit smoking about 19 years ago. Her smoking use included cigarettes. She has a 30.00 pack-year smoking history. She has never used smokeless tobacco. She reports that she does not drink alcohol and does not use drugs.    Family History       Prior to Admission Medications   Prior to Admission Medications   Prescriptions Last Dose Informant Patient Reported? Taking?   acetaminophen (TYLENOL) 500 MG tablet   No No   Sig: Take 1-2 tablets (500-1,000 mg) by mouth every 4 hours as needed for mild pain   allopurinol (ZYLOPRIM) 100 MG tablet   No No   Sig: Take 2 tablets (200 mg) by mouth daily For additional refills, please schedule annual appointment at [phone], overdue for labs   alum & mag hydroxide-simethicone (MAALOX  ES) 400-400-40 MG/5ML SUSP suspension   No No   Sig: Take 15 mLs by mouth every 4 hours as needed for other (gastric distress.)   atorvastatin (LIPITOR) 20 MG tablet   No No   Sig: Take 1 tablet (20 mg) by mouth daily   blood glucose monitoring (ACCU-CHEK NINA PLUS) meter device kit   No No   Sig: Use to test blood sugars 3 times daily or as directed.   blood glucose monitoring (SOFTCLIX) lancets   No No   Sig: Use to test blood sugar 3 times daily or as directed.   bumetanide (BUMEX) 2 MG tablet   No No   Sig: Take 1 tablet (2 mg) by mouth daily   cyclobenzaprine (FLEXERIL) 5 MG tablet   No No   Sig: Take 1 tablet (5 mg) by mouth nightly as needed for muscle spasms   diphenhydrAMINE (BENADRYL) 25 MG tablet   Yes No   Sig: Take 25 mg by mouth every 6 hours as  needed for itching or allergies   ferrous gluconate (FERGON) 324 (38 Fe) MG tablet   No No   Sig: Take 1 tablet (324 mg) by mouth daily   lisinopril (ZESTRIL) 5 MG tablet   No No   Sig: Take 1 tablet (5 mg) by mouth daily   metoprolol succinate ER (TOPROL-XL) 100 MG 24 hr tablet   No No   Sig: Take 1 tablet (100 mg) by mouth daily   miconazole (MICATIN) 2 % external powder   No No   Sig: APPLY TO AFFECTED AREA TWICE A DAY   ondansetron (ZOFRAN-ODT) 4 MG ODT tab   No No   Sig: Take 1 tablet (4 mg) by mouth every 6 hours as needed for nausea or vomiting   polyethylene glycol (MIRALAX) packet   No No   Sig: Take 17 g by mouth daily as needed for constipation   potassium chloride ER (K-TAB) 20 MEQ CR tablet   No No   Sig: Take 1 tablet (20 mEq) by mouth 2 times daily   sennosides (SENOKOT) 8.6 MG tablet   No No   Sig: Take 1 tablet by mouth 2 times daily as needed for constipation   traMADol (ULTRAM) 50 MG tablet   No No   Sig: TAKE 1 TABLET (50 MG) BY MOUTH NIGHTLY AS NEEDED FOR SEVERE PAIN *INS LIMIT 7 DAYS   warfarin ANTICOAGULANT (COUMADIN) 1 MG tablet   No No   Sig: TAKE 2 TABLETS (2 MG) BY MOUTH DAILY USE AS DIRECTED BY COUMADIN CLINIC   warfarin ANTICOAGULANT (COUMADIN) 3 MG tablet   No No   Sig: Take one to one in a half tablets daily or as directed by the Coumadin clinic   warfarin ANTICOAGULANT (COUMADIN) 3 MG tablet   No No   Sig: Take 1 to 1.5 (one and one-half) tablets by mouth daily or as directed by the Coumadin clinic.   warfarin ANTICOAGULANT (COUMADIN) 3 MG tablet   No No   Sig: TAKE 1 TO 1.5 (ONE AND A HALF) TABLETS BY MOUTH DAILY OR AS DIRECTED BY THE COUMADIN CLINIC.   warfarin ANTICOAGULANT (COUMADIN) 3 MG tablet   No No   Sig: TAKE 1.5 TABLETS DAILY OR AS DIRECTED BY COUMADIN CLINIC.   warfarin ANTICOAGULANT (COUMADIN) 3 MG tablet   No No   Sig: Take 1 tablet (3mg) to 1.5 tablets (4.5mg) by mouth daily or as directed by the Coumadin clinic.      Facility-Administered Medications: None      Allergies   Allergies   Allergen Reactions     Penicillins Itching and Rash     Tolerated ceftriaxone 1/17/2020       Physical Exam   Vital Signs: Temp: 98.3  F (36.8  C) Temp src: Oral BP: (!) 102/37 Pulse: 84   Resp: 16 SpO2: 95 % O2 Device: Nasal cannula Oxygen Delivery: 6 LPM  Weight: 400 lbs 0 oz    General Appearance: NAD, morbidly obese, laying bed, conversant, on 6L NC  Eyes: Sclera Anicteric, EOMI, PERRLA  HEENT: Normocephalic, Atraumatic, dry oral mucosa  Respiratory: Diminished breath sounds. No increased work of breathing  Cardiovascular: RRR. No murmurs/rubs/gallops  Abdomen: Soft, non-distended, non-tender, positive bowel sounds  Skin: Warm and dry. No bruising or rashes noted  Extremities: No pitting edema noted on bilateral lower extremities.   Neurologic: A/O X3, grossly non focal      Data   Data reviewed today: I reviewed all medications, new labs and imaging results over the last 24 hours.    Recent Labs   Lab 11/11/21  2336 11/11/21  2335 11/09/21  1205   WBC  --  12.8*  --    HGB  --  3.1*  --    MCV  --  81  --    PLT  --  367  --    INR 5.84*  --  2.4*   NA  --  137  --    POTASSIUM  --  5.1  --    CHLORIDE  --  106  --    CO2  --  26  --    BUN  --  51*  --    CR  --  1.46*  --    ANIONGAP  --  5  --    KADEN  --  8.7  --    GLC  --  97  --    BILITOTAL  --  0.2  --    TROPONIN  --  <0.015  --

## 2021-11-12 NOTE — CONSULTS
Gynecology Consult Note    Reason for Consult: Vaginal Bleeding    HPI: Arianna Siddiqi is a 61 year old  postmenopausal female initially presenting with SOB and vaginal bleeding, found to have a Hgb of 3.1 with INR of 5.8 in the setting of multiple medical co-morbidities. Patient has a PMHx of HFpEF, morbid obesity, paroxysmal atrial fibrillation (on warfarin), HTN, and CARLOS. GYN team was consulted for work up and management of vaginal bleeding.    Patient has had postmenopausal bleeding previously. She has an US from  showing an EMS of 1.9 cm with a negative endometrial biopsy at the time. She reports intermittent post menopausal bleeding over the past several years. She reports being previously started on a medication for heavy bleeding (possibly norethindrone per chart review) that stopped the bleeding. Bleeding recurred upon discontinuation of the medication. Three days ago she had acute onset heavy bleeding requiring use of multiple towels that needed to be changed hourly.      Patient has had vaginal bleeding intermittently over the past several years. She has not undergone a period of 12 months free of bleeding, thus she assumed that she was premenopausal. She is not currently sexually active. Her last pap was 2013. No history of abnormal paps or STIs. Never used contraceptives. Has never been pregnant.     PMH:   Past Medical History:   Diagnosis Date    CHF (congestive heart failure) (H)     CKD (chronic kidney disease)     Compliance poor     Diabetes mellitus (H)     Gout     Hypertension     Insomnia     Morbid obesity (H)     CARLOS treated with BiPAP      PSH: History reviewed. No pertinent surgical history.    Social Hx: Lives in an apartment in [place].   Social History     Tobacco Use    Smoking status: Former Smoker     Packs/day: 1.50     Years: 20.00     Pack years: 30.00     Types: Cigarettes     Quit date: 2002     Years since quittin.8    Smokeless tobacco: Never Used  "  Substance Use Topics    Alcohol use: No     Alcohol/week: 0.0 standard drinks    Drug use: No      Family History: She was adopted. Family history is unknown by patient.  No family history of breast, ovarian or uterine cancer. No history of DVT or migranes.    ROS:   Negative except per HPI    Objective:   /46   Pulse 82   Temp 98.5  F (36.9  C)   Resp 18   Ht 1.626 m (5' 4\")   Wt (!) 181.4 kg (400 lb)   LMP 11/09/2021   SpO2 94%   BMI 68.66 kg/m     Gen: Awake, alert, no acute distress, morbidly obese, laying in bed on 6L NC.   CV: Regular rate, well perfused  Lungs: Increased work of breathing.    Ab: Soft, non-distended, non-tender to palpation  Ext: Non-edematous  Pelvic: Bimanual exam without any palpable masses on cervix.     Labs/Imaging:  Results for orders placed or performed during the hospital encounter of 11/11/21   XR Chest Port 1 View     Status: None    Narrative    EXAM: XR CHEST PORT 1 VIEW  LOCATION: [facility]  DATE/TIME: 11/11/2021 10:51 PM    INDICATION: Shortness of breath.  COMPARISON: 2/6/2010.    FINDINGS: The heart is enlarged. There is pulmonary vascular congestion without pulmonary edema. The lungs are clear. No pneumothorax. Degenerative disease in the spine.      Impression    IMPRESSION: Cardiomegaly and pulmonary vascular congestion.   US Pelvic Complete w/o Transvaginal Portable     Status: None    Narrative    EXAMINATION: US PELVIC COMPLETE WITHOUT TRANSVAGINAL PORTABLE,  11/12/2021 8:39 AM     COMPARISON: Abdominal radiograph 1/19/2020    HISTORY: patient with vaginal bleeding. Please measure endometrial  stripe    TECHNIQUE: The pelvis was scanned in standard fashion with a  transabdominal transducer(s) using grayscale technique.     FINDINGS:  Very limited transabdominal evaluation due to body habitus.  Transvaginal technique could not be performed. The bladder was  partially visualized and unremarkable in appearance. " The uterus and  ovaries were not visualized.      Impression    IMPRESSION: Technically limited sonographic evaluation with no  visualization of the uterus. May consider MR pelvis for further  evaluation if clinically desired.    I have personally reviewed the examination and initial interpretation  and I agree with the findings.    JEREMIAH ZALDIVAR MD         SYSTEM ID:  [id number]   Basic metabolic panel     Status: Abnormal   Result Value Ref Range    Sodium 137 133 - 144 mmol/L    Potassium 5.1 3.4 - 5.3 mmol/L    Chloride 106 94 - 109 mmol/L    Carbon Dioxide (CO2) 26 20 - 32 mmol/L    Anion Gap 5 3 - 14 mmol/L    Urea Nitrogen 51 (H) 7 - 30 mg/dL    Creatinine 1.46 (H) 0.52 - 1.04 mg/dL    Calcium 8.7 8.5 - 10.1 mg/dL    Glucose 97 70 - 99 mg/dL    GFR Estimate 38 (L) >60 mL/min/1.73m2   Troponin I     Status: Normal   Result Value Ref Range    Troponin I <0.015 0.000 - 0.045 ug/L   INR     Status: Abnormal   Result Value Ref Range    INR 5.84 (HH) 0.85 - 1.15   Nt probnp inpatient (BNP)     Status: Abnormal   Result Value Ref Range    N terminal Pro BNP Inpatient 1,093 (H) 0 - 900 pg/mL   Symptomatic Influenza A/B & SARS-CoV2 (COVID-19) Virus PCR Multiplex Nasopharyngeal     Status: Normal    Specimen: Nasopharyngeal; Swab   Result Value Ref Range    Influenza A target Negative Negative    Influenza B target Negative Negative    RSV target Negative Negative    SARS CoV2 PCR Negative Negative, Testing sent to reference lab. Results will be returned via unsolicited result    Narrative    Testing was performed using the Xpert Xpress CoV2/Flu/RSV Assay on the Cepheid GeneXpert Instrument. This test should be ordered for the detection of SARS-CoV-2 and influenza viruses in individuals who meet clinical and/or epidemiological criteria. Test performance is unknown in asymptomatic patients. This test is for in vitro diagnostic use under the FDA EUA for laboratories certified under CLIA to perform high or moderate  complexity testing. This test has not been FDA cleared or approved. A negative result does not rule out the presence of PCR inhibitors in the specimen or target RNA in concentration below the limit of detection for the assay. If only one viral target is positive but coinfection with multiple targets is suspected, the sample should be re-tested with another FDA cleared, approved, or authorized test, if coinfection would change clinical management. This test was validated by the [facility]. These laboratories are certified under the Clinical  Laboratory Improvement Amendments of 1988 (CLIA-88) as qualified to perform high complexity laboratory testing.   CBC with platelets and differential     Status: Abnormal   Result Value Ref Range    WBC Count 12.8 (H) 4.0 - 11.0 10e3/uL    RBC Count 1.50 (L) 3.80 - 5.20 10e6/uL    Hemoglobin 3.1 (LL) 11.7 - 15.7 g/dL    Hematocrit 12.2 (L) 35.0 - 47.0 %    MCV 81 78 - 100 fL    MCH 20.7 (L) 26.5 - 33.0 pg    MCHC 25.4 (L) 31.5 - 36.5 g/dL    RDW 20.4 (H) 10.0 - 15.0 %    Platelet Count 367 150 - 450 10e3/uL    % Neutrophils 82 %    % Lymphocytes 10 %    % Monocytes 6 %    % Eosinophils 1 %    % Basophils 0 %    % Immature Granulocytes 1 %    NRBCs per 100 WBC 1 (H) <1 /100    Absolute Neutrophils 10.6 (H) 1.6 - 8.3 10e3/uL    Absolute Lymphocytes 1.2 0.8 - 5.3 10e3/uL    Absolute Monocytes 0.7 0.0 - 1.3 10e3/uL    Absolute Eosinophils 0.1 0.0 - 0.7 10e3/uL    Absolute Basophils 0.0 0.0 - 0.2 10e3/uL    Absolute Immature Granulocytes 0.1 (H) <=0.0 10e3/uL    Absolute NRBCs 0.1 10e3/uL   Bilirubin Direct and Total     Status: Normal   Result Value Ref Range    Bilirubin Direct <0.1 0.0 - 0.2 mg/dL    Bilirubin Total 0.2 0.2 - 1.3 mg/dL   TSH with free T4 reflex     Status: Normal   Result Value Ref Range    TSH 2.07 0.40 - 4.00 mU/L   Iron and iron binding capacity     Status: Abnormal   Result Value Ref Range    Iron 9 (L) 35 - 180 ug/dL    Iron Binding  Capacity 460 (H) 240 - 430 ug/dL    Iron Sat Index 2 (L) 15 - 46 %   Ferritin     Status: Abnormal   Result Value Ref Range    Ferritin 6 (L) 8 - 252 ng/mL   Transferrin     Status: Normal   Result Value Ref Range    Transferrin 296 210 - 360 mg/dL   Hepatic panel     Status: Abnormal   Result Value Ref Range    Bilirubin Total 0.2 0.2 - 1.3 mg/dL    Bilirubin Direct <0.1 0.0 - 0.2 mg/dL    Protein Total 6.8 6.8 - 8.8 g/dL    Albumin 2.3 (L) 3.4 - 5.0 g/dL    Alkaline Phosphatase 84 40 - 150 U/L    AST 15 0 - 45 U/L    ALT 16 0 - 50 U/L   INR     Status: Abnormal   Result Value Ref Range    INR 1.31 (H) 0.85 - 1.15   Hemoglobin     Status: Abnormal   Result Value Ref Range    Hemoglobin 5.0 (LL) 11.7 - 15.7 g/dL   Fibrinogen activity     Status: Abnormal   Result Value Ref Range    Fibrinogen Activity 506 (H) 170 - 490 mg/dL   Partial thromboplastin time     Status: Normal   Result Value Ref Range    aPTT 30 22 - 38 Seconds   EKG 12-lead, tracing only     Status: None   Result Value Ref Range    Systolic Blood Pressure  mmHg    Diastolic Blood Pressure  mmHg    Ventricular Rate 87 BPM    Atrial Rate 87 BPM    IL Interval 182 ms    QRS Duration 86 ms     ms    QTc 452 ms    P Axis 62 degrees    R AXIS 9 degrees    T Axis 5 degrees    Interpretation ECG       Sinus rhythm  Nonspecific T wave abnormality  Abnormal ECG  Unconfirmed report - interpretation of this ECG is computer generated - see medical record for final interpretation  Confirmed by - EMERGENCY ROOM, PHYSICIAN ([id number]),  CHANELL ALEJANDRA ([id number]) on 11/12/2021 12:11:24 PM     Adult Type and Screen     Status: None   Result Value Ref Range    ABO/RH(D) A POS     Antibody Screen Negative Negative    SPECIMEN EXPIRATION DATE [id number]    Prepare red blood cells (unit)     Status: None (Preliminary result)   Result Value Ref Range    CROSSMATCH Compatible     UNIT ABO/RH A Pos     Unit Number [id number]     Unit Status Issued     Blood Component  Type Red Blood Cells     Product Code [id number]     CODING SYSTEM [id number]     UNIT TYPE ISBT 6200     ISSUE DATE AND TIME [id number]    Prepare red blood cells (unit)     Status: None (Preliminary result)   Result Value Ref Range    CROSSMATCH Compatible     UNIT ABO/RH A Pos     Unit Number [id number]     Unit Status Issued     Blood Component Type Red Blood Cells     Product Code [id number]     CODING SYSTEM [id number]     UNIT TYPE ISBT 6200     ISSUE DATE AND TIME [id number]    Prepare red blood cells (unit)     Status: None (Preliminary result)   Result Value Ref Range    CROSSMATCH Compatible     UNIT ABO/RH A Pos     Unit Number [id number]     Unit Status Issued     Blood Component Type Red Blood Cells     Product Code [id number]     CODING SYSTEM [id number]     UNIT TYPE ISBT 6200     ISSUE DATE AND TIME [id number]    Prepare red blood cells (unit)     Status: None (Preliminary result)   Result Value Ref Range    CROSSMATCH Compatible     UNIT ABO/RH A Pos     Unit Number [id number]     Unit Status Issued     Blood Component Type Red Blood Cells     Product Code [id number]     CODING SYSTEM [id number]     UNIT TYPE ISBT 6200     ISSUE DATE AND TIME [id number]    CBC with platelets differential     Status: Abnormal    Narrative    The following orders were created for panel order CBC with platelets differential.  Procedure                               Abnormality         Status                     ---------                               -----------         ------                     CBC with platelets and d...[[id number]]  Abnormal            Final result                 Please view results for these tests on the individual orders.   ABO/Rh type and screen     Status: None    Narrative    The following orders were created for panel order ABO/Rh type and screen.  Procedure                               Abnormality         Status                     ---------                               -----------          ------                     Adult Type and Screen[[id number]]                            Final result                 Please view results for these tests on the individual orders.      Assessment/Plan: Arianna Siddiqi is a 61 year old  postmenopausal female initially presenting with SOB and vaginal bleeding, found to have a Hgb of 3.1 with INR of 5.8 in the setting of multiple medical co-morbidities. GYN team consulted for assistance with work up and management of postmenopausal bleeding.    #Abnormal Uterine Bleeding  #Postmenopausal Bleeding  - Differential includes PALM-COEIN, patient is certainly at high risk for hyperplasia and malignancy given her morbid obesity. The acute onset of heavy bleeding is most likely due to her anti-coagulation. Anticipate decrease in heavy bleeding with INR reversal already underway by primary team.   - Recommend obtaining a transvaginal ultrasound for assessment of EMS and any structural anomalies.  - Given patient's age and medical co-morbidities (regardless of EMS) patient would need endometrial sampling. Due to her body habitus, this may be difficult at the bedside=but could be considered to avoid sedation.  She is also agreeable to a Mirena IUD placement at this time also- if possible . Recommend this be completed this admission, as patient has barriers to care- and has stated her likelihood to return is very low once she goes home.     Patient was seen and dscussed with Dr. Elijah Gutiérrez, MS4    Resident/Fellow Attestation   I, Chelsea Duarte, was present with the medical/VALERY student who participated in the service and in the documentation of the note.  I have verified the history and personally performed the physical exam and medical decision making.  I agree with the assessment and plan of care as documented in the note.      Chelsea Duarte MD  OB/GYN PGY-4  21 5:47 PM    [facility] staff:  Appreciate note by Dr. Duarte.  I  have seen and examined the patient without the resident. I have reviewed, edited, and agree with the note.    My findings are: TV ultrasound completed and shows EM stripe of 6 mm.  There is also blood in the EM canal and cervix c/w her bleeding history.     Given relatively thin EM stripe, EMB would likely be sufficient for sampling.  Dicussed with GYN ONC team re: possibly doing sampling over weekend.  Dr. Hess and team plan to see pt on Saturday if her situation is more stable after blood transfusion and INR is corrected.     Natty Nava MD, FACOG  11/13/2021  10PM

## 2021-11-12 NOTE — ED NOTES
Bed: ED23  Expected date:   Expected time:   Means of arrival:   Comments:  N714  61F  Hypotension  HX CHF

## 2021-11-12 NOTE — PROGRESS NOTES
Resident/Fellow Attestation   I, Char Phipps, was present with the medical/VALERY student who participated in the service and in the documentation of the note.  I have verified the history and personally performed the physical exam and medical decision making.  I agree with the assessment and plan of care as documented in the note.      Char Phipps MD  PGY1  Date of Service (when I saw the patient): 11/12/21    [facility]    Progress Note - Maroon 2 Service        Date of Admission:  11/11/2021    Assessment & Plan             Arianna Siddiqi is a 61 year old female admitted on 11/11/2021. She has a history of HFpEF (LEV 55-60% on 1/2020), morbid obesity, paroxysmal a-fib, HTN, gout, OHS/CARLOS on AVAPS presenting with SOB and vaginal bleeding and is admitted for anemia 2/2 uterine bleeding.    #Anemia 2/2 to vaginal bleeding  #Supratherapeutic INR (5.84) on warfarin  #Acute hypoxic respiratory failure 2/2 anemia  #Hypotension    61 yoF with history of intermittent vaginal bleeding without clear menopausal history presenting with acute onset of vaginal bleeding and worsening dyspnea starting on 11/12/21. LMP/last episode of typical intermittent vaginal bleeding was 7/2021. Pt experienced a single day episode of vaginal bleeding 2 wks before admission as well as during a 2019/2020 during hospitalization for CHF exacerbation. Started warfarin in 2016 for a-fib, has been taking and checking INR regularly, last INR 2.4 on 11/9. INR was elevated to 5.84 at presentation. Pt denied any other major bleeding episode, no previous anticoagulation related hospitalization, and no changes to diet or new medication. Remains on 6L NC for SOB. OB/GYN consulted, recommended starting Provera 20mg BID and obtaining Pelvic US. US was reportedly difficult to obtain transvaginal imaging, however per OB/GYN reassessment transvaginal view should be obtainable and repeat imaging order placed.  DDx - warfarin assoc'd bleeding vs abnormal uterine bleeding 2/2 malignancy vs structural abnormality. Given the lack of uterine imaging and to attempt further identification of bleeding source, will obtain CTA AP.  - 4 units pRBCs, will recheck Hgb after completing transfusions   - may require MTP protocol if Hgb not >7 s/p 6 units  - One-time Kcentra administered on 11/12 for INR reversal  - Hgb Q4H  - INR Q8hrs, recheck in PM at 1.31  - Hold warfarin   - Gyn consult, appreciate recs   - Pelvic US with transvaginal imaging   - Likely will need exam under anesthesia with Hysteroscopy, D&C for thorough sampling, assuming stable for anesthesia, per GYN will attempt to arrange tomorrow if able   - Medroxyprogesterone tablet 20 mg BID  - Hold PTA anti-hypertensives while receiving transfusions  - If evidence of volume overload w/ hx HFpEP, could consider diuresis if needing further transfusions  - repeat attempt at pelvic US with transvaginal imaging  - will make NPO at midnight in case OB/GYN able to take to OR tomorrow for exam/biospy      #JASON  Baseline 0.84. 11/12 Cr 1.45, likely pre-renal in the setting of vomiting, dehydration. Obtain repeat BMP/Cr prior to CTA s/p 4 units pRBCs.  - Hold PTA lisinopril  - Avoid nephrotoxic meds  - Repeat BMP/Cr prior to CTA      #HFpEF (TTE with EF 55-60%, 1/2020)  BNP mildly elevated at 1093 on 11/12. Pt given IV bumex in ED due to concern for acute decompensated HF given SOB. 11/12 CXR without pulmonary edema and no signs of hypervolemia on phy exam.  - Hold PTA Bumex  - Hold PTA lisinopril  - Hold PTA metoprolol      Chronic Problems  #Paroxysmal Atrial Fibrillation  - Hold PTA metoprolol   - Hold PTA warfarin    #HTN  - Hold PTA lisinopril    #Hyperlipidemia  - PTA on atorvastatin    #Gout  - PTA allopurinol    #Intertrigo groin  - PTA miconazole     #OHS/CARLOS  - CPAP at night       Diet: 2 Gram Sodium Diet  NPO per Anesthesia Guidelines for Procedure/Surgery Except for: Meds     DVT Prophylaxis: Warfarin  Baig Catheter: Not present  Fluids: None  Central Lines: None  Code Status: Full Code      Disposition Plan   Expected discharge: recommended to prior living arrangement once hemoglobin stable.     The patient's care was discussed with the Primary team.    SHAHRZAD QUINONEZ  Medical Student  [address]  Securely message with the Vocera Web Console (learn more here)  Text page via [employer] Paging/Directory    Please see sign in/sign out for up to date coverage information    ______________________________________________________________________    Interval History   Pt admitted overnight due to acute gynecological bleed and SOB. Supratherapeutic INR at 5.84 and Hbg 3.1 discovered at admission. Pt remains on 6L NC. Repeat labs today show improved INR at  1.31, Hbg at 5, fibrinogen 506, and aPTT 30 seconds. Infused 3/4 pRBC. CTA abd/pelvis w/ contrast ordered.     Pt reports that she continues to feel generally weak, minimally lightheaded when getting up and moving around. Does live independently at home and typically takes care of herself. No dyspnea on exertion or weakness typically with ADLs. Intermittently endorsing nausea here, also reports wanting to eat breakfast. Reports she is still passing large clots, but less bright red blood.    4 point ROS otherwise negative.    Data reviewed today: I reviewed all medications, new labs and imaging results over the last 24 hours. I personally reviewed the chest x-ray image showing no cardiomegaly and pulmonary vascular congestion and US pelvic w/o transvaginal approach producing no appreciable uterine imaging.     Physical Exam   Vital Signs: Temp: 98.1  F (36.7  C) Temp src: Oral BP: 104/40 Pulse: 80   Resp: 16 SpO2: 100 % O2 Device: Nasal cannula Oxygen Delivery: 6 LPM  Weight: 400 lbs 0 oz  General Appearance: Lying in bed, appeared in no acute distress  Respiratory: Normal  work of breathing while on 6LPM NC. Lung sounds distant, overall clear bilaterally without wheezes or rhonchi.  Cardiovascular: RRR. Heart sounds somewhat distant, possible systolic murmur.  GI: Soft, non-tender, non-distended, bowel sounds present  Skin: Bilateral LE lymphedema with dry skin patches, few abrasions, no erythema or tenderness.   Neurological: Alert and fully oriented, easily engaged throughout interview     Data   Recent Labs   Lab 11/12/21  1344 11/11/21  2336 11/11/21  2335 11/09/21  1205   WBC  --   --  12.8*  --    HGB 5.0*  --  3.1*  --    MCV  --   --  81  --    PLT  --   --  367  --    INR 1.31* 5.84*  --  2.4*   NA  --   --  137  --    POTASSIUM  --   --  5.1  --    CHLORIDE  --   --  106  --    CO2  --   --  26  --    BUN  --   --  51*  --    CR  --   --  1.46*  --    ANIONGAP  --   --  5  --    KADEN  --   --  8.7  --    GLC  --   --  97  --    ALBUMIN  --   --  2.3*  --    PROTTOTAL  --   --  6.8  --    BILITOTAL  --   --  0.2  0.2  --    ALKPHOS  --   --  84  --    ALT  --   --  16  --    AST  --   --  15  --    TROPONIN  --   --  <0.015  --      Recent Results (from the past 24 hour(s))   XR Chest Port 1 View    Narrative    EXAM: XR CHEST PORT 1 VIEW  LOCATION: [facility]  DATE/TIME: 11/11/2021 10:51 PM    INDICATION: Shortness of breath.  COMPARISON: 2/6/2010.    FINDINGS: The heart is enlarged. There is pulmonary vascular congestion without pulmonary edema. The lungs are clear. No pneumothorax. Degenerative disease in the spine.      Impression    IMPRESSION: Cardiomegaly and pulmonary vascular congestion.   US Pelvic Complete w/o Transvaginal Portable    Narrative    EXAMINATION: US PELVIC COMPLETE WITHOUT TRANSVAGINAL PORTABLE,  11/12/2021 8:39 AM     COMPARISON: Abdominal radiograph 1/19/2020    HISTORY: patient with vaginal bleeding. Please measure endometrial  stripe    TECHNIQUE: The pelvis was scanned in standard fashion with  a  transabdominal transducer(s) using grayscale technique.     FINDINGS:  Very limited transabdominal evaluation due to body habitus.  Transvaginal technique could not be performed. The bladder was  partially visualized and unremarkable in appearance. The uterus and  ovaries were not visualized.      Impression    IMPRESSION: Technically limited sonographic evaluation with no  visualization of the uterus. May consider MR pelvis for further  evaluation if clinically desired.    I have personally reviewed the examination and initial interpretation  and I agree with the findings.    JEREMIAH ZALDIVAR MD         SYSTEM ID:  [id number]     Medications       allopurinol  200 mg Oral Daily     atorvastatin  20 mg Oral Daily     sodium chloride 0.9 %  135 mL Intravenous Once     iopamidol (ISOVUE-370)  95 mL Intravenous Once     [Held by provider] lisinopril  5 mg Oral Daily     medroxyPROGESTERone  20 mg Oral BID     [Held by provider] metoprolol succinate ER  100 mg Oral Daily     miconazole   Topical BID     [Held by provider] potassium chloride ER  20 mEq Oral BID     sodium chloride (PF)  3 mL Intracatheter Q8H

## 2021-11-13 ENCOUNTER — ANESTHESIA EVENT (OUTPATIENT)
Dept: SURGERY | Facility: CLINIC | Age: 61
DRG: 744 | End: 2021-11-13
Payer: MEDICARE

## 2021-11-13 ENCOUNTER — ANESTHESIA (OUTPATIENT)
Dept: SURGERY | Facility: CLINIC | Age: 61
DRG: 744 | End: 2021-11-13
Payer: MEDICARE

## 2021-11-13 ENCOUNTER — APPOINTMENT (OUTPATIENT)
Dept: GENERAL RADIOLOGY | Facility: CLINIC | Age: 61
DRG: 744 | End: 2021-11-13
Payer: MEDICARE

## 2021-11-13 LAB
ALBUMIN SERPL-MCNC: 2.2 G/DL (ref 3.4–5)
ALP SERPL-CCNC: 80 U/L (ref 40–150)
ALT SERPL W P-5'-P-CCNC: 15 U/L (ref 0–50)
ANION GAP SERPL CALCULATED.3IONS-SCNC: 3 MMOL/L (ref 3–14)
AST SERPL W P-5'-P-CCNC: 15 U/L (ref 0–45)
BILIRUB DIRECT SERPL-MCNC: 0.1 MG/DL (ref 0–0.2)
BILIRUB SERPL-MCNC: 0.3 MG/DL (ref 0.2–1.3)
BUN SERPL-MCNC: 34 MG/DL (ref 7–30)
CALCIUM SERPL-MCNC: 8.3 MG/DL (ref 8.5–10.1)
CHLORIDE BLD-SCNC: 108 MMOL/L (ref 94–109)
CO2 SERPL-SCNC: 28 MMOL/L (ref 20–32)
CREAT SERPL-MCNC: 1.13 MG/DL (ref 0.52–1.04)
ERYTHROCYTE [DISTWIDTH] IN BLOOD BY AUTOMATED COUNT: 17.1 % (ref 10–15)
FIBRINOGEN PPP-MCNC: 506 MG/DL (ref 170–490)
GFR SERPL CREATININE-BSD FRML MDRD: 52 ML/MIN/1.73M2
GLUCOSE BLD-MCNC: 95 MG/DL (ref 70–99)
HCT VFR BLD AUTO: 23.5 % (ref 35–47)
HGB BLD-MCNC: 6.9 G/DL (ref 11.7–15.7)
HGB BLD-MCNC: 7.2 G/DL (ref 11.7–15.7)
HGB BLD-MCNC: 7.3 G/DL (ref 11.7–15.7)
INR PPP: 1.21 (ref 0.85–1.15)
MCH RBC QN AUTO: 25.2 PG (ref 26.5–33)
MCHC RBC AUTO-ENTMCNC: 29.4 G/DL (ref 31.5–36.5)
MCV RBC AUTO: 86 FL (ref 78–100)
NT-PROBNP SERPL-MCNC: 865 PG/ML (ref 0–900)
PLATELET # BLD AUTO: 256 10E3/UL (ref 150–450)
POTASSIUM BLD-SCNC: 4.2 MMOL/L (ref 3.4–5.3)
PROT SERPL-MCNC: 6.5 G/DL (ref 6.8–8.8)
RBC # BLD AUTO: 2.74 10E6/UL (ref 3.8–5.2)
SODIUM SERPL-SCNC: 139 MMOL/L (ref 133–144)
WBC # BLD AUTO: 11.8 10E3/UL (ref 4–11)

## 2021-11-13 PROCEDURE — 85018 HEMOGLOBIN: CPT

## 2021-11-13 PROCEDURE — 250N000013 HC RX MED GY IP 250 OP 250 PS 637: Performed by: STUDENT IN AN ORGANIZED HEALTH CARE EDUCATION/TRAINING PROGRAM

## 2021-11-13 PROCEDURE — 82248 BILIRUBIN DIRECT: CPT

## 2021-11-13 PROCEDURE — 85027 COMPLETE CBC AUTOMATED: CPT

## 2021-11-13 PROCEDURE — 36415 COLL VENOUS BLD VENIPUNCTURE: CPT

## 2021-11-13 PROCEDURE — 83880 ASSAY OF NATRIURETIC PEPTIDE: CPT

## 2021-11-13 PROCEDURE — 71045 X-RAY EXAM CHEST 1 VIEW: CPT

## 2021-11-13 PROCEDURE — 999N000127 HC STATISTIC PERIPHERAL IV START W US GUIDANCE

## 2021-11-13 PROCEDURE — P9016 RBC LEUKOCYTES REDUCED: HCPCS

## 2021-11-13 PROCEDURE — 250N000013 HC RX MED GY IP 250 OP 250 PS 637

## 2021-11-13 PROCEDURE — 85610 PROTHROMBIN TIME: CPT

## 2021-11-13 PROCEDURE — 250N000011 HC RX IP 250 OP 636

## 2021-11-13 PROCEDURE — 85384 FIBRINOGEN ACTIVITY: CPT

## 2021-11-13 PROCEDURE — 86923 COMPATIBILITY TEST ELECTRIC: CPT

## 2021-11-13 PROCEDURE — 120N000002 HC R&B MED SURG/OB UMMC

## 2021-11-13 PROCEDURE — 80048 BASIC METABOLIC PNL TOTAL CA: CPT

## 2021-11-13 PROCEDURE — 71045 X-RAY EXAM CHEST 1 VIEW: CPT | Mod: 26 | Performed by: RADIOLOGY

## 2021-11-13 RX ORDER — LIDOCAINE 4 G/G
1 PATCH TOPICAL
Status: DISCONTINUED | OUTPATIENT
Start: 2021-11-13 | End: 2021-11-18 | Stop reason: HOSPADM

## 2021-11-13 RX ORDER — BUMETANIDE 2 MG/1
2 TABLET ORAL ONCE
Status: COMPLETED | OUTPATIENT
Start: 2021-11-13 | End: 2021-11-13

## 2021-11-13 RX ORDER — METHOCARBAMOL 500 MG/1
500 TABLET, FILM COATED ORAL 3 TIMES DAILY
Status: DISCONTINUED | OUTPATIENT
Start: 2021-11-13 | End: 2021-11-18 | Stop reason: HOSPADM

## 2021-11-13 RX ORDER — CYCLOBENZAPRINE HCL 5 MG
5 TABLET ORAL
Status: DISCONTINUED | OUTPATIENT
Start: 2021-11-13 | End: 2021-11-16

## 2021-11-13 RX ORDER — ACETAMINOPHEN 325 MG/1
650 TABLET ORAL EVERY 6 HOURS
Status: DISCONTINUED | OUTPATIENT
Start: 2021-11-13 | End: 2021-11-18 | Stop reason: HOSPADM

## 2021-11-13 RX ORDER — LANOLIN ALCOHOL/MO/W.PET/CERES
3 CREAM (GRAM) TOPICAL
Status: DISCONTINUED | OUTPATIENT
Start: 2021-11-13 | End: 2021-11-16

## 2021-11-13 RX ADMIN — DIPHENHYDRAMINE HYDROCHLORIDE 25 MG: 25 CAPSULE ORAL at 08:02

## 2021-11-13 RX ADMIN — MEDROXYPROGESTERONE ACETATE 20 MG: 10 TABLET ORAL at 08:04

## 2021-11-13 RX ADMIN — DIPHENHYDRAMINE HYDROCHLORIDE 25 MG: 25 CAPSULE ORAL at 01:35

## 2021-11-13 RX ADMIN — MEDROXYPROGESTERONE ACETATE 20 MG: 10 TABLET ORAL at 20:44

## 2021-11-13 RX ADMIN — MICONAZOLE NITRATE: 20 POWDER TOPICAL at 08:17

## 2021-11-13 RX ADMIN — BUMETANIDE 2 MG: 2 TABLET ORAL at 17:30

## 2021-11-13 RX ADMIN — METHOCARBAMOL 500 MG: 500 TABLET ORAL at 20:44

## 2021-11-13 RX ADMIN — ONDANSETRON 4 MG: 4 TABLET, ORALLY DISINTEGRATING ORAL at 06:22

## 2021-11-13 RX ADMIN — LIDOCAINE 1 PATCH: 560 PATCH PERCUTANEOUS; TOPICAL; TRANSDERMAL at 12:07

## 2021-11-13 RX ADMIN — MICONAZOLE NITRATE: 20 POWDER TOPICAL at 20:45

## 2021-11-13 RX ADMIN — ATORVASTATIN CALCIUM 20 MG: 20 TABLET, FILM COATED ORAL at 08:04

## 2021-11-13 RX ADMIN — ACETAMINOPHEN 650 MG: 325 TABLET, FILM COATED ORAL at 08:02

## 2021-11-13 RX ADMIN — METHOCARBAMOL 500 MG: 500 TABLET ORAL at 14:56

## 2021-11-13 RX ADMIN — METHOCARBAMOL 500 MG: 500 TABLET ORAL at 12:07

## 2021-11-13 RX ADMIN — ACETAMINOPHEN 650 MG: 325 TABLET, FILM COATED ORAL at 14:56

## 2021-11-13 RX ADMIN — DIPHENHYDRAMINE HYDROCHLORIDE 25 MG: 25 CAPSULE ORAL at 17:30

## 2021-11-13 RX ADMIN — ACETAMINOPHEN 650 MG: 325 TABLET, FILM COATED ORAL at 01:35

## 2021-11-13 RX ADMIN — ALLOPURINOL 200 MG: 100 TABLET ORAL at 08:04

## 2021-11-13 RX ADMIN — ACETAMINOPHEN 650 MG: 325 TABLET, FILM COATED ORAL at 20:44

## 2021-11-13 ASSESSMENT — ACTIVITIES OF DAILY LIVING (ADL)
ADLS_ACUITY_SCORE: 9
ADLS_ACUITY_SCORE: 12
ADLS_ACUITY_SCORE: 12
ADLS_ACUITY_SCORE: 9
ADLS_ACUITY_SCORE: 12
ADLS_ACUITY_SCORE: 12
ADLS_ACUITY_SCORE: 9
ADLS_ACUITY_SCORE: 14
ADLS_ACUITY_SCORE: 14
ADLS_ACUITY_SCORE: 9
ADLS_ACUITY_SCORE: 12
ADLS_ACUITY_SCORE: 9
ADLS_ACUITY_SCORE: 14
ADLS_ACUITY_SCORE: 9
ADLS_ACUITY_SCORE: 12
ADLS_ACUITY_SCORE: 9
ADLS_ACUITY_SCORE: 12
ADLS_ACUITY_SCORE: 9
ADLS_ACUITY_SCORE: 12
ADLS_ACUITY_SCORE: 9
ADLS_ACUITY_SCORE: 9
ADLS_ACUITY_SCORE: 14
ADLS_ACUITY_SCORE: 12
ADLS_ACUITY_SCORE: 9

## 2021-11-13 ASSESSMENT — MIFFLIN-ST. JEOR: SCORE: 2243.97

## 2021-11-13 ASSESSMENT — ENCOUNTER SYMPTOMS: DYSRHYTHMIAS: 1

## 2021-11-13 NOTE — PROGRESS NOTES
[facility]    Progress Note - [facility] 2 Service        Date of Admission:  11/11/2021    Assessment & Plan      Arianna Siddiqi is a 61 year old female admitted on 11/11/2021. She has a history of HFpEF (LEV 55-60% on 1/2020), morbid obesity, paroxysmal a-fib, HTN, gout, OHS/CARLOS on AVAPS presenting with SOB and vaginal bleeding and is admitted for anemia 2/2 uterine bleeding.    Today's Changes:  - s/p 2 additional units pRBCs overnight  - bleeding improved per pt report, hgb stabilizing at 7.3 at noon recheck  - LFTs, fibrinogen rechecked to eval for hemolytic transfusion reaction - low concern for reaction at this time  - back pain improved s/p methocarbamol, lidocaine patch  - diet resumed, NPO at midnight tonight for OR with OB/GYN 11/14  - diuresis with PTA bumex given increased edema on CXR s/p transfusions      #Anemia 2/2 to vaginal bleeding  #Supratherapeutic INR (5.84) on warfarin  #Hypotension    61 yoF with history of intermittent vaginal bleeding without clear menopausal history presenting with acute onset of vaginal bleeding and worsening dyspnea starting on 11/12/21. LMP/last episode of typical intermittent vaginal bleeding was 7/2021. Pt experienced a single day episode of vaginal bleeding 2 wks before admission as well as during a 2019/2020 during hospitalization for CHF exacerbation. Started warfarin in 2016 for a-fib, has been taking and checking INR regularly, last INR 2.4 on 11/9. INR was elevated to 5.84 at presentation. Pt denied any other major bleeding episode, no previous anticoagulation related hospitalization, and no changes to diet or new medication. Remains on 6L NC for SOB. OB/GYN consulted, recommended starting Provera 20mg BID and obtaining Pelvic US. US was reportedly difficult to obtain transvaginal imaging, however per OB/GYN reassessment transvaginal view should be obtainable and repeat imaging order placed. DDx - warfarin assoc'd  bleeding vs abnormal uterine bleeding 2/2 malignancy vs structural abnormality. CTA AP without significant abnormalities noted, no clear source of bleeding identified.   - s/p 6 units pRBCs (4 units given on admission, 2 units overnight 11/13)  - Hgb on AM labs at 6.9, stable-improved on recheck at 7.3  - space out Hgb checks to Q8hrs  - Hgb goal >7 for Hysteroscopy and bx tomorrow with OB/GYN   - One-time Kcentra administered on 11/12 for INR reversal  - INR recheck daily (1.31 s/p Kcentra, 1.2 on 11/13)  - Hold warfarin   - Gyn consult, appreciate recs   - Pelvic US with transvaginal imaging - 6 mm EMS, no adnexal abnormalities, no mass/fibroid   - Plan for hysteroscopy 11/14 - NPO at midnight tonight   - Medroxyprogesterone tablet 20 mg BID   - Likely plan for placement of Mirena during procedure  - will make NPO at midnight with plan for OR with OB/GYN tomorrow for exam/biospy    #Acute hypoxic respiratory failure 2/2 anemia  Pt presented initially hypoxic, has been requiring supplemental O2 (6LPM -> 2LPM -> 4LPM ->2LPM). Pt reports worsening of breathing on 11/13 AM that developed after her back pain. Question if related to pain vs anemia as still requiring additional units overnight vs volume overload/TACO as now s/p 6 units RBCs.   - check CXR - evidence of increased edema, will attempt PTA bumex  - check for evidence of transfusion reaction as below  - manage back pain as below    #Back pain  Pt developed back pain in AM on 11/13. Some bruising noted per nursing on lower back. Some hyperpigmentation noted on exam but no large areas of bruising concerning for active bleeding. Pt reports noticing back pain onset after transferring to different tables and back to her bed from imaging. Ddx at this time includes likely bruising vs MSK pain from repeated transfers overnight for imaging and bed, vs possible transfusion reaction as she has currently received >6 units pRBCs. Low concern for transfusion reaction as  pain significantly improved without treatment for reaction, LFTs with normal bili so low concern for hemolytic reaction.  - attempt multimodal pain control with methocarbamol 500mg TID, scheduled tylenol 650 mg Q6hrs, lidocaine patch, and bengay PRN  - continue PTA flexeril at bedtime PRN  - added on LFTs - bilirubin normal, stable at 0.3  - CTM      #JASON, improving  Baseline 0.84. 11/12 Cr 1.45, likely pre-renal in the setting of vomiting, dehydration, anemia. Improving s/p now 6 units pRBCs.  - Hold PTA lisinopril  - Avoid nephrotoxic meds  - CTM on daily labs      #HFpEF (TTE with EF 55-60%, 1/2020)  BNP mildly elevated at 1093 on 11/12. Pt given IV bumex in ED due to concern for acute decompensated HF given SOB. 11/12 CXR without pulmonary edema and no signs of hypervolemia on phy exam.  - Holding PTA bumex (except as above)  - Hold PTA lisinopril  - Hold PTA metoprolol      Chronic Problems  #Paroxysmal Atrial Fibrillation  - Hold PTA metoprolol   - Hold PTA warfarin    #HTN  - Hold PTA lisinopril while receiving transfusions  - If BP increasing vs remaining stable and JASON improving, will consider adding back in    #Hyperlipidemia  - PTA on atorvastatin    #Gout  - PTA allopurinol    #Intertrigo groin  - PTA miconazole     #OHS/CARLOS  - CPAP at night       Diet: NPO per Anesthesia Guidelines for Procedure/Surgery Except for: Meds    DVT Prophylaxis: Holding warfarin with concern for bleeding, PCDs  Baig Catheter: Not present  Fluids: None  Central Lines: None  Code Status: Full Code      Disposition Plan   Expected discharge: recommended to prior living arrangement once hemoglobin stable.     The patient's care was discussed with the Attending, Dr. Melvin, and Patient.    Char Phipps MD  Internal Medicine, PGY-1  [address]  Securely message with the Vocera Web Console (learn more here)  Text page via [employer] Paging/Directory    Please see  sign in/sign out for up to date coverage information    ______________________________________________________________________    Interval History   Hgb recheck overnight returned at 5.8 s/p 4 units, 2 more units given. Now s/p 6 units pRBCs since admission. Pt reports that this morning she feels like her bleeding has significantly decreased. No longer passing clots. Does note she had some nausea this morning, improved with Zofran and is eagerly asking about food. OR time with OB/GYN being pushed to tomorrow AM.    4 point ROS otherwise negative.    Data reviewed today: I reviewed all medications, new labs and imaging results over the last 24 hours. I personally reviewed the chest x-ray image showing no cardiomegaly and pulmonary vascular congestion and US pelvic w/o transvaginal approach producing no appreciable uterine imaging.     Physical Exam   Vital Signs: Temp: 97.7  F (36.5  C) Temp src: Axillary BP: 133/58 Pulse: 87   Resp: 24 SpO2: 97 % O2 Device: Nasal cannula Oxygen Delivery: 4 LPM  Weight: 400 lbs 0 oz  General Appearance: Lying in bed, appeared in no acute distress  Respiratory: Normal work of breathing while on 4LPM NC. Lung sounds distant, overall clear bilaterally without wheezes or rhonchi.  Cardiovascular: RRR. Heart sounds somewhat distant, possible systolic murmur.  GI: Soft, non-tender, non-distended, bowel sounds present  Skin: Bilateral LE lymphedema with dry skin patches, few abrasions, no erythema or tenderness.   Neurological: Alert and fully oriented, easily engaged throughout interview     Data   Recent Labs   Lab 11/12/21  2208 11/12/21  2146 11/12/21  1344 11/11/21  2336 11/11/21  2335   WBC  --   --   --   --  12.8*   HGB 5.8*  --  5.0*  --  3.1*   MCV  --   --   --   --  81   PLT  --   --   --   --  367   INR  --  1.19* 1.31* 5.84*  --    NA  --  138  --   --  137   POTASSIUM  --  4.4  --   --  5.1   CHLORIDE  --  106  --   --  106   CO2  --  28  --   --  26   BUN  --  44*  --   --   51*   CR  --  1.33*  --   --  1.46*   ANIONGAP  --  4  --   --  5   KADEN  --  8.4*  --   --  8.7   GLC  --  100*  --   --  97   ALBUMIN  --   --   --   --  2.3*   PROTTOTAL  --   --   --   --  6.8   BILITOTAL  --   --   --   --  0.2  0.2   ALKPHOS  --   --   --   --  84   ALT  --   --   --   --  16   AST  --   --   --   --  15   TROPONIN  --   --   --   --  <0.015     Recent Results (from the past 24 hour(s))   CTA Abdomen Pelvis with Contrast    Narrative    EXAMINATION: CTA ABDOMEN PELVIS WITH CONTRAST, 11/12/2021 6:46 PM    TECHNIQUE:  Helical CT images from the lung bases through the  symphysis pubis were obtained with IV contrast utilizing multiphasic  technique. Contrast: 135 mL of Isovue-370     COMPARISON: Pelvic ultrasound same date    HISTORY: GI bleed; pt hx of intermittent vaginal bleeding, last period  4 months ago, here with large amount of vaginal bleeding, possible  melena, weakness, nausea    FINDINGS:    Liver: Normal parenchymal attenuation without focal mass.  Biliary system: Cholelithiasis without acute cholecystitis.. No  intrahepatic or extrahepatic biliary ductal dilatation.  Pancreas: No focal mass or dilation of the main pancreatic duct.  Stomach: Within normal limits.  Spleen: Within normal limits.  Adrenal glands: Within normal limits.  Kidneys: No focal mass, hydronephrosis, or stone.  Bladder: Within normal limits.  Reproductive organs: Within normal limits.  Colon: Within normal limits.  Appendix: Within normal limits.  Small Bowel: Within normal limits.  Lymph nodes: No intra-abdominal or pelvic lymphadenopathy.  Vasculature: Within normal limits.  Peritoneum: No intraabdominal free fluid or air. Infraumbilical  fat-containing hernia.    Lung bases: Bibasilar atelectasis..    Bones and soft tissues: Severe degenerative changes of the  thoracolumbar spines.. No suspicious osseous lesion.      Impression    IMPRESSION:   1. No active gastrointestinal bleeding is identified however exam  is  slightly limited due to contrast timing.  2. No acute findings in the abdomen or pelvis.     I have personally reviewed the examination and initial interpretation  and I agree with the findings.    TINY CRISTINA MD         SYSTEM ID:  [id number]   US Pelvic Complete with Transvaginal    Narrative    EXAMINATION: US PELVIC COMPLETE WITH TRANSVAGINAL, 11/12/2021 8:22 PM     COMPARISON: Same day, 3/16/2010    HISTORY: Vaginal bleeding    TECHNIQUE: The pelvis was scanned in standard fashion with  transabdominal and transvaginal transducer(s) using both grey scale  and limited color Doppler techniques.    FINDINGS:  The uterus measures 6.9 x 2.7 x 3.5 cm, and there is no evidence of a  focal fibroid.  The endometrial stripe measures 6 mm. There is  heterogeneous fluid in the endometrial canal extending into the  cervix.    Ovaries are not identified. No suspicious adnexal masses.      Impression    IMPRESSION:   1. Endometrial stripe measures 6 mm. In a postmenopausal patient with  uterine bleeding, this is considered thickened.  2. Small amount of heterogeneous fluid in the endometrial canal  extending through the cervix. This is likely small volume blood.  3. Ovaries not identified. No suspicious adnexal lesions.    I have personally reviewed the examination and initial interpretation  and I agree with the findings.    TINY CRISTINA MD         SYSTEM ID:  [id number]     Medications       allopurinol  200 mg Oral Daily     atorvastatin  20 mg Oral Daily     [Held by provider] lisinopril  5 mg Oral Daily     medroxyPROGESTERone  20 mg Oral BID     [Held by provider] metoprolol succinate ER  100 mg Oral Daily     miconazole   Topical BID     [Held by provider] potassium chloride ER  20 mEq Oral BID     sodium chloride (PF)  3 mL Intracatheter Q8H

## 2021-11-13 NOTE — PROGRESS NOTES
GYN ONC PROGRESS NOTE  11/13/2021    HD#:  2  Disease:  Post-menopausal bleeding, acute blood loss anemia     24 hour events:   - Admitted to Medicine service  - Blood transfusion, 6u pRBC total  - INR supratherapeutic, reversed  - JASON, likely pre-renal  - TVUS with EMS 6mm    Subjective: Pt reports she is still not feeling great this morning. Finally just fell asleep and now she is being woken up again. Had some shortness of breath and dizziness before falling asleep, doing ok right now. Bleeding seems to be improved from yesterday. Reports her main complaint this morning is generally how uncomfortable she is, having low back pain. Wondering if she will get a bed upstairs out of the ED.    Objective:   Temp:  [97.5  F (36.4  C)-98.6  F (37  C)] 97.7  F (36.5  C)  Pulse:  [] 87  Resp:  [17-24] 24  BP: ()/(40-85) 133/58  FiO2 (%):  [4 %] 4 %  SpO2:  [94 %-100 %] 97 %     Gen: NAD  CV: Regular rate  Pulm: Normal work of breathing  Abd: Obese, soft, non-tender    Labs:  Hgb 3.1 > 4u pRBC > 5.0 > 5.8 > 2u pRBC > 6.9  Plt 296 > 367 > 256  INR 5.8 > Kcentra > 1.31 > 1.19 >1.2  Cr 1.46 > 1.33 > 1.13    Imaging:  TVUS 11/12 -   EXAMINATION: US PELVIC COMPLETE WITH TRANSVAGINAL, 11/12/2021 8:22 PM      COMPARISON: Same day, 3/16/2010     HISTORY: Vaginal bleeding     TECHNIQUE: The pelvis was scanned in standard fashion with  transabdominal and transvaginal transducer(s) using both grey scale  and limited color Doppler techniques.     FINDINGS:  The uterus measures 6.9 x 2.7 x 3.5 cm, and there is no evidence of a  focal fibroid.  The endometrial stripe measures 6 mm. There is  heterogeneous fluid in the endometrial canal extending into the  cervix.     Ovaries are not identified. No suspicious adnexal masses.                                                                   IMPRESSION:   1. Endometrial stripe measures 6 mm. In a postmenopausal patient with  uterine bleeding, this is considered thickened.  2.  Small amount of heterogeneous fluid in the endometrial canal  extending through the cervix. This is likely small volume blood.  3. Ovaries not identified. No suspicious adnexal lesions.    Assessment:   Ms. Arianna Siddiqi is a 61 year old now HD#2 admitted to the Medicine service with acute blood loss anemia in the setting of acute on chronic vaginal bleeding. Complex PMH including morbid obesity, paroxysmal afib on warfarin, heart failure, HTN, CARLOS, and gout. Hgb 3.1 on admission, now improved s/p multiple transfusions. Currently hemodynamically stable.    # Postmenopausal bleeding  # ABLA  - Longstanding PMB dating back to at least 2012, TVUS then showed 1.9cm EMS, had negative EMBx; no additional workup since that time  - Differential as previously stated, however TVUS results with slightly thickened EMS and no apparent structural causes such as fibroids or polyps; more likely hyperplasia or possible malignancy, worsened by supratherapeutic anticoagulation  - Currently symptomatically improving s/p transfusion, bleeding improving s/p INR correction. Continue to monitor and transfuse as necessary  - Discussed with patient today recommendation for endometrial sampling with EUA, dilation and curettage, and treatment of bleeding/protection of endometrial lining with Mirena IUD placement. Discussed risks, benefits, and alternatives to the procedure including risk of infection, bleeding, and injury to structures such as uterine perforation. Patient expressed understanding and agreed to proceed. Written consent obtained.  - Plan for EUA, D&C, Mirena IUD insertion 11/14 first thing in the morning, exact timing TBD pending OR schedule  - Please make NPO at midnight in anticipation of procedure  - Anesthesia notified today for pre-op consult PRN given patient's multiple co-morbidities    Appreciate rest of cares for chronic issues per Hospital Medicine primary team.    Plan discussed with Dr. Deshawn Jane,  MD [place], PGY-2  11/13/21 10:41 AM   Gyn Onc Pager: [phone]    Provider Disclosure:   I agree with above History, Review of Systems, Physical exam and Plan. I have reviewed the content of the documentation and have edited it as needed. I have personally performed the services documented here and the documentation accurately represents those services and the decisions I have made.     Electronically signed by:   Deedee Hess MD   Gynecologic Oncology   [facility]

## 2021-11-13 NOTE — PROGRESS NOTES
Very uncomfortable most of day while lying in bed.  Left low back muscle pain.  More comfortable sitting up in chair or at side of bed.  More comfortable with Robaxin, Tylenol and Lidocaine patch in place.  Last hemoglobin check 7.3.  Scheduled for a D and C tomorrow.  Sitting in room.  Turned oxygen down from 4 liters NC from this morning, to 2 liters via NC.  Will monitor tolerance to this.

## 2021-11-13 NOTE — ANESTHESIA PREPROCEDURE EVALUATION
Anesthesia Pre-Procedure Evaluation    Patient: Arianna Siddiqi   MRN: [id number] : 1960        Preoperative Diagnosis: Bleeding [R58]    Procedure : Procedure(s):  HYSTEROSCOPY, WITH DILATION AND CURETTAGE OF UTERUS  INSERTION, INTRAUTERINE DEVICE          Past Medical History:   Diagnosis Date     CHF (congestive heart failure) (H)      CKD (chronic kidney disease)      Compliance poor      Diabetes mellitus (H)      Gout      Hypertension      Insomnia      Morbid obesity (H)      CARLOS treated with BiPAP       History reviewed. No pertinent surgical history.   Allergies   Allergen Reactions     Penicillins Itching and Rash     Tolerated ceftriaxone 2020      Social History     Tobacco Use     Smoking status: Former Smoker     Packs/day: 1.50     Years: 20.00     Pack years: 30.00     Types: Cigarettes     Quit date: 2002     Years since quittin.8     Smokeless tobacco: Never Used   Substance Use Topics     Alcohol use: No     Alcohol/week: 0.0 standard drinks      Wt Readings from Last 1 Encounters:   21 (!) 181.4 kg (400 lb)        Anesthesia Evaluation            ROS/MED HX  ENT/Pulmonary:       Neurologic:  - neg neurologic ROS     Cardiovascular:     (+) Dyslipidemia hypertension-----CHF etiology: HFpEF dysrhythmias, a-fib, Previous cardiac testing   Echo: Date: 20 Results:  Interpretation Summary  Technically difficult study.  The Ejection Fraction is estimated at 55-60% by visual estimate.  Regional wall motion is probably normal.  Difficult to assess RV. Function probably at least mildly reduced. Size might  be mildly dilated.  Valves not well visualized. No significant valvular abnormalities were noted  by Doppler.  Dilation of the inferior vena cava is present with abnormal respiratory  variation in diameter.  No pericardial effusion is present.  Stress Test: Date: Results:    ECG Reviewed: Date: 21 Results:  SR  Cath: Date: Results:      METS/Exercise Tolerance:      Hematologic: Comments: Presented with severe symptomatic anemia dt supratherapeutic INR and acute on chronic post-menopausal vaginal bleeding.    Hgb on admission was 3.1 after 6 unit(s) PRBCs and INR correction she is now 7.3    (+) anemia,     Musculoskeletal:  - neg musculoskeletal ROS     GI/Hepatic:  - neg GI/hepatic ROS     Renal/Genitourinary: Comment: JASON      Endo:     (+) Obesity,     Psychiatric/Substance Use:  - neg psychiatric ROS     Infectious Disease:       Malignancy:       Other:  - neg other ROS             OUTSIDE LABS:  CBC:   Lab Results   Component Value Date    WBC 11.8 (H) 11/13/2021    WBC 12.8 (H) 11/11/2021    HGB 7.3 (L) 11/13/2021    HGB 6.9 (LL) 11/13/2021    HCT 23.5 (L) 11/13/2021    HCT 12.2 (L) 11/11/2021     11/13/2021     11/11/2021     BMP:   Lab Results   Component Value Date     11/13/2021     11/12/2021    POTASSIUM 4.2 11/13/2021    POTASSIUM 4.4 11/12/2021    CHLORIDE 108 11/13/2021    CHLORIDE 106 11/12/2021    CO2 28 11/13/2021    CO2 28 11/12/2021    BUN 34 (H) 11/13/2021    BUN 44 (H) 11/12/2021    CR 1.13 (H) 11/13/2021    CR 1.33 (H) 11/12/2021    GLC 95 11/13/2021     (H) 11/12/2021     COAGS:   Lab Results   Component Value Date    PTT 30 11/12/2021    INR 1.21 (H) 11/13/2021    FIBR 506 (H) 11/13/2021     POC:   Lab Results   Component Value Date     (H) 02/22/2020     HEPATIC:   Lab Results   Component Value Date    ALBUMIN 2.2 (L) 11/13/2021    PROTTOTAL 6.5 (L) 11/13/2021    ALT 15 11/13/2021    AST 15 11/13/2021    ALKPHOS 80 11/13/2021    BILITOTAL 0.3 11/13/2021     OTHER:   Lab Results   Component Value Date    PH 7.17 (LL) 01/17/2020    LACT 1.4 01/15/2020    A1C 5.0 04/16/2020    KADEN 8.3 (L) 11/13/2021    PHOS 4.8 (H) 01/30/2020    MAG 1.9 02/06/2020    LIPASE <10 (L) 09/13/2016    TSH 2.07 11/11/2021    CRP 81.9 09/11/2016       Anesthesia Plan    ASA Status:  4      Anesthesia Type: General.     - Airway: ETT    Induction: Intravenous, RSI.   Maintenance: Balanced.   Techniques and Equipment:     - Airway: Video-Laryngoscope, Shoulder Vinay/Ramp     - Lines/Monitors: 2nd IV     Consents         - Extended Intubation/Ventilatory Support Discussed: No.      - Patient is DNR/DNI Status: No    Use of blood products discussed: No .     Postoperative Care    Pain management: IV analgesics, Oral pain medications.   PONV prophylaxis: Ondansetron (or other 5HT-3), Dexamethasone or Solumedrol     Comments:    Due to body size, would prefer not to mask ventilate, hence RSI            Brandy Durand MD

## 2021-11-14 LAB
ANION GAP SERPL CALCULATED.3IONS-SCNC: 5 MMOL/L (ref 3–14)
APTT PPP: 28 SECONDS (ref 22–38)
BUN SERPL-MCNC: 27 MG/DL (ref 7–30)
CALCIUM SERPL-MCNC: 8.8 MG/DL (ref 8.5–10.1)
CHLORIDE BLD-SCNC: 105 MMOL/L (ref 94–109)
CO2 SERPL-SCNC: 29 MMOL/L (ref 20–32)
CREAT SERPL-MCNC: 1.04 MG/DL (ref 0.52–1.04)
ERYTHROCYTE [DISTWIDTH] IN BLOOD BY AUTOMATED COUNT: 17.6 % (ref 10–15)
FIBRINOGEN PPP-MCNC: 608 MG/DL (ref 170–490)
GFR SERPL CREATININE-BSD FRML MDRD: 58 ML/MIN/1.73M2
GLUCOSE BLD-MCNC: 74 MG/DL (ref 70–99)
GLUCOSE BLDC GLUCOMTR-MCNC: 79 MG/DL (ref 70–99)
HCT VFR BLD AUTO: 27.2 % (ref 35–47)
HGB BLD-MCNC: 7.9 G/DL (ref 11.7–15.7)
HOLD SPECIMEN: NORMAL
INR PPP: 1.4 (ref 0.85–1.15)
INR PPP: 1.44 (ref 0.85–1.15)
MCH RBC QN AUTO: 25.2 PG (ref 26.5–33)
MCHC RBC AUTO-ENTMCNC: 29 G/DL (ref 31.5–36.5)
MCV RBC AUTO: 87 FL (ref 78–100)
PLATELET # BLD AUTO: 290 10E3/UL (ref 150–450)
POTASSIUM BLD-SCNC: 3.9 MMOL/L (ref 3.4–5.3)
RBC # BLD AUTO: 3.13 10E6/UL (ref 3.8–5.2)
SARS-COV-2 RNA RESP QL NAA+PROBE: NEGATIVE
SODIUM SERPL-SCNC: 139 MMOL/L (ref 133–144)
WBC # BLD AUTO: 10.2 10E3/UL (ref 4–11)

## 2021-11-14 PROCEDURE — 999N000157 HC STATISTIC RCP TIME EA 10 MIN

## 2021-11-14 PROCEDURE — 999N000141 HC STATISTIC PRE-PROCEDURE NURSING ASSESSMENT: Performed by: OBSTETRICS & GYNECOLOGY

## 2021-11-14 PROCEDURE — 250N000013 HC RX MED GY IP 250 OP 250 PS 637

## 2021-11-14 PROCEDURE — 250N000009 HC RX 250: Performed by: NURSE ANESTHETIST, CERTIFIED REGISTERED

## 2021-11-14 PROCEDURE — 94660 CPAP INITIATION&MGMT: CPT

## 2021-11-14 PROCEDURE — 85730 THROMBOPLASTIN TIME PARTIAL: CPT | Performed by: STUDENT IN AN ORGANIZED HEALTH CARE EDUCATION/TRAINING PROGRAM

## 2021-11-14 PROCEDURE — 258N000003 HC RX IP 258 OP 636

## 2021-11-14 PROCEDURE — 250N000011 HC RX IP 250 OP 636: Performed by: NURSE ANESTHETIST, CERTIFIED REGISTERED

## 2021-11-14 PROCEDURE — 272N000001 HC OR GENERAL SUPPLY STERILE: Performed by: OBSTETRICS & GYNECOLOGY

## 2021-11-14 PROCEDURE — 370N000017 HC ANESTHESIA TECHNICAL FEE, PER MIN: Performed by: OBSTETRICS & GYNECOLOGY

## 2021-11-14 PROCEDURE — 85027 COMPLETE CBC AUTOMATED: CPT

## 2021-11-14 PROCEDURE — 250N000011 HC RX IP 250 OP 636: Performed by: OBSTETRICS & GYNECOLOGY

## 2021-11-14 PROCEDURE — 250N000013 HC RX MED GY IP 250 OP 250 PS 637: Performed by: STUDENT IN AN ORGANIZED HEALTH CARE EDUCATION/TRAINING PROGRAM

## 2021-11-14 PROCEDURE — 36415 COLL VENOUS BLD VENIPUNCTURE: CPT | Performed by: STUDENT IN AN ORGANIZED HEALTH CARE EDUCATION/TRAINING PROGRAM

## 2021-11-14 PROCEDURE — 360N000074 HC SURGERY LEVEL 1, PER MIN: Performed by: OBSTETRICS & GYNECOLOGY

## 2021-11-14 PROCEDURE — 88305 TISSUE EXAM BY PATHOLOGIST: CPT | Mod: TC | Performed by: OBSTETRICS & GYNECOLOGY

## 2021-11-14 PROCEDURE — 36415 COLL VENOUS BLD VENIPUNCTURE: CPT

## 2021-11-14 PROCEDURE — 87624 HPV HI-RISK TYP POOLED RSLT: CPT | Performed by: OBSTETRICS & GYNECOLOGY

## 2021-11-14 PROCEDURE — U0003 INFECTIOUS AGENT DETECTION BY NUCLEIC ACID (DNA OR RNA); SEVERE ACUTE RESPIRATORY SYNDROME CORONAVIRUS 2 (SARS-COV-2) (CORONAVIRUS DISEASE [COVID-19]), AMPLIFIED PROBE TECHNIQUE, MAKING USE OF HIGH THROUGHPUT TECHNOLOGIES AS DESCRIBED BY CMS-2020-01-R: HCPCS | Performed by: ANESTHESIOLOGY

## 2021-11-14 PROCEDURE — 120N000002 HC R&B MED SURG/OB UMMC

## 2021-11-14 PROCEDURE — 250N000011 HC RX IP 250 OP 636

## 2021-11-14 PROCEDURE — G0145 SCR C/V CYTO,THINLAYER,RESCR: HCPCS | Performed by: OBSTETRICS & GYNECOLOGY

## 2021-11-14 PROCEDURE — 85384 FIBRINOGEN ACTIVITY: CPT | Performed by: STUDENT IN AN ORGANIZED HEALTH CARE EDUCATION/TRAINING PROGRAM

## 2021-11-14 PROCEDURE — 250N000025 HC SEVOFLURANE, PER MIN: Performed by: OBSTETRICS & GYNECOLOGY

## 2021-11-14 PROCEDURE — 85610 PROTHROMBIN TIME: CPT

## 2021-11-14 PROCEDURE — 250N000009 HC RX 250

## 2021-11-14 PROCEDURE — 80048 BASIC METABOLIC PNL TOTAL CA: CPT

## 2021-11-14 PROCEDURE — 0UDB7ZX EXTRACTION OF ENDOMETRIUM, VIA NATURAL OR ARTIFICIAL OPENING, DIAGNOSTIC: ICD-10-PCS | Performed by: OBSTETRICS & GYNECOLOGY

## 2021-11-14 PROCEDURE — 85610 PROTHROMBIN TIME: CPT | Performed by: STUDENT IN AN ORGANIZED HEALTH CARE EDUCATION/TRAINING PROGRAM

## 2021-11-14 PROCEDURE — 250N000026 HC DESFLURANE, PER MIN: Performed by: OBSTETRICS & GYNECOLOGY

## 2021-11-14 PROCEDURE — 250N000013 HC RX MED GY IP 250 OP 250 PS 637: Performed by: HOSPITALIST

## 2021-11-14 PROCEDURE — 258N000003 HC RX IP 258 OP 636: Performed by: STUDENT IN AN ORGANIZED HEALTH CARE EDUCATION/TRAINING PROGRAM

## 2021-11-14 PROCEDURE — 710N000010 HC RECOVERY PHASE 1, LEVEL 2, PER MIN: Performed by: OBSTETRICS & GYNECOLOGY

## 2021-11-14 RX ORDER — NALOXONE HYDROCHLORIDE 0.4 MG/ML
0.2 INJECTION, SOLUTION INTRAMUSCULAR; INTRAVENOUS; SUBCUTANEOUS
Status: DISCONTINUED | OUTPATIENT
Start: 2021-11-14 | End: 2021-11-18 | Stop reason: HOSPADM

## 2021-11-14 RX ORDER — OXYCODONE HYDROCHLORIDE 5 MG/1
5 TABLET ORAL EVERY 6 HOURS PRN
Status: DISCONTINUED | OUTPATIENT
Start: 2021-11-14 | End: 2021-11-15

## 2021-11-14 RX ORDER — DEXAMETHASONE SODIUM PHOSPHATE 4 MG/ML
INJECTION, SOLUTION INTRA-ARTICULAR; INTRALESIONAL; INTRAMUSCULAR; INTRAVENOUS; SOFT TISSUE PRN
Status: DISCONTINUED | OUTPATIENT
Start: 2021-11-14 | End: 2021-11-14

## 2021-11-14 RX ORDER — BUMETANIDE 2 MG/1
2 TABLET ORAL DAILY
Status: DISCONTINUED | OUTPATIENT
Start: 2021-11-14 | End: 2021-11-17

## 2021-11-14 RX ORDER — PROPOFOL 10 MG/ML
INJECTION, EMULSION INTRAVENOUS PRN
Status: DISCONTINUED | OUTPATIENT
Start: 2021-11-14 | End: 2021-11-14

## 2021-11-14 RX ORDER — ESMOLOL HYDROCHLORIDE 10 MG/ML
INJECTION INTRAVENOUS PRN
Status: DISCONTINUED | OUTPATIENT
Start: 2021-11-14 | End: 2021-11-14

## 2021-11-14 RX ORDER — FERROUS GLUCONATE 324(38)MG
324 TABLET ORAL DAILY
Status: DISCONTINUED | OUTPATIENT
Start: 2021-11-14 | End: 2021-11-18 | Stop reason: HOSPADM

## 2021-11-14 RX ORDER — LIDOCAINE HYDROCHLORIDE 20 MG/ML
INJECTION, SOLUTION INFILTRATION; PERINEURAL PRN
Status: DISCONTINUED | OUTPATIENT
Start: 2021-11-14 | End: 2021-11-14

## 2021-11-14 RX ORDER — SODIUM CHLORIDE, SODIUM LACTATE, POTASSIUM CHLORIDE, CALCIUM CHLORIDE 600; 310; 30; 20 MG/100ML; MG/100ML; MG/100ML; MG/100ML
INJECTION, SOLUTION INTRAVENOUS CONTINUOUS
Status: DISCONTINUED | OUTPATIENT
Start: 2021-11-14 | End: 2021-11-14 | Stop reason: HOSPADM

## 2021-11-14 RX ORDER — NALOXONE HYDROCHLORIDE 0.4 MG/ML
0.4 INJECTION, SOLUTION INTRAMUSCULAR; INTRAVENOUS; SUBCUTANEOUS
Status: DISCONTINUED | OUTPATIENT
Start: 2021-11-14 | End: 2021-11-18 | Stop reason: HOSPADM

## 2021-11-14 RX ORDER — SODIUM CHLORIDE, SODIUM LACTATE, POTASSIUM CHLORIDE, CALCIUM CHLORIDE 600; 310; 30; 20 MG/100ML; MG/100ML; MG/100ML; MG/100ML
INJECTION, SOLUTION INTRAVENOUS CONTINUOUS
Status: DISCONTINUED | OUTPATIENT
Start: 2021-11-14 | End: 2021-11-14

## 2021-11-14 RX ORDER — SODIUM CHLORIDE, SODIUM LACTATE, POTASSIUM CHLORIDE, CALCIUM CHLORIDE 600; 310; 30; 20 MG/100ML; MG/100ML; MG/100ML; MG/100ML
INJECTION, SOLUTION INTRAVENOUS CONTINUOUS PRN
Status: DISCONTINUED | OUTPATIENT
Start: 2021-11-14 | End: 2021-11-14

## 2021-11-14 RX ORDER — ONDANSETRON 2 MG/ML
INJECTION INTRAMUSCULAR; INTRAVENOUS PRN
Status: DISCONTINUED | OUTPATIENT
Start: 2021-11-14 | End: 2021-11-14

## 2021-11-14 RX ADMIN — PHENYLEPHRINE HYDROCHLORIDE 200 MCG: 10 INJECTION INTRAVENOUS at 13:11

## 2021-11-14 RX ADMIN — MICONAZOLE NITRATE: 20 POWDER TOPICAL at 20:30

## 2021-11-14 RX ADMIN — Medication 100 MG: at 12:46

## 2021-11-14 RX ADMIN — ACETAMINOPHEN 650 MG: 325 TABLET, FILM COATED ORAL at 08:13

## 2021-11-14 RX ADMIN — BUMETANIDE 2 MG: 2 TABLET ORAL at 16:56

## 2021-11-14 RX ADMIN — ESMOLOL HYDROCHLORIDE 20 MG: 10 INJECTION, SOLUTION INTRAVENOUS at 12:49

## 2021-11-14 RX ADMIN — ALLOPURINOL 200 MG: 100 TABLET ORAL at 08:14

## 2021-11-14 RX ADMIN — MEDROXYPROGESTERONE ACETATE 20 MG: 10 TABLET ORAL at 08:14

## 2021-11-14 RX ADMIN — PHENYLEPHRINE HYDROCHLORIDE 100 MCG: 10 INJECTION INTRAVENOUS at 13:00

## 2021-11-14 RX ADMIN — CYCLOBENZAPRINE 5 MG: 5 TABLET, FILM COATED ORAL at 00:07

## 2021-11-14 RX ADMIN — METHOCARBAMOL 500 MG: 500 TABLET ORAL at 08:13

## 2021-11-14 RX ADMIN — ONDANSETRON 4 MG: 2 INJECTION INTRAMUSCULAR; INTRAVENOUS at 13:11

## 2021-11-14 RX ADMIN — METHOCARBAMOL 500 MG: 500 TABLET ORAL at 17:05

## 2021-11-14 RX ADMIN — PROPOFOL 200 MG: 10 INJECTION, EMULSION INTRAVENOUS at 12:45

## 2021-11-14 RX ADMIN — PHENYLEPHRINE HYDROCHLORIDE 100 MCG: 10 INJECTION INTRAVENOUS at 12:56

## 2021-11-14 RX ADMIN — ACETAMINOPHEN 650 MG: 325 TABLET, FILM COATED ORAL at 17:05

## 2021-11-14 RX ADMIN — OXYCODONE HYDROCHLORIDE 5 MG: 5 TABLET ORAL at 17:35

## 2021-11-14 RX ADMIN — PHENYLEPHRINE HYDROCHLORIDE 200 MCG: 10 INJECTION INTRAVENOUS at 13:26

## 2021-11-14 RX ADMIN — ATORVASTATIN CALCIUM 20 MG: 20 TABLET, FILM COATED ORAL at 08:14

## 2021-11-14 RX ADMIN — ACETAMINOPHEN 650 MG: 325 TABLET, FILM COATED ORAL at 02:07

## 2021-11-14 RX ADMIN — SODIUM CHLORIDE, POTASSIUM CHLORIDE, SODIUM LACTATE AND CALCIUM CHLORIDE: 600; 310; 30; 20 INJECTION, SOLUTION INTRAVENOUS at 12:45

## 2021-11-14 RX ADMIN — PHENYLEPHRINE HYDROCHLORIDE 200 MCG: 10 INJECTION INTRAVENOUS at 13:31

## 2021-11-14 RX ADMIN — LIDOCAINE HYDROCHLORIDE 100 MG: 20 INJECTION, SOLUTION INFILTRATION; PERINEURAL at 12:45

## 2021-11-14 RX ADMIN — FERROUS GLUCONATE 324 MG: 324 TABLET ORAL at 16:56

## 2021-11-14 RX ADMIN — MICONAZOLE NITRATE: 20 POWDER TOPICAL at 08:18

## 2021-11-14 RX ADMIN — MIDAZOLAM 1 MG: 1 INJECTION INTRAMUSCULAR; INTRAVENOUS at 12:42

## 2021-11-14 RX ADMIN — MIDAZOLAM 1 MG: 1 INJECTION INTRAMUSCULAR; INTRAVENOUS at 12:32

## 2021-11-14 RX ADMIN — LIDOCAINE 1 PATCH: 560 PATCH PERCUTANEOUS; TOPICAL; TRANSDERMAL at 08:27

## 2021-11-14 RX ADMIN — SODIUM CHLORIDE, POTASSIUM CHLORIDE, SODIUM LACTATE AND CALCIUM CHLORIDE: 600; 310; 30; 20 INJECTION, SOLUTION INTRAVENOUS at 07:08

## 2021-11-14 RX ADMIN — DEXAMETHASONE SODIUM PHOSPHATE 8 MG: 4 INJECTION, SOLUTION INTRA-ARTICULAR; INTRALESIONAL; INTRAMUSCULAR; INTRAVENOUS; SOFT TISSUE at 13:11

## 2021-11-14 ASSESSMENT — ACTIVITIES OF DAILY LIVING (ADL)
ADLS_ACUITY_SCORE: 14
ADLS_ACUITY_SCORE: 16
ADLS_ACUITY_SCORE: 16
ADLS_ACUITY_SCORE: 14
ADLS_ACUITY_SCORE: 16
ADLS_ACUITY_SCORE: 14
ADLS_ACUITY_SCORE: 16
ADLS_ACUITY_SCORE: 16
ADLS_ACUITY_SCORE: 14
ADLS_ACUITY_SCORE: 16
ADLS_ACUITY_SCORE: 14

## 2021-11-14 NOTE — PLAN OF CARE
Assumed cares from 0714-1811. VSS on 2L NC, A&Ox4. Hemoglobin 7.2 this shift. Up with SBA to bathroom, bariatric bed ordered for room. Currently on 2G sodium diet, will be NPO at midnight, tolerating diet without nausea. Reporting lower back pain managed with current pain regimen at this time. Abdomen soft, obese, bowel sounds hypoactive. Passing flatus, no stool this shift, voiding spontaneously without difficulty. PIV CDI and saline locked. Continue with POC.

## 2021-11-14 NOTE — PROGRESS NOTES
Arianna was able to sleep for a few hours this afternoon, and then awoke feeling better.  Tolerated sitting up in chair for 90 minutes; ate a meal; back felt better.  Report called to unit 7C.  Transported Arianna to unit 7C.

## 2021-11-14 NOTE — PROGRESS NOTES
Admitted/transferred from: ED  2 RN full   skin assessment completed by Cata Green, SONAL and Yesenia Phipps RN.  Skin assessment finding: issues found Dry skin to BLE, scratches to buttocks, pigmentation noted between skin folds.   Interventions/actions: skin interventions encouraged repositioning and OOB activity, continue to apply scheduled powder between skin folds.     Will continue to monitor.

## 2021-11-14 NOTE — PROGRESS NOTES
Paged dr Acosta: Arianna Siddiqi Preop 2. pt still having periods doesnt feel the need for an HCG. please call Adelia at [phone] thanks     GEOFFREY Acosta said ok to proceed with surgery without HCG. Adelia Mullins RN on 11/14/2021 at 10:50 AM

## 2021-11-14 NOTE — PROGRESS NOTES
GYN ONC PROGRESS NOTE  11/14/2021    HD#:  3  Disease:  Post-menopausal bleeding, acute blood loss anemia     24 hour events:   - received additional 2U pRBCs (total 6U this admission)  - CXR with slightly increased patchy opacities, possibly edema    Subjective: Pt reports she is tired this AM, had a hard time sleeping due to general discomfort. She is nervous about the procedure today. Bleeding continues to be improved. No SOB. She does wear CPAP at home, but didn't like our CPAP overnight. Her dizziness, lightheadedness is significantly improved from admission. She has a mild headache. Denies abdominal pain, CP. Voiding spontaneously.    Objective:   Temp:  [97.7  F (36.5  C)-98.4  F (36.9  C)] 98.4  F (36.9  C)  Pulse:  [81-99] 99  Resp:  [18-24] 18  BP: (112-133)/(44-61) 112/44  FiO2 (%):  [30 %] 30 %  SpO2:  [86 %-100 %] 90 %     Gen: NAD  CV: RRR  Lungs: CTAB, O2 mask in place  Abd: Obese, soft, non-tender    Labs:  Hgb 3.1 > 4u pRBC > 5.0 > 5.8 > 2u pRBC > 6.9>7.3  Plt 296 > 367 > 256  INR 5.8 > Kcentra > 1.31 > 1.19 >1.2  Cr 1.46 > 1.33 > 1.13    Assessment:   Ms. Arianna Siddiqi is a 61 year old now HD#3 admitted to the Medicine service with acute blood loss anemia in the setting of acute on chronic vaginal bleeding. Complex PMH including morbid obesity, paroxysmal afib on warfarin, heart failure, HTN, CARLOS, and gout. Hgb 3.1 on admission, now improved s/p 6U pRBCs. Currently hemodynamically stable.    # Postmenopausal bleeding  # ABLA  - Longstanding PMB dating back to at least 2012, TVUS then showed 1.9cm EMS, had negative EMBx; no additional workup since that time  - Differential as previously stated, however TVUS results with slightly thickened EMS and no apparent structural causes such as fibroids or polyps; more likely hyperplasia or possible malignancy, worsened by supratherapeutic anticoagulation  - Currently symptomatically improving s/p transfusion, bleeding improving s/p INR correction. Continue to  monitor and transfuse as necessary  - to OR today (anticipate 1000 pending OR schedule) for EUA, dilation and curettage, and Mirena IUD placement for evaluation/samping of endometrium, and treatment of bleeding/protection of endometrial lining. She was previously consented for this, answered additional questions this AM.    - has been NPO since midnight   - s/p anesthesiology consult, plan for GETA    Appreciate rest of cares for chronic issues per Hospital Medicine primary team.    Alonzo Cruz MD  OB/GYN PGY-2  11/14/2021 7:04 AM    Provider Disclosure:   I agree with above History, Review of Systems, Physical exam and Plan. I have reviewed the content of the documentation and have edited it as needed. I have personally performed the services documented here and the documentation accurately represents those services and the decisions I have made.     Electronically signed by:   Deedee Hess MD   Gynecologic Oncology   [facility]

## 2021-11-14 NOTE — ANESTHESIA POSTPROCEDURE EVALUATION
Patient: Arianna Siddiqi    Procedure: Procedure(s):  Exam Under Anesthesia, Dilation and curettage,  placement of intrauterine device, pap smear       Diagnosis:Anemia due to blood loss, acute [D62]  Abnormal vaginal bleeding [N93.9]  Diagnosis Additional Information: No value filed.    Anesthesia Type:  General    Note:  Disposition: Inpatient   Postop Pain Control: Uneventful            Sign Out: Well controlled pain   PONV: No   Neuro/Psych: Uneventful            Sign Out: Acceptable/Baseline neuro status   Airway/Respiratory: Uneventful            Sign Out: Acceptable/Baseline resp. status   CV/Hemodynamics: Uneventful            Sign Out: Acceptable CV status; No obvious hypovolemia; No obvious fluid overload   Other NRE: NONE   DID A NON-ROUTINE EVENT OCCUR? No           Last vitals:  Vitals Value Taken Time   /61 11/14/21 1430   Temp 36.2  C (97.2  F) 11/14/21 1430   Pulse 95 11/14/21 1430   Resp 20 11/14/21 1430   SpO2 96 % 11/14/21 1430   Vitals shown include unvalidated device data.    Electronically Signed By: Marvin Acosta MD  November 14, 2021  2:37 PM

## 2021-11-14 NOTE — ANESTHESIA PROCEDURE NOTES
Airway       Patient location during procedure: OR       Procedure Start/Stop Times: 11/14/2021 12:49 PM  Staff -        Anesthesiologist:  Marvin Acosta MD       Resident/Fellow: Corby Cortez MD       Performed By: resident  Consent for Airway        Urgency: elective  Indications and Patient Condition       Indications for airway management: shantel-procedural       Induction type:intravenous       Mask difficulty assessment: 3 - difficult mask (inadequate, unstable, or two providers) +/- NMBA    Final Airway Details       Final airway type: endotracheal airway       Successful airway: ETT - single  Endotracheal Airway Details        ETT size (mm): 7.0       Cuffed: yes       Successful intubation technique: video laryngoscopy       VL Blade Size: MAC D Blade       Grade View of Cords: 1       Adjucts: stylet       Position: Center       Measured from: gums/teeth       Secured at (cm): 21    Post intubation assessment        Placement verified by: capnometry, equal breath sounds and chest rise        Number of attempts at approach: 1       Number of other approaches attempted: 0       Secured with: pink tape       Ease of procedure: easy       Dentition: Unchanged and Intact

## 2021-11-14 NOTE — PROGRESS NOTES
Assumed cares of pt [id number]. VSS on 2L NC, attempted CPAP but pt was unable to sleep and preferred to use NC and try to get some sleep. Pt desats down to dawn 70s, low 80s on NC when sleeping. Changed over to 6L on oxymask. Pt not having pain except for body discomfort as she feels she cant get comfortable, Tylenol given. Ambulates with Ax1. Voiding adequate UOP, no BM overnight. PIV saline locked. NPO @ midnight for OR today. Continue POC

## 2021-11-14 NOTE — OP NOTE
[facility]  Operative Note    Patient: Arianna Siddiqi  : 1960  MRN: [id number]    Date of Service: 2021    Pre-operative diagnosis:  1. Postmenopausal bleeding  2. Acute blood loss anemia  3. Morbid obesity    Post-operative diagnosis:  1. Same    Procedure:   1. Exam under anesthesia, pap smear, dilation and curettage, Mirena IUD insertion    Surgeon: Deedee Hess MD  Assistants: Marlen Vick MD, Fellow  Michelle Jane MD, PGY-2    Anesthesia: General    EBL: 5 mL  Urine: 200 mL clear  Fluids: 400 mL cystalloid    Specimens: endometrial curetings  Complications: none    Findings: EUA revealed normal exernal genitalia, normal cervix, vaginal mucosa and anteverted uterus, difficult to palpate fundus and adnexa secondary to body habitus. Hemostatic tenaculum sites with minimal bleeding from the cervical os at the end of the case.    Indications: Arianna Siddiqi is a 61 year old female who presented to the ED with acute on chronic postmenopausal bleeding and a hemoglobin of 3. TVUS showed thickened EMS of 6mm. Patient was recommended to undergo D&C for endometrial sampling and Mirena IUD insertion for treatment of bleeding and protection of endometrial lining. Risks, benefits, and alternatives to the procedure were discussed. The patient's questions were answered, understanding confirmed, and the patient signed written informed consent.    Technique: The patient was taken to the operating room where she was placed in the dorsal lithotomy position with feet in yellow fin stirrups. The patient was placed under general anesthesia. An exam under anesthesia was performed. The patient was prepped and draped in the usual sterile fashion. A speculum was placed in the vagina and the cervix visualized. A single-toothed tenaculum was placed on the anterior lip of the cervix at 12 o'clock. A Pap smear was obtained. The uterus was sounded to 8cm. A sharp curet was used to scrape the uterus which was gritty on all aspects  and with good return of tissue. The curetings were sent to pathology. A Mirena IUD was loaded, inserted to the fundus, arms released and then placed in typical fashion. IUD strings were trimmed to 3cm. The tenaculum was removed from the cervix and good hemostasis was noted. The speculum was removed from the vagina.    Instrument counts were correct x2. Dr. Hess was present and scrubbed for the entire procedure. The patient was awoken in the OR and transferred to the PACU in stable condition.    Michelle Jane MD  ObGyn, PGY-2  11/14/21 1:43 PM    Provider Disclosure:   I agree with above History, Review of Systems, Physical exam and Plan. I have reviewed the content of the documentation and have edited it as needed. I have personally performed the services documented here and the documentation accurately represents those services and the decisions I have made.     Electronically signed by:   Deedee Hess MD   Gynecologic Oncology   [facility]

## 2021-11-14 NOTE — PROGRESS NOTES
[facility]    Progress Note - [facility] 2 Service        Date of Admission:  11/11/2021    Assessment & Plan      Arianna Siddiqi is a 61 year old female admitted on 11/11/2021. She has a history of HFpEF (LEV 55-60% on 1/2020), morbid obesity, paroxysmal a-fib, HTN, gout, OHS/CARLOS on AVAPS presenting with SOB and vaginal bleeding and is admitted for anemia 2/2 uterine bleeding.      Today's Changes:  - pt reports continued improvement in bleeding, now just spotting  - OR today with GYN ONC, pap smear and D&C pathology pending, Mirena placed  - continue diuresis with PTA bumex  - reduce Hgb checks to daily  - discontinue Provera per GYN ONC  - continue PTA iron supplementation  - coagulopathy workup with PTT, mixing studies now >48 hours after Kcentra administration      #Anemia 2/2 to vaginal bleeding  #Supratherapeutic INR (5.84) on warfarin  #Hypotension    61 yoF with history of intermittent vaginal bleeding without clear menopausal history presenting with acute onset of vaginal bleeding and worsening dyspnea starting on 11/12/21. LMP/last episode of typical intermittent vaginal bleeding was 7/2021. Pt experienced a single day episode of vaginal bleeding 2 wks before admission as well as during a 2019/2020 during hospitalization for CHF exacerbation. Started warfarin in 2016 for a-fib, has been taking and checking INR regularly, last INR 2.4 on 11/9. INR was elevated to 5.84 at presentation. Pt denied any other major bleeding episode, no previous anticoagulation related hospitalization, and no changes to diet or new medication. Remains on 6L NC for SOB. OB/GYN consulted, recommended starting Provera 20mg BID and obtaining Pelvic US. US was reportedly difficult to obtain transvaginal imaging, however per OB/GYN reassessment transvaginal view should be obtainable and repeat imaging order placed. DDx - warfarin assoc'd bleeding vs abnormal uterine bleeding 2/2 malignancy vs  structural abnormality. CTA AP without significant abnormalities noted, no clear source of bleeding identified.   - s/p 6 units pRBCs (4 units given on admission, 2 units overnight 11/13)  - Hgb stable, will reduce Hgb checks to daily  - Hgb goal >7 for Hysteroscopy and bx tomorrow with OB/GYN   - One-time Kcentra administered on 11/12 for INR reversal  - INR recheck daily (1.31 s/p Kcentra, 1.2 on 11/13)  - Working up ?coagulopathy with rising INR despite holding warfarin  - Hold warfarin pending coagulopathy workup with PTT and mixing studies   - would consider holding warfarin upon discharge in setting of recent life-threatening bleed with close follow up with PCP in 1-2 weeks, with re-initiation of warfarin if no bleeding prior to follow up; will discuss with patient  - OB/GYN and GYN ONC consulted, appreciate recs   - Pelvic US with transvaginal imaging - 6 mm EMS, no adnexal abnormalities, no mass/fibroid   - OR with GYN ONC today, pap smear, D&C pathology pending, Mirena IUD placed   - Discontinue Medroxyprogesterone tablet 20 mg BID s/p placement of Mirena IUD  - Continue PTA iron supplementation      #Acute hypoxic respiratory failure 2/2 anemia  Pt presented initially hypoxic, has been requiring supplemental O2 (6LPM -> 2LPM -> 4LPM ->2LPM). Pt reports worsening of breathing on 11/13 AM that developed after her back pain. Question if related to pain vs anemia as still requiring additional units overnight vs volume overload/TACO as now s/p 6 units RBCs.   - check CXR - evidence of increased edema, will continue PTA bumex  - check for evidence of transfusion reaction as below  - manage back pain as below    #Back pain  Pt developed back pain in AM on 11/13. Some bruising noted per nursing on lower back. Some hyperpigmentation noted on exam but no large areas of bruising concerning for active bleeding. Pt reports noticing back pain onset after transferring to different tables and back to her bed from imaging.  Ddx at this time includes likely bruising vs MSK pain from repeated transfers overnight for imaging and bed, vs possible transfusion reaction as she has currently received >6 units pRBCs. Low concern for transfusion reaction as pain significantly improved without treatment for reaction, LFTs with normal bili so low concern for hemolytic reaction.  - attempt multimodal pain control with methocarbamol 500mg TID, scheduled tylenol 650 mg Q6hrs, lidocaine patch, and bengay PRN  - continue PTA flexeril at bedtime PRN  - added on LFTs - bilirubin normal, stable at 0.3  - CTM      #JASON, improving  Baseline 0.84. 11/12 Cr 1.45, likely pre-renal in the setting of vomiting, dehydration, anemia. Improving s/p now 6 units pRBCs.  - Hold PTA lisinopril  - Avoid nephrotoxic meds  - CTM on daily labs      #HFpEF (TTE with EF 55-60%, 1/2020)  BNP mildly elevated at 1093 on 11/12. Pt given IV bumex in ED due to concern for acute decompensated HF given SOB. 11/12 CXR without pulmonary edema and no signs of hypervolemia on phy exam.  - Holding PTA bumex (except as above)  - Hold PTA lisinopril  - Hold PTA metoprolol      Chronic Problems  #Paroxysmal Atrial Fibrillation  - Hold PTA metoprolol   - Hold PTA warfarin    #HTN  - Hold PTA lisinopril while receiving transfusions  - If BP increasing vs remaining stable and JASON improving, will consider adding back in    #Hyperlipidemia  - PTA on atorvastatin    #Gout  - PTA allopurinol    #Intertrigo groin  - PTA miconazole     #OHS/CARLOS  - CPAP at night       Diet:      DVT Prophylaxis: Holding warfarin with concern for bleeding, PCDs  Baig Catheter: Not present  Fluids: None  Central Lines: None  Code Status: Full Code      Disposition Plan   Expected discharge: recommended to prior living arrangement once hemoglobin stable.     The patient's care was discussed with the Attending, Dr. Melvin, and Patient.    Char Phipps MD  Internal Medicine, PGY-1  [address]  [facility]  Securely message with the [facility] Web Console (learn more here)  Text page via [employer] Paging/Directory    Please see sign in/sign out for up to date coverage information    ______________________________________________________________________    Interval History   NAEON. NNR. Pt reports significant back discomfort on the hospital bed, requiring frequent repositioning throughout the night. Did not sleep well 2/2 to this. However does note that back pain is significantly improved/non-existent while sitting up in wheelchair at bedside and with current regimen. States that she feels bleeding is significantly improved - only noticing intermittent spotting today. No clots noticed since yesterday. No nausea or vomiting. No abdominal pain.     Notes she is nervous about the OR procedure today with GYN ONC, however she is eager to get some answers and for her intermittent bleeding episodes to stop.    4 point ROS otherwise negative.    Data reviewed today: I reviewed all medications, new labs and imaging results over the last 24 hours.    Physical Exam   Vital Signs: Temp: 97.2  F (36.2  C) Temp src: Core BP: 111/62 Pulse: 90   Resp: 20 SpO2: 96 % O2 Device: Nasal cannula Oxygen Delivery: 4 LPM  Weight: 374 lbs 8 oz  General Appearance: Sitting up in wheelchair at bedside, appeared in no acute distress  Respiratory: Normal work of breathing while on 4LPM NC. Lung sounds distant, overall clear bilaterally without wheezes or rhonchi.  Cardiovascular: RRR. Heart sounds somewhat distant, possible systolic murmur.  GI: Soft, non-tender, non-distended, bowel sounds present  Skin: Bilateral LE lymphedema with dry skin patches, few abrasions, no erythema or tenderness.   Neurological: Alert and fully oriented, easily engaged throughout interview     Data   Recent Labs   Lab 11/14/21  1018 11/14/21  0730 11/13/21  2142 11/13/21  1215 11/13/21  0913 11/12/21  2208 11/12/21 2146  11/11/21 2336 11/11/21 2335   WBC  --  10.2  --   --  11.8*  --   --   --  12.8*   HGB  --  7.9* 7.2* 7.3* 6.9*   < >  --    < > 3.1*   MCV  --  87  --   --  86  --   --   --  81   PLT  --  290  --   --  256  --   --   --  367   INR  --  1.40*  --   --  1.21*  --  1.19*   < >  --    NA  --  139  --   --  139  --  138  --  137   POTASSIUM  --  3.9  --   --  4.2  --  4.4  --  5.1   CHLORIDE  --  105  --   --  108  --  106  --  106   CO2  --  29  --   --  28  --  28  --  26   BUN  --  27  --   --  34*  --  44*  --  51*   CR  --  1.04  --   --  1.13*  --  1.33*  --  1.46*   ANIONGAP  --  5  --   --  3  --  4  --  5   KADEN  --  8.8  --   --  8.3*  --  8.4*  --  8.7   GLC 79 74  --   --  95  --  100*  --  97   ALBUMIN  --   --   --   --  2.2*  --   --   --  2.3*   PROTTOTAL  --   --   --   --  6.5*  --   --   --  6.8   BILITOTAL  --   --   --   --  0.3  --   --   --  0.2  0.2   ALKPHOS  --   --   --   --  80  --   --   --  84   ALT  --   --   --   --  15  --   --   --  16   AST  --   --   --   --  15  --   --   --  15   TROPONIN  --   --   --   --   --   --   --   --  <0.015    < > = values in this interval not displayed.     No results found for this or any previous visit (from the past 24 hour(s)).  Medications     lactated ringers 125 mL/hr at 11/14/21 0708       [Auto Hold] acetaminophen  650 mg Oral Q6H     [Auto Hold] allopurinol  200 mg Oral Daily     [Auto Hold] atorvastatin  20 mg Oral Daily     [Auto Hold] lidocaine  1 patch Transdermal Q24H     [Auto Hold] lidocaine   Transdermal Q8H     [Held by provider] lisinopril  5 mg Oral Daily     [Auto Hold] medroxyPROGESTERone  20 mg Oral BID     [Auto Hold] methocarbamol  500 mg Oral TID     [Held by provider] metoprolol succinate ER  100 mg Oral Daily     [Auto Hold] miconazole   Topical BID     [Held by provider] potassium chloride ER  20 mEq Oral BID     [Auto Hold] sodium chloride (PF)  3 mL Intracatheter Q8H

## 2021-11-14 NOTE — ANESTHESIA CARE TRANSFER NOTE
Patient: Arianna Siddiqi    Procedure: Procedure(s):  Exam Under Anesthesia, Dilation and curettage,  placement of intrauterine device, pap smear       Diagnosis: Anemia due to blood loss, acute [D62]  Abnormal vaginal bleeding [N93.9]  Diagnosis Additional Information: No value filed.    Anesthesia Type:   General     Note:    Oropharynx: spontaneously breathing and nasal airway in place  Level of Consciousness: awake  Oxygen Supplementation: face mask  Level of Supplemental Oxygen (L/min / FiO2): 8    Dentition: dentition unchanged  Vital Signs Stable: post-procedure vital signs reviewed and stable  Report to RN Given: handoff report given  Patient transferred to: PACU  Comments: Pt. Was slow to wake up in the OR. Was a bit agitated upon arrival to PACU. I was at bedside for about 15 minutes monitoring her in PACU  Handoff Report: Identifed the Patient, Identified the Reponsible Provider, Reviewed the pertinent medical history, Discussed the surgical course, Reviewed Intra-OP anesthesia mangement and issues during anesthesia, Set expectations for post-procedure period and Allowed opportunity for questions and acknowledgement of understanding      Vitals:  Vitals Value Taken Time   BP     Temp     Pulse     Resp     SpO2         Electronically Signed By: Corby Cortez MD  November 14, 2021  1:58 PM

## 2021-11-14 NOTE — PLAN OF CARE
Patient went to the OR this morning returned to 7C just before 3pm. Patient is alert and oriented, back is a little sore from laying down. Capno in place, VSS. No signs of vaginal bleeding. Continue with post op cares.

## 2021-11-15 ENCOUNTER — APPOINTMENT (OUTPATIENT)
Dept: OCCUPATIONAL THERAPY | Facility: CLINIC | Age: 61
DRG: 744 | End: 2021-11-15
Attending: HOSPITALIST
Payer: MEDICARE

## 2021-11-15 LAB
ANION GAP SERPL CALCULATED.3IONS-SCNC: 3 MMOL/L (ref 3–14)
APTT IMM NP PPP: 34 SECONDS (ref 31–45)
APTT IMM NP PPP: NORMAL S
BUN SERPL-MCNC: 23 MG/DL (ref 7–30)
CALCIUM SERPL-MCNC: 8.7 MG/DL (ref 8.5–10.1)
CHLORIDE BLD-SCNC: 104 MMOL/L (ref 94–109)
CO2 SERPL-SCNC: 30 MMOL/L (ref 20–32)
CREAT SERPL-MCNC: 1.02 MG/DL (ref 0.52–1.04)
ERYTHROCYTE [DISTWIDTH] IN BLOOD BY AUTOMATED COUNT: 17.5 % (ref 10–15)
GFR SERPL CREATININE-BSD FRML MDRD: 59 ML/MIN/1.73M2
GLUCOSE BLD-MCNC: 120 MG/DL (ref 70–99)
GLUCOSE BLDC GLUCOMTR-MCNC: 122 MG/DL (ref 70–99)
HCT VFR BLD AUTO: 24.9 % (ref 35–47)
HGB BLD-MCNC: 7 G/DL (ref 11.7–15.7)
HGB BLD-MCNC: 7.5 G/DL (ref 11.7–15.7)
INR PPP: 1.66 (ref 0.85–1.15)
MCH RBC QN AUTO: 24.7 PG (ref 26.5–33)
MCHC RBC AUTO-ENTMCNC: 28.1 G/DL (ref 31.5–36.5)
MCV RBC AUTO: 88 FL (ref 78–100)
PLATELET # BLD AUTO: 263 10E3/UL (ref 150–450)
POTASSIUM BLD-SCNC: 4.2 MMOL/L (ref 3.4–5.3)
RBC # BLD AUTO: 2.83 10E6/UL (ref 3.8–5.2)
SODIUM SERPL-SCNC: 137 MMOL/L (ref 133–144)
WBC # BLD AUTO: 9 10E3/UL (ref 4–11)

## 2021-11-15 PROCEDURE — 120N000002 HC R&B MED SURG/OB UMMC

## 2021-11-15 PROCEDURE — 250N000011 HC RX IP 250 OP 636

## 2021-11-15 PROCEDURE — 999N000128 HC STATISTIC PERIPHERAL IV START W/O US GUIDANCE

## 2021-11-15 PROCEDURE — 250N000013 HC RX MED GY IP 250 OP 250 PS 637

## 2021-11-15 PROCEDURE — 85610 PROTHROMBIN TIME: CPT

## 2021-11-15 PROCEDURE — 85018 HEMOGLOBIN: CPT

## 2021-11-15 PROCEDURE — 99223 1ST HOSP IP/OBS HIGH 75: CPT | Performed by: INTERNAL MEDICINE

## 2021-11-15 PROCEDURE — 85014 HEMATOCRIT: CPT

## 2021-11-15 PROCEDURE — 250N000011 HC RX IP 250 OP 636: Performed by: HOSPITALIST

## 2021-11-15 PROCEDURE — 97166 OT EVAL MOD COMPLEX 45 MIN: CPT | Mod: GO

## 2021-11-15 PROCEDURE — 36415 COLL VENOUS BLD VENIPUNCTURE: CPT

## 2021-11-15 PROCEDURE — 97530 THERAPEUTIC ACTIVITIES: CPT | Mod: GO

## 2021-11-15 PROCEDURE — 250N000013 HC RX MED GY IP 250 OP 250 PS 637: Performed by: HOSPITALIST

## 2021-11-15 PROCEDURE — 258N000003 HC RX IP 258 OP 636

## 2021-11-15 PROCEDURE — 80048 BASIC METABOLIC PNL TOTAL CA: CPT

## 2021-11-15 PROCEDURE — 97535 SELF CARE MNGMENT TRAINING: CPT | Mod: GO

## 2021-11-15 RX ORDER — METHYLPREDNISOLONE SODIUM SUCCINATE 125 MG/2ML
125 INJECTION, POWDER, LYOPHILIZED, FOR SOLUTION INTRAMUSCULAR; INTRAVENOUS
Status: DISCONTINUED | OUTPATIENT
Start: 2021-11-15 | End: 2021-11-18 | Stop reason: HOSPADM

## 2021-11-15 RX ORDER — POLYETHYLENE GLYCOL 3350 17 G/17G
17 POWDER, FOR SOLUTION ORAL DAILY
Status: DISCONTINUED | OUTPATIENT
Start: 2021-11-15 | End: 2021-11-18 | Stop reason: HOSPADM

## 2021-11-15 RX ORDER — AMOXICILLIN 250 MG
1 CAPSULE ORAL 2 TIMES DAILY PRN
Status: DISCONTINUED | OUTPATIENT
Start: 2021-11-15 | End: 2021-11-17

## 2021-11-15 RX ORDER — OXYCODONE HYDROCHLORIDE 10 MG/1
10 TABLET ORAL EVERY 4 HOURS PRN
Status: DISCONTINUED | OUTPATIENT
Start: 2021-11-15 | End: 2021-11-16

## 2021-11-15 RX ORDER — DIPHENHYDRAMINE HYDROCHLORIDE 50 MG/ML
50 INJECTION INTRAMUSCULAR; INTRAVENOUS
Status: DISCONTINUED | OUTPATIENT
Start: 2021-11-15 | End: 2021-11-18 | Stop reason: HOSPADM

## 2021-11-15 RX ORDER — BUMETANIDE 0.25 MG/ML
2 INJECTION INTRAMUSCULAR; INTRAVENOUS ONCE
Status: COMPLETED | OUTPATIENT
Start: 2021-11-15 | End: 2021-11-15

## 2021-11-15 RX ADMIN — BUMETANIDE 2 MG: 2 TABLET ORAL at 08:18

## 2021-11-15 RX ADMIN — LIDOCAINE 1 PATCH: 560 PATCH PERCUTANEOUS; TOPICAL; TRANSDERMAL at 08:18

## 2021-11-15 RX ADMIN — POLYETHYLENE GLYCOL 3350 17 G: 17 POWDER, FOR SOLUTION ORAL at 14:49

## 2021-11-15 RX ADMIN — METHOCARBAMOL 500 MG: 500 TABLET ORAL at 08:18

## 2021-11-15 RX ADMIN — OXYCODONE HYDROCHLORIDE 5 MG: 5 TABLET ORAL at 15:03

## 2021-11-15 RX ADMIN — OXYCODONE HYDROCHLORIDE 5 MG: 5 TABLET ORAL at 08:27

## 2021-11-15 RX ADMIN — ACETAMINOPHEN 650 MG: 325 TABLET, FILM COATED ORAL at 17:07

## 2021-11-15 RX ADMIN — MICONAZOLE NITRATE: 20 POWDER TOPICAL at 08:18

## 2021-11-15 RX ADMIN — MICONAZOLE NITRATE: 20 POWDER TOPICAL at 20:51

## 2021-11-15 RX ADMIN — IRON SUCROSE 300 MG: 20 INJECTION, SOLUTION INTRAVENOUS at 13:02

## 2021-11-15 RX ADMIN — ACETAMINOPHEN 650 MG: 325 TABLET, FILM COATED ORAL at 00:04

## 2021-11-15 RX ADMIN — OXYCODONE HYDROCHLORIDE 5 MG: 5 TABLET ORAL at 01:20

## 2021-11-15 RX ADMIN — BUMETANIDE 2 MG: 0.25 INJECTION, SOLUTION INTRAMUSCULAR; INTRAVENOUS at 10:46

## 2021-11-15 RX ADMIN — ACETAMINOPHEN 650 MG: 325 TABLET, FILM COATED ORAL at 12:53

## 2021-11-15 RX ADMIN — METHOCARBAMOL 500 MG: 500 TABLET ORAL at 17:07

## 2021-11-15 RX ADMIN — FERROUS GLUCONATE 324 MG: 324 TABLET ORAL at 08:18

## 2021-11-15 RX ADMIN — ALLOPURINOL 200 MG: 100 TABLET ORAL at 08:18

## 2021-11-15 RX ADMIN — OXYCODONE HYDROCHLORIDE 10 MG: 10 TABLET ORAL at 17:50

## 2021-11-15 RX ADMIN — ATORVASTATIN CALCIUM 20 MG: 20 TABLET, FILM COATED ORAL at 08:18

## 2021-11-15 RX ADMIN — ACETAMINOPHEN 650 MG: 325 TABLET, FILM COATED ORAL at 07:09

## 2021-11-15 RX ADMIN — CYCLOBENZAPRINE 5 MG: 5 TABLET, FILM COATED ORAL at 05:36

## 2021-11-15 RX ADMIN — METHOCARBAMOL 500 MG: 500 TABLET ORAL at 00:04

## 2021-11-15 ASSESSMENT — ACTIVITIES OF DAILY LIVING (ADL)
ADLS_ACUITY_SCORE: 16
ADLS_ACUITY_SCORE: 16
ADLS_ACUITY_SCORE: 14
ADLS_ACUITY_SCORE: 12
ADLS_ACUITY_SCORE: 16
ADLS_ACUITY_SCORE: 12
ADLS_ACUITY_SCORE: 14
ADLS_ACUITY_SCORE: 16
ADLS_ACUITY_SCORE: 14
ADLS_ACUITY_SCORE: 16
ADLS_ACUITY_SCORE: 12
ADLS_ACUITY_SCORE: 16
ADLS_ACUITY_SCORE: 14
ADLS_ACUITY_SCORE: 14
ADLS_ACUITY_SCORE: 16
ADLS_ACUITY_SCORE: 16

## 2021-11-15 ASSESSMENT — MIFFLIN-ST. JEOR: SCORE: 2236.26

## 2021-11-15 NOTE — PROGRESS NOTES
"   11/15/21 1053   Quick Adds   Type of Visit Initial Occupational Therapy Evaluation   Living Environment   People in home alone   Current Living Arrangements apartment   Transportation Anticipated agency   Living Environment Comments Pt lives alone in an apartment, elevator access. Pt has tub shower with shower chair and tub transfer bench, no grab bars. Pt typically sleeps in recliner   Self-Care   Usual Activity Tolerance moderate   Current Activity Tolerance fair   Regular Exercise No   Equipment Currently Used at Home tub bench;shower chair;walker, standard   Activity/Exercise/Self-Care Comment Pt reports low level of activity at baseline, spends most of her time in her recliner. Has \"many\" FWW's at home that pt uses for household mobility. Typically IND at baseline w/ ADLs.    Instrumental Activities of Daily Living (IADL)   IADL Comments Pt recieves assist with errands/grocery delivery, driving, has had a cleaning service until recently. Pt reports her sister is able to assist PRN upon discharge    Disability/Function   Hearing Difficulty or Deaf no   Wear Glasses or Blind no   Concentrating, Remembering or Making Decisions Difficulty no   Difficulty Communicating no   Difficulty Eating/Swallowing no   Walking or Climbing Stairs Difficulty yes   Walking or Climbing Stairs stair climbing difficulty, requires equipment;ambulation difficulty, requires equipment  (elevator, FWW use)   Mobility Management FWW   Dressing/Bathing Difficulty no   Toileting issues no   Doing Errands Independently Difficulty (such as shopping) yes   Errands Management Senior service assists   Fall history within last six months no   Change in Functional Status Since Onset of Current Illness/Injury yes   General Information   Onset of Illness/Injury or Date of Surgery 11/11/21   Referring Physician James Melvin MD   Patient/Family Therapy Goal Statement (OT) Maintain IND, \"not go into a nursing home\"   Additional " Occupational Profile Info/Pertinent History of Current Problem Arianna Siddiqi is a 61 year old female admitted on 11/11/2021. She has a history of HFpEF (LEV 55-60% on 1/2020), morbid obesity, paroxysmal a-fib, HTN, gout, OHS/CARLOS on AVAPS presenting with SOB and vaginal bleeding and is admitted for anemia 2/2 uterine bleeding.   Existing Precautions/Restrictions fall   Left Upper Extremity (Weight-bearing Status) full weight-bearing (FWB)   Right Upper Extremity (Weight-bearing Status) full weight-bearing (FWB)   Left Lower Extremity (Weight-bearing Status) full weight-bearing (FWB)   Right Lower Extremity (Weight-bearing Status) full weight-bearing (FWB)   Cognitive Status Examination   Orientation Status orientation to person, place and time   Cognitive Status Comments Tangential, however cognition appears WFL. Will continue to monitor   Visual Perception   Visual Impairment/Limitations corrective lenses for reading   Impact of Vision Impairment on Function (Vision) Pt reports overall blurry vision, improved with use of readers however pt does not have with her   Sensory   Sensory Comments N/t in B hands since Hgb low   Pain Assessment   Patient Currently in Pain No   Integumentary/Edema   Integumentary/Edema no deficits were identifed   Posture   Posture forward head position;protracted shoulders   Range of Motion Comprehensive   Comment, General Range of Motion BUE WFL   Strength Comprehensive (MMT)   Comment, General Manual Muscle Testing (MMT) Assessment BUE WFL   Coordination   Upper Extremity Coordination No deficits were identified   Clinical Impression   Criteria for Skilled Therapeutic Interventions Met (OT) yes;meets criteria;skilled treatment is necessary   OT Diagnosis Decreased ADL/IADL I   OT Problem List-Impairments impacting ADL problems related to;activity tolerance impaired;strength;mobility   Assessment of Occupational Performance 3-5 Performance Deficits   Identified Performance Deficits  Dressing, bathing, functional mobility, home mgmt   Planned Therapy Interventions (OT) ADL retraining;IADL retraining;strengthening;home program guidelines;progressive activity/exercise;risk factor education   Clinical Decision Making Complexity (OT) moderate complexity   Therapy Frequency (OT) 6x/week   Predicted Duration of Therapy 1 week   Anticipated Equipment Needs Upon Discharge (OT) wheelchair   Risk & Benefits of therapy have been explained evaluation/treatment results reviewed;care plan/treatment goals reviewed;risks/benefits reviewed;current/potential barriers reviewed;participants voiced agreement with care plan;patient;participants included   Comment-Clinical Impression Pt will benefit from skilled OT services to promote ADL/IADL I and support safe discharge plan   OT Discharge Planning    OT Discharge Recommendation (DC Rec) Home with assist;home with home care occupational therapy   OT Rationale for DC Rec Pt presents below baseline, limited by intermittent dizziness and deconditioning. However, at baseline, pt reports spending most of her time in/around her room, with good safety awareness and AE options. Pending progress and LOS, anticipate pt will be safe to discharge home w/ A (reports her sister plans to stay with her upon discharge short-term) and  OT for home safety eval and to progress ADL/IADL I within home environment. Pt would benefit from PCA support with higher level IADL tasks if available.    OT Brief overview of current status  Ax1 w/ FWW   Total Evaluation Time (Minutes)   Total Evaluation Time (Minutes) 5

## 2021-11-15 NOTE — PLAN OF CARE
Assumed cares from [id number], transferred up from PACU after D&C. VSS on 3L NC, A&Ox4. Up with Ax1 to bathroom, some dizziness reported with position changes. Currently on 2G sodium diet, tolerating without nausea. Reporting lower back pain managed with current pain regimen and PRN Oxycodone x1. Abdomen soft, obese, bowel sounds hypoactive. Passing flatus, no stool this shift, voiding spontaneously without difficulty. PIV CDI and saline locked. Continue with POC.

## 2021-11-15 NOTE — PROGRESS NOTES
Admitted/transferred from: PACU  2 RN full   skin assessment completed by Cata Green, SONAL and Yesenia Phipps RN.  Skin assessment finding: issues found Dry skin to BLE, scratches to buttocks, pigmentation noted between skin folds.   Interventions/actions: skin interventions encouraged repositioning and OOB activity, continue to apply scheduled powder between skin folds.      Will continue to monitor.

## 2021-11-15 NOTE — PROGRESS NOTES
[facility]    Progress Note - [facility] 2 Service        Date of Admission:  11/11/2021    Assessment & Plan      Arianna Siddiqi is a 61 year old female admitted on 11/11/2021. She has a history of HFpEF (LEV 55-60% on 1/2020), morbid obesity, paroxysmal a-fib, HTN, gout, OHS/CARLOS on AVAPS presenting with SOB and vaginal bleeding and is admitted for anemia 2/2 uterine bleeding.      Today's Changes:  - still having some spotting post-procedure  - start IV iron with iron sucrose, 300mg x3 doses Q72 hours  - Pathology pending  - diuresis with IV bumex at PTA dose  - Hgb 7 this AM, recheck in afternoon and transfuse if <7  - Heme consult placed, recs pending  - PT/OT ordered      #Anemia 2/2 to vaginal bleeding  #Supratherapeutic INR, resolved  #Hypotension, resolved  #Concern for coagulopathy  61 yoF with history of intermittent vaginal bleeding without clear menopausal history presenting with acute onset of vaginal bleeding and worsening dyspnea starting on 11/12/21. LMP/last episode of typical intermittent vaginal bleeding was 7/2021. Pt experienced a single day episode of vaginal bleeding 2 wks before admission as well as during a 2019/2020 during hospitalization for CHF exacerbation. Started warfarin in 2016 for a-fib, has been taking and checking INR regularly, last INR 2.4 on 11/9. INR was elevated to 5.84 at presentation. Pt denied any other major bleeding episode, no previous anticoagulation related hospitalization, and no changes to diet or new medication. Remains on 6L NC for SOB. OB/GYN consulted, recommended starting Provera 20mg BID and obtaining Pelvic US. US was reportedly difficult to obtain transvaginal imaging, however per OB/GYN reassessment transvaginal view should be obtainable and repeat imaging order placed. DDx - warfarin assoc'd bleeding vs abnormal uterine bleeding 2/2 malignancy vs structural abnormality. CTA AP without significant abnormalities  noted, no clear source of bleeding identified.   - s/p 6 units pRBCs (4 units given on admission, 2 units overnight 11/13)  - Hgb stable, will reduce Hgb checks to daily  - Hgb goal >7 for Hysteroscopy and bx tomorrow with OB/GYN   - One-time Kcentra administered on 11/12 for INR reversal  - INR recheck daily (1.31 s/p Kcentra, 1.2 on 11/13)  - Working up ?coagulopathy with rising INR despite holding warfarin  - Heme consulted re: INR, possible coagulopathy, and when to restart warfarin with recent bleed as pt prefers to resume warfarin  - Continue to hold warfarin pending Heme consult recs  - OB/GYN and GYN ONC consulted, appreciate recs   - Pelvic US with transvaginal imaging - 6 mm EMS, no adnexal abnormalities, no mass/fibroid   - OR with GYN ONC 11/14, pap smear, D&C pathology pending, Mirena IUD placed  - IV iron sucrose 300mg x3 days      #Acute hypoxic respiratory failure 2/2 anemia  Pt presented initially hypoxic, has been requiring supplemental O2 (6LPM -> 2LPM -> 4LPM ->2LPM). Pt reports worsening of breathing on 11/13 AM that developed after her back pain. Question if related to pain vs anemia as still requiring additional units overnight vs volume overload/TACO as now s/p 6 units RBCs. Pt remaining on 2-6LPM after procedure with GYN ONC, did receive large volume of fluids with procedure. Will continue with diuresis, CTM.  - continue diuresis, will give 2 mg Bumex IV    #Back pain  Pt developed back pain in AM on 11/13. Some bruising noted per nursing on lower back. Some hyperpigmentation noted on exam but no large areas of bruising concerning for active bleeding. Pt reports noticing back pain onset after transferring to different tables and back to her bed from imaging. Ddx at this time includes likely bruising vs MSK pain from repeated transfers overnight for imaging and bed, vs possible transfusion reaction as she has currently received >6 units pRBCs. Low concern for transfusion reaction as pain  significantly improved without treatment for reaction and resolves when not lying on back, LFTs with normal bili so low concern for hemolytic reaction.  - attempt multimodal pain control with methocarbamol 500mg TID, scheduled tylenol 650 mg Q6hrs, lidocaine patch, and bengay PRN  - oxycodone 5mg Q6hrs PRN  - continue PTA flexeril at bedtime PRN  - CTM      #JASON, improving  Baseline 0.8-0.9. 11/12 Cr 1.45, likely pre-renal in the setting of vomiting, dehydration, anemia. Improving s/p now 6 units pRBCs.  - Hold PTA lisinopril  - Avoid nephrotoxic meds  - CTM on daily labs      #HFpEF (TTE with EF 55-60%, 1/2020)  BNP mildly elevated at 1093 on 11/12. Pt given IV bumex in ED due to concern for acute decompensated HF given SOB. 11/12 CXR without pulmonary edema and no signs of hypervolemia on phy exam.  - Holding PTA bumex (except as above)  - Hold PTA lisinopril  - Hold PTA metoprolol      #Consitpation  Pt reports no BM x2-3 days. Has oxy PRN for pain.   - scheduled Miralax  - senna-docusate BID PRN      Chronic Problems  #Paroxysmal Atrial Fibrillation  - Hold PTA metoprolol   - Hold PTA warfarin    #HTN  - Hold PTA lisinopril while receiving transfusions  - If BP increasing vs remaining stable and JASON improving, will consider adding back in    #Hyperlipidemia  - PTA on atorvastatin    #Gout  - PTA allopurinol    #Intertrigo groin  - PTA miconazole     #OHS/CARLOS  - CPAP at night       Diet: 2 Gram Sodium Diet    DVT Prophylaxis: Holding warfarin with concern for bleeding, PCDs  Baig Catheter: Not present  Fluids: None  Central Lines: None  Code Status: Full Code      Disposition Plan   Expected discharge: recommended to prior living arrangement once hemoglobin stable.     The patient's care was discussed with the Attending, Dr. Melvin, and Patient.    Char Phipps MD  Internal Medicine, PGY-1  [address]  Securely message with the Vocera Web  "Console (learn more here)  Text page via [facility] Paging/Directory    Please see sign in/sign out for up to date coverage information    ______________________________________________________________________    Interval History   NAEON. NNR. Pt tolerated procedure well last night. However, notes back pain worsened after the procedure, stating she was on \"a very hard bed for over 3 hours\" both pre- and post-procedure. Pain is mostly on the left lower side. Her pain nearly completely resolves when sitting in a chair, is hoping to get a recliner into her room as she typically sleeps in a recliner at home and is hoping this will be much more comfortable for her.    Reports she is still seeing some spotting after the procedure and has been experiencing intermittent cramping pain. Her pain regimen currently is working well for her. No nausea or vomiting. Does note it has been a couple of days since her last bowel movement. Has been passing gas normally. Except for the cramping, no other new abdominal pain.     Continues to require supplemental O2. Notes she feels very weak when she gets up to walk and gets short of breath easily. Does live independently at home and performs all ADLs without assistance.    4 point ROS otherwise negative.    Data reviewed today: I reviewed all medications, new labs and imaging results over the last 24 hours.    Physical Exam   Vital Signs: Temp: 97  F (36.1  C) Temp src: Temporal BP: 117/41 Pulse: 91   Resp: 20 SpO2: 100 % O2 Device: Oxymask Oxygen Delivery: 6 LPM  Weight: 374 lbs 8 oz  General Appearance: Lying in bed, sleeping comfortably on arrival, in no acute distress  Respiratory: Normal work of breathing while on 4-6LPM NC. Lung sounds distant.  Cardiovascular: RRR. Heart sounds somewhat distant.  GI: Soft, non-tender, non-distended, bowel sounds present  Skin: Bilateral LE lymphedema with dry skin patches, few abrasions, no erythema or tenderness.   Neurological: Alert and fully " oriented, easily engaged throughout interview     Data   Recent Labs   Lab 11/15/21  0612 11/15/21  0239 11/14/21  1637 11/14/21  1018 11/14/21  0730 11/13/21  2142 11/13/21  1215 11/13/21  0913 11/11/21  2336 11/11/21  2335   WBC 9.0  --   --   --  10.2  --   --  11.8*  --  12.8*   HGB 7.0*  --   --   --  7.9* 7.2*   < > 6.9*   < > 3.1*   MCV 88  --   --   --  87  --   --  86  --  81     --   --   --  290  --   --  256  --  367   INR 1.66*  --  1.44*  --  1.40*  --   --  1.21*   < >  --      --   --   --  139  --   --  139   < > 137   POTASSIUM 4.2  --   --   --  3.9  --   --  4.2   < > 5.1   CHLORIDE 104  --   --   --  105  --   --  108   < > 106   CO2 30  --   --   --  29  --   --  28   < > 26   BUN 23  --   --   --  27  --   --  34*   < > 51*   CR 1.02  --   --   --  1.04  --   --  1.13*   < > 1.46*   ANIONGAP 3  --   --   --  5  --   --  3   < > 5   KADEN 8.7  --   --   --  8.8  --   --  8.3*   < > 8.7   * 122*  --  79 74  --   --  95   < > 97   ALBUMIN  --   --   --   --   --   --   --  2.2*  --  2.3*   PROTTOTAL  --   --   --   --   --   --   --  6.5*  --  6.8   BILITOTAL  --   --   --   --   --   --   --  0.3  --  0.2  0.2   ALKPHOS  --   --   --   --   --   --   --  80  --  84   ALT  --   --   --   --   --   --   --  15  --  16   AST  --   --   --   --   --   --   --  15  --  15   TROPONIN  --   --   --   --   --   --   --   --   --  <0.015    < > = values in this interval not displayed.     No results found for this or any previous visit (from the past 24 hour(s)).  Medications       acetaminophen  650 mg Oral Q6H     allopurinol  200 mg Oral Daily     atorvastatin  20 mg Oral Daily     [Held by provider] bumetanide  2 mg Oral Daily     ferrous gluconate  324 mg Oral Daily     iron sucrose (VENOFER) intermittent infusion  300 mg Intravenous Q72H     lidocaine  1 patch Transdermal Q24H     lidocaine   Transdermal Q8H     [Held by provider] lisinopril  5 mg Oral Daily     methocarbamol   500 mg Oral TID     [Held by provider] metoprolol succinate ER  100 mg Oral Daily     miconazole   Topical BID     [Held by provider] potassium chloride ER  20 mEq Oral BID     sodium chloride (PF)  3 mL Intracatheter Q8H

## 2021-11-15 NOTE — CONSULTS
Hematology Consult Note   Date of Service: 11/15/2021    Patient: Arianna Siddiqi  MRN: [id number]  Admission Date: 11/11/2021  Hospital Day # Hospital Day: 5  Primary Outpatient Hematologist: Dr. Dumont     Reason for Consult: elevated INR with significant bleed, given kcentra but with continued elevated INR     Assessment & Plan:   Arianna Siddiqi is a 61 year old female with PMHx of HFpEF, morbid obesity, paroxysmal atrial fibrillation (on warfarin), HTN, and CARLOS.who was admitted 11/11/21 through the ED for recurrent vaginal bleeding, SOB and was found to have hgb 3.1, INR elevated 5.84, reversed with kcentra on 11/12, s/p vit K 10 mg IV x 1 on 11/12 and has received 6 units of pRBCs, s/p d & c with IUD (Mirena) placement yesterday (11/14). Concern is that INR still elevated despite reversal of warfarin and question of when to restart AC.  Will check PT mixing and factor levels to evaluate elevated INR although reassuringly bleeding has improved post procedure.  Due to significant, life threatening bleed we recommend to hold AC until hgb recovers to ~10.    #Severe anemia 2/2 vaginal bleeding  #Supratherapeutic INR on warfarin  #Chronic iron deficiency  -on oral iron PTA  -ferritin 6     Recommendations:   -Check PT mixing study, FV, FVII, FVIII, FIX (all ordered)   -Continue to monitor and transfuse pRBCs PRN hgb <7   -hold AC until hgb recovers to ~10  -Agree with IV iron replacement     Patient was seen and plan of care was discussed with attending physician Dr. Dumont     We will continue to follow this patient. Please don't hesitate to contact the Fellow On-Call with questions.    Nesha Vilchis PA-C  [facility]  [phone]      History of Present Illness:    Arianna Siddiqi is a 61 year old female with a past medical history significant for HFpEF (LVEF of 55-60% on 01/2020),morbid obesity, paroxysmal atrial fibrillation,, HTN, gout, OHS/CARLOS on AVAP presenting with complaints of shortness of breath and vaginal  "bleeding.     Per patient, she has intermittent shortness of breath at baseline however in the last few days it has worsened both at rest and on exertion. She states to be complaint with her bumex and states her shortness of breath seems similar to when she was previously admitted for CHF exacerbation. Endorses waking up intermittently at night with SOB but states it mostly due to pain associated with sleeping on a recliner. Per patient, she has been sleeping on a recliner for a couple of years.      Patient also endorses heavy vaginal bleeding for the past 3 days. She states her last menstrual cycle was about 4 months ago however in the last 3 days, she has experienced copious amount of vaginal bleeding and huge blood clots requiring her to use about 3 \"thick big towels\" and has had to change the towels every 1-2 hours. She states pads were not thick enough to absorb the bleeding. Per patient, although she does have heavy bleeding during her period that lasts about 2 weeks, this episode of heavy bleeding is significantly way more than usual.States melo she had also had heavy vaginal bleeding about 2 weeks ago but it only lasted for 1 day. She is not on any hormonal therapy and no changes to diet or new medication.  She endorsed a previous episode back in 2019/2020 when she was admitted for CHF exacerbation but her hemoglobin remained stable during that admission and she was supposed to follow up with Ob/gyn however she did not due to the pandemic. Last menstrual period was in July/2021. She denies abdominal cramps but endorses dizziness, headaches, nausea and 2 episodes of vomiting(no blood). Also denies any other major bleeding episodes apart from the vaginal bleeding in the last 3 days and no previous anticoagulation related hospital admission        She has undergone CTA A/P without acute findings or bleed.  Transvaginal US showed thickened endometrial stripe.  Earlier today (11/15) she underwent d and c and " mirena IUD placement in OR and now only has spotting.  She has had previous episodes of vaginal bleeding but this is the worst.  She also notes that she noted bleeding several years ago when her Obgyn took her off OCP and she was put on warfarin for paroxysmal a fib. She does have some bruising, but no other bleeding (mouth, nose, rectal).         Hematologic History:  Iron deficiency anemia (seen by Dr. Dumont 20)   -chronic MAG, refractory to oral iron   -Recommend IV iron + EGD and colonoscopy    -Ferritin 6, serum iron 9 on     Clot hx: none    Review of Systems: Pertinent positive and negative systems described in HPI; the remainder of the 14 systems are negative    Past Medical History:  Past Medical History:   Diagnosis Date     CHF (congestive heart failure) (H)      CKD (chronic kidney disease)      Compliance poor      Diabetes mellitus (H)      Gout      Hypertension      Insomnia      Morbid obesity (H)      CARLOS treated with BiPAP        Past Surgical History:  History reviewed. No pertinent surgical history.    Social History:  Social History     Socioeconomic History     Marital status: Single     Spouse name: None     Number of children: None     Years of education: None     Highest education level: None   Occupational History     None   Tobacco Use     Smoking status: Former Smoker     Packs/day: 1.50     Years: 20.00     Pack years: 30.00     Types: Cigarettes     Quit date: 2002     Years since quittin.8     Smokeless tobacco: Never Used   Substance and Sexual Activity     Alcohol use: No     Alcohol/week: 0.0 standard drinks     Drug use: No     Sexual activity: None   Other Topics Concern     None   Social History Narrative     None     Social Determinants of Health     Financial Resource Strain: Not on file   Food Insecurity: Not on file   Transportation Needs: Not on file   Physical Activity: Not on file   Stress: Not on file   Social Connections: Not on file   Intimate  Partner Violence: Not on file   Housing Stability: Not on file        Family History  Family History   Adopted: Yes   Family history unknown: Yes     Outpatient Medications:  No current facility-administered medications on file prior to encounter.  acetaminophen (TYLENOL) 500 MG tablet, Take 1-2 tablets (500-1,000 mg) by mouth every 4 hours as needed for mild pain  allopurinol (ZYLOPRIM) 100 MG tablet, Take 2 tablets (200 mg) by mouth daily For additional refills, please schedule annual appointment at [phone], overdue for labs  atorvastatin (LIPITOR) 20 MG tablet, Take 1 tablet (20 mg) by mouth daily  bumetanide (BUMEX) 2 MG tablet, Take 1 tablet (2 mg) by mouth daily  ferrous gluconate (FERGON) 324 (38 Fe) MG tablet, Take 1 tablet (324 mg) by mouth daily  lisinopril (ZESTRIL) 5 MG tablet, Take 1 tablet (5 mg) by mouth daily  metoprolol succinate ER (TOPROL-XL) 100 MG 24 hr tablet, Take 1 tablet (100 mg) by mouth daily  miconazole (MICATIN) 2 % external powder, APPLY TO AFFECTED AREA TWICE A DAY  ondansetron (ZOFRAN-ODT) 4 MG ODT tab, Take 1 tablet (4 mg) by mouth every 6 hours as needed for nausea or vomiting  polyethylene glycol (MIRALAX) packet, Take 17 g by mouth daily as needed for constipation  potassium chloride ER (K-TAB) 20 MEQ CR tablet, Take 1 tablet (20 mEq) by mouth 2 times daily  sennosides (SENOKOT) 8.6 MG tablet, Take 1 tablet by mouth 2 times daily as needed for constipation  traMADol (ULTRAM) 50 MG tablet, TAKE 1 TABLET (50 MG) BY MOUTH NIGHTLY AS NEEDED FOR SEVERE PAIN *INS LIMIT 7 DAYS  warfarin ANTICOAGULANT (COUMADIN) 3 MG tablet, Take 1 to 1.5 (one and one-half) tablets by mouth daily or as directed by the Coumadin clinic. (Patient taking differently: 3 mg every Mon, Wed, Fri; 4.5 mg all other days)  alum & mag hydroxide-simethicone (MAALOX  ES) 400-400-40 MG/5ML SUSP suspension, Take 15 mLs by mouth every 4 hours as needed for other (gastric distress.)  blood glucose monitoring  "(ACCU-CHEK NINA PLUS) meter device kit, Use to test blood sugars 3 times daily or as directed.  blood glucose monitoring (SOFTCLIX) lancets, Use to test blood sugar 3 times daily or as directed.  cyclobenzaprine (FLEXERIL) 5 MG tablet, Take 1 tablet (5 mg) by mouth nightly as needed for muscle spasms  diphenhydrAMINE (BENADRYL) 25 MG tablet, Take 25 mg by mouth every 6 hours as needed for itching or allergies  warfarin ANTICOAGULANT (COUMADIN) 1 MG tablet, TAKE 2 TABLETS (2 MG) BY MOUTH DAILY USE AS DIRECTED BY COUMADIN CLINIC (Patient taking differently: Take 2 mg by mouth daily As of 11/9/2021  Full warfarin instructions: 3 mg every Mon, Wed, Fri; 4.5 mg all other days  Next INR check: 11/23/2021     )  warfarin ANTICOAGULANT (COUMADIN) 3 MG tablet, Take 1 tablet (3mg) to 1.5 tablets (4.5mg) by mouth daily or as directed by the Coumadin clinic.         Physical Exam:    /41 (BP Location: Left arm)   Pulse 91   Temp 97  F (36.1  C) (Temporal)   Resp 20   Ht 1.626 m (5' 4.02\")   Wt (!) 169.9 kg (374 lb 8 oz)   LMP 11/09/2021   SpO2 100%   BMI 64.25 kg/m    Gen: Well appearing, in NAD. Seen sitting in hospital chair   HEENT: EOMI, PERRL, mmm, oropharynx clear  CV: Normal rate, regular rhythm. No m/r/g  Pulm: CTAB, no wheezing, normal work of breathing  Abd: Soft, nt/nd, no rebound/guarding  Ext: Warm and well perfused. No lower extremity edema  Skin: No rash, cyanosis or petechial lesion  Neuro: Alert and answering questions appropriately.     Labs & Studies: I personally reviewed the following studies:  ROUTINE LABS (Last four results):  CMP  Recent Labs   Lab 11/15/21  0612 11/15/21  0239 11/14/21  1018 11/14/21  0730 11/13/21  0913 11/12/21  2146 11/11/21  2335     --   --  139 139 138 137   POTASSIUM 4.2  --   --  3.9 4.2 4.4 5.1   CHLORIDE 104  --   --  105 108 106 106   CO2 30  --   --  29 28 28 26   ANIONGAP 3  --   --  5 3 4 5   * 122* 79 74 95 100* 97   BUN 23  --   --  27 34* 44* " 51*   CR 1.02  --   --  1.04 1.13* 1.33* 1.46*   GFRESTIMATED 59*  --   --  58* 52* 43* 38*   KADEN 8.7  --   --  8.8 8.3* 8.4* 8.7   PROTTOTAL  --   --   --   --  6.5*  --  6.8   ALBUMIN  --   --   --   --  2.2*  --  2.3*   BILITOTAL  --   --   --   --  0.3  --  0.2  0.2   ALKPHOS  --   --   --   --  80  --  84   AST  --   --   --   --  15  --  15   ALT  --   --   --   --  15  --  16     CBC  Recent Labs   Lab 11/15/21  0612 11/14/21  0730 11/13/21  2142 11/13/21  1215 11/13/21  0913 11/12/21  1344 11/11/21  2335   WBC 9.0 10.2  --   --  11.8*  --  12.8*   RBC 2.83* 3.13*  --   --  2.74*  --  1.50*   HGB 7.0* 7.9* 7.2* 7.3* 6.9*   < > 3.1*   HCT 24.9* 27.2*  --   --  23.5*  --  12.2*   MCV 88 87  --   --  86  --  81   MCH 24.7* 25.2*  --   --  25.2*  --  20.7*   MCHC 28.1* 29.0*  --   --  29.4*  --  25.4*   RDW 17.5* 17.6*  --   --  17.1*  --  20.4*    290  --   --  256  --  367    < > = values in this interval not displayed.     INR  Recent Labs   Lab 11/15/21  0612 11/14/21  1637 11/14/21  0730 11/13/21  0913   INR 1.66* 1.44* 1.40* 1.21*

## 2021-11-15 NOTE — PLAN OF CARE
Hemoglobin this morning was 7.0, recheck this afternoon is 7.5. Patient is getting an IV iron infusion. Oxycodone is helping with back pain.Up with walker and stand by assist. Voiding, no BM, started miralax this afternoon, got IV bumex this morning, on strict I/O's. OT worked with patient this morning. Up in chair most of shift, complaining that current bed is too hard, bariatric recliner obtained for patient.

## 2021-11-15 NOTE — PLAN OF CARE
Alert and oriented x4. On 6L O2 overnight via face mask. Capno in place. Saturations are in the low 90s when sleeping. Had a D & C yesterday. On 2 mg sodium diet. Voiding spontaneously, some bloody discharge from procedure. Passing gas, no BM. PIV saline locked. Oxycodone, robaxin, and flexeril given for pain. Complaining of pain in back and stomach area. Assist of 1 with walker. Has wheelchair to sit in. Continue plan of care.

## 2021-11-16 ENCOUNTER — APPOINTMENT (OUTPATIENT)
Dept: OCCUPATIONAL THERAPY | Facility: CLINIC | Age: 61
DRG: 744 | End: 2021-11-16
Payer: MEDICARE

## 2021-11-16 DIAGNOSIS — M10.9 GOUT, UNSPECIFIED CAUSE, UNSPECIFIED CHRONICITY, UNSPECIFIED SITE: ICD-10-CM

## 2021-11-16 LAB
ANION GAP SERPL CALCULATED.3IONS-SCNC: 4 MMOL/L (ref 3–14)
BUN SERPL-MCNC: 20 MG/DL (ref 7–30)
CALCIUM SERPL-MCNC: 9 MG/DL (ref 8.5–10.1)
CHLORIDE BLD-SCNC: 102 MMOL/L (ref 94–109)
CO2 SERPL-SCNC: 31 MMOL/L (ref 20–32)
CREAT SERPL-MCNC: 1.12 MG/DL (ref 0.52–1.04)
ERYTHROCYTE [DISTWIDTH] IN BLOOD BY AUTOMATED COUNT: 18 % (ref 10–15)
FACT IX ACT/NOR PPP: 107 %
FACT V ACT/NOR PPP: 95 % (ref 60–140)
FACT VII ACT/NOR PPP: 22 % (ref 50–129)
FACT VIII ACT/NOR PPP: 491 % (ref 55–200)
GFR SERPL CREATININE-BSD FRML MDRD: 53 ML/MIN/1.73M2
GLUCOSE BLD-MCNC: 84 MG/DL (ref 70–99)
HCT VFR BLD AUTO: 26.6 % (ref 35–47)
HGB BLD-MCNC: 7.4 G/DL (ref 11.7–15.7)
INR PPP: 1.13 (ref 0.85–1.15)
INR PPP: 1.58 (ref 0.85–1.15)
INR PPP: 1.58 (ref 0.85–1.15)
MCH RBC QN AUTO: 24.6 PG (ref 26.5–33)
MCHC RBC AUTO-ENTMCNC: 27.8 G/DL (ref 31.5–36.5)
MCV RBC AUTO: 88 FL (ref 78–100)
PLATELET # BLD AUTO: 285 10E3/UL (ref 150–450)
POTASSIUM BLD-SCNC: 3.8 MMOL/L (ref 3.4–5.3)
PROTHROMBIN TIME: 18.3 SECONDS
PT IMM NP PPP: 14.2 SECONDS
RBC # BLD AUTO: 3.01 10E6/UL (ref 3.8–5.2)
SODIUM SERPL-SCNC: 137 MMOL/L (ref 133–144)
WBC # BLD AUTO: 11.8 10E3/UL (ref 4–11)

## 2021-11-16 PROCEDURE — 85230 CLOT FACTOR VII PROCONVERTIN: CPT | Performed by: PHYSICIAN ASSISTANT

## 2021-11-16 PROCEDURE — 250N000013 HC RX MED GY IP 250 OP 250 PS 637: Performed by: HOSPITALIST

## 2021-11-16 PROCEDURE — 85220 BLOOC CLOT FACTOR V TEST: CPT | Performed by: PHYSICIAN ASSISTANT

## 2021-11-16 PROCEDURE — 999N000157 HC STATISTIC RCP TIME EA 10 MIN

## 2021-11-16 PROCEDURE — 99233 SBSQ HOSP IP/OBS HIGH 50: CPT | Mod: GC | Performed by: PEDIATRICS

## 2021-11-16 PROCEDURE — 97535 SELF CARE MNGMENT TRAINING: CPT | Mod: GO | Performed by: OCCUPATIONAL THERAPIST

## 2021-11-16 PROCEDURE — 85250 CLOT FACTOR IX PTC/CHRSTMAS: CPT | Performed by: PHYSICIAN ASSISTANT

## 2021-11-16 PROCEDURE — 85240 CLOT FACTOR VIII AHG 1 STAGE: CPT | Performed by: PHYSICIAN ASSISTANT

## 2021-11-16 PROCEDURE — 36415 COLL VENOUS BLD VENIPUNCTURE: CPT

## 2021-11-16 PROCEDURE — 999N000128 HC STATISTIC PERIPHERAL IV START W/O US GUIDANCE

## 2021-11-16 PROCEDURE — 80048 BASIC METABOLIC PNL TOTAL CA: CPT

## 2021-11-16 PROCEDURE — 97110 THERAPEUTIC EXERCISES: CPT | Mod: GO | Performed by: OCCUPATIONAL THERAPIST

## 2021-11-16 PROCEDURE — 250N000013 HC RX MED GY IP 250 OP 250 PS 637

## 2021-11-16 PROCEDURE — 85610 PROTHROMBIN TIME: CPT

## 2021-11-16 PROCEDURE — 85610 PROTHROMBIN TIME: CPT | Performed by: PHYSICIAN ASSISTANT

## 2021-11-16 PROCEDURE — 120N000002 HC R&B MED SURG/OB UMMC

## 2021-11-16 PROCEDURE — 85027 COMPLETE CBC AUTOMATED: CPT

## 2021-11-16 RX ORDER — LANOLIN ALCOHOL/MO/W.PET/CERES
6 CREAM (GRAM) TOPICAL
Status: DISCONTINUED | OUTPATIENT
Start: 2021-11-16 | End: 2021-11-18 | Stop reason: HOSPADM

## 2021-11-16 RX ORDER — ASPIRIN 300 MG/1
300 SUPPOSITORY RECTAL AT BEDTIME
COMMUNITY
End: 2021-01-01

## 2021-11-16 RX ORDER — BUMETANIDE 2 MG/1
2 TABLET ORAL DAILY
Status: CANCELLED | OUTPATIENT
Start: 2021-11-17

## 2021-11-16 RX ORDER — AMOXICILLIN 250 MG
2 CAPSULE ORAL ONCE
Status: DISCONTINUED | OUTPATIENT
Start: 2021-11-16 | End: 2021-11-16

## 2021-11-16 RX ORDER — OXYCODONE HYDROCHLORIDE 5 MG/1
5 TABLET ORAL EVERY 4 HOURS PRN
Status: DISCONTINUED | OUTPATIENT
Start: 2021-11-16 | End: 2021-11-17

## 2021-11-16 RX ORDER — AMOXICILLIN 250 MG
2 CAPSULE ORAL 2 TIMES DAILY
Status: DISCONTINUED | OUTPATIENT
Start: 2021-11-16 | End: 2021-11-18 | Stop reason: HOSPADM

## 2021-11-16 RX ORDER — BUMETANIDE 2 MG/1
2 TABLET ORAL ONCE
Status: COMPLETED | OUTPATIENT
Start: 2021-11-16 | End: 2021-11-16

## 2021-11-16 RX ADMIN — DOCUSATE SODIUM 50 MG AND SENNOSIDES 8.6 MG 2 TABLET: 8.6; 5 TABLET, FILM COATED ORAL at 16:47

## 2021-11-16 RX ADMIN — ALLOPURINOL 200 MG: 100 TABLET ORAL at 08:47

## 2021-11-16 RX ADMIN — Medication: at 13:50

## 2021-11-16 RX ADMIN — ACETAMINOPHEN 650 MG: 325 TABLET, FILM COATED ORAL at 07:13

## 2021-11-16 RX ADMIN — OXYCODONE HYDROCHLORIDE 10 MG: 10 TABLET ORAL at 12:32

## 2021-11-16 RX ADMIN — METHOCARBAMOL 500 MG: 500 TABLET ORAL at 16:47

## 2021-11-16 RX ADMIN — MICONAZOLE NITRATE: 20 POWDER TOPICAL at 21:33

## 2021-11-16 RX ADMIN — Medication 7.5 MG: at 21:33

## 2021-11-16 RX ADMIN — FERROUS GLUCONATE 324 MG: 324 TABLET ORAL at 08:48

## 2021-11-16 RX ADMIN — ATORVASTATIN CALCIUM 20 MG: 20 TABLET, FILM COATED ORAL at 08:47

## 2021-11-16 RX ADMIN — DOCUSATE SODIUM 50 MG AND SENNOSIDES 8.6 MG 1 TABLET: 8.6; 5 TABLET, FILM COATED ORAL at 09:06

## 2021-11-16 RX ADMIN — LIDOCAINE 1 PATCH: 560 PATCH PERCUTANEOUS; TOPICAL; TRANSDERMAL at 08:48

## 2021-11-16 RX ADMIN — OXYCODONE HYDROCHLORIDE 10 MG: 10 TABLET ORAL at 00:56

## 2021-11-16 RX ADMIN — MICONAZOLE NITRATE: 20 POWDER TOPICAL at 08:48

## 2021-11-16 RX ADMIN — POLYETHYLENE GLYCOL 3350 17 G: 17 POWDER, FOR SOLUTION ORAL at 08:46

## 2021-11-16 RX ADMIN — ACETAMINOPHEN 650 MG: 325 TABLET, FILM COATED ORAL at 12:58

## 2021-11-16 RX ADMIN — OXYCODONE HYDROCHLORIDE 5 MG: 5 TABLET ORAL at 17:42

## 2021-11-16 RX ADMIN — BUMETANIDE 2 MG: 2 TABLET ORAL at 12:58

## 2021-11-16 RX ADMIN — Medication: at 17:43

## 2021-11-16 RX ADMIN — METHOCARBAMOL 500 MG: 500 TABLET ORAL at 08:47

## 2021-11-16 RX ADMIN — ACETAMINOPHEN 650 MG: 325 TABLET, FILM COATED ORAL at 00:56

## 2021-11-16 RX ADMIN — OXYCODONE HYDROCHLORIDE 10 MG: 10 TABLET ORAL at 04:57

## 2021-11-16 RX ADMIN — METHOCARBAMOL 500 MG: 500 TABLET ORAL at 00:56

## 2021-11-16 RX ADMIN — ACETAMINOPHEN 650 MG: 325 TABLET, FILM COATED ORAL at 17:42

## 2021-11-16 RX ADMIN — DOCUSATE SODIUM 50 MG AND SENNOSIDES 8.6 MG 2 TABLET: 8.6; 5 TABLET, FILM COATED ORAL at 21:33

## 2021-11-16 ASSESSMENT — ACTIVITIES OF DAILY LIVING (ADL)
ADLS_ACUITY_SCORE: 13
ADLS_ACUITY_SCORE: 14
ADLS_ACUITY_SCORE: 13
ADLS_ACUITY_SCORE: 12
ADLS_ACUITY_SCORE: 13
ADLS_ACUITY_SCORE: 13
ADLS_ACUITY_SCORE: 14
ADLS_ACUITY_SCORE: 13
ADLS_ACUITY_SCORE: 14
ADLS_ACUITY_SCORE: 13
ADLS_ACUITY_SCORE: 12
ADLS_ACUITY_SCORE: 13
ADLS_ACUITY_SCORE: 14
ADLS_ACUITY_SCORE: 12
ADLS_ACUITY_SCORE: 12
ADLS_ACUITY_SCORE: 13
ADLS_ACUITY_SCORE: 13
ADLS_ACUITY_SCORE: 12
ADLS_ACUITY_SCORE: 13

## 2021-11-16 NOTE — PLAN OF CARE
A&O, VSS, afebrile. Pain managed with PRN oxycodone. Pt ambulating in room with SBA. Generalized edema in LEs remains unchanged. Diffuse bruising noted on forearms and lower back. Neuro deficit in hands/ feet resolving during hospitalization. Tolerating PO intake w/o c/o nausea. IV SL. Voiding spontaneously, pt reporting minor vaginal spotting. +flatus, hypo bowel sounds, -BM.

## 2021-11-16 NOTE — PROGRESS NOTES
Resident/Fellow Attestation   I, Michelle Pineda, was present with the medical/VALERY student who participated in the service and in the documentation of the note.  I have verified the history and personally performed the physical exam and medical decision making.  I agree with the assessment and plan of care as documented in the note.      Michelle Pineda MD  PGY1  Date of Service (when I saw the patient): 11/16/21    [facility]    Progress Note - Maroon 2 Service        Date of Admission:  11/11/2021    Assessment & Plan      Arianna Siddiqi is a 61 year old female admitted on 11/11/2021. She has a history of HFpEF (LEV 55-60% on 1/2020), morbid obesity, paroxysmal a-fib, HTN, gout, OHS/CARLOS on AVAPS presenting with SOB and vaginal bleeding and is admitted for anemia 2/2 uterine bleeding.    Today's Changes:  - Heme/onc recommendations provided (see below)  - Bengay/lidocaine patch for right shoulder MSK issue  - Scheduled flexeril (previously prn)- dose changed to 7.5mg.   - Awaiting uterine pathology report  - scheduled pta bumex 2mg qday and gave additional 2mg dose in attempt to improve hypoxia/hypervolemia  - scheduled doc-senna 2 tablets BID in addition to prior bowel regimen.     #Anemia 2/2 to vaginal bleeding  #Supratherapeutic INR, resolved  #Hypotension, resolved  #Congenital Factor VII Deficiency  Hx of intermittent vaginal bleeding w/o clear menopausal hx who presented with acute onset vaginal bleeding on 11/12.  Hb 5.84 on admission- s/p 6 uPRBC. INR was elevated to 5.84 at presentation-given Kcentra. Pt denied any other major bleeding episode, no previous anticoagulation related hospitalization, and no changes to diet or new medication. OB/GYN initially recommended starting Provera 20 mg BID and a transvaginal US that showed thickened endometrium and no suspicious adnexal lesions. CTA AP w/o significant abnormalities noted, no clear source of bleeding  identified. Subsequently, pt underwent D&C, pap smear, and Mirena IUD placement with pathology findings forthcoming. DDx - warfarin assoc'd bleeding vs abnormal uterine bleeding 2/2 malignancy vs structural abnormality. Hem/Onc concluded involvement, finding low factor VII (22%) w/ INR normalization after mixing study. Given this, diagnosis of congenital factor VII deficiency. Per heme, this does not explain the bleeding, but does explain prolonged INR.   - CBC + INR daily  - Hem/Onc consult concluded with the following recs   - Hold off AC until hb 10   -Consider DOAC instead of warfarin (pt states she has not been on this before)   -No heme follow-up needed at discharge.   - OB/GYN following:   -Awaiting pathology results.   - IV iron sucrose 300mg x3 days    #Acute hypoxic respiratory failure 2/2 anemia  Pt presented initially hypoxic, has been requiring supplemental O2 (6LPM -> 2LPM -> 4LPM ->2LPM). Reports worsening shortness of breath on 11/13 AM that developed after her back pain. Question if related to pain vs anemia as still requiring additional units overnight vs volume overload/TACO as now s/p 6 units RBCs. Pt remaining on 2-6LPM after procedure with GYN ONC, did receive large volume of fluids with procedure. Will continue with diuresis.  -Continue attempting to wean from 2L NC as tolerated.   - restart pt's pta 2mg qday bumex  - Additional 2mg bumex dosing today  - Encourage pt to safely increase activity to self-assess her oxygen need     #Back pain likely 2/2 hospital bed + immobility  #Acute Right shoulder injury:  Pt developed back pain in AM on 11/13. Some low back bruising noted by nurse. Pt noticed back pain onset after transferring between the bed and imaging tables. Likely bruising vs. MSK pain from repeated transfers overnight for imaging and bed vs possible transfusion reaction due to receiving >6 units pRBCs at present. Pt also experienced R shoulder pain after rising from a reclining chair  to go to the bathroom early 11/16. Given the injury description and exam results, R shoulder rotator cuff injury or other MSK injury likely. No indication currently for R shoulder imaging given low suspicion for dislocation or acute fracture.   - Attempt multimodal pain control with methocarbamol 500mg TID, scheduled tylenol 650 mg Q6hrs.   - Initiated lidocaine patch and bengay today   - Changed flexeril to scheduled at bedtime from PRN- dose changed to 7.5mg.   - Decreased oxycodone to 5mg q4h PRN as pt does not take this at home, has improved pain now that she is in chair, and is experiencing constipation.   -She is agreeable to decreasing this oxycodone in anticipation of likely discharge    soon to find out a suitable pain plan for her.   - Heat pad for shoulder    #JASON, improving  Baseline 0.8-0.9. 11/16 Cr 1.12, likely pre-renal in the setting of vomiting, dehydration, anemia. Improving s/p now 6 units pRBCs.  - Hold PTA lisinopril  - Avoid nephrotoxic meds  - CTM on daily labs    #HFpEF (TTE with EF 55-60%, 1/2020)  BNP mildly elevated at 1093 on 11/12. Pt given IV bumex in ED due to concern for acute decompensated HF given SOB. 11/12 CXR without pulmonary edema and no signs of hypervolemia on phy exam.  - Restarted pta 2mg bumex qday   -Extra dose bumex 2mg given today d/t volume overload.   - Hold PTA lisinopril  - Hold PTA metoprolol    #Constipation  Pt reports no BM x 4-5 days. Has been taking increased oxy PRN. Pt given Miralax on 11/15 and 11/16, along with senna-docusate on 11/16. Pt normally has 1bm/day- she is not currently uncomfortable.   - Scheduled Miralax qday  - Increased doc-senna to 2 tablets BID today   - If no result by 7am 11/17- pt agreeable to tap water enema.     Chronic Problems  #Paroxysmal Atrial Fibrillation  - Hold PTA metoprolol   - Hold PTA warfarin d/t recent bleeding    #HTN  - Hold PTA lisinopril while receiving transfusions  - If BP increasing vs remaining stable and JASON  "improving, will consider adding back in    #Hyperlipidemia  - PTA on atorvastatin    #Gout  - PTA allopurinol    #Intertrigo groin  - PTA miconazole     #OHS/CARLOS  - CPAP at night     Diet: 2 Gram Sodium Diet    DVT Prophylaxis: Holding warfarin with concern for bleeding, PCDs  Baig Catheter: Not present  Fluids: None  Central Lines: None  Code Status: Full Code      Disposition Plan   Expected discharge: awaiting TCU placement.      The patient's care was discussed with the Attending, Dr. Schultz.    Marvin Cardoso, MS3  [address]  ______________________________________________________________________    Interval History   NAEON. Pt anxiously awaits the pathology results from the 11/14 procedure. Meanwhile, her back pain has improved with using a seated-recliner. She reported R shoulder pain this morning. She first noticed this when rising from seated position. She heard a \"pop\" and then had pain. Denies numbness/tingling or radiating pain down arm. Able to passively move her arm, but feels she has pain when she abducts her right arm.     She reports slight spotting and decreased intermittent cramping pain. Her pain regimen continues to work well. No nausea or vomiting. Given senna-docusate and Miralax to promote BM. She continues to pass gas normally. Except for reduced cramping, no other new abdominal pain.     4 point ROS otherwise negative.    Data reviewed today: I reviewed all medications, new labs and imaging results over the last 24 hours.    Physical Exam   Vital Signs: Temp: 97.4  F (36.3  C) Temp src: Temporal BP: (!) 141/50 Pulse: 90   Resp: 18 SpO2: 93 % O2 Device: Nasal cannula Oxygen Delivery: 2 LPM  Weight: 372 lbs 12.8 oz  General Appearance: Sitting up in reclining chair, awake on arrival, in no acute distress  Respiratory: Normal work of breathing while on 2L NC. Lung sounds distant. No noted cough.  Cardiovascular: RRR. Heart " sounds somewhat distant. 2+ radial pulse  GI: Soft, non-tender, non-distended, bowel sounds present  MSK: Right anterior shoulder diffusely tender to palpation. Intact passive ROM. No pinpoint tenderness. No decreased sensation grossly. Positive empty can test.   Skin: Bilateral LE edema with dry skin patches, few abrasions, no erythema or tenderness. Extremities warm.   Neurological: Alert and fully oriented, easily engaged throughout interview   Psych: Pleasant, appropriate speech/language    Data   Recent Labs   Lab 11/16/21  0720 11/15/21  1418 11/15/21  0612 11/15/21  0239 11/14/21  1637 11/14/21  1018 11/14/21  0730 11/13/21  1215 11/13/21  0913 11/11/21  2336 11/11/21  2335   WBC 11.8*  --  9.0  --   --   --  10.2  --  11.8*  --  12.8*   HGB 7.4* 7.5* 7.0*  --   --   --  7.9*   < > 6.9*   < > 3.1*   MCV 88  --  88  --   --   --  87  --  86  --  81     --  263  --   --   --  290  --  256  --  367   INR 1.58*  1.58*  --  1.66*  --  1.44*  --  1.40*  --  1.21*   < >  --      --  137  --   --   --  139  --  139   < > 137   POTASSIUM 3.8  --  4.2  --   --   --  3.9  --  4.2   < > 5.1   CHLORIDE 102  --  104  --   --   --  105  --  108   < > 106   CO2 31  --  30  --   --   --  29  --  28   < > 26   BUN 20  --  23  --   --   --  27  --  34*   < > 51*   CR 1.12*  --  1.02  --   --   --  1.04  --  1.13*   < > 1.46*   ANIONGAP 4  --  3  --   --   --  5  --  3   < > 5   KADEN 9.0  --  8.7  --   --   --  8.8  --  8.3*   < > 8.7   GLC 84  --  120* 122*  --    < > 74  --  95   < > 97   ALBUMIN  --   --   --   --   --   --   --   --  2.2*  --  2.3*   PROTTOTAL  --   --   --   --   --   --   --   --  6.5*  --  6.8   BILITOTAL  --   --   --   --   --   --   --   --  0.3  --  0.2  0.2   ALKPHOS  --   --   --   --   --   --   --   --  80  --  84   ALT  --   --   --   --   --   --   --   --  15  --  16   AST  --   --   --   --   --   --   --   --  15  --  15   TROPONIN  --   --   --   --   --   --   --   --   --    --  <0.015    < > = values in this interval not displayed.

## 2021-11-16 NOTE — CONSULTS
Care Management Initial Consult    General Information  Assessment completed with: Patient,Care Team Member,-chart review,  (pt)  Type of CM/SW Visit: Initial Assessment    Primary Care Provider verified and updated as needed: Yes   Readmission within the last 30 days:        Reason for Consult: discharge planning  Advance Care Planning:       General Information Comments:  (Has had home care in past but, not this admission.)    Communication Assessment  Patient's communication style: spoken language (English or Bilingual)    Hearing Difficulty or Deaf: no   Wear Glasses or Blind: no    Cognitive  Cognitive/Neuro/Behavioral: WDL  Level of Consciousness: alert  Arousal Level: opens eyes spontaneously  Orientation: oriented x 4  Mood/Behavior: calm,cooperative  Best Language: 0 - No aphasia  Speech: clear,spontaneous    Living Environment:   People in home: alone     Current living Arrangements: apartment      Able to return to prior arrangements: yes  Living Arrangement Comments:  (3rd floor apt.)    Family/Social Support:  Care provided by:    Provides care for: no one  Marital Status: Single  Other (specify) (Family intermittantly and friends.)          Description of Support System: Supportive       Current Resources:   Patient receiving home care services: No     Community Resources:    Equipment currently used at home: tub bench,shower chair,walker, standard  Supplies currently used at home:      Employment/Financial:  Employment Status: disabled (Retired Nursing Assistant)        Financial Concerns: other (see comments) (Has spend down and medical needs becoming expensive.)       Lifestyle & Psychosocial Needs: (From Hospital Flow Sheet)  Social Determinants of Health     Tobacco Use: Medium Risk     Smoking Tobacco Use: Former Smoker     Smokeless Tobacco Use: Never Used   Alcohol Use: Not on file   Financial Resource Strain: Not on file   Food Insecurity: Not on file   Transportation Needs: Not on file    Physical Activity: Not on file   Stress: Not on file   Social Connections: Not on file   Intimate Partner Violence: Not on file   Depression: Not at risk     PHQ-2 Score: 0   Housing Stability: Not on file     Functional Status:  Prior to admission patient needed assistance:  no     Mental Health Status:  No concerns.        Chemical Dependency Status: See above flw sheet prn.        Values/Beliefs:  Spiritual, Cultural Beliefs, Druze Practices, Values that affect care:   no            Additional Information:  The following home care plans of care are pending insurance inquiry and community home care availability in home area of [facility]:    Please fax discharge orders to [facility], [place]    Ph: [phone]    Fax: [phone]    Skilled home care RN for initial home safety evaluation and 1-3 times a week to evaluate medication management, nutrition and hydration evaluation, endurance evaluation, and general status evaluation after discharge from the acute care hospital setting.    Skilled home care Physical Therapy and Occupational Therapy to evaluate and treat in home setting per recommendations of the inpatient Rehabilitation consult team.    Home Care agency to provide a Home Health Aide for bathing and grooming 3 times a week for 2 weeks after discharge at which time skilled home care RN to evaluate for ongoing bathing assistance needs.  ______________________    Inpatient cares continue per MD orders and the inpatient care management team will continue to follow for updated transition needs prn.    Callie Leblanc,  B.S.N., R.N., P.H.N..  Care Coordinator     Pager   [facility]        Addendum:  Ms. Siddiqi was not aware but, she was updated that she has the following benefits under her secondary insurance plan: (Information was embedded in her discharge orders)    Transportation Benefits    [facility]-  [place] [facility]  Transportation    Phone: 1 [phone]

## 2021-11-16 NOTE — PLAN OF CARE
PT - Cancel/defer, per chart review and discussion with interdisciplinary team, pt does not require skilled inpatient physical therapy at this time. Pt is SBA when dizzy from low hgb/pain meds, otherwise indep with transfers and gait in laws. OT is following for ADL/mobility progression. Please re-consult as needed if patient experiences a change in functional mobility or goals requiring skilled inpatient physical therapy.    Entered by: Rahel Lentz 11/16/2021 10:19 AM

## 2021-11-16 NOTE — DISCHARGE INSTRUCTIONS
Home Oxygen :  [facility]    Ph:  [phone]    Home Oxygen per MD orders  __________________________    Transportation Provider    [employer] ([place] [facility])    Phone Number:  1 [phone]            Oxygen Provider:  Arranged through [employer] [facility], contact number [phone].  If you have any questions or concerns please call the oxygen company directly.            Take your medicines every day, as directed       Monitor Your Weight and Symptoms    Contact us if you:      Gain 2 pounds in one day or 5 pounds in one week    Feel more short of breath    Notice more leg swelling    Feel lightheadeded   Change your lifestyle    Limit Salt or Sodium:    2000 mg  Limit Fluids:    2000 mL or approximately 64 ounces  Eat a Heart Healthy Diet    Low in saturated fats  Stay Active:    Aim to move at least 150 minutes every  week         To Contact us    During Business Hours:  [phone], option # 1 (University)  Then option # 4 (medical questions)     After hours, weekends or holidays:   [phone], Option #4  Ask to speak to the On-Call Cardiologist. Inform them you are a CORE/heart failure patient at the [facility].     Use [employer] allows you to communicate directly with your heart team through secure messaging.    [employer] can be accessed any time on your phone, computer, or tablet.    If you need assistance, we'd be happy to help!         Keep your Heart Appointments:    12/7/21, 8:30 AM--Cardiology appointment with Dora Otero  12/7/21, 8:00 AM Lab appointment  Clinic is located at [address] [place] [address], [id number]       Transportation Benefits    [employer]-  [place] [facility] Transportation    Phone: 1 [phone]

## 2021-11-16 NOTE — PLAN OF CARE
A&O, VSS, afebrile. Pain managed with PRN oxycodone, icy hot, lidocaine patch and heat pads. Primary source of pain is now R shoulder after straining it while trying to get out of chair. Pt ambulating in room with SBA. Generalized edema in LEs remains unchanged. Diffuse bruising noted on forearms and lower back. Neuro deficit in hands/ feet resolving during hospitalization. Tolerating PO intake w/o c/o nausea. IV SL. Voiding spontaneously, pt reporting minor vaginal spotting. +flatus, hypo bowel sounds, -BM.

## 2021-11-16 NOTE — PLAN OF CARE
Assumed cares from [id number]. VSS on 2L NC, A&Ox4. Up with Ax1 to bathroom, some dizziness reported with position changes. Currently on 2G sodium diet, tolerating without nausea. Reporting lower back pain managed with current pain regimen and PRN Oxycodone x2. Abdomen soft, obese, bowel sounds hypoactive. Passing flatus, no stool this shift, voiding spontaneously without difficulty. PIV CDI and saline locked. Continue with POC

## 2021-11-16 NOTE — PROGRESS NOTES
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  We recommend holding off on AC until anemia significantly improves and cause of vaginal bleeding determined.  When stable to restart AC we feel that she would be a better candidate for DOAC, but will defer to primary team.     Please do not hesitate to contact us with future questions/concerns.    Nesha Vilchis PA-C  Benign Hematology  [phone]

## 2021-11-17 ENCOUNTER — APPOINTMENT (OUTPATIENT)
Dept: OCCUPATIONAL THERAPY | Facility: CLINIC | Age: 61
DRG: 744 | End: 2021-11-17
Payer: MEDICARE

## 2021-11-17 LAB
ANION GAP SERPL CALCULATED.3IONS-SCNC: 7 MMOL/L (ref 3–14)
BKR LAB AP GYN ADEQUACY: NORMAL
BKR LAB AP GYN INTERPRETATION: NORMAL
BUN SERPL-MCNC: 18 MG/DL (ref 7–30)
CALCIUM SERPL-MCNC: 8.6 MG/DL (ref 8.5–10.1)
CHLORIDE BLD-SCNC: 100 MMOL/L (ref 94–109)
CO2 SERPL-SCNC: 30 MMOL/L (ref 20–32)
CREAT SERPL-MCNC: 0.98 MG/DL (ref 0.52–1.04)
ERYTHROCYTE [DISTWIDTH] IN BLOOD BY AUTOMATED COUNT: 18.2 % (ref 10–15)
GFR SERPL CREATININE-BSD FRML MDRD: 62 ML/MIN/1.73M2
GLUCOSE BLD-MCNC: 82 MG/DL (ref 70–99)
HCT VFR BLD AUTO: 25.7 % (ref 35–47)
HGB BLD-MCNC: 7.3 G/DL (ref 11.7–15.7)
INR PPP: 1.46 (ref 0.85–1.15)
MCH RBC QN AUTO: 24.9 PG (ref 26.5–33)
MCHC RBC AUTO-ENTMCNC: 28.4 G/DL (ref 31.5–36.5)
MCV RBC AUTO: 88 FL (ref 78–100)
PATH REPORT.COMMENTS IMP SPEC: NORMAL
PATH REPORT.COMMENTS IMP SPEC: NORMAL
PLATELET # BLD AUTO: 284 10E3/UL (ref 150–450)
POTASSIUM BLD-SCNC: 3.6 MMOL/L (ref 3.4–5.3)
RBC # BLD AUTO: 2.93 10E6/UL (ref 3.8–5.2)
SODIUM SERPL-SCNC: 137 MMOL/L (ref 133–144)
WBC # BLD AUTO: 10 10E3/UL (ref 4–11)

## 2021-11-17 PROCEDURE — 120N000002 HC R&B MED SURG/OB UMMC

## 2021-11-17 PROCEDURE — 97530 THERAPEUTIC ACTIVITIES: CPT | Mod: GO

## 2021-11-17 PROCEDURE — 36415 COLL VENOUS BLD VENIPUNCTURE: CPT

## 2021-11-17 PROCEDURE — 250N000013 HC RX MED GY IP 250 OP 250 PS 637

## 2021-11-17 PROCEDURE — 99207 PR CDG-MDM COMPONENT: MEETS MODERATE - DOWN CODED: CPT | Performed by: PEDIATRICS

## 2021-11-17 PROCEDURE — 97535 SELF CARE MNGMENT TRAINING: CPT | Mod: GO

## 2021-11-17 PROCEDURE — 999N000157 HC STATISTIC RCP TIME EA 10 MIN

## 2021-11-17 PROCEDURE — 99232 SBSQ HOSP IP/OBS MODERATE 35: CPT | Mod: GC | Performed by: PEDIATRICS

## 2021-11-17 PROCEDURE — 85027 COMPLETE CBC AUTOMATED: CPT

## 2021-11-17 PROCEDURE — 80048 BASIC METABOLIC PNL TOTAL CA: CPT

## 2021-11-17 PROCEDURE — 85610 PROTHROMBIN TIME: CPT

## 2021-11-17 RX ORDER — OXYCODONE HYDROCHLORIDE 5 MG/1
5 TABLET ORAL
Status: COMPLETED | OUTPATIENT
Start: 2021-11-17 | End: 2021-11-17

## 2021-11-17 RX ORDER — BUMETANIDE 2 MG/1
2 TABLET ORAL DAILY
Status: DISCONTINUED | OUTPATIENT
Start: 2021-11-17 | End: 2021-11-18 | Stop reason: HOSPADM

## 2021-11-17 RX ADMIN — LIDOCAINE 1 PATCH: 560 PATCH PERCUTANEOUS; TOPICAL; TRANSDERMAL at 08:13

## 2021-11-17 RX ADMIN — DOCUSATE SODIUM 50 MG AND SENNOSIDES 8.6 MG 2 TABLET: 8.6; 5 TABLET, FILM COATED ORAL at 19:45

## 2021-11-17 RX ADMIN — ATORVASTATIN CALCIUM 20 MG: 20 TABLET, FILM COATED ORAL at 08:13

## 2021-11-17 RX ADMIN — ACETAMINOPHEN 650 MG: 325 TABLET, FILM COATED ORAL at 19:17

## 2021-11-17 RX ADMIN — MICONAZOLE NITRATE: 20 POWDER TOPICAL at 13:01

## 2021-11-17 RX ADMIN — METHOCARBAMOL 500 MG: 500 TABLET ORAL at 08:13

## 2021-11-17 RX ADMIN — METHOCARBAMOL 500 MG: 500 TABLET ORAL at 00:05

## 2021-11-17 RX ADMIN — POLYETHYLENE GLYCOL 3350 17 G: 17 POWDER, FOR SOLUTION ORAL at 08:12

## 2021-11-17 RX ADMIN — Medication: at 13:01

## 2021-11-17 RX ADMIN — ALLOPURINOL 200 MG: 100 TABLET ORAL at 08:13

## 2021-11-17 RX ADMIN — ACETAMINOPHEN 650 MG: 325 TABLET, FILM COATED ORAL at 06:31

## 2021-11-17 RX ADMIN — Medication: at 00:06

## 2021-11-17 RX ADMIN — MICONAZOLE NITRATE: 20 POWDER TOPICAL at 21:04

## 2021-11-17 RX ADMIN — Medication: at 21:04

## 2021-11-17 RX ADMIN — ACETAMINOPHEN 650 MG: 325 TABLET, FILM COATED ORAL at 00:05

## 2021-11-17 RX ADMIN — Medication: at 06:32

## 2021-11-17 RX ADMIN — DOCUSATE SODIUM 50 MG AND SENNOSIDES 8.6 MG 2 TABLET: 8.6; 5 TABLET, FILM COATED ORAL at 08:12

## 2021-11-17 RX ADMIN — FERROUS GLUCONATE 324 MG: 324 TABLET ORAL at 08:13

## 2021-11-17 RX ADMIN — ACETAMINOPHEN 650 MG: 325 TABLET, FILM COATED ORAL at 13:01

## 2021-11-17 RX ADMIN — POTASSIUM CHLORIDE 20 MEQ: 1500 TABLET, EXTENDED RELEASE ORAL at 19:45

## 2021-11-17 RX ADMIN — Medication 7.5 MG: at 21:46

## 2021-11-17 RX ADMIN — METHOCARBAMOL 500 MG: 500 TABLET ORAL at 17:05

## 2021-11-17 RX ADMIN — BUMETANIDE 2 MG: 2 TABLET ORAL at 08:13

## 2021-11-17 RX ADMIN — OXYCODONE HYDROCHLORIDE 5 MG: 5 TABLET ORAL at 17:11

## 2021-11-17 ASSESSMENT — ACTIVITIES OF DAILY LIVING (ADL)
ADLS_ACUITY_SCORE: 12

## 2021-11-17 NOTE — TELEPHONE ENCOUNTER
allopurinol (ZYLOPRIM) 100 MG tablet  Take 2 tablets (200 mg) by mouth daily       Last Written Prescription Date:  10/18/21  Last Fill Quantity: 60 tabs,   # refills: 1  Last Office Visit : 4/3/20  Future Office visit:  none    Routing refill request to provider for review/approval because:  Overdue appointment > 18 months. Lab due uric acid    Please call [phone] for an appointment with Dr Quiles.   Danette Mayfield RN 6:20 AM on 11/18/2021.

## 2021-11-17 NOTE — PLAN OF CARE
Care from 3;30 pm to 7 pm  A&Ox4.AVSS.Pt will use CPAP at bed time; need to call RT at bed time so that CPAP setting can be adjusted.  Pain controlled with scheduled Tylenol, Lidocaine patch, and Robaxin. Pt is on 1  LPM NC and in the low to mid 90s.SBA with walker to the bathroom, calls appropriately. Tolerating 2g Na diet. Abdominal US done today. Please encourage pt to continue to use  IS and ambulate to increase her O2 capabilities.    Plan for discharge tomorrow per provider note.

## 2021-11-17 NOTE — PROGRESS NOTES
Assumed cares [id number]. VSS on 1-2 LPM NC. Attempted to wean O2 to 0.5 LPM but dropped down to mid to high 80s. Pain controlled with scheduled Tylenol, Lidocaine patch, and Robaxin. Now on 2 LPM NC and in the low to mid 90s. Currently on 2 mg Bumex daily with good output. SBA with walker to the bathroom, calls appropriately. Walked with OT in hallway today. Did an O2 walk test with results in OT note. Been in bed for the afternoon since she said she didn't sleep well last night. Tolerating 2g Na diet. Abdominal US done today. Med [facility] 2 would like patient to continue using IS and ambulate to increase her O2 capabilities. Plan is to discharge patient as early as tomorrow or possibly this Friday to home. Has lymphedema consult ordered.

## 2021-11-17 NOTE — PLAN OF CARE
Assumed cares from [id number]. VSS on 2L NC, A&Ox4. Up with Ax1 to bathroom, some dizziness reported with position changes. Currently on 2G sodium diet, tolerating without nausea. Reporting lower back pain and Rt. Shoulder pain managed with current pain regimen and PRN Oxycodone x1. Abdomen soft, obese, bowel sounds hypoactive. Passing flatus, no stool this shift, voiding spontaneously without difficulty. One extra dose of flakito administered this shift per orders, plan for enema in AM if no bowel movement by then. PIV CDI and saline locked. Continue with POC.

## 2021-11-17 NOTE — PLAN OF CARE
VSS. Pain managed with tylenol, robaxin, icyhot cream. Tolerating diet. Had a BM overnight. Small amount of vaginal spotting present. Voiding, adequate amounts. Up with SBA and walker to bathroom. Slept well overnight. Continue POC.

## 2021-11-17 NOTE — PLAN OF CARE
Patient has been assessed for Home Oxygen needs. Oxygen readings:    *Pulse oximetry (SpO2) = 92% on room air at rest while awake.    *SpO2 improved to 94% on 1 liters/minute at rest.    *SpO2 = 88% on room air during activity/with exercise.    *SpO2 improved to 96% on 1 liters/minute during activity/with exercise.

## 2021-11-17 NOTE — PROGRESS NOTES
Resident/Fellow Attestation   I, BRIONNA ALTMAN, was present with the medical/VALERY student who participated in the service and in the documentation of the note.  I have verified the history and personally performed the physical exam and medical decision making.  I agree with the assessment and plan of care as documented in the note.      BRIONNA ALTMAN  PGY1  Date of Service (when I saw the patient): 11/17/2021    [facility]    Progress Note - Maroon 2 Service        Date of Admission:  11/11/2021    Assessment & Plan      Arianna Siddiqi is a 61 year old female admitted on 11/11/2021. She has a history of HFpEF (LEV 55-60% on 1/2020), morbid obesity, paroxysmal a-fib, HTN, gout, OHS/CARLOS on AVAPS presenting with SOB and vaginal bleeding and is admitted for anemia 2/2 uterine bleeding.    Today's Changes:  - PAP results - negative for lesion or malignancy  - Awaiting uterine pathology report  - Walking test performed  - Pt instructed on spirometer use  - Bumex maintained at 2 mg QD   - POCUS imaging of IVC and upper lungs - normal   - Lymphedema consult  - Plan for discharge tomorrow    #Anemia 2/2 to vaginal bleeding  #Supratherapeutic INR, resolved  #Hypotension, resolved  #Congenital Factor VII Deficiency  Hx of intermittent vaginal bleeding w/o clear menopausal hx, presenting w/ acute onset vaginal bleeding on 11/12.  Hb 5.84 on admission- s/p 6 uPRBC. INR elevated to 5.84 at presentation-given Kcentra. Pt denied other major bleeding episodes, no previous anticoagulation related hospitalization, and no changes to diet or new medication. OB/GYN initially recommended starting Provera 20 mg BID and a transvaginal US that showed thickened endometrium and no suspicious adnexal lesions. CTA AP w/o significant abnormalities noted, no clear source of bleeding identified. Subsequently, pt underwent D&C, pap smear, and Mirena IUD placement with pathology findings  forthcoming.  Hem/Onc concluded involvement, finding low factor VII (22%) w/ INR normalization after mixing study supporting a diagnosis of congenital factor VII deficiency. Per heme, this does not explain the bleeding, but does explain prolonged INR. DDx - abnormal uterine bleeding 2/2 malignancy vs structural abnormality. 11/17 PAP negative.  - CBC tomorrow to monitor Hb  - Hem/Onc consult concluded with recs below   - Hold off AC until hb 10   - Consider DOAC instead of warfarin d/t INR prolongation d/t congenital deficiency (pt states she has not been on this before)   - No heme follow-up needed at discharge.   - OB/GYN following:   - Awaiting pathology results.   -They will schedule outpatient f/u in approximately two weeks.   - IV iron sucrose 300mg x3 days  - c/w oral iron     #Multifactorial Acute hypoxic respiratory failure   Initial hypoxic presentation, requiring supplemental O2 (6LPM -> 2LPM -> 4LPM ->2LPM). Originally, concern for relation to acute HF exacerbation 2/2 volume overload, however, pt has been diuresed with extra bumex and also had POCUS without evidence of B lines or IVC abnormality. Likely her hypoxia is 2/2 OHS/CARLOS (she has not been using AVAPS overnight inpatient because it is hot on her face; does use trilogy at home regularly) along with some component of initial volume overload that has now resolved. It is also possible she has some underlying lung damage 2/2 prior lung infections which could also be minimally contributing, however, it is unclear why this would've started all of a sudden in the absence of acute infectious process. Reassessed supplemental O2 needs on 11/17, with decreasing O2 sat, 90% on room air and increased O2 sat, 95% at 0.5LPM. Walking test performed, showing SpO2 at 88% on room air during activity with exercise. Current supplemental O2 at 0.5 LPM.    - Continue attempting to wean from NC  - PTA bumex 2mg QD  - Encourage pt to safely increase activity to  self-assess her oxygen need   - Provided pt with and instruction on spirometer use    #Back pain likely 2/2 hospital bed + immobility  #Acute Right shoulder injury:  Pt developed back pain in AM on 11/13. Some low back bruising noted by nurse. Pt noticed back pain onset after transferring between the bed and imaging tables. Likely bruising vs. MSK pain from repeated transfers overnight for imaging and bed vs possible transfusion reaction due to receiving >6 units pRBCs at present. Pt also experienced R shoulder pain after rising from a reclining chair to go to the bathroom early 11/16. Given the injury description and exam results, R shoulder rotator cuff injury or other MSK injury likely. No indication currently for R shoulder imaging given low suspicion for dislocation or acute fracture.   - Maintain multimodal pain control with methocarbamol 500mg TID, scheduled tylenol 650 mg Q6hrs.   - Initiated lidocaine patch and bengay 11/16   - Flexeril, 7.5 mg at bedtime   - Oxycodone 5mg PRN 1 dose   -She remains agreeable to decreasing this oxycodone in anticipation of likely  discharge soon to find out a suitable pain plan for her.   - Heat pad for shoulder    #JASON, improving  Baseline 0.8-0.9. 11/17, Cr 0.98, likely pre-renal in the setting of vomiting, dehydration, anemia. Improving s/p now 6 units pRBCs.  - Hold PTA lisinopril  - Avoid nephrotoxic meds  - BMP tomorrow.     #HFpEF (TTE with EF 55-60%, 1/2020)  BNP mildly elevated at 1093 on 11/12. Pt given IV bumex in ED due to concern for acute decompensated HF given SOB. 11/12 CXR without pulmonary edema and no signs of hypervolemia on phy exam. 11/16 IVC US normal and bilateral upper lungs US showed little to no B-lines, thereby revealing no interstitial edema. Given the results, address peripheral edema with lymphedema consult for LE wraps and compression and return bumex to PTA.    - PTA 2mg bumex qday  - Restart metoprlol today  - Hold PTA lisinopril, as B/P  remains optimal     #Constipation, Resolved  Pt reports no BM x 4-5 days. Overnight bowel regimen produced BM 11/17.   - Maintain Miralax qday  - Continue doc-senna 2 tablets BID   - hold bowel regimen for loose stools    Chronic Problems  #Paroxysmal Atrial Fibrillation  - Restart metoprolol as above   - Hold PTA warfarin d/t recent bleeding    #HTN  - Hold PTA lisinopril while inpatient as pt's BP WNL and she has been getting increased diuresis.     #Hyperlipidemia  - PTA on atorvastatin    #Gout  - PTA allopurinol    #Intertrigo groin  - PTA miconazole     #OHS/CARLOS  - CPAP at night     Diet: 2 Gram Sodium Diet    DVT Prophylaxis: Holding warfarin with concern for bleeding, PCDs  Baig Catheter: Not present  Fluids: None  Central Lines: None  Code Status: Full Code      Disposition Plan   Expected discharge: to previous home setting with limited assistance.     The patient's care was discussed with the Attending, Dr. Schultz.    Marvin Cardoso, MS3  [address]  ______________________________________________________________________    Interval History   NAEON. Pt slightly less anxious today about pending pathology report. Shoulder and back pain have mildly improved with current pain mgmt plan. Pt did not take oxycodone overnight. Able to passively move R arm with increased abduction.     Slight spotting remains with decreased intermittent cramping pain. Except for reduced cramping, no other new abdominal pain.    Pt informed about plan to assess/reduce her dependence on supplemental O2. Walking testing administered and currently on 0.5 LPM. US performed today to assess HF, finding no interstitial edema.      4 point ROS otherwise negative.    Data reviewed today: I reviewed all medications, new labs and imaging results over the last 24 hours.    Physical Exam   Vital Signs: Temp: 98  F (36.7  C) Temp src: Temporal BP: 127/49 Pulse: 86   Resp: 16  SpO2: 91 % O2 Device: Nasal cannula Oxygen Delivery: 2 LPM  Weight: 372 lbs 12.8 oz  General Appearance: Sitting up in reclining chair, awake on arrival, in no acute distress  Respiratory: Normal work of breathing while on 0.5 LPM NC. Lung sounds distant. No noted cough.  Cardiovascular: RRR. Heart sounds somewhat distant. 2+ radial pulse  GI: Soft, non-tender, non-distended, bowel sounds present  Skin: Bilateral LE edema with dry skin patches, few abrasions, no erythema or tenderness. Extremities warm.   Neurological: Alert and fully oriented, easily engaged throughout interview   Psych: Pleasant, appropriate speech/language    Data   Recent Labs   Lab 11/17/21  0659 11/16/21  0720 11/15/21  1418 11/15/21  0612 11/13/21  1215 11/13/21  0913 11/11/21  2336 11/11/21  2335   WBC 10.0 11.8*  --  9.0   < > 11.8*  --  12.8*   HGB 7.3* 7.4* 7.5* 7.0*   < > 6.9*   < > 3.1*   MCV 88 88  --  88   < > 86  --  81    285  --  263   < > 256  --  367   INR 1.46* 1.58*  1.58*  --  1.66*   < > 1.21*   < >  --     137  --  137   < > 139   < > 137   POTASSIUM 3.6 3.8  --  4.2   < > 4.2   < > 5.1   CHLORIDE 100 102  --  104   < > 108   < > 106   CO2 30 31  --  30   < > 28   < > 26   BUN 18 20  --  23   < > 34*   < > 51*   CR 0.98 1.12*  --  1.02   < > 1.13*   < > 1.46*   ANIONGAP 7 4  --  3   < > 3   < > 5   KADEN 8.6 9.0  --  8.7   < > 8.3*   < > 8.7   GLC 82 84  --  120*   < > 95   < > 97   ALBUMIN  --   --   --   --   --  2.2*  --  2.3*   PROTTOTAL  --   --   --   --   --  6.5*  --  6.8   BILITOTAL  --   --   --   --   --  0.3  --  0.2  0.2   ALKPHOS  --   --   --   --   --  80  --  84   ALT  --   --   --   --   --  15  --  16   AST  --   --   --   --   --  15  --  15   TROPONIN  --   --   --   --   --   --   --  <0.015    < > = values in this interval not displayed.

## 2021-11-18 ENCOUNTER — APPOINTMENT (OUTPATIENT)
Dept: OCCUPATIONAL THERAPY | Facility: CLINIC | Age: 61
DRG: 744 | End: 2021-11-18
Payer: MEDICARE

## 2021-11-18 ENCOUNTER — DOCUMENTATION ONLY (OUTPATIENT)
Dept: MEDSURG UNIT | Facility: CLINIC | Age: 61
End: 2021-11-18
Payer: MEDICARE

## 2021-11-18 ENCOUNTER — TELEPHONE (OUTPATIENT)
Dept: OBGYN | Facility: CLINIC | Age: 61
End: 2021-11-18
Payer: MEDICARE

## 2021-11-18 VITALS
RESPIRATION RATE: 16 BRPM | OXYGEN SATURATION: 94 % | DIASTOLIC BLOOD PRESSURE: 64 MMHG | SYSTOLIC BLOOD PRESSURE: 133 MMHG | TEMPERATURE: 98.5 F | HEART RATE: 79 BPM | BODY MASS INDEX: 50.02 KG/M2 | WEIGHT: 293 LBS | HEIGHT: 64 IN

## 2021-11-18 DIAGNOSIS — I10 ESSENTIAL HYPERTENSION: ICD-10-CM

## 2021-11-18 DIAGNOSIS — I48.0 PAF (PAROXYSMAL ATRIAL FIBRILLATION) (H): ICD-10-CM

## 2021-11-18 LAB
ANION GAP SERPL CALCULATED.3IONS-SCNC: 3 MMOL/L (ref 3–14)
BUN SERPL-MCNC: 16 MG/DL (ref 7–30)
CALCIUM SERPL-MCNC: 8.8 MG/DL (ref 8.5–10.1)
CHLORIDE BLD-SCNC: 102 MMOL/L (ref 94–109)
CO2 SERPL-SCNC: 31 MMOL/L (ref 20–32)
CREAT SERPL-MCNC: 0.88 MG/DL (ref 0.52–1.04)
ERYTHROCYTE [DISTWIDTH] IN BLOOD BY AUTOMATED COUNT: 19 % (ref 10–15)
GFR SERPL CREATININE-BSD FRML MDRD: 71 ML/MIN/1.73M2
GLUCOSE BLD-MCNC: 91 MG/DL (ref 70–99)
HCT VFR BLD AUTO: 26.6 % (ref 35–47)
HGB BLD-MCNC: 7.4 G/DL (ref 11.7–15.7)
MCH RBC QN AUTO: 25.1 PG (ref 26.5–33)
MCHC RBC AUTO-ENTMCNC: 27.8 G/DL (ref 31.5–36.5)
MCV RBC AUTO: 90 FL (ref 78–100)
PLATELET # BLD AUTO: 257 10E3/UL (ref 150–450)
POTASSIUM BLD-SCNC: 3.6 MMOL/L (ref 3.4–5.3)
RBC # BLD AUTO: 2.95 10E6/UL (ref 3.8–5.2)
SODIUM SERPL-SCNC: 136 MMOL/L (ref 133–144)
WBC # BLD AUTO: 7.8 10E3/UL (ref 4–11)

## 2021-11-18 PROCEDURE — 90682 RIV4 VACC RECOMBINANT DNA IM: CPT | Performed by: HOSPITALIST

## 2021-11-18 PROCEDURE — 250N000013 HC RX MED GY IP 250 OP 250 PS 637

## 2021-11-18 PROCEDURE — 99239 HOSP IP/OBS DSCHRG MGMT >30: CPT | Mod: GC | Performed by: PEDIATRICS

## 2021-11-18 PROCEDURE — 94660 CPAP INITIATION&MGMT: CPT

## 2021-11-18 PROCEDURE — 999N000157 HC STATISTIC RCP TIME EA 10 MIN

## 2021-11-18 PROCEDURE — 36415 COLL VENOUS BLD VENIPUNCTURE: CPT

## 2021-11-18 PROCEDURE — G0008 ADMIN INFLUENZA VIRUS VAC: HCPCS | Performed by: HOSPITALIST

## 2021-11-18 PROCEDURE — 97530 THERAPEUTIC ACTIVITIES: CPT | Mod: GO

## 2021-11-18 PROCEDURE — 258N000003 HC RX IP 258 OP 636: Performed by: PEDIATRICS

## 2021-11-18 PROCEDURE — 97535 SELF CARE MNGMENT TRAINING: CPT | Mod: GO

## 2021-11-18 PROCEDURE — 80048 BASIC METABOLIC PNL TOTAL CA: CPT

## 2021-11-18 PROCEDURE — 250N000011 HC RX IP 250 OP 636: Performed by: PEDIATRICS

## 2021-11-18 PROCEDURE — 85027 COMPLETE CBC AUTOMATED: CPT

## 2021-11-18 PROCEDURE — 250N000011 HC RX IP 250 OP 636: Performed by: HOSPITALIST

## 2021-11-18 RX ORDER — BUMETANIDE 2 MG/1
2 TABLET ORAL DAILY
Qty: 30 TABLET | Refills: 0 | Status: SHIPPED | OUTPATIENT
Start: 2021-11-18 | End: 2021-11-19

## 2021-11-18 RX ORDER — ALLOPURINOL 100 MG/1
200 TABLET ORAL DAILY
Qty: 60 TABLET | Refills: 0 | Status: SHIPPED | OUTPATIENT
Start: 2021-11-18 | End: 2022-01-01

## 2021-11-18 RX ADMIN — LIDOCAINE 1 PATCH: 560 PATCH PERCUTANEOUS; TOPICAL; TRANSDERMAL at 08:24

## 2021-11-18 RX ADMIN — FERROUS GLUCONATE 324 MG: 324 TABLET ORAL at 08:23

## 2021-11-18 RX ADMIN — POLYETHYLENE GLYCOL 3350 17 G: 17 POWDER, FOR SOLUTION ORAL at 08:23

## 2021-11-18 RX ADMIN — METOPROLOL SUCCINATE 100 MG: 50 TABLET, EXTENDED RELEASE ORAL at 08:23

## 2021-11-18 RX ADMIN — METHOCARBAMOL 500 MG: 500 TABLET ORAL at 08:23

## 2021-11-18 RX ADMIN — IRON SUCROSE 300 MG: 20 INJECTION, SOLUTION INTRAVENOUS at 10:58

## 2021-11-18 RX ADMIN — METHOCARBAMOL 500 MG: 500 TABLET ORAL at 00:54

## 2021-11-18 RX ADMIN — INFLUENZA A VIRUS A/WISCONSIN/588/2019 (H1N1) RECOMBINANT HEMAGGLUTININ ANTIGEN, INFLUENZA A VIRUS A/TASMANIA/503/2020 (H3N2) RECOMBINANT HEMAGGLUTININ ANTIGEN, INFLUENZA B VIRUS B/WASHINGTON/02/2019 RECOMBINANT HEMAGGLUTININ ANTIGEN, AND INFLUENZA B VIRUS B/PHUKET/3073/2013 RECOMBINANT HEMAGGLUTININ ANTIGEN 0.5 ML: 45; 45; 45; 45 INJECTION INTRAMUSCULAR at 11:06

## 2021-11-18 RX ADMIN — BUMETANIDE 2 MG: 2 TABLET ORAL at 08:23

## 2021-11-18 RX ADMIN — POTASSIUM CHLORIDE 20 MEQ: 1500 TABLET, EXTENDED RELEASE ORAL at 08:23

## 2021-11-18 RX ADMIN — ALLOPURINOL 200 MG: 100 TABLET ORAL at 08:23

## 2021-11-18 RX ADMIN — DOCUSATE SODIUM 50 MG AND SENNOSIDES 8.6 MG 2 TABLET: 8.6; 5 TABLET, FILM COATED ORAL at 08:23

## 2021-11-18 RX ADMIN — METHOCARBAMOL 500 MG: 500 TABLET ORAL at 17:21

## 2021-11-18 RX ADMIN — ACETAMINOPHEN 650 MG: 325 TABLET, FILM COATED ORAL at 13:36

## 2021-11-18 RX ADMIN — ACETAMINOPHEN 650 MG: 325 TABLET, FILM COATED ORAL at 00:54

## 2021-11-18 RX ADMIN — ACETAMINOPHEN 650 MG: 325 TABLET, FILM COATED ORAL at 05:59

## 2021-11-18 RX ADMIN — ACETAMINOPHEN 650 MG: 325 TABLET, FILM COATED ORAL at 17:21

## 2021-11-18 RX ADMIN — ATORVASTATIN CALCIUM 20 MG: 20 TABLET, FILM COATED ORAL at 08:23

## 2021-11-18 RX ADMIN — MICONAZOLE NITRATE: 20 POWDER TOPICAL at 08:24

## 2021-11-18 RX ADMIN — Medication: at 08:24

## 2021-11-18 ASSESSMENT — ACTIVITIES OF DAILY LIVING (ADL)
ADLS_ACUITY_SCORE: 11
ADLS_ACUITY_SCORE: 12
ADLS_ACUITY_SCORE: 11
ADLS_ACUITY_SCORE: 11
ADLS_ACUITY_SCORE: 12
ADLS_ACUITY_SCORE: 11
ADLS_ACUITY_SCORE: 12
ADLS_ACUITY_SCORE: 11
ADLS_ACUITY_SCORE: 11
ADLS_ACUITY_SCORE: 12
ADLS_ACUITY_SCORE: 11
ADLS_ACUITY_SCORE: 12
ADLS_ACUITY_SCORE: 11
ADLS_ACUITY_SCORE: 12
ADLS_ACUITY_SCORE: 12

## 2021-11-18 NOTE — PROGRESS NOTES
Patient has been assessed for Home Oxygen needs. Oxygen readings:    *Pulse oximetry (SpO2) = 92% on room air at rest while awake.    *SpO2 improved to95% on 0.5 liters/minute at rest.    *SpO2 = 84% on room air during activity/with exercise.    *SpO2 improved to 93% on 0.5 liters/minute during activity/with exercise.

## 2021-11-18 NOTE — DISCHARGE SUMMARY
Physician Discharge Summary      Patient ID:  Arianna Siddiqi  [id number]  61 year old  1960     Admit date: 11/11/2021     Discharge date and time: 11/18/2021      Admitting Physician: Dr. Melvin     Discharge Physician: Dr. Schultz     Admission Diagnoses:   Abnormal vaginal bleeding [N93.9]  Acute systolic congestive heart failure (H) [I50.21]  Anemia due to blood loss, acute [D62]  Supratherapeutic INR [R79.1]  Atrial fibrillation, unspecified type (H) [I48.91]  Congestive heart failure, unspecified HF chronicity, unspecified heart failure type (H) [I50.9]  Encounter for screening laboratory testing for severe acute respiratory syndrome coronavirus 2 (SARS-CoV-2) [Z20.822]     Discharge Diagnoses:   Acute on chronic anemia 2/2 abnormal uterine bleeding of unclear etiology  HFpEF  Congential Factor VII Deficiency  Acute Hypoxic Respiratory Failure  Back pain  Right Shoulder Pain likely 2/2 MSK injury  Constipation     Admission Condition: fair     Discharged Condition: fair     Indication for Admission:   Hypoxia  Anemia     Brief HPI:  Arianna Siddiqi is a 60 yo f w/ pmhx HFpEF, morbid obesity, OHS/CARLOS, paroxysmal afib on warfarin, htn, admitted on 11/11/21 for acute anemia 2/2 uterine bleeding and hypoxia. See below for detailed information regarding each problem and its hospital progression. Currently, she is improved from her admission condition. Her uterine bleeding has considerably decreased and she has been weaned from oxygen therapy at rest. At this time, she is hemodynamically stable and appropriate for discharge to home with home therapy and nurse monitoring and home oxygen therapy with activity.      Problems addressed during hospitalization:  #Acute on chronic anemia 2/2 uterine bleeding of unclear etiology  Acute onset vaginal bleeding on 11/12. Hb 5.84 on admission- received 6uPRBC with stabilization of hemoglobin near 7.5. OBGYN consulted- D+C, pap smear, mirena IUD placement on 11/14. Pap smear  negative for intraepithelial lesion or malignancy. Bleeding has significantly improved, she now describes it as spotting.   -Benign Gynecology f/u in approximately 2 weeks.               -They will schedule the appointment per phone conversation on 11/17.   -Uterine pathology still pending. Gynecology will monitor this and adjust appointment if results not benign.      #Supratherapeutic INR  #Congenital Factor VII deficiency  #Hx paroxysmal afib on wafarin  INR 5.84 on admission, given Kcentra w/ adequate reversal. Heme/onc consulted. Coagulation studies notable for congenital factor VII deficiency. This does not explain pt's bleeding, as there is enough factor VII for hemostasis, however, it does explain prolonged INR. Given 2 doses IV iron sucrose 300mg.   -No heme f/u needed  -Heme recommends holding warfarin until Hb reaches 10.               -PCP to recheck CBC + INR at f/u appointment in 1 week.               -They would recommend switching patient to a DOAC, if able, to avoid INR           prolongation.  -C/w PO iron     #Acute Hypoxic Respiratory Failure   #Hx OHS/CARLOS  Pt Hypoxic on presentation and requiring 6L NC. This has since been weaned to room air over course of hospitalization, however, she is requiring 1L with ambulation. POCUS demonstrated IVC WNL + no B lines, suggestion she is not hypervolemic. She has not been using an AVAPS machine while inpatient d/t pt preference. Given this, likely OHS/CARLOS and atelectasis, in combination with heart failure, significant contributing factors to patient's need for oxygen.   -continue with home trilogy machine at night  -Home oxygen therapy: no oxygen needed at rest; 1L with activity.  -Encouraged continued use of incentive spirometer (pt has device at home)     #HFpEF:  #Lymphedema:  #Hx HTN  Original concern for decompensated HF d/t hypoxia and elevated BNP at 1093, however, this appeared less likely throughout hospitalization as no signs of hypervolemia on  exam or POCUS. Did receive extra doses of IV and PO bumex with increased UOP. Metoprolol and lisinopril held on admission d/t bleeding. Metoprolol since restarted. Pt has remained normotensive throughout admission. Continues to have non-pitting LE edema.  -PCP to monitor Cr on BMP.   -Referral placed for CORE/HF clinic f/u.   -Lymphedema team consulted              -Compression stockings applied   -Restart pta lisinopril at discharge  -c/w pta metoprolol.   -continue 2mg bumex qday     #Acute back pain:  #acute right shoulder pain:  Back pain onset on 11/13 after transferring beds, mild bruising noted by nurse. Pain has improved with spending more time in recliner than hospital bed. Likely MSK injury 2/2 direct bruising or strain while transferring. Also noted acute onset right shoulder pain on 11/16 when getting up from her chair. Positive empty can test, tenderness to palpation on anterior shoulder- most consistent with rotator cuff injury. No injury indicated as no fracture or dislocation suspected. Given short course of oxy while inpatient.   -c/w methocarbomol 500mg TID  -Tylenol, Bengay, lidocaine patch, PRN  -c/w flexeril 5mg      #Constipation:  Likely 2/2 opioid use and inactivity. 5 days w/o bm. Bm noted on 11/17.   -she can continue with miralax and doc-senna as needed     #Deconditioning  #Morbid Obesity  -Home PT and OT scheduled  -Home nursing 3x/wk     Discharge Exam:  General: Awake, alert, sitting in chair, engages in conversation, nontoxic  HEENT: Pupils equal and round, No scleral icterus  CV: distant heart sounds; RRR, no m/r/g, 2+ radial pulses  Resp: BLCTA, no noted cough, breathing comfortably on RA  Abd: Soft, nontender, nondistended  Ext: Warm, no pitting edema, but chronic lymphedema present  Neuro: AOx3, spontaneously moving all extremities  Psych: pleasant, appropriate speech/language     Disposition: home     Patient Instructions:         Current Discharge Medication List              CONTINUE these medications which have NOT CHANGED     Details   acetaminophen (TYLENOL) 500 MG tablet Take 1-2 tablets (500-1,000 mg) by mouth every 4 hours as needed for mild pain  Refills: 0     Associated Diagnoses: Arthritis       allopurinol (ZYLOPRIM) 100 MG tablet Take 2 tablets (200 mg) by mouth daily For additional refills, please schedule annual appointment at [phone], overdue for labs  Qty: 60 tablet, Refills: 1     Associated Diagnoses: Gout, unspecified cause, unspecified chronicity, unspecified site       aspirin (ASA) 300 MG suppository Place 300 mg rectally At Bedtime       atorvastatin (LIPITOR) 20 MG tablet Take 1 tablet (20 mg) by mouth daily  Qty: 90 tablet, Refills: 3     Associated Diagnoses: Hyperlipidemia, unspecified hyperlipidemia type       bumetanide (BUMEX) 2 MG tablet Take 1 tablet (2 mg) by mouth daily  Qty: 90 tablet, Refills: 3     Associated Diagnoses: PAF (paroxysmal atrial fibrillation) (H); Essential hypertension       ferrous gluconate (FERGON) 324 (38 Fe) MG tablet Take 1 tablet (324 mg) by mouth daily  Qty: 90 tablet, Refills: 0     Comments: DX Code Needed  .  Associated Diagnoses: Iron deficiency       lisinopril (ZESTRIL) 5 MG tablet Take 1 tablet (5 mg) by mouth daily  Qty: 90 tablet, Refills: 3     Associated Diagnoses: Essential hypertension       metoprolol succinate ER (TOPROL-XL) 100 MG 24 hr tablet Take 1 tablet (100 mg) by mouth daily  Qty: 90 tablet, Refills: 3     Associated Diagnoses: PAF (paroxysmal atrial fibrillation) (H)       miconazole (MICATIN) 2 % external powder APPLY TO AFFECTED AREA TWICE A DAY  Qty: 90 g, Refills: 0     Associated Diagnoses: Fungal infection       ondansetron (ZOFRAN-ODT) 4 MG ODT tab Take 1 tablet (4 mg) by mouth every 6 hours as needed for nausea or vomiting  Qty: 10 tablet, Refills: 0     Associated Diagnoses: Vomiting, intractability of vomiting not specified, presence of nausea not specified, unspecified vomiting type        polyethylene glycol (MIRALAX) packet Take 17 g by mouth daily as needed for constipation  Qty: 30 packet, Refills: 1     Associated Diagnoses: Constipation, unspecified constipation type       potassium chloride ER (K-TAB) 20 MEQ CR tablet Take 1 tablet (20 mEq) by mouth 2 times daily  Qty: 180 tablet, Refills: 3     Associated Diagnoses: Essential hypertension; Hypertension, unspecified type; Gout, unspecified cause, unspecified chronicity, unspecified site; Hyperlipidemia, unspecified hyperlipidemia type       sennosides (SENOKOT) 8.6 MG tablet Take 1 tablet by mouth 2 times daily as needed for constipation  Qty: 60 tablet, Refills: 0     Associated Diagnoses: Constipation, unspecified constipation type       traMADol (ULTRAM) 50 MG tablet TAKE 1 TABLET (50 MG) BY MOUTH NIGHTLY AS NEEDED FOR SEVERE PAIN *INS LIMIT 7 DAYS  Qty: 7 tablet, Refills: 0     Comments: This request is for a new prescription for a controlled substance as required by Federal/State law. DX Code Needed  .  Associated Diagnoses: Anemia, unspecified type; Anticoagulated on Coumadin; Pain       alum & mag hydroxide-simethicone (MAALOX  ES) 400-400-40 MG/5ML SUSP suspension Take 15 mLs by mouth every 4 hours as needed for other (gastric distress.)  Qty: 355 mL, Refills: 0     Associated Diagnoses: Dyspepsia       blood glucose monitoring (ACCU-CHEK NINA PLUS) meter device kit Use to test blood sugars 3 times daily or as directed.  Qty: 1 kit, Refills: 0     Associated Diagnoses: Diabetes mellitus, type 2 (H)       blood glucose monitoring (SOFTCLIX) lancets Use to test blood sugar 3 times daily or as directed.  Qty: 300 each, Refills: 0     Associated Diagnoses: Type 2 diabetes mellitus with chronic kidney disease, without long-term current use of insulin, unspecified CKD stage (H)       cyclobenzaprine (FLEXERIL) 5 MG tablet Take 1 tablet (5 mg) by mouth nightly as needed for muscle spasms  Qty: 30 tablet, Refills: 5     Associated Diagnoses:  Muscle spasm       diphenhydrAMINE (BENADRYL) 25 MG tablet Take 25 mg by mouth every 6 hours as needed for itching or allergies                STOP taking these medications         warfarin ANTICOAGULANT (COUMADIN) 1 MG tablet Comments:   Reason for Stopping:            warfarin ANTICOAGULANT (COUMADIN) 3 MG tablet Comments:   Reason for Stopping:            warfarin ANTICOAGULANT (COUMADIN) 3 MG tablet Comments:   Reason for Stopping:                 Activity: activity as tolerated  Diet: 2g Na Diet  Wound Care: none needed     Follow-Up Needed:  -PCP in 7 days.               -Needs CBC, BMP, and INR before appointment.              -warfarin held until hb reaches 10. Recommend seeing if pt can start a DOAC as  opposed to warfarin d/t influence Factor VII Deficiency has on INR.   -Gynecology F/U in approximately 2 weeks              -To be scheduled by gynecology. Discussed with team on 11/17.  -F/u with [facility] Heart Failure Clinic.               -Order placed on 11/17     Signed:  Michelle Pineda MD  11/17/2021  1:46 PM

## 2021-11-18 NOTE — TREATMENT PLAN
I certify that this patient, Arianna Siddiqi has been under my care (or a nurse practitioner or physican's assistant working with me). This is the face-to-face encounter for oxygen medical necessity.      Arianna Siddiqi is now in a chronic stable state and continues to require supplemental oxygen. Patient has continued oxygen desaturation due to Chronic Heart Failure I50 and CARLOS/OHS.    Alternative treatment(s) tried or considered and deemed clinically infective for treatment of Chronic Heart Failure I50 and CARLOS/OHS include overnight ventilator support, diuresis, incentive spirometry. .  If portability is ordered, is the patient mobile within the home? yes    **Patients who qualify for home O2 coverage under the CMS guidelines require ABG tests or O2 sat readings obtained closest to, but no earlier than 2 days prior to the discharge, as evidence of the need for home oxygen therapy. Testing must be performed while patient is in the chronic stable state. See notes for O2 sats.**

## 2021-11-18 NOTE — CONSULTS
"61 year old female presenting with abnormal vaginal bleeding. Has cardiomyopthy. CORE consult requested.     Arianna was provided with a CHF book.  I reviewed the importance of daily weights, 2 gm sodium diet, 2L fluid restriction and compliance with medications upon hospital discharge.  CORE contact phone numbers provided and patient is encouraged to call with any questions or concerns, including any weight gain or loss of 2 or more pounds in 24 hours or 5 or more pounds in 1 week.      Appointment made in CORE clinic on 21 at 8:30.  I will follow-up with the patient at that time, sooner if needed.  Thank you for the consult.    Luis A Powers RN  Cardiology Care Coordinator - Heart Failure  [facility]  Questions and [phone]  First press #1 for the [place] and then press #3 for \"Medical Advise\" to reach us Cardiology Nurses.    "

## 2021-11-18 NOTE — PLAN OF CARE
"Ready for discharge home with [facility]. Waiting home O2 delivery. Ride set up via [employer]  AT 8PM. PIV removed and belongings returned from security. Ambulates independently with walker in room. Good appetite. Vitals stable. BP (!) 143/71 (BP Location: Right arm)   Pulse 98   Temp 97.8  F (36.6  C) (Oral)   Resp 18   Ht 1.626 m (5' 4.02\")   Wt (!) 169.1 kg (372 lb 12.8 oz)   LMP 11/09/2021   SpO2 96%   BMI 63.96 kg/m          "

## 2021-11-18 NOTE — TELEPHONE ENCOUNTER
----- Message from Wendy Walters RN sent at 11/17/2021  3:00 PM CST -----  Regarding: FW: Follow-up pathology    ----- Message -----  From: Vicki Hodge MD  Sent: 11/17/2021   1:51 PM CST  To: [facility], #  Subject: Follow-up pathology                              Hello!    This patient is currently hospitalized (primary team is medicine) and we were consulted for vaginal bleeding. A D&C was performed and we are now just waiting on results. Can we get her an appointment for follow-up in 2 weeks or so to discuss her results and see how her bleeding is doing?    Thank you so much and let me know if you have any questions/concerns.  Vicki

## 2021-11-18 NOTE — PROGRESS NOTES
Assumed cares of pt [id number]. Pt is AOVSS on 1L NC while at rest. Pain managed with scheduled Tylenol, Lidocaine patch and Robaxin. CPAP on for bedtime, pt slept intermittently as she was hot and not very comfortable with hospital CPAP. Ambulating to bathroom with Ax1 and walker. Voiding spontaneously. Pt having productive cough at the beginning of the night and encouraged to use her IS while awake. Continue POC

## 2021-11-18 NOTE — PROGRESS NOTES
Care Management Discharge Note    Discharge Date: 11/18/2021     Discharge Disposition:  Home with skilled home care services    Discharge Services:  See below    Discharge DME:      Discharge Transportation:  (Has [facility] )    Private pay costs discussed: Not applicable    Patient/family educated on Medicare website which has current facility and service quality ratings:  yes    Education Provided on the Discharge Plan:  yes  Persons Notified of Discharge Plans: Patient  Patient/Family in Agreement with the Plan:  Yes    Handoff Referral Completed: Yes    Additional Information: Home care agency, Oxygen Company and Transport information:    Please fax discharge orders to [facility], [place]     Ph: [phone]     Fax: [phone]     Skilled home care RN for initial home safety evaluation and 1-3 times a week to evaluate medication management, nutrition and hydration evaluation, endurance evaluation, and general status evaluation after discharge from the acute care hospital setting.     Skilled home care Physical Therapy and Occupational Therapy to evaluate and treat in home setting per recommendations of the inpatient Rehabilitation consult team.     Home Care agency to provide a Home Health Aide for bathing and grooming 3 times a week for 2 weeks after discharge at which time skilled home care RN to evaluate for ongoing bathing assistance needs.        Transportation Benefits     [facility] Transportation     Phone: 3       Home Oxygen :  [facility]    Ph:  [phone]    Home Oxygen per MD orders    Discharge today per MD orders and patient will follow up as designated in discharge orders.  Medical Transportation arranged for 8:00 p.m.this evening via stretcher (PCS TRANSPORTATION FORM ON FRONT OF HARDCOVER CHART ON PCU 7C) with [facility] [employer] .  Patient has the keys to her apartment.  Secondary to no wheelchair  for bariatric need, transportation company stated stretcher transport needed to be the mode of transport.    OMKAR Lockett.S.LINDY., R.N., P.H.N..  Care Coordinator     Pager   [facility]

## 2021-11-18 NOTE — PROGRESS NOTES
Received intake call for home oxygen at 10:12AM. Reviewed patient's chart; Patient qualifies under insurance guidelines and all documentation is in the chart including a good order.   12:46PM- Called to offer choice and patient is okay with [facility] setting them up. Discussed equipment with patient and informed them that we would be to bedside with oxygen in the next 2 hours.   12:54PM- Spoke with care coordinator, LETTY, confirmed we received the order, and provided them with ETA of oxygen.

## 2021-11-19 ENCOUNTER — TELEPHONE (OUTPATIENT)
Dept: ANTICOAGULATION | Facility: CLINIC | Age: 61
End: 2021-11-19
Payer: MEDICARE

## 2021-11-19 ENCOUNTER — PATIENT OUTREACH (OUTPATIENT)
Dept: CARE COORDINATION | Facility: CLINIC | Age: 61
End: 2021-11-19
Payer: MEDICARE

## 2021-11-19 DIAGNOSIS — Z79.01 LONG TERM CURRENT USE OF ANTICOAGULANT THERAPY: Primary | ICD-10-CM

## 2021-11-19 DIAGNOSIS — I48.20 CHRONIC ATRIAL FIBRILLATION (H): ICD-10-CM

## 2021-11-19 DIAGNOSIS — Z71.89 OTHER SPECIFIED COUNSELING: ICD-10-CM

## 2021-11-19 DIAGNOSIS — I48.91 ATRIAL FIBRILLATION, UNSPECIFIED TYPE (H): ICD-10-CM

## 2021-11-19 DIAGNOSIS — I48.91 ATRIAL FIBRILLATION (H): ICD-10-CM

## 2021-11-19 LAB
HUMAN PAPILLOMA VIRUS 16 DNA: NEGATIVE
HUMAN PAPILLOMA VIRUS 18 DNA: NEGATIVE
HUMAN PAPILLOMA VIRUS FINAL DIAGNOSIS: NORMAL
HUMAN PAPILLOMA VIRUS OTHER HR: NEGATIVE
PATH REPORT.COMMENTS IMP SPEC: NORMAL
PATH REPORT.COMMENTS IMP SPEC: NORMAL
PATH REPORT.FINAL DX SPEC: NORMAL
PATH REPORT.GROSS SPEC: NORMAL
PATH REPORT.MICROSCOPIC SPEC OTHER STN: NORMAL
PATH REPORT.RELEVANT HX SPEC: NORMAL
PHOTO IMAGE: NORMAL

## 2021-11-19 PROCEDURE — 88305 TISSUE EXAM BY PATHOLOGIST: CPT | Mod: 26 | Performed by: PATHOLOGY

## 2021-11-19 RX ORDER — BUMETANIDE 2 MG/1
TABLET ORAL
Qty: 90 TABLET | Refills: 0 | Status: SHIPPED | OUTPATIENT
Start: 2021-11-19 | End: 2021-01-01

## 2021-11-19 NOTE — PROGRESS NOTES
[facility]: Post-Discharge Note  SITUATION                                                      Admission:    Admission Date: 11/11/21   Reason for Admission: Abnormal vaginal bleeding  Discharge:   Discharge Date: 11/18/21  Discharge Diagnosis: Acute on chronic anemia 2/2 abnormal uterine bleeding of unclear etiology    BACKGROUND                                                    Arianna Siddiqi is a 62 yo f w/ pmhx HFpEF, morbid obesity, OHS/CARLOS, paroxysmal afib on warfarin, htn, admitted on 11/11/21 for acute anemia 2/2 uterine bleeding and hypoxia. See below for detailed information regarding each problem and its hospital progression. Currently, she is improved from her admission condition. Her uterine bleeding has considerably decreased and she has been weaned from oxygen therapy at rest. At this time, she is hemodynamically stable and appropriate for discharge to home with home therapy and nurse monitoring and home oxygen therapy with activity.          ASSESSMENT      Enrollment  Primary Care Care Coordination Status: Declined (Will call if interested)    Discharge Assessment  How are you doing now that you are home?: Patient said the she is doing okay, just tired.  How are your symptoms? (Red Flag symptoms escalate to triage hotline per guidelines): Improved  Do you feel your condition is stable enough to be safe at home until your provider visit?: Yes  Does the patient have their discharge instructions? : Yes  Does the patient have questions regarding their discharge instructions? : No (Patient said that [facility] suppose to call Sunday to set up Monday [facility].)  Were you started on any new medications or were there changes to any of your previous medications? : Yes  Does the patient have all of their medications?: Yes  Do you have questions regarding any of your medications? : No  Do you have all of your needed medical supplies or equipment (DME)?  (i.e. oxygen tank, CPAP, cane, etc.): Yes (Patient is waiting  for callback regarding ventilator.)  Discharge follow-up appointment scheduled within 14 calendar days? : No  Discharge Follow Up Appointment Date: 12/07/21  Discharge Follow Up Appointment Scheduled with?: Specialty Care Provider  Is patient agreeable to assistance with scheduling? : No    Post-op ([facility] CTA Only)  If the patient had a surgery or procedure, do they have any questions for a nurse?: Yes (see comment)             PLAN                                                      Outpatient Plan:  PCP in 7 days.               -Needs CBC, BMP, and INR before appointment.              -warfarin held until hb reaches 10. Recommend seeing if pt can start a DOAC as  opposed to warfarin d/t influence Factor VII Deficiency has on INR.   -Gynecology F/U in approximately 2 weeks              -To be scheduled by gynecology. Discussed with team on 11/17.  -F/u with [facility] Heart Failure Clinic.               -Order placed on 11/17    Future Appointments   Date Time Provider Department Center   12/7/2021  8:00 AM  LAB [facility]   12/7/2021  8:30 AM Dora Otero NP [facility]   12/17/2021  8:30 AM Gagan Quiles MD [facility]   12/22/2021 12:00 PM Duane Uriarte MD [facility]         For any urgent concerns, please contact our 24 hour nurse triage line: [phone] ([phone])         JANNETTE Jones  [phone]  [facility]

## 2021-11-19 NOTE — TELEPHONE ENCOUNTER
ANTICOAGULATION  MANAGEMENT: Discharge Review    Arianna Siddiqi chart reviewed for anticoagulation continuity of care    Hospital Admission on 11/11/21 - 11/18/21 for acute anemia secondary to uterine bleeding and hypoxia.    Discharge disposition: Home with Home Care    Results:    Recent labs: (last 7 days)     11/12/21  1344 11/12/21  2146 11/13/21  0913 11/14/21  0730 11/14/21  1637 11/15/21  0612 11/16/21  0720 11/17/21  0659   INR 1.31* 1.19* 1.21* 1.40* 1.44* 1.66* 1.58*  1.58* 1.46*     Anticoagulation inpatient management:     held warfarin due to bleeding, low hgb, INR supra therapeutic on admission     Anticoagulation discharge instructions:     Warfarin dosing: hold warfarin until provider follow up visit on next week.  Arianna has not set this up yet.  Warfarin is on hold until hgb reaches 10.  They suggested switching to a DOAC--will follow up with Arianna after she sees her primary provider.   Bridging: No   INR goal change: No      Medication changes affecting anticoagulation: No    Additional factors affecting anticoagulation: Yes: holding warfarin,  Recovering from hospital stay.    Plan     Agree with dosing adjustment on discharge    Spoke with Arianna    Anticoagulation Calendar updated    Paradise Pizarro RN

## 2021-11-19 NOTE — PLAN OF CARE
Occupational Therapy Discharge Summary    Reason for therapy discharge:    Discharged to home with home therapy.    Progress towards therapy goal(s). See goals on Care Plan in [facility] electronic health record for goal details.  Goals partially met.  Barriers to achieving goals:   discharge from facility.    Therapy recommendation(s):    Continued therapy is recommended.  Rationale/Recommendations:  to progress functional endurance and increase independence with ADL/IADLs within home environment.

## 2021-11-19 NOTE — PROGRESS NOTES
Discharge orders and meds reviewed with patient. Pt is agreeable with current plan, and feel safe being discharged tonight. All personal belongings sent with pt. Patient discharged home via stretcher by [facility] transportation.

## 2021-11-23 ENCOUNTER — TELEPHONE (OUTPATIENT)
Dept: INTERNAL MEDICINE | Facility: CLINIC | Age: 61
End: 2021-11-23
Payer: MEDICARE

## 2021-11-23 NOTE — TELEPHONE ENCOUNTER
[facility] Call Center    Phone Message    May a detailed message be left on voicemail: yes     Reason for Call: Order(s): Home Care Orders: Skilled Nursing:   SN: Start of Care 11/29/21 per patients request, please review and follow up with verbal orders thank you.   Action Taken: Message routed to:  [facility] pcc    Travel Screening: Not Applicable

## 2021-11-24 ENCOUNTER — PATIENT OUTREACH (OUTPATIENT)
Dept: ONCOLOGY | Facility: CLINIC | Age: 61
End: 2021-11-24
Payer: MEDICARE

## 2021-11-24 NOTE — TELEPHONE ENCOUNTER
Verbal orders given to Paloma from [facility], per Dr. Quiles, for Order(s): Home Care Orders: Skilled Nursing: SN: Start of Care 11/29/21 per patients request. Trena Sorenson LPN 11/24/2021 8:58 AM

## 2021-11-24 NOTE — TELEPHONE ENCOUNTER
Patient reached out with questions and concerns about recent surgery, pathology and needed follow up.     Patient reviewed her hospital course with RN.    Patient was wanting clarification/questions on:  ~ Final pathology report  ~ Questions about IUD  ~ Follow up plan  ~ Symptoms    RN addressed all the above to the best of her ability.     RN reviewed needed follow up based on the hospital discharge report. Per discharge patient needs close follow up with benign OBGYN     Patient was given clinic information in-case of any other questions     Patient appreciative of the RN time    Alicia Manuel RN

## 2021-11-26 ENCOUNTER — TELEPHONE (OUTPATIENT)
Dept: ANTICOAGULATION | Facility: CLINIC | Age: 61
End: 2021-11-26
Payer: MEDICARE

## 2021-11-26 ENCOUNTER — TELEPHONE (OUTPATIENT)
Dept: CARDIOLOGY | Facility: CLINIC | Age: 61
End: 2021-11-26
Payer: MEDICARE

## 2021-11-26 NOTE — TELEPHONE ENCOUNTER
Addendum 12/13/21  Gagan Quiles MD Hoekstra, Ying TORRES, RN  Dear Ying;     She sees Dr. Uriarte Cardiology 1/22/2021 and me on 12/17/2021 and will hold for now     CAROL Stevens                 Pt called reporting that she still has to continue to hold her Warfarin and that she is seeing Cardiology on 12/6 and will be scheduled to get labs. Pt still isn't sure if a DOAC will be prescribed or not. Will follow-up with the patient on 12/6 to see if DOAC is started or if pt will resume Warfarin. Pt communicated understanding.

## 2021-11-26 NOTE — TELEPHONE ENCOUNTER
[facility] Call Center    Phone Message    May a detailed message be left on voicemail: yes     Reason for Call: Other: Pt is calling for advice on her upcoming appt on 12/7/21. Please caller at [phone] thank-you.     Action Taken: Message routed to:  [facility] cardio    Travel Screening: Not Applicable

## 2021-11-29 ENCOUNTER — LAB (OUTPATIENT)
Dept: LAB | Facility: CLINIC | Age: 61
End: 2021-11-29
Payer: MEDICARE

## 2021-11-29 DIAGNOSIS — D62 ANEMIA DUE TO BLOOD LOSS, ACUTE: ICD-10-CM

## 2021-11-29 DIAGNOSIS — N17.9 ACUTE KIDNEY INJURY (H): ICD-10-CM

## 2021-11-29 LAB
ANION GAP SERPL CALCULATED.3IONS-SCNC: 5 MMOL/L (ref 3–14)
BUN SERPL-MCNC: 14 MG/DL (ref 7–30)
CALCIUM SERPL-MCNC: 9.3 MG/DL (ref 8.5–10.1)
CHLORIDE BLD-SCNC: 104 MMOL/L (ref 94–109)
CO2 SERPL-SCNC: 29 MMOL/L (ref 20–32)
CREAT SERPL-MCNC: 1.08 MG/DL (ref 0.52–1.04)
ERYTHROCYTE [DISTWIDTH] IN BLOOD BY AUTOMATED COUNT: 19.8 % (ref 10–15)
GFR SERPL CREATININE-BSD FRML MDRD: 55 ML/MIN/1.73M2
GLUCOSE BLD-MCNC: 99 MG/DL (ref 70–99)
HCT VFR BLD AUTO: 33.9 % (ref 35–47)
HGB BLD-MCNC: 9.2 G/DL (ref 11.7–15.7)
MCH RBC QN AUTO: 24.6 PG (ref 26.5–33)
MCHC RBC AUTO-ENTMCNC: 27.1 G/DL (ref 31.5–36.5)
MCV RBC AUTO: 91 FL (ref 78–100)
PLATELET # BLD AUTO: 341 10E3/UL (ref 150–450)
POTASSIUM BLD-SCNC: 3.9 MMOL/L (ref 3.4–5.3)
RBC # BLD AUTO: 3.74 10E6/UL (ref 3.8–5.2)
SODIUM SERPL-SCNC: 138 MMOL/L (ref 133–144)
WBC # BLD AUTO: 7.3 10E3/UL (ref 4–11)

## 2021-11-29 PROCEDURE — 80048 BASIC METABOLIC PNL TOTAL CA: CPT

## 2021-11-29 PROCEDURE — 85027 COMPLETE CBC AUTOMATED: CPT

## 2021-11-29 PROCEDURE — 36415 COLL VENOUS BLD VENIPUNCTURE: CPT

## 2021-11-29 NOTE — TELEPHONE ENCOUNTER
Returned call to Arianna. Discussed that no changes would be made to warfarin/DOAC until we recheck hemoglobin. Per discharge summary, she was to see pcp for lab recheck and discussion of this in 1 week. Last lab was on 11/18 before discharge. Faxed orders to home care to be rechecked this week. PEr discharge summary, discuss doac start if hemoglobin up to 10. Reviewed with Arianna that we need labs before this discussion can start. She states understanding. Reviewed appointments with [facility] for next week. She states it is hard for her to get out of her apartment due to dizziness. Reviewed she should contact her transportation company to see if they have door to door options or if she will need to get to main entrance. She will reach out to them and let us know if she cannot get to clinic for appts. Will start with labs this week and go from there. Arianna states understanding, no questions at this time.

## 2021-11-30 ENCOUNTER — TELEPHONE (OUTPATIENT)
Dept: INTERNAL MEDICINE | Facility: CLINIC | Age: 61
End: 2021-11-30
Payer: MEDICARE

## 2021-11-30 NOTE — TELEPHONE ENCOUNTER
Called Grisel and left a secure VM. Gave verbal orders per  for Order(s): Home Care Orders: Skilled Nursing:  Orders for skilled 1x week for2 weeks  and every other week for 6 weeks and 3 PRN s     Patient must keep her appointment with  on 12/17 for hospital follow up, espeically since patient has not been seen at [facility] for over a year.         Elver Sevilla CMA ([facility]) at 9:23 AM on 11/30/2021

## 2021-11-30 NOTE — TELEPHONE ENCOUNTER
[facility] Call Center    Phone Message    May a detailed message be left on voicemail: yes     Reason for Call: Order(s): Home Care Orders: Skilled Nursing:  Orders for skilled 1x week for2 weeks  and every other week for 6 weeks and 3 PRN s     Action Taken: Message routed to:  [facility] PCC    Travel Screening: Not Applicable

## 2021-12-02 DIAGNOSIS — I50.30 HEART FAILURE WITH PRESERVED EJECTION FRACTION, NYHA CLASS I (H): Primary | ICD-10-CM

## 2021-12-02 NOTE — TELEPHONE ENCOUNTER
I contacted the pt and notified her that the appointment is going to be virtual on Monday and the labs will be drawn next week. Per Michleine HURLEY

## 2021-12-02 NOTE — TELEPHONE ENCOUNTER
Arianna called back in and states that she can't come in. She is requesting to change it to a phone visit instead. Her homecare nurse lester labs yesterday. Please call her back to discuss

## 2021-12-04 ENCOUNTER — TELEPHONE (OUTPATIENT)
Dept: INTERNAL MEDICINE | Facility: CLINIC | Age: 61
End: 2021-12-04

## 2021-12-04 DIAGNOSIS — D64.9 ANEMIA, UNSPECIFIED TYPE: Primary | ICD-10-CM

## 2021-12-04 DIAGNOSIS — N93.9 UTERINE BLEEDING: ICD-10-CM

## 2021-12-04 NOTE — TELEPHONE ENCOUNTER
Dear Danette;    Please see results letter and help coordinate    CAROL Stevens       Dear Arianna;    Your hemoglobin is better but still low and have the following recommendations:    (1) keep your 12/17/2021 follow up appointment with me    (2) you need to see the gynecology providers and I placed a referral     (3) you need to see the hematology providers and I placed a referral     My nurse Danette will help coordinate this for you.    CAROL Quiles MD    Pt called and reviewed plan of care. Pt states she is homebound and is unable to go to any appointment. She is unable to walk with a cane or walker. Unable to use a wheelchair without someone pushing her.  Pt states she is unable to come to any appointments.    After reading [facility] notes-called pt-left message to call back.  Danette Mayfield RN 9:49 AM on 12/7/2021.

## 2021-12-07 ENCOUNTER — VIRTUAL VISIT (OUTPATIENT)
Dept: CARDIOLOGY | Facility: CLINIC | Age: 61
End: 2021-12-07
Attending: NURSE PRACTITIONER
Payer: MEDICARE

## 2021-12-07 DIAGNOSIS — R11.10 VOMITING, INTRACTABILITY OF VOMITING NOT SPECIFIED, PRESENCE OF NAUSEA NOT SPECIFIED, UNSPECIFIED VOMITING TYPE: ICD-10-CM

## 2021-12-07 DIAGNOSIS — M10.9 GOUT, UNSPECIFIED CAUSE, UNSPECIFIED CHRONICITY, UNSPECIFIED SITE: ICD-10-CM

## 2021-12-07 DIAGNOSIS — E78.5 HYPERLIPIDEMIA, UNSPECIFIED HYPERLIPIDEMIA TYPE: ICD-10-CM

## 2021-12-07 DIAGNOSIS — I10 HYPERTENSION, UNSPECIFIED TYPE: ICD-10-CM

## 2021-12-07 DIAGNOSIS — I50.30 HEART FAILURE WITH PRESERVED EJECTION FRACTION, NYHA CLASS I (H): Primary | ICD-10-CM

## 2021-12-07 DIAGNOSIS — I10 ESSENTIAL HYPERTENSION: ICD-10-CM

## 2021-12-07 PROCEDURE — 99443 PR PHYSICIAN TELEPHONE EVALUATION 21-30 MIN: CPT | Mod: 95 | Performed by: NURSE PRACTITIONER

## 2021-12-07 RX ORDER — ONDANSETRON 4 MG/1
4 TABLET, ORALLY DISINTEGRATING ORAL EVERY 6 HOURS PRN
Qty: 20 TABLET | Refills: 0 | Status: SHIPPED | OUTPATIENT
Start: 2021-12-07 | End: 2022-01-01

## 2021-12-07 RX ORDER — SPIRONOLACTONE 25 MG/1
25 TABLET ORAL DAILY
Qty: 30 TABLET | Refills: 1 | Status: SHIPPED | OUTPATIENT
Start: 2021-12-07 | End: 2021-01-01

## 2021-12-07 RX ORDER — POTASSIUM CHLORIDE 1500 MG/1
20 TABLET, EXTENDED RELEASE ORAL DAILY
Qty: 90 TABLET | Refills: 3 | Status: SHIPPED | OUTPATIENT
Start: 2021-12-07 | End: 2021-01-01

## 2021-12-07 NOTE — LETTER
"12/7/2021      RE: Arianna Siddiqi  [address]  [place] [address]       Arianna is a 61 year old who is being evaluated via a billable telephone visit.      What phone number would you like to be contacted at? [phone]  How would you like to obtain your AVS? Mail a copy      [facility] -- [facility] Evaluation -- Telephone Visit  December 7, 2021    HPI:   Ms. Arianna Siddiqi is a 61yr old female with a history of heavy smoking (quit 2002), morbid obesity, pAFib, HFpEF (LVEF 55-60% per TTE 1/2020), CARLOS, congenital factor VII deficiency, DMII, HTN, and chronic anemia who is being evaluated via telephone for initial CORE evaluation and for follow-up of recent hospitalization.    She presented to the ER 11/11 with worsening shortness of breath and acute on chronic vaginal spotting.  Her hemoglobin was 3.1 and INR 5.8 on arrival.  GYN was consulted, and she underwent a D&C, pap smear (negative for intraepithelial lesion and malignancy), and mirena IUD placement on 11/14.  She was ultimately transfused a total of 6u PRBCs with subsequent stabilization of hemoglobin.  Her INR was reversed.  Hematology was consulted, and she was found to have congenital factor VII deficiency --> which described her supratherapeutic INR, but not her bleeding (she had enough factor VII for hemostasis).  She was given 2 doses of IV iron, and ultimately discharged 11/18 with the plan to hold warfarin and possibly transition to DOAC pending Hgb trend.      Since discharge, she states that she is \"slowly getting along.\"  She is taking everything slowly.  She gets lightheaded when she exerts herself, which has improved somewhat overall.  She she states that she used to feel like she was going to pass out whenever she exerted herself, but now she only feels mildly dizzy.  She has been trying to walk around her residence, stating that she is able to complete a loop 2-3x before becoming short of breath.  She feels like " "her strength and endurance are improving, but not by much.  She does feel shaky when walking, and still feels weak.  She has not been using her walker.  She states that she has to go down stairs to retrieve her mail, which is still challenging for her.  Her breathing status has improved overall.  She has been doing incentive spirometry several times each day, and has been working on deep breathing.  She sleeps in a recliner (since 2016), stating that she lies \"skilled nursing\" for comfort.  She denies PND and orthopnea.  She has used BiPAP since 2016.  Her appetite is down, stating that her tastes have changed.  She is eating regularly, roughly 2 meals/day.  She has regular bowel movements every morning, and notes that her stools are back to normal.  She was constipated while in the hospital, which has since resolved.  She is occasionally nauseated when eating, and will get diarrhea depending on what she eats.  She has vomited once since discharge, and is unsure if this was related to something she ate.  Her weight has been stable, ranging 369-371#, and she denies fluid retention.  Her BP today was 130/68.  She is unsure what her heart rates have been.  She had a \"splitting\" headache yesterday, for which she took 2 Tylenol at bedtime with resolution.  She otherwise denies chest pain, new palpitations, falls, acute vision changes, fevers, chills, cough, sore throat, and signs bleeding.      PAST MEDICAL HISTORY:  Past Medical History:   Diagnosis Date     CHF (congestive heart failure) (H)      CKD (chronic kidney disease)      Compliance poor      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     Diabetes mellitus (H)      Gout      Hypertension      Insomnia      Morbid obesity (H)      CARLOS treated with BiPAP        FAMILY HISTORY:  Family History   Adopted: Yes   Family history unknown: Yes       SOCIAL HISTORY:  Social History     Socioeconomic History     Marital status: " Single     Spouse name: None     Number of children: None     Years of education: None     Highest education level: None   Occupational History     None   Tobacco Use     Smoking status: Former Smoker     Packs/day: 1.50     Years: 20.00     Pack years: 30.00     Types: Cigarettes     Quit date: 2002     Years since quittin.9     Smokeless tobacco: Never Used   Substance and Sexual Activity     Alcohol use: No     Alcohol/week: 0.0 standard drinks     Drug use: No     Sexual activity: None   Other Topics Concern     None   Social History Narrative     None     Social Determinants of Health     Financial Resource Strain: Not on file   Food Insecurity: Not on file   Transportation Needs: Not on file   Physical Activity: Not on file   Stress: Not on file   Social Connections: Not on file   Intimate Partner Violence: Not on file   Housing Stability: Not on file       CURRENT MEDICATIONS:  Current Outpatient Medications   Medication Sig Dispense Refill     acetaminophen (TYLENOL) 500 MG tablet Take 1-2 tablets (500-1,000 mg) by mouth every 4 hours as needed for mild pain  0     allopurinol (ZYLOPRIM) 100 MG tablet Take 2 tablets (200 mg) by mouth daily Please call [phone] for an appointment with Dr Quiles. 60 tablet 0     alum & mag hydroxide-simethicone (MAALOX  ES) 400-400-40 MG/5ML SUSP suspension Take 15 mLs by mouth every 4 hours as needed for other (gastric distress.) 355 mL 0     aspirin (ASA) 300 MG suppository Place 300 mg rectally At Bedtime       atorvastatin (LIPITOR) 20 MG tablet Take 1 tablet (20 mg) by mouth daily 90 tablet 3     blood glucose monitoring (ACCU-CHEK NINA PLUS) meter device kit Use to test blood sugars 3 times daily or as directed. 1 kit 0     blood glucose monitoring (SOFTCLIX) lancets Use to test blood sugar 3 times daily or as directed. 300 each 0     bumetanide (BUMEX) 2 MG tablet TAKE 1 TABLET BY MOUTH EVERY DAY 90 tablet 0     cyclobenzaprine (FLEXERIL) 5 MG tablet  Take 1 tablet (5 mg) by mouth nightly as needed for muscle spasms 30 tablet 5     diphenhydrAMINE (BENADRYL) 25 MG tablet Take 25 mg by mouth every 6 hours as needed for itching or allergies       ferrous gluconate (FERGON) 324 (38 Fe) MG tablet Take 1 tablet (324 mg) by mouth daily 90 tablet 0     lisinopril (ZESTRIL) 5 MG tablet Take 1 tablet (5 mg) by mouth daily 90 tablet 3     metoprolol succinate ER (TOPROL-XL) 100 MG 24 hr tablet Take 1 tablet (100 mg) by mouth daily 90 tablet 3     miconazole (MICATIN) 2 % external powder APPLY TO AFFECTED AREA TWICE A DAY 90 g 0     ondansetron (ZOFRAN-ODT) 4 MG ODT tab Take 1 tablet (4 mg) by mouth every 6 hours as needed for nausea or vomiting 10 tablet 0     polyethylene glycol (MIRALAX) packet Take 17 g by mouth daily as needed for constipation 30 packet 1     potassium chloride ER (K-TAB) 20 MEQ CR tablet Take 1 tablet (20 mEq) by mouth 2 times daily 180 tablet 3     sennosides (SENOKOT) 8.6 MG tablet Take 1 tablet by mouth 2 times daily as needed for constipation 60 tablet 0     traMADol (ULTRAM) 50 MG tablet TAKE 1 TABLET (50 MG) BY MOUTH NIGHTLY AS NEEDED FOR SEVERE PAIN *INS LIMIT 7 DAYS 7 tablet 0       ROS:   As per HPI.    Labs:  CBC RESULTS:  Lab Results   Component Value Date    WBC 7.3 11/29/2021    WBC 7.6 05/15/2020    RBC 3.74 (L) 11/29/2021    RBC 3.63 (L) 05/15/2020    HGB 9.2 (L) 11/29/2021    HGB 10.5 (L) 05/15/2020    HCT 33.9 (L) 11/29/2021    HCT 34.9 (L) 05/15/2020    MCV 91 11/29/2021    MCV 96 05/15/2020    MCH 24.6 (L) 11/29/2021    MCH 28.9 05/15/2020    MCHC 27.1 (L) 11/29/2021    MCHC 30.1 (L) 05/15/2020    RDW 19.8 (H) 11/29/2021    RDW 14.7 05/15/2020     11/29/2021     05/15/2020       CMP RESULTS:  Lab Results   Component Value Date     11/29/2021     05/15/2020    POTASSIUM 3.9 11/29/2021    POTASSIUM 4.0 05/15/2020    CHLORIDE 104 11/29/2021    CHLORIDE 103 05/15/2020    CO2 29 11/29/2021    CO2 29 05/15/2020     ANIONGAP 5 11/29/2021    ANIONGAP 6 05/15/2020    GLC 99 11/29/2021    GLC 88 05/15/2020    BUN 14 11/29/2021    BUN 24 05/15/2020    CR 1.08 (H) 11/29/2021    CR 1.18 (H) 05/15/2020    GFRESTIMATED 55 (L) 11/29/2021    GFRESTIMATED 50 (L) 05/15/2020    GFRESTBLACK 58 (L) 05/15/2020    KADEN 9.3 11/29/2021    KADEN 9.2 05/15/2020    BILITOTAL 0.3 11/13/2021    BILITOTAL 0.3 05/15/2020    ALBUMIN 2.2 (L) 11/13/2021    ALBUMIN 3.0 (L) 05/15/2020    ALKPHOS 80 11/13/2021    ALKPHOS 135 05/15/2020    ALT 15 11/13/2021    ALT 30 05/15/2020    AST 15 11/13/2021    AST 30 05/15/2020        INR RESULTS:  Lab Results   Component Value Date    INR 1.46 (H) 11/17/2021    INR 2.4 (A) 11/09/2021    INR 3.0 07/20/2021       Lab Results   Component Value Date    MAG 1.9 02/06/2020     Lab Results   Component Value Date    NTBNPI 865 11/13/2021    NTBNPI 7,473 (H) 02/09/2020     Assessment and Plan:   Ms. Arianna Siddiqi is a 61yr old female with a history of heavy smoking (quit 2002), morbid obesity, pAFib, HFpEF (LVEF 55-60% per TTE 1/2020), CARLOS, congenital factor VII deficiency, DMII, HTN, and chronic anemia who is being evaluated via telephone for initial [facility] evaluation and for follow-up of recent hospitalization.    She has not had any blood work done since hospital discharge.    Ms. Siddiqi sounds stable today.  Her weight trend remained stable, and she does not endorse any signs or symptoms of congestive heart failure.  Her blood pressure today remains stable.    In regards to her ongoing HFpEF management, advised that she start spironolactone 25mg once daily.  Given this, instructed her to decrease her potassium chloride to 20mEq once daily.    It appears that she has been taking metoprolol XL 100mg daily for her pAFib.  As BBs are not indicated for the treatment of HFpEF, will discuss ongoing dosing with her primary cardiologist Dr. Uriarte.      She requests a refill for Zofran, which was provided today.  Asked that she request  further refills from her primary provider.    She has not been taking anticoagulation.  It appears that her primary provider has referred her to hematology for further review.    We will plan for her to repeat a BMP in 1-2 weeks.  She will then return to clinic to see Dr. Uriarte in 3 weeks.  Asked that she call us in [facility] if she were to develop any new questions/concerns.      Chronic HFpEF (LVEF 55-60% per TTE 1/2020)  HTN  Risk factors include DMII, HTN, female gender, age, sleep apnea, obesity and arrhythmia  The mainstays of therapy for HFpEF include volume management, blood pressure control, treating underlying sleep apnea if present, treatment of underlying CAD if within the goals of care, weight management, exercise training, rate control for atrial fibrillation as well as consideration for a rhythm control strategy, and consideration for clinical trials. Consideration of spironolactone in low risk patients should be taken given the positive CHF results in the TOPCAT trial.    Stage C. NYHA Class IIIB (confounded by comorbidities).  Primary Cardiologist: Dr. Uriarte; Last seen 12/2020  BP: controlled   Fluid status: Reports stable weight trend, with no signs of persistent fluid retention  Aldosterone antagonist: Starting spironolactone 25mg once daily today  Ischemia evaluation: No anginal concerns today  ACEi/ARB/ARNI: On lisinopril 5mg daily for comorbidities  BB: On metoprolol XL 100mg daily --> will discuss ongoing BB treatment with Dr. Uriarte.  SCD prophylaxis: n/a, no evidence for use in HFpEF  NSAID use: Contraindicated  Sleep apnea evaluation: Currently using CPAP or BiPAP    pAFib  H/o pAFib since 2016.  Chads VASC score is 4 (diabetes, hypertension, heart failure, female).  Warfarin remains held, she will follow up with pain for consideration of anticoagulation/DOAC.  She is currently taking aspirin.  HRs remain controlled, and she remains on metoprolol XL 100mg once daily.  Will discuss ongoing BB management  with Dr. Uriarte.    MAG  Remains on iron supplements, follows with Dr. Paul.        Telephone-Visit Details  Start Time: 0835  End Time: 0910      The above was reviewed with Ms. Siddiqi, who verbalized understanding and will call with further questions/concerns.    35 minutes spent on the phone with patient, along with additional time spent preparing for visit, reviewing follow up, and completing documentation.      Dora Otero DNP, FNP-BC, CHFN  Advanced Heart Failure Nurse Practitioner  [facility]  Patient Care Team:  Betsy Paul MD as PCP - General (Internal Medicine)  Susanne Burnham MD as MD (Ophthalmology)  Warren Cano MD as Resident (Internal Medicine)  Va Wilson MD as MD (Cardiology)  Deedee Meneses MD as MD (Internal Medicine)  Courtney Salinas [facility] as Pharmacist (Pharmacist Ambulatory Care)  Duane Uriarte MD as MD (Cardiology)  Duane Uriarte MD as Assigned Heart and Vascular Provider  Betsy Paul MD as Assigned PCP  BETSY PAUL NP

## 2021-12-07 NOTE — LETTER
"12/7/2021      RE: Arianna Siddiqi  [address]  [facility] [address]       Dear Colleague,    Thank you for the opportunity to participate in the care of your patient, Arianna Siddiqi, at the [facility] HEART CLINIC [place] at [facility]. Please see a copy of my visit note below.    Arianna is a 61 year old who is being evaluated via a billable telephone visit.      What phone number would you like to be contacted at? [phone]  How would you like to obtain your AVS? Mail a copy  Phone call duration: *** minutes    Advanced Heart Failure Clinic -- CORE Evaluation -- Telephone Visit  December 7, 2021    HPI:   Ms. Arianna Siddiqi is a 61yr old female with a history of       \"Slowly getting along\"  Has a nurse who comes,   Doing ok, taking everything slow  When she exerts self, gets a little lightheaded, improving  Before, felt like she was going to pass out, now \"just a little dizzy\" which she attributes to \"doing too much\"  Been walking around her residence, able to [place] 2-3x before SOB  Feels like strength and endurance are improving, but not much  Feels shaky when walking, still feels weak  Not using walker  Has to go downstairs to get her mail, which is still a challenge  Doing IS several times/day, working on deep breathing  Breathing much better  Sleeping in a recliner, chronic, lies \"intermediate\" out of comfort  No pnd orthopnea  Uses BiPAP since 2016, been sleeping in chair since  Appetite down a little, tastes have changed, eating regularly, 2meals/day  Regular BMs every morning -- stools back to normal, was constipated in hospital  occ nauseated when eating, some diarrhea depending on diet  Vomited once since discharge, unsure if wasn't feeling well vs something she ate  Weight ranging 369-371, stable, no fluid retention  BPs ranging 130/68  Unsure what HRs  No cp new palps falls  Splitting headache all day yesterday, took 2 tylenol at bedtime with " resolution  No acute vision changes fevers chills cough sore throat signs of bleeding  Needs to meet w heme per PCP re: AC      -- needs refill for zofran  -- decrease potassium to once daily  -- start jorge 25mg daily  -- labs 1-2 weeks  -- can 3 weeks, message re: metoprolol   -- mail AVS        PAST MEDICAL HISTORY:  Past Medical History:   Diagnosis Date     CHF (congestive heart failure) (H)      CKD (chronic kidney disease)      Compliance poor      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     Diabetes mellitus (H)      Gout      Hypertension      Insomnia      Morbid obesity (H)      CARLOS treated with BiPAP        FAMILY HISTORY:  Family History   Adopted: Yes   Family history unknown: Yes       SOCIAL HISTORY:  Social History     Socioeconomic History     Marital status: Single     Spouse name: None     Number of children: None     Years of education: None     Highest education level: None   Occupational History     None   Tobacco Use     Smoking status: Former Smoker     Packs/day: 1.50     Years: 20.00     Pack years: 30.00     Types: Cigarettes     Quit date: 2002     Years since quittin.9     Smokeless tobacco: Never Used   Substance and Sexual Activity     Alcohol use: No     Alcohol/week: 0.0 standard drinks     Drug use: No     Sexual activity: None   Other Topics Concern     None   Social History Narrative     None     Social Determinants of Health     Financial Resource Strain: Not on file   Food Insecurity: Not on file   Transportation Needs: Not on file   Physical Activity: Not on file   Stress: Not on file   Social Connections: Not on file   Intimate Partner Violence: Not on file   Housing Stability: Not on file       CURRENT MEDICATIONS:  Current Outpatient Medications   Medication Sig Dispense Refill     acetaminophen (TYLENOL) 500 MG tablet Take 1-2 tablets (500-1,000 mg) by mouth every 4 hours as needed for mild pain  0      allopurinol (ZYLOPRIM) 100 MG tablet Take 2 tablets (200 mg) by mouth daily Please call [phone] for an appointment with Dr Quiles. 60 tablet 0     alum & mag hydroxide-simethicone (MAALOX  ES) 400-400-40 MG/5ML SUSP suspension Take 15 mLs by mouth every 4 hours as needed for other (gastric distress.) 355 mL 0     aspirin (ASA) 300 MG suppository Place 300 mg rectally At Bedtime       atorvastatin (LIPITOR) 20 MG tablet Take 1 tablet (20 mg) by mouth daily 90 tablet 3     blood glucose monitoring (ACCU-CHEK NINA PLUS) meter device kit Use to test blood sugars 3 times daily or as directed. 1 kit 0     blood glucose monitoring (SOFTCLIX) lancets Use to test blood sugar 3 times daily or as directed. 300 each 0     bumetanide (BUMEX) 2 MG tablet TAKE 1 TABLET BY MOUTH EVERY DAY 90 tablet 0     cyclobenzaprine (FLEXERIL) 5 MG tablet Take 1 tablet (5 mg) by mouth nightly as needed for muscle spasms 30 tablet 5     diphenhydrAMINE (BENADRYL) 25 MG tablet Take 25 mg by mouth every 6 hours as needed for itching or allergies       ferrous gluconate (FERGON) 324 (38 Fe) MG tablet Take 1 tablet (324 mg) by mouth daily 90 tablet 0     lisinopril (ZESTRIL) 5 MG tablet Take 1 tablet (5 mg) by mouth daily 90 tablet 3     metoprolol succinate ER (TOPROL-XL) 100 MG 24 hr tablet Take 1 tablet (100 mg) by mouth daily 90 tablet 3     miconazole (MICATIN) 2 % external powder APPLY TO AFFECTED AREA TWICE A DAY 90 g 0     ondansetron (ZOFRAN-ODT) 4 MG ODT tab Take 1 tablet (4 mg) by mouth every 6 hours as needed for nausea or vomiting 10 tablet 0     polyethylene glycol (MIRALAX) packet Take 17 g by mouth daily as needed for constipation 30 packet 1     potassium chloride ER (K-TAB) 20 MEQ CR tablet Take 1 tablet (20 mEq) by mouth 2 times daily 180 tablet 3     sennosides (SENOKOT) 8.6 MG tablet Take 1 tablet by mouth 2 times daily as needed for constipation 60 tablet 0     traMADol (ULTRAM) 50 MG tablet TAKE 1 TABLET (50 MG) BY  MOUTH NIGHTLY AS NEEDED FOR SEVERE PAIN *INS LIMIT 7 DAYS 7 tablet 0       ROS:   As per HPI.    Labs:  CBC RESULTS:  Lab Results   Component Value Date    WBC 7.3 11/29/2021    WBC 7.6 05/15/2020    RBC 3.74 (L) 11/29/2021    RBC 3.63 (L) 05/15/2020    HGB 9.2 (L) 11/29/2021    HGB 10.5 (L) 05/15/2020    HCT 33.9 (L) 11/29/2021    HCT 34.9 (L) 05/15/2020    MCV 91 11/29/2021    MCV 96 05/15/2020    MCH 24.6 (L) 11/29/2021    MCH 28.9 05/15/2020    MCHC 27.1 (L) 11/29/2021    MCHC 30.1 (L) 05/15/2020    RDW 19.8 (H) 11/29/2021    RDW 14.7 05/15/2020     11/29/2021     05/15/2020       CMP RESULTS:  Lab Results   Component Value Date     11/29/2021     05/15/2020    POTASSIUM 3.9 11/29/2021    POTASSIUM 4.0 05/15/2020    CHLORIDE 104 11/29/2021    CHLORIDE 103 05/15/2020    CO2 29 11/29/2021    CO2 29 05/15/2020    ANIONGAP 5 11/29/2021    ANIONGAP 6 05/15/2020    GLC 99 11/29/2021    GLC 88 05/15/2020    BUN 14 11/29/2021    BUN 24 05/15/2020    CR 1.08 (H) 11/29/2021    CR 1.18 (H) 05/15/2020    GFRESTIMATED 55 (L) 11/29/2021    GFRESTIMATED 50 (L) 05/15/2020    GFRESTBLACK 58 (L) 05/15/2020    KADEN 9.3 11/29/2021    KADEN 9.2 05/15/2020    BILITOTAL 0.3 11/13/2021    BILITOTAL 0.3 05/15/2020    ALBUMIN 2.2 (L) 11/13/2021    ALBUMIN 3.0 (L) 05/15/2020    ALKPHOS 80 11/13/2021    ALKPHOS 135 05/15/2020    ALT 15 11/13/2021    ALT 30 05/15/2020    AST 15 11/13/2021    AST 30 05/15/2020        INR RESULTS:  Lab Results   Component Value Date    INR 1.46 (H) 11/17/2021    INR 2.4 (A) 11/09/2021    INR 3.0 07/20/2021       Lab Results   Component Value Date    MAG 1.9 02/06/2020     Lab Results   Component Value Date    NTBNPI 865 11/13/2021    NTBNPI 7,473 (H) 02/09/2020     No results found for: NTBNP    Assessment and Plan:   1. Chronic *** heart failure secondary to ***.    Stage {UMPCARDSTAGE:386510697}  NYHA Class {UMPCARDSYMP:655555649}  ACEi/ARB {UMPCARDACEI/ARB:201355845}  BB  "{UMPCARDACEI/ARB:783529105}  Aldosterone antagonist {UMPCARDBB:447010757}  SCD prophylaxis {UMPCARDSCD:901877089}  % BiV pacing: {Not Applicable or free text:828987::\"N/A\"}  Fluid status {UMPCARDFLUID:109325399}  NSAID use: ***  Sleep Apnea Evaluation: ***  Follow-up ***    CC  Patient Care Team:  Betsy Paul MD as PCP - General (Internal Medicine)  Susanne Burnham MD as MD (Ophthalmology)  Warren Cano MD as Resident (Internal Medicine)  Va Wilson MD as MD (Cardiology)  Deedee eMneses MD as MD (Internal Medicine)  Courtney Salinas [facility] as Pharmacist (Pharmacist Ambulatory Care)  Duane Uriarte MD as MD (Cardiology)  Duane Uriarte MD as Assigned Heart and Vascular Provider  Betsy Paul MD as Assigned PCP  BETSY PAUL        Please do not hesitate to contact me if you have any questions/concerns.     Sincerely,     Dora Otero NP    "

## 2021-12-07 NOTE — LETTER
"12/7/2021       RE: Arianna Siddiqi  [address]  [facility] [address]     Dear Colleague,    Thank you for referring your patient, Arianna Siddiqi, to the [facility] HEART CLINIC [place] at [facility]. Please see a copy of my visit note below.      Advanced Heart Failure Clinic -- CORE Evaluation -- Telephone Visit  December 7, 2021    HPI:   Ms. Arianna Siddiqi is a 61yr old female with a history of heavy smoking (quit 2002), morbid obesity, pAFib, HFpEF (LVEF 55-60% per TTE 1/2020), CARLOS, congenital factor VII deficiency, and chronic anemia who is being evaluated via telephone for initial CORE evaluation and for follow-up of recent hospitalization.    She presented to the ER 11/11 with worsening shortness of breath and acute on chronic vaginal spotting.  Her hemoglobin was 3.1 and INR 5.8 on arrival.  GYN was consulted, noting ***She was ultimately transfused a total of 6u PRBCs with subsequent stabilization of hemoglobin.    \"Slowly getting along\"  Has a nurse who comes,   Doing ok, taking everything slow  When she exerts self, gets a little lightheaded, improving  Before, felt like she was going to pass out, now \"just a little dizzy\" which she attributes to \"doing too much\"  Been walking around her residence, able to [place] 2-3x before SOB  Feels like strength and endurance are improving, but not much  Feels shaky when walking, still feels weak  Not using walker  Has to go downstairs to get her mail, which is still a challenge  Doing IS several times/day, working on deep breathing  Breathing much better  Sleeping in a recliner, chronic, lies \"retirement\" out of comfort  No pnd orthopnea  Uses BiPAP since 2016, been sleeping in chair since  Appetite down a little, tastes have changed, eating regularly, 2meals/day  Regular BMs every morning -- stools back to normal, was constipated in hospital  occ nauseated when eating, some diarrhea depending on " diet  Vomited once since discharge, unsure if wasn't feeling well vs something she ate  Weight ranging 369-371, stable, no fluid retention  BPs ranging 130/68  Unsure what HRs  No cp new pal[place] falls  Splitting headache all day yesterday, took 2 tylenol at bedtime with resolution  No acute vision changes fevers chills cough sore throat signs of bleeding  Needs to meet w heme per PCP re: AC      -- needs refill for zofran  -- decrease potassium to once daily  -- start jorge 25mg daily  -- labs 1-2 weeks  -- can 3 weeks, message re: metoprolol   -- mail AVS        PAST MEDICAL HISTORY:  Past Medical History:   Diagnosis Date     CHF (congestive heart failure) (H)      CKD (chronic kidney disease)      Compliance poor      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     Diabetes mellitus (H)      Gout      Hypertension      Insomnia      Morbid obesity (H)      CARLOS treated with BiPAP        FAMILY HISTORY:  Family History   Adopted: Yes   Family history unknown: Yes       SOCIAL HISTORY:  Social History     Socioeconomic History     Marital status: Single     Spouse name: None     Number of children: None     Years of education: None     Highest education level: None   Occupational History     None   Tobacco Use     Smoking status: Former Smoker     Packs/day: 1.50     Years: 20.00     Pack years: 30.00     Types: Cigarettes     Quit date: 2002     Years since quittin.9     Smokeless tobacco: Never Used   Substance and Sexual Activity     Alcohol use: No     Alcohol/week: 0.0 standard drinks     Drug use: No     Sexual activity: None   Other Topics Concern     None   Social History Narrative     None     Social Determinants of Health     Financial Resource Strain: Not on file   Food Insecurity: Not on file   Transportation Needs: Not on file   Physical Activity: Not on file   Stress: Not on file   Social Connections: Not on file   Intimate Partner Violence: Not  on file   Housing Stability: Not on file       CURRENT MEDICATIONS:  Current Outpatient Medications   Medication Sig Dispense Refill     acetaminophen (TYLENOL) 500 MG tablet Take 1-2 tablets (500-1,000 mg) by mouth every 4 hours as needed for mild pain  0     allopurinol (ZYLOPRIM) 100 MG tablet Take 2 tablets (200 mg) by mouth daily Please call [phone] for an appointment with Dr Quiles. 60 tablet 0     alum & mag hydroxide-simethicone (MAALOX  ES) 400-400-40 MG/5ML SUSP suspension Take 15 mLs by mouth every 4 hours as needed for other (gastric distress.) 355 mL 0     aspirin (ASA) 300 MG suppository Place 300 mg rectally At Bedtime       atorvastatin (LIPITOR) 20 MG tablet Take 1 tablet (20 mg) by mouth daily 90 tablet 3     blood glucose monitoring (ACCU-CHEK NINA PLUS) meter device kit Use to test blood sugars 3 times daily or as directed. 1 kit 0     blood glucose monitoring (SOFTCLIX) lancets Use to test blood sugar 3 times daily or as directed. 300 each 0     bumetanide (BUMEX) 2 MG tablet TAKE 1 TABLET BY MOUTH EVERY DAY 90 tablet 0     cyclobenzaprine (FLEXERIL) 5 MG tablet Take 1 tablet (5 mg) by mouth nightly as needed for muscle spasms 30 tablet 5     diphenhydrAMINE (BENADRYL) 25 MG tablet Take 25 mg by mouth every 6 hours as needed for itching or allergies       ferrous gluconate (FERGON) 324 (38 Fe) MG tablet Take 1 tablet (324 mg) by mouth daily 90 tablet 0     lisinopril (ZESTRIL) 5 MG tablet Take 1 tablet (5 mg) by mouth daily 90 tablet 3     metoprolol succinate ER (TOPROL-XL) 100 MG 24 hr tablet Take 1 tablet (100 mg) by mouth daily 90 tablet 3     miconazole (MICATIN) 2 % external powder APPLY TO AFFECTED AREA TWICE A DAY 90 g 0     ondansetron (ZOFRAN-ODT) 4 MG ODT tab Take 1 tablet (4 mg) by mouth every 6 hours as needed for nausea or vomiting 10 tablet 0     polyethylene glycol (MIRALAX) packet Take 17 g by mouth daily as needed for constipation 30 packet 1     potassium chloride ER  (K-TAB) 20 MEQ CR tablet Take 1 tablet (20 mEq) by mouth 2 times daily 180 tablet 3     sennosides (SENOKOT) 8.6 MG tablet Take 1 tablet by mouth 2 times daily as needed for constipation 60 tablet 0     traMADol (ULTRAM) 50 MG tablet TAKE 1 TABLET (50 MG) BY MOUTH NIGHTLY AS NEEDED FOR SEVERE PAIN *INS LIMIT 7 DAYS 7 tablet 0       ROS:   As per HPI.    Labs:  CBC RESULTS:  Lab Results   Component Value Date    WBC 7.3 11/29/2021    WBC 7.6 05/15/2020    RBC 3.74 (L) 11/29/2021    RBC 3.63 (L) 05/15/2020    HGB 9.2 (L) 11/29/2021    HGB 10.5 (L) 05/15/2020    HCT 33.9 (L) 11/29/2021    HCT 34.9 (L) 05/15/2020    MCV 91 11/29/2021    MCV 96 05/15/2020    MCH 24.6 (L) 11/29/2021    MCH 28.9 05/15/2020    MCHC 27.1 (L) 11/29/2021    MCHC 30.1 (L) 05/15/2020    RDW 19.8 (H) 11/29/2021    RDW 14.7 05/15/2020     11/29/2021     05/15/2020       CMP RESULTS:  Lab Results   Component Value Date     11/29/2021     05/15/2020    POTASSIUM 3.9 11/29/2021    POTASSIUM 4.0 05/15/2020    CHLORIDE 104 11/29/2021    CHLORIDE 103 05/15/2020    CO2 29 11/29/2021    CO2 29 05/15/2020    ANIONGAP 5 11/29/2021    ANIONGAP 6 05/15/2020    GLC 99 11/29/2021    GLC 88 05/15/2020    BUN 14 11/29/2021    BUN 24 05/15/2020    CR 1.08 (H) 11/29/2021    CR 1.18 (H) 05/15/2020    GFRESTIMATED 55 (L) 11/29/2021    GFRESTIMATED 50 (L) 05/15/2020    GFRESTBLACK 58 (L) 05/15/2020    KADEN 9.3 11/29/2021    KADEN 9.2 05/15/2020    BILITOTAL 0.3 11/13/2021    BILITOTAL 0.3 05/15/2020    ALBUMIN 2.2 (L) 11/13/2021    ALBUMIN 3.0 (L) 05/15/2020    ALKPHOS 80 11/13/2021    ALKPHOS 135 05/15/2020    ALT 15 11/13/2021    ALT 30 05/15/2020    AST 15 11/13/2021    AST 30 05/15/2020        INR RESULTS:  Lab Results   Component Value Date    INR 1.46 (H) 11/17/2021    INR 2.4 (A) 11/09/2021    INR 3.0 07/20/2021       Lab Results   Component Value Date    MAG 1.9 02/06/2020     Lab Results   Component Value Date    NTBNPI 865 11/13/2021     NTBNPI 7,473 (H) 02/09/2020     Assessment and Plan:       Telephone-Visit Details  Start Time: 0835  End Time: 0910      The above was reviewed with Ms. Siddiqi, who verbalized understanding and will call with further questions/concerns.    35 minutes spent on the phone with patient, along with additional time spent preparing for visit, reviewing follow up, and completing documentation.      Dora Otero DNP, FNP-BC, CHFN  Advanced Heart Failure Nurse Practitioner  [facility]  Patient Care Team:  Betsy Paul MD as PCP - General (Internal Medicine)  Susanne Burnham MD as MD (Ophthalmology)  Warren Cano MD as Resident (Internal Medicine)  Va Wilson MD as MD (Cardiology)  Deedee Meneses MD as MD (Internal Medicine)  Courtney Salinas [facility] as Pharmacist (Pharmacist Ambulatory Care)  Duane Uriarte MD as MD (Cardiology)  Duane Uriarte MD as Assigned Heart and Vascular Provider  Betsy Paul MD as Assigned PCP  BETSY PAUL

## 2021-12-07 NOTE — PROGRESS NOTES
Arianna is a 61 year old who is being evaluated via a billable telephone visit.      What phone number would you like to be contacted at? [phone]  How would you like to obtain your AVS? Mail a copy

## 2021-12-07 NOTE — NURSING NOTE
Chief Complaint   Patient presents with     Consult     New CORE pt     Blood glucose: 78    Alicia Oh, Virtual Facilitator/LPN

## 2021-12-07 NOTE — PATIENT INSTRUCTIONS
Take your medicines every day, as directed    Changes made today:  o START spironolactone 25mg once daily  o DECREASE potassium to 20mEq once daily         Monitor Your Weight and Symptoms    Contact us if you:      Gain 2 pounds in one day or 5 pounds in one week    Feel more short of breath    Notice more leg swelling    Feel lightheadeded   Change your lifestyle    Limit Salt or Sodium:    2000 mg  Limit Fluids:    2000 mL or approximately 64 ounces  Eat a Heart Healthy Diet    Low in saturated fats  Stay Active:    Aim to move at least 150 minutes every  week         To Contact us    During Business Hours:  [phone], option # 1 (University)  Then option # 4 (medical questions)     After hours, weekends or holidays:   [phone], Option #4  Ask to speak to the On-Call Cardiologist. Inform them you are a CORE/heart failure patient at the [facility].     Use [employer] allows you to communicate directly with your heart team through secure messaging.    Anonymous You can be accessed any time on your phone, computer, or tablet.    If you need assistance, we'd be happy to help!         Keep your Heart Appointments:    Repeat labs in 1-2 weeks    Follow up with Dr Uriarte as scheduled

## 2021-12-07 NOTE — PROGRESS NOTES
"Advanced [facility] Failure Clinic -- CORE Evaluation -- Telephone Visit  December 7, 2021    HPI:   Ms. Arianna Siddiqi is a 61yr old female with a history of heavy smoking (quit 2002), morbid obesity, pAFib, HFpEF (LVEF 55-60% per TTE 1/2020), CARLOS, congenital factor VII deficiency, DMII, HTN, and chronic anemia who is being evaluated via telephone for initial CORE evaluation and for follow-up of recent hospitalization.    She presented to the ER 11/11 with worsening shortness of breath and acute on chronic vaginal spotting.  Her hemoglobin was 3.1 and INR 5.8 on arrival.  GYN was consulted, and she underwent a D&C, pap smear (negative for intraepithelial lesion and malignancy), and mirena IUD placement on 11/14.  She was ultimately transfused a total of 6u PRBCs with subsequent stabilization of hemoglobin.  Her INR was reversed.  Hematology was consulted, and she was found to have congenital factor VII deficiency --> which described her supratherapeutic INR, but not her bleeding (she had enough factor VII for hemostasis).  She was given 2 doses of IV iron, and ultimately discharged 11/18 with the plan to hold warfarin and possibly transition to DOAC pending Hgb trend.      Since discharge, she states that she is \"slowly getting along.\"  She is taking everything slowly.  She gets lightheaded when she exerts herself, which has improved somewhat overall.  She she states that she used to feel like she was going to pass out whenever she exerted herself, but now she only feels mildly dizzy.  She has been trying to walk around her residence, stating that she is able to complete a loop 2-3x before becoming short of breath.  She feels like her strength and endurance are improving, but not by much.  She does feel shaky when walking, and still feels weak.  She has not been using her walker.  She states that she has to go down stairs to retrieve her mail, which is still challenging for her.  Her breathing status has improved overall.  " "She has been doing incentive spirometry several times each day, and has been working on deep breathing.  She sleeps in a recliner (since 2016), stating that she lies \"jail\" for comfort.  She denies PND and orthopnea.  She has used BiPAP since 2016.  Her appetite is down, stating that her tastes have changed.  She is eating regularly, roughly 2 meals/day.  She has regular bowel movements every morning, and notes that her stools are back to normal.  She was constipated while in the hospital, which has since resolved.  She is occasionally nauseated when eating, and will get diarrhea depending on what she eats.  She has vomited once since discharge, and is unsure if this was related to something she ate.  Her weight has been stable, ranging 369-371#, and she denies fluid retention.  Her BP today was 130/68.  She is unsure what her heart rates have been.  She had a \"splitting\" headache yesterday, for which she took 2 Tylenol at bedtime with resolution.  She otherwise denies chest pain, new palpitations, falls, acute vision changes, fevers, chills, cough, sore throat, and signs bleeding.      PAST MEDICAL HISTORY:  Past Medical History:   Diagnosis Date     CHF (congestive heart failure) (H)      CKD (chronic kidney disease)      Compliance poor      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     Diabetes mellitus (H)      Gout      Hypertension      Insomnia      Morbid obesity (H)      CARLOS treated with BiPAP        FAMILY HISTORY:  Family History   Adopted: Yes   Family history unknown: Yes       SOCIAL HISTORY:  Social History     Socioeconomic History     Marital status: Single     Spouse name: None     Number of children: None     Years of education: None     Highest education level: None   Occupational History     None   Tobacco Use     Smoking status: Former Smoker     Packs/day: 1.50     Years: 20.00     Pack years: 30.00     Types: Cigarettes     Quit date: " 2002     Years since quittin.9     Smokeless tobacco: Never Used   Substance and Sexual Activity     Alcohol use: No     Alcohol/week: 0.0 standard drinks     Drug use: No     Sexual activity: None   Other Topics Concern     None   Social History Narrative     None     Social Determinants of Health     Financial Resource Strain: Not on file   Food Insecurity: Not on file   Transportation Needs: Not on file   Physical Activity: Not on file   Stress: Not on file   Social Connections: Not on file   Intimate Partner Violence: Not on file   Housing Stability: Not on file       CURRENT MEDICATIONS:  Current Outpatient Medications   Medication Sig Dispense Refill     acetaminophen (TYLENOL) 500 MG tablet Take 1-2 tablets (500-1,000 mg) by mouth every 4 hours as needed for mild pain  0     allopurinol (ZYLOPRIM) 100 MG tablet Take 2 tablets (200 mg) by mouth daily Please call [phone] for an appointment with Dr Quiles. 60 tablet 0     alum & mag hydroxide-simethicone (MAALOX  ES) 400-400-40 MG/5ML SUSP suspension Take 15 mLs by mouth every 4 hours as needed for other (gastric distress.) 355 mL 0     aspirin (ASA) 300 MG suppository Place 300 mg rectally At Bedtime       atorvastatin (LIPITOR) 20 MG tablet Take 1 tablet (20 mg) by mouth daily 90 tablet 3     blood glucose monitoring (ACCU-CHEK NINA PLUS) meter device kit Use to test blood sugars 3 times daily or as directed. 1 kit 0     blood glucose monitoring (SOFTCLIX) lancets Use to test blood sugar 3 times daily or as directed. 300 each 0     bumetanide (BUMEX) 2 MG tablet TAKE 1 TABLET BY MOUTH EVERY DAY 90 tablet 0     cyclobenzaprine (FLEXERIL) 5 MG tablet Take 1 tablet (5 mg) by mouth nightly as needed for muscle spasms 30 tablet 5     diphenhydrAMINE (BENADRYL) 25 MG tablet Take 25 mg by mouth every 6 hours as needed for itching or allergies       ferrous gluconate (FERGON) 324 (38 Fe) MG tablet Take 1 tablet (324 mg) by mouth daily 90 tablet 0      lisinopril (ZESTRIL) 5 MG tablet Take 1 tablet (5 mg) by mouth daily 90 tablet 3     metoprolol succinate ER (TOPROL-XL) 100 MG 24 hr tablet Take 1 tablet (100 mg) by mouth daily 90 tablet 3     miconazole (MICATIN) 2 % external powder APPLY TO AFFECTED AREA TWICE A DAY 90 g 0     ondansetron (ZOFRAN-ODT) 4 MG ODT tab Take 1 tablet (4 mg) by mouth every 6 hours as needed for nausea or vomiting 10 tablet 0     polyethylene glycol (MIRALAX) packet Take 17 g by mouth daily as needed for constipation 30 packet 1     potassium chloride ER (K-TAB) 20 MEQ CR tablet Take 1 tablet (20 mEq) by mouth 2 times daily 180 tablet 3     sennosides (SENOKOT) 8.6 MG tablet Take 1 tablet by mouth 2 times daily as needed for constipation 60 tablet 0     traMADol (ULTRAM) 50 MG tablet TAKE 1 TABLET (50 MG) BY MOUTH NIGHTLY AS NEEDED FOR SEVERE PAIN *INS LIMIT 7 DAYS 7 tablet 0       ROS:   As per HPI.    Labs:  CBC RESULTS:  Lab Results   Component Value Date    WBC 7.3 11/29/2021    WBC 7.6 05/15/2020    RBC 3.74 (L) 11/29/2021    RBC 3.63 (L) 05/15/2020    HGB 9.2 (L) 11/29/2021    HGB 10.5 (L) 05/15/2020    HCT 33.9 (L) 11/29/2021    HCT 34.9 (L) 05/15/2020    MCV 91 11/29/2021    MCV 96 05/15/2020    MCH 24.6 (L) 11/29/2021    MCH 28.9 05/15/2020    MCHC 27.1 (L) 11/29/2021    MCHC 30.1 (L) 05/15/2020    RDW 19.8 (H) 11/29/2021    RDW 14.7 05/15/2020     11/29/2021     05/15/2020       CMP RESULTS:  Lab Results   Component Value Date     11/29/2021     05/15/2020    POTASSIUM 3.9 11/29/2021    POTASSIUM 4.0 05/15/2020    CHLORIDE 104 11/29/2021    CHLORIDE 103 05/15/2020    CO2 29 11/29/2021    CO2 29 05/15/2020    ANIONGAP 5 11/29/2021    ANIONGAP 6 05/15/2020    GLC 99 11/29/2021    GLC 88 05/15/2020    BUN 14 11/29/2021    BUN 24 05/15/2020    CR 1.08 (H) 11/29/2021    CR 1.18 (H) 05/15/2020    GFRESTIMATED 55 (L) 11/29/2021    GFRESTIMATED 50 (L) 05/15/2020    GFRESTBLACK 58 (L) 05/15/2020    KADEN 9.3  11/29/2021    KADEN 9.2 05/15/2020    BILITOTAL 0.3 11/13/2021    BILITOTAL 0.3 05/15/2020    ALBUMIN 2.2 (L) 11/13/2021    ALBUMIN 3.0 (L) 05/15/2020    ALKPHOS 80 11/13/2021    ALKPHOS 135 05/15/2020    ALT 15 11/13/2021    ALT 30 05/15/2020    AST 15 11/13/2021    AST 30 05/15/2020        INR RESULTS:  Lab Results   Component Value Date    INR 1.46 (H) 11/17/2021    INR 2.4 (A) 11/09/2021    INR 3.0 07/20/2021       Lab Results   Component Value Date    MAG 1.9 02/06/2020     Lab Results   Component Value Date    NTBNPI 865 11/13/2021    NTBNPI 7,473 (H) 02/09/2020     Assessment and Plan:   Ms. Arianna Siddiqi is a 61yr old female with a history of heavy smoking (quit 2002), morbid obesity, pAFib, HFpEF (LVEF 55-60% per TTE 1/2020), CARLOS, congenital factor VII deficiency, DMII, HTN, and chronic anemia who is being evaluated via telephone for initial CORE evaluation and for follow-up of recent hospitalization.    She has not had any blood work done since hospital discharge.    Ms. Siddiqi sounds stable today.  Her weight trend remained stable, and she does not endorse any signs or symptoms of congestive heart failure.  Her blood pressure today remains stable.    In regards to her ongoing HFpEF management, advised that she start spironolactone 25mg once daily.  Given this, instructed her to decrease her potassium chloride to 20mEq once daily.    It appears that she has been taking metoprolol XL 100mg daily for her pAFib.  As BBs are not indicated for the treatment of HFpEF, will discuss ongoing dosing with her primary cardiologist Dr. Uriarte.      She requests a refill for Zofran, which was provided today.  Asked that she request further refills from her primary provider.    She has not been taking anticoagulation.  It appears that her primary provider has referred her to hematology for further review.    We will plan for her to repeat a BMP in 1-2 weeks.  She will then return to clinic to see Dr. Uriarte in 3 weeks.  Asked that  she call us in CORE if she were to develop any new questions/concerns.      Chronic HFpEF (LVEF 55-60% per TTE 1/2020)  HTN  Risk factors include DMII, HTN, female gender, age, sleep apnea, obesity and arrhythmia  The mainstays of therapy for HFpEF include volume management, blood pressure control, treating underlying sleep apnea if present, treatment of underlying CAD if within the goals of care, weight management, exercise training, rate control for atrial fibrillation as well as consideration for a rhythm control strategy, and consideration for clinical trials. Consideration of spironolactone in low risk patients should be taken given the positive CHF results in the TOPCAT trial.    Stage C. NYHA Class IIIB (confounded by comorbidities).  Primary Cardiologist: Dr. Uriarte; Last seen 12/2020  BP: controlled   Fluid status: Reports stable weight trend, with no signs of persistent fluid retention  Aldosterone antagonist: Starting spironolactone 25mg once daily today  Ischemia evaluation: No anginal concerns today  ACEi/ARB/ARNI: On lisinopril 5mg daily for comorbidities  BB: On metoprolol XL 100mg daily --> will discuss ongoing BB treatment with Dr. Uriarte.  SCD prophylaxis: n/a, no evidence for use in HFpEF  NSAID use: Contraindicated  Sleep apnea evaluation: Currently using CPAP or BiPAP    pAFib  H/o pAFib since 2016.  Chads VASC score is 4 (diabetes, hypertension, heart failure, female).  Warfarin remains held, she will follow up with pain for consideration of anticoagulation/DOAC.  She is currently taking aspirin.  HRs remain controlled, and she remains on metoprolol XL 100mg once daily.  Will discuss ongoing BB management with Dr. Uriarte.    MAG  Remains on iron supplements, follows with Dr. Quiles.        Telephone-Visit Details  Start Time: 0835  End Time: 0910      The above was reviewed with Ms. Siddiqi, who verbalized understanding and will call with further questions/concerns.    35 minutes spent on the phone with  patient, along with additional time spent preparing for visit, reviewing follow up, and completing documentation.      Dora Otero DNP, FNP-BC, CHFN  Advanced Heart Failure Nurse Practitioner  [facility]  Patient Care Team:  Betsy Paul MD as PCP - General (Internal Medicine)  Susanne Burnham MD as MD (Ophthalmology)  Warren Cano MD as Resident (Internal Medicine)  Va Wilson MD as MD (Cardiology)  Deedee Meneses MD as MD (Internal Medicine)  Courtney Salinas [facility] as Pharmacist (Pharmacist Ambulatory Care)  Duane Uriarte MD as MD (Cardiology)  Duane Uriarte MD as Assigned Heart and Vascular Provider  Betsy Paul MD as Assigned PCP  BETSY PAUL

## 2021-12-10 DIAGNOSIS — Z53.9 DIAGNOSIS NOT YET DEFINED: Primary | ICD-10-CM

## 2021-12-10 PROCEDURE — G0180 MD CERTIFICATION HHA PATIENT: HCPCS | Performed by: INTERNAL MEDICINE

## 2021-12-12 DIAGNOSIS — M10.9 GOUT, UNSPECIFIED CAUSE, UNSPECIFIED CHRONICITY, UNSPECIFIED SITE: ICD-10-CM

## 2021-12-14 ENCOUNTER — PATIENT OUTREACH (OUTPATIENT)
Dept: ONCOLOGY | Facility: CLINIC | Age: 61
End: 2021-12-14
Payer: MEDICARE

## 2021-12-14 RX ORDER — ALLOPURINOL 100 MG/1
200 TABLET ORAL DAILY
Qty: 60 TABLET | Refills: 0 | OUTPATIENT
Start: 2021-12-14

## 2021-12-14 NOTE — TELEPHONE ENCOUNTER
RN had followed up with patients care Team.     Per  note: I spoke with her and she said that she's already established with gynecology at [facility] and she's planning on setting up appointments with them.           Alicia Manuel RN

## 2021-12-16 ENCOUNTER — LAB REQUISITION (OUTPATIENT)
Dept: LAB | Facility: CLINIC | Age: 61
End: 2021-12-16
Payer: MEDICARE

## 2021-12-16 DIAGNOSIS — E78.5 HYPERLIPIDEMIA, UNSPECIFIED HYPERLIPIDEMIA TYPE: ICD-10-CM

## 2021-12-16 DIAGNOSIS — M10.9 GOUT, UNSPECIFIED CAUSE, UNSPECIFIED CHRONICITY, UNSPECIFIED SITE: ICD-10-CM

## 2021-12-16 DIAGNOSIS — I10 HYPERTENSION, UNSPECIFIED TYPE: ICD-10-CM

## 2021-12-16 DIAGNOSIS — I10 ESSENTIAL HYPERTENSION: ICD-10-CM

## 2021-12-16 DIAGNOSIS — D62 ACUTE POSTHEMORRHAGIC ANEMIA: ICD-10-CM

## 2021-12-16 LAB
ANION GAP SERPL CALCULATED.3IONS-SCNC: 4 MMOL/L (ref 3–14)
BUN SERPL-MCNC: 18 MG/DL (ref 7–30)
CALCIUM SERPL-MCNC: 9.9 MG/DL (ref 8.5–10.1)
CHLORIDE BLD-SCNC: 104 MMOL/L (ref 94–109)
CO2 SERPL-SCNC: 29 MMOL/L (ref 20–32)
CREAT SERPL-MCNC: 0.89 MG/DL (ref 0.52–1.04)
GFR SERPL CREATININE-BSD FRML MDRD: 70 ML/MIN/1.73M2
GLUCOSE BLD-MCNC: 83 MG/DL (ref 70–99)
POTASSIUM BLD-SCNC: 4.4 MMOL/L (ref 3.4–5.3)
SODIUM SERPL-SCNC: 137 MMOL/L (ref 133–144)

## 2021-12-16 PROCEDURE — 80048 BASIC METABOLIC PNL TOTAL CA: CPT | Mod: ORL | Performed by: INTERNAL MEDICINE

## 2021-12-16 NOTE — PROGRESS NOTES
Telephone visit    Ms. Siddiqi agrees to a telephone visit    Discharged from the hospital 11/18/2021 for anemia (Hgb got down to 7.0),  increased INR and uterine bleeding. She states no further vaginal/uterine bleeding. She has an IUD currently. She states she will follow up with Dr. Deidra Pendleton, her GYN provider from [facility]. She states her weight is stable and down one pound down 367 lbs Her Vitals today: 128/72, Temp. 98.3, 94% saturation, pulse 70. Overall she feels better.     Past Medical History:   Diagnosis Date     CHF (congestive heart failure) (H)      CKD (chronic kidney disease)      Compliance poor      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     Diabetes mellitus (H)      Gout      Hypertension      Insomnia      Morbid obesity (H)      CARLOS treated with BiPAP      Past Surgical History:   Procedure Laterality Date     EXAM UNDER ANESTHESIA PELVIC N/A 11/14/2021    Procedure: Exam Under Anesthesia, Dilation and curettage,  placement of intrauterine device, pap smear;  Surgeon: Deedee Hess MD;  Location:  OR       A/P:    1. Immunizations; Influenza vaccine 11/18/2021. Two doses of the Pfizer COVID vaccine  2. Gout on Allopurinol  3. Increased lipids on Atorvastatin  4. Anemia. Hematology appt. With Dr. Dumont 2/22/2022. She is on PO iron. Hgb 9.2 on 11/19/2021 and ordered future but not done. May have to have home nursing go to her house for lab draws. She states she is quite limited regarding mobility. She was seen 7/28/2020 by Dr. Dumont. Ordered CBC for next week and home nurse can draw.   5. Afib currently off anticoagulation due to uterine bleeding?  She Cardiology appt. 12/22/2021 with Dr. Uriarte. Seen in CORE clinic 12/7/2021, Cardiology. She remains on Bumex, Lisinopril, K+ replacement, and more recently spirolactone. Last visit with Dr. Uriarte 12/8/2020, Cardiology note.   6. Abnormal uterine bleeding. Placed GYN referral 12/4/2021   7. Ordered  mammogram 8/24/2020  8. Ordered EGD and colonoscopy 8/4/2020  9. Immunizations; two doses of Pfizer COVID vaccination; Influenza vaccine 11/18/2021. Tdap 5/1/2013.     CAROL Quiles MD    Total time today 11 minutes

## 2021-12-17 ENCOUNTER — VIRTUAL VISIT (OUTPATIENT)
Dept: INTERNAL MEDICINE | Facility: CLINIC | Age: 61
End: 2021-12-17
Payer: MEDICARE

## 2021-12-17 ENCOUNTER — TELEPHONE (OUTPATIENT)
Dept: CARDIOLOGY | Facility: CLINIC | Age: 61
End: 2021-12-17

## 2021-12-17 ENCOUNTER — TELEPHONE (OUTPATIENT)
Dept: INTERNAL MEDICINE | Facility: CLINIC | Age: 61
End: 2021-12-17

## 2021-12-17 DIAGNOSIS — D64.9 ANEMIA, UNSPECIFIED TYPE: Primary | ICD-10-CM

## 2021-12-17 DIAGNOSIS — B49 FUNGAL INFECTION: ICD-10-CM

## 2021-12-17 PROCEDURE — 99442 PR PHYSICIAN TELEPHONE EVALUATION 11-20 MIN: CPT | Mod: 95 | Performed by: INTERNAL MEDICINE

## 2021-12-17 NOTE — TELEPHONE ENCOUNTER
Left a detailed message to home care nurse, Grisel ([phone]) regarding CBC lab drawn next week or call me back with questions.

## 2021-12-17 NOTE — TELEPHONE ENCOUNTER
Date: 12/17/2021    Time of Call: 1:10 PM     Diagnosis:  rash     [ VORB ] Ordering provider: Ttay Tate NP    Order: hold spironolactone. Okay to try OTC benadryl at bedtime for itching or zyrtec or claritin during day if needed     Order received by: Micheline Perez RN     Follow-up/additional notes: order placed. Called Arianna. I will also call her Monday to see how she is doing. She states understanding, no questions at this time.

## 2021-12-17 NOTE — TELEPHONE ENCOUNTER
[facility] Call Center    Phone Message    May a detailed message be left on voicemail: no     Reason for Call: Other: Arianna called to advise she began taking a new Rx spironolactone (ALDACTONE) 25 MG tablet on 12/8/2021. On Friday 12/10/2021 she began experiencing an itchy rash on her chest. Please reach out to Arianna at [phone].     Action Taken: Message routed to:  [facility] ([facility]): Cardiology    Travel Screening: Not Applicable

## 2021-12-17 NOTE — TELEPHONE ENCOUNTER
Dear Danette;    I had a telephone visit with Arianna today    She needs to have home health nursing draw a CBC next week and ordered today.    Please help coordinate    Thanks, CAROL Quiles

## 2021-12-17 NOTE — NURSING NOTE
Chief Complaint   Patient presents with     Hospital F/U     Patient calls in for a hospital follow up.         Timmy Van MA on 12/17/2021 at 8:31 AM

## 2021-12-19 ENCOUNTER — MEDICAL CORRESPONDENCE (OUTPATIENT)
Dept: HEALTH INFORMATION MANAGEMENT | Facility: CLINIC | Age: 61
End: 2021-12-19
Payer: MEDICARE

## 2021-12-20 ENCOUNTER — PATIENT OUTREACH (OUTPATIENT)
Dept: CARDIOLOGY | Facility: CLINIC | Age: 61
End: 2021-12-20
Payer: MEDICARE

## 2021-12-20 RX ORDER — POTASSIUM CHLORIDE 1500 MG/1
TABLET, EXTENDED RELEASE ORAL
Qty: 180 TABLET | Refills: 3 | OUTPATIENT
Start: 2021-12-20

## 2021-12-20 NOTE — TELEPHONE ENCOUNTER
Called Arianna to check in on her rash. She states she did use benadryl over the weekend and this morning the rash is gone and the itching is almost gone as well. Greatly improved she reports. Advised to continue holding spironolactone until she discusses further with doctor on Wednesday. She states understanding, no questions at this time.

## 2021-12-22 NOTE — NURSING NOTE
Called pharmacy and confirmed Eliquis was $9.20 for a 90 day supply.  Patient is agreeable with the plan.  Explained that the 2022 cost may be different, patient is aware.    Ambika Hobbs RN  Cardiology Care Coordinator  [facility]  [phone] option 1

## 2021-12-22 NOTE — PROGRESS NOTES
ANTICOAGULATION  MANAGEMENT    Arianna Siddiqi is being discharged from the [facility]ticoagulation [facility] ([facility]).    Reason for discharge: warfarin replaced by alternate therapy, Eliquis    Anticoagulation episode resolved and INR Standing order discontinued    If patient needs warfarin management in the future, please send a new referral    Ying Hollis RN

## 2021-12-22 NOTE — PROGRESS NOTES
"Arianna Siddiqi is a 60 year old female who is being evaluated via a billable telephone visit.      The patient has been notified of following:     \"This telephone visit will be conducted via a call between you and your physician/provider. We have found that certain health care needs can be provided without the need for a physical exam.  This service lets us provide the care you need with a short phone conversation.  If a prescription is necessary we can send it directly to your pharmacy.  If lab work is needed we can place an order for that and you can then stop by our lab to have the test done at a later time.    Telephone visits are billed at different rates depending on your insurance coverage. During this emergency period, for some insurers they may be billed the same as an in-person visit.  Please reach out to your insurance provider with any questions.    If during the course of the call the physician/provider feels a telephone visit is not appropriate, you will not be charged for this service.\"    Patient has given verbal consent for Telephone visit?  Yes    What phone number would you like to be contacted at? [phone]    How would you like to obtain your AVS? Mail a copy    Phone call duration:25 minutes (phone call started 12 PM)    HPI: Ms. Arianna Siddiqi is a 60 year old  female with PMH significant for morbid obesity, paroxysmal atrial fibrillation, heart failure with preserved ejection fraction, type 2 diabetes? , obesity hypoventilation syndrome, CARLOS on CPAP, and hypertension.    The patient is being seen today at request of Dr. Quiles.    Patient was first diagnosed with heart failure and atrial fibrillation in 2016 and she was started on Bumex and warfarin. Since then the patient has been on these medications.  In November 2019 she ran out of Bumex for couple of months and could not refill the medication.  She ended up gaining weight and hospitalized in January of this year with heart failure " exacerbation and respiratory decompensation.  She was 60 pounds up from her normal weight.  She was treated with IV diuresis and CPAP. Patient had an asymptomatic paroxysmal atrial flutter episode during this admission.    Unfortunately patient admitted to the hospital recently on 2/15/2020 with heart failure decompensation and respiratory failure in the setting of influenza A.  Patient`s symptoms improved with noninvasive ventilation.      Patient tells me today that she is feeling stronger and breathing better since last hospital discharge.  She is able to do her daily activities.  She has home health nurse that checks on her once a week.  Denies chest pain, lower extremity edema, palpitations, dizziness or syncope.  She weighs herself daily.  Today she was 349 pounds.  She checks her INR at home.  During the day she no longer needs oxygen though she still has an oxygen tube at home.  She uses CPAP at night.    No prior history of CAD. No prior history of stroke. Blood pressure is well controlled with lisinopril 5 mg. She was a heavy smoker until 2002 with 30-pack-year smoking.  No alcohol abuse.  Diabetes is listed in patient's past medical history however last hemoglobin A1c is 5 on 4/16/2020 and she is not on antidiabetics.    Patient is currently on Bumex 2 mg once a day, atorvastatin 20 mg, lisinopril 5 mg, metoprolol 100 mg, potassium chloride 20 mEq twice daily, warfarin, allopurinol, diphenhydramine and cyclobenzaprine.    Patient's last EKG on 2/11/2020 shows sinus rhythm with right bundle branch block.  EKG on 1/23/2020 shows atrial flutter.    Echocardiogram on 1/16/2020 showed LVEF of 55 to 60%.  RV showed mild dilation with mildly reduced function.  No significant valve disease noted.  Prior echocardiogram in 2016 showed reduced EF at 40 to 45%.    Medications, personal, family, and social history reviewed with patient and revised.    Interval history 12/8/2020:  Today 6-month follow-up visit is via  telephone.  She reports feeling well.  Denies chest pain, palpitations, syncope, dizziness or lower extremity edema.  Reports shortness of breath with exertion particularly with strenuous activities.  Compliant with medications and CPAP.  Patient follows with Dr. Quiles regarding iron deficiency anemia.  She was recommended EGD and colonoscopy when she was last seen in August of this year however she was not able to do any of these.  She tells me that she is very concerned to go to hospital to do any tests due to current pandemic.    Interval history 12/22/2021:  Patient is being seen today through telephone visit.  She tells me that in November of this year she had severe uterine bleeding with hemoglobin dropping to 3.1.  She was hospitalized at [facility].  She underwent some procedures and they finally implanted her an IUD.  Patient required 6 units of red blood cell infusion.  She was recommended to stop warfarin (warfarin was for paroxysmal atrial fibrillation).  She is only taking aspirin now.  When she was last seen by core clinic 12/7 she was recommended to stop metoprolol, start spironolactone and reduce the dose of potassium.  Being on spironolactone for a week she developed rash and had to stop spironolactone.    Patient is complaining about extra money that she needs to pay for her CPAP machine every month.  She tells me that she does not know whom to talk to about it.    Continues to report shortness of breath with exertion.  No chest pain.  Weight stable.  Blood pressure today was 128/76 mmHg.  Her weight is 367 pounds.      PAST MEDICAL HISTORY:  Past Medical History:   Diagnosis Date     CHF (congestive heart failure) (H)      CKD (chronic kidney disease)      Compliance poor      Diabetes mellitus (H)      Gout      Hypertension      Insomnia      Morbid obesity (H)      CARLOS treated with BiPAP        CURRENT MEDICATIONS:  Current Outpatient Medications   Medication Sig Dispense Refill     acetaminophen  (TYLENOL) 500 MG tablet Take 1-2 tablets (500-1,000 mg) by mouth every 4 hours as needed for mild pain  0     allopurinol (ZYLOPRIM) 100 MG tablet TAKE 2 TABLETS (200 MG) BY MOUTH DAILY PATIENT NEEDS TO SEE PRIMARY PROVIDER AND HAVE LABS FOR FURTHER REFILLS. 60 tablet 1     alum & mag hydroxide-simethicone (MAALOX  ES) 400-400-40 MG/5ML SUSP suspension Take 15 mLs by mouth every 4 hours as needed for other (gastric distress.) 355 mL 0     atorvastatin (LIPITOR) 20 MG tablet TAKE 1 TABLET BY MOUTH EVERY EVENING 30 tablet 1     blood glucose monitoring (ACCU-CHEK NINA PLUS) meter device kit Use to test blood sugars 3 times daily or as directed. 1 kit 0     blood glucose monitoring (SOFTCLIX) lancets Use to test blood sugar 3 times daily or as directed. 300 each 0     bumetanide (BUMEX) 1 MG tablet TAKE 2 TABLETS BY MOUTH EVERY DAY 60 tablet 1     cyclobenzaprine (FLEXERIL) 5 MG tablet Take 1 tablet (5 mg) by mouth nightly as needed for muscle spasms 30 tablet 5     diphenhydrAMINE (BENADRYL) 25 MG tablet Take 25 mg by mouth every 6 hours as needed for itching or allergies       ferrous gluconate (FERGON) 324 (38 Fe) MG tablet Take 1 tablet (324 mg) by mouth daily 90 tablet 3     lisinopril (ZESTRIL) 5 MG tablet TAKE 1 TABLET BY MOUTH EVERY DAY 30 tablet 1     metoprolol tartrate (LOPRESSOR) 50 MG tablet TAKE 1 TABLET BY MOUTH TWICE A DAY 60 tablet 1     miconazole (MICATIN) 2 % external powder Apply topically 2 times daily 180 g 0     ondansetron (ZOFRAN-ODT) 4 MG ODT tab Take 1 tablet (4 mg) by mouth every 6 hours as needed for nausea or vomiting 10 tablet 0     polyethylene glycol (MIRALAX) packet Take 17 g by mouth daily as needed for constipation 30 packet 1     potassium chloride ER (K-TAB) 20 MEQ CR tablet Take 1 tablet (20 mEq) by mouth 2 times daily 180 tablet 1     sennosides (SENOKOT) 8.6 MG tablet Take 1 tablet by mouth 2 times daily as needed for constipation 60 tablet 0     traMADol (ULTRAM) 50 MG tablet  Take 1 tablet (50 mg) by mouth nightly as needed for severe pain 30 tablet 0     warfarin ANTICOAGULANT (COUMADIN) 1 MG tablet TAKE 2 TABLETS (2 MG) BY MOUTH DAILY USE AS DIRECTED BY COUMADIN CLINIC 60 tablet 1     warfarin ANTICOAGULANT (COUMADIN) 3 MG tablet Take one to one in a half tablets daily or as directed by the Coumadin clinic 135 tablet 5       PAST SURGICAL HISTORY:  No past surgical history on file.    ALLERGIES:     Allergies   Allergen Reactions     Penicillins Itching and Rash     Tolerated ceftriaxone 2020       FAMILY HISTORY:  Family History   Adopted: Yes   Family history unknown: Yes         SOCIAL HISTORY:  Social History     Tobacco Use     Smoking status: Former Smoker     Packs/day: 1.50     Years: 20.00     Pack years: 30.00     Types: Cigarettes     Last attempt to quit: 2002     Years since quittin.3     Smokeless tobacco: Never Used   Substance Use Topics     Alcohol use: No     Alcohol/week: 0.0 standard drinks     Drug use: No       I have reviewed the labs  and made my comment in the assessment and plan.    Labs:  CBC RESULTS:   Lab Results   Component Value Date    WBC 6.9 2020    RBC 3.30 (L) 2020    HGB 9.5 (L) 2020    HCT 33.1 (L) 2020     2020    MCH 28.8 2020    MCHC 28.7 (L) 2020    RDW 18.4 (H) 2020     2020       BMP RESULTS:  Lab Results   Component Value Date     2020    POTASSIUM 3.9 2020    CHLORIDE 105 2020    CO2 28 2020    ANIONGAP 5 2020    GLC 89 2020    BUN 23 2020    CR 0.84 2020    GFRESTIMATED 75 2020    GFRESTBLACK 86 2020    KADEN 9.5 2020        INR RESULTS:  Lab Results   Component Value Date    INR 2.1 (A) 2020    INR 2.4 (A) 2020    INR Canceled, Test credited 2020    INR 1.7 (A) 2020     Echocardiogram 2020  Technically difficult study.  The Ejection Fraction is estimated at 55-60%  by visual estimate.  Regional wall motion is probably normal.  Difficult to assess RV. Function probably at least mildly reduced. Size might  be mildly dilated.  Valves not well visualized. No significant valvular abnormalities were noted  by Doppler.  Dilation of the inferior vena cava is present with abnormal respiratory  variation in diameter.  No pericardial effusion is present.    Patient's last EKG on 2/11/2020 shows sinus rhythm with right bundle branch block.  EKG on 1/23/2020 shows atrial flutter.    Assessment and Plan:   Ms. Arianna Siddiqi is a 60 year old  female with PMH significant for former heavy smoking history for 30 pack years quit date 2002, morbid obesity, paroxysmal atrial fibrillation, heart failure with preserved ejection fraction, type 2 diabetes?  Not on medications, obesity hypoventilation syndrome, obstructive sleep apnea on CPAP, iron deficiency anemia and hypertension.    Patient recently had severe uterine bleeding which required multiple blood transfusion.  Her hemoglobin was down to 3.  Now she is off of warfarin.  She is still taking aspirin.  From heart failure standpoint patient appears stable however she was not able to tolerate spironolactone which was prescribed to her few weeks ago.  She was also recommended to stop metoprolol when she was seen by core clinic on 12/7/2021.    # Heart failure with preserved ejection fraction  -NYHA functional class II.  Patient's weight today is 367 pounds.  Recommend to continue Bumex 2 mg once daily along with potassium 20 mEq daily.  Recommend not to restart spironolactone.    # Paroxysmal atrial fibrillation  #Recent uterine bleeding  :The patient has history of paroxysmal A. fib since 2016.  Chads VASC score is 4 (diabetes, hypertension, heart failure, female).  She was on warfarin until recently.  Warfarin was discontinued in the hospital when she was hospitalized for uterine bleeding.  She was recommended to start direct oral  anticoagulants but this has not been started yet.  -Recommend to start Eliquis 5 mg twice daily.  -Stop aspirin    # Morbid obesity  - Has multiple complications like obstructive sleep apnea and obesity hypoventilation syndrome.      # Hypertension  -Well-controlled with lisinopril 5 mg and metoprolol 100 mg.    # Former heavy smoker: No prior history of CAD.     # Iron deficiency anemia: On iron treatment.  Follows with Dr. Quiles    Medication change today:  -Stop aspirin  -Start Eliquis 5 mg twice daily  -Stay off of metoprolol and spironolactone  -Continue all other medications    Return to clinic 6 months    Total time spent 45 minutes including telephone visit, review of medical notes and documentation.    Please donot hesitate to contact me if you have any questions or concerns. Again, thank you for allowing me to participate in the care of your patient.    Duane CHAPA MD  [facility] Division of Cardiology  Pager [phone]

## 2021-12-22 NOTE — NURSING NOTE
Vitals - Patient Reported  Systolic (Patient Reported): 128  Diastolic (Patient Reported): 76  Weight (Patient Reported): 166.5 kg (367 lb)  SpO2 (Patient Reported): 94  Pulse (Patient Reported): 80    Leticia Rausch MA

## 2021-12-22 NOTE — PATIENT INSTRUCTIONS
Thank you for coming to the [facility]; please note the following instructions:    1.  Stop aspirin    2.  Do not take spironolactone (since you are reporting rash with the medication)    3.  We will start you on Eliquis 5 mg twice daily which is a new blood thinner and does not need monitoring.    4.  We will place a referral to sleep medicine so that you can talk to them about your current CPAP machine    5.  Continue all your medications the same.    6.  Return to clinic 6 months.        If you have any questions regarding your visit please contact your care team:     Cardiology  Telephone Number   Ambika CAO., RN  Rahel ALLAN, RN   Yu BOLAND, RMA  Leticia CUNNINGHAM, RMA  Sofía FLOWERS, LPN   [phone] (option 1)   For scheduling appts:     [phone] (select option 1)       For the Device Clinic (Pacemakers and ICD's)  RN's :  Dulce Burr   During business hours: [phone]    *After business hours:  [phone] (select option 4)      Normal test result notifications will be released via [employer] or mailed within 7 business days.  All other test results, will be communicated via telephone once reviewed by your cardiologist.    If you need a medication refill please contact your pharmacy.  Please allow 3 business days for your refill to be completed.    As always, thank you for trusting us with your health care needs!        Patient Education     Apixaban Oral tablet  What is this medicine?  APIXABAN (a PIX a ban) is an anticoagulant (blood thinner). It is used to lower the chance of stroke in people with a medical condition called atrial fibrillation. It is also used to treat or prevent blood clots in the lungs or in the veins.  This medicine may be used for other purposes; ask your health care provider or pharmacist if you have questions.  What should I tell my health care provider before I take this medicine?  They need to know if you have any of these conditions:    bleeding  disorders    bleeding in the brain    blood in your stools (black or tarry stools) or if you have blood in your vomit    history of stomach bleeding    kidney disease    liver disease    mechanical heart valve    an unusual or allergic reaction to apixaban, other medicines, foods, dyes, or preservatives    pregnant or trying to get pregnant    breast-feeding  How should I use this medicine?  Take this medicine by mouth with a glass of water. Follow the directions on the prescription label. You can take it with or without food. If it upsets your stomach, take it with food. Take your medicine at regular intervals. Do not take it more often than directed. Do not stop taking except on your doctor's advice. Stopping this medicine may increase your risk of a blot clot. Be sure to refill your prescription before you run out of medicine.  Talk to your pediatrician regarding the use of this medicine in children. Special care may be needed.  Overdosage: If you think you have taken too much of this medicine contact a poison control center or emergency room at once.  NOTE: This medicine is only for you. Do not share this medicine with others.  What if I miss a dose?  If you miss a dose, take it as soon as you can. If it is almost time for your next dose, take only that dose. Do not take double or extra doses.  What may interact with this medicine?  This medicine may interact with the following:    aspirin and aspirin-like medicines    certain medicines for fungal infections like ketoconazole and itraconazole    certain medicines for seizures like carbamazepine and phenytoin    certain medicines that treat or prevent blood clots like warfarin, enoxaparin, and dalteparin    clarithromycin    NSAIDs, medicines for pain and inflammation, like ibuprofen or naproxen    rifampin    ritonavir    [place]'s wort  This list may not describe all possible interactions. Give your health care provider a list of all the medicines, herbs,  non-prescription drugs, or dietary supplements you use. Also tell them if you smoke, drink alcohol, or use illegal drugs. Some items may interact with your medicine.  What should I watch for while using this medicine?  Notify your doctor or health care professional and seek emergency treatment if you develop breathing problems; changes in vision; chest pain; severe, sudden headache; pain, swelling, warmth in the leg; trouble speaking; sudden numbness or weakness of the face, arm, or leg. These can be signs that your condition has gotten worse.  If you are going to have surgery, tell your doctor or health care professional that you are taking this medicine.  Tell your health care professional that you use this medicine before you have a spinal or epidural procedure. Sometimes people who take this medicine have bleeding problems around the spine when they have a spinal or epidural procedure. This bleeding is very rare. If you have a spinal or epidural procedure while on this medicine, call your health care professional immediately if you have back pain, numbness or tingling (especially in your legs and feet), muscle weakness, paralysis, or loss of bladder or bowel control.  Avoid sports and activities that might cause injury while you are using this medicine. Severe falls or injuries can cause unseen bleeding. Be careful when using sharp tools or knives. Consider using an electric razor. Take special care brushing or flossing your teeth. Report any injuries, bruising, or red spots on the skin to your doctor or health care professional.  What side effects may I notice from receiving this medicine?  Side effects that you should report to your doctor or health care professional as soon as possible:    allergic reactions like skin rash, itching or hives, swelling of the face, lips, or tongue    signs and symptoms of bleeding such as bloody or black, tarry stools; red or dark-brown urine; spitting up blood or brown  material that looks like coffee grounds; red spots on the skin; unusual bruising or bleeding from the eye, gums, or nose  This list may not describe all possible side effects. Call your doctor for medical advice about side effects. You may report side effects to FDA at 7-877-BJV-1274.  Where should I keep my medicine?  Keep out of the reach of children.  Store at room temperature between 20 and 25 degrees C (68 and 77 degrees F). Throw away any unused medicine after the expiration date.  NOTE: This sheet is a summary. It may not cover all possible information. If you have questions about this medicine, talk to your doctor, pharmacist, or health care provider.  NOTE:This sheet is a summary. It may not cover all possible information. If you have questions about this medicine, talk to your doctor, pharmacist, or health care provider. Copyright  2016 [employer]

## 2021-12-22 NOTE — LETTER
"12/22/2021      RE: Arianna Siddiqi  [address]  [place] [address]       Dear Colleague,    Thank you for the opportunity to participate in the care of your patient, Arianna Siddiqi, at the [facility]. Please see a copy of my visit note below.    Arianna Siddiqi is a 60 year old female who is being evaluated via a billable telephone visit.      The patient has been notified of following:     \"This telephone visit will be conducted via a call between you and your physician/provider. We have found that certain health care needs can be provided without the need for a physical exam.  This service lets us provide the care you need with a short phone conversation.  If a prescription is necessary we can send it directly to your pharmacy.  If lab work is needed we can place an order for that and you can then stop by our lab to have the test done at a later time.    Telephone visits are billed at different rates depending on your insurance coverage. During this emergency period, for some insurers they may be billed the same as an in-person visit.  Please reach out to your insurance provider with any questions.    If during the course of the call the physician/provider feels a telephone visit is not appropriate, you will not be charged for this service.\"    Patient has given verbal consent for Telephone visit?  Yes    What phone number would you like to be contacted at? [phone]    How would you like to obtain your AVS? Mail a copy    Phone call duration:25 minutes (phone call started 12 PM)    HPI: Ms. Arianna Siddiqi is a 60 year old  female with PMH significant for morbid obesity, paroxysmal atrial fibrillation, heart failure with preserved ejection fraction, type 2 diabetes? , obesity hypoventilation syndrome, CARLOS on CPAP, and hypertension.    The patient is being seen today at request of Dr. Quiles.    Patient was " first diagnosed with heart failure and atrial fibrillation in 2016 and she was started on Bumex and warfarin. Since then the patient has been on these medications.  In November 2019 she ran out of Bumex for couple of months and could not refill the medication.  She ended up gaining weight and hospitalized in January of this year with heart failure exacerbation and respiratory decompensation.  She was 60 pounds up from her normal weight.  She was treated with IV diuresis and CPAP. Patient had an asymptomatic paroxysmal atrial flutter episode during this admission.    Unfortunately patient admitted to the hospital recently on 2/15/2020 with heart failure decompensation and respiratory failure in the setting of influenza A.  Patient`s symptoms improved with noninvasive ventilation.      Patient tells me today that she is feeling stronger and breathing better since last hospital discharge.  She is able to do her daily activities.  She has home health nurse that checks on her once a week.  Denies chest pain, lower extremity edema, palpitations, dizziness or syncope.  She weighs herself daily.  Today she was 349 pounds.  She checks her INR at home.  During the day she no longer needs oxygen though she still has an oxygen tube at home.  She uses CPAP at night.    No prior history of CAD. No prior history of stroke. Blood pressure is well controlled with lisinopril 5 mg. She was a heavy smoker until 2002 with 30-pack-year smoking.  No alcohol abuse.  Diabetes is listed in patient's past medical history however last hemoglobin A1c is 5 on 4/16/2020 and she is not on antidiabetics.    Patient is currently on Bumex 2 mg once a day, atorvastatin 20 mg, lisinopril 5 mg, metoprolol 100 mg, potassium chloride 20 mEq twice daily, warfarin, allopurinol, diphenhydramine and cyclobenzaprine.    Patient's last EKG on 2/11/2020 shows sinus rhythm with right bundle branch block.  EKG on 1/23/2020 shows atrial flutter.    Echocardiogram  on 1/16/2020 showed LVEF of 55 to 60%.  RV showed mild dilation with mildly reduced function.  No significant valve disease noted.  Prior echocardiogram in 2016 showed reduced EF at 40 to 45%.    Medications, personal, family, and social history reviewed with patient and revised.    Interval history 12/8/2020:  Today 6-month follow-up visit is via telephone.  She reports feeling well.  Denies chest pain, palpitations, syncope, dizziness or lower extremity edema.  Reports shortness of breath with exertion particularly with strenuous activities.  Compliant with medications and CPAP.  Patient follows with Dr. Quiles regarding iron deficiency anemia.  She was recommended EGD and colonoscopy when she was last seen in August of this year however she was not able to do any of these.  She tells me that she is very concerned to go to hospital to do any tests due to current pandemic.    Interval history 12/22/2021:  Patient is being seen today through telephone visit.  She tells me that in November of this year she had severe uterine bleeding with hemoglobin dropping to 3.1.  She was hospitalized at [facility].  She underwent some procedures and they finally implanted her an IUD.  Patient required 6 units of red blood cell infusion.  She was recommended to stop warfarin (warfarin was for paroxysmal atrial fibrillation).  She is only taking aspirin now.  When she was last seen by core clinic 12/7 she was recommended to stop metoprolol, start spironolactone and reduce the dose of potassium.  Being on spironolactone for a week she developed rash and had to stop spironolactone.    Patient is complaining about extra money that she needs to pay for her CPAP machine every month.  She tells me that she does not know whom to talk to about it.    Continues to report shortness of breath with exertion.  No chest pain.  Weight stable.  Blood pressure today was 128/76 mmHg.  Her weight is 367 pounds.      PAST MEDICAL HISTORY:  Past Medical  History:   Diagnosis Date     CHF (congestive heart failure) (H)      CKD (chronic kidney disease)      Compliance poor      Diabetes mellitus (H)      Gout      Hypertension      Insomnia      Morbid obesity (H)      CARLOS treated with BiPAP        CURRENT MEDICATIONS:  Current Outpatient Medications   Medication Sig Dispense Refill     acetaminophen (TYLENOL) 500 MG tablet Take 1-2 tablets (500-1,000 mg) by mouth every 4 hours as needed for mild pain  0     allopurinol (ZYLOPRIM) 100 MG tablet TAKE 2 TABLETS (200 MG) BY MOUTH DAILY PATIENT NEEDS TO SEE PRIMARY PROVIDER AND HAVE LABS FOR FURTHER REFILLS. 60 tablet 1     alum & mag hydroxide-simethicone (MAALOX  ES) 400-400-40 MG/5ML SUSP suspension Take 15 mLs by mouth every 4 hours as needed for other (gastric distress.) 355 mL 0     atorvastatin (LIPITOR) 20 MG tablet TAKE 1 TABLET BY MOUTH EVERY EVENING 30 tablet 1     blood glucose monitoring (ACCU-CHEK NINA PLUS) meter device kit Use to test blood sugars 3 times daily or as directed. 1 kit 0     blood glucose monitoring (SOFTCLIX) lancets Use to test blood sugar 3 times daily or as directed. 300 each 0     bumetanide (BUMEX) 1 MG tablet TAKE 2 TABLETS BY MOUTH EVERY DAY 60 tablet 1     cyclobenzaprine (FLEXERIL) 5 MG tablet Take 1 tablet (5 mg) by mouth nightly as needed for muscle spasms 30 tablet 5     diphenhydrAMINE (BENADRYL) 25 MG tablet Take 25 mg by mouth every 6 hours as needed for itching or allergies       ferrous gluconate (FERGON) 324 (38 Fe) MG tablet Take 1 tablet (324 mg) by mouth daily 90 tablet 3     lisinopril (ZESTRIL) 5 MG tablet TAKE 1 TABLET BY MOUTH EVERY DAY 30 tablet 1     metoprolol tartrate (LOPRESSOR) 50 MG tablet TAKE 1 TABLET BY MOUTH TWICE A DAY 60 tablet 1     miconazole (MICATIN) 2 % external powder Apply topically 2 times daily 180 g 0     ondansetron (ZOFRAN-ODT) 4 MG ODT tab Take 1 tablet (4 mg) by mouth every 6 hours as needed for nausea or vomiting 10 tablet 0      polyethylene glycol (MIRALAX) packet Take 17 g by mouth daily as needed for constipation 30 packet 1     potassium chloride ER (K-TAB) 20 MEQ CR tablet Take 1 tablet (20 mEq) by mouth 2 times daily 180 tablet 1     sennosides (SENOKOT) 8.6 MG tablet Take 1 tablet by mouth 2 times daily as needed for constipation 60 tablet 0     traMADol (ULTRAM) 50 MG tablet Take 1 tablet (50 mg) by mouth nightly as needed for severe pain 30 tablet 0     warfarin ANTICOAGULANT (COUMADIN) 1 MG tablet TAKE 2 TABLETS (2 MG) BY MOUTH DAILY USE AS DIRECTED BY COUMADIN CLINIC 60 tablet 1     warfarin ANTICOAGULANT (COUMADIN) 3 MG tablet Take one to one in a half tablets daily or as directed by the Coumadin clinic 135 tablet 5       PAST SURGICAL HISTORY:  No past surgical history on file.    ALLERGIES:     Allergies   Allergen Reactions     Penicillins Itching and Rash     Tolerated ceftriaxone 2020       FAMILY HISTORY:  Family History   Adopted: Yes   Family history unknown: Yes         SOCIAL HISTORY:  Social History     Tobacco Use     Smoking status: Former Smoker     Packs/day: 1.50     Years: 20.00     Pack years: 30.00     Types: Cigarettes     Last attempt to quit: 2002     Years since quittin.3     Smokeless tobacco: Never Used   Substance Use Topics     Alcohol use: No     Alcohol/week: 0.0 standard drinks     Drug use: No       I have reviewed the labs  and made my comment in the assessment and plan.    Labs:  CBC RESULTS:   Lab Results   Component Value Date    WBC 6.9 2020    RBC 3.30 (L) 2020    HGB 9.5 (L) 2020    HCT 33.1 (L) 2020     2020    MCH 28.8 2020    MCHC 28.7 (L) 2020    RDW 18.4 (H) 2020     2020       BMP RESULTS:  Lab Results   Component Value Date     2020    POTASSIUM 3.9 2020    CHLORIDE 105 2020    CO2 28 2020    ANIONGAP 5 2020    GLC 89 2020    BUN 23 2020    CR 0.84  04/16/2020    GFRESTIMATED 75 04/16/2020    GFRESTBLACK 86 04/16/2020    KADEN 9.5 04/16/2020        INR RESULTS:  Lab Results   Component Value Date    INR 2.1 (A) 04/28/2020    INR 2.4 (A) 04/21/2020    INR Canceled, Test credited 04/16/2020    INR 1.7 (A) 04/14/2020     Echocardiogram 1/2020  Technically difficult study.  The Ejection Fraction is estimated at 55-60% by visual estimate.  Regional wall motion is probably normal.  Difficult to assess RV. Function probably at least mildly reduced. Size might  be mildly dilated.  Valves not well visualized. No significant valvular abnormalities were noted  by Doppler.  Dilation of the inferior vena cava is present with abnormal respiratory  variation in diameter.  No pericardial effusion is present.    Patient's last EKG on 2/11/2020 shows sinus rhythm with right bundle branch block.  EKG on 1/23/2020 shows atrial flutter.    Assessment and Plan:   Ms. Arianna Siddiqi is a 60 year old  female with PMH significant for former heavy smoking history for 30 pack years quit date 2002, morbid obesity, paroxysmal atrial fibrillation, heart failure with preserved ejection fraction, type 2 diabetes?  Not on medications, obesity hypoventilation syndrome, obstructive sleep apnea on CPAP, iron deficiency anemia and hypertension.    Patient recently had severe uterine bleeding which required multiple blood transfusion.  Her hemoglobin was down to 3.  Now she is off of warfarin.  She is still taking aspirin.  From heart failure standpoint patient appears stable however she was not able to tolerate spironolactone which was prescribed to her few weeks ago.  She was also recommended to stop metoprolol when she was seen by core clinic on 12/7/2021.    # Heart failure with preserved ejection fraction  -NYHA functional class II.  Patient's weight today is 367 pounds.  Recommend to continue Bumex 2 mg once daily along with potassium 20 mEq daily.  Recommend not to restart spironolactone.    #  Paroxysmal atrial fibrillation  #Recent uterine bleeding  :The patient has history of paroxysmal A. fib since 2016.  Chads VASC score is 4 (diabetes, hypertension, heart failure, female).  She was on warfarin until recently.  Warfarin was discontinued in the hospital when she was hospitalized for uterine bleeding.  She was recommended to start direct oral anticoagulants but this has not been started yet.  -Recommend to start Eliquis 5 mg twice daily.  -Stop aspirin    # Morbid obesity  - Has multiple complications like obstructive sleep apnea and obesity hypoventilation syndrome.      # Hypertension  -Well-controlled with lisinopril 5 mg and metoprolol 100 mg.    # Former heavy smoker: No prior history of CAD.     # Iron deficiency anemia: On iron treatment.  Follows with Dr. Quiles    Medication change today:  -Stop aspirin  -Start Eliquis 5 mg twice daily  -Stay off of metoprolol and spironolactone  -Continue all other medications    Return to clinic 6 months    Total time spent 45 minutes including telephone visit, review of medical notes and documentation.    Please donot hesitate to contact me if you have any questions or concerns. Again, thank you for allowing me to participate in the care of your patient.    Duane CHAPA MD  [facility] Division of Cardiology  Pager [phone]

## 2022-01-01 ENCOUNTER — APPOINTMENT (OUTPATIENT)
Dept: GENERAL RADIOLOGY | Facility: CLINIC | Age: 62
DRG: 870 | End: 2022-01-01
Attending: STUDENT IN AN ORGANIZED HEALTH CARE EDUCATION/TRAINING PROGRAM
Payer: MEDICARE

## 2022-01-01 ENCOUNTER — LAB REQUISITION (OUTPATIENT)
Dept: LAB | Facility: CLINIC | Age: 62
End: 2022-01-01
Payer: MEDICARE

## 2022-01-01 ENCOUNTER — TELEPHONE (OUTPATIENT)
Dept: INTERNAL MEDICINE | Facility: CLINIC | Age: 62
End: 2022-01-01
Payer: MEDICARE

## 2022-01-01 ENCOUNTER — MEDICAL CORRESPONDENCE (OUTPATIENT)
Dept: INTERNAL MEDICINE | Facility: CLINIC | Age: 62
End: 2022-01-01

## 2022-01-01 ENCOUNTER — APPOINTMENT (OUTPATIENT)
Dept: PHYSICAL THERAPY | Facility: CLINIC | Age: 62
DRG: 870 | End: 2022-01-01
Payer: MEDICARE

## 2022-01-01 ENCOUNTER — APPOINTMENT (OUTPATIENT)
Dept: OCCUPATIONAL THERAPY | Facility: SKILLED NURSING FACILITY | Age: 62
DRG: 189 | End: 2022-01-01
Attending: INTERNAL MEDICINE
Payer: MEDICARE

## 2022-01-01 ENCOUNTER — HOME INFUSION (PRE-WILLOW HOME INFUSION) (OUTPATIENT)
Dept: PHARMACY | Facility: CLINIC | Age: 62
End: 2022-01-01

## 2022-01-01 ENCOUNTER — TELEPHONE (OUTPATIENT)
Dept: INTERNAL MEDICINE | Facility: CLINIC | Age: 62
End: 2022-01-01

## 2022-01-01 ENCOUNTER — APPOINTMENT (OUTPATIENT)
Dept: GENERAL RADIOLOGY | Facility: CLINIC | Age: 62
DRG: 870 | End: 2022-01-01
Payer: MEDICARE

## 2022-01-01 ENCOUNTER — PATIENT OUTREACH (OUTPATIENT)
Dept: CARE COORDINATION | Facility: CLINIC | Age: 62
End: 2022-01-01

## 2022-01-01 ENCOUNTER — VIRTUAL VISIT (OUTPATIENT)
Dept: ONCOLOGY | Facility: CLINIC | Age: 62
End: 2022-01-01
Attending: INTERNAL MEDICINE
Payer: MEDICARE

## 2022-01-01 ENCOUNTER — PATIENT OUTREACH (OUTPATIENT)
Dept: ONCOLOGY | Facility: CLINIC | Age: 62
End: 2022-01-01
Payer: MEDICARE

## 2022-01-01 ENCOUNTER — APPOINTMENT (OUTPATIENT)
Dept: OCCUPATIONAL THERAPY | Facility: CLINIC | Age: 62
DRG: 870 | End: 2022-01-01
Payer: MEDICARE

## 2022-01-01 ENCOUNTER — APPOINTMENT (OUTPATIENT)
Dept: CT IMAGING | Facility: CLINIC | Age: 62
DRG: 870 | End: 2022-01-01
Attending: NURSE PRACTITIONER
Payer: MEDICARE

## 2022-01-01 ENCOUNTER — HOSPITAL ENCOUNTER (INPATIENT)
Facility: CLINIC | Age: 62
LOS: 29 days | Discharge: NURSING, REHAB OR LONG-TERM CARE | DRG: 870 | End: 2022-12-17
Attending: EMERGENCY MEDICINE | Admitting: SURGERY
Payer: MEDICARE

## 2022-01-01 ENCOUNTER — APPOINTMENT (OUTPATIENT)
Dept: CARDIOLOGY | Facility: CLINIC | Age: 62
DRG: 870 | End: 2022-01-01
Attending: NURSE PRACTITIONER
Payer: MEDICARE

## 2022-01-01 ENCOUNTER — CARE COORDINATION (OUTPATIENT)
Dept: ONCOLOGY | Facility: CLINIC | Age: 62
End: 2022-01-01
Payer: MEDICARE

## 2022-01-01 ENCOUNTER — TELEPHONE (OUTPATIENT)
Dept: ONCOLOGY | Facility: CLINIC | Age: 62
End: 2022-01-01
Payer: MEDICARE

## 2022-01-01 ENCOUNTER — APPOINTMENT (OUTPATIENT)
Dept: GENERAL RADIOLOGY | Facility: CLINIC | Age: 62
DRG: 870 | End: 2022-01-01
Attending: INTERNAL MEDICINE
Payer: MEDICARE

## 2022-01-01 ENCOUNTER — APPOINTMENT (OUTPATIENT)
Dept: CARDIOLOGY | Facility: CLINIC | Age: 62
DRG: 870 | End: 2022-01-01
Payer: MEDICARE

## 2022-01-01 ENCOUNTER — APPOINTMENT (OUTPATIENT)
Dept: GENERAL RADIOLOGY | Facility: CLINIC | Age: 62
DRG: 870 | End: 2022-01-01
Attending: NURSE PRACTITIONER
Payer: MEDICARE

## 2022-01-01 ENCOUNTER — TRANSFERRED RECORDS (OUTPATIENT)
Dept: HEALTH INFORMATION MANAGEMENT | Facility: CLINIC | Age: 62
End: 2022-01-01

## 2022-01-01 ENCOUNTER — MEDICAL CORRESPONDENCE (OUTPATIENT)
Dept: HEALTH INFORMATION MANAGEMENT | Facility: CLINIC | Age: 62
End: 2022-01-01
Payer: MEDICARE

## 2022-01-01 ENCOUNTER — APPOINTMENT (OUTPATIENT)
Dept: CT IMAGING | Facility: CLINIC | Age: 62
DRG: 870 | End: 2022-01-01
Payer: MEDICARE

## 2022-01-01 ENCOUNTER — APPOINTMENT (OUTPATIENT)
Dept: ULTRASOUND IMAGING | Facility: CLINIC | Age: 62
DRG: 870 | End: 2022-01-01
Payer: MEDICARE

## 2022-01-01 ENCOUNTER — NURSE TRIAGE (OUTPATIENT)
Dept: NURSING | Facility: CLINIC | Age: 62
End: 2022-01-01

## 2022-01-01 ENCOUNTER — PATIENT OUTREACH (OUTPATIENT)
Dept: CARE COORDINATION | Facility: CLINIC | Age: 62
End: 2022-01-01
Payer: MEDICARE

## 2022-01-01 ENCOUNTER — APPOINTMENT (OUTPATIENT)
Dept: PHYSICAL THERAPY | Facility: SKILLED NURSING FACILITY | Age: 62
DRG: 189 | End: 2022-01-01
Attending: INTERNAL MEDICINE
Payer: MEDICARE

## 2022-01-01 ENCOUNTER — APPOINTMENT (OUTPATIENT)
Dept: GENERAL RADIOLOGY | Facility: CLINIC | Age: 62
End: 2022-01-01
Attending: PHYSICIAN ASSISTANT
Payer: MEDICARE

## 2022-01-01 ENCOUNTER — HOME INFUSION (PRE-WILLOW HOME INFUSION) (OUTPATIENT)
Dept: PHARMACY | Facility: CLINIC | Age: 62
End: 2022-01-01
Payer: MEDICARE

## 2022-01-01 ENCOUNTER — VIRTUAL VISIT (OUTPATIENT)
Dept: CARDIOLOGY | Facility: CLINIC | Age: 62
End: 2022-01-01
Payer: MEDICARE

## 2022-01-01 ENCOUNTER — HOSPITAL ENCOUNTER (INPATIENT)
Facility: CLINIC | Age: 62
LOS: 1 days | DRG: 871 | End: 2022-12-22
Attending: SURGERY | Admitting: SURGERY
Payer: MEDICARE

## 2022-01-01 ENCOUNTER — VIRTUAL VISIT (OUTPATIENT)
Dept: INTERNAL MEDICINE | Facility: CLINIC | Age: 62
End: 2022-01-01
Payer: MEDICARE

## 2022-01-01 ENCOUNTER — APPOINTMENT (OUTPATIENT)
Dept: CARDIOLOGY | Facility: CLINIC | Age: 62
DRG: 870 | End: 2022-01-01
Attending: STUDENT IN AN ORGANIZED HEALTH CARE EDUCATION/TRAINING PROGRAM
Payer: MEDICARE

## 2022-01-01 ENCOUNTER — APPOINTMENT (OUTPATIENT)
Dept: GENERAL RADIOLOGY | Facility: CLINIC | Age: 62
DRG: 870 | End: 2022-01-01
Attending: EMERGENCY MEDICINE
Payer: MEDICARE

## 2022-01-01 ENCOUNTER — HOSPITAL ENCOUNTER (INPATIENT)
Facility: SKILLED NURSING FACILITY | Age: 62
LOS: 4 days | Discharge: ANOTHER HOSPITAL | DRG: 189 | End: 2022-12-21
Attending: INTERNAL MEDICINE | Admitting: INTERNAL MEDICINE
Payer: MEDICARE

## 2022-01-01 VITALS
OXYGEN SATURATION: 95 % | DIASTOLIC BLOOD PRESSURE: 46 MMHG | SYSTOLIC BLOOD PRESSURE: 122 MMHG | TEMPERATURE: 95.7 F | RESPIRATION RATE: 20 BRPM | WEIGHT: 293 LBS | HEART RATE: 68 BPM | BODY MASS INDEX: 70.69 KG/M2

## 2022-01-01 VITALS
HEART RATE: 65 BPM | HEIGHT: 62 IN | TEMPERATURE: 97.9 F | OXYGEN SATURATION: 95 % | RESPIRATION RATE: 18 BRPM | BODY MASS INDEX: 53.92 KG/M2 | DIASTOLIC BLOOD PRESSURE: 57 MMHG | SYSTOLIC BLOOD PRESSURE: 102 MMHG | WEIGHT: 293 LBS

## 2022-01-01 VITALS
RESPIRATION RATE: 14 BRPM | SYSTOLIC BLOOD PRESSURE: 45 MMHG | DIASTOLIC BLOOD PRESSURE: 23 MMHG | TEMPERATURE: 100 F | HEART RATE: 73 BPM | OXYGEN SATURATION: 82 %

## 2022-01-01 DIAGNOSIS — M62.838 MUSCLE SPASM: ICD-10-CM

## 2022-01-01 DIAGNOSIS — Z20.822 CONTACT WITH AND (SUSPECTED) EXPOSURE TO COVID-19: ICD-10-CM

## 2022-01-01 DIAGNOSIS — I10 ESSENTIAL HYPERTENSION: ICD-10-CM

## 2022-01-01 DIAGNOSIS — B49 FUNGAL INFECTION: ICD-10-CM

## 2022-01-01 DIAGNOSIS — R21 RASH: Primary | ICD-10-CM

## 2022-01-01 DIAGNOSIS — R11.10 VOMITING, INTRACTABILITY OF VOMITING NOT SPECIFIED, PRESENCE OF NAUSEA NOT SPECIFIED, UNSPECIFIED VOMITING TYPE: Primary | ICD-10-CM

## 2022-01-01 DIAGNOSIS — I50.9 HEART FAILURE, UNSPECIFIED (H): ICD-10-CM

## 2022-01-01 DIAGNOSIS — M1A.9XX0 CHRONIC GOUT WITHOUT TOPHUS, UNSPECIFIED CAUSE, UNSPECIFIED SITE: Primary | ICD-10-CM

## 2022-01-01 DIAGNOSIS — G47.00 INSOMNIA, UNSPECIFIED TYPE: ICD-10-CM

## 2022-01-01 DIAGNOSIS — N93.9 VAGINAL BLEEDING: ICD-10-CM

## 2022-01-01 DIAGNOSIS — I50.33 ACUTE ON CHRONIC DIASTOLIC CONGESTIVE HEART FAILURE (H): ICD-10-CM

## 2022-01-01 DIAGNOSIS — D62 ANEMIA DUE TO BLOOD LOSS, ACUTE: Primary | ICD-10-CM

## 2022-01-01 DIAGNOSIS — M10.9 GOUT, UNSPECIFIED CAUSE, UNSPECIFIED CHRONICITY, UNSPECIFIED SITE: ICD-10-CM

## 2022-01-01 DIAGNOSIS — R79.1 SUPRATHERAPEUTIC INR: ICD-10-CM

## 2022-01-01 DIAGNOSIS — E61.1 IRON DEFICIENCY: Primary | ICD-10-CM

## 2022-01-01 DIAGNOSIS — N17.9 ACUTE RENAL FAILURE, UNSPECIFIED ACUTE RENAL FAILURE TYPE (H): ICD-10-CM

## 2022-01-01 DIAGNOSIS — I48.91 ATRIAL FIBRILLATION, UNSPECIFIED TYPE (H): ICD-10-CM

## 2022-01-01 DIAGNOSIS — E61.1 IRON DEFICIENCY: ICD-10-CM

## 2022-01-01 DIAGNOSIS — R21 RASH: ICD-10-CM

## 2022-01-01 DIAGNOSIS — I48.0 PAROXYSMAL ATRIAL FIBRILLATION (H): Primary | ICD-10-CM

## 2022-01-01 DIAGNOSIS — R11.10 VOMITING, INTRACTABILITY OF VOMITING NOT SPECIFIED, PRESENCE OF NAUSEA NOT SPECIFIED, UNSPECIFIED VOMITING TYPE: ICD-10-CM

## 2022-01-01 DIAGNOSIS — I48.0 PAF (PAROXYSMAL ATRIAL FIBRILLATION) (H): ICD-10-CM

## 2022-01-01 DIAGNOSIS — D64.9 ANEMIA, UNSPECIFIED TYPE: Primary | ICD-10-CM

## 2022-01-01 DIAGNOSIS — Z87.891 HISTORY OF TOBACCO USE: ICD-10-CM

## 2022-01-01 DIAGNOSIS — J96.02 ACUTE RESPIRATORY ACIDOSIS (H): ICD-10-CM

## 2022-01-01 DIAGNOSIS — J96.22 ACUTE AND CHRONIC RESPIRATORY FAILURE WITH HYPERCAPNIA (H): ICD-10-CM

## 2022-01-01 DIAGNOSIS — D62 ACUTE POSTHEMORRHAGIC ANEMIA: ICD-10-CM

## 2022-01-01 DIAGNOSIS — I10 BENIGN ESSENTIAL HYPERTENSION: ICD-10-CM

## 2022-01-01 DIAGNOSIS — I50.30 HEART FAILURE WITH PRESERVED EJECTION FRACTION, NYHA CLASS II (H): ICD-10-CM

## 2022-01-01 DIAGNOSIS — I48.91 ATRIAL FIBRILLATION, UNSPECIFIED TYPE (H): Primary | ICD-10-CM

## 2022-01-01 DIAGNOSIS — E66.01 MORBID OBESITY (H): ICD-10-CM

## 2022-01-01 DIAGNOSIS — I50.9 CONGESTIVE HEART FAILURE, UNSPECIFIED HF CHRONICITY, UNSPECIFIED HEART FAILURE TYPE (H): Primary | ICD-10-CM

## 2022-01-01 DIAGNOSIS — N18.9 CHRONIC KIDNEY DISEASE, UNSPECIFIED: ICD-10-CM

## 2022-01-01 DIAGNOSIS — Z53.9 DIAGNOSIS NOT YET DEFINED: Primary | ICD-10-CM

## 2022-01-01 DIAGNOSIS — D62 ANEMIA DUE TO BLOOD LOSS, ACUTE: ICD-10-CM

## 2022-01-01 LAB
ABO/RH(D): NORMAL
ALBUMIN SERPL BCG-MCNC: 3 G/DL (ref 3.5–5.2)
ALBUMIN SERPL BCG-MCNC: 3.1 G/DL (ref 3.5–5.2)
ALBUMIN SERPL BCG-MCNC: 3.1 G/DL (ref 3.5–5.2)
ALBUMIN SERPL BCG-MCNC: 3.2 G/DL (ref 3.5–5.2)
ALBUMIN SERPL BCG-MCNC: 3.3 G/DL (ref 3.5–5.2)
ALBUMIN SERPL BCG-MCNC: 3.3 G/DL (ref 3.5–5.2)
ALBUMIN SERPL BCG-MCNC: 3.4 G/DL (ref 3.5–5.2)
ALBUMIN SERPL BCG-MCNC: 3.6 G/DL (ref 3.5–5.2)
ALBUMIN SERPL-MCNC: 2.5 G/DL (ref 3.4–5)
ALBUMIN UR-MCNC: 10 MG/DL
ALBUMIN UR-MCNC: 30 MG/DL
ALBUMIN UR-MCNC: 70 MG/DL
ALLEN'S TEST: ABNORMAL
ALP SERPL-CCNC: 115 U/L (ref 35–104)
ALP SERPL-CCNC: 129 U/L (ref 35–104)
ALP SERPL-CCNC: 132 U/L (ref 40–150)
ALP SERPL-CCNC: 137 U/L (ref 35–104)
ALP SERPL-CCNC: 151 U/L (ref 35–104)
ALP SERPL-CCNC: 74 U/L (ref 35–104)
ALP SERPL-CCNC: 78 U/L (ref 35–104)
ALP SERPL-CCNC: 78 U/L (ref 35–104)
ALP SERPL-CCNC: 84 U/L (ref 35–104)
ALP SERPL-CCNC: 93 U/L (ref 35–104)
ALP SERPL-CCNC: 93 U/L (ref 35–104)
ALP SERPL-CCNC: 99 U/L (ref 35–104)
ALT SERPL W P-5'-P-CCNC: 16 U/L (ref 10–35)
ALT SERPL W P-5'-P-CCNC: 17 U/L (ref 10–35)
ALT SERPL W P-5'-P-CCNC: 21 U/L (ref 10–35)
ALT SERPL W P-5'-P-CCNC: 24 U/L (ref 10–35)
ALT SERPL W P-5'-P-CCNC: 25 U/L (ref 0–50)
ALT SERPL W P-5'-P-CCNC: 26 U/L (ref 10–35)
ALT SERPL W P-5'-P-CCNC: 27 U/L (ref 10–35)
ALT SERPL W P-5'-P-CCNC: 29 U/L (ref 10–35)
ALT SERPL W P-5'-P-CCNC: 33 U/L (ref 10–35)
ANION GAP SERPL CALCULATED.3IONS-SCNC: 1 MMOL/L (ref 3–14)
ANION GAP SERPL CALCULATED.3IONS-SCNC: 1 MMOL/L (ref 3–14)
ANION GAP SERPL CALCULATED.3IONS-SCNC: 11 MMOL/L (ref 7–15)
ANION GAP SERPL CALCULATED.3IONS-SCNC: 12 MMOL/L (ref 7–15)
ANION GAP SERPL CALCULATED.3IONS-SCNC: 13 MMOL/L (ref 7–15)
ANION GAP SERPL CALCULATED.3IONS-SCNC: 14 MMOL/L (ref 7–15)
ANION GAP SERPL CALCULATED.3IONS-SCNC: 15 MMOL/L (ref 7–15)
ANION GAP SERPL CALCULATED.3IONS-SCNC: 16 MMOL/L (ref 7–15)
ANION GAP SERPL CALCULATED.3IONS-SCNC: 17 MMOL/L (ref 7–15)
ANION GAP SERPL CALCULATED.3IONS-SCNC: 18 MMOL/L (ref 7–15)
ANION GAP SERPL CALCULATED.3IONS-SCNC: 18 MMOL/L (ref 7–15)
ANION GAP SERPL CALCULATED.3IONS-SCNC: 20 MMOL/L (ref 7–15)
ANION GAP SERPL CALCULATED.3IONS-SCNC: 3 MMOL/L (ref 3–14)
ANION GAP SERPL CALCULATED.3IONS-SCNC: 4 MMOL/L (ref 3–14)
ANION GAP SERPL CALCULATED.3IONS-SCNC: 5 MMOL/L (ref 3–14)
ANION GAP SERPL CALCULATED.3IONS-SCNC: 7 MMOL/L (ref 7–15)
ANION GAP SERPL CALCULATED.3IONS-SCNC: 8 MMOL/L (ref 3–14)
ANION GAP SERPL CALCULATED.3IONS-SCNC: 8 MMOL/L (ref 7–15)
ANION GAP SERPL CALCULATED.3IONS-SCNC: 8 MMOL/L (ref 7–15)
ANION GAP SERPL CALCULATED.3IONS-SCNC: 9 MMOL/L (ref 7–15)
ANION GAP SERPL CALCULATED.3IONS-SCNC: <1 MMOL/L (ref 3–14)
ANTIBODY SCREEN: NEGATIVE
APIXABAN PPP CHRO-MCNC: 147 NG/ML
APPEARANCE UR: ABNORMAL
APPEARANCE UR: ABNORMAL
APPEARANCE UR: CLEAR
APTT PPP: 35 SECONDS (ref 22–38)
APTT PPP: 37 SECONDS (ref 22–38)
APTT PPP: 39 SECONDS (ref 22–38)
APTT PPP: 40 SECONDS (ref 22–38)
APTT PPP: 41 SECONDS (ref 22–38)
APTT PPP: 44 SECONDS (ref 22–38)
APTT PPP: 53 SECONDS (ref 22–38)
APTT PPP: 58 SECONDS (ref 22–38)
APTT PPP: 58 SECONDS (ref 22–38)
APTT PPP: 62 SECONDS (ref 22–38)
APTT PPP: 64 SECONDS (ref 22–38)
APTT PPP: 65 SECONDS (ref 22–38)
APTT PPP: 67 SECONDS (ref 22–38)
APTT PPP: 69 SECONDS (ref 22–38)
APTT PPP: 70 SECONDS (ref 22–38)
APTT PPP: 70 SECONDS (ref 22–38)
APTT PPP: 71 SECONDS (ref 22–38)
APTT PPP: 71 SECONDS (ref 22–38)
APTT PPP: 72 SECONDS (ref 22–38)
APTT PPP: 74 SECONDS (ref 22–38)
APTT PPP: 76 SECONDS (ref 22–38)
APTT PPP: 78 SECONDS (ref 22–38)
APTT PPP: 78 SECONDS (ref 22–38)
APTT PPP: 79 SECONDS (ref 22–38)
APTT PPP: 80 SECONDS (ref 22–38)
APTT PPP: 82 SECONDS (ref 22–38)
APTT PPP: 93 SECONDS (ref 22–38)
APTT PPP: 96 SECONDS (ref 22–38)
AST SERPL W P-5'-P-CCNC: 21 U/L (ref 10–35)
AST SERPL W P-5'-P-CCNC: 22 U/L (ref 0–45)
AST SERPL W P-5'-P-CCNC: 22 U/L (ref 10–35)
AST SERPL W P-5'-P-CCNC: 28 U/L (ref 10–35)
AST SERPL W P-5'-P-CCNC: 33 U/L (ref 10–35)
AST SERPL W P-5'-P-CCNC: 37 U/L (ref 10–35)
AST SERPL W P-5'-P-CCNC: 38 U/L (ref 10–35)
AST SERPL W P-5'-P-CCNC: 47 U/L (ref 10–35)
AST SERPL W P-5'-P-CCNC: 49 U/L (ref 10–35)
AST SERPL W P-5'-P-CCNC: 56 U/L (ref 10–35)
AST SERPL W P-5'-P-CCNC: 58 U/L (ref 10–35)
AST SERPL W P-5'-P-CCNC: 62 U/L (ref 10–35)
ATRIAL RATE - MUSE: 104 BPM
ATRIAL RATE - MUSE: 163 BPM
ATRIAL RATE - MUSE: 294 BPM
ATRIAL RATE - MUSE: 320 BPM
ATRIAL RATE - MUSE: 54 BPM
ATRIAL RATE - MUSE: 64 BPM
ATRIAL RATE - MUSE: 72 BPM
ATRIAL RATE - MUSE: 86 BPM
ATRIAL RATE - MUSE: 91 BPM
BACTERIA #/AREA URNS HPF: ABNORMAL /HPF
BACTERIA BLD CULT: NO GROWTH
BACTERIA SPT CULT: NO GROWTH
BACTERIA SPT CULT: NO GROWTH
BACTERIA UR CULT: NO GROWTH
BASE EXCESS BLDA CALC-SCNC: -1 MMOL/L (ref -9–1.8)
BASE EXCESS BLDA CALC-SCNC: -1.2 MMOL/L (ref -9–1.8)
BASE EXCESS BLDA CALC-SCNC: -1.6 MMOL/L (ref -9–1.8)
BASE EXCESS BLDA CALC-SCNC: -1.8 MMOL/L (ref -9–1.8)
BASE EXCESS BLDA CALC-SCNC: -2.1 MMOL/L (ref -9–1.8)
BASE EXCESS BLDA CALC-SCNC: -2.2 MMOL/L (ref -9–1.8)
BASE EXCESS BLDA CALC-SCNC: -2.8 MMOL/L (ref -9–1.8)
BASE EXCESS BLDA CALC-SCNC: -3.1 MMOL/L (ref -9–1.8)
BASE EXCESS BLDA CALC-SCNC: 10.7 MMOL/L (ref -9–1.8)
BASE EXCESS BLDA CALC-SCNC: 11.7 MMOL/L (ref -9–1.8)
BASE EXCESS BLDA CALC-SCNC: 5.6 MMOL/L (ref -9–1.8)
BASE EXCESS BLDA CALC-SCNC: 5.6 MMOL/L (ref -9–1.8)
BASE EXCESS BLDA CALC-SCNC: 7.4 MMOL/L (ref -9–1.8)
BASE EXCESS BLDA CALC-SCNC: 7.8 MMOL/L (ref -9–1.8)
BASE EXCESS BLDA CALC-SCNC: 8.9 MMOL/L (ref -9–1.8)
BASE EXCESS BLDA CALC-SCNC: 9.7 MMOL/L (ref -9–1.8)
BASE EXCESS BLDV CALC-SCNC: -0.1 MMOL/L (ref -7.7–1.9)
BASE EXCESS BLDV CALC-SCNC: -0.2 MMOL/L (ref -7.7–1.9)
BASE EXCESS BLDV CALC-SCNC: -0.3 MMOL/L (ref -7.7–1.9)
BASE EXCESS BLDV CALC-SCNC: -0.4 MMOL/L (ref -7.7–1.9)
BASE EXCESS BLDV CALC-SCNC: -0.4 MMOL/L (ref -7.7–1.9)
BASE EXCESS BLDV CALC-SCNC: -0.7 MMOL/L (ref -7.7–1.9)
BASE EXCESS BLDV CALC-SCNC: -0.7 MMOL/L (ref -7.7–1.9)
BASE EXCESS BLDV CALC-SCNC: -0.8 MMOL/L (ref -7.7–1.9)
BASE EXCESS BLDV CALC-SCNC: -1.3 MMOL/L (ref -7.7–1.9)
BASE EXCESS BLDV CALC-SCNC: -2 MMOL/L (ref -7.7–1.9)
BASE EXCESS BLDV CALC-SCNC: -2 MMOL/L (ref -7.7–1.9)
BASE EXCESS BLDV CALC-SCNC: 0 MMOL/L (ref -7.7–1.9)
BASE EXCESS BLDV CALC-SCNC: 0.4 MMOL/L (ref -7.7–1.9)
BASE EXCESS BLDV CALC-SCNC: 0.5 MMOL/L (ref -7.7–1.9)
BASE EXCESS BLDV CALC-SCNC: 0.6 MMOL/L (ref -7.7–1.9)
BASE EXCESS BLDV CALC-SCNC: 1 MMOL/L (ref -7.7–1.9)
BASE EXCESS BLDV CALC-SCNC: 1.2 MMOL/L (ref -7.7–1.9)
BASE EXCESS BLDV CALC-SCNC: 1.2 MMOL/L (ref -7.7–1.9)
BASE EXCESS BLDV CALC-SCNC: 1.5 MMOL/L (ref -7.7–1.9)
BASE EXCESS BLDV CALC-SCNC: 1.6 MMOL/L (ref -7.7–1.9)
BASE EXCESS BLDV CALC-SCNC: 10 MMOL/L (ref -7.7–1.9)
BASE EXCESS BLDV CALC-SCNC: 10.1 MMOL/L (ref -7.7–1.9)
BASE EXCESS BLDV CALC-SCNC: 10.6 MMOL/L (ref -7.7–1.9)
BASE EXCESS BLDV CALC-SCNC: 14.5 MMOL/L (ref -7.7–1.9)
BASE EXCESS BLDV CALC-SCNC: 14.6 MMOL/L (ref -7.7–1.9)
BASE EXCESS BLDV CALC-SCNC: 15 MMOL/L (ref -7.7–1.9)
BASE EXCESS BLDV CALC-SCNC: 17 MMOL/L (ref -7.7–1.9)
BASE EXCESS BLDV CALC-SCNC: 17 MMOL/L (ref -7.7–1.9)
BASE EXCESS BLDV CALC-SCNC: 2 MMOL/L (ref -7.7–1.9)
BASE EXCESS BLDV CALC-SCNC: 2 MMOL/L (ref -7.7–1.9)
BASE EXCESS BLDV CALC-SCNC: 2.3 MMOL/L (ref -7.7–1.9)
BASE EXCESS BLDV CALC-SCNC: 2.6 MMOL/L (ref -7.7–1.9)
BASE EXCESS BLDV CALC-SCNC: 2.7 MMOL/L (ref -7.7–1.9)
BASE EXCESS BLDV CALC-SCNC: 2.7 MMOL/L (ref -7.7–1.9)
BASE EXCESS BLDV CALC-SCNC: 3.2 MMOL/L (ref -7.7–1.9)
BASE EXCESS BLDV CALC-SCNC: 3.4 MMOL/L (ref -7.7–1.9)
BASE EXCESS BLDV CALC-SCNC: 3.5 MMOL/L (ref -7.7–1.9)
BASE EXCESS BLDV CALC-SCNC: 3.7 MMOL/L (ref -7.7–1.9)
BASE EXCESS BLDV CALC-SCNC: 4.1 MMOL/L (ref -7.7–1.9)
BASE EXCESS BLDV CALC-SCNC: 4.2 MMOL/L (ref -7.7–1.9)
BASE EXCESS BLDV CALC-SCNC: 4.2 MMOL/L (ref -7.7–1.9)
BASE EXCESS BLDV CALC-SCNC: 4.3 MMOL/L (ref -7.7–1.9)
BASE EXCESS BLDV CALC-SCNC: 4.5 MMOL/L (ref -7.7–1.9)
BASE EXCESS BLDV CALC-SCNC: 9.1 MMOL/L (ref -7.7–1.9)
BASE EXCESS BLDV CALC-SCNC: ABNORMAL MMOL/L
BASOPHILS # BLD AUTO: 0 10E3/UL (ref 0–0.2)
BASOPHILS # BLD AUTO: 0.1 10E3/UL (ref 0–0.2)
BASOPHILS NFR BLD AUTO: 0 %
BASOPHILS NFR BLD AUTO: 0 %
BASOPHILS NFR BLD AUTO: 1 %
BILIRUB DIRECT SERPL-MCNC: 0.1 MG/DL (ref 0–0.2)
BILIRUB DIRECT SERPL-MCNC: 0.22 MG/DL (ref 0–0.3)
BILIRUB DIRECT SERPL-MCNC: 0.22 MG/DL (ref 0–0.3)
BILIRUB DIRECT SERPL-MCNC: 0.29 MG/DL (ref 0–0.3)
BILIRUB DIRECT SERPL-MCNC: 0.31 MG/DL (ref 0–0.3)
BILIRUB DIRECT SERPL-MCNC: 0.48 MG/DL (ref 0–0.3)
BILIRUB DIRECT SERPL-MCNC: 0.49 MG/DL (ref 0–0.3)
BILIRUB DIRECT SERPL-MCNC: <0.2 MG/DL (ref 0–0.3)
BILIRUB SERPL-MCNC: 0.2 MG/DL (ref 0.2–1.3)
BILIRUB SERPL-MCNC: 0.4 MG/DL
BILIRUB SERPL-MCNC: 0.4 MG/DL
BILIRUB SERPL-MCNC: 0.5 MG/DL
BILIRUB SERPL-MCNC: 0.6 MG/DL
BILIRUB SERPL-MCNC: 0.7 MG/DL
BILIRUB SERPL-MCNC: 0.7 MG/DL
BILIRUB SERPL-MCNC: 1 MG/DL
BILIRUB UR QL STRIP: NEGATIVE
BUN SERPL-MCNC: 20 MG/DL (ref 7–30)
BUN SERPL-MCNC: 28 MG/DL (ref 7–30)
BUN SERPL-MCNC: 32 MG/DL (ref 7–30)
BUN SERPL-MCNC: 32.4 MG/DL (ref 8–23)
BUN SERPL-MCNC: 34 MG/DL (ref 7–30)
BUN SERPL-MCNC: 35.4 MG/DL (ref 8–23)
BUN SERPL-MCNC: 35.4 MG/DL (ref 8–23)
BUN SERPL-MCNC: 36.3 MG/DL (ref 8–23)
BUN SERPL-MCNC: 38 MG/DL (ref 7–30)
BUN SERPL-MCNC: 38.7 MG/DL (ref 8–23)
BUN SERPL-MCNC: 39.3 MG/DL (ref 8–23)
BUN SERPL-MCNC: 39.9 MG/DL (ref 8–23)
BUN SERPL-MCNC: 39.9 MG/DL (ref 8–23)
BUN SERPL-MCNC: 41 MG/DL (ref 8–23)
BUN SERPL-MCNC: 42.3 MG/DL (ref 8–23)
BUN SERPL-MCNC: 43.3 MG/DL (ref 8–23)
BUN SERPL-MCNC: 43.4 MG/DL (ref 8–23)
BUN SERPL-MCNC: 45.5 MG/DL (ref 8–23)
BUN SERPL-MCNC: 46 MG/DL (ref 7–30)
BUN SERPL-MCNC: 47 MG/DL (ref 7–30)
BUN SERPL-MCNC: 47.3 MG/DL (ref 8–23)
BUN SERPL-MCNC: 47.6 MG/DL (ref 8–23)
BUN SERPL-MCNC: 48.6 MG/DL (ref 8–23)
BUN SERPL-MCNC: 48.7 MG/DL (ref 8–23)
BUN SERPL-MCNC: 49.3 MG/DL (ref 8–23)
BUN SERPL-MCNC: 50.2 MG/DL (ref 8–23)
BUN SERPL-MCNC: 51 MG/DL (ref 8–23)
BUN SERPL-MCNC: 51.2 MG/DL (ref 8–23)
BUN SERPL-MCNC: 51.5 MG/DL (ref 8–23)
BUN SERPL-MCNC: 53.1 MG/DL (ref 8–23)
BUN SERPL-MCNC: 55.8 MG/DL (ref 8–23)
BUN SERPL-MCNC: 56.3 MG/DL (ref 8–23)
BUN SERPL-MCNC: 56.6 MG/DL (ref 8–23)
BUN SERPL-MCNC: 57.6 MG/DL (ref 8–23)
BUN SERPL-MCNC: 58.2 MG/DL (ref 8–23)
BUN SERPL-MCNC: 63.7 MG/DL (ref 8–23)
BUN SERPL-MCNC: 65.6 MG/DL (ref 8–23)
BUN SERPL-MCNC: 65.6 MG/DL (ref 8–23)
BUN SERPL-MCNC: 67.3 MG/DL (ref 8–23)
BUN SERPL-MCNC: 67.4 MG/DL (ref 8–23)
BUN SERPL-MCNC: 68.1 MG/DL (ref 8–23)
BUN SERPL-MCNC: 68.5 MG/DL (ref 8–23)
BUN SERPL-MCNC: 69.5 MG/DL (ref 8–23)
BUN SERPL-MCNC: 69.8 MG/DL (ref 8–23)
CA-I BLD-MCNC: 4.7 MG/DL (ref 4.4–5.2)
CA-I BLD-MCNC: 5.1 MG/DL (ref 4.4–5.2)
CALCIUM SERPL-MCNC: 10 MG/DL (ref 8.8–10.2)
CALCIUM SERPL-MCNC: 10.1 MG/DL (ref 8.5–10.1)
CALCIUM SERPL-MCNC: 10.2 MG/DL (ref 8.8–10.2)
CALCIUM SERPL-MCNC: 10.3 MG/DL (ref 8.8–10.2)
CALCIUM SERPL-MCNC: 8.7 MG/DL (ref 8.8–10.2)
CALCIUM SERPL-MCNC: 8.9 MG/DL (ref 8.8–10.2)
CALCIUM SERPL-MCNC: 9 MG/DL (ref 8.8–10.2)
CALCIUM SERPL-MCNC: 9.1 MG/DL (ref 8.5–10.1)
CALCIUM SERPL-MCNC: 9.1 MG/DL (ref 8.8–10.2)
CALCIUM SERPL-MCNC: 9.1 MG/DL (ref 8.8–10.2)
CALCIUM SERPL-MCNC: 9.3 MG/DL (ref 8.5–10.1)
CALCIUM SERPL-MCNC: 9.3 MG/DL (ref 8.8–10.2)
CALCIUM SERPL-MCNC: 9.4 MG/DL (ref 8.5–10.1)
CALCIUM SERPL-MCNC: 9.4 MG/DL (ref 8.5–10.1)
CALCIUM SERPL-MCNC: 9.4 MG/DL (ref 8.8–10.2)
CALCIUM SERPL-MCNC: 9.4 MG/DL (ref 8.8–10.2)
CALCIUM SERPL-MCNC: 9.5 MG/DL (ref 8.5–10.1)
CALCIUM SERPL-MCNC: 9.5 MG/DL (ref 8.5–10.1)
CALCIUM SERPL-MCNC: 9.5 MG/DL (ref 8.8–10.2)
CALCIUM SERPL-MCNC: 9.5 MG/DL (ref 8.8–10.2)
CALCIUM SERPL-MCNC: 9.6 MG/DL (ref 8.8–10.2)
CALCIUM SERPL-MCNC: 9.7 MG/DL (ref 8.8–10.2)
CALCIUM SERPL-MCNC: 9.7 MG/DL (ref 8.8–10.2)
CALCIUM SERPL-MCNC: 9.8 MG/DL (ref 8.8–10.2)
CALCIUM SERPL-MCNC: 9.9 MG/DL (ref 8.8–10.2)
CALCIUM SERPL-MCNC: 9.9 MG/DL (ref 8.8–10.2)
CHLORIDE BLD-SCNC: 100 MMOL/L (ref 94–109)
CHLORIDE BLD-SCNC: 100 MMOL/L (ref 94–109)
CHLORIDE BLD-SCNC: 102 MMOL/L (ref 94–109)
CHLORIDE BLD-SCNC: 104 MMOL/L (ref 94–109)
CHLORIDE BLD-SCNC: 98 MMOL/L (ref 94–109)
CHLORIDE BLD-SCNC: 99 MMOL/L (ref 94–109)
CHLORIDE BLD-SCNC: 99 MMOL/L (ref 94–109)
CHLORIDE SERPL-SCNC: 100 MMOL/L (ref 98–107)
CHLORIDE SERPL-SCNC: 101 MMOL/L (ref 98–107)
CHLORIDE SERPL-SCNC: 102 MMOL/L (ref 98–107)
CHLORIDE SERPL-SCNC: 102 MMOL/L (ref 98–107)
CHLORIDE SERPL-SCNC: 103 MMOL/L (ref 98–107)
CHLORIDE SERPL-SCNC: 104 MMOL/L (ref 98–107)
CHLORIDE SERPL-SCNC: 107 MMOL/L (ref 98–107)
CHLORIDE SERPL-SCNC: 107 MMOL/L (ref 98–107)
CHLORIDE SERPL-SCNC: 108 MMOL/L (ref 98–107)
CHLORIDE SERPL-SCNC: 87 MMOL/L (ref 98–107)
CHLORIDE SERPL-SCNC: 88 MMOL/L (ref 98–107)
CHLORIDE SERPL-SCNC: 88 MMOL/L (ref 98–107)
CHLORIDE SERPL-SCNC: 89 MMOL/L (ref 98–107)
CHLORIDE SERPL-SCNC: 89 MMOL/L (ref 98–107)
CHLORIDE SERPL-SCNC: 90 MMOL/L (ref 98–107)
CHLORIDE SERPL-SCNC: 90 MMOL/L (ref 98–107)
CHLORIDE SERPL-SCNC: 92 MMOL/L (ref 98–107)
CHLORIDE SERPL-SCNC: 92 MMOL/L (ref 98–107)
CHLORIDE SERPL-SCNC: 94 MMOL/L (ref 98–107)
CHLORIDE SERPL-SCNC: 94 MMOL/L (ref 98–107)
CHLORIDE SERPL-SCNC: 95 MMOL/L (ref 98–107)
CHLORIDE SERPL-SCNC: 96 MMOL/L (ref 98–107)
CHLORIDE SERPL-SCNC: 97 MMOL/L (ref 98–107)
CHLORIDE SERPL-SCNC: 98 MMOL/L (ref 98–107)
CHLORIDE SERPL-SCNC: 99 MMOL/L (ref 98–107)
CHLORIDE SERPL-SCNC: 99 MMOL/L (ref 98–107)
CK SERPL-CCNC: 26 U/L (ref 26–192)
CO2 SERPL-SCNC: 23 MMOL/L (ref 20–32)
CO2 SERPL-SCNC: 27 MMOL/L (ref 20–32)
CO2 SERPL-SCNC: 34 MMOL/L (ref 20–32)
CO2 SERPL-SCNC: 34 MMOL/L (ref 20–32)
CO2 SERPL-SCNC: 36 MMOL/L (ref 20–32)
CO2 SERPL-SCNC: 37 MMOL/L (ref 20–32)
CO2 SERPL-SCNC: 37 MMOL/L (ref 20–32)
COLOR UR AUTO: ABNORMAL
COLOR UR AUTO: YELLOW
COLOR UR AUTO: YELLOW
CORTIS SERPL-MCNC: 15.5 UG/DL
CPB POCT: NO
CREAT SERPL-MCNC: 0.88 MG/DL (ref 0.51–0.95)
CREAT SERPL-MCNC: 0.88 MG/DL (ref 0.52–1.04)
CREAT SERPL-MCNC: 0.94 MG/DL (ref 0.52–1.04)
CREAT SERPL-MCNC: 0.94 MG/DL (ref 0.52–1.04)
CREAT SERPL-MCNC: 0.98 MG/DL (ref 0.51–0.95)
CREAT SERPL-MCNC: 1.05 MG/DL (ref 0.51–0.95)
CREAT SERPL-MCNC: 1.12 MG/DL (ref 0.51–0.95)
CREAT SERPL-MCNC: 1.12 MG/DL (ref 0.52–1.04)
CREAT SERPL-MCNC: 1.15 MG/DL (ref 0.51–0.95)
CREAT SERPL-MCNC: 1.18 MG/DL (ref 0.51–0.95)
CREAT SERPL-MCNC: 1.21 MG/DL (ref 0.51–0.95)
CREAT SERPL-MCNC: 1.25 MG/DL (ref 0.51–0.95)
CREAT SERPL-MCNC: 1.26 MG/DL (ref 0.51–0.95)
CREAT SERPL-MCNC: 1.26 MG/DL (ref 0.52–1.04)
CREAT SERPL-MCNC: 1.27 MG/DL (ref 0.51–0.95)
CREAT SERPL-MCNC: 1.31 MG/DL (ref 0.51–0.95)
CREAT SERPL-MCNC: 1.39 MG/DL (ref 0.51–0.95)
CREAT SERPL-MCNC: 1.43 MG/DL (ref 0.51–0.95)
CREAT SERPL-MCNC: 1.49 MG/DL (ref 0.51–0.95)
CREAT SERPL-MCNC: 1.49 MG/DL (ref 0.51–0.95)
CREAT SERPL-MCNC: 1.52 MG/DL (ref 0.51–0.95)
CREAT SERPL-MCNC: 1.52 MG/DL (ref 0.51–0.95)
CREAT SERPL-MCNC: 1.55 MG/DL (ref 0.51–0.95)
CREAT SERPL-MCNC: 1.57 MG/DL (ref 0.51–0.95)
CREAT SERPL-MCNC: 1.57 MG/DL (ref 0.51–0.95)
CREAT SERPL-MCNC: 1.65 MG/DL (ref 0.51–0.95)
CREAT SERPL-MCNC: 1.65 MG/DL (ref 0.51–0.95)
CREAT SERPL-MCNC: 1.66 MG/DL (ref 0.51–0.95)
CREAT SERPL-MCNC: 1.66 MG/DL (ref 0.51–0.95)
CREAT SERPL-MCNC: 1.83 MG/DL (ref 0.51–0.95)
CREAT SERPL-MCNC: 1.84 MG/DL (ref 0.52–1.04)
CREAT SERPL-MCNC: 1.93 MG/DL (ref 0.51–0.95)
CREAT SERPL-MCNC: 2.02 MG/DL (ref 0.51–0.95)
CREAT SERPL-MCNC: 2.12 MG/DL (ref 0.51–0.95)
CREAT SERPL-MCNC: 2.12 MG/DL (ref 0.51–0.95)
CREAT SERPL-MCNC: 2.36 MG/DL (ref 0.51–0.95)
CREAT SERPL-MCNC: 2.36 MG/DL (ref 0.52–1.04)
CREAT SERPL-MCNC: 2.43 MG/DL (ref 0.51–0.95)
CREAT SERPL-MCNC: 2.47 MG/DL (ref 0.51–0.95)
CREAT SERPL-MCNC: 2.52 MG/DL (ref 0.51–0.95)
CREAT SERPL-MCNC: 2.57 MG/DL (ref 0.51–0.95)
CREAT SERPL-MCNC: 3.12 MG/DL (ref 0.51–0.95)
CREAT SERPL-MCNC: 3.34 MG/DL (ref 0.51–0.95)
CREAT SERPL-MCNC: 3.4 MG/DL (ref 0.51–0.95)
CRP SERPL-MCNC: 33 MG/L (ref 0–8)
CRP SERPL-MCNC: 35 MG/L (ref 0–8)
CRP SERPL-MCNC: 57 MG/L (ref 0–8)
CRP SERPL-MCNC: 60 MG/L
CYSTATIN C (ROCHE): 2.2 MG/L (ref 0.6–1)
CYSTATIN C (ROCHE): 2.6 MG/L (ref 0.6–1)
CYSTATIN C (ROCHE): 3.5 MG/L (ref 0.6–1)
D DIMER PPP FEU-MCNC: 0.55 UG/ML FEU (ref 0–0.5)
DEPRECATED HCO3 PLAS-SCNC: 19 MMOL/L (ref 22–29)
DEPRECATED HCO3 PLAS-SCNC: 20 MMOL/L (ref 22–29)
DEPRECATED HCO3 PLAS-SCNC: 22 MMOL/L (ref 22–29)
DEPRECATED HCO3 PLAS-SCNC: 23 MMOL/L (ref 22–29)
DEPRECATED HCO3 PLAS-SCNC: 25 MMOL/L (ref 22–29)
DEPRECATED HCO3 PLAS-SCNC: 25 MMOL/L (ref 22–29)
DEPRECATED HCO3 PLAS-SCNC: 26 MMOL/L (ref 22–29)
DEPRECATED HCO3 PLAS-SCNC: 27 MMOL/L (ref 22–29)
DEPRECATED HCO3 PLAS-SCNC: 27 MMOL/L (ref 22–29)
DEPRECATED HCO3 PLAS-SCNC: 28 MMOL/L (ref 22–29)
DEPRECATED HCO3 PLAS-SCNC: 29 MMOL/L (ref 22–29)
DEPRECATED HCO3 PLAS-SCNC: 30 MMOL/L (ref 22–29)
DEPRECATED HCO3 PLAS-SCNC: 31 MMOL/L (ref 22–29)
DEPRECATED HCO3 PLAS-SCNC: 32 MMOL/L (ref 22–29)
DEPRECATED HCO3 PLAS-SCNC: 32 MMOL/L (ref 22–29)
DEPRECATED HCO3 PLAS-SCNC: 33 MMOL/L (ref 22–29)
DEPRECATED HCO3 PLAS-SCNC: 33 MMOL/L (ref 22–29)
DEPRECATED HCO3 PLAS-SCNC: 36 MMOL/L (ref 22–29)
DEPRECATED HCO3 PLAS-SCNC: 37 MMOL/L (ref 22–29)
DEPRECATED HCO3 PLAS-SCNC: 37 MMOL/L (ref 22–29)
DEPRECATED HCO3 PLAS-SCNC: 38 MMOL/L (ref 22–29)
DEPRECATED HCO3 PLAS-SCNC: 39 MMOL/L (ref 22–29)
DEPRECATED HCO3 PLAS-SCNC: 40 MMOL/L (ref 22–29)
DIASTOLIC BLOOD PRESSURE - MUSE: NORMAL MMHG
EOSINOPHIL # BLD AUTO: 0 10E3/UL (ref 0–0.7)
EOSINOPHIL # BLD AUTO: 0.2 10E3/UL (ref 0–0.7)
EOSINOPHIL # BLD AUTO: 0.2 10E3/UL (ref 0–0.7)
EOSINOPHIL # BLD AUTO: 0.3 10E3/UL (ref 0–0.7)
EOSINOPHIL NFR BLD AUTO: 0 %
EOSINOPHIL NFR BLD AUTO: 2 %
EOSINOPHIL NFR BLD AUTO: 3 %
EOSINOPHIL NFR BLD AUTO: 3 %
EOSINOPHIL NFR BLD AUTO: 5 %
ERYTHROCYTE [DISTWIDTH] IN BLOOD BY AUTOMATED COUNT: 16.7 % (ref 10–15)
ERYTHROCYTE [DISTWIDTH] IN BLOOD BY AUTOMATED COUNT: 17.1 % (ref 10–15)
ERYTHROCYTE [DISTWIDTH] IN BLOOD BY AUTOMATED COUNT: 17.1 % (ref 10–15)
ERYTHROCYTE [DISTWIDTH] IN BLOOD BY AUTOMATED COUNT: 17.2 % (ref 10–15)
ERYTHROCYTE [DISTWIDTH] IN BLOOD BY AUTOMATED COUNT: 17.3 % (ref 10–15)
ERYTHROCYTE [DISTWIDTH] IN BLOOD BY AUTOMATED COUNT: 17.3 % (ref 10–15)
ERYTHROCYTE [DISTWIDTH] IN BLOOD BY AUTOMATED COUNT: 17.4 % (ref 10–15)
ERYTHROCYTE [DISTWIDTH] IN BLOOD BY AUTOMATED COUNT: 17.5 % (ref 10–15)
ERYTHROCYTE [DISTWIDTH] IN BLOOD BY AUTOMATED COUNT: 17.6 % (ref 10–15)
ERYTHROCYTE [DISTWIDTH] IN BLOOD BY AUTOMATED COUNT: 17.6 % (ref 10–15)
ERYTHROCYTE [DISTWIDTH] IN BLOOD BY AUTOMATED COUNT: 17.7 % (ref 10–15)
ERYTHROCYTE [DISTWIDTH] IN BLOOD BY AUTOMATED COUNT: 17.8 % (ref 10–15)
ERYTHROCYTE [DISTWIDTH] IN BLOOD BY AUTOMATED COUNT: 17.9 % (ref 10–15)
ERYTHROCYTE [DISTWIDTH] IN BLOOD BY AUTOMATED COUNT: 17.9 % (ref 10–15)
ERYTHROCYTE [DISTWIDTH] IN BLOOD BY AUTOMATED COUNT: 18.2 % (ref 10–15)
ERYTHROCYTE [DISTWIDTH] IN BLOOD BY AUTOMATED COUNT: 18.2 % (ref 10–15)
ERYTHROCYTE [DISTWIDTH] IN BLOOD BY AUTOMATED COUNT: 18.6 % (ref 10–15)
ERYTHROCYTE [DISTWIDTH] IN BLOOD BY AUTOMATED COUNT: 18.6 % (ref 10–15)
ERYTHROCYTE [DISTWIDTH] IN BLOOD BY AUTOMATED COUNT: 19.9 % (ref 10–15)
FERRITIN SERPL-MCNC: 34 NG/ML (ref 8–252)
FIBRINOGEN PPP-MCNC: 762 MG/DL (ref 170–490)
FLUAV RNA SPEC QL NAA+PROBE: NEGATIVE
FLUBV RNA RESP QL NAA+PROBE: NEGATIVE
FOLATE SERPL-MCNC: 21.6 NG/ML
GFR SERPL CREATININE-BSD FRML MDRD: 14 ML/MIN/1.73M2
GFR SERPL CREATININE-BSD FRML MDRD: 15 ML/MIN/1.73M2
GFR SERPL CREATININE-BSD FRML MDRD: 15 ML/MIN/1.73M2
GFR SERPL CREATININE-BSD FRML MDRD: 16 ML/MIN/1.73M2
GFR SERPL CREATININE-BSD FRML MDRD: 20 ML/MIN/1.73M2
GFR SERPL CREATININE-BSD FRML MDRD: 20 ML/MIN/1.73M2
GFR SERPL CREATININE-BSD FRML MDRD: 21 ML/MIN/1.73M2
GFR SERPL CREATININE-BSD FRML MDRD: 21 ML/MIN/1.73M2
GFR SERPL CREATININE-BSD FRML MDRD: 22 ML/MIN/1.73M2
GFR SERPL CREATININE-BSD FRML MDRD: 25 ML/MIN/1.73M2
GFR SERPL CREATININE-BSD FRML MDRD: 26 ML/MIN/1.73M2
GFR SERPL CREATININE-BSD FRML MDRD: 26 ML/MIN/1.73M2
GFR SERPL CREATININE-BSD FRML MDRD: 27 ML/MIN/1.73M2
GFR SERPL CREATININE-BSD FRML MDRD: 29 ML/MIN/1.73M2
GFR SERPL CREATININE-BSD FRML MDRD: 30 ML/MIN/1.73M2
GFR SERPL CREATININE-BSD FRML MDRD: 31 ML/MIN/1.73M2
GFR SERPL CREATININE-BSD FRML MDRD: 34 ML/MIN/1.73M2
GFR SERPL CREATININE-BSD FRML MDRD: 34 ML/MIN/1.73M2
GFR SERPL CREATININE-BSD FRML MDRD: 35 ML/MIN/1.73M2
GFR SERPL CREATININE-BSD FRML MDRD: 35 ML/MIN/1.73M2
GFR SERPL CREATININE-BSD FRML MDRD: 37 ML/MIN/1.73M2
GFR SERPL CREATININE-BSD FRML MDRD: 38 ML/MIN/1.73M2
GFR SERPL CREATININE-BSD FRML MDRD: 38 ML/MIN/1.73M2
GFR SERPL CREATININE-BSD FRML MDRD: 39 ML/MIN/1.73M2
GFR SERPL CREATININE-BSD FRML MDRD: 39 ML/MIN/1.73M2
GFR SERPL CREATININE-BSD FRML MDRD: 41 ML/MIN/1.73M2
GFR SERPL CREATININE-BSD FRML MDRD: 42 ML/MIN/1.73M2
GFR SERPL CREATININE-BSD FRML MDRD: 46 ML/MIN/1.73M2
GFR SERPL CREATININE-BSD FRML MDRD: 47 ML/MIN/1.73M2
GFR SERPL CREATININE-BSD FRML MDRD: 48 ML/MIN/1.73M2
GFR SERPL CREATININE-BSD FRML MDRD: 50 ML/MIN/1.73M2
GFR SERPL CREATININE-BSD FRML MDRD: 52 ML/MIN/1.73M2
GFR SERPL CREATININE-BSD FRML MDRD: 53 ML/MIN/1.73M2
GFR SERPL CREATININE-BSD FRML MDRD: 55 ML/MIN/1.73M2
GFR SERPL CREATININE-BSD FRML MDRD: 55 ML/MIN/1.73M2
GFR SERPL CREATININE-BSD FRML MDRD: 59 ML/MIN/1.73M2
GFR SERPL CREATININE-BSD FRML MDRD: 65 ML/MIN/1.73M2
GFR SERPL CREATININE-BSD FRML MDRD: 68 ML/MIN/1.73M2
GFR SERPL CREATININE-BSD FRML MDRD: 68 ML/MIN/1.73M2
GFR SERPL CREATININE-BSD FRML MDRD: 73 ML/MIN/1.73M2
GFR SERPL CREATININE-BSD FRML MDRD: 73 ML/MIN/1.73M2
GLUCOSE BLD-MCNC: 103 MG/DL (ref 70–99)
GLUCOSE BLD-MCNC: 104 MG/DL (ref 70–99)
GLUCOSE BLD-MCNC: 114 MG/DL (ref 70–99)
GLUCOSE BLD-MCNC: 118 MG/DL (ref 70–99)
GLUCOSE BLD-MCNC: 126 MG/DL (ref 70–99)
GLUCOSE BLD-MCNC: 173 MG/DL (ref 70–99)
GLUCOSE BLD-MCNC: 187 MG/DL (ref 70–99)
GLUCOSE BLD-MCNC: 90 MG/DL (ref 70–99)
GLUCOSE BLDC GLUCOMTR-MCNC: 102 MG/DL (ref 70–99)
GLUCOSE BLDC GLUCOMTR-MCNC: 102 MG/DL (ref 70–99)
GLUCOSE BLDC GLUCOMTR-MCNC: 103 MG/DL (ref 70–99)
GLUCOSE BLDC GLUCOMTR-MCNC: 103 MG/DL (ref 70–99)
GLUCOSE BLDC GLUCOMTR-MCNC: 104 MG/DL (ref 70–99)
GLUCOSE BLDC GLUCOMTR-MCNC: 105 MG/DL (ref 70–99)
GLUCOSE BLDC GLUCOMTR-MCNC: 106 MG/DL (ref 70–99)
GLUCOSE BLDC GLUCOMTR-MCNC: 107 MG/DL (ref 70–99)
GLUCOSE BLDC GLUCOMTR-MCNC: 108 MG/DL (ref 70–99)
GLUCOSE BLDC GLUCOMTR-MCNC: 109 MG/DL (ref 70–99)
GLUCOSE BLDC GLUCOMTR-MCNC: 109 MG/DL (ref 70–99)
GLUCOSE BLDC GLUCOMTR-MCNC: 110 MG/DL (ref 70–99)
GLUCOSE BLDC GLUCOMTR-MCNC: 111 MG/DL (ref 70–99)
GLUCOSE BLDC GLUCOMTR-MCNC: 113 MG/DL (ref 70–99)
GLUCOSE BLDC GLUCOMTR-MCNC: 113 MG/DL (ref 70–99)
GLUCOSE BLDC GLUCOMTR-MCNC: 114 MG/DL (ref 70–99)
GLUCOSE BLDC GLUCOMTR-MCNC: 114 MG/DL (ref 70–99)
GLUCOSE BLDC GLUCOMTR-MCNC: 115 MG/DL (ref 70–99)
GLUCOSE BLDC GLUCOMTR-MCNC: 117 MG/DL (ref 70–99)
GLUCOSE BLDC GLUCOMTR-MCNC: 117 MG/DL (ref 70–99)
GLUCOSE BLDC GLUCOMTR-MCNC: 118 MG/DL (ref 70–99)
GLUCOSE BLDC GLUCOMTR-MCNC: 118 MG/DL (ref 70–99)
GLUCOSE BLDC GLUCOMTR-MCNC: 119 MG/DL (ref 70–99)
GLUCOSE BLDC GLUCOMTR-MCNC: 119 MG/DL (ref 70–99)
GLUCOSE BLDC GLUCOMTR-MCNC: 120 MG/DL (ref 70–99)
GLUCOSE BLDC GLUCOMTR-MCNC: 120 MG/DL (ref 70–99)
GLUCOSE BLDC GLUCOMTR-MCNC: 121 MG/DL (ref 70–99)
GLUCOSE BLDC GLUCOMTR-MCNC: 121 MG/DL (ref 70–99)
GLUCOSE BLDC GLUCOMTR-MCNC: 122 MG/DL (ref 70–99)
GLUCOSE BLDC GLUCOMTR-MCNC: 123 MG/DL (ref 70–99)
GLUCOSE BLDC GLUCOMTR-MCNC: 123 MG/DL (ref 70–99)
GLUCOSE BLDC GLUCOMTR-MCNC: 125 MG/DL (ref 70–99)
GLUCOSE BLDC GLUCOMTR-MCNC: 125 MG/DL (ref 70–99)
GLUCOSE BLDC GLUCOMTR-MCNC: 127 MG/DL (ref 70–99)
GLUCOSE BLDC GLUCOMTR-MCNC: 128 MG/DL (ref 70–99)
GLUCOSE BLDC GLUCOMTR-MCNC: 128 MG/DL (ref 70–99)
GLUCOSE BLDC GLUCOMTR-MCNC: 129 MG/DL (ref 70–99)
GLUCOSE BLDC GLUCOMTR-MCNC: 130 MG/DL (ref 70–99)
GLUCOSE BLDC GLUCOMTR-MCNC: 131 MG/DL (ref 70–99)
GLUCOSE BLDC GLUCOMTR-MCNC: 132 MG/DL (ref 70–99)
GLUCOSE BLDC GLUCOMTR-MCNC: 133 MG/DL (ref 70–99)
GLUCOSE BLDC GLUCOMTR-MCNC: 133 MG/DL (ref 70–99)
GLUCOSE BLDC GLUCOMTR-MCNC: 136 MG/DL (ref 70–99)
GLUCOSE BLDC GLUCOMTR-MCNC: 136 MG/DL (ref 70–99)
GLUCOSE BLDC GLUCOMTR-MCNC: 137 MG/DL (ref 70–99)
GLUCOSE BLDC GLUCOMTR-MCNC: 141 MG/DL (ref 70–99)
GLUCOSE BLDC GLUCOMTR-MCNC: 142 MG/DL (ref 70–99)
GLUCOSE BLDC GLUCOMTR-MCNC: 143 MG/DL (ref 70–99)
GLUCOSE BLDC GLUCOMTR-MCNC: 144 MG/DL (ref 70–99)
GLUCOSE BLDC GLUCOMTR-MCNC: 145 MG/DL (ref 70–99)
GLUCOSE BLDC GLUCOMTR-MCNC: 146 MG/DL (ref 70–99)
GLUCOSE BLDC GLUCOMTR-MCNC: 147 MG/DL (ref 70–99)
GLUCOSE BLDC GLUCOMTR-MCNC: 148 MG/DL (ref 70–99)
GLUCOSE BLDC GLUCOMTR-MCNC: 155 MG/DL (ref 70–99)
GLUCOSE BLDC GLUCOMTR-MCNC: 157 MG/DL (ref 70–99)
GLUCOSE BLDC GLUCOMTR-MCNC: 159 MG/DL (ref 70–99)
GLUCOSE BLDC GLUCOMTR-MCNC: 161 MG/DL (ref 70–99)
GLUCOSE BLDC GLUCOMTR-MCNC: 164 MG/DL (ref 70–99)
GLUCOSE BLDC GLUCOMTR-MCNC: 164 MG/DL (ref 70–99)
GLUCOSE BLDC GLUCOMTR-MCNC: 165 MG/DL (ref 70–99)
GLUCOSE BLDC GLUCOMTR-MCNC: 168 MG/DL (ref 70–99)
GLUCOSE BLDC GLUCOMTR-MCNC: 171 MG/DL (ref 70–99)
GLUCOSE BLDC GLUCOMTR-MCNC: 178 MG/DL (ref 70–99)
GLUCOSE BLDC GLUCOMTR-MCNC: 182 MG/DL (ref 70–99)
GLUCOSE BLDC GLUCOMTR-MCNC: 189 MG/DL (ref 70–99)
GLUCOSE BLDC GLUCOMTR-MCNC: 190 MG/DL (ref 70–99)
GLUCOSE BLDC GLUCOMTR-MCNC: 191 MG/DL (ref 70–99)
GLUCOSE BLDC GLUCOMTR-MCNC: 219 MG/DL (ref 70–99)
GLUCOSE BLDC GLUCOMTR-MCNC: 66 MG/DL (ref 70–99)
GLUCOSE BLDC GLUCOMTR-MCNC: 70 MG/DL (ref 70–99)
GLUCOSE BLDC GLUCOMTR-MCNC: 76 MG/DL (ref 70–99)
GLUCOSE BLDC GLUCOMTR-MCNC: 78 MG/DL (ref 70–99)
GLUCOSE BLDC GLUCOMTR-MCNC: 79 MG/DL (ref 70–99)
GLUCOSE BLDC GLUCOMTR-MCNC: 79 MG/DL (ref 70–99)
GLUCOSE BLDC GLUCOMTR-MCNC: 83 MG/DL (ref 70–99)
GLUCOSE BLDC GLUCOMTR-MCNC: 84 MG/DL (ref 70–99)
GLUCOSE BLDC GLUCOMTR-MCNC: 87 MG/DL (ref 70–99)
GLUCOSE BLDC GLUCOMTR-MCNC: 88 MG/DL (ref 70–99)
GLUCOSE BLDC GLUCOMTR-MCNC: 89 MG/DL (ref 70–99)
GLUCOSE BLDC GLUCOMTR-MCNC: 91 MG/DL (ref 70–99)
GLUCOSE BLDC GLUCOMTR-MCNC: 92 MG/DL (ref 70–99)
GLUCOSE BLDC GLUCOMTR-MCNC: 92 MG/DL (ref 70–99)
GLUCOSE BLDC GLUCOMTR-MCNC: 93 MG/DL (ref 70–99)
GLUCOSE BLDC GLUCOMTR-MCNC: 93 MG/DL (ref 70–99)
GLUCOSE BLDC GLUCOMTR-MCNC: 94 MG/DL (ref 70–99)
GLUCOSE BLDC GLUCOMTR-MCNC: 94 MG/DL (ref 70–99)
GLUCOSE BLDC GLUCOMTR-MCNC: 97 MG/DL (ref 70–99)
GLUCOSE SERPL-MCNC: 100 MG/DL (ref 70–99)
GLUCOSE SERPL-MCNC: 103 MG/DL (ref 70–99)
GLUCOSE SERPL-MCNC: 104 MG/DL (ref 70–99)
GLUCOSE SERPL-MCNC: 104 MG/DL (ref 70–99)
GLUCOSE SERPL-MCNC: 106 MG/DL (ref 70–99)
GLUCOSE SERPL-MCNC: 109 MG/DL (ref 70–99)
GLUCOSE SERPL-MCNC: 111 MG/DL (ref 70–99)
GLUCOSE SERPL-MCNC: 111 MG/DL (ref 70–99)
GLUCOSE SERPL-MCNC: 113 MG/DL (ref 70–99)
GLUCOSE SERPL-MCNC: 113 MG/DL (ref 70–99)
GLUCOSE SERPL-MCNC: 115 MG/DL (ref 70–99)
GLUCOSE SERPL-MCNC: 119 MG/DL (ref 70–99)
GLUCOSE SERPL-MCNC: 120 MG/DL (ref 70–99)
GLUCOSE SERPL-MCNC: 123 MG/DL (ref 70–99)
GLUCOSE SERPL-MCNC: 124 MG/DL (ref 70–99)
GLUCOSE SERPL-MCNC: 128 MG/DL (ref 70–99)
GLUCOSE SERPL-MCNC: 137 MG/DL (ref 70–99)
GLUCOSE SERPL-MCNC: 139 MG/DL (ref 70–99)
GLUCOSE SERPL-MCNC: 140 MG/DL (ref 70–99)
GLUCOSE SERPL-MCNC: 145 MG/DL (ref 70–99)
GLUCOSE SERPL-MCNC: 146 MG/DL (ref 70–99)
GLUCOSE SERPL-MCNC: 167 MG/DL (ref 70–99)
GLUCOSE SERPL-MCNC: 169 MG/DL (ref 70–99)
GLUCOSE SERPL-MCNC: 277 MG/DL (ref 70–99)
GLUCOSE SERPL-MCNC: 77 MG/DL (ref 70–99)
GLUCOSE SERPL-MCNC: 87 MG/DL (ref 70–99)
GLUCOSE SERPL-MCNC: 90 MG/DL (ref 70–99)
GLUCOSE SERPL-MCNC: 91 MG/DL (ref 70–99)
GLUCOSE SERPL-MCNC: 92 MG/DL (ref 70–99)
GLUCOSE SERPL-MCNC: 92 MG/DL (ref 70–99)
GLUCOSE SERPL-MCNC: 94 MG/DL (ref 70–99)
GLUCOSE SERPL-MCNC: 94 MG/DL (ref 70–99)
GLUCOSE SERPL-MCNC: 96 MG/DL (ref 70–99)
GLUCOSE SERPL-MCNC: 97 MG/DL (ref 70–99)
GLUCOSE SERPL-MCNC: 99 MG/DL (ref 70–99)
GLUCOSE UR STRIP-MCNC: 70 MG/DL
GLUCOSE UR STRIP-MCNC: NEGATIVE MG/DL
GLUCOSE UR STRIP-MCNC: NEGATIVE MG/DL
GRAM STAIN RESULT: NORMAL
HBA1C MFR BLD: 6.7 %
HCO3 BLD-SCNC: 28 MMOL/L (ref 21–28)
HCO3 BLD-SCNC: 29 MMOL/L (ref 21–28)
HCO3 BLD-SCNC: 31 MMOL/L (ref 21–28)
HCO3 BLD-SCNC: 31 MMOL/L (ref 21–28)
HCO3 BLD-SCNC: 32 MMOL/L (ref 21–28)
HCO3 BLD-SCNC: 33 MMOL/L (ref 21–28)
HCO3 BLD-SCNC: 35 MMOL/L (ref 21–28)
HCO3 BLD-SCNC: 35 MMOL/L (ref 21–28)
HCO3 BLD-SCNC: 36 MMOL/L (ref 21–28)
HCO3 BLD-SCNC: 37 MMOL/L (ref 21–28)
HCO3 BLDV-SCNC: 26 MMOL/L (ref 21–28)
HCO3 BLDV-SCNC: 26 MMOL/L (ref 21–28)
HCO3 BLDV-SCNC: 28 MMOL/L (ref 21–28)
HCO3 BLDV-SCNC: 28 MMOL/L (ref 21–28)
HCO3 BLDV-SCNC: 29 MMOL/L (ref 21–28)
HCO3 BLDV-SCNC: 30 MMOL/L (ref 21–28)
HCO3 BLDV-SCNC: 31 MMOL/L (ref 21–28)
HCO3 BLDV-SCNC: 32 MMOL/L (ref 21–28)
HCO3 BLDV-SCNC: 33 MMOL/L (ref 21–28)
HCO3 BLDV-SCNC: 33 MMOL/L (ref 21–28)
HCO3 BLDV-SCNC: 34 MMOL/L (ref 21–28)
HCO3 BLDV-SCNC: 36 MMOL/L (ref 21–28)
HCO3 BLDV-SCNC: 39 MMOL/L (ref 21–28)
HCO3 BLDV-SCNC: 39 MMOL/L (ref 21–28)
HCO3 BLDV-SCNC: 42 MMOL/L (ref 21–28)
HCO3 BLDV-SCNC: 43 MMOL/L (ref 21–28)
HCO3 BLDV-SCNC: 44 MMOL/L (ref 21–28)
HCO3 BLDV-SCNC: 45 MMOL/L (ref 21–28)
HCO3 BLDV-SCNC: 45 MMOL/L (ref 21–28)
HCO3 BLDV-SCNC: 46 MMOL/L (ref 21–28)
HCO3 BLDV-SCNC: 48 MMOL/L (ref 21–28)
HCO3 BLDV-SCNC: ABNORMAL MMOL/L
HCT VFR BLD AUTO: 39.9 % (ref 35–47)
HCT VFR BLD AUTO: 43.3 % (ref 35–47)
HCT VFR BLD AUTO: 43.7 % (ref 35–47)
HCT VFR BLD AUTO: 43.9 % (ref 35–47)
HCT VFR BLD AUTO: 44 % (ref 35–47)
HCT VFR BLD AUTO: 44.1 % (ref 35–47)
HCT VFR BLD AUTO: 44.5 % (ref 35–47)
HCT VFR BLD AUTO: 45 % (ref 35–47)
HCT VFR BLD AUTO: 45.4 % (ref 35–47)
HCT VFR BLD AUTO: 45.9 % (ref 35–47)
HCT VFR BLD AUTO: 46.1 % (ref 35–47)
HCT VFR BLD AUTO: 46.5 % (ref 35–47)
HCT VFR BLD AUTO: 46.7 % (ref 35–47)
HCT VFR BLD AUTO: 46.8 % (ref 35–47)
HCT VFR BLD AUTO: 47.2 % (ref 35–47)
HCT VFR BLD AUTO: 47.3 % (ref 35–47)
HCT VFR BLD AUTO: 47.8 % (ref 35–47)
HCT VFR BLD AUTO: 47.9 % (ref 35–47)
HCT VFR BLD AUTO: 48.2 % (ref 35–47)
HCT VFR BLD AUTO: 48.4 % (ref 35–47)
HCT VFR BLD AUTO: 48.7 % (ref 35–47)
HCT VFR BLD AUTO: 49.3 % (ref 35–47)
HCT VFR BLD AUTO: 49.5 % (ref 35–47)
HCT VFR BLD AUTO: 50.2 % (ref 35–47)
HCT VFR BLD AUTO: 50.4 % (ref 35–47)
HCT VFR BLD AUTO: 51.1 % (ref 35–47)
HCT VFR BLD AUTO: 51.3 % (ref 35–47)
HCT VFR BLD AUTO: 51.5 % (ref 35–47)
HCT VFR BLD AUTO: 51.8 % (ref 35–47)
HCT VFR BLD AUTO: 52.4 % (ref 35–47)
HCT VFR BLD AUTO: 52.6 % (ref 35–47)
HCT VFR BLD CALC: 55 % (ref 35–47)
HGB BLD-MCNC: 11.4 G/DL (ref 11.7–15.7)
HGB BLD-MCNC: 11.5 G/DL (ref 11.7–15.7)
HGB BLD-MCNC: 12.3 G/DL (ref 11.7–15.7)
HGB BLD-MCNC: 12.3 G/DL (ref 11.7–15.7)
HGB BLD-MCNC: 12.8 G/DL (ref 11.7–15.7)
HGB BLD-MCNC: 12.8 G/DL (ref 11.7–15.7)
HGB BLD-MCNC: 13 G/DL (ref 11.7–15.7)
HGB BLD-MCNC: 13.3 G/DL (ref 11.7–15.7)
HGB BLD-MCNC: 13.3 G/DL (ref 11.7–15.7)
HGB BLD-MCNC: 13.4 G/DL (ref 11.7–15.7)
HGB BLD-MCNC: 13.5 G/DL (ref 11.7–15.7)
HGB BLD-MCNC: 13.7 G/DL (ref 11.7–15.7)
HGB BLD-MCNC: 13.8 G/DL (ref 11.7–15.7)
HGB BLD-MCNC: 13.9 G/DL (ref 11.7–15.7)
HGB BLD-MCNC: 13.9 G/DL (ref 11.7–15.7)
HGB BLD-MCNC: 14 G/DL (ref 11.7–15.7)
HGB BLD-MCNC: 14 G/DL (ref 11.7–15.7)
HGB BLD-MCNC: 14.1 G/DL (ref 11.7–15.7)
HGB BLD-MCNC: 14.1 G/DL (ref 11.7–15.7)
HGB BLD-MCNC: 14.2 G/DL (ref 11.7–15.7)
HGB BLD-MCNC: 14.3 G/DL (ref 11.7–15.7)
HGB BLD-MCNC: 14.5 G/DL (ref 11.7–15.7)
HGB BLD-MCNC: 14.7 G/DL (ref 11.7–15.7)
HGB BLD-MCNC: 14.8 G/DL (ref 11.7–15.7)
HGB BLD-MCNC: 14.9 G/DL (ref 11.7–15.7)
HGB BLD-MCNC: 15.1 G/DL (ref 11.7–15.7)
HGB BLD-MCNC: 15.3 G/DL (ref 11.7–15.7)
HGB BLD-MCNC: 18.7 G/DL (ref 11.7–15.7)
HGB UR QL STRIP: ABNORMAL
HGB UR QL STRIP: ABNORMAL
HGB UR QL STRIP: NEGATIVE
HOLD SPECIMEN: NORMAL
HYALINE CASTS: 1 /LPF
HYALINE CASTS: 17 /LPF
HYALINE CASTS: 21 /LPF
IMM GRANULOCYTES # BLD: 0 10E3/UL
IMM GRANULOCYTES # BLD: 0.1 10E3/UL
IMM GRANULOCYTES # BLD: 0.1 10E3/UL
IMM GRANULOCYTES NFR BLD: 0 %
IMM GRANULOCYTES NFR BLD: 1 %
INR PPP: 1.16 (ref 0.85–1.15)
INR PPP: 1.17 (ref 0.85–1.15)
INR PPP: 1.2 (ref 0.85–1.15)
INR PPP: 1.27 (ref 0.85–1.15)
INR PPP: 1.28 (ref 0.85–1.15)
INR PPP: 1.32 (ref 0.85–1.15)
INR PPP: 1.33 (ref 0.85–1.15)
INR PPP: 1.35 (ref 0.85–1.15)
INR PPP: 1.37 (ref 0.85–1.15)
INR PPP: 1.37 (ref 0.85–1.15)
INR PPP: 1.4 (ref 0.85–1.15)
INR PPP: 1.4 (ref 0.85–1.15)
INR PPP: 1.48 (ref 0.85–1.15)
INTERPRETATION ECG - MUSE: NORMAL
IRON SATN MFR SERPL: 21 % (ref 15–46)
IRON SERPL-MCNC: 86 UG/DL (ref 35–180)
KETONES UR STRIP-MCNC: NEGATIVE MG/DL
LACTATE BLD-SCNC: 2 MMOL/L
LACTATE SERPL-SCNC: 0.6 MMOL/L (ref 0.7–2)
LACTATE SERPL-SCNC: 0.6 MMOL/L (ref 0.7–2)
LACTATE SERPL-SCNC: 0.9 MMOL/L (ref 0.7–2)
LACTATE SERPL-SCNC: 0.9 MMOL/L (ref 0.7–2)
LACTATE SERPL-SCNC: 1.1 MMOL/L (ref 0.7–2)
LACTATE SERPL-SCNC: 1.1 MMOL/L (ref 0.7–2)
LACTATE SERPL-SCNC: 1.4 MMOL/L (ref 0.7–2)
LACTATE SERPL-SCNC: 1.5 MMOL/L (ref 0.7–2)
LACTATE SERPL-SCNC: 2.1 MMOL/L (ref 0.7–2)
LACTATE SERPL-SCNC: 2.2 MMOL/L (ref 0.7–2)
LEUKOCYTE ESTERASE UR QL STRIP: ABNORMAL
LEUKOCYTE ESTERASE UR QL STRIP: NEGATIVE
LEUKOCYTE ESTERASE UR QL STRIP: NEGATIVE
LVEF ECHO: NORMAL
LYMPHOCYTES # BLD AUTO: 0.8 10E3/UL (ref 0.8–5.3)
LYMPHOCYTES # BLD AUTO: 0.9 10E3/UL (ref 0.8–5.3)
LYMPHOCYTES # BLD AUTO: 1 10E3/UL (ref 0.8–5.3)
LYMPHOCYTES # BLD AUTO: 1.1 10E3/UL (ref 0.8–5.3)
LYMPHOCYTES # BLD AUTO: 1.4 10E3/UL (ref 0.8–5.3)
LYMPHOCYTES NFR BLD AUTO: 12 %
LYMPHOCYTES NFR BLD AUTO: 13 %
LYMPHOCYTES NFR BLD AUTO: 6 %
MAGNESIUM SERPL-MCNC: 1.6 MG/DL (ref 1.7–2.3)
MAGNESIUM SERPL-MCNC: 1.6 MG/DL (ref 1.7–2.3)
MAGNESIUM SERPL-MCNC: 1.7 MG/DL (ref 1.7–2.3)
MAGNESIUM SERPL-MCNC: 1.8 MG/DL (ref 1.7–2.3)
MAGNESIUM SERPL-MCNC: 1.9 MG/DL (ref 1.7–2.3)
MAGNESIUM SERPL-MCNC: 2 MG/DL (ref 1.7–2.3)
MAGNESIUM SERPL-MCNC: 2.1 MG/DL (ref 1.7–2.3)
MAGNESIUM SERPL-MCNC: 2.2 MG/DL (ref 1.6–2.3)
MAGNESIUM SERPL-MCNC: 2.2 MG/DL (ref 1.7–2.3)
MAGNESIUM SERPL-MCNC: 2.3 MG/DL (ref 1.7–2.3)
MAGNESIUM SERPL-MCNC: 2.3 MG/DL (ref 1.7–2.3)
MAGNESIUM SERPL-MCNC: 2.4 MG/DL (ref 1.7–2.3)
MAGNESIUM SERPL-MCNC: 2.4 MG/DL (ref 1.7–2.3)
MAGNESIUM SERPL-MCNC: 2.5 MG/DL (ref 1.7–2.3)
MAGNESIUM SERPL-MCNC: 2.7 MG/DL (ref 1.7–2.3)
MAGNESIUM SERPL-MCNC: 2.9 MG/DL (ref 1.6–2.3)
MCH RBC QN AUTO: 24.4 PG (ref 26.5–33)
MCH RBC QN AUTO: 26.7 PG (ref 26.5–33)
MCH RBC QN AUTO: 27.9 PG (ref 26.5–33)
MCH RBC QN AUTO: 27.9 PG (ref 26.5–33)
MCH RBC QN AUTO: 28 PG (ref 26.5–33)
MCH RBC QN AUTO: 28.1 PG (ref 26.5–33)
MCH RBC QN AUTO: 28.2 PG (ref 26.5–33)
MCH RBC QN AUTO: 28.3 PG (ref 26.5–33)
MCH RBC QN AUTO: 28.4 PG (ref 26.5–33)
MCH RBC QN AUTO: 28.5 PG (ref 26.5–33)
MCH RBC QN AUTO: 28.6 PG (ref 26.5–33)
MCH RBC QN AUTO: 28.7 PG (ref 26.5–33)
MCH RBC QN AUTO: 29.1 PG (ref 26.5–33)
MCHC RBC AUTO-ENTMCNC: 27.1 G/DL (ref 31.5–36.5)
MCHC RBC AUTO-ENTMCNC: 28 G/DL (ref 31.5–36.5)
MCHC RBC AUTO-ENTMCNC: 28 G/DL (ref 31.5–36.5)
MCHC RBC AUTO-ENTMCNC: 28.1 G/DL (ref 31.5–36.5)
MCHC RBC AUTO-ENTMCNC: 28.1 G/DL (ref 31.5–36.5)
MCHC RBC AUTO-ENTMCNC: 28.2 G/DL (ref 31.5–36.5)
MCHC RBC AUTO-ENTMCNC: 28.2 G/DL (ref 31.5–36.5)
MCHC RBC AUTO-ENTMCNC: 28.4 G/DL (ref 31.5–36.5)
MCHC RBC AUTO-ENTMCNC: 28.5 G/DL (ref 31.5–36.5)
MCHC RBC AUTO-ENTMCNC: 28.6 G/DL (ref 31.5–36.5)
MCHC RBC AUTO-ENTMCNC: 28.7 G/DL (ref 31.5–36.5)
MCHC RBC AUTO-ENTMCNC: 28.8 G/DL (ref 31.5–36.5)
MCHC RBC AUTO-ENTMCNC: 28.9 G/DL (ref 31.5–36.5)
MCHC RBC AUTO-ENTMCNC: 28.9 G/DL (ref 31.5–36.5)
MCHC RBC AUTO-ENTMCNC: 29 G/DL (ref 31.5–36.5)
MCHC RBC AUTO-ENTMCNC: 29.3 G/DL (ref 31.5–36.5)
MCHC RBC AUTO-ENTMCNC: 29.4 G/DL (ref 31.5–36.5)
MCHC RBC AUTO-ENTMCNC: 29.4 G/DL (ref 31.5–36.5)
MCHC RBC AUTO-ENTMCNC: 29.5 G/DL (ref 31.5–36.5)
MCHC RBC AUTO-ENTMCNC: 29.5 G/DL (ref 31.5–36.5)
MCHC RBC AUTO-ENTMCNC: 29.8 G/DL (ref 31.5–36.5)
MCHC RBC AUTO-ENTMCNC: 29.9 G/DL (ref 31.5–36.5)
MCHC RBC AUTO-ENTMCNC: 29.9 G/DL (ref 31.5–36.5)
MCHC RBC AUTO-ENTMCNC: 30.1 G/DL (ref 31.5–36.5)
MCHC RBC AUTO-ENTMCNC: 30.2 G/DL (ref 31.5–36.5)
MCHC RBC AUTO-ENTMCNC: 30.3 G/DL (ref 31.5–36.5)
MCHC RBC AUTO-ENTMCNC: 30.4 G/DL (ref 31.5–36.5)
MCHC RBC AUTO-ENTMCNC: 30.6 G/DL (ref 31.5–36.5)
MCHC RBC AUTO-ENTMCNC: 31 G/DL (ref 31.5–36.5)
MCV RBC AUTO: 100 FL (ref 78–100)
MCV RBC AUTO: 101 FL (ref 78–100)
MCV RBC AUTO: 103 FL (ref 78–100)
MCV RBC AUTO: 106 FL (ref 78–100)
MCV RBC AUTO: 85 FL (ref 78–100)
MCV RBC AUTO: 89 FL (ref 78–100)
MCV RBC AUTO: 92 FL (ref 78–100)
MCV RBC AUTO: 92 FL (ref 78–100)
MCV RBC AUTO: 94 FL (ref 78–100)
MCV RBC AUTO: 95 FL (ref 78–100)
MCV RBC AUTO: 96 FL (ref 78–100)
MCV RBC AUTO: 97 FL (ref 78–100)
MCV RBC AUTO: 98 FL (ref 78–100)
MCV RBC AUTO: 99 FL (ref 78–100)
MONOCYTES # BLD AUTO: 0.5 10E3/UL (ref 0–1.3)
MONOCYTES # BLD AUTO: 0.6 10E3/UL (ref 0–1.3)
MONOCYTES # BLD AUTO: 0.7 10E3/UL (ref 0–1.3)
MONOCYTES # BLD AUTO: 0.9 10E3/UL (ref 0–1.3)
MONOCYTES # BLD AUTO: 0.9 10E3/UL (ref 0–1.3)
MONOCYTES NFR BLD AUTO: 6 %
MONOCYTES NFR BLD AUTO: 6 %
MONOCYTES NFR BLD AUTO: 7 %
MONOCYTES NFR BLD AUTO: 8 %
MONOCYTES NFR BLD AUTO: 9 %
MRSA DNA SPEC QL NAA+PROBE: NEGATIVE
MRSA DNA SPEC QL NAA+PROBE: NEGATIVE
MUCOUS THREADS #/AREA URNS LPF: PRESENT /LPF
MUCOUS THREADS #/AREA URNS LPF: PRESENT /LPF
NEUTROPHILS # BLD AUTO: 13.4 10E3/UL (ref 1.6–8.3)
NEUTROPHILS # BLD AUTO: 4.3 10E3/UL (ref 1.6–8.3)
NEUTROPHILS # BLD AUTO: 4.4 10E3/UL (ref 1.6–8.3)
NEUTROPHILS # BLD AUTO: 5.2 10E3/UL (ref 1.6–8.3)
NEUTROPHILS # BLD AUTO: 6.1 10E3/UL (ref 1.6–8.3)
NEUTROPHILS # BLD AUTO: 6.3 10E3/UL (ref 1.6–8.3)
NEUTROPHILS # BLD AUTO: 9.1 10E3/UL (ref 1.6–8.3)
NEUTROPHILS NFR BLD AUTO: 71 %
NEUTROPHILS NFR BLD AUTO: 72 %
NEUTROPHILS NFR BLD AUTO: 72 %
NEUTROPHILS NFR BLD AUTO: 76 %
NEUTROPHILS NFR BLD AUTO: 77 %
NEUTROPHILS NFR BLD AUTO: 78 %
NEUTROPHILS NFR BLD AUTO: 88 %
NITRATE UR QL: NEGATIVE
NRBC # BLD AUTO: 0 10E3/UL
NRBC BLD AUTO-RTO: 0 /100
NT-PROBNP SERPL-MCNC: ABNORMAL PG/ML (ref 0–900)
O2/TOTAL GAS SETTING VFR VENT: 0 %
O2/TOTAL GAS SETTING VFR VENT: 0 %
O2/TOTAL GAS SETTING VFR VENT: 2 %
O2/TOTAL GAS SETTING VFR VENT: 2 %
O2/TOTAL GAS SETTING VFR VENT: 30 %
O2/TOTAL GAS SETTING VFR VENT: 40 %
O2/TOTAL GAS SETTING VFR VENT: 44 %
O2/TOTAL GAS SETTING VFR VENT: 45 %
O2/TOTAL GAS SETTING VFR VENT: 50 %
O2/TOTAL GAS SETTING VFR VENT: 60 %
O2/TOTAL GAS SETTING VFR VENT: 65 %
O2/TOTAL GAS SETTING VFR VENT: 65 %
O2/TOTAL GAS SETTING VFR VENT: 69 %
O2/TOTAL GAS SETTING VFR VENT: 7 %
O2/TOTAL GAS SETTING VFR VENT: 7 %
O2/TOTAL GAS SETTING VFR VENT: 70 %
O2/TOTAL GAS SETTING VFR VENT: 77 %
O2/TOTAL GAS SETTING VFR VENT: 80 %
O2/TOTAL GAS SETTING VFR VENT: 80 %
O2/TOTAL GAS SETTING VFR VENT: 86 %
O2/TOTAL GAS SETTING VFR VENT: 92 %
O2/TOTAL GAS SETTING VFR VENT: 97 %
OXYHGB MFR BLD: 84 % (ref 92–100)
OXYHGB MFR BLD: 95 % (ref 92–100)
OXYHGB MFR BLDV: 40 % (ref 70–75)
OXYHGB MFR BLDV: 70 % (ref 70–75)
OXYHGB MFR BLDV: 71 % (ref 70–75)
OXYHGB MFR BLDV: 72 % (ref 70–75)
OXYHGB MFR BLDV: 73 % (ref 70–75)
OXYHGB MFR BLDV: 74 % (ref 70–75)
OXYHGB MFR BLDV: 74 % (ref 70–75)
OXYHGB MFR BLDV: 75 % (ref 70–75)
OXYHGB MFR BLDV: 76 % (ref 70–75)
OXYHGB MFR BLDV: 77 % (ref 70–75)
OXYHGB MFR BLDV: 79 % (ref 70–75)
OXYHGB MFR BLDV: 79 % (ref 70–75)
OXYHGB MFR BLDV: 82 % (ref 70–75)
OXYHGB MFR BLDV: 85 % (ref 70–75)
OXYHGB MFR BLDV: 94 % (ref 70–75)
P AXIS - MUSE: 261 DEGREES
P AXIS - MUSE: 33 DEGREES
P AXIS - MUSE: 46 DEGREES
P AXIS - MUSE: 53 DEGREES
P AXIS - MUSE: 53 DEGREES
P AXIS - MUSE: 60 DEGREES
P AXIS - MUSE: 69 DEGREES
P AXIS - MUSE: NORMAL DEGREES
P AXIS - MUSE: NORMAL DEGREES
PCO2 BLD: 42 MM HG (ref 35–45)
PCO2 BLD: 47 MM HG (ref 35–45)
PCO2 BLD: 47 MM HG (ref 35–45)
PCO2 BLD: 48 MM HG (ref 35–45)
PCO2 BLD: 49 MM HG (ref 35–45)
PCO2 BLD: 52 MM HG (ref 35–45)
PCO2 BLD: 67 MM HG (ref 35–45)
PCO2 BLD: 67 MM HG (ref 35–45)
PCO2 BLD: 70 MM HG (ref 35–45)
PCO2 BLD: 71 MM HG (ref 35–45)
PCO2 BLD: 73 MM HG (ref 35–45)
PCO2 BLD: 79 MM HG (ref 35–45)
PCO2 BLD: 87 MM HG (ref 35–45)
PCO2 BLD: 89 MM HG (ref 35–45)
PCO2 BLDV: 100 MM HG (ref 40–50)
PCO2 BLDV: 105 MM HG (ref 40–50)
PCO2 BLDV: 107 MM HG (ref 40–50)
PCO2 BLDV: 114 MM HG (ref 40–50)
PCO2 BLDV: 120 MM HG (ref 40–50)
PCO2 BLDV: 55 MM HG (ref 40–50)
PCO2 BLDV: 59 MM HG (ref 40–50)
PCO2 BLDV: 61 MM HG (ref 40–50)
PCO2 BLDV: 62 MM HG (ref 40–50)
PCO2 BLDV: 62 MM HG (ref 40–50)
PCO2 BLDV: 65 MM HG (ref 40–50)
PCO2 BLDV: 66 MM HG (ref 40–50)
PCO2 BLDV: 66 MM HG (ref 40–50)
PCO2 BLDV: 67 MM HG (ref 40–50)
PCO2 BLDV: 68 MM HG (ref 40–50)
PCO2 BLDV: 68 MM HG (ref 40–50)
PCO2 BLDV: 70 MM HG (ref 40–50)
PCO2 BLDV: 71 MM HG (ref 40–50)
PCO2 BLDV: 72 MM HG (ref 40–50)
PCO2 BLDV: 73 MM HG (ref 40–50)
PCO2 BLDV: 74 MM HG (ref 40–50)
PCO2 BLDV: 74 MM HG (ref 40–50)
PCO2 BLDV: 76 MM HG (ref 40–50)
PCO2 BLDV: 76 MM HG (ref 40–50)
PCO2 BLDV: 77 MM HG (ref 40–50)
PCO2 BLDV: 79 MM HG (ref 40–50)
PCO2 BLDV: 79 MM HG (ref 40–50)
PCO2 BLDV: 87 MM HG (ref 40–50)
PCO2 BLDV: 89 MM HG (ref 40–50)
PCO2 BLDV: 90 MM HG (ref 40–50)
PCO2 BLDV: 96 MM HG (ref 40–50)
PCO2 BLDV: 97 MM HG (ref 40–50)
PCO2 BLDV: 99 MM HG (ref 40–50)
PCO2 BLDV: >130 MM HG (ref 40–50)
PCO2 BLDV: >98 MM HG (ref 40–50)
PH BLD: 7.12 [PH] (ref 7.35–7.45)
PH BLD: 7.13 [PH] (ref 7.35–7.45)
PH BLD: 7.17 [PH] (ref 7.35–7.45)
PH BLD: 7.2 [PH] (ref 7.35–7.45)
PH BLD: 7.21 [PH] (ref 7.35–7.45)
PH BLD: 7.22 [PH] (ref 7.35–7.45)
PH BLD: 7.23 [PH] (ref 7.35–7.45)
PH BLD: 7.23 [PH] (ref 7.35–7.45)
PH BLD: 7.42 [PH] (ref 7.35–7.45)
PH BLD: 7.43 [PH] (ref 7.35–7.45)
PH BLD: 7.44 [PH] (ref 7.35–7.45)
PH BLD: 7.46 [PH] (ref 7.35–7.45)
PH BLD: 7.46 [PH] (ref 7.35–7.45)
PH BLD: 7.48 [PH] (ref 7.35–7.45)
PH BLD: 7.49 [PH] (ref 7.35–7.45)
PH BLD: 7.49 [PH] (ref 7.35–7.45)
PH BLDV: 6.93 [PH] (ref 7.32–7.43)
PH BLDV: 6.96 [PH] (ref 7.32–7.43)
PH BLDV: 7.05 [PH] (ref 7.32–7.43)
PH BLDV: 7.1 [PH] (ref 7.32–7.43)
PH BLDV: 7.11 [PH] (ref 7.32–7.43)
PH BLDV: 7.12 [PH] (ref 7.32–7.43)
PH BLDV: 7.14 [PH] (ref 7.32–7.43)
PH BLDV: 7.15 [PH] (ref 7.32–7.43)
PH BLDV: 7.18 [PH] (ref 7.32–7.43)
PH BLDV: 7.18 [PH] (ref 7.32–7.43)
PH BLDV: 7.19 [PH] (ref 7.32–7.43)
PH BLDV: 7.2 [PH] (ref 7.32–7.43)
PH BLDV: 7.2 [PH] (ref 7.32–7.43)
PH BLDV: 7.21 [PH] (ref 7.32–7.43)
PH BLDV: 7.21 [PH] (ref 7.32–7.43)
PH BLDV: 7.22 [PH] (ref 7.32–7.43)
PH BLDV: 7.23 [PH] (ref 7.32–7.43)
PH BLDV: 7.24 [PH] (ref 7.32–7.43)
PH BLDV: 7.25 [PH] (ref 7.32–7.43)
PH BLDV: 7.25 [PH] (ref 7.32–7.43)
PH BLDV: 7.26 [PH] (ref 7.32–7.43)
PH BLDV: 7.27 [PH] (ref 7.32–7.43)
PH BLDV: 7.27 [PH] (ref 7.32–7.43)
PH BLDV: 7.28 [PH] (ref 7.32–7.43)
PH BLDV: 7.3 [PH] (ref 7.32–7.43)
PH BLDV: 7.31 [PH] (ref 7.32–7.43)
PH BLDV: 7.32 [PH] (ref 7.32–7.43)
PH BLDV: 7.33 [PH] (ref 7.32–7.43)
PH BLDV: 7.34 [PH] (ref 7.32–7.43)
PH BLDV: 7.35 [PH] (ref 7.32–7.43)
PH BLDV: 7.37 [PH] (ref 7.32–7.43)
PH BLDV: 7.4 [PH] (ref 7.32–7.43)
PH BLDV: 7.46 [PH] (ref 7.32–7.43)
PH UR STRIP: 5 [PH] (ref 5–7)
PH UR STRIP: 5 [PH] (ref 5–7)
PH UR STRIP: 5.5 [PH] (ref 5–7)
PHOSPHATE SERPL-MCNC: 2.4 MG/DL (ref 2.5–4.5)
PHOSPHATE SERPL-MCNC: 2.6 MG/DL (ref 2.5–4.5)
PHOSPHATE SERPL-MCNC: 2.7 MG/DL (ref 2.5–4.5)
PHOSPHATE SERPL-MCNC: 3 MG/DL (ref 2.5–4.5)
PHOSPHATE SERPL-MCNC: 3.2 MG/DL (ref 2.5–4.5)
PHOSPHATE SERPL-MCNC: 3.3 MG/DL (ref 2.5–4.5)
PHOSPHATE SERPL-MCNC: 3.3 MG/DL (ref 2.5–4.5)
PHOSPHATE SERPL-MCNC: 3.5 MG/DL (ref 2.5–4.5)
PHOSPHATE SERPL-MCNC: 3.5 MG/DL (ref 2.5–4.5)
PHOSPHATE SERPL-MCNC: 3.9 MG/DL (ref 2.5–4.5)
PHOSPHATE SERPL-MCNC: 4 MG/DL (ref 2.5–4.5)
PHOSPHATE SERPL-MCNC: 4.1 MG/DL (ref 2.5–4.5)
PHOSPHATE SERPL-MCNC: 4.2 MG/DL (ref 2.5–4.5)
PHOSPHATE SERPL-MCNC: 4.4 MG/DL (ref 2.5–4.5)
PHOSPHATE SERPL-MCNC: 4.5 MG/DL (ref 2.5–4.5)
PHOSPHATE SERPL-MCNC: 4.5 MG/DL (ref 2.5–4.5)
PHOSPHATE SERPL-MCNC: 4.8 MG/DL (ref 2.5–4.5)
PHOSPHATE SERPL-MCNC: 5.1 MG/DL (ref 2.5–4.5)
PHOSPHATE SERPL-MCNC: 5.2 MG/DL (ref 2.5–4.5)
PHOSPHATE SERPL-MCNC: 5.4 MG/DL (ref 2.5–4.5)
PHOSPHATE SERPL-MCNC: 7.3 MG/DL (ref 2.5–4.5)
PLATELET # BLD AUTO: 148 10E3/UL (ref 150–450)
PLATELET # BLD AUTO: 166 10E3/UL (ref 150–450)
PLATELET # BLD AUTO: 166 10E3/UL (ref 150–450)
PLATELET # BLD AUTO: 170 10E3/UL (ref 150–450)
PLATELET # BLD AUTO: 172 10E3/UL (ref 150–450)
PLATELET # BLD AUTO: 172 10E3/UL (ref 150–450)
PLATELET # BLD AUTO: 177 10E3/UL (ref 150–450)
PLATELET # BLD AUTO: 181 10E3/UL (ref 150–450)
PLATELET # BLD AUTO: 185 10E3/UL (ref 150–450)
PLATELET # BLD AUTO: 191 10E3/UL (ref 150–450)
PLATELET # BLD AUTO: 196 10E3/UL (ref 150–450)
PLATELET # BLD AUTO: 199 10E3/UL (ref 150–450)
PLATELET # BLD AUTO: 207 10E3/UL (ref 150–450)
PLATELET # BLD AUTO: 216 10E3/UL (ref 150–450)
PLATELET # BLD AUTO: 219 10E3/UL (ref 150–450)
PLATELET # BLD AUTO: 221 10E3/UL (ref 150–450)
PLATELET # BLD AUTO: 224 10E3/UL (ref 150–450)
PLATELET # BLD AUTO: 224 10E3/UL (ref 150–450)
PLATELET # BLD AUTO: 227 10E3/UL (ref 150–450)
PLATELET # BLD AUTO: 227 10E3/UL (ref 150–450)
PLATELET # BLD AUTO: 228 10E3/UL (ref 150–450)
PLATELET # BLD AUTO: 228 10E3/UL (ref 150–450)
PLATELET # BLD AUTO: 230 10E3/UL (ref 150–450)
PLATELET # BLD AUTO: 238 10E3/UL (ref 150–450)
PLATELET # BLD AUTO: 243 10E3/UL (ref 150–450)
PLATELET # BLD AUTO: 256 10E3/UL (ref 150–450)
PLATELET # BLD AUTO: 269 10E3/UL (ref 150–450)
PLATELET # BLD AUTO: 294 10E3/UL (ref 150–450)
PLATELET # BLD AUTO: 316 10E3/UL (ref 150–450)
PO2 BLD: 124 MM HG (ref 80–105)
PO2 BLD: 48 MM HG (ref 80–105)
PO2 BLD: 61 MM HG (ref 80–105)
PO2 BLD: 62 MM HG (ref 80–105)
PO2 BLD: 64 MM HG (ref 80–105)
PO2 BLD: 65 MM HG (ref 80–105)
PO2 BLD: 71 MM HG (ref 80–105)
PO2 BLD: 77 MM HG (ref 80–105)
PO2 BLD: 78 MM HG (ref 80–105)
PO2 BLD: 81 MM HG (ref 80–105)
PO2 BLD: 82 MM HG (ref 80–105)
PO2 BLD: 83 MM HG (ref 80–105)
PO2 BLD: 83 MM HG (ref 80–105)
PO2 BLD: 90 MM HG (ref 80–105)
PO2 BLD: 93 MM HG (ref 80–105)
PO2 BLD: 96 MM HG (ref 80–105)
PO2 BLDV: 22 MM HG (ref 25–47)
PO2 BLDV: 26 MM HG (ref 25–47)
PO2 BLDV: 31 MM HG (ref 25–47)
PO2 BLDV: 37 MM HG (ref 25–47)
PO2 BLDV: 37 MM HG (ref 25–47)
PO2 BLDV: 40 MM HG (ref 25–47)
PO2 BLDV: 42 MM HG (ref 25–47)
PO2 BLDV: 43 MM HG (ref 25–47)
PO2 BLDV: 44 MM HG (ref 25–47)
PO2 BLDV: 45 MM HG (ref 25–47)
PO2 BLDV: 46 MM HG (ref 25–47)
PO2 BLDV: 47 MM HG (ref 25–47)
PO2 BLDV: 48 MM HG (ref 25–47)
PO2 BLDV: 49 MM HG (ref 25–47)
PO2 BLDV: 49 MM HG (ref 25–47)
PO2 BLDV: 50 MM HG (ref 25–47)
PO2 BLDV: 52 MM HG (ref 25–47)
PO2 BLDV: 53 MM HG (ref 25–47)
PO2 BLDV: 54 MM HG (ref 25–47)
PO2 BLDV: 54 MM HG (ref 25–47)
PO2 BLDV: 55 MM HG (ref 25–47)
PO2 BLDV: 57 MM HG (ref 25–47)
PO2 BLDV: 59 MM HG (ref 25–47)
PO2 BLDV: 69 MM HG (ref 25–47)
PO2 BLDV: 71 MM HG (ref 25–47)
PO2 BLDV: 72 MM HG (ref 25–47)
PO2 BLDV: 88 MM HG (ref 25–47)
PO2 BLDV: 95 MM HG (ref 25–47)
POTASSIUM BLD-SCNC: 4.6 MMOL/L (ref 3.4–5.3)
POTASSIUM BLD-SCNC: 4.6 MMOL/L (ref 3.4–5.3)
POTASSIUM BLD-SCNC: 5 MMOL/L (ref 3.4–5.3)
POTASSIUM BLD-SCNC: 5 MMOL/L (ref 3.4–5.3)
POTASSIUM BLD-SCNC: 5.3 MMOL/L (ref 3.4–5.3)
POTASSIUM BLD-SCNC: 6 MMOL/L (ref 3.4–5.3)
POTASSIUM BLD-SCNC: 6.4 MMOL/L (ref 3.4–5.3)
POTASSIUM BLD-SCNC: 6.5 MMOL/L (ref 3.4–5.3)
POTASSIUM BLD-SCNC: 7 MMOL/L (ref 3.4–5.3)
POTASSIUM BLD-SCNC: 8.2 MMOL/L (ref 3.4–5.3)
POTASSIUM BLD-SCNC: ABNORMAL MMOL/L
POTASSIUM SERPL-SCNC: 3.3 MMOL/L (ref 3.4–5.3)
POTASSIUM SERPL-SCNC: 3.6 MMOL/L (ref 3.4–5.3)
POTASSIUM SERPL-SCNC: 3.7 MMOL/L (ref 3.4–5.3)
POTASSIUM SERPL-SCNC: 3.8 MMOL/L (ref 3.4–5.3)
POTASSIUM SERPL-SCNC: 3.9 MMOL/L (ref 3.4–5.3)
POTASSIUM SERPL-SCNC: 4 MMOL/L (ref 3.4–5.3)
POTASSIUM SERPL-SCNC: 4.1 MMOL/L (ref 3.4–5.3)
POTASSIUM SERPL-SCNC: 4.1 MMOL/L (ref 3.4–5.3)
POTASSIUM SERPL-SCNC: 4.2 MMOL/L (ref 3.4–5.3)
POTASSIUM SERPL-SCNC: 4.2 MMOL/L (ref 3.4–5.3)
POTASSIUM SERPL-SCNC: 4.3 MMOL/L (ref 3.4–5.3)
POTASSIUM SERPL-SCNC: 4.4 MMOL/L (ref 3.4–5.3)
POTASSIUM SERPL-SCNC: 4.5 MMOL/L (ref 3.4–5.3)
POTASSIUM SERPL-SCNC: 4.5 MMOL/L (ref 3.4–5.3)
POTASSIUM SERPL-SCNC: 4.6 MMOL/L (ref 3.4–5.3)
POTASSIUM SERPL-SCNC: 4.9 MMOL/L (ref 3.4–5.3)
POTASSIUM SERPL-SCNC: 5 MMOL/L (ref 3.4–5.3)
POTASSIUM SERPL-SCNC: 5.1 MMOL/L (ref 3.4–5.3)
POTASSIUM SERPL-SCNC: 5.5 MMOL/L (ref 3.4–5.3)
POTASSIUM SERPL-SCNC: 5.5 MMOL/L (ref 3.4–5.3)
POTASSIUM SERPL-SCNC: 5.7 MMOL/L (ref 3.4–5.3)
POTASSIUM SERPL-SCNC: 5.9 MMOL/L (ref 3.4–5.3)
POTASSIUM SERPL-SCNC: 6 MMOL/L (ref 3.4–5.3)
POTASSIUM SERPL-SCNC: 6.1 MMOL/L (ref 3.4–5.3)
POTASSIUM SERPL-SCNC: 6.1 MMOL/L (ref 3.4–5.3)
PR INTERVAL - MUSE: 156 MS
PR INTERVAL - MUSE: 160 MS
PR INTERVAL - MUSE: 170 MS
PR INTERVAL - MUSE: 182 MS
PR INTERVAL - MUSE: 188 MS
PR INTERVAL - MUSE: 198 MS
PR INTERVAL - MUSE: NORMAL MS
PROCALCITONIN SERPL IA-MCNC: 0.15 NG/ML
PROCALCITONIN SERPL IA-MCNC: 0.21 NG/ML
PROCALCITONIN SERPL IA-MCNC: 0.46 NG/ML
PROCALCITONIN SERPL-MCNC: 0.14 NG/ML
PROT SERPL-MCNC: 7 G/DL (ref 6.4–8.3)
PROT SERPL-MCNC: 7.1 G/DL (ref 6.4–8.3)
PROT SERPL-MCNC: 7.4 G/DL (ref 6.4–8.3)
PROT SERPL-MCNC: 7.4 G/DL (ref 6.4–8.3)
PROT SERPL-MCNC: 7.5 G/DL (ref 6.4–8.3)
PROT SERPL-MCNC: 7.8 G/DL (ref 6.4–8.3)
PROT SERPL-MCNC: 7.8 G/DL (ref 6.4–8.3)
PROT SERPL-MCNC: 8.3 G/DL (ref 6.4–8.3)
PROT SERPL-MCNC: 8.4 G/DL (ref 6.4–8.3)
PROT SERPL-MCNC: 8.5 G/DL (ref 6.8–8.8)
PROT SERPL-MCNC: 8.6 G/DL (ref 6.4–8.3)
PROT SERPL-MCNC: 8.6 G/DL (ref 6.4–8.3)
QRS DURATION - MUSE: 102 MS
QRS DURATION - MUSE: 74 MS
QRS DURATION - MUSE: 78 MS
QRS DURATION - MUSE: 82 MS
QRS DURATION - MUSE: 88 MS
QRS DURATION - MUSE: 90 MS
QRS DURATION - MUSE: 90 MS
QRS DURATION - MUSE: 92 MS
QRS DURATION - MUSE: 96 MS
QT - MUSE: 276 MS
QT - MUSE: 304 MS
QT - MUSE: 360 MS
QT - MUSE: 374 MS
QT - MUSE: 380 MS
QT - MUSE: 404 MS
QT - MUSE: 432 MS
QT - MUSE: 440 MS
QT - MUSE: 452 MS
QTC - MUSE: 428 MS
QTC - MUSE: 431 MS
QTC - MUSE: 438 MS
QTC - MUSE: 453 MS
QTC - MUSE: 460 MS
QTC - MUSE: 473 MS
QTC - MUSE: 473 MS
QTC - MUSE: 478 MS
QTC - MUSE: 483 MS
R AXIS - MUSE: -24 DEGREES
R AXIS - MUSE: -50 DEGREES
R AXIS - MUSE: 108 DEGREES
R AXIS - MUSE: 128 DEGREES
R AXIS - MUSE: 30 DEGREES
R AXIS - MUSE: 49 DEGREES
R AXIS - MUSE: 55 DEGREES
R AXIS - MUSE: 77 DEGREES
R AXIS - MUSE: 93 DEGREES
RBC # BLD AUTO: 4.39 10E6/UL (ref 3.8–5.2)
RBC # BLD AUTO: 4.41 10E6/UL (ref 3.8–5.2)
RBC # BLD AUTO: 4.52 10E6/UL (ref 3.8–5.2)
RBC # BLD AUTO: 4.53 10E6/UL (ref 3.8–5.2)
RBC # BLD AUTO: 4.57 10E6/UL (ref 3.8–5.2)
RBC # BLD AUTO: 4.64 10E6/UL (ref 3.8–5.2)
RBC # BLD AUTO: 4.71 10E6/UL (ref 3.8–5.2)
RBC # BLD AUTO: 4.71 10E6/UL (ref 3.8–5.2)
RBC # BLD AUTO: 4.77 10E6/UL (ref 3.8–5.2)
RBC # BLD AUTO: 4.8 10E6/UL (ref 3.8–5.2)
RBC # BLD AUTO: 4.81 10E6/UL (ref 3.8–5.2)
RBC # BLD AUTO: 4.88 10E6/UL (ref 3.8–5.2)
RBC # BLD AUTO: 4.91 10E6/UL (ref 3.8–5.2)
RBC # BLD AUTO: 4.91 10E6/UL (ref 3.8–5.2)
RBC # BLD AUTO: 4.92 10E6/UL (ref 3.8–5.2)
RBC # BLD AUTO: 4.96 10E6/UL (ref 3.8–5.2)
RBC # BLD AUTO: 4.97 10E6/UL (ref 3.8–5.2)
RBC # BLD AUTO: 4.98 10E6/UL (ref 3.8–5.2)
RBC # BLD AUTO: 4.99 10E6/UL (ref 3.8–5.2)
RBC # BLD AUTO: 5.02 10E6/UL (ref 3.8–5.2)
RBC # BLD AUTO: 5.02 10E6/UL (ref 3.8–5.2)
RBC # BLD AUTO: 5.03 10E6/UL (ref 3.8–5.2)
RBC # BLD AUTO: 5.05 10E6/UL (ref 3.8–5.2)
RBC # BLD AUTO: 5.06 10E6/UL (ref 3.8–5.2)
RBC # BLD AUTO: 5.08 10E6/UL (ref 3.8–5.2)
RBC # BLD AUTO: 5.09 10E6/UL (ref 3.8–5.2)
RBC # BLD AUTO: 5.11 10E6/UL (ref 3.8–5.2)
RBC # BLD AUTO: 5.21 10E6/UL (ref 3.8–5.2)
RBC # BLD AUTO: 5.22 10E6/UL (ref 3.8–5.2)
RBC # BLD AUTO: 5.24 10E6/UL (ref 3.8–5.2)
RBC # BLD AUTO: 5.27 10E6/UL (ref 3.8–5.2)
RBC # BLD AUTO: 5.37 10E6/UL (ref 3.8–5.2)
RBC # BLD AUTO: 5.44 10E6/UL (ref 3.8–5.2)
RBC URINE: 5 /HPF
RBC URINE: 76 /HPF
RBC URINE: >182 /HPF
RETICS # AUTO: 0.09 10E6/UL (ref 0.03–0.1)
RETICS/RBC NFR AUTO: 1.8 % (ref 0.5–2)
RSV RNA SPEC NAA+PROBE: NEGATIVE
SA TARGET DNA: NEGATIVE
SA TARGET DNA: NEGATIVE
SAO2 % BLDV: 62 % (ref 94–100)
SAO2 % BLDV: 65 % (ref 94–100)
SARS-COV-2 RNA RESP QL NAA+PROBE: NEGATIVE
SARS-COV-2 RNA RESP QL NAA+PROBE: NEGATIVE
SARS-COV-2 RNA RESP QL NAA+PROBE: POSITIVE
SODIUM BLD-SCNC: 136 MMOL/L (ref 133–144)
SODIUM SERPL-SCNC: 133 MMOL/L (ref 133–144)
SODIUM SERPL-SCNC: 133 MMOL/L (ref 136–145)
SODIUM SERPL-SCNC: 134 MMOL/L (ref 133–144)
SODIUM SERPL-SCNC: 134 MMOL/L (ref 136–145)
SODIUM SERPL-SCNC: 135 MMOL/L (ref 133–144)
SODIUM SERPL-SCNC: 135 MMOL/L (ref 133–144)
SODIUM SERPL-SCNC: 136 MMOL/L (ref 136–145)
SODIUM SERPL-SCNC: 136 MMOL/L (ref 136–145)
SODIUM SERPL-SCNC: 137 MMOL/L (ref 133–144)
SODIUM SERPL-SCNC: 137 MMOL/L (ref 136–145)
SODIUM SERPL-SCNC: 138 MMOL/L (ref 133–144)
SODIUM SERPL-SCNC: 138 MMOL/L (ref 133–144)
SODIUM SERPL-SCNC: 138 MMOL/L (ref 136–145)
SODIUM SERPL-SCNC: 139 MMOL/L (ref 136–145)
SODIUM SERPL-SCNC: 140 MMOL/L (ref 136–145)
SODIUM SERPL-SCNC: 141 MMOL/L (ref 136–145)
SODIUM SERPL-SCNC: 142 MMOL/L (ref 136–145)
SODIUM SERPL-SCNC: 142 MMOL/L (ref 136–145)
SODIUM SERPL-SCNC: 143 MMOL/L (ref 136–145)
SODIUM SERPL-SCNC: 144 MMOL/L (ref 136–145)
SODIUM SERPL-SCNC: 144 MMOL/L (ref 136–145)
SODIUM SERPL-SCNC: 145 MMOL/L (ref 136–145)
SODIUM SERPL-SCNC: 145 MMOL/L (ref 136–145)
SODIUM SERPL-SCNC: 147 MMOL/L (ref 136–145)
SP GR UR STRIP: 1.01 (ref 1–1.03)
SP GR UR STRIP: 1.02 (ref 1–1.03)
SP GR UR STRIP: 1.02 (ref 1–1.03)
SPECIMEN EXPIRATION DATE: NORMAL
SQUAMOUS EPITHELIAL: 1 /HPF
SQUAMOUS EPITHELIAL: 2 /HPF
SQUAMOUS EPITHELIAL: <1 /HPF
SYSTOLIC BLOOD PRESSURE - MUSE: NORMAL MMHG
T AXIS - MUSE: -1 DEGREES
T AXIS - MUSE: -12 DEGREES
T AXIS - MUSE: -16 DEGREES
T AXIS - MUSE: -52 DEGREES
T AXIS - MUSE: -55 DEGREES
T AXIS - MUSE: -59 DEGREES
T AXIS - MUSE: 10 DEGREES
T AXIS - MUSE: 2 DEGREES
T AXIS - MUSE: 2 DEGREES
TIBC SERPL-MCNC: 418 UG/DL (ref 240–430)
TROPONIN I SERPL HS-MCNC: 24 NG/L
TROPONIN T SERPL HS-MCNC: 143 NG/L
TROPONIN T SERPL HS-MCNC: 158 NG/L
TROPONIN T SERPL HS-MCNC: 190 NG/L
TROPONIN T SERPL HS-MCNC: 257 NG/L
TROPONIN T SERPL HS-MCNC: 327 NG/L
TROPONIN T SERPL HS-MCNC: 366 NG/L
UFH PPP CHRO-ACNC: 0.1 IU/ML
UFH PPP CHRO-ACNC: 0.6 IU/ML
UFH PPP CHRO-ACNC: 0.61 IU/ML
URATE SERPL-MCNC: 7.9 MG/DL (ref 2.4–5.7)
UROBILINOGEN UR STRIP-MCNC: NORMAL MG/DL
VENTRICULAR RATE- MUSE: 104 BPM
VENTRICULAR RATE- MUSE: 147 BPM
VENTRICULAR RATE- MUSE: 149 BPM
VENTRICULAR RATE- MUSE: 54 BPM
VENTRICULAR RATE- MUSE: 64 BPM
VENTRICULAR RATE- MUSE: 72 BPM
VENTRICULAR RATE- MUSE: 80 BPM
VENTRICULAR RATE- MUSE: 86 BPM
VENTRICULAR RATE- MUSE: 91 BPM
VIT B12 SERPL-MCNC: 686 PG/ML (ref 193–986)
WBC # BLD AUTO: 10.4 10E3/UL (ref 4–11)
WBC # BLD AUTO: 10.5 10E3/UL (ref 4–11)
WBC # BLD AUTO: 10.8 10E3/UL (ref 4–11)
WBC # BLD AUTO: 10.9 10E3/UL (ref 4–11)
WBC # BLD AUTO: 11.3 10E3/UL (ref 4–11)
WBC # BLD AUTO: 11.4 10E3/UL (ref 4–11)
WBC # BLD AUTO: 11.7 10E3/UL (ref 4–11)
WBC # BLD AUTO: 12.4 10E3/UL (ref 4–11)
WBC # BLD AUTO: 12.6 10E3/UL (ref 4–11)
WBC # BLD AUTO: 12.7 10E3/UL (ref 4–11)
WBC # BLD AUTO: 13.3 10E3/UL (ref 4–11)
WBC # BLD AUTO: 15.3 10E3/UL (ref 4–11)
WBC # BLD AUTO: 17.5 10E3/UL (ref 4–11)
WBC # BLD AUTO: 6 10E3/UL (ref 4–11)
WBC # BLD AUTO: 6.1 10E3/UL (ref 4–11)
WBC # BLD AUTO: 7 10E3/UL (ref 4–11)
WBC # BLD AUTO: 7.2 10E3/UL (ref 4–11)
WBC # BLD AUTO: 7.9 10E3/UL (ref 4–11)
WBC # BLD AUTO: 8 10E3/UL (ref 4–11)
WBC # BLD AUTO: 8.2 10E3/UL (ref 4–11)
WBC # BLD AUTO: 8.5 10E3/UL (ref 4–11)
WBC # BLD AUTO: 8.8 10E3/UL (ref 4–11)
WBC # BLD AUTO: 8.9 10E3/UL (ref 4–11)
WBC # BLD AUTO: 9.1 10E3/UL (ref 4–11)
WBC # BLD AUTO: 9.3 10E3/UL (ref 4–11)
WBC # BLD AUTO: 9.4 10E3/UL (ref 4–11)
WBC # BLD AUTO: 9.6 10E3/UL (ref 4–11)
WBC # BLD AUTO: 9.8 10E3/UL (ref 4–11)
WBC # BLD AUTO: 9.9 10E3/UL (ref 4–11)
WBC URINE: 1 /HPF
WBC URINE: 14 /HPF
WBC URINE: 2 /HPF

## 2022-01-01 PROCEDURE — 97530 THERAPEUTIC ACTIVITIES: CPT | Mod: GO

## 2022-01-01 PROCEDURE — 84145 PROCALCITONIN (PCT): CPT | Performed by: STUDENT IN AN ORGANIZED HEALTH CARE EDUCATION/TRAINING PROGRAM

## 2022-01-01 PROCEDURE — 999N000157 HC STATISTIC RCP TIME EA 10 MIN

## 2022-01-01 PROCEDURE — 120N000002 HC R&B MED SURG/OB UMMC

## 2022-01-01 PROCEDURE — 80299 QUANTITATIVE ASSAY DRUG: CPT | Performed by: INTERNAL MEDICINE

## 2022-01-01 PROCEDURE — 99291 CRITICAL CARE FIRST HOUR: CPT | Performed by: INTERNAL MEDICINE

## 2022-01-01 PROCEDURE — 258N000003 HC RX IP 258 OP 636: Performed by: NURSE PRACTITIONER

## 2022-01-01 PROCEDURE — 83735 ASSAY OF MAGNESIUM: CPT | Performed by: INTERNAL MEDICINE

## 2022-01-01 PROCEDURE — 250N000011 HC RX IP 250 OP 636

## 2022-01-01 PROCEDURE — 71045 X-RAY EXAM CHEST 1 VIEW: CPT

## 2022-01-01 PROCEDURE — 36415 COLL VENOUS BLD VENIPUNCTURE: CPT | Performed by: NURSE PRACTITIONER

## 2022-01-01 PROCEDURE — 85027 COMPLETE CBC AUTOMATED: CPT | Performed by: NURSE PRACTITIONER

## 2022-01-01 PROCEDURE — 81001 URINALYSIS AUTO W/SCOPE: CPT

## 2022-01-01 PROCEDURE — 94003 VENT MGMT INPAT SUBQ DAY: CPT

## 2022-01-01 PROCEDURE — 250N000011 HC RX IP 250 OP 636: Performed by: STUDENT IN AN ORGANIZED HEALTH CARE EDUCATION/TRAINING PROGRAM

## 2022-01-01 PROCEDURE — 93005 ELECTROCARDIOGRAM TRACING: CPT | Performed by: NURSE PRACTITIONER

## 2022-01-01 PROCEDURE — 71045 X-RAY EXAM CHEST 1 VIEW: CPT | Mod: 26 | Performed by: RADIOLOGY

## 2022-01-01 PROCEDURE — 250N000013 HC RX MED GY IP 250 OP 250 PS 637

## 2022-01-01 PROCEDURE — 85730 THROMBOPLASTIN TIME PARTIAL: CPT | Performed by: SURGERY

## 2022-01-01 PROCEDURE — 84132 ASSAY OF SERUM POTASSIUM: CPT | Performed by: NURSE PRACTITIONER

## 2022-01-01 PROCEDURE — 99441 PR PHYSICIAN TELEPHONE EVALUATION 5-10 MIN: CPT | Mod: 95 | Performed by: INTERNAL MEDICINE

## 2022-01-01 PROCEDURE — 82374 ASSAY BLOOD CARBON DIOXIDE: CPT | Performed by: STUDENT IN AN ORGANIZED HEALTH CARE EDUCATION/TRAINING PROGRAM

## 2022-01-01 PROCEDURE — 74018 RADEX ABDOMEN 1 VIEW: CPT | Mod: 26 | Performed by: RADIOLOGY

## 2022-01-01 PROCEDURE — 94660 CPAP INITIATION&MGMT: CPT

## 2022-01-01 PROCEDURE — 250N000009 HC RX 250: Performed by: SURGERY

## 2022-01-01 PROCEDURE — 93308 TTE F-UP OR LMTD: CPT | Mod: 26 | Performed by: INTERNAL MEDICINE

## 2022-01-01 PROCEDURE — 5A09357 ASSISTANCE WITH RESPIRATORY VENTILATION, LESS THAN 24 CONSECUTIVE HOURS, CONTINUOUS POSITIVE AIRWAY PRESSURE: ICD-10-PCS | Performed by: INTERNAL MEDICINE

## 2022-01-01 PROCEDURE — 99239 HOSP IP/OBS DSCHRG MGMT >30: CPT | Performed by: STUDENT IN AN ORGANIZED HEALTH CARE EDUCATION/TRAINING PROGRAM

## 2022-01-01 PROCEDURE — 94640 AIRWAY INHALATION TREATMENT: CPT | Mod: 76

## 2022-01-01 PROCEDURE — 83735 ASSAY OF MAGNESIUM: CPT | Performed by: NURSE PRACTITIONER

## 2022-01-01 PROCEDURE — 94640 AIRWAY INHALATION TREATMENT: CPT

## 2022-01-01 PROCEDURE — 250N000013 HC RX MED GY IP 250 OP 250 PS 637: Performed by: SURGERY

## 2022-01-01 PROCEDURE — 250N000011 HC RX IP 250 OP 636: Performed by: NURSE PRACTITIONER

## 2022-01-01 PROCEDURE — 83735 ASSAY OF MAGNESIUM: CPT | Performed by: STUDENT IN AN ORGANIZED HEALTH CARE EDUCATION/TRAINING PROGRAM

## 2022-01-01 PROCEDURE — C9803 HOPD COVID-19 SPEC COLLECT: HCPCS | Performed by: NURSE PRACTITIONER

## 2022-01-01 PROCEDURE — G1010 CDSM STANSON: HCPCS

## 2022-01-01 PROCEDURE — 91312 HC RX IP 250 OP 636: CPT

## 2022-01-01 PROCEDURE — 97116 GAIT TRAINING THERAPY: CPT | Mod: GP

## 2022-01-01 PROCEDURE — 82803 BLOOD GASES ANY COMBINATION: CPT | Performed by: STUDENT IN AN ORGANIZED HEALTH CARE EDUCATION/TRAINING PROGRAM

## 2022-01-01 PROCEDURE — 87205 SMEAR GRAM STAIN: CPT | Performed by: NURSE PRACTITIONER

## 2022-01-01 PROCEDURE — 93005 ELECTROCARDIOGRAM TRACING: CPT

## 2022-01-01 PROCEDURE — 99233 SBSQ HOSP IP/OBS HIGH 50: CPT | Mod: GC | Performed by: STUDENT IN AN ORGANIZED HEALTH CARE EDUCATION/TRAINING PROGRAM

## 2022-01-01 PROCEDURE — 36415 COLL VENOUS BLD VENIPUNCTURE: CPT | Performed by: PHYSICIAN ASSISTANT

## 2022-01-01 PROCEDURE — 250N000013 HC RX MED GY IP 250 OP 250 PS 637: Performed by: NURSE PRACTITIONER

## 2022-01-01 PROCEDURE — 250N000013 HC RX MED GY IP 250 OP 250 PS 637: Performed by: EMERGENCY MEDICINE

## 2022-01-01 PROCEDURE — G1010 CDSM STANSON: HCPCS | Mod: GC | Performed by: RADIOLOGY

## 2022-01-01 PROCEDURE — 120N000009 HC R&B SNF

## 2022-01-01 PROCEDURE — 71275 CT ANGIOGRAPHY CHEST: CPT | Mod: 26 | Performed by: RADIOLOGY

## 2022-01-01 PROCEDURE — 250N000009 HC RX 250

## 2022-01-01 PROCEDURE — 200N000002 HC R&B ICU UMMC

## 2022-01-01 PROCEDURE — 99232 SBSQ HOSP IP/OBS MODERATE 35: CPT | Mod: GC | Performed by: STUDENT IN AN ORGANIZED HEALTH CARE EDUCATION/TRAINING PROGRAM

## 2022-01-01 PROCEDURE — 84100 ASSAY OF PHOSPHORUS: CPT | Performed by: SURGERY

## 2022-01-01 PROCEDURE — 99309 SBSQ NF CARE MODERATE MDM 30: CPT | Performed by: PHYSICIAN ASSISTANT

## 2022-01-01 PROCEDURE — 83036 HEMOGLOBIN GLYCOSYLATED A1C: CPT | Performed by: STUDENT IN AN ORGANIZED HEALTH CARE EDUCATION/TRAINING PROGRAM

## 2022-01-01 PROCEDURE — 85520 HEPARIN ASSAY: CPT | Performed by: SURGERY

## 2022-01-01 PROCEDURE — C9113 INJ PANTOPRAZOLE SODIUM, VIA: HCPCS | Performed by: STUDENT IN AN ORGANIZED HEALTH CARE EDUCATION/TRAINING PROGRAM

## 2022-01-01 PROCEDURE — 80048 BASIC METABOLIC PNL TOTAL CA: CPT | Performed by: STUDENT IN AN ORGANIZED HEALTH CARE EDUCATION/TRAINING PROGRAM

## 2022-01-01 PROCEDURE — 83605 ASSAY OF LACTIC ACID: CPT

## 2022-01-01 PROCEDURE — 85014 HEMATOCRIT: CPT | Performed by: NURSE PRACTITIONER

## 2022-01-01 PROCEDURE — 0124A HC ADMIN COVID VAC PFIZER 12+ BIVAL ADDITIONAL: CPT

## 2022-01-01 PROCEDURE — 84155 ASSAY OF PROTEIN SERUM: CPT | Performed by: STUDENT IN AN ORGANIZED HEALTH CARE EDUCATION/TRAINING PROGRAM

## 2022-01-01 PROCEDURE — 80048 BASIC METABOLIC PNL TOTAL CA: CPT | Performed by: INTERNAL MEDICINE

## 2022-01-01 PROCEDURE — 86140 C-REACTIVE PROTEIN: CPT | Performed by: NURSE PRACTITIONER

## 2022-01-01 PROCEDURE — 250N000013 HC RX MED GY IP 250 OP 250 PS 637: Performed by: STUDENT IN AN ORGANIZED HEALTH CARE EDUCATION/TRAINING PROGRAM

## 2022-01-01 PROCEDURE — 80048 BASIC METABOLIC PNL TOTAL CA: CPT | Performed by: NURSE PRACTITIONER

## 2022-01-01 PROCEDURE — 82810 BLOOD GASES O2 SAT ONLY: CPT | Performed by: SURGERY

## 2022-01-01 PROCEDURE — 83735 ASSAY OF MAGNESIUM: CPT | Performed by: SURGERY

## 2022-01-01 PROCEDURE — 02HV33Z INSERTION OF INFUSION DEVICE INTO SUPERIOR VENA CAVA, PERCUTANEOUS APPROACH: ICD-10-PCS | Performed by: SURGERY

## 2022-01-01 PROCEDURE — 250N000013 HC RX MED GY IP 250 OP 250 PS 637: Performed by: INTERNAL MEDICINE

## 2022-01-01 PROCEDURE — 82310 ASSAY OF CALCIUM: CPT | Performed by: NURSE PRACTITIONER

## 2022-01-01 PROCEDURE — 999N000045 HC STATISTIC DAILY SWAN MONITORING

## 2022-01-01 PROCEDURE — 82803 BLOOD GASES ANY COMBINATION: CPT | Performed by: NURSE PRACTITIONER

## 2022-01-01 PROCEDURE — 258N000003 HC RX IP 258 OP 636

## 2022-01-01 PROCEDURE — 93010 ELECTROCARDIOGRAM REPORT: CPT | Performed by: NURSE PRACTITIONER

## 2022-01-01 PROCEDURE — 86850 RBC ANTIBODY SCREEN: CPT | Performed by: STUDENT IN AN ORGANIZED HEALTH CARE EDUCATION/TRAINING PROGRAM

## 2022-01-01 PROCEDURE — 250N000009 HC RX 250: Performed by: NURSE PRACTITIONER

## 2022-01-01 PROCEDURE — 85027 COMPLETE CBC AUTOMATED: CPT

## 2022-01-01 PROCEDURE — 93010 ELECTROCARDIOGRAM REPORT: CPT | Performed by: INTERNAL MEDICINE

## 2022-01-01 PROCEDURE — 36415 COLL VENOUS BLD VENIPUNCTURE: CPT | Performed by: STUDENT IN AN ORGANIZED HEALTH CARE EDUCATION/TRAINING PROGRAM

## 2022-01-01 PROCEDURE — 36415 COLL VENOUS BLD VENIPUNCTURE: CPT | Mod: ORL | Performed by: INTERNAL MEDICINE

## 2022-01-01 PROCEDURE — 99232 SBSQ HOSP IP/OBS MODERATE 35: CPT | Performed by: FAMILY MEDICINE

## 2022-01-01 PROCEDURE — 80053 COMPREHEN METABOLIC PANEL: CPT | Performed by: NURSE PRACTITIONER

## 2022-01-01 PROCEDURE — 84132 ASSAY OF SERUM POTASSIUM: CPT | Performed by: STUDENT IN AN ORGANIZED HEALTH CARE EDUCATION/TRAINING PROGRAM

## 2022-01-01 PROCEDURE — 85610 PROTHROMBIN TIME: CPT | Performed by: NURSE PRACTITIONER

## 2022-01-01 PROCEDURE — 93325 DOPPLER ECHO COLOR FLOW MAPG: CPT

## 2022-01-01 PROCEDURE — 99232 SBSQ HOSP IP/OBS MODERATE 35: CPT | Performed by: NURSE PRACTITIONER

## 2022-01-01 PROCEDURE — 85730 THROMBOPLASTIN TIME PARTIAL: CPT | Performed by: NURSE PRACTITIONER

## 2022-01-01 PROCEDURE — 36415 COLL VENOUS BLD VENIPUNCTURE: CPT | Performed by: INTERNAL MEDICINE

## 2022-01-01 PROCEDURE — 99291 CRITICAL CARE FIRST HOUR: CPT | Mod: 25 | Performed by: SURGERY

## 2022-01-01 PROCEDURE — 90682 RIV4 VACC RECOMBINANT DNA IM: CPT | Performed by: STUDENT IN AN ORGANIZED HEALTH CARE EDUCATION/TRAINING PROGRAM

## 2022-01-01 PROCEDURE — 80048 BASIC METABOLIC PNL TOTAL CA: CPT | Mod: ORL | Performed by: INTERNAL MEDICINE

## 2022-01-01 PROCEDURE — 99233 SBSQ HOSP IP/OBS HIGH 50: CPT | Mod: GC | Performed by: INTERNAL MEDICINE

## 2022-01-01 PROCEDURE — 84100 ASSAY OF PHOSPHORUS: CPT | Performed by: INTERNAL MEDICINE

## 2022-01-01 PROCEDURE — 82310 ASSAY OF CALCIUM: CPT | Performed by: INTERNAL MEDICINE

## 2022-01-01 PROCEDURE — 99291 CRITICAL CARE FIRST HOUR: CPT | Mod: GC | Performed by: INTERNAL MEDICINE

## 2022-01-01 PROCEDURE — 3E033XZ INTRODUCTION OF VASOPRESSOR INTO PERIPHERAL VEIN, PERCUTANEOUS APPROACH: ICD-10-PCS | Performed by: NURSE PRACTITIONER

## 2022-01-01 PROCEDURE — 999N000065 XR CHEST PORT 1 VIEW

## 2022-01-01 PROCEDURE — 84132 ASSAY OF SERUM POTASSIUM: CPT | Performed by: INTERNAL MEDICINE

## 2022-01-01 PROCEDURE — 36415 COLL VENOUS BLD VENIPUNCTURE: CPT

## 2022-01-01 PROCEDURE — XW023W7 INTRODUCTION OF COVID-19 VACCINE BOOSTER INTO MUSCLE, PERCUTANEOUS APPROACH, NEW TECHNOLOGY GROUP 7: ICD-10-PCS | Performed by: STUDENT IN AN ORGANIZED HEALTH CARE EDUCATION/TRAINING PROGRAM

## 2022-01-01 PROCEDURE — 82805 BLOOD GASES W/O2 SATURATION: CPT

## 2022-01-01 PROCEDURE — 93321 DOPPLER ECHO F-UP/LMTD STD: CPT

## 2022-01-01 PROCEDURE — 99442 PR PHYSICIAN TELEPHONE EVALUATION 11-20 MIN: CPT | Mod: 95 | Performed by: INTERNAL MEDICINE

## 2022-01-01 PROCEDURE — 82248 BILIRUBIN DIRECT: CPT | Performed by: NURSE PRACTITIONER

## 2022-01-01 PROCEDURE — 999N000127 HC STATISTIC PERIPHERAL IV START W US GUIDANCE

## 2022-01-01 PROCEDURE — 84100 ASSAY OF PHOSPHORUS: CPT

## 2022-01-01 PROCEDURE — 85730 THROMBOPLASTIN TIME PARTIAL: CPT | Performed by: STUDENT IN AN ORGANIZED HEALTH CARE EDUCATION/TRAINING PROGRAM

## 2022-01-01 PROCEDURE — 258N000001 HC RX 258: Performed by: NURSE PRACTITIONER

## 2022-01-01 PROCEDURE — 99309 SBSQ NF CARE MODERATE MDM 30: CPT | Performed by: INTERNAL MEDICINE

## 2022-01-01 PROCEDURE — 93325 DOPPLER ECHO COLOR FLOW MAPG: CPT | Mod: 26 | Performed by: INTERNAL MEDICINE

## 2022-01-01 PROCEDURE — 97110 THERAPEUTIC EXERCISES: CPT | Mod: GP

## 2022-01-01 PROCEDURE — 86140 C-REACTIVE PROTEIN: CPT | Performed by: PHYSICIAN ASSISTANT

## 2022-01-01 PROCEDURE — 250N000011 HC RX IP 250 OP 636: Performed by: SURGERY

## 2022-01-01 PROCEDURE — 36415 COLL VENOUS BLD VENIPUNCTURE: CPT | Performed by: SURGERY

## 2022-01-01 PROCEDURE — 83605 ASSAY OF LACTIC ACID: CPT | Performed by: NURSE PRACTITIONER

## 2022-01-01 PROCEDURE — 84132 ASSAY OF SERUM POTASSIUM: CPT | Performed by: SURGERY

## 2022-01-01 PROCEDURE — 82805 BLOOD GASES W/O2 SATURATION: CPT | Performed by: NURSE PRACTITIONER

## 2022-01-01 PROCEDURE — 97165 OT EVAL LOW COMPLEX 30 MIN: CPT | Mod: GO

## 2022-01-01 PROCEDURE — 999N000178 HC STATISTIC SUCTION SPUTUM

## 2022-01-01 PROCEDURE — 84100 ASSAY OF PHOSPHORUS: CPT | Performed by: STUDENT IN AN ORGANIZED HEALTH CARE EDUCATION/TRAINING PROGRAM

## 2022-01-01 PROCEDURE — 258N000003 HC RX IP 258 OP 636: Performed by: EMERGENCY MEDICINE

## 2022-01-01 PROCEDURE — 83880 ASSAY OF NATRIURETIC PEPTIDE: CPT | Performed by: STUDENT IN AN ORGANIZED HEALTH CARE EDUCATION/TRAINING PROGRAM

## 2022-01-01 PROCEDURE — 76856 US EXAM PELVIC COMPLETE: CPT | Mod: 26 | Performed by: RADIOLOGY

## 2022-01-01 PROCEDURE — 84484 ASSAY OF TROPONIN QUANT: CPT | Performed by: NURSE PRACTITIONER

## 2022-01-01 PROCEDURE — 97535 SELF CARE MNGMENT TRAINING: CPT | Mod: GO

## 2022-01-01 PROCEDURE — 82947 ASSAY GLUCOSE BLOOD QUANT: CPT | Performed by: STUDENT IN AN ORGANIZED HEALTH CARE EDUCATION/TRAINING PROGRAM

## 2022-01-01 PROCEDURE — 99222 1ST HOSP IP/OBS MODERATE 55: CPT | Performed by: NURSE PRACTITIONER

## 2022-01-01 PROCEDURE — 999N000253 HC STATISTIC WEANING TRIALS

## 2022-01-01 PROCEDURE — 97530 THERAPEUTIC ACTIVITIES: CPT | Mod: GP

## 2022-01-01 PROCEDURE — 5A1955Z RESPIRATORY VENTILATION, GREATER THAN 96 CONSECUTIVE HOURS: ICD-10-PCS | Performed by: STUDENT IN AN ORGANIZED HEALTH CARE EDUCATION/TRAINING PROGRAM

## 2022-01-01 PROCEDURE — 74177 CT ABD & PELVIS W/CONTRAST: CPT | Mod: MG

## 2022-01-01 PROCEDURE — 97110 THERAPEUTIC EXERCISES: CPT | Mod: GO

## 2022-01-01 PROCEDURE — 82248 BILIRUBIN DIRECT: CPT | Performed by: PHYSICIAN ASSISTANT

## 2022-01-01 PROCEDURE — 999N000111 HC STATISTIC OT IP EVAL DEFER: Performed by: OCCUPATIONAL THERAPIST

## 2022-01-01 PROCEDURE — 250N000011 HC RX IP 250 OP 636: Performed by: INTERNAL MEDICINE

## 2022-01-01 PROCEDURE — 84145 PROCALCITONIN (PCT): CPT | Performed by: NURSE PRACTITIONER

## 2022-01-01 PROCEDURE — 36620 INSERTION CATHETER ARTERY: CPT

## 2022-01-01 PROCEDURE — G1010 CDSM STANSON: HCPCS | Performed by: RADIOLOGY

## 2022-01-01 PROCEDURE — 82746 ASSAY OF FOLIC ACID SERUM: CPT | Mod: ORL | Performed by: INTERNAL MEDICINE

## 2022-01-01 PROCEDURE — 82803 BLOOD GASES ANY COMBINATION: CPT

## 2022-01-01 PROCEDURE — 85384 FIBRINOGEN ACTIVITY: CPT | Performed by: NURSE PRACTITIONER

## 2022-01-01 PROCEDURE — 82805 BLOOD GASES W/O2 SATURATION: CPT | Performed by: INTERNAL MEDICINE

## 2022-01-01 PROCEDURE — 84484 ASSAY OF TROPONIN QUANT: CPT | Performed by: OBSTETRICS & GYNECOLOGY

## 2022-01-01 PROCEDURE — 99207 PR NO BILLABLE SERVICE THIS VISIT: CPT | Performed by: INTERNAL MEDICINE

## 2022-01-01 PROCEDURE — 250N000011 HC RX IP 250 OP 636: Performed by: EMERGENCY MEDICINE

## 2022-01-01 PROCEDURE — 99285 EMERGENCY DEPT VISIT HI MDM: CPT | Mod: 25 | Performed by: NURSE PRACTITIONER

## 2022-01-01 PROCEDURE — 999N000215 HC STATISTIC HFNC ADULT NON-CPAP

## 2022-01-01 PROCEDURE — 93970 EXTREMITY STUDY: CPT

## 2022-01-01 PROCEDURE — 83880 ASSAY OF NATRIURETIC PEPTIDE: CPT | Performed by: PHYSICIAN ASSISTANT

## 2022-01-01 PROCEDURE — 84132 ASSAY OF SERUM POTASSIUM: CPT

## 2022-01-01 PROCEDURE — 87040 BLOOD CULTURE FOR BACTERIA: CPT | Performed by: NURSE PRACTITIONER

## 2022-01-01 PROCEDURE — 36415 COLL VENOUS BLD VENIPUNCTURE: CPT | Performed by: EMERGENCY MEDICINE

## 2022-01-01 PROCEDURE — 85025 COMPLETE CBC W/AUTO DIFF WBC: CPT

## 2022-01-01 PROCEDURE — 82610 CYSTATIN C: CPT

## 2022-01-01 PROCEDURE — 97535 SELF CARE MNGMENT TRAINING: CPT | Mod: GO | Performed by: OCCUPATIONAL THERAPIST

## 2022-01-01 PROCEDURE — 250N000011 HC RX IP 250 OP 636: Performed by: PHYSICIAN ASSISTANT

## 2022-01-01 PROCEDURE — U0003 INFECTIOUS AGENT DETECTION BY NUCLEIC ACID (DNA OR RNA); SEVERE ACUTE RESPIRATORY SYNDROME CORONAVIRUS 2 (SARS-COV-2) (CORONAVIRUS DISEASE [COVID-19]), AMPLIFIED PROBE TECHNIQUE, MAKING USE OF HIGH THROUGHPUT TECHNOLOGIES AS DESCRIBED BY CMS-2020-01-R: HCPCS | Performed by: INTERNAL MEDICINE

## 2022-01-01 PROCEDURE — G0008 ADMIN INFLUENZA VIRUS VAC: HCPCS | Performed by: STUDENT IN AN ORGANIZED HEALTH CARE EDUCATION/TRAINING PROGRAM

## 2022-01-01 PROCEDURE — 87637 SARSCOV2&INF A&B&RSV AMP PRB: CPT | Performed by: NURSE PRACTITIONER

## 2022-01-01 PROCEDURE — 99213 OFFICE O/P EST LOW 20 MIN: CPT | Mod: 95 | Performed by: INTERNAL MEDICINE

## 2022-01-01 PROCEDURE — 93321 DOPPLER ECHO F-UP/LMTD STD: CPT | Mod: 26 | Performed by: INTERNAL MEDICINE

## 2022-01-01 PROCEDURE — XW033E5 INTRODUCTION OF REMDESIVIR ANTI-INFECTIVE INTO PERIPHERAL VEIN, PERCUTANEOUS APPROACH, NEW TECHNOLOGY GROUP 5: ICD-10-PCS | Performed by: NURSE PRACTITIONER

## 2022-01-01 PROCEDURE — C9113 INJ PANTOPRAZOLE SODIUM, VIA: HCPCS

## 2022-01-01 PROCEDURE — 83735 ASSAY OF MAGNESIUM: CPT

## 2022-01-01 PROCEDURE — 85730 THROMBOPLASTIN TIME PARTIAL: CPT | Performed by: INTERNAL MEDICINE

## 2022-01-01 PROCEDURE — 81001 URINALYSIS AUTO W/SCOPE: CPT | Performed by: STUDENT IN AN ORGANIZED HEALTH CARE EDUCATION/TRAINING PROGRAM

## 2022-01-01 PROCEDURE — 82330 ASSAY OF CALCIUM: CPT

## 2022-01-01 PROCEDURE — 3E043XZ INTRODUCTION OF VASOPRESSOR INTO CENTRAL VEIN, PERCUTANEOUS APPROACH: ICD-10-PCS | Performed by: STUDENT IN AN ORGANIZED HEALTH CARE EDUCATION/TRAINING PROGRAM

## 2022-01-01 PROCEDURE — 258N000003 HC RX IP 258 OP 636: Performed by: SURGERY

## 2022-01-01 PROCEDURE — 258N000001 HC RX 258: Performed by: STUDENT IN AN ORGANIZED HEALTH CARE EDUCATION/TRAINING PROGRAM

## 2022-01-01 PROCEDURE — 999N000259 HC STATISTIC EXTUBATION

## 2022-01-01 PROCEDURE — G0463 HOSPITAL OUTPT CLINIC VISIT: HCPCS

## 2022-01-01 PROCEDURE — 999N000158 HC STATISTIC RCP TIME ED VENT EA 10 MIN

## 2022-01-01 PROCEDURE — 99232 SBSQ HOSP IP/OBS MODERATE 35: CPT | Performed by: STUDENT IN AN ORGANIZED HEALTH CARE EDUCATION/TRAINING PROGRAM

## 2022-01-01 PROCEDURE — 99231 SBSQ HOSP IP/OBS SF/LOW 25: CPT | Performed by: NURSE PRACTITIONER

## 2022-01-01 PROCEDURE — 258N000003 HC RX IP 258 OP 636: Performed by: STUDENT IN AN ORGANIZED HEALTH CARE EDUCATION/TRAINING PROGRAM

## 2022-01-01 PROCEDURE — 96365 THER/PROPH/DIAG IV INF INIT: CPT | Performed by: NURSE PRACTITIONER

## 2022-01-01 PROCEDURE — 272N000272 HC CONTINUOUS NEBULIZER MICRO PUMP

## 2022-01-01 PROCEDURE — 84550 ASSAY OF BLOOD/URIC ACID: CPT | Performed by: NURSE PRACTITIONER

## 2022-01-01 PROCEDURE — 76830 TRANSVAGINAL US NON-OB: CPT | Mod: 26 | Performed by: RADIOLOGY

## 2022-01-01 PROCEDURE — 82803 BLOOD GASES ANY COMBINATION: CPT | Performed by: EMERGENCY MEDICINE

## 2022-01-01 PROCEDURE — A7035 POS AIRWAY PRESS HEADGEAR: HCPCS

## 2022-01-01 PROCEDURE — 87086 URINE CULTURE/COLONY COUNT: CPT | Performed by: STUDENT IN AN ORGANIZED HEALTH CARE EDUCATION/TRAINING PROGRAM

## 2022-01-01 PROCEDURE — 97165 OT EVAL LOW COMPLEX 30 MIN: CPT | Mod: GO | Performed by: OCCUPATIONAL THERAPIST

## 2022-01-01 PROCEDURE — 80048 BASIC METABOLIC PNL TOTAL CA: CPT

## 2022-01-01 PROCEDURE — 99223 1ST HOSP IP/OBS HIGH 75: CPT | Mod: GC | Performed by: INTERNAL MEDICINE

## 2022-01-01 PROCEDURE — 84484 ASSAY OF TROPONIN QUANT: CPT

## 2022-01-01 PROCEDURE — 999N000208 ECHOCARDIOGRAM COMPLETE

## 2022-01-01 PROCEDURE — 83880 ASSAY OF NATRIURETIC PEPTIDE: CPT | Performed by: NURSE PRACTITIONER

## 2022-01-01 PROCEDURE — 85018 HEMOGLOBIN: CPT | Performed by: NURSE PRACTITIONER

## 2022-01-01 PROCEDURE — 93325 DOPPLER ECHO COLOR FLOW MAPG: CPT | Mod: 26 | Performed by: STUDENT IN AN ORGANIZED HEALTH CARE EDUCATION/TRAINING PROGRAM

## 2022-01-01 PROCEDURE — 022N000001 HC SNF RUG CODE OPNP

## 2022-01-01 PROCEDURE — 97161 PT EVAL LOW COMPLEX 20 MIN: CPT | Mod: GP

## 2022-01-01 PROCEDURE — 120N000003 HC R&B IMCU UMMC

## 2022-01-01 PROCEDURE — 02HQ32Z INSERTION OF MONITORING DEVICE INTO RIGHT PULMONARY ARTERY, PERCUTANEOUS APPROACH: ICD-10-PCS | Performed by: INTERNAL MEDICINE

## 2022-01-01 PROCEDURE — 82310 ASSAY OF CALCIUM: CPT

## 2022-01-01 PROCEDURE — 82728 ASSAY OF FERRITIN: CPT | Mod: ORL | Performed by: INTERNAL MEDICINE

## 2022-01-01 PROCEDURE — 85004 AUTOMATED DIFF WBC COUNT: CPT | Performed by: NURSE PRACTITIONER

## 2022-01-01 PROCEDURE — 999N000015 HC STATISTIC ARTERIAL MONITORING DAILY

## 2022-01-01 PROCEDURE — 84145 PROCALCITONIN (PCT): CPT

## 2022-01-01 PROCEDURE — 272N000012 HC KIT CATH SWAN VIP SUPPLY

## 2022-01-01 PROCEDURE — 71046 X-RAY EXAM CHEST 2 VIEWS: CPT | Mod: 26 | Performed by: RADIOLOGY

## 2022-01-01 PROCEDURE — 999N000065 XR ABDOMEN PORT 1 VIEW

## 2022-01-01 PROCEDURE — 84484 ASSAY OF TROPONIN QUANT: CPT | Performed by: EMERGENCY MEDICINE

## 2022-01-01 PROCEDURE — 5A09357 ASSISTANCE WITH RESPIRATORY VENTILATION, LESS THAN 24 CONSECUTIVE HOURS, CONTINUOUS POSITIVE AIRWAY PRESSURE: ICD-10-PCS | Performed by: NURSE PRACTITIONER

## 2022-01-01 PROCEDURE — 87205 SMEAR GRAM STAIN: CPT | Performed by: STUDENT IN AN ORGANIZED HEALTH CARE EDUCATION/TRAINING PROGRAM

## 2022-01-01 PROCEDURE — 85025 COMPLETE CBC W/AUTO DIFF WBC: CPT | Performed by: NURSE PRACTITIONER

## 2022-01-01 PROCEDURE — 82550 ASSAY OF CK (CPK): CPT

## 2022-01-01 PROCEDURE — 82610 CYSTATIN C: CPT | Performed by: INTERNAL MEDICINE

## 2022-01-01 PROCEDURE — 70450 CT HEAD/BRAIN W/O DYE: CPT | Mod: 26 | Performed by: RADIOLOGY

## 2022-01-01 PROCEDURE — 80053 COMPREHEN METABOLIC PANEL: CPT

## 2022-01-01 PROCEDURE — 250N000012 HC RX MED GY IP 250 OP 636 PS 637: Performed by: NURSE PRACTITIONER

## 2022-01-01 PROCEDURE — 74177 CT ABD & PELVIS W/CONTRAST: CPT | Mod: 26 | Performed by: RADIOLOGY

## 2022-01-01 PROCEDURE — 85379 FIBRIN DEGRADATION QUANT: CPT | Performed by: NURSE PRACTITIONER

## 2022-01-01 PROCEDURE — 82310 ASSAY OF CALCIUM: CPT | Performed by: STUDENT IN AN ORGANIZED HEALTH CARE EDUCATION/TRAINING PROGRAM

## 2022-01-01 PROCEDURE — 71046 X-RAY EXAM CHEST 2 VIEWS: CPT

## 2022-01-01 PROCEDURE — 255N000002 HC RX 255 OP 636: Performed by: INTERNAL MEDICINE

## 2022-01-01 PROCEDURE — 82533 TOTAL CORTISOL: CPT

## 2022-01-01 PROCEDURE — 93308 TTE F-UP OR LMTD: CPT | Mod: 26 | Performed by: STUDENT IN AN ORGANIZED HEALTH CARE EDUCATION/TRAINING PROGRAM

## 2022-01-01 PROCEDURE — 81001 URINALYSIS AUTO W/SCOPE: CPT | Performed by: NURSE PRACTITIONER

## 2022-01-01 PROCEDURE — 51798 US URINE CAPACITY MEASURE: CPT | Performed by: NURSE PRACTITIONER

## 2022-01-01 PROCEDURE — 96361 HYDRATE IV INFUSION ADD-ON: CPT | Performed by: NURSE PRACTITIONER

## 2022-01-01 PROCEDURE — 85045 AUTOMATED RETICULOCYTE COUNT: CPT | Mod: ORL | Performed by: INTERNAL MEDICINE

## 2022-01-01 PROCEDURE — 94002 VENT MGMT INPAT INIT DAY: CPT

## 2022-01-01 PROCEDURE — 82607 VITAMIN B-12: CPT | Mod: ORL | Performed by: INTERNAL MEDICINE

## 2022-01-01 PROCEDURE — 85027 COMPLETE CBC AUTOMATED: CPT | Performed by: STUDENT IN AN ORGANIZED HEALTH CARE EDUCATION/TRAINING PROGRAM

## 2022-01-01 PROCEDURE — 272N000054 HC CANNULA HIGH FLOW, ADULT

## 2022-01-01 PROCEDURE — 83550 IRON BINDING TEST: CPT | Mod: ORL | Performed by: INTERNAL MEDICINE

## 2022-01-01 PROCEDURE — 85730 THROMBOPLASTIN TIME PARTIAL: CPT

## 2022-01-01 PROCEDURE — 999N000248 HC STATISTIC IV INSERT WITH US BY RN

## 2022-01-01 PROCEDURE — 85027 COMPLETE CBC AUTOMATED: CPT | Performed by: SURGERY

## 2022-01-01 PROCEDURE — 82374 ASSAY BLOOD CARBON DIOXIDE: CPT | Performed by: NURSE PRACTITIONER

## 2022-01-01 PROCEDURE — 82805 BLOOD GASES W/O2 SATURATION: CPT | Performed by: STUDENT IN AN ORGANIZED HEALTH CARE EDUCATION/TRAINING PROGRAM

## 2022-01-01 PROCEDURE — 85520 HEPARIN ASSAY: CPT | Performed by: STUDENT IN AN ORGANIZED HEALTH CARE EDUCATION/TRAINING PROGRAM

## 2022-01-01 PROCEDURE — 74018 RADEX ABDOMEN 1 VIEW: CPT

## 2022-01-01 PROCEDURE — 36620 INSERTION CATHETER ARTERY: CPT | Mod: GC | Performed by: SURGERY

## 2022-01-01 PROCEDURE — 87040 BLOOD CULTURE FOR BACTERIA: CPT | Performed by: OBSTETRICS & GYNECOLOGY

## 2022-01-01 PROCEDURE — 97162 PT EVAL MOD COMPLEX 30 MIN: CPT | Mod: GP

## 2022-01-01 PROCEDURE — 255N000002 HC RX 255 OP 636: Performed by: STUDENT IN AN ORGANIZED HEALTH CARE EDUCATION/TRAINING PROGRAM

## 2022-01-01 PROCEDURE — 93306 TTE W/DOPPLER COMPLETE: CPT | Mod: 26 | Performed by: INTERNAL MEDICINE

## 2022-01-01 PROCEDURE — 82803 BLOOD GASES ANY COMBINATION: CPT | Performed by: PHYSICIAN ASSISTANT

## 2022-01-01 PROCEDURE — 93970 EXTREMITY STUDY: CPT | Mod: 26 | Performed by: RADIOLOGY

## 2022-01-01 PROCEDURE — 85025 COMPLETE CBC W/AUTO DIFF WBC: CPT | Mod: ORL | Performed by: INTERNAL MEDICINE

## 2022-01-01 PROCEDURE — 999N000185 HC STATISTIC TRANSPORT TIME EA 15 MIN

## 2022-01-01 PROCEDURE — 93321 DOPPLER ECHO F-UP/LMTD STD: CPT | Mod: 26 | Performed by: STUDENT IN AN ORGANIZED HEALTH CARE EDUCATION/TRAINING PROGRAM

## 2022-01-01 PROCEDURE — 83880 ASSAY OF NATRIURETIC PEPTIDE: CPT

## 2022-01-01 PROCEDURE — 87641 MR-STAPH DNA AMP PROBE: CPT | Performed by: NURSE PRACTITIONER

## 2022-01-01 PROCEDURE — 80053 COMPREHEN METABOLIC PANEL: CPT | Performed by: PHYSICIAN ASSISTANT

## 2022-01-01 PROCEDURE — 250N000009 HC RX 250: Performed by: EMERGENCY MEDICINE

## 2022-01-01 PROCEDURE — 36415 COLL VENOUS BLD VENIPUNCTURE: CPT | Performed by: OBSTETRICS & GYNECOLOGY

## 2022-01-01 PROCEDURE — 85018 HEMOGLOBIN: CPT

## 2022-01-01 PROCEDURE — 36556 INSERT NON-TUNNEL CV CATH: CPT | Mod: GC | Performed by: SURGERY

## 2022-01-01 PROCEDURE — 999N000155 HC STATISTIC RAPCV CVP MONITORING

## 2022-01-01 PROCEDURE — 76830 TRANSVAGINAL US NON-OB: CPT

## 2022-01-01 PROCEDURE — 87641 MR-STAPH DNA AMP PROBE: CPT

## 2022-01-01 PROCEDURE — U0005 INFEC AGEN DETEC AMPLI PROBE: HCPCS | Performed by: STUDENT IN AN ORGANIZED HEALTH CARE EDUCATION/TRAINING PROGRAM

## 2022-01-01 RX ORDER — TRIAMCINOLONE ACETONIDE 1 MG/G
CREAM TOPICAL 2 TIMES DAILY
Qty: 30 G | Refills: 1 | Status: SHIPPED | OUTPATIENT
Start: 2022-01-01 | End: 2022-01-01

## 2022-01-01 RX ORDER — NALOXONE HYDROCHLORIDE 0.4 MG/ML
0.4 INJECTION, SOLUTION INTRAMUSCULAR; INTRAVENOUS; SUBCUTANEOUS
Status: DISCONTINUED | OUTPATIENT
Start: 2022-01-01 | End: 2022-01-01

## 2022-01-01 RX ORDER — POLYETHYLENE GLYCOL 3350 17 G/17G
17 POWDER, FOR SOLUTION ORAL DAILY PRN
Status: DISCONTINUED | OUTPATIENT
Start: 2022-01-01 | End: 2022-01-01

## 2022-01-01 RX ORDER — DEXTROSE MONOHYDRATE 25 G/50ML
25 INJECTION, SOLUTION INTRAVENOUS ONCE
Status: COMPLETED | OUTPATIENT
Start: 2022-01-01 | End: 2022-01-01

## 2022-01-01 RX ORDER — CYCLOBENZAPRINE HCL 5 MG
5 TABLET ORAL
Qty: 30 TABLET | Refills: 1 | Status: ON HOLD | OUTPATIENT
Start: 2022-01-01 | End: 2022-01-01

## 2022-01-01 RX ORDER — FUROSEMIDE 10 MG/ML
80 INJECTION INTRAMUSCULAR; INTRAVENOUS ONCE
Status: COMPLETED | OUTPATIENT
Start: 2022-01-01 | End: 2022-01-01

## 2022-01-01 RX ORDER — BUMETANIDE 0.25 MG/ML
4 INJECTION INTRAMUSCULAR; INTRAVENOUS EVERY 8 HOURS
Status: DISCONTINUED | OUTPATIENT
Start: 2022-01-01 | End: 2022-01-01

## 2022-01-01 RX ORDER — PIPERACILLIN SODIUM, TAZOBACTAM SODIUM 3; .375 G/15ML; G/15ML
3.38 INJECTION, POWDER, LYOPHILIZED, FOR SOLUTION INTRAVENOUS EVERY 6 HOURS
Status: DISCONTINUED | OUTPATIENT
Start: 2022-01-01 | End: 2022-01-01

## 2022-01-01 RX ORDER — BUMETANIDE 1 MG/1
1 TABLET ORAL DAILY
Status: DISCONTINUED | OUTPATIENT
Start: 2022-01-01 | End: 2022-01-01 | Stop reason: HOSPADM

## 2022-01-01 RX ORDER — OXYCODONE HYDROCHLORIDE 5 MG/1
5 TABLET ORAL EVERY 4 HOURS PRN
Status: DISCONTINUED | OUTPATIENT
Start: 2022-01-01 | End: 2022-01-01

## 2022-01-01 RX ORDER — LISINOPRIL 5 MG/1
5 TABLET ORAL DAILY
Status: DISCONTINUED | OUTPATIENT
Start: 2022-01-01 | End: 2022-01-01 | Stop reason: HOSPADM

## 2022-01-01 RX ORDER — ATORVASTATIN CALCIUM 10 MG/1
20 TABLET, FILM COATED ORAL DAILY
Status: DISCONTINUED | OUTPATIENT
Start: 2022-01-01 | End: 2022-01-01 | Stop reason: HOSPADM

## 2022-01-01 RX ORDER — POLYETHYLENE GLYCOL 3350 17 G/17G
17 POWDER, FOR SOLUTION ORAL DAILY
Status: DISCONTINUED | OUTPATIENT
Start: 2022-01-01 | End: 2022-01-01

## 2022-01-01 RX ORDER — ALLOPURINOL 100 MG/1
200 TABLET ORAL DAILY
Status: DISCONTINUED | OUTPATIENT
Start: 2022-01-01 | End: 2022-01-01

## 2022-01-01 RX ORDER — EPINEPHRINE 1 MG/ML
0.3 INJECTION, SOLUTION INTRAMUSCULAR; SUBCUTANEOUS EVERY 5 MIN PRN
Status: CANCELLED | OUTPATIENT
Start: 2022-01-01

## 2022-01-01 RX ORDER — BUMETANIDE 1 MG/1
1 TABLET ORAL DAILY
DISCHARGE
Start: 2022-01-01

## 2022-01-01 RX ORDER — ONDANSETRON 4 MG/1
4 TABLET, ORALLY DISINTEGRATING ORAL EVERY 6 HOURS PRN
Status: DISCONTINUED | OUTPATIENT
Start: 2022-01-01 | End: 2022-01-01 | Stop reason: HOSPADM

## 2022-01-01 RX ORDER — ACETAMINOPHEN 325 MG/1
650 TABLET ORAL EVERY 4 HOURS PRN
Status: DISCONTINUED | OUTPATIENT
Start: 2022-01-01 | End: 2022-01-01

## 2022-01-01 RX ORDER — SODIUM CHLORIDE FOR INHALATION 3 %
3 VIAL, NEBULIZER (ML) INHALATION 4 TIMES DAILY PRN
Status: DISCONTINUED | OUTPATIENT
Start: 2022-01-01 | End: 2022-01-01 | Stop reason: HOSPADM

## 2022-01-01 RX ORDER — SENNOSIDES 8.6 MG
1 TABLET ORAL 2 TIMES DAILY PRN
Status: DISCONTINUED | OUTPATIENT
Start: 2022-01-01 | End: 2022-01-01 | Stop reason: HOSPADM

## 2022-01-01 RX ORDER — ONDANSETRON 2 MG/ML
4-8 INJECTION INTRAMUSCULAR; INTRAVENOUS EVERY 6 HOURS PRN
Status: DISCONTINUED | OUTPATIENT
Start: 2022-01-01 | End: 2022-01-01

## 2022-01-01 RX ORDER — DEXMEDETOMIDINE HYDROCHLORIDE 4 UG/ML
.1-1.2 INJECTION, SOLUTION INTRAVENOUS CONTINUOUS
Status: DISCONTINUED | OUTPATIENT
Start: 2022-01-01 | End: 2022-01-01

## 2022-01-01 RX ORDER — CARBOXYMETHYLCELLULOSE SODIUM 5 MG/ML
1 SOLUTION/ DROPS OPHTHALMIC
Status: DISCONTINUED | OUTPATIENT
Start: 2022-01-01 | End: 2022-01-01 | Stop reason: HOSPADM

## 2022-01-01 RX ORDER — ACETAMINOPHEN 500 MG
500-1000 TABLET ORAL EVERY 6 HOURS PRN
Status: DISCONTINUED | OUTPATIENT
Start: 2022-01-01 | End: 2022-01-01 | Stop reason: HOSPADM

## 2022-01-01 RX ORDER — ALLOPURINOL 100 MG/1
200 TABLET ORAL DAILY
Qty: 180 TABLET | Refills: 0 | Status: SHIPPED | OUTPATIENT
Start: 2022-01-01 | End: 2022-01-01

## 2022-01-01 RX ORDER — AMOXICILLIN 250 MG
2 CAPSULE ORAL 2 TIMES DAILY
Status: DISCONTINUED | OUTPATIENT
Start: 2022-01-01 | End: 2022-01-01

## 2022-01-01 RX ORDER — ONDANSETRON 4 MG/1
4 TABLET, ORALLY DISINTEGRATING ORAL EVERY 8 HOURS PRN
Status: DISCONTINUED | OUTPATIENT
Start: 2022-01-01 | End: 2022-01-01

## 2022-01-01 RX ORDER — POTASSIUM CHLORIDE 750 MG/1
20 TABLET, EXTENDED RELEASE ORAL ONCE
Status: COMPLETED | OUTPATIENT
Start: 2022-01-01 | End: 2022-01-01

## 2022-01-01 RX ORDER — HYDROMORPHONE HYDROCHLORIDE 1 MG/ML
1 INJECTION, SOLUTION INTRAMUSCULAR; INTRAVENOUS; SUBCUTANEOUS
Status: DISCONTINUED | OUTPATIENT
Start: 2022-01-01 | End: 2022-01-01

## 2022-01-01 RX ORDER — NOREPINEPHRINE BITARTRATE 0.06 MG/ML
INJECTION, SOLUTION INTRAVENOUS
Status: COMPLETED
Start: 2022-01-01 | End: 2022-01-01

## 2022-01-01 RX ORDER — ALBUTEROL SULFATE 0.83 MG/ML
2.5 SOLUTION RESPIRATORY (INHALATION)
Status: CANCELLED | OUTPATIENT
Start: 2022-01-01

## 2022-01-01 RX ORDER — ALLOPURINOL 100 MG/1
200 TABLET ORAL DAILY
Qty: 60 TABLET | Refills: 0 | Status: SHIPPED | OUTPATIENT
Start: 2022-01-01 | End: 2022-01-01

## 2022-01-01 RX ORDER — NICOTINE POLACRILEX 4 MG
15-30 LOZENGE BUCCAL
Status: DISCONTINUED | OUTPATIENT
Start: 2022-01-01 | End: 2022-01-01

## 2022-01-01 RX ORDER — CEFTRIAXONE 1 G/1
1 INJECTION, POWDER, FOR SOLUTION INTRAMUSCULAR; INTRAVENOUS EVERY 24 HOURS
Status: DISCONTINUED | OUTPATIENT
Start: 2022-01-01 | End: 2022-01-01

## 2022-01-01 RX ORDER — PROCHLORPERAZINE 25 MG
25 SUPPOSITORY, RECTAL RECTAL EVERY 12 HOURS PRN
Status: DISCONTINUED | OUTPATIENT
Start: 2022-01-01 | End: 2022-01-01 | Stop reason: HOSPADM

## 2022-01-01 RX ORDER — METOPROLOL SUCCINATE 50 MG/1
100 TABLET, EXTENDED RELEASE ORAL DAILY
Status: DISCONTINUED | OUTPATIENT
Start: 2022-01-01 | End: 2022-01-01 | Stop reason: HOSPADM

## 2022-01-01 RX ORDER — TRIAMCINOLONE ACETONIDE 1 MG/G
CREAM TOPICAL 2 TIMES DAILY
Status: DISCONTINUED | OUTPATIENT
Start: 2022-01-01 | End: 2022-01-01 | Stop reason: HOSPADM

## 2022-01-01 RX ORDER — MEPERIDINE HYDROCHLORIDE 25 MG/ML
25 INJECTION INTRAMUSCULAR; INTRAVENOUS; SUBCUTANEOUS EVERY 30 MIN PRN
Status: CANCELLED | OUTPATIENT
Start: 2022-01-01

## 2022-01-01 RX ORDER — LIDOCAINE 4 G/G
2 PATCH TOPICAL DAILY
Status: DISCONTINUED | OUTPATIENT
Start: 2022-01-01 | End: 2022-01-01 | Stop reason: HOSPADM

## 2022-01-01 RX ORDER — VIT B COMP NO.3/FOLIC/C/BIOTIN 1 MG-60 MG
1 TABLET ORAL DAILY
Status: DISCONTINUED | OUTPATIENT
Start: 2022-01-01 | End: 2022-01-01 | Stop reason: HOSPADM

## 2022-01-01 RX ORDER — NYSTATIN 100000/ML
500000 SUSPENSION, ORAL (FINAL DOSE FORM) ORAL 4 TIMES DAILY
Status: DISCONTINUED | OUTPATIENT
Start: 2022-01-01 | End: 2022-01-01

## 2022-01-01 RX ORDER — METHYLPREDNISOLONE SODIUM SUCCINATE 125 MG/2ML
125 INJECTION, POWDER, LYOPHILIZED, FOR SOLUTION INTRAMUSCULAR; INTRAVENOUS
Status: CANCELLED
Start: 2022-01-01

## 2022-01-01 RX ORDER — IPRATROPIUM BROMIDE AND ALBUTEROL SULFATE 2.5; .5 MG/3ML; MG/3ML
3 SOLUTION RESPIRATORY (INHALATION)
Status: DISCONTINUED | OUTPATIENT
Start: 2022-01-01 | End: 2022-01-01

## 2022-01-01 RX ORDER — DOBUTAMINE HYDROCHLORIDE 200 MG/100ML
2.5 INJECTION INTRAVENOUS CONTINUOUS
Status: DISCONTINUED | OUTPATIENT
Start: 2022-01-01 | End: 2022-01-01

## 2022-01-01 RX ORDER — ALLOPURINOL 100 MG/1
200 TABLET ORAL DAILY
Qty: 30 TABLET | Refills: 0 | OUTPATIENT
Start: 2022-01-01

## 2022-01-01 RX ORDER — ACETAMINOPHEN 650 MG/1
650 SUPPOSITORY RECTAL EVERY 4 HOURS PRN
Status: DISCONTINUED | OUTPATIENT
Start: 2022-01-01 | End: 2022-01-01

## 2022-01-01 RX ORDER — POTASSIUM CHLORIDE 20MEQ/15ML
40 LIQUID (ML) ORAL ONCE
Status: COMPLETED | OUTPATIENT
Start: 2022-01-01 | End: 2022-01-01

## 2022-01-01 RX ORDER — ACETAMINOPHEN 500 MG
500-1000 TABLET ORAL EVERY 4 HOURS PRN
Status: DISCONTINUED | OUTPATIENT
Start: 2022-01-01 | End: 2022-01-01

## 2022-01-01 RX ORDER — BISACODYL 10 MG
10 SUPPOSITORY, RECTAL RECTAL DAILY
Status: DISCONTINUED | OUTPATIENT
Start: 2022-01-01 | End: 2022-01-01

## 2022-01-01 RX ORDER — SENNOSIDES 8.6 MG
8.6 TABLET ORAL DAILY
Status: DISCONTINUED | OUTPATIENT
Start: 2022-01-01 | End: 2022-01-01

## 2022-01-01 RX ORDER — NOREPINEPHRINE BITARTRATE 0.06 MG/ML
.01-.6 INJECTION, SOLUTION INTRAVENOUS CONTINUOUS
Status: DISCONTINUED | OUTPATIENT
Start: 2022-01-01 | End: 2022-01-01

## 2022-01-01 RX ORDER — OXYCODONE HYDROCHLORIDE 5 MG/1
5-10 TABLET ORAL EVERY 4 HOURS PRN
Status: DISCONTINUED | OUTPATIENT
Start: 2022-01-01 | End: 2022-01-01

## 2022-01-01 RX ORDER — BUMETANIDE 2 MG/1
2 TABLET ORAL DAILY
Status: DISCONTINUED | OUTPATIENT
Start: 2022-01-01 | End: 2022-01-01

## 2022-01-01 RX ORDER — MEGESTROL ACETATE 20 MG/1
20 TABLET ORAL DAILY
Status: DISCONTINUED | OUTPATIENT
Start: 2022-01-01 | End: 2022-01-01

## 2022-01-01 RX ORDER — BUMETANIDE 1 MG/1
2 TABLET ORAL DAILY
Status: DISCONTINUED | OUTPATIENT
Start: 2022-01-01 | End: 2022-01-01

## 2022-01-01 RX ORDER — PIPERACILLIN SODIUM, TAZOBACTAM SODIUM 4; .5 G/20ML; G/20ML
4.5 INJECTION, POWDER, LYOPHILIZED, FOR SOLUTION INTRAVENOUS EVERY 6 HOURS
Status: DISCONTINUED | OUTPATIENT
Start: 2022-01-01 | End: 2022-01-01

## 2022-01-01 RX ORDER — DEXTROSE MONOHYDRATE 25 G/50ML
25 INJECTION, SOLUTION INTRAVENOUS ONCE
Status: DISCONTINUED | OUTPATIENT
Start: 2022-01-01 | End: 2022-01-01

## 2022-01-01 RX ORDER — FERROUS GLUCONATE 324(38)MG
324 TABLET ORAL DAILY
Qty: 90 TABLET | Refills: 3 | Status: SHIPPED | OUTPATIENT
Start: 2022-01-01

## 2022-01-01 RX ORDER — DEXTROSE MONOHYDRATE 25 G/50ML
25-50 INJECTION, SOLUTION INTRAVENOUS
Status: DISCONTINUED | OUTPATIENT
Start: 2022-01-01 | End: 2022-01-01 | Stop reason: HOSPADM

## 2022-01-01 RX ORDER — SODIUM CHLORIDE FOR INHALATION 3 %
3 VIAL, NEBULIZER (ML) INHALATION EVERY 6 HOURS
Status: DISCONTINUED | OUTPATIENT
Start: 2022-01-01 | End: 2022-01-01

## 2022-01-01 RX ORDER — SENNOSIDES 8.6 MG
1 TABLET ORAL 2 TIMES DAILY PRN
Status: DISCONTINUED | OUTPATIENT
Start: 2022-01-01 | End: 2022-01-01

## 2022-01-01 RX ORDER — METOCLOPRAMIDE HYDROCHLORIDE 5 MG/ML
10 INJECTION INTRAMUSCULAR; INTRAVENOUS EVERY 6 HOURS
Status: DISCONTINUED | OUTPATIENT
Start: 2022-01-01 | End: 2022-01-01

## 2022-01-01 RX ORDER — METOLAZONE 5 MG/1
5 TABLET ORAL ONCE
Status: COMPLETED | OUTPATIENT
Start: 2022-01-01 | End: 2022-01-01

## 2022-01-01 RX ORDER — TRIAMCINOLONE ACETONIDE 1 MG/G
CREAM TOPICAL
Qty: 30 G | Refills: 1 | Status: SHIPPED | OUTPATIENT
Start: 2022-01-01 | End: 2022-01-01

## 2022-01-01 RX ORDER — HEPARIN SODIUM,PORCINE 10 UNIT/ML
5 VIAL (ML) INTRAVENOUS
Status: CANCELLED | OUTPATIENT
Start: 2022-01-01

## 2022-01-01 RX ORDER — PIPERACILLIN SODIUM, TAZOBACTAM SODIUM 3; .375 G/15ML; G/15ML
3.38 INJECTION, POWDER, LYOPHILIZED, FOR SOLUTION INTRAVENOUS EVERY 6 HOURS
Status: COMPLETED | OUTPATIENT
Start: 2022-01-01 | End: 2022-01-01

## 2022-01-01 RX ORDER — DOBUTAMINE HYDROCHLORIDE 200 MG/100ML
2.5-5 INJECTION INTRAVENOUS CONTINUOUS
Status: DISCONTINUED | OUTPATIENT
Start: 2022-01-01 | End: 2022-01-01

## 2022-01-01 RX ORDER — DIPHENHYDRAMINE HCL 25 MG
50 CAPSULE ORAL
Status: DISCONTINUED | OUTPATIENT
Start: 2022-01-01 | End: 2022-01-01

## 2022-01-01 RX ORDER — POLYETHYLENE GLYCOL 3350 17 G/17G
17 POWDER, FOR SOLUTION ORAL DAILY PRN
Status: DISCONTINUED | OUTPATIENT
Start: 2022-01-01 | End: 2022-01-01 | Stop reason: HOSPADM

## 2022-01-01 RX ORDER — FUROSEMIDE 10 MG/ML
100 INJECTION INTRAMUSCULAR; INTRAVENOUS
Status: DISCONTINUED | OUTPATIENT
Start: 2022-01-01 | End: 2022-01-01

## 2022-01-01 RX ORDER — QUETIAPINE FUMARATE 25 MG/1
25 TABLET, FILM COATED ORAL
Status: DISCONTINUED | OUTPATIENT
Start: 2022-01-01 | End: 2022-01-01

## 2022-01-01 RX ORDER — IOPAMIDOL 755 MG/ML
135 INJECTION, SOLUTION INTRAVASCULAR ONCE
Status: DISCONTINUED | OUTPATIENT
Start: 2022-01-01 | End: 2022-01-01

## 2022-01-01 RX ORDER — DEXTROSE MONOHYDRATE 25 G/50ML
25-50 INJECTION, SOLUTION INTRAVENOUS
Status: DISCONTINUED | OUTPATIENT
Start: 2022-01-01 | End: 2022-01-01

## 2022-01-01 RX ORDER — POTASSIUM CHLORIDE 20MEQ/15ML
40 LIQUID (ML) ORAL DAILY
Status: DISCONTINUED | OUTPATIENT
Start: 2022-01-01 | End: 2022-01-01

## 2022-01-01 RX ORDER — LISINOPRIL 5 MG/1
5 TABLET ORAL DAILY
Status: DISCONTINUED | OUTPATIENT
Start: 2022-01-01 | End: 2022-01-01

## 2022-01-01 RX ORDER — OXYCODONE HYDROCHLORIDE 5 MG/1
5 TABLET ORAL ONCE
Status: COMPLETED | OUTPATIENT
Start: 2022-01-01 | End: 2022-01-01

## 2022-01-01 RX ORDER — POTASSIUM CHLORIDE 29.8 MG/ML
20 INJECTION INTRAVENOUS ONCE
Status: COMPLETED | OUTPATIENT
Start: 2022-01-01 | End: 2022-01-01

## 2022-01-01 RX ORDER — QUETIAPINE FUMARATE 25 MG/1
25 TABLET, FILM COATED ORAL 2 TIMES DAILY
Status: DISCONTINUED | OUTPATIENT
Start: 2022-01-01 | End: 2022-01-01

## 2022-01-01 RX ORDER — SODIUM CHLORIDE 9 MG/ML
INJECTION, SOLUTION INTRAVENOUS CONTINUOUS
Status: DISCONTINUED | OUTPATIENT
Start: 2022-01-01 | End: 2022-01-01

## 2022-01-01 RX ORDER — AMOXICILLIN 250 MG
1-2 CAPSULE ORAL DAILY
Status: DISCONTINUED | OUTPATIENT
Start: 2022-01-01 | End: 2022-01-01 | Stop reason: HOSPADM

## 2022-01-01 RX ORDER — CALCIUM GLUCONATE 20 MG/ML
1 INJECTION, SOLUTION INTRAVENOUS ONCE
Status: COMPLETED | OUTPATIENT
Start: 2022-01-01 | End: 2022-01-01

## 2022-01-01 RX ORDER — ONDANSETRON 2 MG/ML
4 INJECTION INTRAMUSCULAR; INTRAVENOUS EVERY 6 HOURS PRN
Status: DISCONTINUED | OUTPATIENT
Start: 2022-01-01 | End: 2022-01-01

## 2022-01-01 RX ORDER — POTASSIUM CHLORIDE 20MEQ/15ML
20 LIQUID (ML) ORAL DAILY
Status: DISCONTINUED | OUTPATIENT
Start: 2022-01-01 | End: 2022-01-01

## 2022-01-01 RX ORDER — PROPOFOL 10 MG/ML
5-75 INJECTION, EMULSION INTRAVENOUS CONTINUOUS
Status: DISCONTINUED | OUTPATIENT
Start: 2022-01-01 | End: 2022-01-01

## 2022-01-01 RX ORDER — FUROSEMIDE 10 MG/ML
40 INJECTION INTRAMUSCULAR; INTRAVENOUS ONCE
Status: COMPLETED | OUTPATIENT
Start: 2022-01-01 | End: 2022-01-01

## 2022-01-01 RX ORDER — IPRATROPIUM BROMIDE AND ALBUTEROL SULFATE 2.5; .5 MG/3ML; MG/3ML
3 SOLUTION RESPIRATORY (INHALATION) EVERY 4 HOURS PRN
Status: DISCONTINUED | OUTPATIENT
Start: 2022-01-01 | End: 2022-01-01 | Stop reason: HOSPADM

## 2022-01-01 RX ORDER — PROCHLORPERAZINE MALEATE 10 MG
10 TABLET ORAL EVERY 6 HOURS PRN
Status: DISCONTINUED | OUTPATIENT
Start: 2022-01-01 | End: 2022-01-01 | Stop reason: HOSPADM

## 2022-01-01 RX ORDER — ONDANSETRON 2 MG/ML
4 INJECTION INTRAMUSCULAR; INTRAVENOUS EVERY 6 HOURS PRN
Status: DISCONTINUED | OUTPATIENT
Start: 2022-01-01 | End: 2022-01-01 | Stop reason: HOSPADM

## 2022-01-01 RX ORDER — AMOXICILLIN 250 MG
2 CAPSULE ORAL DAILY PRN
Status: DISCONTINUED | OUTPATIENT
Start: 2022-01-01 | End: 2022-01-01 | Stop reason: HOSPADM

## 2022-01-01 RX ORDER — CYCLOBENZAPRINE HCL 5 MG
5 TABLET ORAL
Qty: 30 TABLET | Refills: 1 | Status: SHIPPED | OUTPATIENT
Start: 2022-01-01 | End: 2022-01-01

## 2022-01-01 RX ORDER — LANOLIN ALCOHOL/MO/W.PET/CERES
3 CREAM (GRAM) TOPICAL
DISCHARGE
Start: 2022-01-01

## 2022-01-01 RX ORDER — METOPROLOL TARTRATE 1 MG/ML
5 INJECTION, SOLUTION INTRAVENOUS EVERY 6 HOURS
Status: DISCONTINUED | OUTPATIENT
Start: 2022-01-01 | End: 2022-01-01 | Stop reason: HOSPADM

## 2022-01-01 RX ORDER — QUETIAPINE FUMARATE 25 MG/1
25 TABLET, FILM COATED ORAL ONCE
Status: COMPLETED | OUTPATIENT
Start: 2022-01-01 | End: 2022-01-01

## 2022-01-01 RX ORDER — NALOXONE HYDROCHLORIDE 0.4 MG/ML
0.2 INJECTION, SOLUTION INTRAMUSCULAR; INTRAVENOUS; SUBCUTANEOUS
Status: CANCELLED | OUTPATIENT
Start: 2022-01-01

## 2022-01-01 RX ORDER — DEXMEDETOMIDINE HYDROCHLORIDE 4 UG/ML
.1-1.7 INJECTION, SOLUTION INTRAVENOUS CONTINUOUS
Status: DISCONTINUED | OUTPATIENT
Start: 2022-01-01 | End: 2022-01-01

## 2022-01-01 RX ORDER — ALBUTEROL SULFATE 90 UG/1
1-2 AEROSOL, METERED RESPIRATORY (INHALATION)
Status: CANCELLED
Start: 2022-01-01

## 2022-01-01 RX ORDER — FENTANYL CITRATE 50 UG/ML
25-50 INJECTION, SOLUTION INTRAMUSCULAR; INTRAVENOUS
Status: DISCONTINUED | OUTPATIENT
Start: 2022-01-01 | End: 2022-01-01

## 2022-01-01 RX ORDER — CHLOROTHIAZIDE SODIUM 500 MG/1
500 INJECTION INTRAVENOUS ONCE
Status: COMPLETED | OUTPATIENT
Start: 2022-01-01 | End: 2022-01-01

## 2022-01-01 RX ORDER — METOCLOPRAMIDE HYDROCHLORIDE 5 MG/ML
10 INJECTION INTRAMUSCULAR; INTRAVENOUS EVERY 6 HOURS PRN
Status: DISCONTINUED | OUTPATIENT
Start: 2022-01-01 | End: 2022-01-01 | Stop reason: HOSPADM

## 2022-01-01 RX ORDER — DIPHENHYDRAMINE HYDROCHLORIDE 50 MG/ML
50 INJECTION INTRAMUSCULAR; INTRAVENOUS
Status: DISCONTINUED | OUTPATIENT
Start: 2022-01-01 | End: 2022-01-01

## 2022-01-01 RX ORDER — LIDOCAINE 40 MG/G
CREAM TOPICAL
Status: DISCONTINUED | OUTPATIENT
Start: 2022-01-01 | End: 2022-01-01

## 2022-01-01 RX ORDER — BISACODYL 10 MG
10 SUPPOSITORY, RECTAL RECTAL DAILY PRN
Status: DISCONTINUED | OUTPATIENT
Start: 2022-01-01 | End: 2022-01-01

## 2022-01-01 RX ORDER — ALLOPURINOL 100 MG/1
100 TABLET ORAL DAILY
Start: 2022-01-01

## 2022-01-01 RX ORDER — POTASSIUM CHLORIDE 750 MG/1
40 TABLET, EXTENDED RELEASE ORAL ONCE
Status: DISCONTINUED | OUTPATIENT
Start: 2022-01-01 | End: 2022-01-01

## 2022-01-01 RX ORDER — BUMETANIDE 0.25 MG/ML
4 INJECTION INTRAMUSCULAR; INTRAVENOUS ONCE
Status: COMPLETED | OUTPATIENT
Start: 2022-01-01 | End: 2022-01-01

## 2022-01-01 RX ORDER — NALOXONE HYDROCHLORIDE 0.4 MG/ML
0.2 INJECTION, SOLUTION INTRAMUSCULAR; INTRAVENOUS; SUBCUTANEOUS
Status: DISCONTINUED | OUTPATIENT
Start: 2022-01-01 | End: 2022-01-01

## 2022-01-01 RX ORDER — BISACODYL 10 MG
10 SUPPOSITORY, RECTAL RECTAL DAILY PRN
Status: DISCONTINUED | OUTPATIENT
Start: 2022-01-01 | End: 2022-01-01 | Stop reason: HOSPADM

## 2022-01-01 RX ORDER — ALLOPURINOL 100 MG/1
100 TABLET ORAL DAILY
Status: DISCONTINUED | OUTPATIENT
Start: 2022-01-01 | End: 2022-01-01 | Stop reason: HOSPADM

## 2022-01-01 RX ORDER — ETOMIDATE 2 MG/ML
INJECTION INTRAVENOUS
Status: COMPLETED
Start: 2022-01-01 | End: 2022-01-01

## 2022-01-01 RX ORDER — MORPHINE SULFATE 2 MG/ML
1 INJECTION, SOLUTION INTRAMUSCULAR; INTRAVENOUS ONCE
Status: COMPLETED | OUTPATIENT
Start: 2022-01-01 | End: 2022-01-01

## 2022-01-01 RX ORDER — POLYETHYLENE GLYCOL 3350 17 G/17G
17 POWDER, FOR SOLUTION ORAL 2 TIMES DAILY
Status: DISCONTINUED | OUTPATIENT
Start: 2022-01-01 | End: 2022-01-01

## 2022-01-01 RX ORDER — ACETAZOLAMIDE 250 MG/1
500 TABLET ORAL ONCE
Status: COMPLETED | OUTPATIENT
Start: 2022-01-01 | End: 2022-01-01

## 2022-01-01 RX ORDER — BUMETANIDE 0.5 MG/1
1 TABLET ORAL DAILY
Status: DISCONTINUED | OUTPATIENT
Start: 2022-01-01 | End: 2022-01-01 | Stop reason: HOSPADM

## 2022-01-01 RX ORDER — POLYETHYLENE GLYCOL 3350 17 G/17G
17 POWDER, FOR SOLUTION ORAL 2 TIMES DAILY PRN
Status: DISCONTINUED | OUTPATIENT
Start: 2022-01-01 | End: 2022-01-01

## 2022-01-01 RX ORDER — AMIODARONE HYDROCHLORIDE 200 MG/1
200 TABLET ORAL DAILY
Qty: 30 TABLET | Refills: 4 | DISCHARGE
Start: 2022-01-01

## 2022-01-01 RX ORDER — ONDANSETRON 4 MG/1
4 TABLET, ORALLY DISINTEGRATING ORAL EVERY 8 HOURS PRN
Status: DISCONTINUED | OUTPATIENT
Start: 2022-01-01 | End: 2022-01-01 | Stop reason: HOSPADM

## 2022-01-01 RX ORDER — IOPAMIDOL 755 MG/ML
75 INJECTION, SOLUTION INTRAVASCULAR ONCE
Status: COMPLETED | OUTPATIENT
Start: 2022-01-01 | End: 2022-01-01

## 2022-01-01 RX ORDER — FUROSEMIDE 10 MG/ML
40 INJECTION INTRAMUSCULAR; INTRAVENOUS ONCE
Status: DISCONTINUED | OUTPATIENT
Start: 2022-01-01 | End: 2022-01-01

## 2022-01-01 RX ORDER — HEPARIN SODIUM (PORCINE) LOCK FLUSH IV SOLN 100 UNIT/ML 100 UNIT/ML
5 SOLUTION INTRAVENOUS
Status: CANCELLED | OUTPATIENT
Start: 2022-01-01

## 2022-01-01 RX ORDER — ALLOPURINOL 100 MG/1
200 TABLET ORAL DAILY
Qty: 30 TABLET | Refills: 0 | Status: SHIPPED | OUTPATIENT
Start: 2022-01-01 | End: 2022-01-01

## 2022-01-01 RX ORDER — MIDAZOLAM HCL IN 0.9 % NACL/PF 1 MG/ML
1-8 PLASTIC BAG, INJECTION (ML) INTRAVENOUS CONTINUOUS
Status: DISCONTINUED | OUTPATIENT
Start: 2022-01-01 | End: 2022-01-01

## 2022-01-01 RX ORDER — ETOMIDATE 2 MG/ML
50 INJECTION INTRAVENOUS ONCE
Status: COMPLETED | OUTPATIENT
Start: 2022-01-01 | End: 2022-01-01

## 2022-01-01 RX ORDER — NICOTINE POLACRILEX 4 MG
15-30 LOZENGE BUCCAL
Status: DISCONTINUED | OUTPATIENT
Start: 2022-01-01 | End: 2022-01-01 | Stop reason: HOSPADM

## 2022-01-01 RX ORDER — SENNOSIDES 8.6 MG
8.6 TABLET ORAL 2 TIMES DAILY PRN
Status: DISCONTINUED | OUTPATIENT
Start: 2022-01-01 | End: 2022-01-01

## 2022-01-01 RX ORDER — ONDANSETRON 4 MG/1
4 TABLET, ORALLY DISINTEGRATING ORAL EVERY 8 HOURS PRN
Qty: 20 TABLET | Refills: 1 | Status: SHIPPED | OUTPATIENT
Start: 2022-01-01

## 2022-01-01 RX ORDER — BENZONATATE 100 MG/1
100 CAPSULE ORAL 3 TIMES DAILY PRN
Status: DISCONTINUED | OUTPATIENT
Start: 2022-01-01 | End: 2022-01-01

## 2022-01-01 RX ORDER — HEPARIN SODIUM 10000 [USP'U]/100ML
0-5000 INJECTION, SOLUTION INTRAVENOUS CONTINUOUS
Status: DISCONTINUED | OUTPATIENT
Start: 2022-01-01 | End: 2022-01-01

## 2022-01-01 RX ORDER — ENOXAPARIN SODIUM 100 MG/ML
0.5 INJECTION SUBCUTANEOUS 2 TIMES DAILY
Status: DISCONTINUED | OUTPATIENT
Start: 2022-01-01 | End: 2022-01-01

## 2022-01-01 RX ORDER — HYDROXYZINE HYDROCHLORIDE 25 MG/1
25 TABLET, FILM COATED ORAL EVERY 6 HOURS PRN
Status: DISCONTINUED | OUTPATIENT
Start: 2022-01-01 | End: 2022-01-01

## 2022-01-01 RX ORDER — SODIUM CHLORIDE FOR INHALATION 3 %
3 VIAL, NEBULIZER (ML) INHALATION 4 TIMES DAILY
Status: DISCONTINUED | OUTPATIENT
Start: 2022-01-01 | End: 2022-01-01

## 2022-01-01 RX ORDER — TRIAMCINOLONE ACETONIDE 1 MG/G
CREAM TOPICAL 2 TIMES DAILY
Qty: 30 G | Refills: 3 | Status: SHIPPED | OUTPATIENT
Start: 2022-01-01

## 2022-01-01 RX ORDER — BUMETANIDE 2 MG/1
TABLET ORAL
Qty: 90 TABLET | Refills: 3 | OUTPATIENT
Start: 2022-01-01

## 2022-01-01 RX ORDER — AMIODARONE HYDROCHLORIDE 200 MG/1
200 TABLET ORAL DAILY
Status: DISCONTINUED | OUTPATIENT
Start: 2022-01-01 | End: 2022-01-01 | Stop reason: HOSPADM

## 2022-01-01 RX ORDER — ONDANSETRON 4 MG/1
4 TABLET, ORALLY DISINTEGRATING ORAL EVERY 6 HOURS PRN
Qty: 20 TABLET | Refills: 0 | Status: SHIPPED | OUTPATIENT
Start: 2022-01-01 | End: 2022-01-01

## 2022-01-01 RX ORDER — GLYCOPYRROLATE 0.2 MG/ML
0.1 INJECTION, SOLUTION INTRAMUSCULAR; INTRAVENOUS EVERY 4 HOURS PRN
Status: DISCONTINUED | OUTPATIENT
Start: 2022-01-01 | End: 2022-01-01 | Stop reason: HOSPADM

## 2022-01-01 RX ORDER — MORPHINE SULFATE 2 MG/ML
2-4 INJECTION, SOLUTION INTRAMUSCULAR; INTRAVENOUS EVERY 30 MIN PRN
Status: DISCONTINUED | OUTPATIENT
Start: 2022-01-01 | End: 2022-01-01 | Stop reason: HOSPADM

## 2022-01-01 RX ORDER — QUETIAPINE FUMARATE 25 MG/1
25 TABLET, FILM COATED ORAL AT BEDTIME
Status: DISCONTINUED | OUTPATIENT
Start: 2022-01-01 | End: 2022-01-01

## 2022-01-01 RX ORDER — METOPROLOL SUCCINATE 100 MG/1
100 TABLET, EXTENDED RELEASE ORAL DAILY
Qty: 90 TABLET | Refills: 3 | Status: SHIPPED | OUTPATIENT
Start: 2022-01-01

## 2022-01-01 RX ORDER — FERROUS GLUCONATE 324(38)MG
324 TABLET ORAL DAILY
Status: DISCONTINUED | OUTPATIENT
Start: 2022-01-01 | End: 2022-01-01 | Stop reason: HOSPADM

## 2022-01-01 RX ORDER — BISACODYL 10 MG
10 SUPPOSITORY, RECTAL RECTAL EVERY 8 HOURS
Status: DISCONTINUED | OUTPATIENT
Start: 2022-01-01 | End: 2022-01-01

## 2022-01-01 RX ORDER — ALBUTEROL SULFATE 5 MG/ML
10 SOLUTION RESPIRATORY (INHALATION) ONCE
Status: COMPLETED | OUTPATIENT
Start: 2022-01-01 | End: 2022-01-01

## 2022-01-01 RX ORDER — ROPIVACAINE IN 0.9% SOD CHL/PF 0.1 %
.03-.125 PLASTIC BAG, INJECTION (ML) EPIDURAL CONTINUOUS
Status: DISCONTINUED | OUTPATIENT
Start: 2022-01-01 | End: 2022-01-01

## 2022-01-01 RX ORDER — ACETAMINOPHEN 325 MG/1
325 TABLET ORAL EVERY 4 HOURS PRN
Status: DISCONTINUED | OUTPATIENT
Start: 2022-01-01 | End: 2022-01-01

## 2022-01-01 RX ORDER — LANOLIN ALCOHOL/MO/W.PET/CERES
3 CREAM (GRAM) TOPICAL ONCE
Status: COMPLETED | OUTPATIENT
Start: 2022-01-01 | End: 2022-01-01

## 2022-01-01 RX ORDER — DEXAMETHASONE SODIUM PHOSPHATE 10 MG/ML
6 INJECTION, SOLUTION INTRAMUSCULAR; INTRAVENOUS DAILY
Status: DISCONTINUED | OUTPATIENT
Start: 2022-01-01 | End: 2022-01-01

## 2022-01-01 RX ORDER — AMIODARONE HYDROCHLORIDE 200 MG/1
200 TABLET ORAL 2 TIMES DAILY
Status: COMPLETED | OUTPATIENT
Start: 2022-01-01 | End: 2022-01-01

## 2022-01-01 RX ORDER — DIPHENHYDRAMINE HYDROCHLORIDE 50 MG/ML
50 INJECTION INTRAMUSCULAR; INTRAVENOUS
Status: CANCELLED
Start: 2022-01-01

## 2022-01-01 RX ORDER — PANTOPRAZOLE SODIUM 40 MG/1
40 TABLET, DELAYED RELEASE ORAL
Status: DISCONTINUED | OUTPATIENT
Start: 2022-01-01 | End: 2022-01-01

## 2022-01-01 RX ORDER — LANOLIN ALCOHOL/MO/W.PET/CERES
3 CREAM (GRAM) TOPICAL
Status: DISCONTINUED | OUTPATIENT
Start: 2022-01-01 | End: 2022-01-01 | Stop reason: HOSPADM

## 2022-01-01 RX ORDER — QUETIAPINE FUMARATE 50 MG/1
50 TABLET, FILM COATED ORAL AT BEDTIME
Status: DISCONTINUED | OUTPATIENT
Start: 2022-01-01 | End: 2022-01-01

## 2022-01-01 RX ORDER — PIPERACILLIN SODIUM, TAZOBACTAM SODIUM 4; .5 G/20ML; G/20ML
4.5 INJECTION, POWDER, LYOPHILIZED, FOR SOLUTION INTRAVENOUS ONCE
Status: COMPLETED | OUTPATIENT
Start: 2022-01-01 | End: 2022-01-01

## 2022-01-01 RX ORDER — ALBUTEROL SULFATE 90 UG/1
2 AEROSOL, METERED RESPIRATORY (INHALATION) EVERY 6 HOURS PRN
Status: DISCONTINUED | OUTPATIENT
Start: 2022-01-01 | End: 2022-01-01 | Stop reason: HOSPADM

## 2022-01-01 RX ORDER — AMOXICILLIN 250 MG
1-2 CAPSULE ORAL 2 TIMES DAILY
Status: DISCONTINUED | OUTPATIENT
Start: 2022-01-01 | End: 2022-01-01

## 2022-01-01 RX ORDER — MAGNESIUM HYDROXIDE/ALUMINUM HYDROXICE/SIMETHICONE 120; 1200; 1200 MG/30ML; MG/30ML; MG/30ML
30 SUSPENSION ORAL EVERY 4 HOURS PRN
Status: DISCONTINUED | OUTPATIENT
Start: 2022-01-01 | End: 2022-01-01 | Stop reason: HOSPADM

## 2022-01-01 RX ORDER — ACETAMINOPHEN 325 MG/1
650 TABLET ORAL EVERY 4 HOURS PRN
Status: DISCONTINUED | OUTPATIENT
Start: 2022-01-01 | End: 2022-01-01 | Stop reason: HOSPADM

## 2022-01-01 RX ORDER — ONDANSETRON 4 MG/1
4 TABLET, ORALLY DISINTEGRATING ORAL EVERY 8 HOURS PRN
Qty: 20 TABLET | Refills: 1 | Status: SHIPPED | OUTPATIENT
Start: 2022-01-01 | End: 2022-01-01

## 2022-01-01 RX ORDER — METOPROLOL SUCCINATE 25 MG/1
100 TABLET, EXTENDED RELEASE ORAL DAILY
Status: DISCONTINUED | OUTPATIENT
Start: 2022-01-01 | End: 2022-01-01 | Stop reason: HOSPADM

## 2022-01-01 RX ORDER — ACETAMINOPHEN 325 MG/10.15ML
650 LIQUID ORAL EVERY 4 HOURS PRN
Status: DISCONTINUED | OUTPATIENT
Start: 2022-01-01 | End: 2022-01-01

## 2022-01-01 RX ORDER — POTASSIUM CHLORIDE 1.5 G/1.58G
20 POWDER, FOR SOLUTION ORAL ONCE
Status: COMPLETED | OUTPATIENT
Start: 2022-01-01 | End: 2022-01-01

## 2022-01-01 RX ORDER — B COMPLEX C NO.10/FOLIC ACID 900MCG/5ML
5 LIQUID (ML) ORAL DAILY
Status: DISCONTINUED | OUTPATIENT
Start: 2022-01-01 | End: 2022-01-01

## 2022-01-01 RX ORDER — NYSTATIN 100000 U/G
OINTMENT TOPICAL 2 TIMES DAILY
Status: DISCONTINUED | OUTPATIENT
Start: 2022-01-01 | End: 2022-01-01 | Stop reason: HOSPADM

## 2022-01-01 RX ORDER — QUETIAPINE FUMARATE 25 MG/1
25 TABLET, FILM COATED ORAL DAILY PRN
Status: DISCONTINUED | OUTPATIENT
Start: 2022-01-01 | End: 2022-01-01

## 2022-01-01 RX ORDER — METOPROLOL SUCCINATE 100 MG/1
100 TABLET, EXTENDED RELEASE ORAL DAILY
Status: DISCONTINUED | OUTPATIENT
Start: 2022-01-01 | End: 2022-01-01 | Stop reason: HOSPADM

## 2022-01-01 RX ORDER — BUMETANIDE 2 MG/1
4 TABLET ORAL 3 TIMES DAILY
Status: DISCONTINUED | OUTPATIENT
Start: 2022-01-01 | End: 2022-01-01

## 2022-01-01 RX ORDER — BUMETANIDE 2 MG/1
4 TABLET ORAL
Status: DISCONTINUED | OUTPATIENT
Start: 2022-01-01 | End: 2022-01-01

## 2022-01-01 RX ORDER — MORPHINE SULFATE 2 MG/ML
2 INJECTION, SOLUTION INTRAMUSCULAR; INTRAVENOUS
Status: DISCONTINUED | OUTPATIENT
Start: 2022-01-01 | End: 2022-01-01

## 2022-01-01 RX ORDER — CEFTRIAXONE 2 G/1
2 INJECTION, POWDER, FOR SOLUTION INTRAMUSCULAR; INTRAVENOUS EVERY 24 HOURS
Status: COMPLETED | OUTPATIENT
Start: 2022-01-01 | End: 2022-01-01

## 2022-01-01 RX ORDER — MAGNESIUM SULFATE HEPTAHYDRATE 40 MG/ML
2 INJECTION, SOLUTION INTRAVENOUS ONCE
Status: DISCONTINUED | OUTPATIENT
Start: 2022-01-01 | End: 2022-01-01 | Stop reason: CLARIF

## 2022-01-01 RX ORDER — HYDRALAZINE HYDROCHLORIDE 25 MG/1
25 TABLET, FILM COATED ORAL EVERY 8 HOURS SCHEDULED
Status: DISCONTINUED | OUTPATIENT
Start: 2022-01-01 | End: 2022-01-01

## 2022-01-01 RX ORDER — MAGNESIUM HYDROXIDE/ALUMINUM HYDROXICE/SIMETHICONE 120; 1200; 1200 MG/30ML; MG/30ML; MG/30ML
15 SUSPENSION ORAL EVERY 4 HOURS PRN
Status: DISCONTINUED | OUTPATIENT
Start: 2022-01-01 | End: 2022-01-01

## 2022-01-01 RX ORDER — BUMETANIDE 0.25 MG/ML
4 INJECTION INTRAMUSCULAR; INTRAVENOUS EVERY 8 HOURS
Status: COMPLETED | OUTPATIENT
Start: 2022-01-01 | End: 2022-01-01

## 2022-01-01 RX ORDER — POLYETHYLENE GLYCOL 3350 17 G/17G
17 POWDER, FOR SOLUTION ORAL DAILY
Status: DISCONTINUED | OUTPATIENT
Start: 2022-01-01 | End: 2022-01-01 | Stop reason: HOSPADM

## 2022-01-01 RX ORDER — MICONAZOLE NITRATE 20 MG/G
POWDER TOPICAL PRN
Qty: 85 G | Refills: 5 | Status: SHIPPED | OUTPATIENT
Start: 2022-01-01

## 2022-01-01 RX ORDER — CEFTRIAXONE 1 G/1
1 INJECTION, POWDER, FOR SOLUTION INTRAMUSCULAR; INTRAVENOUS ONCE
Status: COMPLETED | OUTPATIENT
Start: 2022-01-01 | End: 2022-01-01

## 2022-01-01 RX ORDER — MAGNESIUM SULFATE HEPTAHYDRATE 40 MG/ML
4 INJECTION, SOLUTION INTRAVENOUS ONCE
Status: COMPLETED | OUTPATIENT
Start: 2022-01-01 | End: 2022-01-01

## 2022-01-01 RX ORDER — METOPROLOL SUCCINATE 50 MG/1
100 TABLET, EXTENDED RELEASE ORAL DAILY
Status: DISCONTINUED | OUTPATIENT
Start: 2022-01-01 | End: 2022-01-01

## 2022-01-01 RX ORDER — FUROSEMIDE 10 MG/ML
120 INJECTION INTRAMUSCULAR; INTRAVENOUS ONCE
Status: COMPLETED | OUTPATIENT
Start: 2022-01-01 | End: 2022-01-01

## 2022-01-01 RX ORDER — ATORVASTATIN CALCIUM 20 MG/1
20 TABLET, FILM COATED ORAL DAILY
Status: DISCONTINUED | OUTPATIENT
Start: 2022-01-01 | End: 2022-01-01 | Stop reason: HOSPADM

## 2022-01-01 RX ORDER — MORPHINE SULFATE 2 MG/ML
4 INJECTION, SOLUTION INTRAMUSCULAR; INTRAVENOUS ONCE
Status: DISCONTINUED | OUTPATIENT
Start: 2022-01-01 | End: 2022-01-01

## 2022-01-01 RX ORDER — DEXTROSE MONOHYDRATE 100 MG/ML
INJECTION, SOLUTION INTRAVENOUS CONTINUOUS PRN
Status: DISCONTINUED | OUTPATIENT
Start: 2022-01-01 | End: 2022-01-01 | Stop reason: HOSPADM

## 2022-01-01 RX ORDER — HYDROXYZINE HYDROCHLORIDE 25 MG/1
50 TABLET, FILM COATED ORAL EVERY 6 HOURS PRN
Status: DISCONTINUED | OUTPATIENT
Start: 2022-01-01 | End: 2022-01-01

## 2022-01-01 RX ORDER — QUETIAPINE FUMARATE 25 MG/1
25 TABLET, FILM COATED ORAL EVERY MORNING
Status: DISCONTINUED | OUTPATIENT
Start: 2022-01-01 | End: 2022-01-01

## 2022-01-01 RX ORDER — MORPHINE SULFATE 2 MG/ML
4 INJECTION, SOLUTION INTRAMUSCULAR; INTRAVENOUS ONCE
Status: COMPLETED | OUTPATIENT
Start: 2022-01-01 | End: 2022-01-01

## 2022-01-01 RX ORDER — FUROSEMIDE 10 MG/ML
80 INJECTION INTRAMUSCULAR; INTRAVENOUS 2 TIMES DAILY
Status: DISCONTINUED | OUTPATIENT
Start: 2022-01-01 | End: 2022-01-01

## 2022-01-01 RX ORDER — ONDANSETRON 4 MG/1
4 TABLET, ORALLY DISINTEGRATING ORAL EVERY 6 HOURS PRN
Status: DISCONTINUED | OUTPATIENT
Start: 2022-01-01 | End: 2022-01-01

## 2022-01-01 RX ADMIN — MICONAZOLE NITRATE: 20 POWDER TOPICAL at 09:29

## 2022-01-01 RX ADMIN — BUMETANIDE 1 MG: 1 TABLET ORAL at 10:08

## 2022-01-01 RX ADMIN — AMIODARONE HYDROCHLORIDE 200 MG: 200 TABLET ORAL at 20:20

## 2022-01-01 RX ADMIN — PANTOPRAZOLE SODIUM 40 MG: 40 INJECTION, POWDER, FOR SOLUTION INTRAVENOUS at 08:36

## 2022-01-01 RX ADMIN — NYSTATIN 500000 UNITS: 100000 SUSPENSION ORAL at 19:50

## 2022-01-01 RX ADMIN — SODIUM CHLORIDE SOLN NEBU 3% 3 ML: 3 NEBU SOLN at 20:13

## 2022-01-01 RX ADMIN — APIXABAN 5 MG: 2.5 TABLET, FILM COATED ORAL at 09:19

## 2022-01-01 RX ADMIN — ACETAMINOPHEN 650 MG: 325 TABLET, FILM COATED ORAL at 21:47

## 2022-01-01 RX ADMIN — FERROUS GLUCONATE 324 MG: 324 TABLET ORAL at 08:59

## 2022-01-01 RX ADMIN — AMIODARONE HYDROCHLORIDE 200 MG: 200 TABLET ORAL at 21:16

## 2022-01-01 RX ADMIN — ALLOPURINOL 200 MG: 100 TABLET ORAL at 08:40

## 2022-01-01 RX ADMIN — ALLOPURINOL 100 MG: 100 TABLET ORAL at 08:44

## 2022-01-01 RX ADMIN — BUMETANIDE 4 MG: 0.25 INJECTION INTRAMUSCULAR; INTRAVENOUS at 05:08

## 2022-01-01 RX ADMIN — NYSTATIN 500000 UNITS: 100000 SUSPENSION ORAL at 21:06

## 2022-01-01 RX ADMIN — HEPARIN SODIUM 1950 UNITS/HR: 10000 INJECTION, SOLUTION INTRAVENOUS at 20:30

## 2022-01-01 RX ADMIN — HEPARIN SODIUM 1950 UNITS/HR: 10000 INJECTION, SOLUTION INTRAVENOUS at 07:45

## 2022-01-01 RX ADMIN — ALLOPURINOL 200 MG: 100 TABLET ORAL at 09:40

## 2022-01-01 RX ADMIN — Medication 1 CAPSULE: at 09:36

## 2022-01-01 RX ADMIN — IPRATROPIUM BROMIDE AND ALBUTEROL SULFATE 3 ML: 2.5; .5 SOLUTION RESPIRATORY (INHALATION) at 08:46

## 2022-01-01 RX ADMIN — APIXABAN 5 MG: 5 TABLET, FILM COATED ORAL at 09:42

## 2022-01-01 RX ADMIN — ONDANSETRON HYDROCHLORIDE 4 MG: 2 INJECTION, SOLUTION INTRAMUSCULAR; INTRAVENOUS at 16:08

## 2022-01-01 RX ADMIN — ACETAMINOPHEN 650 MG: 325 TABLET, FILM COATED ORAL at 06:20

## 2022-01-01 RX ADMIN — BISACODYL 10 MG: 10 SUPPOSITORY RECTAL at 09:39

## 2022-01-01 RX ADMIN — NYSTATIN 500000 UNITS: 100000 SUSPENSION ORAL at 08:14

## 2022-01-01 RX ADMIN — ACETAMINOPHEN 1000 MG: 500 TABLET ORAL at 05:50

## 2022-01-01 RX ADMIN — HEPARIN SODIUM 1950 UNITS/HR: 10000 INJECTION, SOLUTION INTRAVENOUS at 09:33

## 2022-01-01 RX ADMIN — LIDOCAINE PATCH 4% 2 PATCH: 40 PATCH TOPICAL at 00:08

## 2022-01-01 RX ADMIN — OXYCODONE HYDROCHLORIDE 5 MG: 5 TABLET ORAL at 08:04

## 2022-01-01 RX ADMIN — NYSTATIN 500000 UNITS: 100000 SUSPENSION ORAL at 09:43

## 2022-01-01 RX ADMIN — SODIUM CHLORIDE SOLN NEBU 3% 3 ML: 3 NEBU SOLN at 01:08

## 2022-01-01 RX ADMIN — ONDANSETRON HYDROCHLORIDE 4 MG: 2 INJECTION, SOLUTION INTRAMUSCULAR; INTRAVENOUS at 09:11

## 2022-01-01 RX ADMIN — DEXTROSE MONOHYDRATE 300 ML: 100 INJECTION, SOLUTION INTRAVENOUS at 02:42

## 2022-01-01 RX ADMIN — POTASSIUM CHLORIDE 20 MEQ: 40 SOLUTION ORAL at 08:40

## 2022-01-01 RX ADMIN — NYSTATIN 500000 UNITS: 100000 SUSPENSION ORAL at 15:48

## 2022-01-01 RX ADMIN — AMIODARONE HYDROCHLORIDE 200 MG: 200 TABLET ORAL at 08:16

## 2022-01-01 RX ADMIN — DEXTROSE 15 G: 15 GEL ORAL at 23:53

## 2022-01-01 RX ADMIN — EMPAGLIFLOZIN 10 MG: 10 TABLET, FILM COATED ORAL at 09:36

## 2022-01-01 RX ADMIN — MELATONIN TAB 3 MG 3 MG: 3 TAB at 23:57

## 2022-01-01 RX ADMIN — PIPERACILLIN AND TAZOBACTAM 3.38 G: 3; .375 INJECTION, POWDER, LYOPHILIZED, FOR SOLUTION INTRAVENOUS at 00:28

## 2022-01-01 RX ADMIN — QUETIAPINE FUMARATE 25 MG: 25 TABLET ORAL at 23:44

## 2022-01-01 RX ADMIN — NYSTATIN: 100000 OINTMENT TOPICAL at 10:07

## 2022-01-01 RX ADMIN — FERROUS GLUCONATE 324 MG: 324 TABLET ORAL at 09:19

## 2022-01-01 RX ADMIN — IOPAMIDOL 75 ML: 755 INJECTION, SOLUTION INTRAVENOUS at 12:56

## 2022-01-01 RX ADMIN — ATORVASTATIN CALCIUM 20 MG: 20 TABLET, FILM COATED ORAL at 08:30

## 2022-01-01 RX ADMIN — ALLOPURINOL 100 MG: 100 TABLET ORAL at 08:30

## 2022-01-01 RX ADMIN — ONDANSETRON 4 MG: 4 TABLET, ORALLY DISINTEGRATING ORAL at 22:25

## 2022-01-01 RX ADMIN — DEXMEDETOMIDINE HYDROCHLORIDE 1 MCG/KG/HR: 400 INJECTION INTRAVENOUS at 09:02

## 2022-01-01 RX ADMIN — FERROUS GLUCONATE 324 MG: 324 TABLET ORAL at 08:15

## 2022-01-01 RX ADMIN — LIDOCAINE PATCH 4% 2 PATCH: 40 PATCH TOPICAL at 07:55

## 2022-01-01 RX ADMIN — PIPERACILLIN SODIUM AND TAZOBACTAM SODIUM 4.5 G: 4; .5 INJECTION, POWDER, LYOPHILIZED, FOR SOLUTION INTRAVENOUS at 23:23

## 2022-01-01 RX ADMIN — FERROUS GLUCONATE 324 MG: 324 TABLET ORAL at 09:08

## 2022-01-01 RX ADMIN — ALLOPURINOL 100 MG: 100 TABLET ORAL at 08:56

## 2022-01-01 RX ADMIN — PROPOFOL 5 MCG/KG/MIN: 10 INJECTION, EMULSION INTRAVENOUS at 02:25

## 2022-01-01 RX ADMIN — LIDOCAINE PATCH 4% 2 PATCH: 40 PATCH TOPICAL at 02:12

## 2022-01-01 RX ADMIN — METOPROLOL SUCCINATE 100 MG: 50 TABLET, EXTENDED RELEASE ORAL at 08:15

## 2022-01-01 RX ADMIN — BUMETANIDE 4 MG: 0.25 INJECTION, SOLUTION INTRAMUSCULAR; INTRAVENOUS at 21:56

## 2022-01-01 RX ADMIN — ACETAMINOPHEN 650 MG: 325 TABLET, FILM COATED ORAL at 14:34

## 2022-01-01 RX ADMIN — NYSTATIN 500000 UNITS: 100000 SUSPENSION ORAL at 12:01

## 2022-01-01 RX ADMIN — APIXABAN 5 MG: 5 TABLET, FILM COATED ORAL at 08:44

## 2022-01-01 RX ADMIN — ACETAMINOPHEN 1000 MG: 500 TABLET ORAL at 22:15

## 2022-01-01 RX ADMIN — FERROUS GLUCONATE 324 MG: 324 TABLET ORAL at 08:30

## 2022-01-01 RX ADMIN — NYSTATIN 500000 UNITS: 100000 SUSPENSION ORAL at 07:55

## 2022-01-01 RX ADMIN — APIXABAN 5 MG: 2.5 TABLET, FILM COATED ORAL at 20:55

## 2022-01-01 RX ADMIN — HEPARIN SODIUM 2150 UNITS/HR: 10000 INJECTION, SOLUTION INTRAVENOUS at 20:03

## 2022-01-01 RX ADMIN — BUMETANIDE 4 MG: 2 TABLET ORAL at 07:55

## 2022-01-01 RX ADMIN — ACETAMINOPHEN 650 MG: 325 TABLET, FILM COATED ORAL at 09:16

## 2022-01-01 RX ADMIN — OXYCODONE HYDROCHLORIDE 5 MG: 5 TABLET ORAL at 15:47

## 2022-01-01 RX ADMIN — NYSTATIN 500000 UNITS: 100000 SUSPENSION ORAL at 18:33

## 2022-01-01 RX ADMIN — AMIODARONE HYDROCHLORIDE 200 MG: 200 TABLET ORAL at 08:55

## 2022-01-01 RX ADMIN — LISINOPRIL 5 MG: 5 TABLET ORAL at 07:37

## 2022-01-01 RX ADMIN — MICONAZOLE NITRATE: 2 POWDER TOPICAL at 21:52

## 2022-01-01 RX ADMIN — HEPARIN SODIUM 2550 UNITS/HR: 10000 INJECTION, SOLUTION INTRAVENOUS at 16:51

## 2022-01-01 RX ADMIN — DEXMEDETOMIDINE HYDROCHLORIDE 0.7 MCG/KG/HR: 400 INJECTION INTRAVENOUS at 16:46

## 2022-01-01 RX ADMIN — METOPROLOL SUCCINATE 100 MG: 50 TABLET, EXTENDED RELEASE ORAL at 08:44

## 2022-01-01 RX ADMIN — REMDESIVIR 200 MG: 100 INJECTION, POWDER, LYOPHILIZED, FOR SOLUTION INTRAVENOUS at 14:50

## 2022-01-01 RX ADMIN — MICONAZOLE NITRATE: 2 POWDER TOPICAL at 21:08

## 2022-01-01 RX ADMIN — INSULIN ASPART 1 UNITS: 100 INJECTION, SOLUTION INTRAVENOUS; SUBCUTANEOUS at 08:48

## 2022-01-01 RX ADMIN — ACETAMINOPHEN 650 MG: 325 TABLET, FILM COATED ORAL at 23:42

## 2022-01-01 RX ADMIN — SENNOSIDES AND DOCUSATE SODIUM 2 TABLET: 8.6; 5 TABLET ORAL at 21:43

## 2022-01-01 RX ADMIN — NYSTATIN 500000 UNITS: 100000 SUSPENSION ORAL at 15:54

## 2022-01-01 RX ADMIN — INFLUENZA A VIRUS A/WISCONSIN/588/2019 (H1N1) RECOMBINANT HEMAGGLUTININ ANTIGEN, INFLUENZA A VIRUS A/DARWIN/6/2021 (H3N2) RECOMBINANT HEMAGGLUTININ ANTIGEN, INFLUENZA B VIRUS B/AUSTRIA/1359417/2021 RECOMBINANT HEMAGGLUTININ ANTIGEN, AND INFLUENZA B VIRUS B/PHUKET/3073/2013 RECOMBINANT HEMAGGLUTININ ANTIGEN 0.5 ML: 45; 45; 45; 45 INJECTION INTRAMUSCULAR at 18:31

## 2022-01-01 RX ADMIN — DEXMEDETOMIDINE HYDROCHLORIDE 0.8 MCG/KG/HR: 400 INJECTION INTRAVENOUS at 01:53

## 2022-01-01 RX ADMIN — APIXABAN 5 MG: 5 TABLET, FILM COATED ORAL at 07:32

## 2022-01-01 RX ADMIN — ATORVASTATIN CALCIUM 20 MG: 20 TABLET, FILM COATED ORAL at 08:37

## 2022-01-01 RX ADMIN — Medication 1 CAPSULE: at 16:06

## 2022-01-01 RX ADMIN — HEPARIN SODIUM 2350 UNITS/HR: 10000 INJECTION, SOLUTION INTRAVENOUS at 20:21

## 2022-01-01 RX ADMIN — ACETAMINOPHEN 650 MG: 325 TABLET, FILM COATED ORAL at 21:48

## 2022-01-01 RX ADMIN — ACETAMINOPHEN 650 MG: 325 TABLET, FILM COATED ORAL at 13:40

## 2022-01-01 RX ADMIN — Medication 1 CAPSULE: at 09:19

## 2022-01-01 RX ADMIN — POTASSIUM CHLORIDE 20 MEQ: 29.8 INJECTION, SOLUTION INTRAVENOUS at 22:51

## 2022-01-01 RX ADMIN — APIXABAN 5 MG: 5 TABLET, FILM COATED ORAL at 08:07

## 2022-01-01 RX ADMIN — TRIAMCINOLONE ACETONIDE: 1 CREAM TOPICAL at 21:31

## 2022-01-01 RX ADMIN — ALLOPURINOL 100 MG: 100 TABLET ORAL at 08:07

## 2022-01-01 RX ADMIN — AMIODARONE HYDROCHLORIDE 200 MG: 200 TABLET ORAL at 21:22

## 2022-01-01 RX ADMIN — DEXMEDETOMIDINE HYDROCHLORIDE 0.6 MCG/KG/HR: 400 INJECTION INTRAVENOUS at 00:49

## 2022-01-01 RX ADMIN — NYSTATIN 500000 UNITS: 100000 SUSPENSION ORAL at 21:22

## 2022-01-01 RX ADMIN — MICONAZOLE NITRATE: 20 POWDER TOPICAL at 20:56

## 2022-01-01 RX ADMIN — BUMETANIDE 2 MG: 2 TABLET ORAL at 08:44

## 2022-01-01 RX ADMIN — NYSTATIN 500000 UNITS: 100000 SUSPENSION ORAL at 08:09

## 2022-01-01 RX ADMIN — AMIODARONE HYDROCHLORIDE 1 MG/MIN: 50 INJECTION, SOLUTION INTRAVENOUS at 10:36

## 2022-01-01 RX ADMIN — PIPERACILLIN SODIUM AND TAZOBACTAM SODIUM 4.5 G: 4; .5 INJECTION, POWDER, LYOPHILIZED, FOR SOLUTION INTRAVENOUS at 10:27

## 2022-01-01 RX ADMIN — NYSTATIN 500000 UNITS: 100000 SUSPENSION ORAL at 12:48

## 2022-01-01 RX ADMIN — ACETAMINOPHEN 650 MG: 325 TABLET, FILM COATED ORAL at 20:43

## 2022-01-01 RX ADMIN — ACETAMINOPHEN 650 MG: 325 TABLET, FILM COATED ORAL at 13:08

## 2022-01-01 RX ADMIN — SODIUM CHLORIDE SOLN NEBU 3% 3 ML: 3 NEBU SOLN at 09:06

## 2022-01-01 RX ADMIN — ACETAMINOPHEN 650 MG: 325 TABLET, FILM COATED ORAL at 08:40

## 2022-01-01 RX ADMIN — FUROSEMIDE 80 MG: 10 INJECTION, SOLUTION INTRAVENOUS at 21:29

## 2022-01-01 RX ADMIN — APIXABAN 5 MG: 5 TABLET, FILM COATED ORAL at 08:25

## 2022-01-01 RX ADMIN — B-COMPLEX W/ C & FOLIC ACID TAB 1 MG 1 TABLET: 1 TAB at 10:01

## 2022-01-01 RX ADMIN — ACETAMINOPHEN 325 MG: 325 TABLET, FILM COATED ORAL at 00:30

## 2022-01-01 RX ADMIN — IPRATROPIUM BROMIDE AND ALBUTEROL SULFATE 3 ML: 2.5; .5 SOLUTION RESPIRATORY (INHALATION) at 20:31

## 2022-01-01 RX ADMIN — QUETIAPINE FUMARATE 25 MG: 25 TABLET ORAL at 21:45

## 2022-01-01 RX ADMIN — AMIODARONE HYDROCHLORIDE 200 MG: 200 TABLET ORAL at 08:30

## 2022-01-01 RX ADMIN — IPRATROPIUM BROMIDE AND ALBUTEROL SULFATE 3 ML: 2.5; .5 SOLUTION RESPIRATORY (INHALATION) at 13:18

## 2022-01-01 RX ADMIN — ALLOPURINOL 100 MG: 100 TABLET ORAL at 09:08

## 2022-01-01 RX ADMIN — MICONAZOLE NITRATE: 2 POWDER TOPICAL at 07:59

## 2022-01-01 RX ADMIN — HEPARIN SODIUM 2350 UNITS/HR: 10000 INJECTION, SOLUTION INTRAVENOUS at 18:26

## 2022-01-01 RX ADMIN — CALCIUM GLUCONATE 1 G: 20 INJECTION, SOLUTION INTRAVENOUS at 01:57

## 2022-01-01 RX ADMIN — B-COMPLEX W/ C & FOLIC ACID TAB 1 MG 1 TABLET: 1 TAB at 07:32

## 2022-01-01 RX ADMIN — IPRATROPIUM BROMIDE AND ALBUTEROL SULFATE 3 ML: 2.5; .5 SOLUTION RESPIRATORY (INHALATION) at 11:56

## 2022-01-01 RX ADMIN — PIPERACILLIN AND TAZOBACTAM 3.38 G: 3; .375 INJECTION, POWDER, LYOPHILIZED, FOR SOLUTION INTRAVENOUS at 09:43

## 2022-01-01 RX ADMIN — DEXMEDETOMIDINE HYDROCHLORIDE 1 MCG/KG/HR: 400 INJECTION INTRAVENOUS at 06:21

## 2022-01-01 RX ADMIN — PIPERACILLIN SODIUM AND TAZOBACTAM SODIUM 4.5 G: 4; .5 INJECTION, POWDER, LYOPHILIZED, FOR SOLUTION INTRAVENOUS at 12:50

## 2022-01-01 RX ADMIN — MICONAZOLE NITRATE: 2 POWDER TOPICAL at 23:25

## 2022-01-01 RX ADMIN — SODIUM PHOSPHATE 1 ENEMA: 7; 19 ENEMA RECTAL at 21:16

## 2022-01-01 RX ADMIN — POLYETHYLENE GLYCOL 3350 17 G: 17 POWDER, FOR SOLUTION ORAL at 08:14

## 2022-01-01 RX ADMIN — AMIODARONE HYDROCHLORIDE 200 MG: 200 TABLET ORAL at 08:26

## 2022-01-01 RX ADMIN — FUROSEMIDE 40 MG: 10 INJECTION, SOLUTION INTRAVENOUS at 06:35

## 2022-01-01 RX ADMIN — ATORVASTATIN CALCIUM 20 MG: 10 TABLET, FILM COATED ORAL at 09:19

## 2022-01-01 RX ADMIN — ALLOPURINOL 100 MG: 100 TABLET ORAL at 08:32

## 2022-01-01 RX ADMIN — NYSTATIN 500000 UNITS: 100000 SUSPENSION ORAL at 07:57

## 2022-01-01 RX ADMIN — MICONAZOLE NITRATE: 2 POWDER TOPICAL at 19:29

## 2022-01-01 RX ADMIN — NYSTATIN: 100000 OINTMENT TOPICAL at 00:09

## 2022-01-01 RX ADMIN — HEPARIN SODIUM 2400 UNITS/HR: 10000 INJECTION, SOLUTION INTRAVENOUS at 02:17

## 2022-01-01 RX ADMIN — SODIUM CHLORIDE, POTASSIUM CHLORIDE, SODIUM LACTATE AND CALCIUM CHLORIDE 500 ML: 600; 310; 30; 20 INJECTION, SOLUTION INTRAVENOUS at 22:38

## 2022-01-01 RX ADMIN — SODIUM CHLORIDE 1000 ML: 9 INJECTION, SOLUTION INTRAVENOUS at 21:30

## 2022-01-01 RX ADMIN — HEPARIN SODIUM 2550 UNITS/HR: 10000 INJECTION, SOLUTION INTRAVENOUS at 12:33

## 2022-01-01 RX ADMIN — ACETAMINOPHEN 650 MG: 325 TABLET, FILM COATED ORAL at 00:51

## 2022-01-01 RX ADMIN — INSULIN ASPART 1 UNITS: 100 INJECTION, SOLUTION INTRAVENOUS; SUBCUTANEOUS at 19:52

## 2022-01-01 RX ADMIN — FUROSEMIDE 80 MG: 10 INJECTION, SOLUTION INTRAVENOUS at 18:20

## 2022-01-01 RX ADMIN — ATORVASTATIN CALCIUM 20 MG: 10 TABLET, FILM COATED ORAL at 08:22

## 2022-01-01 RX ADMIN — ONDANSETRON HYDROCHLORIDE 4 MG: 2 INJECTION, SOLUTION INTRAMUSCULAR; INTRAVENOUS at 07:57

## 2022-01-01 RX ADMIN — INSULIN ASPART 1 UNITS: 100 INJECTION, SOLUTION INTRAVENOUS; SUBCUTANEOUS at 01:00

## 2022-01-01 RX ADMIN — MICONAZOLE NITRATE: 2 POWDER TOPICAL at 08:37

## 2022-01-01 RX ADMIN — NOREPINEPHRINE BITARTRATE 0.08 MCG/KG/MIN: 0.06 INJECTION, SOLUTION INTRAVENOUS at 06:00

## 2022-01-01 RX ADMIN — POLYETHYLENE GLYCOL 3350 17 G: 17 POWDER, FOR SOLUTION ORAL at 21:44

## 2022-01-01 RX ADMIN — SODIUM CHLORIDE SOLN NEBU 3% 3 ML: 3 NEBU SOLN at 19:41

## 2022-01-01 RX ADMIN — IPRATROPIUM BROMIDE AND ALBUTEROL SULFATE 3 ML: 2.5; .5 SOLUTION RESPIRATORY (INHALATION) at 12:09

## 2022-01-01 RX ADMIN — MICONAZOLE NITRATE: 2 POWDER TOPICAL at 20:54

## 2022-01-01 RX ADMIN — HYDROXYZINE HYDROCHLORIDE 50 MG: 25 TABLET, FILM COATED ORAL at 23:43

## 2022-01-01 RX ADMIN — ALLOPURINOL 200 MG: 100 TABLET ORAL at 07:35

## 2022-01-01 RX ADMIN — Medication 3 MG: at 23:39

## 2022-01-01 RX ADMIN — BUMETANIDE 4 MG: 0.25 INJECTION INTRAMUSCULAR; INTRAVENOUS at 18:26

## 2022-01-01 RX ADMIN — TRIAMCINOLONE ACETONIDE: 1 CREAM TOPICAL at 20:55

## 2022-01-01 RX ADMIN — PANTOPRAZOLE SODIUM 40 MG: 40 INJECTION, POWDER, FOR SOLUTION INTRAVENOUS at 09:00

## 2022-01-01 RX ADMIN — IPRATROPIUM BROMIDE AND ALBUTEROL SULFATE 3 ML: 2.5; .5 SOLUTION RESPIRATORY (INHALATION) at 15:54

## 2022-01-01 RX ADMIN — AMIODARONE HYDROCHLORIDE 200 MG: 200 TABLET ORAL at 07:33

## 2022-01-01 RX ADMIN — ACETAMINOPHEN 650 MG: 325 TABLET, FILM COATED ORAL at 03:49

## 2022-01-01 RX ADMIN — NYSTATIN 500000 UNITS: 100000 SUSPENSION ORAL at 08:29

## 2022-01-01 RX ADMIN — MAGNESIUM SULFATE IN WATER 4 G: 40 INJECTION, SOLUTION INTRAVENOUS at 08:38

## 2022-01-01 RX ADMIN — Medication 5 UNITS: at 10:21

## 2022-01-01 RX ADMIN — MELATONIN TAB 3 MG 3 MG: 3 TAB at 22:15

## 2022-01-01 RX ADMIN — MELATONIN TAB 3 MG 3 MG: 3 TAB at 23:45

## 2022-01-01 RX ADMIN — MICONAZOLE NITRATE: 2 POWDER TOPICAL at 09:41

## 2022-01-01 RX ADMIN — EMPAGLIFLOZIN 10 MG: 10 TABLET, FILM COATED ORAL at 13:40

## 2022-01-01 RX ADMIN — PANTOPRAZOLE SODIUM 40 MG: 40 INJECTION, POWDER, FOR SOLUTION INTRAVENOUS at 09:58

## 2022-01-01 RX ADMIN — PIPERACILLIN AND TAZOBACTAM 3.38 G: 3; .375 INJECTION, POWDER, LYOPHILIZED, FOR SOLUTION INTRAVENOUS at 21:58

## 2022-01-01 RX ADMIN — IPRATROPIUM BROMIDE AND ALBUTEROL SULFATE 3 ML: 2.5; .5 SOLUTION RESPIRATORY (INHALATION) at 19:38

## 2022-01-01 RX ADMIN — DEXAMETHASONE SODIUM PHOSPHATE 6 MG: 10 INJECTION, SOLUTION INTRAMUSCULAR; INTRAVENOUS at 14:53

## 2022-01-01 RX ADMIN — POTASSIUM CHLORIDE 20 MEQ: 40 SOLUTION ORAL at 09:37

## 2022-01-01 RX ADMIN — Medication 25 MCG: at 06:28

## 2022-01-01 RX ADMIN — QUETIAPINE FUMARATE 25 MG: 25 TABLET ORAL at 14:16

## 2022-01-01 RX ADMIN — SENNOSIDES AND DOCUSATE SODIUM 2 TABLET: 8.6; 5 TABLET ORAL at 08:44

## 2022-01-01 RX ADMIN — DEXMEDETOMIDINE HYDROCHLORIDE 0.8 MCG/KG/HR: 400 INJECTION INTRAVENOUS at 03:03

## 2022-01-01 RX ADMIN — MICONAZOLE NITRATE: 2 POWDER TOPICAL at 10:07

## 2022-01-01 RX ADMIN — APIXABAN 5 MG: 2.5 TABLET, FILM COATED ORAL at 21:31

## 2022-01-01 RX ADMIN — NYSTATIN 500000 UNITS: 100000 SUSPENSION ORAL at 19:53

## 2022-01-01 RX ADMIN — CEFTRIAXONE SODIUM 2 G: 2 INJECTION, POWDER, FOR SOLUTION INTRAMUSCULAR; INTRAVENOUS at 22:06

## 2022-01-01 RX ADMIN — SODIUM CHLORIDE SOLN NEBU 3% 3 ML: 3 NEBU SOLN at 20:39

## 2022-01-01 RX ADMIN — DEXMEDETOMIDINE HYDROCHLORIDE 0.7 MCG/KG/HR: 400 INJECTION INTRAVENOUS at 13:40

## 2022-01-01 RX ADMIN — MICONAZOLE NITRATE: 2 POWDER TOPICAL at 19:54

## 2022-01-01 RX ADMIN — AMIODARONE HYDROCHLORIDE 200 MG: 200 TABLET ORAL at 10:02

## 2022-01-01 RX ADMIN — DEXMEDETOMIDINE HYDROCHLORIDE 0.2 MCG/KG/HR: 400 INJECTION INTRAVENOUS at 09:18

## 2022-01-01 RX ADMIN — FERROUS GLUCONATE 324 MG: 324 TABLET ORAL at 09:42

## 2022-01-01 RX ADMIN — B-COMPLEX W/ C & FOLIC ACID TAB 1 MG 1 TABLET: 1 TAB at 08:07

## 2022-01-01 RX ADMIN — NYSTATIN: 100000 OINTMENT TOPICAL at 21:47

## 2022-01-01 RX ADMIN — HEPARIN SODIUM 1350 UNITS/HR: 10000 INJECTION, SOLUTION INTRAVENOUS at 22:19

## 2022-01-01 RX ADMIN — METOPROLOL SUCCINATE 100 MG: 100 TABLET, EXTENDED RELEASE ORAL at 08:22

## 2022-01-01 RX ADMIN — OXYCODONE HYDROCHLORIDE 5 MG: 5 TABLET ORAL at 06:38

## 2022-01-01 RX ADMIN — Medication 0.03 MCG/KG/MIN: at 22:40

## 2022-01-01 RX ADMIN — POTASSIUM CHLORIDE 20 MEQ: 750 TABLET, EXTENDED RELEASE ORAL at 08:59

## 2022-01-01 RX ADMIN — Medication 3 MG: at 22:30

## 2022-01-01 RX ADMIN — DEXMEDETOMIDINE HYDROCHLORIDE 0.6 MCG/KG/HR: 400 INJECTION INTRAVENOUS at 06:06

## 2022-01-01 RX ADMIN — TRIAMCINOLONE ACETONIDE: 1 CREAM TOPICAL at 20:15

## 2022-01-01 RX ADMIN — B-COMPLEX W/ C & FOLIC ACID TAB 1 MG 1 TABLET: 1 TAB at 08:09

## 2022-01-01 RX ADMIN — B-COMPLEX W/ C & FOLIC ACID TAB 1 MG 1 TABLET: 1 TAB at 10:06

## 2022-01-01 RX ADMIN — ATORVASTATIN CALCIUM 20 MG: 20 TABLET, FILM COATED ORAL at 07:39

## 2022-01-01 RX ADMIN — MICONAZOLE NITRATE: 2 POWDER TOPICAL at 20:21

## 2022-01-01 RX ADMIN — IPRATROPIUM BROMIDE AND ALBUTEROL SULFATE 3 ML: 2.5; .5 SOLUTION RESPIRATORY (INHALATION) at 07:22

## 2022-01-01 RX ADMIN — NYSTATIN 500000 UNITS: 100000 SUSPENSION ORAL at 07:35

## 2022-01-01 RX ADMIN — NYSTATIN 500000 UNITS: 100000 SUSPENSION ORAL at 11:09

## 2022-01-01 RX ADMIN — MICONAZOLE NITRATE: 2 POWDER TOPICAL at 10:09

## 2022-01-01 RX ADMIN — BUMETANIDE 4 MG: 0.25 INJECTION, SOLUTION INTRAMUSCULAR; INTRAVENOUS at 18:32

## 2022-01-01 RX ADMIN — DEXTROSE MONOHYDRATE 25 G: 25 INJECTION, SOLUTION INTRAVENOUS at 05:36

## 2022-01-01 RX ADMIN — PIPERACILLIN SODIUM AND TAZOBACTAM SODIUM 4.5 G: 4; .5 INJECTION, POWDER, LYOPHILIZED, FOR SOLUTION INTRAVENOUS at 05:22

## 2022-01-01 RX ADMIN — POLYETHYLENE GLYCOL 3350 17 G: 17 POWDER, FOR SOLUTION ORAL at 09:44

## 2022-01-01 RX ADMIN — NYSTATIN 500000 UNITS: 100000 SUSPENSION ORAL at 20:21

## 2022-01-01 RX ADMIN — HEPARIN SODIUM 2550 UNITS/HR: 10000 INJECTION, SOLUTION INTRAVENOUS at 02:41

## 2022-01-01 RX ADMIN — MICONAZOLE NITRATE: 2 POWDER TOPICAL at 20:18

## 2022-01-01 RX ADMIN — DEXTROSE MONOHYDRATE 25 G: 25 INJECTION, SOLUTION INTRAVENOUS at 02:37

## 2022-01-01 RX ADMIN — Medication 5 ML: at 09:42

## 2022-01-01 RX ADMIN — APIXABAN 5 MG: 5 TABLET, FILM COATED ORAL at 21:49

## 2022-01-01 RX ADMIN — Medication 3 MG: at 22:20

## 2022-01-01 RX ADMIN — ACETAMINOPHEN 650 MG: 325 TABLET, FILM COATED ORAL at 21:39

## 2022-01-01 RX ADMIN — ACETAMINOPHEN 1000 MG: 500 TABLET ORAL at 13:11

## 2022-01-01 RX ADMIN — FUROSEMIDE 80 MG: 10 INJECTION, SOLUTION INTRAVENOUS at 16:24

## 2022-01-01 RX ADMIN — APIXABAN 5 MG: 5 TABLET, FILM COATED ORAL at 08:30

## 2022-01-01 RX ADMIN — IPRATROPIUM BROMIDE AND ALBUTEROL SULFATE 3 ML: 2.5; .5 SOLUTION RESPIRATORY (INHALATION) at 16:31

## 2022-01-01 RX ADMIN — NYSTATIN 500000 UNITS: 100000 SUSPENSION ORAL at 11:15

## 2022-01-01 RX ADMIN — DOBUTAMINE HYDROCHLORIDE 2.5 MCG/KG/MIN: 200 INJECTION INTRAVENOUS at 11:02

## 2022-01-01 RX ADMIN — LIDOCAINE PATCH 4% 2 PATCH: 40 PATCH TOPICAL at 08:14

## 2022-01-01 RX ADMIN — LISINOPRIL 5 MG: 5 TABLET ORAL at 18:33

## 2022-01-01 RX ADMIN — B-COMPLEX W/ C & FOLIC ACID TAB 1 MG 1 TABLET: 1 TAB at 08:16

## 2022-01-01 RX ADMIN — QUETIAPINE FUMARATE 25 MG: 25 TABLET ORAL at 20:21

## 2022-01-01 RX ADMIN — BNT162B2 ORIGINAL AND OMICRON BA.4/BA.5 30 MCG: .1125; .1125 INJECTION, SUSPENSION INTRAMUSCULAR at 15:55

## 2022-01-01 RX ADMIN — Medication 75 MCG/HR: at 06:31

## 2022-01-01 RX ADMIN — DEXMEDETOMIDINE HYDROCHLORIDE 0.4 MCG/KG/HR: 400 INJECTION INTRAVENOUS at 19:51

## 2022-01-01 RX ADMIN — DEXMEDETOMIDINE HYDROCHLORIDE 1 MCG/KG/HR: 400 INJECTION INTRAVENOUS at 21:59

## 2022-01-01 RX ADMIN — B-COMPLEX W/ C & FOLIC ACID TAB 1 MG 1 TABLET: 1 TAB at 07:35

## 2022-01-01 RX ADMIN — ALLOPURINOL 200 MG: 100 TABLET ORAL at 08:46

## 2022-01-01 RX ADMIN — B-COMPLEX W/ C & FOLIC ACID TAB 1 MG 1 TABLET: 1 TAB at 09:08

## 2022-01-01 RX ADMIN — SODIUM CHLORIDE SOLN NEBU 3% 3 ML: 3 NEBU SOLN at 10:00

## 2022-01-01 RX ADMIN — QUETIAPINE FUMARATE 25 MG: 25 TABLET ORAL at 10:34

## 2022-01-01 RX ADMIN — ONDANSETRON HYDROCHLORIDE 8 MG: 2 INJECTION, SOLUTION INTRAMUSCULAR; INTRAVENOUS at 06:33

## 2022-01-01 RX ADMIN — ALLOPURINOL 100 MG: 100 TABLET ORAL at 08:09

## 2022-01-01 RX ADMIN — ACETAMINOPHEN 650 MG: 325 TABLET, FILM COATED ORAL at 19:53

## 2022-01-01 RX ADMIN — Medication 0.08 MCG/KG/MIN: at 18:26

## 2022-01-01 RX ADMIN — FERROUS GLUCONATE 324 MG: 324 TABLET ORAL at 08:32

## 2022-01-01 RX ADMIN — BUMETANIDE 1 MG: 1 TABLET ORAL at 09:36

## 2022-01-01 RX ADMIN — SENNOSIDES AND DOCUSATE SODIUM 2 TABLET: 8.6; 5 TABLET ORAL at 20:13

## 2022-01-01 RX ADMIN — SODIUM CHLORIDE, POTASSIUM CHLORIDE, SODIUM LACTATE AND CALCIUM CHLORIDE 500 ML: 600; 310; 30; 20 INJECTION, SOLUTION INTRAVENOUS at 10:35

## 2022-01-01 RX ADMIN — DEXMEDETOMIDINE HYDROCHLORIDE 0.8 MCG/KG/HR: 400 INJECTION INTRAVENOUS at 19:56

## 2022-01-01 RX ADMIN — OXYCODONE HYDROCHLORIDE 5 MG: 5 TABLET ORAL at 08:26

## 2022-01-01 RX ADMIN — IPRATROPIUM BROMIDE AND ALBUTEROL SULFATE 3 ML: 2.5; .5 SOLUTION RESPIRATORY (INHALATION) at 16:44

## 2022-01-01 RX ADMIN — MICONAZOLE NITRATE: 2 POWDER TOPICAL at 08:52

## 2022-01-01 RX ADMIN — MICONAZOLE NITRATE: 2 POWDER TOPICAL at 09:18

## 2022-01-01 RX ADMIN — AMIODARONE HYDROCHLORIDE 200 MG: 200 TABLET ORAL at 19:53

## 2022-01-01 RX ADMIN — METOPROLOL SUCCINATE 100 MG: 50 TABLET, EXTENDED RELEASE ORAL at 15:52

## 2022-01-01 RX ADMIN — ACETAMINOPHEN 650 MG: 325 TABLET, FILM COATED ORAL at 22:08

## 2022-01-01 RX ADMIN — HYDRALAZINE HYDROCHLORIDE 25 MG: 25 TABLET, FILM COATED ORAL at 22:21

## 2022-01-01 RX ADMIN — TRIAMCINOLONE ACETONIDE: 1 CREAM TOPICAL at 21:39

## 2022-01-01 RX ADMIN — Medication 5 ML: at 16:14

## 2022-01-01 RX ADMIN — INSULIN ASPART 2 UNITS: 100 INJECTION, SOLUTION INTRAVENOUS; SUBCUTANEOUS at 20:15

## 2022-01-01 RX ADMIN — PIPERACILLIN SODIUM AND TAZOBACTAM SODIUM 4.5 G: 4; .5 INJECTION, POWDER, LYOPHILIZED, FOR SOLUTION INTRAVENOUS at 10:35

## 2022-01-01 RX ADMIN — MICONAZOLE NITRATE: 2 POWDER TOPICAL at 19:55

## 2022-01-01 RX ADMIN — ATORVASTATIN CALCIUM 20 MG: 20 TABLET, FILM COATED ORAL at 07:32

## 2022-01-01 RX ADMIN — IPRATROPIUM BROMIDE AND ALBUTEROL SULFATE 3 ML: 2.5; .5 SOLUTION RESPIRATORY (INHALATION) at 20:05

## 2022-01-01 RX ADMIN — NYSTATIN 500000 UNITS: 100000 SUSPENSION ORAL at 16:23

## 2022-01-01 RX ADMIN — NYSTATIN: 100000 OINTMENT TOPICAL at 10:33

## 2022-01-01 RX ADMIN — METOPROLOL SUCCINATE 100 MG: 50 TABLET, EXTENDED RELEASE ORAL at 09:43

## 2022-01-01 RX ADMIN — ACETAMINOPHEN 650 MG: 325 TABLET, FILM COATED ORAL at 11:59

## 2022-01-01 RX ADMIN — FUROSEMIDE 80 MG: 10 INJECTION, SOLUTION INTRAVENOUS at 15:25

## 2022-01-01 RX ADMIN — FUROSEMIDE 80 MG: 10 INJECTION, SOLUTION INTRAMUSCULAR; INTRAVENOUS at 19:59

## 2022-01-01 RX ADMIN — MICONAZOLE NITRATE: 2 POWDER TOPICAL at 21:30

## 2022-01-01 RX ADMIN — ALLOPURINOL 200 MG: 100 TABLET ORAL at 07:37

## 2022-01-01 RX ADMIN — SENNOSIDES AND DOCUSATE SODIUM 2 TABLET: 8.6; 5 TABLET ORAL at 20:21

## 2022-01-01 RX ADMIN — MORPHINE SULFATE 1 MG: 2 INJECTION, SOLUTION INTRAMUSCULAR; INTRAVENOUS at 20:00

## 2022-01-01 RX ADMIN — BUMETANIDE 2 MG: 2 TABLET ORAL at 08:07

## 2022-01-01 RX ADMIN — ACETAMINOPHEN 650 MG: 325 TABLET, FILM COATED ORAL at 18:36

## 2022-01-01 RX ADMIN — ATORVASTATIN CALCIUM 20 MG: 20 TABLET, FILM COATED ORAL at 08:16

## 2022-01-01 RX ADMIN — B-COMPLEX W/ C & FOLIC ACID TAB 1 MG 1 TABLET: 1 TAB at 08:44

## 2022-01-01 RX ADMIN — ACETAMINOPHEN 1000 MG: 500 TABLET ORAL at 23:45

## 2022-01-01 RX ADMIN — METOPROLOL SUCCINATE 100 MG: 100 TABLET, EXTENDED RELEASE ORAL at 09:19

## 2022-01-01 RX ADMIN — APIXABAN 5 MG: 5 TABLET, FILM COATED ORAL at 21:04

## 2022-01-01 RX ADMIN — ACETAMINOPHEN 650 MG: 325 TABLET, FILM COATED ORAL at 13:37

## 2022-01-01 RX ADMIN — OXYCODONE HYDROCHLORIDE 5 MG: 5 TABLET ORAL at 23:43

## 2022-01-01 RX ADMIN — ACETAMINOPHEN 1000 MG: 500 TABLET ORAL at 08:22

## 2022-01-01 RX ADMIN — ATORVASTATIN CALCIUM 20 MG: 20 TABLET, FILM COATED ORAL at 09:00

## 2022-01-01 RX ADMIN — DEXTROSE 15 G: 15 GEL ORAL at 00:27

## 2022-01-01 RX ADMIN — MICONAZOLE NITRATE: 2 POWDER TOPICAL at 08:46

## 2022-01-01 RX ADMIN — NYSTATIN 500000 UNITS: 100000 SUSPENSION ORAL at 15:55

## 2022-01-01 RX ADMIN — POLYETHYLENE GLYCOL 3350 17 G: 17 POWDER, FOR SOLUTION ORAL at 10:23

## 2022-01-01 RX ADMIN — HEPARIN SODIUM 2550 UNITS/HR: 10000 INJECTION, SOLUTION INTRAVENOUS at 22:27

## 2022-01-01 RX ADMIN — PIPERACILLIN SODIUM AND TAZOBACTAM SODIUM 4.5 G: 4; .5 INJECTION, POWDER, LYOPHILIZED, FOR SOLUTION INTRAVENOUS at 23:08

## 2022-01-01 RX ADMIN — SENNOSIDES AND DOCUSATE SODIUM 1 TABLET: 8.6; 5 TABLET ORAL at 08:40

## 2022-01-01 RX ADMIN — APIXABAN 5 MG: 5 TABLET, FILM COATED ORAL at 20:03

## 2022-01-01 RX ADMIN — IPRATROPIUM BROMIDE AND ALBUTEROL SULFATE 3 ML: 2.5; .5 SOLUTION RESPIRATORY (INHALATION) at 11:41

## 2022-01-01 RX ADMIN — APIXABAN 5 MG: 5 TABLET, FILM COATED ORAL at 19:29

## 2022-01-01 RX ADMIN — EMPAGLIFLOZIN 10 MG: 10 TABLET, FILM COATED ORAL at 14:50

## 2022-01-01 RX ADMIN — AMIODARONE HYDROCHLORIDE 150 MG: 1.5 INJECTION, SOLUTION INTRAVENOUS at 08:24

## 2022-01-01 RX ADMIN — PROPOFOL 10 MCG/KG/MIN: 10 INJECTION, EMULSION INTRAVENOUS at 10:49

## 2022-01-01 RX ADMIN — BUMETANIDE 4 MG: 2 TABLET ORAL at 20:00

## 2022-01-01 RX ADMIN — ACETAMINOPHEN 650 MG: 325 TABLET, FILM COATED ORAL at 00:31

## 2022-01-01 RX ADMIN — ACETAMINOPHEN 650 MG: 325 TABLET, FILM COATED ORAL at 21:21

## 2022-01-01 RX ADMIN — HUMAN INSULIN 10 UNITS: 100 INJECTION, SOLUTION SUBCUTANEOUS at 05:39

## 2022-01-01 RX ADMIN — HYDRALAZINE HYDROCHLORIDE 25 MG: 25 TABLET, FILM COATED ORAL at 14:35

## 2022-01-01 RX ADMIN — PIPERACILLIN AND TAZOBACTAM 3.38 G: 3; .375 INJECTION, POWDER, LYOPHILIZED, FOR SOLUTION INTRAVENOUS at 09:32

## 2022-01-01 RX ADMIN — NYSTATIN: 100000 OINTMENT TOPICAL at 09:09

## 2022-01-01 RX ADMIN — PROPOFOL 10 MCG/KG/MIN: 10 INJECTION, EMULSION INTRAVENOUS at 15:57

## 2022-01-01 RX ADMIN — HEPARIN SODIUM 2550 UNITS/HR: 10000 INJECTION, SOLUTION INTRAVENOUS at 01:32

## 2022-01-01 RX ADMIN — NYSTATIN 500000 UNITS: 100000 SUSPENSION ORAL at 20:30

## 2022-01-01 RX ADMIN — AMIODARONE HYDROCHLORIDE 200 MG: 200 TABLET ORAL at 08:44

## 2022-01-01 RX ADMIN — NYSTATIN 500000 UNITS: 100000 SUSPENSION ORAL at 08:46

## 2022-01-01 RX ADMIN — DEXMEDETOMIDINE HYDROCHLORIDE 0.7 MCG/KG/HR: 400 INJECTION INTRAVENOUS at 04:13

## 2022-01-01 RX ADMIN — PIPERACILLIN AND TAZOBACTAM 3.38 G: 3; .375 INJECTION, POWDER, LYOPHILIZED, FOR SOLUTION INTRAVENOUS at 18:22

## 2022-01-01 RX ADMIN — PROPOFOL 5 MCG/KG/MIN: 10 INJECTION, EMULSION INTRAVENOUS at 10:32

## 2022-01-01 RX ADMIN — POTASSIUM CHLORIDE 40 MEQ: 40 SOLUTION ORAL at 10:42

## 2022-01-01 RX ADMIN — SODIUM CHLORIDE, POTASSIUM CHLORIDE, SODIUM LACTATE AND CALCIUM CHLORIDE 500 ML: 600; 310; 30; 20 INJECTION, SOLUTION INTRAVENOUS at 22:36

## 2022-01-01 RX ADMIN — Medication 25 MCG: at 14:30

## 2022-01-01 RX ADMIN — LISINOPRIL 5 MG: 5 TABLET ORAL at 08:46

## 2022-01-01 RX ADMIN — FERROUS GLUCONATE 324 MG: 324 TABLET ORAL at 10:02

## 2022-01-01 RX ADMIN — DEXMEDETOMIDINE HYDROCHLORIDE 0.5 MCG/KG/HR: 400 INJECTION INTRAVENOUS at 08:47

## 2022-01-01 RX ADMIN — BUMETANIDE 2 MG: 2 TABLET ORAL at 09:42

## 2022-01-01 RX ADMIN — POLYETHYLENE GLYCOL 3350 17 G: 17 POWDER, FOR SOLUTION ORAL at 07:37

## 2022-01-01 RX ADMIN — SODIUM CHLORIDE 1250 MG: 9 INJECTION, SOLUTION INTRAVENOUS at 23:35

## 2022-01-01 RX ADMIN — SODIUM CHLORIDE SOLN NEBU 3% 3 ML: 3 NEBU SOLN at 12:30

## 2022-01-01 RX ADMIN — Medication 5 MG: at 10:31

## 2022-01-01 RX ADMIN — DEXMEDETOMIDINE HYDROCHLORIDE 0.5 MCG/KG/HR: 400 INJECTION INTRAVENOUS at 04:29

## 2022-01-01 RX ADMIN — NYSTATIN 500000 UNITS: 100000 SUSPENSION ORAL at 16:13

## 2022-01-01 RX ADMIN — ONDANSETRON 4 MG: 4 TABLET, ORALLY DISINTEGRATING ORAL at 10:31

## 2022-01-01 RX ADMIN — ONDANSETRON HYDROCHLORIDE 4 MG: 2 INJECTION, SOLUTION INTRAMUSCULAR; INTRAVENOUS at 21:45

## 2022-01-01 RX ADMIN — SODIUM CHLORIDE, POTASSIUM CHLORIDE, SODIUM LACTATE AND CALCIUM CHLORIDE 500 ML: 600; 310; 30; 20 INJECTION, SOLUTION INTRAVENOUS at 06:53

## 2022-01-01 RX ADMIN — MICONAZOLE NITRATE: 2 POWDER TOPICAL at 21:05

## 2022-01-01 RX ADMIN — HUMAN INSULIN 10 UNITS: 100 INJECTION, SOLUTION SUBCUTANEOUS at 21:53

## 2022-01-01 RX ADMIN — Medication 0.12 MCG/KG/MIN: at 01:31

## 2022-01-01 RX ADMIN — SENNOSIDES AND DOCUSATE SODIUM 1 TABLET: 8.6; 5 TABLET ORAL at 09:16

## 2022-01-01 RX ADMIN — ACETAMINOPHEN 650 MG: 325 TABLET, FILM COATED ORAL at 05:01

## 2022-01-01 RX ADMIN — HEPARIN SODIUM 1950 UNITS/HR: 10000 INJECTION, SOLUTION INTRAVENOUS at 19:51

## 2022-01-01 RX ADMIN — MICONAZOLE NITRATE: 2 POWDER TOPICAL at 20:03

## 2022-01-01 RX ADMIN — DEXMEDETOMIDINE HYDROCHLORIDE 1 MCG/KG/HR: 400 INJECTION INTRAVENOUS at 23:27

## 2022-01-01 RX ADMIN — DEXMEDETOMIDINE HYDROCHLORIDE 0.6 MCG/KG/HR: 400 INJECTION INTRAVENOUS at 09:29

## 2022-01-01 RX ADMIN — MICONAZOLE NITRATE: 2 POWDER TOPICAL at 10:08

## 2022-01-01 RX ADMIN — FERROUS GLUCONATE 324 MG: 324 TABLET ORAL at 08:44

## 2022-01-01 RX ADMIN — BUMETANIDE 4 MG: 0.25 INJECTION INTRAMUSCULAR; INTRAVENOUS at 20:21

## 2022-01-01 RX ADMIN — MICONAZOLE NITRATE: 2 POWDER TOPICAL at 10:33

## 2022-01-01 RX ADMIN — HEPARIN SODIUM 2350 UNITS/HR: 10000 INJECTION, SOLUTION INTRAVENOUS at 09:42

## 2022-01-01 RX ADMIN — FUROSEMIDE 80 MG: 10 INJECTION, SOLUTION INTRAVENOUS at 16:06

## 2022-01-01 RX ADMIN — VANCOMYCIN HYDROCHLORIDE 3000 MG: 1 INJECTION, POWDER, LYOPHILIZED, FOR SOLUTION INTRAVENOUS at 06:44

## 2022-01-01 RX ADMIN — BUMETANIDE 4 MG: 0.25 INJECTION INTRAMUSCULAR; INTRAVENOUS at 11:09

## 2022-01-01 RX ADMIN — APIXABAN 5 MG: 5 TABLET, FILM COATED ORAL at 21:16

## 2022-01-01 RX ADMIN — APIXABAN 5 MG: 5 TABLET, FILM COATED ORAL at 20:21

## 2022-01-01 RX ADMIN — ALLOPURINOL 200 MG: 100 TABLET ORAL at 08:39

## 2022-01-01 RX ADMIN — AMIODARONE HYDROCHLORIDE 1 MG/MIN: 50 INJECTION, SOLUTION INTRAVENOUS at 16:37

## 2022-01-01 RX ADMIN — SENNOSIDES AND DOCUSATE SODIUM 1 TABLET: 8.6; 5 TABLET ORAL at 13:40

## 2022-01-01 RX ADMIN — QUETIAPINE FUMARATE 25 MG: 25 TABLET ORAL at 08:45

## 2022-01-01 RX ADMIN — ATORVASTATIN CALCIUM 20 MG: 20 TABLET, FILM COATED ORAL at 08:31

## 2022-01-01 RX ADMIN — NYSTATIN 500000 UNITS: 100000 SUSPENSION ORAL at 08:39

## 2022-01-01 RX ADMIN — APIXABAN 5 MG: 5 TABLET, FILM COATED ORAL at 08:55

## 2022-01-01 RX ADMIN — NYSTATIN: 100000 OINTMENT TOPICAL at 20:55

## 2022-01-01 RX ADMIN — OXYCODONE HYDROCHLORIDE 5 MG: 5 TABLET ORAL at 00:41

## 2022-01-01 RX ADMIN — PANTOPRAZOLE SODIUM 40 MG: 40 INJECTION, POWDER, FOR SOLUTION INTRAVENOUS at 07:56

## 2022-01-01 RX ADMIN — AMIODARONE HYDROCHLORIDE 200 MG: 200 TABLET ORAL at 08:33

## 2022-01-01 RX ADMIN — BUMETANIDE 4 MG: 2 TABLET ORAL at 16:11

## 2022-01-01 RX ADMIN — ATORVASTATIN CALCIUM 20 MG: 20 TABLET, FILM COATED ORAL at 10:02

## 2022-01-01 RX ADMIN — NYSTATIN 500000 UNITS: 100000 SUSPENSION ORAL at 08:58

## 2022-01-01 RX ADMIN — IPRATROPIUM BROMIDE AND ALBUTEROL SULFATE 3 ML: 2.5; .5 SOLUTION RESPIRATORY (INHALATION) at 21:03

## 2022-01-01 RX ADMIN — HEPARIN SODIUM 1200 UNITS/HR: 10000 INJECTION, SOLUTION INTRAVENOUS at 10:58

## 2022-01-01 RX ADMIN — BUMETANIDE 1 MG: 1 TABLET ORAL at 09:19

## 2022-01-01 RX ADMIN — INSULIN ASPART 1 UNITS: 100 INJECTION, SOLUTION INTRAVENOUS; SUBCUTANEOUS at 23:25

## 2022-01-01 RX ADMIN — Medication 3 MG: at 00:49

## 2022-01-01 RX ADMIN — INSULIN ASPART 1 UNITS: 100 INJECTION, SOLUTION INTRAVENOUS; SUBCUTANEOUS at 08:46

## 2022-01-01 RX ADMIN — Medication 0.12 MCG/KG/MIN: at 04:52

## 2022-01-01 RX ADMIN — ATORVASTATIN CALCIUM 20 MG: 20 TABLET, FILM COATED ORAL at 07:35

## 2022-01-01 RX ADMIN — TRIAMCINOLONE ACETONIDE: 1 CREAM TOPICAL at 08:23

## 2022-01-01 RX ADMIN — PIPERACILLIN AND TAZOBACTAM 3.38 G: 3; .375 INJECTION, POWDER, LYOPHILIZED, FOR SOLUTION INTRAVENOUS at 22:08

## 2022-01-01 RX ADMIN — NYSTATIN 500000 UNITS: 100000 SUSPENSION ORAL at 21:29

## 2022-01-01 RX ADMIN — NYSTATIN 500000 UNITS: 100000 SUSPENSION ORAL at 11:25

## 2022-01-01 RX ADMIN — BUMETANIDE 2 MG: 2 TABLET ORAL at 14:21

## 2022-01-01 RX ADMIN — HEPARIN SODIUM 2550 UNITS/HR: 10000 INJECTION, SOLUTION INTRAVENOUS at 06:06

## 2022-01-01 RX ADMIN — DEXMEDETOMIDINE HYDROCHLORIDE 0.5 MCG/KG/HR: 400 INJECTION INTRAVENOUS at 20:40

## 2022-01-01 RX ADMIN — BUMETANIDE 4 MG: 0.25 INJECTION INTRAMUSCULAR; INTRAVENOUS at 07:16

## 2022-01-01 RX ADMIN — ATORVASTATIN CALCIUM 20 MG: 20 TABLET, FILM COATED ORAL at 08:40

## 2022-01-01 RX ADMIN — NYSTATIN: 100000 OINTMENT TOPICAL at 20:18

## 2022-01-01 RX ADMIN — B-COMPLEX W/ C & FOLIC ACID TAB 1 MG 1 TABLET: 1 TAB at 08:39

## 2022-01-01 RX ADMIN — LIDOCAINE PATCH 4% 2 PATCH: 40 PATCH TOPICAL at 08:43

## 2022-01-01 RX ADMIN — ACETAMINOPHEN 650 MG: 325 TABLET, FILM COATED ORAL at 06:38

## 2022-01-01 RX ADMIN — APIXABAN 5 MG: 5 TABLET, FILM COATED ORAL at 09:09

## 2022-01-01 RX ADMIN — POTASSIUM CHLORIDE 20 MEQ: 750 TABLET, EXTENDED RELEASE ORAL at 10:47

## 2022-01-01 RX ADMIN — FERROUS GLUCONATE 324 MG: 324 TABLET ORAL at 08:39

## 2022-01-01 RX ADMIN — IPRATROPIUM BROMIDE AND ALBUTEROL SULFATE 3 ML: 2.5; .5 SOLUTION RESPIRATORY (INHALATION) at 08:49

## 2022-01-01 RX ADMIN — NYSTATIN: 100000 OINTMENT TOPICAL at 00:38

## 2022-01-01 RX ADMIN — IPRATROPIUM BROMIDE AND ALBUTEROL SULFATE 3 ML: 2.5; .5 SOLUTION RESPIRATORY (INHALATION) at 08:27

## 2022-01-01 RX ADMIN — SODIUM CHLORIDE, POTASSIUM CHLORIDE, SODIUM LACTATE AND CALCIUM CHLORIDE 250 ML: 600; 310; 30; 20 INJECTION, SOLUTION INTRAVENOUS at 16:53

## 2022-01-01 RX ADMIN — ACETAMINOPHEN 650 MG: 325 TABLET, FILM COATED ORAL at 21:10

## 2022-01-01 RX ADMIN — MICONAZOLE NITRATE: 2 POWDER TOPICAL at 09:45

## 2022-01-01 RX ADMIN — APIXABAN 5 MG: 5 TABLET, FILM COATED ORAL at 21:45

## 2022-01-01 RX ADMIN — PANTOPRAZOLE SODIUM 40 MG: 40 INJECTION, POWDER, FOR SOLUTION INTRAVENOUS at 08:40

## 2022-01-01 RX ADMIN — DEXMEDETOMIDINE HYDROCHLORIDE 1.5 MCG/KG/HR: 400 INJECTION INTRAVENOUS at 18:47

## 2022-01-01 RX ADMIN — ALLOPURINOL 100 MG: 100 TABLET ORAL at 10:09

## 2022-01-01 RX ADMIN — LIDOCAINE PATCH 4% 2 PATCH: 40 PATCH TOPICAL at 08:29

## 2022-01-01 RX ADMIN — ACETAMINOPHEN 650 MG: 325 TABLET, FILM COATED ORAL at 02:14

## 2022-01-01 RX ADMIN — ATORVASTATIN CALCIUM 20 MG: 10 TABLET, FILM COATED ORAL at 10:09

## 2022-01-01 RX ADMIN — IPRATROPIUM BROMIDE AND ALBUTEROL SULFATE 3 ML: 2.5; .5 SOLUTION RESPIRATORY (INHALATION) at 11:38

## 2022-01-01 RX ADMIN — BUMETANIDE 4 MG: 0.25 INJECTION INTRAMUSCULAR; INTRAVENOUS at 04:31

## 2022-01-01 RX ADMIN — METOLAZONE 5 MG: 5 TABLET ORAL at 14:20

## 2022-01-01 RX ADMIN — SENNOSIDES AND DOCUSATE SODIUM 2 TABLET: 8.6; 5 TABLET ORAL at 08:46

## 2022-01-01 RX ADMIN — IPRATROPIUM BROMIDE AND ALBUTEROL SULFATE 3 ML: 2.5; .5 SOLUTION RESPIRATORY (INHALATION) at 08:05

## 2022-01-01 RX ADMIN — PIPERACILLIN AND TAZOBACTAM 3.38 G: 3; .375 INJECTION, POWDER, LYOPHILIZED, FOR SOLUTION INTRAVENOUS at 03:48

## 2022-01-01 RX ADMIN — SENNOSIDES AND DOCUSATE SODIUM 2 TABLET: 8.6; 5 TABLET ORAL at 08:08

## 2022-01-01 RX ADMIN — ALLOPURINOL 200 MG: 100 TABLET ORAL at 07:53

## 2022-01-01 RX ADMIN — NYSTATIN 500000 UNITS: 100000 SUSPENSION ORAL at 16:55

## 2022-01-01 RX ADMIN — IPRATROPIUM BROMIDE AND ALBUTEROL SULFATE 3 ML: 2.5; .5 SOLUTION RESPIRATORY (INHALATION) at 20:40

## 2022-01-01 RX ADMIN — BUMETANIDE 4 MG: 2 TABLET ORAL at 16:00

## 2022-01-01 RX ADMIN — Medication 1 CAPSULE: at 08:22

## 2022-01-01 RX ADMIN — APIXABAN 5 MG: 2.5 TABLET, FILM COATED ORAL at 10:09

## 2022-01-01 RX ADMIN — HUMAN ALBUMIN MICROSPHERES AND PERFLUTREN 6 ML: 10; .22 INJECTION, SOLUTION INTRAVENOUS at 13:30

## 2022-01-01 RX ADMIN — ACETAMINOPHEN 650 MG: 325 TABLET, FILM COATED ORAL at 08:09

## 2022-01-01 RX ADMIN — POTASSIUM CHLORIDE 20 MEQ: 1.5 POWDER, FOR SOLUTION ORAL at 17:10

## 2022-01-01 RX ADMIN — ALLOPURINOL 200 MG: 100 TABLET ORAL at 08:03

## 2022-01-01 RX ADMIN — AMIODARONE HYDROCHLORIDE 200 MG: 200 TABLET ORAL at 20:00

## 2022-01-01 RX ADMIN — APIXABAN 5 MG: 5 TABLET, FILM COATED ORAL at 19:54

## 2022-01-01 RX ADMIN — BISACODYL 10 MG: 10 SUPPOSITORY RECTAL at 21:12

## 2022-01-01 RX ADMIN — POLYETHYLENE GLYCOL 3350 17 G: 17 POWDER, FOR SOLUTION ORAL at 13:40

## 2022-01-01 RX ADMIN — INSULIN ASPART 1 UNITS: 100 INJECTION, SOLUTION INTRAVENOUS; SUBCUTANEOUS at 12:01

## 2022-01-01 RX ADMIN — ALLOPURINOL 200 MG: 100 TABLET ORAL at 08:33

## 2022-01-01 RX ADMIN — IPRATROPIUM BROMIDE AND ALBUTEROL SULFATE 3 ML: 2.5; .5 SOLUTION RESPIRATORY (INHALATION) at 20:39

## 2022-01-01 RX ADMIN — MICONAZOLE NITRATE: 2 POWDER TOPICAL at 08:10

## 2022-01-01 RX ADMIN — APIXABAN 5 MG: 5 TABLET, FILM COATED ORAL at 20:04

## 2022-01-01 RX ADMIN — SENNOSIDES AND DOCUSATE SODIUM 1 TABLET: 8.6; 5 TABLET ORAL at 08:16

## 2022-01-01 RX ADMIN — B-COMPLEX W/ C & FOLIC ACID TAB 1 MG 1 TABLET: 1 TAB at 07:56

## 2022-01-01 RX ADMIN — DEXMEDETOMIDINE HYDROCHLORIDE 0.5 MCG/KG/HR: 400 INJECTION INTRAVENOUS at 17:24

## 2022-01-01 RX ADMIN — NYSTATIN 500000 UNITS: 100000 SUSPENSION ORAL at 20:03

## 2022-01-01 RX ADMIN — CEFTRIAXONE SODIUM 2 G: 2 INJECTION, POWDER, FOR SOLUTION INTRAMUSCULAR; INTRAVENOUS at 21:28

## 2022-01-01 RX ADMIN — Medication 6 MG: at 21:53

## 2022-01-01 RX ADMIN — AMIODARONE HYDROCHLORIDE 200 MG: 200 TABLET ORAL at 08:59

## 2022-01-01 RX ADMIN — ATORVASTATIN CALCIUM 20 MG: 20 TABLET, FILM COATED ORAL at 10:07

## 2022-01-01 RX ADMIN — APIXABAN 5 MG: 5 TABLET, FILM COATED ORAL at 08:32

## 2022-01-01 RX ADMIN — HEPARIN SODIUM 2350 UNITS/HR: 10000 INJECTION, SOLUTION INTRAVENOUS at 05:32

## 2022-01-01 RX ADMIN — NYSTATIN 500000 UNITS: 100000 SUSPENSION ORAL at 11:01

## 2022-01-01 RX ADMIN — MICONAZOLE NITRATE: 2 POWDER TOPICAL at 08:40

## 2022-01-01 RX ADMIN — ACETAMINOPHEN 650 MG: 325 TABLET, FILM COATED ORAL at 19:45

## 2022-01-01 RX ADMIN — APIXABAN 5 MG: 2.5 TABLET, FILM COATED ORAL at 20:15

## 2022-01-01 RX ADMIN — NYSTATIN 500000 UNITS: 100000 SUSPENSION ORAL at 21:04

## 2022-01-01 RX ADMIN — FUROSEMIDE 100 MG: 10 INJECTION, SOLUTION INTRAVENOUS at 08:38

## 2022-01-01 RX ADMIN — OXYCODONE HYDROCHLORIDE 5 MG: 5 TABLET ORAL at 14:23

## 2022-01-01 RX ADMIN — ACETAMINOPHEN 1000 MG: 500 TABLET ORAL at 01:43

## 2022-01-01 RX ADMIN — DEXMEDETOMIDINE HYDROCHLORIDE 0.5 MCG/KG/HR: 400 INJECTION INTRAVENOUS at 13:02

## 2022-01-01 RX ADMIN — NYSTATIN 500000 UNITS: 100000 SUSPENSION ORAL at 08:03

## 2022-01-01 RX ADMIN — B-COMPLEX W/ C & FOLIC ACID TAB 1 MG 1 TABLET: 1 TAB at 08:30

## 2022-01-01 RX ADMIN — ALLOPURINOL 100 MG: 100 TABLET ORAL at 08:59

## 2022-01-01 RX ADMIN — MICONAZOLE NITRATE: 2 POWDER TOPICAL at 21:00

## 2022-01-01 RX ADMIN — AMIODARONE HYDROCHLORIDE 200 MG: 200 TABLET ORAL at 09:08

## 2022-01-01 RX ADMIN — IPRATROPIUM BROMIDE AND ALBUTEROL SULFATE 3 ML: 2.5; .5 SOLUTION RESPIRATORY (INHALATION) at 13:30

## 2022-01-01 RX ADMIN — HYDRALAZINE HYDROCHLORIDE 25 MG: 25 TABLET, FILM COATED ORAL at 21:53

## 2022-01-01 RX ADMIN — MICONAZOLE NITRATE: 2 POWDER TOPICAL at 07:36

## 2022-01-01 RX ADMIN — PIPERACILLIN SODIUM AND TAZOBACTAM SODIUM 4.5 G: 4; .5 INJECTION, POWDER, LYOPHILIZED, FOR SOLUTION INTRAVENOUS at 22:12

## 2022-01-01 RX ADMIN — NYSTATIN 500000 UNITS: 100000 SUSPENSION ORAL at 13:01

## 2022-01-01 RX ADMIN — PIPERACILLIN AND TAZOBACTAM 3.38 G: 3; .375 INJECTION, POWDER, LYOPHILIZED, FOR SOLUTION INTRAVENOUS at 21:48

## 2022-01-01 RX ADMIN — DEXMEDETOMIDINE HYDROCHLORIDE 1.2 MCG/KG/HR: 400 INJECTION INTRAVENOUS at 12:36

## 2022-01-01 RX ADMIN — METOPROLOL SUCCINATE 100 MG: 50 TABLET, EXTENDED RELEASE ORAL at 09:15

## 2022-01-01 RX ADMIN — B-COMPLEX W/ C & FOLIC ACID TAB 1 MG 1 TABLET: 1 TAB at 08:32

## 2022-01-01 RX ADMIN — SODIUM CHLORIDE SOLN NEBU 3% 3 ML: 3 NEBU SOLN at 11:38

## 2022-01-01 RX ADMIN — SODIUM CHLORIDE SOLN NEBU 3% 3 ML: 3 NEBU SOLN at 15:37

## 2022-01-01 RX ADMIN — ONDANSETRON HYDROCHLORIDE 4 MG: 2 INJECTION, SOLUTION INTRAMUSCULAR; INTRAVENOUS at 12:08

## 2022-01-01 RX ADMIN — HYDROXYZINE HYDROCHLORIDE 50 MG: 25 TABLET, FILM COATED ORAL at 11:03

## 2022-01-01 RX ADMIN — MICONAZOLE NITRATE: 2 POWDER TOPICAL at 20:06

## 2022-01-01 RX ADMIN — Medication 0.09 MCG/KG/MIN: at 09:40

## 2022-01-01 RX ADMIN — QUETIAPINE FUMARATE 50 MG: 50 TABLET ORAL at 21:28

## 2022-01-01 RX ADMIN — BUMETANIDE 4 MG: 0.25 INJECTION INTRAMUSCULAR; INTRAVENOUS at 12:08

## 2022-01-01 RX ADMIN — PIPERACILLIN AND TAZOBACTAM 3.38 G: 3; .375 INJECTION, POWDER, LYOPHILIZED, FOR SOLUTION INTRAVENOUS at 03:07

## 2022-01-01 RX ADMIN — MICONAZOLE NITRATE: 2 POWDER TOPICAL at 00:08

## 2022-01-01 RX ADMIN — BISACODYL 10 MG: 10 SUPPOSITORY RECTAL at 09:58

## 2022-01-01 RX ADMIN — HEPARIN SODIUM 1950 UNITS/HR: 10000 INJECTION, SOLUTION INTRAVENOUS at 09:05

## 2022-01-01 RX ADMIN — BUMETANIDE 4 MG: 2 TABLET ORAL at 13:12

## 2022-01-01 RX ADMIN — OXYCODONE HYDROCHLORIDE 5 MG: 5 TABLET ORAL at 16:43

## 2022-01-01 RX ADMIN — PANTOPRAZOLE SODIUM 40 MG: 40 INJECTION, POWDER, FOR SOLUTION INTRAVENOUS at 07:37

## 2022-01-01 RX ADMIN — INSULIN ASPART 1 UNITS: 100 INJECTION, SOLUTION INTRAVENOUS; SUBCUTANEOUS at 23:31

## 2022-01-01 RX ADMIN — MICONAZOLE NITRATE: 2 POWDER TOPICAL at 19:53

## 2022-01-01 RX ADMIN — MICONAZOLE NITRATE: 20 POWDER TOPICAL at 10:11

## 2022-01-01 RX ADMIN — NYSTATIN 500000 UNITS: 100000 SUSPENSION ORAL at 18:36

## 2022-01-01 RX ADMIN — MICONAZOLE NITRATE: 2 POWDER TOPICAL at 07:38

## 2022-01-01 RX ADMIN — SENNOSIDES AND DOCUSATE SODIUM 2 TABLET: 50; 8.6 TABLET ORAL at 09:43

## 2022-01-01 RX ADMIN — FENTANYL CITRATE 50 MCG: 50 INJECTION INTRAMUSCULAR; INTRAVENOUS at 19:05

## 2022-01-01 RX ADMIN — MICONAZOLE NITRATE: 20 POWDER TOPICAL at 09:39

## 2022-01-01 RX ADMIN — HEPARIN SODIUM 2350 UNITS/HR: 10000 INJECTION, SOLUTION INTRAVENOUS at 07:58

## 2022-01-01 RX ADMIN — FUROSEMIDE 80 MG: 10 INJECTION, SOLUTION INTRAVENOUS at 10:52

## 2022-01-01 RX ADMIN — FUROSEMIDE 100 MG: 10 INJECTION, SOLUTION INTRAVENOUS at 13:43

## 2022-01-01 RX ADMIN — ACETAMINOPHEN 650 MG: 325 TABLET, FILM COATED ORAL at 23:43

## 2022-01-01 RX ADMIN — INSULIN ASPART 1 UNITS: 100 INJECTION, SOLUTION INTRAVENOUS; SUBCUTANEOUS at 03:06

## 2022-01-01 RX ADMIN — DEXMEDETOMIDINE HYDROCHLORIDE 1.5 MCG/KG/HR: 400 INJECTION INTRAVENOUS at 16:22

## 2022-01-01 RX ADMIN — PROPOFOL 15 MCG/KG/MIN: 10 INJECTION, EMULSION INTRAVENOUS at 15:24

## 2022-01-01 RX ADMIN — SODIUM CHLORIDE: 9 INJECTION, SOLUTION INTRAVENOUS at 22:57

## 2022-01-01 RX ADMIN — FERROUS GLUCONATE 324 MG: 324 TABLET ORAL at 07:32

## 2022-01-01 RX ADMIN — HEPARIN SODIUM 2350 UNITS/HR: 10000 INJECTION, SOLUTION INTRAVENOUS at 12:24

## 2022-01-01 RX ADMIN — B-COMPLEX W/ C & FOLIC ACID TAB 1 MG 1 TABLET: 1 TAB at 08:55

## 2022-01-01 RX ADMIN — NYSTATIN 500000 UNITS: 100000 SUSPENSION ORAL at 16:00

## 2022-01-01 RX ADMIN — APIXABAN 5 MG: 5 TABLET, FILM COATED ORAL at 20:16

## 2022-01-01 RX ADMIN — ONDANSETRON HYDROCHLORIDE 4 MG: 2 INJECTION, SOLUTION INTRAMUSCULAR; INTRAVENOUS at 10:42

## 2022-01-01 RX ADMIN — AMIODARONE HYDROCHLORIDE 200 MG: 200 TABLET ORAL at 09:19

## 2022-01-01 RX ADMIN — POTASSIUM & SODIUM PHOSPHATES POWDER PACK 280-160-250 MG 1 PACKET: 280-160-250 PACK at 08:59

## 2022-01-01 RX ADMIN — NYSTATIN 500000 UNITS: 100000 SUSPENSION ORAL at 14:30

## 2022-01-01 RX ADMIN — B-COMPLEX W/ C & FOLIC ACID TAB 1 MG 1 TABLET: 1 TAB at 10:35

## 2022-01-01 RX ADMIN — ETOMIDATE 50 MG: 2 INJECTION INTRAVENOUS at 02:09

## 2022-01-01 RX ADMIN — FERROUS GLUCONATE 324 MG: 324 TABLET ORAL at 08:07

## 2022-01-01 RX ADMIN — HYDROXYZINE HYDROCHLORIDE 25 MG: 25 TABLET, FILM COATED ORAL at 11:05

## 2022-01-01 RX ADMIN — INSULIN ASPART 1 UNITS: 100 INJECTION, SOLUTION INTRAVENOUS; SUBCUTANEOUS at 16:22

## 2022-01-01 RX ADMIN — DEXMEDETOMIDINE HYDROCHLORIDE 1.5 MCG/KG/HR: 400 INJECTION INTRAVENOUS at 14:24

## 2022-01-01 RX ADMIN — FERROUS GLUCONATE 324 MG: 324 TABLET ORAL at 08:56

## 2022-01-01 RX ADMIN — Medication 6 MG: at 22:14

## 2022-01-01 RX ADMIN — QUETIAPINE FUMARATE 25 MG: 25 TABLET ORAL at 07:37

## 2022-01-01 RX ADMIN — FUROSEMIDE 80 MG: 10 INJECTION, SOLUTION INTRAVENOUS at 10:07

## 2022-01-01 RX ADMIN — NYSTATIN 500000 UNITS: 100000 SUSPENSION ORAL at 15:16

## 2022-01-01 RX ADMIN — PIPERACILLIN AND TAZOBACTAM 3.38 G: 3; .375 INJECTION, POWDER, LYOPHILIZED, FOR SOLUTION INTRAVENOUS at 16:21

## 2022-01-01 RX ADMIN — ACETAMINOPHEN 650 MG: 325 TABLET, FILM COATED ORAL at 12:25

## 2022-01-01 RX ADMIN — PANTOPRAZOLE SODIUM 40 MG: 40 INJECTION, POWDER, FOR SOLUTION INTRAVENOUS at 08:32

## 2022-01-01 RX ADMIN — HEPARIN SODIUM 2550 UNITS/HR: 10000 INJECTION, SOLUTION INTRAVENOUS at 11:00

## 2022-01-01 RX ADMIN — Medication 5 ML: at 07:39

## 2022-01-01 RX ADMIN — BISACODYL 10 MG: 10 SUPPOSITORY RECTAL at 18:21

## 2022-01-01 RX ADMIN — OXYCODONE HYDROCHLORIDE 5 MG: 5 TABLET ORAL at 21:17

## 2022-01-01 RX ADMIN — SODIUM CHLORIDE SOLN NEBU 3% 3 ML: 3 NEBU SOLN at 22:57

## 2022-01-01 RX ADMIN — SODIUM CHLORIDE SOLN NEBU 3% 3 ML: 3 NEBU SOLN at 15:16

## 2022-01-01 RX ADMIN — SODIUM CHLORIDE SOLN NEBU 3% 3 ML: 3 NEBU SOLN at 08:27

## 2022-01-01 RX ADMIN — MORPHINE SULFATE 4 MG: 2 INJECTION, SOLUTION INTRAMUSCULAR; INTRAVENOUS at 07:52

## 2022-01-01 RX ADMIN — OXYCODONE HYDROCHLORIDE 5 MG: 5 TABLET ORAL at 17:07

## 2022-01-01 RX ADMIN — SODIUM CHLORIDE SOLN NEBU 3% 3 ML: 3 NEBU SOLN at 08:05

## 2022-01-01 RX ADMIN — ALLOPURINOL 100 MG: 100 TABLET ORAL at 07:32

## 2022-01-01 RX ADMIN — ALLOPURINOL 100 MG: 100 TABLET ORAL at 09:16

## 2022-01-01 RX ADMIN — NYSTATIN 500000 UNITS: 100000 SUSPENSION ORAL at 10:06

## 2022-01-01 RX ADMIN — LIDOCAINE PATCH 4% 2 PATCH: 40 PATCH TOPICAL at 08:08

## 2022-01-01 RX ADMIN — BUMETANIDE 2 MG: 2 TABLET ORAL at 09:08

## 2022-01-01 RX ADMIN — B-COMPLEX W/ C & FOLIC ACID TAB 1 MG 1 TABLET: 1 TAB at 08:26

## 2022-01-01 RX ADMIN — MICONAZOLE NITRATE: 20 POWDER TOPICAL at 20:15

## 2022-01-01 RX ADMIN — HEPARIN SODIUM 2550 UNITS/HR: 10000 INJECTION, SOLUTION INTRAVENOUS at 12:16

## 2022-01-01 RX ADMIN — PROPOFOL 15 MCG/KG/MIN: 10 INJECTION, EMULSION INTRAVENOUS at 21:07

## 2022-01-01 RX ADMIN — BISACODYL 10 MG: 10 SUPPOSITORY RECTAL at 11:02

## 2022-01-01 RX ADMIN — ONDANSETRON HYDROCHLORIDE 4 MG: 2 INJECTION, SOLUTION INTRAMUSCULAR; INTRAVENOUS at 13:13

## 2022-01-01 RX ADMIN — Medication 6 MG: at 22:21

## 2022-01-01 RX ADMIN — Medication 0.08 MCG/KG/MIN: at 06:00

## 2022-01-01 RX ADMIN — ALLOPURINOL 100 MG: 100 TABLET ORAL at 08:22

## 2022-01-01 RX ADMIN — PIPERACILLIN AND TAZOBACTAM 3.38 G: 3; .375 INJECTION, POWDER, LYOPHILIZED, FOR SOLUTION INTRAVENOUS at 15:31

## 2022-01-01 RX ADMIN — FUROSEMIDE 120 MG: 10 INJECTION, SOLUTION INTRAMUSCULAR; INTRAVENOUS at 02:04

## 2022-01-01 RX ADMIN — SODIUM CHLORIDE SOLN NEBU 3% 3 ML: 3 NEBU SOLN at 07:22

## 2022-01-01 RX ADMIN — LISINOPRIL 5 MG: 5 TABLET ORAL at 10:01

## 2022-01-01 RX ADMIN — SODIUM CHLORIDE SOLN NEBU 3% 3 ML: 3 NEBU SOLN at 20:37

## 2022-01-01 RX ADMIN — AMIODARONE HYDROCHLORIDE 200 MG: 200 TABLET ORAL at 21:04

## 2022-01-01 RX ADMIN — LISINOPRIL 5 MG: 5 TABLET ORAL at 08:44

## 2022-01-01 RX ADMIN — PROCHLORPERAZINE EDISYLATE 10 MG: 5 INJECTION INTRAMUSCULAR; INTRAVENOUS at 12:44

## 2022-01-01 RX ADMIN — B-COMPLEX W/ C & FOLIC ACID TAB 1 MG 1 TABLET: 1 TAB at 09:42

## 2022-01-01 RX ADMIN — NYSTATIN 500000 UNITS: 100000 SUSPENSION ORAL at 12:20

## 2022-01-01 RX ADMIN — MICONAZOLE NITRATE: 2 POWDER TOPICAL at 21:47

## 2022-01-01 RX ADMIN — NYSTATIN: 100000 OINTMENT TOPICAL at 08:45

## 2022-01-01 RX ADMIN — POTASSIUM PHOSPHATE, MONOBASIC AND POTASSIUM PHOSPHATE, DIBASIC 9 MMOL: 224; 236 INJECTION, SOLUTION, CONCENTRATE INTRAVENOUS at 16:57

## 2022-01-01 RX ADMIN — MICONAZOLE NITRATE: 2 POWDER TOPICAL at 09:06

## 2022-01-01 RX ADMIN — ACETAMINOPHEN 325 MG: 325 TABLET, FILM COATED ORAL at 21:43

## 2022-01-01 RX ADMIN — ACETAMINOPHEN 1000 MG: 500 TABLET ORAL at 19:13

## 2022-01-01 RX ADMIN — ATORVASTATIN CALCIUM 20 MG: 20 TABLET, FILM COATED ORAL at 08:33

## 2022-01-01 RX ADMIN — FERROUS GLUCONATE 324 MG: 324 TABLET ORAL at 10:09

## 2022-01-01 RX ADMIN — APIXABAN 5 MG: 5 TABLET, FILM COATED ORAL at 10:06

## 2022-01-01 RX ADMIN — ACETAMINOPHEN 650 MG: 325 TABLET, FILM COATED ORAL at 01:47

## 2022-01-01 RX ADMIN — LIDOCAINE PATCH 4% 2 PATCH: 40 PATCH TOPICAL at 09:00

## 2022-01-01 RX ADMIN — ACETAMINOPHEN 650 MG: 325 TABLET, FILM COATED ORAL at 22:30

## 2022-01-01 RX ADMIN — APIXABAN 5 MG: 5 TABLET, FILM COATED ORAL at 09:15

## 2022-01-01 RX ADMIN — MICONAZOLE NITRATE: 2 POWDER TOPICAL at 07:51

## 2022-01-01 RX ADMIN — ALLOPURINOL 100 MG: 100 TABLET ORAL at 10:02

## 2022-01-01 RX ADMIN — BUMETANIDE 4 MG: 0.25 INJECTION, SOLUTION INTRAMUSCULAR; INTRAVENOUS at 12:20

## 2022-01-01 RX ADMIN — FUROSEMIDE 80 MG: 10 INJECTION, SOLUTION INTRAVENOUS at 14:25

## 2022-01-01 RX ADMIN — ALLOPURINOL 200 MG: 100 TABLET ORAL at 08:38

## 2022-01-01 RX ADMIN — MICONAZOLE NITRATE: 2 POWDER TOPICAL at 01:00

## 2022-01-01 RX ADMIN — BUMETANIDE 4 MG: 2 TABLET ORAL at 08:59

## 2022-01-01 RX ADMIN — AMIODARONE HYDROCHLORIDE 200 MG: 200 TABLET ORAL at 08:32

## 2022-01-01 RX ADMIN — POLYETHYLENE GLYCOL 3350 17 G: 17 POWDER, FOR SOLUTION ORAL at 09:39

## 2022-01-01 RX ADMIN — Medication 5 ML: at 08:48

## 2022-01-01 RX ADMIN — Medication 6 MG: at 21:30

## 2022-01-01 RX ADMIN — ACETAMINOPHEN 650 MG: 325 TABLET, FILM COATED ORAL at 21:49

## 2022-01-01 RX ADMIN — SENNOSIDES AND DOCUSATE SODIUM 2 TABLET: 8.6; 5 TABLET ORAL at 09:43

## 2022-01-01 RX ADMIN — EMPAGLIFLOZIN 10 MG: 10 TABLET, FILM COATED ORAL at 09:19

## 2022-01-01 RX ADMIN — EMPAGLIFLOZIN 10 MG: 10 TABLET, FILM COATED ORAL at 10:09

## 2022-01-01 RX ADMIN — HEPARIN SODIUM 2350 UNITS/HR: 10000 INJECTION, SOLUTION INTRAVENOUS at 20:32

## 2022-01-01 RX ADMIN — HYDRALAZINE HYDROCHLORIDE 25 MG: 25 TABLET, FILM COATED ORAL at 05:14

## 2022-01-01 RX ADMIN — IPRATROPIUM BROMIDE AND ALBUTEROL SULFATE 3 ML: 2.5; .5 SOLUTION RESPIRATORY (INHALATION) at 16:17

## 2022-01-01 RX ADMIN — PIPERACILLIN SODIUM AND TAZOBACTAM SODIUM 4.5 G: 4; .5 INJECTION, POWDER, LYOPHILIZED, FOR SOLUTION INTRAVENOUS at 05:19

## 2022-01-01 RX ADMIN — DEXMEDETOMIDINE HYDROCHLORIDE 0.8 MCG/KG/HR: 400 INJECTION INTRAVENOUS at 00:23

## 2022-01-01 RX ADMIN — IPRATROPIUM BROMIDE AND ALBUTEROL SULFATE 3 ML: 2.5; .5 SOLUTION RESPIRATORY (INHALATION) at 12:30

## 2022-01-01 RX ADMIN — NYSTATIN 500000 UNITS: 100000 SUSPENSION ORAL at 16:11

## 2022-01-01 RX ADMIN — LIDOCAINE PATCH 4% 2 PATCH: 40 PATCH TOPICAL at 00:28

## 2022-01-01 RX ADMIN — DOBUTAMINE HYDROCHLORIDE 2.5 MCG/KG/MIN: 200 INJECTION INTRAVENOUS at 18:36

## 2022-01-01 RX ADMIN — MICONAZOLE NITRATE: 2 POWDER TOPICAL at 20:33

## 2022-01-01 RX ADMIN — FUROSEMIDE 80 MG: 10 INJECTION, SOLUTION INTRAVENOUS at 16:22

## 2022-01-01 RX ADMIN — ACETAMINOPHEN 650 MG: 325 TABLET, FILM COATED ORAL at 03:00

## 2022-01-01 RX ADMIN — HUMAN ALBUMIN MICROSPHERES AND PERFLUTREN 5 ML: 10; .22 INJECTION, SOLUTION INTRAVENOUS at 10:14

## 2022-01-01 RX ADMIN — HEPARIN SODIUM 2550 UNITS/HR: 10000 INJECTION, SOLUTION INTRAVENOUS at 15:54

## 2022-01-01 RX ADMIN — PIPERACILLIN SODIUM AND TAZOBACTAM SODIUM 4.5 G: 4; .5 INJECTION, POWDER, LYOPHILIZED, FOR SOLUTION INTRAVENOUS at 16:43

## 2022-01-01 RX ADMIN — EMPAGLIFLOZIN 10 MG: 10 TABLET, FILM COATED ORAL at 10:31

## 2022-01-01 RX ADMIN — Medication 50 MCG/HR: at 04:55

## 2022-01-01 RX ADMIN — METOPROLOL SUCCINATE 100 MG: 50 TABLET, EXTENDED RELEASE ORAL at 09:08

## 2022-01-01 RX ADMIN — ATORVASTATIN CALCIUM 20 MG: 20 TABLET, FILM COATED ORAL at 09:09

## 2022-01-01 RX ADMIN — PIPERACILLIN SODIUM AND TAZOBACTAM SODIUM 4.5 G: 4; .5 INJECTION, POWDER, LYOPHILIZED, FOR SOLUTION INTRAVENOUS at 04:26

## 2022-01-01 RX ADMIN — FERROUS GLUCONATE 324 MG: 324 TABLET ORAL at 07:37

## 2022-01-01 RX ADMIN — PANTOPRAZOLE SODIUM 40 MG: 40 INJECTION, POWDER, FOR SOLUTION INTRAVENOUS at 09:37

## 2022-01-01 RX ADMIN — BUMETANIDE 4 MG: 0.25 INJECTION INTRAMUSCULAR; INTRAVENOUS at 19:53

## 2022-01-01 RX ADMIN — ALLOPURINOL 100 MG: 100 TABLET ORAL at 07:56

## 2022-01-01 RX ADMIN — PIPERACILLIN AND TAZOBACTAM 3.38 G: 3; .375 INJECTION, POWDER, LYOPHILIZED, FOR SOLUTION INTRAVENOUS at 15:22

## 2022-01-01 RX ADMIN — APIXABAN 5 MG: 5 TABLET, FILM COATED ORAL at 07:56

## 2022-01-01 RX ADMIN — ACETAMINOPHEN 650 MG: 325 TABLET, FILM COATED ORAL at 20:22

## 2022-01-01 RX ADMIN — MICONAZOLE NITRATE: 2 POWDER TOPICAL at 20:23

## 2022-01-01 RX ADMIN — OXYCODONE HYDROCHLORIDE 5 MG: 5 TABLET ORAL at 19:45

## 2022-01-01 RX ADMIN — ATORVASTATIN CALCIUM 20 MG: 20 TABLET, FILM COATED ORAL at 08:32

## 2022-01-01 RX ADMIN — PANTOPRAZOLE SODIUM 40 MG: 40 INJECTION, POWDER, FOR SOLUTION INTRAVENOUS at 08:06

## 2022-01-01 RX ADMIN — MICONAZOLE NITRATE: 2 POWDER TOPICAL at 09:09

## 2022-01-01 RX ADMIN — POTASSIUM CHLORIDE 20 MEQ: 750 TABLET, EXTENDED RELEASE ORAL at 00:08

## 2022-01-01 RX ADMIN — Medication 6 MG: at 22:50

## 2022-01-01 RX ADMIN — NYSTATIN: 100000 OINTMENT TOPICAL at 12:47

## 2022-01-01 RX ADMIN — ACETAMINOPHEN 650 MG: 325 TABLET, FILM COATED ORAL at 10:12

## 2022-01-01 RX ADMIN — NYSTATIN: 100000 OINTMENT TOPICAL at 20:59

## 2022-01-01 RX ADMIN — ACETAMINOPHEN 650 MG: 325 TABLET, FILM COATED ORAL at 18:08

## 2022-01-01 RX ADMIN — DEXMEDETOMIDINE HYDROCHLORIDE 1.2 MCG/KG/HR: 400 INJECTION INTRAVENOUS at 10:46

## 2022-01-01 RX ADMIN — FERROUS GLUCONATE 324 MG: 324 TABLET ORAL at 08:03

## 2022-01-01 RX ADMIN — CHLOROTHIAZIDE SODIUM 500 MG: 500 INJECTION, POWDER, LYOPHILIZED, FOR SOLUTION INTRAVENOUS at 01:32

## 2022-01-01 RX ADMIN — LIDOCAINE PATCH 4% 2 PATCH: 40 PATCH TOPICAL at 09:45

## 2022-01-01 RX ADMIN — TRIAMCINOLONE ACETONIDE: 1 CREAM TOPICAL at 09:29

## 2022-01-01 RX ADMIN — NYSTATIN: 100000 OINTMENT TOPICAL at 16:54

## 2022-01-01 RX ADMIN — EMPAGLIFLOZIN 10 MG: 10 TABLET, FILM COATED ORAL at 10:34

## 2022-01-01 RX ADMIN — SODIUM BICARBONATE 50 MEQ: 84 INJECTION INTRAVENOUS at 02:00

## 2022-01-01 RX ADMIN — NYSTATIN 500000 UNITS: 100000 SUSPENSION ORAL at 15:52

## 2022-01-01 RX ADMIN — IPRATROPIUM BROMIDE AND ALBUTEROL SULFATE 3 ML: 2.5; .5 SOLUTION RESPIRATORY (INHALATION) at 16:40

## 2022-01-01 RX ADMIN — AMIODARONE HYDROCHLORIDE 200 MG: 200 TABLET ORAL at 11:09

## 2022-01-01 RX ADMIN — INSULIN ASPART 1 UNITS: 100 INJECTION, SOLUTION INTRAVENOUS; SUBCUTANEOUS at 15:39

## 2022-01-01 RX ADMIN — FERROUS GLUCONATE 324 MG: 324 TABLET ORAL at 08:26

## 2022-01-01 RX ADMIN — IPRATROPIUM BROMIDE AND ALBUTEROL SULFATE 3 ML: 2.5; .5 SOLUTION RESPIRATORY (INHALATION) at 15:35

## 2022-01-01 RX ADMIN — Medication 25 MCG: at 11:00

## 2022-01-01 RX ADMIN — NYSTATIN 500000 UNITS: 100000 SUSPENSION ORAL at 08:32

## 2022-01-01 RX ADMIN — MICONAZOLE NITRATE: 2 POWDER TOPICAL at 20:14

## 2022-01-01 RX ADMIN — AMIODARONE HYDROCHLORIDE 200 MG: 200 TABLET ORAL at 07:56

## 2022-01-01 RX ADMIN — MICONAZOLE NITRATE: 2 POWDER TOPICAL at 22:52

## 2022-01-01 RX ADMIN — AMIODARONE HYDROCHLORIDE 200 MG: 200 TABLET ORAL at 10:09

## 2022-01-01 RX ADMIN — PANTOPRAZOLE SODIUM 40 MG: 40 INJECTION, POWDER, FOR SOLUTION INTRAVENOUS at 07:32

## 2022-01-01 RX ADMIN — Medication 5 ML: at 08:40

## 2022-01-01 RX ADMIN — APIXABAN 5 MG: 2.5 TABLET, FILM COATED ORAL at 21:19

## 2022-01-01 RX ADMIN — Medication 3 MG: at 21:46

## 2022-01-01 RX ADMIN — INSULIN ASPART 1 UNITS: 100 INJECTION, SOLUTION INTRAVENOUS; SUBCUTANEOUS at 03:44

## 2022-01-01 RX ADMIN — SODIUM CHLORIDE, POTASSIUM CHLORIDE, SODIUM LACTATE AND CALCIUM CHLORIDE 500 ML: 600; 310; 30; 20 INJECTION, SOLUTION INTRAVENOUS at 23:55

## 2022-01-01 RX ADMIN — APIXABAN 5 MG: 5 TABLET, FILM COATED ORAL at 21:22

## 2022-01-01 RX ADMIN — HUMAN ALBUMIN MICROSPHERES AND PERFLUTREN 6 ML: 10; .22 INJECTION, SOLUTION INTRAVENOUS at 11:58

## 2022-01-01 RX ADMIN — SODIUM CHLORIDE SOLN NEBU 3% 3 ML: 3 NEBU SOLN at 13:18

## 2022-01-01 RX ADMIN — FUROSEMIDE 40 MG: 10 INJECTION, SOLUTION INTRAMUSCULAR; INTRAVENOUS at 10:15

## 2022-01-01 RX ADMIN — Medication 5 ML: at 07:37

## 2022-01-01 RX ADMIN — Medication 0.03 MCG/KG/MIN: at 10:55

## 2022-01-01 RX ADMIN — IPRATROPIUM BROMIDE AND ALBUTEROL SULFATE 3 ML: 2.5; .5 SOLUTION RESPIRATORY (INHALATION) at 09:06

## 2022-01-01 RX ADMIN — PIPERACILLIN AND TAZOBACTAM 3.38 G: 3; .375 INJECTION, POWDER, LYOPHILIZED, FOR SOLUTION INTRAVENOUS at 06:00

## 2022-01-01 RX ADMIN — HEPARIN SODIUM 2550 UNITS/HR: 10000 INJECTION, SOLUTION INTRAVENOUS at 20:28

## 2022-01-01 RX ADMIN — IPRATROPIUM BROMIDE AND ALBUTEROL SULFATE 3 ML: 2.5; .5 SOLUTION RESPIRATORY (INHALATION) at 10:00

## 2022-01-01 RX ADMIN — ATORVASTATIN CALCIUM 20 MG: 20 TABLET, FILM COATED ORAL at 08:44

## 2022-01-01 RX ADMIN — AMIODARONE HYDROCHLORIDE 200 MG: 200 TABLET ORAL at 08:09

## 2022-01-01 RX ADMIN — FUROSEMIDE 80 MG: 10 INJECTION, SOLUTION INTRAVENOUS at 20:18

## 2022-01-01 RX ADMIN — METOPROLOL SUCCINATE 100 MG: 100 TABLET, EXTENDED RELEASE ORAL at 09:36

## 2022-01-01 RX ADMIN — IPRATROPIUM BROMIDE AND ALBUTEROL SULFATE 3 ML: 2.5; .5 SOLUTION RESPIRATORY (INHALATION) at 09:32

## 2022-01-01 RX ADMIN — FERROUS GLUCONATE 324 MG: 324 TABLET ORAL at 07:35

## 2022-01-01 RX ADMIN — APIXABAN 5 MG: 5 TABLET, FILM COATED ORAL at 10:02

## 2022-01-01 RX ADMIN — PANTOPRAZOLE SODIUM 40 MG: 40 INJECTION, POWDER, FOR SOLUTION INTRAVENOUS at 07:54

## 2022-01-01 RX ADMIN — AMIODARONE HYDROCHLORIDE 1 MG/MIN: 50 INJECTION, SOLUTION INTRAVENOUS at 16:46

## 2022-01-01 RX ADMIN — MIDAZOLAM HYDROCHLORIDE 4 MG/HR: 1 INJECTION, SOLUTION INTRAVENOUS at 04:50

## 2022-01-01 RX ADMIN — MICONAZOLE NITRATE: 2 POWDER TOPICAL at 20:01

## 2022-01-01 RX ADMIN — AMIODARONE HYDROCHLORIDE 0.5 MG/MIN: 50 INJECTION, SOLUTION INTRAVENOUS at 02:28

## 2022-01-01 RX ADMIN — ACETAMINOPHEN 650 MG: 325 TABLET, FILM COATED ORAL at 20:04

## 2022-01-01 RX ADMIN — NYSTATIN 500000 UNITS: 100000 SUSPENSION ORAL at 12:21

## 2022-01-01 RX ADMIN — MICONAZOLE NITRATE: 2 POWDER TOPICAL at 21:16

## 2022-01-01 RX ADMIN — Medication 100 MCG/HR: at 10:28

## 2022-01-01 RX ADMIN — ATORVASTATIN CALCIUM 20 MG: 20 TABLET, FILM COATED ORAL at 07:56

## 2022-01-01 RX ADMIN — OXYCODONE HYDROCHLORIDE 5 MG: 5 TABLET ORAL at 20:20

## 2022-01-01 RX ADMIN — DEXMEDETOMIDINE HYDROCHLORIDE 0.5 MCG/KG/HR: 400 INJECTION INTRAVENOUS at 05:20

## 2022-01-01 RX ADMIN — NYSTATIN 500000 UNITS: 100000 SUSPENSION ORAL at 08:38

## 2022-01-01 RX ADMIN — DEXMEDETOMIDINE HYDROCHLORIDE 1.2 MCG/KG/HR: 400 INJECTION INTRAVENOUS at 21:47

## 2022-01-01 RX ADMIN — Medication 5 ML: at 08:06

## 2022-01-01 RX ADMIN — OXYCODONE HYDROCHLORIDE 5 MG: 5 TABLET ORAL at 05:32

## 2022-01-01 RX ADMIN — FERROUS GLUCONATE 324 MG: 324 TABLET ORAL at 09:36

## 2022-01-01 RX ADMIN — METOPROLOL SUCCINATE 100 MG: 50 TABLET, EXTENDED RELEASE ORAL at 10:31

## 2022-01-01 RX ADMIN — PROCHLORPERAZINE EDISYLATE 5 MG: 5 INJECTION INTRAMUSCULAR; INTRAVENOUS at 14:30

## 2022-01-01 RX ADMIN — METOPROLOL SUCCINATE 100 MG: 50 TABLET, EXTENDED RELEASE ORAL at 10:02

## 2022-01-01 RX ADMIN — NYSTATIN 500000 UNITS: 100000 SUSPENSION ORAL at 08:55

## 2022-01-01 RX ADMIN — NYSTATIN 500000 UNITS: 100000 SUSPENSION ORAL at 12:24

## 2022-01-01 RX ADMIN — CALCIUM CHLORIDE 1 G: 100 INJECTION, SOLUTION INTRAVENOUS at 21:58

## 2022-01-01 RX ADMIN — PIPERACILLIN AND TAZOBACTAM 3.38 G: 3; .375 INJECTION, POWDER, LYOPHILIZED, FOR SOLUTION INTRAVENOUS at 16:00

## 2022-01-01 RX ADMIN — FERROUS GLUCONATE 324 MG: 324 TABLET ORAL at 07:56

## 2022-01-01 RX ADMIN — BUMETANIDE 4 MG: 0.25 INJECTION, SOLUTION INTRAMUSCULAR; INTRAVENOUS at 05:01

## 2022-01-01 RX ADMIN — SODIUM CHLORIDE SOLN NEBU 3% 3 ML: 3 NEBU SOLN at 20:34

## 2022-01-01 RX ADMIN — ATORVASTATIN CALCIUM 20 MG: 20 TABLET, FILM COATED ORAL at 08:07

## 2022-01-01 RX ADMIN — NYSTATIN: 100000 OINTMENT TOPICAL at 08:09

## 2022-01-01 RX ADMIN — ALLOPURINOL 100 MG: 100 TABLET ORAL at 08:16

## 2022-01-01 RX ADMIN — HEPARIN SODIUM 2150 UNITS/HR: 10000 INJECTION, SOLUTION INTRAVENOUS at 08:01

## 2022-01-01 RX ADMIN — IPRATROPIUM BROMIDE AND ALBUTEROL SULFATE 3 ML: 2.5; .5 SOLUTION RESPIRATORY (INHALATION) at 16:09

## 2022-01-01 RX ADMIN — HYDROXYZINE HYDROCHLORIDE 25 MG: 25 TABLET, FILM COATED ORAL at 03:00

## 2022-01-01 RX ADMIN — FERROUS GLUCONATE 324 MG: 324 TABLET ORAL at 07:39

## 2022-01-01 RX ADMIN — MICONAZOLE NITRATE: 2 POWDER TOPICAL at 08:16

## 2022-01-01 RX ADMIN — OXYCODONE HYDROCHLORIDE 5 MG: 5 TABLET ORAL at 13:37

## 2022-01-01 RX ADMIN — HYDRALAZINE HYDROCHLORIDE 25 MG: 25 TABLET, FILM COATED ORAL at 14:14

## 2022-01-01 RX ADMIN — PANTOPRAZOLE SODIUM 40 MG: 40 TABLET, DELAYED RELEASE ORAL at 08:16

## 2022-01-01 RX ADMIN — NYSTATIN 500000 UNITS: 100000 SUSPENSION ORAL at 07:39

## 2022-01-01 RX ADMIN — NYSTATIN 500000 UNITS: 100000 SUSPENSION ORAL at 15:59

## 2022-01-01 RX ADMIN — BUMETANIDE 4 MG: 0.25 INJECTION, SOLUTION INTRAMUSCULAR; INTRAVENOUS at 02:50

## 2022-01-01 RX ADMIN — OXYCODONE HYDROCHLORIDE 5 MG: 5 TABLET ORAL at 05:07

## 2022-01-01 RX ADMIN — NYSTATIN: 100000 OINTMENT TOPICAL at 22:52

## 2022-01-01 RX ADMIN — METOPROLOL SUCCINATE 100 MG: 50 TABLET, EXTENDED RELEASE ORAL at 08:56

## 2022-01-01 RX ADMIN — BUMETANIDE 1 MG: 0.5 TABLET ORAL at 09:16

## 2022-01-01 RX ADMIN — MICONAZOLE NITRATE: 20 POWDER TOPICAL at 21:19

## 2022-01-01 RX ADMIN — APIXABAN 5 MG: 5 TABLET, FILM COATED ORAL at 21:09

## 2022-01-01 RX ADMIN — FUROSEMIDE 80 MG: 10 INJECTION, SOLUTION INTRAVENOUS at 11:59

## 2022-01-01 RX ADMIN — SODIUM CHLORIDE SOLN NEBU 3% 3 ML: 3 NEBU SOLN at 16:17

## 2022-01-01 RX ADMIN — ATORVASTATIN CALCIUM 20 MG: 20 TABLET, FILM COATED ORAL at 08:09

## 2022-01-01 RX ADMIN — B-COMPLEX W/ C & FOLIC ACID TAB 1 MG 1 TABLET: 1 TAB at 08:59

## 2022-01-01 RX ADMIN — PIPERACILLIN SODIUM AND TAZOBACTAM SODIUM 4.5 G: 4; .5 INJECTION, POWDER, LYOPHILIZED, FOR SOLUTION INTRAVENOUS at 23:07

## 2022-01-01 RX ADMIN — ALBUTEROL SULFATE 10 MG: 2.5 SOLUTION RESPIRATORY (INHALATION) at 03:08

## 2022-01-01 RX ADMIN — SODIUM CHLORIDE SOLN NEBU 3% 3 ML: 3 NEBU SOLN at 16:48

## 2022-01-01 RX ADMIN — PIPERACILLIN AND TAZOBACTAM 3.38 G: 3; .375 INJECTION, POWDER, LYOPHILIZED, FOR SOLUTION INTRAVENOUS at 11:09

## 2022-01-01 RX ADMIN — FERROUS GLUCONATE 324 MG: 324 TABLET ORAL at 08:38

## 2022-01-01 RX ADMIN — PIPERACILLIN SODIUM AND TAZOBACTAM SODIUM 4.5 G: 4; .5 INJECTION, POWDER, LYOPHILIZED, FOR SOLUTION INTRAVENOUS at 16:23

## 2022-01-01 RX ADMIN — SENNOSIDES AND DOCUSATE SODIUM 2 TABLET: 8.6; 5 TABLET ORAL at 07:37

## 2022-01-01 RX ADMIN — IPRATROPIUM BROMIDE AND ALBUTEROL SULFATE 3 ML: 2.5; .5 SOLUTION RESPIRATORY (INHALATION) at 13:37

## 2022-01-01 RX ADMIN — DEXMEDETOMIDINE HYDROCHLORIDE 0.7 MCG/KG/HR: 400 INJECTION INTRAVENOUS at 02:17

## 2022-01-01 RX ADMIN — POTASSIUM & SODIUM PHOSPHATES POWDER PACK 280-160-250 MG 1 PACKET: 280-160-250 PACK at 16:10

## 2022-01-01 RX ADMIN — PANTOPRAZOLE SODIUM 40 MG: 40 INJECTION, POWDER, FOR SOLUTION INTRAVENOUS at 08:46

## 2022-01-01 RX ADMIN — Medication 50 MCG/HR: at 08:01

## 2022-01-01 RX ADMIN — Medication 6 MG: at 22:27

## 2022-01-01 RX ADMIN — MICONAZOLE NITRATE: 2 POWDER TOPICAL at 12:48

## 2022-01-01 RX ADMIN — MICONAZOLE NITRATE: 2 POWDER TOPICAL at 23:34

## 2022-01-01 RX ADMIN — INSULIN ASPART 1 UNITS: 100 INJECTION, SOLUTION INTRAVENOUS; SUBCUTANEOUS at 11:47

## 2022-01-01 RX ADMIN — AMIODARONE HYDROCHLORIDE 200 MG: 200 TABLET ORAL at 10:07

## 2022-01-01 RX ADMIN — PROPOFOL 10 MCG/KG/MIN: 10 INJECTION, EMULSION INTRAVENOUS at 10:54

## 2022-01-01 RX ADMIN — HEPARIN SODIUM 2250 UNITS/HR: 10000 INJECTION, SOLUTION INTRAVENOUS at 06:21

## 2022-01-01 RX ADMIN — ACETAMINOPHEN 650 MG: 325 TABLET, FILM COATED ORAL at 10:42

## 2022-01-01 RX ADMIN — Medication 5 ML: at 07:53

## 2022-01-01 RX ADMIN — TRIAMCINOLONE ACETONIDE: 1 CREAM TOPICAL at 10:11

## 2022-01-01 RX ADMIN — NYSTATIN 500000 UNITS: 100000 SUSPENSION ORAL at 22:19

## 2022-01-01 RX ADMIN — MICONAZOLE NITRATE: 20 POWDER TOPICAL at 08:23

## 2022-01-01 RX ADMIN — BUMETANIDE 4 MG: 0.25 INJECTION INTRAMUSCULAR; INTRAVENOUS at 21:56

## 2022-01-01 RX ADMIN — MELATONIN TAB 3 MG 3 MG: 3 TAB at 21:35

## 2022-01-01 RX ADMIN — ACETAMINOPHEN 650 MG: 325 TABLET, FILM COATED ORAL at 06:59

## 2022-01-01 RX ADMIN — EMPAGLIFLOZIN 10 MG: 10 TABLET, FILM COATED ORAL at 08:22

## 2022-01-01 RX ADMIN — APIXABAN 5 MG: 5 TABLET, FILM COATED ORAL at 08:59

## 2022-01-01 RX ADMIN — PROPOFOL 20 MCG/KG/MIN: 10 INJECTION, EMULSION INTRAVENOUS at 06:23

## 2022-01-01 RX ADMIN — ACETAMINOPHEN 650 MG: 325 TABLET, FILM COATED ORAL at 08:48

## 2022-01-01 RX ADMIN — ACETAMINOPHEN 650 MG: 325 TABLET, FILM COATED ORAL at 22:35

## 2022-01-01 RX ADMIN — MICONAZOLE NITRATE: 2 POWDER TOPICAL at 20:34

## 2022-01-01 RX ADMIN — LIDOCAINE PATCH 4% 2 PATCH: 40 PATCH TOPICAL at 09:07

## 2022-01-01 RX ADMIN — BUMETANIDE 2 MG: 2 TABLET ORAL at 13:01

## 2022-01-01 RX ADMIN — APIXABAN 5 MG: 2.5 TABLET, FILM COATED ORAL at 08:22

## 2022-01-01 RX ADMIN — FUROSEMIDE 40 MG: 10 INJECTION, SOLUTION INTRAMUSCULAR; INTRAVENOUS at 14:54

## 2022-01-01 RX ADMIN — IPRATROPIUM BROMIDE AND ALBUTEROL SULFATE 3 ML: 2.5; .5 SOLUTION RESPIRATORY (INHALATION) at 20:33

## 2022-01-01 RX ADMIN — HUMAN INSULIN 10 UNITS: 100 INJECTION, SOLUTION SUBCUTANEOUS at 02:37

## 2022-01-01 RX ADMIN — DEXMEDETOMIDINE HYDROCHLORIDE 0.6 MCG/KG/HR: 400 INJECTION INTRAVENOUS at 13:01

## 2022-01-01 RX ADMIN — NYSTATIN 500000 UNITS: 100000 SUSPENSION ORAL at 11:32

## 2022-01-01 RX ADMIN — Medication 3 MG: at 21:10

## 2022-01-01 RX ADMIN — FERROUS GLUCONATE 324 MG: 324 TABLET ORAL at 08:22

## 2022-01-01 RX ADMIN — ACETAMINOPHEN 650 MG: 325 TABLET, FILM COATED ORAL at 20:00

## 2022-01-01 RX ADMIN — IPRATROPIUM BROMIDE AND ALBUTEROL SULFATE 3 ML: 2.5; .5 SOLUTION RESPIRATORY (INHALATION) at 20:37

## 2022-01-01 RX ADMIN — PIPERACILLIN SODIUM AND TAZOBACTAM SODIUM 4.5 G: 4; .5 INJECTION, POWDER, LYOPHILIZED, FOR SOLUTION INTRAVENOUS at 04:35

## 2022-01-01 RX ADMIN — PIPERACILLIN SODIUM AND TAZOBACTAM SODIUM 4.5 G: 4; .5 INJECTION, POWDER, LYOPHILIZED, FOR SOLUTION INTRAVENOUS at 23:09

## 2022-01-01 RX ADMIN — DEXMEDETOMIDINE HYDROCHLORIDE 1.5 MCG/KG/HR: 400 INJECTION INTRAVENOUS at 17:37

## 2022-01-01 RX ADMIN — IPRATROPIUM BROMIDE AND ALBUTEROL SULFATE 3 ML: 2.5; .5 SOLUTION RESPIRATORY (INHALATION) at 12:16

## 2022-01-01 RX ADMIN — METOPROLOL SUCCINATE 100 MG: 50 TABLET, EXTENDED RELEASE ORAL at 08:07

## 2022-01-01 RX ADMIN — MICONAZOLE NITRATE: 20 POWDER TOPICAL at 21:31

## 2022-01-01 RX ADMIN — ACETAMINOPHEN 1000 MG: 500 TABLET ORAL at 10:54

## 2022-01-01 RX ADMIN — AMIODARONE HYDROCHLORIDE 200 MG: 200 TABLET ORAL at 09:43

## 2022-01-01 RX ADMIN — ALLOPURINOL 100 MG: 100 TABLET ORAL at 09:36

## 2022-01-01 RX ADMIN — MICONAZOLE NITRATE: 2 POWDER TOPICAL at 08:06

## 2022-01-01 RX ADMIN — FUROSEMIDE 80 MG: 10 INJECTION, SOLUTION INTRAVENOUS at 11:24

## 2022-01-01 RX ADMIN — Medication 3 MG: at 21:48

## 2022-01-01 RX ADMIN — OXYCODONE HYDROCHLORIDE 5 MG: 5 TABLET ORAL at 11:32

## 2022-01-01 RX ADMIN — POTASSIUM CHLORIDE 20 MEQ: 40 SOLUTION ORAL at 07:38

## 2022-01-01 RX ADMIN — ATORVASTATIN CALCIUM 20 MG: 20 TABLET, FILM COATED ORAL at 08:04

## 2022-01-01 RX ADMIN — APIXABAN 5 MG: 5 TABLET, FILM COATED ORAL at 20:00

## 2022-01-01 RX ADMIN — PIPERACILLIN AND TAZOBACTAM 3.38 G: 3; .375 INJECTION, POWDER, LYOPHILIZED, FOR SOLUTION INTRAVENOUS at 10:01

## 2022-01-01 RX ADMIN — LISINOPRIL 5 MG: 5 TABLET ORAL at 08:07

## 2022-01-01 RX ADMIN — AMIODARONE HYDROCHLORIDE 200 MG: 200 TABLET ORAL at 09:36

## 2022-01-01 RX ADMIN — NYSTATIN 500000 UNITS: 100000 SUSPENSION ORAL at 20:20

## 2022-01-01 RX ADMIN — FERROUS GLUCONATE 324 MG: 324 TABLET ORAL at 08:09

## 2022-01-01 RX ADMIN — B-COMPLEX W/ C & FOLIC ACID TAB 1 MG 1 TABLET: 1 TAB at 09:16

## 2022-01-01 RX ADMIN — PIPERACILLIN SODIUM AND TAZOBACTAM SODIUM 4.5 G: 4; .5 INJECTION, POWDER, LYOPHILIZED, FOR SOLUTION INTRAVENOUS at 10:50

## 2022-01-01 RX ADMIN — DEXTROSE MONOHYDRATE 300 ML: 100 INJECTION, SOLUTION INTRAVENOUS at 06:56

## 2022-01-01 RX ADMIN — ACETAMINOPHEN 1000 MG: 500 TABLET ORAL at 16:06

## 2022-01-01 RX ADMIN — MICONAZOLE NITRATE: 2 POWDER TOPICAL at 08:09

## 2022-01-01 RX ADMIN — OXYCODONE HYDROCHLORIDE 5 MG: 5 TABLET ORAL at 14:39

## 2022-01-01 RX ADMIN — SODIUM CHLORIDE SOLN NEBU 3% 3 ML: 3 NEBU SOLN at 13:37

## 2022-01-01 RX ADMIN — MICONAZOLE NITRATE: 2 POWDER TOPICAL at 07:55

## 2022-01-01 RX ADMIN — AMIODARONE HYDROCHLORIDE 200 MG: 200 TABLET ORAL at 08:22

## 2022-01-01 RX ADMIN — PIPERACILLIN AND TAZOBACTAM 3.38 G: 3; .375 INJECTION, POWDER, LYOPHILIZED, FOR SOLUTION INTRAVENOUS at 03:38

## 2022-01-01 RX ADMIN — ENOXAPARIN SODIUM 90 MG: 100 INJECTION SUBCUTANEOUS at 20:34

## 2022-01-01 RX ADMIN — POTASSIUM CHLORIDE 20 MEQ: 40 SOLUTION ORAL at 08:46

## 2022-01-01 RX ADMIN — ACETAMINOPHEN 650 MG: 325 TABLET, FILM COATED ORAL at 16:22

## 2022-01-01 RX ADMIN — BUMETANIDE 4 MG: 0.25 INJECTION INTRAMUSCULAR; INTRAVENOUS at 11:32

## 2022-01-01 RX ADMIN — Medication 25 MCG: at 08:02

## 2022-01-01 RX ADMIN — PANTOPRAZOLE SODIUM 40 MG: 40 INJECTION, POWDER, FOR SOLUTION INTRAVENOUS at 08:31

## 2022-01-01 RX ADMIN — AMIODARONE HYDROCHLORIDE 200 MG: 200 TABLET ORAL at 20:21

## 2022-01-01 RX ADMIN — ALLOPURINOL 100 MG: 100 TABLET ORAL at 09:43

## 2022-01-01 RX ADMIN — ATORVASTATIN CALCIUM 20 MG: 20 TABLET, FILM COATED ORAL at 08:55

## 2022-01-01 RX ADMIN — PIPERACILLIN AND TAZOBACTAM 3.38 G: 3; .375 INJECTION, POWDER, LYOPHILIZED, FOR SOLUTION INTRAVENOUS at 10:22

## 2022-01-01 RX ADMIN — AMIODARONE HYDROCHLORIDE 200 MG: 200 TABLET ORAL at 08:07

## 2022-01-01 RX ADMIN — Medication 6 MG: at 21:47

## 2022-01-01 RX ADMIN — ALLOPURINOL 100 MG: 100 TABLET ORAL at 10:06

## 2022-01-01 RX ADMIN — OXYCODONE HYDROCHLORIDE 5 MG: 5 TABLET ORAL at 04:26

## 2022-01-01 RX ADMIN — FUROSEMIDE 80 MG: 10 INJECTION, SOLUTION INTRAVENOUS at 10:23

## 2022-01-01 RX ADMIN — ATORVASTATIN CALCIUM 20 MG: 20 TABLET, FILM COATED ORAL at 08:25

## 2022-01-01 RX ADMIN — IPRATROPIUM BROMIDE AND ALBUTEROL SULFATE 3 ML: 2.5; .5 SOLUTION RESPIRATORY (INHALATION) at 21:16

## 2022-01-01 RX ADMIN — ATORVASTATIN CALCIUM 20 MG: 10 TABLET, FILM COATED ORAL at 09:36

## 2022-01-01 RX ADMIN — ATORVASTATIN CALCIUM 20 MG: 20 TABLET, FILM COATED ORAL at 09:42

## 2022-01-01 RX ADMIN — MIDAZOLAM 2 MG: 1 INJECTION INTRAMUSCULAR; INTRAVENOUS at 03:40

## 2022-01-01 RX ADMIN — MICONAZOLE NITRATE: 2 POWDER TOPICAL at 20:22

## 2022-01-01 RX ADMIN — NOREPINEPHRINE BITARTRATE 0.03 MCG/KG/MIN: 1 INJECTION, SOLUTION, CONCENTRATE INTRAVENOUS at 01:52

## 2022-01-01 RX ADMIN — NYSTATIN 500000 UNITS: 100000 SUSPENSION ORAL at 20:01

## 2022-01-01 RX ADMIN — LISINOPRIL 5 MG: 5 TABLET ORAL at 09:20

## 2022-01-01 RX ADMIN — Medication 3 MG: at 00:31

## 2022-01-01 RX ADMIN — BUMETANIDE 1 MG: 1 TABLET ORAL at 08:22

## 2022-01-01 RX ADMIN — APIXABAN 5 MG: 5 TABLET, FILM COATED ORAL at 20:54

## 2022-01-01 RX ADMIN — LIDOCAINE PATCH 4% 2 PATCH: 40 PATCH TOPICAL at 00:30

## 2022-01-01 RX ADMIN — PIPERACILLIN SODIUM AND TAZOBACTAM SODIUM 4.5 G: 4; .5 INJECTION, POWDER, LYOPHILIZED, FOR SOLUTION INTRAVENOUS at 04:07

## 2022-01-01 RX ADMIN — LISINOPRIL 5 MG: 5 TABLET ORAL at 08:22

## 2022-01-01 RX ADMIN — DEXMEDETOMIDINE HYDROCHLORIDE 0.6 MCG/KG/HR: 400 INJECTION INTRAVENOUS at 16:25

## 2022-01-01 RX ADMIN — LISINOPRIL 5 MG: 5 TABLET ORAL at 12:00

## 2022-01-01 RX ADMIN — AMIODARONE HYDROCHLORIDE 200 MG: 200 TABLET ORAL at 09:16

## 2022-01-01 RX ADMIN — HEPARIN SODIUM 2400 UNITS/HR: 10000 INJECTION, SOLUTION INTRAVENOUS at 16:32

## 2022-01-01 RX ADMIN — SODIUM CHLORIDE 50 ML: 9 INJECTION, SOLUTION INTRAVENOUS at 15:00

## 2022-01-01 RX ADMIN — Medication 6 MG: at 23:44

## 2022-01-01 RX ADMIN — NYSTATIN 500000 UNITS: 100000 SUSPENSION ORAL at 12:36

## 2022-01-01 RX ADMIN — NYSTATIN 500000 UNITS: 100000 SUSPENSION ORAL at 15:32

## 2022-01-01 RX ADMIN — OXYCODONE HYDROCHLORIDE 5 MG: 5 TABLET ORAL at 04:35

## 2022-01-01 RX ADMIN — ACETAZOLAMIDE 500 MG: 250 TABLET ORAL at 12:37

## 2022-01-01 RX ADMIN — PIPERACILLIN SODIUM AND TAZOBACTAM SODIUM 4.5 G: 4; .5 INJECTION, POWDER, LYOPHILIZED, FOR SOLUTION INTRAVENOUS at 16:02

## 2022-01-01 RX ADMIN — OXYCODONE HYDROCHLORIDE 5 MG: 5 TABLET ORAL at 21:53

## 2022-01-01 RX ADMIN — POTASSIUM CHLORIDE 20 MEQ: 29.8 INJECTION, SOLUTION INTRAVENOUS at 06:29

## 2022-01-01 RX ADMIN — POTASSIUM CHLORIDE 20 MEQ: 40 SOLUTION ORAL at 17:28

## 2022-01-01 RX ADMIN — ACETAMINOPHEN 650 MG: 325 TABLET, FILM COATED ORAL at 20:16

## 2022-01-01 RX ADMIN — NYSTATIN 500000 UNITS: 100000 SUSPENSION ORAL at 19:49

## 2022-01-01 RX ADMIN — FERROUS GLUCONATE 324 MG: 324 TABLET ORAL at 08:33

## 2022-01-01 RX ADMIN — LISINOPRIL 5 MG: 5 TABLET ORAL at 08:04

## 2022-01-01 RX ADMIN — IPRATROPIUM BROMIDE AND ALBUTEROL SULFATE 3 ML: 2.5; .5 SOLUTION RESPIRATORY (INHALATION) at 15:16

## 2022-01-01 RX ADMIN — CEFTRIAXONE SODIUM 1 G: 1 INJECTION, POWDER, FOR SOLUTION INTRAMUSCULAR; INTRAVENOUS at 11:25

## 2022-01-01 RX ADMIN — Medication 0.11 MCG/KG/MIN: at 23:42

## 2022-01-01 RX ADMIN — Medication 3 MG: at 21:28

## 2022-01-01 RX ADMIN — ONDANSETRON 4 MG: 4 TABLET, ORALLY DISINTEGRATING ORAL at 09:18

## 2022-01-01 RX ADMIN — SODIUM CHLORIDE SOLN NEBU 3% 3 ML: 3 NEBU SOLN at 01:22

## 2022-01-01 RX ADMIN — SENNOSIDES AND DOCUSATE SODIUM 2 TABLET: 8.6; 5 TABLET ORAL at 20:03

## 2022-01-01 RX ADMIN — FERROUS GLUCONATE 324 MG: 324 TABLET ORAL at 09:16

## 2022-01-01 RX ADMIN — MICONAZOLE NITRATE: 2 POWDER TOPICAL at 08:28

## 2022-01-01 RX ADMIN — ALLOPURINOL 200 MG: 100 TABLET ORAL at 07:39

## 2022-01-01 RX ADMIN — Medication 50 MCG: at 04:55

## 2022-01-01 RX ADMIN — ACETAMINOPHEN 650 MG: 325 TABLET, FILM COATED ORAL at 20:03

## 2022-01-01 RX ADMIN — MICONAZOLE NITRATE: 2 POWDER TOPICAL at 23:02

## 2022-01-01 RX ADMIN — PIPERACILLIN SODIUM AND TAZOBACTAM SODIUM 4.5 G: 4; .5 INJECTION, POWDER, LYOPHILIZED, FOR SOLUTION INTRAVENOUS at 22:35

## 2022-01-01 RX ADMIN — POTASSIUM CHLORIDE 20 MEQ: 750 TABLET, EXTENDED RELEASE ORAL at 07:00

## 2022-01-01 RX ADMIN — ALLOPURINOL 100 MG: 100 TABLET ORAL at 09:19

## 2022-01-01 RX ADMIN — APIXABAN 5 MG: 5 TABLET, FILM COATED ORAL at 08:15

## 2022-01-01 RX ADMIN — NYSTATIN: 100000 OINTMENT TOPICAL at 09:18

## 2022-01-01 RX ADMIN — MICONAZOLE NITRATE: 2 POWDER TOPICAL at 08:45

## 2022-01-01 RX ADMIN — FERROUS GLUCONATE 324 MG: 324 TABLET ORAL at 10:06

## 2022-01-01 RX ADMIN — NYSTATIN 500000 UNITS: 100000 SUSPENSION ORAL at 15:27

## 2022-01-01 RX ADMIN — APIXABAN 5 MG: 5 TABLET, FILM COATED ORAL at 20:30

## 2022-01-01 RX ADMIN — DEXMEDETOMIDINE HYDROCHLORIDE 1.3 MCG/KG/HR: 400 INJECTION INTRAVENOUS at 20:14

## 2022-01-01 RX ADMIN — PIPERACILLIN SODIUM AND TAZOBACTAM SODIUM 4.5 G: 4; .5 INJECTION, POWDER, LYOPHILIZED, FOR SOLUTION INTRAVENOUS at 17:11

## 2022-01-01 RX ADMIN — NYSTATIN 500000 UNITS: 100000 SUSPENSION ORAL at 15:35

## 2022-01-01 RX ADMIN — POLYETHYLENE GLYCOL 3350 17 G: 17 POWDER, FOR SOLUTION ORAL at 08:47

## 2022-01-01 RX ADMIN — BENZONATATE 100 MG: 100 CAPSULE ORAL at 08:25

## 2022-01-01 RX ADMIN — IPRATROPIUM BROMIDE AND ALBUTEROL SULFATE 3 ML: 2.5; .5 SOLUTION RESPIRATORY (INHALATION) at 07:48

## 2022-01-01 RX ADMIN — HEPARIN SODIUM 2350 UNITS/HR: 10000 INJECTION, SOLUTION INTRAVENOUS at 10:20

## 2022-01-01 RX ADMIN — CEFTRIAXONE SODIUM 1 G: 1 INJECTION, POWDER, FOR SOLUTION INTRAMUSCULAR; INTRAVENOUS at 21:08

## 2022-01-01 RX ADMIN — SENNOSIDES AND DOCUSATE SODIUM 1 TABLET: 8.6; 5 TABLET ORAL at 10:23

## 2022-01-01 RX ADMIN — ALLOPURINOL 100 MG: 100 TABLET ORAL at 08:26

## 2022-01-01 RX ADMIN — DEXTROSE MONOHYDRATE 300 ML: 100 INJECTION, SOLUTION INTRAVENOUS at 22:01

## 2022-01-01 RX ADMIN — DEXMEDETOMIDINE HYDROCHLORIDE 0.5 MCG/KG/HR: 400 INJECTION INTRAVENOUS at 13:24

## 2022-01-01 RX ADMIN — FUROSEMIDE 80 MG: 10 INJECTION, SOLUTION INTRAVENOUS at 07:54

## 2022-01-01 RX ADMIN — LISINOPRIL 5 MG: 5 TABLET ORAL at 09:36

## 2022-01-01 RX ADMIN — BUMETANIDE 2 MG: 2 TABLET ORAL at 10:02

## 2022-01-01 RX ADMIN — APIXABAN 5 MG: 5 TABLET, FILM COATED ORAL at 20:58

## 2022-01-01 RX ADMIN — BUMETANIDE 4 MG: 0.25 INJECTION, SOLUTION INTRAMUSCULAR; INTRAVENOUS at 11:58

## 2022-01-01 RX ADMIN — HYDRALAZINE HYDROCHLORIDE 25 MG: 25 TABLET, FILM COATED ORAL at 05:07

## 2022-01-01 RX ADMIN — IPRATROPIUM BROMIDE AND ALBUTEROL SULFATE 3 ML: 2.5; .5 SOLUTION RESPIRATORY (INHALATION) at 20:13

## 2022-01-01 RX ADMIN — LISINOPRIL 5 MG: 5 TABLET ORAL at 09:16

## 2022-01-01 RX ADMIN — DEXTROSE MONOHYDRATE 25 G: 25 INJECTION, SOLUTION INTRAVENOUS at 21:54

## 2022-01-01 RX ADMIN — NYSTATIN 500000 UNITS: 100000 SUSPENSION ORAL at 11:24

## 2022-01-01 RX ADMIN — METOPROLOL SUCCINATE 100 MG: 50 TABLET, EXTENDED RELEASE ORAL at 10:00

## 2022-01-01 RX ADMIN — ROCURONIUM BROMIDE 150 MG: 50 INJECTION, SOLUTION INTRAVENOUS at 02:10

## 2022-01-01 RX ADMIN — ATORVASTATIN CALCIUM 20 MG: 20 TABLET, FILM COATED ORAL at 09:16

## 2022-01-01 ASSESSMENT — ACTIVITIES OF DAILY LIVING (ADL)
ADLS_ACUITY_SCORE: 36
ADLS_ACUITY_SCORE: 47
ADLS_ACUITY_SCORE: 31
ADLS_ACUITY_SCORE: 37
ADLS_ACUITY_SCORE: 31
ADLS_ACUITY_SCORE: 37
ADLS_ACUITY_SCORE: 42
ADLS_ACUITY_SCORE: 33
ADLS_ACUITY_SCORE: 34
ADLS_ACUITY_SCORE: 53
ADLS_ACUITY_SCORE: 53
ADLS_ACUITY_SCORE: 37
ADLS_ACUITY_SCORE: 38
ADLS_ACUITY_SCORE: 34
ADLS_ACUITY_SCORE: 38
ADLS_ACUITY_SCORE: 36
ADLS_ACUITY_SCORE: 34
ADLS_ACUITY_SCORE: 38
ADLS_ACUITY_SCORE: 38
ADLS_ACUITY_SCORE: 52
ADLS_ACUITY_SCORE: 38
ADLS_ACUITY_SCORE: 52
ADLS_ACUITY_SCORE: 59
ADLS_ACUITY_SCORE: 36
ADLS_ACUITY_SCORE: 34
ADLS_ACUITY_SCORE: 35
ADLS_ACUITY_SCORE: 34
ADLS_ACUITY_SCORE: 36
ADLS_ACUITY_SCORE: 34
ADLS_ACUITY_SCORE: 34
ADLS_ACUITY_SCORE: 37
ADLS_ACUITY_SCORE: 34
ADLS_ACUITY_SCORE: 37
ADLS_ACUITY_SCORE: 34
ADLS_ACUITY_SCORE: 31
ADLS_ACUITY_SCORE: 34
ADLS_ACUITY_SCORE: 53
ADLS_ACUITY_SCORE: 34
ADLS_ACUITY_SCORE: 37
ADLS_ACUITY_SCORE: 38
ADLS_ACUITY_SCORE: 35
ADLS_ACUITY_SCORE: 34
ADLS_ACUITY_SCORE: 36
ADLS_ACUITY_SCORE: 38
ADLS_ACUITY_SCORE: 42
ADLS_ACUITY_SCORE: 33
ADLS_ACUITY_SCORE: 42
ADLS_ACUITY_SCORE: 36
ADLS_ACUITY_SCORE: 34
ADLS_ACUITY_SCORE: 36
ADLS_ACUITY_SCORE: 34
ADLS_ACUITY_SCORE: 38
ADLS_ACUITY_SCORE: 33
ADLS_ACUITY_SCORE: 38
ADLS_ACUITY_SCORE: 36
ADLS_ACUITY_SCORE: 53
ADLS_ACUITY_SCORE: 37
ADLS_ACUITY_SCORE: 52
ADLS_ACUITY_SCORE: 34
ADLS_ACUITY_SCORE: 34
ADLS_ACUITY_SCORE: 37
ADLS_ACUITY_SCORE: 38
ADLS_ACUITY_SCORE: 31
ADLS_ACUITY_SCORE: 38
ADLS_ACUITY_SCORE: 42
ADLS_ACUITY_SCORE: 38
ADLS_ACUITY_SCORE: 35
ADLS_ACUITY_SCORE: 38
ADLS_ACUITY_SCORE: 38
ADLS_ACUITY_SCORE: 34
ADLS_ACUITY_SCORE: 33
ADLS_ACUITY_SCORE: 38
ADLS_ACUITY_SCORE: 38
ADLS_ACUITY_SCORE: 33
ADLS_ACUITY_SCORE: 38
ADLS_ACUITY_SCORE: 34
ADLS_ACUITY_SCORE: 38
ADLS_ACUITY_SCORE: 36
ADLS_ACUITY_SCORE: 33
ADLS_ACUITY_SCORE: 38
ADLS_ACUITY_SCORE: 35
ADLS_ACUITY_SCORE: 53
ADLS_ACUITY_SCORE: 34
ADLS_ACUITY_SCORE: 37
ADLS_ACUITY_SCORE: 34
ADLS_ACUITY_SCORE: 42
ADLS_ACUITY_SCORE: 33
ADLS_ACUITY_SCORE: 33
ADLS_ACUITY_SCORE: 38
CHANGE_IN_FUNCTIONAL_STATUS_SINCE_ONSET_OF_CURRENT_ILLNESS/INJURY: YES
ADLS_ACUITY_SCORE: 42
ADLS_ACUITY_SCORE: 34
EQUIPMENT_CURRENTLY_USED_AT_HOME: OTHER (SEE COMMENTS)
ADLS_ACUITY_SCORE: 36
ADLS_ACUITY_SCORE: 34
ADLS_ACUITY_SCORE: 47
ADLS_ACUITY_SCORE: 37
ADLS_ACUITY_SCORE: 38
ADLS_ACUITY_SCORE: 34
ADLS_ACUITY_SCORE: 53
ADLS_ACUITY_SCORE: 38
ADLS_ACUITY_SCORE: 36
ADLS_ACUITY_SCORE: 38
ADLS_ACUITY_SCORE: 34
ADLS_ACUITY_SCORE: 37
ADLS_ACUITY_SCORE: 33
ADLS_ACUITY_SCORE: 37
ADLS_ACUITY_SCORE: 34
ADLS_ACUITY_SCORE: 33
ADLS_ACUITY_SCORE: 36
ADLS_ACUITY_SCORE: 34
ADLS_ACUITY_SCORE: 38
ADLS_ACUITY_SCORE: 34
ADLS_ACUITY_SCORE: 38
ADLS_ACUITY_SCORE: 37
ADLS_ACUITY_SCORE: 34
ADLS_ACUITY_SCORE: 38
ADLS_ACUITY_SCORE: 53
ADLS_ACUITY_SCORE: 33
ADLS_ACUITY_SCORE: 52
ADLS_ACUITY_SCORE: 42
DEPENDENT_IADLS:: INDEPENDENT
ADLS_ACUITY_SCORE: 37
ADLS_ACUITY_SCORE: 38
ADLS_ACUITY_SCORE: 34
ADLS_ACUITY_SCORE: 38
ADLS_ACUITY_SCORE: 34
ADLS_ACUITY_SCORE: 38
ADLS_ACUITY_SCORE: 52
ADLS_ACUITY_SCORE: 38
ADLS_ACUITY_SCORE: 37
ADLS_ACUITY_SCORE: 38
PREVIOUS_RESPONSIBILITIES: MEAL PREP;HOUSEKEEPING;LAUNDRY;MEDICATION MANAGEMENT;FINANCES
ADLS_ACUITY_SCORE: 36
ADLS_ACUITY_SCORE: 36
ADLS_ACUITY_SCORE: 35
ADLS_ACUITY_SCORE: 34
ADLS_ACUITY_SCORE: 53
ADLS_ACUITY_SCORE: 35
ADLS_ACUITY_SCORE: 34
ADLS_ACUITY_SCORE: 34
ADLS_ACUITY_SCORE: 40
ADLS_ACUITY_SCORE: 42
ADLS_ACUITY_SCORE: 35
ADLS_ACUITY_SCORE: 34
ADLS_ACUITY_SCORE: 31
ADLS_ACUITY_SCORE: 31
ADLS_ACUITY_SCORE: 38
ADLS_ACUITY_SCORE: 42
ADLS_ACUITY_SCORE: 34
ADLS_ACUITY_SCORE: 34
ADLS_ACUITY_SCORE: 36
ADLS_ACUITY_SCORE: 34
ADLS_ACUITY_SCORE: 34
ADLS_ACUITY_SCORE: 53
ADLS_ACUITY_SCORE: 34
ADLS_ACUITY_SCORE: 36
ADLS_ACUITY_SCORE: 38
ADLS_ACUITY_SCORE: 37
ADLS_ACUITY_SCORE: 38
ADLS_ACUITY_SCORE: 31
ADLS_ACUITY_SCORE: 42
ADLS_ACUITY_SCORE: 38
ADLS_ACUITY_SCORE: 34
ADLS_ACUITY_SCORE: 42
ADLS_ACUITY_SCORE: 34
ADLS_ACUITY_SCORE: 36
ADLS_ACUITY_SCORE: 36
ADLS_ACUITY_SCORE: 34
ADLS_ACUITY_SCORE: 42
ADLS_ACUITY_SCORE: 34
ADLS_ACUITY_SCORE: 34
ADLS_ACUITY_SCORE: 31
ADLS_ACUITY_SCORE: 59
ADLS_ACUITY_SCORE: 37
ADLS_ACUITY_SCORE: 38
ADLS_ACUITY_SCORE: 38
ADLS_ACUITY_SCORE: 37
ADLS_ACUITY_SCORE: 53
ADLS_ACUITY_SCORE: 34
ADLS_ACUITY_SCORE: 36
ADLS_ACUITY_SCORE: 35
ADLS_ACUITY_SCORE: 42
ADLS_ACUITY_SCORE: 53
ADLS_ACUITY_SCORE: 36
ADLS_ACUITY_SCORE: 37
ADLS_ACUITY_SCORE: 34
ADLS_ACUITY_SCORE: 53
ADLS_ACUITY_SCORE: 36
ADLS_ACUITY_SCORE: 38
ADLS_ACUITY_SCORE: 34
ADLS_ACUITY_SCORE: 38
ADLS_ACUITY_SCORE: 42
ADLS_ACUITY_SCORE: 34
ADLS_ACUITY_SCORE: 38
ADLS_ACUITY_SCORE: 31
ADLS_ACUITY_SCORE: 34
ADLS_ACUITY_SCORE: 31
ADLS_ACUITY_SCORE: 36
ADLS_ACUITY_SCORE: 38
ADLS_ACUITY_SCORE: 38
ADLS_ACUITY_SCORE: 34
ADLS_ACUITY_SCORE: 37
ADLS_ACUITY_SCORE: 36
ADLS_ACUITY_SCORE: 31
ADLS_ACUITY_SCORE: 34
ADLS_ACUITY_SCORE: 34
ADLS_ACUITY_SCORE: 33
ADLS_ACUITY_SCORE: 38
ADLS_ACUITY_SCORE: 34
ADLS_ACUITY_SCORE: 37
ADLS_ACUITY_SCORE: 34
ADLS_ACUITY_SCORE: 47
ADLS_ACUITY_SCORE: 42
ADLS_ACUITY_SCORE: 40
ADLS_ACUITY_SCORE: 34
ADLS_ACUITY_SCORE: 31
ADLS_ACUITY_SCORE: 53
ADLS_ACUITY_SCORE: 33
ADLS_ACUITY_SCORE: 53
ADLS_ACUITY_SCORE: 53
ADLS_ACUITY_SCORE: 35
ADLS_ACUITY_SCORE: 34
ADLS_ACUITY_SCORE: 37
ADLS_ACUITY_SCORE: 42
ADLS_ACUITY_SCORE: 37
ADLS_ACUITY_SCORE: 38
ADLS_ACUITY_SCORE: 42
ADLS_ACUITY_SCORE: 34
ADLS_ACUITY_SCORE: 38
ADLS_ACUITY_SCORE: 38
ADLS_ACUITY_SCORE: 36
ADLS_ACUITY_SCORE: 37
ADLS_ACUITY_SCORE: 38
ADLS_ACUITY_SCORE: 59
ADLS_ACUITY_SCORE: 38
ADLS_ACUITY_SCORE: 37
ADLS_ACUITY_SCORE: 35
ADLS_ACUITY_SCORE: 36
ADLS_ACUITY_SCORE: 53
ADLS_ACUITY_SCORE: 53
ADLS_ACUITY_SCORE: 34
ADLS_ACUITY_SCORE: 53
ADLS_ACUITY_SCORE: 52
ADLS_ACUITY_SCORE: 31
ADLS_ACUITY_SCORE: 38
ADLS_ACUITY_SCORE: 53
ADLS_ACUITY_SCORE: 40
ADLS_ACUITY_SCORE: 53
ADLS_ACUITY_SCORE: 38
ADLS_ACUITY_SCORE: 31
ADLS_ACUITY_SCORE: 37
ADLS_ACUITY_SCORE: 34
ADLS_ACUITY_SCORE: 38
ADLS_ACUITY_SCORE: 34
ADLS_ACUITY_SCORE: 36
ADLS_ACUITY_SCORE: 31
ADLS_ACUITY_SCORE: 34
ADLS_ACUITY_SCORE: 33
ADLS_ACUITY_SCORE: 34
ADLS_ACUITY_SCORE: 34
ADLS_ACUITY_SCORE: 51
ADLS_ACUITY_SCORE: 42
ADLS_ACUITY_SCORE: 34
ADLS_ACUITY_SCORE: 36
ADLS_ACUITY_SCORE: 34
ADLS_ACUITY_SCORE: 34
ADLS_ACUITY_SCORE: 42
ADLS_ACUITY_SCORE: 53
ADLS_ACUITY_SCORE: 38
ADLS_ACUITY_SCORE: 31
ADLS_ACUITY_SCORE: 36
ADLS_ACUITY_SCORE: 33
ADLS_ACUITY_SCORE: 34
ADLS_ACUITY_SCORE: 42
ADLS_ACUITY_SCORE: 53
ADLS_ACUITY_SCORE: 37
ADLS_ACUITY_SCORE: 38
ADLS_ACUITY_SCORE: 37
ADLS_ACUITY_SCORE: 34
ADLS_ACUITY_SCORE: 53
ADLS_ACUITY_SCORE: 43
ADLS_ACUITY_SCORE: 36
ADLS_ACUITY_SCORE: 34
ADLS_ACUITY_SCORE: 34
ADLS_ACUITY_SCORE: 37
ADLS_ACUITY_SCORE: 34
ADLS_ACUITY_SCORE: 59
ADLS_ACUITY_SCORE: 34
ADLS_ACUITY_SCORE: 36
ADLS_ACUITY_SCORE: 36
ADLS_ACUITY_SCORE: 53
ADLS_ACUITY_SCORE: 40
ADLS_ACUITY_SCORE: 34
ADLS_ACUITY_SCORE: 34
ADLS_ACUITY_SCORE: 38
ADLS_ACUITY_SCORE: 53
ADLS_ACUITY_SCORE: 36
ADLS_ACUITY_SCORE: 36
ADLS_ACUITY_SCORE: 38
ADLS_ACUITY_SCORE: 53
ADLS_ACUITY_SCORE: 34
ADLS_ACUITY_SCORE: 33
ADLS_ACUITY_SCORE: 36
ADLS_ACUITY_SCORE: 53
ADLS_ACUITY_SCORE: 31
ADLS_ACUITY_SCORE: 33
ADLS_ACUITY_SCORE: 37
ADLS_ACUITY_SCORE: 35
ADLS_ACUITY_SCORE: 42
ADLS_ACUITY_SCORE: 34
ADLS_ACUITY_SCORE: 34
ADLS_ACUITY_SCORE: 38
ADLS_ACUITY_SCORE: 42
ADLS_ACUITY_SCORE: 34
ADLS_ACUITY_SCORE: 35
ADLS_ACUITY_SCORE: 53
ADLS_ACUITY_SCORE: 34
ADLS_ACUITY_SCORE: 31
ADLS_ACUITY_SCORE: 38
WALKING_OR_CLIMBING_STAIRS_DIFFICULTY: OTHER (SEE COMMENTS)
ADLS_ACUITY_SCORE: 38
ADLS_ACUITY_SCORE: 42
ADLS_ACUITY_SCORE: 53
ADLS_ACUITY_SCORE: 38
ADLS_ACUITY_SCORE: 52
ADLS_ACUITY_SCORE: 34
ADLS_ACUITY_SCORE: 37
ADLS_ACUITY_SCORE: 36
ADLS_ACUITY_SCORE: 59
ADLS_ACUITY_SCORE: 34
ADLS_ACUITY_SCORE: 34
ADLS_ACUITY_SCORE: 38
ADLS_ACUITY_SCORE: 34
ADLS_ACUITY_SCORE: 42
ADLS_ACUITY_SCORE: 37
ADLS_ACUITY_SCORE: 34
ADLS_ACUITY_SCORE: 53
ADLS_ACUITY_SCORE: 36
ADLS_ACUITY_SCORE: 35
ADLS_ACUITY_SCORE: 38
ADLS_ACUITY_SCORE: 34
ADLS_ACUITY_SCORE: 38
ADLS_ACUITY_SCORE: 37
ADLS_ACUITY_SCORE: 36
ADLS_ACUITY_SCORE: 38
ADLS_ACUITY_SCORE: 42
ADLS_ACUITY_SCORE: 34
ADLS_ACUITY_SCORE: 36
ADLS_ACUITY_SCORE: 34
ADLS_ACUITY_SCORE: 37
ADLS_ACUITY_SCORE: 42
ADLS_ACUITY_SCORE: 59
ADLS_ACUITY_SCORE: 36
ADLS_ACUITY_SCORE: 36
ADLS_ACUITY_SCORE: 53
ADLS_ACUITY_SCORE: 53
ADLS_ACUITY_SCORE: 38
ADLS_ACUITY_SCORE: 53
DOING_ERRANDS_INDEPENDENTLY_DIFFICULTY: OTHER (SEE COMMENTS)
ADLS_ACUITY_SCORE: 38
ADLS_ACUITY_SCORE: 36
ADLS_ACUITY_SCORE: 34
ADLS_ACUITY_SCORE: 35
ADLS_ACUITY_SCORE: 37
ADLS_ACUITY_SCORE: 59
ADLS_ACUITY_SCORE: 53
ADLS_ACUITY_SCORE: 36
ADLS_ACUITY_SCORE: 38
ADLS_ACUITY_SCORE: 38
ADLS_ACUITY_SCORE: 37
ADLS_ACUITY_SCORE: 42
ADLS_ACUITY_SCORE: 38
ADLS_ACUITY_SCORE: 42
ADLS_ACUITY_SCORE: 34
ADLS_ACUITY_SCORE: 38
ADLS_ACUITY_SCORE: 35
ADLS_ACUITY_SCORE: 36
ADLS_ACUITY_SCORE: 34
ADLS_ACUITY_SCORE: 31
ADLS_ACUITY_SCORE: 38
ADLS_ACUITY_SCORE: 34
ADLS_ACUITY_SCORE: 53
ADLS_ACUITY_SCORE: 36
ADLS_ACUITY_SCORE: 38
ADLS_ACUITY_SCORE: 35
ADLS_ACUITY_SCORE: 38
ADLS_ACUITY_SCORE: 34
ADLS_ACUITY_SCORE: 38
ADLS_ACUITY_SCORE: 42
ADLS_ACUITY_SCORE: 34
ADLS_ACUITY_SCORE: 36
ADLS_ACUITY_SCORE: 38
ADLS_ACUITY_SCORE: 38
ADLS_ACUITY_SCORE: 36
ADLS_ACUITY_SCORE: 37
ADLS_ACUITY_SCORE: 53
ADLS_ACUITY_SCORE: 36
ADLS_ACUITY_SCORE: 36
ADLS_ACUITY_SCORE: 34
ADLS_ACUITY_SCORE: 42

## 2022-01-01 ASSESSMENT — ENCOUNTER SYMPTOMS
NAUSEA: 0
DYSURIA: 0
SHORTNESS OF BREATH: 1
WHEEZING: 0
ABDOMINAL PAIN: 0
FREQUENCY: 0
HEADACHES: 0
VOMITING: 0
COUGH: 0
PALPITATIONS: 0
SORE THROAT: 0
DIZZINESS: 1
CHILLS: 0
FEVER: 0
BACK PAIN: 0
LIGHT-HEADEDNESS: 1
FACIAL ASYMMETRY: 0

## 2022-01-11 NOTE — TELEPHONE ENCOUNTER
[facility] Call Center    Phone Message    May a detailed message be left on voicemail: yes     Reason for Call: Other: Katelyn from  homecare called and stated they will be doing the CBC lab draw today at pt's appt     Action Taken: Message routed to:  [facility] ([facility]): michelle

## 2022-01-13 NOTE — TELEPHONE ENCOUNTER
[facility] Call Center    Phone Message    May a detailed message be left on voicemail: yes     Reason for Call: Other: Home care labs were not able to be completed this week due to staff shortage/sickness. Patient wants labs completed in 2 weeks instead. Please call back adn discuss.     Action Taken: Message routed to:  [facility] PCC    Travel Screening: Not Applicable

## 2022-01-14 NOTE — TELEPHONE ENCOUNTER
I called and left message to call back and clarify, are they needing new homecare order approval?   Carrie Baig, EMT at 12:01 PM on 1/14/2022.  [facility]  [facility]  [place]  [phone]

## 2022-01-20 NOTE — TELEPHONE ENCOUNTER
Called Katelyn and gave verbal orders per Dr. Quiles for blood draw for CBC w diff.  Also D/C from home care on 1/25/22.       Elver Sevilla CMA ([facility]) at 2:26 PM on 1/20/2022

## 2022-01-20 NOTE — TELEPHONE ENCOUNTER
[facility] Call Center    Phone Message    May a detailed message be left on voicemail: yes     Reason for Call: Other: Katelyn called regardinf the lab she ust needs the ok to heraclio the labs. Patient will also be D/C from home care on 1/25/22.     Action Taken: Message routed to:  [facility] PCC    Travel Screening: Not Applicable

## 2022-01-29 NOTE — TELEPHONE ENCOUNTER
Dear Danette;    Please see results note I sent to Arianna;    (1) Please have home nursing draw additional labs (ordered this encounter) in the next few weeks    (2) Telephone visit with me in about 3 weeks    Thanks, Alicia        Dear Arianna;    Your hemoglobin is stable. I will add some additional laboratory tests on today to further characterize you anemia.    (1) I recommend you keep your 2/22/2022 hematology appointment with Dr. Dumont    (2) I would like to have a telephone visit with me in about 3 weeks and our nursing staff will set this up.    CAROL Quiles MD

## 2022-02-01 NOTE — TELEPHONE ENCOUNTER
"RN tried to call patient, but no voicemail was available so a message could not be given.  RN wanted to relay the following message:    \"  Taty Barrow, LICJAVIER       Her insurance should cover in-home labwork, but it may be cumbersome to find a company that will accept her insurance.     She can arrange her own medical transportation ([place]) by calling [phone], three business days prior to when she needs the medical appt-related ride.         Rahel Ram RN on 2/1/2022 at 9:20 AM            "

## 2022-02-02 NOTE — TELEPHONE ENCOUNTER
[facility] ANTI[facility]FUNGAL 2% POWDER  Last Written Prescription Date:  12/17/2021  Last Fill Quantity: 90,   # refills: 0  Last Office Visit :  12/17/2021  Future Office visit:   2/25/2022  Routing refill request to provider for review/approval because:  Would Provider like a 90 day supply with refills sent to pharmacy for Pt care.  Due to continuous refills request?  Please advise.       Brigida Velasquez RN  Central Triage Red Flags/Med Refills

## 2022-02-04 NOTE — TELEPHONE ENCOUNTER
"RN called patient and relayed message from Taty Barrow:    \"Her insurance should cover in-home labwork, but it may be cumbersome to find a company that will accept her insurance.     She can arrange her own medical transportation ([place]) by calling [phone], three business days prior to when she needs the medical appt-related ride.\"    Patient still had questions about possible community services.  RN let her know a message would be sent to , so patient can be contacted.    Rahel Ram RN on 2/4/2022 at 12:57 PM    "

## 2022-02-04 NOTE — TELEPHONE ENCOUNTER
ondansetron (ZOFRAN-ODT) 4 MG ODT tab      Last Written Prescription Date:  12/7/21 by Dora Otero  Last Fill Quantity: 20,   # refills: 0  Last Office Visit : 12/17/21  Future Office visit:  2/25/22    Routing refill request to provider for review/approval because:  Not prescribed by requested provider  Limited quantity without refills last prescribed

## 2022-02-17 NOTE — PROGRESS NOTES
Social Work Intervention  [facility]    Data/Intervention:    Patient Name:  Arianna Siddiqi  /Age:  1960 (61 year old)    Visit Type: telephone  Referral Source: [facility]  Reason for Referral:  Patient requesting to talk about community resources    Collaborated With:    -Patient    Psychosocial Information/Concerns:  [place] spenddown of [place]394/month  Ventilator/bipap machine costs her ~$220/month  Would like in home assistance as well.   Has section 8 housing  Wheelchair transport requested due to Patient being unable to ambulate long distances. She does not have her own wheelchair (only walker), so will need to find a company that has their own transport wheelchairs, which will be difficult.       Intervention/Education/Resources Provided:  Informed Patient that her spenddown amount should be decreasing starting 2022, so healthcare will be more affordable for her soon.   Encouraged Patient to reach out to [facility] to request a [place] Cho[place] assessment for CADI waiver services. If approved, this could provide PCA, homemaker, delivered meals, assistance with medical supplies.    contacted several transportation companies to see if they are able to supply a transport wheelchair, but none of them could. Patient will need to obtain her own wheelchair if this type of transportation is needed.     Assessment/Plan:  Patient to follow up with [facility].  will remain available in the future as needed.    Provided patient/family with contact information and availability.    Taty Barrow [facility]  Clinical , Outpatient Specialty Clinics  Direct Phone: [phone]

## 2022-02-22 NOTE — PROGRESS NOTES
Arianna is a 61 year old who is being evaluated via a billable telephone visit.      What phone number would you like to be contacted at? [phone]  How would you like to obtain richard DIAZ? 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  Her anemia has not completely resolved making a suspicious that she is persistently iron deficient not because of recurrence but rather because of insufficient IV iron replacement in November, who is doing well on apixaban instead of warfarin and has had no further menorrhagia she had an IUD placed.    Recommendations:  1. I counseled the patient about my assessment of her iron deficiency anemia which I think is persistent rather than recurrent.  2. Ferritin and CBC check in a few weeks  3. If ferritin is still less than 50 then she would need another dose of IV iron  4. When she is iron replete she should continue on once a day oral iron and have ferritin and CBC checks about every 3 to 4 months with her primary care doctor.    25 minutes spent on the date of the encounter doing chart review, review of test results, interpretation of tests, patient visit and documentation       Jorge Dumont MD   of Medicine, Division of Hematology, Oncology and Transplantation  [facility]      --------------------------------------------------------  Forward History:  Established care for MAG in July 2020, treated with INFeD due to transportation challenges  Nov 2021 hospitalized for menorrhagia and severe anemia.  Noted to have incomplete correcting of INR as she was on warfarin prior to admission.  Our evaluation revealed presence of mild congenital factor VII deficiency after vitamin K replacement.  She was changed to apixaban for anticoagulation for A. fib.  IUD was placed for menorrhagia    History: Dustin Fernandes is a 61 year old woman with a history of recurrent iron deficiency anemia.  She was recently diagnosed with congenital factor VII deficiency which is mild.  She was changed from warfarin to apixaban due to the challenges of INR monitoring with even mild congenital factor VII deficiency.  She notes she has been feeling much better since hospitalization but still intermittently feeling fatigued.  She notes no menstrual bleeding since IUD placement.  Apixaban has caused no problems for her.    Medications are reviewed in the EMR and notable for apixaban, once a day ferrous sulfate    Objective:  By phone she was in no distress, with clear and linear speech.  Affect was normal.  No shortness of breath or cough.     Labs:  Results for DUSTIN FERNANDES (MRN [id number]) as of 2/22/2022 11:36   Ref. Range 11/18/2021 07:24 11/29/2021 14:40 1/25/2022 13:59   WBC Latest Ref Range: 4.0 - 11.0 10e3/uL 7.8 7.3 7.9   Hemoglobin Latest Ref Range: 11.7 - 15.7 g/dL 7.4 (L) 9.2 (L) 11.5 (L)   Hematocrit Latest Ref Range: 35.0 - 47.0 % 26.6 (L) 33.9 (L) 39.9   Platelet Count Latest Ref Range: 150 - 450 10e3/uL 257 341 316       Imaging:  none

## 2022-02-22 NOTE — LETTER
2022         RE: Arianna Siddiqi  [address]  [facility] [address]        Dear Colleague,    Thank you for referring your patient, Arianna Siddiqi, to the [facility]. Please see a copy of my visit note below.    Arianna is a 61 year old who is being evaluated via a billable telephone visit.      What phone number would you like to be contacted at? [phone]  How would you like to obtain yor AVS? 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  Her anemia has not completely resolved making a suspicious that she is persistently iron deficient not because of recurrence but rather because of insufficient IV iron replacement in November, who is doing well on apixaban instead of warfarin and has had no further menorrhagia she had an IUD placed.    Recommendations:  1. I counseled the patient about my assessment of her iron deficiency anemia which I think is persistent rather than recurrent.  2. Ferritin and CBC check in a few weeks  3. If ferritin is still less than 50 then she would need another dose of IV iron  4. When she is iron replete she should continue on once a day oral iron and have ferritin and CBC checks about every 3 to 4 months with her primary care doctor.    25 minutes spent on the date of the encounter doing chart review, review  of test results, interpretation of tests, patient visit and documentation       Jorge Dumont MD   of Medicine, Division of Hematology, Oncology and Transplantation  [employer]     --------------------------------------------------------  Forward History:  Established care for MAG in July 2020, treated with INFeD due to transportation challenges  Nov 2021 hospitalized for menorrhagia and severe anemia.  Noted to have incomplete correcting of INR as she was on warfarin prior to admission.  Our evaluation revealed presence of mild congenital factor VII deficiency after vitamin K replacement.  She was changed to apixaban for anticoagulation for A. fib.  IUD was placed for menorrhagia    History: Dustin Fernandes is a 61 year old woman with a history of recurrent iron deficiency anemia.  She was recently diagnosed with congenital factor VII deficiency which is mild.  She was changed from warfarin to apixaban due to the challenges of INR monitoring with even mild congenital factor VII deficiency.  She notes she has been feeling much better since hospitalization but still intermittently feeling fatigued.  She notes no menstrual bleeding since IUD placement.  Apixaban has caused no problems for her.    Medications are reviewed in the EMR and notable for apixaban, once a day ferrous sulfate    Objective:  By phone she was in no distress, with clear and linear speech.  Affect was normal.  No shortness of breath or cough.     Labs:  Results for DUSTIN FERNANDES (MRN [id number]) as of 2/22/2022 11:36   Ref. Range 11/18/2021 07:24 11/29/2021 14:40 1/25/2022 13:59   WBC Latest Ref Range: 4.0 - 11.0 10e3/uL 7.8 7.3 7.9   Hemoglobin Latest Ref Range: 11.7 - 15.7 g/dL 7.4 (L) 9.2 (L) 11.5 (L)   Hematocrit Latest Ref Range: 35.0 - 47.0 % 26.6 (L) 33.9 (L) 39.9   Platelet Count Latest Ref Range: 150 - 450 10e3/uL 257 341 316       Imaging:  none        Again, thank you for allowing me to  participate in the care of your patient.        Sincerely,        Jorge Dumont MD

## 2022-02-24 NOTE — PROGRESS NOTES
+Telephone visit     Ms. Siddiqi agrees to a telephone     Last telephone visit with me 12/17/2021     Cardiology follow up with Dr. Uriarte 6/22/2022. Last visit 12/22/2021 for h/o afib on warfarin and now recommended to start Eliquis. Stop ASA.  She is also on Bumex and potassium replacement. Electrolytes and Creatinine normal on 12/16/2021. She states she is tolerating Eliquis w/o problems.     Hematology visit with Dr. Dumont, 2/22/2022 for anemia secondary to menorrhagia. She is on PO iron. Last Hgb 11.4 on 1/25/2022    Obesity and Sleep Apnea     She remains on Atorvastatin increased lipids; no recent lipids     Immunizations; COVID vaccine Pfizer x 2. Influenza vaccine 11/18/2021. She states she called [facility] department and the may come out an give her a booster    She feels much better and has more energy. She is trying to lose weight. She states she has eliminated her morning toast but still has an omelette with onions, green peppers, spinach, and mushrooms.     Vitals today: Weight 361 lbs, /78, Pulse 77. Temperature 98.1 and O2 sats 94%      CAROL Quiles MD    Total time on the phone 5 minutes and 14 seconds.

## 2022-02-25 NOTE — NURSING NOTE
Chief Complaint   Patient presents with     RECHECK     Patient calls in for follow up.         Timmy Van MA on 2/25/2022 at 8:53 AM

## 2022-02-25 NOTE — TELEPHONE ENCOUNTER
[facility]    Phone Message    May a detailed message be left on voicemail: yes     Reason for Call: Other: Patient reporting she is wondering when her visit will start. Patient has 9 am telephone visit with Dr. Quiles today.     Action Taken: Message routed to:  [facility]    Travel Screening: Not Applicable

## 2022-02-25 NOTE — TELEPHONE ENCOUNTER
Provider will call patient in a few minutes.      Elver Sevilla CMA ([facility]) at 9:30 AM on 2/25/2022

## 2022-03-04 NOTE — PROGRESS NOTES
Spoke with Dr. Dumont and he would like for patient to get her labs drawn at home due to her home bound status.  Labs faxed on 3/2 to  [facility] and they asked for a current referral. Order placed per Dr. Dumont and will fax referral out Monday.      Michelle Guillory, RN  Nurse Care Coordinator  [phone]

## 2022-03-10 NOTE — TELEPHONE ENCOUNTER
Spoke with patient and let her know that [facility] is unable to accept her as a patient at this time for home lab draws.  Writer will reach out to other [facility] agencies to see if anyone has openings and update patient as information comes in.     Michelle Guillory RN  Nurse Care Coordinator

## 2022-03-14 NOTE — PROGRESS NOTES
Faxed referral and order to [facility].  They are unable to take patient at this time due to staffing issues.   Michelle Guillory, RN  RN Care Coordinator  [phone] clinic main line

## 2022-03-17 NOTE — PROGRESS NOTES
Faxed home lab draw order to [facility]- will see if they can take her as a patient.   Michelle Guillory, RN  RN Care Coordinator  [phone] clinic main line    UPDATE: [facility] unable to take pt as client at this time.

## 2022-03-17 NOTE — PROGRESS NOTES
Faxed referral and order to [facility] to see if they can take patient on for home lab draws.  Will await return call.  Michelle Guillory, RN  RN Care Coordinator  [phone] clinic main line

## 2022-03-17 NOTE — PROGRESS NOTES
Faxed referral and order for home care lab draws. They called writer back and they are unable to accommodate this patient at this time.   Michelle Guillory, RN  RN Care Coordinator  [phone] clinic main line

## 2022-03-23 NOTE — PROGRESS NOTES
[facility]: Cancer Care Initial Note                                    Discussion with Patient:                                                    Spoke with patient again today and let her know that all of the [facility] referrals I sent out were rejected due to lack of staff.  Patient should have had her labs drawn last week and is unable to get to the lab due to transportation and no [facility] agencies will come to her.  She gets her food and medications delivered to her house, so that is not a an issue, but at this point, she is completely home bound  and lacks access to medical care.              Assessment:                                                      Initial  Current living arrangement:: I live alone  Informal Support system:: Family ([place] and [place])- no one local to help even get her to the front door.   Bed or wheelchair confined:: No, not confined, but can't get to front door without a wheel chair- lives on the third floor in an apartment building  Mobility Status: Independent w/Device (walker around house)  Transportation means:: Medical transport, [facility]- they won't come and get her in the wheel chair and she doesn't have a wheel chair in her house  Medication adherence problem (GOAL):: Yes  Effective medication organization strategy in place:: Yes  All new medication Rx filled: Yes  Knowledgeable about how to use meds:: Yes  Medication side effects suspected:: No  Referrals Placed: Home Care  Advanced Care Plan/Directive Status: Declined Further Information  Patient Reported Pain?: Yes- not well managed and only has tylenol and a muscle relaxer for pain.  Pain Score: Worst Pain (10)- at time of out reach  Pain Loc: Knee (bilateral knee pain)- takes tylenol for pain. States it's arthritis. Right knee worse than left.          Intervention/Education provided during outreach:                                                     Will send a message to  to follow up and another message to  Yeni to follow up with [facility] care since she is unable to get out of her home.        Signature:  Michelle Guillory RN  RN Care Coordinator  [phone] clinic main line

## 2022-03-29 NOTE — PROGRESS NOTES
Telephone visit     Ms. Siddiqi agrees to a telephone visit    Cardiology follow up with Dr. Uriarte 6/22/2022, On Eliquis for afib. She is on Bumex and potassium replacement.     Hematology/Oncology SW note 3/9/2022 and 3/29/2022. She was seen 2/22/2022 Hematology Dr. Dumont for anemia related to menorrhagia.     Last telephone visit with me 2/25/2022    Obesity and Sleep Apnea.     She has some hair loss and she should get her iron and ferritin rechecked.    She has a rash on her chest and has tried benadryl cream and low dose hydrocortisone. Will send in Rx. For triamcinolone. If rash is worse (along with hair loss sxs.) recommend she see dermatology    Her vitas today were Temp 98.1. O2 sat on RA 94%, HR 86 and /74. Her weight was 356 lbs  (last weight 362 lbs on 2/22/2022).     She has some B hand arthritis.     CAROL Quiles MD    Total time 9 minutes and 37 seconds

## 2022-03-29 NOTE — PROGRESS NOTES
"Oncology Distress Screening Follow-up  Clinical Social Work  [facility]    Identified Concern and Score From Distress Screenin. How concerned are you about your ability to eat?  0       2. How concerned are you about unintended weight loss or your current weight?  7 Abnormal        3. How concerned are you about feeling depressed or very sad?  0       4. How concerned are you about feeling anxious or very scared?  0       5. Do you struggle with the loss of meaning and immanuel in your life?  Not at all       6. How concerned are you about work and home life issues that may be affected by your cancer?  4       7. How concerned are you about knowing what resources are available to help you?  8 Abnormal        8. Do you currently have what you would describe as Denominational or spiritual struggles?             Not at all         Date of Distress Screenin2022    Intervention:   Arianna is a 61-year-old woman with a diagnosis of anemia who is followed by Dr. Dumont. At time of last visit with Dr. Dumont she scored positive on oncology distress screen. This clinician called Arianna today with goal of following up on elevated distress, and re-orienting to role of  as part of care team.     Arianna endorsed that she had a conversation with Taty 2022 about enhancing support in home, and is awaiting feedback about eligibility for CADI waiver (anticipating feedback in 5 weeks). Arianna reported that she presently resides on 3rd floor of apartment and \"never leaves\" due to 1) fear of COVID 2) mobility issues. Arianna is 5'4\" and 370lbs. Arianna reports that she is independent with ambulation within home, but that arthritis makes mobility very difficult out of home. Arianna reports that she uses walker within residence, is interested in a transport wheelchair, but also reports that if she were to access transport wheelchair, she would not be able to push herself out of residence and into elevator to leave.     This clinician " "inquired about pt's interest in engaging with physical therapist through home care that could have as their goal supporting Arianna's independence in leaving home. Arianna reported that she would be open to this.     JAVIER contacted [employer] (ph:[phone], f: [phone]) who reported that they have 19\" transport wheelchair (300lbs weight limit) and 22\" x 18\" (450lbs weight limit, $399 if no insurance support- not currently in stock). JAVIER called Arianna and reviewed costs for transport wheelchair which she reported she would not be able to afford or likely use due to strength issues/arthritis.     Arianna reports when she is physically stronger she anticipates using walker and [employer] transportation.      Education Provided:   JAVIER contact information (mailed)    Follow-up Required:   1) This clinician to collaborate with RNCC Michelle and PCP regarding possibility of home care for PT support.   2) SW will continue to be available as needed for ongoing psychosocial assistance.     TAMMY Bush, LICSW  Phone: [phone]  Schulter Devon: M, Popeyeu  *every other Tue, 8am-4:30pm  Larry Brooks: W, F, *every other Tue, 8am-4:30pm       "

## 2022-04-05 NOTE — PROGRESS NOTES
[facility] called and stated they got the C referral from Dr. Dumont and they will be going out to the patient's home on the 7th of April.  Dr. Dumont placed the order because the patient is not able to leave her home due to not being able to ambulate to the front door of her building, not having access to a wheel chair and not having any friends or family that are able to help her.  [facility] is for both PT and RN so she can get her labs drawn.  IB message sent to Yue HERRERA to info her of change.      Michelle Guillory, RN  RN Care Coordinator  [phone] clinic main line

## 2022-04-08 NOTE — TELEPHONE ENCOUNTER
[facility]    Phone Message    May a detailed message be left on voicemail: yes     Reason for Call: Order(s): Home Care Orders: Physical Therapy (PT): evaluation and treatment and Skilled Nursing:  every other week for 8 weeks.    Action Taken: Message routed to:  [facility]    Travel Screening: Not Applicable

## 2022-04-08 NOTE — TELEPHONE ENCOUNTER
[facility]    Phone Message    May a detailed message be left on voicemail: yes     Reason for Call: Order(s): Other:   Reason for requested: Grisel was calling because the referral order it mentions the need for iron levels and ferritin levels, which they will also need orders for. Please review and fellow up asap thank you.    Date needed: Today/ASAP   Provider name: Gagan Quiles MD        Action Taken: Message routed to:  [facility]    Travel Screening: Not Applicable

## 2022-04-08 NOTE — TELEPHONE ENCOUNTER
[facility]    Phone Message    May a detailed message be left on voicemail: yes     Reason for Call: Other: Jyamie is calling about the Home care referral following after 4/5/22 clinic visit, they need an order to Delay the start of care. The nurse will be seeing the patient today 4/8/22 @ 1pm, they will need the verbal orders prior to the nurse arrival thank you.     Action Taken: Message routed to:  [facility]    Travel Screening: Not Applicable

## 2022-04-08 NOTE — TELEPHONE ENCOUNTER
Called Jaymie. Unable to reach  No VM set up.    Elver Sevilla CMA ([facility]) at 12:17 PM on 4/8/2022

## 2022-04-08 NOTE — TELEPHONE ENCOUNTER
Called Grisel.  Gave verbal orders for lab draw.  Added additional labs.      Elver Sevilla CMA ([facility]) at 3:07 PM on 4/8/2022

## 2022-04-11 NOTE — TELEPHONE ENCOUNTER
Grisel calling requesting verbal orders for below. Unable to do vitamin B 12 and Folate. Will draw at next visit next week. Please call to discuss thank you.

## 2022-04-11 NOTE — TELEPHONE ENCOUNTER
Number that was left is the patients number. Will need home cares number in order to give home care orders. Trena Sorenson LPN 4/11/2022 9:24 AM

## 2022-04-11 NOTE — TELEPHONE ENCOUNTER
Called Grisel. Gave verbal orders per Dr. Quiles for Order(s): Home Care Orders: Physical Therapy (PT): evaluation and treatment and Skilled Nursing:  every other week for 8 weeks    Okay to get labs next week.      Elver Sevilla CMA ([facility]) at 2:49 PM on 4/11/2022

## 2022-04-12 NOTE — TELEPHONE ENCOUNTER
[facility]    Phone Message    May a detailed message be left on voicemail: yes     Reason for Call: Requesting Results   Name/type of test: blood labs  Date of test: 04/08/2022  Was test done at a location other than [facility] (Please fill in the location if not [facility])?: No    Requesting call back from care team to discuss results.      Action Taken: Message routed to:  [facility]    Travel Screening: Not Applicable                      Lab results given. Answered pt questions and concerns.  Clinic numbers given.  Danette Mayfield RN 3:02 PM on 4/12/2022.

## 2022-04-13 NOTE — PROGRESS NOTES
[facility]: Cancer Care Short Note                                                                                          Outgoing Call:   Spoke with patient.  PCP had labs drawn on Friday.  Ferritin 34 and Hgb 13.3.  Tmi's last note stated that patient would need IV iron if ferritin was under 50.  Patient is taking one iron pill daily, down from 3 pills daily.  If IV iron is necessary, patient will need home care due to patient being home bound.      Route to provider to further advise- will call patient back next week to follow up and inform her if MD has ordered IV iron.     Michelle Guillory, RN  RN Care Coordinator   [facility]

## 2022-04-14 NOTE — TELEPHONE ENCOUNTER
Called Lara and left a secure VM.  Gave verbal orders per Dr. Quiles for Order(s): Home Care Orders: Physical Therapy (PT): requesting 2 additional home care orders to check status of patient.        Elver Sevilla CMA ([facility]) at 12:45 PM on 4/14/2022

## 2022-04-14 NOTE — TELEPHONE ENCOUNTER
[facility]    Phone Message    May a detailed message be left on voicemail: yes     Reason for Call: Order(s): Home Care Orders: Physical Therapy (PT): requesting 2 additional home care orders to check status of patient. Home care also wanted to note patients oxygen levels drop to 81% with activiity but do recover. Please call with questions thank you.     Action Taken: Message routed to:  [facility]    Travel Screening: Not Applicable

## 2022-04-26 PROBLEM — E61.1 IRON DEFICIENCY: Status: ACTIVE | Noted: 2022-01-01

## 2022-04-27 NOTE — PROGRESS NOTES
[facility]: Cancer Care Short Note                                                                                        Spoke with Dr. Dumont and he would like Arianna to get IV iron.  Treatment plan entered by the MD and message sent to [facility] to help set this up.  Patient currently has [facility] for Home Health.        Michelle Guillory, RN  RN Care Coordinator   [facility]

## 2022-04-29 NOTE — PROGRESS NOTES
[facility]: Cancer Care Short Note                                                                                          Incoming Call:     Spoke with nurse from [facility].  [facility] is going to discharge patient from their service.  According to [facility], there have been ongoing billing issues with patient care.  The only company in the area who will do peripheral IV starts is [employer] out of [place].  Will reach out to them next week to see if they can accommodate her order.        Follow up call in 1-2 weeks    Michelle Guillory RN  RN Care Coordinator   [facility]

## 2022-05-02 NOTE — TELEPHONE ENCOUNTER
[facility]    Phone Message    May a detailed message be left on voicemail: yes     Reason for Call: Order(s): Home Care Orders: Skilled Nursing:  Skilled nursing orders - D/C patient from homecare as pt wants to be discharged from homecare    Action Taken: Message routed to:  [facility]    Travel Screening: Not Applicable

## 2022-05-03 NOTE — TELEPHONE ENCOUNTER
Verbal [facility] Dr. Quiles, for Home Care Orders: Skilled Nursing:  Skilled nursing orders - D/C patient from homecare as pt wants to be discharged from homecare. Trena Sorenson LPN 5/3/2022 8:08 AM

## 2022-05-04 NOTE — TELEPHONE ENCOUNTER
cyclobenzaprine (FLEXERIL) 5 MG tablet   Take 1 tablet (5 mg) by mouth nightly as needed for muscle spasms      Last Written Prescription Date:  10/5/21  Last Fill Quantity: 30,   # refills: 5  Last Office Visit : 3/30/22  Future Office visit:  none    Routing refill request to provider for review/approval because:  Drug not on the [facility], [facility] or [facility] refill protocol

## 2022-05-04 NOTE — TELEPHONE ENCOUNTER
2nd request, 5-4-22 pt call    Resent as high priority          Pt call note   cyclobenzaprine (FLEXERIL) 5 MG tablet  Prescribing Provider: Dr Quiles              Pharmacy: [facility]/PHARMACY #[id number] [place] [id number] AT [place] [address]              What on the order needs clarification? Per call from PT needs refill but was informed   by the pharmacy that it was denied. Pt requesting call back to discuss if there are changes to her meds.

## 2022-05-04 NOTE — TELEPHONE ENCOUNTER
[facility]    Phone Message    May a detailed message be left on voicemail: yes     Reason for Call: Medication Question or concern regarding medication   Prescription Clarification  Name of Medication: cyclobenzaprine (FLEXERIL) 5 MG tablet  Prescribing Provider: Dr Quiles   Pharmacy: [facility]/PHARMACY #[id number] [address] AT [address]   What on the order needs clarification? Per call from PT needs refill but was informed by the pharmacy that it was denied. Pt requesting call back to discuss if there are changes to her meds.           Action Taken: Message routed to:  [facility]    Travel Screening: Not Applicable

## 2022-05-06 NOTE — TELEPHONE ENCOUNTER
1metoprolol succinate ER (TOPROL-XL) 100 MG    Last Written Prescription Date:  12/8/20  Last Fill Quantity: 90,   # refills: 3  Last Office Visit : 12/22/21  Future Office visit:  6/28/22  Routing refill request to provider for review/approval because:  RF GAP   Is pt taking med?/ DOSE?

## 2022-05-17 NOTE — PROGRESS NOTES
This is a recent snapshot of the patient's [facility] medical record.  For current drug dose and complete information and questions, call [phone] or In Basket pool, [facility] home infusion ([id number])  CSN Number:  [id number]

## 2022-06-02 NOTE — CONFIDENTIAL NOTE
Covid test was negative.   Has sore throat and has stuff in back of throat.   Had diarrhea.   States has been sick for two weeks.   Advised to call PCP for further advice and symptom management.

## 2022-06-10 NOTE — TELEPHONE ENCOUNTER
[facility] Call Center    Phone Message    May a detailed message be left on voicemail: yes     Reason for Call: Other: Patient is calling regarding getting denied for her triamcinolone (KENALOG) 0.1 % external cream. She still has the rash on her chest and wanted another prescription. Please call and discuss.     Action Taken: Message routed to:  [facility] ([facility]): PCC    Travel Screening: Not Applicable

## 2022-06-11 NOTE — TELEPHONE ENCOUNTER
cyclobenzaprine (FLEXERIL) 5 MG tablet  Last Written Prescription Date: 5/5/22  Last Fill Quantity: 30,  1 rfs  LV:  3/30/22  Future Office visit:  none    Routing refill request to provider for review/approval because: not on protocol

## 2022-06-13 NOTE — TELEPHONE ENCOUNTER
TRIAMCINOLONE 0.1% CREAM  Last Written Prescription Date:  3/30/2022  Last Fill Quantity: 30,   # refills: 1  Last Office Visit :  3/30/2022  Future Office visit:  None  Routing refill request to provider for review/approval because:  Refer to clinic for review     ALLOPURINOL 100 MG TABLET  Last Written Prescription Date:  3/30/2022  Last Fill Quantity: 30,   # refills: 1  Last Office Visit :  3/30/2022  Future Office visit:  None  Routing refill request to provider for review/approval because:  Second request  Over due Uric Acid level  Please call Pt to schedule.     Recent Labs   Lab Test 01/17/20  0334   URIC 9.1*

## 2022-06-28 NOTE — LETTER
6/28/2022      RE: Arianna Siddiqi  [address]  [place] [id number]       Dear Colleague,    Thank you for the opportunity to participate in the care of your patient, Arianna Siddiqi, at the [facility]. Please see a copy of my visit note below.    HPI: Ms. Arianna Siddiqi is a 60 year old  female with PMH significant for morbid obesity, paroxysmal atrial fibrillation, heart failure with preserved ejection fraction, type 2 diabetes? , obesity hypoventilation syndrome, CARLOS on CPAP, and hypertension.    The patient is being seen today at request of Dr. Quiles.    Patient was first diagnosed with heart failure and atrial fibrillation in 2016 and she was started on Bumex and warfarin. Since then the patient has been on these medications.  In November 2019 she ran out of Bumex for couple of months and could not refill the medication.  She ended up gaining weight and hospitalized in January of this year with heart failure exacerbation and respiratory decompensation.  She was 60 pounds up from her normal weight.  She was treated with IV diuresis and CPAP. Patient had an asymptomatic paroxysmal atrial flutter episode during this admission.    Unfortunately patient admitted to the hospital recently on 2/15/2020 with heart failure decompensation and respiratory failure in the setting of influenza A.  Patient`s symptoms improved with noninvasive ventilation.      Patient tells me today that she is feeling stronger and breathing better since last hospital discharge.  She is able to do her daily activities.  She has home health nurse that checks on her once a week.  Denies chest pain, lower extremity edema, palpitations, dizziness or syncope.  She weighs herself daily.  Today she was 349 pounds.  She checks her INR at home.  During the day she no longer needs oxygen though she still has an oxygen tube at home.  She uses CPAP at  night.    No prior history of CAD. No prior history of stroke. Blood pressure is well controlled with lisinopril 5 mg. She was a heavy smoker until 2002 with 30-pack-year smoking.  No alcohol abuse.  Diabetes is listed in patient's past medical history however last hemoglobin A1c is 5 on 4/16/2020 and she is not on antidiabetics.    Patient is currently on Bumex 2 mg once a day, atorvastatin 20 mg, lisinopril 5 mg, metoprolol 100 mg, potassium chloride 20 mEq twice daily, warfarin, allopurinol, diphenhydramine and cyclobenzaprine.    Patient's last EKG on 2/11/2020 shows sinus rhythm with right bundle branch block.  EKG on 1/23/2020 shows atrial flutter.    Echocardiogram on 1/16/2020 showed LVEF of 55 to 60%.  RV showed mild dilation with mildly reduced function.  No significant valve disease noted.  Prior echocardiogram in 2016 showed reduced EF at 40 to 45%.    Medications, personal, family, and social history reviewed with patient and revised.    Interval history 12/8/2020:  Today 6-month follow-up visit is via telephone.  She reports feeling well.  Denies chest pain, palpitations, syncope, dizziness or lower extremity edema.  Reports shortness of breath with exertion particularly with strenuous activities.  Compliant with medications and CPAP.  Patient follows with Dr. Quiles regarding iron deficiency anemia.  She was recommended EGD and colonoscopy when she was last seen in August of this year however she was not able to do any of these.  She tells me that she is very concerned to go to hospital to do any tests due to current pandemic.    Interval history 12/22/2021:  Patient is being seen today through telephone visit.  She tells me that in November of this year she had severe uterine bleeding with hemoglobin dropping to 3.1.  She was hospitalized at [facility].  She underwent some procedures and they finally implanted her an IUD.  Patient required 6 units of red blood cell infusion.  She was recommended to stop  warfarin (warfarin was for paroxysmal atrial fibrillation).  She is only taking aspirin now.  When she was last seen by [facility] clinic 12/7 she was recommended to stop metoprolol, start spironolactone and reduce the dose of potassium.  Being on spironolactone for a week she developed rash and had to stop spironolactone.    Patient is complaining about extra money that she needs to pay for her CPAP machine every month.  She tells me that she does not know whom to talk to about it.    Continues to report shortness of breath with exertion.  No chest pain.  Weight stable.  Blood pressure today was 128/76 mmHg.  Her weight is 367 pounds.      Interval history 6/28/2022  I called and talked to the patient on the phone.  She tells me that she is feeling great.  Denies chest pain, palpitations, lower extremity edema.  Feels short of breath only if she overexerts herself otherwise she is fine with regular daily activities.  She has lost weight and currently weighing 323 pounds.  She is compliant with medications.  Does not need rate control for atrial fibrillation at this time.  Staying off of metoprolol.  No side effects from Eliquis.    PAST MEDICAL HISTORY:  Past Medical History:   Diagnosis Date     CHF (congestive heart failure) (H)      CKD (chronic kidney disease)      Compliance poor      Diabetes mellitus (H)      Gout      Hypertension      Insomnia      Morbid obesity (H)      CARLOS treated with BiPAP        CURRENT MEDICATIONS:  Current Outpatient Medications   Medication Sig Dispense Refill     acetaminophen (TYLENOL) 500 MG tablet Take 1-2 tablets (500-1,000 mg) by mouth every 4 hours as needed for mild pain  0     allopurinol (ZYLOPRIM) 100 MG tablet TAKE 2 TABLETS (200 MG) BY MOUTH DAILY PATIENT NEEDS TO SEE PRIMARY PROVIDER AND HAVE LABS FOR FURTHER REFILLS. 60 tablet 1     alum & mag hydroxide-simethicone (MAALOX  ES) 400-400-40 MG/5ML SUSP suspension Take 15 mLs by mouth every 4 hours as needed for other  (gastric distress.) 355 mL 0     atorvastatin (LIPITOR) 20 MG tablet TAKE 1 TABLET BY MOUTH EVERY EVENING 30 tablet 1     blood glucose monitoring (ACCU-CHEK NINA PLUS) meter device kit Use to test blood sugars 3 times daily or as directed. 1 kit 0     blood glucose monitoring (SOFTCLIX) lancets Use to test blood sugar 3 times daily or as directed. 300 each 0     bumetanide (BUMEX) 1 MG tablet TAKE 2 TABLETS BY MOUTH EVERY DAY 60 tablet 1     cyclobenzaprine (FLEXERIL) 5 MG tablet Take 1 tablet (5 mg) by mouth nightly as needed for muscle spasms 30 tablet 5     diphenhydrAMINE (BENADRYL) 25 MG tablet Take 25 mg by mouth every 6 hours as needed for itching or allergies       ferrous gluconate (FERGON) 324 (38 Fe) MG tablet Take 1 tablet (324 mg) by mouth daily 90 tablet 3     lisinopril (ZESTRIL) 5 MG tablet TAKE 1 TABLET BY MOUTH EVERY DAY 30 tablet 1     metoprolol tartrate (LOPRESSOR) 50 MG tablet TAKE 1 TABLET BY MOUTH TWICE A DAY 60 tablet 1     miconazole (MICATIN) 2 % external powder Apply topically 2 times daily 180 g 0     ondansetron (ZOFRAN-ODT) 4 MG ODT tab Take 1 tablet (4 mg) by mouth every 6 hours as needed for nausea or vomiting 10 tablet 0     polyethylene glycol (MIRALAX) packet Take 17 g by mouth daily as needed for constipation 30 packet 1     potassium chloride ER (K-TAB) 20 MEQ CR tablet Take 1 tablet (20 mEq) by mouth 2 times daily 180 tablet 1     sennosides (SENOKOT) 8.6 MG tablet Take 1 tablet by mouth 2 times daily as needed for constipation 60 tablet 0     traMADol (ULTRAM) 50 MG tablet Take 1 tablet (50 mg) by mouth nightly as needed for severe pain 30 tablet 0     warfarin ANTICOAGULANT (COUMADIN) 1 MG tablet TAKE 2 TABLETS (2 MG) BY MOUTH DAILY USE AS DIRECTED BY COUMADIN CLINIC 60 tablet 1     warfarin ANTICOAGULANT (COUMADIN) 3 MG tablet Take one to one in a half tablets daily or as directed by the Coumadin clinic 135 tablet 5       PAST SURGICAL HISTORY:  No past surgical history on  file.    ALLERGIES:     Allergies   Allergen Reactions     Penicillins Itching and Rash     Tolerated ceftriaxone 2020       FAMILY HISTORY:  Family History   Adopted: Yes   Family history unknown: Yes         SOCIAL HISTORY:  Social History     Tobacco Use     Smoking status: Former Smoker     Packs/day: 1.50     Years: 20.00     Pack years: 30.00     Types: Cigarettes     Last attempt to quit: 2002     Years since quittin.3     Smokeless tobacco: Never Used   Substance Use Topics     Alcohol use: No     Alcohol/week: 0.0 standard drinks     Drug use: No       I have reviewed the labs  and made my comment in the assessment and plan.    Labs:  CBC RESULTS:   Lab Results   Component Value Date    WBC 6.9 2020    RBC 3.30 (L) 2020    HGB 9.5 (L) 2020    HCT 33.1 (L) 2020     2020    MCH 28.8 2020    MCHC 28.7 (L) 2020    RDW 18.4 (H) 2020     2020       BMP RESULTS:  Lab Results   Component Value Date     2020    POTASSIUM 3.9 2020    CHLORIDE 105 2020    CO2 28 2020    ANIONGAP 5 2020    GLC 89 2020    BUN 23 2020    CR 0.84 2020    GFRESTIMATED 75 2020    GFRESTBLACK 86 2020    KADEN 9.5 2020        INR RESULTS:  Lab Results   Component Value Date    INR 2.1 (A) 2020    INR 2.4 (A) 2020    INR Canceled, Test credited 2020    INR 1.7 (A) 2020     Echocardiogram 2020  Technically difficult study.  The Ejection Fraction is estimated at 55-60% by visual estimate.  Regional wall motion is probably normal.  Difficult to assess RV. Function probably at least mildly reduced. Size might  be mildly dilated.  Valves not well visualized. No significant valvular abnormalities were noted  by Doppler.  Dilation of the inferior vena cava is present with abnormal respiratory  variation in diameter.  No pericardial effusion is present.    Patient's last EKG  on 2/11/2020 shows sinus rhythm with right bundle branch block.  EKG on 1/23/2020 shows atrial flutter.    Assessment and Plan:   Ms. Arianna Siddiqi is a 60 year old  female with PMH significant for former heavy smoking history for 30 pack years quit date 2002, morbid obesity, paroxysmal atrial fibrillation, heart failure with preserved ejection fraction, type 2 diabetes?  Not on medications, obesity hypoventilation syndrome, obstructive sleep apnea on CPAP, iron deficiency anemia and hypertension.    Today's telephone visit is for 6-month follow-up.  She tells me she is feeling great.  As you know she had uterine bleeding 6 months ago with severe anemia.  Her hemoglobin has improved and normal now.    # Heart failure with preserved ejection fraction  -NYHA functional class II.  Patient's weight today is 333 pounds.  Recommend to continue Bumex 2 mg once daily along with potassium 20 mEq daily.      # Paroxysmal atrial fibrillation  -On Eliquis 5 mg twice daily. Tolerating well.  -Does not require rate control at this time.  Stay off of metoprolol for now.    # Morbid obesity  - Has multiple complications like obstructive sleep apnea and obesity hypoventilation syndrome.  She has been losing weight with diet change.    # Hypertension  -Well-controlled with lisinopril 5 mg.    # Former heavy smoker: No prior history of CAD.     # Iron deficiency anemia: On iron treatment.  Resolved now.    No medication changes today.  She follows with Dr. Quiles who follows her very closely. I will see patient as needed.    Total time spent 22 minutes including telephone visit, review of medical notes and documentation.    Please donot hesitate to contact me if you have any questions or concerns. Again, thank you for allowing me to participate in the care of your patient.    Duane CHAPA MD  [facility] Division of Cardiology  Pager [phone]

## 2022-06-28 NOTE — PROGRESS NOTES
"Arianna Siddiqi is a 60 year old female who is being evaluated via a billable telephone visit.      The patient has been notified of following:     \"This telephone visit will be conducted via a call between you and your physician/provider. We have found that certain health care needs can be provided without the need for a physical exam.  This service lets us provide the care you need with a short phone conversation.  If a prescription is necessary we can send it directly to your pharmacy.  If lab work is needed we can place an order for that and you can then stop by our lab to have the test done at a later time.    Telephone visits are billed at different rates depending on your insurance coverage. During this emergency period, for some insurers they may be billed the same as an in-person visit.  Please reach out to your insurance provider with any questions.    If during the course of the call the physician/provider feels a telephone visit is not appropriate, you will not be charged for this service.\"    Patient has given verbal consent for Telephone visit?  Yes    What phone number would you like to be contacted at? [phone]    How would you like to obtain your AVS? Mail a copy    Phone call duration: 12 minutes (phone call started at 1:30 PM)    HPI: Ms. Arianna Siddiqi is a 60 year old  female with PMH significant for morbid obesity, paroxysmal atrial fibrillation, heart failure with preserved ejection fraction, type 2 diabetes? , obesity hypoventilation syndrome, CARLOS on CPAP, and hypertension.    The patient is being seen today at request of Dr. Quiles.    Patient was first diagnosed with heart failure and atrial fibrillation in 2016 and she was started on Bumex and warfarin. Since then the patient has been on these medications.  In November 2019 she ran out of Bumex for couple of months and could not refill the medication.  She ended up gaining weight and hospitalized in January of this year with heart failure " exacerbation and respiratory decompensation.  She was 60 pounds up from her normal weight.  She was treated with IV diuresis and CPAP. Patient had an asymptomatic paroxysmal atrial flutter episode during this admission.    Unfortunately patient admitted to the hospital recently on 2/15/2020 with heart failure decompensation and respiratory failure in the setting of influenza A.  Patient`s symptoms improved with noninvasive ventilation.      Patient tells me today that she is feeling stronger and breathing better since last hospital discharge.  She is able to do her daily activities.  She has home health nurse that checks on her once a week.  Denies chest pain, lower extremity edema, palpitations, dizziness or syncope.  She weighs herself daily.  Today she was 349 pounds.  She checks her INR at home.  During the day she no longer needs oxygen though she still has an oxygen tube at home.  She uses CPAP at night.    No prior history of CAD. No prior history of stroke. Blood pressure is well controlled with lisinopril 5 mg. She was a heavy smoker until 2002 with 30-pack-year smoking.  No alcohol abuse.  Diabetes is listed in patient's past medical history however last hemoglobin A1c is 5 on 4/16/2020 and she is not on antidiabetics.    Patient is currently on Bumex 2 mg once a day, atorvastatin 20 mg, lisinopril 5 mg, metoprolol 100 mg, potassium chloride 20 mEq twice daily, warfarin, allopurinol, diphenhydramine and cyclobenzaprine.    Patient's last EKG on 2/11/2020 shows sinus rhythm with right bundle branch block.  EKG on 1/23/2020 shows atrial flutter.    Echocardiogram on 1/16/2020 showed LVEF of 55 to 60%.  RV showed mild dilation with mildly reduced function.  No significant valve disease noted.  Prior echocardiogram in 2016 showed reduced EF at 40 to 45%.    Medications, personal, family, and social history reviewed with patient and revised.    Interval history 12/8/2020:  Today 6-month follow-up visit is via  telephone.  She reports feeling well.  Denies chest pain, palpitations, syncope, dizziness or lower extremity edema.  Reports shortness of breath with exertion particularly with strenuous activities.  Compliant with medications and CPAP.  Patient follows with Dr. Quiles regarding iron deficiency anemia.  She was recommended EGD and colonoscopy when she was last seen in August of this year however she was not able to do any of these.  She tells me that she is very concerned to go to hospital to do any tests due to current pandemic.    Interval history 12/22/2021:  Patient is being seen today through telephone visit.  She tells me that in November of this year she had severe uterine bleeding with hemoglobin dropping to 3.1.  She was hospitalized at [facility].  She underwent some procedures and they finally implanted her an IUD.  Patient required 6 units of red blood cell infusion.  She was recommended to stop warfarin (warfarin was for paroxysmal atrial fibrillation).  She is only taking aspirin now.  When she was last seen by core clinic 12/7 she was recommended to stop metoprolol, start spironolactone and reduce the dose of potassium.  Being on spironolactone for a week she developed rash and had to stop spironolactone.    Patient is complaining about extra money that she needs to pay for her CPAP machine every month.  She tells me that she does not know whom to talk to about it.    Continues to report shortness of breath with exertion.  No chest pain.  Weight stable.  Blood pressure today was 128/76 mmHg.  Her weight is 367 pounds.      Interval history 6/28/2022  I called and talked to the patient on the phone.  She tells me that she is feeling great.  Denies chest pain, palpitations, lower extremity edema.  Feels short of breath only if she overexerts herself otherwise she is fine with regular daily activities.  She has lost weight and currently weighing 323 pounds.  She is compliant with medications.  Does not need  rate control for atrial fibrillation at this time.  Staying off of metoprolol.  No side effects from Eliquis.    PAST MEDICAL HISTORY:  Past Medical History:   Diagnosis Date     CHF (congestive heart failure) (H)      CKD (chronic kidney disease)      Compliance poor      Diabetes mellitus (H)      Gout      Hypertension      Insomnia      Morbid obesity (H)      CARLOS treated with BiPAP        CURRENT MEDICATIONS:  Current Outpatient Medications   Medication Sig Dispense Refill     acetaminophen (TYLENOL) 500 MG tablet Take 1-2 tablets (500-1,000 mg) by mouth every 4 hours as needed for mild pain  0     allopurinol (ZYLOPRIM) 100 MG tablet TAKE 2 TABLETS (200 MG) BY MOUTH DAILY PATIENT NEEDS TO SEE PRIMARY PROVIDER AND HAVE LABS FOR FURTHER REFILLS. 60 tablet 1     alum & mag hydroxide-simethicone (MAALOX  ES) 400-400-40 MG/5ML SUSP suspension Take 15 mLs by mouth every 4 hours as needed for other (gastric distress.) 355 mL 0     atorvastatin (LIPITOR) 20 MG tablet TAKE 1 TABLET BY MOUTH EVERY EVENING 30 tablet 1     blood glucose monitoring (ACCU-CHEK NINA PLUS) meter device kit Use to test blood sugars 3 times daily or as directed. 1 kit 0     blood glucose monitoring (SOFTCLIX) lancets Use to test blood sugar 3 times daily or as directed. 300 each 0     bumetanide (BUMEX) 1 MG tablet TAKE 2 TABLETS BY MOUTH EVERY DAY 60 tablet 1     cyclobenzaprine (FLEXERIL) 5 MG tablet Take 1 tablet (5 mg) by mouth nightly as needed for muscle spasms 30 tablet 5     diphenhydrAMINE (BENADRYL) 25 MG tablet Take 25 mg by mouth every 6 hours as needed for itching or allergies       ferrous gluconate (FERGON) 324 (38 Fe) MG tablet Take 1 tablet (324 mg) by mouth daily 90 tablet 3     lisinopril (ZESTRIL) 5 MG tablet TAKE 1 TABLET BY MOUTH EVERY DAY 30 tablet 1     metoprolol tartrate (LOPRESSOR) 50 MG tablet TAKE 1 TABLET BY MOUTH TWICE A DAY 60 tablet 1     miconazole (MICATIN) 2 % external powder Apply topically 2 times daily  180 g 0     ondansetron (ZOFRAN-ODT) 4 MG ODT tab Take 1 tablet (4 mg) by mouth every 6 hours as needed for nausea or vomiting 10 tablet 0     polyethylene glycol (MIRALAX) packet Take 17 g by mouth daily as needed for constipation 30 packet 1     potassium chloride ER (K-TAB) 20 MEQ CR tablet Take 1 tablet (20 mEq) by mouth 2 times daily 180 tablet 1     sennosides (SENOKOT) 8.6 MG tablet Take 1 tablet by mouth 2 times daily as needed for constipation 60 tablet 0     traMADol (ULTRAM) 50 MG tablet Take 1 tablet (50 mg) by mouth nightly as needed for severe pain 30 tablet 0     warfarin ANTICOAGULANT (COUMADIN) 1 MG tablet TAKE 2 TABLETS (2 MG) BY MOUTH DAILY USE AS DIRECTED BY COUMADIN CLINIC 60 tablet 1     warfarin ANTICOAGULANT (COUMADIN) 3 MG tablet Take one to one in a half tablets daily or as directed by the Coumadin clinic 135 tablet 5       PAST SURGICAL HISTORY:  No past surgical history on file.    ALLERGIES:     Allergies   Allergen Reactions     Penicillins Itching and Rash     Tolerated ceftriaxone 2020       FAMILY HISTORY:  Family History   Adopted: Yes   Family history unknown: Yes         SOCIAL HISTORY:  Social History     Tobacco Use     Smoking status: Former Smoker     Packs/day: 1.50     Years: 20.00     Pack years: 30.00     Types: Cigarettes     Last attempt to quit: 2002     Years since quittin.3     Smokeless tobacco: Never Used   Substance Use Topics     Alcohol use: No     Alcohol/week: 0.0 standard drinks     Drug use: No       I have reviewed the labs  and made my comment in the assessment and plan.    Labs:  CBC RESULTS:   Lab Results   Component Value Date    WBC 6.9 2020    RBC 3.30 (L) 2020    HGB 9.5 (L) 2020    HCT 33.1 (L) 2020     2020    MCH 28.8 2020    MCHC 28.7 (L) 2020    RDW 18.4 (H) 2020     2020       BMP RESULTS:  Lab Results   Component Value Date     2020    POTASSIUM 3.9  04/16/2020    CHLORIDE 105 04/16/2020    CO2 28 04/16/2020    ANIONGAP 5 04/16/2020    GLC 89 04/16/2020    BUN 23 04/16/2020    CR 0.84 04/16/2020    GFRESTIMATED 75 04/16/2020    GFRESTBLACK 86 04/16/2020    KADEN 9.5 04/16/2020        INR RESULTS:  Lab Results   Component Value Date    INR 2.1 (A) 04/28/2020    INR 2.4 (A) 04/21/2020    INR Canceled, Test credited 04/16/2020    INR 1.7 (A) 04/14/2020     Echocardiogram 1/2020  Technically difficult study.  The Ejection Fraction is estimated at 55-60% by visual estimate.  Regional wall motion is probably normal.  Difficult to assess RV. Function probably at least mildly reduced. Size might  be mildly dilated.  Valves not well visualized. No significant valvular abnormalities were noted  by Doppler.  Dilation of the inferior vena cava is present with abnormal respiratory  variation in diameter.  No pericardial effusion is present.    Patient's last EKG on 2/11/2020 shows sinus rhythm with right bundle branch block.  EKG on 1/23/2020 shows atrial flutter.    Assessment and Plan:   Ms. Arianna Siddiqi is a 60 year old  female with PMH significant for former heavy smoking history for 30 pack years quit date 2002, morbid obesity, paroxysmal atrial fibrillation, heart failure with preserved ejection fraction, type 2 diabetes?  Not on medications, obesity hypoventilation syndrome, obstructive sleep apnea on CPAP, iron deficiency anemia and hypertension.    Today's telephone visit is for 6-month follow-up.  She tells me she is feeling great.  As you know she had uterine bleeding 6 months ago with severe anemia.  Her hemoglobin has improved and normal now.    # Heart failure with preserved ejection fraction  -NYHA functional class II.  Patient's weight today is 333 pounds.  Recommend to continue Bumex 2 mg once daily along with potassium 20 mEq daily.      # Paroxysmal atrial fibrillation  -On Eliquis 5 mg twice daily. Tolerating well.  -Does not require rate control at this  time.  Stay off of metoprolol for now.    # Morbid obesity  - Has multiple complications like obstructive sleep apnea and obesity hypoventilation syndrome.  She has been losing weight with diet change.    # Hypertension  -Well-controlled with lisinopril 5 mg.    # Former heavy smoker: No prior history of CAD.     # Iron deficiency anemia: On iron treatment.  Resolved now.    No medication changes today.  She follows with Dr. Quiles who follows her very closely. I will see patient as needed.    Total time spent 22 minutes including telephone visit, review of medical notes and documentation.    Please donot hesitate to contact me if you have any questions or concerns. Again, thank you for allowing me to participate in the care of your patient.    Duane CHAPA MD  [facility] Division of Cardiology  Pager [phone]

## 2022-06-28 NOTE — PATIENT INSTRUCTIONS
Thank you for coming to the [facility] at [place]; please note the following instructions:    1. Follow up with Dr. Uriarte in cardiology as needed.        If you have any questions regarding your visit, please contact your care team:     CARDIOLOGY  TELEPHONE NUMBER   Ambika CAOMeghan, Registered Nurse  Rahel ALLAN, Registered Nurse  Leticia CUNNINGHAM, Registered Medical Assistant  Caroline MAIN, Visit Facilitator [phone] (select option 1)    *After hours: [phone]   For Scheduling Appts:     [phone] (select option 1)    *After hours: [phone]   For the Device Clinic (Pacemakers and ICD's)  Dulce EAST, Registered Nurse   During business hours: [phone]    *After business hours:  [phone] (select option 4)      Normal test result notifications will be released via tidy or mailed within 7 business days.  All other test results, will be communicated via telephone once reviewed by your cardiologist.    If you need a medication refill, please contact your pharmacy.  Please allow 3 business days for your refill to be completed.    As always, thank you for trusting us with your health care needs!

## 2022-07-27 NOTE — TELEPHONE ENCOUNTER
ALLOPURINOL 100 MG TABLET      Last Written Prescription Date:  6/13/22  Last Fill Quantity: 60,   # refills: 0  Last Office Visit : 3/30/22  Future Office visit:  None scheduled    Routing refill request to provider for review/approval because:  Overdue for Uric Acid  Lab Test 01/17/20  0334   URIC 9.1*     Future order in queue  Nothing more recent in care everywhere nor media  Last received limited quantity no refills

## 2022-07-30 NOTE — TELEPHONE ENCOUNTER
Pt phoning states that she is having a really bad headache    Headache started yesterday 07/30/2022    Rates pain  3/10    No Fever     Per protocol - Home Care     Care advice given per protocol and when to call back. Pt verbalized understanding and agrees to plan of care.    Apurva Roy RN  [place] Nurse Advisor  1:58 AM 7/30/2022      COVID 19 Nurse Triage Plan/Patient Instructions    Please be aware that novel coronavirus (COVID-19) may be circulating in the community. If you develop symptoms such as fever, cough, or SOB or if you have concerns about the presence of another infection including coronavirus (COVID-19), please contact your health care provider or visit [email or web address].     Disposition/Instructions    Home care recommended. Follow home care protocol based instructions.    Thank you for taking steps to prevent the spread of this virus.  o Limit your contact with others.  o Wear a simple mask to cover your cough.  o Wash your hands well and often.    Resources    [facility]: About COVID-19: [email or web address]    CDC: What to Do If You're Sick: www.cdc.gov/coronavirus/2019-ncov/about/steps-when-sick.html    CDC: Ending Home Isolation: www.cdc.gov/coronavirus/2019-ncov/hcp/disposition-in-home-patients.html     CDC: Caring for Someone: www.cdc.gov/coronavirus/2019-ncov/if-you-are-sick/care-for-someone.html     [facility]: Interim Guidance for Hospital Discharge to Home: www.health.[facility].mn.us/diseases/coronavirus/hcp/hospdischarge.pdf    [facility] clinical trials (COVID-19 research studies): [email or web address]     Below are the COVID-19 hotlines at the [facility] of Health ([facility]). Interpreters are available.   o For health questions: Call 517-729-8391 or 1-885.880.3405 (7 a.m. to 7 p.m.)  o For questions about schools and childcare: Call 953-486-0841 or 1-669.132.3466 (7 a.m. to 7 p.m.)                       Reason for  "Disposition    Headache    Additional Information    Negative: Difficult to awaken or acting confused (e.g., disoriented, slurred speech)    Negative: [1] Weakness of the face, arm or leg on one side of the body AND [2] new onset    Negative: [1] Loss of speech or garbled speech AND [2] new onset    Negative: [1] Numbness of the face, arm or leg on one side of the body AND [2] new onset    Negative: Passed out (i.e., lost consciousness, collapsed and was not responding)    Negative: Sounds like a life-threatening emergency to the triager    Negative: Followed a head injury    Negative: Pregnant    Negative: Postpartum (from 0 to 6 weeks after delivery)    Negative: Traumatic Brain Injury (TBI) is suspected    Negative: Unable to walk, or can only walk with assistance (e.g., requires support)    Negative: Stiff neck (can't touch chin to chest)    Negative: Severe pain in one eye    Negative: [1] Other family members (or roommates) with headaches AND [2] possibility of carbon monoxide exposure    Negative: [1] SEVERE headache (e.g., excruciating) AND [2] \"worst headache\" of life    Negative: [1] SEVERE headache AND [2] sudden-onset (i.e., reaching maximum intensity within seconds)    Negative: [1] SEVERE headache AND [2] fever    Negative: Loss of vision or double vision (Exception: same as prior migraines)    Negative: [1] Fever > 100.0 F (37.8 C) AND [2] diabetes mellitus or weak immune system (e.g., HIV positive, cancer chemo, splenectomy, organ transplant, chronic steroids)    Negative: Patient sounds very sick or weak to the triager    Negative: [1] SEVERE headache (e.g., excruciating) AND [2] not improved after 2 hours of pain medicine    Negative: [1] Vomiting AND [2] 2 or more times (Exception: similar to previous migraines)    Negative: Fever > 104 F (40 C)    Negative: [1] MODERATE headache (e.g., interferes with normal activities) AND [2] present > 24 hours AND [3] unexplained  (Exceptions: analgesics not " tried, typical migraine, or headache part of viral illness)    Negative: [1] New headache AND [2] weak immune system (e.g., HIV positive, cancer chemo, splenectomy, organ transplant, chronic steroids)    Negative: [1] New headache AND [2] age > 50    Negative: [1] Sinus pain of forehead AND [2] yellow or green nasal discharge    Negative: Fever present > 3 days (72 hours)    Negative: [1] MILD-MODERATE headache AND [2] present > 72 hours    Negative: Headache started during sex    Negative: Headache is a chronic symptom (recurrent or ongoing AND present > 4 weeks)    Negative: [1] Headache AND [2] part of viral illness AND [3] present < 72 hours    Negative: [1] Headache AND [2] has not taken pain medications    Negative: Similar to previously diagnosed muscle-tension headaches    Negative: Similar to previously diagnosed migraine headaches    Protocols used: HEADACHE-A-AH

## 2022-08-25 NOTE — PROGRESS NOTES
This is a recent snapshot of the patient's [facility] medical record.  For current drug dose and complete information and questions, call [phone] or In Basket pool, [facility]v home infusion ([id number])  CSN Number:  [id number]

## 2022-09-02 NOTE — TELEPHONE ENCOUNTER
CYCLOBENZAPRINE 5 MG TABLET      Last Written Prescription Date:  6/13/22  Last Fill Quantity: 30,   # refills: 1  Last Office Visit : 3/30/22  Future Office visit:  None scheduled    Routing refill request to provider for review/approval because:  Drug not on primary care refill protocol

## 2022-09-08 NOTE — TELEPHONE ENCOUNTER
FERROUS GLUCONATE 324 MG TAB  Last Written Prescription Date:  12/22/2021  Last Fill Quantity: 90,   # refills: 3  Last Office Visit :  6/28/2022  Future Office visit:  None  90 Tabs, 3 Refills sent to pharm 9/8/2022      Brigida Velasquez RN  Central Triage Red Flags/Med Refills

## 2022-09-09 NOTE — TELEPHONE ENCOUNTER
ALLOPURINOL 100 MG TABLET      Last Written Prescription Date:  7/28/22  Last Fill Quantity: 60,   # refills: 0  Last Office Visit : 3/30/22  Future Office visit:  None scheduled     Routing refill request to provider for review/approval because:  Overdue for Uric Acid  Lab Test 01/17/20  0334   URIC 9.1*      Nothing more recent in care everywhere nor media  Last received limited quantity no refills

## 2022-09-09 NOTE — TELEPHONE ENCOUNTER
Attempted to call and schedule labs for med refills - phone went to voicemail and mailbox was full unable to LVM

## 2022-09-19 NOTE — LETTER
"12/7/2021      RE: Arianna Siddiqi  [address]  [facility] [address]       Arianna is a 61 year old who is being evaluated via a billable telephone visit.      What phone number would you like to be contacted at? [phone]  How would you like to obtain your AVS? Mail a copy  Phone call duration: *** minutes    Advanced [facility] -- CORE Evaluation -- Telephone Visit  December 7, 2021    HPI:   Ms. Arianna Siddiqi is a 61yr old female with a history of       \"Slowly getting along\"  Has a nurse who comes,   Doing ok, taking everything slow  When she exerts self, gets a little lightheaded, improving  Before, felt like she was going to pass out, now \"just a little dizzy\" which she attributes to \"doing too much\"  Been walking around her residence, able to [place] 2-3x before SOB  Feels like strength and endurance are improving, but not much  Feels shaky when walking, still feels weak  Not using walker  Has to go downstairs to get her mail, which is still a challenge  Doing IS several times/day, working on deep breathing  Breathing much better  Sleeping in a recliner, chronic, lies \"half-way\" out of comfort  No pnd orthopnea  Uses BiPAP since 2016, been sleeping in chair since  Appetite down a little, tastes have changed, eating regularly, 2meals/day  Regular BMs every morning -- stools back to normal, was constipated in hospital  occ nauseated when eating, some diarrhea depending on diet  Vomited once since discharge, unsure if wasn't feeling well vs something she ate  Weight ranging 369-371, stable, no fluid retention  BPs ranging 130/68  Unsure what HRs  No cp new pal[place] falls  Splitting headache all day yesterday, took 2 tylenol at bedtime with resolution  No acute vision changes fevers chills cough sore throat signs of bleeding  Needs to meet w heme per PCP re: AC      -- needs refill for zofran  -- decrease potassium to once daily  -- start jorge 25mg daily  -- labs 1-2 weeks  -- can 3 weeks, " · PPI message re: metoprolol   -- mail AVS        PAST MEDICAL HISTORY:  Past Medical History:   Diagnosis Date     CHF (congestive heart failure) (H)      CKD (chronic kidney disease)      Compliance poor      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     Diabetes mellitus (H)      Gout      Hypertension      Insomnia      Morbid obesity (H)      CARLOS treated with BiPAP        FAMILY HISTORY:  Family History   Adopted: Yes   Family history unknown: Yes       SOCIAL HISTORY:  Social History     Socioeconomic History     Marital status: Single     Spouse name: None     Number of children: None     Years of education: None     Highest education level: None   Occupational History     None   Tobacco Use     Smoking status: Former Smoker     Packs/day: 1.50     Years: 20.00     Pack years: 30.00     Types: Cigarettes     Quit date: 2002     Years since quittin.9     Smokeless tobacco: Never Used   Substance and Sexual Activity     Alcohol use: No     Alcohol/week: 0.0 standard drinks     Drug use: No     Sexual activity: None   Other Topics Concern     None   Social History Narrative     None     Social Determinants of Health     Financial Resource Strain: Not on file   Food Insecurity: Not on file   Transportation Needs: Not on file   Physical Activity: Not on file   Stress: Not on file   Social Connections: Not on file   Intimate Partner Violence: Not on file   Housing Stability: Not on file       CURRENT MEDICATIONS:  Current Outpatient Medications   Medication Sig Dispense Refill     acetaminophen (TYLENOL) 500 MG tablet Take 1-2 tablets (500-1,000 mg) by mouth every 4 hours as needed for mild pain  0     allopurinol (ZYLOPRIM) 100 MG tablet Take 2 tablets (200 mg) by mouth daily Please call [phone] for an appointment with Dr Quiles. 60 tablet 0     alum & mag hydroxide-simethicone (MAALOX  ES) 400-400-40 MG/5ML SUSP suspension Take 15 mLs by mouth every 4  hours as needed for other (gastric distress.) 355 mL 0     aspirin (ASA) 300 MG suppository Place 300 mg rectally At Bedtime       atorvastatin (LIPITOR) 20 MG tablet Take 1 tablet (20 mg) by mouth daily 90 tablet 3     blood glucose monitoring (ACCU-CHEK NINA PLUS) meter device kit Use to test blood sugars 3 times daily or as directed. 1 kit 0     blood glucose monitoring (SOFTCLIX) lancets Use to test blood sugar 3 times daily or as directed. 300 each 0     bumetanide (BUMEX) 2 MG tablet TAKE 1 TABLET BY MOUTH EVERY DAY 90 tablet 0     cyclobenzaprine (FLEXERIL) 5 MG tablet Take 1 tablet (5 mg) by mouth nightly as needed for muscle spasms 30 tablet 5     diphenhydrAMINE (BENADRYL) 25 MG tablet Take 25 mg by mouth every 6 hours as needed for itching or allergies       ferrous gluconate (FERGON) 324 (38 Fe) MG tablet Take 1 tablet (324 mg) by mouth daily 90 tablet 0     lisinopril (ZESTRIL) 5 MG tablet Take 1 tablet (5 mg) by mouth daily 90 tablet 3     metoprolol succinate ER (TOPROL-XL) 100 MG 24 hr tablet Take 1 tablet (100 mg) by mouth daily 90 tablet 3     miconazole (MICATIN) 2 % external powder APPLY TO AFFECTED AREA TWICE A DAY 90 g 0     ondansetron (ZOFRAN-ODT) 4 MG ODT tab Take 1 tablet (4 mg) by mouth every 6 hours as needed for nausea or vomiting 10 tablet 0     polyethylene glycol (MIRALAX) packet Take 17 g by mouth daily as needed for constipation 30 packet 1     potassium chloride ER (K-TAB) 20 MEQ CR tablet Take 1 tablet (20 mEq) by mouth 2 times daily 180 tablet 3     sennosides (SENOKOT) 8.6 MG tablet Take 1 tablet by mouth 2 times daily as needed for constipation 60 tablet 0     traMADol (ULTRAM) 50 MG tablet TAKE 1 TABLET (50 MG) BY MOUTH NIGHTLY AS NEEDED FOR SEVERE PAIN *INS LIMIT 7 DAYS 7 tablet 0       ROS:   As per HPI.    Labs:  CBC RESULTS:  Lab Results   Component Value Date    WBC 7.3 11/29/2021    WBC 7.6 05/15/2020    RBC 3.74 (L) 11/29/2021    RBC 3.63 (L) 05/15/2020    HGB 9.2 (L)  "11/29/2021    HGB 10.5 (L) 05/15/2020    HCT 33.9 (L) 11/29/2021    HCT 34.9 (L) 05/15/2020    MCV 91 11/29/2021    MCV 96 05/15/2020    MCH 24.6 (L) 11/29/2021    MCH 28.9 05/15/2020    MCHC 27.1 (L) 11/29/2021    MCHC 30.1 (L) 05/15/2020    RDW 19.8 (H) 11/29/2021    RDW 14.7 05/15/2020     11/29/2021     05/15/2020       CMP RESULTS:  Lab Results   Component Value Date     11/29/2021     05/15/2020    POTASSIUM 3.9 11/29/2021    POTASSIUM 4.0 05/15/2020    CHLORIDE 104 11/29/2021    CHLORIDE 103 05/15/2020    CO2 29 11/29/2021    CO2 29 05/15/2020    ANIONGAP 5 11/29/2021    ANIONGAP 6 05/15/2020    GLC 99 11/29/2021    GLC 88 05/15/2020    BUN 14 11/29/2021    BUN 24 05/15/2020    CR 1.08 (H) 11/29/2021    CR 1.18 (H) 05/15/2020    GFRESTIMATED 55 (L) 11/29/2021    GFRESTIMATED 50 (L) 05/15/2020    GFRESTBLACK 58 (L) 05/15/2020    KADEN 9.3 11/29/2021    KADEN 9.2 05/15/2020    BILITOTAL 0.3 11/13/2021    BILITOTAL 0.3 05/15/2020    ALBUMIN 2.2 (L) 11/13/2021    ALBUMIN 3.0 (L) 05/15/2020    ALKPHOS 80 11/13/2021    ALKPHOS 135 05/15/2020    ALT 15 11/13/2021    ALT 30 05/15/2020    AST 15 11/13/2021    AST 30 05/15/2020        INR RESULTS:  Lab Results   Component Value Date    INR 1.46 (H) 11/17/2021    INR 2.4 (A) 11/09/2021    INR 3.0 07/20/2021       Lab Results   Component Value Date    MAG 1.9 02/06/2020     Lab Results   Component Value Date    NTBNPI 865 11/13/2021    NTBNPI 7,473 (H) 02/09/2020     No results found for: [facility]    Assessment and Plan:   1. Chronic *** heart failure secondary to ***.    Stage {UMPCARDSTAGE:823583721}  NYHA Class {UMPCARDSYMP:874828118}  ACEi/ARB {UMPCARDACEI/ARB:790132703}  BB {PCARDACEI/ARB:914690252}  Aldosterone antagonist {UMPCARDBB:516167772}  SCD prophylaxis {[facility]:668779981}  % BiV pacing: {Not Applicable or free text:864949::\"N/A\"}  Fluid status {[facility] Vegas[facility]FLUID:090887580}  NSAID use: ***  Sleep Apnea Evaluation: ***  Follow-up " ***    CC  Patient Care Team:  Betsy Paul MD as PCP - General (Internal Medicine)  Susanne Burnham MD as MD (Ophthalmology)  Warren Cano MD as Resident (Internal Medicine)  Va Wilson MD as MD (Cardiology)  Deedee Meneses MD as MD (Internal Medicine)  Courtney Salinas [facility] as Pharmacist (Pharmacist Ambulatory Care)  Duane Uriarte MD as MD (Cardiology)  Duane Uriarte MD as Assigned Heart and Vascular Provider  Betsy Paul MD as Assigned PCP  BETSY PAUL NP

## 2022-10-11 NOTE — TELEPHONE ENCOUNTER
allopurinol (ZYLOPRIM) 100 MG tablet       Last Written Prescription Date:  9/9/2022   Last Fill Quantity: 60,   # refills: 0  Last Office Visit : 3/30/2022  Future Office visit:  none    Routing refill request to provider for review/approval because:  Failed medication protocol: abnormal lab result and test past due  - last Uric Acid level (H) done 1/17/2020  - several maico refills previously granted  - pended Rx 15 day supply    Uric Acid   Date Value Ref Range Status   01/17/2020 9.1 (H) 2.6 - 6.0 mg/dL Final

## 2022-11-03 NOTE — TELEPHONE ENCOUNTER
Spoke with patient about scheduling for Labs placed by Dr. Quiles.  She is currently unable to leave the house saying she has no transportation.  Patient is requesting a call back regarding other options.

## 2022-11-04 NOTE — PROGRESS NOTES
Social Work Telephone Message Note  [facility]    Patient Name:  Arianna Siddiqi  /Age:  1960 (62 year old)    Referral Source: [facility]  Reason for Referral:  Transportation to lab appointment     contacted Patient via telephone on 2022. Unable to leave a message as mailbox was full. Previous discussion with Patient regarding transportation (see note 2022) Patient indicated that the problem wasn't that she didn't have a ride, it was that she physically could not ambulate from her apartment to the front door (and into a vehicle).  will make 2 more attempts to contact Patient in the next business days before closing out the referral.    Taty Barrow [facility]  Clinical , Outpatient Specialty Clinics  Direct Phone: [phone]         called and spoke with Patient. Patient reported that she cannot physically get to the front door of the building due to deconditioning and shortness of breath. Patient reported she would like to see if someone could come out to her apartment to draw the labs and possibly give her a flu shot and a COVID booster. Will send a message to SONAL Gottlieb to see if this can be ordered.    Taty Barrow [facility]  Clinical , Outpatient Specialty Clinics  Direct Phone: [phone]

## 2022-11-09 NOTE — TELEPHONE ENCOUNTER
cyclobenzaprine (FLEXERIL) 5 MG tablet 30 tablet 1 9/2/2022           Last Written Prescription Date:  9-2-2022  Last Fill Quantity: 30,   # refills: 1  Last Office Visit : 3-  Future Office visit:  none    Routing refill request to provider for review/approval because:  Not on protocol.      Kathleen M Doege RN

## 2022-11-18 PROBLEM — J96.22 ACUTE AND CHRONIC RESPIRATORY FAILURE WITH HYPERCAPNIA (H): Status: ACTIVE | Noted: 2022-01-01

## 2022-11-18 NOTE — ED PROVIDER NOTES
"    [place] EMERGENCY DEPARTMENT ([facility])  11/17/22   ED Provider Note    History     Chief Complaint   Patient presents with     Generalized Weakness     Dysuria     HPI  Arianna Siddiqi is a 62 year old female with a past history of morbid obesity, paroxysmal atrial fibrillation on anticoagulation, CKD, heart failure with preserved ejection fraction, type 2 diabetes, obesity hypoventilation syndrome, CARLOS , and hypertension.who presents with shortness of breath generalized weakness, pre-syncope and dysuria.     She reports over the past \"few week\" she has had worsening shortness of breath and over the last 3 to 4 days she has experienced generalized weakness, and worsening fatigue, and reports feeling like she may lose consciousness every time she stands up to ambulate. She also reports starting today she has not felt the urge to urinate, and has not urinated.  She reports being on a diuretic for heart failure that causes her to usually urinate multiple times per day. Denies fevers, chills, nausea, abdominal pain, or urinary complaints.    Upon arrival to the emergency department patient was somnolent, hypoxic and placed on oxygen plus mask at 5 L. Does endorse one episode of vomiting earlier today but denies any recent frequent vomiting.    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     Diabetes mellitus (H)      Gout      Hypertension      Insomnia      Morbid obesity (H)      CARLOS treated with BiPAP      Past Surgical History:   Procedure Laterality Date     EXAM UNDER ANESTHESIA PELVIC N/A 11/14/2021    Procedure: Exam Under Anesthesia, Dilation and curettage,  placement of intrauterine device, pap smear;  Surgeon: Deedee Hess MD;  Location: [facility] OR     acetaminophen (TYLENOL) 500 MG " tablet  allopurinol (ZYLOPRIM) 100 MG tablet  alum & mag hydroxide-simethicone (MAALOX  ES) 400-400-40 MG/5ML SUSP suspension  apixaban ANTICOAGULANT (ELIQUIS ANTICOAGULANT) 5 MG tablet  atorvastatin (LIPITOR) 20 MG tablet  blood glucose monitoring (ACCU-CHEK NINA PLUS) meter device kit  blood glucose monitoring (SOFTCLIX) lancets  bumetanide (BUMEX) 2 MG tablet  cyclobenzaprine (FLEXERIL) 5 MG tablet  diphenhydrAMINE (BENADRYL) 25 MG tablet  ferrous gluconate (FERGON) 324 (38 Fe) MG tablet  lisinopril (ZESTRIL) 5 MG tablet  metoprolol succinate ER (TOPROL XL) 100 MG 24 hr tablet  miconazole (MICRO GUARD) 2 % external powder  Multiple Vitamins-Minerals (MULTIVITAMIN WOMEN PO)  ondansetron (ZOFRAN ODT) 4 MG ODT tab  polyethylene glycol (MIRALAX) packet  potassium chloride ER (K-TAB) 20 MEQ CR tablet  sennosides (SENOKOT) 8.6 MG tablet  traMADol (ULTRAM) 50 MG tablet  triamcinolone (KENALOG) 0.1 % external cream      Allergies   Allergen Reactions     Penicillins Itching and Rash     Tolerated ceftriaxone 2020     Family History  Family History   Adopted: Yes   Family history unknown: Yes     Social History   Social History     Tobacco Use     Smoking status: Former     Packs/day: 1.50     Years: 20.00     Pack years: 30.00     Types: Cigarettes     Quit date: 2002     Years since quittin.8     Smokeless tobacco: Never   Substance Use Topics     Alcohol use: No     Alcohol/week: 0.0 standard drinks     Drug use: No      Past medical history, past surgical history, medications, allergies, family history, and social history were reviewed with the patient. No additional pertinent items.       Review of Systems   Constitutional: Negative for chills and fever.   HENT: Negative for sore throat.    Respiratory: Positive for shortness of breath. Negative for cough and wheezing.    Cardiovascular: Negative for chest pain, palpitations and leg swelling.   Gastrointestinal: Negative for abdominal pain, nausea and  vomiting.   Genitourinary: Negative for dysuria and frequency.   Musculoskeletal: Negative for back pain.   Neurological: Positive for dizziness and light-headedness. Negative for facial asymmetry and headaches.     A complete review of systems was performed with pertinent positives and negatives noted in the HPI, and all other systems negative.    Physical Exam   BP: 99/68  Pulse: 84  Temp: 97.7  F (36.5  C)  Resp: 20  SpO2: 97 %  Physical Exam  Vitals reviewed.   Constitutional:       Appearance: She is obese. She is ill-appearing.   HENT:      Head: Normocephalic.      Right Ear: External ear normal.      Left Ear: External ear normal.      Mouth/Throat:      Mouth: Mucous membranes are moist.   Eyes:      Extraocular Movements: Extraocular movements intact.      Conjunctiva/sclera: Conjunctivae normal.      Pupils: Pupils are equal, round, and reactive to light.   Cardiovascular:      Rate and Rhythm: Normal rate and regular rhythm.      Pulses: Normal pulses.      Heart sounds: Normal heart sounds. No murmur heard.  Pulmonary:      Effort: Pulmonary effort is normal.      Breath sounds: Normal breath sounds. No stridor. No wheezing.   Abdominal:      General: Abdomen is flat. Bowel sounds are normal.      Palpations: Abdomen is soft.      Tenderness: There is no abdominal tenderness.   Musculoskeletal:         General: Normal range of motion.      Cervical back: Normal range of motion and neck supple.      Right lower leg: Edema present.      Left lower leg: Edema present.   Skin:     General: Skin is warm.   Neurological:      GCS: GCS eye subscore is 4. GCS verbal subscore is 5. GCS motor subscore is 6.      Cranial Nerves: No facial asymmetry.      Sensory: Sensation is intact.      Motor: Tremor present. No abnormal muscle tone or pronator drift.      Coordination: Coordination is intact.      Comments: Patient drowsy, arouses to voice. Able to hold conversation and answers questions appropriately. Able to  give full HPI.   Psychiatric:         Mood and Affect: Mood normal.         ED Course     ED Course as of 11/18/22 0258   Thu Nov 17, 2022   2137 Lab called back with critical lab values   2247 Nt probnp inpatient (BNP)   2247 Comprehensive metabolic panel  Attempted to call chem lab to check progress, no answer    2341 Lab called reporting critical value CO2 of 115   2347 Comprehensive metabolic panel  On analyzer 1145 PM   Fri Nov 18, 2022   0039 Comprehensive metabolic panel  Called chem lab, they will check analyzer. Analyzer states it was run, but results are not reporting from analyzer to computer.      Procedures            EKG Interpretation:      Interpreted by ALMA Mccabe CNP and reviewed by Dr. Menendez  Time reviewed: 2050  Symptoms at time of EKG: None   Rhythm: normal sinus   Rate: Normal  Axis: Right Axis Deviation  Ectopy: none  Conduction: normal  ST Segments/ T Waves: T wave inversion Global  Q Waves: none  Comparison to prior: New global t-wave inversion.  Clinical Impression: non-specific EKG      No results found for any visits on 11/17/22.  Medications - No data to display     Assessments & Plan (with Medical Decision Making)   62 year old female w/ PMH notable for morbid obesity, paroxysmal atrial fibrillation on anticoagulation, CKD, heart failure with preserved ejection fraction, type 2 diabetes, obesity hypoventilation syndrome, CARLOS , and hypertension     Clinically, patient appears ill, without tachypnea but hypoxic on room air. Vital signs with labile and consistently soft blood pressures. On examination patient without wheezing or crackles on lung auscultation, cardiac exam without murmurs or clicks.     Ddx includes, but not limited to, UTI, ACS, pneumonia, other infectious process, heart failure exacerbation.     Patient had a CBC, INR, CMP, Lactic Acid, magnesium, BNP, CRP and blood cultures drawn, in addition to UA, ECG COVID swab and chest x-ray, non-contrast head CT as well as  a bedside gas/pH.    Her bedside lactic acid was 2, and her pH 7.2 and pCO2 107. With her continued hypoxia while on oxi-plus mask and pCO2 she was placed on BiPAP. Due to delays with lab results, most of patient's labs were late to result. Her CBC did show a leukocytosis of 15.3. With her hypotension, hypoxia, hypercapnia and opacities in the lungs she was given 4.5g Zosyn. Due to concern for pulmonary edema she was given one liter NS for concerns for sepsis as well. While there was concern for her pulmonary edema with her consistently low blood pressures was unable to diurese her. Her lab VBG showed a pH of 7.19 and pCO2 of 96 prior to initiation of BiPAP.    Nursing staff did bed side bladder scan patient with resulting <15 mL showing in her bladder. Nursing was able to catherize patient but had very small return of urine that was sent to lab for analysis, but unsure if volume is enough for analysis. Bedside ultrasound was completed by attending physician that did not show distended, nor full bladder.    Following being placed on BiPAP patient did improve clinically, and was less somnolent though she continued to be hypotensive. With her current presentation and concern for severe illness patient will need to be admitted.    At the end of my shift patient's only resulted labs were her CBC, VBG and lactic acid. I did place orders to draw patient's second lactic acid as well as VBG. Patient will be signed out to attending physician pending lab results to determine level of care needed for admission.    I have reviewed the nursing notes. I have reviewed the findings, diagnosis, plan and need for follow up with the patient.    New Prescriptions    No medications on file       Final diagnoses:   None       --  ALMA Mccabe CNP  [facility] EMERGENCY DEPARTMENT  11/17/2022     Cortes Dawkins APRN CNP  11/18/22 0349

## 2022-11-18 NOTE — ED NOTES
Patient received in signout from nurse practitioner Cortes Dawkins.  See his note for full documentation.  In short, patient with history of morbid obesity, paroxysmal A. fib on anticoagulation, chronic kidney disease, heart failure with preserved ejection fraction (most recent echo 50% EF), type 2 diabetes, pickwickian syndrome, CARLOS, hypertension, presents to the ED with generalized weakness, presyncope, and dysuria.    On arrival, patient was in significant respiratory distress.  Had to be placed on BiPAP.  She was noted to be somnolent.  PCO2 was found to be in the high 90s.  Chest x-ray with pulmonary edema, however, she was hypotensive, so unable to diurese.  COVID/influenza negative.  Infiltrates on CXR, started on Vanco and Zosyn    Subsequent blood gases have not improved and have actually worsened.  Plan was made to intubate the patient.  Her blood pressures continue to be soft.    [facility]    -Intubation    Date/Time: 11/18/2022 2:13 AM  Performed by: Viraj Menendez DO  Authorized by: Viraj Menendez DO     Emergent condition/consent implied      PRE-PROCEDURE DETAILS     Patient status:  Awake    Mallampati score:  IV    Pretreatment medications:  None (etomidate)    Paralytics:  Rocuronium      PROCEDURE DETAILS     Preoxygenation:  BiPAP    CPR in progress: no      Intubation method:  Oral    Oral intubation technique:  Video-assisted    Laryngoscope blade:  Mac 3    Tube size (mm):  7.5    Tube type:  Cuffed    Number of attempts:  1    Ventilation between attempts: no      Cricoid pressure: no      Tube visualized through cords: yes      PLACEMENT ASSESSMENT     ETT to lip:  22    Tube secured with:  ETT esquivel    Breath sounds:  Equal    Placement verification: chest rise, condensation, CXR verification, direct visualization, equal breath sounds and colorimetric ETCO2 device      CXR findings:  ETT in proper place    Exact etiology of  patient's symptoms as not entirely clear to me but I suspect is likely multifactorial from CHF exacerbation, hypoventilation syndrome, and CARLOS.  Case discussed with the intensivist.  We will transfer emergently to the ICU.     Viraj Menendez DO  11/18/22 0244

## 2022-11-18 NOTE — PROCEDURES
Radial Arterial Line Procedure Note   INDICATION: hemodynamic monitoring   PROCEDURE : Corby Panchal MD   ATTENDING PHYSICIAN: Sid Szymanski MD_ In Attendance (Y)     CONSENT:  The procedure was emergent, the patient was unable to provide consent, and a designee was not immediately available.     PROCEDURE SUMMARY:     A time out was performed. My hands were washed prior to the  procedure. I wore a surgical mask, sterile  gown and sterile gloves throughout the procedure. The L wrist was  prepped and scrubbed and the radial pulse was  identified and the wrist was positioned in the usual fashion.  Using the Arrow Radial Arterial Line  Kit, a needle was inserted into the radial artery. Arterial blood was  seen to pulsate in the flash chamber. The internal guidewire was  advanced easily into the radial artery. The catheter was then advanced  over the wire and the needle and wire were withdrawn. The catheter was  sutured in place. A sterile opsite was placed over the catheter at the  insertion site. The patient tolerated the procedure without any  hemodynamic compromise. At the time of procedure completion, the  catheter was connected to the cardiac monitor and calibrated.  Appropriate waveform and blood pressure tracing was observed.    Estimated blood loss is 2 ml.

## 2022-11-18 NOTE — PROCEDURES
INDICATION: Delivery of pressors in a critically ill patient  PROCEDURE : Corby Panchal MD  ATTENDING PHYSICIAN: Sid Szymanski MD. In Attendance (Y)     CONSENT: The procedure was emergent, the patient was unable to provide consent, and a designee was not immediately available.     PROCEDURE SUMMARY:   My hands were  washed prior to the procedure. I wore a surgical mask, full gown and sterile gloves throughout the  procedure. The patient was placed in Trendelenburg position.  RIGHT chest region was prepped using chlorhexidine scrub and draped in  sterile fashion using a full drape and sterile probe cover and sterile  gel employed. The medial and lateral heads of the sternocleidomastoid  muscle were identified as was the carotid pulse. The Internal Jugular  vein was identified using the ultrasound. Using real-time out of plane guidance, the  introducer needle was inserted into the Internal Jugular vein under  direct ultrasound visualization. Venous blood was withdrawn. The syringe  was removed and a guidewire was advanced into the introducer needle.  The guidewire was visualized in the Internal Jugular Vein by ultrasound.  A small incision was made at the skin surface with a scalpel and the  introducer needle was exchanged for a dilator over the guidewire. After  appropriate dilation was obtained, the dilator was exchanged over the  wire for a central venous catheter. The wire was removed and the  catheter was sutured in place. A sterile sorbaview shield was  placed over the catheter at the insertion site. The patient tolerated  the procedure without any hemodynamic compromise. At time of procedure  completion, all ports aspirated and flushed properly. Post-procedure  chest x-ray is pending at this time. Estimated blood loss is 1ml.

## 2022-11-18 NOTE — CONSULTS
Nephrology Initial Consult  November 18, 2022      Arianna Siddiqi MRN:[id number] YOB: 1960  Date of Admission:11/17/2022  Primary care provider: Gagan Quiles  Requesting physician: Sid Szymanski MD    ASSESSMENT AND RECOMMENDATIONS:     # Non Oliguric JASON   # Elevated Lactate   Baseline creatinine 0.9-1.0. Now up to 3.4. Electrolytes are now WNL. Potassium was 6 on admission. Bicarb normal. UOP this am 485 cc. Was given Lasix 40 mg overnight. She developed respiratory failure and hypotension requiring pressors overnight. As well, IVF started at 50 ml/h for hydration but cautious due to concern for volume overload. JASON most likely 2/2 pre-renal in the setting of hypotension requiring pressors and ongoing infection. As well, probable cardiorenal component due to volume overload. Dry weight documented in June was ~60 lbs less.  - Ok to give more IV diuretics for a total dose of Lasix 200 mg IV today   - Stop IVF  - Avoid hypotension   - Avoid nephrotoxins   - If K elevated, ok to shift again   - Recheck K this evening.     # Hypoxic / Hypercarbic respiratory failure   # Pulmonary edema   # Hx HFpEF  # Distributive shock, cardiogenic vs. Septic shock  # Decompensated heart failure    Recommendations were communicated to primary team via note    Seen and discussed with Dr. Danny Ray MD   Division of Renal Disease and Hypertension  [facility]  [employer]  [employer]      REASON FOR CONSULT: JASON    HISTORY OF PRESENT ILLNESS:  Admitting provider and nursing notes reviewed  Per H&P   Arianna Siddiqi is a 62 year old female with a history of morbid obesity, obstructive sleep apnea, hypertension, heart failure with preserved ejection fraction, paroxysmal A. fib and chronic kidney disease who presents with dyspnea.     She had 3 to 4 days of increased weakness, fatigue and dyspnea.  She also noted episodes of presyncope with standing up.  She has not urinated today despite being on  diuretic medications.  She presented to the emergency department where she was started on oxygen and given 1 L of intravenous fluids.  However, her respiratory status decompensated and she developed hypercapnic respiratory failure with the VBG pH 7.12 and PCO2 of 120.  She was intubated and admitted to the ICU. Labs in the emergency department were otherwise notable for potassium of notable for potassium of 6, troponin of 366, white count of 15 and CRP of 60. Of note, her weight is currently 404 pounds from reported baseline of 330s in June when she was asymptomatic.       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 Auth Provider Long Term End Date   acetaminophen (TYLENOL) 500 MG tablet Take 1-2 tablets (500-1,000 mg) by mouth every 4 hours as needed for mild pain   Elif Gordon MD     allopurinol (ZYLOPRIM) 100 MG tablet Take 2 tablets (200 mg) by mouth daily *Monitoring lab - Uric Acid level every 12 months. You will not receive further refills until lab is scheduled. [phone]   Gagan Quiles MD     alum & mag hydroxide-simethicone (MAALOX  ES) 400-400-40 MG/5ML SUSP suspension Take 15 mLs by mouth every 4 hours as needed for other (gastric distress.)   Elif Gordon MD     apixaban ANTICOAGULANT (ELIQUIS ANTICOAGULANT)  5 MG tablet Take 1 tablet (5 mg) by mouth 2 times daily   Duane Uriarte MD     atorvastatin (LIPITOR) 20 MG tablet Take 1 tablet (20 mg) by mouth daily   Duane Uriarte MD Yes    blood glucose monitoring (ACCU-CHEK NINA PLUS) meter device kit Use to test blood sugars 3 times daily or as directed.   Yuko Crabtree MD     blood glucose monitoring (SOFTCLIX) lancets Use to test blood sugar 3 times daily or as directed.   Yoselin Shook APRN CNP     bumetanide (BUMEX) 2 MG tablet Take 1 tablet (2 mg) by mouth daily   Duane Uriarte MD Yes    cyclobenzaprine (FLEXERIL) 5 MG tablet TAKE 1 TABLET (5 MG) BY MOUTH NIGHTLY AS NEEDED FOR MUSCLE SPASMS   Gagan Quiles MD     diphenhydrAMINE (BENADRYL) 25 MG tablet Take 25 mg by mouth every 6 hours as needed for itching or allergies   Reported, Patient     ferrous gluconate (FERGON) 324 (38 Fe) MG tablet Take 1 tablet (324 mg) by mouth daily   Duane Uriarte MD     lisinopril (ZESTRIL) 5 MG tablet Take 1 tablet (5 mg) by mouth daily   Duane Uriarte MD Yes    metoprolol succinate ER (TOPROL XL) 100 MG 24 hr tablet Take 1 tablet (100 mg) by mouth daily   Duane Uriarte MD Yes    miconazole (MICRO GUARD) 2 % external powder Apply topically as needed for itching or other APPLY TO AFFECTED AREA TWICE A DAY *NOT COVERED*   Gagan Quiles MD     Multiple Vitamins-Minerals (MULTIVITAMIN WOMEN PO)    Reported, Patient     ondansetron (ZOFRAN ODT) 4 MG ODT tab Take 1 tablet (4 mg) by mouth every 8 hours as needed for nausea or vomiting   Gagan Quiles MD     polyethylene glycol (MIRALAX) packet Take 17 g by mouth daily as needed for constipation  Patient not taking: Reported on 6/28/2022   Gagan Quiles MD No    potassium chloride ER (K-TAB) 20 MEQ CR tablet Take 1 tablet (20 mEq) by mouth daily   Duane Uriarte MD     sennosides (SENOKOT) 8.6 MG tablet Take 1 tablet by mouth 2 times daily as needed for constipation  Patient not taking: Reported on 6/28/2022   Evan,  MD Elif     traMADol (ULTRAM) 50 MG tablet TAKE 1 TABLET (50 MG) BY MOUTH NIGHTLY AS NEEDED FOR SEVERE PAIN *INS LIMIT 7 DAYS  Patient not taking: Reported on 6/28/2022   Gagan Quiles MD     triamcinolone (KENALOG) 0.1 % external cream Apply topically 2 times daily APPLY TO AFFECTED AREA   Gagan Quiles MD        Current Meds    [Held by provider] allopurinol  200 mg Oral Daily     [Held by provider] apixaban ANTICOAGULANT  5 mg Oral BID     [Held by provider] atorvastatin  20 mg Oral Daily     [Held by provider] bumetanide  2 mg Oral Daily     dextrose 10%  300 mL Intravenous Once     [Held by provider] ferrous gluconate  324 mg Oral Daily     furosemide  40 mg Intravenous Once     [Held by provider] lisinopril  5 mg Oral Daily     [Held by provider] metoprolol succinate ER  100 mg Oral Daily     pantoprazole  40 mg Per Feeding Tube QAM AC    Or     pantoprazole  40 mg Intravenous QAM AC     piperacillin-tazobactam  3.375 g Intravenous Q6H     vancomycin  3,000 mg Intravenous Once     vancomycin place esquivel - receiving intermittent dosing  1 each Does not apply See Admin Instructions     Infusion Meds    fentaNYL 100 mcg/hr (11/18/22 0800)     midazolam 3 mg/hr (11/18/22 0800)     - MEDICATION INSTRUCTIONS -       norepinephrine 0.01 mcg/kg/min (11/18/22 0825)     - MEDICATION INSTRUCTIONS -       - MEDICATION INSTRUCTIONS -       sodium chloride 50 mL/hr at 11/18/22 0639       ALLERGIES:    Allergies   Allergen Reactions     Penicillins Itching and Rash     Tolerated ceftriaxone 1/17/2020       REVIEW OF SYSTEMS:  A comprehensive of systems was negative except as noted above.    SOCIAL HISTORY:   Social History     Socioeconomic History     Marital status: Single     Spouse name: Not on file     Number of children: Not on file     Years of education: Not on file     Highest education level: Not on file   Occupational History     Not on file   Tobacco Use     Smoking status: Former     Packs/day:  "1.50     Years: 20.00     Pack years: 30.00     Types: Cigarettes     Quit date: 2002     Years since quittin.8     Smokeless tobacco: Never   Substance and Sexual Activity     Alcohol use: No     Alcohol/week: 0.0 standard drinks     Drug use: No     Sexual activity: Not on file   Other Topics Concern     Not on file   Social History Narrative     Not on file     Social Determinants of Health     Financial Resource Strain: Not on file   Food Insecurity: Not on file   Transportation Needs: Not on file   Physical Activity: Not on file   Stress: Not on file   Social Connections: Not on file   Intimate Partner Violence: Not on file   Housing Stability: Not on file       FAMILY MEDICAL HISTORY:   Family History   Adopted: Yes   Family history unknown: Yes     PHYSICAL EXAM:   Temp  Av.1  F (36.2  C)  Min: 96.5  F (35.8  C)  Max: 97.7  F (36.5  C)  Arterial Line BP  Min: 67/36  Max: 127/61  Arterial Line MAP (mmHg)  Av.8 mmHg  Min: 44 mmHg  Max: 87 mmHg      Pulse  Av.4  Min: 55  Max: 89 Resp  Av.5  Min: 7  Max: 30  FiO2 (%)  Av %  Min: 60 %  Max: 60 %  SpO2  Av.1 %  Min: 78 %  Max: 100 %       BP (!) 130/108   Pulse 75   Temp 97.1  F (36.2  C) (Axillary)   Resp 22   Ht 1.575 m (5' 2\")   Wt (!) 183.4 kg (404 lb 5.2 oz)   SpO2 99%   BMI 73.95 kg/m     Date 22 0700 - 22 0659   Shift 4266-5784 8276-0174 9590-9691 24 Hour Total   INTAKE   I.V. 27.28   27.28   Shift Total(mL/kg) 27.28(0.15)   27.28(0.15)   OUTPUT   Urine 485   485   Shift Total(mL/kg) 485(2.64)   485(2.64)   Weight (kg) 183.4 183.4 183.4 183.4      Admit Weight: (!) 183.1 kg (403 lb 10.6 oz)     GENERAL APPEARANCE: sedated and intubated. Morbidly obese  EYES: N/A  Lymphatics: no cervical or supraclavicular LAD  Pulmonary: crackles throughout bilateral lungs  CV: regular rhythm, normal rate, no rub   - JVD N/A   - Edema ++ non pitting bilateral LE  GI: soft, nontender, normal bowel sounds  MS: no " evidence of inflammation in joints, no muscle tenderness  : + tulio  SKIN: no rash, warm, dry, no cyanosis  NEURO: face symmetric, no asterixis     LABS:   I have reviewed the following labs:  CMP  Recent Labs   Lab 11/18/22  0814 11/18/22  0703 11/18/22  0532 11/18/22 0453 11/18/22  0322 11/18/22 0224 11/18/22 0033 11/17/22 2118   NA  --   --   --  138  --  138 136 134*   POTASSIUM  --   --   --  4.6  4.6  --  6.1*  6.1* 6.0* 5.5*   CHLORIDE  --   --   --  97*  --  98  --  90*   CO2  --   --   --  23  --  20*  --  26   ANIONGAP  --   --   --  18*  --  20*  --  18*   * 94 136* 124*   < > 103* 104* 94   BUN  --   --   --  41.0*  --  35.4*  --  36.3*   CR  --   --   --  3.40*  --  3.12*  --  3.34*   GFRESTIMATED  --   --   --  15*  --  16*  --  15*   KADEN  --   --   --  9.3  --  8.9  --  9.4   MAG  --   --   --  1.9  --  1.9  --  2.2   PHOS  --   --   --  4.8*  --  7.3*  --   --    PROTTOTAL  --   --   --  7.5  --  7.8  --  8.3   ALBUMIN  --   --   --  3.2*  --  3.1*  --  3.6   BILITOTAL  --   --   --  1.0  --  0.4  --  0.4   ALKPHOS  --   --   --  151*  --  129*  --  137*   AST  --   --   --  56*  --  62*  --  47*   ALT  --   --   --  29  --  26  --  27    < > = values in this interval not displayed.     CBC  Recent Labs   Lab 11/18/22 0453 11/18/22 0033 11/17/22 2118   HGB 14.0 18.7* 14.7   WBC 12.7*  --  15.3*   RBC 4.99  --  5.22*   HCT 48.4* 55* 51.3*   MCV 97  --  98   MCH 28.1  --  28.2   MCHC 28.9*  --  28.7*   RDW 17.2*  --  17.8*     --  256     INR  Recent Labs   Lab 11/17/22 2118   INR 1.48*     ABG  Recent Labs   Lab 11/18/22  0651 11/18/22  0453 11/18/22  0026 11/17/22  2319 11/17/22 2126   PH 7.42 7.43  --   --  7.20*   PCO2 48* 47*  --   --   --    PO2 62* 48*  --   --   --    HCO3 31* 31*  --   --   --    O2PER 80 60 2 2  --       URINE STUDIES  Recent Labs   Lab Test 11/18/22  0652 02/12/20  2245 02/09/20  2220 01/16/20  0230 10/06/16  1130   COLOR Orange* Red Red Straw  Yellow   APPEARANCE Slightly Cloudy* Clear Cloudy Clear Clear   URINEGLC Negative Negative Negative Negative Negative   URINEBILI Negative Negative Negative Negative Negative   URINEKETONE Negative Negative Negative Negative Negative   SG 1.016 1.016 1.016 1.004 1.016   UBLD Large* Large* Large* Negative Negative   URINEPH 5.5 8.0* 5.0 7.0 6.0   PROTEIN 70* 100* 30* Negative Negative   NITRITE Negative Negative Negative Negative Negative   LEUKEST Trace* Moderate* Small* Negative Negative   RBCU >182* >182* >182*  --  0   WBCU 14* 71* 51*  --  0     No lab results found.  PTH  No lab results found.  IRON STUDIES  Recent Labs   Lab Test 04/08/22  1445 11/11/21  2335 05/15/20  1028 04/16/20  1050 04/06/20  0935 11/16/16  1316   IRON 86 9* 25* 26*  --  14*    460* 398 402  --  431*   IRONSAT 21 2* 6* 6*  --  3*   CHRIS 34 6* 17  --  28 13       IMAGING:  All imaging studies reviewed by me.     Kandi Ray MD

## 2022-11-18 NOTE — PLAN OF CARE
Admitted/transferred from: [facility].  Reason for admission/transfer: AMS and Acute Resp Failure  Patient status upon admission/transfer: Intubated + paralyzed, not on sedation.  Interventions: Immediate Versed bolused. MD to place CVC + ART line.  Plan: K+ Shifting + Diuresis.  2 RN skin assessment: Partially completed by Lila Galvez (Back side only).  Result of skin assessment and interventions/actions: Tennis ball-sized denudement/maceration to sacrum + skin tear down gluteal cleft  Height, weight, drug calc weight: Done  Patient belongings (see Flowsheet - Adult Profile for details): In closet.  MDRO education (if applicable): N/A.      Prednisone sent to [facility], but she did have some side effects with this med in the past

## 2022-11-18 NOTE — PHARMACY-CONSULT NOTE
Pharmacy Tube Feeding Consult    Medication reviewed for administration by feeding tube and for potential food/drug interactions.    Recommendation: No changes are needed at this time.     Pharmacy will continue to follow as new medications are ordered.    Ivana Marroquin, KerwinD

## 2022-11-18 NOTE — CONSULTS
[facility] Nurse Inpatient Assessment     Consulted for: Intergluteal cleft    Patient History (according to provider note(s):      Arianna Siddiqi is a 62 year old female with a history of morbid obesity, obstructive sleep apnea, hypertension, heart failure with preserved ejection fraction, paroxysmal A. fib and chronic kidney disease who was admitted for hypercapnic respiratory failure likely due to pneumonia, necessitating intubation.     Areas Assessed:      Areas visualized during today's visit: Sacrum/coccyx and intergluteal cleft    Wound location: Intergluteal cleft     Last photo: Unable to take good picture as pt fighting to lay on her back  Wound due to: Moisture Associated Skin Damage (MASD)  Wound history/plan of care: POA, large skin folds  Wound base: 100 % dermis,      Palpation of the wound bed: normal      Drainage: scant     Description of drainage: serous     Measurements (length x width x depth, in cm):  2 areas proximal 3  x 1  x  0.1 cm and distal- 2 x 1 x 0.1cm      Tunneling: N/A     Undermining: N/A  Periwound skin: Intact and moist      Color: normal and consistent with surrounding tissue      Temperature: normal   Odor: none  Pain: tension to hands, feet and body, none  Pain interventions prior to dressing change: patient tolerated well  Treatment goal: Heal , Decrease moisture and Protection  STATUS: initial assessment  Supplies ordered: supplies stored on unit and discussed with RN     Treatment Plan:     Intergluteal cleft wound(s): Every other day     Cleanse the area with NS and pat dry.    Apply No sting film barrier to periwound skin.    Cover wound with Sacral Mepilex (#[id number])    Change dressing Q 3 days.    Turn and reposition Q 2hrs side to side only.    Ensure pt has Luis Antonio-cushion while sitting up in the chair.  FYI- If pt has constant incontinent loose stools needing dressing changes Q shift please discontinue the Mepilex dressing  and apply criticaid barrier paste BID and PRN.     Orders: Written    RECOMMEND PRIMARY TEAM ORDER: None, at this time  Education provided: importance of repositioning, plan of care, wound progress, Moisture management and Off-loading pressure  Discussed plan of care with: Nurse  WOC nurse follow-up plan: weekly  Notify WOC if wound(s) deteriorate.  Nursing to notify the Provider(s) and re-consult the WOC Nurse if new skin concern.    DATA:     Current support surface: Standard  Low air loss (DEVAN pump, Isolibrium, Pulsate, skin guard, etc)  Containment of urine/stool: Indwelling catheter  BMI: Body mass index is 73.95 kg/m .   Active diet order: Orders Placed This Encounter      NPO for Medical/Clinical Reasons Except for: Meds     Output: I/O last 3 completed shifts:  In: 1476.7 [I.V.:1476.7]  Out: -      Labs: Recent Labs   Lab 11/18/22  1102 11/18/22  0453 11/18/22  0033 11/17/22  2118   ALBUMIN  --  3.2*   < > 3.6   HGB 13.7 14.0   < > 14.7   INR  --   --   --  1.48*   WBC 12.4* 12.7*  --  15.3*   CRP  --   --   --  60.00*    < > = values in this interval not displayed.     Pressure injury risk assessment:   Sensory Perception: 1-->completely limited  Moisture: 3-->occasionally moist  Activity: 2-->chairfast  Mobility: 2-->very limited  Nutrition: 2-->probably inadequate  Friction and Shear: 1-->problem  Johnnie Score: 11    Ashley Falk RN   Dept. Pager: [phone]  Dept. Office Number: [phone]

## 2022-11-18 NOTE — PHARMACY-VANCOMYCIN DOSING SERVICE
"Pharmacy Vancomycin Initial Note  Date of Service 2022  Patient's  1960  62 year old, female    Indication: Community Acquired Pneumonia    Current estimated CrCl = Estimated Creatinine Clearance: 28.1 mL/min (A) (based on SCr of 3.34 mg/dL (H)).    Creatinine for last 3 days  2022:  9:18 PM Creatinine 3.34 mg/dL    Recent Vancomycin Level(s) for last 3 days  No results found for requested labs within last 72 hours.      Vancomycin IV Administrations (past 72 hours)      No vancomycin orders with administrations in past 72 hours.                Nephrotoxins and other renal medications (From now, onward)    Start     Dose/Rate Route Frequency Ordered Stop    22 0800  [Held by provider]  bumetanide (BUMEX) tablet 2 mg        (Held by provider since 2022 at 0329 by Lenny Pizarro, RN.Hold Reason: NPO)   Note to Pharmacy: PTA Sig:Take 1 tablet (2 mg) by mouth daily      2 mg Oral DAILY 22 03222 0800  [Held by provider]  lisinopril (ZESTRIL) tablet 5 mg        (Held by provider since 2022 at 0329 by Lenny Pizarro, RN.Hold Reason: NPO)   Note to Pharmacy: PTA Sig:Take 1 tablet (5 mg) by mouth daily      5 mg Oral DAILY 22 03222 0400  piperacillin-tazobactam (ZOSYN) 3.375 g vial to attach to  mL bag        Note to Pharmacy: For [facility], [facility] and [facility]: For Zosyn-naive patients, use the \"Zosyn initial dose + extended infusion\" order panel.    3.375 g  over 30 Minutes Intravenous EVERY 6 HOURS 22 0319      22 0330  furosemide (LASIX) injection 40 mg         40 mg  over 1-3 Minutes Intravenous ONCE 22 0329      22 0130  norepinephrine (LEVOPHED) 4 mg/250 mL NS infusion ADULT (PERIPHERAL)         0.03-0.125 mcg/kg/min × 183.1 kg  20.6-85.8 mL/hr  Intravenous CONTINUOUS 22 0042            Contrast Orders - past 72 hours (72h ago, onward)    None              Plan:  1. Start vancomycin  3000 mg IV once, then " follow levels.   2. Vancomycin monitoring method: Trough (Method 2 = manual dose calculation)  3. Vancomycin therapeutic monitoring goal: 15-20 mg/L  4. Pharmacy will check vancomycin levels as appropriate in 1-3 Days.    5. Serum creatinine levels will be ordered daily for the first week of therapy and at least twice weekly for subsequent weeks.      Juarez Campos RPH

## 2022-11-18 NOTE — PROGRESS NOTES
[facility]    ICU Progress Note       Date of Admission:  11/17/2022    Assessment: Critical Care   Arianna Siddiqi is a 62 year old female with a history of morbid obesity, obstructive sleep apnea, hypertension, heart failure with preserved ejection fraction, paroxysmal A. fib and chronic kidney disease who was admitted for hypercapnic respiratory failure likely due to pneumonia, necessitating intubation.        Todays events:  - stop versed and change to propofol  - renal consult; diurese  - monitor potassium q 6 hours  - Echo    Plan: Critical Care   Neuro/ pain/ sedation:  # Acute encephalopathy, multifactoral  # Pain and sedation  11/17 head CT normal  - Monitor neurological status. Notify provider for any acute changes in exam  - RASS goal 0- -1  - Pain control with fentanyl  - Stop versed and change to profolol  - Hold PTA cyclobenzaprine, tramadol and benadryl     Pulmonary:  # Acute hypoxic and hypercarbic respiratory failure  # Hx CARLOS with CPAP  # Hx Obesity hypoventilation syndrome    # Concern for CAP  # Pulmonary edema/fluid overload (see CV)  - Antibiotics and work-up as below for presumed pneumonia.  - Serial ABG with vent adjustments as needed  - Diuresis per below  - no SBT today due to high PEEP    Vent Mode: CMV/AC  (Continuous Mandatory Ventilation/ Assist Control)  FiO2 (%): 60 %  Resp Rate (Set): 22 breaths/min  Tidal Volume (Set, mL): 400 mL  PEEP (cm H2O): 10 cmH2O  Resp: 22    Cardiovascular:  # Hx HFpEF  # Distributive shock, cardiogenic vs. Septic shock  # Decompensated heart failure  # Troponinemia  # Hx PAF  Known history of heart failure with preserved ejection fraction.  Her weight is currently 404 lb up from 323 in June.      - Furosemide 40mg 0630, followed by 80mg 0830- assess response  - Norepinephrine to keep MAP > 65  - PTA apixaban on hold while in ICU, started heparin protocol  - PTA atorvastatin on hold due to elevated liver  enzymes  - Hold PTA lisinopril and metoprolol in the setting of hypotension requiring pressors.   - Monitor hemodynamic status.  - Echo complete ordered  - pro BNP     GI/Nutrition:  # Risk for malnutrition  # Hypoalbuminemia  # Severe obesity  - OG tube in place  - RD consult to start TF    Renal/ Fluid Balance:   # Volume overload  # Dry weight likely about 323# as last documented in June 2022  # JASON multifactorial ATN and glomerular overload  # Hyperkalemia  - Strict intake and output  - potassium shifted x1  - Nephrology consult and appreciate recs  - Increase furosemide with goal of 200mg today  - Check potassium every 6 hours while diuresing     Endocrine:  # possible DM II, unclear in history  # Risk for stress induced hyperglycemia  -Every 4 hour blood glucose measurements  - Medium SSI     ID:  # Concern for CAP  # Shock possibly due to sepsis  -Currently treating with broad-spectrum antibiotics (Vanco Zosyn) for presumed pneumonia.   - 11/18 Sputum culture prelim negative  - 11/18 urine culture in process- monitor   - 11/17 blood cultures x2 prelim no growth after 12 hours  - Pro-Calcitonin in process     Hematology:  # Leukocytosis  -AM CBC     MSK:   - PT and OT consulted. Appreciate recommendations.  Lines/ tubes/ drains: Left radial A-line present  Baig Catheter: Present  Central Lines: RIJ present     Prophylaxis:  - DVT Prophylaxis: heparin drip   - PUD Prophylaxis: Pantoprazole     Code Status: Full Code       Disposition:  - ICU     The patient's care was discussed with the Attending Physician, Dr. Tena Joy, [facility]  Securely message with the Vocera Web Console (learn more here)  Text page via [employer] Paging/Directory       Clinically Significant Risk Factors Present on Admission        # Hyperkalemia: Highest K = 6.1 mmol/L (Ref range: 3.4-5.3) in last 2 days, will monitor as appropriate  # Hyponatremia: Lowest Na = 134  "mmol/L (Ref range: 136-145) in last 2 days, will monitor as appropriate   # Hypercalcemia: corrected calcium is >10.1, will monitor as appropriate   # Anion Gap Metabolic Acidosis: Highest Anion Gap = 20 mmol/L (Ref range: 7-15) in last 2 days, will monitor and treat as appropriate  # Hypoalbuminemia: Lowest albumin = 3.1 g/dL (Ref range: 3.5-5.2) at 11/18/2022  2:24 AM, will monitor as appropriate   # Drug Induced Coagulation Defect: home medication list includes an anticoagulant medication   # Acute Respiratory Failure: Documented O2 saturation < 91%.  Continue supplemental oxygen as needed  # Circulatory Shock: currently requiring pressors for blood pressure support   # Severe Obesity: Estimated body mass index is 73.95 kg/m  as calculated from the following:    Height as of this encounter: 1.575 m (5' 2\").    Weight as of this encounter: 183.4 kg (404 lb 5.2 oz).      ___________________________________________________________________    Interval History   Pt laying in bed, intubated and sedated, appears comfortable.    Physical Exam   Vital Signs: Temp: 97.8  F (36.6  C) Temp src: Axillary BP: (!) 130/108 Pulse: 65   Resp: 22 SpO2: 99 % O2 Device: Mechanical Ventilator Oxygen Delivery: 3 LPM  Weight: 404 lbs 5.18 oz  GEN: Obese, rosa skin  EYES: PERRL, Anicteric sclera.   HEENT:  Normocephalic, atraumatic, trachea midline, ETT secure  CV: RRR, no gallops, rubs, or murmurs, distant heart tones  PULM/CHEST: Diminished breath sounds bilaterally   GI: normal bowel sounds, soft, non-tender, obese  : antunez catheter in place, urine dark yellow and turbid  EXTREMITIES: +4 peripheral edema BLE, moving all extremities, peripheral pulses intact  NEURO: HOUSER but not to command, sedated  SKIN: No rashes, sores or ulcerations  PSYCH:  Affect:sedated, relaxed  Imaging personally reviewed:  ECG    Data   I reviewed all medications, new labs and imaging results over the last 24 hours.  Arterial Blood Gases   Recent Labs   Lab " 11/18/22  1102 11/18/22  0651 11/18/22 0453 11/17/22 2126   PH 7.49* 7.42 7.43 7.20*   PCO2 42 48* 47*  --    PO2 124* 62* 48*  --    HCO3 32* 31* 31*  --        Complete Blood Count   Recent Labs   Lab 11/18/22  1102 11/18/22  0453 11/18/22 0033 11/17/22 2118   WBC 12.4* 12.7*  --  15.3*   HGB 13.7 14.0 18.7* 14.7    227  --  256       Basic Metabolic Panel  Recent Labs   Lab 11/18/22  0945 11/18/22  0814 11/18/22  0703 11/18/22  0532 11/18/22 0453 11/18/22 0322 11/18/22 0224 11/18/22 0033 11/17/22 2118   NA  --   --   --   --  138  --  138 136 134*   POTASSIUM 4.4  --   --   --  4.6  4.6  --  6.1*  6.1* 6.0* 5.5*   CHLORIDE  --   --   --   --  97*  --  98  --  90*   CO2  --   --   --   --  23  --  20*  --  26   BUN  --   --   --   --  41.0*  --  35.4*  --  36.3*   CR  --   --   --   --  3.40*  --  3.12*  --  3.34*   GLC  --  109* 94 136* 124*   < > 103* 104* 94    < > = values in this interval not displayed.       Liver Function Tests  Recent Labs   Lab 11/18/22 0453 11/18/22 0224 11/17/22 2118   AST 56* 62* 47*   ALT 29 26 27   ALKPHOS 151* 129* 137*   BILITOTAL 1.0 0.4 0.4   ALBUMIN 3.2* 3.1* 3.6   INR  --   --  1.48*       Pancreatic Enzymes  No lab results found in last 7 days.    Coagulation Profile  Recent Labs   Lab 11/17/22 2118   INR 1.48*       IMAGING:  Recent Results (from the past 24 hour(s))   XR Chest Port 1 View    Narrative    EXAM: XR CHEST PORT 1 VIEW  11/17/2022 9:07 PM     HISTORY:  SOB.       COMPARISON:  Chest radiograph 11/13/2021    FINDINGS:     Portable semiupright view of the chest. The cardiac silhouette is  borderline enlarged. Enlargement of the main pulmonary artery.  Bilateral hazy patchy opacities. No pleural effusion or appreciable  pneumothorax.    No acute osseous abnormality. Visualized upper abdomen is  unremarkable.        Impression    IMPRESSION:   1. Borderline cardiomegaly.  2. Bilateral hazy patchy opacities, likely pulmonary edema.     I have  personally reviewed the examination and initial interpretation  and I agree with the findings.    SMITA LOPEZ MD         SYSTEM ID:  [id number]   CT Head w/o Contrast    Narrative    EXAM: CT HEAD W/O CONTRAST  LOCATION: [facility]  DATE/TIME: 11/17/2022 10:03 PM    INDICATION: Fatigue, drowsiness.  COMPARISON: None.  TECHNIQUE: Routine CT Head without IV contrast. Multiplanar reformats. Dose reduction techniques were used.    FINDINGS:  INTRACRANIAL CONTENTS: No intracranial hemorrhage, extraaxial collection, or mass effect.  No CT evidence of acute infarct. Normal parenchymal attenuation. Normal ventricles and sulci.     VISUALIZED ORBITS/SINUSES/MASTOIDS: No intraorbital abnormality. No paranasal sinus mucosal disease. No middle ear or mastoid effusion.    BONES/SOFT TISSUES: No acute abnormality.      Impression    IMPRESSION:  1.  Normal head CT.   XR Chest Port 1 View    Narrative    EXAM: XR CHEST PORT 1 VIEW  LOCATION: [facility]  DATE/TIME: 11/18/2022 2:35 AM    INDICATION: intubation  COMPARISON: 11/17/2022      Impression    IMPRESSION: Stable enlargement of the cardiac silhouette. Endotracheal tube tip terminates about 2 cm above the concha. Prominence of the central pulmonary vascularity with mild left perihilar atelectasis or infiltrate. No visible pneumothorax.   XR Abdomen Port 1 View    Narrative    Exam: XR ABDOMEN PORT 1 VIEW, 11/18/2022 3:51 AM    Indication: Verify OG placement    Comparison: Abdominal x-ray 1/19/2020, chest x-ray 11/18/2022    Findings:   Portable semiupright AP radiograph of the chest. The sidehole of the  OG tube projects over the stomach and the tip extends below the field  of view. The visualized bowel gas pattern is nonobstructive. No  visualized pneumatosis or portal venous gas. Lung opacities are better  evaluated on same-day chest x-ray.      Impression    Impression: The  sidehole of the OG tube projects over the stomach and  the tip extends below the field of view.    I have personally reviewed the examination and initial interpretation  and I agree with the findings.    VIRIDIANA SINCLAIR MD         SYSTEM ID:  [id number]   XR Chest Port 1 View    Narrative    EXAM: XR CHEST PORT 1 VIEW  11/18/2022 5:43 AM      HISTORY: RIJ CVC placement    COMPARISON: Chest x-ray 11/18/2022    FINDINGS: Portable semiupright AP radiograph of the chest. Right IJ  central venous catheter tip projects over the low SVC. The sidehole of  the NG/OG tube projects over the stomach and the tip extends below the  field of view. The tip of the endotracheal tube projects over the  midthoracic trachea. The trachea is midline. The cardiac silhouette  appears enlarged. Small left pleural effusion. No pneumothorax. Stable  bilateral perihilar and retrocardiac opacities. The visualized upper  abdomen is unremarkable.       Impression    IMPRESSION:   1. Right IJ central venous catheter tip projects over the low SVC.  2. Small stable left pleural effusion.  3. Stable bilateral perihilar and retrocardiac opacities, likely  pulmonary edema and/or atelectasis.  4. Cardiomegaly.    I have personally reviewed the examination and initial interpretation  and I agree with the findings.    VIRIDIANA SINCLAIR MD         SYSTEM ID:  [id number]

## 2022-11-18 NOTE — H&P
[facility]    ICU History and Physical    Primary Team: MICU  Reason for Critical Care Admission: intubated for hypercapnic respiratory failure  Admitting Physician: Sid Szymanski MD  Date of Admission:  11/17/2022    Assessment: Critical Care   Arianna Siddiqi is a 62 year old female with a history of morbid obesity, obstructive sleep apnea, hypertension, heart failure with preserved ejection fraction, paroxysmal A. fib and chronic kidney disease who was admitted for hypercapnic respiratory failure likely due to pneumonia, necessitating intubation.       Plan: Critical Care   Neuro/ pain/ sedation:  - Monitor neurological status. Notify provider for any acute changes in exam  - Sedated with versed  - Pain control with fentanyl  - Holding PTA cyclobenzaprine, tramadol and benadryl    Pulmonary:  Suspect that her respiratory failure in the setting of an elevated white count and CRP is due to a pneumonia.  Her obesity hypoventilation and obstructive sleep apnea could be contributing.    - Ventilator settings: 420 mL at a rate of 20.  PEEP of 10.  We will adjust based on her ABG.  - supplemental oxygen to keep saturation about 92%  - Antibiotics and work-up as below for presumed pneumonia.    Cardiovascular:  Known history of heart failure with preserved ejection fraction.  Her weight is currently 404 lb up from 330 in June.  A heart failure exacerbation could explain her dyspnea.    - Started gentle diuresis with Lasix overnight (cautious in the setting of hypotension requiring pressors).  We will consider additional diuresis moving forward.  -Presented with an elevated troponin which has remained stably elevated. Will obtain an NT proBNP  -Norepinephrine  -PTA apixaban for paroxysmal A. Fib  -PTA atorvastatin  -Holding PTA lisinopril and metoprolol in the setting of hypotension requiring pressors.   - Monitor hemodynamic status.  Radial A-line in place.  -A.m.  TTE    GI/Nutrition:  - Diet: NPO for Medical/Clinical Reasons Except for: Meds    - Nutrition consulted. Appreciate recommendations.     Renal/ Fluid Balance:   - Will monitor intake and output  - normal saline for IV fluid hydration (only 50 cc/h as the patient may be volume overloaded)  -We will continue to monitor the patient's acute kidney injury.  Eelvated Creatinine could either represent a dehydrated prerenal state in the setting of sepsis versus fluid overload state due to heart failure.  Morning TTE may be helpful and  this out.    Endocrine:  -Every 4 hour blood glucose measurements    ID:  -Currently treating with broad-spectrum antibiotics (Vanco Zosyn) for presumed pneumonia.   -Sputum cultures, urine cultures, blood cultures x2  -Pro-Calcitonin    Hematology:  -AM CBC    MSK:   - PT and OT consulted. Appreciate recommendations.     Lines/ tubes/ drains: Left radial A-line present  Baig Catheter: Present  Central Lines: RIJ present    Prophylaxis:  - DVT Prophylaxis: Apixaban (for paroxysmal A. Fib)  - PUD Prophylaxis: Pantoprazole    Code Status: Full Code      Disposition:  - ICU    The patient's care was discussed with the Attending Physician, Dr. Sid Szymanski.  Critical care time exclusive of procedures: 60 minutes    Clinically Significant Risk Factors Present on Admission        # Hyperkalemia: Highest K = 6.1 mmol/L (Ref range: 3.4-5.3) in last 2 days, will monitor as appropriate  # Hyponatremia: Lowest Na = 134 mmol/L (Ref range: 136-145) in last 2 days, will monitor as appropriate   # Hypercalcemia: corrected calcium is >10.1, will monitor as appropriate   # Anion Gap Metabolic Acidosis: Highest Anion Gap = 20 mmol/L (Ref range: 7-15) in last 2 days, will monitor and treat as appropriate  # Hypoalbuminemia: Lowest albumin = 3.1 g/dL (Ref range: 3.5-5.2) at 11/18/2022  2:24 AM, will monitor as appropriate   # Drug Induced Coagulation Defect: home medication list includes an  "anticoagulant medication   # Acute Respiratory Failure: Documented O2 saturation < 91%.  Continue supplemental oxygen as needed  # Circulatory Shock: currently requiring pressors for blood pressure support   # Severe Obesity: Estimated body mass index is 73.95 kg/m  as calculated from the following:    Height as of this encounter: 1.575 m (5' 2\").    Weight as of this encounter: 183.4 kg (404 lb 5.2 oz).        Corby Panchal MD  [facility]  Securely message with the Vocera Web Console (learn more here)  Text page via [facility] Paging/Directory         ______________________________________________________________________    Chief Complaint   Dyspnea    Unable to obtain a history from the patient due to intubation.  History obtained from chart review.    History of Present Illness   Arianna Siddiqi is a 62 year old female with a history of morbid obesity, obstructive sleep apnea, hypertension, heart failure with preserved ejection fraction, paroxysmal A. fib and chronic kidney disease who presents with dyspnea.    She had 3 to 4 days of increased weakness, fatigue and dyspnea.  She also noted episodes of presyncope with standing up.  She has not urinated today despite being on diuretic medications.  She presented to the emergency department where she was started on oxygen and given 1 L of intravenous fluids.  However, her respiratory status decompensated and she developed hypercapnic respiratory failure with the VBG pH 7.12 and PCO2 of 120.  She was intubated and admitted to the ICU.    Labs in the emergency department were otherwise notable for potassium of notable for potassium of 6, 6, troponin of 366, white count of 15 and CRP of 60.    Of note, her weight is currently 404 pounds from reported baseline of 330s in June when she was asymptomatic.    Review of Systems    The 10 point Review of Systems is negative other than noted in the HPI or here.     Past Medical History  "   I have reviewed this patient's medical history and updated it with pertinent information if needed.   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  Past Surgical History:   Procedure Laterality Date     EXAM UNDER ANESTHESIA PELVIC N/A 2021    Procedure: Exam Under Anesthesia, Dilation and curettage,  placement of intrauterine device, pap smear;  Surgeon: Deedee Hess MD;  Location:  OR       Social History   I have reviewed this patient's social history and updated it with pertinent information if needed.  Social History     Tobacco Use     Smoking status: Former     Packs/day: 1.50     Years: 20.00     Pack years: 30.00     Types: Cigarettes     Quit date: 2002     Years since quittin.8     Smokeless tobacco: Never   Substance Use Topics     Alcohol use: No     Alcohol/week: 0.0 standard drinks     Drug use: No       Family History   Unable to obtain due to: Intubated     Prior to Admission Medications   Prior to Admission Medications   Prescriptions Last Dose Informant Patient Reported? Taking?   Multiple Vitamins-Minerals (MULTIVITAMIN WOMEN PO)   Yes No   acetaminophen (TYLENOL) 500 MG tablet  Pharmacy No No   Sig: Take 1-2 tablets (500-1,000 mg) by mouth every 4 hours as needed for mild pain   allopurinol (ZYLOPRIM) 100 MG tablet   No No   Sig: Take 2 tablets (200 mg) by mouth daily *Monitoring lab - Uric Acid level every 12 months. You will not receive further refills until lab is scheduled. [phone]   alum & mag hydroxide-simethicone (MAALOX  ES)  400-400-40 MG/5ML SUSP suspension  Pharmacy No No   Sig: Take 15 mLs by mouth every 4 hours as needed for other (gastric distress.)   apixaban ANTICOAGULANT (ELIQUIS ANTICOAGULANT) 5 MG tablet   No No   Sig: Take 1 tablet (5 mg) by mouth 2 times daily   atorvastatin (LIPITOR) 20 MG tablet   No No   Sig: Take 1 tablet (20 mg) by mouth daily   blood glucose monitoring (ACCU-CHEK NINA PLUS) meter device kit  Pharmacy No No   Sig: Use to test blood sugars 3 times daily or as directed.   blood glucose monitoring (SOFTCLIX) lancets  Pharmacy No No   Sig: Use to test blood sugar 3 times daily or as directed.   bumetanide (BUMEX) 2 MG tablet   No No   Sig: Take 1 tablet (2 mg) by mouth daily   cyclobenzaprine (FLEXERIL) 5 MG tablet   No No   Sig: TAKE 1 TABLET (5 MG) BY MOUTH NIGHTLY AS NEEDED FOR MUSCLE SPASMS   diphenhydrAMINE (BENADRYL) 25 MG tablet  Pharmacy Yes No   Sig: Take 25 mg by mouth every 6 hours as needed for itching or allergies   ferrous gluconate (FERGON) 324 (38 Fe) MG tablet   No No   Sig: Take 1 tablet (324 mg) by mouth daily   lisinopril (ZESTRIL) 5 MG tablet   No No   Sig: Take 1 tablet (5 mg) by mouth daily   metoprolol succinate ER (TOPROL XL) 100 MG 24 hr tablet   No No   Sig: Take 1 tablet (100 mg) by mouth daily   miconazole (MICRO GUARD) 2 % external powder   No No   Sig: Apply topically as needed for itching or other APPLY TO AFFECTED AREA TWICE A DAY *NOT COVERED*   ondansetron (ZOFRAN ODT) 4 MG ODT tab   No No   Sig: Take 1 tablet (4 mg) by mouth every 8 hours as needed for nausea or vomiting   polyethylene glycol (MIRALAX) packet  Pharmacy No No   Sig: Take 17 g by mouth daily as needed for constipation   Patient not taking: Reported on 6/28/2022   potassium chloride ER (K-TAB) 20 MEQ CR tablet   No No   Sig: Take 1 tablet (20 mEq) by mouth daily   sennosides (SENOKOT) 8.6 MG tablet  Pharmacy No No   Sig: Take 1 tablet by mouth 2 times daily as needed for constipation   Patient not taking:  Reported on 6/28/2022   traMADol (ULTRAM) 50 MG tablet  Pharmacy No No   Sig: TAKE 1 TABLET (50 MG) BY MOUTH NIGHTLY AS NEEDED FOR SEVERE PAIN *INS LIMIT 7 DAYS   Patient not taking: Reported on 6/28/2022   triamcinolone (KENALOG) 0.1 % external cream   No No   Sig: Apply topically 2 times daily APPLY TO AFFECTED AREA      Facility-Administered Medications: None     Allergies   Allergies   Allergen Reactions     Penicillins Itching and Rash     Tolerated ceftriaxone 1/17/2020       Physical Exam   Vital Signs: Temp: (!) 96.5  F (35.8  C) Temp src: Axillary BP: (!) 152/93 Pulse: 76   Resp: 24 SpO2: 100 % O2 Device: Mechanical Ventilator Oxygen Delivery: 3 LPM  Weight: 404 lbs 5.18 oz    General: Obese female sleeping in no acute distress.  Intubated and sedated.  HEENT: Moist mucous membranes.  Intubated.  Neuro: Sedated.  CV: Regular rate and rhythm with no murmurs appreciated  Pulm: Coarse breath sounds bilaterally.  Abd: Nontender to palpation with normal bowel sounds  : Baig in place.  Extremities: No lower extremity edema noted.  Intact radial pulses.    Data   I reviewed all medications, new labs and imaging results over the last 24 hours.  Arterial Blood Gases   Recent Labs   Lab 11/17/22 2126   PH 7.20*       Complete Blood Count   Recent Labs   Lab 11/18/22 0033 11/17/22 2118   WBC  --  15.3*   HGB 18.7* 14.7   PLT  --  256       Basic Metabolic Panel  Recent Labs   Lab 11/18/22  0532 11/18/22  0322 11/18/22 0224 11/18/22 0033 11/17/22 2118   NA  --   --  138 136 134*   POTASSIUM  --   --  6.1*  6.1* 6.0* 5.5*   CHLORIDE  --   --  98  --  90*   CO2  --   --  20*  --  26   BUN  --   --  35.4*  --  36.3*   CR  --   --  3.12*  --  3.34*   * 102* 103* 104* 94       Liver Function Tests  Recent Labs   Lab 11/18/22 0224 11/17/22 2118   AST 62* 47*   ALT 26 27   ALKPHOS 129* 137*   BILITOTAL 0.4 0.4   ALBUMIN 3.1* 3.6   INR  --  1.48*       Pancreatic Enzymes  No lab results found in last 7  days.    Coagulation Profile  Recent Labs   Lab 11/17/22 2118   INR 1.48*       IMAGING:  Recent Results (from the past 24 hour(s))   XR Chest Port 1 View    Narrative    EXAM: XR CHEST PORT 1 VIEW  11/17/2022 9:07 PM     HISTORY:  SOB.       COMPARISON:  Chest radiograph 11/13/2021    FINDINGS:     Portable semiupright view of the chest. The cardiac silhouette is  borderline enlarged. Enlargement of the main pulmonary artery.  Bilateral hazy patchy opacities. No pleural effusion or appreciable  pneumothorax.    No acute osseous abnormality. Visualized upper abdomen is  unremarkable.        Impression    IMPRESSION:   1. Borderline cardiomegaly.  2. Bilateral hazy patchy opacities, likely pulmonary edema.     I have personally reviewed the examination and initial interpretation  and I agree with the findings.    SMITA LOPEZ MD         SYSTEM ID:  [id number]   CT Head w/o Contrast    Narrative    EXAM: CT HEAD W/O CONTRAST  LOCATION: [facility]  DATE/TIME: 11/17/2022 10:03 PM    INDICATION: Fatigue, drowsiness.  COMPARISON: None.  TECHNIQUE: Routine CT Head without IV contrast. Multiplanar reformats. Dose reduction techniques were used.    FINDINGS:  INTRACRANIAL CONTENTS: No intracranial hemorrhage, extraaxial collection, or mass effect.  No CT evidence of acute infarct. Normal parenchymal attenuation. Normal ventricles and sulci.     VISUALIZED ORBITS/SINUSES/MASTOIDS: No intraorbital abnormality. No paranasal sinus mucosal disease. No middle ear or mastoid effusion.    BONES/SOFT TISSUES: No acute abnormality.      Impression    IMPRESSION:  1.  Normal head CT.   XR Chest Port 1 View    Narrative    EXAM: XR CHEST PORT 1 VIEW  LOCATION: [facility]  DATE/TIME: 11/18/2022 2:35 AM    INDICATION: intubation  COMPARISON: 11/17/2022      Impression    IMPRESSION: Stable enlargement of the cardiac silhouette. Endotracheal tube  tip terminates about 2 cm above the concha. Prominence of the central pulmonary vascularity with mild left perihilar atelectasis or infiltrate. No visible pneumothorax.

## 2022-11-18 NOTE — ED TRIAGE NOTES
Presents with generalized weakness, one episode of syncope per EMS, and not being able to urinate today. No fevers, denies CP/SOB. Pt in NAD.      Triage Assessment     Row Name 11/17/22 1933       Triage Assessment (Adult)    Airway WDL WDL       Respiratory WDL    Respiratory WDL WDL       Skin Circulation/Temperature WDL    Skin Circulation/Temperature WDL WDL       Cardiac WDL    Cardiac WDL WDL       Peripheral/Neurovascular WDL    Peripheral Neurovascular WDL WDL       Cognitive/Neuro/Behavioral WDL    Cognitive/Neuro/Behavioral WDL WDL

## 2022-11-18 NOTE — PLAN OF CARE
ICU End of Shift Summary. See flowsheets for vital signs and detailed assessment.    Changes this shift:  Versed 4mg/hr + Fentanyl 100mcg/hr for RASS -3. Head CT neg. Not following commands. ART + CVC line + OG placed. Placements confirmed with XR. SR 70s, with ST dep + T-wave inversion. Levo off, however did have fleeting episode of HOTN (MAPs 40s), Levo restarted at 0.08 and pt subsequently sofia'd down to 40s, however stabilized back to SR in 70s within 1-2 minutes, without recurrence since. CMV with multiple vent changes. Now 80%/400/22/10. Marked PO secretions. No BM. Baig placed; UA sent. Diuresed with 40mg Lasix IVP, with small response. Bariatric sizewize rx'd. K+ 6.1 shifted, pending recheck.     Plan: Aggressive diuresis vs potential CRRT. Increase PEEP + RR?

## 2022-11-18 NOTE — PROGRESS NOTES
RT originally called to ED 11/17 around 2145 to place pt on BIPAP due to CO2 of 105 on VBG. BIPAP was initiated at 14/6 R18 50%. A VBG was redrawn on 11/18 at 0026 and CO2 was 114. RT then switched pt to AVAPS on BIPAP per provider. Settings were Vt 450, R20, Max P 30, Min P 6, EPAP 5, 50%. Pt continued to decompensate and at 0215 RT assisted MD with intubation.  Assisted with endotracheal intubation for respiratory failure/airway protection. A #7.5 ETT was placed and secured at 22 cm @ lip. Positive color change noted with CO2 detector, breath sounds were heard bilaterally. Patient placed on full vent support, labs and CXR pending.  RT placed pt on CMV R20/450/+8/50% per provider.    Patients ETT was secured with an anchorfast,      Heather Muniz, RT, RT  11/18/2022 3:25 AM

## 2022-11-18 NOTE — PROGRESS NOTES
CLINICAL NUTRITION SERVICES - ASSESSMENT NOTE     Nutrition Prescription    RECOMMENDATIONS FOR MDs/PROVIDERS TO ORDER:  --Additional water flushes and bowel regimen as per primary team.     Malnutrition Status:    Patient does not meet two of the established criteria necessary for diagnosing malnutrition but is at risk for malnutrition    Recommendations already ordered by Registered Dietitian (RD):  TF:   --Enteral access: OGT  --GOAL: Vital High Protein @ goal of 55ml/hr (1320ml/day) will provide: 1320 kcals (27.5 kcal/kg IBW), 115 g PRO (2.4 g/kg IBW), 1103 ml free H20, 146 g CHO, and 0 g fiber daily; 1848 mg K+, 1102 mg Phos.  --Start TF @ 15 ml/hr and advance by 10 ml q 8 hrs until goal rate.  --Do not start or advance TF rate unless K+ >3.0, Mg++ > 1.5,  and Phos > 1.9.  --Minimum 30 ml q 4 hrs water flushes for tube patency.  --If gastric enteral access: HOB > 30 degrees or Reverse Trendelenburg >10-25 degrees  --Nephronex.     Future/Additional Recommendations:  --If renal formula becomes indicated: Novasource Renal @ 25 ml/hr (600 ml) +3 Prosource TF20 packets provides 1440 kcal (30 kcal/kg IBW), 114 g pro (2.4 g/kg IBW), 110 g CHO, 430 ml free water, and 0 g fiber daily.      REASON FOR ASSESSMENT  Arianna Siddiqi is a/an 62 year old female assessed by the dietitian for Provider Order - Registered Dietitian to Assess and Order TF per Medical Nutrition Therapy Protocol.    NUTRITION HISTORY  PMH morbid obesity, obstructive sleep apnea, hypertension, heart failure with preserved ejection fraction, paroxysmal A. fib and chronic kidney disease who was admitted for hypercapnic respiratory failure likely due to pneumonia, necessitating intubation. OGT in place (AXR). Nephrology consult, no plans for dialysis today.     CURRENT NUTRITION ORDERS  Diet: NPO    LABS  K+ 4.4 (WNL <- 4.6 <- 6.1 (hemolyzed))  BUN 41 (H)  Cr 3.4 (H)  Phos 4.8 (H <- 7.3)    MEDICATIONS  Bumex daily  Ferrous  "gluconate  Fentanyl  Versed  Norepi @ 0.01 mcg/kg/min  Propofol     ANTHROPOMETRICS  Height: 157.5 cm (5' 2\")  Most Recent Weight: (!) 183.4 kg (404 lb 5.2 oz)    IBW: 47.7 kg  BMI: Obesity Grade III BMI >40 (BMI = 73.79 kg/m2)  Weight History:   Per Nephrology note, dry weight in June 2022 was ~60 lb less (~330 lb or 150 kg)  Wt Readings from Last 30 Encounters:   11/18/22 (!) 183.4 kg (404 lb 5.2 oz)   11/15/21 (!) 169.1 kg (372 lb 12.8 oz)   05/13/20 (!) 158.3 kg (349 lb)   05/04/20 (!) 158.8 kg (350 lb)   04/03/20 (!) 158.8 kg (350 lb)   02/21/20 (!) 163.2 kg (359 lb 11.2 oz)   02/15/20 (!) 159.2 kg (351 lb)   02/09/20 (!) 172.3 kg (379 lb 14.4 oz)   01/27/20 (!) 170.6 kg (376 lb 1.6 oz)   12/12/17 (!) 182.5 kg (402 lb 6.4 oz)   10/19/16 (!) 159.3 kg (351 lb 1.6 oz)   10/09/16 (!) 163.9 kg (361 lb 6.4 oz)   10/05/16 (!) 162 kg (357 lb 1.6 oz)   09/23/16 (!) 167.5 kg (369 lb 4.3 oz)     Dosing Weight: 48 kg IBW    ASSESSED NUTRITION NEEDS  Estimated Energy Needs: 0743-2686+ kcals/day (22 - 25+ kcal/kg IBW)  Justification: Obese and Vented  Estimated Protein Needs:  grams protein/day (2 - 2.5 grams of pro/kg IBW)  Justification: Hypercatabolism with critical illness and Obesity guidelines  Estimated Fluid Needs: (1 mL/kcal)   Justification: Maintenance and Per provider pending fluid status    PHYSICAL FINDINGS  See malnutrition section below.     MALNUTRITION  % Intake: Unable to assess  % Weight Loss: None noted  Subcutaneous Fat Loss: None observed  Muscle Loss: None observed  Fluid Accumulation/Edema: none documented  Malnutrition Diagnosis: Patient does not meet two of the established criteria necessary for diagnosing malnutrition but is at risk for malnutrition    NUTRITION DIAGNOSIS  Predicted inadequate nutrient intake (energy and protein) related to mechanical ventilation as evidenced by NPO status reliant on TF to meet nutrition needs with risk of TF interruptions.   "     INTERVENTIONS  Implementation  Collaboration with other providers - rounds  Enteral Nutrition - Initiate     Goals  Total avg nutritional intake to meet a minimum of 22 kcal/kg and 2 g PRO/kg daily (per dosing wt 48 kg IBW).     Monitoring/Evaluation  Progress toward goals will be monitored and evaluated per protocol.    Michelle Arreaga MS, RD, LD, [facility]  MICU pager: [phone]  ASCOM: [id number]

## 2022-11-19 NOTE — PLAN OF CARE
Time: 0700 - 1930    Goal Outcome Evaluation:      Plan of Care Reviewed With: patient    Overall Patient Progress: no change    Major Shift Events: No acute neuro changes; HOUSER but not following commands, PERRL. RASS goal 0 to -1, pt within goal. Afebrile. Tele NSR, with intermittent PVC's. Levo gtt titrated for goal MAP >65. Increased PEEP to 12 with improvement in PaO2 and P/F ratio. OG confirmed by X-ray, TF initiated this afternoon. 80 mg IV lasix given x2 with effect; Baig catheter with clear yellow UOP. Last BM PTA. WOC consult completed.     Plan: Continue to monitor respiratory status. Follow plan of care.     For vital signs and complete assessments, please see documentation flowsheets.

## 2022-11-19 NOTE — PROGRESS NOTES
[facility]    ICU Progress Note       Date of Admission:  11/17/2022    Assessment: Critical Care   Arianna Siddiqi is a 62 year old female with a history of morbid obesity, obstructive sleep apnea, hypertension, heart failure with preserved ejection fraction, paroxysmal A. fib and chronic kidney disease who was admitted for hypercapnic respiratory failure likely due to pneumonia, necessitating intubation.        Todays events:  - Diurese with 80mg lasix in AM (2nd dose at 2pm)  - Follow up UOP with goal of 1-2L (Dry weight of 323lbs)  - Follow up labs: BMP, Mg, Phos  - Wean PEEP now at 10  - Repeat ABG  - Cardiology consult- appreciate recs  - PRN Senna and Miralax      Plan: Critical Care     Neuro/ pain/ sedation:  # Acute encephalopathy, multifactoral  # Pain and sedation  11/17 head CT normal  - Monitor neurological status. Notify provider for any acute changes in exam  - RASS goal 0 to -1  - Pain control with fentanyl  - Continue profolol  - Hold PTA cyclobenzaprine, tramadol and benadryl     Pulmonary:  # Acute hypoxic and hypercarbic respiratory failure  # Hx CARLOS with CPAP  # Hx Obesity hypoventilation syndrome    # Concern for CAP  # Pulmonary edema/fluid overload (see CV)  - Antibiotics and work-up as below for presumed pneumonia.  - Serial ABG with vent adjustments as needed  - Diuresis per below  - No SBT today due to continued high PEEP    Vent Mode: CMV/AC  (Continuous Mandatory Ventilation/ Assist Control)  FiO2 (%): 40 %  Resp Rate (Set): 18 breaths/min  Tidal Volume (Set, mL): 400 mL  PEEP (cm H2O): (S) 10 cmH2O  Resp: 14    Cardiovascular:  # Hx HFpEF  # Distributive shock, cardiogenic vs. Septic shock  # Decompensated heart failure with right heart strain  # Troponinemia  # Hx PAF  Known history of heart failure with preserved ejection fraction.  Her weight is currently 404 lb up from 323 in June.      - Furosemide 80mg in AM with 2nd dose at 2pm, will  then follow UOP  - Norepinephrine to keep MAP > 65  - PTA apixaban on hold while in ICU, started heparin protocol  - PTA atorvastatin on hold due to elevated liver enzymes  - Hold PTA lisinopril and metoprolol in the setting of hypotension requiring pressors.   - Monitor hemodynamic status- order placed  - Echo complete ordered- EF 55-60% signs of volume overload and increased right ventricular dilation/dysfunction  - BNP 30,780  - Trop 327 (down fro 366)  - Cardiology consult- appreciate recs     GI/Nutrition:  # Risk for malnutrition  # Hypoalbuminemia  # Severe obesity  - OG tube in place  - RD consult to start TF    Renal/ Fluid Balance:   # Volume overload  # Dry weight likely about 323lbs as last documented in June 2022  # JASON multifactorial ATN and glomerular overload  # Hyperkalemia  - Strict intake and output  - Potassium shifted yesterday- now stable at 4.0  - Nephrology consult and appreciate recs   - Continue Diuresis   - Stop q4hr K checks    Endocrine:  # possible DM II, unclear in history  # Risk for stress induced hyperglycemia  -Every 4 hour blood glucose measurements  - Medium SSI     ID:  # Concern for CAP  # Shock possibly due to sepsis  -Currently treating with Zosyn for presumed pneumonia.   - 11/18 Sputum culture prelim negative  - 11/18 urine culture in process- monitor   - 11/17 blood cultures x2 prelim no growth after 12 hours  - Pro-Calcitonin elevated at 0.21  - Continue Zosyn     Hematology:  # Leukocytosis  -AM CBC     MSK:   - PT and OT consulted. Appreciate recommendations.  Lines/ tubes/ drains: R PIV, Left radial A-line present  Baig Catheter: Present  Central Lines: RIJ present     Prophylaxis:  - DVT Prophylaxis: heparin drip   - PUD Prophylaxis: Pantoprazole     Code Status: Full Code       Disposition:  - ICU     The patient was discussed with attending physician, Dr. Madsen, who agrees with the plan outlined above.    Alejandro Cuenca MD  Internal Medicine Resident,  "PGY-1  Pager: [phone]    Clinically Significant Risk Factors Present on Admission                # Severe Obesity: Estimated body mass index is 72.7 kg/m  as calculated from the following:    Height as of this encounter: 1.575 m (5' 2\").    Weight as of this encounter: 180.3 kg (397 lb 7.8 oz).      ___________________________________________________________________    Interval History   Pt laying in bed, intubated and sedated, appears comfortable.    Physical Exam   Vital Signs: Temp: 98.9  F (37.2  C) Temp src: Axillary   Pulse: 68   Resp: 14 SpO2: 93 % O2 Device: Mechanical Ventilator    Weight: 397 lbs 7.83 oz  GEN: Obese, rosa skin  EYES: PERRL, Anicteric sclera.   HEENT:  Normocephalic, atraumatic, trachea midline, ETT secure  CV: RRR, no gallops, rubs, or murmurs, distant heart tones  PULM/CHEST: Diminished breath sounds bilaterally   GI: Normal bowel sounds, soft, non-tender, obese  : antunez catheter in place, urine dark yellow and turbid  EXTREMITIES: +4 peripheral edema BLE, moving all extremities, peripheral pulses intact  NEURO: HOUSER but not to command, sedated  SKIN: No rashes, sores or ulcerations  PSYCH:  Affect:sedated, relaxed    Data   I reviewed all medications, new labs and imaging results over the last 24 hours.  Arterial Blood Gases   Recent Labs   Lab 11/19/22  1245 11/19/22  0431 11/18/22  1533 11/18/22  1102   PH 7.46* 7.44 7.46* 7.49*   PCO2 49* 52* 47* 42   PO2 78* 82 96 124*   HCO3 35* 35* 33* 32*       Complete Blood Count   Recent Labs   Lab 11/19/22  0431 11/18/22  1102 11/18/22  0453 11/18/22  0033 11/17/22  2118   WBC 11.7* 12.4* 12.7*  --  15.3*   HGB 14.1 13.7 14.0 18.7* 14.7    243 227  --  256       Basic Metabolic Panel  Recent Labs   Lab 11/19/22  1132 11/19/22  0745 11/19/22  0431 11/19/22  0430 11/19/22  0025 11/18/22  2154 11/18/22  1537 11/18/22  1533 11/18/22  0945 11/18/22  0532 11/18/22  0453 11/18/22  0322 11/18/22  0224 11/18/22  0033 11/17/22  2118   NA  " --   --  138  --   --   --   --   --   --   --  138  --  138 136 134*   POTASSIUM  --   --  4.0  4.0  --   --  4.4  --  4.4 4.4  --  4.6  4.6  --  6.1*  6.1* 6.0* 5.5*   CHLORIDE  --   --  96*  --   --   --   --   --   --   --  97*  --  98  --  90*   CO2  --   --  27  --   --   --   --   --   --   --  23  --  20*  --  26   BUN  --   --  42.3*  --   --   --   --   --   --   --  41.0*  --  35.4*  --  36.3*   CR  --   --  2.57*  --   --   --   --   --   --   --  3.40*  --  3.12*  --  3.34*   * 121* 97 114*   < >  --    < >  --   --    < > 124*   < > 103* 104* 94    < > = values in this interval not displayed.       Liver Function Tests  Recent Labs   Lab 11/19/22  0431 11/18/22 0453 11/18/22 0224 11/17/22 2118   AST 49* 56* 62* 47*   ALT 33 29 26 27   ALKPHOS 115* 151* 129* 137*   BILITOTAL 0.6 1.0 0.4 0.4   ALBUMIN 3.0* 3.2* 3.1* 3.6   INR  --   --   --  1.48*       Pancreatic Enzymes  No lab results found in last 7 days.    Coagulation Profile  Recent Labs   Lab 11/19/22  0431 11/18/22 2154 11/18/22  1533 11/17/22 2118   INR  --   --   --  1.48*   PTT 40* 37 41*  --        IMAGING:  No results found for this or any previous visit (from the past 24 hour(s)).

## 2022-11-19 NOTE — PLAN OF CARE
"ICU End of Shift Summary. See flowsheets for vital signs and detailed assessment.    Changes this shift: Pt remains intubated 40% PEEP of 12. On levo to keep MAP>65. Afebrile. Adequate amount of urine, no stool, Tube feeds increased to 25. Intermittently following commands towards the end of the shift. Heparin increased to 1650 units/hr overnight waiting for AM result.  Pt remains in bilateral mitt restraints to keep from pulling out ETT, education was provided with no evidence of learning. No signs of injury present will continue to monitor for when it is appropriate to discharge mitts    Plan: Continue to monitor and address changes.     Problem: Plan of Care - These are the overarching goals to be used throughout the patient stay.    Goal: Plan of Care Review  Description: The Plan of Care Review/Shift note should be completed every shift.  The Outcome Evaluation is a brief statement about your assessment that the patient is improving, declining, or no change.  This information will be displayed automatically on your shift note.  Outcome: Not Progressing  Goal: Patient-Specific Goal (Individualized)  Description: You can add care plan individualizations to a care plan. Examples of Individualization might be:  \"Parent requests to be called daily at 9am for status\", \"I have a hard time hearing out of my right ear\", or \"Do not touch me to wake me up as it startles me\".  Outcome: Not Progressing  Goal: Absence of Hospital-Acquired Illness or Injury  Outcome: Not Progressing  Intervention: Identify and Manage Fall Risk  Recent Flowsheet Documentation  Taken 11/19/2022 0400 by Dora Youssef, RN  Safety Promotion/Fall Prevention:   activity supervised   assistive device/personal items within reach   fall prevention program maintained   clutter free environment maintained   increase visualization of patient   safety round/check completed  Taken 11/19/2022 0000 by Dora Youssef, RN  Safety Promotion/Fall Prevention:   " activity supervised   assistive device/personal items within reach   fall prevention program maintained   clutter free environment maintained   increase visualization of patient   safety round/check completed  Taken 11/18/2022 2000 by Dora Youssef RN  Safety Promotion/Fall Prevention:   activity supervised   assistive device/personal items within reach   fall prevention program maintained   clutter free environment maintained   increase visualization of patient   safety round/check completed  Intervention: Prevent Skin Injury  Recent Flowsheet Documentation  Taken 11/19/2022 0400 by Dora Youssef RN  Body Position: turned  Taken 11/19/2022 0200 by Dora Youssef RN  Body Position: turned  Taken 11/19/2022 0000 by Dora Youssef RN  Body Position: turned  Taken 11/18/2022 2200 by Dora Youssef RN  Body Position: turned  Taken 11/18/2022 2000 by Dora Youssef RN  Body Position: turned  Goal: Optimal Comfort and Wellbeing  Outcome: Not Progressing  Goal: Readiness for Transition of Care  Outcome: Not Progressing   Goal Outcome Evaluation:

## 2022-11-19 NOTE — CONSULTS
[facility]    Cardiology Consult Note-Cardiology      Date of Admission:  11/17/2022  Consult Requested by: Medical ICU  Reason for Consult: Acute RV failure    Assessment & Plan: HVSL   Arianna Siddiqi is a 62 year old female admitted on 11/17/2022. She has morbid obesity, obstructive sleep apnea, hypertension, heart failure with preserved ejection fraction, mild RV dilation and systolic dysfunction, paroxysmal A. fib and chronic kidney disease who presented to the ED yesterday with dyspnea. Admitted to medical ICU respiratory failure and septic shock.  Cardiology consulted for concern of acute RV failure contributing to clinical decompensation.  Transthoracic echocardiographic images reviewed from today's study and prior study, RV size and function mildly abnormal and grossly unchanged between 2 studies.  Positive pressure ventilation may be contributing to some of the increased intraventricular activity observed in today's study.      There is no convincing evidence of acute right ventricular failure.    Assessment  1. Acute respiratory failure  2. Shock due to possible sepsis  3. Volume overload  4. Chronic kidney disease  5. Heart failure with preserved ejection fraction  6. Mild right failure dilation and systolic dysfunction    Recommendations  1. Volume resuscitation and CVP goal according to typical SIRS/sepsis management  2. Diuresis when clinical status allows/dictates from a sepsis management perspective    Thank you for allowing us to participate in the care of this patient.  Cardiovascular consult service will sign off.     The patient's care was discussed with the Attending Physician, Dr. Aaron and Primary team.    Marlon Crabtree MD  [facility]          ______________________________________________________________________    Chief Complaint   Septic shock     History is obtained from the primary team.      History of Present Illness   Arianna Siddiqi is a 62 year old female with a history of morbid obesity, obstructive sleep apnea, hypertension, heart failure with preserved ejection fraction, mild RV dilation and systolic dysfunction, paroxysmal A. fib and chronic kidney disease who presented to the ED yesterday with dyspnea.    Noted to have 3 to 4 days of increased weakness, fatigue and dyspnea. Reported no urine x 1 day despite diuretic use. Preseneted to the ED, placed on supplemental O2, given 1 L of intravenous fluids. Respiratory status decompensated, developed hypercapnic respiratory failure (VBG pH 7.12, PCO2 of 120).  Intubated and admitted to the medical ICU.     Initial labs notable potassium of 6.6, troponin of 366, WBC 15 and CRP 60. Weight up to 404 pounds from reported baseline of 330s (June 2022).      TTE this admission showed normal LV function, moderate RV dilation/dysfunction consistent with volume + pressure overload.    We personally reviewed the images from prior and current transthoracic echocardiogram.  Image quality very poor with limited views of right ventricle.  In direct comparison of both studies, RV size and function appears only mildly abnormal and grossly unchanged.    Review of Systems   Review of systems not obtained due to patient factors - patient intubation    Past Medical History    I have reviewed this patient's medical history and updated it with pertinent information if needed.   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  Past Surgical  History:   Procedure Laterality Date     EXAM UNDER ANESTHESIA PELVIC N/A 2021    Procedure: Exam Under Anesthesia, Dilation and curettage,  placement of intrauterine device, pap smear;  Surgeon: Deedee Hess MD;  Location: [facility]       Social History   I have reviewed this patient's social history and updated it with pertinent information if needed.  Social History     Tobacco Use     Smoking status: Former     Packs/day: 1.50     Years: 20.00     Pack years: 30.00     Types: Cigarettes     Quit date: 2002     Years since quittin.8     Smokeless tobacco: Never   Substance Use Topics     Alcohol use: No     Alcohol/week: 0.0 standard drinks     Drug use: No     Family History   I have reviewed this patient's family history and updated it with pertinent information if needed.   Unable to obtain due to: intubation    Medications   Current Facility-Administered Medications   Medication     allopurinol (ZYLOPRIM) tablet 200 mg     [Held by provider] apixaban ANTICOAGULANT (ELIQUIS) tablet 5 mg     [Held by provider] atorvastatin (LIPITOR) tablet 20 mg     B and C vitamin Complex with folic acid (NEPHRONEX) liquid 5 mL     [Held by provider] bumetanide (BUMEX) tablet 2 mg     carboxymethylcellulose PF (REFRESH PLUS) 0.5 % ophthalmic solution 1 drop     dexmedetomidine (PRECEDEX) 400 mcg in 0.9% sodium chloride 100 mL     dextrose 10% infusion     glucose gel 15-30 g    Or     dextrose 50 % injection 25-50 mL    Or     glucagon injection 1 mg     fentaNYL (SUBLIMAZE) 50 mcg/mL bolus from pump     fentaNYL (SUBLIMAZE) infusion     [Held by provider] ferrous gluconate (FERGON) tablet 324 mg     heparin 25,000 units in 0.45% NaCl 250 mL ANTICOAGULANT infusion     insulin aspart (NovoLOG) injection (RAPID ACTING)     lidocaine (LMX4) cream     lidocaine 1 % 0.1-5 mL     [Held by provider] lisinopril (ZESTRIL) tablet 5 mg     [Held by provider] metoprolol succinate ER (TOPROL XL) 24 hr tablet 100 mg      miconazole (MICATIN) 2 % powder     naloxone (NARCAN) injection 0.2 mg    Or     naloxone (NARCAN) injection 0.4 mg    Or     naloxone (NARCAN) injection 0.2 mg    Or     naloxone (NARCAN) injection 0.4 mg     No lozenges or gum should be given while patient on BIPAP/AVAPS/AVAPS AE     norepinephrine (LEVOPHED) 16 mg in  mL infusion MAX CONC CENTRAL LINE     pantoprazole (PROTONIX) 2 mg/mL suspension 40 mg    Or     pantoprazole (PROTONIX) IV push injection 40 mg     Patient is already receiving anticoagulation with heparin, enoxaparin (LOVENOX), warfarin (COUMADIN)  or other anticoagulant medication     Patient may continue current oral medications     piperacillin-tazobactam (ZOSYN) 3.375 g vial to attach to  mL bag     polyethylene glycol (MIRALAX) Packet 17 g     potassium chloride (KAYCIEL) solution 20 mEq     potassium phosphate 9 mmol in 250 mL D5W intermittent infusion     propofol (DIPRIVAN) bolus from infusion pump 5 mg     propofol (DIPRIVAN) infusion     sennosides (SENOKOT) tablet 8.6 mg     sodium chloride (PF) 0.9% PF flush 10-40 mL       Allergies   Allergies   Allergen Reactions     Penicillins Itching and Rash     Tolerated ceftriaxone 1/17/2020       Physical Exam   Vital Signs: Temp: 98.6  F (37  C) Temp src: Axillary BP: 121/69 Pulse: 63   Resp: 18 SpO2: 97 % O2 Device: Mechanical Ventilator    Weight: 397 lbs 7.83 oz    General Appearance: intubated sedated  Eyes: Closed shut  HEENT: Very supple/pump neck  Respiratory: Mechanical breath sounds  Cardiovascular: Regular rate and rhythm; distant heart sounds secondary to body habitus  GI: Obese abdomen  Lymph/Hematologic: No ecchymoses  Genitourinary: Baig in place  Skin: Warm well perfused  Musculoskeletal: Sedated; at least 1+ lower extremity edema  Neurologic: Sedated  Psychiatric: Sedated        Data   I personally reviewed the EKG tracing showing sinus rhythm.  Results for orders placed or performed during the hospital encounter  of 22 (from the past 24 hour(s))   Echo Complete   Result Value Ref Range    LVEF  55-60%     [facility]    [id number]  [id number]  [id number]  [id number]^TERRANCE^VANDANA     [facility],[place]  Echocardiography Laboratory  [address] [place] [id number]     Name: DUSTIN FERNANDES  MRN: [id number]  : 1960  Study Date: 2022 11:35 AM  Age: 62 yrs  Gender: Female  [place] [facility] [place] For Study: CHF  Ordering Physician: VANDANA CARLOS  Performed By: Jesse Clifton     BSA: 2.6 m2  Height: 62 in  Weight: 404 lb  HR: 79  BP: 136/76 mmHg  ______________________________________________________________________________  Procedure  Echocardiogram with two-dimensional, color and spectral Doppler performed.  Contrast Optison. Technically difficult study.Extremely difficult acoustic  windows despite the use of contrast for endcardial border definition. Patient  was given 6 ml mixture of 3 ml Optison and 6 ml saline. 3 ml wasted.  ______________________________________________________________________________  Interpretation Summary  Left ventricular function is normal.The ejection fraction is 55-60%.  Flattened septum is consistent with right ventricular pressure and volume  overload.  Moderate right ventricular dilation is present. Global right ventricular  function is moderately reduced.  Mild tricuspid regurgitation is present.  Unable to assess mean RA pressure given the patient is on a ventilator.  No pericardial effusion is present.  ______________________________________________________________________________  Left Ventricle  Small left ventricular cavity size. Left ventricular function is normal.The  ejection fraction is 55-60%. Flattened septum is consistent with right  ventricular pressure and volume overload.     Right Ventricle  Moderate right ventricular dilation is present. Global right ventricular  function is moderately reduced.     Atria  The atria cannot be  assessed.     Mitral Valve  On Doppler interrogation, there is no significant stenosis or regurgitation.     Aortic Valve  On Doppler interrogation, there is no significant stenosis or regurgitation.     Tricuspid Valve  Mild tricuspid insufficiency is present. The right ventricular systolic  pressure is approximated at 15.1 mmHg plus the right atrial pressure.     Pulmonic Valve  The pulmonic valve cannot be assessed.     Vessels  Unable to assess mean RA pressure given the patient is on a ventilator.     Pericardium  No pericardial effusion is present.     Compared to Previous Study  This study was compared with the study from 1.16.20 . Patient on ventilator  now with RV dilatation and dysfunction.  ______________________________________________________________________________  MMode/2D Measurements & Calculations     IVSd: 1.4 cm  LVIDd: 4.6 cm  LVIDs: 3.5 cm  LVPWd: 1.0 cm  FS: 24.6 %  LV mass(C)d: 205.2 grams  LV mass(C)dI: 79.6 grams/m2  asc Aorta Diam: 3.2 cm  LVOT diam: 2.0 cm  LVOT area: 3.2 cm2  RWT: 0.46     Doppler Measurements & Calculations  PA acc time: 0.07 sec  TR max enrique: 194.5 cm/sec  TR max PG: 15.1 mmHg     ______________________________________________________________________________  Report approved by: John Moreno 11/18/2022 12:11 PM         Partial thromboplastin time   Result Value Ref Range    aPTT 41 (H) 22 - 38 Seconds   Potassium   Result Value Ref Range    Potassium 4.4 3.4 - 5.3 mmol/L   Blood gas arterial   Result Value Ref Range    pH Arterial 7.46 (H) 7.35 - 7.45    pCO2 Arterial 47 (H) 35 - 45 mm Hg    pO2 Arterial 96 80 - 105 mm Hg    FIO2 50     Bicarbonate Arterial 33 (H) 21 - 28 mmol/L    Base Excess/Deficit (+/-) 7.8 (H) -9.0 - 1.8 mmol/L   Glucose by meter   Result Value Ref Range    GLUCOSE BY METER POCT 115 (H) 70 - 99 mg/dL   Glucose by meter   Result Value Ref Range    GLUCOSE BY METER POCT 117 (H) 70 - 99 mg/dL   Procalcitonin   Result Value Ref Range     Procalcitonin 0.21 (H) <0.05 ng/mL   Nt probnp inpatient   Result Value Ref Range    N terminal Pro BNP Inpatient 30,780 (H) 0 - 900 pg/mL   Potassium   Result Value Ref Range    Potassium 4.4 3.4 - 5.3 mmol/L   Partial thromboplastin time   Result Value Ref Range    aPTT 37 22 - 38 Seconds   Glucose by meter   Result Value Ref Range    GLUCOSE BY METER POCT 120 (H) 70 - 99 mg/dL   Glucose by meter   Result Value Ref Range    GLUCOSE BY METER POCT 114 (H) 70 - 99 mg/dL   CBC with Platelets & Differential    Narrative    The following orders were created for panel order CBC with Platelets & Differential.  Procedure                               Abnormality         Status                     ---------                               -----------         ------                     CBC with platelets and d...[[id number]]  Abnormal            Final result                 Please view results for these tests on the individual orders.   Magnesium   Result Value Ref Range    Magnesium 1.7 1.7 - 2.3 mg/dL   Phosphorus   Result Value Ref Range    Phosphorus 2.6 2.5 - 4.5 mg/dL   Potassium   Result Value Ref Range    Potassium 4.0 3.4 - 5.3 mmol/L   Comprehensive metabolic panel   Result Value Ref Range    Sodium 138 136 - 145 mmol/L    Potassium 4.0 3.4 - 5.3 mmol/L    Chloride 96 (L) 98 - 107 mmol/L    Carbon Dioxide (CO2) 27 22 - 29 mmol/L    Anion Gap 15 7 - 15 mmol/L    Urea Nitrogen 42.3 (H) 8.0 - 23.0 mg/dL    Creatinine 2.57 (H) 0.51 - 0.95 mg/dL    Calcium 9.0 8.8 - 10.2 mg/dL    Glucose 97 70 - 99 mg/dL    Alkaline Phosphatase 115 (H) 35 - 104 U/L    AST 49 (H) 10 - 35 U/L    ALT 33 10 - 35 U/L    Protein Total 7.1 6.4 - 8.3 g/dL    Albumin 3.0 (L) 3.5 - 5.2 g/dL    Bilirubin Total 0.6 <=1.2 mg/dL    GFR Estimate 20 (L) >60 mL/min/1.73m2   Blood gas arterial   Result Value Ref Range    pH Arterial 7.44 7.35 - 7.45    pCO2 Arterial 52 (H) 35 - 45 mm Hg    pO2 Arterial 82 80 - 105 mm Hg    FIO2 40     Bicarbonate Arterial  35 (H) 21 - 28 mmol/L    Base Excess/Deficit (+/-) 8.9 (H) -9.0 - 1.8 mmol/L   Partial thromboplastin time   Result Value Ref Range    aPTT 40 (H) 22 - 38 Seconds   CBC with platelets and differential   Result Value Ref Range    WBC Count 11.7 (H) 4.0 - 11.0 10e3/uL    RBC Count 5.02 3.80 - 5.20 10e6/uL    Hemoglobin 14.1 11.7 - 15.7 g/dL    Hematocrit 46.1 35.0 - 47.0 %    MCV 92 78 - 100 fL    MCH 28.1 26.5 - 33.0 pg    MCHC 30.6 (L) 31.5 - 36.5 g/dL    RDW 18.2 (H) 10.0 - 15.0 %    Platelet Count 269 150 - 450 10e3/uL    % Neutrophils 77 %    % Lymphocytes 12 %    % Monocytes 8 %    % Eosinophils 2 %    % Basophils 1 %    % Immature Granulocytes 0 %    NRBCs per 100 WBC 0 <1 /100    Absolute Neutrophils 9.1 (H) 1.6 - 8.3 10e3/uL    Absolute Lymphocytes 1.4 0.8 - 5.3 10e3/uL    Absolute Monocytes 0.9 0.0 - 1.3 10e3/uL    Absolute Eosinophils 0.2 0.0 - 0.7 10e3/uL    Absolute Basophils 0.1 0.0 - 0.2 10e3/uL    Absolute Immature Granulocytes 0.0 <=0.4 10e3/uL    Absolute NRBCs 0.0 10e3/uL   Glucose by meter   Result Value Ref Range    GLUCOSE BY METER POCT 121 (H) 70 - 99 mg/dL   Glucose by meter   Result Value Ref Range    GLUCOSE BY METER POCT 136 (H) 70 - 99 mg/dL

## 2022-11-19 NOTE — PROGRESS NOTES
"  Nephrology Progress Note  11/19/2022         Assessment & Recommendations:   Arianna Siddiqi is a 62 year old female with a history of morbid obesity, obstructive sleep apnea, hypertension, heart failure with preserved ejection fraction, paroxysmal A. fib and chronic kidney disease who presented to the ED with dyspnea, presyncope, decreased UOP despite home diuretics. Admitted on 11/18 due to volume overload, respiratory failure, acidemia, hyperkalemia of 6 and intubated.    # Non Oliguric JASON   # Elevated Lactate   Baseline creatinine 0.9-1.0. Now up to 3.4. Electrolytes are now WNL. Potassium was 6 on admission. Bicarb normal. UOP this am 485 cc. Was given Lasix 40 mg initially. She developed respiratory failure and hypotension requiring pressors on admission. JASON most likely 2/2 pre-renal in the setting of hypotension requiring pressors and ongoing infection. Dry weight documented in June was ~60 lbs less.  - Ok to give more IV diuretics with Lasix 80 mg BID  - Avoid hypotension   - Avoid nephrotoxins   - If K elevated, ok to shift again   - Recheck K this evening.     # Hypoxic / Hypercarbic respiratory failure   # Pulmonary edema   # Hx HFpEF  # Distributive shock, cardiogenic vs. Septic shock  # Decompensated heart failure    Recommendations were communicated to primary team via note    Seen and discussed with Dr. Lucy Ray MD   [phone]    Interval History :   Nursing and provider notes from last 24 hours reviewed.  In the last 24 hours Arianna Siddiqi has responded to IV diuretics. UOP increased to 3.4L. Creatinine decreased to 2.57    Review of Systems:   I reviewed the following systems:  Intubated and sedated     Physical Exam:   I/O last 3 completed shifts:  In: 1709.95 [I.V.:1324.95; NG/GT:90]  Out: 4320 [Urine:4320]   /69   Pulse 73   Temp 98.5  F (36.9  C) (Axillary)   Resp 18   Ht 1.575 m (5' 2\")   Wt (!) 180.3 kg (397 lb 7.8 oz)   SpO2 96%   BMI 72.70 kg/m       GENERAL " APPEARANCE: sedated and intubated. Morbidly obese  EYES: N/A  Lymphatics: no cervical or supraclavicular LAD  Pulmonary: crackles throughout bilateral lungs  CV: regular rhythm, normal rate, no rub   - JVD N/A   - Edema ++ non pitting bilateral LE  GI: soft, nontender, normal bowel sounds  MS: no evidence of inflammation in joints, no muscle tenderness  : + antunez  SKIN: no rash, warm, dry, no cyanosis  NEURO: face symmetric, no asterixis     Labs:   All labs reviewed by me  Electrolytes/Renal - Recent Labs   Lab Test 11/19/22  0431 11/19/22  0430 11/19/22  0025 11/18/22  2154 11/18/22  2104 11/18/22  1537 11/18/22  1533 11/18/22  0532 11/18/22  0453 11/18/22  0322 11/18/22  0224 11/18/22  0033 11/17/22  2118   NA  --   --   --   --   --   --   --   --  138  --  138 136 134*   POTASSIUM 4.0  --   --  4.4  --   --  4.4   < > 4.6  4.6  --  6.1*  6.1* 6.0* 5.5*   CHLORIDE  --   --   --   --   --   --   --   --  97*  --  98  --  90*   CO2  --   --   --   --   --   --   --   --  23  --  20*  --  26   BUN  --   --   --   --   --   --   --   --  41.0*  --  35.4*  --  36.3*   CR  --   --   --   --   --   --   --   --  3.40*  --  3.12*  --  3.34*   GLC  --  114* 120*  --  117*   < >  --    < > 124*   < > 103* 104* 94   KADEN  --   --   --   --   --   --   --   --  9.3  --  8.9  --  9.4   MAG  --   --   --   --   --   --   --   --  1.9  --  1.9  --  2.2   PHOS 2.6  --   --   --   --   --   --   --  4.8*  --  7.3*  --   --     < > = values in this interval not displayed.       CBC -   Recent Labs   Lab Test 11/19/22  0431 11/18/22  1102 11/18/22  0453   WBC 11.7* 12.4* 12.7*   HGB 14.1 13.7 14.0    243 227       LFTs -   Recent Labs   Lab Test 11/18/22  0453 11/18/22  0224 11/17/22  2118   ALKPHOS 151* 129* 137*   BILITOTAL 1.0 0.4 0.4   ALT 29 26 27   AST 56* 62* 47*   PROTTOTAL 7.5 7.8 8.3   ALBUMIN 3.2* 3.1* 3.6       Iron Panel -   Recent Labs   Lab Test 04/08/22  1445 11/11/21  2335 11/11/21  2335 05/15/20  1028    IRON 86  --  9* 25*   IRONSAT 21  --  2* 6*   CHRIS 34   < > 6* 17    < > = values in this interval not displayed.         Imaging:  All imaging studies reviewed by me.     Current Medications:    allopurinol  200 mg Oral or Feeding Tube Daily     [Held by provider] apixaban ANTICOAGULANT  5 mg Oral BID     [Held by provider] atorvastatin  20 mg Oral Daily     B and C vitamin Complex with folic acid  5 mL Per Feeding Tube Daily     [Held by provider] bumetanide  2 mg Oral Daily     [Held by provider] ferrous gluconate  324 mg Oral Daily     furosemide  80 mg Intravenous Once     insulin aspart  1-6 Units Subcutaneous Q4H     [Held by provider] lisinopril  5 mg Oral Daily     [Held by provider] metoprolol succinate ER  100 mg Oral Daily     pantoprazole  40 mg Per Feeding Tube QAM AC    Or     pantoprazole  40 mg Intravenous QAM AC     piperacillin-tazobactam  3.375 g Intravenous Q6H       dextrose       fentaNYL 50 mcg/hr (11/19/22 0700)     heparin 1,650 Units/hr (11/19/22 0600)     - MEDICATION INSTRUCTIONS -       norepinephrine 0.05 mcg/kg/min (11/19/22 0700)     - MEDICATION INSTRUCTIONS -       - MEDICATION INSTRUCTIONS -       propofol 20 mcg/kg/min (11/19/22 0700)     Kandi Ray MD

## 2022-11-20 NOTE — PROGRESS NOTES
MEDICAL ICU PROGRESS NOTE  2022      Date of Service (when I saw the patient): 2022    ASSESSMENT: Arianna Siddiqi is a 62 year old female with a history of morbid obesity, obstructive sleep apnea, hypertension, heart failure with preserved ejection fraction, paroxysmal A. fib and chronic kidney disease who was admitted for hypercapnic respiratory failure likely due to pneumonia, necessitating intubation.     CHANGES and MAJOR THINGS TODAY:   - Repeat Diuresis with Lasix 80mg, goal -1L today  - Diamox 500mg x1 for contraction alkalosis  - Abd Xray for c/o pain and no BM documented  - Increase bowel regimen  - repeat Sputum culture with gram stain due to increased thick tan secretions  - Scheduled duo nebs and 3% saline nebs for pulm toilet      PLAN:    Neuro:  # Pain and sedation  # Acute encephalopathy, multifactoral  # Pain and sedation  -  head CT normal  - Monitor neurological status. Notify provider for any acute changes in exam  - RASS goal 0 to -1  - Pain control with fentanyl  - Propofol transitioned to Precedex  - Hold PTA cyclobenzaprine, tramadol and benadryl    Pulmonary:  # Acute hypoxic and hypercarbic respiratory failure  # Hx CARLOS with CPAP  # Hx Obesity hypoventilation syndrome    # Concern for CAP  # Pulmonary edema/fluid overload (see CV)  - Antibiotics and work-up as below for presumed CAP pneumonia.  - Serial ABG with vent adjustments as needed  - Due to pt's large body habitus, may need higher peep for atelectasis prevention.   - Thick yellow/tan tracheal secretions noted   - AB.49/49/65/+11/37  - Vent Mode: CMV/AC  (Continuous Mandatory Ventilation/ Assist Control)  FiO2 (%): 50 %  Resp Rate (Set): 18 breaths/min  Tidal Volume (Set, mL): 400 mL  PEEP (cm H2O): 10 cmH2O  Resp: 18  - Scheduled duonebs and hypertonic saline nebs for thick secretions  - No wheezing noted on exam    PLAN: Defer SBT today due to secretions, and consider decrease in PEEP to 8 tomorrow and SBT as  able tomorrow    Cardiovascular:  # Hx HFpEF EF 55-60%  # Distributive shock, cardiogenic vs. Septic shock  # Decompensated heart failure with right heart strain  # Troponinemia  # Hx PAF  - Weight is currently 404 lb up from 323 in June.      - Norepinephrine to keep MAP > 65, weaned off at midnight (highest 0.04)  - PTA meds: Furosemide, apixaban, Lisinopril, and atorvastatin all held.    - Monitor hemodynamic status- order placed  - Echo 11/19: EF 55-60%, flattened septum consistent with RV reduced function, global RV function moderately reduced  Per Cards note, reviewed images (poor quality) and feel RV size and function only mildly abnormal and grossly unchanged. Follow up additional recs.  - BNP 30,780  - Trop 366 -> 327     GI/Nutrition:  # Risk for malnutrition  # Hypoalbuminemia  # Severe obesity  - OG tube in place  - Tube feeds: Osmolite 1.5 start at 15ml/hr (adv 10ml Q8) goal 55ml/hr  - RD consult for nutrition recs  - Senna, miralax and bisacodyl prn  PLAN: Will obtain an abd xray    Renal/Fluids/Electrolytes:  # Volume overload  # Dry weight likely about 323lbs as last documented in June 2022  # JASON multifactorial ATN and glomerular overload  # Hyperkalemia - resolved  - Strict intake and output  - I/Os -2.6L SA, -2L yesterday; received Furosemide 160mg yesterday  - Potassium shifted yesterday- now stable at 4.0  - Nephrology consult and appreciate recs  - 75-150ml UOP every hour  - Cr 1.83 (2.12), BMP within normal limits  - Contraction alkalosis noted, will administer diamox today    PLAN: Repeat lasix today for goal -1L as hemodynamics allow    Endocrine:  # possible DM II, unclear in history  # Risk for stress induced hyperglycemia  - Every 4 hour blood glucose measurements  - Medium SSI    ID:  # Concern for CAP  # Shock possibly due to sepsis  # Leukocytosis - resolving  - WBC 8.9 (11.7)  - Afebrile  - Currently treating with Zosyn for presumed CAP  - Pro-Calcitonin elevated at 0.21  Cultures:  "    11/18 Sputum culture prelim negative    11/18 urine culture in process- monitor     11/17 blood cultures x2 prelim no growth after 12 hours  PLAN: Repeat sputum culture with gram stain today. Continue Zosyn for planned 7 day course due to clinical improvement. Pending culture data can consider de-escalation to ceftriaxone    Hematology:    - Hgb 14.5 (14.1)  - Continue to hold apixaban and continue Heparin infusion     Musculoskeletal/Skin:  - PT/OT for ROM  - No acute concerns for skin breakdown      General Cares/Prophylaxis:    DVT Prophylaxis: Heparin infusion  GI Prophylaxis: PPI  Restraints: Mitts in place  Family Communication: Emelia Siddiqi   Code Status: FULL    Lines/tubes/drains:  - PIVx2, Left A. Line, Right internal jugular CVC    Disposition:  - Medical ICU     Patient seen and findings/plan discussed with medical ICU staff, Dr. Madsne.    ALMA ACOSTA CNP    Clinically Significant Risk Factors Present on Admission               # Severe Obesity: Estimated body mass index is 72.22 kg/m  as calculated from the following:    Height as of this encounter: 1.575 m (5' 2\").    Weight as of this encounter: 179.1 kg (394 lb 13.5 oz).            ====================================  INTERVAL HISTORY:   - TTE obtained, per Cards no significant changes or concerns  - Neph following and recommended diureses. -2L yesterday  - PEEP weaned to 10  - Sedation transitioned to Precedex    OBJECTIVE:   1. VITAL SIGNS:   Temp:  [97.9  F (36.6  C)-98.9  F (37.2  C)] 97.9  F (36.6  C)  Pulse:  [53-88] 61  Resp:  [11-33] 18  MAP:  [57 mmHg-131 mmHg] 86 mmHg  Arterial Line BP: ()/() 127/65  FiO2 (%):  [40 %-50 %] 50 %  SpO2:  [90 %-100 %] 90 %  Vent Mode: CMV/AC  (Continuous Mandatory Ventilation/ Assist Control)  FiO2 (%): 50 %  Resp Rate (Set): 18 breaths/min  Tidal Volume (Set, mL): 400 mL  PEEP (cm H2O): 10 cmH2O  Resp: 18    2. INTAKE/ OUTPUT:   I/O last 3 completed shifts:  In: 3422.3 " [I.V.:2062.3; NG/GT:310]  Out: 5035 [Urine:5035]    3. PHYSICAL EXAMINATION:  General: Inubated and sedated, intermittent agitations  HEENT: PERRLA  Neuro: Eyes open to verbal and tactile stimulation, follows commands, moves all extremities independently  Pulm/Resp: Diminished sounds bilaterally without rhonchi, crackles or wheeze, breathing non-labored with ETT secured. Moderate thick tan/yellow secretions noted  CV: RRR,SBP stable and   Abdomen: Soft, distended and obese, OGT in place  : antunez catheter in place, urine yellow and clear  Incisions/Skin: No concerns    4. LABS:   Arterial Blood Gases   Recent Labs   Lab 11/20/22  0303 11/19/22  1245 11/19/22  0431 11/18/22  1533   PH 7.49* 7.46* 7.44 7.46*   PCO2 49* 49* 52* 47*   PO2 65* 78* 82 96   HCO3 37* 35* 35* 33*     Complete Blood Count   Recent Labs   Lab 11/20/22  0300 11/19/22  0431 11/18/22  1102 11/18/22  0453   WBC 8.9 11.7* 12.4* 12.7*   HGB 14.5 14.1 13.7 14.0    269 243 227     Basic Metabolic Panel  Recent Labs   Lab 11/20/22  0303 11/20/22  0300 11/19/22  2252 11/19/22  1951 11/19/22  1516 11/19/22  1512 11/19/22  0745 11/19/22  0431 11/19/22  0025 11/18/22  2154 11/18/22  0532 11/18/22  0453   NA  --  140  --   --   --  139  --  138  --   --   --  138   POTASSIUM  --  4.0  --   --   --  3.8  --  4.0  4.0  --  4.4   < > 4.6  4.6   CHLORIDE  --  98  --   --   --  97*  --  96*  --   --   --  97*   CO2  --  29  --   --   --  29  --  27  --   --   --  23   BUN  --  39.9*  --   --   --  39.9*  --  42.3*  --   --   --  41.0*   CR  --  1.83*  --   --   --  2.12*  --  2.57*  --   --   --  3.40*   * 146* 127* 161*   < > 140*   < > 97   < >  --    < > 124*    < > = values in this interval not displayed.     Liver Function Tests  Recent Labs   Lab 11/19/22  0431 11/18/22  0453 11/18/22  0224 11/17/22  2118   AST 49* 56* 62* 47*   ALT 33 29 26 27   ALKPHOS 115* 151* 129* 137*   BILITOTAL 0.6 1.0 0.4 0.4   ALBUMIN 3.0* 3.2* 3.1* 3.6   INR  --    --   --  1.48*     Coagulation Profile  Recent Labs   Lab 11/20/22  0631 11/19/22  2252 11/19/22  1512 11/19/22  0431 11/18/22  1533 11/17/22  2118   INR  --   --   --   --   --  1.48*   PTT 67* 39* 58* 40*   < >  --     < > = values in this interval not displayed.       5. RADIOLOGY:   No results found for this or any previous visit (from the past 24 hour(s)).

## 2022-11-20 NOTE — PROGRESS NOTES
"  Nephrology Progress Note  11/20/2022         Assessment & Recommendations:   Arianna Siddiqi is a 62 year old female with a history of morbid obesity, obstructive sleep apnea, hypertension, heart failure with preserved ejection fraction, paroxysmal A. fib and chronic kidney disease who presented to the ED with dyspnea, presyncope, decreased UOP despite home diuretics. Admitted on 11/18 due to volume overload, respiratory failure, acidemia, hyperkalemia of 6 and intubated. Nephrology consulted due to JASON.    # Non Oliguric JASON   # Elevated Lactate   Baseline creatinine 0.9-1.0. Peaked at 3.4. Electrolytes are now WNL. Potassium was 6 on admission. Bicarb normal. Was given Lasix 40 mg initially. She developed respiratory failure and hypotension requiring pressors on admission. JASON most likely 2/2 pre-renal in the setting of hypotension requiring pressors and septic/distributive shock. Dry weight documented in June was ~60 lbs less than on admission.  - Hold off Lasix and monitor UOP off diuretics. She is proably autodiuresing.   - Avoid hypotension   - Avoid nephrotoxins   - If K elevated, ok to shift again   - Daily BMP    # Hypoxic / Hypercarbic respiratory failure   # Pulmonary edema   # Hx HFpEF  # Distributive shock, cardiogenic vs. Septic shock  # Decompensated heart failure    Recommendations were communicated to primary team via note    Seen and discussed with Dr. Lucy Ray MD   [phone]    Interval History :   Nursing and provider notes from last 24 hours reviewed.  In the last 24 hours Arianna Siddiqi has responded to IV diuretics. UOP 5L in the last 24h. She was on Lasix 80 mg BID. Creatinine decreased to 1.83    Review of Systems:   I reviewed the following systems:  Intubated and sedated     Physical Exam:   I/O last 3 completed shifts:  In: 3039.16 [I.V.:1919.16; NG/GT:310]  Out: 5135 [Urine:5135]   /69   Pulse 61   Temp 97.9  F (36.6  C) (Axillary)   Resp 18   Ht 1.575 m (5' 2\") "   Wt (!) 179.1 kg (394 lb 13.5 oz)   SpO2 90%   BMI 72.22 kg/m       GENERAL APPEARANCE: sedated and intubated. Morbidly obese  EYES: N/A  Lymphatics: no cervical or supraclavicular LAD  Pulmonary: crackles throughout bilateral lungs  CV: regular rhythm, normal rate, no rub   - JVD N/A   - Edema ++ non pitting bilateral LE  GI: soft, nontender, normal bowel sounds  MS: no evidence of inflammation in joints, no muscle tenderness  : + antunez  SKIN: no rash, warm, dry, no cyanosis  NEURO: face symmetric, no asterixis     Labs:   All labs reviewed by me  Electrolytes/Renal -   Recent Labs   Lab Test 11/20/22 0303 11/20/22 0300 11/19/22 2252 11/19/22  1516 11/19/22  1512 11/19/22  0745 11/19/22  0431   NA  --  140  --   --  139  --  138   POTASSIUM  --  4.0  --   --  3.8  --  4.0  4.0   CHLORIDE  --  98  --   --  97*  --  96*   CO2  --  29  --   --  29  --  27   BUN  --  39.9*  --   --  39.9*  --  42.3*   CR  --  1.83*  --   --  2.12*  --  2.57*   * 146* 127*   < > 140*   < > 97   KADEN  --  8.9  --   --  9.3  --  9.0   MAG  --  1.6*  --   --  1.6*  --  1.7   PHOS  --  3.0  --   --  2.7  --  2.6    < > = values in this interval not displayed.       CBC -   Recent Labs   Lab Test 11/20/22 0300 11/19/22 0431 11/18/22  1102   WBC 8.9 11.7* 12.4*   HGB 14.5 14.1 13.7    269 243       LFTs -   Recent Labs   Lab Test 11/19/22 0431 11/18/22  0453 11/18/22  0224   ALKPHOS 115* 151* 129*   BILITOTAL 0.6 1.0 0.4   ALT 33 29 26   AST 49* 56* 62*   PROTTOTAL 7.1 7.5 7.8   ALBUMIN 3.0* 3.2* 3.1*       Iron Panel -   Recent Labs   Lab Test 04/08/22  1445 11/11/21  2335 11/11/21  2335 05/15/20  1028   IRON 86  --  9* 25*   IRONSAT 21  --  2* 6*   CHRIS 34   < > 6* 17    < > = values in this interval not displayed.         Imaging:  All imaging studies reviewed by me.     Current Medications:    allopurinol  200 mg Oral or Feeding Tube Daily     [Held by provider] apixaban ANTICOAGULANT  5 mg Oral BID     [Held by  provider] atorvastatin  20 mg Oral Daily     B and C vitamin Complex with folic acid  5 mL Per Feeding Tube Daily     [Held by provider] bumetanide  2 mg Oral Daily     [Held by provider] ferrous gluconate  324 mg Oral Daily     insulin aspart  1-6 Units Subcutaneous Q4H     [Held by provider] lisinopril  5 mg Oral Daily     [Held by provider] metoprolol succinate ER  100 mg Oral Daily     miconazole   Topical BID     pantoprazole  40 mg Per Feeding Tube QAM AC    Or     pantoprazole  40 mg Intravenous QAM AC     piperacillin-tazobactam  3.375 g Intravenous Q6H     potassium chloride  20 mEq Oral Daily       dexmedetomidine 0.11 mcg/kg/hr (11/20/22 0628)     dextrose       fentaNYL 50 mcg/hr (11/20/22 0700)     heparin 2,250 Units/hr (11/20/22 0700)     - MEDICATION INSTRUCTIONS -       norepinephrine Stopped (11/20/22 0145)     - MEDICATION INSTRUCTIONS -       - MEDICATION INSTRUCTIONS -       propofol Stopped (11/19/22 1045)     Kandi Ray MD

## 2022-11-20 NOTE — PLAN OF CARE
Time: 0700 - 1930    Goal Outcome Evaluation:      Plan of Care Reviewed With: patient    Overall Patient Progress: no change    Major Shift Events: Neuro status stable; RASS -1, HOUSER, PERRL, and pt beginning to inconsistently follow commands. Sedation transitioned from propofol to precedex. Afebrile. BP's labile; levo titrated to maintain MAP >65. Flotrack monitoring initiated, CVP's 8 and 16; MICU aware of increase. PEEP decreased to 10, pt tolerating well. 80 mg IV lasix given x2 with effect. No BM; PRN bowel regimen added.     Plan: Continue to monitor respiratory status, wean vent as tolerated. Follow plan of care.     For vital signs and complete assessments, please see documentation flowsheets.

## 2022-11-20 NOTE — PLAN OF CARE
"ICU End of Shift Summary. See flowsheets for vital signs and detailed assessment.    Changes this shift: Pt remains intubated 45% PEEP of 10. Levo shut off around 0200 this AM. MAP>65. Afebrile. Adequate urine. Following commands majority of the time. Remains on bilateral mitt restraints to keep from pulling out lines/tubes. Dex at 0.7 Heparin increased to 2250 units/hr recheck scheduled for 0645. Labs unremarkable.    Plan: Continue to monitor and address changes    Problem: Plan of Care - These are the overarching goals to be used throughout the patient stay.    Goal: Plan of Care Review  Description: The Plan of Care Review/Shift note should be completed every shift.  The Outcome Evaluation is a brief statement about your assessment that the patient is improving, declining, or no change.  This information will be displayed automatically on your shift note.  Outcome: Not Progressing  Goal: Patient-Specific Goal (Individualized)  Description: You can add care plan individualizations to a care plan. Examples of Individualization might be:  \"Parent requests to be called daily at 9am for status\", \"I have a hard time hearing out of my right ear\", or \"Do not touch me to wake me up as it startles me\".  Outcome: Not Progressing  Goal: Absence of Hospital-Acquired Illness or Injury  Outcome: Not Progressing  Intervention: Identify and Manage Fall Risk  Recent Flowsheet Documentation  Taken 11/20/2022 0400 by Dora Youssef, RN  Safety Promotion/Fall Prevention:   activity supervised   assistive device/personal items within reach   fall prevention program maintained   clutter free environment maintained   increase visualization of patient   safety round/check completed  Taken 11/20/2022 0000 by Dora Youssef, RN  Safety Promotion/Fall Prevention:   activity supervised   assistive device/personal items within reach   fall prevention program maintained   clutter free environment maintained   increase visualization of patient   " safety round/check completed  Taken 11/19/2022 2000 by Dora Youssef RN  Safety Promotion/Fall Prevention:   activity supervised   assistive device/personal items within reach   fall prevention program maintained   clutter free environment maintained   increase visualization of patient   safety round/check completed  Intervention: Prevent Skin Injury  Recent Flowsheet Documentation  Taken 11/20/2022 0400 by Dora Youssef RN  Body Position: turned  Taken 11/20/2022 0200 by Dora Youssef RN  Body Position: turned  Taken 11/20/2022 0000 by Dora Youssef RN  Body Position: turned  Taken 11/19/2022 2200 by Dora Youssef RN  Body Position: turned  Taken 11/19/2022 2000 by Dora Youssef RN  Body Position: turned  Goal: Optimal Comfort and Wellbeing  Outcome: Not Progressing  Goal: Readiness for Transition of Care  Outcome: Not Progressing   Goal Outcome Evaluation:

## 2022-11-21 NOTE — PROGRESS NOTES
Nephrology Progress Note  11/21/2022         Assessment & Recommendations:   Arianna Siddiqi is a 62 year old female with a history of morbid obesity, obstructive sleep apnea, hypertension, heart failure with preserved ejection fraction, paroxysmal A. fib and chronic kidney disease who presented to the ED with dyspnea, presyncope, decreased UOP despite home diuretics. Admitted on 11/18 due to volume overload, respiratory failure, acidemia, hyperkalemia of 6 and intubated. Nephrology consulted due to JASON.    # Non Oliguric JASON   # Elevated Lactate   Baseline creatinine 0.9-1.0. Peaked at 3.4. Now downtrending. She developed respiratory failure and hypotension requiring pressors on admission. JASON most likely 2/2 pre-renal in the setting of hypotension requiring pressors and septic/distributive shock. Dry weight documented in June was ~60 lbs less than on admission. Electrolytes continue to be stable. Working on volume management with lasix 80 BID   - Lasix 80mg this AM  - Consider repeat dose in PM if decreasing UOP  - Work to limit Ins (~2.6L IVF)  - Agree with goal net negative 1-2L  - Her baseline bicarb appears to be close to 30 - no need for diamox to treat  - Avoid hypotension   - Avoid nephrotoxins  - Daily BMP    # Hypoxic / Hypercarbic respiratory failure   # Pulmonary edema   # Hx HFpEF  # Distributive shock, cardiogenic vs. Septic shock  # Decompensated heart failure  Suspect that underlying pulmonary HTN may have contributed to her initial presentation. ECHO showing right sided ventricular hypertrophy with increased pressure suggesting volume overload. Unclear medical compliance for diuretics and CPAP prior to admission. After intubation and positive pressure, brisk response in urine output.    Recommendations were communicated to primary team via note    Seen and discussed with Dr. Zak Bhatti MD  Division of Renal Disease and Hypertension  [facility]  "[facility]"airmail  Vocera Web Console      Interval  "History :   Nursing and provider notes from last 24 hours reviewed.  In the last 24 hours, continues to have significant urine output. Limited net negative due to volume of Ins, but suspect that her tube feeds are not completely contributed to intervascular volume. Creatinine stable. No electrolyte abnormalities. Has decreased pressor requirements.     Review of Systems:   I reviewed the following systems:  Intubated and sedated     Physical Exam:   I/O last 3 completed shifts:  In: 4409.71 [I.V.:2749.71; NG/GT:340]  Out: 5630 [Urine:5630]   /59   Pulse 58   Temp 98.6  F (37  C) (Axillary)   Resp 18   Ht 1.575 m (5' 2\")   Wt (!) 179.1 kg (394 lb 13.5 oz)   SpO2 95%   BMI 72.22 kg/m       GENERAL APPEARANCE: sedated and intubated. Morbidly obese  EYES: N/A  Lymphatics: no cervical or supraclavicular LAD  Pulmonary: crackles throughout bilateral lungs  CV: regular rhythm, normal rate, no rub   - JVD N/A   - Edema ++ non pitting bilateral LE  GI: soft, nondistended  MS: no evidence of inflammation in joints, no muscle tenderness  : + antunez  SKIN: no rash, warm, dry, no cyanosis  NEURO: Sedated    Labs:   All labs reviewed by me  Electrolytes/Renal -   Recent Labs   Lab Test 11/21/22  0346 11/21/22  0342 11/20/22  2324 11/20/22 2013 11/20/22  1619 11/20/22  0303 11/20/22  0300 11/19/22  1516 11/19/22  1512     --   --   --  137  --  140  --  139   POTASSIUM 3.6  --  3.8  --  3.8  --  4.0  --  3.8   CHLORIDE 96*  --   --   --  95*  --  98  --  97*   CO2 30*  --   --   --  31*  --  29  --  29   BUN 39.3*  --   --   --  38.7*  --  39.9*  --  39.9*   CR 1.66*  --   --   --  1.65*  --  1.83*  --  2.12*   * 168* 171*   < > 178*  169*   < > 146*   < > 140*   KADEN 8.9  --   --   --  9.1  --  8.9  --  9.3   MAG 2.0  --   --   --   --   --  1.6*  --  1.6*   PHOS 4.5  --   --   --   --   --  3.0  --  2.7    < > = values in this interval not displayed.       CBC -   Recent Labs   Lab Test 11/21/22  0346 " 11/20/22  0300 11/19/22  0431   WBC 9.6 8.9 11.7*   HGB 14.5 14.5 14.1    224 269       LFTs -   Recent Labs   Lab Test 11/19/22  0431 11/18/22  0453 11/18/22  0224   ALKPHOS 115* 151* 129*   BILITOTAL 0.6 1.0 0.4   ALT 33 29 26   AST 49* 56* 62*   PROTTOTAL 7.1 7.5 7.8   ALBUMIN 3.0* 3.2* 3.1*       Iron Panel -   Recent Labs   Lab Test 04/08/22  1445 11/11/21  2335 11/11/21  2335 05/15/20  1028   IRON 86  --  9* 25*   IRONSAT 21  --  2* 6*   CHRIS 34   < > 6* 17    < > = values in this interval not displayed.         Imaging:  All imaging studies reviewed by me.     Current Medications:    allopurinol  200 mg Oral or Feeding Tube Daily     [Held by provider] apixaban ANTICOAGULANT  5 mg Oral BID     [Held by provider] atorvastatin  20 mg Oral Daily     B and C vitamin Complex with folic acid  5 mL Per Feeding Tube Daily     bisacodyl  10 mg Rectal Daily     [Held by provider] bumetanide  2 mg Oral Daily     [Held by provider] ferrous gluconate  324 mg Oral Daily     insulin aspart  1-6 Units Subcutaneous Q4H     ipratropium - albuterol 0.5 mg/2.5 mg/3 mL  3 mL Nebulization 4x daily     [Held by provider] lisinopril  5 mg Oral Daily     melatonin  3 mg Oral or Feeding Tube At Bedtime     [Held by provider] metoprolol succinate ER  100 mg Oral Daily     miconazole   Topical BID     pantoprazole  40 mg Per Feeding Tube QAM AC    Or     pantoprazole  40 mg Intravenous QAM AC     piperacillin-tazobactam  3.375 g Intravenous Q6H     polyethylene glycol  17 g Oral Daily     potassium chloride  20 mEq Oral Daily     senna-docusate  2 tablet Oral BID     sodium chloride  3 mL Nebulization Q6H       dexmedetomidine 0.5 mcg/kg/hr (11/21/22 0700)     dextrose       fentaNYL 75 mcg/hr (11/21/22 0700)     heparin 2,550 Units/hr (11/21/22 0700)     - MEDICATION INSTRUCTIONS -       norepinephrine Stopped (11/20/22 0145)     - MEDICATION INSTRUCTIONS -       - MEDICATION INSTRUCTIONS -       propofol 10 mcg/kg/min (11/21/22  0700)     Edilberto Bhatti MD

## 2022-11-21 NOTE — PROGRESS NOTES
MEDICAL ICU PROGRESS NOTE  2022      Date of Service (when I saw the patient): 2022    ASSESSMENT: Arianna Siddiqi is a 62 year old female with a history of morbid obesity, CARLOS, hypertension, HFpEF, paroxysmal A. Fib (apixipaban), MAG, and CKD who was admitted for hypercapnic respiratory failure likely due to pneumonia, necessitating intubation.      CHANGES and MAJOR THINGS TODAY:   - 80 mg Lasix IV given; goal net neg 1L daily  - Holding Diamox r/t concerns for chronic hypoventilation and HCO3 compensation  - Re-started PTA lisinopril & ferrous gluconate  - Schedule Seroquel 25 mg BID   - Increase precedex gtt to 1.7   - CXR today   - Fentanyl bolus instead of continuous   - Cardiology consult for RHC         PLAN:     Neuro:  # Pain and sedation  # Acute encephalopathy, multifactoral  -  head CT normal  - Monitor neurological status. Notify provider for any acute changes in exam  - RASS goal 0 to -1  - Pain control: fentanyl bolus dosing  - Sedation control: Propofol & Precedex  - Hold PTA cyclobenzaprine, tramadol and benadryl     Pulmonary:  # Acute hypoxic and hypercarbic respiratory failure   # Concern for CAP  # Pulmonary edema/fluid overload (see CV)  # Hx CARLOS with CPAP  # Hx Obesity hypoventilation syndrome   # Hx Smoking (30 pack per year; stopped )  - Antibiotics and work-up as below for presumed CAP pneumonia.  - Serial ABG with vent adjustments as needed  - Due to pt's large body habitus, may need higher peep for atelectasis prevention.   - Thick yellow/tan tracheal secretions minimal/moderate quantity noted   - AB.49/49/65/+  - Scheduled duonebs and hypertonic saline nebs for thick secretions  - No wheezing noted on exam  - SBT today  - Plan to keep peep at 10; will consider lowering to 8 once pulmonary edema and intravascular fluid volume are optimized  - Vent Mode: CMV/AC  (Continuous Mandatory Ventilation/ Assist Control)  FiO2 (%): 50 %  Resp Rate (Set): 18  breaths/min  Tidal Volume (Set, mL): 400 mL  PEEP (cm H2O): 10 cmH2O  Resp: 18       Cardiovascular:  # Mixed shock state; Distributive & Cardiogenic shock-resolved  # Decompensated heart failure with right heart strain  # Demand induced troponin leak  # Hx PAF  # Hx HFpEF EF 55-60% (most recent echo 11/18)  Echo 11/18: EF 55-60%, flattened septum consistent with RV reduced function, global RV function moderately reduced. Per Cards note, reviewed images (poor quality) and feel RV size and function only mildly abnormal and grossly unchanged. Follow up additional recs. Discussed with cardiology concerns for PHTN given most recent echocardiogram results with R sided heart strain and h/o non-compliance with CARLOS/CPAP therapy & HFpEF therapy regimen. Cardiology recommending performing R sided heart catheterization at later date unless acute decompensation present.   - Cardiology consulted appreciate recommendation  - Current weight 179 kg up from June 147 kg.      - MAP > 65 (levophed off 11/20); trend CVPs  - BNP 69,955 -> 30,780  - Trop 366 -> 327   - 80 mg furosemide IV given once with goal net neg 1L daily  - PTA meds: re-started lisinopril 5mg daily  - Held PTA meds: Furosemide, apixaban, metoprolol, Bumex and atorvastatin all held.      GI/Nutrition:  # Risk for malnutrition  # Hypoalbuminemia  # Morbid obesity  # Constipation  - OG tube in place  - Tube feeds: Osmolite 1.5 start at 15ml/hr (adv 10ml Q8) goal 55ml/hr  - RD consult for nutrition recs  - Senna BID, miralax daily, and bisacodyl suppository daily  - Abd xray 11/20 (poor quality imaging) - mild colonic stool burden; Nonspecific paucity of small bowel gas, though without findings to suggest obstruction     Renal/Fluids/Electrolytes:  # Volume overload  # Non-oliguric Acute on chronic kidney injury  # Metabolic alkalosis-presumed contraction alkalosis vs chronic hypoventilation syndrome  # Hyperkalemia - resolved  # Hypomagnesia - resolved  # Hx CKD  (baseline creatinine 1.1)  - Nephrology consult and appreciate recs  - Strict intake and output  - 80 mg furosemide IV given - goal net neg 1L  - Cr 3.4 -> 1.6, GFR 15 -> 34; JASON improving  - Holding Diamox concerns HCO3 is chronically elevated to compensate for chronic hypoventilation syndrome     PLAN: Repeat lasix today for goal -1L daily as hemodynamics allow     Endocrine:  # Risk for stress induced hyperglycemia  # Hx. DMII  - Every 4 hour blood glucose measurements  - Medium SSI     ID:  # Presumed CAP  # Distributive & Cardiogenic shock - resolved  # Leukocytosis - resolved  Infection work up grossly unremarkable with cultures and virology  - WBC 15 -> 9.6  - Afebrile  - Pro-Calcitonin elevated at 0.21  Cultures:     11/20 Sputum culture-pending    11/18 Sputum culture unremarkable    11/18 urine culture unremarkable    11/17 blood cultures x 2 NGTD    11/17 viral panels unremarkable  - Zosyn 7 day course (11/18-11/24)    PLAN: Repeat sputum culture with gram stain today. Continue Zosyn for planned 7 day course due to clinical improvement.      Hematology:    # Hx MAG  # Hx Factor VII deficiency  Seen outpatient by hematology has a history oral/IV iron supplementation  - Hgb 14.5   - PTA ferrous gluconate 324 mg resumed  - Held-PTA apixaban (r/t pAfib)  - Heparin gtt for pafib ppx     Musculoskeletal/Skin:  #Hx Gout  #Hx hemosiderin deposition BLE  - PT/OT for ROM  - No acute concerns for skin breakdown  - PTA allopurinol-resumed        General Cares/Prophylaxis:    DVT Prophylaxis: Heparin infusion  GI Prophylaxis: PPI  Restraints: Mitts in place  Family Communication: Emelia Siddiqi   Code Status: FULL     Lines/tubes/drains:  - PIVx2, Left A. Line, Right internal jugular CVC     Disposition:  - Medical ICU     Patient seen and findings/plan discussed with medical ICU staff, Dr. Merino.    Delroy Briseno, APRN CNP    Clinically Significant Risk Factors Present on Admission               # Severe Obesity:  "Estimated body mass index is 72.22 kg/m  as calculated from the following:    Height as of this encounter: 1.575 m (5' 2\").    Weight as of this encounter: 179.1 kg (394 lb 13.5 oz).            ====================================  INTERVAL HISTORY:   Patient intermittently agitated overnight and this AM. Following minimal commands and arousing/opening eyes to stimulus. Optimizing sedation plans with adding scheduled seroquel BID and changing to intermittent bolus dosing with fentanyl. Patient has strong cough with mod/min thick tans/clear secretions. Hoping for SBT this afternoon    OBJECTIVE:   1. VITAL SIGNS:   Temp:  [98.4  F (36.9  C)-99.2  F (37.3  C)] 98.6  F (37  C)  Pulse:  [58-91] 58  Resp:  [14-30] 18  BP: ()/(56-63) 107/59  MAP:  [69 mmHg-116 mmHg] 73 mmHg  Arterial Line BP: ()/() 104/57  FiO2 (%):  [50 %-55 %] 55 %  SpO2:  [90 %-95 %] 95 %  Vent Mode: CMV/AC  (Continuous Mandatory Ventilation/ Assist Control)  FiO2 (%): 55 %  Resp Rate (Set): 18 breaths/min  Tidal Volume (Set, mL): 400 mL  PEEP (cm H2O): 10 cmH2O  Resp: 18    2. INTAKE/ OUTPUT:   I/O last 3 completed shifts:  In: 4409.71 [I.V.:2749.71; NG/GT:340]  Out: 5630 [Urine:5630]    3. PHYSICAL EXAMINATION:  Physical Exam  Constitutional:       Appearance: She is morbidly obese.   Eyes:      Pupils: Pupils are equal, round, and reactive to light.   Cardiovascular:      Rate and Rhythm: Normal rate and regular rhythm.      Pulses:           Radial pulses are 2+ on the right side and 2+ on the left side.        Dorsalis pedis pulses are 1+ on the right side and 1+ on the left side.      Heart sounds: Normal heart sounds.      Comments: Non-pitting Generalized edema presnet  Pulmonary:      Effort: Pulmonary effort is normal.      Breath sounds: Examination of the right-lower field reveals decreased breath sounds. Examination of the left-lower field reveals decreased breath sounds. Decreased breath sounds present.   Abdominal:      " General: Bowel sounds are normal.      Palpations: Abdomen is soft.      Comments: No grimacing or motor response to palpation   Genitourinary:     Comments: Baig cath presnet  Musculoskeletal:         General: Normal range of motion.      Right lower leg: Edema present.      Left lower leg: Edema present.      Comments: Passive ROM   Skin:     General: Skin is warm and dry.      Capillary Refill: Capillary refill takes 2 to 3 seconds.      Comments: Hemosiderin deposition present BLE   Neurological:      GCS: GCS eye subscore is 3. GCS verbal subscore is 1. GCS motor subscore is 6.         4. LABS:   Arterial Blood Gases   Recent Labs   Lab 11/20/22  1245 11/20/22  0303 11/19/22  1245 11/19/22  0431   PH 7.48* 7.49* 7.46* 7.44   PCO2 49* 49* 49* 52*   PO2 64* 65* 78* 82   HCO3 36* 37* 35* 35*     Complete Blood Count   Recent Labs   Lab 11/21/22  0346 11/20/22  0300 11/19/22  0431 11/18/22  1102   WBC 9.6 8.9 11.7* 12.4*   HGB 14.5 14.5 14.1 13.7    224 269 243     Basic Metabolic Panel  Recent Labs   Lab 11/21/22  0346 11/21/22  0342 11/20/22  2324 11/20/22  2013 11/20/22  1619 11/20/22  0303 11/20/22  0300 11/19/22  1516 11/19/22  1512     --   --   --  137  --  140  --  139   POTASSIUM 3.6  --  3.8  --  3.8  --  4.0  --  3.8   CHLORIDE 96*  --   --   --  95*  --  98  --  97*   CO2 30*  --   --   --  31*  --  29  --  29   BUN 39.3*  --   --   --  38.7*  --  39.9*  --  39.9*   CR 1.66*  --   --   --  1.65*  --  1.83*  --  2.12*   * 168* 171* 191* 178*  169*   < > 146*   < > 140*    < > = values in this interval not displayed.     Liver Function Tests  Recent Labs   Lab 11/19/22  0431 11/18/22  0453 11/18/22  0224 11/17/22  2118   AST 49* 56* 62* 47*   ALT 33 29 26 27   ALKPHOS 115* 151* 129* 137*   BILITOTAL 0.6 1.0 0.4 0.4   ALBUMIN 3.0* 3.2* 3.1* 3.6   INR  --   --   --  1.48*     Coagulation Profile  Recent Labs   Lab 11/21/22  0346 11/20/22 2012 11/20/22  1245 11/20/22  0631  11/18/22  1533 11/17/22  2118   INR  --   --   --   --   --  1.48*   PTT 44* 58* 53* 67*   < >  --     < > = values in this interval not displayed.       5. RADIOLOGY:   Recent Results (from the past 24 hour(s))   XR Abdomen Port 1 View    Narrative    Exam: XR ABDOMEN PORT 1 VIEW, 11/20/2022 11:33 AM    Indication: abd pain, constipation,    Comparison: 11/18/2022, 11/12/2021    Findings:   Supine frontal views of the abdomen. Images are degraded by  underpenetration/photon starvation. Enteric tube tip and sidehole  project over the stomach. Paucity of small bowel gas. Air-filled,  nondilated loops of colon in the left lower quadrant. No appreciable  pneumatosis or portal venous gas. Mild colonic stool burden. Left  basilar airspace opacities. Intrauterine device projects over the  pelvis.      Impression    Impression: Nonspecific paucity of small bowel gas, though without  findings to suggest obstruction. Mild colonic stool burden.    ALAN GOLD DO         SYSTEM ID:  [id number]

## 2022-11-21 NOTE — PLAN OF CARE
"ICU End of Shift Summary. See flowsheets for vital signs and detailed assessment.    Changes this shift: Pt RASS sedated to a RASS -2- -3 on prop, dex and fentanyl, seldomly follows commands. Remains in bilateral mitt restraints, education provided with no evidence of learning, no signs of injury. Continue to monitor for when they can be discontinued. Remains intubated 55% PEEP 10. ART line not reliable, team notified, per team ok to stop Flotrack numbers. Good amount urine output. No stool. Labs unremarkable.     Plan:  Continue to monitor and address changes. Wean sedation as able.  Problem: Plan of Care - These are the overarching goals to be used throughout the patient stay.    Goal: Plan of Care Review  Description: The Plan of Care Review/Shift note should be completed every shift.  The Outcome Evaluation is a brief statement about your assessment that the patient is improving, declining, or no change.  This information will be displayed automatically on your shift note.  Outcome: Not Progressing  Goal: Patient-Specific Goal (Individualized)  Description: You can add care plan individualizations to a care plan. Examples of Individualization might be:  \"Parent requests to be called daily at 9am for status\", \"I have a hard time hearing out of my right ear\", or \"Do not touch me to wake me up as it startles me\".  Outcome: Not Progressing  Goal: Absence of Hospital-Acquired Illness or Injury  Outcome: Not Progressing  Intervention: Identify and Manage Fall Risk  Recent Flowsheet Documentation  Taken 11/21/2022 0400 by Dora Youssef, RN  Safety Promotion/Fall Prevention:   activity supervised   assistive device/personal items within reach   fall prevention program maintained   clutter free environment maintained   increase visualization of patient   safety round/check completed  Taken 11/21/2022 0000 by Dora Youssef, RN  Safety Promotion/Fall Prevention:   activity supervised   assistive device/personal items within " reach   fall prevention program maintained   clutter free environment maintained   increase visualization of patient   safety round/check completed  Taken 11/20/2022 2000 by Dora Youssef RN  Safety Promotion/Fall Prevention:   activity supervised   assistive device/personal items within reach   fall prevention program maintained   clutter free environment maintained   increase visualization of patient   safety round/check completed  Intervention: Prevent Skin Injury  Recent Flowsheet Documentation  Taken 11/21/2022 0600 by Dora Youssef RN  Body Position: turned  Taken 11/21/2022 0400 by Dora Youssef RN  Body Position: turned  Taken 11/21/2022 0200 by Dora Youssef RN  Body Position: turned  Taken 11/21/2022 0000 by Dora Youssef RN  Body Position: turned  Taken 11/20/2022 2200 by Dora Youssef RN  Body Position: turned  Taken 11/20/2022 2000 by Dora Youssef RN  Body Position: turned  Intervention: Prevent and Manage VTE (Venous Thromboembolism) Risk  Recent Flowsheet Documentation  Taken 11/21/2022 0400 by Dora Youssef RN  VTE Prevention/Management: SCDs (sequential compression devices) off  Taken 11/21/2022 0000 by Dora Youssef RN  VTE Prevention/Management: SCDs (sequential compression devices) off  Taken 11/20/2022 2000 by Dora Youssef RN  VTE Prevention/Management: SCDs (sequential compression devices) off  Goal: Optimal Comfort and Wellbeing  Outcome: Not Progressing  Goal: Readiness for Transition of Care  Outcome: Not Progressing   Goal Outcome Evaluation:

## 2022-11-21 NOTE — PROGRESS NOTES
RT notified by RN that team is okay to PS pt on a PEEP of 10. RT called team to verify and team stated that it is okay. RT trailed pt on PS 12/+10 and pt did great. Attempted to drop PS to 10, however, pt's tidal volumes were lower and was put back to PS of 12. RN in room and aware of PS trail.    RT will continue to follow and monitor.     Jf Lancaster, RRT

## 2022-11-22 NOTE — PROGRESS NOTES
Nephrology Progress Note  11/22/2022         Assessment & Recommendations:   Arianna Siddiqi is a 62 year old female with a history of morbid obesity, obstructive sleep apnea, hypertension, heart failure with preserved ejection fraction, paroxysmal A. fib and chronic kidney disease who presented to the ED with dyspnea, presyncope, decreased UOP despite home diuretics. Admitted on 11/18 due to volume overload, respiratory failure, acidemia, hyperkalemia of 6 and intubated. Nephrology consulted due to JASON.    # Non Oliguric JASON   # Elevated Lactate   Baseline creatinine 0.9-1.0. Peaked at 3.4. Now downtrending. She developed respiratory failure and hypotension requiring pressors on admission. JASON most likely 2/2 pre-renal in the setting of hypotension requiring pressors and septic/distributive shock. Dry weight documented in June was ~60 lbs less than on admission. Electrolytes continue to be stable. Working on volume management with lasix 80 BID   - Continue Lasix 80mg BID  - Agree with goal net negative 1-2L  - Her baseline bicarb appears to be close to 30 - no need for diamox to treat  - Avoid nephrotoxins  - Daily BMP    # Hypoxic / Hypercarbic respiratory failure   # Pulmonary edema   # Hx HFpEF  # Distributive shock, cardiogenic vs. Septic shock  # Decompensated heart failure  Suspect that underlying pulmonary HTN may have contributed to her initial presentation. ECHO showing right sided ventricular hypertrophy with increased pressure suggesting volume overload. Unclear medical compliance for diuretics and CPAP prior to admission. After intubation and positive pressure, brisk response in urine output.    Recommendations were communicated to primary team via note    Seen and discussed with Dr. Zak Bhatti MD  Division of Renal Disease and Hypertension  [facility]  [employer]  Vocera Web Console      Interval History :   Nursing and provider notes from last 24 hours reviewed.  In the last 24 hours,  "continues to have excellent urine output (~5L). Net negative ~1L from yesterday. Sedation vacation today and able to mouth words around ET tube. Creatinine slightly improved.    Physical Exam:   I/O last 3 completed shifts:  In: 3601.45 [I.V.:1996.45; NG/GT:285]  Out: 4235 [Urine:4235]   BP 91/58   Pulse 57   Temp 98.4  F (36.9  C)   Resp 18   Ht 1.575 m (5' 2\")   Wt (!) 167.4 kg (369 lb)   SpO2 94%   BMI 67.49 kg/m       GENERAL APPEARANCE: sedated and intubated. Able to mouth words around ETT tube. Morbidly obese  EYES: N/A  Lymphatics: no cervical or supraclavicular LAD  Pulmonary: crackles throughout bilateral lungs  CV: regular rhythm, normal rate, no rub   - JVD N/A   - Edema ++ non pitting bilateral LE  GI: soft, nondistended  MS: no evidence of inflammation in joints, no muscle tenderness  : + antunez  SKIN: no rash, warm, dry, no cyanosis  NEURO: Sedated    Labs:   All labs reviewed by me  Electrolytes/Renal -   Recent Labs   Lab Test 11/22/22  0744 11/22/22  0406 11/22/22  0404 11/21/22  1557 11/21/22  1541 11/21/22  0935 11/21/22  0346 11/20/22  0303 11/20/22  0300 11/19/22  1516 11/19/22  1512   NA  --  141  --   --  141  --  140   < > 140  --  139   POTASSIUM  --  3.6  --   --  3.9  --  3.6   < > 4.0  --  3.8   CHLORIDE  --  97*  --   --  99  --  96*   < > 98  --  97*   CO2  --  28  --   --  29  --  30*   < > 29  --  29   BUN  --  47.6*  --   --  43.4*  --  39.3*   < > 39.9*  --  39.9*   CR  --  1.52*  --   --  1.66*  --  1.66*   < > 1.83*  --  2.12*   * 96 111*   < > 123*   < > 145*   < > 146*   < > 140*   KADEN  --  9.0  --   --  9.1  --  8.9   < > 8.9  --  9.3   MAG  --   --   --   --   --   --  2.0  --  1.6*  --  1.6*   PHOS  --   --   --   --   --   --  4.5  --  3.0  --  2.7    < > = values in this interval not displayed.       CBC -   Recent Labs   Lab Test 11/22/22  0408 11/21/22  0346 11/20/22  0300   WBC 11.4* 9.6 8.9   HGB 14.0 14.5 14.5    230 224       LFTs -   Recent Labs "   Lab Test 11/19/22  0431 11/18/22  0453 11/18/22  0224   ALKPHOS 115* 151* 129*   BILITOTAL 0.6 1.0 0.4   ALT 33 29 26   AST 49* 56* 62*   PROTTOTAL 7.1 7.5 7.8   ALBUMIN 3.0* 3.2* 3.1*       Iron Panel -   Recent Labs   Lab Test 04/08/22  1445 11/11/21  2335 11/11/21  2335 05/15/20  1028   IRON 86  --  9* 25*   IRONSAT 21  --  2* 6*   CHRIS 34   < > 6* 17    < > = values in this interval not displayed.         Imaging:  All imaging studies reviewed by me.     Current Medications:    allopurinol  200 mg Oral or Feeding Tube Daily     [Held by provider] apixaban ANTICOAGULANT  5 mg Oral BID     [Held by provider] atorvastatin  20 mg Oral Daily     B and C vitamin Complex with folic acid  5 mL Per Feeding Tube Daily     bisacodyl  10 mg Rectal Daily     [Held by provider] bumetanide  2 mg Oral Daily     cefTRIAXone  1 g Intravenous Q24H     ferrous gluconate  324 mg Oral Daily     insulin aspart  1-6 Units Subcutaneous Q4H     ipratropium - albuterol 0.5 mg/2.5 mg/3 mL  3 mL Nebulization 4x daily     lisinopril  5 mg Oral Daily     [Held by provider] lisinopril  5 mg Oral Daily     melatonin  3 mg Oral or Feeding Tube At Bedtime     [Held by provider] metoprolol succinate ER  100 mg Oral Daily     miconazole   Topical BID     pantoprazole  40 mg Per Feeding Tube QAM AC    Or     pantoprazole  40 mg Intravenous QAM AC     polyethylene glycol  17 g Oral Daily     potassium chloride  20 mEq Oral Daily     QUEtiapine  25 mg Oral BID     senna-docusate  2 tablet Oral BID     sodium chloride  3 mL Nebulization Q6H       dexmedetomidine 0.6 mcg/kg/hr (11/22/22 0800)     dextrose       heparin 2,550 Units/hr (11/22/22 0800)     - MEDICATION INSTRUCTIONS -       - MEDICATION INSTRUCTIONS -       - MEDICATION INSTRUCTIONS -       Edilberto Bhatti MD

## 2022-11-22 NOTE — PROGRESS NOTES
MEDICAL ICU PROGRESS NOTE  11/22/2022      Date of Service (when I saw the patient): 11/22/2022    ASSESSMENT: Arianna Siddiqi is a 62 year old female with a history of morbid obesity, CARLOS, hypertension, HFpEF, paroxysmal A. Fib (apixipaban), MAG, and CKD who was admitted for hypercapnic respiratory failure likely due to pneumonia, necessitating intubation.      CHANGES and MAJOR THINGS TODAY:   - 80 mg Lasix IV given this AM and at 1600; goal net neg 1-2L daily  - Increased Seroquel at bedtime 50mg and 25mg AM  - Oral nystatin ordered for concerns of thrush  - Increased Ducolax suppository to q8hr  - BMP at 1500  - SBT x 2 today           PLAN:     Neuro:  # Pain and sedation  # Acute encephalopathy, multifactoral  - 11/17 head CT normal  - Monitor neurological status. Notify provider for any acute changes in exam  - RASS goal 0 to -1   - Pain control: fentanyl bolus dosing  - Sedation control: Precedex  - Increased Seroquel dosing 50mg at bedtime and 25mg AM  - Hold PTA cyclobenzaprine, tramadol and benadryl     Pulmonary:  # Acute hypoxic and hypercarbic respiratory failure   # Concern for CAP  # Pulmonary edema/fluid overload (see CV)  # Hx CARLOS with CPAP  # Hx Obesity hypoventilation syndrome   # Hx Smoking (30 pack per year; stopped 2002)  - Antibiotics and work-up as below for presumed CAP pneumonia.  - Serial ABG with vent adjustments as needed  - Thick yellow/tan tracheal secretions minimal/moderate quantity noted   - Scheduled duonebs and hypertonic saline nebs for thick secretions  - SBT x 2 daily  - Plan to keep peep at 10 due to body habitus; will consider lowering to 8 once pulmonary edema and intravascular fluid volume are optimized  - 11/21 CXR increase pulmonary edema concerns from previous CXR  - Vent Mode: CMV/AC  (Continuous Mandatory Ventilation/ Assist Control)  FiO2 (%): 50 %  Resp Rate (Set): 18 breaths/min  Tidal Volume (Set, mL): 400 mL  PEEP (cm H2O): 10 cmH2O  Resp: 18  -Daily vent wean  order in place        Cardiovascular:  # Mixed shock state; Distributive & Cardiogenic shock-resolved  # Decompensated heart failure with right heart strain  # Demand induced troponin leak  # Hx PAF  # Hx HFpEF EF 55-60% (most recent echo 11/18)  Echo 11/18: EF 55-60%, flattened septum consistent with RV reduced function, global RV function moderately reduced. Per Cards note, reviewed images (poor quality) and feel RV size and function only mildly abnormal and grossly unchanged. Follow up additional recs. Discussed with cardiology concerns for PHTN given most recent echocardiogram results with R sided heart strain and h/o non-compliance with CARLOS/CPAP therapy & HFpEF therapy regimen. Cardiology recommending performing R sided heart catheterization at later date unless acute decompensation present.   - Cardiology consulted appreciate recommendation  - Current weight 177 kg up from June 147 kg.      - MAP > 65 (levophed off 11/20); Q 8 hour CVP (CVP overnight 18)  - BNP 69,955 -> 30,780  - Trop 366 -> 327   - 80 mg furosemide IV given times two today; goal net neg 1-2L  - PTA meds: lisinopril 5mg daily  - Held PTA meds: Furosemide, apixaban, metoprolol, Bumex and atorvastatin all held.       GI/Nutrition:  # Risk for malnutrition  # Hypoalbuminemia  # Morbid obesity  # Constipation  - OG tube in place  - Tube feeds: Osmolite 1.5 start at goal 55ml/hr  - RD consult for nutrition recs  - Senna BID, miralax daily, and bisacodyl suppository q8hr  - Abd xray 11/20 (poor quality imaging) - mild colonic stool burden; Nonspecific paucity of small bowel gas, though without findings to suggest obstruction     Renal/Fluids/Electrolytes:  # Volume overload  # Non-oliguric Acute on chronic kidney injury  # Metabolic alkalosis-presumed contraction alkalosis vs chronic hypoventilation syndrome  # Hyperkalemia - resolved  # Hypomagnesia - resolved  # Hx CKD (baseline creatinine 1.1)  - Nephrology consult and appreciate recs  - Strict  intake and output  - 80 mg furosemide IV given will re-eval additional dosing this afternoon- goal net neg 1-2L  - 11/22 Cr 3.4 -> 1.5, GFR 15 -> 38; JASON improving  - Holding Diamox concerns HCO3 is chronically elevated to compensate for chronic hypoventilation syndrome          Endocrine:  # Risk for stress induced hyperglycemia  # Hx. DMII  - Every 4 hour blood glucose measurements  - Medium SSI     ID:  # Presumed CAP  # Distributive & Cardiogenic shock - resolved  # Leukocytosis - resolved  Infection work up grossly unremarkable with cultures and virology  - 11/22 WBC 15 -> 11.4  - Afebrile  - Oral thrush present on physical exam; nystatin ordered  - Pro-Calcitonin elevated at 0.21  Cultures:     11/20 Sputum culture unremarkable    11/18 Sputum culture unremarkable    11/18 urine culture unremarkable    11/17 blood cultures x 2 NGTD    11/17 viral panels unremarkable  - Zosyn 7 day course (11/18-11/24)  - Nystatin q4hrs          Hematology:    # Hx MAG  # Hx Factor VII deficiency  Seen outpatient by hematology has a history oral/IV iron supplementation  - PTA ferrous gluconate 324 mg resumed  - Held-PTA apixaban (r/t pAfib)  - Heparin gtt for pafib ppx     Musculoskeletal/Skin:  #Oral thrush concerns  #Hx Gout  #Hx hemosiderin deposition BLE  - Nystatin oral started  - PT/OT for ROM  - No acute concerns for skin breakdown  - PTA allopurinol-resumed        General Cares/Prophylaxis:    DVT Prophylaxis: Heparin infusion  GI Prophylaxis: PPI  Restraints: Mitts in place  Family Communication: Emelia Siddiqi   Code Status: FULL     Lines/tubes/drains:  - PIVx2, Right internal jugular CVC     Disposition:  - Medical ICU      Patient seen and findings/plan discussed with medical ICU staff, Dr. Merino.       Delroy Briseno, APRN CNP    Clinically Significant Risk Factors Present on Admission                           ====================================  INTERVAL HISTORY:   Patient given furosemide BID yesterday and was  net neg 1L. Given furosemide this AM and at 1600 with goal of net neg 1-2L today. Tolerating SBT well this AM x 4 hours; only lasted one hour when CPAP/ps settings initially 12/10 and then adjusted 10/7 in which only tolerated for one hour on lower settings. Patient became tachypneic and MV dropped to 5 after one hour of being on 10/7. Thrush present on tongue; oral nystatin started     OBJECTIVE:   1. VITAL SIGNS:   Temp:  [97.5  F (36.4  C)-98.6  F (37  C)] 98.4  F (36.9  C)  Pulse:  [57-90] 77  Resp:  [7-28] 17  BP: ()/(45-93) 103/60  MAP:  [76 mmHg-115 mmHg] 76 mmHg  Arterial Line BP: ()/() 84/72  FiO2 (%):  [50 %-55 %] 50 %  SpO2:  [93 %-98 %] 94 %  Vent Mode: (S) CPAP/PS  (Continuous positive airway pressure with Pressure Support)  FiO2 (%): 50 %  Resp Rate (Set): 18 breaths/min  Tidal Volume (Set, mL): 400 mL  PEEP (cm H2O): 10 cmH2O  Pressure Support (cm H2O): 10 cmH2O  Resp: 17    2. INTAKE/ OUTPUT:   I/O last 3 completed shifts:  In: 3601.45 [I.V.:1996.45; NG/GT:285]  Out: 4235 [Urine:4235]    3. PHYSICAL EXAMINATION:  Constitutional:       Appearance: She is morbidly obese.   Eyes:      Pupils: Pupils are equal, round, and reactive to light.   Cardiovascular:      Rate and Rhythm: Normal rate and regular rhythm.      Pulses:           Radial pulses are 2+ on the right side and 2+ on the left side.        Dorsalis pedis pulses are 1+ on the right side and 1+ on the left side.      Heart sounds: Normal heart sounds.      Comments: Non-pitting Generalized edema presnet  Pulmonary:      Effort: Pulmonary effort is normal.      Breath sounds: Examination of the right-lower field reveals decreased breath sounds. Examination of the left-lower field reveals decreased breath sounds. Decreased breath sounds present.   Abdominal:      General: Bowel sounds are normal.      Palpations: Abdomen is soft.      Comments: No grimacing or motor response to palpation   Genitourinary:     Comments: Baig  cath presentt  Musculoskeletal:         General: Normal range of motion.      Right lower leg: Edema present.      Left lower leg: Edema present.      Comments: Passive ROM   Skin:     General: Skin is warm and dry.      Capillary Refill: Capillary refill takes 2 to 3 seconds.      Comments: Hemosiderin deposition present BLE   Neurological:      GCS: GCS eye subscore is 3. GCS verbal subscore is 1. GCS motor subscore is 6.     4. LABS:   Arterial Blood Gases   Recent Labs   Lab 11/20/22  1245 11/20/22  0303 11/19/22  1245 11/19/22  0431   PH 7.48* 7.49* 7.46* 7.44   PCO2 49* 49* 49* 52*   PO2 64* 65* 78* 82   HCO3 36* 37* 35* 35*     Complete Blood Count   Recent Labs   Lab 11/22/22  0408 11/21/22  0346 11/20/22  0300 11/19/22  0431   WBC 11.4* 9.6 8.9 11.7*   HGB 14.0 14.5 14.5 14.1    230 224 269     Basic Metabolic Panel  Recent Labs   Lab 11/22/22  0744 11/22/22  0406 11/22/22  0404 11/22/22  0022 11/21/22  1557 11/21/22  1541 11/21/22  0935 11/21/22  0346 11/21/22  0342 11/20/22  2324 11/20/22 2013 11/20/22  1619   NA  --  141  --   --   --  141  --  140  --   --   --  137   POTASSIUM  --  3.6  --   --   --  3.9  --  3.6  --  3.8  --  3.8   CHLORIDE  --  97*  --   --   --  99  --  96*  --   --   --  95*   CO2  --  28  --   --   --  29  --  30*  --   --   --  31*   BUN  --  47.6*  --   --   --  43.4*  --  39.3*  --   --   --  38.7*   CR  --  1.52*  --   --   --  1.66*  --  1.66*  --   --   --  1.65*   * 96 111* 110*   < > 123*   < > 145*   < > 171*   < > 178*  169*    < > = values in this interval not displayed.     Liver Function Tests  Recent Labs   Lab 11/19/22  0431 11/18/22  0453 11/18/22  0224 11/17/22 2118   AST 49* 56* 62* 47*   ALT 33 29 26 27   ALKPHOS 115* 151* 129* 137*   BILITOTAL 0.6 1.0 0.4 0.4   ALBUMIN 3.0* 3.2* 3.1* 3.6   INR  --   --   --  1.48*     Coagulation Profile  Recent Labs   Lab 11/22/22  0406 11/21/22  2019 11/21/22  1138 11/21/22  0346 11/18/22  1533 11/17/22 2118    INR  --   --   --   --   --  1.48*   PTT 74* 78* 72* 44*   < >  --     < > = values in this interval not displayed.       5. RADIOLOGY:   Recent Results (from the past 24 hour(s))   XR Chest Port 1 View    Narrative    Portable chest    INDICATION: Intubated concern for pneumonia    COMPARISON: 11/18/2022    FINDINGS: Heart is enlarged. Diffuse perihilar patchy opacities are  more prominent all of the other patchy parenchymal airspace and  interstitial opacities. Right IJ catheter tip in the SVC. NG/OG tube  progresses into the upper abdomen. Endotracheal tube tip approximately  3.2 cm above the concha.      Impression    IMPRESSION: Increasing opacities concerning for developing infection  or possible edema/ARDS. Cardiomegaly.    MYCHAL BATES MD         SYSTEM ID:  [id number]

## 2022-11-22 NOTE — PLAN OF CARE
"ICU End of Shift Summary. See flowsheets for vital signs and detailed assessment.    Changes this shift: Pt remains intubated 50-55% PEEP 10. VSS. Adequate urine output. Only small amount of mucous output per rectum. RASS -1 most of the shift will open eyes to voice/ spontaneously. Seemed much less anxious overnight, following commands. Continues to have soft mitt restraints to keep from pulling out ETT and lines. Education performed but needs reinforcement. No signs of injury. Heparin remains therapeutic ant 2550units/hour    Plan: Continue to monitor and address changes.     Problem: Plan of Care - These are the overarching goals to be used throughout the patient stay.    Goal: Plan of Care Review  Description: The Plan of Care Review/Shift note should be completed every shift.  The Outcome Evaluation is a brief statement about your assessment that the patient is improving, declining, or no change.  This information will be displayed automatically on your shift note.  Outcome: Progressing  Goal: Patient-Specific Goal (Individualized)  Description: You can add care plan individualizations to a care plan. Examples of Individualization might be:  \"Parent requests to be called daily at 9am for status\", \"I have a hard time hearing out of my right ear\", or \"Do not touch me to wake me up as it startles me\".  Outcome: Progressing  Goal: Absence of Hospital-Acquired Illness or Injury  Outcome: Progressing  Intervention: Identify and Manage Fall Risk  Recent Flowsheet Documentation  Taken 11/22/2022 0400 by Dora Youssef, RN  Safety Promotion/Fall Prevention:   activity supervised   assistive device/personal items within reach   fall prevention program maintained   clutter free environment maintained   increase visualization of patient   safety round/check completed  Taken 11/22/2022 0000 by Dora Youssef, RN  Safety Promotion/Fall Prevention:   activity supervised   assistive device/personal items within reach   fall " prevention program maintained   clutter free environment maintained   increase visualization of patient   safety round/check completed  Taken 11/21/2022 2000 by Dora Youssef RN  Safety Promotion/Fall Prevention:   activity supervised   assistive device/personal items within reach   fall prevention program maintained   clutter free environment maintained   increase visualization of patient   safety round/check completed  Intervention: Prevent Skin Injury  Recent Flowsheet Documentation  Taken 11/22/2022 0600 by Dora Youssef RN  Body Position: turned  Taken 11/22/2022 0400 by Dora Youssef RN  Body Position: turned  Taken 11/22/2022 0200 by Dora Youssef RN  Body Position: turned  Taken 11/22/2022 0000 by Dora Youssef RN  Body Position: turned  Taken 11/21/2022 2200 by Dora oYussef RN  Body Position: turned  Taken 11/21/2022 2000 by Dora Youssef RN  Body Position: turned  Intervention: Prevent and Manage VTE (Venous Thromboembolism) Risk  Recent Flowsheet Documentation  Taken 11/22/2022 0400 by Dora Youssef RN  VTE Prevention/Management: SCDs (sequential compression devices) off  Taken 11/22/2022 0000 by Dora Youssef RN  VTE Prevention/Management: SCDs (sequential compression devices) off  Taken 11/21/2022 2000 by Dora Youssef RN  VTE Prevention/Management: SCDs (sequential compression devices) off  Goal: Optimal Comfort and Wellbeing  Outcome: Progressing  Goal: Readiness for Transition of Care  Outcome: Progressing   Goal Outcome Evaluation:

## 2022-11-22 NOTE — PLAN OF CARE
ICU End of Shift Summary. See flowsheets for vital signs and detailed assessment.    Changes this shift: RASS 1 to -2. Precedex @ 0.5. Follows commands, moves extremities, makes needs known. Mitts off d/t pt being more awake and cooperative with cares. SR-SB (50s-80s). CVP 8-9. Soft BP, MAPs maintained >60. PS 7/10 for 4 hrs this AM, then again this afternoon 10/10. Small mucous BM x1. Diuresed x2 c adequate response. K+ replaced. Hep @ 2550.     Plan: PS and wean vent settings as able.

## 2022-11-22 NOTE — CONSULTS
Care Management Initial Consult    General Information  Assessment completed with: Patient, Care Team Member, -chart review,    Type of CM/SW Visit: Initial Assessment  Primary Care Provider verified and updated as needed: Yes   Readmission within the last 30 days: no previous admission in last 30 days   Reason for Consult: care coordination/care conference, discharge planning, decision-making, facility placement, financial concerns  Advance Care Planning: Advance Care Planning Reviewed: no concerns identified, questions answered, concerns discussed        Communication Assessment  Patient's communication style: spoken language (English or Bilingual)    Hearing Difficulty or Deaf: no   Wear Glasses or Blind: no    Cognitive  Cognitive/Neuro/Behavioral: .WDL except  Level of Consciousness: alert, sedated  Arousal Level: opens eyes spontaneously  Orientation: other (see comments) (RUPAL)  Mood/Behavior: calm  Best Language:  (RUPAL)  Speech: endotracheal tube    Living Environment:   People in home: alone     Current living Arrangements: apartment      Able to return to prior arrangements:  (TBD-may need a more supportive environment)       Family/Social Support:  Care provided by: self, homecare agency  Provides care for: no one, unable/limited ability to care for self  Marital Status: Single  Support System: Other (specify) (Niece and grand niece)          Description of Support System: Supportive, Involved    Support Assessment: Lacks necessary supervision and assistance, Lacks adequate physical care, Limited social contact and support, Minimal outside structure and leisure time activities, Difficulty establishing and maintaining relationships    Current Resources:   Patient receiving home care services: No  Community Resources: Transportation Services  Equipment currently used at home: walker, standard, shower chair, grab bar, tub/shower, grab bar, toilet, tub bench  Supplies currently used at home:  "None    Employment/Financial:  Employment Status: disabled     Financial Concerns: No concerns identified   Referral to Financial Worker: No       Lifestyle & Psychosocial Needs:  Social Determinants of Health     Tobacco Use: Medium Risk     Smoking Tobacco Use: Former     Smokeless Tobacco Use: Never     Passive Exposure: Not on file   Alcohol Use: Not on file   Financial Resource Strain: Not on file   Food Insecurity: Not on file   Transportation Needs: Not on file   Physical Activity: Not on file   Stress: Not on file   Social Connections: Not on file   Intimate Partner Violence: Not on file   Depression: Not at risk     PHQ-2 Score: 0   Housing Stability: Not on file       Functional Status:  Prior to admission patient needed assistance:   Dependent ADLs: Ambulation-walker  Dependent IADLs: Independent  Assesssment of Functional Status: Not at baseline with ADL Functioning, Not at baseline with mobility, Not at  functional baseline    Mental Health Status:  Mental Health Status: Current Concern       Chemical Dependency Status:  Chemical Dependency Status: No Current Concerns           Values/Beliefs:  Spiritual, Cultural Beliefs, Mandaeism Practices, Values that affect care: no             Additional Information:  Per H&P, \"Arianna Siddiqi is a 62 year old female with a history of morbid obesity, obstructive sleep apnea, hypertension, heart failure with preserved ejection fraction, paroxysmal A. fib and chronic kidney disease who was admitted for hypercapnic respiratory failure likely due to pneumonia, necessitating intubation\"     SW spoke w/ pt's niece Emelia to introduce role and complete assessment. Emelia explained that she and pt talk \"maybe 3 or 4 times a year\" but pt does not have any other options for surrogate decision maker. Pt and pt's sister had a \"falling out\" about a year ago and are not on speaking terms. Per Emelia, pt expressed that she does not want her sister to be involved at all. Pt lives " alone in an apartment and Emelia is unsure if pt had any home care or community services. Per chart review and confirmed by Emelia, pt isolates herself and rarely leaves her apartment unit d/t mobility issues, lack of reliable transportation, and fear. Pt has been given resources by OP SWs but has not been consistent w/ following up or through with getting services in place. If pt becomes medically stable to discharge from the hospital, conversation about prison or LTC may be needed. Emelia is unsure if pt has any mental health concerns or diagnoses but believes it's probable. SW will talk w/ pt and team about concerns if/when pt becomes medically stable and alert and oriented. Emelia did not have any questions or concerns at this time but will reach out if needed.    SW/RNCC will continue to follow for support and discharge planning    TAMMY Ashraf, LGSW  4A/4C   Ph: [phone]  Pager: [phone]

## 2022-11-22 NOTE — PLAN OF CARE
ICU End of Shift Summary. See flowsheets for vital signs and detailed assessment.    Changes this shift:  pressure support trial x2, patient tolerated well. Niece updated on pt's progress over the phone.   - Patient appears appears to rest well a times, wakes up often hitting side rails with hands and is not redirectable.   - Propofol and fentanyl stopped today. Diuresed x2    Plan:  Encourage rest/delirium precautions       Goal Outcome Evaluation:      Plan of Care Reviewed With: patient, family    Overall Patient Progress: no changeOverall Patient Progress: no change    Outcome Evaluation: Presure supporting well, liable mood

## 2022-11-23 NOTE — PROGRESS NOTES
ICU Daily Rounding Checklist     Checklist Response Notes   Can sedation be reduced?  Yes    Can analgesia be reduced? Yes    Is delirium being assessed, addressed and prevented? Yes    Spontaneous awakening trial and/or Spontaneous breathing trial candidate?  Yes    Total fluid balance goal reviewed?  Yes    Is the patient at goals for lung protective ventilation? Yes    Head of bed elevation (30 degrees)? Yes    Skin breakdown assessment (prevention) completed? Yes    Is enteral nutrition at goal? Yes    Is blood glucose at goal? Yes    Deep venous thrombosis prophylaxis? Yes      Gastric ulcer prophylaxis?  Yes If coagulopathy (INR-1.5 PTT2x normal. Ph<50k), mechanical ventilation 48hr, history of GI bleed/ulcer within past year. TBL, SCI, or burn, or if >= 2 minor risk factors (sepsis, ICU stay 1 week, occult GI bleed > 6 days. glucocorticoid therapy, NSAID use, antiplatelet use)   Can Antibiotics be narrowed or discontinued? YES     Early mobility candidate and physical therapy consulted? Yes    Is antunez catheter needed? No    Is central venous/arterial catheter needed? Yes    Has the family been updated? Yes    Are the patient's goals of care and code status current? Yes    Kezia Irvin, CNP   Physicians

## 2022-11-23 NOTE — PLAN OF CARE
Major Shift Events:    Neuro: Alert to voice or spontaneously. Follows commands, PERRLA. Moves all extremities purposefully. Complained of headache, given PRN tylenol.   CV: SB/SR. HR 50-60s, afebrile. CVP 5-9, Soft BP overnight, unable to diuresis .   Resp: PS until 2100 10/10. Currently on CMV 50%, 400, 18, 10. LS coarse. Thick white secretions from ETT.   GI: TF 55 ml/hr. No BM overnight.   : UOP > 50 ml/hr. Goal of Net negative 1-2L  IV/Drips: Heparin therapeutic 2550 units/hr  Precedex .5 mcg/kg/min  R TL IJ  Plan: Plan PS as tolerable. Monitor hemodynamics   For vital signs and complete assessments, please see documentation flowsheets.

## 2022-11-23 NOTE — PROGRESS NOTES
MEDICAL ICU PROGRESS NOTE  11/23/2022      Date of Service (when I saw the patient): 11/23/2022    ASSESSMENT:Arianna Siddiqi is a 62 year old female with a history of morbid obesity, CARLOS, hypertension, HFpEF, paroxysmal A. Fib (apixiban), MAG, factor IV deficiency and CKD who was admitted for hypercapnic respiratory failure likely due to pneumonia, necessitating intubation. 11/23 extubated to BiPAP r/t chronic CO2 retention tolerating well      CHANGES and MAJOR THINGS TODAY:   - 80 mg Lasix IV given this AM; goal net neg 1-2Lday  - Extubated to BiPAP this AM  - Precedex gtt stopped  - K+ recheck at 1400  - Re-started PTA atorvastatin       PLAN:     Neuro:  # Pain and sedation  # Acute encephalopathy, multifactoral  - 11/17 head CT normal  - Monitor neurological status. Notify provider for any acute changes in exam  - PRN oxycodone and acetaminophen avialable  - Decreased qhs Seroquel dosing 25mg at bedtime and 25mg daily PRN  - Hold PTA cyclobenzaprine, tramadol and benadryl     Pulmonary:  # Acute hypoxic and hypercarbic respiratory failure - improving   # Concern for CAP  # Pulmonary edema/fluid overload (see CV)  # Hx CARLOS with CPAP  # Hx Obesity hypoventilation syndrome   # Hx Smoking (30 pack per year; stopped 2002)  Patient extubated this AM tolerated well. Extubated to BiPAP given history tolerating 14/10 well.   - Antibiotics and work-up as below for presumed CAP pneumonia.  - Scheduled duonebs and hypertonic saline nebs for thick secretions  - BiPAP intermittent; try for 2 on and 2 off during day.   - BiPAP overnight given history of hypoventilation syndrome and CARLOS.         Cardiovascular:  # Mixed shock state; Distributive & Cardiogenic shock-resolved  # Decompensated heart failure with right heart strain  # Demand induced troponin leak  # Hx PAF  # Hx HFpEF EF 55-60% (most recent echo 11/18)  Echo 11/18: EF 55-60%, flattened septum consistent with RV reduced function, global RV function moderately  reduced. Per Cards note, reviewed images (poor quality) and feel RV size and function only mildly abnormal and grossly unchanged. Follow up additional recs. Discussed with cardiology concerns for PHTN given most recent echocardiogram results with R sided heart strain and h/o non-compliance with CARLOS/CPAP therapy & HFpEF therapy regimen. Cardiology recommending performing R sided heart catheterization at later date unless acute decompensation present.   - Cardiology consulted appreciate recommendation  - Current weight 172 kg up from June 147 kg.      - MAP > 65 (levophed off 11/20); Q 8 hour CVP   - 80 mg furosemide IV given this AM; goal net neg 1-2L  - PTA meds: lisinopril 5mg daily  - Held PTA meds: Furosemide, apixaban, metoprolol, Bumex and atorvastatin all held.       GI/Nutrition:  # Risk for malnutrition  # Hypoalbuminemia  # Morbid obesity  # Constipation  - ADAT clear  - RD consult for nutrition recs  - Senna BID, miralax daily, and bisacodyl suppository q8hr  - Abd xray 11/20 (poor quality imaging) - mild colonic stool burden; Nonspecific paucity of small bowel gas, though without findings to suggest obstruction     Renal/Fluids/Electrolytes:  # Volume overload  # Non-oliguric Acute on chronic kidney injury - improving  # Metabolic alkalosis-presumed contraction alkalosis vs chronic hypoventilation syndrome  # Hyperkalemia - resolved  # Hypomagnesia - resolved  # Hx CKD (baseline creatinine 1.1)  Precedex stopped which will decrease fluid intake. Patient should require less furosemide to achieve goal net neg 1-2L  - Nephrology consult and appreciate recs  - Strict intake and output  - 80 mg furosemide IV given will re-eval additional dosing this afternoon- goal net neg 1-2L  - 11/23 Cr 3.4 -> 1.26, GFR 15 -> 34; JASON improving  - Holding Diamox concerns HCO3 is chronically elevated to compensate for chronic hypoventilation syndrome           Endocrine:  # Risk for stress induced hyperglycemia  # Hx. DMII  -  Every 4 hour blood glucose measurements  - Medium SSI     ID:  # Presumed CAP  # Distributive & Cardiogenic shock - resolved  # Leukocytosis - resolved  Infection work up grossly unremarkable with cultures and virology  - 11/22 WBC 15 -> 9.8  - Afebrile  - Oral thrush present on physical exam; nystatin ordered  - 11/18 Pro-Calcitonin 0.21  Cultures:     11/20 Sputum culture unremarkable    11/18 Sputum culture unremarkable    11/18 urine culture unremarkable    11/17 blood cultures x 2 NGTD    11/17 viral panels unremarkable  - Ceftiaxone 7 day course (11/18-11/24)  - Nystatin q4hrs       Hematology:    # Hx MAG  # Hx Factor VII deficiency  Seen outpatient by hematology has a history oral/IV iron supplementation  - PTA ferrous gluconate 324 mg resumed  - Held-PTA apixaban (r/t pAfib)  - Heparin gtt for pafib ppx     Musculoskeletal/Skin:  #Oral thrush concerns  #Hx Gout  #Hx hemosiderin deposition BLE  - Nystatin oral q4hr  - PT/OT for ROM  - No acute concerns for skin breakdown  - 11/18 uric acid 7.9  - PTA allopurinol-resumed        General Cares/Prophylaxis:    DVT Prophylaxis: Heparin infusion  GI Prophylaxis: PPI  Restraints: Mitts in place  Family Communication: Emelia Siddiqi   Code Status: FULL     Lines/tubes/drains:  - PIVx2, Right internal jugular CVC     Disposition:  - Medical ICU      Patient seen and findings/plan discussed with medical ICU staff, Dr. Merino.    ALMA Coto CNP    Clinically Significant Risk Factors Present on Admission                           ====================================  INTERVAL HISTORY:   Patient extubated this AM tolerated well. Placed on BiPAP given clinical history. Patient following commands and appropriate to conversation. Currently, getting scheduled bowel regimen r/t last significant BM PTA. Planning for net negative goal of 1-2L; will adjust fursemide dosing accordingly.    OBJECTIVE:   1. VITAL SIGNS:   Temp:  [97.8  F (36.6  C)-98.1  F (36.7  C)] 98.1  F  (36.7  C)  Pulse:  [53-85] 58  Resp:  [13-31] 18  BP: ()/(27-75) 94/75  FiO2 (%):  [40 %-50 %] 40 %  SpO2:  [91 %-98 %] 98 %  Vent Mode: CPAP/PS  (Continuous positive airway pressure with Pressure Support)  FiO2 (%): 40 %  Resp Rate (Set): 18 breaths/min  Tidal Volume (Set, mL): 400 mL  PEEP (cm H2O): 10 cmH2O  Pressure Support (cm H2O): 7 cmH2O  Resp: 18    2. INTAKE/ OUTPUT:   I/O last 3 completed shifts:  In: 3335.5 [I.V.:1615.5; NG/GT:400]  Out: 2905 [Urine:2905]    3. PHYSICAL EXAMINATION:    Constitutional:       Appearance: She is morbidly obese.   Eyes:      Pupils: Pupils are equal, round, and reactive to light.   Cardiovascular:      Rate and Rhythm: Normal rate and regular rhythm.      Pulses:           Radial pulses are 2+ on the right side and 2+ on the left side.        Dorsalis pedis pulses are 1+ on the right side and 1+ on the left side.      Heart sounds: Normal heart sounds.      Comments: Non-pitting Generalized edema presnet  Pulmonary:      Effort: Pulmonary effort is normal.      Breath sounds: Examination of the right-lower field reveals decreased breath sounds. Examination of the left-lower field reveals decreased breath sounds.   Abdominal:      General: Bowel sounds are normal.      Palpations: Abdomen is soft.      Comments: No grimacing or motor response to palpation   Genitourinary:     Comments: Baig cath present  Musculoskeletal:         General: Normal range of motion.      Right lower leg: Edema present.      Left lower leg: Edema present.      Comments: Passive ROM   Skin:     General: Skin is warm and dry.      Capillary Refill: Capillary refill takes 2 to 3 seconds.      Comments: Hemosiderin deposition present BLE   Neurological:      GCS: GCS eye subscore is 3. GCS verbal subscore is 1. GCS motor subscore is 6.     4. LABS:   Arterial Blood Gases   Recent Labs   Lab 11/20/22  1245 11/20/22  0303 11/19/22  1245 11/19/22  0431   PH 7.48* 7.49* 7.46* 7.44   PCO2 49* 49* 49*  52*   PO2 64* 65* 78* 82   HCO3 36* 37* 35* 35*     Complete Blood Count   Recent Labs   Lab 11/23/22  0404 11/22/22  0408 11/21/22  0346 11/20/22  0300   WBC 9.8 11.4* 9.6 8.9   HGB 13.7 14.0 14.5 14.5    219 230 224     Basic Metabolic Panel  Recent Labs   Lab 11/23/22  0844 11/23/22  0409 11/23/22  0404 11/23/22  0059 11/22/22  1528 11/22/22  1525 11/22/22  0744 11/22/22  0406 11/21/22  1557 11/21/22  1541   NA  --   --  142  --   --  141  --  141  --  141   POTASSIUM  --   --  3.7  --   --  3.8  --  3.6  --  3.9   CHLORIDE  --   --  102  --   --  100  --  97*  --  99   CO2  --   --  27  --   --  29  --  28  --  29   BUN  --   --  56.3*  --   --  53.1*  --  47.6*  --  43.4*   CR  --   --  1.26*  --   --  1.39*  --  1.52*  --  1.66*   * 129* 128* 141*   < > 137*   < > 96   < > 123*    < > = values in this interval not displayed.     Liver Function Tests  Recent Labs   Lab 11/19/22  0431 11/18/22  0453 11/18/22 0224 11/17/22 2118   AST 49* 56* 62* 47*   ALT 33 29 26 27   ALKPHOS 115* 151* 129* 137*   BILITOTAL 0.6 1.0 0.4 0.4   ALBUMIN 3.0* 3.2* 3.1* 3.6   INR  --   --   --  1.48*     Coagulation Profile  Recent Labs   Lab 11/23/22  0404 11/22/22  0406 11/21/22  2019 11/21/22  1138 11/18/22  1533 11/17/22 2118   INR  --   --   --   --   --  1.48*   PTT 70* 74* 78* 72*   < >  --     < > = values in this interval not displayed.       5. RADIOLOGY:   No results found for this or any previous visit (from the past 24 hour(s)).

## 2022-11-23 NOTE — PROGRESS NOTES
Nephrology Progress Note  11/23/2022         Assessment & Recommendations:   Arianna Siddiqi is a 62 year old female with a history of morbid obesity, obstructive sleep apnea, hypertension, heart failure with preserved ejection fraction, paroxysmal A. fib and chronic kidney disease who presented to the ED with dyspnea, presyncope, decreased UOP despite home diuretics. Admitted on 11/18 due to volume overload, respiratory failure, acidemia, hyperkalemia of 6 and intubated. Nephrology consulted due to JASON.    # Non Oliguric JASON   # Elevated Lactate   Baseline creatinine 0.9-1.0. Peaked at 3.4. Now downtrending. She developed respiratory failure and hypotension requiring pressors on admission. JASON most likely 2/2 pre-renal in the setting of hypotension requiring pressors and septic/distributive shock. Dry weight documented in June was ~60 lbs less than on admission. Electrolytes continue to be stable. Working on volume management with lasix 80 BID. Today, CVP lower than expected. Possible that we are close to euvolemia, difficult to assess with her body habitus.   - Lasix 80mg this AM   -Consider PM dose of 80mg if hemodynamically stable and limited UOP.  - Her baseline bicarb appears to be close to 30 - no need for diamox to treat  - Avoid nephrotoxins  - Daily BMP    # Hypoxic / Hypercarbic respiratory failure   # Pulmonary edema   # Hx HFpEF  # Distributive shock, cardiogenic vs. Septic shock  # Decompensated heart failure  Suspect that underlying pulmonary HTN may have contributed to her initial presentation. ECHO showing right sided ventricular hypertrophy with increased pressure suggesting volume overload. Unclear medical compliance for diuretics and CPAP prior to admission. After intubation and positive pressure, brisk response in urine output.    Recommendations were communicated to primary team via note    Seen and discussed with Dr. Zak Bhatti MD  Division of Renal Disease and  "Hypertension  [employer]  [facility] Web Console      Interval History :   Nursing and provider notes from last 24 hours reviewed.  In the last 24 hours, continues to have brisk UOP. She was not able to receive a late lasix dose due to hypotension. Low CVP today suggesting possibly close to euvolemic. Extubated this AM.     Physical Exam:   I/O last 3 completed shifts:  In: 3335.5 [I.V.:1615.5; NG/GT:400]  Out: 2905 [Urine:2905]   BP 92/49   Pulse 61   Temp 98  F (36.7  C) (Axillary)   Resp 17   Ht 1.575 m (5' 2\")   Wt (!) 172.4 kg (380 lb)   SpO2 93%   BMI 69.50 kg/m       GENERAL APPEARANCE: BiPAP in place, no acute distress  EYES: N/A  Lymphatics: no cervical or supraclavicular LAD  Pulmonary: Clear to auscultation with mild crackles in bases  CV: regular rhythm, normal rate, no rub   - JVD N/A   - Edema ++ non pitting bilateral LE  GI: soft, nondistended  MS: no evidence of inflammation in joints, no muscle tenderness  : + antunez  SKIN: no rash, warm, dry, no cyanosis  NEURO: able to respond to questions    Labs:   All labs reviewed by me  Electrolytes/Renal -   Recent Labs   Lab Test 11/23/22  1306 11/23/22  0844 11/23/22  0409 11/23/22  0404 11/22/22 2005 11/22/22  1713 11/22/22  1528 11/22/22  1525 11/22/22  0744 11/22/22  0406 11/21/22  0935 11/21/22  0346   NA  --   --   --  142  --   --   --  141  --  141   < > 140   POTASSIUM  --   --   --  3.7  --   --   --  3.8  --  3.6   < > 3.6   CHLORIDE  --   --   --  102  --   --   --  100  --  97*   < > 96*   CO2  --   --   --  27  --   --   --  29  --  28   < > 30*   BUN  --   --   --  56.3*  --   --   --  53.1*  --  47.6*   < > 39.3*   CR  --   --   --  1.26*  --   --   --  1.39*  --  1.52*   < > 1.66*   * 159* 129* 128*   < >  --    < > 137*   < > 96   < > 145*   KADEN  --   --   --  9.3  --   --   --  9.0  --  9.0   < > 8.9   MAG  --   --   --  2.2  --   --   --  2.0  --   --   --  2.0   PHOS  --   --   --  3.9  --  3.9  --  4.2  --   --   --  " 4.5    < > = values in this interval not displayed.       CBC -   Recent Labs   Lab Test 11/23/22  0404 11/22/22  0408 11/21/22  0346   WBC 9.8 11.4* 9.6   HGB 13.7 14.0 14.5    219 230       LFTs -   Recent Labs   Lab Test 11/19/22  0431 11/18/22  0453 11/18/22  0224   ALKPHOS 115* 151* 129*   BILITOTAL 0.6 1.0 0.4   ALT 33 29 26   AST 49* 56* 62*   PROTTOTAL 7.1 7.5 7.8   ALBUMIN 3.0* 3.2* 3.1*       Iron Panel -   Recent Labs   Lab Test 04/08/22  1445 11/11/21  2335 11/11/21  2335 05/15/20  1028   IRON 86  --  9* 25*   IRONSAT 21  --  2* 6*   CHRIS 34   < > 6* 17    < > = values in this interval not displayed.         Imaging:  All imaging studies reviewed by me.     Current Medications:    allopurinol  200 mg Oral or Feeding Tube Daily     [Held by provider] apixaban ANTICOAGULANT  5 mg Oral BID     atorvastatin  20 mg Oral Daily     B and C vitamin Complex with folic acid  5 mL Per Feeding Tube Daily     bisacodyl  10 mg Rectal Q8H     [Held by provider] bumetanide  2 mg Oral Daily     cefTRIAXone  2 g Intravenous Q24H     ferrous gluconate  324 mg Oral Daily     insulin aspart  1-6 Units Subcutaneous Q4H     ipratropium - albuterol 0.5 mg/2.5 mg/3 mL  3 mL Nebulization 4x daily     lisinopril  5 mg Oral Daily     melatonin  3 mg Oral or Feeding Tube At Bedtime     [Held by provider] metoprolol succinate ER  100 mg Oral Daily     miconazole   Topical BID     nystatin  500,000 Units Mouth/Throat 4x Daily     pantoprazole  40 mg Per Feeding Tube QAM AC    Or     pantoprazole  40 mg Intravenous QAM AC     polyethylene glycol  17 g Oral BID     potassium chloride  20 mEq Oral Daily     QUEtiapine  25 mg Oral At Bedtime     senna-docusate  2 tablet Oral BID     sodium chloride  3 mL Nebulization Q6H       dextrose       heparin 2,550 Units/hr (11/23/22 1100)     - MEDICATION INSTRUCTIONS -       - MEDICATION INSTRUCTIONS -       - MEDICATION INSTRUCTIONS -       Edilberto Bhatti MD

## 2022-11-24 NOTE — PLAN OF CARE
ICU End of Shift Summary. See flowsheets for vital signs and detailed assessment.    Changes this shift: Pt with labile mood, ETT removed around 0930, tolerated bipap after with some issues with excess leakage due to facial structure/pt sleeping with mouth wide open. Pt was very frustrated and angry, yelling at staff, but answering CAM questions and orientation questions correctly- later reported that she did was seeing things and confused and since has been pleasant and cooperative. No BM this shift but mucous x2 when utilizing bed pan. Denies pain. Pt wallet sent to security this shift after verifying contents with patient.    Plan: If oxygenating well, transfer to floor tomorrow?      Goal Outcome Evaluation:      Plan of Care Reviewed With: patient    Overall Patient Progress: improvingOverall Patient Progress: improving    Outcome Evaluation: ETT removed, tolerating NC/bipap

## 2022-11-24 NOTE — PLAN OF CARE
.[email or web address] Outcome Evaluation:      Plan of Care Reviewed With: patient    Overall Patient Progress: improvingOverall Patient Progress: improving    Outcome Evaluation: Pt had a couple of maps 40s/50s and continuing oligura. Pt A&Ox4. Team aware and with lower maps if pt mentating. . New no orders. Pt had some mistrust with writer, but writer was able to relate with pt and build rapport.  AM shift to update team about newer tingling in pts LEs. Pt did not sleep much of all last night. 7 LPM NC/ 14/10 Bipap. Pt given apple juice to drink to prevent hypoglycemia.  For vital signs and complete assessments, please see documentation flowsheets.

## 2022-11-24 NOTE — PROGRESS NOTES
MEDICAL ICU PROGRESS NOTE  11/24/2022      Date of Service (when I saw the patient): 11/24/2022    ASSESSMENT:Arianna Siddiqi is a 62 year old female with a history of morbid obesity, CARLOS, hypertension, HFpEF, paroxysmal A. Fib (apixiban), MAG, factor IV deficiency and CKD who was admitted for hypercapnic respiratory failure likely due to pneumonia, necessitating intubation. 11/23 extubated to BiPAP r/t chronic CO2 retention tolerating well      CHANGES and MAJOR THINGS TODAY:   - 500 LR bolus for oliguria/hypotension   - hold diuresis   - Abd xray  - CXR  - completed abx course yesterday  - VBG  - EKG, trop      PLAN:     Neuro:  # Pain and sedation  # Acute encephalopathy, [id number] head CT normal  - PRN acetaminophen, oxycodone   - Monitor neurological status. Notify provider for any acute changes in exam  - PRN oxycodone and acetaminophen available  - PRN hydroxyzine 25-50mg q 6H PRN available   - Quetiapine 25mg PRN daily      - Hold PTA cyclobenzaprine, tramadol and benadryl     Pulmonary:  # Acute hypoxic and hypercarbic respiratory failure - improving   # Concern for CAP  # Pulmonary edema/fluid overload (see CV)  # Hx CARLOS with CPAP  # Hx Obesity hypoventilation syndrome   # Hx Smoking (30 pack per year; stopped 2002)  Patient extubated 11/23 tolerated well. Extubated to BiPAP given history tolerating 14/10 well.   - Antibiotics and work-up as below for presumed CAP pneumonia.  - Scheduled duonebs and hypertonic saline nebs for thick secretions  - BiPAP intermittent during daytime hours, wean to NC as tolerated  - IS q 2H WA  - BiPAP overnight given history of hypoventilation syndrome and CARLOS.         Cardiovascular:  # Mixed shock state; Distributive & Cardiogenic shock-resolved  # Decompensated heart failure with right heart strain  # Demand induced troponin leak  # Hx PAF  # Hx HFpEF EF 55-60% (most recent echo 11/18)  Echo 11/18: EF 55-60%, flattened septum consistent with RV reduced function,  global RV function moderately reduced. Per Cards note, reviewed images (poor quality) and feel RV size and function only mildly abnormal and grossly unchanged. Follow up additional recs. Discussed with cardiology concerns for PHTN given most recent echocardiogram results with R sided heart strain and h/o non-compliance with CARLOS/CPAP therapy & HFpEF therapy regimen. Cardiology recommending performing R sided heart catheterization at later date unless acute decompensation present.   - Cardiology consulted appreciate recommendation  - trend daily weights     - MAP > 65 (levophed off 11/20)  - Trend CVP   - PTA meds: lisinopril 5mg daily- hold given hypotension   - Held PTA meds: Furosemide, apixaban, metoprolol, Bumex   - send mixed venous     GI/Nutrition:  # Risk for malnutrition  # Hypoalbuminemia  # Morbid obesity  # Constipation  - consistent carb diet  - RD consult for nutrition recs  - Senna BID, miralax PRN  -  bisacodyl suppository daily PRN  - Repeat ab xray today     Renal/Fluids/Electrolytes:  # Volume overload, resolved   # Non-oliguric Acute on chronic kidney injury   # Metabolic alkalosis-presumed contraction alkalosis vs chronic hypoventilation syndrome  # Hyperkalemia - resolved  # Hypomagnesia - resolved  # Hx CKD (baseline creatinine 1.1)  - Nephrology consult and appreciate recs  - 500 LR bolus X 2   - Strict intake and output  - Hold further diuresis given hypotension, increasing creatinine and oliguria   - Avoid Diamox concerns HCO3 is chronically elevated to compensate for chronic hypoventilation syndrome  - remove antunez      Endocrine:  # Risk for stress induced hyperglycemia  # Hx. DMII  - Every 4 hour blood glucose measurements  - Medium SSI     ID:  # Presumed CAP  # Distributive & Cardiogenic shock - resolved  # Leukocytosis - resolved  # Oral thrush  Infection work up grossly unremarkable with cultures and virology  - Trend WBC  - Afebrile  - nystatin QID   - trend cultures   - trend lactic  acid   - Ceftiaxone 7 day course (11/18-11/24) completes today   Cultures:     11/20 Sputum culture unremarkable    11/18 Sputum culture unremarkable    11/18 urine culture unremarkable    11/17 blood cultures x 2 NGTD    11/17 viral panels unremarkable       Hematology:    # Hx MAG  # Hx Factor VII deficiency  Seen outpatient by hematology has a history oral/IV iron supplementation  - PTA ferrous gluconate 324 mg resumed  - Held-PTA apixaban (r/t pAfib)  - Heparin gtt for pafib ppx- consider transition to apixaban in coming days      Musculoskeletal/Skin:  #Hx Gout  #Hx hemosiderin deposition BLE  - PT/OT for ROM  - No acute concerns for skin breakdown  - 11/18 uric acid 7.9  - Continue PTA allopurinol        General Cares/Prophylaxis:    DVT Prophylaxis: Heparin infusion  GI Prophylaxis: PPI  Restraints: N/A  Family Communication: Emelia Siddiqi   Code Status: FULL     Lines/tubes/drains:  - PIVx2, Right internal jugular CVC  - Baig     Disposition:  - Medical ICU      Patient seen and findings/plan discussed with medical ICU staff, Dr. Merino.    Kezia Irvin, CNP        OBJECTIVE:   1. VITAL SIGNS:   Temp:  [97.7  F (36.5  C)-98.4  F (36.9  C)] 98.4  F (36.9  C)  Pulse:  [55-99] 92  Resp:  [8-39] 8  BP: ()/(35-86) 90/55  FiO2 (%):  [40 %-45 %] 40 %  SpO2:  [89 %-100 %] 98 %  Vent Mode: CPAP/PS  (Continuous positive airway pressure with Pressure Support)  FiO2 (%): 40 %  Resp Rate (Set): 18 breaths/min  Tidal Volume (Set, mL): 400 mL  PEEP (cm H2O): 10 cmH2O  Pressure Support (cm H2O): 7 cmH2O  Resp: 8    2. INTAKE/ OUTPUT:   I/O last 3 completed shifts:  In: 2208.66 [P.O.:240; I.V.:1073.66; NG/GT:290]  Out: 3450 [Urine:3450]    3. PHYSICAL EXAMINATION:    Constitutional:       Appearance: She is morbidly obese.   Eyes:      Pupils: Pupils are equal, round, and reactive to light.   Cardiovascular:      Rate and Rhythm: Normal rate and regular rhythm.      Pulses:           Radial pulses are 2+ on the right  side and 2+ on the left side.        Dorsalis pedis pulses are 1+ on the right side and 1+ on the left side.      Heart sounds: Normal heart sounds.      Comments: Non-pitting Generalized edema present  Pulmonary:      Effort: Pulmonary effort is normal.      Breath sounds: Examination of the right-lower field reveals decreased breath sounds. Examination of the left-lower field reveals decreased breath sounds.   Abdominal:      General: Bowel sounds are normal.      Palpations: Abdomen is soft.      Comments: No grimacing or motor response to palpation   Genitourinary:     Comments: Baig cath present  Musculoskeletal:         General: Normal range of motion.      Right lower leg: Edema present.      Left lower leg: Edema present.      Comments: Passive ROM   Skin:     General: Skin is warm and dry.      Capillary Refill: Capillary refill takes 2 to 3 seconds.      Comments: Hemosiderin deposition present BLE   Neurological:      GCS: GCS eye subscore is 3. GCS verbal subscore is 1. GCS motor subscore is 6.     4. LABS:   Arterial Blood Gases   Recent Labs   Lab 11/20/22  1245 11/20/22  0303 11/19/22  1245 11/19/22  0431   PH 7.48* 7.49* 7.46* 7.44   PCO2 49* 49* 49* 52*   PO2 64* 65* 78* 82   HCO3 36* 37* 35* 35*     Complete Blood Count   Recent Labs   Lab 11/24/22  0427 11/23/22  0404 11/22/22  0408 11/21/22  0346   WBC 10.9 9.8 11.4* 9.6   HGB 13.8 13.7 14.0 14.5    216 219 230     Basic Metabolic Panel  Recent Labs   Lab 11/24/22  0427 11/24/22  0425 11/24/22  0124 11/24/22  0100 11/23/22  2031 11/23/22  1616 11/23/22  0409 11/23/22  0404 11/22/22  1528 11/22/22  1525 11/22/22  0744 11/22/22  0406     --   --   --   --   --   --  142  --  141  --  141   POTASSIUM 4.1  --   --   --   --  3.8  --  3.7  --  3.8  --  3.6   CHLORIDE 102  --   --   --   --   --   --  102  --  100  --  97*   CO2 28  --   --   --   --   --   --  27  --  29  --  28   BUN 56.6*  --   --   --   --   --   --  56.3*  --  53.1*   --  47.6*   CR 1.57*  --   --   --   --   --   --  1.26*  --  1.39*  --  1.52*   GLC 99 92 76 79   < > 102*   < > 128*   < > 137*   < > 96    < > = values in this interval not displayed.     Liver Function Tests  Recent Labs   Lab 11/19/22  0431 11/18/22  0453 11/18/22  0224 11/17/22 2118   AST 49* 56* 62* 47*   ALT 33 29 26 27   ALKPHOS 115* 151* 129* 137*   BILITOTAL 0.6 1.0 0.4 0.4   ALBUMIN 3.0* 3.2* 3.1* 3.6   INR  --   --   --  1.48*     Coagulation Profile  Recent Labs   Lab 11/24/22  0427 11/23/22  0404 11/22/22  0406 11/21/22 2019 11/18/22  1533 11/17/22 2118   INR  --   --   --   --   --  1.48*   PTT 71* 70* 74* 78*   < >  --     < > = values in this interval not displayed.       5. RADIOLOGY:   Recent Results (from the past 24 hour(s))   XR Abdomen Port 1 View    Narrative    EXAM: XR ABDOMEN PORT 1 VIEW  11/24/2022 7:00 AM      HISTORY: constipation/concern for ileus    COMPARISON: Abdominal x-ray 11/20/2022    FINDINGS: AP supine view of the abdomen in 3 images. Intrauterine  device.    Gas-filled bowel loops in a nonobstructive pattern. No pneumatosis. No  portal venous gas. No abnormal calcifications. No visualized masses.    Left basilar airspace opacities.      Impression    IMPRESSION: A few gas-filled nondilated loops of bowel in a  nonobstructive pattern.    I have personally reviewed the examination and initial interpretation  and I agree with the findings.    SMITA LOPEZ MD         SYSTEM ID:  [id number]   XR Chest Port 1 View    Narrative    EXAM:  XR CHEST PORT 1 VIEW    INDICATION: pneumonia/CHF    COMPARISON:  Chest x-ray 11/21/2022    FINDINGS:   Single AP view of the chest in 2 images. Right IJ catheter terminating  over the SVC. Interval removal of endotracheal tube and enteric tube.     Stable enlarged cardiac silhouette. Main pulmonary artery enlargement.  Low lung volumes. Decreased right basilar opacity. Retrocardiac  opacity. No pneumothorax. Probable small left pleural  effusion.  Unremarkable upper abdomen. No acute bony lesions.      Impression    IMPRESSION:  1.  Decreased right basilar opacity and persistent retrocardiac  opacity which may represent improving infection/edema.  2.  Stable small left pleural effusion.    I have personally reviewed the examination and initial interpretation  and I agree with the findings.    SMITA LOPEZ MD         SYSTEM ID:  [id number]

## 2022-11-24 NOTE — PHARMACY-ADMISSION MEDICATION HISTORY
Admission Medication History Completed by Pharmacy    See [facility] Admission Navigator for allergy information, preferred outpatient pharmacy, prior to admission medications and immunization status.     Medication History Sources:     Dispense report  o This is the only resource used in this medication history review. Writer tried to contact the patient (confused and unable to answer questions per RN), patient's niece (primary contact, unclear about medication details), and searched [facility] Everywhere records (no recent record on medications), but was unable to find a reliable secondary confirming source. This note was completed based on current PTA list and dispense history to the best of the writer's knowledge.    Changes made to PTA medication list (reason):    Added: None    Deleted: None    Changed: None    Additional Information:    Medications that were actively filled, and that patient should have remaining supplies:  o Allopurinol, apixaban,atorvastatin, bumetanide, cyclobenzaprine, ferrous gluconate, lisinopril, metoprolol succinate, miconazole, potassium chloride, triamcinolone.    Medications without refill history:  o Tylenol, Maalox ES, Benadryl, multivitamin-minerals    Unsure if patient has been taking:  o Tramadol: No fill history in dispense report. Provider in [facility] prescribed 7 tablets on 4/23/21 for a 7-day supply. Patient reported on 6/28/22 that she used this medication occasionally and had not used in a while.   o Spironolactone: last filled in 12/7/21 for a 30-day supply. Unsure if patient is still taking this medication.   o Miralax, Sennosides: According to the note from 6/28/22, patient reported these medications were used in a previous hospital visit, and patient was not taking these medications at home  o Ondansetron: dispensed on 9/9/22 for a 7 day supply. As needed for N & V.  Unsure if patient ever needed this medication or if she is still taking this medication.    Prior to  Admission medications    Medication Sig Last Dose Taking? Auth Provider Long Term End Date   acetaminophen (TYLENOL) 500 MG tablet Take 1-2 tablets (500-1,000 mg) by mouth every 4 hours as needed for mild pain   Elif Gordon MD     allopurinol (ZYLOPRIM) 100 MG tablet Take 2 tablets (200 mg) by mouth daily *Monitoring lab - Uric Acid level every 12 months. You will not receive further refills until lab is scheduled. [phone]   Gagan Quiles MD     alum & mag hydroxide-simethicone (MAALOX  ES) 400-400-40 MG/5ML SUSP suspension Take 15 mLs by mouth every 4 hours as needed for other (gastric distress.)   Elif Gordon MD     apixaban ANTICOAGULANT (ELIQUIS ANTICOAGULANT) 5 MG tablet Take 1 tablet (5 mg) by mouth 2 times daily   Duane Uriarte MD     atorvastatin (LIPITOR) 20 MG tablet Take 1 tablet (20 mg) by mouth daily   Duane Uriarte MD Yes    blood glucose monitoring (ACCU-CHEK NINA PLUS) meter device kit Use to test blood sugars 3 times daily or as directed.   Yuko Crabtree MD     blood glucose monitoring (SOFTCLIX) lancets Use to test blood sugar 3 times daily or as directed.   Yoselin Shook APRN CNP     bumetanide (BUMEX) 2 MG tablet Take 1 tablet (2 mg) by mouth daily   Duane Uriarte MD Yes    cyclobenzaprine (FLEXERIL) 5 MG tablet TAKE 1 TABLET (5 MG) BY MOUTH NIGHTLY AS NEEDED FOR MUSCLE SPASMS   Gagan Quiles MD     diphenhydrAMINE (BENADRYL) 25 MG tablet Take 25 mg by mouth every 6 hours as needed for itching or allergies   Reported, Patient     ferrous gluconate (FERGON) 324 (38 Fe) MG tablet Take 1 tablet (324 mg) by mouth daily   Duane Uriarte MD     lisinopril (ZESTRIL) 5 MG tablet Take 1 tablet (5 mg) by mouth daily   Duane Uriarte MD Yes    metoprolol succinate ER (TOPROL XL) 100 MG 24 hr tablet Take 1 tablet (100 mg) by mouth daily   Duane Uriarte MD Yes    miconazole (MICRO GUARD) 2 % external powder Apply topically as needed for itching or other APPLY TO AFFECTED AREA  TWICE A DAY *NOT COVERED*   Gagan Quiles MD     Multiple Vitamins-Minerals (MULTIVITAMIN WOMEN PO)    Reported, Patient     ondansetron (ZOFRAN ODT) 4 MG ODT tab Take 1 tablet (4 mg) by mouth every 8 hours as needed for nausea or vomiting   Gagan Quiles MD     polyethylene glycol (MIRALAX) packet Take 17 g by mouth daily as needed for constipation  Patient not taking: Reported on 6/28/2022   Gagan Quiles MD No    potassium chloride ER (K-TAB) 20 MEQ CR tablet Take 1 tablet (20 mEq) by mouth daily   Duane Uriarte MD     sennosides (SENOKOT) 8.6 MG tablet Take 1 tablet by mouth 2 times daily as needed for constipation  Patient not taking: Reported on 6/28/2022   Elif Gordon MD     traMADol (ULTRAM) 50 MG tablet TAKE 1 TABLET (50 MG) BY MOUTH NIGHTLY AS NEEDED FOR SEVERE PAIN *INS LIMIT 7 DAYS  Patient not taking: Reported on 6/28/2022   Gagan Quiles MD     triamcinolone (KENALOG) 0.1 % external cream Apply topically 2 times daily APPLY TO AFFECTED AREA   Gagan Quiles MD         Date completed: 11/24/22    Medication history completed by: Kurt Lopes, Pharmacy Intern

## 2022-11-24 NOTE — PROGRESS NOTES
"   11/24/22 1256   Appointment Info   Signing Clinician's Name / Credentials (OT) Dori Foley,oTR/L   Living Environment   People in Home alone   Current Living Arrangements apartment   Home Accessibility no concerns  (elevator)   Transportation Anticipated health plan transportation;agency   Living Environment Comments pt. lives in a [facility]. apt. has elevator access, walk in shower with shower chair and grab bar   Self-Care   Usual Activity Tolerance moderate   Current Activity Tolerance fair   Equipment Currently Used at Home walker, standard;shower chair;grab bar, tub/shower;grab bar, toilet;tub bench   Fall history within last six months no   Activity/Exercise/Self-Care Comment Pt reports low level of activity at baseline, spends most of her time in her recliner. has a FWW that she uses intermittently. Reports being indep. with BADLs, lt. IADLs.   Instrumental Activities of Daily Living (IADL)   Previous Responsibilities meal prep;housekeeping;laundry;medication management;finances   IADL Comments pt. reports having door to door for shopping, does her own cooking and housekeeping (pt. reports \"it takes me time, but I do it\"   General Information   Onset of Illness/Injury or Date of Surgery 11/18/22   Referring Physician Corby Panchal MD   Patient/Family Therapy Goal Statement (OT) \"I usually go to rehab and then home\"   Additional Occupational Profile Info/Pertinent History of Current Problem 62 year old female with a history of morbid obesity, CARLOS, hypertension, HFpEF, paroxysmal A. Fib (apixiban), MAG, factor IV deficiency and CKD who was admitted for hypercapnic respiratory failure likely due to pneumonia, necessitating intubation. now extubated on 7L nc.   Existing Precautions/Restrictions fall   Cognitive Status Examination   Orientation Status orientation to person, place and time   Visual Perception   Visual Impairment/Limitations WFL   Pain Assessment   Patient Currently in Pain   (back pain)   Range " of Motion Comprehensive   General Range of Motion no range of motion deficits identified   Strength Comprehensive (MMT)   Comment, General Manual Muscle Testing (MMT) Assessment BUE str. grossly 4+/5; L slightly> R   Coordination   Upper Extremity Coordination No deficits were identified   Clinical Impression   Criteria for Skilled Therapeutic Interventions Met (OT) Yes, treatment indicated   OT Diagnosis impaired ADLs/mobility   Influenced by the following impairments weakness s/p intubation   OT Problem List-Impairments impacting ADL activity tolerance impaired;strength;pain;balance   ADL comments/analysis below baseline with ADLs/mobility   Assessment of Occupational Performance 3-5 Performance Deficits   Identified Performance Deficits dressing,toileting, bathing, home mgmt.   Planned Therapy Interventions (OT) ADL retraining;transfer training;home program guidelines;progressive activity/exercise   Clinical Decision Making Complexity (OT) low complexity   Anticipated Equipment Needs Upon Discharge (OT)   (TBD)   Risk & Benefits of therapy have been explained evaluation/treatment results reviewed;care plan/treatment goals reviewed;risks/benefits reviewed;current/potential barriers reviewed;participants voiced agreement with care plan;participants included;patient   OT Total Evaluation Time   OT Eval, Low Complexity Minutes (35701) 8   OT Goals   Therapy Frequency (OT) 5 times/wk   OT Predicted Duration/Target Date for Goal Attainment 12/08/22   OT Discharge Planning   OT Plan OT PLAN; up to chair(roni recliner ordered 11/24), ADLs, standing madalyn., toileting   OT Discharge Recommendation (DC Rec) Transitional Care Facility;home with assist;home with home care occupational therapy   OT Rationale for DC Rec recommend d/c to rehab at this time. Pt. well below baseline requiring maxA with bed mob. Continue to assess d/c rec. with OOB act.   OT Brief overview of current status Continue to assess OOB act./transf.s    Total Session Time   Total Session Time (sum of timed and untimed services) 8

## 2022-11-25 NOTE — PLAN OF CARE
Assumed Care from 19:00 - 23:30    ICU End of Shift Summary. See flowsheets for vital signs and detailed assessment.    Changes this shift: AOx4, very pleasant. Makes needs known. Achy pain to BLEs, Oxy + Tylenol given, with relief. Requiring 6L supp O2 NC, while alert. Plan for BIPAP @ hs 14/10; historically tolerates well. Infrequent non-productive cough today. Sinus tach 100-110s, with PAC/PVC. Levo gtt @ 0.03mcg/kg./min for MAP >65. CHO diet, with poor intake. Swallows meds/thins fine. No BM. No Void, but has voided 2x since antunez removal this AM. Hep gtt @ 2550 u/hr.     Plan: Supportive care. Wean O2/Levo as able. CARDS reccs.     Goal Outcome Evaluation:      Plan of Care Reviewed With: patient    Overall Patient Progress: improvingOverall Patient Progress: improving    Outcome Evaluation: AOx4, on low-dose levo, tolerating LFNC during day. BIPAP @ hs.    Problem: Plan of Care - These are the overarching goals to be used throughout the patient stay.      Goal: Optimal Comfort and Wellbeing  Outcome: Progressing  Intervention: Provide Person-Centered Care  Recent Flowsheet Documentation  Taken 11/24/2022 2000 by Lenny Pizarro, RN  Trust Relationship/Rapport:    care explained    choices provided    emotional support provided    empathic listening provided    questions answered    questions encouraged    reassurance provided    thoughts/feelings acknowledged

## 2022-11-25 NOTE — PROGRESS NOTES
MEDICAL ICU PROGRESS NOTE  11/25/2022      Date of Service (when I saw the patient): 11/25/2022    ASSESSMENT: Arianna Siddiqi is a 62 year old female with a history of morbid obesity, CARLOS, hypertension, HFpEF, paroxysmal A. Fib (apixiban), MAG, factor IV deficiency and CKD who was admitted for hypercapnic respiratory failure likely due to pneumonia, necessitating intubation. 11/23 extubated to BiPAP r/t chronic CO2 retention tolerating well      CHANGES and MAJOR THINGS TODAY:   - Amio bolus/gtt r/t Afib w/ -160s; troponin sent  - BLE ultrasound  - BC x 2, sputum, and urine cultures sent  - BnP, troponin, and cortisol level   - lymphedema consult  - Encourage BiPAP 14/8       PLAN:     Neuro:  # Pain and sedation  # Acute encephalopathy- resolved  - PRN acetaminophen, oxycodone   - Monitor neurological status. Notify provider for any acute changes in exam  - PRN oxycodone and acetaminophen available  - PRN hydroxyzine 25-50mg q 6H PRN available   - Quetiapine 25mg PRN daily      - Hold PTA cyclobenzaprine, tramadol and benadryl     Pulmonary:  # Acute hypoxic and hypercarbic respiratory failure - improving   # Concern for CAP  # Pulmonary edema/fluid overload (see CV)  # Hx CARLOS with CPAP  # Hx Obesity hypoventilation syndrome   # Hx Smoking (30 pack per year; stopped 2002)  Patient extubated 11/23 tolerated well.   - CAP treatment course complete; CTM.  - Scheduled duonebs and hypertonic saline nebs for thick secretions  - BiPAP intermittent during daytime hours, wean to NC as tolerated  - IS q 2H WA  - BiPAP overnight given history of hypoventilation syndrome and CARLOS.         Cardiovascular:  # Hypotension; concerns hypovolemia vs distributive/cardiogenic shock  # Decompensated heart failure with right heart strain  # Demand induced troponin leak - improving  # Hx PAF  # Hx HFpEF EF 55-60% (most recent echo 11/18)  Echo 11/18: EF 55-60%, flattened septum consistent with RV reduced function, global RV  function moderately reduced. Per Cards note, reviewed images (poor quality) and feel RV size and function only mildly abnormal and grossly unchanged. Follow up additional recs. Discussed with cardiology concerns for PHTN given most recent echocardiogram results with R sided heart strain and h/o non-compliance with CARLOS/CPAP therapy & HFpEF therapy regimen. Cardiology recommending performing R sided heart catheterization at later date unless acute decompensation present. Afib w/ RVR this AM; amio bolus/gtt intitiated   - trend daily weights     - MAP > 65; levophed restarted 11/24  - 11/25 troponin 257->158, and BNP pending  - 11/25 BLE duplex US - results pending  - Plan to place a-line  - Amio gtt  - Trend CVP  - lymphedema consulted   - Held PTA meds: Furosemide, lisinopril apixaban, metoprolol, & Bumex        GI/Nutrition:  # Risk for malnutrition  # Hypoalbuminemia  # Morbid obesity  # Nausea and vomiting   # Constipation-resolved  - consistent carb diet  - RD consult for nutrition recs  - Compazine and Zofran PRN  - Senna BID, miralax PRN  - bisacodyl suppository daily PRN       Renal/Fluids/Electrolytes:  # Volume overload, resolved   # Non-oliguric Acute on chronic kidney injury   # Metabolic alkalosis-presumed contraction alkalosis vs chronic hypoventilation syndrome  # Hyperkalemia - resolved  # Hypomagnesia - resolved  # Hx CKD (baseline creatinine 1.1)  - Nephrology consult and appreciate recs  - Strict intake and output  - Hold further diuresis given hypotension, increasing creatinine and oliguria   - Avoid Diamox concerns HCO3 is chronically elevated to compensate for chronic hypoventilation syndrome        Endocrine:  # Risk for stress induced hyperglycemia  # Hx. DMII  - 11/25 Cortisol 15.5  - Every 4 hour blood glucose measurements  - Medium SSI     ID:  # Presumed CAP  # Leukocytosis - resolved  # Oral thrush  Infection work up grossly unremarkable with cultures and virology. 11/25 patient still  requiring moderate dose of levophed; will initiate infection workup including BLE US  - Trend WBC  - Afebrile  - nystatin QID   - trend lactic acid   - Ceftiaxone 7 day course (11/18-11/24)    Cultures:     11/25 BC x 2 - pending    11/25 UA reflex - pending    11/25 Sputum culuture - awaiting collection    11/20 Sputum culture unremarkable    11/18 Sputum culture unremarkable    11/18 urine culture unremarkable    11/17 blood cultures x 2 NGTD    11/17 viral panels unremarkable        Hematology:    # Hx MAG  # Hx Factor VII deficiency  Seen outpatient by hematology has a history oral/IV iron supplementation  - PTA ferrous gluconate 324 mg resumed  - Held-PTA apixaban (r/t pAfib)  - Heparin gtt for pafib ppx- consider transition to apixaban in coming days      Musculoskeletal/Skin:  #Hx Gout  #Hx hemosiderin deposition BLE  - PT/OT for ROM  - No acute concerns for skin breakdown  - 11/18 uric acid 7.9  - Continue PTA allopurinol        General Cares/Prophylaxis:    DVT Prophylaxis: Heparin infusion  GI Prophylaxis: PPI  Restraints: N/A  Family Communication: Emelia Siddiqi   Code Status: FULL     Lines/tubes/drains:  - PIVx2, Right internal jugular CVC       Disposition:  - Medical ICU     Patient seen and findings/plan discussed with medical ICU staff, Dr. Merino.    ALMA Coto CNP    Clinically Significant Risk Factors Present on Admission                           ====================================  INTERVAL HISTORY:   Patient had nausea and vomiting this AM PRN Zofran given emesis x 1 this AM. Patient afib w/ RVR this AM; amio bolus & gtt initiated. Still requiring levophed to maintain MAP > 65; workup pending for source of hypotension. Hypercarbia appears to be consistent with baseline obesity hypoventilation syndrome. Hypoxemia persistent patient requiring 6-7 L supplemental oxygen to maintain SpO2 > 92%; pulmonary edema likely source however workup pending for etiology.     OBJECTIVE:   1. VITAL  SIGNS:   Temp:  [97.6  F (36.4  C)-98.4  F (36.9  C)] 98.4  F (36.9  C)  Pulse:  [] 128  Resp:  [7-42] 33  BP: ()/() 73/63  SpO2:  [88 %-98 %] 90 %  Resp: (!) 33    2. INTAKE/ OUTPUT:   I/O last 3 completed shifts:  In: 3041.94 [P.O.:960; I.V.:831.94; IV Piggyback:1250]  Out: 475 [Urine:75; Emesis/NG output:400]    3. PHYSICAL EXAMINATION:    Constitutional:       Appearance: She is morbidly obese.   Eyes:      Pupils: Pupils are equal, round, and reactive to light.   Cardiovascular:      Rate and Rhythm: Afib w/ RVR rate 130-160.      Pulses:           Radial pulses are 2+ on the right side and 2+ on the left side.        Dorsalis pedis pulses are 1+ on the right side and 1+ on the left side.      Heart sounds: Normal heart sounds.      Comments: Non-pitting Generalized edema presnet  Pulmonary:      Effort: Pulmonary effort is normal.      Breath sounds: Examination of the right-lower field reveals decreased breath sounds. Examination of the left-lower field reveals decreased breath sounds.   Abdominal:      General: Bowel sounds are normal.      Palpations: Abdomen is soft.      Comments: No grimacing or complaints of pain to palpation   Genitourinary:     Comments: Voiding spontaneously  Musculoskeletal:         General: Normal range of motion.      Right lower leg: Edema present.      Left lower leg: Edema present.      Comments: Passive ROM   Skin:     General: Skin is warm and dry.      Capillary Refill: Capillary refill takes 2 to 3 seconds.      Comments: Hemosiderin deposition present BLE   Neurological:      GCS: 15     4. LABS:   Arterial Blood Gases   Recent Labs   Lab 11/20/22  1245 11/20/22  0303 11/19/22  1245 11/19/22  0431   PH 7.48* 7.49* 7.46* 7.44   PCO2 49* 49* 49* 52*   PO2 64* 65* 78* 82   HCO3 36* 37* 35* 35*     Complete Blood Count   Recent Labs   Lab 11/25/22  0355 11/24/22  0427 11/23/22  0404 11/22/22  0408   WBC 11.3* 10.9 9.8 11.4*   HGB 13.7 13.8 13.7 14.0     227 216 219     Basic Metabolic Panel  Recent Labs   Lab 11/25/22  0743 11/25/22  0355 11/25/22  0124 11/24/22  0755 11/24/22  0427 11/23/22  2031 11/23/22  1616 11/23/22  0409 11/23/22  0404 11/22/22  1528 11/22/22  1525   NA  --  144  --   --  142  --   --   --  142  --  141   POTASSIUM  --  3.9  --   --  4.1  --  3.8  --  3.7  --  3.8   CHLORIDE  --  103  --   --  102  --   --   --  102  --  100   CO2  --  25  --   --  28  --   --   --  27  --  29   BUN  --  58.2*  --   --  56.6*  --   --   --  56.3*  --  53.1*   CR  --  1.49*  --   --  1.57*  --   --   --  1.26*  --  1.39*   * 123*  113* 105*   < > 99   < > 102*   < > 128*   < > 137*    < > = values in this interval not displayed.     Liver Function Tests  Recent Labs   Lab 11/19/22  0431   AST 49*   ALT 33   ALKPHOS 115*   BILITOTAL 0.6   ALBUMIN 3.0*     Coagulation Profile  Recent Labs   Lab 11/25/22  0355 11/24/22  0427 11/23/22  0404 11/22/22  0406   PTT 82* 71* 70* 74*       5. RADIOLOGY:   No results found for this or any previous visit (from the past 24 hour(s)).

## 2022-11-25 NOTE — PROGRESS NOTES
ICU End of Shift Summary. See flowsheets for vital signs and detailed assessment.    Changes this shift: Neurologically intact. Pain in bilateral lower extremities particularly with touch or movement, MD aware, PRN oxycodone provided some relief. Patient is tachycardic 110-120 with frequent PACs. Patient became hypotensive with MAPs less than 50 this morning, not responsive to trendelenburg positioning or 500ml LR bolus; norepinephrine started. Required between 0.03 and 0.09 today. Patient remains on 7 LPM nasal cannula, with a frequent/productive, wet cough. Baig removed s/s concern for infection, patient now voiding spontaneously. Loose BM x3 today, holding bowel medications. Advanced to consistent carb diet; very limited oral intake-poor appetite and intermittent nausea. Blood glucose WDL.     Plan: Wean levo as tolerated. PT/OT. Monitor for s/sx of infection.

## 2022-11-25 NOTE — PROGRESS NOTES
ICU Daily Rounding Checklist     Checklist Response Notes   Can sedation be reduced?  NA    Can analgesia be reduced? NA    Is delirium being assessed, addressed and prevented? Yes    Spontaneous awakening trial and/or Spontaneous breathing trial candidate?  NA    Total fluid balance goal reviewed?  Yes    Is the patient at goals for lung protective ventilation? NA    Head of bed elevation (30 degrees)? Yes    Skin breakdown assessment (prevention) completed? Yes    Is enteral nutrition at goal? NA    Is blood glucose at goal? Yes    Deep venous thrombosis prophylaxis? Yes      Gastric ulcer prophylaxis?  Yes If coagulopathy (INR-1.5 PTT2x normal. Ph<50k), mechanical ventilation 48hr, history of GI bleed/ulcer within past year. TBL, SCI, or burn, or if >= 2 minor risk factors (sepsis, ICU stay 1 week, occult GI bleed > 6 days. glucocorticoid therapy, NSAID use, antiplatelet use)   Can Antibiotics be narrowed or discontinued? NA    Early mobility candidate and physical therapy consulted? Yes    Is antunez catheter needed? NA    Is central venous/arterial catheter needed? Yes    Has the family been updated? Yes    Are the patient's goals of care and code status current? Yes    Kezia Irvin NP    [facility]

## 2022-11-25 NOTE — PLAN OF CARE
Major Shift Events:  Shift 4027-0192    Neuro: Patient AOx4, calm, cooperative, makes needs known. Pain to BLEs relieved with PRN medications.     Cardiac: SR/ST with BBB and frequent PACs/PVCs. HR 80-110s. Afebrile. Levophed titrated to maintain MAP goal >65. See flow sheet for titration.     Resp: Oxygen saturation WNL on NC 6L to Bipap 14/10 overnight. Tolerated well. Diminished lung sounds. Infrequent cough. No Bipap alarms overnight.     0456 - Critical CO2 - 76, pH 7.22, Bicarb 31 - MICU 1 called - no new orders.     GI/: smear and urine on chux at the beginning of shift, purewick in place overnight. Poor appetite. Blood sugars 66, 69, 89, 105, 123. Glucose 15g given x2 for BS <100. MICU aware at midnight rounding. Continue to monitor and encourage intake. CHO diet ordered.     Heparin gtt at 2550 units/hr - AM PTT 82, decreased to 2350 units, recheck at noon    At 0630 - this writer was in patients room, patient requested Bipap off. A few minutes later patient started vomiting with nurse in the room. -165s. Zofran 8mg given. PRN tylenol and Oxy given for headache and BLE pain.     Plan: Continue POC, wean oxygen/levo as able. Up to chair.      For vital signs and complete assessments, please see documentation flowsheets.      Goal Outcome Evaluation:      Plan of Care Reviewed With: patient    Overall Patient Progress: improving    Outcome Evaluation: AOx4, on levo overnight, Bipap 14/10. Poor appetite with low blood sugars    Problem: Plan of Care - These are the overarching goals to be used throughout the patient stay.    Goal: Optimal Comfort and Wellbeing  Intervention: Monitor Pain and Promote Comfort  Recent Flowsheet Documentation  Taken 11/25/2022 0400 by Judy Canela, RN  Pain Management Interventions:    medication (see MAR)    care clustered    distraction    emotional support    pillow support provided    quiet environment facilitated    relaxation techniques promoted     repositioned    rest  Taken 11/25/2022 0000 by Judy Canela, RN  Pain Management Interventions:    medication (see MAR)    care clustered    distraction    emotional support    pillow support provided    quiet environment facilitated    relaxation techniques promoted    repositioned    rest     Problem: Plan of Care - These are the overarching goals to be used throughout the patient stay.    Goal: Optimal Comfort and Wellbeing  Intervention: Provide Person-Centered Care  Recent Flowsheet Documentation  Taken 11/25/2022 0400 by Judy Canela, RN  Trust Relationship/Rapport:    care explained    choices provided    emotional support provided    empathic listening provided    questions answered    questions encouraged    reassurance provided    thoughts/feelings acknowledged  Taken 11/25/2022 0000 by Judy Canela, RN  Trust Relationship/Rapport:    care explained    choices provided    emotional support provided    empathic listening provided    questions answered    questions encouraged    reassurance provided    thoughts/feelings acknowledged

## 2022-11-25 NOTE — PROCEDURES
[facility]    Arterial line placement    Date/Time: 11/25/2022 2:37 PM  Performed by: Delroy Briseno APRN CNP  Authorized by: Neema Nichole MD       UNIVERSAL PROTOCOL   Site Marked: Yes  Prior Images Obtained and Reviewed:  No  Required items: Required blood products, implants, devices and special equipment available    Patient identity confirmed:  Verbally with patient  NA - No sedation, light sedation, or local anesthesia  Confirmation Checklist:  Patient's identity using two indicators  Time out: Immediately prior to the procedure a time out was called    Universal Protocol: the Joint Commission Universal Protocol was followed    Preparation: Patient was prepped and draped in usual sterile fashion    ESBL (mL):  5  Indication: hemodynamic monitoring  Location: right radial      SEDATION    Patient Sedated: No      PROCEDURE DETAILS  Kev's Test Normal?: Kev's test not abnormal    Seldinger technique: Seldinger technique used    Number of Attempts:  1  Post-procedure:  Line sutured and dressing applied  CMS: normal    PROCEDURE    Patient Tolerance:  Patient tolerated the procedure well with no immediate complications  Length of time physician/provider present for 1:1 monitoring during sedation: 0

## 2022-11-25 NOTE — PROGRESS NOTES
Large emesis at 06:30, per night shift RN report.  Compazine given. Following emesis, HR up to 150's to 160's. Notified MICU Team. ECG ordered. Pt denies any chest pain or nausea.  08:58 Amiodarone bolus ordered and given.

## 2022-11-26 NOTE — PROVIDER NOTIFICATION
Spoke with MICU JUDSON MORSE re: ABG results.  MD called RT and patient was switched from BiPAP settings to AVAPS settings.  Recheck ABG at 2200.

## 2022-11-26 NOTE — PLAN OF CARE
ICU End of Shift Summary. See flowsheets for vital signs and detailed assessment.    Changes this shift: Patient on BiPAP settings at the beginning of the shift but CO2 continued to rise, changed to AVAPS setting by RT to attempt to correct CO2 and avoid intubation. Serial ABGs drawn through the NOC showed sligth improvements in CO2, patient remains on BiPAP with AVAPS settings at this time.  Mentation improving.   Awaiting results of PTT for heparin gtt.  Called coag lab multiple times and have been given mixed information.    Plan: Continue to utilize BiPAP at patient tolerates and check ABGs as needed.      Problem: Plan of Care - These are the overarching goals to be used throughout the patient stay.    Goal: Plan of Care Review  Description: The Plan of Care Review/Shift note should be completed every shift.  The Outcome Evaluation is a brief statement about your assessment that the patient is improving, declining, or no change.  This information will be displayed automatically on your shift note.  11/26/2022 0650 by Roe Barlow, RN  Outcome: Progressing     Problem: Risk for Delirium  Goal: Improved Attention and Thought Clarity  11/26/2022 0650 by Roe Barlow, RN  Outcome: Progressing   Goal Outcome Evaluation:

## 2022-11-26 NOTE — PROGRESS NOTES
MEDICAL ICU PROGRESS NOTE  11/26/2022      Date of Service (when I saw the patient): 11/26/2022    ASSESSMENT: Arianna Siddiqi is a 62 year old female with a history of morbid obesity, CARLOS, hypertension, HFpEF, paroxysmal A. Fib (apixiban), MAG, factor IV deficiency and CKD who was admitted for hypercapnic respiratory failure likely due to pneumonia, necessitating intubation. 11/23 extubated to BiPAP r/t chronic CO2 retention tolerating well      CHANGES and MAJOR THINGS TODAY:   - discontinue PPI  - discontinue scheduled Senna  - discontinue Scheduled Miralax  - TTE to assess worsening right heart strain   - CT PE to rule out PE  - CT CAP without contrast to assess for abscess, etc  - ABG q 6 hr  - lactate daily: 1.1 today   - NPO  - Troponin level: 143 - down-trended        PLAN:     Neuro:  # Pain and sedation  # Acute encephalopathy- resolved  - Monitor neurological status. Notify provider for any acute changes in exam  Analgesia:   - PRN acetaminophen, oxycodone   Sedation:   - PRN hydroxyzine 25-50mg q 6H PRN available    - Quetiapine 25mg PRN daily    - Melatonin 6 mg q HS      - Holding PTA cyclobenzaprine, tramadol and benadryl     Pulmonary:  # Acute hypoxic and hypercarbic respiratory failure   # Concern for PE (11/26)  # CAP, Tx completed  # Pulmonary edema/fluid overload (see CV)  # Hx CARLOS with CPAP  # Hx Obesity hypoventilation syndrome   # Hx Smoking (30 pack per year; stopped 2002)  Patient extubated 11/23   - CAP treatment course completed; restarted antibiotics (see below)   - Scheduled duonebs and hypertonic saline nebs for thick secretions QID  - BiPAP, wean to NC as tolerated (currently 18/5, 45%)  - trial HFNC today, ABG post showing PO2 decreased --> Bipap placed   - IS q 2H WA  - BiPAP overnight given history of hypoventilation syndrome and CARLOS  - CT PE today as worsening oxygenation  - CT CAP without contrast to assess for infection         Cardiovascular:  # Hypotension; concerns hypovolemia  vs distributive/cardiogenic shock  # Decompensated heart failure with right heart strain  # Demand induced troponin leak - improving  # Hx PAF (on apixaban PTA)  # Hx HFpEF EF 55-60% (most recent echo 11/18 - repeat today/see below)  Echo 11/18: EF 55-60%, flattened septum consistent with RV reduced function, global RV function moderately reduced. Per Cards note, reviewed images (poor quality) and feel RV size and function only mildly abnormal and grossly unchanged. Follow up additional recs. Discussed with cardiology concerns for PHTN given most recent echocardiogram results with R sided heart strain and h/o non-compliance with CARLOS/CPAP therapy & HFpEF therapy regimen. Cardiology recommending performing R sided heart catheterization at later date unless acute decompensation present.   - repeat TTE today (11/26): difficult study, EF 60-65%, PA systolic is 32 mmHg above RA pressure  - A fib with RVR 11/25 AM: bolus/gtt intitiated, increased back to 1 mg/min overnight 11/26 d/t continued RVR 140s   - trend daily weights     - MAP > 65; levophed restarted 11/24  - 11/25 troponin 257-->158 --> 143 today  - BNP 65841 ([id number])  - 11/25 BLE duplex US - no above knee DVT, below knee RUPAL   - Amio gtt  - Trend CVP  - lymphedema consulted   - Held PTA meds: Furosemide, lisinopril apixaban, metoprolol, & Bumex   - 11/26: f/up Cardiology: no significant difference in TONJA from 11/18 study        GI/Nutrition:  # Risk for malnutrition  # Hypoalbuminemia  # Morbid obesity  # Nausea and vomiting   # Constipation-resolved  # Diarrhea   - NPO d/t worsening respiratory status   - RD consult for nutrition recs  - Compazine and Zofran PRN  - Bowel regimen: discontinue Scheduled Senna nad Miralax d/t diarrhea, continue PRN   - bisacodyl suppository daily PRN       Renal/Fluids/Electrolytes:  # Volume overload - improving   # Non-oliguric Acute on chronic kidney injury   # Metabolic alkalosis-presumed contraction alkalosis vs chronic  hypoventilation syndrome  # Hyperkalemia - resolved  # Hypomagnesia - resolved  # Hx CKD (baseline creatinine 1.1)  - Nephrology consult and appreciate recs  - Strict intake and output  - Hold further diuresis given hypotension, increasing creatinine and oliguria   - Avoid Diamox concerns HCO3 is chronically elevated to compensate for chronic hypoventilation syndrome        Endocrine:  # Risk for stress induced hyperglycemia  # Hx. DMII  - 11/25 Cortisol 15.5  - Every 4 hour blood glucose measurements  - Medium SSI     ID:  # Presumed CAP  # Leukocytosis - improving  # Oral thrush  Infection work up grossly unremarkable with cultures and virology. 11/25 patient still requiring moderate dose of levophed; initiated infection workup including BLE US  - WBC 12.6 (13.3) - trend   - Afebrile  - nystatin QID   - trend lactic acid daily : 1.1 today   - Ceftiaxone 7 day course (11/18-11/24)    Cultures:     11/25 BC x 2 - NGTD    11/25 MRSA pending     11/25 UA reflex - bland    11/25 Sputum culuture - unremarkable     11/20 Sputum culture unremarkable    11/18 Sputum culture unremarkable    11/18 urine culture unremarkable    11/17 blood cultures x 2 NGTD    11/17 viral panels unremarkable        Hematology:    # Hx MAG  # Hx Factor VII deficiency  Seen outpatient by hematology has a history oral/IV iron supplementation  - PTA ferrous gluconate 324 mg resumed  - Held-PTA apixaban (r/t pAfib)  - Heparin gtt for pafib ppx- therapeutic currently      Musculoskeletal/Skin:  #Hx Gout  #Hx hemosiderin deposition BLE  - PT/OT for ROM  - No acute concerns for skin breakdown  - 11/18 uric acid 7.9  - Continue PTA allopurinol        General Cares/Prophylaxis:    DVT Prophylaxis: Heparin infusion  GI Prophylaxis: PPI - discontinue as no longer needed  Restraints: N/A  Family Communication: Emelia Siddiqi   Code Status: FULL     Lines/tubes/drains:  - PIVx2, Right internal jugular CVC\  - art line       Disposition:  - Medical  ICU     Patient seen and findings/plan discussed with medical ICU staff, Dr. Merino.    ALMA Rodriguez CNP          ====================================  INTERVAL HISTORY:   Course reviewed.  Mental status improved mildly with Bipap, however ill-fitting mask.  Procal, CBC, CHEST XRAY ordered, Vancomycin restarted.  Patient still in a fib with HR 140s, Amio gtt increased to 1 (no bolus).      OBJECTIVE:   1. VITAL SIGNS:   Temp:  [97.6  F (36.4  C)-98.9  F (37.2  C)] 98.6  F (37  C)  Pulse:  [] 140  Resp:  [0-44] 23  BP: ()/(31-88) 122/68  MAP:  [54 mmHg-98 mmHg] 98 mmHg  Arterial Line BP: ()/(46-93) 109/93  FiO2 (%):  [45 %] 45 %  SpO2:  [85 %-98 %] 95 %  FiO2 (%): 45 %  Resp: 23    2. INTAKE/ OUTPUT:   I/O last 3 completed shifts:  In: 2065.32 [P.O.:960; I.V.:1105.32]  Out: 925 [Urine:525; Emesis/NG output:400]    3. PHYSICAL EXAMINATION:    GEN: acutely ill-appearing woman, appears uncomfortable with Bipap  HEENT:  Normocephalic, atraumatic, trachea midline, Bipap ill-fitting,  CV: A fib with HR 140s,   PULM/CHEST: Course breath sounds bilaterally, Bipap ill-fitting  GI: normal bowel sounds, soft, generalized tenderness,   : spontaneously voiding   EXTREMITIES: moderate peripheral edema, moving all extremities, peripheral pulses intact  NEURO: anxious, following commands, A&O x 4  SKIN: No rashes, sores or ulcerations appreciated   PSYCH:  Affect: anxious, no hallucinations         4. LABS:   Arterial Blood Gases   Recent Labs   Lab 11/26/22  0149 11/26/22  0026 11/25/22  2326 11/25/22  2207   PH 7.21* 7.20* 7.17* 7.12*   PCO2 70* 73* 79* 89*   PO2 83 77* 81 93   HCO3 28 28 29* 29*     Complete Blood Count   Recent Labs   Lab 11/25/22  2206 11/25/22  0355 11/24/22  0427 11/23/22  0404   WBC 13.3* 11.3* 10.9 9.8   HGB 14.8 13.7 13.8 13.7    228 227 216     Basic Metabolic Panel  Recent Labs   Lab 11/25/22  2330 11/25/22  2028 11/25/22  1515 11/25/22  1213 11/25/22  0743  11/25/22  0355 11/24/22  0755 11/24/22  0427 11/23/22 2031 11/23/22  1616 11/23/22  0409 11/23/22  0404 11/22/22  1528 11/22/22  1525   NA  --   --   --   --   --  144  --  142  --   --   --  142  --  141   POTASSIUM  --   --   --   --   --  3.9  --  4.1  --  3.8  --  3.7  --  3.8   CHLORIDE  --   --   --   --   --  103  --  102  --   --   --  102  --  100   CO2  --   --   --   --   --  25  --  28  --   --   --  27  --  29   BUN  --   --   --   --   --  58.2*  --  56.6*  --   --   --  56.3*  --  53.1*   CR  --   --   --   --   --  1.49*  --  1.57*  --   --   --  1.26*  --  1.39*   * 133* 118* 132*   < > 123*  113*   < > 99   < > 102*   < > 128*   < > 137*    < > = values in this interval not displayed.     Liver Function Tests  Recent Labs   Lab 11/19/22  0431   AST 49*   ALT 33   ALKPHOS 115*   BILITOTAL 0.6   ALBUMIN 3.0*     Coagulation Profile  Recent Labs   Lab 11/25/22 2025 11/25/22  1137 11/25/22  0355 11/24/22 0427   PTT 78* 69* 82* 71*       5. RADIOLOGY:   Recent Results (from the past 24 hour(s))   US Lower Extremity Venous Duplex Bilateral    Narrative    ULTRASOUND LOWER EXTREMITY VENOUS DUPLEX BILATERAL 11/25/2022 10:55 AM    CLINICAL HISTORY: Hypotension and hypoxia      COMPARISONS: None available.    REFERRING PROVIDER: BETZY MORALES    TECHNIQUE: Grayscale, color Doppler, Doppler waveform ultrasound  evaluation was performed through the bilateral common femoral,  femoral, popliteal, and posterior tibial veins.     FINDINGS: Bilateral common femoral, femoral, and popliteal veins are  fully compressible, patent, and demonstrate normal phasic Doppler  waveforms.    Below knee veins were difficult to follow through the calves due to  patient body habitus.    Bilateral posterior tibial veins were fully compressible at the ankles  with waveforms that augment normally.      Impression    IMPRESSION:   1. No above knee deep venous thrombosis demonstrated in either leg.    2. Below knee veins were  difficult to follow through the calves due to  patient body habitus. Bilateral posterior tibial veins at the ankles  are fully compressible with waveforms that augment normally.    I have personally reviewed the examination and initial interpretation  and I agree with the findings.    MOODY REYNOLDS MD         SYSTEM ID:  [id number]   XR Chest Port 1 View    Narrative    EXAM: XR CHEST PORT 1 VIEW  11/25/2022 9:38 PM      HISTORY: respiratory acidosis    COMPARISON: Chest x-ray 11/24/2022, chest CT 9/15/2016    FINDINGS: Portable semiupright AP radiograph of the chest. Right IJ  central venous catheter tip projects over the low SVC. The trachea is  midline. The cardiac silhouette is enlarged. Enlargement of the main  pulmonary artery. Small left pleural effusion. No pneumothorax.  Bilateral basilar opacities are similar to prior. The visualized upper  abdomen is unremarkable.       Impression    IMPRESSION:   1. Bilateral basilar opacities are similar to prior, likely infection  versus pulmonary edema.  2. Small left pleural effusion.  3. Cardiomegaly.    I have personally reviewed the examination and initial interpretation  and I agree with the findings.    SMITA LOPEZ MD         SYSTEM ID:  [id number]

## 2022-11-26 NOTE — PLAN OF CARE
ICU End of Shift Summary. See flowsheets for vital signs and detailed assessment.    Changes this shift: patient with much anxiety related to cares this NOC.  Pre-medicated for dressing changes and completed CHG bath at that time as well.  PRN anxiety medication and pain medication given throughout the noc.     Hgb 6.9 this AM, await type and screen to release 1 unit PRBC (already ordered, just await type and screen results to release and transfuse)     Plan: Continue to monitor patient and notify primary team with any changes or concerns.      Problem: Plan of Care - These are the overarching goals to be used throughout the patient stay.    Goal: Plan of Care Review  Description: The Plan of Care Review/Shift note should be completed every shift.  The Outcome Evaluation is a brief statement about your assessment that the patient is improving, declining, or no change.  This information will be displayed automatically on your shift note.  Outcome: Not Progressing     Problem: Risk for Delirium  Goal: Improved Behavioral Control  Outcome: Not Progressing   Goal Outcome Evaluation:

## 2022-11-26 NOTE — PHARMACY-VANCOMYCIN DOSING SERVICE
"Pharmacy Vancomycin Initial Note  Date of Service 2022  Patient's  1960  62 year old, female    Indication: Healthcare-Associated Pneumonia    Current estimated CrCl = Estimated Creatinine Clearance: 60.4 mL/min (A) (based on SCr of 1.49 mg/dL (H)).    Creatinine for last 3 days  2022:  4:04 AM Creatinine 1.26 mg/dL  2022:  4:27 AM Creatinine 1.57 mg/dL  2022:  3:55 AM Creatinine 1.49 mg/dL    Recent Vancomycin Level(s) for last 3 days  No results found for requested labs within last 72 hours.      Vancomycin IV Administrations (past 72 hours)      No vancomycin orders with administrations in past 72 hours.                Nephrotoxins and other renal medications (From now, onward)    Start     Dose/Rate Route Frequency Ordered Stop    22 2300  piperacillin-tazobactam (ZOSYN) 4.5 g vial to attach to  mL bag        Note to Pharmacy: For [facility], [facility] and [facility]: For Zosyn-naive patients, use the \"Zosyn initial dose + extended infusion\" order panel.    4.5 g  over 30 Minutes Intravenous EVERY 6 HOURS 22 2244      22 1100  norepinephrine (LEVOPHED) 16 mg in  mL infusion MAX CONC CENTRAL LINE         0.01-0.6 mcg/kg/min × 183.4 kg (Dosing Weight)  1.7-103.2 mL/hr  Intravenous CONTINUOUS 22 1048      22 0800  [Held by provider]  bumetanide (BUMEX) tablet 2 mg        (Held by provider since 2022 at 0329 by Lenny Pizarro, RN.Hold Reason: NPO)   Note to Pharmacy: PTA Sig:Take 1 tablet (2 mg) by mouth daily      2 mg Oral DAILY 22 0329      22 0800  [Held by provider]  lisinopril (ZESTRIL) tablet 5 mg        (Held by provider since Thu 2022 at 1345 by Kezia Irvin CNP.Hold Reason: Other)   Note to Pharmacy: PTA Sig:Take 1 tablet (5 mg) by mouth daily      5 mg Oral DAILY 22 0329            Contrast Orders - past 72 hours (72h ago, onward)    None          [employer] Prediction of Planned Initial Vancomycin " Regimen  Loading dose: N/A  Regimen: 1250 mg IV every 24 hours.  Start time: 23:48 on 11/25/2022  Exposure target: AUC24 (range)400-600 mg/L.hr   AUC24,ss: 515 mg/L.hr  Probability of AUC24 > 400: 71 %  Ctrough,ss: 14 mg/L  Probability of Ctrough,ss > 20: 27 %  Probability of nephrotoxicity (Lodise KEVAN 2009): 9 %          Plan:  1. Start vancomycin  1250 mg IV q24h.   2. Vancomycin monitoring method: AUC  3. Vancomycin therapeutic monitoring goal: 400-600 mg*h/L  4. Pharmacy will check vancomycin levels as appropriate in 1-3 Days.    5. Serum creatinine levels will be ordered daily for the first week of therapy and at least twice weekly for subsequent weeks.      FÉLIX OLEA RPH

## 2022-11-26 NOTE — PLAN OF CARE
ICU End of Shift Summary. See flowsheets for vital signs and detailed assessment.    Changes this shift:  Levophed for MAP goal 65. BP cuff inconsistent, so right radial arterial line was placed. BP still very labile. Levophed up to 0.12 mcg/kg/min. Pt also went into A Fib w/ RVR this morning. ECG done, amiodarone loading dose and gtt were started. Heparin gtt now in range x 1, next PTT tonight at 20:00.  BLE venous doppler was done. Pan cultures sent. Pt was on NC with humidification at 5-6 LPM. O2 sats 89-93%. ABG sent. Placed pt on BiPAP. Consistent carb diet, poor appetite. Ate 50% of 1 meal today. Started Calorie Counts. BG with sliding scale insulin. Purewick in place most of the day. Less than 75 ml output. Straight cathed pt for 450 ml urine and sent urine culture. Large stool x 1 today.    Plan:  Wean Levo as tolerated.     Goal Outcome Evaluation:    Problem: Plan of Care - These are the overarching goals to be used throughout the patient stay.    Goal: Absence of Hospital-Acquired Illness or Injury  Intervention: Identify and Manage Fall Risk  Recent Flowsheet Documentation  Taken 11/25/2022 1600 by Leticia Hughes, RN  Safety Promotion/Fall Prevention:   activity supervised   clutter free environment maintained   fall prevention program maintained   lighting adjusted   mobility aid in reach   nonskid shoes/slippers when out of bed  Taken 11/25/2022 1200 by Leticia Hughes, RN  Safety Promotion/Fall Prevention:   activity supervised   clutter free environment maintained   fall prevention program maintained   lighting adjusted   mobility aid in reach   nonskid shoes/slippers when out of bed  Taken 11/25/2022 0745 by Leticia Hughes, RN  Safety Promotion/Fall Prevention:   activity supervised   clutter free environment maintained   fall prevention program maintained   lighting adjusted   mobility aid in reach   nonskid shoes/slippers when out of bed  Intervention: Prevent Skin Injury  Recent  Flowsheet Documentation  Taken 11/25/2022 1730 by Leticia Hughes RN  Body Position: (positioned for straight cath and for passive leg raise)   weight shifting   side-lying   supine, legs elevated  Taken 11/25/2022 1130 by Leticia Hughes RN  Body Position:   turned   right   foot of bed elevated   heels elevated   legs elevated   head repositioned, right   side-lying 30 degrees   upper extremity elevated  Taken 11/25/2022 0900 by Leticia Hughes RN  Body Position:   turned   left   foot of bed elevated   heels elevated   legs elevated   head repositioned, left   side-lying 30 degrees   upper extremity elevated  Intervention: Prevent and Manage VTE (Venous Thromboembolism) Risk  Recent Flowsheet Documentation  Taken 11/25/2022 1600 by Leticia Hughes RN  VTE Prevention/Management:   SCDs (sequential compression devices) off   patient refused intervention  Taken 11/25/2022 1200 by Leticia Hughes RN  VTE Prevention/Management:   SCDs (sequential compression devices) off   patient refused intervention  Taken 11/25/2022 0745 by Leticia Hughes RN  VTE Prevention/Management:   SCDs (sequential compression devices) off   patient refused intervention  Goal: Optimal Comfort and Wellbeing  Intervention: Provide Person-Centered Care  Recent Flowsheet Documentation  Taken 11/25/2022 1600 by Leticia Hughes RN  Trust Relationship/Rapport:   care explained   choices provided   emotional support provided   empathic listening provided   questions answered   questions encouraged   reassurance provided   thoughts/feelings acknowledged  Taken 11/25/2022 1200 by Leticia Hughes RN  Trust Relationship/Rapport:   care explained   choices provided   emotional support provided   empathic listening provided   questions answered   questions encouraged   reassurance provided   thoughts/feelings acknowledged  Taken 11/25/2022 0745 by Leticia Hughes RN  Trust Relationship/Rapport:   care explained    choices provided   emotional support provided   empathic listening provided   questions answered   questions encouraged   reassurance provided   thoughts/feelings acknowledged

## 2022-11-27 NOTE — PROGRESS NOTES
Calorie Count  Intake recorded for: 11/26  Total Kcals: 0 Total Protein: 0g  Kcals from Hospital Food: 0   Protein: 0g  Kcals from Outside Food (average):0 Protein: 0g  # Meals Recorded: no meals ordered from kitchen, no intake recorded  # Supplements Recorded: no intake recorded

## 2022-11-27 NOTE — PLAN OF CARE
ICU End of Shift Summary. See flowsheets for vital signs and detailed assessment.     Changes this shift: Patient remains on BiPAP with AVAPS settings at this time. Was on HiFLo NC during the day with some increase to CO2, VBG this AM similar to yesterday.      Ordered patient a recliner to hopefully allow her to get some activity throughout the day to limit additional deconditioning.      Plan: Continue to utilize BiPAP and HiFlo NC for O2 needs and CO2 clearance as needed.     Problem: Plan of Care - These are the overarching goals to be used throughout the patient stay.    Goal: Plan of Care Review  Description: The Plan of Care Review/Shift note should be completed every shift.  The Outcome Evaluation is a brief statement about your assessment that the patient is improving, declining, or no change.  This information will be displayed automatically on your shift note.  Outcome: Not Progressing     Problem: Risk for Delirium  Goal: Improved Behavioral Control  Outcome: Not Progressing     Problem: Risk for Delirium  Goal: Improved Sleep  Outcome: Not Progressing     Problem: Heart Failure Comorbidity  Goal: Maintenance of Heart Failure Symptom Control  Outcome: Not Progressing     Problem: Pneumonia  Goal: Fluid Balance  Outcome: Not Progressing     Problem: Pneumonia  Goal: Resolution of Infection Signs and Symptoms  Outcome: Not Progressing     Problem: Pneumonia  Goal: Effective Oxygenation and Ventilation  Outcome: Not Progressing   Goal Outcome Evaluation:

## 2022-11-27 NOTE — PROGRESS NOTES
MEDICAL ICU PROGRESS NOTE  11/27/2022      Date of Service (when I saw the patient): 11/27/2022    ASSESSMENT: Arianna Siddiqi is a 62 year old female with a history of morbid obesity, CARLOS, hypertension, HFpEF, paroxysmal A. Fib (apixiban), MAG, factor IV deficiency and CKD who was admitted for hypercapnic respiratory failure likely due to pneumonia, necessitating intubation. 11/23 extubated to BiPAP r/t chronic CO2 retention tolerating well. Now with concerns for RV heart failure and pulmonary hypertension.     CHANGES and MAJOR THINGS TODAY:   - Heart Failure Cardiology to evaluate for cardiac catheterization  - ABG q 6 hr  - Lactate daily  - Advance diet as tolerated  - Start Dobutamine 2.5mcg/kg titrate up to 5mcg/kg for added inotropic support     PLAN:     Neuro:  # Pain and sedation  # Acute encephalopathy- resolved  - Monitor neurological status. Notify provider for any acute changes in exam  Analgesia: PRN acetaminophen, oxycodone   Sedation:   - PRN hydroxyzine 25-50mg q 6H PRN available    - Quetiapine 25mg PRN daily    - Melatonin 6 mg q HS   - Holding PTA cyclobenzaprine, tramadol and benadryl     Pulmonary:  # Acute hypoxic and hypercarbic respiratory failure   # c/f Pulmonary Hypertension  # Pulmonary edema/fluid overload (see CV)  Patient extubated 11/23   - Scheduled duonebs and hypertonic saline nebs for thick secretions QID  - BiPAP, wean to NC as tolerated (currently 18/5, 45%)  - IS q 2H WA  - CT PE- Unremarkable  - CT CAP- Unremarkable  - Cardiology to evaluate for right heart catheterization- pending results may start Veletri/Flolan/Treprostinil for Pulm HTN    # C/f HAP vs VAP  # CAP- s/p complete CTX treatment  - CAP treatment course completed; restarted antibiotics (see below)     # Hx CARLOS with CPAP  # Hx Obesity hypoventilation syndrome   # Hx Smoking (30 pack per year; stopped 2002)  - BiPAP overnight given history of hypoventilation syndrome and CARLOS     Cardiovascular:  # Hypotension;  concerns hypovolemia vs distributive/cardiogenic shock  # Hx HFpEF EF 55-60% (most recent echo 11/18 - repeat today/see below)  # Decompensated heart failure with right heart strain  # c/f Right heart failure (biventricular failure)  Discussed with cardiology concerns for PHTN given most recent echocardiogram results with R sided heart strain and h/o non-compliance with CARLOS/CPAP therapy & HFpEF therapy regimen. Broad work up perform yesterday, unremarkable overall. Discussed with cardiology yesterday, they will plan to evaluate pt for right heart catheterization.  - Held PTA meds: Furosemide, lisinopril apixaban, metoprolol, & Bumex   - TTE today (11/26): difficult study, EF 60-65%, PA systolic is 32 mmHg above RA pressure  - BNP 14321 (56833)  - trend daily weights     - MAP > 65  - Trend CVP  - Lymphedema consulted   - 11/25 BLE duplex US - no above knee DVT, below knee RUPAL   - CT PE unremarkable  - Abdominal CT unremarkable  - 11/26: f/up Cardiology: no significant difference in TONJA from 11/18 study, will plan to evaluate for right heart catheterization  - Start Dobutamine 2.5mcg/kg titrate up to 5mcg/kg for added inotropic support    # Demand induced troponin leak - improving  - 11/25 troponin 257-->158 --> 143 yesterday    # Hx PAF (on apixaban PTA)  - A fib with RVR 11/25 AM: Amio bolus/gtt intitiated, increased back to 1 mg/min overnight 11/26 d/t continued RVR 140s      GI/Nutrition:  # Risk for malnutrition  # Hypoalbuminemia  # Morbid obesity  # Nausea and vomiting   # Constipation-resolved  # Diarrhea   - RD consult for nutrition recs  - Compazine and Zofran PRN  - Bowel regimen: Senna and Miralax PRN   - Advance diet as tolerated, monitor for nausea       Renal/Fluids/Electrolytes:  # Volume overload - improving   # Non-oliguric Acute on chronic kidney injury   # Metabolic alkalosis-presumed contraction alkalosis vs chronic hypoventilation syndrome  # Hyperkalemia - resolved  # Hypomagnesia - resolved  #  Hx CKD (baseline creatinine 1.1)  - Nephrology consult and appreciate recs  - Strict intake and output  - Hold further diuresis given hypotension, increasing creatinine and oliguria   - Avoid Diamox concerns HCO3 is chronically elevated to compensate for chronic hypoventilation syndrome        Endocrine:  # Risk for stress induced hyperglycemia  # Hx. DMII  - 11/25 Cortisol 15.5  - Every 4 hour blood glucose measurements  - Medium SSI     ID:  # Presumed CAP  # Leukocytosis - improving  Infection work up grossly unremarkable with cultures and virology. 11/25 patient still requiring moderate dose of levophed; initiated infection workup including BLE US  - Trend lactic acid daily : 1.1 today   - Ceftiaxone 7 day course (11/18-11/24)    - Zosyn empiric treatment for VAP/HAP (11/25--> 11/30)    Cultures:     11/25 BC x 2 - NGTD    11/25 MRSA     11/25 UA reflex - bland    11/25 Sputum culuture - unremarkable     11/20 Sputum culture unremarkable    11/18 Sputum culture unremarkable    11/18 urine culture unremarkable    11/17 blood cultures x 2 NGTD    11/17 viral panels unremarkable    # Oral thrush   - Nystatin QID       Hematology:    # Hx MAG  # Hx Factor VII deficiency  Seen outpatient by hematology has a history oral/IV iron supplementation  - PTA ferrous gluconate 324 mg resumed  - Held-PTA apixaban (r/t pAfib)  - Heparin gtt for pafib ppx- therapeutic currently       Musculoskeletal/Skin:  #Hx Gout  #Hx hemosiderin deposition BLE  - PT/OT for ROM  - No acute concerns for skin breakdown  - 11/18 uric acid 7.9  - Continue PTA allopurinol        General Cares/Prophylaxis:    DVT Prophylaxis: Heparin infusion  GI Prophylaxis: None  Restraints: N/A  Family Communication: Emelia Siddiqi   Code Status: FULL     Lines/tubes/drains:  - Right internal jugular CVC\  - PIVx1     Disposition:  - Medical ICU     Patient seen and findings/plan discussed with medical ICU staff, Dr. Merino.    Alejandro Cuenca MD  Internal  Medicine Resident, PGY-1  Pager: [phone]    ====================================  INTERVAL HISTORY:   Course reviewed.     O2 improved with Bipap, however ill-fitting mask. Patient still in a fib with HR 140s on Amio gtt. Hungry, not nauseas anymore and would like start eating once more. Otherwise feeling better than yesterday.     OBJECTIVE:   1. VITAL SIGNS:   Temp:  [97.7  F (36.5  C)-98.7  F (37.1  C)] 97.7  F (36.5  C)  Pulse:  [] 96  Resp:  [9-31] 22  BP: ()/() 106/56  FiO2 (%):  [40 %-65 %] 65 %  SpO2:  [91 %-99 %] 96 %  FiO2 (%): 65 %  Resp: 22    2. INTAKE/ OUTPUT:   I/O last 3 completed shifts:  In: 1651.12 [I.V.:1651.12]  Out: 50 [Urine:50]    3. PHYSICAL EXAMINATION:    GEN: acutely ill-appearing woman, appears comfortable with Bipap  HEENT:  Normocephalic, atraumatic, Bipap ill-fitting,  CV: A fib with HR 120s  PULM/CHEST: Course breath sounds bilaterally, Bipap ill-fitting  GI: normal bowel sounds, soft, generalized tenderness   : spontaneously voiding   EXTREMITIES: moderate peripheral edema, moving all extremities, peripheral pulses intact  NEURO: following commands, A&O x 4  SKIN: No rashes, sores or ulcerations appreciated   PSYCH:  Affect: anxious, no hallucinations       4. LABS:   Arterial Blood Gases   Recent Labs   Lab 11/26/22  1119 11/26/22  0409 11/26/22  0149 11/26/22  0026   PH 7.23* 7.23* 7.21* 7.20*   PCO2 67* 67* 70* 73*   PO2 71* 83 83 77*   HCO3 28 28 28 28     Complete Blood Count   Recent Labs   Lab 11/27/22  0408 11/26/22  0535 11/25/22  2206 11/25/22  0355   WBC 9.1 12.6* 13.3* 11.3*   HGB 13.4 14.3 14.8 13.7    238 243 228     Basic Metabolic Panel  Recent Labs   Lab 11/27/22  0753 11/27/22  0416 11/27/22  0408 11/26/22  2038 11/26/22  0803 11/26/22  0409 11/25/22  0743 11/25/22  0355 11/24/22  0755 11/24/22  0427   NA  --   --  145  --   --  140  --  144  --  142   POTASSIUM  --   --  4.4  --   --  4.9  --  3.9  --  4.1   CHLORIDE  --   --  107   --   --  104  --  103  --  102   CO2  --   --  26  --   --  19*  --  25  --  28   BUN  --   --  67.3*  --   --  65.6*  --  58.2*  --  56.6*   CR  --   --  2.12*  --   --  2.02*  --  1.49*  --  1.57*   GLC 88 97 100* 128*   < > 113*   < > 123*  113*   < > 99    < > = values in this interval not displayed.     Liver Function Tests  No lab results found in last 7 days.  Coagulation Profile  Recent Labs   Lab 22  0408 22  0752 22  1137   PTT 79* 80* 78* 69*       5. RADIOLOGY:   Recent Results (from the past 24 hour(s))   Echo Limited   Result Value    LVEF  60-65%    Narrative    [id number]  [id number]  [id number]  [id number]^ANDREW^BETZY     [facility]  Echocardiography Laboratory  [address] [place] [id number]     Name: DUSTIN FERNANDES  MRN: [id number]  : 1960  Study Date: 2022 09:34 AM  Age: 62 yrs  Gender: Female  [place] [facility] [place]  Reason For Study: T  Ordering Physician: BETZY MORALES  Performed By: Laureen Salazar RDCS     BSA: 2.5 m2  Height: 62 in  Weight: 380 lb  BP: 111/62 mmHg  ______________________________________________________________________________  Procedure  Limited Echocardiogram with portions of two-dimensional, color and spectral  Doppler performed. Contrast Optison. Technically difficult study.Extremely  poor acoustic windows. Optison (NDC #9372-8833-56) given intravenously.  Patient was given 5 ml mixture of 3 ml Optison and 6 ml saline. 4 ml wasted.  ______________________________________________________________________________  Interpretation Summary  Limited TTE. Technically difficult study.  The RV is not clearly visualized but the size appears to be normal and  function appears to be moderately reduced.  The estimated PA systolic pressure is 32 mmHg above the RA pressure.  Left ventricular function is normal.The ejection fraction is 60-65%.Left  ventricular cavity size is small.  This study was  compared with the study from 11/18/2022. No significant changes  in RV size/function. The estimated PASP is higher.  ______________________________________________________________________________  Left Ventricle  Left ventricular function is normal.The ejection fraction is 60-65%. Left  ventricular cavity size is small.     Right Ventricle  The RV is not clearly visualized but the size appears to be normal and  function appears to be moderately reduced.     Mitral Valve  The mitral valve is normal. Trace mitral insufficiency is present.     Tricuspid Valve  The tricuspid valve is normal. Trace tricuspid insufficiency is present. Right  ventricular systolic pressure is 32mmHg above the right atrial pressure.     Pulmonic Valve  The valve leaflets are not well visualized. Trace pulmonic insufficiency is  present.     Vessels  Dilation of the inferior vena cava is present with abnormal respiratory  variation in diameter. IVC diameter >2.1 cm collapsing <50% with sniff  suggests a high RA pressure estimated at 15 mmHg or greater.     Compared to Previous Study  This study was compared with the study from 11/18/2022 . No significant  changes in RV size/function. The estimated PASP is higher.     ______________________________________________________________________________  Report approved by: John Ladd 11/26/2022 12:19 PM     ______________________________________________________________________________      CT Chest Pulmonary Embolism w Contrast    Narrative    EXAM: CTA pulmonary angiogram, 11/26/2022 1:10 PM    HISTORY: Female sex; Not pregnant; No imaging to r/o PE in the last 24  hours; Pulmonary Embolism Rule-Out Criteria (PERC) score > 0; Revised  Fielding Score (RGS) not >= 11; No D-dimer result available; D-dimer not  ordered    COMPARISON: Chest radiograph 11/25/2022.. CT abdomen 11/12/2021    TECHNIQUE: Volumetric CT images obtained through the chest with  contrast. Coronal and axial MIP reformatted  images obtained.  Three-dimensional (3D) post-processed angiographic images were  reconstructed, archived to PACS and used in interpretation of this  study.     CONTRAST: Isovue 370 ml isovue 370 IV.    FINDINGS:     Vascular: There is good contrast opacification of the pulmonary  arterial vasculature. No pulmonary embolus.  Heart is enlarged with a  hypertrophic right ventricle. No evidence of acute right heart strain.  Mild reflux of contrast into the IVC and hepatic veins. No thoracic  aortic aneurysm. Dilated main pulmonary artery measuring up to 4 cm  and is significantly larger than the aorta.    Remaining Chest: Evaluation of the lungs is somewhat limited by  respiratory motion. Central tracheobronchial tree is patent. No  consolidation, mass, or suspicious pulmonary nodules. No pleural  effusion or pneumothorax. Bibasilar atelectasis. No thoracic  lymphadenopathy. Thyroid is unremarkable. Small hiatal hernia.    Upper Abdomen: Limited evaluation demonstrates no acute findings.    Bones: No suspicious osseous lesions    Soft tissues: Unremarkable    Devices: Right IJ CVC tip is in the right atrium.      Impression    IMPRESSION:  1. Exam is negative for acute pulmonary embolism  2. Cardiomegaly with right ventricular hypertrophy and a dilated main  pulmonary artery measuring up to 4 cm, consistent with pulmonary  hypertension.    In the event of a positive result for acute pulmonary embolism  resulting in right heart strain, consider calling the   [facility] hospital  for PERT (Pulmonary Embolism Response Team)  Activation?    PERT -- Pulmonary Embolism Response Team (Multidisciplinary team  including cardiology, interventional radiology, critical care,  hematology)    I have personally reviewed the examination and initial interpretation  and I agree with the findings.    VIRIDIANA SINCLAIR MD         SYSTEM ID:  [id number]   CT Abdomen Pelvis w Contrast    Narrative    EXAMINATION: CT ABDOMEN PELVIS W CONTRAST   11/26/2022 1:11 PM      CLINICAL HISTORY: New sepsis, unknown source    COMPARISON: CT abdomen 11/12/2022    PROCEDURE COMMENTS: CT of the abdomen was performed with 87.0 cc of  Isovue-370 intravenous and oral contrast. Coronal and sagittal  reformatted images were obtained.    FINDINGS:    Liver: Normal parenchymal attenuation without focal mass.  Biliary system: Cholelithiasis. No intrahepatic or extrahepatic  biliary ductal dilatation.  Pancreas: No focal mass or dilation of the main pancreatic duct.  Stomach: Within normal limits.  Spleen: Within normal limits.  Adrenal glands: Within normal limits.  Kidneys: Atrophic kidneys bilaterally. No focal mass, hydronephrosis,  or stone.  Bladder: Within normal limits.  Reproductive organs: IUD in place  Colon: Within normal limits.  Small Bowel: Within normal limits.  Lymph nodes: No intra-abdominal or pelvic lymphadenopathy.  Vasculature: Atherosclerotic calcifications of the abdominal aorta and  its branches without aneurysm.  Peritoneum: No intraabdominal free fluid or air.     Chest: Heart is enlarged. No pericardial effusion. No pneumothorax or  pleural effusion. Bibasilar atelectasis. Small hiatal hernia.    Bones: No suspicious osseous lesion. Degenerative changes of the  thoracolumbar spine.    Soft tissues: Large fat containing ventral hernia.      Impression    IMPRESSION:  1. No acute findings in the abdomen or pelvis.  2. Cholelithiasis without evidence for acute cholecystitis.    I have personally reviewed the examination and initial interpretation  and I agree with the findings.    SMITA LOPEZ MD         SYSTEM ID:  [id number]

## 2022-11-27 NOTE — PLAN OF CARE
ICU End of Shift Summary. See flowsheets for vital signs and detailed assessment.    Changes this shift: Intermittently drowsy/restless. Irritable during R heart cath due to discomfort and post procedure. Following all commands, able to make their needs known. Dobutamine gtt started. Heparin stopped in preparation for swan to be placed at bedside. [place] inserted and verified by xray. Levo gtt titrated for MAP goals. VSS. HFNC during the day. Unable to titrate oxygen down due to oxygen saturation falling to high 80's. Full liquid diet AAT. Swallowing PO meds without difficulty. Denies nausea, no emesis. No urine output this shift, bladder scanned for 187.     Plan: LU & ABGs q6hrs. Wean oxygen as tolerated and as able.

## 2022-11-27 NOTE — CONSULTS
Cardiology Advanced Heart Failure Consult Note       Date of Service: 11/27/22    ASSESSMENT:   Arianna Siddiqi is a 62 year old female who presents with a history of obstructive sleep apnea, morbid obesity (BMI 70), hypertension, heart failure with preserved ejection fraction (LVEF 60 to 65%, 11/26/2022), paroxysmal atrial fibrillation and chronic kidney disease who was initially admitted on 11/18 after 3 to 4-day history of weakness, dyspnea and poor urine output.  She reportedly received a liter of IV fluids in the ED on initial arrival and proceeded to have worsening respiratory status and hypercapnic respiratory failure (VBG 7.12/120).  She was then intubated and admitted to the ICU where she is being treated for presumed pneumonia (had leukocytosis and elevated inflammatory markers on admission) with broad-spectrum antibiotics. Patient has had persistent hypotension requiring vasopressors, making it difficult to adequately diurese her. TTE notable for preserved LVEF of 60 to 65%, moderately reduced RV systolic function with elevated PA pressures and enlarged IVC. Cardiology consulted to assist with management (diuresis, weaning off pressors) given these findings.     [place] rakesh catheter placed at bedside, with initial readings showing CVP 18, PA 76/41/53 and PCWP 30, Allie CO/CI 8.3/3.0, , PVR 2.8 (MAP 90, SVO2 72).     Pertinent problem list:   # Acute on chronic diastolic heart failure (LVEF 60 to 65%, 11/26/2022)  # Obstructive sleep apnea  # Morbid obesity (BMI 70)  # Hypertension  # Paroxysmal atrial fibrillation  # Chronic kidney disease     RECOMMENDATIONS:  - Start IV lasix 80 mg BID, will follow UOP and swan numbers to aid in further dose adjustments.   - Discontinue norepinephrine drip   - Continue dobutamine drip for now   - Continue amiodarone drip for afib RVR. May need to consider DCCV if hypotension remains an issue, as the loss of NSR atrial kick may be contributory.   - We will  continue to follow       Discussed with attending, Dr. Aceves    Thank you for consulting the cardiovascular services at the [facility]. Please do not hesitate to call us with any questions.     Geoff Momin MD  Cardiology Fellow  [phone]    ----------------------------------------------------------------------------  REASON FOR CONSULT: Unable to wean pressors, concern RV dysfunction playing a role     History of Present Illness   Arianna Siddiqi is a 62 year old female who presents with a history of obstructive sleep apnea, morbid obesity (BMI 70), hypertension, heart failure with preserved ejection fraction (LVEF 60 to 65%, 11/26/2022), paroxysmal atrial fibrillation and chronic kidney disease who was initially admitted on 11/18 after 3 to 4-day history of weakness, dyspnea and poor urine output.  She reportedly received a liter of IV fluids in the ED on initial arrival and proceeded to have worsening respiratory status and hypercapnic respiratory failure (VBG 7.12/120).  She was then intubated and admitted to the ICU where she is being treated for presumed pneumonia (had leukocytosis and elevated inflammatory markers on admission) with broad-spectrum antibiotics.  Patient was also started on vasopressors for septic shock on initial presentation and has had persistent hypotension despite what seems like adequate treatment of her presumed pneumonia.  Hypotension has also made it quite difficult to diurese her adequately.     Transthoracic echocardiogram obtained on 11/26 showed preserved LVEF of 60 to 65%.  The right ventricle was not clearly visualized but appeared to have moderately reduced systolic function.  Estimated PA systolic pressure was 32 mmHg above RA pressure.  IVC measured about 3 cm with less than 50%  respiratory collapse, consistent with RA pressure of at least 15 mmHg.  Given these echo findings, ICU team is concerned about her RV dysfunction contributing to inability to  wean any of vasopressors.  She is currently on dobutamine 2.5 mcg/kg/min and norepinephrine 0.03 mcg/kg/min.  Her course has also been complicated by A. fib RVR with rates in the 120s.  She is currently on an amiodarone drip at 1 mg/min    Pertinent cardiac testin2022 TTE:   Interpretation Summary  Limited TTE. Technically difficult study.  The RV is not clearly visualized but the size appears to be normal and  function appears to be moderately reduced.  The estimated PA systolic pressure is 32 mmHg above the RA pressure.  Left ventricular function is normal.The ejection fraction is 60-65%.Left  ventricular cavity size is small.  This study was compared with the study from 2022. No significant changes  in RV size/function. The estimated PASP is higher.    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       ALLERGIES     Allergies   Allergen Reactions     Penicillins Itching and Rash     Tolerated ceftriaxone 2020  Tolerated Zosyn 2022     FAMILY HISTORY:  Family History   Adopted: Yes   Family history unknown: Yes       SOCIAL HISTORY:  Social History     Socioeconomic History     Marital status: Single   Tobacco Use     Smoking status: Former     Packs/day: 1.50     Years: 20.00     Pack years: 30.00     Types: Cigarettes     Quit date: 2002     Years since quittin.9     Smokeless tobacco: Never   Substance and Sexual Activity     Alcohol use: No     Alcohol/week: 0.0 standard drinks     Drug use: No     Review of Systems:   10-point ROS reviewed, & found negative w/ exceptions noted in the  HPI.    Physical Exam   Temp: 97.9  F (36.6  C) Temp src: Oral BP: 124/84 Pulse: (!) 125   Resp: 22 SpO2: 94 % O2 Device: High Flow Nasal Cannula (HFNC) Oxygen Delivery: 35 LPM  Vital Signs with Ranges  Temp:  [97.7  F (36.5  C)-98.7  F (37.1  C)] 97.9  F (36.6  C)  Pulse:  [] 125  Resp:  [9-31] 22  BP: ()/() 124/84  FiO2 (%):  [40 %-65 %] 65 %  SpO2:  [87 %-100 %] 94 %  380 lbs 0 oz    GEN: NAD  HEENT: no icterus  CV: Irregularly irregular, tachycardic, no murmurs. Unable to assess JVD given body habitus   CHEST: Course breath sounds, no crackles or wheezes   ABD: Obese, soft, NT/ND  NEURO: Awake, alert, answering questions appropriately, motor grossly nonfocal  EXT: 1+ LE edema     LABS:  Recent Labs   Lab 11/27/22  1123 11/27/22  0753 11/27/22  0416 11/27/22  0408 11/26/22  0803 11/26/22  0535 11/26/22  0409 11/25/22  2330 11/25/22  2206 11/25/22  0743 11/25/22  0355   WBC  --   --   --  9.1  --  12.6*  --   --  13.3*  --  11.3*   HGB  --   --   --  13.4  --  14.3  --   --  14.8  --  13.7   MCV  --   --   --  100  --  100  --   --  101*  --  100   PLT  --   --   --  221  --  238  --   --  243  --  228   NA  --   --   --  145  --   --  140  --   --   --  144   POTASSIUM  --   --   --  4.4  --   --  4.9  --   --   --  3.9   CHLORIDE  --   --   --  107  --   --  104  --   --   --  103   CO2  --   --   --  26  --   --  19*  --   --   --  25   BUN  --   --   --  67.3*  --   --  65.6*  --   --   --  58.2*   CR  --   --   --  2.12*  --   --  2.02*  --   --   --  1.49*   ANIONGAP  --   --   --  12  --   --  17*  --   --   --  16*   KADEN  --   --   --  9.6  --   --  9.4  --   --   --  9.8   GLC 93 88 97 100*   < >  --  113*   < >  --    < > 123*  113*    < > = values in this interval not displayed.     EKGs and prior imaging reviewed

## 2022-11-28 NOTE — PROGRESS NOTES
ICU End of Shift Summary. See flowsheets for vital signs and detailed assessment.    Changes this shift: R triple lumen internal jugular found removed at start of shift, no bleeding at insertion site, tip intact, Levo, Amio, and Dobutamine infusing, patient stable. Heparin and Levo discontinued. Baig placed for strict I&Os; 80mg IVP lasix given with poor UO (MICU notified); 2L fluid restriction initiated.    Neuro: A&Ox4; restless; cooperative; PERRLA.    Cardiac: [place] @60; LU numbers Q6 hr (refer to flow sheet for detail numbers); Afebrile; VSS; AFlutter.    Resp: Trend VBGs Q6 hr; BiPAP overnight 40%;  LS  diminished    GI: Last BM 11/27; 2L fluid restriction; full liquid diet adv as madalyn.    : Baig placed for strict I&O.      Plan: Monitor LU numbers and trend VBGs. Continue to monitor and follow POC, notify MDs of any acute changes.

## 2022-11-28 NOTE — PROCEDURES
Inpatient Procedure Note     Procedure: Ultrasound guided central access, PA catheter placement  Indication: Hemodynamic monitoring, central access  Diagnosis: Cardiogenic shock RV failure     After informing the benefits and risks, an informed consent was obtained. A time out and site verification were done prior to initiation of the procedure. The neck was prepped and draped in the usual sterile fashion and infiltrated with lidocaine under ultrasound guidance. A 9 Fr MAC sheath was placed in the right internal jugular vein using modified Seldinger technique ultrasound guidance. It was inserted above the pre-existent central line and was sutured in place. A balloon-tipped [place]-Juaquin catheter was advanced into the right atrium, right ventricle, pulmonary artery and pulmonary capillary wedge position with guidance of pressure waveform. The balloon was deflated and the [place]-Juaquin catheter was left in the pulmonary artery. RA/RV/PA/PCW pressures were obtained. [place] locked at 60 cm. At time of procedure completion, all the ports aspirated and flushed properly. The patient tolerated the procedure well without any immediate complications. CXR confirmed appropriate position     Complications: None   Blood loss: Minimal blood loss     RA 18  PA 75/41  PCWP 30  SVO2 71  Hgb 13.4  Allie CO 9  Allie CI 3.3    Kj Aceves MD

## 2022-11-28 NOTE — PROGRESS NOTES
Brief Cardiology Progress Note:     Evening [place] numbers:     [place] numbers:   Date CVP PA PCWP SVO2 MAP CO CI SVR PVR Therapies   11/27 18 76/41/53 30 72 90 8.3 3.0 694 2.8 Dobutamine 2.5   11/28 18 73/38/50 25 76 86 9.5 3.4 572 2.6 Dobutamine 2.5, s/p IV diuretics   11/28 (PM) 15 70/35/47 26 72 83 8.4 3.0 648 2.5 Dobutamine 2.5, s/p IV diuretics       Filling pressures remain high with high cardiac output. Agree with continuing IV diuretics, recommend giving 4 mg IV Bumex at 2000 (with goal of at least net negative 1 liter by midnight).     Beth Seaman MD, PhD  Cardiology Fellow  Pager: [phone]

## 2022-11-28 NOTE — PROGRESS NOTES
RT at bedside for VIP swan placement. Patient tolerated procedure well, no complications occurred.

## 2022-11-28 NOTE — PROGRESS NOTES
MEDICAL ICU PROGRESS NOTE  11/28/2022      Date of Service (when I saw the patient): 11/28/2022    ASSESSMENT: Arianna Siddiqi is a 62 year old female with a history of morbid obesity, CARLOS, hypertension, HFpEF, paroxysmal A. Fib (apixiban), MAG, factor IV deficiency and CKD who was admitted for hypercapnic respiratory failure likely due to pneumonia, necessitating intubation. 11/23 extubated to BiPAP r/t chronic CO2 retention tolerating well. Now with concerns for RV heart failure, pulmonary hypertension, severe volume overload.     CHANGES and MAJOR THINGS TODAY:   - INR, LFTs daily  - Lasix 100 mg BID (08,1400)  - BMP 1400 after Am diuresis (before PM diuresis)  - Goal -1 to -2 L by mid day.  - Zosyn stop date 11/30  - Decrease Amio at 5ml/hr        PLAN:     Neuro:  # Pain and sedation  # Acute encephalopathy- resolved  - Monitor neurological status. Notify provider for any acute changes in exam  Analgesia: PRN acetaminophen, oxycodone   Sedation:   - PRN hydroxyzine 25-50mg q 6H PRN available    - Quetiapine 25mg PRN daily    - Melatonin 6 mg q HS   - Holding PTA cyclobenzaprine, tramadol and benadryl     Pulmonary:  # Acute hypoxic and hypercarbic respiratory failure   # c/f Pulmonary Hypertension  # Pulmonary edema/fluid overload (see CV)  Patient extubated 11/23   - Scheduled duonebs and hypertonic saline nebs for thick secretions QID  - BiPAP, wean to NC as tolerated (currently 18/5, 45%)  - IS q 2H WA  - CT PE- Unremarkable  - CT CAP- Unremarkable  - Cardiology-  Possible component of Pulm HTN though likely mostly related to fluid overload, may need to start Veletri/Flolan/Treprostinil for Pulm HTN    # C/f HAP vs VAP  # CAP- s/p complete CTX treatment  - CAP treatment course completed; restarted antibiotics (see below)     # Hx CARLOS with CPAP  # Hx Obesity hypoventilation syndrome   # Hx Smoking (30 pack per year; stopped 2002)  - BiPAP overnight given history of hypoventilation syndrome and  "CARLOS       Cardiovascular:  # Hypotension; concerns hypovolemia vs distributive/cardiogenic shock  # Hx HFpEF EF 55-60% (most recent echo 11/18 - repeat today/see below)  # Decompensated heart failure with right heart strain  # c/f Right heart failure (biventricular failure)  Discussed with cardiology concerns for PHTN given most recent echocardiogram results with R sided heart strain and h/o non-compliance with CARLOS/CPAP therapy & HFpEF therapy regimen. Broad work up perform yesterday, unremarkable overall. Discussed with cardiology yesterday, they will plan to evaluate pt for right heart catheterization. TTE 11/26 difficult study, EF 60-65%, PA systolic is 32 mmHg above RA pressure  - Held PTA meds: Furosemide, lisinopril apixaban, metoprolol, & Bumex   - Lymphedema consulted   - 11/25 BLE duplex US - no above knee DVT, below knee RUPAL   - CT PE unremarkable  - Abdominal CT unremarkable  - BNP 08898 (20857)  - Trend daily weights     - MAP > 65  - Trend CVP  - Cardiology Consult, appreciate recs:   - Continue diuresis with intermittent diuretic dosing, OK with Lasix 100 mg BID, will  reassess in the afternoon   - Stop Dobutamine 2.5 mcg/kg/min   - Obtain CXR now and daily while swan is present   - Consider an arterial line for more sensitive BP monitoring   - Restart Heparin gtt for anticoagulation given ongoing Afib   - Consider TONJA + DCCV should status continue despite above measures   - We will continue to follow     Per Cardiology: \"It appears that patient is grossly volume overloaded with RA pressure of 18.  She also does have pulmonary hypertension in the setting of elevated wedge pressure potentially this presents a mixed picture.  However at this time would focus on significant volume removal okay to start with 80 mg of Lasix at least twice a day however suspect may need higher dose.  Would wean norepinephrine as well as dobutamine as able given preserved cardiac output which is around 9 to 10 L at this time. " " If hypotensive then norepinephrine would be okay to continue.  Goal MAP probably should be around 70 mmHg. Weaning vasoactive medications will also help control patient's atrial fibrillation, heart rate is better controlled on amiodarone with rate around 90 mmHg.  If she does not improve may consider cardioversion as patients with HFpEF and to benefit from the atrial kick.  Will likely need TONJA prior given interruptions of anticoagulation.\"    [place]-Juaquin catheter  RA 18  PA 75/41  PCWP 30  SVO2 71  Hgb 13.4  Allie CO 9  Allie CI 3.3      # Demand induced troponin leak - improving  - 11/25 troponin 257-->158 --> 143 on 11/26    # Hx PAF (on apixaban PTA)  - A fib with RVR 11/25 AM: Amio gtt at 5ml/hr     GI/Nutrition:  # Risk for malnutrition  # Hypoalbuminemia  # Morbid obesity  # Nausea and vomiting   # Constipation-resolved  # Diarrhea   - RD consult for nutrition recs  - Compazine and Zofran PRN  - Bowel regimen: Senna and Miralax PRN   - Advance diet as tolerated, monitor for nausea       Renal/Fluids/Electrolytes:  # Volume overload  # Non-oliguric Acute on chronic kidney injury   # Metabolic alkalosis-presumed contraction alkalosis vs chronic hypoventilation syndrome  # Hx CKD (baseline creatinine 1.1)  Likely a component of venous ischemia due to fluid overload.  - Nephrology consult and appreciate recs   - Strict intake and output   - Hold further diuresis given hypotension, increasing creatinine and oliguria    - Avoid Diamox concerns HCO3 is chronically elevated to compensate for chronic  hypoventilation syndrome    # Hyperkalemia - resolved  # Hypomagnesia - resolved   - Electrolyte replacement protocol     Endocrine:  # Risk for stress induced hyperglycemia  # Hx. DMII  - 11/25 Cortisol 15.5  - Every 4 hour blood glucose measurements  - Medium SSI     ID:  # Presumed CAP  # Leukocytosis - improving  Infection work up grossly unremarkable with cultures and virology. 11/25 patient still requiring moderate " dose of levophed; initiated infection workup including BLE US  - Trend lactic acid daily : 1.1 today   - Ceftiaxone 7 day course (11/18-11/24)    - Zosyn empiric treatment for VAP/HAP (11/25--> stop date 11/30)    Cultures:     11/25 BC x 2 - NGTD    11/25 MRSA     11/25 UA reflex - bland    11/25 Sputum culuture - unremarkable     11/20 Sputum culture unremarkable    11/18 Sputum culture unremarkable    11/18 urine culture unremarkable    11/17 blood cultures x 2 NGTD    11/17 viral panels unremarkable    # Oral thrush   - Nystatin QID       Hematology:    # Hx MAG  # Hx Factor VII deficiency  Seen outpatient by hematology has a history oral/IV iron supplementation  - PTA ferrous gluconate 324 mg resumed  - Held-PTA apixaban (r/t pAfib)  - Heparin gtt for pafib ppx- therapeutic currently       Musculoskeletal/Skin:  #Hx Gout  #Hx hemosiderin deposition BLE  - PT/OT for ROM  - No acute concerns for skin breakdown  - 11/18 uric acid 7.9  - Continue PTA allopurinol        General Cares/Prophylaxis:    DVT Prophylaxis: Heparin infusion  GI Prophylaxis: None  Restraints: N/A  Family Communication: Emelia Siddiqi   Code Status: FULL     Lines/tubes/drains:  - Right internal jugular CVC  - PIVx1     Disposition:  - Medical ICU     Patient seen and findings/plan discussed with medical ICU staff, Dr. Larose.    Alejandro Cuenca MD  Internal Medicine Resident, PGY-1  Pager: [phone]    ====================================  INTERVAL HISTORY:   Course reviewed.     O2 improved with Bipap, however continues to have an ill-fitting mask. Hungry, not nauseas anymore and would like start eating once more. Otherwise feeling better than yesterday.     OBJECTIVE:   1. VITAL SIGNS:   Temp:  [97.1  F (36.2  C)-98.3  F (36.8  C)] 97.8  F (36.6  C)  Pulse:  [] 92  Resp:  [10-31] 22  BP: ()/(46-91) 110/69  FiO2 (%):  [40 %-70 %] 70 %  SpO2:  [90 %-99 %] 97 %  FiO2 (%): 70 %  Resp: 22    2. INTAKE/ OUTPUT:   I/O last 3  completed shifts:  In: 1916.01 [P.O.:620; I.V.:1296.01]  Out: 1105 [Urine:1105]    3. PHYSICAL EXAMINATION:    GEN: acutely ill-appearing woman, appears comfortable with Bipap  HEENT:  Normocephalic, atraumatic, Bipap ill-fitting,  CV: A fib with HR 120s  PULM/CHEST: Course breath sounds bilaterally, Bipap ill-fitting  GI: normal bowel sounds, soft, generalized tenderness   : spontaneously voiding   EXTREMITIES: moderate peripheral edema, moving all extremities, peripheral pulses intact  NEURO: following commands, A&O x 4  SKIN: No rashes, sores or ulcerations appreciated   PSYCH:  Affect: anxious, no hallucinations       4. LABS:   Arterial Blood Gases   Recent Labs   Lab 11/26/22  1119 11/26/22  0409 11/26/22  0149 11/26/22  0026   PH 7.23* 7.23* 7.21* 7.20*   PCO2 67* 67* 70* 73*   PO2 71* 83 83 77*   HCO3 28 28 28 28     Complete Blood Count   Recent Labs   Lab 11/28/22  0428 11/27/22  0408 11/26/22  0535 11/25/22  2206   WBC 8.9 9.1 12.6* 13.3*   HGB 12.3 13.4 14.3 14.8    221 238 243     Basic Metabolic Panel  Recent Labs   Lab 11/28/22  1219 11/28/22  0816 11/28/22  0435 11/28/22  0428 11/27/22  0416 11/27/22  0408 11/26/22  0803 11/26/22  0409 11/25/22  0743 11/25/22  0355   NA  --   --   --  145  --  145  --  140  --  144   POTASSIUM  --   --   --  4.0  --  4.4  --  4.9  --  3.9   CHLORIDE  --   --   --  108*  --  107  --  104  --  103   CO2  --   --   --  26  --  26  --  19*  --  25   BUN  --   --   --  69.5*  --  67.3*  --  65.6*  --  58.2*   CR  --   --   --  2.36*  --  2.12*  --  2.02*  --  1.49*   * 93 92 91   < > 100*   < > 113*   < > 123*  113*    < > = values in this interval not displayed.     Liver Function Tests  No lab results found in last 7 days.  Coagulation Profile  Recent Labs   Lab 11/28/22  0428 11/27/22  0408 11/26/22  0752 11/25/22  2025   PTT 35 79* 80* 78*       5. RADIOLOGY:   Recent Results (from the past 24 hour(s))   Echo Limited   Result Value    LVEF  60-65%     [facility]    [id number]  [id number]  [id number]  [id number]^ANDREW^BETZY     [facility]  Echocardi[facility]  [address] [place] [id number]     Name: DUSTIN FERNANDES  MRN: [id number]  : 1960  Study Date: 2022 09:34 AM  Age: 62 yrs  Gender: Female  Patient Location: [facility] [place]  Reason For Study: PHTN  Ordering Physician: BETZY MORALES  Performed By: Laureen Salazar RDCS     BSA: 2.5 m2  Height: 62 in  Weight: 380 lb  BP: 111/62 mmHg  ______________________________________________________________________________  Procedure  Limited Echocardiogram with portions of two-dimensional, color and spectral  Doppler performed. Contrast Optison. Technically difficult study.Extremely  poor acoustic windows. Optison (NDC #3379-9819-10) given intravenously.  Patient was given 5 ml mixture of 3 ml Optison and 6 ml saline. 4 ml wasted.  ______________________________________________________________________________  Interpretation Summary  Limited TTE. Technically difficult study.  The RV is not clearly visualized but the size appears to be normal and  function appears to be moderately reduced.  The estimated PA systolic pressure is 32 mmHg above the RA pressure.  Left ventricular function is normal.The ejection fraction is 60-65%.Left  ventricular cavity size is small.  This study was compared with the study from 2022. No significant changes  in RV size/function. The estimated PASP is higher.  ______________________________________________________________________________  Left Ventricle  Left ventricular function is normal.The ejection fraction is 60-65%. Left  ventricular cavity size is small.     Right Ventricle  The RV is not clearly visualized but the size appears to be normal and  function appears to be moderately reduced.     Mitral Valve  The mitral valve is normal. Trace mitral insufficiency is present.     Tricuspid Valve  The tricuspid valve is normal. Trace  tricuspid insufficiency is present. Right  ventricular systolic pressure is 32mmHg above the right atrial pressure.     Pulmonic Valve  The valve leaflets are not well visualized. Trace pulmonic insufficiency is  present.     Vessels  Dilation of the inferior vena cava is present with abnormal respiratory  variation in diameter. IVC diameter >2.1 cm collapsing <50% with sniff  suggests a high RA pressure estimated at 15 mmHg or greater.     Compared to Previous Study  This study was compared with the study from 11/18/2022 . No significant  changes in RV size/function. The estimated PASP is higher.     ______________________________________________________________________________  Report approved by: John Ladd 11/26/2022 12:19 PM     ______________________________________________________________________________      CT Chest Pulmonary Embolism w Contrast    Narrative    EXAM: CTA pulmonary angiogram, 11/26/2022 1:10 PM    HISTORY: Female sex; Not pregnant; No imaging to r/o PE in the last 24  hours; Pulmonary Embolism Rule-Out Criteria (PERC) score > 0; Revised  Park Score (RGS) not >= 11; No D-dimer result available; D-dimer not  ordered    COMPARISON: Chest radiograph 11/25/2022.. CT abdomen 11/12/2021    TECHNIQUE: Volumetric CT images obtained through the chest with  contrast. Coronal and axial MIP reformatted images obtained.  Three-dimensional (3D) post-processed angiographic images were  reconstructed, archived to PACS and used in interpretation of this  study.     CONTRAST: Isovue 370 ml isovue 370 IV.    FINDINGS:     Vascular: There is good contrast opacification of the pulmonary  arterial vasculature. No pulmonary embolus.  Heart is enlarged with a  hypertrophic right ventricle. No evidence of acute right heart strain.  Mild reflux of contrast into the IVC and hepatic veins. No thoracic  aortic aneurysm. Dilated main pulmonary artery measuring up to 4 cm  and is significantly larger than the  aorta.    Remaining Chest: Evaluation of the lungs is somewhat limited by  respiratory motion. Central tracheobronchial tree is patent. No  consolidation, mass, or suspicious pulmonary nodules. No pleural  effusion or pneumothorax. Bibasilar atelectasis. No thoracic  lymphadenopathy. Thyroid is unremarkable. Small hiatal hernia.    Upper Abdomen: Limited evaluation demonstrates no acute findings.    Bones: No suspicious osseous lesions    Soft tissues: Unremarkable    Devices: Right IJ CVC tip is in the right atrium.      Impression    IMPRESSION:  1. Exam is negative for acute pulmonary embolism  2. Cardiomegaly with right ventricular hypertrophy and a dilated main  pulmonary artery measuring up to 4 cm, consistent with pulmonary  hypertension.    In the event of a positive result for acute pulmonary embolism  resulting in right heart strain, consider calling the   [facility] hospital  for PERT (Pulmonary Embolism Response Team)  Activation?    PERT -- Pulmonary Embolism Response Team (Multidisciplinary team  including cardiology, interventional radiology, critical care,  hematology)    I have personally reviewed the examination and initial interpretation  and I agree with the findings.    VIRIDIANA SINCLAIR MD         SYSTEM ID:  [id number]   CT Abdomen Pelvis w Contrast    Narrative    EXAMINATION: CT ABDOMEN PELVIS W CONTRAST  11/26/2022 1:11 PM      CLINICAL HISTORY: New sepsis, unknown source    COMPARISON: CT abdomen 11/12/2022    PROCEDURE COMMENTS: CT of the abdomen was performed with 87.0 cc of  Isovue-370 intravenous and oral contrast. Coronal and sagittal  reformatted images were obtained.    FINDINGS:    Liver: Normal parenchymal attenuation without focal mass.  Biliary system: Cholelithiasis. No intrahepatic or extrahepatic  biliary ductal dilatation.  Pancreas: No focal mass or dilation of the main pancreatic duct.  Stomach: Within normal limits.  Spleen: Within normal limits.  Adrenal glands: Within  normal limits.  Kidneys: Atrophic kidneys bilaterally. No focal mass, hydronephrosis,  or stone.  Bladder: Within normal limits.  Reproductive organs: IUD in place  Colon: Within normal limits.  Small Bowel: Within normal limits.  Lymph nodes: No intra-abdominal or pelvic lymphadenopathy.  Vasculature: Atherosclerotic calcifications of the abdominal aorta and  its branches without aneurysm.  Peritoneum: No intraabdominal free fluid or air.     Chest: Heart is enlarged. No pericardial effusion. No pneumothorax or  pleural effusion. Bibasilar atelectasis. Small hiatal hernia.    Bones: No suspicious osseous lesion. Degenerative changes of the  thoracolumbar spine.    Soft tissues: Large fat containing ventral hernia.      Impression    IMPRESSION:  1. No acute findings in the abdomen or pelvis.  2. Cholelithiasis without evidence for acute cholecystitis.    I have personally reviewed the examination and initial interpretation  and I agree with the findings.    SMITA LOPEZ MD         SYSTEM ID:  [id number]

## 2022-11-28 NOTE — PROGRESS NOTES
[facility] [place]    Cardiology Progress Note- Cards 2         Date of Admission:  11/17/2022     Assessment & Plan: HVSL   Arianna Siddiqi is a 62 year old female who presents with a history of obstructive sleep apnea, morbid obesity (BMI 70), hypertension, heart failure with preserved ejection fraction (LVEF 60 to 65%, 11/26/2022), paroxysmal atrial fibrillation and chronic kidney disease who was initially admitted on 11/18 after 3 to 4-day history of weakness, dyspnea and poor urine output.  She reportedly received a liter of IV fluids in the ED on initial arrival and proceeded to have worsening respiratory status and hypercapnic respiratory failure (VBG 7.12/120).  She was then intubated and admitted to the ICU where she is being treated for presumed pneumonia (had leukocytosis and elevated inflammatory markers on admission) with broad-spectrum antibiotics. Patient has had persistent hypotension requiring vasopressors, making it difficult to adequately diurese her. TTE notable for preserved LVEF of 60 to 65%, moderately reduced RV systolic function with elevated PA pressures and enlarged IVC. Cardiology consulted to assist with management (diuresis, weaning off pressors) given these findings.      Pertinent problem list:   # Acute on chronic diastolic heart failure (LVEF 60 to 65%, 11/26/2022)  # Obstructive sleep apnea  # Morbid obesity (BMI 70)  # Hypertension  # Paroxysmal atrial fibrillation  # Chronic kidney disease     Interval history:   Elevated bilateral filling pressures noted on [place] Juaquin catheter  (details below) with high cardiac output state on Dobutamine 2.5 mcg/kg/min and Norepinephrine 0.03.   Norepinephrine IV turned off and IV diuretics initiated overnight with mild to moderate urine output response though net positive for the day yesterday.   Filling pressures remains high this morning (details below) with high output state once again noted while on  Dobutamine 2.5 mcg/kg/min. Shunt assessment done this morning with MVO2 obtained from 3 sites: RIJ, RA, and PA which were 82, 82, and 73 % respectively suggestive of no left to right shunt. Hence, the calculated Allie CO/CI are not falsely elevated,      [place] numbers:   Date CVP PA PCWP SVO2 MAP CO CI SVR PVR Therapies   11/27 18 76/41/53 30 72 90 8.3 3.0 694 2.8 Dobutamine 2.5   11/28 18 73/38/50 25 76 86 9.5 3.4 572 2.6 Dobutamine 2.5, s/p IV diuretics        RECOMMENDATIONS:  - Continue diuresis with intermittent diuretic dosing, OK with Lasix 100 mg BID, will reassess in the afternoon  - Stop Dobutamine 2.5 mcg/kg/min  - Obtain CXR now and daily while swan is present  - Consider an arterial line for more sensitive BP monitoring  - Restart Heparin gtt for anticoagulation given ongoing Afib  - Consider TONJA + DCCV should status continue despite above measures  - We will continue to follow      Diet: currently on full liquid diet  DVT Prophylaxis: on heparin gtt (for Afib)  Baig Catheter: PRESENT, indication: Strict 1-2 Hour I&O, Strict 1-2 Hour I&O  Code Status:   Full       Disposition Plan  Remains in MICU for management of critical illness requiring pressors.     The patient's care was discussed with the Attending Physician, Dr. Aceves.    Beth Seaman MD, PhD  Cardiology Fellow  Pager: [phone]  ______________________________________________________________________    Interval History   NAOE. Reports of generalized discomfort this morning worse with laying flat (while [place] numbers were obtained)    Data reviewed today: I reviewed all medications, new labs and imaging results over the last 24 hours.     Physical Exam   Vital Signs: Temp: 97.5  F (36.4  C) Temp src: Axillary BP: 126/57 Pulse: 82   Resp: 17 SpO2: 95 % O2 Device: BiPAP/CPAP Oxygen Delivery: 35 LPM  Weight: 398 lbs 0 oz  General Appearance: Appears to be in mild discomfort on BiPAP  HEENT: anicteric sclera  Respiratory: Course breath sounds  diminished by BiPAP sounds, no crackles or wheezes   Cardiovascular: irregularly irregular, normal rate, s1 and s2 present, no murmur, JVD difficult to assess due to body habitus. +1 LE edema  GI: Obese, soft, NT/ND  Skin:no rashes, bruises, or ecchymosis visible  Other: Awake, alert, no focal motor deficits/able to move all extremities, appropriate affect    Data   Recent Labs   Lab 11/28/22  1219 11/28/22  0816 11/28/22  0435 11/28/22  0428 11/27/22  0416 11/27/22  0408 11/26/22  0803 11/26/22  0535 11/26/22  0409   WBC  --   --   --  8.9  --  9.1  --  12.6*  --    HGB  --   --   --  12.3  --  13.4  --  14.3  --    MCV  --   --   --  100  --  100  --  100  --    PLT  --   --   --  181  --  221  --  238  --    NA  --   --   --  145  --  145  --   --  140   POTASSIUM  --   --   --  4.0  --  4.4  --   --  4.9   CHLORIDE  --   --   --  108*  --  107  --   --  104   CO2  --   --   --  26  --  26  --   --  19*   BUN  --   --   --  69.5*  --  67.3*  --   --  65.6*   CR  --   --   --  2.36*  --  2.12*  --   --  2.02*   ANIONGAP  --   --   --  11  --  12  --   --  17*   KADEN  --   --   --  9.3  --  9.6  --   --  9.4   * 93 92 91   < > 100*   < >  --  113*    < > = values in this interval not displayed.     Recent Results (from the past 24 hour(s))   XR Chest Port 1 View    Narrative    Chest one view portable    HISTORY: Line    COMPARISON STUDY: 11/25/2022    FINDINGS: Cardiac silhouette is large. Main pulmonary artery  enlargement. Interstitial opacities bilaterally. Right IJ [place]-Juaquin  catheter tip in right pulmonary artery. Right IJ central line tip in  the low SVC.      Impression    IMPRESSION: Interstitial pulmonary edema    SMITA LOPEZ MD         SYSTEM ID:  [id number]   XR Chest Port 1 View    Narrative    Portable chest    Indication follow-up for [place]-Juaquin catheter    COMPARISON: Yesterday    FINDINGS: Heart is enlarged. Patchy opacities in the lungs there is  similar to slightly decreased. Right IJ  [place]-Juaquin catheter tip is in  the distal right main and branch pulmonary artery.      Impression    IMPRESSION: Probable decrease of pulmonary edema with cardiomegaly.  [place]-Juaquin catheter tip in the distal right main branch pulmonary  artery.    MYCHAL BATES MD         SYSTEM ID:  [id number]     Medications     amiodarone 0.5 mg/min (11/28/22 1200)     dextrose       DOBUTamine 2.5 mcg/kg/min (11/28/22 1200)     heparin 2,350 Units/hr (11/28/22 1200)     - MEDICATION INSTRUCTIONS -       - MEDICATION INSTRUCTIONS -       - MEDICATION INSTRUCTIONS -         allopurinol  200 mg Oral or Feeding Tube Daily     atorvastatin  20 mg Oral Daily     ferrous gluconate  324 mg Oral Daily     furosemide  100 mg Intravenous BID     insulin aspart  1-6 Units Subcutaneous Q4H     ipratropium - albuterol 0.5 mg/2.5 mg/3 mL  3 mL Nebulization 4x daily     melatonin  6 mg Oral or Feeding Tube At Bedtime     miconazole   Topical BID     multivitamin RENAL  1 tablet Oral Daily     nystatin  500,000 Units Mouth/Throat 4x Daily     piperacillin-tazobactam  4.5 g Intravenous Q6H     polyethylene glycol  17 g Oral or Feeding Tube Daily     senna-docusate  1-2 tablet Oral or Feeding Tube Daily

## 2022-11-28 NOTE — CONSULTS
[facility]  WOC Nurse Inpatient Assessment     Consulted for: Intergluteal cleft    Patient History (according to provider note(s):      Arianna Siddiqi is a 62 year old female with a history of morbid obesity, obstructive sleep apnea, hypertension, heart failure with preserved ejection fraction, paroxysmal A. fib and chronic kidney disease who was admitted for hypercapnic respiratory failure likely due to pneumonia, necessitating intubation.     Areas Assessed:      Areas visualized during today's visit: Sacrum/coccyx and intergluteal cleft    Wound location: Intergluteal cleft     Last photo: Unable to take good picture as pt fighting to lay on her back  Wound due to: Moisture Associated Skin Damage (MASD)  Wound history/plan of care: POA, large skin folds  Wound base: 100 % recently epithelialized tissue,        Treatment Plan:     Intergluteal cleft wound(s): Every other day     Cleanse the area with NS and pat dry.    Apply No sting film barrier to periwound skin.    Cover wound with Sacral Mepilex (#[id number])    Change dressing Q 3 days.    Turn and reposition Q 2hrs side to side only.    Ensure pt has Luis Antonio-cushion while sitting up in the chair.  FYI- If pt has constant incontinent loose stools needing dressing changes Q shift please discontinue the Mepilex dressing and apply criticaid barrier paste BID and PRN.     Orders: Written    RECOMMEND PRIMARY TEAM ORDER: None, at this time  Education provided: importance of repositioning, plan of care, wound progress, Moisture management and Off-loading pressure  Discussed plan of care with: Nurse  WOC nurse follow-up plan: signing off  Notify WOC if wound(s) deteriorate.  Nursing to notify the Provider(s) and re-consult the WOC Nurse if new skin concern.    DATA:     Current support surface: Standard  Low air loss (DEVAN pump, Isolibrium, Pulsate, skin guard, etc)  Containment of urine/stool: Indwelling catheter  BMI: Body mass  index is 72.8 kg/m .   Active diet order: Orders Placed This Encounter      Advance Diet as Tolerated: Full Liquid Diet     Output: I/O last 3 completed shifts:  In: 1916.01 [P.O.:620; I.V.:1296.01]  Out: 1105 [Urine:1105]     Labs:   Recent Labs   Lab 11/28/22  0428   HGB 12.3   WBC 8.9     Pressure injury risk assessment:   Sensory Perception: 4-->no impairment  Moisture: 4-->rarely moist  Activity: 2-->chairfast  Mobility: 2-->very limited  Nutrition: 1-->very poor  Friction and Shear: 1-->problem  Johnnie Score: 14    Ashley Falk RN   Dept. Pager: [phone]  Dept. Office Number: [phone]

## 2022-11-28 NOTE — PLAN OF CARE
Edema: Pt presenting with minimal to no edema in William LEs, no pitting in feet, around ankles or in william LEs. LE girth likely soft tissue. Pt reporting no need for LE compression as well. Will complete edema orders.

## 2022-11-28 NOTE — PROGRESS NOTES
ICU Daily Rounding Checklist     Checklist Response Notes   Can sedation be reduced?  NA    Can analgesia be reduced? No    Is delirium being assessed, addressed and prevented? Yes    Spontaneous awakening trial and/or Spontaneous breathing trial candidate?  NA    Total fluid balance goal reviewed?  Yes Target Goals:        -500 [12h]             -500 [24h]   Is the patient at goals for lung protective ventilation? NA    Head of bed elevation (30 degrees)? Yes    Skin breakdown assessment (prevention) completed? Yes    Is enteral nutrition at goal? Yes    Is blood glucose at goal? Yes    Deep venous thrombosis prophylaxis? Yes      Gastric ulcer prophylaxis?  No If coagulopathy (INR-1.5 PTT2x normal. Ph<50k), mechanical ventilation 48hr, history of GI bleed/ulcer within past year. TBL, SCI, or burn, or if >= 2 minor risk factors (sepsis, ICU stay 1 week, occult GI bleed > 6 days. glucocorticoid therapy, NSAID use, antiplatelet use)   Can Antibiotics be narrowed or discontinued? No    Early mobility candidate and physical therapy consulted? Yes    Is antunez catheter needed? Yes    Is central venous/arterial catheter needed? Yes    Has the family been updated? Yes    Are the patient's goals of care and code status current? Yes

## 2022-11-28 NOTE — PROGRESS NOTES
CLINICAL NUTRITION SERVICES - REASSESSMENT NOTE     Nutrition Prescription    RECOMMENDATIONS FOR MDs/PROVIDERS TO ORDER:  --If pt is unable to tolerate nutrition PO, consider placement of post pyloric feeding tube if able to remain off BIPAP.     Malnutrition Status:    Patient does not meet two of the established criteria necessary for diagnosing malnutrition but is at risk for malnutrition    Recommendations already ordered by Registered Dietitian (RD):  --Changed MVI to tablet form (Renavite).   --Supplements: Ensure Enlive @ 10 am, Ensure Clear @ 2 pm, Ensure Shake @ HS    Future/Additional Recommendations:  --Supplements tolerance/acceptance.   --If PO intake remains poor and pt amenable, consider placing small bore feeding tube (post pyloric if BIPAP):       --GOAL: Vital High Protein @ goal of 55ml/hr (1320ml/day) will provide: 1320 kcals (27.5 kcal/kg IBW), 115 g PRO (2.4 g/kg IBW), 1103 ml free H20, 146 g CHO, and 0 g fiber daily; 1848 mg K+, 1102 mg Phos.      EVALUATION OF THE PROGRESS TOWARD GOALS   Diet: Full Liquid advance as tolerated; 2000 ml fluid restriction  Nutrition Support:   11/18-11/23: Vital High Protein @ goal of 55ml/hr (1320ml/day) will provide: 1320 kcals (27.5 kcal/kg IBW), 115 g PRO (2.4 g/kg IBW), 1103 ml free H20, 146 g CHO, and 0 g fiber daily; 1848 mg K+, 1102 mg Phos.   Intake: pt received ~80% of low-end estimated nutrition needs via TF x 6 days, with minimal PO intake since extubation.   Calorie counts ordered 11/26-11/28 11/26     Total Kcals: 0           Total Protein: 0g  11/27 - pending  11/28 - pending    Spoke with RN this afternoon, pt was just placed on BIPAP and sleepy. Per RN, pt only drank water so far today. Discussed plan to order various supplements.      NEW FINDINGS   Weight: variable, confounded by fluid/diuresis and bariatric bed scale.   Labs: K+ 4 (WNL), BUN 69.5 (H), Cr 2.36 (H)  Meds: Ferrous gluconate, Lasix BID, Medium sliding scale insulin, Nystatin  QID, Zosyn, Nephronex, Dobutamine gtt  GI: Last BM on 11/25, emesis x1 also on 11/25. Abdomen soft, non-tender.   Renal: CKD on JASON  Resp: intubated 11/18-11/23; intermittently on BIPAP  Skin: WOCN following    MALNUTRITION  % Intake: < 75% for > 7 days (moderate)  % Weight Loss: None noted  Subcutaneous Fat Loss: None observed - difficult to assess 2/2 body habitus  Muscle Loss: None observed - difficult to assess 2/2 body habitus  Fluid Accumulation/Edema: Mild  Malnutrition Diagnosis: Patient does not meet two of the established criteria necessary for diagnosing malnutrition but is at risk for malnutrition    Previous Goals   Total avg nutritional intake to meet a minimum of 22 kcal/kg and 2 g PRO/kg daily (per dosing wt 48 kg IBW).  Evaluation: Not met    Previous Nutrition Diagnosis  Predicted inadequate nutrient intake (energy and protein) related to mechanical ventilation as evidenced by NPO status reliant on TF to meet nutrition needs with risk of TF interruptions.     Evaluation: Declining    CURRENT NUTRITION DIAGNOSIS  Inadequate protein-energy intake related to respiratory status, loss of enteral access, and poor appetite post-extubation as evidenced by PO intake 0-25% x 3-4 days, previously receiving 80% of nutrition needs via EN x 6 days.       INTERVENTIONS  Implementation  Collaboration with other providers - rounds  Enteral Nutrition - recs  Medical food supplement therapy    Goals  Patient to consume % of nutritionally adequate meal trays TID, or the equivalent with supplements/snacks.    Monitoring/Evaluation  Progress toward goals will be monitored and evaluated per protocol.    Michelle Arreaga, MS, RD, LD, [facility]  MICU pager: [phone]  ASCOM: [phone]

## 2022-11-29 NOTE — PROGRESS NOTES
[facility]    Cardiology Progress Note- Cards 2         Date of Admission:  11/17/2022     Assessment & Plan: HVSL   Arianna Siddiqi is a 62 year old female who presents with a history of obstructive sleep apnea, morbid obesity (BMI 70), hypertension, heart failure with preserved ejection fraction (LVEF 60 to 65%, 11/26/2022), paroxysmal atrial fibrillation and chronic kidney disease who was initially admitted on 11/18 after 3 to 4-day history of weakness, dyspnea and poor urine output.  She reportedly received a liter of IV fluids in the ED on initial arrival and proceeded to have worsening respiratory status and hypercapnic respiratory failure (VBG 7.12/120).  She was then intubated and admitted to the ICU where she is being treated for presumed pneumonia (had leukocytosis and elevated inflammatory markers on admission) with broad-spectrum antibiotics. Patient has had persistent hypotension requiring vasopressors, making it difficult to adequately diurese her. TTE notable for preserved LVEF of 60 to 65%, moderately reduced RV systolic function with elevated PA pressures and enlarged IVC. Cardiology consulted to assist with management (diuresis, weaning off pressors) given these findings.      Pertinent problem list:   # Acute on chronic diastolic heart failure (LVEF 60 to 65%, 11/26/2022)  # Obstructive sleep apnea  # Morbid obesity (BMI 70)  # Hypertension  # Paroxysmal atrial fibrillation  # Chronic kidney disease     Interval history:   Good response to IV diuretics with Bilateral filling pressures slowly down trending (details below). Clinically patient seem to be doing better. Remain on Amiodarone gtt with conversion to NSR noted on telemetry.        [place] numbers:   Date CVP PA PCWP SVO2 MAP CO CI SVR PVR Therapies   11/27 18 76/41/53 30 72 90 8.3 3.0 694 2.8 Dobutamine 2.5   11/28 18 73/38/50 25 76 86 9.5 3.4 572 2.6 Dobutamine 2.5, s/p IV diuretics   11/29  15 70/35/47 20 72 89 9.5 3.4 623 2.3 Dobutamine 2.5, IV diuretics        RECOMMENDATIONS:  - Stop Dobutamine 2.5 mcg/kg/min  - Continue diuresis with intermittent diuretic dosing, (transitioned to Bumex 4 with good tolerance overnight), target goal of net negative by midnight  - Obtain CXR now and daily while swan is present  - Continue Amiodarone 0.5 mg/hr gtt today then transition to 400 mg PO BID tomorrow for 1 week, then 200 mg daily to finish load  - Continue Heparin gtt for anticoagulation  - We will continue to follow      Diet: currently on full liquid diet  DVT Prophylaxis: on heparin gtt (for Afib)  Baig Catheter: PRESENT, indication: Strict 1-2 Hour I&O, Strict 1-2 Hour I&O  Code Status:   Full       Disposition Plan  Remains in MICU for management of critical illness requiring pressors.     The patient's care was discussed with the Attending Physician, Dr. Aceves.    Beth Seaman MD, PhD  Cardiology Fellow  Pager: [phone]  ______________________________________________________________________    Interval History   NAOE. Patient seems more awake and interactive today. Reports feeling better today compared to yesterday.     Data reviewed today: I reviewed all medications, new labs and imaging results over the last 24 hours.     Physical Exam   Vital Signs: Temp: 97  F (36.1  C) Temp src: Axillary BP: 138/75 Pulse: 75   Resp: 20 SpO2: 95 % O2 Device: BiPAP/CPAP Oxygen Delivery: 35 LPM  Weight: 392 lbs 0 oz  General Appearance: Nasal BiPAP in place, interactive  HEENT: anicteric sclera  Respiratory: Course breath sounds diminished by BiPAP sounds, no crackles or wheezes   Cardiovascular: irregularly irregular, normal rate, s1 and s2 present, no murmur, JVD difficult to assess due to body habitus. +1 LE edema  GI: Obese, soft, NT/ND  Skin:no rashes, bruises, or ecchymosis visible  Other: Awake, alert, no focal motor deficits/able to move all extremities, appropriate affect    Data   Recent Labs   Lab  11/29/22 0439 11/29/22 0415 11/29/22  0014 11/28/22  1548 11/28/22  1349 11/28/22  0435 11/28/22  0428 11/27/22 0416 11/27/22  0408   WBC  --  8.0  --   --   --   --  8.9  --  9.1   HGB  --  12.3  --   --   --   --  12.3  --  13.4   MCV  --  98  --   --   --   --  100  --  100   PLT  --  177  --   --   --   --  181  --  221   INR  --  1.32*  --   --   --   --   --   --   --    NA  --  147*  --   --  140  --  145  --  145   POTASSIUM  --  3.9  --   --  4.0  --  4.0  --  4.4   CHLORIDE  --  107  --   --  104  --  108*  --  107   CO2  --  25  --   --  22  --  26  --  26   BUN  --  69.8*  --   --  65.6*  --  69.5*  --  67.3*   CR  --  2.47*  --   --  1.05*  --  2.36*  --  2.12*   ANIONGAP  --  15  --   --  14  --  11  --  12   KADEN  --  9.8  --   --  8.7*  --  9.3  --  9.6   GLC 88 87 91   < > 277*   < > 91   < > 100*   ALBUMIN  --  3.0*  --   --   --   --   --   --   --    PROTTOTAL  --  7.0  --   --   --   --   --   --   --    BILITOTAL  --  0.7  --   --   --   --   --   --   --    ALKPHOS  --  74  --   --   --   --   --   --   --    ALT  --  16  --   --   --   --   --   --   --    AST  --  21  --   --   --   --   --   --   --     < > = values in this interval not displayed.     Recent Results (from the past 24 hour(s))   XR Chest Port 1 View    Narrative    Portable chest    Indication follow-up for [place]-Juaquin catheter    COMPARISON: Yesterday    FINDINGS: Heart is enlarged. Patchy opacities in the lungs there is  similar to slightly decreased. Right IJ [place]-Juaquin catheter tip is in  the distal right main and branch pulmonary artery.      Impression    IMPRESSION: Probable decrease of pulmonary edema with cardiomegaly.  [place]-Juaquin catheter tip in the distal right main branch pulmonary  artery.    MYCHAL BATES MD         SYSTEM ID:  [id number]     Medications     amiodarone 0.5 mg/min (11/29/22 0800)     dextrose       DOBUTamine 2.5 mcg/kg/min (11/29/22 0800)     heparin 2,150 Units/hr (11/29/22 0800)     -  MEDICATION INSTRUCTIONS -       - MEDICATION INSTRUCTIONS -       - MEDICATION INSTRUCTIONS -         allopurinol  200 mg Oral or Feeding Tube Daily     atorvastatin  20 mg Oral Daily     bumetanide  4 mg Intravenous Once     ferrous gluconate  324 mg Oral Daily     insulin aspart  1-6 Units Subcutaneous Q4H     ipratropium - albuterol 0.5 mg/2.5 mg/3 mL  3 mL Nebulization 4x daily     lidocaine  2 patch Transdermal Daily    And     lidocaine   Transdermal Q8H SANDRA     melatonin  6 mg Oral or Feeding Tube At Bedtime     miconazole   Topical BID     multivitamin RENAL  1 tablet Oral Daily     nystatin  500,000 Units Mouth/Throat 4x Daily     piperacillin-tazobactam  4.5 g Intravenous Q6H     polyethylene glycol  17 g Oral or Feeding Tube Daily     senna-docusate  1-2 tablet Oral or Feeding Tube Daily

## 2022-11-29 NOTE — PLAN OF CARE
ICU End of Shift Summary. See flowsheets for vital signs and detailed assessment.    Changes this shift: Neuro status unchanged. PRN tylenol, oxycodone, and lidocaine patches for back pain. HFNC 70% 35L. VBG improved after transitioning to AVAPS 40% at HS. [place] @ 60. CVP 13-14. PA 70-72/32-33. BM x1. Bumex given with good results. Abrasion noted to left labia, barrier cream applied. Heparin gtt at 2150 units/hr, next PTT @ 0830. Amiodarone and Dobutamine gtts continued.     Plan: Continue with plan of care. FICKS q6.       Goal Outcome Evaluation:      Plan of Care Reviewed With: patient    Overall Patient Progress: no changeOverall Patient Progress: no change    Outcome Evaluation: Bipap overnight. Dobutamine and amio gtt continued.

## 2022-11-29 NOTE — PROGRESS NOTES
Calorie Count  Intake recorded for: 11/28  Total Kcals: 123 Total Protein: 6g  Kcals from Hospital Food: 123   Protein: 6g  Kcals from Outside Food (average):0 Protein: 0g  # Meals Ordered from Kitchen: 0 meals  # Meals Recorded: no meal intake recorded  # Supplements Recorded: <25% Ensure Enlive + ice cream shake

## 2022-11-29 NOTE — PLAN OF CARE
ICU End of Shift Summary. See flowsheets for vital signs and detailed assessment.    Changes this shift: Pt oriented and following commands, in atrial flutter. [place] catheter at 60, FICKs q6. Pt to be on Bipap (40%) as much as tolerated, HFNC otherwise (70% and 35L). Mostly on HFNC this shift. vBGs q6. Amnio decreased from 1 to 0.5. Dobutamine continued at 2.5. Heparin restarted at 2350, aPTT was therapeutic. Full liquid diet, calorie count, advance as tolerated. Ate 50% of one applesauce today, mostly uninterested in eating. Medium BM. Aggressive diuresis with lasix and metolazone. 2L fluid restriction continued.     Plan:  Continue plan of care, encourage Bipap use and diet allowed intake.

## 2022-11-29 NOTE — PROGRESS NOTES
Cardiology Brief Progress Note    PM [place] numbers:  RA 16   PA 75/37/50    CWP 16    SVO2 77  Allie CO/CI: 10.3/3.7  MAP 75    PVR 3.3  SaO2 97%    Intake/Output Summary (Last 24 hours) at 11/29/2022 1521  Last data filed at 11/29/2022 1500  Gross per 24 hour   Intake 2682.1 ml   Output 4060 ml   Net -1377.9 ml     - Notably off dobutamine drip since AM numbers were obtained   - RA not significantly changed  - PCWP decreased from 20 to 16  - Continue IV diuresis    Geoff Momin MD   Cardiology Fellow

## 2022-11-29 NOTE — PROGRESS NOTES
CLINICAL NUTRITION SERVICES - BRIEF NOTE  *Please see full assessment note from 11/28/2022    New Findings:  Calorie counts:  11/26     Total Kcals: 0           Total Protein: 0g   11/27 - none documented  11/28     Total Kcals: 123       Total Protein: 6g    Visited with pt this morning on HFNC. Pt reports having a shake this morning (Ensure shake) and enjoyed it. Discussed nutrition concerns with pt, as she has been without adequate nutrition x 6 days. Pt verbalized understanding and agreed to try drinking 2-3 nutrition supplements/day while appetite is poor, per RD recommendation. Modified supplement orders and re-ordered calorie counts.      Interventions  Continue calorie counts  Medical food supplement therapy - Butter Pecan Nepro @ 10 am, Ensure shake @ 2 pm and HS (strawberry or chocolate)  Nutrition education - recommendations    RD to follow per protocol.    Michelle Arreaga, MS, RD, LD, [facility] pager: [phone]  ASCOM: [id number]

## 2022-11-30 NOTE — PLAN OF CARE
ICU End of Shift Summary. See flowsheets for vital signs and detailed assessment.     Changes this shift: HFNC 70% 35L, otherwise BiPap 50% majority of the shift. [place] @ 60. SR, HR 60-80. BM x1. Good UOP, continues on scheduled Bumex. Heparin gtt at 1950 units/hr, next PTT @ 1215. Amiodarone gtt continued. Potassium replaced per protocol.      Plan: Continue with plan of care. FICKS q6.

## 2022-11-30 NOTE — CONSULTS
[facility]  Palliative Care Consultation Note    Patient: Arianna Siddiqi  Date of Admission:  11/17/2022    Requesting Clinician / Team: MICU 1   Reason for consult: Goals of care  Patient and family support    Recommendations:  Pt seen and examined with PC LICSW/Counseling staff for co-visit. Basis role and tenets of palliative care outlined. Will review with primary team.  Goals of care:  Arianna outlines her goals as fully restorative knowing she has significant recovery needs to achieve her prior level of independence- noting she lived alone in an apartment with elevator access-supported by food/meal delivery service and cleaning resources from a [facility] agency. She has a niece (Emelia) who serves as her advocate (not documented formally) and visits 'often'-(not supported by unit SW documentation of discussion with niece who notes visiting 2-3 times per year.)  She relates no use of walker or cane and functional independence. At present she is total lift assist for transfers and unable to meet basic ADL's. Seems open to TCU after discharge prior to her hopeful return home.  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   Asking to see - requested.  Notes some anxiety with health decline and open to support from counseling staff- will continue to follow.   CODE STATUS: FULL CODE - will need to be further addressed at follow up. Her niece is surrogate decision maker but not formalized- offered assist with honoring choices documents.      These recommendations have been discussed with unit staff, ICU RN and primary team.      Thank you for the opportunity to participate in the care of this patient and family. Our team: will continue to follow.     During regular M-F work hours -- if you are not sure who specifically to contact -- please contact us by sending a text page to our team consult pager at  [phone].    After regular work hours and on weekends/holidays, you can call our answering service at [phone]. Also, who's on call for us is available in [employer].   Marvin MARQUEZ NP  Nurse Practitioner- Advanced Practice Provider  [facility] Palliative Medicine Consult Service   [phone]    TT spent: 40 minutes of which 30 minutes were spent in direct face to face contact with patient/family.  Greater than 50% of time spent counseling and/or coordinating care.     Assessments:  Arianna Siddiqi is a 62 year old female with a history of morbid obesity, CARLOS, hypertension, HFpEF, paroxysmal A. Fib (apixiban), MAG, factor IV deficiency and CKD who was admitted for hypercapnic respiratory failure likely due to pneumonia, necessitating intubation. 11/23 extubated to BiPAP r/t chronic CO2 retention tolerating well. On HFNC and off vasoactive meds for now.  Known concerns for RV heart failure, pulmonary hypertension, severe volume overload.    Today, 11/30 the patient was seen for PC consult for goals of care and support in setting of above.   Prognosis, Goals, & Planning:      Functional Status just prior to hospitalization: 2 (Ambulatory and capable of all selfcare but unable to carry out any work activities; may need help with IADLs up and about > 50% of waking hours)      Prognosis, Goals, and/or Advance Care Planning were addressed today: Yes        Summary/Comments: Herniece is surrogate decision maker but not documented- offered assist with honoring choices documents.       Patient's decision making preferences: shared with support from loved ones          Patient has decision-making capacity today for complex decisions: Yes            I have concerns about the patient/family's health literacy today: No           Patient has a completed Health Care Directive: No.       Code status: Full Code    Coping, Meaning, & Spirituality:   Mood, coping, and/or meaning in the context of serious illness  were addressed today: Yes  Summary/Comments:     Social:     Living situation: lives alone in [facility] with [facility] assistance- no PCA or HHA to date.    Key family / caregivers: Niece is her only involved family. reason to question extent of actual support     Current in-home services: see above.     Spends her free time with TV- loves old movies from the 40's- some time playing cards with friends. 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     History of Present Illness:  History gathered today from: patient, medical chart, medical team members  At present she is total lift assist for transfers and unable to meet basic ADL's. Seems open to TCU after discharge prior to her hopeful return home. She is conversant and sitting in bedside chair.     Key Palliative Symptom Data:  # Dyspnea severity the last 12 hours: low  # Anxiety severity the last 12 hours: low    ROS:  Comprehensive ROS is somewhat unreliable due to critical illness and respiratory distress. 6 systems reviewed without findings except as noted above.      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     Diabetes mellitus (H)      Gout      Hypertension      Insomnia      Morbid obesity (H)      CARLOS treated with BiPAP         Past Surgical History:  Past Surgical History:   Procedure Laterality Date     EXAM UNDER ANESTHESIA PELVIC N/A 11/14/2021    Procedure: Exam Under Anesthesia, Dilation and curettage,  placement of intrauterine device, pap smear;  Surgeon: Deedee Hess MD;  Location: [facility] OR         Family History:  Family History   Adopted: Yes   Family history unknown: Yes        Allergies:  Allergies   Allergen Reactions     Penicillins Itching and Rash     Tolerated ceftriaxone 1/17/2020  Tolerated Zosyn 11/2022        Medications:  I have  reviewed this patient's medication profile and medications from this hospitalization.    Physical Exam:  Vital Signs: Temp: 97.6  F (36.4  C) Temp src: Oral BP: (!) 141/81 Pulse: 75   Resp: 11 SpO2: 97 % O2 Device: High Flow Nasal Cannula (HFNC) Oxygen Delivery: 35 LPM  Weight: 390 lbs 0 oz  GEN: Morbidly obese female resting in recliner, HFNC in place.  in no acute distress.  HEENT:  Normocephalic, atraumatic, MMM.  CV: Monitor with sinus rhythm in the 70s.  RRR.  PULM/CHEST: no appreciable wheeze or cough.  GI: normal bowel sounds, soft, generalized tenderness   : Baig catheter in place  EXTREMITIES: moving all extremities, peripheral pulses intact  NEURO: following commands, A&O x 4    Data reviewed:  ROUTINE LABS (Last four results)  BMPRecent Labs   Lab 11/30/22  0836 11/30/22 0408 11/29/22 2018 11/29/22  1651 11/29/22  1358 11/29/22  0439 11/29/22  0415 11/28/22  1548 11/28/22  1349   NA  --  141  --   --  143  --  147*  --  140   POTASSIUM  --  3.6  --   --  4.0  --  3.9  --  4.0   CHLORIDE  --  101  --   --  104  --  107  --  104   KADEN  --  10.0  --   --  9.8  --  9.8  --  8.7*   CO2  --  28  --   --  26  --  25  --  22   BUN  --  67.4*  --   --  68.5*  --  69.8*  --  65.6*   CR  --  2.43*  --   --  2.52*  --  2.47*  --  1.05*   GLC 79 90 109* 120* 139*   < > 87   < > 277*    < > = values in this interval not displayed.     CBC  Recent Labs   Lab 11/30/22 0408 11/29/22 0415 11/28/22  0428 11/27/22  0408   WBC 8.9 8.0 8.9 9.1   RBC 4.53 4.41 4.39 4.71   HGB 12.8 12.3 12.3 13.4   HCT 43.7 43.3 43.9 47.2*   MCV 97 98 100 100   MCH 28.3 27.9 28.0 28.5   MCHC 29.3* 28.4* 28.0* 28.4*   RDW 17.7* 17.7* 17.8* 17.6*    177 181 221     INR  Recent Labs   Lab 11/30/22  0408 11/29/22  0415   INR 1.27* 1.32*

## 2022-11-30 NOTE — PROGRESS NOTES
"[facility]  Palliative Care Social Work Note:    Patient Info:  Arianna Siddiqi is a 62 year old female with a history of morbid obesity, CARLOS, hypertension, HFpEF, paroxysmal A. Fib (apixiban), MAG, factor IV deficiency and CKD who was admitted for hypercapnic respiratory failure likely due to pneumonia, necessitating intubation. 11/23 extubated to BiPAP due to chronic CO2 retention. Now with concerns for RV heart failure, pulmonary hypertension, volume overload.      Brief summary of visit: Introductory co-visit with members of the palliative care team today; NP and PCSW. Reviewed role of palliative care as part of Arianna's care team along with role and services provided.  Engaged Arianna in conversation around previous hospitalizations, living situation and ways in which she juani with stress. Arianna denied history of anxiety or impaired mood. Validated pt's repeated and prolonged hospitalizations. Offered continued PCSW visits over course of this hospital stay, pt welcoming of continued support.      Date of Admission: 11/17/2022    Reason for consult: Patient and family support    Sources of information: Patient    Recommendations & Plan:  PCSW will continue to follow pt during course of hospitalization for continued assessment of emotional support needs and coping skills.     These recommendations have been discussed with unit sw and palliative care team.    Symptoms & Concerns Addressed Today:  Emotional needs will continue to be assessed; pt denied immediate psych/social concerns.    Strengths Identified:    Pt identified good support from friends and niece; some conflicting information re how often pt/niece see one another.    Relationships & Support:  Aspects of relationships and support assessed today:    Identified family members: Viraj Kapoor    Professional supports: Pt reports previously having home care; uses \"senior\" transport services and grocery delivery    Family coping: Unable " to assess today    Bereavement Risk concerns: Unable to assess today    Coping, Mental Health & Adjustment to Illness:   Anxiety  Depression    Goals, Decision Making & Advance Care Planning:   Prognosis, Goals, and/or Advance Care Planning were assessed today: No  If yes, brief summary of discussion: NA  Preferred language: English  Patient's decision making preferences: independently  I have concerns about the patient/family's health literacy today: No  Patient has a completed Health Care Directive: No.   Code status per chart review: Full Code    Clinical Social Work Interventions:   Introduction of Palliative clinical social work interventions  Facilitation of processing of thoughts/feelings      TAMMY Daly, [facility]  Palliative Care   [facility] Inpatient Team Consult pager [phone] (M-F 8-4:30)  After-hours Answering Service [phone]

## 2022-11-30 NOTE — PROGRESS NOTES
[facility]    Progress Note - Medicine Service, MARGIOVANNI TEAM 1       Date of Admission:  11/17/2022    Assessment & Plan   Arianna Siddiqi is a 62 year old female with a history of morbid obesity, CARLOS, hypertension, HFpEF, paroxysmal A. Fib (apixiban), MAG, factor IV deficiency and CKD who was admitted for hypercapnic respiratory failure likely due to pneumonia, necessitating intubation. 11/23 extubated to BiPAP due to chronic CO2 retention. Now with concerns for RV heart failure, pulmonary hypertension, volume overload, off pressors since 11/29 and transferred to general medicine 11/30.     # Acute hypoxic and hypercarbic respiratory failure   # Pulmonary Hypertension  # Pulmonary edema/fluid overload   Patient extubated to BiPAP 11/23. CT PE/CAP unremarkable this admission.    - Scheduled duonebs and hypertonic saline nebs for thick secretions QID  - BiPAP when sleeping  - IS q 2H WA  - VBG conditionally   - see the cardiology section for the plan regarding her pulmonary hypertension    # Intermittent hypotension  # Hx HFpEF (EF 60 to 65% per echo on 11/26/2022)    # Decompensated right heart failure  # Fluid overload  # Pulmonary hypertension  Patient's Aurora-Juaquin catheter measurements while in ICU consistent with volume overload and pulmonary hypertension.   Elevated BNP.  Normal cardiac output per swan Juaquin catheter (no suspicion for shunts which would artificially elevate these measurements). Known history of CARLOS and HFpEF. 11/25 BLE duplex US showed no above knee DVT.   - Holding prior to admission Furosemide, lisinopril apixaban, metoprolol, & Bumex   - Diuresis with Bumex 4mg IV TID, goal net negative 1L to 2L  - Trend daily weightsand obtain strict ins and outs   - MAP > 70    # Acute encephalopathy- resolved  Iso critical illness and ICU derlerium.   Analgesia: PRN acetaminophen, oxycodone   Sedation:               - PRN hydroxyzine 25-50mg q 6H                - PRN Quetiapine 25mg daily                - Melatonin 6 mg q HS   - Holding PTA cyclobenzaprine, tramadol and benadryl     # Hx PAF (on apixaban PTA)  Presently in sinus rhythm.   - Previously on amio gtt this admission; continuing 200 mg BID PO amio this week. Plan to transition to maintenance 200 mg amio therafter (12/7).   - On heparin gtt given JASON, transition back to apixaban as resolves     # Non-oliguric Acute on chronic kidney injury   # Metabolic alkalosis-presumed contraction alkalosis vs chronic hypoventilation syndrome  # Hx CKD (baseline creatinine 1.1)  Likely a component of venous ischemia due to fluid overload.  - Repeat a Cystatin C Dec 2. If GFR remains low, consider re-consulting nephrology.    # Hyperkalemia - resolved  # Hypomagnesia - resolved   - Electrolyte replacement protocol    # CAP - resolved  Status post Ceftiaxone 7 day course (11/18-11/24).  Zosyn empiric treatment for VAP/HAP (11/25-11/30)     Cultures:     11/25 BC x 2 - NGTD    11/25 MRSA     11/25 UA reflex - bland    11/25 Sputum culuture - unremarkable     11/20 Sputum culture unremarkable    11/18 Sputum culture unremarkable    11/18 urine culture unremarkable    11/17 blood cultures x 2 NGTD    11/17 viral panels unremarkable    # Hx MAG  # Hx Factor VII deficiency  Seen outpatient by hematology has a history oral/IV iron supplementation  - PTA ferrous gluconate 324 mg resumed  - Held-PTA apixaban (r/t pAfib)  - Heparin gtt for pafib ppx- therapeutic currently     # CARLOS onCPAP  # Obesity hypoventilation syndrome   # Hx Smoking (30 pack per year; stopped 2002)  - BiPAP when sleeping    #Hx Gout  #Hx hemosiderin deposition BLE  - PT/OT for ROM  - 11/18 uric acid 7.9  - PTA allopurinol reduced to 100 mg given poor renal function    # Demand induced troponin leak - improving  - 11/25 troponin 257-->158 --> 143 on 11/26    # Oral thrush   - Nystatin QID     # Risk for malnutrition  # Hypoalbuminemia  # Morbid obesity  # Nausea and  vomiting   # Constipation-resolved  # Diarrhea   - RD following  - Compazine and Zofran PRN  - Bowel regimen: Senna and Miralax PRN   - Advance diet as tolerated, monitor for nausea    # Goals of Care  ICU consulted palliative care 11/30 to begin discussing pt's goals moving forward. Given her chronic lung disease 2/2 CARLOS, OHS, pHTN as well as her HFpEF with decompensated RV failure recurrent acute hypercarbic respiratory failure likely to occur with any pulmonary insult like another viral or bacterial. Pt may want to discuss what she would prefer us to do in these situations.   - Palliative care consult, appreciate assistance       Diet: Fluid restriction 2000 ML FLUID  Snacks/Supplements Adult: Other; Butter pecan Nepro @ 10 am, Ensure shake (strawberry) @ 2 pm, Ensure Shake (chocolate) @ HS; Between Meals  Calorie Counts  Advance Diet as Tolerated: Regular Diet Adult    DVT Prophylaxis: Heparin gtt  Baig Catheter: PRESENT, indication: Strict 1-2 Hour I&O, Strict 1-2 Hour I&O  Fluids: none  Central Lines: PRESENT  CVC Double Lumen Right Internal jugular-Site Assessment: WDL except;Ecchymotic  Cardiac Monitoring: ACTIVE order. Indication: ICU  Code Status: Full Code      Disposition Plan     Expected Discharge Date: 11/30/2022      Destination: nursing home          The patient's care was discussed with the Attending Physician, Dr. Barnes.    Servando Holbrook MD  Medicine Service, [facility] TEAM [address] [place] [address]  Securely message with the Vocera Web Console (learn more here)  Text page via [facility] Paging/Directory   Please see signed in provider for up to date coverage information      Clinically Significant Risk Factors         # Hypernatremia: Highest Na = 147 mmol/L (Ref range: 136-145) in last 2 days, will monitor as appropriate   # Hypercalcemia: corrected calcium is >10.1, will monitor as appropriate    # Hypoalbuminemia: Lowest albumin = 3 g/dL (Ref range: 3.5-5.2) at  "11/29/2022  4:15 AM, will monitor as appropriate          # Severe Obesity: Estimated body mass index is 71.33 kg/m  as calculated from the following:    Height as of this encounter: 1.575 m (5' 2\").    Weight as of this encounter: 176.9 kg (390 lb).          ______________________________________________________________________    Interval History   Transferred to general medicine team. Received handoff from ICU team and spoke with pt about questions or concerns. She denies any worsening SOB, cough or secretions. Tolerating PO.      Data reviewed today: I reviewed all medications, new labs and imaging results over the last 24 hours. I personally reviewed no images or EKG's today.    Physical Exam   Vital Signs: Temp: 97.6  F (36.4  C) Temp src: Oral BP: (!) 166/151 Pulse: 74   Resp: 28 SpO2: 96 % O2 Device: High Flow Nasal Cannula (HFNC) Oxygen Delivery: 35 LPM  Weight: 390 lbs 0 oz  GEN: Morbidly obese female resting in recliner in no acute distress.  HEENT:  Normocephalic, atraumatic, Bipap mask off.  CV: Sinus rhythm in the 70s.  Regular rate and rhythm with no murmurs.  PULM/CHEST: Course breath sounds bilaterally.  GI: normal bowel sounds, soft, generalized tenderness   : Baig catheter in place  EXTREMITIES: moving all extremities, peripheral pulses intact  NEURO: following commands, A&O x 4    Data   Recent Labs   Lab 11/30/22  1157 11/30/22  0836 11/30/22  0408 11/29/22  1651 11/29/22  1358 11/29/22  0439 11/29/22  0415 11/28/22  0435 11/28/22  0428   WBC  --   --  8.9  --   --   --  8.0  --  8.9   HGB  --   --  12.8  --   --   --  12.3  --  12.3   MCV  --   --  97  --   --   --  98  --  100   PLT  --   --  170  --   --   --  177  --  181   INR  --   --  1.27*  --   --   --  1.32*  --   --    NA  --   --  141  --  143  --  147*   < > 145   POTASSIUM  --   --  3.6  --  4.0  --  3.9   < > 4.0   CHLORIDE  --   --  101  --  104  --  107   < > 108*   CO2  --   --  28  --  26  --  25   < > 26   BUN  --   --  " 67.4*  --  68.5*  --  69.8*   < > 69.5*   CR  --   --  2.43*  --  2.52*  --  2.47*   < > 2.36*   ANIONGAP  --   --  12  --  13  --  15   < > 11   KADEN  --   --  10.0  --  9.8  --  9.8   < > 9.3   GLC 78 79 90   < > 139*   < > 87   < > 91   ALBUMIN  --   --  3.1*  --   --   --  3.0*  --   --    PROTTOTAL  --   --  7.4  --   --   --  7.0  --   --    BILITOTAL  --   --  0.7  --   --   --  0.7  --   --    ALKPHOS  --   --  78  --   --   --  74  --   --    ALT  --   --  16  --   --   --  16  --   --    AST  --   --  22  --   --   --  21  --   --     < > = values in this interval not displayed.

## 2022-11-30 NOTE — PROGRESS NOTES
[facility]    Cardiology Progress Note- Cards 2         Date of Admission:  11/17/2022     Assessment & Plan: HVSL   Arianna Siddiqi is a 62 year old female who presents with a history of obstructive sleep apnea, morbid obesity (BMI 70), hypertension, heart failure with preserved ejection fraction (LVEF 60 to 65%, 11/26/2022), paroxysmal atrial fibrillation and chronic kidney disease who was initially admitted on 11/18 after 3 to 4-day history of weakness, dyspnea and poor urine output.  She reportedly received a liter of IV fluids in the ED on initial arrival and proceeded to have worsening respiratory status and hypercapnic respiratory failure (VBG 7.12/120).  She was then intubated and admitted to the ICU where she is being treated for presumed pneumonia (had leukocytosis and elevated inflammatory markers on admission) with broad-spectrum antibiotics. Patient has had persistent hypotension requiring vasopressors, making it difficult to adequately diurese her. TTE notable for preserved LVEF of 60 to 65%, moderately reduced RV systolic function with elevated PA pressures and enlarged IVC. Cardiology consulted to assist with management (diuresis, weaning off pressors) given these findings.      Pertinent problem list:   # Acute on chronic diastolic heart failure (LVEF 60 to 65%, 11/26/2022)  # Obstructive sleep apnea  # Morbid obesity (BMI 70)  # Hypertension  # Paroxysmal atrial fibrillation  # Chronic kidney disease     Gopal numbers:   Date CVP PA PCWP SVO2 MAP CO CI SVR PVR Therapies   11/27 18 76/41/53 30 72 90 8.3 3.0 694 2.8 Dobutamine 2.5   11/28 18 73/38/50 25 76 86 9.5 3.4 572 2.6 Dobutamine 2.5, s/p IV diuretics   11/29 15 70/35/47 20 72 89 9.5 3.4 623 2.3 Dobutamine 2.5, IV diuretics   11/30 16 75/40/52 15 70 104 8.6 3.1 819 4.3 IV diuretics        RECOMMENDATIONS:  - Remains off of Dobutamine (turned off 11/29/22 morning) with continued great response to  diuretics with great improvement in bilateral filling pressures.  - Overall improvement likely secondary to decreased biventricular filling pressures and return of normal sinus rhythm from AFib.  - Pulmonary pressures remains high suggesting continued underlying intra pulmonary process despite stable cardiac function.   - Sustained mild elevation in filling pressures unlikely to completely resolve/normalize given some contribution patient's body habitus.  - Agree with continued diuresis until clinical euvolemia is achieved.   - May remove [place] Juaquin Catheter as it not longer clinically indicated given stable CO/CI in the past 3 days and sustained stable status off Dobutamine  - Agree with transition to oral Amiodarone to complete load (patient transitioned to sinus rhythm on 11/29 and remains in sinus rhythm)  -  Continue anticoagulation for AFib      Advanced heart failure team will sign off. If further   assistance is needed with diuresis, may consider consulting the general cardiology consult service.     The patient's care was discussed with the Attending Physician, Dr. Aceves.    Beth Seaman MD, PhD  Cardiology Fellow  Pager: [phone]  ______________________________________________________________________    Interval History   NAOE. Patient continued to be more awake and interactive today. Reports feeling better and better each day.     Data reviewed today: I reviewed all medications, new labs and imaging results over the last 24 hours.     Physical Exam   Vital Signs: Temp: 96.8  F (36  C) Temp src: Axillary BP: (!) 158/88 Pulse: 76   Resp: 20 SpO2: 96 % O2 Device: High Flow Nasal Cannula (HFNC) Oxygen Delivery: 35 LPM  Weight: 390 lbs 0 oz  General Appearance: Nasal BiPAP in place, interactive  HEENT: anicteric sclera  Respiratory: Course breath sounds diminished by BiPAP sounds, no crackles or wheezes   Cardiovascular: irregularly irregular, normal rate, s1 and s2 present, no murmur, JVD difficult to  assess due to body habitus. +1 LE edema  GI: Obese, soft, NT/ND  Skin:no rashes, bruises, or ecchymosis visible  Other: Awake, alert, no focal motor deficits/able to move all extremities, appropriate affect    Data   Recent Labs   Lab 11/30/22  0408 11/29/22 2018 11/29/22  1651 11/29/22  1358 11/29/22  0439 11/29/22  0415 11/28/22 0435 11/28/22 0428   WBC 8.9  --   --   --   --  8.0  --  8.9   HGB 12.8  --   --   --   --  12.3  --  12.3   MCV 97  --   --   --   --  98  --  100     --   --   --   --  177  --  181   INR 1.27*  --   --   --   --  1.32*  --   --      --   --  143  --  147*   < > 145   POTASSIUM 3.6  --   --  4.0  --  3.9   < > 4.0   CHLORIDE 101  --   --  104  --  107   < > 108*   CO2 28  --   --  26  --  25   < > 26   BUN 67.4*  --   --  68.5*  --  69.8*   < > 69.5*   CR 2.43*  --   --  2.52*  --  2.47*   < > 2.36*   ANIONGAP 12  --   --  13  --  15   < > 11   KADEN 10.0  --   --  9.8  --  9.8   < > 9.3   GLC 90 109* 120* 139*   < > 87   < > 91   ALBUMIN 3.1*  --   --   --   --  3.0*  --   --    PROTTOTAL 7.4  --   --   --   --  7.0  --   --    BILITOTAL 0.7  --   --   --   --  0.7  --   --    ALKPHOS 78  --   --   --   --  74  --   --    ALT 16  --   --   --   --  16  --   --    AST 22  --   --   --   --  21  --   --     < > = values in this interval not displayed.     Recent Results (from the past 24 hour(s))   XR Chest Port 1 View    Narrative    Exam: XR CHEST PORT 1 VIEW, 11/29/2022 7:55 AM    Indication: swan position    Comparison: Chest x-ray 11/20/2022    Findings:   There is a right IJ pulmonary artery catheter with tip difficult to  delineate, however likely in the expected location of the distal right  pulmonary artery/proximal interlobar artery. Persistent widening of  the cardiomediastinal silhouette. Retrocardiac consolidation.  Indistinct pulmonary vasculature with interstitial perihilar  opacities.      Impression    Impression:   1. Pulmonary catheter tip projects over  the expected location of the  distal right pulmonary artery/proximal interlobar artery.  2. Cardiomegaly with mild interstitial pulmonary edema.    I have personally reviewed the examination and initial interpretation  and I agree with the findings.    SMITA LOPEZ MD         SYSTEM ID:  [id number]     Medications     amiodarone 0.5 mg/min (11/30/22 0700)     dextrose       heparin 1,950 Units/hr (11/30/22 0700)     - MEDICATION INSTRUCTIONS -       - MEDICATION INSTRUCTIONS -       - MEDICATION INSTRUCTIONS -         allopurinol  200 mg Oral or Feeding Tube Daily     atorvastatin  20 mg Oral Daily     bumetanide  4 mg Intravenous Q8H     ferrous gluconate  324 mg Oral Daily     ipratropium - albuterol 0.5 mg/2.5 mg/3 mL  3 mL Nebulization 4x daily     lidocaine  2 patch Transdermal Daily    And     lidocaine   Transdermal Q8H SANDRA     melatonin  6 mg Oral or Feeding Tube At Bedtime     miconazole   Topical BID     multivitamin RENAL  1 tablet Oral Daily     nystatin  500,000 Units Mouth/Throat 4x Daily     piperacillin-tazobactam  4.5 g Intravenous Q6H     polyethylene glycol  17 g Oral or Feeding Tube Daily     potassium chloride  20 mEq Intravenous Once     senna-docusate  1-2 tablet Oral or Feeding Tube Daily

## 2022-11-30 NOTE — PROGRESS NOTES
MEDICAL ICU PROGRESS NOTE  11/30/2022      Date of Service (when I saw the patient): 11/30/2022    ASSESSMENT: Arianna Siddiqi is a 62 year old female with a history of morbid obesity, CARLOS, hypertension, HFpEF, paroxysmal A. Fib (apixiban), MAG, factor IV deficiency and CKD who was admitted for hypercapnic respiratory failure likely due to pneumonia, necessitating intubation. 11/23 extubated to BiPAP due to chronic CO2 retention. Now with concerns for RV heart failure, pulmonary hypertension, volume overload.     CHANGES and MAJOR THINGS TODAY:   - Continue IV Bumex 4 mg TID; Goal net negative 1 to 2L  - Completing zosyn course today  - Tessalon for dry cough  - Consulted palliative care for goals of care / code status discussion    PLAN:     Neuro:  # Pain and sedation  # Acute encephalopathy- resolved  - Monitor neurological status. Notify provider for any acute changes in exam  Analgesia: PRN acetaminophen, oxycodone   Sedation:   - PRN hydroxyzine 25-50mg q 6H   - PRN Quetiapine 25mg daily    - Melatonin 6 mg q HS   - Holding PTA cyclobenzaprine, tramadol and benadryl     Pulmonary:  # Acute hypoxic and hypercarbic respiratory failure   # Pulmonary Hypertension  # Pulmonary edema/fluid overload (see CV)  Patient extubated to BiPAP 11/23. CT PE/CAP unremarkable this admission.    - Scheduled duonebs and hypertonic saline nebs for thick secretions QID  - BiPAP when sleeping  - IS q 2H WA  - see the cardiology section for the plan regarding her pulmonary hypertension    # CAP  Status post 1 course of treatment with ceftriaxone and a second course with zosyn.      # CARLOS onCPAP  # Obesity hypoventilation syndrome   # Hx Smoking (30 pack per year; stopped 2002)  - BiPAP when sleeping      Cardiovascular:  # Intermittent hypotension  # Hx HFpEF (EF 60 to 65% per echo on 11/26/2022)    # Decompensated right heart failure  # Fluid overload  # Pulmonary hypertension  Patient's [place]-Juaquin catheter measurements consistent  with volume overload and pulmonary hypertension.   Elevated BNP.  Normal cardiac output per swan Juaquin catheter (no suspicion for shunts which would artificially elevate these measurements). Known history of CARLOS and HFpEF. 11/25 BLE duplex US showed no above knee DVT.   - Holding prior to admission Furosemide, lisinopril apixaban, metoprolol, & Bumex   - Lymphedema consulted  - Trend daily weightsand obtain strict ins and outs   - MAP > 70  - Trend CVP  - Per cardiology recommendations continue aggressive diuresis with 3 times daily Bumex.  Consider norepinephrine if vasopressor medications are needed.      # Demand induced troponin leak - improving  - 11/25 troponin 257-->158 --> 143 on 11/26    # Hx PAF (on apixaban PTA)  Presently in sinus rhythm.   - Previously on amio gtt this admission; continuing 200 mg BID PO amio this week. Plan to transition to maintenance 200 mg amio therafter.     GI/Nutrition:  # Risk for malnutrition  # Hypoalbuminemia  # Morbid obesity  # Nausea and vomiting   # Constipation-resolved  # Diarrhea   - RD following  - Compazine and Zofran PRN  - Bowel regimen: Senna and Miralax PRN   - Advance diet as tolerated, monitor for nausea       Renal/Fluids/Electrolytes:  # Volume overload  # Non-oliguric Acute on chronic kidney injury   # Metabolic alkalosis-presumed contraction alkalosis vs chronic hypoventilation syndrome  # Hx CKD (baseline creatinine 1.1)  Likely a component of venous ischemia due to fluid overload. Will repeat a Cystatin C Dec 2. If GFR remains low, consider re-consulting nephrology.      # Hyperkalemia - resolved  # Hypomagnesia - resolved   - Electrolyte replacement protocol     Endocrine:  # Risk for stress induced hyperglycemia  # Hx. DMII  11/25 Cortisol 15.5  - Every 4 hour blood glucose measurements  - Medium SSI     ID:  # Presumed CAP  # Leukocytosis - improving  Infection work up grossly unremarkable with cultures and virology. -Pulse S/p Ceftiaxone 7 day course  (11/18-11/24).  Zosyn empiric treatment for VAP/HAP (11/25-11/30)    Cultures:     11/25 BC x 2 - NGTD    11/25 MRSA     11/25 UA reflex - bland    11/25 Sputum culuture - unremarkable     11/20 Sputum culture unremarkable    11/18 Sputum culture unremarkable    11/18 urine culture unremarkable    11/17 blood cultures x 2 NGTD    11/17 viral panels unremarkable    # Oral thrush   - Nystatin QID       Hematology:    # Hx MAG  # Hx Factor VII deficiency  Seen outpatient by hematology has a history oral/IV iron supplementation  - PTA ferrous gluconate 324 mg resumed  - Held-PTA apixaban (r/t pAfib)  - Heparin gtt for pafib ppx- therapeutic currently       Musculoskeletal/Skin:  #Hx Gout  #Hx hemosiderin deposition BLE  - PT/OT for ROM  - 11/18 uric acid 7.9  - PTA allopurinol reduced to 100 mg given poor renal function        General Cares/Prophylaxis:    DVT Prophylaxis: Heparin infusion  GI Prophylaxis: None  Restraints: N/A  Family Communication: Emelia Siddiqi   Code Status: FULL     Lines/tubes/drains:  - PIVx1     Disposition:  - Medical ICU; stable for transfer to the [facility]    Patient seen and findings/plan discussed with medical ICU staff, Dr. Larose.    Corby Panchal MD  Medicine Pediatrics  [facility]  Pager: [phone]    ====================================  INTERVAL HISTORY:   Course reviewed.  No acute events overnight.    Respiratory acidosis improves with wearing the BiPAP at night.  Overall feeling okay this morning.  Does endorse generalized stiffness in her body and feels that the bed is uncomfortable.    OBJECTIVE:   1. VITAL SIGNS:   Temp:  [96.8  F (36  C)-98.2  F (36.8  C)] 97.6  F (36.4  C)  Pulse:  [67-89] 77  Resp:  [11-29] 18  BP: (107-163)/() 140/125  FiO2 (%):  [40 %-70 %] 70 %  SpO2:  [93 %-99 %] 98 %  FiO2 (%): 70 %  Resp: 18    2. INTAKE/ OUTPUT:   I/O last 3 completed shifts:  In: 3051.48 [P.O.:1610; I.V.:1441.48]  Out: 5035 [Urine:5035]    3. PHYSICAL  EXAMINATION:    GEN: Morbidly obese female resting in bed in no acute distress.  HEENT:  Normocephalic, atraumatic, Bipap mask off.  CV: Sinus rhythm in the 70s.  Regular rate and rhythm with no murmurs.  PULM/CHEST: Course breath sounds bilaterally.  GI: normal bowel sounds, soft, generalized tenderness   : Baig catheter in place  EXTREMITIES: moving all extremities, peripheral pulses intact  NEURO: following commands, A&O x 4       4. LABS:   Arterial Blood Gases   Recent Labs   Lab 11/26/22  1119 11/26/22  0409 11/26/22  0149 11/26/22  0026   PH 7.23* 7.23* 7.21* 7.20*   PCO2 67* 67* 70* 73*   PO2 71* 83 83 77*   HCO3 28 28 28 28     Complete Blood Count   Recent Labs   Lab 11/30/22  0408 11/29/22  0415 11/28/22  0428 11/27/22  0408   WBC 8.9 8.0 8.9 9.1   HGB 12.8 12.3 12.3 13.4    177 181 221     Basic Metabolic Panel  Recent Labs   Lab 11/30/22  0836 11/30/22  0408 11/29/22  2018 11/29/22  1651 11/29/22  1358 11/29/22  0439 11/29/22  0415 11/28/22  1548 11/28/22  1349   NA  --  141  --   --  143  --  147*  --  140   POTASSIUM  --  3.6  --   --  4.0  --  3.9  --  4.0   CHLORIDE  --  101  --   --  104  --  107  --  104   CO2  --  28  --   --  26  --  25  --  22   BUN  --  67.4*  --   --  68.5*  --  69.8*  --  65.6*   CR  --  2.43*  --   --  2.52*  --  2.47*  --  1.05*   GLC 79 90 109* 120* 139*   < > 87   < > 277*    < > = values in this interval not displayed.     Liver Function Tests  Recent Labs   Lab 11/30/22 0408 11/29/22 0415   AST 22 21   ALT 16 16   ALKPHOS 78 74   BILITOTAL 0.7 0.7   ALBUMIN 3.1* 3.0*   INR 1.27* 1.32*     Coagulation Profile  Recent Labs   Lab 11/30/22  0408 11/29/22  1358 11/29/22  0757 11/29/22  0415 11/28/22  2307   INR 1.27*  --   --  1.32*  --    PTT 96* 76* 70*  --  93*       5. RADIOLOGY:   Recent Results (from the past 24 hour(s))   Echo Limited   Result Value    LVEF  60-65%    [facility]    [id number]  [id number]  [id number]  [id number]^[facility]  [facility],[place]  Echocardiography Laboratory  [address] [place] [address]     Name: DUSTIN FERNANDES  MRN: [id number]  : 1960  Study Date: 2022 09:34 AM  Age: 62 yrs  Gender: Female  [place] [facility]  Reason For Study: PHTN  Ordering Physician: BETZY MORALES  Performed By: Laureen Salazar RDCS     BSA: 2.5 m2  Height: 62 in  Weight: 380 lb  BP: 111/62 mmHg  ______________________________________________________________________________  Procedure  Limited Echocardiogram with portions of two-dimensional, color and spectral  Doppler performed. Contrast Optison. Technically difficult study.Extremely  poor acoustic windows. Optison (NDC #8858-3485-19) given intravenously.  Patient was given 5 ml mixture of 3 ml Optison and 6 ml saline. 4 ml wasted.  ______________________________________________________________________________  Interpretation Summary  Limited TTE. Technically difficult study.  The RV is not clearly visualized but the size appears to be normal and  function appears to be moderately reduced.  The estimated PA systolic pressure is 32 mmHg above the RA pressure.  Left ventricular function is normal.The ejection fraction is 60-65%.Left  ventricular cavity size is small.  This study was compared with the study from 2022. No significant changes  in RV size/function. The estimated PASP is higher.  ______________________________________________________________________________  Left Ventricle  Left ventricular function is normal.The ejection fraction is 60-65%. Left  ventricular cavity size is small.     Right Ventricle  The RV is not clearly visualized but the size appears to be normal and  function appears to be moderately reduced.     Mitral Valve  The mitral valve is normal. Trace mitral insufficiency is present.     Tricuspid Valve  The tricuspid valve is normal. Trace tricuspid insufficiency is present. Right  ventricular systolic pressure is 32mmHg  above the right atrial pressure.     Pulmonic Valve  The valve leaflets are not well visualized. Trace pulmonic insufficiency is  present.     Vessels  Dilation of the inferior vena cava is present with abnormal respiratory  variation in diameter. IVC diameter >2.1 cm collapsing <50% with sniff  suggests a high RA pressure estimated at 15 mmHg or greater.     Compared to Previous Study  This study was compared with the study from 11/18/2022 . No significant  changes in RV size/function. The estimated PASP is higher.     ______________________________________________________________________________  Report approved by: John Ladd 11/26/2022 12:19 PM     ______________________________________________________________________________      CT Chest Pulmonary Embolism w Contrast    Narrative    EXAM: CTA pulmonary angiogram, 11/26/2022 1:10 PM    HISTORY: Female sex; Not pregnant; No imaging to r/o PE in the last 24  hours; Pulmonary Embolism Rule-Out Criteria (PERC) score > 0; Revised  Rock Score (RGS) not >= 11; No D-dimer result available; D-dimer not  ordered    COMPARISON: Chest radiograph 11/25/2022.. CT abdomen 11/12/2021    TECHNIQUE: Volumetric CT images obtained through the chest with  contrast. Coronal and axial MIP reformatted images obtained.  Three-dimensional (3D) post-processed angiographic images were  reconstructed, archived to PACS and used in interpretation of this  study.     CONTRAST: Isovue 370 ml isovue 370 IV.    FINDINGS:     Vascular: There is good contrast opacification of the pulmonary  arterial vasculature. No pulmonary embolus.  Heart is enlarged with a  hypertrophic right ventricle. No evidence of acute right heart strain.  Mild reflux of contrast into the IVC and hepatic veins. No thoracic  aortic aneurysm. Dilated main pulmonary artery measuring up to 4 cm  and is significantly larger than the aorta.    Remaining Chest: Evaluation of the lungs is somewhat limited by  respiratory  motion. Central tracheobronchial tree is patent. No  consolidation, mass, or suspicious pulmonary nodules. No pleural  effusion or pneumothorax. Bibasilar atelectasis. No thoracic  lymphadenopathy. Thyroid is unremarkable. Small hiatal hernia.    Upper Abdomen: Limited evaluation demonstrates no acute findings.    Bones: No suspicious osseous lesions    Soft tissues: Unremarkable    Devices: Right IJ CVC tip is in the right atrium.      Impression    IMPRESSION:  1. Exam is negative for acute pulmonary embolism  2. Cardiomegaly with right ventricular hypertrophy and a dilated main  pulmonary artery measuring up to 4 cm, consistent with pulmonary  hypertension.    In the event of a positive result for acute pulmonary embolism  resulting in right heart strain, consider calling the   [facility] hospital  for PERT (Pulmonary Embolism Response Team)  Activation?    PERT -- Pulmonary Embolism Response Team (Multidisciplinary team  including cardiology, interventional radiology, critical care,  hematology)    I have personally reviewed the examination and initial interpretation  and I agree with the findings.    VIRIDIANA SINCLAIR MD         SYSTEM ID:  [id number]   CT Abdomen Pelvis w Contrast    Narrative    EXAMINATION: CT ABDOMEN PELVIS W CONTRAST  11/26/2022 1:11 PM      CLINICAL HISTORY: New sepsis, unknown source    COMPARISON: CT abdomen 11/12/2022    PROCEDURE COMMENTS: CT of the abdomen was performed with 87.0 cc of  Isovue-370 intravenous and oral contrast. Coronal and sagittal  reformatted images were obtained.    FINDINGS:    Liver: Normal parenchymal attenuation without focal mass.  Biliary system: Cholelithiasis. No intrahepatic or extrahepatic  biliary ductal dilatation.  Pancreas: No focal mass or dilation of the main pancreatic duct.  Stomach: Within normal limits.  Spleen: Within normal limits.  Adrenal glands: Within normal limits.  Kidneys: Atrophic kidneys bilaterally. No focal mass, hydronephrosis,  or  stone.  Bladder: Within normal limits.  Reproductive organs: IUD in place  Colon: Within normal limits.  Small Bowel: Within normal limits.  Lymph nodes: No intra-abdominal or pelvic lymphadenopathy.  Vasculature: Atherosclerotic calcifications of the abdominal aorta and  its branches without aneurysm.  Peritoneum: No intraabdominal free fluid or air.     Chest: Heart is enlarged. No pericardial effusion. No pneumothorax or  pleural effusion. Bibasilar atelectasis. Small hiatal hernia.    Bones: No suspicious osseous lesion. Degenerative changes of the  thoracolumbar spine.    Soft tissues: Large fat containing ventral hernia.      Impression    IMPRESSION:  1. No acute findings in the abdomen or pelvis.  2. Cholelithiasis without evidence for acute cholecystitis.    I have personally reviewed the examination and initial interpretation  and I agree with the findings.    SMITA LOPEZ MD         SYSTEM ID:  [id number]

## 2022-11-30 NOTE — PROGRESS NOTES
RT NOTE:     Pt seen for initial nebulizer therapy this AM. BS diminished but overall clear bilaterally. Pt wearing BiPAP at noc. Main obstacle is weaning O2 - goal for today.     BS clear/dm bilaterally. Pt states she does not do nebs at home. Pt also states she does not feel significantly different/improved post neb treatments.     Would recommend DC'ing nebs to PRN as pt has more HF issues rather than wheezing due to bronchospasm.     [place], LRT, BSRT-RRT

## 2022-12-01 NOTE — PROGRESS NOTES
11/30/22 1030   Appointment Info   Signing Clinician's Name / Credentials (PT) Amanda Alvarez, PT   Rehab Comments (PT) Nikolas bed and recliner ordered/present for pt use.   Living Environment   People in Home alone   Current Living Arrangements apartment   Home Accessibility no concerns  (elevator)   Transportation Anticipated health plan transportation;agency   Living Environment Comments Pt reports walk in shower with chair, bar. Pt states has meal delivery access and some cleaning A from a senior service.   Self-Care   Usual Activity Tolerance moderate   Current Activity Tolerance fair   Equipment Currently Used at Home walker, standard;shower chair;grab bar, tub/shower;grab bar, toilet;tub bench   Activity/Exercise/Self-Care Comment Per OT: Pt reports low level of activity at baseline, spends most of her time in her recliner. has a FWW that she uses intermittently. Reports being indep. with BADLs, light. IADLs. Today pt reports was not using AD for mobility.   General Information   Onset of Illness/Injury or Date of Surgery 11/17/22   Referring Physician Ivan Barnes MD   Patient/Family Therapy Goals Statement (PT) get stronger   Pertinent History of Current Problem (include personal factors and/or comorbidities that impact the POC) 62 year old female with a history of morbid obesity, CARLOS, hypertension, HFpEF, paroxysmal A. Fib (apixiban), MAG, factor IV deficiency and CKD who was admitted for hypercapnic respiratory failure likely due to pneumonia, necessitating intubation. 11/23 extubated to BiPAP due to chronic CO2 retention. Now with concerns for RV heart failure, pulmonary hypertension, volume overload.   Existing Precautions/Restrictions fall   General Observations Pt very agreeable to therapy   Cognition   Affect/Mental Status (Cognition) anxious   Orientation Status (Cognition) oriented to;person;place;situation   Follows Commands (Cognition) follows one-step commands;75-90%  accuracy;delayed response/completion;physical/tactile prompts required;repetition of directions required   Behavioral Issues overwhelmed easily   Posture    Posture Forward head position;Protracted shoulders;Kyphosis   Range of Motion (ROM)   ROM Comment B LEs WFL   Strength (Manual Muscle Testing)   Strength Comments B LEs decreased consistent with medical course   Bed Mobility   Comment, (Bed Mobility) max A rolling B   Transfers   Comment, (Transfers) dependent lift   Gait/Stairs (Locomotion)   Comment, (Gait/Stairs) dependent, unable to stand   Clinical Impression   Criteria for Skilled Therapeutic Intervention Yes, treatment indicated   PT Diagnosis (PT) impaired functional mobility   Influenced by the following impairments decreased strength, balance, act tolerance   Functional limitations due to impairments decreased I with transfers, gait, bed mob, ADLs   Clinical Presentation (PT Evaluation Complexity) Evolving/Changing   Clinical Presentation Rationale clinical judgement   Clinical Decision Making (Complexity) low complexity   Planned Therapy Interventions (PT) balance training;bed mobility training;gait training;home exercise program;patient/family education;strengthening;ROM (range of motion);transfer training;progressive activity/exercise;risk factor education;home program guidelines   Risk & Benefits of therapy have been explained evaluation/treatment results reviewed;care plan/treatment goals reviewed;risks/benefits reviewed;current/potential barriers reviewed;participants voiced agreement with care plan;participants included;patient   PT Total Evaluation Time   PT Eval, Low Complexity Minutes (05313) 6   Physical Therapy Goals   PT Frequency 5x/week   PT Predicted Duration/Target Date for Goal Attainment 12/28/22   PT Goals Bed Mobility;Transfers;Gait   PT: Bed Mobility Supervision/stand-by assist;Rolling;Supine to/from sit   PT: Transfers Modified independent;Sit to/from stand;Bed to/from  chair;Assistive device   PT: Gait Modified independent;Assistive device;50 feet   Total Session Time   Total Session Time (sum of timed and untimed services) 6

## 2022-12-01 NOTE — PLAN OF CARE
ICU End of Shift Summary. See flowsheets for vital signs and detailed assessment.    Changes this shift: Afebrile. A&Ox4. Follows commands, HOUSER. SR 80s. BP stable, hypertensive, started on hydralazine today. Removed [place] and CVC. Amio gtt dc'd started on oral. HFNC 55% 35L. Baig w/ adequate UOP. Loose BM x2. Minimal appetite for food. 2L fluid restriction in place, 720ml so far today. Up in chair today. PTT rechecked, within range, waiting on lab to redraw for 1830. Heparin gtt infusing.     Plan: Wear bipap overnight, recheck K+ in AM. Transfer to IMC when bed available    Goal Outcome Evaluation:      Plan of Care Reviewed With: patient    Overall Patient Progress: improvingOverall Patient Progress: improving    Outcome Evaluation: HFNC 55% 35L. Amio gtt stopped transitioned to oral. [place] removed, transfer to IMC when bed available.

## 2022-12-01 NOTE — PROGRESS NOTES
Calorie Count  Intake recorded for: 11/30  Total Kcals: 0 Total Protein: 0g  Kcals from Hospital Food: 0   Protein: 0g  Kcals from Outside Food (average):0 Protein: 0g  # Meals Ordered from Kitchen: 1 meal   # Meals Recorded: no intake recorded.   # Supplements Recorded: no intake recorded.

## 2022-12-01 NOTE — PROGRESS NOTES
[facility]    Progress Note - Medicine Service, CAROLYN TEAM 1       Date of Admission:  11/17/2022    Assessment & Plan   Arianna Siddiqi is a 62 year old female with a history of morbid obesity, CARLOS, hypertension, HFpEF, paroxysmal A. Fib (apixiban), MAG, factor IV deficiency and CKD who was admitted for hypercapnic respiratory failure likely due to pneumonia, necessitating intubation. 11/23 extubated to BiPAP due to chronic CO2 retention. Now with concerns for RV heart failure, pulmonary hypertension, volume overload, off pressors since 11/29 and transferred to general medicine 11/30.     Changes and Major Updates today:  - Will begin TCU planning per PT recs  - VBG with stable pH but evidence of likely Co2 retention  - DC heparin gtt as Cr is improving and restart apixiban  - Continue bumex 4mg TID  - Leave antunez in one more day given continued aggressive diuresis  - Switching in and out of Atrial Flutter but HRs in 70s, ok to monitor as long as HR nml  - Weaning HFNC as tolerated    # Acute hypoxic and hypercarbic respiratory failure   # Pulmonary Hypertension  # Pulmonary edema/fluid overload   Patient extubated to BiPAP 11/23. CT PE/CAP unremarkable this admission.    - Wean O2 as tolerated  - Scheduled duonebs and hypertonic saline nebs for thick secretions QID  - BiPAP when sleeping  - IS q 2H WA  - VBG conditionally, repeat VBG 12/3 and if stable will transition back to CPAP at night  - see the cardiology section for the plan regarding her pulmonary hypertension    # Intermittent hypotension  # Hx HFpEF (EF 60 to 65% per echo on 11/26/2022)    # Decompensated right heart failure  # Fluid overload  # Pulmonary hypertension  Patient's [place]-Juaquin catheter measurements while in ICU consistent with volume overload and pulmonary hypertension.   Elevated BNP.  Normal cardiac output per swan Juaquin catheter (no suspicion for shunts which would artificially elevate these  measurements). Known history of CARLOS and HFpEF. 11/25 BLE duplex US showed no above knee DVT.   - Holding prior to admission Furosemide, lisinopril apixaban, metoprolol, & Bumex   - Diuresis with Bumex 4mg IV TID, goal net negative 1L to 2L  - Trend daily weightsand obtain strict ins and outs   - MAP > 70    # Acute encephalopathy- resolved  Iso critical illness and ICU derlerium.   Analgesia: PRN acetaminophen, oxycodone   Sedation:               - PRN hydroxyzine 25-50mg q 6H               - PRN Quetiapine 25mg daily                - Melatonin 6 mg q HS   - Holding PTA cyclobenzaprine, tramadol and benadryl     # Hx PAF (on apixaban PTA)  Presently in sinus rhythm.   - Previously on amio gtt this admission; continuing 200 mg BID PO amio this week. Plan to transition to maintenance 200 mg amio therafter (12/7).   - DC heparin gtt and resume PTA apixiban dose    # Non-oliguric Acute on chronic kidney injury   # Metabolic alkalosis-presumed contraction alkalosis vs chronic hypoventilation syndrome  # Hx CKD (baseline creatinine 1.1)  Likely a component of venous ischemia due to fluid overload.   - Diuresis as above    # Hyperkalemia - resolved  # Hypomagnesia - resolved   - Electrolyte replacement protocol    # CAP - resolved  Status post Ceftiaxone 7 day course (11/18-11/24).  Zosyn empiric treatment for VAP/HAP (11/25-11/30)     Cultures:     11/25 BC x 2 - NGTD    11/25 MRSA     11/25 UA reflex - bland    11/25 Sputum culuture - unremarkable     11/20 Sputum culture unremarkable    11/18 Sputum culture unremarkable    11/18 urine culture unremarkable    11/17 blood cultures x 2 NGTD    11/17 viral panels unremarkable    # Hx MAG  # Hx Factor VII deficiency  Seen outpatient by hematology has a history oral/IV iron supplementation  - PTA ferrous gluconate 324 mg resumed  - Held-PTA apixaban (r/t pAfib)  - Heparin gtt for pafib ppx- therapeutic currently     # CARLOS onCPAP  # Obesity hypoventilation syndrome   # Hx  Smoking (30 pack per year; stopped 2002)  - BiPAP when sleeping    #Hx Gout  #Hx hemosiderin deposition BLE  - PT/OT for ROM  - 11/18 uric acid 7.9  - PTA allopurinol reduced to 100 mg given poor renal function    # Demand induced troponin leak - improving  - 11/25 troponin 257-->158 --> 143 on 11/26    # Oral thrush   - Nystatin QID     # Risk for malnutrition  # Hypoalbuminemia  # Morbid obesity  # Nausea and vomiting   # Constipation-resolved  # Diarrhea   - RD following  - Compazine and Zofran PRN  - Bowel regimen: Senna and Miralax PRN   - Advance diet as tolerated, monitor for nausea    # Goals of Care  ICU consulted palliative care 11/30 to begin discussing pt's goals moving forward. Given her chronic lung disease 2/2 CARLOS, OHS, pHTN as well as her HFpEF with decompensated RV failure recurrent acute hypercarbic respiratory failure likely to occur with any pulmonary insult like another viral or bacterial. Pt may want to discuss what she would prefer us to do in these situations.   - Palliative care consult, appreciate assistance       Diet: Fluid restriction 2000 ML FLUID  Snacks/Supplements Adult: Other; Butter pecan Nepro @ 10 am, Ensure shake (strawberry) @ 2 pm, Ensure Shake (chocolate) @ HS; Between Meals  Calorie Counts  Advance Diet as Tolerated: Regular Diet Adult    DVT Prophylaxis: Heparin gtt  Baig Catheter: PRESENT, indication: Strict 1-2 Hour I&O, Strict 1-2 Hour I&O  Fluids: none  Central Lines: None  Cardiac Monitoring: ACTIVE order. Indication: ICU  Code Status: Full Code      Disposition Plan   Pending TCU placement     The patient's care was discussed with the Attending Physician, Dr. Barnes.    Servando Holbrook MD  Medicine Service, [facility] TEAM 1  [facility]  Securely message with the Vocera Web Console (learn more here)  Text page via [employer] Paging/Directory   Please see signed in provider for up to date coverage information      Clinically  "Significant Risk Factors           # Hypercalcemia: corrected calcium is >10.1, will monitor as appropriate    # Hypoalbuminemia: Lowest albumin = 3 g/dL (Ref range: 3.5-5.2) at 11/29/2022  4:15 AM, will monitor as appropriate          # Severe Obesity: Estimated body mass index is 71.37 kg/m  as calculated from the following:    Height as of this encounter: 1.575 m (5' 2\").    Weight as of this encounter: 177 kg (390 lb 3.4 oz).          ______________________________________________________________________    Interval History   Feels better today and willing to try eating a bit more. Able to wean HFNC so will transfer to gen med/surg rather than IMC. Interested in TCU placement.     Data reviewed today: I reviewed all medications, new labs and imaging results over the last 24 hours. I personally reviewed no images or EKG's today.    Physical Exam   Vital Signs: Temp: 98.3  F (36.8  C) Temp src: Axillary BP: (!) 151/75 Pulse: 71   Resp: 15 SpO2: 95 % O2 Device: BiPAP/CPAP Oxygen Delivery: 35 LPM  Weight: 390 lbs 3.43 oz  GEN: Morbidly obese female resting in recliner in no acute distress.  HEENT:  Normocephalic, atraumatic, HFNC in  CV: Regular rate and rhythm with no murmurs.  PULM/CHEST: Course breath sounds bilaterally.  GI: normal bowel sounds, soft, generalized tenderness   : Baig catheter in place  EXTREMITIES: moving all extremities, peripheral pulses intact  NEURO: following commands, A&O x 4    Data   Recent Labs   Lab 11/30/22  2037 11/30/22  1157 11/30/22  0836 11/30/22  0408 11/29/22  1651 11/29/22  1358 11/29/22  0439 11/29/22  0415 11/28/22  0435 11/28/22  0428   WBC  --   --   --  8.9  --   --   --  8.0  --  8.9   HGB  --   --   --  12.8  --   --   --  12.3  --  12.3   MCV  --   --   --  97  --   --   --  98  --  100   PLT  --   --   --  170  --   --   --  177  --  181   INR  --   --   --  1.27*  --   --   --  1.32*  --   --    NA  --   --   --  141  --  143  --  147*   < > 145   POTASSIUM  --   " --   --  3.6  --  4.0  --  3.9   < > 4.0   CHLORIDE  --   --   --  101  --  104  --  107   < > 108*   CO2  --   --   --  28  --  26  --  25   < > 26   BUN  --   --   --  67.4*  --  68.5*  --  69.8*   < > 69.5*   CR  --   --   --  2.43*  --  2.52*  --  2.47*   < > 2.36*   ANIONGAP  --   --   --  12  --  13  --  15   < > 11   KADNE  --   --   --  10.0  --  9.8  --  9.8   < > 9.3   * 78 79 90   < > 139*   < > 87   < > 91   ALBUMIN  --   --   --  3.1*  --   --   --  3.0*  --   --    PROTTOTAL  --   --   --  7.4  --   --   --  7.0  --   --    BILITOTAL  --   --   --  0.7  --   --   --  0.7  --   --    ALKPHOS  --   --   --  78  --   --   --  74  --   --    ALT  --   --   --  16  --   --   --  16  --   --    AST  --   --   --  22  --   --   --  21  --   --     < > = values in this interval not displayed.

## 2022-12-01 NOTE — SIGNIFICANT EVENT
SPIRITUAL HEALTH SERVICES Significant Event  Hindu Sacrament of ANOINTING  [facility] 4c    Pt anointed by Father Amanda Burden   Pager [phone]

## 2022-12-01 NOTE — PROGRESS NOTES
[facility]  Palliative Care Daily Progress Note       Recommendations/discussion       Pt seen and examined with PC LICSW/Counseling staff for co-visit. Basic role and tenets of palliative care outlined. Will review with primary team. Given her chronic lung disease 2/2 CARLOS, pHTN as well as her HFpEF with progressive RV failure there is concern that recurrent acute hypercarbic respiratory failure will probably occur with any future pulmonary insult.   We discussed the likely progression of her condition and possible need for repeat hospitalizations and/or need for aggressive interventions such as intubation and ICU stays. She wants to review these concepts/decisions with her niece who will be back in town next week.   Goals of care:  For now, Arianna outlines her goals as fully restorative knowing she has significant recovery needs to achieve her prior level of independence- noting she lived alone in an apartment with elevator access-supported by food/meal delivery service and cleaning resources from a [facility] agency. She has a niece (Emelia) who serves as her advocate (not documented formally) and visits 'often'-(not supported by unit SW documentation of discussion with niece who notes visiting only 2-3 times per year.)  She relates no use of walker or cane and functional independence. At present she is total lift assist for transfers and unable to meet basic ADL's. Seems open to TCU after discharge prior to her hopeful return home.   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   Asking to see - requested.  Notes some anxiety with health decline and open to support from PC counseling staff- will continue to follow.   CODE STATUS: FULL CODE -needs to be further addressed at follow up. Her niece is surrogate decision maker but not formalized- offered assist with honoring choices documents.         Assessments           Arianna Siddiqi is a 62 year old female with a history of morbid obesity, CARLOS, hypertension, HFpEF, paroxysmal A. Fib (apixiban), MAG, factor IV deficiency and CKD who was admitted for hypercapnic respiratory failure likely due to pneumonia, necessitating intubation. 11/23 extubated to BiPAP r/t chronic CO2 retention tolerating well. On HFNC and off vasoactive meds for now.  Known concerns for RV heart failure, pulmonary hypertension, severe volume overload. Will need further rehab stay and likely longer term placement.     Prognosis, Goals, or Advance Care Planning was addressed today with: Yes.  Mood, coping, and/or meaning in the context of serious illness were addressed today: Yes.  Summary/Comments: see consult note   Marvin MARQUEZ NP ACHPN  Nurse Practitioner- Advanced Practice Provider  [facility] Palliative Medicine Consult Service   [phone]  TT spent: 28 minutes of which 16 minutes were spent in direct face to face contact with patient/family..        Interval History:     Chart review/discussion with unit or clinical team members:   No acute events overnight. Remains on HFNC . PT notes ability to sit on edge of bed for 7-8 minutes unassisted this am as a reflection of core strength.    Key Palliative Symptoms:  We are not helping to manage these symptoms currently in this patient.           Review of Systems:     5 system ROS was reviewed and is unremarkable          Medications:     I have reviewed this patient's medication profile and medications during this hospitalization.           Physical Exam:   Vitals were reviewed  Temp: 96.9  F (36.1  C) Temp src: Axillary BP: 115/81 Pulse: 95   Resp: 16 SpO2: 90 % O2 Device: High Flow Nasal Cannula (HFNC) Oxygen Delivery: 35 LPM  GEN: Morbidly obese female resting in bedside recliner, HFNC in place. In no acute distress. Follows requests. Conversant.   HEENT: Normocephalic, atraumatic, MMM. HFNC.   CV: Monitor with sinus rhythm in the 70s.  RRR.  PULM/CHEST: no appreciable wheeze or cough.  GI: normal bowel sounds, soft, generalized tenderness   : Baig catheter in place, clear yellow urine.  EXTREMITIES: moving all extremities, peripheral pulses intact  NEURO: following commands, A&O x 4           Data Reviewed:     ROUTINE LABS (Last four results)  BMPRecent Labs   Lab 12/01/22  0642 11/30/22  2037 11/30/22  1157 11/30/22  0836 11/30/22  0408 11/29/22  1651 11/29/22  1358 11/29/22  0439 11/29/22  0415     --   --   --  141  --  143  --  147*   POTASSIUM 3.7  --   --   --  3.6  --  4.0  --  3.9   CHLORIDE 99  --   --   --  101  --  104  --  107   KADEN 9.9  --   --   --  10.0  --  9.8  --  9.8   CO2 31*  --   --   --  28  --  26  --  25   BUN 57.6*  --   --   --  67.4*  --  68.5*  --  69.8*   CR 1.93*  --   --   --  2.43*  --  2.52*  --  2.47*   GLC 92 122* 78 79 90   < > 139*   < > 87    < > = values in this interval not displayed.     CBC  Recent Labs   Lab 12/01/22  0642 11/30/22 0408 11/29/22 0415 11/28/22  0428   WBC 9.1 8.9 8.0 8.9   RBC 4.64 4.53 4.41 4.39   HGB 13.3 12.8 12.3 12.3   HCT 44.1 43.7 43.3 43.9   MCV 95 97 98 100   MCH 28.7 28.3 27.9 28.0   MCHC 30.2* 29.3* 28.4* 28.0*   RDW 17.8* 17.7* 17.7* 17.8*    170 177 181     INR  Recent Labs   Lab 12/01/22  0642 11/30/22 0408 11/29/22 0415   INR 1.20* 1.27* 1.32*

## 2022-12-01 NOTE — PLAN OF CARE
ICU End of Shift Summary. See flowsheets for vital signs and detailed assessment.    Changes this shift: Pt A&Ox4, following commands, PERRLA. Pt complains of generalized pain, given lidocaine patches and PRN oxy. HR NSR. Normotensive. Afebrile. On CPAP settings at night, 16/8 on 40%. 1 loose BM this shift. On 2L FR, 200 ml intake so far. Baig in place, Bumex given Q8 with good response. Heparin gtt running at 1950.     Plan: Continue plan of care.       Problem: Plan of Care - These are the overarching goals to be used throughout the patient stay.    Goal: Absence of Hospital-Acquired Illness or Injury  Outcome: Progressing  Intervention: Identify and Manage Fall Risk  Recent Flowsheet Documentation  Taken 12/1/2022 0400 by Geeta Morris RN  Safety Promotion/Fall Prevention:    clutter free environment maintained    fall prevention program maintained    increased rounding and observation    increase visualization of patient    lighting adjusted    patient and family education    room near nurse's station    room organization consistent    safety round/check completed  Taken 12/1/2022 0000 by Geeta Morris, RN  Safety Promotion/Fall Prevention:    clutter free environment maintained    fall prevention program maintained    increased rounding and observation    increase visualization of patient    lighting adjusted    patient and family education    room near nurse's station    room organization consistent    safety round/check completed  Taken 11/30/2022 2000 by Geeta Morris, RN  Safety Promotion/Fall Prevention:    clutter free environment maintained    fall prevention program maintained    increased rounding and observation    increase visualization of patient    lighting adjusted    patient and family education    room near nurse's station    room organization consistent    safety round/check completed  Intervention: Prevent Skin Injury  Recent Flowsheet Documentation  Taken 12/1/2022 0400 by  Geeta Morris RN  Body Position:    turned    right    heels elevated    upper extremity elevated  Taken 12/1/2022 0200 by Geeta Morris RN  Body Position:    turned    left    heels elevated    upper extremity elevated  Taken 12/1/2022 0000 by Geeta Morris RN  Body Position:    turned    right    heels elevated    upper extremity elevated  Taken 11/30/2022 2200 by Geeta Morris RN  Body Position:    turned    left    heels elevated    upper extremity elevated  Taken 11/30/2022 2000 by Geeta Morris RN  Body Position:    turned    right    heels elevated    upper extremity elevated  Intervention: Prevent and Manage VTE (Venous Thromboembolism) Risk  Recent Flowsheet Documentation  Taken 12/1/2022 0400 by Geeta Morris RN  VTE Prevention/Management: patient refused intervention  Taken 12/1/2022 0000 by Geeta Morris RN  VTE Prevention/Management: patient refused intervention  Taken 11/30/2022 2000 by Geeta Morris RN  VTE Prevention/Management: patient refused intervention     Problem: Risk for Delirium  Goal: Improved Attention and Thought Clarity  Outcome: Progressing     Problem: Risk for Delirium  Goal: Improved Sleep  Outcome: Progressing     Problem: Pneumonia  Goal: Fluid Balance  Outcome: Progressing

## 2022-12-01 NOTE — PROGRESS NOTES
"PALLIATIVE CARE SOCIAL WORK Progress Note   [facility] ([place]) Unit 4C    REFERRAL SOURCE: Palliative Care; follow up, ongoing assessment of emotional support needs and coping    Follow up visit this afternoon with Arianna after introductory visit with team yesterday.    Arianna sitting up in chair in room and reports she has been up in chair since 0930. Independent with lunch tray and reports fair to good appetite.  Arianna presents in good spirits, denies changes in mood or difficulty coping with hospitalization. Arianna shared with PCSW that she was advised today she will likely need a TCU stay prior to returning home, when asked how pt felt about that recommendation Arianna stated, \"Oh that's fine, I've gone to rehab before and want to be as strong as I can be when I go back home.\"     Noted during initial visit 11/30 that pt has not completed a health care directive; primary family contact is pt's niece who would be default decision-maker however follow up conversation with Arianna today re documenting her health care wishes/goals. PCSW provided Arianna with a blank health care directive for her consideration/completion.    Plan: PCSW available for continued check-ins while palliative care remains involved. Coordination of care with unit  following visit today.    TAMMY Daly, [facility]  Palliative Care   [facility] Inpatient Team Consult pager [phone] (M-F 8-4:30)  After-hours Answering Service [phone]      "

## 2022-12-02 NOTE — PROGRESS NOTES
[facility]    Progress Note - Medicine Service, CAROLYN TEAM 1       Date of Admission:  11/17/2022    Assessment & Plan   Arianna Siddiqi is a 62 year old female with a history of morbid obesity, CARLOS, hypertension, HFpEF, paroxysmal A. Fib (apixiban), MAG, factor IV deficiency and CKD who was admitted for hypercapnic respiratory failure likely due to pneumonia, necessitating intubation. 11/23 extubated to BiPAP due to chronic CO2 retention. Now with concerns for RV heart failure, pulmonary hypertension, volume overload, off pressors since 11/29 and transferred to general medicine 11/30.     Changes and Major Updates today:  - Continue IV Bumex 4mg TID given improvement in Cr while diuresing  - VBG in AM to assess whether we can restart CPAP at night  - Remove antunez  - DC hydral now that blood pressure is under better control  - DC prn seroquel and hydroxyzine as not receiving any in past few days    # Acute hypoxic and hypercarbic respiratory failure   # Pulmonary Hypertension  # Pulmonary edema/fluid overload   Patient extubated to BiPAP 11/23. CT PE/CAP unremarkable this admission.    - Wean O2 as tolerated  - Scheduled duonebs and hypertonic saline nebs for thick secretions QID  - BiPAP when sleeping  - IS q 2H WA  - VBG conditionally, repeat VBG 12/3 and if stable will transition back to CPAP at night  - see the cardiology section for the plan regarding her pulmonary hypertension    # Intermittent hypotension  # Hx HFpEF (EF 60 to 65% per echo on 11/26/2022)    # Decompensated right heart failure  # Fluid overload  # Pulmonary hypertension  Patient's [place] catheter measurements while in ICU consistent with volume overload and pulmonary hypertension.   Elevated BNP.  Normal cardiac output per swan Juaquin catheter (no suspicion for shunts which would artificially elevate these measurements). Known history of CARLOS and HFpEF. 11/25 BLE duplex US showed no above knee  DVT.   - Holding prior to admission Furosemide, lisinopril apixaban, metoprolol, & Bumex   - Diuresis with Bumex 4mg IV TID, goal net negative 1L to 2L  - Trend daily weights and obtain strict ins and outs   - MAP > 70    # Acute encephalopathy- resolved  Iso critical illness and ICU derlerium.   Analgesia: PRN acetaminophen, oxycodone   Sedation:               - Melatonin 6 mg q HS   - Holding PTA cyclobenzaprine, tramadol and benadryl     # Hx PAF (on apixaban PTA)  Presently in sinus rhythm.   - Previously on amio gtt this admission; continuing 200 mg BID PO amio this week. Plan to transition to maintenance 200 mg amio therafter (12/7).   - PTA apixiban dose    # Non-oliguric Acute on chronic kidney injury   # Metabolic alkalosis-presumed contraction alkalosis vs chronic hypoventilation syndrome  # Hx CKD (baseline creatinine 1.1)  Likely a component of venous ischemia due to fluid overload.   - Diuresis as above    # Hyperkalemia - resolved  # Hypomagnesia - resolved   - Electrolyte replacement protocol    # CAP - resolved  Status post Ceftiaxone 7 day course (11/18-11/24).  Zosyn empiric treatment for VAP/HAP (11/25-11/30)     Cultures:     11/25 BC x 2 - NGTD    11/25 MRSA     11/25 UA reflex - bland    11/25 Sputum culuture - unremarkable     11/20 Sputum culture unremarkable    11/18 Sputum culture unremarkable    11/18 urine culture unremarkable    11/17 blood cultures x 2 NGTD    11/17 viral panels unremarkable    # Hx MAG  # Hx Factor VII deficiency  Seen outpatient by hematology has a history oral/IV iron supplementation  - PTA ferrous gluconate 324 mg resumed  - Held-PTA apixaban (r/t pAfib)  - Heparin gtt for pafib ppx- therapeutic currently     # CARLOS onCPAP  # Obesity hypoventilation syndrome   # Hx Smoking (30 pack per year; stopped 2002)  - BiPAP when sleeping    #Hx Gout  #Hx hemosiderin deposition BLE  - PT/OT for ROM  - 11/18 uric acid 7.9  - PTA allopurinol reduced to 100 mg given poor renal  function    # Demand induced troponin leak - improving  - 11/25 troponin 257-->158 --> 143 on 11/26    # Oral thrush   - Nystatin QID     # Risk for malnutrition  # Hypoalbuminemia  # Morbid obesity  # Nausea and vomiting   # Constipation-resolved  # Diarrhea   - RD following  - Compazine and Zofran PRN  - Bowel regimen: Senna and Miralax PRN   - Advance diet as tolerated, monitor for nausea    # Goals of Care  ICU consulted palliative care 11/30 to begin discussing pt's goals moving forward. Given her chronic lung disease 2/2 CARLOS, OHS, pHTN as well as her HFpEF with decompensated RV failure recurrent acute hypercarbic respiratory failure likely to occur with any pulmonary insult like another viral or bacterial. Pt may want to discuss what she would prefer us to do in these situations.   - Palliative care consult, appreciate assistance       Diet: Fluid restriction 2000 ML FLUID  Snacks/Supplements Adult: Other; Butter pecan Nepro @ 10 am, Ensure shake (strawberry) @ 2 pm, Ensure Shake (chocolate) @ HS; Between Meals  Calorie Counts  Advance Diet as Tolerated: Regular Diet Adult    DVT Prophylaxis: Heparin gtt  Baig Catheter: PRESENT, indication: Strict 1-2 Hour I&O, Strict 1-2 Hour I&O  Fluids: none  Central Lines: None  Cardiac Monitoring: ACTIVE order. Indication: ICU  Code Status: Full Code      Disposition Plan   Pending TCU placement     The patient's care was discussed with the Attending Physician, Dr. Barnes.    Servando Holbrook MD  Medicine Service, [facility] TEAM [address] [place] [address]  Securely message with the Vocera Web Console (learn more here)  Text page via [facility] Paging/Directory   Please see signed in provider for up to date coverage information      Clinically Significant Risk Factors           # Hypercalcemia: corrected calcium is >10.1, will monitor as appropriate    # Hypoalbuminemia: Lowest albumin = 3 g/dL (Ref range: 3.5-5.2) at 12/1/2022  6:42 AM, will monitor  "as appropriate          # Severe Obesity: Estimated body mass index is 71.37 kg/m  as calculated from the following:    Height as of this encounter: 1.575 m (5' 2\").    Weight as of this encounter: 177 kg (390 lb 3.4 oz).          ______________________________________________________________________    Interval History   Feels better today than compared to yesterday. Believes her breathing is improving. No questions or concerns currently.     Data reviewed today: I reviewed all medications, new labs and imaging results over the last 24 hours. I personally reviewed no images or EKG's today.    Physical Exam   Vital Signs: Temp: 98.4  F (36.9  C) Temp src: Oral BP: 123/70 Pulse: 78   Resp: 24 SpO2: 95 % O2 Device: BiPAP/CPAP Oxygen Delivery: 35 LPM  Weight: 390 lbs 3.43 oz  GEN: Morbidly obese female resting in bedside chair in no acute distress.  HEENT:  Normocephalic, atraumatic, HFNC in  CV: Regular rate and rhythm with no murmurs.  PULM/CHEST: Clear breath sounds bilaterally, normal work of breathing  GI: normal bowel sounds, soft, generalized tenderness   : Baig catheter in place  EXTREMITIES: moving all extremities, peripheral pulses intact  NEURO: following commands, A&O x 4    Data   Recent Labs   Lab 12/02/22  0600 12/01/22  1210 12/01/22  0642 11/30/22  2037 11/30/22  0836 11/30/22  0408 11/29/22  1651 11/29/22  1358   WBC 8.5  --  9.1  --   --  8.9  --   --    HGB 13.9  --  13.3  --   --  12.8  --   --    MCV 96  --  95  --   --  97  --   --      --  185  --   --  170  --   --    INR 1.16*  --  1.20*  --   --  1.27*  --   --    NA  --   --  143  --   --  141  --  143   POTASSIUM  --   --  3.7  --   --  3.6  --  4.0   CHLORIDE  --   --  99  --   --  101  --  104   CO2  --   --  31*  --   --  28  --  26   BUN  --   --  57.6*  --   --  67.4*  --  68.5*   CR  --   --  1.93*  --   --  2.43*  --  2.52*   ANIONGAP  --   --  13  --   --  12  --  13   KADEN  --   --  9.9  --   --  10.0  --  9.8   GLC  --  " 125* 92 122*   < > 90   < > 139*   ALBUMIN  --   --  3.0*  --   --  3.1*  --   --    PROTTOTAL  --   --  7.4  --   --  7.4  --   --    BILITOTAL  --   --  0.6  --   --  0.7  --   --    ALKPHOS  --   --  78  --   --  78  --   --    ALT  --   --  16  --   --  16  --   --    AST  --   --  37*  --   --  22  --   --     < > = values in this interval not displayed.

## 2022-12-02 NOTE — PLAN OF CARE
ICU End of Shift Summary. See flowsheets for vital signs and detailed assessment.    Changes this shift: Afebrile. Neuro status unchanged. Flipped to Aflutter this AM, rate controlled. In SR w/ PACs this evening. BP stable. HFNC 45% 35L. BIPAP when sleeping. Baig in place, adequate UOP. Heparin gtt stopped, PTA eliquis restarted.     Plan: Transfer to med surg when bed available.     Goal Outcome Evaluation:      Plan of Care Reviewed With: patient    Overall Patient Progress: improvingOverall Patient Progress: improving    Outcome Evaluation: HFNC 45%  35L. Heparin gtt stopped. Transfer med surg.

## 2022-12-02 NOTE — PROGRESS NOTES
Calorie Count  Intake recorded for: 12/1  Total Kcals: 0 Total Protein: 0g  Kcals from Hospital Food: 0   Protein: 0g  Kcals from Outside Food (average):0 Protein: 0g  # Meals Ordered from Kitchen: 2 meals  # Meals Recorded: No food intake recorded  # Supplements Recorded: No food intake recorded

## 2022-12-02 NOTE — PLAN OF CARE
ICU End of Shift Summary. See flowsheets for vital signs and detailed assessment.    Changes this shift: Pt A&Ox4, following commands, PERRLA. Pain managed with PRN oxy. HR NSR with PAC's. Afebrile. On CPAP 16/8 on 40% overnight. Baig in place with good UOP.     Plan: Transfer to floor pending bed availability. Pull Baig this AM.      Problem: Plan of Care - These are the overarching goals to be used throughout the patient stay.    Goal: Absence of Hospital-Acquired Illness or Injury  Outcome: Progressing  Intervention: Identify and Manage Fall Risk  Recent Flowsheet Documentation  Taken 12/2/2022 0400 by Geeta Morris RN  Safety Promotion/Fall Prevention:   clutter free environment maintained   fall prevention program maintained   increased rounding and observation   increase visualization of patient   lighting adjusted   patient and family education   room near nurse's station   room organization consistent   safety round/check completed  Taken 12/1/2022 2000 by Geeta Morris RN  Safety Promotion/Fall Prevention:   clutter free environment maintained   fall prevention program maintained   increased rounding and observation   increase visualization of patient   lighting adjusted   patient and family education   room near nurse's station   room organization consistent   safety round/check completed  Intervention: Prevent Skin Injury  Recent Flowsheet Documentation  Taken 12/2/2022 0400 by Geeta Morris RN  Body Position:   weight shifting   upper extremity elevated   heels elevated  Taken 12/2/2022 0200 by Geeta Morris RN  Body Position:   weight shifting   upper extremity elevated   heels elevated  Taken 12/2/2022 0000 by Geeta Morris RN  Body Position:   weight shifting   upper extremity elevated   heels elevated  Taken 12/1/2022 2200 by Geeta Morris RN  Body Position:   weight shifting   upper extremity elevated   heels elevated  Taken 12/1/2022 2000 by Alexandra  Geeta GAMINO RN  Body Position:   refuses positioning   weight shifting   upper extremity elevated   heels elevated  Intervention: Prevent and Manage VTE (Venous Thromboembolism) Risk  Recent Flowsheet Documentation  Taken 12/2/2022 0400 by Geeta Morris RN  VTE Prevention/Management: patient refused intervention  Taken 12/1/2022 2000 by Geeta Morris RN  VTE Prevention/Management: patient refused intervention     Problem: Plan of Care - These are the overarching goals to be used throughout the patient stay.    Goal: Optimal Comfort and Wellbeing  Outcome: Progressing  Intervention: Monitor Pain and Promote Comfort  Recent Flowsheet Documentation  Taken 12/2/2022 0400 by Geeta Morris RN  Pain Management Interventions:   medication (see MAR)   rest   repositioned  Taken 12/1/2022 2200 by Geeta Morris RN  Pain Management Interventions:   medication (see MAR)   rest   repositioned  Taken 12/1/2022 2000 by Geeta Morris RN  Pain Management Interventions:   medication (see MAR)   rest   repositioned  Intervention: Provide Person-Centered Care  Recent Flowsheet Documentation  Taken 12/2/2022 0400 by Geeta Morris RN  Trust Relationship/Rapport:   care explained   choices provided   emotional support provided   empathic listening provided   questions answered   questions encouraged   reassurance provided   thoughts/feelings acknowledged  Taken 12/1/2022 2000 by Geeta Morris RN  Trust Relationship/Rapport:   care explained   choices provided   emotional support provided   empathic listening provided   questions answered   questions encouraged   reassurance provided   thoughts/feelings acknowledged     Problem: Risk for Delirium  Goal: Improved Sleep  Outcome: Progressing

## 2022-12-02 NOTE — PROGRESS NOTES
[facility]  Palliative Care Daily Progress Note       Recommendations/discussion       Pt seen and examined. Will review with primary team. Given her chronic lung disease 2/2 CARLOS, pHTN as well as her HFpEF with progressive RV failure there is concern that recurrent acute hypercarbic respiratory failure will probably occur with any future pulmonary insult.   We discussed the likely progression of her condition and possible need for repeat hospitalizations and/or need for aggressive interventions such as intubation and ICU stays. She wants to review these concepts/decisions with her niece who will be back in town next week continues PC helping facilitate ACD discussions  Goals of care:  For now, Arianna outlines her goals as fully restorative knowing she has significant recovery needs to achieve her prior level of independence- noting she lived alone in an apartment with elevator access-supported by food/meal delivery service and cleaning resources from a [facility] agency. She has a niece (Emelia) who serves as her advocate (not documented formally) and visits 'often'-(not supported by unit SW documentation of discussion with niece who notes visiting only 2-3 times per year.)  She relates no use of walker or cane and functional independence. At present she is needing lift assist for transfers, some participation in ADL's. Seems open to TCU vs LTACH after discharge prior to her hopeful return home.   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   Was anointed by  as requested.  Ongoing support from PC counseling staff- will continue to follow.   CODE STATUS: FULL CODE -needs to be further addressed with ongoing follow up. Her niece is surrogate decision maker but not formalized- PC provided assistance with honoring choices documents.         Assessments          Arianna Siddiqi is a 62 year old female with a history  of morbid obesity, CARLOS, hypertension, HFpEF, paroxysmal A. Fib (apixiban), MAG, factor IV deficiency and CKD who was admitted for hypercapnic respiratory failure likely due to pneumonia, necessitating intubation. 11/23 extubated to BiPAP r/t chronic CO2 retention tolerating well. On HFNC and off vasoactive meds for now.  Known concerns for RV heart failure, pulmonary hypertension, severe volume overload. Will need further rehab stay and likely longer term placement.     Prognosis, Goals, or Advance Care Planning was addressed today with: Yes.  Mood, coping, and/or meaning in the context of serious illness were addressed today: Yes.  Summary/Comments: see consult note   Marvin MARQUEZ NP ACHPN  Nurse Practitioner- Advanced Practice Provider  [facility] Palliative Medicine Consult Service   [phone]  TT spent: 28 minutes of which 16 minutes were spent in direct face to face contact with patient/family..        Interval History:     Chart review/discussion with unit or clinical team members:   No acute events overnight. Remains on HFNC. PT notes ability to sit on edge of bed for 7-8 minutes and tolerating EZ stand as a reflection of core strength.    Key Palliative Symptoms:  We are not helping to manage these symptoms currently in this patient.           Review of Systems:     5 system ROS was reviewed and is unremarkable          Medications:     I have reviewed this patient's medication profile and medications during this hospitalization.           Physical Exam:   Vitals were reviewed  Temp: 98  F (36.7  C) Temp src: Oral BP: 123/70 Pulse: 79   Resp: 15 SpO2: 98 % O2 Device: BiPAP/CPAP Oxygen Delivery: 35 LPM  GEN: Morbidly obese female resting in bedside recliner, HFNC in place. In no acute distress. Follows requests. Conversant.   HEENT: Normocephalic, atraumatic, MMM. HFNC.   CV: Monitor with sinus rhythm in the 70s. RRR.  PULM/CHEST: no appreciable wheeze or cough.  GI: normal bowel sounds, soft,  generalized tenderness   : Baig catheter in place, clear yellow urine.  EXTREMITIES: moving all extremities, peripheral pulses intact  NEURO: following commands, A&O x 4           Data Reviewed:     ROUTINE LABS (Last four results)  BMP  Recent Labs   Lab 12/02/22  0600 12/01/22  1210 12/01/22  0642 11/30/22  2037 11/30/22  0836 11/30/22  0408 11/29/22  1651 11/29/22  1358     --  143  --   --  141  --  143   POTASSIUM 3.3*  --  3.7  --   --  3.6  --  4.0   CHLORIDE 95*  --  99  --   --  101  --  104   KADEN 10.0  --  9.9  --   --  10.0  --  9.8   CO2 33*  --  31*  --   --  28  --  26   BUN 55.8*  --  57.6*  --   --  67.4*  --  68.5*   CR 1.65*  --  1.93*  --   --  2.43*  --  2.52*   GLC 94 125* 92 122*   < > 90   < > 139*    < > = values in this interval not displayed.     CBC  Recent Labs   Lab 12/02/22  0600 12/01/22  0642 11/30/22 0408 11/29/22 0415   WBC 8.5 9.1 8.9 8.0   RBC 4.91 4.64 4.53 4.41   HGB 13.9 13.3 12.8 12.3   HCT 47.3* 44.1 43.7 43.3   MCV 96 95 97 98   MCH 28.3 28.7 28.3 27.9   MCHC 29.4* 30.2* 29.3* 28.4*   RDW 17.7* 17.8* 17.7* 17.7*    185 170 177     INR  Recent Labs   Lab 12/02/22  0600 12/01/22  0642 11/30/22 0408 11/29/22  0415   INR 1.16* 1.20* 1.27* 1.32*

## 2022-12-02 NOTE — PROGRESS NOTES
SPIRITUAL HEALTH SERVICES  SPIRITUAL ASSESSMENT Progress Note (Palliative Focus)  [facility] 4C    REFERRAL SOURCE: Staff request.    Attempted visits with patient Arianna Siddiqi; however, Arianna was with other providers on each attempt.     Plan: Fr. Delgado and/or I will follow for spiritual support while Palliative Care is consulted.    Debi Oseguera  Palliative   Pager [phone]  [facility] Inpatient Team Consult pager [phone] (M-F 8-4:30)  After-hours Answering Service [phone]

## 2022-12-02 NOTE — PLAN OF CARE
ICU End of Shift Summary. See flowsheets for vital signs and detailed assessment.    Changes this shift: Initially AOx4; up with therapy PT/OT to chair x5 hours approx. Premedicated with oxy (tolerated in past) and became progressively drowsy throughout day, however conversing and oriented still. Pt then req to go back to bed and wanted oxy/compazine again for gen pain/nausea. Interim, VBG obtained showing CO2 100 (BIPAP already applied for nap approx 20 minutes prior to VBG)...RT called to bedside as all bedside BIPAP masks ill-fitting; also switch to AVAP mode targeting volumes as pt frequently was not pulling adequate TVs (100-200). MD called to bedside for assessment as RN concern for intubation . Narcan offered, but MD declined at this time. 2hr VBG recheck showed unimproved Co2 97. MD + RT paged; awaiting response. Interim, CXR obtained. 4mg Bumex given early. Additionally, AM diuresis pt did not have adequate response. Baig removed per attending request @ 15:00; Purewick applied. Has not voided spontaneously yet. K+ 3.3 replaced; recheck 4.2. 1x BM smear. Poor intake 2/2 loss of appetite. Spot AKKU check 120s.    Plan: Monitor neuro/respiratory status.     Goal Outcome Evaluation:      Plan of Care Reviewed With: patient    Overall Patient Progress: no changeOverall Patient Progress: no change    Outcome Evaluation: Respiratory status grossly unchanged. AOx4, drowsy at times with oxy. Amenable to rehab therapies.    Problem: Plan of Care - These are the overarching goals to be used throughout the patient stay.    Goal: Readiness for Transition of Care  Outcome: Progressing

## 2022-12-02 NOTE — PROGRESS NOTES
Care Management Follow Up    Length of Stay (days): 14    Expected Discharge Date:       Concerns to be Addressed: Discharge planning   Patient plan of care discussed at interdisciplinary rounds: Yes    Anticipated Discharge Disposition: Assisted Living, Skilled Nursing Facility, Long Term Care     Anticipated Discharge Services:  Unknown   Anticipated Discharge DME:  Unknown       Referrals Placed by CM/SW:  [facility]  Private pay costs discussed: Not applicable    Additional Information:  Patient still requiring HFNC at 45% and will continue to need complex care. Writer made referrals to LTACH for possible admission. RNCC/SW will continue to follow.     Lana Dimas, RN, BSN  RN Care Coordinator   MICU/SICU  [phone]           Lana Dimas, RN

## 2022-12-03 NOTE — PROGRESS NOTES
[facility] PALLIATIVE CARE PROGRESS NOTE        Chief Complaint:  Goals of care conversation    Assessment/Recommendations:  1)   GOALS OF CARE per patient    Restorative  With a desire to go to rehab and return to home if possible or else to an assisted living-type situation.  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    Code Status:  Arianna does not want to be intubated and I wrote an order to that effect.  I discussed with her that attempting cardiac resuscitation without intubation is likely of no benefit and she wants to speak with her two nieces prior to deciding to be NO CPR.    2)    ADVANCED CARE PLANNING    Patient has completed health care directive:  no    Documented health care agent:  N/a    Surrogate decision maker if no appointed agent:  Her two nieces, Emelia and Marcela Siddiqi.    3)    SYMPTOM MANAGEMENT      We are not currently helping to manage symptoms in this patient    No unmet symptom burden identified    4)    PSYCHOSOCIAL/SPIRITUAL SUPPORT    Arianna declines visits from  and palliative  at this time.    These recommendations have been discussed with ICU team via this note.      Thank you for the opportunity to participate in the care of this patient and family.      During regular M-F work hours -- if you are not sure who specifically to contact -- please contact us by calling [phone].      Key Palliative Symptom Data:  We are not helping to manage these symptoms currently in this patient.    Patient is on opioids: bowels not assessed today.       Prognosis, Goals, or Advance Care Planning was addressed today with: Yes.    Mood, coping, and/or meaning in the context of serious illness were addressed today: Yes.    Palliative Encounter Summary/Comments:  I met with Arianna in her ICU room at the request of the ICU team.  She was in the bedside chair with [facility] in place.  She was oriented X4 and readily  desired to talk about her concerns about being re-intubated and the possible need for a trache.  She stated categorically that she never wants to be re-intubated.  She voiced understanding that she would likely die in that circumstance and would like to be kept comfortable while she had a natural death.    Arianna also expressed that she has been intubated and extubated 2X now and does not want it a third time.    I asked Arianna about CPR and she says she wants to discuss that with her nieces prior to making a decision though she is leaning towards no CPR.  I explained it is likely to not be beneficial if she doesn't want to be intubated and she voiced understanding that.    Arianna voices a lot of hope that she can go to rehab and successfully return home and then likely move into an [facility] as she recognizes she needs more help.  She was very clear she would not want to live in a nursing home long term and would prefer to work with hospice, if she qualified.  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    I told Arianna I would adjust her code status order and have Marvin Larson follow up with her early next week.    TTS: I have personally spent a total of 30 minutes  today on the unit in review of medical record, consultation with the medical providers and assessment of patient today, with more than 50% of this time spent in counseling about goals of care and code status, coordination of care, and conversation with primary team re:  prognosis, symptom management, risks and benefits of management options, emotional support and development of plan of care.    Leon Ruffin MD MS FAAFP  Palliative Medicine   Text me via [employer].     Team Consult Pager [phone] (answered 8am-430pm M-F) - ok to text page via [employer] / After-Hours Answering Service [phone] / Palliative Clinic in the [facility] at the [facility] - [phone] (scheduling); [phone]  (triage).

## 2022-12-03 NOTE — H&P
[facility]    ICU History and Physical    Primary Team: MICU  Reason for Critical Care Admission: Hypercapneic respiratory failure, encephalopathy  Admitting Physician: Gagan Mcdonough MD  Date of Admission:  11/17/2022    Assessment: Critical Care   Arianna Siddiqi is a 62 year old female admitted on 11/17/2022. She has a history of morbid obesity, CARLOS, hypertension, HFpEF, paroxysmal A. Fib (on apixiban), MAG, factor IV deficiency and CKD who was initially admitted to the ICU on 11/18 for hypercapnic respiratory failure likely due to pneumonia, necessitating intubation. 11/23 extubated to BiPAP due to chronic CO2 retention. Now with concerns for RV heart failure, pulmonary hypertension, volume overload, off pressors since 11/29 and transferred to general medicine 11/30.   Readmitted to ICU 12/2 PM for worsening hypercapnia and acute encephalopathy with concern for need for intubation. However, mental status improved with better fitting BiPAP and have been able to avoid intubation, although remains significantly hypercapnic.      Plan: Critical Care   Neuro/ pain/ sedation:  # Acute encephalopathy, improved  Patient less responsive in early evening, mental status improved on BiPAP before requiring intubation. Given improvement after better fitting BiPAP mask, likely secondary to hypercapnia. Patient satting well on 35L 40% BiPAP. Less likely metabolic or infectious given improvement. A&Ox4 at time of exam 12/2 late PM.  - Monitor neurological status. Notify provider for any acute changes in exam  - If worsening encephalopathy overnight, plan for intubation  - Holding PTA cyclobenzaprine, tramadol and benadryl    Pulmonary:  # Acute hypoxic and hypercarbic respiratory failure   # Pulmonary Hypertension, R heart failure   # Pulmonary edema/volume overload   Patient required intubation on admission 11/18, extubated to BiPAP 11/23. 12/2 with worsening hypercapnia and altered  mental status as above, concern for need for intubation. PCO2 up to 100 from 55-70 in preceding days, pH 7.23. Ddx includes poorly fitting BiPAP masks not adequately ventilating on baseline of known obesity hypoventilation, right heart failure with pulmonary edema. CXR at time of worsening with low lung volumes, perihilar and basilar opacities - edema vs atelectasis vs infection. Remains afebrile, no leukocytosis, no new productive cough - less likely infectious. Is s/p 7 day course of ceftriaxone (11/18-11/24) for CAP and empiric zosyn for c/f VAP/HAP 11/25-11/30. CT PE 11/26 without e/o PE, has been on anticoagulation (heparin gtt -> apixaban for afib).  - Continuous BiPAP, AVAPs setting until pCO2 improving   - Required nasal trumpet placement by RT given poorly fitting mask when patient is laying down/sleeping   - Scheduled duonebs and hypertonic saline nebs for thick secretions QID  - Repeat VBG at 0000  - Repeat sputum culture, add on procal - hold off on abx for now, low threshold to start empiric coverage if respiratory status worsening or requires intubation  - Frequent mental status assessment with low threshold for intubation if becoming more somnolent, encephalopathic   - See the cardiology section for the plan regarding her pulmonary hypertension    Cardiovascular:  # Hx HFpEF (EF 60 to 65% per echo on 11/26/2022)    # Decompensated right heart failure, pulmonary edema  # Pulmonary hypertension  # CARLOS on PTA CPAP  # Obesity hypoventilation  # CAP, resolved  # C/f HAP, resolved   Patient's [place]Jua[place] catheter measurements while in ICU initially consistent with volume overload and pulmonary hypertension, elevated BNP. Normal cardiac output per swan Juaquin catheter (no suspicion for shunts which would artificially elevate these measurements). Known history of CARLOS and HFpEF. 11/25 BLE duplex US showed no above knee DVT; CT PE 11/26 without PE.   CXR still with concern for pulmonary edema 12/2, likely  contributing to hypercapnia as detailed above. Good UOP with TID bumex.  - Holding prior to admission Furosemide, lisinopril, metoprolol, & Bumex   - Diuresis with Bumex 4mg IV TID, goal net negative 1L to 2L  - Trend daily weights and obtain strict ins and outs   - MAP > 70    # Hx PAF (on apixaban PTA)  Presently in sinus rhythm. Previously on amio gtt this admission.  - Continue 200 mg BID PO amio through 12/6, then decrease to 200 mg daily   - PTA apixiban    GI/Nutrition:  - Diet: NPO for Medical/Clinical Reasons Except for: Meds    Maintain NPO status except for meds while on continuous AVAPs    # Risk for malnutrition  # Hypoalbuminemia  # Morbid obesity  # Nausea   # Constipation-resolved  - RD following  - Compazine and Zofran PRN  - Bowel regimen: Senna and Miralax PRN     Renal/ Fluid Balance:   # Non-oliguric acute on chronic kidney injury   # Metabolic alkalosis,   # Hx CKD (baseline creatinine 1.1)  Creatinine 3.4 on admission, improved to 1.05 on 11/28 before rising again to 2.5 on 11/29. Now downtrending over past few days with aggressive diuresis, 1.65 on 12/2. Likely cardiorenal with c/f volume overload in setting of known right heart failure and improvement with diuresis  - Diuresis as above  - Daily BMP    # Hypokalemia  In setting of diuresis as above.   - On replacement protocol, monitor closely with JASON     Endocrine:  No acute concerns   - Hypoglycemia protocol in place while patient NPO     ID:  # CAP, resolved  # C/f HAP/VAP, resolved  S/p 7 day course ceftriaxone (11/18-11/24) and zosyn (11/25-11/30).   - Procal, sputum culture as above - low concern for pneumonia as etiology of hypercapneic respiratory failure as above but low threshold to empirically treat if respiratory status worsens    # Oral thrush   - Nystatin QID     Cultures:     11/25 BC x 2 - NGTD    11/25 MRSA     11/25 UA reflex - bland    11/25 Sputum culuture - unremarkable     11/20 Sputum culture unremarkable    11/18  Sputum culture unremarkable    11/18 urine culture unremarkable    11/17 blood cultures x 2 NGTD    11/17 viral panels unremarkable    Hematology:  # Hx MAG  # Hx Factor VII deficiency  Seen outpatient by hematology has a history oral/IV iron supplementation  - PTA ferrous gluconate 324 mg - continue given infection lower on differential at this time  - PTA apixaban (restarted 12/01, was previously therapeutic on heparin gtt earlier in hospitalization)    MSK:   - PT and OT consulted. Appreciate recommendations.    #Hx Gout  #Hx hemosiderin deposition BLE  - PT/OT for ROM  - PTA allopurinol reduced to 100 mg given poor renal function     Lines/ tubes/ drains:  Baig Catheter: Not present  Central Lines: None    Prophylaxis:  - DVT Prophylaxis: Apixaban as above  - PUD Prophylaxis: NA not intubated. Is on PPI    Code Status: Full Code  - confirmed with patient and family (niece) on re-admission to ICU. Encourage ongoing conversations as detailed below    Disposition: Pending need for intubation  Goals of Care  ICU consulted palliative care 11/30 to begin discussing pt's goals moving forward. Given her chronic lung disease 2/2 CARLOS, OHS, pHTN as well as her HFpEF with decompensated RV failure recurrent acute hypercarbic respiratory failure likely to occur with any pulmonary insult. Palliative care saw patient 11/30, confirmed desire for full code.   - Continue to readdress code status  - Niece is surrogate decision maker but not formalized - palliative provided honoring choices documents     The patient's care was discussed with the Attending Physician, Dr. Mcdonough.    Cony Dwyer MD   Internal Medicine PGY2  [facility]  Securely message with the Vocera Web Console (learn more here)  Text page via [employer] Paging/Directory   Please see signed in provider for up to date coverage information      ______________________________________________________________________    Chief  Complaint   Acute hypercarbic respiratory failure    History is obtained from the patient and the medical record    History of Present Illness   Arianna Siddiqi is a 62 year old female who was admitted on 11/17 with acute hypercarbic respiratory failure thought 2/2 CAP. Was intubated and is s/p abx course (ceftriaxone), extubated 11/23. Transferred to floor, also s/p 5 days zosyn for c/f HAP/VAP, 11/25-11/30. Patient transferred back to ICU this evening because was less arousable, VBG demonstrated pCO2 of 100, pH of 7.23. RT noted that they were having trouble with BiPAP mask fitting all day.     At time of evaluation, patient was conversant, although somewhat difficult to understand with BiPAP in place. Noted discomfort with nasal trumpet in nose extending down the back of her throat. No worsened shortness of breath, no chest discomfort, no pleuritic chest pain. No abdominal pain, fevers/chills, new cough. Wondering if she can have anything to drink.     Review of Systems    Limited by BiPAP/difficulty understanding patient but pertinent negatives detailed above     Past Medical History    I have reviewed this patient's medical history and updated it with pertinent information if needed.   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  Past Surgical History:   Procedure Laterality Date     EXAM UNDER ANESTHESIA PELVIC N/A 11/14/2021    Procedure: Exam Under Anesthesia, Dilation and curettage,  placement of intrauterine device, pap smear;  Surgeon: Deedee Hess MD;  Location: [facility] OR        Social History   I have reviewed this patient's social history and updated it with pertinent information if needed.  Social History     Tobacco Use     Smoking status: Former     Packs/day: 1.50     Years: 20.00     Pack years: 30.00     Types: Cigarettes     Quit date: 2002     Years since quittin.9     Smokeless tobacco: Never   Substance Use Topics     Alcohol use: No     Alcohol/week: 0.0 standard drinks     Drug use: No       Family History     Did not discuss with patient tonight     Prior to Admission Medications   Prior to Admission Medications   Prescriptions Last Dose Informant Patient Reported? Taking?   Multiple Vitamins-Minerals (MULTIVITAMIN WOMEN PO)   Yes No   acetaminophen (TYLENOL) 500 MG tablet  Pharmacy No No   Sig: Take 1-2 tablets (500-1,000 mg) by mouth every 4 hours as needed for mild pain   allopurinol (ZYLOPRIM) 100 MG tablet   No No   Sig: Take 2 tablets (200 mg) by mouth daily *Monitoring lab - Uric Acid level every 12 months. You will not receive further refills until lab is scheduled. [phone]   alum & mag hydroxide-simethicone (MAALOX  ES) 400-400-40 MG/5ML SUSP suspension  Pharmacy No No   Sig: Take 15 mLs by mouth every 4 hours as needed for other (gastric distress.)   apixaban ANTICOAGULANT (ELIQUIS ANTICOAGULANT) 5 MG tablet   No No   Sig: Take 1 tablet (5 mg) by mouth 2 times daily   atorvastatin (LIPITOR) 20 MG tablet   No No   Sig: Take 1 tablet (20 mg) by mouth daily   blood glucose monitoring (ACCU-CHEK NINA PLUS) meter device kit  Pharmacy No No   Sig: Use to test blood sugars 3 times daily or as directed.   blood glucose monitoring (SOFTCLIX) lancets  Pharmacy No No   Sig: Use to test blood sugar 3 times daily or as directed.   bumetanide (BUMEX) 2 MG tablet   No No   Sig: Take 1 tablet (2 mg) by mouth daily   cyclobenzaprine (FLEXERIL) 5 MG tablet   No No   Sig: TAKE 1 TABLET (5 MG) BY MOUTH NIGHTLY AS NEEDED FOR MUSCLE SPASMS   diphenhydrAMINE  (BENADRYL) 25 MG tablet  Pharmacy Yes No   Sig: Take 25 mg by mouth every 6 hours as needed for itching or allergies   ferrous gluconate (FERGON) 324 (38 Fe) MG tablet   No No   Sig: Take 1 tablet (324 mg) by mouth daily   lisinopril (ZESTRIL) 5 MG tablet   No No   Sig: Take 1 tablet (5 mg) by mouth daily   metoprolol succinate ER (TOPROL XL) 100 MG 24 hr tablet   No No   Sig: Take 1 tablet (100 mg) by mouth daily   miconazole (MICRO GUARD) 2 % external powder   No No   Sig: Apply topically as needed for itching or other APPLY TO AFFECTED AREA TWICE A DAY *NOT COVERED*   ondansetron (ZOFRAN ODT) 4 MG ODT tab   No No   Sig: Take 1 tablet (4 mg) by mouth every 8 hours as needed for nausea or vomiting   polyethylene glycol (MIRALAX) packet  Pharmacy No No   Sig: Take 17 g by mouth daily as needed for constipation   Patient not taking: Reported on 6/28/2022   potassium chloride ER (K-TAB) 20 MEQ CR tablet   No No   Sig: Take 1 tablet (20 mEq) by mouth daily   sennosides (SENOKOT) 8.6 MG tablet  Pharmacy No No   Sig: Take 1 tablet by mouth 2 times daily as needed for constipation   Patient not taking: Reported on 6/28/2022   traMADol (ULTRAM) 50 MG tablet  Pharmacy No No   Sig: TAKE 1 TABLET (50 MG) BY MOUTH NIGHTLY AS NEEDED FOR SEVERE PAIN *INS LIMIT 7 DAYS   Patient not taking: Reported on 6/28/2022   triamcinolone (KENALOG) 0.1 % external cream   No No   Sig: Apply topically 2 times daily APPLY TO AFFECTED AREA      Facility-Administered Medications: None     Allergies   Allergies   Allergen Reactions     Penicillins Itching and Rash     Tolerated ceftriaxone 1/17/2020  Tolerated Zosyn 11/2022       Physical Exam   Vital Signs: Temp: 97.9  F (36.6  C) Temp src: Axillary BP: (!) 144/84 Pulse: 77   Resp: 24 SpO2: 96 % O2 Device: BiPAP/CPAP Oxygen Delivery: 35 LPM  Weight: 390 lbs 3.43 oz    General: Alert and oriented x3, BiPAP in place, in no acute distress  HEENT: BiPAP and nasal trumpet in place, no scleral  icterus  Neuro: Moving all extremities, no appreciated focal deficits  CV: Somewhat difficult to auscultate due to body habitus, RRR, no m/r/g appreciated. JVD difficult to assess given body habitus   Pulm: Clear to auscultation bilaterally, not in respiratory distress  Abd: Soft, nontender, nondistended  Extremities: Trace LE edema with chronic venous stasis changes, warm and well perfused, 2+ DP pulses  Skin: Woody appearance of bilateral LEs, no other noted rashes/lesions    Data   I reviewed all medications, new labs and imaging results over the last 24 hours.  Arterial Blood Gases   Recent Labs   Lab 11/26/22  1119 11/26/22  0409 11/26/22  0149   PH 7.23* 7.23* 7.21*   PCO2 67* 67* 70*   PO2 71* 83 83   HCO3 28 28 28       Complete Blood Count   Recent Labs   Lab 12/02/22  0600 12/01/22  0642 11/30/22  0408 11/29/22  0415   WBC 8.5 9.1 8.9 8.0   HGB 13.9 13.3 12.8 12.3    185 170 177       Basic Metabolic Panel  Recent Labs   Lab 12/02/22  1626 12/02/22  1440 12/02/22  0600 12/01/22  1210 12/01/22  0642 11/30/22  0836 11/30/22  0408 11/29/22  1651 11/29/22  1358   NA  --   --  143  --  143  --  141  --  143   POTASSIUM  --  4.2 3.3*  --  3.7  --  3.6  --  4.0   CHLORIDE  --   --  95*  --  99  --  101  --  104   CO2  --   --  33*  --  31*  --  28  --  26   BUN  --   --  55.8*  --  57.6*  --  67.4*  --  68.5*   CR  --   --  1.65*  --  1.93*  --  2.43*  --  2.52*   *  --  94 125* 92   < > 90   < > 139*    < > = values in this interval not displayed.       Liver Function Tests  Recent Labs   Lab 12/02/22  0600 12/01/22  0642 11/30/22  0408 11/29/22  0415   AST 28 37* 22 21   ALT 17 16 16 16   ALKPHOS 84 78 78 74   BILITOTAL 0.5 0.6 0.7 0.7   ALBUMIN 3.3* 3.0* 3.1* 3.0*   INR 1.16* 1.20* 1.27* 1.32*       Pancreatic Enzymes  No lab results found in last 7 days.    Coagulation Profile  Recent Labs   Lab 12/02/22  0600 12/01/22  0642 11/30/22  1843 11/30/22  1151 11/30/22  0408 11/29/22  0757  11/29/22  0415   INR 1.16* 1.20*  --   --  1.27*  --  1.32*   PTT 35 64* 62* 65* 96*   < >  --     < > = values in this interval not displayed.       IMAGING:  Recent Results (from the past 24 hour(s))   XR Chest Port 1 View    Narrative    EXAM: XR CHEST PORT 1 VIEW 12/2/2022 5:28 PM    HISTORY: Pt with hypercarbic respiratory failure and volume overload,  reassess     COMPARISON: 12/1/2022.    TECHNIQUE: Portable AP view of the chest..    FINDINGS:   Midline trachea. Stable cardiomegaly with redemonstration of enlarged  and indistinct pulmonary vasculature. Bilateral costophrenic angle  blunting. No discernible pneumothorax. Low lung volumes. Fluffy  bilateral perihilar and basilar opacities. Unremarkable upper abdomen.  No acute or suspicious osseous abnormalities. Unremarkable soft  tissues.      Impression    IMPRESSION:   Continued low lung volumes with perihilar and basilar predominant  mixed interstitial and patchy airspace opacities, pulmonary edema  and/or atelectasis. Infection is not excluded.    I have personally reviewed the examination and initial interpretation  and I agree with the findings.    ALAN GOLD,          SYSTEM ID:  [id number]

## 2022-12-03 NOTE — PROGRESS NOTES
[facility]    ICU Progress Note       Date of Admission:  11/17/2022    Assessment: Critical Care   Arianna Siddiqi is a 62 year old female admitted on 11/17/2022. She has a history of morbid obesity, CARLOS, hypertension, HFpEF, paroxysmal A. Fib (on apixiban), MAG, factor IV deficiency and CKD who was initially admitted to the ICU on 11/18 for hypercapnic respiratory failure likely due to pneumonia, necessitating intubation. 11/23 extubated to BiPAP due to chronic CO2 retention. Now with concerns for RV heart failure, pulmonary hypertension, volume overload, off pressors since 11/29 and transferred to general medicine 11/30.     Readmitted to ICU 12/2 PM for worsening hypercapnia and acute encephalopathy with concern for need for intubation. However, mental status improved with better fitting BiPAP and have been able to avoid intubation. Now stable for the floor with good mentation.     Plan: Critical Care   Neuro/ pain/ sedation:  # Acute encephalopathy, improved  Mental status improved on BiPAP before requiring intubation. Given improvement after better fitting BiPAP mask, likely secondary to hypercapnia. Patient satting well on 35L 40% BiPAP.   - Monitor neurological status. Notify provider for any acute changes in exam  - Holding PTA cyclobenzaprine, tramadol and benadryl     Pulmonary:  # Acute hypoxic and hypercarbic respiratory failure   # Pulmonary Hypertension, R heart failure   # Pulmonary edema/volume overload   Patient required intubation on admission 11/18, extubated to BiPAP 11/23. 12/2 with worsening hypercapnia and altered mental status as above, concern for need for intubation. PCO2 up to 100 from 55-70 in preceding days, pH 7.23. Ddx includes poorly fitting BiPAP masks not adequately ventilating on baseline of known obesity hypoventilation, right heart failure with pulmonary edema. CXR at time of worsening with low lung volumes, perihilar and basilar  opacities - edema vs atelectasis vs infection. Remains afebrile, no leukocytosis, no new productive cough - less likely infectious. Is s/p 7 day course of ceftriaxone (11/18-11/24) for CAP and empiric zosyn for c/f VAP/HAP 11/25-11/30. CT PE 11/26 without e/o PE, has been on anticoagulation (heparin gtt -> apixaban for afib).  - Continuous BiPAP, AVAPs setting until pCO2 improving  - Scheduled duonebs and hypertonic saline nebs for thick secretions QID  - Repeat sputum culture, add on procal - hold off on abx for now, low threshold to start empiric coverage if respiratory status worsening or requires intubation  - See the cardiology section for the plan regarding her pulmonary hypertension     Cardiovascular:  # Hx HFpEF (EF 60 to 65% per echo on 11/26/2022)    # Decompensated right heart failure, pulmonary edema  # Pulmonary hypertension  # CARLOS on PTA CPAP  # Obesity hypoventilation  # CAP, resolved  # C/f HAP, resolved   Patient's [place]-Juaquin catheter measurements while in ICU initially consistent with volume overload and pulmonary hypertension, elevated BNP. Normal cardiac output per swan Juaquin catheter (no suspicion for shunts which would artificially elevate these measurements). Known history of CARLOS and HFpEF. 11/25 BLE duplex US showed no above knee DVT; CT PE 11/26 without PE.   CXR still with concern for pulmonary edema 12/2, likely contributing to hypercapnia as detailed above. Good UOP with TID bumex.  - Holding prior to admission Furosemide, lisinopril, metoprolol, & Bumex   - Diuresis with Bumex 4mg IV TID, goal net negative 1L to 2L  - Trend daily weights and obtain strict ins and outs   - MAP > 70     # Hx PAF (on apixaban PTA)  Presently in sinus rhythm. Previously on amio gtt this admission.  - Continue 200 mg BID PO amio through 12/6, then decrease to 200 mg daily   - PTA apixiban     GI/Nutrition:  - Diet: clear liquid diet     # Risk for malnutrition  # Hypoalbuminemia  # Morbid obesity  # Nausea   #  Constipation-resolved  - RD following  - Compazine and Zofran PRN  - Bowel regimen: Senna and Miralax PRN      Renal/ Fluid Balance:   # Non-oliguric acute on chronic kidney injury   # Metabolic alkalosis,   # Hx CKD (baseline creatinine 1.1)  Creatinine 3.4 on admission, improved to 1.05 on 11/28 before rising again to 2.5 on 11/29. Now downtrending over past few days with aggressive diuresis, 1.65 on 12/2. Likely cardiorenal with c/f volume overload in setting of known right heart failure and improvement with diuresis  - Diuresis as above  - Daily BMP     # Hypokalemia  In setting of diuresis as above.   - On replacement protocol, monitor closely with JASON      Endocrine:  No acute concerns   - Hypoglycemia protocol in place while patient NPO      ID:  # CAP, resolved  # C/f HAP/VAP, resolved  S/p 7 day course ceftriaxone (11/18-11/24) and zosyn (11/25-11/30).   - Procal, sputum culture as above - low concern for pneumonia as etiology of hypercapneic respiratory failure as above but low threshold to empirically treat if respiratory status worsens     # Oral thrush   - Nystatin QID      Cultures:     11/25 BC x 2 - NGTD    11/25 MRSA     11/25 UA reflex - bland    11/25 Sputum culuture - unremarkable     11/20 Sputum culture unremarkable    11/18 Sputum culture unremarkable    11/18 urine culture unremarkable    11/17 blood cultures x 2 NGTD    11/17 viral panels unremarkable     Hematology:  # Hx MAG  # Hx Factor VII deficiency  Seen outpatient by hematology has a history oral/IV iron supplementation  - PTA ferrous gluconate 324 mg - continue given infection lower on differential at this time  - PTA apixaban (restarted 12/01, was previously therapeutic on heparin gtt earlier in hospitalization)     MSK:   - PT and OT consulted. Appreciate recommendations.     #Hx Gout  #Hx hemosiderin deposition BLE  - PT/OT for ROM  - PTA allopurinol reduced to 100 mg given poor renal function  Lines/ tubes/ drains:  Baig  Catheter: Not present  Central Lines: None     Prophylaxis:  - DVT Prophylaxis: Apixaban as above  - PUD Prophylaxis: NA not intubated. Is on PPI     Code Status: Full Code  - confirmed with patient and family (niece) on re-admission to ICU. Encourage ongoing conversations as detailed below     Disposition: Pending need for intubation  Goals of Care  ICU consulted palliative care 11/30 to begin discussing pt's goals moving forward. Given her chronic lung disease 2/2 CARLOS, OHS, pHTN as well as her HFpEF with decompensated RV failure recurrent acute hypercarbic respiratory failure likely to occur with any pulmonary insult. Palliative care saw patient 11/30, confirmed desire for full code.   - Continue to readdress code status  - Niece is surrogate decision maker but not formalized - palliative provided honoring choices documents     The patient's care was discussed with the Attending Physician, Dr. Larose.  Critical care time exclusive of procedures: 30 minutes    Corby Panchal MD  [facility]  Securely message with the Vocera Web Console (learn more here)  Text page via [employer] Paging/Directory         Clinically Significant Risk Factors Present on Admission                       ______________________________________________________________________    Interval History   Patient breathing comfortably on high flow and mentating well.  Conversant.  Confirms desire to remain full code for now but will continue to think this through and discuss with family.  Does not feel like she would want a trach.    Physical Exam   Vital Signs: Temp: 98.5  F (36.9  C) Temp src: Oral BP: 134/81 Pulse: 86   Resp: 20 SpO2: 90 % O2 Device: High Flow Nasal Cannula (HFNC) Oxygen Delivery: 35 LPM  Weight: 386 lbs 0 oz  GEN: Morbidly obese female resting in bed in no acute distress.  HEENT:  Normocephalic, atraumatic, HFNC in place  CV: Regular rate and rhythm with no murmurs.  PULM/CHEST: Clear  breath sounds bilaterally, normal work of breathing  GI: normal bowel sounds, soft, generalized tenderness   : Baig catheter in place  EXTREMITIES: moving all extremities, peripheral pulses intact  NEURO: following commands, A&O x 4    Data   I reviewed all medications, new labs and imaging results over the last 24 hours.  Arterial Blood Gases   No lab results found in last 7 days.    Complete Blood Count   Recent Labs   Lab 12/02/22  0600 12/01/22  0642 11/30/22  0408 11/29/22  0415   WBC 8.5 9.1 8.9 8.0   HGB 13.9 13.3 12.8 12.3    185 170 177       Basic Metabolic Panel  Recent Labs   Lab 12/03/22  0759 12/03/22  0352 12/02/22  1626 12/02/22  1440 12/02/22  0600 12/01/22  1210 12/01/22  0642 11/30/22  0836 11/30/22  0408   NA  --  144  --   --  143  --  143  --  141   POTASSIUM  --  3.9  --  4.2 3.3*  --  3.7  --  3.6   CHLORIDE  --  94*  --   --  95*  --  99  --  101   CO2  --  36*  --   --  33*  --  31*  --  28   BUN  --  51.2*  --   --  55.8*  --  57.6*  --  67.4*   CR  --  1.55*  --   --  1.65*  --  1.93*  --  2.43*   GLC 94 92 128*  --  94   < > 92   < > 90    < > = values in this interval not displayed.       Liver Function Tests  Recent Labs   Lab 12/03/22 0352 12/02/22 0600 12/01/22  0642 11/30/22  0408   AST 38* 28 37* 22   ALT 21 17 16 16   ALKPHOS 93 84 78 78   BILITOTAL 0.5 0.5 0.6 0.7   ALBUMIN 3.3* 3.3* 3.0* 3.1*   INR 1.28* 1.16* 1.20* 1.27*       Pancreatic Enzymes  No lab results found in last 7 days.    Coagulation Profile  Recent Labs   Lab 12/03/22 0352 12/02/22  0600 12/01/22  0642 11/30/22  1843 11/30/22  1151 11/30/22  0408   INR 1.28* 1.16* 1.20*  --   --  1.27*   PTT  --  35 64* 62* 65* 96*       IMAGING:  Recent Results (from the past 24 hour(s))   XR Chest Port 1 View    Narrative    EXAM: XR CHEST PORT 1 VIEW 12/2/2022 5:28 PM    HISTORY: Pt with hypercarbic respiratory failure and volume overload,  reassess     COMPARISON: 12/1/2022.    TECHNIQUE: Portable AP view of the  chest..    FINDINGS:   Midline trachea. Stable cardiomegaly with redemonstration of enlarged  and indistinct pulmonary vasculature. Bilateral costophrenic angle  blunting. No discernible pneumothorax. Low lung volumes. Fluffy  bilateral perihilar and basilar opacities. Unremarkable upper abdomen.  No acute or suspicious osseous abnormalities. Unremarkable soft  tissues.      Impression    IMPRESSION:   Continued low lung volumes with perihilar and basilar predominant  mixed interstitial and patchy airspace opacities, pulmonary edema  and/or atelectasis. Infection is not excluded.    I have personally reviewed the examination and initial interpretation  and I agree with the findings.    ALAN GOLD DO         SYSTEM ID:  [id number]

## 2022-12-03 NOTE — PROGRESS NOTES
[facility]    Progress Note - Medicine Service, CAROLYN TEAM 1       Date of Admission:  11/17/2022    Assessment & Plan   Arianna Siddiqi is a 62 year old female admitted on 11/17/2022. She has a history of morbid obesity, CARLOS, hypertension, HFpEF, paroxysmal A. Fib (on apixiban), MAG, factor IV deficiency and CKD who was initially admitted to the ICU on 11/18 for hypercapnic respiratory failure likely due to pneumonia, necessitating intubation. 11/23 extubated to BiPAP due to chronic CO2 retention. Now with concerns for RV heart failure, pulmonary hypertension, volume overload, off pressors since 11/29 and transferred to general medicine 11/30. Readmitted to ICU 12/2 PM for worsening hypercapnia and acute encephalopathy with concern for need for intubation. However, mental status improved with better fitting BiPAP able to avoid intubation, although remains significantly hypercapnic. Transferred back to general medicine 12/3 for ongoing diuresis, respiratory support and goals of care conversations.     Changes and Major Updates today:  - Transferred back to general medicine   - Conitnue Bumex 4mg TID  - Will continue GOC conversations especially about the question of re-intubation and potential need for trach if that occurs     # Acute hypoxic and hypercarbic respiratory failure   # Pulmonary Hypertension  # Pulmonary edema/fluid overload   Patient extubated to BiPAP 11/23. CT PE/CAP unremarkable this admission.  - Wean O2 as tolerated  - Scheduled duonebs and hypertonic saline nebs for thick secretions QID  - BiPAP when sleeping (including naps)   - IS q 2H WA  - VBG conditionally  - see the cardiology section for the plan regarding her pulmonary hypertension     # Intermittent hypotension  # Hx HFpEF (EF 60 to 65% per echo on 11/26/2022)    # Decompensated right heart failure  # Fluid overload  # Pulmonary hypertension  Patient's [place]-Juaquin catheter measurements while  in ICU consistent with volume overload and pulmonary hypertension.   Elevated BNP.  Normal cardiac output per swan Juaquin catheter (no suspicion for shunts which would artificially elevate these measurements). Known history of CARLOS and HFpEF. 11/25 BLE duplex US showed no above knee DVT.   - Holding prior to admission Furosemide, lisinopril apixaban, metoprolol, & Bumex   - Diuresis with Bumex 4mg IV TID, goal net negative 1L to 2L  - Trend daily weights and obtain strict ins and outs   - MAP > 70     # Acute encephalopathy - improving  Iso critical illness and ICU delirium, exacerbated by hypercapnia   Analgesia: PRN acetaminophen      - DC oxycodone as might have contributed to hypercarbia 12/2  Sedation: Melatonin 6 mg q HS, holding PTA cyclobenzaprine, tramadol and benadryl     # Hx PAF (on apixaban PTA)  Presently in sinus rhythm.   - Previously on amio gtt this admission; continuing 200 mg BID PO amio this week. Plan to transition to maintenance 200 mg amio therafter (12/7).   - PTA apixiban dose     # Non-oliguric Acute on chronic kidney injury   # Metabolic alkalosis-presumed contraction alkalosis vs chronic hypoventilation syndrome  # Hx CKD (baseline creatinine 1.1)  Likely a component of venous ischemia due to fluid overload.   - Diuresis as above     # Hyperkalemia - resolved  # Hypomagnesia - resolved   - Electrolyte replacement protocol    # CAP - resolved  Status post Ceftiaxone 7 day course (11/18-11/24).  Zosyn empiric treatment for VAP/HAP (11/25-11/30)     Cultures:     11/25 BC x 2 - NGTD    11/25 MRSA     11/25 UA reflex - bland    11/25 Sputum culuture - unremarkable     11/20 Sputum culture unremarkable    11/18 Sputum culture unremarkable    11/18 urine culture unremarkable    11/17 blood cultures x 2 NGTD    11/17 viral panels unremarkable     # Hx MAG  # Hx Factor VII deficiency  Seen outpatient by hematology has a history oral/IV iron supplementation  - PTA ferrous gluconate 324 mg resumed  -  PTA apixaban      # CARLOS onCPAP  # Obesity hypoventilation syndrome   # Hx Smoking (30 pack per year; stopped 2002)  - BiPAP when sleeping     #Hx Gout  #Hx hemosiderin deposition BLE  - PT/OT for ROM  - 11/18 uric acid 7.9  - PTA allopurinol reduced to 100 mg given poor renal function     # Demand induced troponin leak - improving  - 11/25 troponin 257-->158 --> 143 on 11/26     # Oral thrush   - Nystatin QID      # Risk for malnutrition  # Hypoalbuminemia  # Morbid obesity  # Nausea and vomiting   # Constipation-resolved  # Diarrhea   - RD following  - Compazine and Zofran PRN  - Bowel regimen: Senna and Miralax PRN   - Advance diet as tolerated, monitor for nausea     # Goals of Care  ICU consulted palliative care 11/30 to begin discussing pt's goals moving forward. Given her chronic lung disease 2/2 CARLOS, OHS, pHTN as well as her HFpEF with decompensated RV failure recurrent acute hypercarbic respiratory failure likely to occur with any pulmonary insult like another viral or bacterial. After transfer back to ICU 12/2 she began thinking about whether she would want to be re-intubated but wanted to talk with her family before deciding. She does not want a trach and we discussed the worry that if she ever were re-intubated there is a high likelihood we would not be able to extubate her without placing a trach.   - Palliative care consult, appreciate assistance     Diet: Clear Liquid Diet    DVT Prophylaxis: Apixiban as above  Baig Catheter: Not present  Fluids: none  Central Lines: None  Cardiac Monitoring: ACTIVE order. Indication: ICU  Code Status: Full Code      Disposition Plan   { TIP  Click here to update expected discharge date and refresh note (tipsheet)     :[id number]}  Expected Discharge Date: 12/09/2022, 12:00 PM    Destination: nursing home  Discharge Comments: needs to wean of HFNC, will need TCU        The patient's care was discussed with the Attending Physician, Dr. Barnes.    Servando Holbrook MD  Medicine  "Service, CAROLYN TEAM 1  [facility]  Securely message with the Vocera Web Console (learn more here)  Text page via [facility] Paging/Directory   Please see signed in provider for up to date coverage information      Clinically Significant Risk Factors        # Hypokalemia: Lowest K = 3.3 mmol/L (Ref range: 3.4-5.3) in last 2 days, will replace as needed    # Hypercalcemia: corrected calcium is >10.1, will monitor as appropriate    # Hypoalbuminemia: Lowest albumin = 3 g/dL (Ref range: 3.5-5.2) at 12/1/2022  6:42 AM, will monitor as appropriate          # Severe Obesity: Estimated body mass index is 70.6 kg/m  as calculated from the following:    Height as of this encounter: 1.575 m (5' 2\").    Weight as of this encounter: 175.1 kg (386 lb).          ______________________________________________________________________    Interval History   Transferred to ICU 12/2 PM for AMS 2/2 hypercapnia. Was able to recover with BiPAP ON and did not need re-intubation. After speaking with ICU team this morning is considering transitioning to DNI as she notes that tracheostomy does not align with her quality of life. She requests time to speak to family about it.     When evaluated after transfer, she was alert interacting and feeling much better today compared to yesterday.     Data reviewed today: I reviewed all medications, new labs and imaging results over the last 24 hours. I personally reviewed no images or EKG's today.    Physical Exam   Vital Signs: Temp: 98.5  F (36.9  C) Temp src: Oral BP: 134/81 Pulse: 86   Resp: 20 SpO2: 90 % O2 Device: High Flow Nasal Cannula (HFNC) Oxygen Delivery: 35 LPM  Weight: 386 lbs 0 oz  GEN: Morbidly obese female resting in bedside chair in no acute distress.  HEENT:  Normocephalic, atraumatic, HFNC in  CV: Regular rate and rhythm with no murmurs.  PULM/CHEST: Clear breath sounds bilaterally, normal work of breathing  GI: normal bowel sounds, soft, " generalized tenderness   : Baig catheter in place  EXTREMITIES: moving all extremities, peripheral pulses intact  NEURO: following commands, A&O x 4    Data   Recent Labs   Lab 12/03/22  0759 12/03/22  0352 12/02/22  1626 12/02/22  1440 12/02/22  0600 12/01/22  1210 12/01/22  0642   WBC  --  9.3  --   --  8.5  --  9.1   HGB  --  14.3  --   --  13.9  --  13.3   MCV  --  99  --   --  96  --  95   PLT  --  172  --   --  191  --  185   INR  --  1.28*  --   --  1.16*  --  1.20*   NA  --  144  --   --  143  --  143   POTASSIUM  --  3.9  --  4.2 3.3*  --  3.7   CHLORIDE  --  94*  --   --  95*  --  99   CO2  --  36*  --   --  33*  --  31*   BUN  --  51.2*  --   --  55.8*  --  57.6*   CR  --  1.55*  --   --  1.65*  --  1.93*   ANIONGAP  --  14  --   --  15  --  13   KADEN  --  9.8  --   --  10.0  --  9.9   GLC 94 92 128*  --  94   < > 92   ALBUMIN  --  3.3*  --   --  3.3*  --  3.0*   PROTTOTAL  --  8.4*  --   --  7.8  --  7.4   BILITOTAL  --  0.5  --   --  0.5  --  0.6   ALKPHOS  --  93  --   --  84  --  78   ALT  --  21  --   --  17  --  16   AST  --  38*  --   --  28  --  37*    < > = values in this interval not displayed.

## 2022-12-03 NOTE — PLAN OF CARE
ICU End of Shift Summary. See flowsheets for vital signs and detailed assessment.    Changes this shift: AOx4, acting appropriately. Up to chair x4 hours. Palliative did reconsulted to readdress code status.     CODE STATUS now special: DNI, however CPR ok, until pt has family discussion. Additionally at this time, pt specifically denies ever wanting to be trached.    Otherwise: HFNC 40-50% @ 35lpm during day/awake, and BIPAP @ during naps and @ hs -AVAP mode targeting TV 500mL with FiO2 40% and EPAP 10. VBG Co2 improved to 77 during afternoon, prior to nap/ administrating BIPAP. Our best-fitted BIPAP mask is still not ideal and requiring frequent adjustments; questioning efficacy of tx.  Infrequent dry cough. SR, with PVC however had ST run in 140s and flipped into aflutter 100-120s at 11:00; pt asymptomatic and HD stable; On Eliquis. Switched to CLD; tolerating well. No BM. Voiding via purewick; Diuresed with 4mg Bumex IVP.     Plan: Monitor for hypercapnia signs/symptomology. Xfer to IMC.    Goal Outcome Evaluation:      Plan of Care Reviewed With: patient    Overall Patient Progress: improvingOverall Patient Progress: improving    Outcome Evaluation: Mentation cleared; however pt endorsing refractory hypercapnia. Amenable to BIPAP during day for naps and at hs.    Problem: Heart Failure Comorbidity  Goal: Maintenance of Heart Failure Symptom Control  Outcome: Progressing  Intervention: Maintain Heart Failure Management  Recent Flowsheet Documentation  Taken 12/3/2022 1200 by Lenny Pizarro, RN  Medication Review/Management:    medications reviewed    dosing adjusted  Taken 12/3/2022 0800 by Lenny Pizarro, RN  Medication Review/Management:    medications reviewed    dosing adjusted

## 2022-12-03 NOTE — PLAN OF CARE
Major Shift Events:    Neuro: A/O x 4, following commands, moves all extremities purposefully. Able to make her needs known. Pupils PERRLA  CV: SR, 70-80s, afebrile.   Resp: BiPAP 40%, , Max pressure 30, EPAP 8, RR 20. LS diminished. Nasal trumpet placed to help with airflow d/t low TV when she is sleeping. Cooperative with BiPAP. PCO2 trending of 99 to most recent 87.    GI: No BM, NPO. Denied nausea  : purewick, adequate UOP,   Plan: Monitor neuro status for changes and VBG  For vital signs and complete assessments, please see documentation flowsheets.

## 2022-12-03 NOTE — PROGRESS NOTES
Critical Care Services Progress Note:  I personally examined and evaluated the patient today. I formulated today s plan with the house staff team or resident(s) and agree with the findings and plan in the associated note (see separately attested resident note).   I have evaluated all laboratory values and imaging studies from the past 24 hours.  Summary of hospital course:  62F pmh of CARLOS, morbid obesity, hfpef, moderate RV dysfunction on echo and PA pressure estimated at 32 cm h2o over RA, now re-presents to MICU team with acute vs acute on chronic hypercapnic respiratory failure recalcitrant to bipap.  Most recent vbg 7.24/97.  Apparently earlier in the day they had had trouble finding a bipap mask that fit her appropriately, but now she has a well-fitting mask and hypercapnia persists.  Overnight events/pertinent findings today:  On my visit she is somnolent, minimally arousable to voice and tactile stimulues on bipap.  This limits history taking.  Data  Hr/bp acceptable off pressors.  satting acceptably on bipap 35/5 and 40%.  Minimally arousable on my visit, does briefly open eyes to sternal rub, but otherwise obtunded. PERRL.  Labs (personally reviewed):  Lytes ok.  Creat 1.65 acceptable.  Hypoalbuminemia to 3.3.  vbg 7.24/97.  Wbc ok 8.5, hgb ok 13.9, pltls ok 191.  INR acceptable 1.16.  CXR (personally reviewed):  B/l low lung volumes and patchy infiltrates to bases. If anything the R side is somewhat improved from yesterday; L side unchanged.    Assessment/plan:  1.  Acute encephalopathy:  In setting of acute hypercapnic respiratory failure.  Likely co2 narcosis.  Mgmt of co2 narcosis as below.  Clinical picture does not suggest alternative etiology at this time, but can investigate if encephalopathy does not resolve with correction of co2.  2.  Acute (?on chronic?) hypercapnic respiratory failure:  Many risk factors for this including h/o CARLOS and morbid obesity.  Recalcitrant to bipap, will need to  intubate and mechanically ventilate.  Inability to obtain a good bipap mask fit earlier today may have contributed to this, but will also r/o development of superimposed pna with sputum cx.  3.  Acute exacerbation of R sided heart failure and pulmonary hypertension:  Agree with continued diuresis to manage volume status and optimize Alistair-Starling curve.  4.  JASON:  Creat 1.65 seems to be overall improving with diuresis.  BUN improving as well suggests against uremia as cause for encephalopathy.    Rest per resident note.   I spent a total of 40 minutes (excluding procedure time) personally providing and directing critical care services at the bedside and on the critical care unit for this patient.     Gagan Mcdonough    ADDENDUM:  Anesthesia arrived at bedside to intubate but patient now far more awake/interactive.  Now orriented to person and situation.  Will hold on intubation for now, continue bipap as at present.  Will keep patient on MICU team for tonight, recheck blood gas around midnight, but would not plan to intubate as long as pt is mentating well.  Updated pt's nijas Kapoor about change in plan.

## 2022-12-03 NOTE — PROGRESS NOTES
Calorie Count  Intake recorded for: 12/2  Total Kcals: 0 Total Protein: 0g  Kcals from Hospital Food: 0   Protein: 0g  Kcals from Outside Food (average):0 Protein: 0g  # Meals Ordered from Kitchen: 1 meal   # Meals Recorded: no intake recorded.   # Supplements Recorded: no intake recorded.

## 2022-12-04 NOTE — PLAN OF CARE
Neuro: A&Ox4.   Cardiac: A flutter. VSS.   Respiratory: Sating >92% on 40% Bipap.  GI/: Adequate urine output via purewick. No BM this shift.   Diet/appetite: Tolerating clear liquid diet.  Activity:  Lift assist. Ax1 with repositioning q2h.    Pain: At acceptable level on current regimen.    Skin: No new deficits noted.   LDA's: PIV x2.    Plan: Continue with POC. Notify primary team with changes.

## 2022-12-04 NOTE — PROGRESS NOTES
Transfer  Transferred from:   Via:bed  Reason for transfer: Pt appropriate for 6B- improved patient condition  Belongings: Received with pt  Chart: Received with pt  Medications: Meds received from old unit with pt  Code Status verified on armband: yes  2 RN Skin Assessment Completed By: Writer Beau Turner rec completed: yes  Bed surface reassessed with algorithm and charted: yes  New bed surface ordered: no  Suction/Ambu bag/Flowmeter at bedside: yes    Report received from: Kayode PRUITT

## 2022-12-04 NOTE — PROGRESS NOTES
[facility]    Progress Note - Medicine Service, CAROLYN TEAM 1       Date of Admission:  11/17/2022    Assessment & Plan   Arianna Siddiqi is a 62 year old female admitted on 11/17/2022. She has a history of morbid obesity, CARLOS, hypertension, HFpEF, paroxysmal A. Fib (on apixiban), MAG, factor IV deficiency and CKD who was initially admitted to the ICU on 11/18 for hypercapnic respiratory failure likely due to pneumonia, necessitating intubation. 11/23 extubated to BiPAP due to chronic CO2 retention. Now with concerns for RV heart failure, pulmonary hypertension, volume overload, off pressors since 11/29 and transferred to general medicine 11/30. Readmitted to ICU 12/2 PM for worsening hypercapnia and acute encephalopathy with concern for need for intubation. However, mental status improved with better fitting BiPAP able to avoid intubation, although remains significantly hypercapnic. Transferred back to general medicine 12/3 for ongoing diuresis, respiratory support and goals of care conversations.      Changes and Major Updates today:  - Able to wean to oxymask 15L when off BiPAP,continue to wean as tolerated  - Advance diet to regular diet  - Transition to 4mg Bumex PO from IV     # Acute hypoxic and hypercarbic respiratory failure   # Pulmonary Hypertension  # Pulmonary edema/fluid overload   Patient extubated to BiPAP 11/23. CT PE/CAP unremarkable this admission.  - Wean O2 as tolerated  - Scheduled duonebs and hypertonic saline nebs for thick secretions QID  - BiPAP when sleeping (including naps)   - IS q 2H WA  - VBG conditionally  - see the cardiology section for the plan regarding her pulmonary hypertension     # Hx HFpEF (EF 60 to 65% per echo on 11/26/2022)    # Decompensated right heart failure  # Fluid overload  # Pulmonary hypertension  Patient's [place]-Juaquin catheter measurements while in ICU consistent with volume overload and pulmonary hypertension.    Elevated BNP.  Normal cardiac output per swan Juaquin catheter (no suspicion for shunts which would artificially elevate these measurements). Known history of CARLOS and HFpEF. 11/25 BLE duplex US showed no above knee DVT.   - Holding prior to admission Furosemide, lisinopril apixaban, metoprolol, & Bumex   - Diuresis with Bumex 4mg PO TID, goal net negative 1L to 2L  - Trend daily weights and obtain strict ins and outs   - MAP > 70     # Acute encephalopathy - improving  Iso critical illness and ICU delirium, exacerbated by hypercapnia   Analgesia: PRN acetaminophen      - DC oxycodone as might have contributed to hypercarbia 12/2  Sedation: Melatonin 6 mg q HS, holding PTA cyclobenzaprine, tramadol and benadryl     # Hx PAF (on apixaban PTA)  Presently in sinus rhythm.   - Previously on amio gtt this admission; continuing 200 mg BID PO amio this week. Plan to transition to maintenance 200 mg amio therafter (12/7).   - PTA apixiban dose     # Non-oliguric Acute on chronic kidney injury   # Metabolic alkalosis-presumed contraction alkalosis vs chronic hypoventilation syndrome  # Hx CKD (baseline creatinine 1.1)  Likely a component of venous ischemia due to fluid overload.   - Diuresis as above     # Hyperkalemia - resolved  # Hypomagnesia - resolved   - Electrolyte replacement protocol    # CAP - resolved  Status post Ceftiaxone 7 day course (11/18-11/24).  Zosyn empiric treatment for VAP/HAP (11/25-11/30)     Cultures:     11/25 BC x 2 - NGTD    11/25 MRSA     11/25 UA reflex - bland    11/25 Sputum culuture - unremarkable     11/20 Sputum culture unremarkable    11/18 Sputum culture unremarkable    11/18 urine culture unremarkable    11/17 blood cultures x 2 NGTD    11/17 viral panels unremarkable     # Hx MAG  # Hx Factor VII deficiency  Seen outpatient by hematology has a history oral/IV iron supplementation  - PTA ferrous gluconate 324 mg resumed  - PTA apixaban      # CARLOS onCPAP  # Obesity hypoventilation  syndrome   # Hx Smoking (30 pack per year; stopped 2002)  - BiPAP when sleeping     #Hx Gout  #Hx hemosiderin deposition BLE  - PT/OT for ROM  - 11/18 uric acid 7.9  - PTA allopurinol reduced to 100 mg given poor renal function     # Demand induced troponin leak - improving  - 11/25 troponin 257-->158 --> 143 on 11/26     # Oral thrush   - Nystatin QID      # Risk for malnutrition  # Hypoalbuminemia  # Morbid obesity  # Nausea and vomiting   # Constipation-resolved  # Diarrhea   - RD following  - Compazine and Zofran PRN  - Bowel regimen: Senna and Miralax PRN   - Advance diet as tolerated, monitor for nausea     # Goals of Care  ICU consulted palliative care 11/30 to begin discussing pt's goals moving forward. Given her chronic lung disease 2/2 CARLOS, OHS, pHTN as well as her HFpEF with decompensated RV failure recurrent acute hypercarbic respiratory failure likely to occur with any pulmonary insult like another viral or bacterial. After transfer back to ICU 12/2 she began thinking about whether she would want to be re-intubated but wanted to talk with her family before deciding. She does not want a trach and we discussed the worry that if she ever were re-intubated there is a high likelihood we would not be able to extubate her without placing a trach.   - Palliative care consult, appreciate assistance  - Transitioned to DNI 12/3 after d/w palliative, wants to speak with her nieces prior to deciding to be no CPR         Diet: Clear Liquid Diet  Snacks/Supplements Adult: Other; Nepro Butterpecan 1x @ 09:00; Ensure Shake (Strawberry) x1 @14:00 and 2nd Ensure Shake (Strawberry @ 19:00).; Between Meals    DVT Prophylaxis: Eliquis  Baig Catheter: Not present  Fluids: none  Central Lines: None  Cardiac Monitoring: None  Code Status:   Special code: DNI, CPR ok    Disposition Plan      Expected Discharge Date: 12/12/2022, 12:00 PM    Destination: nursing home  Discharge Comments: needs to wean of HFNC, will need TCU       "  The patient's care was discussed with the Attending Physician, Dr. Barnes.    Servando Holbrook MD  Medicine Service, [address]  Securely message with the Vocera Web Console (learn more here)  Text page via [facility] Paging/Directory   Please see signed in provider for up to date coverage information      Clinically Significant Risk Factors           # Hypercalcemia: corrected calcium is >10.1, will monitor as appropriate    # Hypoalbuminemia: Lowest albumin = 3 g/dL (Ref range: 3.5-5.2) at 12/1/2022  6:42 AM, will monitor as appropriate          # Severe Obesity: Estimated body mass index is 70.78 kg/m  as calculated from the following:    Height as of this encounter: 1.575 m (5' 2\").    Weight as of this encounter: 175.5 kg (387 lb).          ______________________________________________________________________    Interval History   Feeling better today compared to yesterday. Had a tough but important conversation with her niece yesterday about choosing not to intubate. Goal is still to work with rehab here to get her to TCU and eventually back home.    Data reviewed today: I reviewed all medications, new labs and imaging results over the last 24 hours. I personally reviewed no images or EKG's today.    Physical Exam   Vital Signs: Temp: 97.8  F (36.6  C) Temp src: Oral BP: 129/72 Pulse: 77   Resp: 18 SpO2: 97 % O2 Device: BiPAP/CPAP Oxygen Delivery: 15 LPM  Weight: 387 lbs 0 oz  GEN: Morbidly obese female resting in bed in no acute distress.  HEENT:  Normocephalic, atraumatic, HFNC in  CV: Regular rate and rhythm with no murmurs.  PULM/CHEST: Clear breath sounds bilaterally, normal work of breathing  GI: normal bowel sounds, soft, nontender  EXTREMITIES: moving all extremities, peripheral pulses intact  NEURO: following commands, A&O x 4    Data   Recent Labs   Lab 12/04/22  0550 12/03/22  0759 12/03/22  0352 12/02/22  1626 12/02/22  1440 12/02/22  0600 " 12/01/22  1210 12/01/22  0642   WBC 9.9  --  9.3  --   --  8.5  --  9.1   HGB 14.8  --  14.3  --   --  13.9  --  13.3   MCV 97  --  99  --   --  96  --  95     --  172  --   --  191  --  185   INR 1.37*  --  1.28*  --   --  1.16*  --  1.20*   NA  --   --  144  --   --  143  --  143   POTASSIUM  --   --  3.9  --  4.2 3.3*  --  3.7   CHLORIDE  --   --  94*  --   --  95*  --  99   CO2  --   --  36*  --   --  33*  --  31*   BUN  --   --  51.2*  --   --  55.8*  --  57.6*   CR  --   --  1.55*  --   --  1.65*  --  1.93*   ANIONGAP  --   --  14  --   --  15  --  13   KADEN  --   --  9.8  --   --  10.0  --  9.9   GLC  --  94 92 128*  --  94   < > 92   ALBUMIN 3.0*  --  3.3*  --   --  3.3*  --  3.0*   PROTTOTAL 8.6*  --  8.4*  --   --  7.8  --  7.4   BILITOTAL 0.5  --  0.5  --   --  0.5  --  0.6   ALKPHOS 93  --  93  --   --  84  --  78   ALT 24  --  21  --   --  17  --  16   AST 58*  --  38*  --   --  28  --  37*    < > = values in this interval not displayed.

## 2022-12-04 NOTE — PROGRESS NOTES
Transferred to: Unit 6B at 0320  Belongings: Sent with pt.   Baig removed? NA  Central line removed? NA  Chart and medications sent with patient Yes  Family notified: No, will notify family in the morning per pt request

## 2022-12-05 NOTE — PROGRESS NOTES
[facility]  Palliative Care Daily Progress Note       Recommendations/discussion       Pt seen and examined and reviewed with unit staff, will discuss with primary medicine team. Given her chronic lung disease 2/2 CARLOS, pHTN as well as her HFpEF with progressive RV failure there is concern that any recurrent acute hypercarbic respiratory failure will probably occur with any future pulmonary insult.   We had discussed the likely progression of her condition and possible need for repeat hospitalizations and/or need for aggressive interventions such as intubation and ICU stays. In a subsequent meeting with Dr Ruffin of Palliative Care she elected no repeat intubation She wants to review concepts/decisions with her niece who will be back in town late this  Week.  Palliative care continues to see 1-2 X per week to facilitate ACD discussions.  Goals of care:  For now, Arianna continues to outline her goals as fully restorative knowing she has significant recovery needs to achieve her prior level of independence where she lived alone in an apartment with elevator access-supported by food/meal delivery service and cleaning resources from a [facility] agency. Her niece (Emelia) who serves as her advocate (not documented formally) visits 'often'-(not supported by unit SW documentation of discussion with niece who notes visiting only 2-3 times per year.)  She relates no use of walker or cane and functional independence. At present she is needing lift assist for all transfers, some participation in ADL's. She is  open to TCU after discharge prior to her hopeful return home.   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   Was anointed by  as requested.  Ongoing support from PC counseling staff- will continue to follow.   CODE STATUS: DNI -needs to be further addressed with ongoing follow up. Her niece is surrogate decision  maker but not formalized- PC provided assistance with honoring choices documents.         Assessments          Arianna Siddiqi is a 62 year old female with a history of morbid obesity, CARLOS, hypertension, HFpEF, paroxysmal A. Fib (apixiban), MAG, factor IV deficiency and CKD who was admitted for hypercapnic respiratory failure likely due to pneumonia, necessitating intubation. 11/23 extubated to BiPAP r/t chronic CO2 retention tolerating well. On HFNC and off vasoactive meds for now.  Known concerns for RV heart failure, pulmonary hypertension, severe volume overload. Will need further rehab stay and possibly longer term placement.   Seen 12/7 for PC follow up focusing on goals of care and support related to chronic respiratory disease.   Prognosis, Goals, or Advance Care Planning was addressed today with: Yes.  Mood, coping, and/or meaning in the context of serious illness were addressed today: Yes.  Summary/Comments: see consult note   Marvin MARQUEZ NP ACHPN  Nurse Practitioner- Advanced Practice Provider  [facility] Palliative Medicine Consult Service   [phone]  TT spent: 26 minutes of which 14 minutes were spent in direct face to face contact with patient/family..        Interval History:     Chart review/discussion with unit or clinical team members:   No acute events overnight. Remains on 02 via NC during the day with CPAP at night.  PT notes ability to sit on edge of bed for 7-8 minutes and tolerating EZ stand as a reflection of some baseline strength.    Key Palliative Symptoms:  We are not helping to manage these symptoms currently in this patient.           Review of Systems:     6 system ROS was reviewed and is unremarkable. Notes some intermittent loose stool and abd distress which are improved. No pain per se.          Medications:     I have reviewed this patient's medication profile and medications during this hospitalization.           Physical Exam:   Vitals were reviewed  Temp: 98.2  F (36.8   C) Temp src: Oral BP: 122/87 Pulse: 87   Resp: 20 SpO2: 95 % O2 Device: Nasal cannula Oxygen Delivery: 3 LPM  GEN: Morbidly obese female resting in bed, 02 via NC in place. In no acute distress. Pleasant and conversant.   HEENT: Normocephalic, atraumatic, MMM.   CV: Monitor with sinus rhythm in the 80s. RRR.  PULM/CHEST: no appreciable wheeze or cough.  GI: normal bowel sounds, soft, generalized tenderness   EXTREMITIES: moving all extremities, peripheral pulses intact  NEURO: following commands, A&O x 4           Data Reviewed:     ROUTINE LABS (Last four results)  BMP  Recent Labs   Lab 12/05/22 0522 12/04/22  1700 12/04/22  0550 12/03/22  0759 12/03/22  0352 12/02/22  1440 12/02/22  0600     --  133*  --  144  --  143   POTASSIUM 3.7 3.7 4.5  --  3.9   < > 3.3*   CHLORIDE 89*  --  87*  --  94*  --  95*   KADEN 10.2  --  10.3*  --  9.8  --  10.0   CO2 37*  --  32*  --  36*  --  33*   BUN 51.5*  --  48.6*  --  51.2*  --  55.8*   CR 1.25*  --  1.31*  --  1.55*  --  1.65*   *  --  77 94 92   < > 94    < > = values in this interval not displayed.     CBC  Recent Labs   Lab 12/05/22 0522 12/04/22  0550 12/03/22  0352 12/02/22  0600   WBC 8.8 9.9 9.3 8.5   RBC 5.44* 5.27* 5.05 4.91   HGB 15.3 14.8 14.3 13.9   HCT 51.8* 51.1* 50.2* 47.3*   MCV 95 97 99 96   MCH 28.1 28.1 28.3 28.3   MCHC 29.5* 29.0* 28.5* 29.4*   RDW 17.3* 17.2* 17.4* 17.7*    191 172 191     INR  Recent Labs   Lab 12/05/22 0522 12/04/22  0550 12/03/22  0352 12/02/22  0600   INR 1.33* 1.37* 1.28* 1.16*

## 2022-12-05 NOTE — PLAN OF CARE
Neuro: A&Ox4.   Cardiac: SR. VSS.            Respiratory: Sating >92% on 5L NC while awake. Bipap 30% while asleep, minimal/no leak.   GI/: Adequate urine output via purewick. No BM this shift.   Diet/appetite: Regular diet. Minimal appetite.   Activity:  Lift assist OOB. Ax1 with repositioning q2h.    Pain: At acceptable level on current regimen.    Skin: No new deficits noted. Blanchable redness on bridge of nose from Bipap mask. Mask repositioned q1-2h. Foam applied intermittently to bridge of nose, but difficult to get a good seal with foam in place.  LDA's: PIV x2. Purewick.      Venous bicarb 46 this AM. Phos 2.4, K 3.7, replacements ordered.     Plan: Continue with POC. Notify primary team with changes.

## 2022-12-05 NOTE — PROGRESS NOTES
"./66 (BP Location: Left arm)   Pulse 85   Temp 97.7  F (36.5  C) (Axillary)   Resp 20   Ht 1.575 m (5' 2\")   Wt (!) 175.5 kg (387 lb)   SpO2 94%   BMI 70.78 kg/m      Hours of Care: [id number]    Reason for admission: SOB  Vitals: q4   Activity: Lift to chair  Pain: Mild pain; tylenol x1   Neuro: Aox4  Cardiac: Aflutter and Sinus Rhythm back and forth   Respiratory: Started off on HFNC able to wean to 5L at 95% and Bipap with naps and NOC  GI/: External catheter has caught some urine output but large amount of voids so change chux every time  Diet: Regular, tolerating well  Lines: 2 PIV  Skin/Wounds: No new issues  Plan: Wean o2. PT/OT      Continue to monitor and follow POC    Karen Milian RN on 12/4/2022 at 6:36 PM      "

## 2022-12-05 NOTE — PLAN OF CARE
Goal Outcome Evaluation:      Plan of Care Reviewed With: patient    Overall Patient Progress: no changeOverall Patient Progress: no change    Outcome Evaluation: Encourage PO intake, ONS, snacks between meals. Meets moderate malnutrition criteria. See RD note.    Nabeel Kumari RDN, LD, CNSC  6B RD pager: [phone]   6B work-room RD phone: *[phone]    Weekend/Holiday RD pager [phone]

## 2022-12-05 NOTE — PROGRESS NOTES
[facility]    Progress Note - Medicine Service, MARGIOVANNI TEAM 1       Date of Admission:  11/17/2022    Assessment & Plan   Arianna Siddiqi is a 62 year old female admitted on 11/17/2022. She has a history of morbid obesity, CARLOS, hypertension, HFpEF, paroxysmal A. Fib (on apixiban), MAG, factor IV deficiency and CKD who was initially admitted to the ICU on 11/18 for hypercapnic respiratory failure likely due to pneumonia, necessitating intubation. 11/23 extubated to BiPAP due to chronic CO2 retention. Now with concerns for RV heart failure, pulmonary hypertension, volume overload, off pressors since 11/29 and transferred to general medicine 11/30. Readmitted to ICU 12/2 PM for worsening hypercapnia and acute encephalopathy with concern for need for intubation. However, mental status improved with better fitting BiPAP able to avoid intubation, although remains significantly hypercapnic. Transferred back to general medicine 12/3 for ongoing diuresis, respiratory support and goals of care conversations. Currently awaiting TCU placement.      Changes and Major Updates today:  - Bumex 4 mg BID (from TID) - hypochloremic metabolic alkalosis in the setting of chronic respiratory acidosis and now diuresis.   - VBG this evening, 12/05   - Plan for TCU placement, discussed with SW     # Acute hypoxic and hypercarbic respiratory failure   # Pulmonary Hypertension  # Pulmonary edema/fluid overload   Patient extubated to BiPAP 11/23. CT PE/CAP unremarkable this admission.  - Wean O2 as tolerated  - Scheduled duonebs and hypertonic saline nebs for thick secretions QID  - BiPAP when sleeping (including naps)   - IS q 2H WA  - VBG conditionally  - see the cardiology section for the plan regarding her pulmonary hypertension     # Hx HFpEF (EF 60 to 65% per echo on 11/26/2022)    # Decompensated right heart failure  # Fluid overload  # Pulmonary hypertension  Patient's [place]Juaquin catheter  measurements while in ICU consistent with volume overload and pulmonary hypertension.   Elevated BNP.  Normal cardiac output per swan Juaquin catheter (no suspicion for shunts which would artificially elevate these measurements). Known history of CARLOS and HFpEF. 11/25 BLE duplex US showed no above knee DVT.   - Holding prior to admission Furosemide, lisinopril apixaban, metoprolol, & Bumex   - Diuresis with Bumex 4mg PO TID, goal net negative 1L to 2L  - Trend daily weights and obtain strict ins and outs   - MAP > 70     # Acute encephalopathy - improving  Iso critical illness and ICU delirium, exacerbated by hypercapnia   Analgesia: PRN acetaminophen      - DC oxycodone as might have contributed to hypercarbia 12/2  Sedation: Melatonin 6 mg q HS, holding PTA cyclobenzaprine, tramadol and benadryl     # Hx PAF (on apixaban PTA)  Presently in sinus rhythm.   - Previously on amio gtt this admission; continuing 200 mg BID PO amio this week. Plan to transition to maintenance 200 mg amio therafter (12/7).   - PTA apixaban dose     # Non-oliguric Acute on chronic kidney injury   # Metabolic alkalosis-presumed contraction alkalosis vs chronic hypoventilation syndrome  # Hx CKD (baseline creatinine 1.1)  Likely a component of venous ischemia due to fluid overload.   - Diuresis as above     # Hyperkalemia - resolved  # Hypomagnesia - resolved   - Electrolyte replacement protocol    # CAP - resolved  Status post Ceftiaxone 7 day course (11/18-11/24).  Zosyn empiric treatment for VAP/HAP (11/25-11/30)     Cultures:     11/25 BC x 2 - NGTD    11/25 MRSA     11/25 UA reflex - bland    11/25 Sputum culuture - unremarkable     11/20 Sputum culture unremarkable    11/18 Sputum culture unremarkable    11/18 urine culture unremarkable    11/17 blood cultures x 2 NGTD    11/17 viral panels unremarkable     # Hx MAG  # Hx Factor VII deficiency  Seen outpatient by hematology has a history oral/IV iron supplementation  - PTA ferrous  gluconate 324 mg resumed  - PTA apixaban      # CARLOS onCPAP  # Obesity hypoventilation syndrome   # Hx Smoking (30 pack per year; stopped 2002)  - BiPAP when sleeping     #Hx Gout  #Hx hemosiderin deposition BLE  - PT/OT for ROM  - 11/18 uric acid 7.9  - PTA allopurinol reduced to 100 mg given poor renal function     # Demand induced troponin leak - improving  - 11/25 troponin 257-->158 --> 143 on 11/26     # Oral thrush   - Nystatin QID      # Risk for malnutrition  # Hypoalbuminemia  # Morbid obesity  # Nausea and vomiting   # Constipation-resolved  # Diarrhea   - RD following  - Compazine and Zofran PRN  - Bowel regimen: Senna and Miralax PRN   - Advance diet as tolerated, monitor for nausea     # Goals of Care  ICU consulted palliative care 11/30 to begin discussing pt's goals moving forward. Given her chronic lung disease 2/2 CARLOS, OHS, pHTN as well as her HFpEF with decompensated RV failure recurrent acute hypercarbic respiratory failure likely to occur with any pulmonary insult like another viral or bacterial. After transfer back to ICU 12/2 she began thinking about whether she would want to be re-intubated but wanted to talk with her family before deciding. She does not want a trach and we discussed the worry that if she ever were re-intubated there is a high likelihood we would not be able to extubate her without placing a trach.   - Palliative care consult, appreciate assistance  - Transitioned to DNI 12/3 after d/w palliative, wants to speak with her nieces prior to deciding to be no CPR         Diet: Snacks/Supplements Adult: Other; Nepro Butterpecan 1x @ 09:00; Ensure Shake (Strawberry) x1 @14:00 and 2nd Ensure Shake (Strawberry @ 19:00).; Between Meals  Regular Diet Adult    DVT Prophylaxis: Eliquis  Baig Catheter: Not present  Fluids: none  Central Lines: None  Cardiac Monitoring: None  Code Status:   Special code: DNI, CPR ok    Disposition Plan      Expected Discharge Date: awaiting TCU placement  "    The patient's care was discussed with the Attending Physician, Dr. Barnes.    Kat Bronw MD  Medicine Service, [facility] TEAM [address] [place] [address]  Securely message with the Vocera Web Console (learn more here)  Text page via [facility] Paging/Directory   Please see signed in provider for up to date coverage information      Clinically Significant Risk Factors         # Hyponatremia: Lowest Na = 133 mmol/L (Ref range: 136-145) in last 2 days, will monitor as appropriate   # Hypercalcemia: Highest Ca = 10.3 mg/dL (Ref range: 8.5 - 10.1 mg/dL) in last 2 days, will monitor as appropriate    # Hypoalbuminemia: Lowest albumin = 3 g/dL (Ref range: 3.5-5.2) at 12/4/2022  5:50 AM, will monitor as appropriate          # Severe Obesity: Estimated body mass index is 70.42 kg/m  as calculated from the following:    Height as of this encounter: 1.575 m (5' 2\").    Weight as of this encounter: 174.6 kg (385 lb).     ______________________________________________________________________    Interval History    Sitting in chair this morning  Breathing and BLE improved  No chest pain  Tolerating diet   Awaiting TCU placement     Data reviewed today: I reviewed all medications, new labs and imaging results over the last 24 hours.    Physical Exam   Vital Signs: Temp: 98.2  F (36.8  C) Temp src: Oral BP: 122/87 Pulse: 87   Resp: 20 SpO2: 95 % O2 Device: Nasal cannula Oxygen Delivery: 3 LPM  Weight: 385 lbs 0 oz  GEN: sitting up in chair, eating breakfast, NAD   HEENT:  Normocephalic, atraumatic, NC in place   CV: Regular rate and rhythm with no murmurs.  PULM/CHEST: Clear breath sounds bilaterally, normal work of breathing  GI: normal bowel sounds, soft, nontender  EXTREMITIES: moving all extremities, peripheral pulses intact  NEURO: following commands, A&O x 4    Data   Recent Labs   Lab 12/05/22  0522 12/04/22  1700 12/04/22  0550 12/03/22  0759 12/03/22  0352   WBC 8.8  --  9.9  --  9.3   HGB 15.3  " --  14.8  --  14.3   MCV 95  --  97  --  99     --  191  --  172   INR 1.33*  --  1.37*  --  1.28*     --  133*  --  144   POTASSIUM 3.7 3.7 4.5  --  3.9   CHLORIDE 89*  --  87*  --  94*   CO2 37*  --  32*  --  36*   BUN 51.5*  --  48.6*  --  51.2*   CR 1.25*  --  1.31*  --  1.55*   ANIONGAP 12  --  14  --  14   KADEN 10.2  --  10.3*  --  9.8   *  --  77 94 92   ALBUMIN 3.4*  --  3.0*  --  3.3*   PROTTOTAL 8.6*  --  8.6*  --  8.4*   BILITOTAL 0.6  --  0.5  --  0.5   ALKPHOS 99  --  93  --  93   ALT 16  --  24  --  21   AST 33  --  58*  --  38*

## 2022-12-05 NOTE — PROGRESS NOTES
Care Management Follow Up    Length of Stay (days): 17    Expected Discharge Date: 12/08/2022?     Concerns to be Addressed: Discharge planning, medical readiness.     Patient plan of care discussed at interdisciplinary rounds: Yes    Anticipated Discharge Disposition: TCU    Referrals Placed by CM/SW: LTACH  Private pay costs discussed: Not applicable    Additional Information:  Patient had previously been referred to [facility], per their liaison, patient no longer meets LTACH criteria. Unit  updated, patient will need TCU placement. Care coordination will continue to follow for discharge planning.     Jaki Clarke, RNCC, BSN    [facility]    [address]  [address] [place] [address]    [email or web address]    Office: [phone] Pager: [phone]    To contact the weekend RNCC  [place] (0800 - 1630) Saturday and Sunday    Units: 4A, 4C, 4E, 5A and 5B- Pager 1: [phone]    Units: 6A, 6B, 6C, 6D- Pager 2: [phone]    Units: 7A, 7B, 7C, 7D, and 5C-Pager 3: [phone]

## 2022-12-05 NOTE — PROGRESS NOTES
CLINICAL NUTRITION SERVICES - REASSESSMENT NOTE     Nutrition Prescription  Malnutrition Status:    Moderate malnutrition in the context of acute illness (worsened)    Recommendations already ordered by Registered Dietitian (RD):  - Restart oral nutrition supplements (Enure Enlive 10 AM, Nepro 2 PM, Gelatein with lunch)  - High protein snacks between meals at 10, 2, 8     Future/Additional Recommendations:  Continue to monitor nutrition-related findings and follow pt per protocol     EVALUATION OF THE PROGRESS TOWARD GOALS   Diet: Regular  - Had oral nutrition supplements ordered but these were discontinued when the pt went NPO.     PO Intake: 25% intake documented for most meals. Orders 1-3 meals per day with variable intake 868-4191 Kcals and 0-154 gm protein per day, per review of recent meal orders and not considering supplements ordered. Minimal appetite reported by pt. Would like to add oral nutrition supplements back in; obtained preferences. Discussed importance of small frequent meals and high protein foods to preserve lean body mass. Setting up high protein snacks between meals and encouraged PO intake.     Nutrition Support:   Was on TFs: 11/18-11/23: Vital High Protein @ goal of 55ml/hr (1320ml/day) will provide: 1320 kcals (27.5 kcal/kg IBW), 115 g PRO (2.4 g/kg IBW), 1103 ml free H20, 146 g CHO, and 0 g fiber daily; 1848 mg K+, 1102 mg Phos.     NEW FINDINGS   General:    Tx from ICU --> IMC 12/4.     GI:    Last BM x1 documented on 12/1    Emesis documented today, 175 mL     Weights:    Weight down 8.5 kg (4.6%) since admission; trending significant for LOS of 18-days.    Labs:    Reviewed available labs     Medications:    Bumex    Ferrous sulfate, 324 mg    Renavite    Miralax    Neutraphos 1 pkt Q4hrs    Senna, once daily     Skin:    WOCN note 11/28 (See for detail if needed)   o Intergluteal cleft/sacrum wound     MALNUTRITION  % Intake: < 75% for > 7 days (moderate)  % Weight Loss: 5% in 1 month  (moderate)  Subcutaneous Fat Loss: None observed  Muscle Loss: None observed - difficult to assess 2/2 body habitus  Fluid Accumulation/Edema: None noted  Malnutrition Diagnosis: Moderate malnutrition in the context of acute illness     Previous Goals   Patient to consume % of nutritionally adequate meal trays TID, or the equivalent with supplements/snacks.  Evaluation: Not met    Previous Nutrition Diagnosis  Inadequate protein-energy intake related to respiratory status, loss of enteral access, and poor appetite post-extubation as evidenced by PO intake 0-25% x 3-4 days, previously receiving 80% of nutrition needs via EN x 6 days.     Evaluation: Modified to nutrition diagnosis below    CURRENT NUTRITION DIAGNOSIS  Inadequate oral intake related to decreased appetite, respiratory status, loss of enteral access as evidenced by intake 25% of meals, oral nutrition supplements discontinued, weight loss of ~5% since admit.       INTERVENTIONS  Implementation  Medical food supplement therapy  Modify composition of meals/snacks    Goals  Patient to consume % of nutritionally adequate meal trays TID, or the equivalent with supplements/snacks.    Monitoring/Evaluation  Progress toward goals will be monitored and evaluated per protocol.    Nabeel Kumari RDN, LD, CNSC  6B RD pager: [phone]   6B work-room RD phone: *[phone]    Weekend/Holiday RD pager [phone]

## 2022-12-05 NOTE — PROGRESS NOTES
Care Management Follow Up    Length of Stay (days): 17    Expected Discharge Date: 12/10/2022     Concerns to be Addressed: all concerns addressed in this encounter     Patient plan of care discussed at interdisciplinary rounds: Yes    Anticipated Discharge Disposition: transitional care      Anticipated Discharge Services: TBD   Anticipated Discharge DME: TBD     Patient/family educated on Medicare website which has current facility and service quality ratings:    Education Provided on the Discharge Plan:    Patient/Family in Agreement with the Plan:      Referrals Placed by CM/SW:      [facility] 4th floor  [address] [place] [address]  [id number]  Admissions: [phone]  Fax: [phone]    Private pay costs discussed: Not applicable    Additional Information:    SW met with pt in her room at bedside. SW provided pt with a list of TCU options based on proximity to her home. Pt reports that she will only consider [facility] and that she will not make any other recommendations at this time. SW will refer pt to  TCU and let pt know that having other options would be beneficial.     SW to continue to follow for discharge planning and TCU placement.     TAMMY Boo, LGSW   6B    Phone: [phone]  Pager: [phone]

## 2022-12-06 NOTE — PLAN OF CARE
Goal Outcome Evaluation:  VSS on 3 liters, wears CPAP at HS and with naps but awake all day, c/o low back pain using lidocaine patches, up to chair with lift X2, alert, orientated, scant vaginal bleeding, had transvaginal ultrasound today, using purewick or bedpan, soft BM X 1, slight nausea with releif from IV zofran

## 2022-12-06 NOTE — PLAN OF CARE
Goal Outcome Evaluation:      Plan of Care Reviewed With: patient    Overall Patient Progress: no changeOverall Patient Progress: no change    Outcome Evaluation: Arrived to 5A at 1840. VSS on 3L NC. Oriented to room and unit.

## 2022-12-06 NOTE — PROGRESS NOTES
Admission/Transfer from: 6B  2 RN skin assessment completed. yes  Significant findings include: generalized scabs, powder under skin folds, old skin tear on R shin, healed pressure injury on coccyx  WOC Nurse Consult Ordered? no

## 2022-12-06 NOTE — PLAN OF CARE
"Goal Outcome Evaluation:      Plan of Care Reviewed With: patient    Overall Patient Progress: no change    Shift: 0448-9447  VS: /78 (BP Location: Left arm)   Pulse 99   Temp 97.6  F (36.4  C) (Oral)   Resp 16   Ht 1.575 m (5' 2\")   Wt (!) 174.6 kg (385 lb)   SpO2 96%   BMI 70.42 kg/m     Pain: Pt has been between 7-9/10 pain - PRN tylenol was somewhat effective.  Neuro: A&O x4  Cardiac: WDL  Respiratory: BiPap at night - 3L nasal cannula when awake  Diet/Appetite: Reg diet - appetite is fair  /GI: Purewick in place - had small to moderate vaginal bleeding when performing incontinence cares - MD notified and ordered stat Hgb. Had episode of bowel incontinence (loose, soft stool).  LDA's: 2 R PIV - saline locked  Skin: Powder under breast and abdomen skin folds; generalized scabbing  Activity: Lift  Procedures: N/A  Pertinent Labs/: Hgb: 14.9     Plan: Continue POC - waiting on TCU placement    "

## 2022-12-06 NOTE — PROVIDER NOTIFICATION
"MD notified: \"Noticed pt had small to moderate amounts of bleeding coming from vagina when performing incontinence cares\". MD put in stat Hgb - came back at 14.9. Will continue to monitor.  "

## 2022-12-06 NOTE — PROGRESS NOTES
[facility]    Progress Note - Medicine Service, MAROON TEAM 1       Date of Admission:  11/17/2022    Assessment & Plan   Arianna Siddiqi is a 62 year old female admitted on 11/17/2022. She has a history of morbid obesity, CARLOS, hypertension, HFpEF, paroxysmal A. Fib (on apixiban), MAG, factor IV deficiency and CKD who was initially admitted to the ICU on 11/18 for hypercapnic respiratory failure likely due to pneumonia, necessitating intubation. 11/23 extubated to BiPAP due to chronic CO2 retention. Now with concerns for RV heart failure, pulmonary hypertension, volume overload, off pressors since 11/29 and transferred to general medicine 11/30. Readmitted to ICU 12/2 PM for worsening hypercapnia and acute encephalopathy with concern for need for intubation. However, mental status improved with better fitting BiPAP able to avoid intubation, although remains significantly hypercapnic. Transferred back to general medicine 12/3 for ongoing diuresis, respiratory support and goals of care conversations. Currently awaiting TCU placement.      Changes and Major Updates today:  - Weaning O2 as tolerated, down to 3L NC  - Mild-moderate vaginal bleeding noted on cares ON, Hgb stable  - Ordered pelvic US to assess IUD placement     # Acute hypoxic and hypercarbic respiratory failure   # Pulmonary Hypertension  # Pulmonary edema/fluid overload   Patient extubated to BiPAP 11/23. CT PE/CAP unremarkable this admission.  - Wean O2 as tolerated  - Scheduled duonebs and hypertonic saline nebs for thick secretions QID  - BiPAP when sleeping (including naps)   - IS q 2H WA  - VBG conditionally  - see the cardiology section for the plan regarding her pulmonary hypertension    # Vaginal Bleeding   Noted to have vaginal bleeding 12/6 during nursing cares (bright red blood on purewick), Hgb stable. Hx s/f acute onset vaginal bleeding on 11/12/21. Hb 5.84 on that admission- received 6uPRBC  with stabilization of hemoglobin near 7.5. OBGYN consulted- D+C, pap smear, mirena IUD placement on 11/14/21 with resolution of sx. Pap smear, uterine and cervical pathology negative for intraepithelial lesion or malignancy. Previously gynecologist retired so was unable to f/u in past year, transportation difficulties and recent hospitalizations have limited her following up with new providers.    - F/u pelvic US to assess IUD placeement  - F/u benign gyn on DC - can call [phone] to schedule appointment     # Hx HFpEF (EF 60 to 65% per echo on 11/26/2022)    # Decompensated right heart failure  # Fluid overload  # Pulmonary hypertension  Patient's [place]-Juaquin catheter measurements while in ICU consistent with volume overload and pulmonary hypertension.   Elevated BNP.  Normal cardiac output per swan Juaquin catheter (no suspicion for shunts which would artificially elevate these measurements). Known history of CARLOS and HFpEF. 11/25 BLE duplex US showed no above knee DVT.   - Holding prior to admission Furosemide, lisinopril, apixaban, metoprolol  - Diuresis with Bumex 4mg PO BID, goal net negative 1L  - Trend daily weights and obtain strict ins and outs   - MAP > 70     # Acute encephalopathy - resolved  Iso critical illness and ICU delirium, exacerbated by hypercapnia   Analgesia: PRN acetaminophen      - DC oxycodone as might have contributed to hypercarbia 12/2  Sedation: Melatonin 6 mg q HS, holding PTA cyclobenzaprine, tramadol and benadryl     # Hx PAF (on apixaban PTA)  Presently in sinus rhythm.   - Previously on amio gtt this admission; continuing 200 mg BID PO amio this week. Plan to transition to maintenance 200 mg amio therafter (12/7).   - PTA apixaban dose     # Non-oliguric Acute on chronic kidney injury   # Metabolic alkalosis-presumed contraction alkalosis vs chronic hypoventilation syndrome  # Hx CKD (baseline creatinine 1.1)  Likely a component of venous ischemia due to fluid overload.   - Diuresis  as above     # Hyperkalemia - resolved  # Hypomagnesia - resolved   - Electrolyte replacement protocol    # CAP - resolved  Status post Ceftiaxone 7 day course (11/18-11/24).  Zosyn empiric treatment for VAP/HAP (11/25-11/30)     Cultures:     11/25 BC x 2 - NGTD    11/25 MRSA     11/25 UA reflex - bland    11/25 Sputum culuture - unremarkable     11/20 Sputum culture unremarkable    11/18 Sputum culture unremarkable    11/18 urine culture unremarkable    11/17 blood cultures x 2 NGTD    11/17 viral panels unremarkable     # Hx MAG  # Hx Factor VII deficiency  Seen outpatient by hematology has a history oral/IV iron supplementation  - PTA ferrous gluconate 324 mg resumed  - PTA apixaban      # CARLOS onCPAP  # Obesity hypoventilation syndrome   # Hx Smoking (30 pack per year; stopped 2002)  - BiPAP when sleeping     #Hx Gout  #Hx hemosiderin deposition BLE  - PT/OT for ROM  - 11/18 uric acid 7.9  - PTA allopurinol reduced to 100 mg given poor renal function     # Demand induced troponin leak - improving  - 11/25 troponin 257-->158 --> 143 on 11/26     # Oral thrush   - Nystatin QID      # Risk for malnutrition  # Hypoalbuminemia  # Morbid obesity  # Nausea and vomiting   # Constipation-resolved  # Diarrhea   - RD following  - Compazine and Zofran PRN  - Bowel regimen: Senna and Miralax PRN   - Advance diet as tolerated, monitor for nausea     # Goals of Care  ICU consulted palliative care 11/30 to begin discussing pt's goals moving forward. Given her chronic lung disease 2/2 CARLOS, OHS, pHTN as well as her HFpEF with decompensated RV failure recurrent acute hypercarbic respiratory failure likely to occur with any pulmonary insult like another viral or bacterial. After transfer back to ICU 12/2 she began thinking about whether she would want to be re-intubated but wanted to talk with her family before deciding. She does not want a trach and we discussed the worry that if she ever were re-intubated there is a high  "likelihood we would not be able to extubate her without placing a trach.   - Palliative care consult, appreciate assistance  - Transitioned to DNI 12/3 after d/w palliative, wants to speak with her nieces prior to deciding to be no CPR      Diet: Regular Diet Adult  Snacks/Supplements Adult: Other; See comments; Between Meals  Snacks/Supplements Adult: Other; See comments; Between Meals    DVT Prophylaxis: Eliquis  Baig Catheter: Not present  Fluids: none  Central Lines: None  Cardiac Monitoring: None  Code Status:   DNI    Disposition Plan      Expected Discharge Date: 12/10/2022      Destination: nursing home  Discharge Comments: needs to wean of HFNC, will need TCU        The patient's care was discussed with the Attending Physician, Dr. Melton.    Servando Holbrook MD  Medicine Service, [address]  Securely message with the Vocera Web Console (learn more here)  Text page via [facility] Paging/Directory   Please see signed in provider for up to date coverage information      Clinically Significant Risk Factors           # Hypercalcemia: Highest Ca = 10.2 mg/dL (Ref range: 8.5 - 10.1 mg/dL) in last 2 days, will monitor as appropriate    # Hypoalbuminemia: Lowest albumin = 3 g/dL (Ref range: 3.5-5.2) at 12/4/2022  5:50 AM, will monitor as appropriate          # Severe Obesity: Estimated body mass index is 70.42 kg/m  as calculated from the following:    Height as of this encounter: 1.575 m (5' 2\").    Weight as of this encounter: 174.6 kg (385 lb).   # Moderate Malnutrition: based on nutrition assessment        ______________________________________________________________________    Interval History   Feeling much better today but is concerned about vaginal bleeding that was noted ON. One year ago she had severe uterine bleeding leading to requirement for transfusions leading to placement of an IUD. Endorses some lower abdominal pain as well as continued nausea. "     Data reviewed today: I reviewed all medications, new labs and imaging results over the last 24 hours. I personally reviewed no images or EKG's today.    Physical Exam   Vital Signs: Temp: 98.3  F (36.8  C) Temp src: Oral BP: (!) 149/73 Pulse: 80   Resp: 18 SpO2: 91 % O2 Device: Nasal cannula Oxygen Delivery: 3 LPM  Weight: 385 lbs 0 oz  GEN: Lying in bed, awake, alert, NAD   HEENT:  Normocephalic, atraumatic, NC in place   CV: Regular rate and rhythm with no murmurs.  PULM/CHEST: Clear breath sounds bilaterally, normal work of breathing  GI: normal bowel sounds, soft, slight tenderness to palpation of right and left lower quaudrants  EXTREMITIES: moving all extremities, peripheral pulses intact  NEURO: following commands, A&O x 4    Data   Recent Labs   Lab 12/06/22  0631 12/06/22  0145 12/05/22  0522 12/04/22  1700 12/04/22  0550   WBC 10.5  --  8.8  --  9.9   HGB 15.1 14.9 15.3  --  14.8   MCV 96  --  95  --  97     --  196  --  191   INR 1.40*  --  1.33*  --  1.37*     --  138  --  133*   POTASSIUM 3.8  --  3.7 3.7 4.5   CHLORIDE 90*  --  89*  --  87*   CO2 36*  --  37*  --  32*   BUN 48.7*  --  51.5*  --  48.6*   CR 1.21*  --  1.25*  --  1.31*   ANIONGAP 13  --  12  --  14   KADEN 10.0  --  10.2  --  10.3*   *  --  109*  --  77   ALBUMIN  --   --  3.4*  --  3.0*   PROTTOTAL  --   --  8.6*  --  8.6*   BILITOTAL  --   --  0.6  --  0.5   ALKPHOS  --   --  99  --  93   ALT  --   --  16  --  24   AST  --   --  33  --  58*

## 2022-12-06 NOTE — PLAN OF CARE
Transfer  Transferred to:   Via: bed  Reason for transfer:Pt no longer appropriate for 6B- improved patient condition  Belongings: Packed and sent with pt  Chart: Delivered with pt to next unit  Medications: Meds sent to new unit with pt  Report given to: SONAL Vicente   Pt status:     Assumed care of patient at 0700. A&Ox4. Neck/back pain managed with repositioning and distraction, declined medications. SR, rates in the 70-80s. Lungs clear/dim throughout on 3L via NC when awake and needing bipap in AVAPS settings for sleep to maintain sat> 90%. Weaning O2 as tolerating. Up to chair with lift, sat for 3 1/2 hours. Nausea with emesis x2 after getting back to bed. Zofran and compazine given with eventual relief. Pt placed back on bipap when more lethargic after compazine dose. Pt more alert and interactive again this afternoon. Regular diet with ok appetite, knows what she needs to be doing but doesn't like the food here. Dietician at bedside this evening for supplements. Purewick in place with good UOP, misses intermittently with incontinence care done. No BM this shift. PT done this AM with pt working on standing. Sacral mepilex in place. Abrasion to bridge of nose from bipap mask and right calf. Small left this healing abrasion. 2 PIV, SL on right side. Pt turned and repositioned q2 hours. Pt transferred to  at 1815. Continue with current plan of care.

## 2022-12-07 NOTE — PROGRESS NOTES
[facility]    Progress Note - Medicine Service, CAROLYN TEAM 1       Date of Admission:  11/17/2022    Assessment & Plan   Arianna Siddiqi is a 62 year old female admitted on 11/17/2022. She has a history of morbid obesity, CARLOS, hypertension, HFpEF, paroxysmal A. Fib (on apixiban), MAG, factor IV deficiency and CKD who was initially admitted to the ICU on 11/18 for hypercapnic respiratory failure likely due to pneumonia, necessitating intubation. 11/23 extubated to BiPAP due to chronic CO2 retention. Now with concerns for RV heart failure, pulmonary hypertension, volume overload, off pressors since 11/29 and transferred to general medicine 11/30. Readmitted to ICU 12/2 PM for worsening hypercapnia and acute encephalopathy with concern for need for intubation. However, mental status improved with better fitting BiPAP able to avoid intubation, although remains significantly hypercapnic. Transferred back to general medicine 12/3 for ongoing diuresis, respiratory support and goals of care conversations. Currently awaiting TCU placement.      Changes and Major Updates today:  - Weaning O2 as tolerated, clear to start looking for TCU placement  - Cr bumped given diuresis, held Bumex consider restarting PTA 2mg Bumex daily tomorrow  - Restarted PTA metoprolol succinate     # Acute hypoxic and hypercarbic respiratory failure   # Pulmonary Hypertension  # Pulmonary edema/fluid overload   Patient extubated to BiPAP 11/23. CT PE/CAP unremarkable this admission.  - Wean O2 as tolerated  - Adjust diuresis as below  - BiPAP when sleeping (including naps)   - IS q 2H WA  - VBG conditionally  - see the cardiology section for the plan regarding her pulmonary hypertension     # Hx HFpEF (EF 60 to 65% per echo on 11/26/2022)    # Decompensated right heart failure  # Fluid overload  # Pulmonary hypertension  Patient's [place]-Juaquin catheter measurements while in ICU consistent with volume  overload and pulmonary hypertension.   Elevated BNP.  Normal cardiac output per swan Juaquin catheter (no suspicion for shunts which would artificially elevate these measurements). Known history of CARLOS and HFpEF. 11/25 BLE duplex US showed no above knee DVT.   - Held bumex today, consider restarting 2mg daily tomorrow (12/8)  - Restart prior to admission metoprolol succ 100mg  - Hold PTA lisinopril  - Trend daily weights and obtain strict ins and outs   - MAP > 70    # Vaginal Bleeding, resolved  Noted to have vaginal bleeding 12/6 during nursing cares (bright red blood on purewick), Hgb stable. Hx s/f acute onset vaginal bleeding on 11/12/21. Hb 5.84 on that admission- received 6uPRBC with stabilization of hemoglobin near 7.5. OBGYN consulted- D+C, pap smear, mirena IUD placement on 11/14/21 with resolution of sx. Pap smear, uterine and cervical pathology negative for intraepithelial lesion or malignancy. Previously gynecologist retired so was unable to f/u in past year, transportation difficulties and recent hospitalizations have limited her following up with new providers. Pelvic US with confirmation of IUD in correct spot.   - F/u benign gyn on DC - can call [phone] to schedule appointment     # Acute encephalopathy - resolved  Iso critical illness and ICU delirium, exacerbated by hypercapnia   Analgesia: PRN acetaminophen      - DC oxycodone as might have contributed to hypercarbia 12/2  Sedation: Melatonin 6 mg q HS, holding PTA cyclobenzaprine, tramadol and benadryl     # Hx PAF (on apixaban PTA)  Presently in sinus rhythm.   - Previously on amio gtt this admission; continuing 200 mg BID PO amio this week. Plan to transition to maintenance 200 mg amio therafter (12/7).   - PTA apixaban dose     # Non-oliguric Acute on chronic kidney injury   # Metabolic alkalosis-presumed contraction alkalosis vs chronic hypoventilation syndrome  # Hx CKD (baseline creatinine 1.1)  Likely a component of venous ischemia due  to fluid overload.   - Diuresis as above     # Hyperkalemia - resolved  # Hypomagnesia - resolved   - Electrolyte replacement protocol    # CAP - resolved  Status post Ceftiaxone 7 day course (11/18-11/24).  Zosyn empiric treatment for VAP/HAP (11/25-11/30)     Cultures:     11/25 BC x 2 - NGTD    11/25 MRSA     11/25 UA reflex - bland    11/25 Sputum culuture - unremarkable     11/20 Sputum culture unremarkable    11/18 Sputum culture unremarkable    11/18 urine culture unremarkable    11/17 blood cultures x 2 NGTD    11/17 viral panels unremarkable     # Hx MAG  # Hx Factor VII deficiency  Seen outpatient by hematology has a history oral/IV iron supplementation  - PTA ferrous gluconate 324 mg resumed  - PTA apixaban      # CARLOS onCPAP  # Obesity hypoventilation syndrome   # Hx Smoking (30 pack per year; stopped 2002)  - BiPAP when sleeping     #Hx Gout  #Hx hemosiderin deposition BLE  - PT/OT for ROM  - 11/18 uric acid 7.9  - PTA allopurinol reduced to 100 mg given poor renal function     # Demand induced troponin leak - improving  - 11/25 troponin 257-->158 --> 143 on 11/26     # Oral thrush   - Nystatin QID      # Risk for malnutrition  # Hypoalbuminemia  # Morbid obesity  # Nausea and vomiting   # Constipation-resolved  # Diarrhea   - RD following  - Compazine and Zofran PRN  - Bowel regimen: Senna and Miralax PRN   - Advance diet as tolerated, monitor for nausea     # Goals of Care  ICU consulted palliative care 11/30 to begin discussing pt's goals moving forward. Given her chronic lung disease 2/2 CARLOS, OHS, pHTN as well as her HFpEF with decompensated RV failure recurrent acute hypercarbic respiratory failure likely to occur with any pulmonary insult like another viral or bacterial. After transfer back to ICU 12/2 she began thinking about whether she would want to be re-intubated but wanted to talk with her family before deciding. She does not want a trach and we discussed the worry that if she ever were  "re-intubated there is a high likelihood we would not be able to extubate her without placing a trach.   - Palliative care consult, appreciate assistance  - Transitioned to DNI 12/3 after d/w palliative, wants to speak with her nieces prior to deciding to be no CPR         Diet: Regular Diet Adult  Snacks/Supplements Adult: Other; See comments; Between Meals  Snacks/Supplements Adult: Other; See comments; Between Meals    DVT Prophylaxis: Eliquis  Baig Catheter: Not present  Fluids: none  Central Lines: None  Cardiac Monitoring: None  Code Status:   DNI    Disposition Plan     Expected Discharge Date: 12/10/2022      Destination: nursing home  Discharge Comments: needs to wean of HFNC, will need TCU        The patient's care was discussed with the Attending Physician, Dr. Melton.    Servando Holbrook MD  Medicine Service, [address]  Securely message with the Vocera Web Console (learn more here)  Text page via [employer] Paging/Directory   Please see signed in provider for up to date coverage information      Clinically Significant Risk Factors              # Hypoalbuminemia: Lowest albumin = 3 g/dL (Ref range: 3.5-5.2) at 12/4/2022  5:50 AM, will monitor as appropriate          # Severe Obesity: Estimated body mass index is 70.42 kg/m  as calculated from the following:    Height as of this encounter: 1.575 m (5' 2\").    Weight as of this encounter: 174.6 kg (385 lb).   # Moderate Malnutrition: based on nutrition assessment        ______________________________________________________________________    Interval History   No acute events overnight. Cr bumbed this AM so held bumex and will consider returning to PTA dosing tomorrow. Restarted metoprolol succinate. Awaiting TCU placement/    Data reviewed today: I reviewed all medications, new labs and imaging results over the last 24 hours. I personally reviewed no images or EKG's today.    Physical Exam   Vital Signs: " Temp: 97.6  F (36.4  C) Temp src: Oral BP: (!) 148/80 Pulse: 68   Resp: 16 SpO2: 95 % O2 Device: Nasal cannula    Weight: 385 lbs 0 oz  GEN: Lying in bed, awake, alert, NAD   HEENT:  Normocephalic, atraumatic, NC in place   CV: Regular rate and rhythm with no murmurs.  PULM/CHEST: Clear breath sounds bilaterally, normal work of breathing  GI: normal bowel sounds, soft, slight tenderness to palpation of right and left lower quaudrants  EXTREMITIES: moving all extremities, peripheral pulses intact  NEURO: following commands, A&O x 4    Data   Recent Labs   Lab 12/07/22  0537 12/06/22  0631 12/06/22  0145 12/05/22  0522 12/04/22  1700 12/04/22  0550   WBC 10.8 10.5  --  8.8  --  9.9   HGB 14.8 15.1 14.9 15.3  --  14.8   MCV 96 96  --  95  --  97    199  --  196  --  191   INR 1.37* 1.40*  --  1.33*  --  1.37*    139  --  138  --  133*   POTASSIUM 4.3 3.8  --  3.7   < > 4.5   CHLORIDE 88* 90*  --  89*  --  87*   CO2 38* 36*  --  37*  --  32*   BUN 50.2* 48.7*  --  51.5*  --  48.6*   CR 1.43* 1.21*  --  1.25*  --  1.31*   ANIONGAP 11 13  --  12  --  14   KADEN 9.8 10.0  --  10.2  --  10.3*   * 104*  --  109*  --  77   ALBUMIN  --   --   --  3.4*  --  3.0*   PROTTOTAL  --   --   --  8.6*  --  8.6*   BILITOTAL  --   --   --  0.6  --  0.5   ALKPHOS  --   --   --  99  --  93   ALT  --   --   --  16  --  24   AST  --   --   --  33  --  58*    < > = values in this interval not displayed.

## 2022-12-07 NOTE — PLAN OF CARE
"Goal Outcome Evaluation:      Plan of Care Reviewed With: patient    Overall Patient Progress: no change    Shift: [id number]  VS: BP (!) 148/80 (BP Location: Left arm, Patient Position: Supine)   Pulse 68   Temp 97.6  F (36.4  C) (Oral)   Resp 16   Ht 1.575 m (5' 2\")   Wt (!) 174.6 kg (385 lb)   SpO2 95%   BMI 70.42 kg/m     Pain: Pt had 7/10 back pain - PRN tylenol was somewhat effective.  Neuro: A&O x4  Cardiac: WDL  Respiratory: BiPap at night - 3L nasal cannula when awake  Diet/Appetite: Reg diet - appetite is fair but not eating much  /GI: Purewick in place - last BM 12/6  LDA's: 2 R PIV - saline locked  Skin: Powder under breast and abdomen skin folds; generalized scabbing and dryness  Activity: Lift  Procedures: N/A  Pertinent Labs/: N/A     Plan: Continue POC - waiting on TCU placement - trying to wean off of high flow nasal cannula           "

## 2022-12-07 NOTE — CARE PLAN
VSS on 3 liters, wears CPAP at HS and with naps but awake all day, c/o low back pain using lidocaine patches, up to chair with lift X2, alert, orientated, scant vaginal bleeding, using purewick or bedpan, no BM today, denies nausea

## 2022-12-08 NOTE — PLAN OF CARE
"Goal Outcome Evaluation:    Shift: [id number]  VS: /72 (BP Location: Left arm, Patient Position: Supine)   Pulse 80   Temp 97.8  F (36.6  C) (Oral)   Resp 20   Ht 1.575 m (5' 2\")   Wt (!) 174.6 kg (385 lb)   SpO2 94%   BMI 70.42 kg/m     Pain: Has been between 4-5/10 - PRN tylenol given  Neuro: A&O x4  Cardiac: WDL  Respiratory: 3L NC, BiPAP at night 40% FiO2  Diet/Appetite: Reg diet - fair appetite  /GI: Haven't been using the purewick (states she is sore down there) - bed pan instead - continues to have small to moderate bleeding out of vagina. Last BM 12/6  LDA's: 1 L PIV in hand  Skin: Generalized scabs and flaking - healed pressure injury on buttocks - powder under abdominal skin folds   Activity: Lift  Procedures: N/A  Pertinent Labs/: N/A     Plan: Awaiting TCU placement - continue POC                 "

## 2022-12-08 NOTE — PROGRESS NOTES
[facility]    Progress Note - Medicine Service, CAROLYN TEAM 1       Date of Admission:  11/17/2022    Assessment & Plan   Arianna Siddiqi is a 62 year old female admitted on 11/17/2022. She has a history of morbid obesity, CARLOS, hypertension, HFpEF, paroxysmal A. Fib (on apixiban), MAG, factor IV deficiency and CKD who was initially admitted to the ICU on 11/18 for hypercapnic respiratory failure likely due to pneumonia, necessitating intubation. 11/23 extubated to BiPAP due to chronic CO2 retention. Now with concerns for RV heart failure, pulmonary hypertension, volume overload, off pressors since 11/29 and transferred to general medicine 11/30. Readmitted to ICU 12/2 PM for worsening hypercapnia and acute encephalopathy with concern for need for intubation. However, mental status improved with better fitting BiPAP able to avoid intubation, although remains significantly hypercapnic. Transferred back to general medicine 12/3 for ongoing diuresis, respiratory support and goals of care conversations. Currently awaiting TCU placement.      Changes and Major Updates today:  - Cr improved, will start back on home diuretic regimen of bumex 2mg every day  - Continue strict I/Os and daily weight as she still may not be at dry weight  - Attempting to give Covid vaccine and influenza vaccine prior to TCU, either infection would significantly affect her pulmonary status and could lead to significant morbidity/mortality     # Acute hypoxic and hypercarbic respiratory failure   # Pulmonary Hypertension  # Pulmonary edema/fluid overload   Patient extubated to BiPAP 11/23. CT PE/CAP unremarkable this admission.  - Wean O2 as tolerated  - Adjust diuresis as below  - BiPAP when sleeping (including naps)   - IS q 2H WA  - VBG conditionally  - see the cardiology section for the plan regarding her pulmonary hypertension     # Hx HFpEF (EF 60 to 65% per echo on 11/26/2022)    #  Decompensated right heart failure  # Fluid overload  # Pulmonary hypertension  Patient's [place]-Juaquin catheter measurements while in ICU consistent with volume overload and pulmonary hypertension.   Elevated BNP.  Normal cardiac output per swan Juaquin catheter (no suspicion for shunts which would artificially elevate these measurements). Known history of CARLOS and HFpEF. 11/25 BLE duplex US showed no above knee DVT.   - Restart PTA bumex 2mg every day, may increase tomorrow (12/9) if Cr stable  - Restart prior to admission metoprolol succ 100mg  - Hold PTA lisinopril  - Trend daily weights and obtain strict ins and outs   - MAP > 70     # Vaginal Bleeding, resolved  Noted to have vaginal bleeding 12/6 during nursing cares (bright red blood on purewick), Hgb stable. Hx s/f acute onset vaginal bleeding on 11/12/21 s/p IUD placement as w/u negative for endometrial and cervical cancer. Pelvic US with confirmation of IUD in correct spot.   - F/u benign gyn on DC - can call [phone] to schedule appointment     # Acute encephalopathy - resolved  Iso critical illness and ICU delirium, exacerbated by hypercapnia   Analgesia: PRN acetaminophen      - DC oxycodone as might have contributed to hypercarbia 12/2  Sedation: Melatonin 6 mg q HS, holding PTA cyclobenzaprine, tramadol and benadryl     # Hx PAF (on apixaban PTA)  Presently in sinus rhythm.   - Previously on amio gtt this admission; 200 mg amio every day now   - PTA apixaban dose     # Non-oliguric Acute on chronic kidney injury   # Metabolic alkalosis-presumed contraction alkalosis vs chronic hypoventilation syndrome  # Hx CKD (baseline creatinine 1.1)  Likely a component of venous ischemia due to fluid overload.   - Diuresis as above     # Hyperkalemia - resolved  # Hypomagnesia - resolved   - Electrolyte replacement protocol    # CAP - resolved  Status post Ceftiaxone 7 day course (11/18-11/24).  Zosyn empiric treatment for VAP/HAP (11/25-11/30)     Cultures:     11/25  BC x 2 - NGTD    11/25 MRSA     11/25 UA reflex - bland    11/25 Sputum culuture - unremarkable     11/20 Sputum culture unremarkable    11/18 Sputum culture unremarkable    11/18 urine culture unremarkable    11/17 blood cultures x 2 NGTD    11/17 viral panels unremarkable     # Hx MAG  # Hx Factor VII deficiency  Seen outpatient by hematology has a history oral/IV iron supplementation  - PTA ferrous gluconate 324 mg resumed  - PTA apixaban      # CARLOS onCPAP  # Obesity hypoventilation syndrome   # Hx Smoking (30 pack per year; stopped 2002)  - BiPAP when sleeping     #Hx Gout  #Hx hemosiderin deposition BLE  - PT/OT for ROM  - 11/18 uric acid 7.9  - PTA allopurinol reduced to 100 mg given poor renal function     # Demand induced troponin leak - improving  - 11/25 troponin 257-->158 --> 143 on 11/26     # Oral thrush   - Nystatin QID      # Risk for malnutrition  # Hypoalbuminemia  # Morbid obesity  # Nausea and vomiting   # Constipation-resolved  # Diarrhea   - RD following  - Compazine and Zofran PRN  - Bowel regimen: Senna and Miralax PRN   - Advance diet as tolerated, monitor for nausea     # Goals of Care  ICU consulted palliative care 11/30 to begin discussing pt's goals moving forward. Given her chronic lung disease 2/2 CARLOS, OHS, pHTN as well as her HFpEF with decompensated RV failure recurrent acute hypercarbic respiratory failure likely to occur with any pulmonary insult like another viral or bacterial. After transfer back to ICU 12/2 she began thinking about whether she would want to be re-intubated but wanted to talk with her family before deciding. She does not want a trach and we discussed the worry that if she ever were re-intubated there is a high likelihood we would not be able to extubate her without placing a trach.   - Palliative care consult, appreciate assistance  - Transitioned to DNI 12/3 after d/w palliative, wants to speak with her nieces prior to deciding to be no CPR      Diet: Regular  "Diet Adult  Snacks/Supplements Adult: Other; See comments; Between Meals  Snacks/Supplements Adult: Other; See comments; Between Meals    DVT Prophylaxis: Apixaban  Baig Catheter: Not present  Fluids: none  Central Lines: None  Cardiac Monitoring: None  Code Status:   DNI    Disposition Plan     Expected Discharge Date: 12/10/2022      Destination: nursing home  Discharge Comments: needs to wean of HFNC, will need TCU        The patient's care was discussed with the Attending Physician, Dr. Melton.    Servando Holbrook MD  Medicine Service, [address]  Securely message with the Vocera Web Console (learn more here)  Text page via [facility] Paging/Directory   Please see signed in provider for up to date coverage information      Clinically Significant Risk Factors              # Hypoalbuminemia: Lowest albumin = 3 g/dL (Ref range: 3.5-5.2) at 12/4/2022  5:50 AM, will monitor as appropriate          # Severe Obesity: Estimated body mass index is 70.42 kg/m  as calculated from the following:    Height as of this encounter: 1.575 m (5' 2\").    Weight as of this encounter: 174.6 kg (385 lb).   # Moderate Malnutrition: based on nutrition assessment        ______________________________________________________________________    Interval History   Doing well today. Able to sit in chair and will continue working with PT/OT prior to TCU admission. She is medically appropriate for TCU but we are still working on determining her new dry weight as she may be able to tolerate more diuresis.     Data reviewed today: I reviewed all medications, new labs and imaging results over the last 24 hours. I personally reviewed no images or EKG's today.    Physical Exam   Vital Signs: Temp: 97.8  F (36.6  C) Temp src: Oral BP: 125/72 Pulse: 80   Resp: 20 SpO2: 94 % O2 Device: Nasal cannula Oxygen Delivery: 3 LPM  Weight: 385 lbs 0 oz  GEN: Lying in bed, awake, alert, NAD   HEENT: "  Normocephalic, atraumatic, NC in place   CV: Regular rate and rhythm with no murmurs.  PULM/CHEST: Clear breath sounds bilaterally, normal work of breathing  GI: normal bowel sounds, soft, slight tenderness to palpation of right and left lower quaudrants  EXTREMITIES: moving all extremities, peripheral pulses intact  NEURO: following commands, A&O x 4    Data   Recent Labs   Lab 12/08/22  0551 12/07/22  0537 12/06/22  0631 12/06/22  0145 12/05/22  0522 12/04/22  1700 12/04/22  0550   WBC 9.4 10.8 10.5  --  8.8  --  9.9   HGB 14.5 14.8 15.1   < > 15.3  --  14.8   MCV 97 96 96  --  95  --  97    207 199  --  196  --  191   INR 1.40* 1.37* 1.40*  --  1.33*  --  1.37*    137 139  --  138  --  133*   POTASSIUM 4.2 4.3 3.8  --  3.7   < > 4.5   CHLORIDE 88* 88* 90*  --  89*  --  87*   CO2 40* 38* 36*  --  37*  --  32*   BUN 49.3* 50.2* 48.7*  --  51.5*  --  48.6*   CR 1.27* 1.43* 1.21*  --  1.25*  --  1.31*   ANIONGAP 9 11 13  --  12  --  14   KADEN 9.9 9.8 10.0  --  10.2  --  10.3*   * 106* 104*  --  109*  --  77   ALBUMIN  --   --   --   --  3.4*  --  3.0*   PROTTOTAL  --   --   --   --  8.6*  --  8.6*   BILITOTAL  --   --   --   --  0.6  --  0.5   ALKPHOS  --   --   --   --  99  --  93   ALT  --   --   --   --  16  --  24   AST  --   --   --   --  33  --  58*    < > = values in this interval not displayed.

## 2022-12-09 NOTE — PROGRESS NOTES
Care Management Follow Up    Length of Stay (days): 20    Expected Discharge Date: 12/10/2022     Concerns to be Addressed: all concerns addressed in this encounter, decision making, discharge planning, financial/insurance     Patient plan of care discussed at interdisciplinary rounds: Yes    Anticipated Discharge Disposition: Assisted Living, Skilled Nursing Facility, Long Term Care     Anticipated Discharge Services:  [facility]  Anticipated Discharge DME:  12/10/22    Patient/family educated on Medicare website which has current facility and service quality ratings:  Yes  Education Provided on the Discharge Plan:  Yes  Patient/Family in Agreement with the Plan:  Yes    Referrals Placed by CM/SW:      [facility] - 4th floor  Intake phone: [phone]  4th floor:[phone]    Private pay costs discussed: Not applicable    Additional Information:  [facility] accepted pt. Pt can go there when medically stable. Still on 3LNC today. Plan on disposition 12/10/22. TCU updated today.      Zoe Cruz RN  5A RN Care Coordinator  Phone: [phone]  Pager: [phone]     For Weekend & Holiday on call RN Care Coordinator:  (Tasks: Home care, home infusion, medical equipment/oxygen, transportation, IMM & MOON forms, etc.)     Text Paging in [employer] Smart Web is the preferred method of contact for these teams     [facility] (0800-1630) Saturday & Sunday; (6103-2404)  Recognized Holidays  Pager: [phone] Units: 4A, 4C, 4E, 5A & 5B      For Weekend & Holiday on call Social Work:  (Tasks: TCU, transportation, Hospice, adjustment to illness counseling, Health Care Directives, Child Protection and Domestic Violence concerns, Vulnerable Adult, IMM forms, etc.)     Text Paging in [employer] Smart Web is the preferred method of contact for these teams    [facility] (0800 - 1630) Saturday and Sunday  Pager: [phone] Units: 4A, 4C, 4E  Pager: [phone] Units: 5A & 5B  _____________________________________________      After hours (0329-1457) for all units everyday- (only the  is available after hours until midnight)  Pager [phone]

## 2022-12-09 NOTE — PLAN OF CARE
Goal Outcome Evaluation:      Plan of Care Reviewed With: patient    Overall Patient Progress: improvingOverall Patient Progress: improving    Outcome Evaluation: AOx4. Did not sleep well. Stable on 3L while awake and Bipap while asleep. C/o back pain-tylenol and reposioning w/ some relief. Purewick in place with adequate urine output, vaginal bleeding remains. All electrolytes recheck in AM. Tolerating diet but poor appetite. Turns well for chnages but lift when OOB. Discharge to [facility] 12/10. Continue to monitor until discharge.

## 2022-12-09 NOTE — PLAN OF CARE
Goal Outcome Evaluation:      Plan of Care Reviewed With: patient    Overall Patient Progress: no changeOverall Patient Progress: no change    Outcome Evaluation:  Worked with PT today.  On 3L NC throughout shift.  Flu vaccine given.  Nystatin cream ordered for perineal itching, scant amount of vaginal bleeding.  Call light within reach.

## 2022-12-09 NOTE — PLAN OF CARE
Goal Outcome Evaluation:    Pt A&O. VSS on 3 L O2 NC. Transfers w/ lift. Tylenol given for back pain. Worked w/ PT today. Last BM 12/6. Voided w/ bedpan and purewick. Scant amount of vaginal bleeding. Strict I&O. Will cont to monitor.

## 2022-12-09 NOTE — PROGRESS NOTES
[facility]    Progress Note - Medicine Service, CAROLYN TEAM 1       Date of Admission:  11/17/2022    Assessment & Plan   Arianna Siddiqi is a 62 year old female admitted on 11/17/2022. She has a history of morbid obesity, CARLOS, hypertension, HFpEF, paroxysmal A. Fib (on apixiban), MAG, factor IV deficiency and CKD who was initially admitted to the ICU on 11/18 for hypercapnic respiratory failure likely due to pneumonia, necessitating intubation. 11/23 extubated to BiPAP due to chronic CO2 retention. Now with concerns for RV heart failure, pulmonary hypertension, volume overload, off pressors since 11/29 and transferred to general medicine 11/30. Readmitted to ICU 12/2 PM for worsening hypercapnia and acute encephalopathy with concern for need for intubation. However, mental status improved with better fitting BiPAP able to avoid intubation, although remains significantly hypercapnic. Transferred back to general medicine 12/3 for ongoing diuresis, respiratory support and goals of care conversations. Currently awaiting TCU placement.      Changes and Major Updates today:  - Pt notes that her dry weight prior to this hospitalization was around 360-370lb which we are currently meeting  - Potential bed available at  TCU 12/10/22  - Will place palliative care referral on discharge per pt request     # Acute hypoxic and hypercarbic respiratory failure   # Pulmonary Hypertension  # Pulmonary edema/fluid overload   Patient extubated to BiPAP 11/23. CT PE/CAP unremarkable this admission.  - Wean O2 as tolerated- currently requiring 3 L   - see the cardiology section for the plan regarding her pulmonary hypertension     # Hx HFpEF (EF 60 to 65% per echo on 11/26/2022)    # Decompensated right heart failure  # Fluid overload  # Pulmonary hypertension  Patient's [place]-Juaquin catheter measurements while in ICU consistent with volume overload and pulmonary hypertension.   Elevated BNP.   Normal cardiac output per swan Juaquin catheter (no suspicion for shunts which would artificially elevate these measurements). Known history of CARLOS and HFpEF. 11/25 BLE duplex US showed no above knee DVT.   - Restart PTA bumex 2mg every day, may increase tomorrow (12/9) if Cr stable  - Restart prior to admission metoprolol succ 100mg  - Restart PTA lisinopril  - Trend daily weights and obtain strict ins and outs   - MAP > 70     # Vaginal Bleeding, resolved  Noted to have vaginal bleeding 12/6 during nursing cares (bright red blood on purewick), Hgb stable. Hx s/f acute onset vaginal bleeding on 11/12/21 s/p IUD placement as w/u negative for endometrial and cervical cancer. Pelvic US with confirmation of IUD in correct spot.   - F/u benign gyn on DC - can call [phone] to schedule appointment     # Acute encephalopathy - resolved  Iso critical illness and ICU delirium, exacerbated by hypercapnia   Analgesia: PRN acetaminophen      - DC oxycodone as might have contributed to hypercarbia 12/2  Sedation: Melatonin 6 mg q HS, holding PTA cyclobenzaprine, tramadol and benadryl     # Hx PAF (on apixaban PTA)  Presently in sinus rhythm.   - Previously on amio gtt this admission; 200 mg amio every day now   - PTA apixaban dose     # Non-oliguric Acute on chronic kidney injury   # Metabolic alkalosis-presumed contraction alkalosis vs chronic hypoventilation syndrome  # Hx CKD (baseline creatinine 1.1)  Likely a component of venous congestion due to fluid overload. Appears to be euvolemic now.  - Diuresis as above     # Hyperkalemia - resolved  # Hypomagnesia - resolved   - Electrolyte replacement protocol    # CAP - resolved  Status post Ceftiaxone 7 day course (11/18-11/24).  Zosyn empiric treatment for VAP/HAP (11/25-11/30)     Cultures:   11/25 BC x 2 - NGTD  11/25 MRSA   11/25 UA reflex - bland  11/25 Sputum culuture - unremarkable   11/20 Sputum culture unremarkable  11/18 Sputum culture unremarkable  11/18 urine  culture unremarkable  11/17 blood cultures x 2 NGTD  11/17 viral panels unremarkable     # Hx MAG  # Hx Factor VII deficiency  Seen outpatient by hematology has a history oral/IV iron supplementation  - PTA ferrous gluconate 324 mg resumed  - PTA apixaban      # CARLOS onCPAP  # Obesity hypoventilation syndrome   # Hx Smoking (30 pack per year; stopped 2002)  - BiPAP when sleeping     #Hx Gout  #Hx hemosiderin deposition BLE  - PT/OT for ROM  - 11/18 uric acid 7.9  - PTA allopurinol reduced to 100 mg given poor renal function     # Demand induced troponin leak - improving  - 11/25 troponin 257-->158 --> 143 on 11/26     # Oral thrush   - Nystatin QID      # Risk for malnutrition  # Hypoalbuminemia  # Morbid obesity  # Nausea and vomiting   # Constipation-resolved  # Diarrhea   - RD following  - Compazine and Zofran PRN  - Bowel regimen: Senna and Miralax PRN   - Advance diet as tolerated, monitor for nausea     # Goals of Care  ICU consulted palliative care 11/30 to begin discussing pt's goals moving forward. Given her chronic lung disease 2/2 CARLOS, OHS, pHTN as well as her HFpEF with decompensated RV failure recurrent acute hypercarbic respiratory failure likely to occur with any pulmonary insult like another viral or bacterial. After transfer back to ICU 12/2 she began thinking about whether she would want to be re-intubated but wanted to talk with her family before deciding. She does not want a trach and we discussed the worry that if she ever were re-intubated there is a high likelihood we would not be able to extubate her without placing a trach.   - Palliative care consult, appreciate assistance  - Transitioned to DNI 12/3 after d/w palliative, wants to speak with her nieces prior to deciding to be no CPR   - Referral to palliative on DC per pt request     Diet: Regular Diet Adult  Snacks/Supplements Adult: Other; See comments; Between Meals  Snacks/Supplements Adult: Other; See comments; Between Meals    DVT  "Prophylaxis: Eliquis  Baig Catheter: Not present  Fluids: none  Central Lines: None  Cardiac Monitoring: None  Code Status:   DNI    Disposition Plan     Expected Discharge Date: 12/10/2022      Destination: nursing home  Discharge Comments: needs to wean of HFNC, will need TCU        The patient's care was discussed with the Attending Physician, Dr. Melton .    Servando Holbrook MD  Medicine Service, [facility] TEAM 1  [facility]  Securely message with the Vocera Web Console (learn more here)  Text page via [facility] Paging/Directory   Please see signed in provider for up to date coverage information      Clinically Significant Risk Factors              # Hypoalbuminemia: Lowest albumin = 3 g/dL at 12/4/2022  5:50 AM, will monitor as appropriate           # Severe Obesity: Estimated body mass index is 67.31 kg/m  as calculated from the following:    Height as of this encounter: 1.575 m (5' 2\").    Weight as of this encounter: 166.9 kg (368 lb).   # Moderate Malnutrition: based on nutrition assessment        ______________________________________________________________________    Interval History   No acute events. Feeling good today, felt like she had a long day yesterday but feeling better today. Hopeful there is a bed at [facility] tomorrow. She endorsed wanting to remain connected with palliative care in the future.     Data reviewed today: I reviewed all medications, new labs and imaging results over the last 24 hours. I personally reviewed no images or EKG's today.    Physical Exam   Vital Signs: Temp: 97.7  F (36.5  C) Temp src: Oral BP: 106/60 Pulse: 74   Resp: 16 SpO2: 94 % O2 Device: Nasal cannula Oxygen Delivery: 3 LPM  Weight: 368 lbs 0 oz  GEN: Lying in bed, awake, alert, NAD   HEENT:  Normocephalic, atraumatic, NC in place   CV: Regular rate and rhythm with no murmurs.  PULM/CHEST: Clear breath sounds bilaterally, normal work of breathing  GI: normal bowel sounds, soft, " slight tenderness to palpation of right and left lower quaudrants  EXTREMITIES: moving all extremities, peripheral pulses intact  NEURO: following commands, A&O x 4    Data   Recent Labs   Lab 12/09/22  0658 12/08/22  0551 12/07/22  0537 12/06/22  0145 12/05/22  0522 12/04/22  1700 12/04/22  0550   WBC 9.1 9.4 10.8   < > 8.8  --  9.9   HGB 14.3 14.5 14.8   < > 15.3  --  14.8   MCV 95 97 96   < > 95  --  97    224 207   < > 196  --  191   INR 1.35* 1.40* 1.37*   < > 1.33*  --  1.37*    137 137   < > 138  --  133*   POTASSIUM 3.9 4.2 4.3   < > 3.7   < > 4.5   CHLORIDE 89* 88* 88*   < > 89*  --  87*   CO2 39* 40* 38*   < > 37*  --  32*   BUN 47.3* 49.3* 50.2*   < > 51.5*  --  48.6*   CR 1.15* 1.27* 1.43*   < > 1.25*  --  1.31*   ANIONGAP 8 9 11   < > 12  --  14   KADEN 9.5 9.9 9.8   < > 10.2  --  10.3*   * 119* 106*   < > 109*  --  77   ALBUMIN  --   --   --   --  3.4*  --  3.0*   PROTTOTAL  --   --   --   --  8.6*  --  8.6*   BILITOTAL  --   --   --   --  0.6  --  0.5   ALKPHOS  --   --   --   --  99  --  93   ALT  --   --   --   --  16  --  24   AST  --   --   --   --  33  --  58*    < > = values in this interval not displayed.

## 2022-12-10 NOTE — PLAN OF CARE
"Goal Outcome Evaluation:      Plan of Care Reviewed With: patient    Overall Patient Progress: no change    Outcome Evaluation: A&Ox4, VSS on 2-3L o2 via NC while awake, wearing BiPAP overnight, purwick in place w/ adequate urine output, vaginal bleeding continues, nystatin ointment applied as scheduled, no BM this shift, poor appetite, c/o of headache this shift, PRN tylenol given 1x, plan to discharge to  TCU today if bed becomes available    Edit: meplex placed on sacrum this AM for blanchable redness on bottom and pt stating sacral area is feeling \"sore\"   "

## 2022-12-10 NOTE — PLAN OF CARE
Goal Outcome Evaluation:      Plan of Care Reviewed With: patient    Overall Patient Progress: no changeOverall Patient Progress: no change    Outcome Evaluation: A&Ox4. VSS on 2-3L NC. On regular diet w/ little appetite. Purwick in place. Had 1 BM this shift. Continues to have vaginal bleeding. Awaiting TCU placement.

## 2022-12-10 NOTE — PROGRESS NOTES
[facility]    Progress Note - Medicine Service, CAROLYN TEAM 1       Date of Admission:  11/17/2022    Assessment & Plan   Arianna Siddiqi is a 62 year old female admitted on 11/17/2022. She has a history of morbid obesity, CARLOS, hypertension, HFpEF, paroxysmal A. Fib (on apixiban), MAG, factor IV deficiency and CKD who was initially admitted to the ICU on 11/18 for hypercapnic respiratory failure likely due to pneumonia, necessitating intubation. 11/23 extubated to BiPAP due to chronic CO2 retention. Now with concerns for RV heart failure, pulmonary hypertension, volume overload, off pressors since 11/29 and transferred to general medicine 11/30. Readmitted to ICU 12/2 PM for worsening hypercapnia and acute encephalopathy with concern for need for intubation. However, mental status improved with better fitting BiPAP able to avoid intubation, although remains significantly hypercapnic. Transferred back to general medicine 12/3 for ongoing diuresis, respiratory support and goals of care conversations. Currently awaiting TCU placement.      Changes and Major Updates today:  - Transfer to TCU if bed available  - No acute changes otherwise  - Received her flu vaccine 12/9     # Acute hypoxic and hypercarbic respiratory failure   # Pulmonary Hypertension  # Pulmonary edema/fluid overload   Patient extubated to BiPAP 11/23. CT PE/CAP unremarkable this admission.  - Wean O2 as tolerated- currently requiring 3 L   - see the cardiology section for the plan regarding her pulmonary hypertension     # Hx HFpEF (EF 60 to 65% per echo on 11/26/2022)    # Decompensated right heart failure  # Fluid overload  # Pulmonary hypertension  Patient's [place]-Juaquin catheter measurements while in ICU consistent with volume overload and pulmonary hypertension.   Elevated BNP.  Normal cardiac output per swan Juaquin catheter (no suspicion for shunts which would artificially elevate these measurements).  Known history of CARLOS and HFpEF. 11/25 BLE duplex US showed no above knee DVT.   - Restart PTA bumex 2mg every day, may increase tomorrow (12/9) if Cr stable  - Restart prior to admission metoprolol succ 100mg  - Restart PTA lisinopril  - Trend daily weights and obtain strict ins and outs   - MAP > 70     # Vaginal Bleeding, resolved  Noted to have vaginal bleeding 12/6 during nursing cares (bright red blood on purewick), Hgb stable. Hx s/f acute onset vaginal bleeding on 11/12/21 s/p IUD placement as w/u negative for endometrial and cervical cancer. Pelvic US with confirmation of IUD in correct spot.   - F/u benign gyn on DC - can call [phone] to schedule appointment     # Acute encephalopathy - resolved  Iso critical illness and ICU delirium, exacerbated by hypercapnia   Analgesia: PRN acetaminophen      - DC oxycodone as might have contributed to hypercarbia 12/2  Sedation: Melatonin 6 mg q HS, holding PTA cyclobenzaprine, tramadol and benadryl     # Hx PAF (on apixaban PTA)  Presently in sinus rhythm.   - Previously on amio gtt this admission; 200 mg amio every day now   - PTA apixaban dose     # Non-oliguric Acute on chronic kidney injury   # Metabolic alkalosis-presumed contraction alkalosis vs chronic hypoventilation syndrome  # Hx CKD (baseline creatinine 1.1)  Likely a component of venous congestion due to fluid overload. Appears to be euvolemic now.  - Diuresis as above     # Hyperkalemia - resolved  # Hypomagnesia - resolved   - Electrolyte replacement protocol    # CAP - resolved  Status post Ceftiaxone 7 day course (11/18-11/24).  Zosyn empiric treatment for VAP/HAP (11/25-11/30)     Cultures:   11/25 BC x 2 - NGTD  11/25 MRSA   11/25 UA reflex - bland  11/25 Sputum culuture - unremarkable   11/20 Sputum culture unremarkable  11/18 Sputum culture unremarkable  11/18 urine culture unremarkable  11/17 blood cultures x 2 NGTD  11/17 viral panels unremarkable     # Hx MAG  # Hx Factor VII  deficiency  Seen outpatient by hematology has a history oral/IV iron supplementation  - PTA ferrous gluconate 324 mg resumed  - PTA apixaban      # CARLOS onCPAP  # Obesity hypoventilation syndrome   # Hx Smoking (30 pack per year; stopped 2002)  - BiPAP when sleeping     #Hx Gout  #Hx hemosiderin deposition BLE  - PT/OT for ROM  - 11/18 uric acid 7.9  - PTA allopurinol reduced to 100 mg given poor renal function     # Demand induced troponin leak - improving  - 11/25 troponin 257-->158 --> 143 on 11/26     # Oral thrush   - Nystatin QID      # Risk for malnutrition  # Hypoalbuminemia  # Morbid obesity  # Nausea and vomiting   # Constipation-resolved  # Diarrhea   - RD following  - Compazine and Zofran PRN  - Bowel regimen: Senna and Miralax PRN   - Regular diet     # Goals of Care  ICU consulted palliative care 11/30 to begin discussing pt's goals moving forward. Given her chronic lung disease 2/2 CARLOS, OHS, pHTN as well as her HFpEF with decompensated RV failure recurrent acute hypercarbic respiratory failure likely to occur with any pulmonary insult like another viral or bacterial. After transfer back to ICU 12/2 she began thinking about whether she would want to be re-intubated but wanted to talk with her family before deciding. She does not want a trach and we discussed the worry that if she ever were re-intubated there is a high likelihood we would not be able to extubate her without placing a trach.   - Palliative care consult, appreciate assistance  - Transitioned to DNI 12/3 after d/w palliative, wants to speak with her nieces prior to deciding to be no CPR   - Referral to palliative on DC per pt request     Diet: Regular Diet Adult  Snacks/Supplements Adult: Other; See comments; Between Meals  Snacks/Supplements Adult: Other; See comments; Between Meals  Diet    DVT Prophylaxis: Eliquis  Baig Catheter: Not present  Fluids: none  Central Lines: None  Cardiac Monitoring: None  Code Status:   DNI    Disposition  "Plan      Expected Discharge Date: 12/12/2022      Destination: nursing home  Discharge Comments: Medically stable for discharge to  TCU, potentially 12/10- will go on 3 L of O2        The patient's care was discussed with the Attending Physician, Dr. Melton .    Servando Holbrook MD  Medicine Service, [facility] TEAM 1  [facility]  Securely message with the Vocera Web Console (learn more here)  Text page via [facility] Paging/Directory   Please see signed in provider for up to date coverage information      Clinically Significant Risk Factors              # Hypoalbuminemia: Lowest albumin = 3 g/dL at 12/4/2022  5:50 AM, will monitor as appropriate           # Severe Obesity: Estimated body mass index is 66.76 kg/m  as calculated from the following:    Height as of this encounter: 1.575 m (5' 2\").    Weight as of this encounter: 165.6 kg (365 lb).   # Moderate Malnutrition: based on nutrition assessment        ______________________________________________________________________    Interval History   No acute events. Awaiting for bed availability at TCU. Discharge is otherwise prepped.     Data reviewed today: I reviewed all medications, new labs and imaging results over the last 24 hours. I personally reviewed no images or EKG's today.    Physical Exam   Vital Signs: Temp: 97.5  F (36.4  C) Temp src: Oral BP: 109/65 Pulse: 63   Resp: 22 SpO2: 99 % O2 Device: Nasal cannula Oxygen Delivery: 3 LPM  Weight: 365 lbs 0 oz  GEN: Lying in bed, awake, alert, NAD   HEENT:  Normocephalic, atraumatic, NC in place   CV: Regular rate and rhythm with no murmurs.  PULM/CHEST: Clear breath sounds bilaterally, normal work of breathing  GI: normal bowel sounds, soft, slight tenderness to palpation of right and left lower quaudrants  EXTREMITIES: moving all extremities, peripheral pulses intact  NEURO: following commands, A&O x 4    Data   Recent Labs   Lab 12/09/22  0658 12/08/22  0551 12/07/22  0537 " 12/06/22  0145 12/05/22  0522 12/04/22  1700 12/04/22  0550   WBC 9.1 9.4 10.8   < > 8.8  --  9.9   HGB 14.3 14.5 14.8   < > 15.3  --  14.8   MCV 95 97 96   < > 95  --  97    224 207   < > 196  --  191   INR 1.35* 1.40* 1.37*   < > 1.33*  --  1.37*    137 137   < > 138  --  133*   POTASSIUM 3.9 4.2 4.3   < > 3.7   < > 4.5   CHLORIDE 89* 88* 88*   < > 89*  --  87*   CO2 39* 40* 38*   < > 37*  --  32*   BUN 47.3* 49.3* 50.2*   < > 51.5*  --  48.6*   CR 1.15* 1.27* 1.43*   < > 1.25*  --  1.31*   ANIONGAP 8 9 11   < > 12  --  14   KADEN 9.5 9.9 9.8   < > 10.2  --  10.3*   * 119* 106*   < > 109*  --  77   ALBUMIN  --   --   --   --  3.4*  --  3.0*   PROTTOTAL  --   --   --   --  8.6*  --  8.6*   BILITOTAL  --   --   --   --  0.6  --  0.5   ALKPHOS  --   --   --   --  99  --  93   ALT  --   --   --   --  16  --  24   AST  --   --   --   --  33  --  58*    < > = values in this interval not displayed.

## 2022-12-11 NOTE — PROGRESS NOTES
[facility]    Medicine Progress Note - Medicine Service, CAROLYN TEAM 1    Date of Admission:  11/17/2022    Assessment & Plan   Arianna Siddiqi is a 62 year old female admitted on 11/17/2022. She has a history of morbid obesity, CARLOS, hypertension, HFpEF, paroxysmal A. Fib (on apixiban), MAG, factor IV deficiency and CKD who was initially admitted to the ICU on 11/18 for hypercapnic respiratory failure likely due to pneumonia, necessitating intubation. 11/23 extubated to BiPAP due to chronic CO2 retention. Now with concerns for RV heart failure, pulmonary hypertension, volume overload, off pressors since 11/29 and transferred to general medicine 11/30. Readmitted to ICU 12/2 PM for worsening hypercapnia and acute encephalopathy with concern for need for intubation. However, mental status improved with better fitting BiPAP able to avoid intubation, although remains significantly hypercapnic. Transferred back to general medicine 12/3 for ongoing diuresis, respiratory support and goals of care conversations. Currently awaiting TCU placement.      Changes and Major Updates today:  - Hold bumex  - Received 500 ml LR for episode of dizziness and hypotension last night  - Repeat potassium (K 6 but specimen appears to be hemolyzed)  - Repeat BMP and CBC in AM     # Acute hypoxic and hypercarbic respiratory failure   # Pulmonary Hypertension  # Pulmonary edema/fluid overload   Patient extubated to BiPAP 11/23. CT PE/CAP unremarkable this admission.  - Wean O2 as tolerated- currently requiring 3 L   - see the cardiology section for the plan regarding her pulmonary hypertension     # Hx HFpEF (EF 60 to 65% per echo on 11/26/2022)    # Decompensated right heart failure  # Fluid overload  # Pulmonary hypertension  Patient's [place]-Juaquin catheter measurements while in ICU consistent with volume overload and pulmonary hypertension.   Elevated BNP.  Normal cardiac output per swan Juaquin catheter  (no suspicion for shunts which would artificially elevate these measurements). Known history of CARLOS and HFpEF. 11/25 BLE duplex US showed no above knee DVT.   - Hold PTA bumex 2mg today   - Continue pta admission metoprolol succ 100mg  - Hold PTA lisinopril  - Trend daily weights and obtain strict ins and outs   - MAP > 70     # Vaginal Bleeding  Noted to have vaginal bleeding 12/6 during nursing cares (bright red blood on purewick), Hgb stable. Hx s/f acute onset vaginal bleeding on 11/12/21 s/p IUD placement as w/u negative for endometrial and cervical cancer. Discussed with gyn. Pelvic US with confirmation of IUD in correct spot.   - F/u benign gyn on DC - can call [phone] to schedule appointment     # Acute encephalopathy - resolved  Iso critical illness and ICU delirium, exacerbated by hypercapnia   - PRN acetaminophen  - Melatonin 6 mg q HS, holding PTA cyclobenzaprine, tramadol and benadryl     # Hx PAF (on apixaban PTA)  Presently in sinus rhythm.   - Previously on amio gtt this admission; 200 mg amio every day now   - PTA apixaban dose     # Non-oliguric Acute on chronic kidney injury   # Metabolic alkalosis-presumed contraction alkalosis vs chronic hypoventilation syndrome  # Hx CKD (baseline creatinine 1.1)  Likely a component of venous congestion due to fluid overload. Cr bumped to 1.5 today, given dizziness and hypotension last night, perhaps pre-renal. S/p 500 ml LR.  - Hold bumex     # Hyperkalemia   # Hypomagnesia - resolved   K elevated to 6 today but the specimen was hemolyzed.   - Repeat K  - Electrolyte replacement protocol    # CAP - resolved  Status post Ceftiaxone 7 day course (11/18-11/24).  Zosyn empiric treatment for VAP/HAP (11/25-11/30)     # Hx MAG  # Hx Factor VII deficiency  Seen outpatient by hematology has a history oral/IV iron supplementation  - PTA ferrous gluconate 324 mg   - PTA apixaban      # CARLOS onCPAP  # Obesity hypoventilation syndrome   # Hx Smoking (30 pack per year;  stopped 2002)  - BiPAP when sleeping     #Hx Gout  #Hx hemosiderin deposition BLE  - PT/OT for ROM  - 11/18 uric acid 7.9  - PTA allopurinol reduced to 100 mg given poor renal function     # Demand induced troponin leak - improving  - 11/25 troponin 257-->158 --> 143 on 11/26     # Oral thrush   - Nystatin QID      # Risk for malnutrition  # Hypoalbuminemia  # Morbid obesity  # Nausea and vomiting   # Constipation-resolved  # Diarrhea   - RD following  - Compazine and Zofran PRN  - Bowel regimen: Senna and Miralax PRN   - Regular diet     # Goals of Care  ICU consulted palliative care 11/30 to begin discussing pt's goals moving forward. Given her chronic lung disease 2/2 CARLOS, OHS, pHTN as well as her HFpEF with decompensated RV failure recurrent acute hypercarbic respiratory failure likely to occur with any pulmonary insult like another viral or bacterial. After transfer back to ICU 12/2 she began thinking about whether she would want to be re-intubated but wanted to talk with her family before deciding. She does not want a trach and we discussed the worry that if she ever were re-intubated there is a high likelihood we would not be able to extubate her without placing a trach.   - Palliative care consult, appreciate assistance  - Transitioned to DNI 12/3 after d/w palliative, wants to speak with her nieces prior to deciding to be no CPR   - Referral to palliative on DC per pt request       Diet: Regular Diet Adult  Snacks/Supplements Adult: Other; See comments; Between Meals  Snacks/Supplements Adult: Other; See comments; Between Meals  Diet    DVT Prophylaxis: DOAC  Baig Catheter: Not present  Central Lines: None  Cardiac Monitoring: None  Code Status:  DNI    Disposition Plan      Expected Discharge Date: 12/12/2022      Destination: nursing home  Discharge Comments: Medically stable for discharge to [facility]  Delays: No beds at [facility] over weekend        The patient's care was discussed with the Bedside Nurse and  "Patient.    Lana Melton MD  Medicine Service, [facility] TEAM 1  [facility]  Securely message with the [employer] Web Console (learn more here)  Text page via [facility] Paging/Directory   Please see signed in provider for up to date coverage information      Clinically Significant Risk Factors        # Hyperkalemia: Highest K = 6 mmol/L in last 2 days, will monitor as appropriate       # Hypoalbuminemia: Lowest albumin = 3 g/dL at 12/4/2022  5:50 AM, will monitor as appropriate           # Severe Obesity: Estimated body mass index is 67.38 kg/m  as calculated from the following:    Height as of this encounter: 1.575 m (5' 2\").    Weight as of this encounter: 167.1 kg (368 lb 6.2 oz).   # Moderate Malnutrition: based on nutrition assessment        ______________________________________________________________________    Interval History   Arianna had an episode of nausea and dizziness last night. She reports it happened while she was trying to have a bowel movement and she was straining. Her BP was low at the time. She received 500 ml LR. She overall feels much better this morning, though feels tired. Denies nausea or vomiting.     Data reviewed today: I reviewed all medications, new labs and imaging results over the last 24 hours. I personally reviewed no images or EKG's today.    Physical Exam   Vital Signs: Temp: 98.2  F (36.8  C) Temp src: Oral BP: 104/44 Pulse: 65   Resp: 20 SpO2: 93 % O2 Device: Nasal cannula Oxygen Delivery: 4 LPM  Weight: 368 lbs 6.22 oz  General Appearance: Resting comfortably in bed. NAD.  Respiratory: Diminished breath sounds bilaterally likely due to body habitus. No wheezing or crackles noted.  Cardiovascular: RRR. Normal S1/S2. No murmurs.   GI: Soft, non-tender, non-distended. Normoactive bowel sounds.   Skin: No rash or lesions.   Other: No peripheral edema.    Data   Recent Labs   Lab 12/11/22 0955 12/10/22  2223 12/09/22  0658 12/08/22  0551 " 12/07/22  0537 12/06/22  0145 12/05/22  0522   WBC  --   --  9.1 9.4 10.8   < > 8.8   HGB  --   --  14.3 14.5 14.8   < > 15.3   MCV  --   --  95 97 96   < > 95   PLT  --   --  228 224 207   < > 196   INR  --   --  1.35* 1.40* 1.37*   < > 1.33*     --  136 137 137   < > 138   POTASSIUM 6.0*  --  3.9 4.2 4.3   < > 3.7   CHLORIDE 92*  --  89* 88* 88*   < > 89*   CO2 32*  --  39* 40* 38*   < > 37*   BUN 63.7*  --  47.3* 49.3* 50.2*   < > 51.5*   CR 1.52*  --  1.15* 1.27* 1.43*   < > 1.25*   ANIONGAP 13  --  8 9 11   < > 12   KADEN 9.8  --  9.5 9.9 9.8   < > 10.2   * 189* 115* 119* 106*   < > 109*   ALBUMIN  --   --   --   --   --   --  3.4*   PROTTOTAL  --   --   --   --   --   --  8.6*   BILITOTAL  --   --   --   --   --   --  0.6   ALKPHOS  --   --   --   --   --   --  99   ALT  --   --   --   --   --   --  16   AST  --   --   --   --   --   --  33    < > = values in this interval not displayed.

## 2022-12-11 NOTE — PLAN OF CARE
Goal Outcome Evaluation:      Plan of Care Reviewed With: patient    Overall Patient Progress: no changeOverall Patient Progress: no change    Outcome Evaluation: A&Ox4. VSS on 3L NC except soft BP. C/o headache, given PRN tylenol. On regular diet w/ little appetite. Had BM this shift. Using purwick. Continues to have vaginal bleeding. Awaiting an open bed at  TCU.

## 2022-12-11 NOTE — PROVIDER NOTIFICATION
Alireza Knowles MD, Mikaela Tolentino paged: TAMEKA pt BP 87/42, recheck 94/50, HR 70s, feeling nauseous/dizzy

## 2022-12-11 NOTE — PLAN OF CARE
Goal Outcome Evaluation:      Plan of Care Reviewed With: patient    Overall Patient Progress: no change    Outcome Evaluation: A&Ox4, stable on 3L NC while awake, wearing AVAPS overnight, pt symptomatic hypotensive early in evening, c/o of dizziness and nausea, 500mL LR bolus given over two hours w/ BP improvement, PRN zofran given for nausea, no emesis, large loose BM in bedpan, purewick in place, vaginal bleeding continues, using antifungal powders and ointments as scheduled, poor appetite, waiting [facility] placement

## 2022-12-12 NOTE — PROGRESS NOTES
Paged regarding ongoing patient concern of dizziness as well as hypotension (although now most recent 100/50).     Patient seen and evaluated at the bedside.     During bedside exam patient was alert, oriented, and appropriately responsive throughout interview. Cranial nerves II-XII were examined and intact.  strength, elbow flexion/extension, dorsi/plantar flexion 5/5 bilaterally. Patient able to lift both legs off bed against resistance and pull back down toward mattress with symmetric strength. Patient states that she has been dizzy only over the last day or so. She had specific concern of dizziness when working with PT reportedly 12/11/2022 when trying to sit/stand up. Dizziness or more like light headedness and there is no vertigo component. In context of normal neurologic exam feel dizziness is most likely secondary to over diuresis. Patient received 500 ml fluid bolus evening 12/10-12/11 and again 12/11/12/12. If patient persistently hypotensive with MAPs below 60 will give additional fluid bolus 500 ml LR. However, as patient feels she is able to tolerate dizziness and in setting of concern previously for volume overload contributing to respiratory dysfunction will defer additional fluid boluses unless persistently hypotensive. No acute indication for head imaging as mental status seems appropriate, patient alert and oriented with symmetric strength, and CN II-XII intact on exam.     Addendum: Of note, patient with no active concern of chest pain or shortness of breath. Anterior and lateral lung fields with diminished lung sounds although no specific concern of crackles. Lung exam seems similar to noted 12/11/2022 by day primary team.     Sid Mcnally MD  PGY-1, Internal Medicine

## 2022-12-12 NOTE — PROGRESS NOTES
CLINICAL NUTRITION SERVICES - REASSESSMENT NOTE     Nutrition Prescription    RECOMMENDATIONS FOR MDs/PROVIDERS TO ORDER:  Diet as tolerated     Malnutrition Status:    Moderate malnutrition in the context of acute presentation     Recommendations already ordered by Registered Dietitian (RD):  High protein snacks in between meals   Ensure plus at 10:00 am per patient's request. Strawberry flavor only     Future/Additional Recommendations:  PO improvement  Renal function       EVALUATION OF THE PROGRESS TOWARD GOALS   Diet:   - Regular diet.  - Oral supplements: On Ensure shake at 10:00 and butter pecan Nepro at 2:00 pm + Gelatin high protein at HS / special K protein bar  - Snack: Receiving cheese and crackers at 10:00 with canned peaches + Cottage cheese and peaches at 2:00    Nutrition Support: EN from 11/18-11/23 while intubated     Intake: Appears to be taking 25-50-75% of meals   Met with patient this afternoon. Patient reports a much improve in appetite. Still consuming small meals as able. Patient would like to continue with snacks in between meals. Dislikes Nepro and would like to continue with ensure plus strawberry at 10:00 am.        NEW FINDINGS   Chart reviewed:  PMH: morbid obesity, CARLOS, hypertension, HFpEF ( EF 60-65%), paroxysmal A.Fib (on apixiban), MAG, factor IV deficiency and CKD    - Initially admitted to the ICU on 11/18 for hypercapnic respiratory failure likely due to pneumonia, necessitating intubation. Remains significantly hypercapnic.   - Transferred back to general medicine 12/3 for ongoing diuresis, respiratory support and goals of care conversations.   - Medically stable for discharge to  TCU    Wt trend:  Patient with pulmonary edema and volume overload on admit  Admit wt was 183.1 kg --> down to 167.1 kg standing wt on 12/11/22.     GI/stool:  Has had daily 2-3 stool in the past few days     Labs noted:  K+: 5.9 (H)  BUN: 68.1, Cr: 1.57, GFR: 37 -> Not on HD  Glucose: 167(H)  Uric  acid: 7.9 (11/18)    MALNUTRITION  % Intake: < 75% for > 7 days (moderate)  % Weight Loss: Unable to assess due to volume up status  Subcutaneous Fat Loss: None observed  Muscle Loss: Unable to assess  Fluid Accumulation/Edema: 2+ Mild  Malnutrition Diagnosis: Moderate malnutrition in the context of acute presentation       Previous Goals   Patient to consume % of nutritionally adequate meal trays TID, or the equivalent with supplements/snacks.  Evaluation: Not met daily       Previous Nutrition Diagnosis  Inadequate oral intake related to decreased appetite, respiratory status, loss of enteral access as evidenced by intake 25% of meals, oral nutrition supplements discontinued, weight loss of ~5% since admit.     Evaluation: Improving      CURRENT NUTRITION DIAGNOSIS  Inadequate oral intake related to slow improve in appetite as evidenced by patient is consuming variable intake mostly <75% of meals       INTERVENTIONS  Implementation  Medical food supplement therapy: Ensure plus strawberry at 10:00 per patient's request  Modify composition of meals/snacks: String cheese x2 with crackers at 10:00, cottage cheese with canned pears at 2:00, bowl of chicken salad with crackers at HS     Goals  Patient to consume % of nutritionally adequate meal trays TID, or the equivalent with supplements/snacks.      Monitoring/Evaluation  Progress toward goals will be monitored and evaluated per protocol.    Kedar Joiner RD/ABRIL  Pager [phone]

## 2022-12-12 NOTE — PROGRESS NOTES
[facility]    Progress Note - Medicine Service, CAROLYN TEAM 1       Date of Admission:  11/17/2022    Assessment & Plan   Arianna Siddiqi is a 62 year old female admitted on 11/17/2022. She has a history of morbid obesity, CARLOS, hypertension, HFpEF, paroxysmal A. Fib (on apixiban), MAG, factor IV deficiency and CKD who was initially admitted to the ICU on 11/18 for hypercapnic respiratory failure likely due to pneumonia, necessitating intubation. 11/23 extubated to BiPAP due to chronic CO2 retention. Now with concerns for RV heart failure, pulmonary hypertension, volume overload, off pressors since 11/29 and transferred to general medicine 11/30. Readmitted to ICU 12/2 PM for worsening hypercapnia and acute encephalopathy with concern for need for intubation. However, mental status improved with better fitting BiPAP able to avoid intubation, although remains significantly hypercapnic. Transferred back to general medicine 12/3 for ongoing diuresis, respiratory support and goals of care conversations. Currently awaiting TCU placement.      Changes and Major Updates today:  - Received 500cc bolus LR for hypotension and dizziness ON, resolved this AM  - Will hold Bumex today and continue following daily weights  - Awaiting TCU placement     # Hypotension  Noted to occur 12/11 when sitting to standing. Likely hypovolemic iso over diuresis. Hgb stable.   - Gentle fluid resuscitation PRN  - Hold bumex 12/12 use daily weight and O2 requirement to determine when to restart    # Acute hypoxic and hypercarbic respiratory failure   # Pulmonary Hypertension  # Pulmonary edema/fluid overload   Patient extubated to BiPAP 11/23. CT PE/CAP unremarkable this admission.  - Wean O2 as tolerated- currently requiring 3 L   - see the cardiology section for the plan regarding her pulmonary hypertension     # Hx HFpEF (EF 60 to 65% per echo on 11/26/2022)    # Decompensated right heart  failure  # Fluid overload  # Pulmonary hypertension  Patient's Mitchells-Juaquin catheter measurements while in ICU consistent with volume overload and pulmonary hypertension.   Elevated BNP.  Normal cardiac output per swan Juaquin catheter (no suspicion for shunts which would artificially elevate these measurements). Known history of CARLOS and HFpEF. 11/25 BLE duplex US showed no above knee DVT.   - Hold PTA bumex 2mg today   - Continue pta admission metoprolol succ 100mg  - Hold PTA lisinopril  - Trend daily weights and obtain strict ins and outs   - MAP > 70     # Vaginal Bleeding  Noted to have vaginal bleeding 12/6 during nursing cares (bright red blood on purewick), Hgb stable. Hx s/f acute onset vaginal bleeding on 11/12/21 s/p IUD placement as w/u negative for endometrial and cervical cancer. Discussed with gyn. Pelvic US with confirmation of IUD in correct spot.   - F/u benign gyn on DC - can call [phone] to schedule appointment     # Acute encephalopathy - resolved  Iso critical illness and ICU delirium, exacerbated by hypercapnia   - PRN acetaminophen  - Melatonin 6 mg q HS, holding PTA cyclobenzaprine, tramadol and benadryl     # Hx PAF (on apixaban PTA)  Presently in sinus rhythm.   - Previously on amio gtt this admission; 200 mg amio every day now   - PTA apixaban dose     # Non-oliguric Acute on chronic kidney injury   # Metabolic alkalosis-presumed contraction alkalosis vs chronic hypoventilation syndrome  # Hx CKD (baseline creatinine 1.1)  Likely a component of venous congestion due to fluid overload. Cr bumped to 1.5 today, given dizziness and hypotension last night, perhaps pre-renal. S/p 500 ml LR.  - Hold bumex     # Hyperkalemia   # Hypomagnesia - resolved   K elevated to 6 today but the specimen was hemolyzed.   - Repeat K  - Electrolyte replacement protocol    # CAP - resolved  Status post Ceftiaxone 7 day course (11/18-11/24).  Zosyn empiric treatment for VAP/HAP (11/25-11/30)     # Hx MAG  # Hx  Factor VII deficiency  Seen outpatient by hematology has a history oral/IV iron supplementation  - PTA ferrous gluconate 324 mg   - PTA apixaban      # CARLOS onCPAP  # Obesity hypoventilation syndrome   # Hx Smoking (30 pack per year; stopped 2002)  - BiPAP when sleeping     #Hx Gout  #Hx hemosiderin deposition BLE  - PT/OT for ROM  - 11/18 uric acid 7.9  - PTA allopurinol reduced to 100 mg given poor renal function     # Demand induced troponin leak - improving  - 11/25 troponin 257-->158 --> 143 on 11/26     # Oral thrush   - Nystatin QID      # Risk for malnutrition  # Hypoalbuminemia  # Morbid obesity  # Nausea and vomiting   # Constipation-resolved  # Diarrhea   - RD following  - Compazine and Zofran PRN  - Bowel regimen: Senna and Miralax PRN   - Regular diet     # Goals of Care  ICU consulted palliative care 11/30 to begin discussing pt's goals moving forward. Given her chronic lung disease 2/2 CARLOS, OHS, pHTN as well as her HFpEF with decompensated RV failure recurrent acute hypercarbic respiratory failure likely to occur with any pulmonary insult like another viral or bacterial. After transfer back to ICU 12/2 she began thinking about whether she would want to be re-intubated but wanted to talk with her family before deciding. She does not want a trach and we discussed the worry that if she ever were re-intubated there is a high likelihood we would not be able to extubate her without placing a trach.   - Palliative care consult, appreciate assistance  - Transitioned to DNI 12/3 after d/w palliative, wants to speak with her nieces prior to deciding to be no CPR   - Referral to palliative on DC per pt request     Diet: Regular Diet Adult  Snacks/Supplements Adult: Other; See comments; Between Meals  Snacks/Supplements Adult: Other; See comments; Between Meals  Diet    DVT Prophylaxis: Apixaban  Baig Catheter: Not present  Fluids: none  Central Lines: None  Cardiac Monitoring: None  Code Status:   DNI, CPR ok  "per palliative discussion    Disposition Plan     Expected Discharge Date: 12/12/2022      Destination: nursing home  Discharge Comments: Medically stable for discharge to [facility]  Delays: No beds at [facility] over weekend        The patient's care was discussed with the Attending Physician, Dr. Melton.    Servando Holbrook MD  Medicine Service, [facility] TEAM [address] [place] [address]  Securely message with the Vocera Web Console (learn more here)  Text page via [facility] Paging/Directory   Please see signed in provider for up to date coverage information      Clinically Significant Risk Factors        # Hyperkalemia: Highest K = 6 mmol/L in last 2 days, will monitor as appropriate       # Hypoalbuminemia: Lowest albumin = 3 g/dL at 12/4/2022  5:50 AM, will monitor as appropriate           # Severe Obesity: Estimated body mass index is 67.38 kg/m  as calculated from the following:    Height as of this encounter: 1.575 m (5' 2\").    Weight as of this encounter: 167.1 kg (368 lb 6.2 oz).   # Moderate Malnutrition: based on nutrition assessment        ______________________________________________________________________    Interval History   Hypotensive ON with dizziness. She believes this all started after getting her flu shot on Friday which then led to emesis after and she has been feeling dizzy when they sit her up and try to get standing weights. Has been drinking water and ensure shakes today.     Data reviewed today: I reviewed all medications, new labs and imaging results over the last 24 hours. I personally reviewed no images or EKG's today.    Physical Exam   Vital Signs: Temp: 97.7  F (36.5  C) Temp src: Oral BP: 125/54 Pulse: 70   Resp: 20 SpO2: 98 % O2 Device: Nasal cannula Oxygen Delivery: 3 LPM  Weight: 368 lbs 6.22 oz  General Appearance:  Resting comfortably in bed. NAD.  Respiratory: Diminished breath sounds bilaterally likely due to body habitus. No wheezing or crackles " noted.  Cardiovascular: RRR. Normal S1/S2. No murmurs.   GI: Soft, non-tender, non-distended. Normoactive bowel sounds.   Skin: No rash or lesions.   Other:  No peripheral edema.    Data   Recent Labs   Lab 12/12/22  1211 12/12/22  1007 12/12/22  0716 12/11/22  1401 12/11/22  0955 12/10/22  2223 12/09/22  0658 12/08/22  0551 12/07/22  0537   WBC  --   --  7.0  --   --   --  9.1 9.4 10.8   HGB  --   --  14.1  --   --   --  14.3 14.5 14.8   MCV  --   --  100  --   --   --  95 97 96   PLT  --   --  243  --   --   --  228 224 207   INR  --   --   --   --   --   --  1.35* 1.40* 1.37*   NA  --  136  --   --  137  --  136 137 137   POTASSIUM 4.5 5.9*  --  4.1 6.0*  --  3.9 4.2 4.3   CHLORIDE  --  92*  --   --  92*  --  89* 88* 88*   CO2  --  36*  --   --  32*  --  39* 40* 38*   BUN  --  68.1*  --   --  63.7*  --  47.3* 49.3* 50.2*   CR  --  1.57*  --   --  1.52*  --  1.15* 1.27* 1.43*   ANIONGAP  --  8  --   --  13  --  8 9 11   KADEN  --  9.8  --   --  9.8  --  9.5 9.9 9.8   GLC  --  167*  --   --  111* 189* 115* 119* 106*

## 2022-12-12 NOTE — PROGRESS NOTES
[facility]  Palliative Care Daily Progress Note       Recommendations/discussion       Pt seen and examined and reviewed with unit staff, will discuss with primary medicine team. Notes from recent need for diuresis (bumex) and associated  hypotension events reviewed.   Plan continues for [facility] once stable.   Given her chronic lung disease 2/2 CARLOS, pHTN as well as her HFpEF with progressive RV failure there is concern that any recurrent acute hypercarbic respiratory failure will probably occur with any future pulmonary insult.   We had discussed the likely progression of her condition and possible need for repeat hospitalizations and/or need for aggressive interventions such as intubation and ICU stays. In a subsequent meeting with Dr Ruffin of Palliative Care she elected no repeat intubation She wants to review concepts/decisions with her niece who acts as her surrogate..  Palliative care continues to see 1-2 X per week to facilitate ACD discussions.  Goals of care:  For now, Arianna continues to outline her goals as fully restorative knowing she has significant recovery needs to achieve her prior level of independence where she lived alone in an apartment with elevator access-supported by food/meal delivery service and cleaning resources from a [facility] agency. Her niece (Emelia) who serves as her advocate (not documented formally) visits 'often'-(not supported by unit SW documentation of discussion with niece who notes visiting only 2-3 times per year.)  She relates no use of walker or cane and functional independence. At present she is needing lift assist for transfers, some participation in ADL's. At present she continues to require significant assistance with even very short distances or transfers per my observations.   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   Was anointed by Opal haney as requested.  Ongoing  support from PC counseling staff- will continue to follow.   CODE STATUS: DNI -needs to be further addressed with ongoing follow up. Her niece is surrogate decision maker but not formalized- PC provided assistance with honoring choices documents.         Assessments          Arianna Siddiqi is a 62 year old female with a history of morbid obesity, CARLOS, hypertension, HFpEF, paroxysmal A. Fib (apixiban), MAG, factor IV deficiency and CKD who was admitted for hypercapnic respiratory failure likely due to pneumonia, necessitating intubation. 11/23 extubated to BiPAP r/t chronic CO2 retention tolerating well. On HFNC and off vasoactive meds for now.  Known concerns for RV heart failure, pulmonary hypertension, severe volume overload. Will need further rehab stay and possibly longer term placement.   Seen 12/15 for PC follow up focusing on goals of care and support related to chronic respiratory disease.   Prognosis, Goals, or Advance Care Planning was addressed today with: Yes.  Mood, coping, and/or meaning in the context of serious illness were addressed today: Yes.  Summary/Comments: see consult note   Marvin MARQUEZ NP ACHPN  Nurse Practitioner- Advanced Practice Provider  [facility] Palliative Medicine Consult Service   [phone]  TT spent: 22 minutes of which 12 minutes were spent in direct face to face contact with patient/family..        Interval History:     Chart review/discussion with unit or clinical team members:   No acute events overnight. Remains on 02 via NC during the day with CPAP at night.  PT notes ability to sit on edge of bed for several  minutes and tolerating EZ stand as a reflection of some baseline strength.    Key Palliative Symptoms:  We are not helping to manage these symptoms currently in this patient.           Review of Systems:     5 system ROS was reviewed and is unremarkable. Notes some intermittent loose stool and abd distress which are improved. No pain per se.           Medications:     I have reviewed this patient's medication profile and medications during this hospitalization.           Physical Exam:   Vitals were reviewed  Temp: 97.7  F (36.5  C) Temp src: Oral BP: 125/54 Pulse: 70   Resp: 20 SpO2: 98 % O2 Device: Nasal cannula Oxygen Delivery: 3 LPM No change from earlier exam as noted below.   GEN: Morbidly obese female resting in bed, 02 via NC in place. In no acute distress. Pleasant and conversant.   HEENT: Normocephalic, atraumatic, MMM.   CV: Monitor with sinus rhythm in the 70s. RRR.  PULM/CHEST: no appreciable wheeze or cough.  GI: normal bowel sounds, soft, generalized tenderness   EXTREMITIES: moving all extremities, peripheral pulses intact  NEURO: following commands, A&O x 4           Data Reviewed:     ROUTINE LABS (Last four results)  BMPRecent Labs   Lab 12/14/22  0751 12/13/22  0609 12/12/22  1211 12/12/22  1007 12/11/22  1401 12/11/22  0955    139  --  136  --  137   POTASSIUM 4.6 5.1 4.5 5.9*   < > 6.0*   CHLORIDE 95* 94*  --  92*  --  92*   KADEN 9.7 9.7  --  9.8  --  9.8   CO2 37* 36*  --  36*  --  32*   BUN 32.4* 51.0*  --  68.1*  --  63.7*   CR 0.88 1.12*  --  1.57*  --  1.52*   * 120*  --  167*  --  111*    < > = values in this interval not displayed.     CBC  Recent Labs   Lab 12/12/22  0716 12/09/22  0658 12/08/22  0551   WBC 7.0 9.1 9.4   RBC 5.03 5.02 5.11   HGB 14.1 14.3 14.5   HCT 50.4* 47.8* 49.5*    95 97   MCH 28.0 28.5 28.4   MCHC 28.0* 29.9* 29.3*   RDW 17.3* 17.7* 17.9*    228 224     INR  Recent Labs   Lab 12/09/22  0658 12/08/22  0551   INR 1.35* 1.40*

## 2022-12-12 NOTE — PLAN OF CARE
Goal Outcome Evaluation:      Plan of Care Reviewed With: patient    Overall Patient Progress: no change     Outcome Evaluation: Pt declined working with PT today. On 3L NC, continuous pulse ox.   BP's stable throughout shift.  Awaiting TCU placement.

## 2022-12-12 NOTE — PLAN OF CARE
Goal Outcome Evaluation:      Plan of Care Reviewed With: patient    Overall Patient Progress: no change    Outcome Evaluation: A&Ox4, VSS on BiPAP/AVAPS overnight, 3L O2 NC while awake, hypotensive and dizzy overnight, 500mL LR bolus given over 4 hours, continues to be hypotensive after fluids, MD paged and saw pt at bedside, poor appetite, LBM 12/11 evening, voiding adequately, not using purwick, calling to use bedpan appropriately, vaginal bleeding continued, scheduled ointments and powders utilized, waiting for [facility] bed placement

## 2022-12-12 NOTE — PROVIDER NOTIFICATION
Alireza 1 Sid Mcnally MD text paged: FYI pt BP 78/43, 88/45, and 90/47 after 500 mL LR fluid bolus, has been feeling dizzy tonight, MD at bedside assessing, no further intervention ordered by MD

## 2022-12-13 NOTE — PROGRESS NOTES
Care Management Follow Up    Length of Stay (days): 25    Expected Discharge Date: 12/15/2022     Concerns to be Addressed: all concerns addressed in this encounter, decision making, discharge planning, financial/insurance     Patient plan of care discussed at interdisciplinary rounds: No    Anticipated Discharge Disposition: Assisted Living, Skilled Nursing Facility, Long Term Care     Anticipated Discharge Services:   TCU  Anticipated Discharge DME:  none    Patient/family educated on Medicare website which has current facility and service quality ratings:  yes  Education Provided on the Discharge Plan:  yes  Patient/Family in Agreement with the Plan:  yes    Referrals Placed by CM/SW:      [facility] - 4th floor  Intake phone: [phone]  4th floor:[phone]     Additional Information:  Pt is not medically ready to go. Updated with  TCU liaison. Per MD notes, pt is ready to discharge to  TCU by 12/15.     Zoe Cruz RN  5A RN Care Coordinator  Phone: [phone]  Pager: [phone]     For Weekend & Holiday on call RN Care Coordinator:  (Tasks: Home care, home infusion, medical equipment/oxygen, transportation, IMM & MOON forms, etc.)     Text Paging in [employer] Smart Web is the preferred method of contact for these teams     [place] [facility] [place] ([phone]) Saturday & Sunday; ([phone])  Recognized Holidays  Pager: [phone] Units: 4A, 4C, 4E, 5A & 5B      For Weekend & Holiday on call Social Work:  (Tasks: TCU, transportation, Hospice, adjustment to illness counseling, Health Care Directives, Child Protection and Domestic Violence concerns, Vulnerable Adult, IMM forms, etc.)     Text Paging in [employer] Smart Web is the preferred method of contact for these teams    [place] (0800 - 1630) Saturday and Sunday  Pager: [phone] Units: 4A, 4C, 4E  Pager: [phone] Units: 5A & 5B  _____________________________________________     After hours (3604-0151) for all units everyday- (only the   is available after hours until midnight)  Pager [phone]

## 2022-12-13 NOTE — PROGRESS NOTES
[facility]    Progress Note - Medicine Service, MAROON TEAM 1       Date of Admission:  11/17/2022    Assessment & Plan   Arianna Siddiqi is a 62 year old female admitted on 11/17/2022. She has a history of morbid obesity, CARLOS, hypertension, HFpEF, paroxysmal A. Fib (on apixiban), MAG, factor IV deficiency and CKD who was initially admitted to the ICU on 11/18 for hypercapnic respiratory failure likely due to pneumonia, necessitating intubation. 11/23 extubated to BiPAP due to chronic CO2 retention. Now with concerns for RV heart failure, pulmonary hypertension, volume overload, off pressors since 11/29 and transferred to general medicine 11/30. Readmitted to ICU 12/2 PM for worsening hypercapnia and acute encephalopathy with concern for need for intubation. However, mental status improved with better fitting BiPAP able to avoid intubation, although remains significantly hypercapnic. Transferred back to general medicine 12/3 for ongoing diuresis, respiratory support and goals of care conversations. Currently awaiting TCU placement.      Changes and Major Updates today:  - CXR 12/13/22 ordered for continued oxygen requirement during daytime with no prior history of O2 at home . CXR showed Perihilar patchy opacities and other bilateral mixed airspace and interstitial opacities are noted suggestive of edema  - Restarted on home Bumex 2mg PO   - A1c ordered for previous history of T2DM  - TCU placement today however deferred given pt still requiring oxygen with no prior use of oxygen before admission.        # Hypotension  Noted to occur 12/11 when sitting to standing. Likely hypovolemic iso over diuresis. Hgb stable.   - Gentle fluid resuscitation PRN  - Hold bumex 12/12; Restarted 12/13 given continued O2 need and CXR findings suggestive of edema    # Acute hypoxic and hypercarbic respiratory failure   # Pulmonary Hypertension  # Pulmonary edema/fluid overload   Patient  extubated to BiPAP 11/23. CT PE/CAP unremarkable this admission.  - Wean O2 as tolerated- currently requiring 3 L   - see the cardiology section for the plan regarding her pulmonary hypertension     # Hx HFpEF (EF 60 to 65% per echo on 11/26/2022)    # Decompensated right heart failure  # Fluid overload  # Pulmonary hypertension  Patient's [place]-Juaquin catheter measurements while in ICU consistent with volume overload and pulmonary hypertension.   Elevated BNP.  Normal cardiac output per swan Juaquin catheter (no suspicion for shunts which would artificially elevate these measurements). Known history of CARLOS and HFpEF. 11/25 BLE duplex US showed no above knee DVT.   - Hold PTA bumex 2mg today   - Continue pta admission metoprolol succ 100mg  - Hold PTA lisinopril  - Trend daily weights and obtain strict ins and outs   - MAP > 70     # Vaginal Bleeding  Noted to have vaginal bleeding 12/6 during nursing cares (bright red blood on purewick), Hgb stable. Hx s/f acute onset vaginal bleeding on 11/12/21 s/p IUD placement as w/u negative for endometrial and cervical cancer. Discussed with gyn. Pelvic US with confirmation of IUD in correct spot.   - F/u benign gyn on DC - can call [phone] to schedule appointment     # Acute encephalopathy - resolved  Iso critical illness and ICU delirium, exacerbated by hypercapnia   - PRN acetaminophen  - Melatonin 6 mg q HS, holding PTA cyclobenzaprine, tramadol and benadryl     # Hx PAF (on apixaban PTA)  Presently in sinus rhythm.   - Previously on amio gtt this admission; 200 mg amio every day now   - PTA apixaban dose     # Non-oliguric Acute on chronic kidney injury   # Metabolic alkalosis-presumed contraction alkalosis vs chronic hypoventilation syndrome  # Hx CKD (baseline creatinine 1.1)  Likely a component of venous congestion due to fluid overload. Cr bumped to 1.5 today, given dizziness and hypotension last night, perhaps pre-renal. S/p 500 ml LR.  - Hold bumex     #  Hyperkalemia   # Hypomagnesia - resolved   K elevated to 6 today but the specimen was hemolyzed.   - Repeat K  - Electrolyte replacement protocol    # CAP - resolved  Status post Ceftiaxone 7 day course (11/18-11/24).  Zosyn empiric treatment for VAP/HAP (11/25-11/30)     # Hx MAG  # Hx Factor VII deficiency  Seen outpatient by hematology has a history oral/IV iron supplementation  - PTA ferrous gluconate 324 mg   - PTA apixaban      # CARLOS onCPAP  # Obesity hypoventilation syndrome   # Hx Smoking (30 pack per year; stopped 2002)  - BiPAP when sleeping     #Hx Gout  #Hx hemosiderin deposition BLE  - PT/OT for ROM  - 11/18 uric acid 7.9  - PTA allopurinol reduced to 100 mg given poor renal function     # Demand induced troponin leak - improving  - 11/25 troponin 257-->158 --> 143 on 11/26     # Oral thrush   - Nystatin QID      # Risk for malnutrition  # Hypoalbuminemia  # Morbid obesity  # Nausea and vomiting   # Constipation-resolved  # Diarrhea   - RD following  - Compazine and Zofran PRN  - Bowel regimen: Senna and Miralax PRN   - Regular diet     # Goals of Care  ICU consulted palliative care 11/30 to begin discussing pt's goals moving forward. Given her chronic lung disease 2/2 CARLOS, OHS, pHTN as well as her HFpEF with decompensated RV failure recurrent acute hypercarbic respiratory failure likely to occur with any pulmonary insult like another viral or bacterial. After transfer back to ICU 12/2 she began thinking about whether she would want to be re-intubated but wanted to talk with her family before deciding. She does not want a trach and we discussed the worry that if she ever were re-intubated there is a high likelihood we would not be able to extubate her without placing a trach.   - Palliative care consult, appreciate assistance  - Transitioned to DNI 12/3 after d/w palliative, wants to speak with her nieces prior to deciding to be no CPR   - Referral to palliative on DC per pt request     Diet: Regular  "Diet Adult  Diet  Snacks/Supplements Adult: Other; See comments; Between Meals  Snacks/Supplements Adult: Other; Strawberry ensure plus at 10:00 am daily; Between Meals    DVT Prophylaxis: Apixaban  Baig Catheter: Not present  Fluids: none  Central Lines: None  Cardiac Monitoring: None  Code Status:   DNI, CPR ok per palliative discussion    Disposition Plan      Expected Discharge Date: 12/15/2022      Destination: nursing home  Discharge Comments: [facility]        The patient's care was discussed with the Dr Owen Cheng.    Olivia Estrada MD  Medicine Service, [facility] TEAM [address] [place] [address]  Securely message with the Vocera Web Console (learn more here)  Text page via [facility] Paging/Directory   Please see signed in provider for up to date coverage information      Clinically Significant Risk Factors        # Hyperkalemia: Highest K = 5.9 mmol/L in last 2 days, will monitor as appropriate       # Hypoalbuminemia: Lowest albumin = 3 g/dL at 12/4/2022  5:50 AM, will monitor as appropriate          # DMII: A1C = 6.7 % (Ref range: <5.7 %) within past 3 months   # Severe Obesity: Estimated body mass index is 68.87 kg/m  as calculated from the following:    Height as of this encounter: 1.575 m (5' 2\").    Weight as of this encounter: 170.8 kg (376 lb 8.7 oz).   # Moderate Malnutrition: based on nutrition assessment        ______________________________________________________________________    Interval History   Unevenful night per patient. Pt reports that she was able to move from the bed to the chair without difficulty. She denies dizziness or light headedness. She notes mild SOB but no chest pain. She had some vaginal bleeding but states that it has been same. She does not use O2 at home.     Data reviewed today: I reviewed all medications, new labs and imaging results over the last 24 hours. I personally reviewed CXR ordered 12/13.     Physical " Exam   Vital Signs: Temp: 98.1  F (36.7  C) Temp src: Oral BP: (!) 148/71 (post activity) Pulse: 72   Resp: 20 SpO2: 96 % O2 Device: Nasal cannula Oxygen Delivery: 2 LPM  Weight: 376 lbs 8.73 oz  General Appearance:  Resting comfortably in bed. NAD.  Respiratory: Difficult to appreciate breath sounds bilaterally likely due to body habitus. No wheezing or crackles noted.  Cardiovascular: RRR. Normal S1/S2. No murmurs.   GI: Soft, non-tender, non-distended. Normoactive bowel sounds.   Skin: No rash or lesions.   Other:  No peripheral edema.    Data   Recent Labs   Lab 12/13/22  0609 12/12/22  1211 12/12/22  1007 12/12/22  0716 12/11/22  1401 12/11/22  0955 12/10/22  2223 12/09/22  0658 12/08/22  0551 12/07/22  0537   WBC  --   --   --  7.0  --   --   --  9.1 9.4 10.8   HGB  --   --   --  14.1  --   --   --  14.3 14.5 14.8   MCV  --   --   --  100  --   --   --  95 97 96   PLT  --   --   --  243  --   --   --  228 224 207   INR  --   --   --   --   --   --   --  1.35* 1.40* 1.37*     --  136  --   --  137  --  136 137 137   POTASSIUM 5.1 4.5 5.9*  --    < > 6.0*  --  3.9 4.2 4.3   CHLORIDE 94*  --  92*  --   --  92*  --  89* 88* 88*   CO2 36*  --  36*  --   --  32*  --  39* 40* 38*   BUN 51.0*  --  68.1*  --   --  63.7*  --  47.3* 49.3* 50.2*   CR 1.12*  --  1.57*  --   --  1.52*  --  1.15* 1.27* 1.43*   ANIONGAP 9  --  8  --   --  13  --  8 9 11   KADEN 9.7  --  9.8  --   --  9.8  --  9.5 9.9 9.8   *  --  167*  --   --  111*   < > 115* 119* 106*    < > = values in this interval not displayed.

## 2022-12-14 NOTE — PLAN OF CARE
Goal Outcome Evaluation:      Plan of Care Reviewed With: patient    Overall Patient Progress: no changeOverall Patient Progress: no change    Outcome Evaluation: Stable on Bipap overnight and 2L while awake. Back pain remains but controlled with repositioning and tylenol. Bumex with good results. I&Os will be inaccurate as pt uses bedpan with chux and most is in chux. Vaginal bleeding remains but not significant. Continue with diuresis and monitor I&Os until ready for [facility] TCU--possible 12/15.

## 2022-12-14 NOTE — PROGRESS NOTES
[facility]    Progress Note - Medicine Service, CAROL[place] TEAM 1       Date of Admission:  11/17/2022    Assessment & Plan   Arianna Siddiqi is a 62 year old female admitted on 11/17/2022. She has a history of morbid obesity, CARLOS, hypertension, HFpEF, paroxysmal A. Fib (on apixiban), MAG, factor IV deficiency and CKD who was initially admitted to the ICU on 11/18 for hypercapnic respiratory failure likely due to pneumonia, necessitating intubation. 11/23 extubated to BiPAP due to chronic CO2 retention. Now with concerns for RV heart failure, pulmonary hypertension, volume overload, off pressors since 11/29 and transferred to general medicine 11/30. Readmitted to ICU 12/2 PM for worsening hypercapnia and acute encephalopathy with concern for need for intubation. However, mental status improved with better fitting BiPAP able to avoid intubation, although remains significantly hypercapnic. Transferred back to general medicine 12/3 for ongoing diuresis, respiratory support and goals of care conversations. Currently awaiting TCU placement.      Changes and Major Updates today:  - Started on empagliflozin 10 mg given A1c of 6.7 and HfpEF  - restarted on home lisinopril        # Hypotension  Noted to occur 12/11 when sitting to standing. Likely hypovolemic due to overdiuresis. Hgb stable.   - Gentle fluid resuscitation PRN  - Hold bumex 12/12; Restarted 12/13 given continued O2 need and CXR findings suggestive of edema    # Acute hypoxic and hypercarbic respiratory failure   # Pulmonary Hypertension  # Pulmonary edema/fluid overload   Patient extubated to BiPAP 11/23. CT PE/CAP unremarkable this admission.  - Wean O2 as tolerated- currently requiring 3 L   - see the cardiology section for the plan regarding her pulmonary hypertension     # Hx HFpEF (EF 60 to 65% per echo on 11/26/2022)    # Decompensated right heart failure  # Fluid overload  # Pulmonary hypertension  Patient's  [place]-Juaquin catheter measurements while in ICU consistent with volume overload and pulmonary hypertension.   Elevated BNP.  Normal cardiac output per swan Juaquin catheter (no suspicion for shunts which would artificially elevate these measurements). Known history of CARLOS and HFpEF. 11/25 BLE duplex US showed no above knee DVT.   - Hold PTA bumex 12/12, restarted on 12/13   - Continue pta admission metoprolol succ 100mg  - Lisinopril restarted 12/14. Initially held on admission   - Trend daily weights and obtain strict ins and outs   - MAP > 70     # Vaginal Bleeding  Noted to have vaginal bleeding 12/6 during nursing cares (bright red blood on purewick), Hgb stable. Hx s/f acute onset vaginal bleeding on 11/12/21 s/p IUD placement as w/u negative for endometrial and cervical cancer. Discussed with gyn. Pelvic US with confirmation of IUD in correct spot.   - F/u benign gyn on DC - can call [phone] to schedule appointment     # Acute encephalopathy - resolved  Iso critical illness and ICU delirium, exacerbated by hypercapnia   - PRN acetaminophen  - Melatonin 6 mg q HS, holding PTA cyclobenzaprine, tramadol and benadryl     # Hx PAF (on apixaban PTA)  Presently in sinus rhythm.   - Previously on amio gtt this admission; 200 mg amio every day now   - PTA apixaban dose     # Non-oliguric Acute on chronic kidney injury   # Metabolic alkalosis-presumed contraction alkalosis vs chronic hypoventilation syndrome  # Hx CKD (baseline creatinine 1.1)  Likely a component of venous congestion due to fluid overload. Cr bumped to 1.5 today, given dizziness and hypotension last night, perhaps pre-renal. S/p 500 ml LR.  - Hold bumex     # Hyperkalemia   # Hypomagnesia - resolved   K elevated to 6 today but the specimen was hemolyzed.   - Repeat K  - Electrolyte replacement protocol    # CAP - resolved  Status post Ceftiaxone 7 day course (11/18-11/24).  Zosyn empiric treatment for VAP/HAP (11/25-11/30)     # Hx MAG  # Hx Factor VII  deficiency  Seen outpatient by hematology has a history oral/IV iron supplementation  - PTA ferrous gluconate 324 mg   - PTA apixaban      # CARLOS onCPAP  # Obesity hypoventilation syndrome   # Hx Smoking (30 pack per year; stopped 2002)  - BiPAP when sleeping     #Hx Gout  #Hx hemosiderin deposition BLE  - PT/OT for ROM  - 11/18 uric acid 7.9  - PTA allopurinol reduced to 100 mg given poor renal function     # Demand induced troponin leak - improving  - 11/25 troponin 257-->158 --> 143 on 11/26     # Oral thrush   - Nystatin QID discontinued on 12/13     # Risk for malnutrition  # Hypoalbuminemia  # Morbid obesity  # Nausea and vomiting   # Constipation-resolved  # Diarrhea   - RD following  - Compazine and Zofran PRN  - Bowel regimen: Senna and Miralax PRN   - Regular diet     # Goals of Care  ICU consulted palliative care 11/30 to begin discussing pt's goals moving forward. Given her chronic lung disease 2/2 CARLOS, OHS, pHTN as well as her HFpEF with decompensated RV failure recurrent acute hypercarbic respiratory failure likely to occur with any pulmonary insult like another viral or bacterial. After transfer back to ICU 12/2 she began thinking about whether she would want to be re-intubated but wanted to talk with her family before deciding. She does not want a trach and we discussed the worry that if she ever were re-intubated there is a high likelihood we would not be able to extubate her without placing a trach.   - Palliative care consult, appreciate assistance  - Transitioned to DNI 12/3 after d/w palliative, wants to speak with her nieces prior to deciding to be no CPR   - Referral to palliative on DC per pt request     Diet: Regular Diet Adult  Diet  Snacks/Supplements Adult: Other; See comments; Between Meals  Snacks/Supplements Adult: Other; Strawberry ensure plus at 10:00 am daily; Between Meals    DVT Prophylaxis: Apixaban  Baig Catheter: Not present  Fluids: none  Central Lines: None  Cardiac  "Monitoring: None  Code Status:   DNI, CPR ok per palliative discussion    Disposition Plan     Expected Discharge Date: 12/15/2022      Destination: nursing home  Discharge Comments: Medically stable for discharge to  TCU        The patient's care was discussed with the Dr Owen Cheng.    Olivia Estrada MD  Medicine Service, [facility] TEAM [address] [place] [address]  Securely message with the Vocera Web Console (learn more here)  Text page via [employer] Paging/Directory   Please see signed in provider for up to date coverage information      Clinically Significant Risk Factors        # Hyperkalemia: Highest K = 5.9 mmol/L in last 2 days, will monitor as appropriate       # Hypoalbuminemia: Lowest albumin = 3 g/dL at 12/4/2022  5:50 AM, will monitor as appropriate          # DMII: A1C = 6.7 % (Ref range: <5.7 %) within past 3 months   # Severe Obesity: Estimated body mass index is 68.87 kg/m  as calculated from the following:    Height as of this encounter: 1.575 m (5' 2\").    Weight as of this encounter: 170.8 kg (376 lb 8.7 oz).   # Moderate Malnutrition: based on nutrition assessment        ______________________________________________________________________    Interval History   Unevenful night per patient. Pt reports that she was able to move today and walked with PT. She reported mild light headedness but states that it was not bothersome and likely because has not been walking much. She notes mild SOB with walking but no chest pain.      Data reviewed today: I reviewed all medications, new labs and imaging results over the last 24 hours. I personally reviewed CXR ordered 12/13.     Physical Exam   Vital Signs: Temp: 97.9  F (36.6  C) Temp src: Axillary BP: 125/61 Pulse: 70   Resp: 20 SpO2: 100 % O2 Device: BiPAP/CPAP Oxygen Delivery: 2 LPM  Weight: 376 lbs 8.73 oz  General Appearance:  Resting comfortably in bed. NAD.  Respiratory: Difficult to appreciate breath sounds bilaterally " likely due to body habitus. No wheezing or crackles noted.  Cardiovascular: RRR. Normal S1/S2. No murmurs.   GI: Soft, non-tender, non-distended. Normoactive bowel sounds.   Skin: No rash or lesions.   Other:  No peripheral edema.    Data   Recent Labs   Lab 12/13/22  0609 12/12/22  1211 12/12/22  1007 12/12/22  0716 12/11/22  1401 12/11/22  0955 12/10/22  2223 12/09/22  0658 12/08/22  0551   WBC  --   --   --  7.0  --   --   --  9.1 9.4   HGB  --   --   --  14.1  --   --   --  14.3 14.5   MCV  --   --   --  100  --   --   --  95 97   PLT  --   --   --  243  --   --   --  228 224   INR  --   --   --   --   --   --   --  1.35* 1.40*     --  136  --   --  137  --  136 137   POTASSIUM 5.1 4.5 5.9*  --    < > 6.0*  --  3.9 4.2   CHLORIDE 94*  --  92*  --   --  92*  --  89* 88*   CO2 36*  --  36*  --   --  32*  --  39* 40*   BUN 51.0*  --  68.1*  --   --  63.7*  --  47.3* 49.3*   CR 1.12*  --  1.57*  --   --  1.52*  --  1.15* 1.27*   ANIONGAP 9  --  8  --   --  13  --  8 9   KADEN 9.7  --  9.8  --   --  9.8  --  9.5 9.9   *  --  167*  --   --  111*   < > 115* 119*    < > = values in this interval not displayed.

## 2022-12-14 NOTE — PLAN OF CARE
Goal Outcome Evaluation:      Plan of Care Reviewed With: patient    Overall Patient Progress: no changeOverall Patient Progress: no change    Outcome Evaluation: Admitted for respiratory failure. VSS on 2L NC. Pt c/o lower back pain. Applied lidocaine patch to L & R lower back/flank. Administered PO bumex. CXR completed today. Will continue diuresing & monitoring I&O. Hgb A1c 6.7. Possible discharge to  TCU 12/15.

## 2022-12-15 NOTE — PROGRESS NOTES
[facility]    Progress Note - Medicine Service, CAROLYN TEAM 1       Date of Admission:  11/17/2022    Assessment & Plan   Arianna Siddiqi is a 62 year old female admitted on 11/17/2022. She has a history of morbid obesity, CARLOS, hypertension, HFpEF, paroxysmal A. Fib (on apixiban), MAG, factor IV deficiency and CKD who was initially admitted to the ICU on 11/18 for hypercapnic respiratory failure likely due to pneumonia, necessitating intubation. 11/23 extubated to BiPAP due to chronic CO2 retention. Now with concerns for RV heart failure, pulmonary hypertension, volume overload, off pressors since 11/29 and transferred to general medicine 11/30. Readmitted to ICU 12/2 PM for worsening hypercapnia and acute encephalopathy with concern for need for intubation. However, mental status improved with better fitting BiPAP able to avoid intubation, although remains significantly hypercapnic. Transferred back to general medicine 12/3 for ongoing diuresis, respiratory support and goals of care conversations. Currently awaiting TCU placement.      Changes and Major Updates today:  - Continue jardiance  - Limited TTE ordered to assess volume status given pt still requiring Oxygen (trending up during day)  - Awaiting TCU placement        # Hypotension  Noted to occur 12/11 when sitting to standing. Likely hypovolemic due to overdiuresis. Hgb stable.   - Gentle fluid resuscitation PRN  - Hold bumex 12/12; Restarted 12/13 given continued O2 need and CXR findings suggestive of edema    # Acute hypoxic and hypercarbic respiratory failure   # Pulmonary Hypertension  # Pulmonary edema/fluid overload   Patient extubated to BiPAP 11/23. CT PE/CAP unremarkable this admission.  - Wean O2 as tolerated- currently requiring 3 L   - see the cardiology section for the plan regarding her pulmonary hypertension     # Hx HFpEF (EF 60 to 65% per echo on 11/26/2022)    # Decompensated right heart  failure  # Fluid overload  # Pulmonary hypertension  Patient's [place]-Juaquin catheter measurements while in ICU consistent with volume overload and pulmonary hypertension.   Elevated BNP.  Normal cardiac output per swan Juaquin catheter (no suspicion for shunts which would artificially elevate these measurements). Known history of CARLOS and HFpEF. 11/25 BLE duplex US showed no above knee DVT.   - Hold PTA bumex 12/12, restarted on 12/13   - Continue pta admission metoprolol succ 100mg  - Lisinopril restarted 12/14. Initially held on admission   - Trend daily weights and obtain strict ins and outs   - MAP > 70     # Vaginal Bleeding  Noted to have vaginal bleeding 12/6 during nursing cares (bright red blood on purewick), Hgb stable. Hx s/f acute onset vaginal bleeding on 11/12/21 s/p IUD placement as w/u negative for endometrial and cervical cancer. Discussed with gyn. Pelvic US with confirmation of IUD in correct spot.   - F/u benign gyn on DC - can call [phone] to schedule appointment     # Acute encephalopathy - resolved  Iso critical illness and ICU delirium, exacerbated by hypercapnia   - PRN acetaminophen  - Melatonin 6 mg q HS, holding PTA cyclobenzaprine, tramadol and benadryl     # Hx PAF (on apixaban PTA)  Presently in sinus rhythm.   - Previously on amio gtt this admission; 200 mg amio every day now   - PTA apixaban dose     # Non-oliguric Acute on chronic kidney injury   # Metabolic alkalosis-presumed contraction alkalosis vs chronic hypoventilation syndrome  # Hx CKD (baseline creatinine 1.1)  Likely a component of venous congestion due to fluid overload. Cr bumped to 1.5 today, given dizziness and hypotension last night, perhaps pre-renal. S/p 500 ml LR.  - Hold bumex     # Hyperkalemia   # Hypomagnesia - resolved   K elevated to 6 today but the specimen was hemolyzed.   - Repeat K  - Electrolyte replacement protocol    # CAP - resolved  Status post Ceftiaxone 7 day course (11/18-11/24).  Zosyn empiric  treatment for VAP/HAP (11/25-11/30)     # Hx MAG  # Hx Factor VII deficiency  Seen outpatient by hematology has a history oral/IV iron supplementation  - PTA ferrous gluconate 324 mg   - PTA apixaban      # CARLOS onCPAP  # Obesity hypoventilation syndrome   # Hx Smoking (30 pack per year; stopped 2002)  - BiPAP when sleeping     #Hx Gout  #Hx hemosiderin deposition BLE  - PT/OT for ROM  - 11/18 uric acid 7.9  - PTA allopurinol reduced to 100 mg given poor renal function     # Demand induced troponin leak - improving  - 11/25 troponin 257-->158 --> 143 on 11/26     # Oral thrush   - Nystatin QID discontinued on 12/13     # Risk for malnutrition  # Hypoalbuminemia  # Morbid obesity  # Nausea and vomiting   # Constipation-resolved  # Diarrhea   - RD following  - Compazine and Zofran PRN  - Bowel regimen: Senna and Miralax PRN   - Regular diet     # Goals of Care  ICU consulted palliative care 11/30 to begin discussing pt's goals moving forward. Given her chronic lung disease 2/2 CARLOS, OHS, pHTN as well as her HFpEF with decompensated RV failure recurrent acute hypercarbic respiratory failure likely to occur with any pulmonary insult like another viral or bacterial. After transfer back to ICU 12/2 she began thinking about whether she would want to be re-intubated but wanted to talk with her family before deciding. She does not want a trach and we discussed the worry that if she ever were re-intubated there is a high likelihood we would not be able to extubate her without placing a trach.   - Palliative care consult, appreciate assistance  - Transitioned to DNI 12/3 after d/w palliative, wants to speak with her nieces prior to deciding to be no CPR   - Referral to palliative on DC per pt request  - her goals are fully restorative knowing she has significant recovery needs to achieve her prior level of independence (not requiring any assitance at home)      Diet: Regular Diet Adult  Diet  Snacks/Supplements Adult: Other;  "Strawberry ensure plus at 10:00 am daily; Between Meals  Snacks/Supplements Adult: Other; See comments; Between Meals    DVT Prophylaxis: Apixaban  Baig Catheter: Not present  Fluids: none  Central Lines: None  Cardiac Monitoring: None  Code Status:   DNI, CPR ok per palliative discussion    Disposition Plan      Expected Discharge Date: 12/16/2022      Destination: nursing home  Discharge Comments: Medically stable for discharge to  TCU  Weaning oxygen during the day (not on PTA oxygen)        The patient's care was discussed with the Dr Owen Cheng.    Olivia Estrada MD  Medicine Service, [facility] TEAM [address] [place] [address]  Securely message with the Vocera Web Console (learn more here)  Text page via [facility] Paging/Directory   Please see signed in provider for up to date coverage information      Clinically Significant Risk Factors              # Hypoalbuminemia: Lowest albumin = 3 g/dL at 12/4/2022  5:50 AM, will monitor as appropriate          # DMII: A1C = 6.7 % (Ref range: <5.7 %) within past 3 months   # Severe Obesity: Estimated body mass index is 68.83 kg/m  as calculated from the following:    Height as of this encounter: 1.575 m (5' 2\").    Weight as of this encounter: 170.7 kg (376 lb 5.2 oz).   # Moderate Malnutrition: based on nutrition assessment        ______________________________________________________________________    Interval History   Unevenful night per patient. Pt reports that since yesterday she did feel light headed during walking but did not fall. She has also developed abdominal cramping that she believe feels the same as her menstural cramping previously. She continues to have some vaginal bleeding. She also endorses increased nausea but no vomiting this morning. Otherwise she denies any chest pain, SOB, diarhhea.     Data reviewed today: I reviewed all medications, new labs and imaging results over the last 24 hours.    Physical Exam   Vital " Signs: Temp: 98.5  F (36.9  C) Temp src: Oral BP: 133/61 Pulse: 71   Resp: 17 SpO2: 93 % O2 Device: Nasal cannula Oxygen Delivery: 5 LPM  Weight: 376 lbs 5.2 oz  General Appearance:  Resting comfortably in bed. NAD.  Respiratory: Difficult to appreciate breath sounds bilaterally likely due to body habitus. No wheezing or crackles noted.  Cardiovascular: RRR. Normal S1/S2. No murmurs.   GI: Soft, non-tender, non-distended. Normoactive bowel sounds.   Skin: No rash or lesions.   Other:  No peripheral edema.    Data   Recent Labs   Lab 12/15/22  0919 12/15/22  0633 12/14/22  1833 12/14/22  0751 12/13/22  0609 12/12/22  1007 12/12/22  0716 12/10/22  2223 12/09/22  0658   WBC 6.0  --   --   --   --   --  7.0  --  9.1   HGB 13.5  --   --   --   --   --  14.1  --  14.3   MCV 97  --   --   --   --   --  100  --  95     --   --   --   --   --  243  --  228   INR  --   --   --   --   --   --   --   --  1.35*   NA  --  138  --  139 139   < >  --    < > 136   POTASSIUM  --  5.0  --  4.6 5.1   < >  --    < > 3.9   CHLORIDE  --  98  --  95* 94*   < >  --    < > 89*   CO2  --  31*  --  37* 36*   < >  --    < > 39*   BUN  --  35.4*  --  32.4* 51.0*   < >  --    < > 47.3*   CR  --  0.98*  --  0.88 1.12*   < >  --    < > 1.15*   ANIONGAP  --  9  --  7 9   < >  --    < > 8   KADEN  --  9.5  --  9.7 9.7   < >  --    < > 9.5   GLC  --  109* 111* 111* 120*   < >  --    < > 115*    < > = values in this interval not displayed.

## 2022-12-15 NOTE — PROGRESS NOTES
Care Management Follow Up    Length of Stay (days): 27    Expected Discharge Date: 12/16/2022     Concerns to be Addressed: all concerns addressed in this encounter, decision making, discharge planning, financial/insurance     Patient plan of care discussed at interdisciplinary rounds: Yes    Anticipated Discharge Disposition: Assisted Living, Skilled Nursing Facility, Long Term Care     Anticipated Discharge Services:  [facility]  Anticipated Discharge DME:  none    Patient/family educated on Medicare website which has current facility and service quality ratings:  yes  Education Provided on the Discharge Plan:  yes  Patient/Family in Agreement with the Plan:  yes    Private pay costs discussed: Not applicable    Additional Information:  Pt is medically ready to go but [facility] has no bed. The RNCC is attempting to help pt to find solution for pt not have to pay $236/mo to rent ventilator BiBAP. The writer reached out to internal financial counseling, awaiting on their advise. RNCC called [employer] but have no results. [employer] stated pt or SW/RNCC need to work with [employer] on that amount. They said they had nothing to do with it. Continue to follow and support.    [facility] 4th [facility]  Intake phone: [phone]  4th floor:[phone]     Zoe Cruz RN  5A RN Care Coordinator  Phone: [phone]  Pager: [phone]     For Weekend & Holiday on call RN Care Coordinator:  (Tasks: Home care, home infusion, medical equipment/oxygen, transportation, IMM & MOON forms, etc.)     Text Paging in [employer] Smart Web is the preferred method of contact for these teams     [place] [facility] [place] ([id number]) Saturday & Sunday; ([id number])  Recognized Holidays  Pager: [phone] Units: 4A, 4C, 4E, 5A & 5B      For Weekend & Holiday on call Social Work:  (Tasks: TCU, transportation, Hospice, adjustment to illness counseling, Health Care Directives, Child Protection and Domestic Violence concerns, Vulnerable Adult, IMM  forms, etc.)     Text Paging in [facility] Smart Web is the preferred method of contact for these teams    [place] (0800 - 1630) Saturday and Sunday  Pager: [phone] Units: 4A, 4C, 4E  Pager: [phone] Units: 5A & 5B  _____________________________________________     After hours (1233-1535) for all units everyday- (only the  is available after hours until midnight)  Pager [phone]

## 2022-12-15 NOTE — PLAN OF CARE
Goal Outcome Evaluation:      Plan of Care Reviewed With: patient    Overall Patient Progress: no changeOverall Patient Progress: no change    Outcome Evaluation: A/O x4. VSS on 4 LPM NC, this AM patient required 5 LPM. Pt utlizing IS. Miralax and senna given for constipation. ECHO complete

## 2022-12-15 NOTE — PLAN OF CARE
Goal Outcome Evaluation:      Plan of Care Reviewed With: patient    Overall Patient Progress: no changeOverall Patient Progress: no change    Outcome Evaluation: Admitted for respiratory failure. VSS on 2L NC. Pt c/o lower back pain. Applied lidocaine patches x2. Administered tylenol. Pt working with PT daily. Pt still has some vaginal bleeding. Continuing to diurese. Plan to discharge to [facility] once bed available and pt becomes med ready.

## 2022-12-15 NOTE — PLAN OF CARE
Goal Outcome Evaluation:      Plan of Care Reviewed With: patient    Overall Patient Progress: no changeOverall Patient Progress: no change    Outcome Evaluation: A/Ox4. VSS on 3L NC, on Bipap at night. C/o lower back pain, tylenol given with some relief. Pt voiding in bedpan, still having light vaginal bleeding. Plan to discharge to  TCU once bed available.

## 2022-12-16 NOTE — PLAN OF CARE
Goal Outcome Evaluation:      Plan of Care Reviewed With: patient    Overall Patient Progress: no change    Outcome Evaluation: A&Ox4, hypotensive this shift 74/38, 500mL LR bolus given, BP improved to 97/48, SBP goal for above 90 met, wearing AVAPS overnight, 5L O2 via NC while awake and 93% SPO2, voiding adequately in bedpan, no BM overnight, LBM 12/15, vaginal bleeding continues, call light in reach and making needs known, waiting [facility] bed availability

## 2022-12-16 NOTE — PLAN OF CARE
Goal Outcome Evaluation:      Plan of Care Reviewed With: patient    Overall Patient Progress: improving    Outcome Evaluation: Pt A&O, VSS on 3.5L O2 NC. Transfers w/ lift. Tylenol given for headache and back pain. Worked w/ PT today. Voids w/ the bedpan, scant amount of bloody vaginal discharge. 1 BM this shift.  Strict I&O. Possible discharge to  TCU tomorrow.

## 2022-12-16 NOTE — PROGRESS NOTES
[facility]    Progress Note - Medicine Service, CAROLYN TEAM 1       Date of Admission:  11/17/2022    Assessment & Plan   Arianna Siddiqi is a 62 year old female admitted on 11/17/2022. She has a history of morbid obesity, CARLOS, hypertension, HFpEF, paroxysmal A. Fib (on apixiban), MAG, factor IV deficiency and CKD who was initially admitted to the ICU on 11/18 for hypercapnic respiratory failure likely due to pneumonia, necessitating intubation. 11/23 extubated to BiPAP due to chronic CO2 retention. Now with concerns for RV heart failure, pulmonary hypertension, volume overload, off pressors since 11/29 and transferred to general medicine 11/30. Readmitted to ICU 12/2 PM for worsening hypercapnia and acute encephalopathy with concern for need for intubation. However, mental status improved with better fitting BiPAP able to avoid intubation, although remains significantly hypercapnic. Transferred back to general medicine 12/3 for ongoing diuresis, respiratory support and goals of care conversations. Currently awaiting TCU placement.      Changes and Major Updates today:  - Received 500 ml overnight for hypotension   - Weaning oxygen  - Incentive spirometry  - Hold Bumex today, plan to restart at lower dose tomorrow  - Anticipate TCU discharge tomorrow      # Hypotension  Noted to occur 12/11 when sitting to standing. Likely hypovolemic due to overdiuresis. Hgb stable. Again hypotensive 12/15. TTE with collapsible IVC and RA pressure of 3, suggesting of volume depletion. Patient has tenuous volume status in setting of HFpEF. Improved with 500 ml.   - Gentle fluid resuscitation PRN  - Hold bumex 12/16; plan to start 12/17 at lower dose per below    # Acute hypoxic and hypercarbic respiratory failure   # Pulmonary Hypertension  # Pulmonary edema/fluid overload   Patient extubated to BiPAP 11/23. CT PE/CAP 11/26/22 unremarkable this admission. Suspect likely due to  atelectasis with underlying obesity hypoventilation contributing.  - Wean O2 as tolerated- currently requiring 3 L   - see the cardiology section for the plan regarding her pulmonary hypertension  - Incentive spirometry q1h     # Hx HFpEF (EF 60 to 65% per echo on 11/26/2022)    # Decompensated right heart failure  # Fluid overload  # Pulmonary hypertension  Patient's [place]-Juaquin catheter measurements while in ICU consistent with volume overload and pulmonary hypertension.   Elevated BNP.  Normal cardiac output per swan Juaquin catheter (no suspicion for shunts which would artificially elevate these measurements). Known history of CARLOS and HFpEF. 11/25 BLE duplex US showed no above knee DVT.   - Hold bumex 12/16; plan to start 12/17 at 1 mg (PTA 2 mg) per day  - Continue pta admission metoprolol succ 100mg  - Lisinopril restarted 12/14. Initially held on admission   - Daily weights, strict ins and outs   - Outpatient cardiology referral     # Non-oliguric Acute on chronic kidney injury   # Metabolic alkalosis-presumed contraction alkalosis vs chronic hypoventilation syndrome  # Hx CKD (baseline creatinine 1.1)  Likely a component of venous congestion due to fluid overload. Cr bumped to 1.5 today, given dizziness and hypotension last night, perhaps pre-renal. S/p 500 ml LR.  - Volume management per above     # Vaginal Bleeding  Noted to have vaginal bleeding 12/6 during nursing cares (bright red blood on purewick), Hgb stable. Hx s/f acute onset vaginal bleeding on 11/12/21 s/p IUD placement as w/u negative for endometrial and cervical cancer. Discussed with gyn. Pelvic US with confirmation of IUD in correct spot.   - Outpatient benign gyn referral - can call [phone] to schedule appointment    # Acute encephalopathy - resolved  Iso critical illness and ICU delirium, exacerbated by hypercapnia   - PRN acetaminophen  - Melatonin 6 mg q HS  - Holding PTA cyclobenzaprine, tramadol and benadryl, will likely stop at  discharge     # Hx PAF (on apixaban PTA)  Presently in sinus rhythm.   - Previously on amio gtt this admission; 200 mg amio every day now   - PTA apixaban dose  - Outpatient cardiology referral       # Hyperkalemia   # Hypomagnesia - resolved   K elevated to 6 today but the specimen was hemolyzed.   - Repeat K  - Electrolyte replacement protocol    # CAP - resolved  Status post Ceftiaxone 7 day course (11/18-11/24).  Zosyn empiric treatment for VAP/HAP (11/25-11/30)     # Hx MAG  # Hx Factor VII deficiency  Seen outpatient by hematology has a history oral/IV iron supplementation  - PTA ferrous gluconate 324 mg   - PTA apixaban      # CARLOS onCPAP  # Obesity hypoventilation syndrome   # Hx Smoking (30 pack per year; stopped 2002)  - BiPAP when sleeping     #Hx Gout  #Hx hemosiderin deposition BLE  - PT/OT for ROM  - 11/18 uric acid 7.9  - PTA allopurinol reduced to 100 mg given poor renal function     # Demand induced troponin leak - improving  - 11/25 troponin 257-->158 --> 143 on 11/26     # Oral thrush   - Nystatin QID discontinued on 12/13     # Risk for malnutrition  # Hypoalbuminemia  # Morbid obesity  # Nausea and vomiting   # Constipation-resolved  # Diarrhea   - RD following  - Compazine and Zofran PRN  - Bowel regimen: Senna and Miralax PRN   - Regular diet     # Goals of Care  ICU consulted palliative care 11/30 to begin discussing pt's goals moving forward. Given her chronic lung disease 2/2 CARLOS, OHS, pHTN as well as her HFpEF with decompensated RV failure recurrent acute hypercarbic respiratory failure likely to occur with any pulmonary insult like another viral or bacterial. After transfer back to ICU 12/2 she began thinking about whether she would want to be re-intubated but wanted to talk with her family before deciding. She does not want a trach and we discussed the worry that if she ever were re-intubated there is a high likelihood we would not be able to extubate her without placing a trach.   -  "Palliative care consult, appreciate assistance  - Transitioned to DNI 12/3 after d/w palliative, wants to speak with her nieces prior to deciding to be no CPR   - Referral to palliative on DC per pt request  - her goals are fully restorative knowing she has significant recovery needs to achieve her prior level of independence (not requiring any assitance at home)      Diet: Regular Diet Adult  Diet  Snacks/Supplements Adult: Other; Strawberry ensure plus at 10:00 am daily; Between Meals  Snacks/Supplements Adult: Other; See comments; Between Meals    DVT Prophylaxis: Apixaban  Baig Catheter: Not present  Fluids: none  Central Lines: None  Cardiac Monitoring: None  Code Status: Full Code     Disposition Plan     Expected Discharge Date: 12/16/2022      Destination: nursing home  Discharge Comments: Medically stable for discharge to  TCU  Weaning oxygen during the day (not on PTA oxygen)        The patient's care was discussed with the Dr Owen Cheng.    Frank Ross MD  Medicine Service, [facility] TEAM [address] [place] [address]  Securely message with the Vocera Web Console (learn more here)  Text page via [employer] Paging/Directory   Please see signed in provider for up to date coverage information      Clinically Significant Risk Factors        # Hyperkalemia: Highest K = 5.5 mmol/L in last 2 days, will monitor as appropriate       # Hypoalbuminemia: Lowest albumin = 3 g/dL at 12/4/2022  5:50 AM, will monitor as appropriate          # DMII: A1C = 6.7 % (Ref range: <5.7 %) within past 3 months   # Severe Obesity: Estimated body mass index is 68.31 kg/m  as calculated from the following:    Height as of this encounter: 1.575 m (5' 2\").    Weight as of this encounter: 169.4 kg (373 lb 7.4 oz).   # Moderate Malnutrition: based on nutrition assessment        ______________________________________________________________________    Interval History   Overnight, hypotensive to 80/40s, " asymptomatic. Blood pressure improved with 500 ml IVF. This morning, feeling well. Has been working on incentive spirometry and deep breathing. No fever, chills. Looking forward to going to TCU.    Data reviewed today: I reviewed all medications, new labs and imaging results over the last 24 hours.    Physical Exam   Vital Signs: Temp: 98  F (36.7  C) Temp src: Oral BP: 108/61 Pulse: 64   Resp: 18 SpO2: 96 % O2 Device: None (Room air) Oxygen Delivery: 5 LPM  Weight: 373 lbs 7.35 oz  General Appearance:  Resting comfortably in bed. NAD.  Respiratory: Difficult to appreciate breath sounds bilaterally likely due to body habitus. No wheezing or crackles noted. Oxygen saturation on room air improved to 94% with deep inspiration.   Cardiovascular: RRR. Normal S1/S2. No murmurs.   GI: Soft, non-tender, non-distended. Normoactive bowel sounds.   Skin: No rash or lesions.   Other:  No peripheral edema.    Data   Recent Labs   Lab 12/16/22  0727 12/15/22  0919 12/15/22  0633 12/14/22  1833 12/14/22  0751 12/12/22  1007 12/12/22  0716   WBC 7.2 6.0  --   --   --   --  7.0   HGB 12.8 13.5  --   --   --   --  14.1    97  --   --   --   --  100    191  --   --   --   --  243     --  138  --  139   < >  --    POTASSIUM 5.5*  --  5.0  --  4.6   < >  --    CHLORIDE 95*  --  98  --  95*   < >  --    CO2 33*  --  31*  --  37*   < >  --    BUN 43.3*  --  35.4*  --  32.4*   < >  --    CR 1.49*  --  0.98*  --  0.88   < >  --    ANIONGAP 9  --  9  --  7   < >  --    KADEN 9.0  --  9.5  --  9.7   < >  --    *  --  109* 111* 111*   < >  --     < > = values in this interval not displayed.

## 2022-12-16 NOTE — PROGRESS NOTES
"Brief Progress Note    Patient noted to have \"Special code\" status as follows:     Question Answer   Code status determined by: Discussion with patient/ legal decision maker   Intubation Pre-arrest: No   Intubation in full arrest: No   CPR/ Chest compressions: Yes   Defibrillate: Yes   Code Drugs: Yes     Discussed with patient CPR without intubation would have limited effectiveness in prolonging life and QOL.     Patient told me she would like to have chest compressions and remains open to be intubated for a limited period of time. However, she is not interested in prolonged intubation (\"do not want to spend the rest of my life on a breathing tube\") and does not want to have a tracheostomy.     I have updated the code status to \"Full code\" with comment on her wish to avoid prolonged intubation/tracheostomy accordingly with the patient's wishes.     Frank Ross MD  PGY-3 Internal Medicine     "

## 2022-12-16 NOTE — PROVIDER NOTIFICATION
Alireza 1 Lauren Murry MD paged: BP 86/47, giving second 250 mL LR bolus over one hour to meet SBP >90 goal

## 2022-12-16 NOTE — PLAN OF CARE
Goal Outcome Evaluation:       A&O VSS except soft BP at HS. Bolus ordered for SBP <90, BP resolved without IVF. on 4L NC or Oxymask. Bipap for sleep. Up in chair for dinner. No lidocaine patch on. Large BM this shift (bed pan) Voids pink urine (MD aware). Denies pain. Awaiting bed at TCU. Calls to make needs known.

## 2022-12-16 NOTE — PROGRESS NOTES
Care Management Follow Up    Length of Stay (days): 28  Expected Discharge Date: 12/16/2022  Concerns to be Addressed: discharge planning     Patient plan of care discussed at interdisciplinary rounds: Yes  Anticipated Discharge Disposition: Transitional Care    [facility] in [place] (P: [phone])    Anticipated Discharge Services: None  Anticipated Discharge DME: None  Patient/family educated on Medicare website which has current facility and service quality ratings: no  Education Provided on the Discharge Plan: yes   Patient/Family in Agreement with the Plan: yes  Referrals Placed by CM/SW: Internal Clinic Care Coordination, Post Acute Facilities, Communication hand-offs to next level of Care Providers  Private pay costs discussed: Not applicable  Additional Information: SW spoke with M1 provider & [facility] liaison. Pt is not considered med ready today. SW placed this patient on the Weekend SW help list for follow-up over the weekend.  __________________________     TAMMY Lee, MACRINA  Advanced Practice Independent Clinical   [facility] [address] Coverage   [email or web address]  Weekday Phone: [phone]  Weekday Pager: [phone]  Fax: [phone]  Message me on [employer].     Schedule: I work Wednesday-Friday and every 6th week I work Friday-Sunday (Jan 6-8). If you need assistance when I am out of the office, please contact my job share partner Ceci Marr.    Weekend 5A & 5B  Pager: [phone]     Weekend 5A & 5B RNCC Pager: [phone]     Weekdays after hours (1630 - 0000), Saturday & Sunday after hours ([phone]), & [facility] Recognized Holidays Coverage  Pager: [phone]

## 2022-12-16 NOTE — PROGRESS NOTES
[facility]  Palliative Care Sign-Off Note    Palliative Care was consulted for goals of care and support in setting of ICU and critical respiratory failure. She has been slowly recovering and has consistently restorative goals . Plans in place once stable for TCU discharge.   . At this time the patient does not have any needs we are actively addressing, and we are signing off.    However we know their condition may change -- please re-consult us if we can be helpful at any time in the future.     Thank you for the opportunity to be involved in the care of this patient.    Marvin MARQUEZ NP ACHPN  Nurse Practitioner- Advanced Practice Provider  [facility] Palliative Medicine Consult Service   [phone]

## 2022-12-16 NOTE — PROVIDER NOTIFICATION
Alireza 1 Lauren Murry MD paged: FYI BP 74/38 and 86/49 on recheck, giving LR bolus that was not given by evening shift, first 250mL LR at 250mL/hr, will check BP in 1 hour after 250 mL given to ensure goal SBP >90 has been met

## 2022-12-17 PROBLEM — R53.81 PHYSICAL DECONDITIONING: Status: ACTIVE | Noted: 2022-01-01

## 2022-12-17 NOTE — DISCHARGE SUMMARY
"[facility]  Hospitalist Discharge Summary      Date of Admission:  11/17/2022  Date of Discharge:  12/17/2022  Discharging Provider: Owen Cheng DO  Discharge Service: Medicine Service, CAROLYN TEAM 1    Discharge Diagnoses     # Hypotension  # Acute hypoxic and hypercarbic respiratory failure   # Pulmonary Hypertension  # Pulmonary edema/fluid overload   # Hx HFpEF (EF 60 to 65% per echo on 11/26/2022)    # Decompensated right heart failure  # Fluid overload  # Pulmonary hypertension  # Non-oliguric Acute on chronic kidney injury   # Metabolic alkalosis-presumed contraction alkalosis vs chronic hypoventilation syndrome  # Hx CKD (baseline creatinine 1.1)  # Vaginal Bleeding  # Acute encephalopathy - resolved  # Hx PAF (on apixaban PTA)  # Hyperkalemia   # Hypomagnesia - resolved   # CAP - resolved  # Hx MAG  # Hx Factor VII deficiency  # CARLOS onCPAP  # Obesity hypoventilation syndrome   # Hx Smoking (30 pack per year; stopped 2002)  #Hx Gout  #Hx hemosiderin deposition BLE  # Demand induced troponin leak - improving  # Oral thrush   Malnutrition Diagnosis: Moderate malnutrition \"  # Hypoalbuminemia  # Morbid obesity  # Nausea and vomiting   # Constipation-resolved  # Diarrhea   # Goals of Care       Follow-ups Needed After Discharge   Follow-up Appointments     Follow Up and recommended labs and tests      Follow up with primary care provider in one week for a hospital follow   up.  No follow up labs or test are needed.             Unresulted Labs Ordered in the Past 30 Days of this Admission     Date and Time Order Name Status Description    12/17/2022 12:19 PM Asymptomatic COVID-19 Virus (Coronavirus) by PCR Nasopharyngeal In process       These results will be followed up by TCU    Discharge Disposition   Discharged to TCU  Condition at discharge: Stable    Hospital Course     Arianna Siddiqi is a 62 year old female admitted on 11/17/2022. She has a history of morbid " obesity, CARLOS, hypertension, HFpEF, paroxysmal A. Fib (on apixiban), MAG, factor IV deficiency and CKD who was initially admitted to the ICU on 11/18 for hypercapnic respiratory failure likely due to pneumonia, necessitating intubation. 11/23 extubated to BiPAP due to chronic CO2 retention. Now with concerns for RV heart failure, pulmonary hypertension, volume overload, off pressors since 11/29 and transferred to general medicine 11/30. Readmitted to ICU 12/2 PM for worsening hypercapnia and acute encephalopathy with concern for need for intubation. However, mental status improved with better fitting BiPAP able to avoid intubation. Transferred back to general medicine 12/3 and has remained stable. Patient with tenuous volume status initially getting hypovolemic on PTA 2 mg bumex with addition of SGLT-2 inhibitor now on 1 mg. Has had ongoing oxygen needs however felt likely due to atelectasis and should improve with increased activity.      # Hypotension  Noted to occur 12/11 when sitting to standing. Likely hypovolemic due to overdiuresis. Hgb stable. Again hypotensive 12/15. TTE with collapsible IVC and RA pressure of 3, suggesting of volume depletion. Patient has tenuous volume status in setting of HFpEF. Improved with 500 ml.   - Gentle fluid resuscitation PRN  - Starting bumex 1 mg daily today      # Acute hypoxic and hypercarbic respiratory failure   # Pulmonary Hypertension  # Pulmonary edema/fluid overload   Patient extubated to BiPAP 11/23. CT PE/CAP 11/26/22 unremarkable this admission. Suspect likely due to atelectasis with underlying obesity hypoventilation contributing.  - Wean O2 as tolerated- currently requiring 3 L   - see the cardiology section for the plan regarding her pulmonary hypertension  - Incentive spirometry q1h      # Hx HFpEF (EF 60 to 65% per echo on 11/26/2022)    # Decompensated right heart failure  # Fluid overload  # Pulmonary hypertension  Patient's Flushing-Juaquin catheter  measurements while in ICU consistent with volume overload and pulmonary hypertension.   Elevated BNP.  Normal cardiac output per swan Juaquin catheter (no suspicion for shunts which would artificially elevate these measurements). Known history of CARLOS and HFpEF. 11/25 BLE duplex US showed no above knee DVT.   - Bumex 1 mg (PTA 2 mg) per day  - Continue pta admission metoprolol succ 100mg  - Lisinopril restarted 12/14. Initially held on admission   - Daily weights, strict ins and outs   - Outpatient cardiology referral     # Non-oliguric Acute on chronic kidney injury   # Metabolic alkalosis-presumed contraction alkalosis vs chronic hypoventilation syndrome  # Hx CKD (baseline creatinine 1.1)  Likely a component of venous congestion due to fluid overload. Cr bumped to 1.5 today, given dizziness and hypotension last night, perhaps pre-renal. S/p 500 ml LR.  - Volume management per above     # Vaginal Bleeding  Noted to have vaginal bleeding 12/6 during nursing cares (bright red blood on purewick), Hgb stable. Hx s/f acute onset vaginal bleeding on 11/12/21 s/p IUD placement as w/u negative for endometrial and cervical cancer. Discussed with gyn. Pelvic US with confirmation of IUD in correct spot.   - Outpatient benign gyn referral - can call [phone] to schedule appointment     # Acute encephalopathy - resolved  Iso critical illness and ICU delirium, exacerbated by hypercapnia   - PRN acetaminophen  - Melatonin 6 mg q HS  - Stopping PTA cyclobenzaprine, tramadol and benadryl     # Hx PAF (on apixaban PTA)  Presently in sinus rhythm.   - Previously on amio gtt this admission; 200 mg amio every day now   - PTA apixaban dose  - Outpatient cardiology referral       # Hyperkalemia   # Hypomagnesia - resolved   K elevated at one point likely due to hemolyzation of lab draw. Has been stable.    # CAP - resolved  Status post Ceftiaxone 7 day course (11/18-11/24).  Zosyn empiric treatment for VAP/HAP (11/25-11/30)     # Hx  MAG  # Hx Factor VII deficiency  Seen outpatient by hematology has a history oral/IV iron supplementation  - PTA ferrous gluconate 324 mg   - PTA apixaban      # CARLOS onCPAP  # Obesity hypoventilation syndrome   # Hx Smoking (30 pack per year; stopped 2002)  - BiPAP when sleeping     #Hx Gout  #Hx hemosiderin deposition BLE  - PT/OT for ROM  - 11/18 uric acid 7.9  - PTA allopurinol reduced to 100 mg given poor renal function     # Demand induced troponin leak - improving  - 11/25 troponin 257-->158 --> 143 on 11/26     # Oral thrush   - Nystatin QID discontinued on 12/13     Malnutrition Diagnosis: Moderate malnutrition   # Hypoalbuminemia  # Morbid obesity  # Nausea and vomiting   # Constipation-resolved  # Diarrhea   - Compazine and Zofran PRN  - Bowel regimen: Senna and Miralax PRN   - Regular diet     # Goals of Care  ICU consulted palliative care 11/30 to begin discussing pt's goals moving forward. Given her chronic lung disease 2/2 CARLOS, OHS, pHTN as well as her HFpEF with decompensated RV failure recurrent acute hypercarbic respiratory failure likely to occur with any pulmonary insult like another viral or bacterial. After transfer back to ICU 12/2 she began thinking about whether she would want to be re-intubated but wanted to talk with her family before deciding. She does not want a trach and we discussed the worry that if she ever were re-intubated there is a high likelihood we would not be able to extubate her without placing a trach.   - Transitioned to DNI 12/3 after d/w palliative, wants to speak with her nieces prior to deciding to be no CPR   - Referral to palliative on DC per pt request  - her goals are fully restorative knowing she has significant recovery needs to achieve her prior level of independence (not requiring any assitance at home)        Consultations This Hospital Stay   NURSING TO CONSULT FOR VASCULAR ACCESS CARE IP CONSULT  PHARMACY TO DOSE VANCO  PHYSICAL THERAPY ADULT IP  CONSULT  OCCUPATIONAL THERAPY ADULT IP CONSULT  NEPHROLOGY ICU IP CONSULT  WOUND OSTOMY CONTINENCE NURSE  IP CONSULT  NUTRITION SERVICES ADULT IP CONSULT  PHARMACY IP CONSULT  PHARMACY IP CONSULT  PHARMACY IP CONSULT  VASCULAR ACCESS FOR PICC PLACEMENT ADULT IP CONSULT  CARDIOLOGY GENERAL ADULT IP CONSULT  CARE MANAGEMENT / SOCIAL WORK IP CONSULT  LYMPHEDEMA THERAPY IP CONSULT  NURSING TO CONSULT FOR VASCULAR ACCESS CARE IP CONSULT  PHARMACY TO DOSE VANCO  CARDIOLOGY HEART FAILURE (HF) IP CONSULT  PALLIATIVE CARE ADULT IP CONSULT  NURSING TO CONSULT FOR VASCULAR ACCESS CARE IP CONSULT  PALLIATIVE CARE ADULT IP CONSULT  PHYSICAL THERAPY ADULT IP CONSULT  OCCUPATIONAL THERAPY ADULT IP CONSULT    Code Status   Full Code    Time Spent on this Encounter   I, Owen Cheng DO, personally saw the patient today and spent greater than 30 minutes discharging this patient.       Owen Cheng DO  [facility] [address] [address]  Phone: [phone]  ______________________________________________________________________    Physical Exam   Vital Signs: Temp: 98.1  F (36.7  C) Temp src: Oral BP: 113/68 Pulse: 66   Resp: 20 SpO2: 94 % O2 Device: None (Room air) Oxygen Delivery: 4 LPM  Weight: 373 lbs 7.35 oz    General Appearance:  Resting comfortably in bed. NAD.  Respiratory: Difficult to appreciate breath sounds bilaterally likely due to body habitus. No wheezing or crackles noted.  Cardiovascular: RRR. Normal S1/S2. No murmurs.   GI: Soft, non-tender, non-distended. Normoactive bowel sounds.   Skin: No rash or lesions.   Other:  No peripheral edema       Primary Care Physician   Gagan Quiles    Discharge Orders      Ob/Gyn Referral      Adult Cardiology Eval  Referral      Ob/Gyn Referral      Follow Up and recommended labs and tests    Follow up with primary care provider in one week for a hospital follow up.  No follow up labs or test are needed.     Reason for your  "hospital stay    You were hospitalized for respiratory failure likely secondary to fluid overload that initially required intubation and then later required BiPAP. After removing fluid through diuresis you were able to be stabilized and weaned down to low flow nasal cannula. You also had an arrhythmia of your heart called atrial fibulation while in the ICU and was started on a medication amiodarone. Please continue your home medications as well as continue taking your amiodarone.     Activity - Up ad beatriz     Full Code    Patient would like chest compressions and short term intubation. However, she does not want prolonged intubation (\"do not want to live forever on a breathing tube\") nor have a tracheostomy.     Physical Therapy Adult Consult    Evaluate and treat as clinically indicated.    Reason:  critical illness myopathy and deconditioning     Occupational Therapy Adult Consult    Evaluate and treat as clinically indicated.    Reason:  critical illness myopathy and deconditioning     Fall precautions     Nikolas Recliner     Diet    Follow this diet upon discharge: Orders Placed This Encounter      Calorie Counts      Calorie Counts      Snacks/Supplements Adult: Other; Nepro Butterpecan 1x @ 09:00; Ensure Shake (Strawberry) x1 @14:00 and 2nd Ensure Shake (Strawberry @ 19:00).; Between Meals      Snacks/Supplements Adult: Other; See comments; Between Meals      Snacks/Supplements Adult: Other; See comments; Between Meals      Regular Diet Adult       Significant Results and Procedures   Most Recent 3 CBC's:Recent Labs   Lab Test 12/16/22  0727 12/15/22  0919 12/12/22  0716   WBC 7.2 6.0 7.0   HGB 12.8 13.5 14.1    97 100    191 243     Most Recent 3 BMP's:Recent Labs   Lab Test 12/17/22  0801 12/16/22  0727 12/15/22  0633    137 138   POTASSIUM 5.7* 5.5* 5.0   CHLORIDE 96* 95* 98   CO2 26 33* 31*   BUN 45.5* 43.3* 35.4*   CR 1.18* 1.49* 0.98*   ANIONGAP 16* 9 9   KADEN 9.0 9.0 9.5   * 104* " 109*     Most Recent 2 LFT's:Recent Labs   Lab Test 12/05/22  0522 12/04/22  0550   AST 33 58*   ALT 16 24   ALKPHOS 99 93   BILITOTAL 0.6 0.5     Most Recent 3 INR's:Recent Labs   Lab Test 12/09/22  0658 12/08/22  0551 12/07/22  0537   INR 1.35* 1.40* 1.37*   ,   Results for orders placed or performed during the hospital encounter of 11/17/22   XR Chest Port 1 View    Narrative    EXAM: XR CHEST PORT 1 VIEW  11/17/2022 9:07 PM     HISTORY:  SOB.       COMPARISON:  Chest radiograph 11/13/2021    FINDINGS:     Portable semiupright view of the chest. The cardiac silhouette is  borderline enlarged. Enlargement of the main pulmonary artery.  Bilateral hazy patchy opacities. No pleural effusion or appreciable  pneumothorax.    No acute osseous abnormality. Visualized upper abdomen is  unremarkable.        Impression    IMPRESSION:   1. Borderline cardiomegaly.  2. Bilateral hazy patchy opacities, likely pulmonary edema.     I have personally reviewed the examination and initial interpretation  and I agree with the findings.    SMITA LOPEZ MD         SYSTEM ID:  [id number]   CT Head w/o Contrast    Narrative    EXAM: CT HEAD W/O CONTRAST  LOCATION: [facility]  DATE/TIME: 11/17/2022 10:03 PM    INDICATION: Fatigue, drowsiness.  COMPARISON: None.  TECHNIQUE: Routine CT Head without IV contrast. Multiplanar reformats. Dose reduction techniques were used.    FINDINGS:  INTRACRANIAL CONTENTS: No intracranial hemorrhage, extraaxial collection, or mass effect.  No CT evidence of acute infarct. Normal parenchymal attenuation. Normal ventricles and sulci.     VISUALIZED ORBITS/SINUSES/MASTOIDS: No intraorbital abnormality. No paranasal sinus mucosal disease. No middle ear or mastoid effusion.    BONES/SOFT TISSUES: No acute abnormality.      Impression    IMPRESSION:  1.  Normal head CT.   XR Chest Port 1 View    Narrative    EXAM: XR CHEST PORT 1 VIEW  LOCATION: [facility]  [facility]  DATE/TIME: 11/18/2022 2:35 AM    INDICATION: intubation  COMPARISON: 11/17/2022      Impression    IMPRESSION: Stable enlargement of the cardiac silhouette. Endotracheal tube tip terminates about 2 cm above the concha. Prominence of the central pulmonary vascularity with mild left perihilar atelectasis or infiltrate. No visible pneumothorax.   XR Abdomen Port 1 View    Narrative    Exam: XR ABDOMEN PORT 1 VIEW, 11/18/2022 3:51 AM    Indication: Verify OG placement    Comparison: Abdominal x-ray 1/19/2020, chest x-ray 11/18/2022    Findings:   Portable semiupright AP radiograph of the chest. The sidehole of the  OG tube projects over the stomach and the tip extends below the field  of view. The visualized bowel gas pattern is nonobstructive. No  visualized pneumatosis or portal venous gas. Lung opacities are better  evaluated on same-day chest x-ray.      Impression    Impression: The sidehole of the OG tube projects over the stomach and  the tip extends below the field of view.    I have personally reviewed the examination and initial interpretation  and I agree with the findings.    VIRIDIANA SINCLAIR MD         SYSTEM ID:  [id number]   XR Chest Port 1 View    Narrative    EXAM: XR CHEST PORT 1 VIEW  11/18/2022 5:43 AM      HISTORY: RIJ CVC placement    COMPARISON: Chest x-ray 11/18/2022    FINDINGS: Portable semiupright AP radiograph of the chest. Right IJ  central venous catheter tip projects over the low SVC. The sidehole of  the NG/OG tube projects over the stomach and the tip extends below the  field of view. The tip of the endotracheal tube projects over the  midthoracic trachea. The trachea is midline. The cardiac silhouette  appears enlarged. Small left pleural effusion. No pneumothorax. Stable  bilateral perihilar and retrocardiac opacities. The visualized upper  abdomen is unremarkable.       Impression    IMPRESSION:   1. Right IJ central venous catheter tip projects  over the low SVC.  2. Small stable left pleural effusion.  3. Stable bilateral perihilar and retrocardiac opacities, likely  pulmonary edema and/or atelectasis.  4. Cardiomegaly.    I have personally reviewed the examination and initial interpretation  and I agree with the findings.    VIRIDIANA SINCLAIR MD         SYSTEM ID:  [id number]   XR Abdomen Port 1 View    Narrative    Exam: XR ABDOMEN PORT 1 VIEW, 11/20/2022 11:33 AM    Indication: abd pain, constipation,    Comparison: 11/18/2022, 11/12/2021    Findings:   Supine frontal views of the abdomen. Images are degraded by  underpenetration/photon starvation. Enteric tube tip and sidehole  project over the stomach. Paucity of small bowel gas. Air-filled,  nondilated loops of colon in the left lower quadrant. No appreciable  pneumatosis or portal venous gas. Mild colonic stool burden. Left  basilar airspace opacities. Intrauterine device projects over the  pelvis.      Impression    Impression: Nonspecific paucity of small bowel gas, though without  findings to suggest obstruction. Mild colonic stool burden.    ALAN GOLD DO         SYSTEM ID:  [id number]   XR Chest Port 1 View    Narrative    Portable chest    INDICATION: Intubated concern for pneumonia    COMPARISON: 11/18/2022    FINDINGS: Heart is enlarged. Diffuse perihilar patchy opacities are  more prominent all of the other patchy parenchymal airspace and  interstitial opacities. Right IJ catheter tip in the SVC. NG/OG tube  progresses into the upper abdomen. Endotracheal tube tip approximately  3.2 cm above the concha.      Impression    IMPRESSION: Increasing opacities concerning for developing infection  or possible edema/ARDS. Cardiomegaly.    MYCHAL BATES MD         SYSTEM ID:  [id number]   XR Abdomen Port 1 View    Narrative    EXAM: XR ABDOMEN PORT 1 VIEW  11/24/2022 7:00 AM      HISTORY: constipation/concern for ileus    COMPARISON: Abdominal x-ray 11/20/2022    FINDINGS: AP supine view of the  abdomen in 3 images. Intrauterine  device.    Gas-filled bowel loops in a nonobstructive pattern. No pneumatosis. No  portal venous gas. No abnormal calcifications. No visualized masses.    Left basilar airspace opacities.      Impression    IMPRESSION: A few gas-filled nondilated loops of bowel in a  nonobstructive pattern.    I have personally reviewed the examination and initial interpretation  and I agree with the findings.    SMITA LOPEZ MD         SYSTEM ID:  [id number]   XR Chest Port 1 View    Narrative    EXAM:  XR CHEST PORT 1 VIEW    INDICATION: pneumonia/CHF    COMPARISON:  Chest x-ray 11/21/2022    FINDINGS:   Single AP view of the chest in 2 images. Right IJ catheter terminating  over the SVC. Interval removal of endotracheal tube and enteric tube.     Stable enlarged cardiac silhouette. Main pulmonary artery enlargement.  Low lung volumes. Decreased right basilar opacity. Retrocardiac  opacity. No pneumothorax. Probable small left pleural effusion.  Unremarkable upper abdomen. No acute bony lesions.      Impression    IMPRESSION:  1.  Decreased right basilar opacity and persistent retrocardiac  opacity which may represent improving infection/edema.  2.  Stable small left pleural effusion.    I have personally reviewed the examination and initial interpretation  and I agree with the findings.    SMITA LOPEZ MD         SYSTEM ID:  [id number]   US Lower Extremity Venous Duplex Bilateral    Narrative    ULTRASOUND LOWER EXTREMITY VENOUS DUPLEX BILATERAL 11/25/2022 10:55 AM    CLINICAL HISTORY: Hypotension and hypoxia      COMPARISONS: None available.    REFERRING PROVIDER: BETZY MORALES    TECHNIQUE: Grayscale, color Doppler, Doppler waveform ultrasound  evaluation was performed through the bilateral common femoral,  femoral, popliteal, and posterior tibial veins.     FINDINGS: Bilateral common femoral, femoral, and popliteal veins are  fully compressible, patent, and demonstrate normal phasic  Doppler  waveforms.    Below knee veins were difficult to follow through the calves due to  patient body habitus.    Bilateral posterior tibial veins were fully compressible at the ankles  with waveforms that augment normally.      Impression    IMPRESSION:   1. No above knee deep venous thrombosis demonstrated in either leg.    2. Below knee veins were difficult to follow through the calves due to  patient body habitus. Bilateral posterior tibial veins at the ankles  are fully compressible with waveforms that augment normally.    I have personally reviewed the examination and initial interpretation  and I agree with the findings.    MOODY REYNOLDS MD         SYSTEM ID:  [id number]   XR Chest Port 1 View    Narrative    EXAM: XR CHEST PORT 1 VIEW  11/25/2022 9:38 PM      HISTORY: respiratory acidosis    COMPARISON: Chest x-ray 11/24/2022, chest CT 9/15/2016    FINDINGS: Portable semiupright AP radiograph of the chest. Right IJ  central venous catheter tip projects over the low SVC. The trachea is  midline. The cardiac silhouette is enlarged. Enlargement of the main  pulmonary artery. Small left pleural effusion. No pneumothorax.  Bilateral basilar opacities are similar to prior. The visualized upper  abdomen is unremarkable.       Impression    IMPRESSION:   1. Bilateral basilar opacities are similar to prior, likely infection  versus pulmonary edema.  2. Small left pleural effusion.  3. Cardiomegaly.    I have personally reviewed the examination and initial interpretation  and I agree with the findings.    SMITA LOPEZ MD         SYSTEM ID:  [id number]   CT Chest Pulmonary Embolism w Contrast    Narrative    EXAM: CTA pulmonary angiogram, 11/26/2022 1:10 PM    HISTORY: Female sex; Not pregnant; No imaging to r/o PE in the last 24  hours; Pulmonary Embolism Rule-Out Criteria (PERC) score > 0; Revised  [place] Score (RGS) not >= 11; No D-dimer result available; D-dimer not  ordered    COMPARISON: Chest radiograph  11/25/2022.. CT abdomen 11/12/2021    TECHNIQUE: Volumetric CT images obtained through the chest with  contrast. Coronal and axial MIP reformatted images obtained.  Three-dimensional (3D) post-processed angiographic images were  reconstructed, archived to PACS and used in interpretation of this  study.     CONTRAST: Isovue 370 ml isovue 370 IV.    FINDINGS:     Vascular: There is good contrast opacification of the pulmonary  arterial vasculature. No pulmonary embolus.  Heart is enlarged with a  hypertrophic right ventricle. No evidence of acute right heart strain.  Mild reflux of contrast into the IVC and hepatic veins. No thoracic  aortic aneurysm. Dilated main pulmonary artery measuring up to 4 cm  and is significantly larger than the aorta.    Remaining Chest: Evaluation of the lungs is somewhat limited by  respiratory motion. Central tracheobronchial tree is patent. No  consolidation, mass, or suspicious pulmonary nodules. No pleural  effusion or pneumothorax. Bibasilar atelectasis. No thoracic  lymphadenopathy. Thyroid is unremarkable. Small hiatal hernia.    Upper Abdomen: Limited evaluation demonstrates no acute findings.    Bones: No suspicious osseous lesions    Soft tissues: Unremarkable    Devices: Right IJ CVC tip is in the right atrium.      Impression    IMPRESSION:  1. Exam is negative for acute pulmonary embolism  2. Cardiomegaly with right ventricular hypertrophy and a dilated main  pulmonary artery measuring up to 4 cm, consistent with pulmonary  hypertension.      In the event of a positive result for acute pulmonary embolism  resulting in right heart strain, consider calling the   [facility]  for PERT (Pulmonary Embolism Response Team)  Activation?    PERT -- Pulmonary Embolism Response Team (Multidisciplinary team  including cardiology, interventional radiology, critical care,  hematology)    I have personally reviewed the examination and initial interpretation  and I agree with the  findings.    VIRIDIANA SINCLAIR MD         SYSTEM ID:  [id number]   CT Abdomen Pelvis w Contrast    Narrative    EXAMINATION: CT ABDOMEN PELVIS W CONTRAST  11/26/2022 1:11 PM      CLINICAL HISTORY: New sepsis, unknown source    COMPARISON: CT abdomen 11/12/2022    PROCEDURE COMMENTS: CT of the abdomen was performed with 87.0 cc of  Isovue-370 intravenous and oral contrast. Coronal and sagittal  reformatted images were obtained.    FINDINGS:    Liver: Normal parenchymal attenuation without focal mass.  Biliary system: Cholelithiasis. No intrahepatic or extrahepatic  biliary ductal dilatation.  Pancreas: No focal mass or dilation of the main pancreatic duct.  Stomach: Within normal limits.  Spleen: Within normal limits.  Adrenal glands: Within normal limits.  Kidneys: Atrophic kidneys bilaterally. No focal mass, hydronephrosis,  or stone.  Bladder: Within normal limits.  Reproductive organs: IUD in place  Colon: Within normal limits.  Small Bowel: Within normal limits.  Lymph nodes: No intra-abdominal or pelvic lymphadenopathy.  Vasculature: Atherosclerotic calcifications of the abdominal aorta and  its branches without aneurysm.  Peritoneum: No intraabdominal free fluid or air.     Chest: Heart is enlarged. No pericardial effusion. No pneumothorax or  pleural effusion. Bibasilar atelectasis. Small hiatal hernia.    Bones: No suspicious osseous lesion. Degenerative changes of the  thoracolumbar spine.    Soft tissues: Large fat containing ventral hernia.      Impression    IMPRESSION:  1. No acute findings in the abdomen or pelvis.  2. Cholelithiasis without evidence for acute cholecystitis.    I have personally reviewed the examination and initial interpretation  and I agree with the findings.    SMITA LOPEZ MD         SYSTEM ID:  [id number]   XR Chest Port 1 View    Narrative    Chest one view portable    HISTORY: Line    COMPARISON STUDY: 11/25/2022    FINDINGS: Cardiac silhouette is large. Main pulmonary  artery  enlargement. Interstitial opacities bilaterally. Right IJ Andrews-Juaquin  catheter tip in right pulmonary artery. Right IJ central line tip in  the low SVC.      Impression    IMPRESSION: Interstitial pulmonary edema    SMITA LOPEZ MD         SYSTEM ID:  [id number]   XR Chest Port 1 View    Narrative    Portable chest    Indication follow-up for Andrews-Juaquin catheter    COMPARISON: Yesterday    FINDINGS: Heart is enlarged. Patchy opacities in the lungs there is  similar to slightly decreased. Right IJ Andrews-Juaquin catheter tip is in  the distal right main and branch pulmonary artery.      Impression    IMPRESSION: Probable decrease of pulmonary edema with cardiomegaly.  Andrews-Juaquin catheter tip in the distal right main branch pulmonary  artery.    MYCHAL BATES MD         SYSTEM ID:  [id number]   XR Chest Port 1 View    Narrative    Exam: XR CHEST PORT 1 VIEW, 11/29/2022 7:55 AM    Indication: swan position    Comparison: Chest x-ray 11/20/2022    Findings:   There is a right IJ pulmonary artery catheter with tip difficult to  delineate, however likely in the expected location of the distal right  pulmonary artery/proximal interlobar artery. Persistent widening of  the cardiomediastinal silhouette. Retrocardiac consolidation.  Indistinct pulmonary vasculature with interstitial perihilar  opacities.      Impression    Impression:   1. Pulmonary catheter tip projects over the expected location of the  distal right pulmonary artery/proximal interlobar artery.  2. Cardiomegaly with mild interstitial pulmonary edema.    I have personally reviewed the examination and initial interpretation  and I agree with the findings.    SMITA LOPEZ MD         SYSTEM ID:  [id number]   XR Chest Port 1 View    Narrative    Chest one view portable    HISTORY: Andrews position    COMPARISON STUDY: 11/29/2022    FINDINGS: Right IJ Andrews-Juaquin catheter tip in right pulmonary artery.  Cardiac silhouette and central pulmonary arteries are  enlarged. Mild  interstitial opacity bilaterally.      Impression    Impression: Advance-Juaquin catheter tip in the right pulmonary artery. Mild  pulmonary edema.    SMITA LOPEZ MD         SYSTEM ID:  [id number]   XR Chest Port 1 View    Narrative    EXAM: XR CHEST PORT 1 VIEW 12/1/2022 6:10 AM    HISTORY: 62-year-old female assess Advance position and lung status.  History CHF, CKD, CARLOS; admitted for hypercapnia respiratory failure  necessitating intubation and extubated 11/23    COMPARISON: 11/30/2022.    TECHNIQUE: Portable AP view of the chest..    FINDINGS:   Interval removal of right IJ Advance-Juaquin catheter. Low lung volumes  bilaterally. Midline trachea. Stable cardiomegaly with redemonstration  of enlarged pulmonary artery. The indistinct pulmonary vasculature.  Bilateral costophrenic angle blunting. No discernible pneumothorax.  Fluffy bilateral perihilar and basilar opacities. Unremarkable upper  abdomen. No acute or suspicious osseous abnormalities. Unremarkable  soft tissues.      Impression    IMPRESSION:   1.  Interval removal of Advance-Juaquin catheter.  2.  Stable to mildly increased pulmonary edema.    I have personally reviewed the examination and initial interpretation  and I agree with the findings.    SMITA LOPEZ MD         SYSTEM ID:  [id number]   XR Chest Port 1 View    Narrative    EXAM: XR CHEST PORT 1 VIEW 12/2/2022 5:28 PM    HISTORY: Pt with hypercarbic respiratory failure and volume overload,  reassess     COMPARISON: 12/1/2022.    TECHNIQUE: Portable AP view of the chest..    FINDINGS:   Midline trachea. Stable cardiomegaly with redemonstration of enlarged  and indistinct pulmonary vasculature. Bilateral costophrenic angle  blunting. No discernible pneumothorax. Low lung volumes. Fluffy  bilateral perihilar and basilar opacities. Unremarkable upper abdomen.  No acute or suspicious osseous abnormalities. Unremarkable soft  tissues.      Impression    IMPRESSION:   Continued low lung volumes with  perihilar and basilar predominant  mixed interstitial and patchy airspace opacities, pulmonary edema  and/or atelectasis. Infection is not excluded.    I have personally reviewed the examination and initial interpretation  and I agree with the findings.    ALAN GOLD DO         SYSTEM ID:  [id number]   US Pelvic Complete with Transvaginal    Narrative    EXAMINATION: TEMPORARY, 12/6/2022 2:59 PM     COMPARISON: Abdomen and pelvis CT 11/26/2022, pelvic ultrasound on  11/12/2021    HISTORY: History of uterine bleeding and IUD with new vaginal  bleeding, assess IUD    TECHNIQUE: The pelvis was scanned in standard fashion with  transabdominal and transvaginal transducer(s) using both grey scale  and limited color Doppler techniques.    FINDINGS:  The uterus measures 5.3 x 2.6 x 3.8 cm, with IUD in the expected  location within the endometrium at the fundus.  The endometrium is  within normal limits and measures 4.9 mm. There is no free fluid in  the pelvis. Nabothian cysts.    Neither ovary was identified.    The visualized portions of the bladder are normal.      Impression    IMPRESSION: IUD visualized in the expected location within the  endometrium near the fundus.    I have personally reviewed the examination and initial interpretation  and I agree with the findings.    LEONARD SMITH MD         SYSTEM ID:  [id number]   XR Chest 2 Views    Narrative    Chest 2 views    INDICATION: Persistent hypoxia coronary pulmonary edema    COMPARISON: 12/2/2022    FINDINGS: Low inspiratory volume. Heart is enlarged. Perihilar patchy  opacities and other bilateral mixed airspace and interstitial  opacities are noted suggestive of edema. No obvious costophrenic angle  blunting. The lateral view is underpenetrated. There are mild  degenerative changes in the thoracic spine.      Impression    IMPRESSION: Probable cardiogenic pulmonary edema.    MYCHAL BATES MD         SYSTEM ID:  [id number]   Echo Complete     Value     LVEF  55-60%    [facility]    [id number]  [id number]  [id number]  [id number]^TERRANCE^VANDANA     [facility],[place]  Echocardiography Laboratory  [address] [place] [id number]     Name: DUSTIN FERNANDES  MRN: [id number]  : 1960  Study Date: 2022 11:35 AM  Age: 62 yrs  Gender: Female  [place] [facility] [place]  Reason For Study: CHF  Ordering Physician: VANDANA CARLOS  Performed By: Jesse Clifton     BSA: 2.6 m2  Height: 62 in  Weight: 404 lb  HR: 79  BP: 136/76 mmHg  ______________________________________________________________________________  Procedure  Echocardiogram with two-dimensional, color and spectral Doppler performed.  Contrast Optison. Technically difficult study.Extremely difficult acoustic  windows despite the use of contrast for endcardial border definition. Patient  was given 6 ml mixture of 3 ml Optison and 6 ml saline. 3 ml wasted.  ______________________________________________________________________________  Interpretation Summary  Left ventricular function is normal.The ejection fraction is 55-60%.  Flattened septum is consistent with right ventricular pressure and volume  overload.  Moderate right ventricular dilation is present. Global right ventricular  function is moderately reduced.  Mild tricuspid regurgitation is present.  Unable to assess mean RA pressure given the patient is on a ventilator.  No pericardial effusion is present.  ______________________________________________________________________________  Left Ventricle  Small left ventricular cavity size. Left ventricular function is normal.The  ejection fraction is 55-60%. Flattened septum is consistent with right  ventricular pressure and volume overload.     Right Ventricle  Moderate right ventricular dilation is present. Global right ventricular  function is moderately reduced.     Atria  The atria cannot be assessed.     Mitral Valve  On Doppler interrogation, there is no significant  stenosis or regurgitation.     Aortic Valve  On Doppler interrogation, there is no significant stenosis or regurgitation.     Tricuspid Valve  Mild tricuspid insufficiency is present. The right ventricular systolic  pressure is approximated at 15.1 mmHg plus the right atrial pressure.     Pulmonic Valve  The pulmonic valve cannot be assessed.     Vessels  Unable to assess mean RA pressure given the patient is on a ventilator.     Pericardium  No pericardial effusion is present.     Compared to Previous Study  This study was compared with the study from 20 . Patient on ventilator  now with RV dilatation and dysfunction.  ______________________________________________________________________________  MMode/2D Measurements & Calculations     IVSd: 1.4 cm  LVIDd: 4.6 cm  LVIDs: 3.5 cm  LVPWd: 1.0 cm  FS: 24.6 %  LV mass(C)d: 205.2 grams  LV mass(C)dI: 79.6 grams/m2  asc Aorta Diam: 3.2 cm  LVOT diam: 2.0 cm  LVOT area: 3.2 cm2  RWT: 0.46     Doppler Measurements & Calculations  PA acc time: 0.07 sec  TR max enrique: 194.5 cm/sec  TR max PG: 15.1 mmHg     ______________________________________________________________________________  Report approved by: John Moreno 2022 12:11 PM         Echo Limited     Value    LVEF  60-65%    Narrative    [id number]  [id number]  [id number]  [id number]^[facility]^BETZY     [facility],[place]  Echocardiography Laboratory  [address] [place] [id number]     Name: DUSTIN FERNANDES  MRN: [id number]  : 1960  Study Date: 2022 09:34 AM  Age: 62 yrs  Gender: Female  [place] [facility] [place] For Study: PHTN  Ordering Physician: BETZY MORALES  Performed By: Laureen Salazar RDCS     BSA: 2.5 m2  Height: 62 in  Weight: 380 lb  BP: 111/62 mmHg  ______________________________________________________________________________  Procedure  Limited Echocardiogram with portions of two-dimensional, color and spectral  Doppler performed. Contrast  Optison. Technically difficult study.Extremely  poor acoustic windows. Optison (NDC #8174-0024-54) given intravenously.  Patient was given 5 ml mixture of 3 ml Optison and 6 ml saline. 4 ml wasted.  ______________________________________________________________________________  Interpretation Summary  Limited TTE. Technically difficult study.  The RV is not clearly visualized but the size appears to be normal and  function appears to be moderately reduced.  The estimated PA systolic pressure is 32 mmHg above the RA pressure.  Left ventricular function is normal.The ejection fraction is 60-65%.Left  ventricular cavity size is small.  This study was compared with the study from 11/18/2022. No significant changes  in RV size/function. The estimated PASP is higher.  ______________________________________________________________________________  Left Ventricle  Left ventricular function is normal.The ejection fraction is 60-65%. Left  ventricular cavity size is small.     Right Ventricle  The RV is not clearly visualized but the size appears to be normal and  function appears to be moderately reduced.     Mitral Valve  The mitral valve is normal. Trace mitral insufficiency is present.     Tricuspid Valve  The tricuspid valve is normal. Trace tricuspid insufficiency is present. Right  ventricular systolic pressure is 32mmHg above the right atrial pressure.     Pulmonic Valve  The valve leaflets are not well visualized. Trace pulmonic insufficiency is  present.     Vessels  Dilation of the inferior vena cava is present with abnormal respiratory  variation in diameter. IVC diameter >2.1 cm collapsing <50% with sniff  suggests a high RA pressure estimated at 15 mmHg or greater.     Compared to Previous Study  This study was compared with the study from 11/18/2022 . No significant  changes in RV size/function. The estimated PASP is higher.      ______________________________________________________________________________  Report approved by: John Ladd 2022 12:19 PM     ______________________________________________________________________________      Echo Limited     Value    LVEF  55-60%    Narrative    [id number]  [id number]  [id number]  [id number]^HAIM^BIENVENIDO     [facility],[place]  Echocardiography Laboratory  [address] [place] [id number]     Name: DUSTIN FERNANDES  MRN: [id number]  : 1960  Study Date: 12/15/2022 01:21 PM  Age: 62 yrs  Gender: Female  Patient Location: [facility]  Reason For Study: Heart Failure  Ordering Physician: BIENVENIDO WHITMORE  Performed By: Smith Sheets RDCS     BSA: 2.5 m2  Height: 62 in  Weight: 376 lb  HR: 57  BP: 133/61 mmHg  ______________________________________________________________________________  Procedure  Limited Portable Echo Adult. Contrast Optison. Patient was given 6 ml mixture  of 3 ml Optison and 6 ml saline. 3 ml wasted.  ______________________________________________________________________________  Interpretation Summary  Left ventricular function is normal.The ejection fraction is 55-60%.  On limited views, the right ventricle appears moderately  Pulmonary artery systolic pressure cannot be assessed.  No pericardial effusion.  dilated with moderately reduced global function.  The inferior vena cava is normal.     This study was compared with the study from 22. Minimal interval change.     ______________________________________________________________________________  Left Ventricle  Left ventricular function is normal.The ejection fraction is 55-60%. Left  ventricular wall thickness is normal. Left ventricular size is normal.  Flattened septum is consistent with right ventricular pressure and volume  overload.     Right Ventricle  On limited views, the right ventricle appears moderately dilated with  moderately reduced global function.     Atria  The  atria cannot be assessed.     Mitral Valve  The mitral valve is normal.     Aortic Valve  Aortic valve is normal in structure and function.     Tricuspid Valve  Trace tricuspid insufficiency is present. The peak velocity of the tricuspid  regurgitant jet is not obtainable. Pulmonary artery systolic pressure cannot  be assessed.     Pulmonic Valve  The pulmonic valve cannot be assessed.     Vessels  The inferior vena cava is normal. IVC diameter <2.1 cm collapsing >50% with  sniff suggests a normal RA pressure of 3 mmHg.     Pericardium  No pericardial effusion is present.     Compared to Previous Study  This study was compared with the study from 22 .     Attestation  I have personally viewed the imaging and agree with the interpretation and  report as documented by the fellow, Amy Ojeda, and/or edited by me.  ______________________________________________________________________________  MMode/2D Measurements & Calculations  IVSd: 1.1 cm  LVIDd: 4.7 cm  LVIDs: 3.0 cm  LVPWd: 1.3 cm  FS: 35.7 %  LV mass(C)d: 204.4 grams  LV mass(C)dI: 81.8 grams/m2  RWT: 0.54     Doppler Measurements & Calculations  TR max enrique: 269.0 cm/sec  TR max P.9 mmHg     ______________________________________________________________________________  Report approved by: John Peraza 12/15/2022 02:07 PM               Discharge Medications   Current Discharge Medication List      START taking these medications    Details   amiodarone (PACERONE) 200 MG tablet 1 tablet (200 mg) by Oral or Feeding Tube route daily  Qty: 30 tablet, Refills: 4    Associated Diagnoses: Atrial fibrillation, unspecified type (H)      empagliflozin (JARDIANCE) 10 MG TABS tablet Take 1 tablet (10 mg) by mouth daily    Associated Diagnoses: Acute on chronic diastolic congestive heart failure (H)      melatonin 3 MG tablet Take 1 tablet (3 mg) by mouth nightly as needed for sleep    Associated Diagnoses: Insomnia, unspecified type         CONTINUE  these medications which have CHANGED    Details   bumetanide (BUMEX) 1 MG tablet Take 1 tablet (1 mg) by mouth daily    Associated Diagnoses: PAF (paroxysmal atrial fibrillation) (H); Essential hypertension         CONTINUE these medications which have NOT CHANGED    Details   acetaminophen (TYLENOL) 500 MG tablet Take 1-2 tablets (500-1,000 mg) by mouth every 4 hours as needed for mild pain  Refills: 0    Associated Diagnoses: Arthritis      allopurinol (ZYLOPRIM) 100 MG tablet Take 2 tablets (200 mg) by mouth daily *Monitoring lab - Uric Acid level every 12 months. You will not receive further refills until lab is scheduled. [phone]  Qty: 30 tablet, Refills: 0    Associated Diagnoses: Gout, unspecified cause, unspecified chronicity, unspecified site      alum & mag hydroxide-simethicone (MAALOX  ES) 400-400-40 MG/5ML SUSP suspension Take 15 mLs by mouth every 4 hours as needed for other (gastric distress.)  Qty: 355 mL, Refills: 0    Associated Diagnoses: Dyspepsia      apixaban ANTICOAGULANT (ELIQUIS ANTICOAGULANT) 5 MG tablet Take 1 tablet (5 mg) by mouth 2 times daily  Qty: 180 tablet, Refills: 3    Associated Diagnoses: PAF (paroxysmal atrial fibrillation) (H)      atorvastatin (LIPITOR) 20 MG tablet Take 1 tablet (20 mg) by mouth daily  Qty: 90 tablet, Refills: 3    Associated Diagnoses: Hyperlipidemia, unspecified hyperlipidemia type      blood glucose monitoring (ACCU-CHEK NINA PLUS) meter device kit Use to test blood sugars 3 times daily or as directed.  Qty: 1 kit, Refills: 0    Associated Diagnoses: Diabetes mellitus, type 2 (H)      blood glucose monitoring (SOFTCLIX) lancets Use to test blood sugar 3 times daily or as directed.  Qty: 300 each, Refills: 0    Associated Diagnoses: Type 2 diabetes mellitus with chronic kidney disease, without long-term current use of insulin, unspecified CKD stage (H)      ferrous gluconate (FERGON) 324 (38 Fe) MG tablet Take 1 tablet (324 mg) by mouth daily  Qty:  90 tablet, Refills: 3    Comments: DX Code Needed  REFILL NEEDED.  Associated Diagnoses: Iron deficiency      lisinopril (ZESTRIL) 5 MG tablet Take 1 tablet (5 mg) by mouth daily  Qty: 90 tablet, Refills: 3    Associated Diagnoses: Essential hypertension      metoprolol succinate ER (TOPROL XL) 100 MG 24 hr tablet Take 1 tablet (100 mg) by mouth daily  Qty: 90 tablet, Refills: 3    Associated Diagnoses: PAF (paroxysmal atrial fibrillation) (H)      miconazole (MICRO GUARD) 2 % external powder Apply topically as needed for itching or other APPLY TO AFFECTED AREA TWICE A DAY *NOT COVERED*  Qty: 85 g, Refills: 5    Associated Diagnoses: Fungal infection      Multiple Vitamins-Minerals (MULTIVITAMIN WOMEN PO)       ondansetron (ZOFRAN ODT) 4 MG ODT tab Take 1 tablet (4 mg) by mouth every 8 hours as needed for nausea or vomiting  Qty: 20 tablet, Refills: 1    Comments: DX Code Needed  .R11.10  Associated Diagnoses: Vomiting, intractability of vomiting not specified, presence of nausea not specified, unspecified vomiting type      polyethylene glycol (MIRALAX) packet Take 17 g by mouth daily as needed for constipation  Qty: 30 packet, Refills: 1    Associated Diagnoses: Constipation, unspecified constipation type      potassium chloride ER (K-TAB) 20 MEQ CR tablet Take 1 tablet (20 mEq) by mouth daily  Qty: 90 tablet, Refills: 3    Associated Diagnoses: Essential hypertension; Hyperlipidemia, unspecified hyperlipidemia type      sennosides (SENOKOT) 8.6 MG tablet Take 1 tablet by mouth 2 times daily as needed for constipation  Qty: 60 tablet, Refills: 0    Associated Diagnoses: Constipation, unspecified constipation type      triamcinolone (KENALOG) 0.1 % external cream Apply topically 2 times daily APPLY TO AFFECTED AREA  Qty: 30 g, Refills: 3    Associated Diagnoses: Rash         STOP taking these medications       cyclobenzaprine (FLEXERIL) 5 MG tablet Comments:   Reason for Stopping:         diphenhydrAMINE (BENADRYL)  25 MG tablet Comments:   Reason for Stopping:         traMADol (ULTRAM) 50 MG tablet Comments:   Reason for Stopping:             Allergies   Allergies   Allergen Reactions     Penicillins Itching and Rash     Tolerated ceftriaxone 1/17/2020  Tolerated Zosyn 11/2022

## 2022-12-17 NOTE — PLAN OF CARE
Goal Outcome Evaluation:    Pt A&O. VSS on 3-4L O2 NC. Lift to transfer. Tylenol given for headache and back pain. Fair appetite. Covid booster given. Voided w/ bedpan. No BM today. PIV removed. Report given to Noemi PRUITT. Pt discharged to  TCU via stretcher ride with belongings and discharge paperwork at 1830.

## 2022-12-17 NOTE — PROGRESS NOTES
[facility]    Progress Note - Medicine Service, MAROON TEAM 1       Date of Admission:  11/17/2022    Assessment & Plan   Arianna Siddiqi is a 62 year old female admitted on 11/17/2022. She has a history of morbid obesity, CARLOS, hypertension, HFpEF, paroxysmal A. Fib (on apixiban), MAG, factor IV deficiency and CKD who was initially admitted to the ICU on 11/18 for hypercapnic respiratory failure likely due to pneumonia, necessitating intubation. 11/23 extubated to BiPAP due to chronic CO2 retention. Now with concerns for RV heart failure, pulmonary hypertension, volume overload, off pressors since 11/29 and transferred to general medicine 11/30. Readmitted to ICU 12/2 PM for worsening hypercapnia and acute encephalopathy with concern for need for intubation. However, mental status improved with better fitting BiPAP able to avoid intubation, although remains significantly hypercapnic. Transferred back to general medicine 12/3 for ongoing diuresis, respiratory support and goals of care conversations. Currently awaiting TCU placement.      Changes and Major Updates today:  - Stating bumex 1 mg today   - no spot at TCU so discharge pending bed availability      # Hypotension  Noted to occur 12/11 when sitting to standing. Likely hypovolemic due to overdiuresis. Hgb stable. Again hypotensive 12/15. TTE with collapsible IVC and RA pressure of 3, suggesting of volume depletion. Patient has tenuous volume status in setting of HFpEF. Improved with 500 ml.   - Gentle fluid resuscitation PRN  - Starting bumex 1 mg daily today     # Acute hypoxic and hypercarbic respiratory failure   # Pulmonary Hypertension  # Pulmonary edema/fluid overload   Patient extubated to BiPAP 11/23. CT PE/CAP 11/26/22 unremarkable this admission. Suspect likely due to atelectasis with underlying obesity hypoventilation contributing.  - Wean O2 as tolerated- currently requiring 3 L   - see the cardiology  section for the plan regarding her pulmonary hypertension  - Incentive spirometry q1h     # Hx HFpEF (EF 60 to 65% per echo on 11/26/2022)    # Decompensated right heart failure  # Fluid overload  # Pulmonary hypertension  Patient's [place]-Juaquin catheter measurements while in ICU consistent with volume overload and pulmonary hypertension.   Elevated BNP.  Normal cardiac output per swan Juaquin catheter (no suspicion for shunts which would artificially elevate these measurements). Known history of CARLOS and HFpEF. 11/25 BLE duplex US showed no above knee DVT.   - Bumex 1 mg (PTA 2 mg) per day  - Continue pta admission metoprolol succ 100mg  - Lisinopril restarted 12/14. Initially held on admission   - Daily weights, strict ins and outs   - Outpatient cardiology referral     # Non-oliguric Acute on chronic kidney injury   # Metabolic alkalosis-presumed contraction alkalosis vs chronic hypoventilation syndrome  # Hx CKD (baseline creatinine 1.1)  Likely a component of venous congestion due to fluid overload. Cr bumped to 1.5 today, given dizziness and hypotension last night, perhaps pre-renal. S/p 500 ml LR.  - Volume management per above     # Vaginal Bleeding  Noted to have vaginal bleeding 12/6 during nursing cares (bright red blood on purewick), Hgb stable. Hx s/f acute onset vaginal bleeding on 11/12/21 s/p IUD placement as w/u negative for endometrial and cervical cancer. Discussed with gyn. Pelvic US with confirmation of IUD in correct spot.   - Outpatient benign gyn referral - can call [phone] to schedule appointment    # Acute encephalopathy - resolved  Iso critical illness and ICU delirium, exacerbated by hypercapnia   - PRN acetaminophen  - Melatonin 6 mg q HS  - Holding PTA cyclobenzaprine, tramadol and benadryl, will likely stop at discharge     # Hx PAF (on apixaban PTA)  Presently in sinus rhythm.   - Previously on amio gtt this admission; 200 mg amio every day now   - PTA apixaban dose  - Outpatient  cardiology referral       # Hyperkalemia   # Hypomagnesia - resolved   K elevated to 6 today but the specimen was hemolyzed.   - Repeat K  - Electrolyte replacement protocol    # CAP - resolved  Status post Ceftiaxone 7 day course (11/18-11/24).  Zosyn empiric treatment for VAP/HAP (11/25-11/30)     # Hx MAG  # Hx Factor VII deficiency  Seen outpatient by hematology has a history oral/IV iron supplementation  - PTA ferrous gluconate 324 mg   - PTA apixaban      # CARLOS onCPAP  # Obesity hypoventilation syndrome   # Hx Smoking (30 pack per year; stopped 2002)  - BiPAP when sleeping     #Hx Gout  #Hx hemosiderin deposition BLE  - PT/OT for ROM  - 11/18 uric acid 7.9  - PTA allopurinol reduced to 100 mg given poor renal function     # Demand induced troponin leak - improving  - 11/25 troponin 257-->158 --> 143 on 11/26     # Oral thrush   - Nystatin QID discontinued on 12/13     # Risk for malnutrition  # Hypoalbuminemia  # Morbid obesity  # Nausea and vomiting   # Constipation-resolved  # Diarrhea   - RD following  - Compazine and Zofran PRN  - Bowel regimen: Senna and Miralax PRN   - Regular diet     # Goals of Care  ICU consulted palliative care 11/30 to begin discussing pt's goals moving forward. Given her chronic lung disease 2/2 CARLOS, OHS, pHTN as well as her HFpEF with decompensated RV failure recurrent acute hypercarbic respiratory failure likely to occur with any pulmonary insult like another viral or bacterial. After transfer back to ICU 12/2 she began thinking about whether she would want to be re-intubated but wanted to talk with her family before deciding. She does not want a trach and we discussed the worry that if she ever were re-intubated there is a high likelihood we would not be able to extubate her without placing a trach.   - Palliative care consult, appreciate assistance  - Transitioned to DNI 12/3 after d/w palliative, wants to speak with her nieces prior to deciding to be no CPR   - Referral to  "palliative on DC per pt request  - her goals are fully restorative knowing she has significant recovery needs to achieve her prior level of independence (not requiring any assitance at home)      Diet: Regular Diet Adult  Diet  Snacks/Supplements Adult: Other; Strawberry ensure plus at 10:00 am daily; Between Meals  Snacks/Supplements Adult: Other; See comments; Between Meals    DVT Prophylaxis: Apixaban  Baig Catheter: Not present  Fluids: none  Central Lines: None  Cardiac Monitoring: None  Code Status: Full Code     Disposition Plan      Expected Discharge Date: 12/17/2022      Destination: nursing home  Discharge Comments: Medically stable for discharge to  TCU, waiting on Cr to make sure JASON better  Weaning oxygen during the day (not on PTA oxygen)        The patient's care was discussed with the Dr Owen Cheng.    Owen Cheng,   Medicine Service, [facility] TEAM [address] [place] [address]  Securely message with the Vocera Web Console (learn more here)  Text page via [employer] Paging/Directory   Please see signed in provider for up to date coverage information      Clinically Significant Risk Factors        # Hyperkalemia: Highest K = 5.7 mmol/L in last 2 days, will monitor as appropriate       # Hypoalbuminemia: Lowest albumin = 3 g/dL at 12/4/2022  5:50 AM, will monitor as appropriate          # DMII: A1C = 6.7 % (Ref range: <5.7 %) within past 3 months   # Severe Obesity: Estimated body mass index is 68.31 kg/m  as calculated from the following:    Height as of this encounter: 1.575 m (5' 2\").    Weight as of this encounter: 169.4 kg (373 lb 7.4 oz).   # Moderate Malnutrition: based on nutrition assessment        ______________________________________________________________________    Interval History     No acute events overnight. Feels like her breathing is maybe now a little congested today however her lungs sound found. Restarting bumex at a lower dose. No other new " issues.    Four point ROS completed and negative unless listed above     Data reviewed today: I reviewed all medications, new labs and imaging results over the last 24 hours.    Physical Exam   Vital Signs: Temp: 98.1  F (36.7  C) Temp src: Oral BP: 113/68 Pulse: 66   Resp: 20 SpO2: 94 % O2 Device: None (Room air) Oxygen Delivery: 4 LPM  Weight: 373 lbs 7.35 oz  General Appearance:  Resting comfortably in bed. NAD.  Respiratory: Difficult to appreciate breath sounds bilaterally likely due to body habitus. No wheezing or crackles noted.  Cardiovascular: RRR. Normal S1/S2. No murmurs.   GI: Soft, non-tender, non-distended. Normoactive bowel sounds.   Skin: No rash or lesions.   Other:  No peripheral edema.    Data   Recent Labs   Lab 12/17/22  0801 12/16/22  0727 12/15/22  0919 12/15/22  0633 12/12/22  1007 12/12/22  0716   WBC  --  7.2 6.0  --   --  7.0   HGB  --  12.8 13.5  --   --  14.1   MCV  --  100 97  --   --  100   PLT  --  166 191  --   --  243    137  --  138   < >  --    POTASSIUM 5.7* 5.5*  --  5.0   < >  --    CHLORIDE 96* 95*  --  98   < >  --    CO2 26 33*  --  31*   < >  --    BUN 45.5* 43.3*  --  35.4*   < >  --    CR 1.18* 1.49*  --  0.98*   < >  --    ANIONGAP 16* 9  --  9   < >  --    KADEN 9.0 9.0  --  9.5   < >  --    * 104*  --  109*   < >  --     < > = values in this interval not displayed.

## 2022-12-17 NOTE — PLAN OF CARE
"Goal Outcome Evaluation:      Plan of Care Reviewed With: patient    Overall Patient Progress: improving    Outcome Evaluation: BP improved and stable overnight. Remained stable on AVAPS machine when sleeping and on 3L NC while awake. Calls appropriatly to use bedpan. Will plan on restarting bumex today at lower dose (per MD note).    /65 (BP Location: Right arm)   Pulse 65   Temp 98.1  F (36.7  C) (Oral)   Resp 20   Ht 1.575 m (5' 2\")   Wt (!) 169.4 kg (373 lb 7.4 oz)   SpO2 94%   BMI 68.31 kg/m      "

## 2022-12-17 NOTE — PROGRESS NOTES
[facility]  Internal Medicine Extended Progress Note    Date of Admission: 12/17/2022  Hospital Discharge Team: Alireza Knowles         Assessment and Plan:   Arianna Siddiqi is a 62 year old woman with a history of Arianna Siddiqi is a 62 year old female with a history of morbid obesity, CARLOS, hypertension, HFpEF, paroxysmal A. Fib (on apixiban), MAG, factor IV deficiency and CKD who was initially admitted to the ICU on 11/18 for hypercapnic respiratory failure likely due to pneumonia, necessitating intubation. 11/23 extubated to BiPAP due to chronic CO2 retention. Now with concerns for RV heart failure, pulmonary hypertension, volume overload, off pressors since 11/29 and transferred to general medicine 11/30. Readmitted to ICU 12/2 PM for worsening hypercapnia and acute encephalopathy with concern for need for intubation. However, mental status improved with better fitting BiPAP able to avoid intubation, although remains significantly hypercapnic. Transferred back to general medicine 12/3 for ongoing diuresis, respiratory support and goals of care conversations.    # Acute hypoxic and hypercarbic respiratory failure   # Pulmonary Hypertension  # Pulmonary edema/fluid overload   # CARLOS on CPAP  Patient extubated to BiPAP 11/23. CT PE/CAP 11/26/22 unremarkable this admission. Suspect likely due to atelectasis with underlying obesity hypoventilation contributing. Pt does not have any family locally that could bring in her home BIPAP. Does not use home oxygen.   - Wean O2 as tolerated- currently requiring 4 L   - suspect will improve with ongoing therapy  - encourage deep breathing  - Incentive spirometry q2h WA  - BIPAP with home settings - RT to supply when one becomes available. O2 4 liters at night until bipap available    - Albuterol INH every 6 hours PRN wheezing    # Hx HFpEF (EF 60 to 65% per echo on 11/26/2022)    # Decompensated right heart failure  # Fluid overload  # Pulmonary  hypertension  # Hypotension-resolved  Patient's [place]-Juaquin catheter measurements while in ICU consistent with volume overload and pulmonary hypertension.   Elevated BNP.  Normal cardiac output per swan Juaquin catheter (no suspicion for shunts which would artificially elevate these measurements). Known history of CARLOS and HFpEF. 11/25 BLE duplex US showed no above knee DVT. Became hypotensive 12/11 when sitting to standing. Hypovolemic due to overdiuresis. Recurred 12/15. TTE with collapsible IVC and RA pressure of 3, suggesting volume depletion. Tenuous volume status in the setting of HFpEF. Improved with 500 ml fluid bolus  - Bumex 1 mg (PTA 2 mg) per day- resumed 12/17  - continue PTA metoprolol succ  - Lisinopril restarted 12/14. Initially held on admission   - Daily weights, strict I and Ox   - Outpatient cardiology referral at discharge      # Non-oliguric Acute on chronic kidney injury   # Metabolic alkalosis-presumed contraction alkalosis vs chronic hypoventilation syndrome  # Hx CKD (baseline creatinine 1.1)  Likely a component of venous congestion due to fluid overload. Cr bumped to 1.5 today, given dizziness and hypotension last night, perhaps pre-renal. S/p 500 ml LR.  - Volume management per above  - BMP M,Th     # Vaginal Bleeding  Noted to have vaginal bleeding 12/6 during nursing cares (bright red blood on purewick), Hgb stable. Hx s/f acute onset vaginal bleeding on 11/12/21 s/p IUD placement as w/u negative for endometrial and cervical cancer. Discussed with gyn. Pelvic US with confirmation of IUD in correct spot.   - Outpatient OBgyn referral at discharge from TCU  - CBC M,Th     # Acute encephalopathy - resolved  Iso critical illness and ICU delirium, exacerbated by hypercapnia   - PRN acetaminophen  - Melatonin 6 mg q HS  - discontinue PTA cyclobenzaprine, tramadol and benadryl     # Hx PAF (on apixaban PTA)  Presently in sinus rhythm.   - continue PTA amiodarone 200 mg daily    - PTA apixaban  dose      # Hyperkalemia   # Hypomagnesia - resolved   Potassium 5.7 and Mg 2.2. Hx of CKD and Cr elevated   - BMP and magnesium M, Th  - Electrolyte replacement protocol  - discontinue potassium daily supplement     # CAP - resolved  Status post Ceftiaxone 7 day course (11/18-11/24).  Zosyn empiric treatment for VAP/HAP (11/25-11/30)     # Hx MAG  # Hx Factor VII deficiency  Seen outpatient by hematology has a history oral/IV iron supplementation  - PTA ferrous gluconate 324 mg   - PTA apixaban   - CBC M, Th     #Hx Gout  #Hx hemosiderin deposition BLE  11/18 uric acid 7.9.  - PTA allopurinol reduced to 100 mg given poor renal function     # Oral thrush   - Nystatin QID discontinued on 12/13     # Risk for malnutrition  # Hypoalbuminemia  # Morbid obesity  # Nausea and vomiting   # Constipation-resolved  # Diarrhea   - RD following  - Compazine and Zofran PRN  - Bowel regimen: Senna and Miralax PRN   - Regular diet     # Goals of Care  ICU consulted palliative care 11/30 to begin discussing pt's goals moving forward. Given her chronic lung disease 2/2 CARLOS, OHS, pHTN as well as her HFpEF with decompensated RV failure recurrent acute hypercarbic respiratory failure likely to occur with any pulmonary insult like another viral or bacterial. After transfer back to ICU 12/2 she began thinking about whether she would want to be re-intubated but wanted to talk with her family before deciding. She does not want a trach and we discussed the worry that if she ever were re-intubated there is a high likelihood we would not be able to extubate her without placing a trach.   - Palliative care consult, appreciate assistance  - Transitioned to DNI 12/3 after d/w palliative, wants to speak with her nieces prior to deciding to be no CPR   - Referral to palliative on DC per pt request  - her goals are fully restorative knowing she has significant recovery needs to achieve her prior level of independence (not requiring any assitance at  "home)       The above was discuss with patient and and attending physician, Dr. Graff who agrees with the above    CODE: DNI  Diet: Regular  DVT: DOAC  Indication for Psychotropic Medications: none  Pneumococcal Vaccination Status:   Disposition: Home with family     ALMA Zuleta [facility]   Hospitalist Service  Pager: [phone]           Chief Complaint:   \"I did not sleep well last night\"         HPI:   Arianna Siddiqi is a 62 year old woman with a history of Arianna Siddiqi is a 62 year old female with a history of morbid obesity, CARLOS, hypertension, HFpEF, paroxysmal A. Fib (on apixiban), MAG, factor IV deficiency and CKD who was initially admitted to the ICU on 11/18 for hypercapnic respiratory failure likely due to pneumonia, necessitating intubation. 11/23 extubated to BiPAP due to chronic CO2 retention. Now with concerns for RV heart failure, pulmonary hypertension, volume overload, off pressors since 11/29 and transferred to general medicine 11/30. Readmitted to ICU 12/2 PM for worsening hypercapnia and acute encephalopathy with concern for need for intubation. However, mental status improved with better fitting BiPAP able to avoid intubation, although remains significantly hypercapnic. Transferred back to general medicine 12/3 for ongoing diuresis, respiratory support and goals of care conversations.    Pt states she did not sleep well last night without a bipap. She has family in [place]. No one local that could bring in home machine. She does not like the NC and felt \"like it was strangling\" her. Discussed different options for tonight like an oxymizer or oxymask. Requested a bariatric chair to sit in. Unable to ambulate long distances yet. Has a good appetite. No other concerns or questions.   Denies shortness of breath, chest pain, dyspnea, lightheadedness, dizziness, numbness, tingling. Denies n/v/d, constipation, abdominal pain. Denies joint pain, swelling, increased warmth. Denies feelings of anxiety or " depression.          Review of Systems:   A 10 point ROS was performed and negative unless otherwise noted in HPI.          Past Medical History:     I have reviewed this patient's medical history and updated it with pertinent information if needed.   Past Medical History:   Diagnosis Date     CHF (congestive heart failure) (H)      CKD (chronic kidney disease)      Compliance poor      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     Diabetes mellitus (H)      Gout      Hypertension      Insomnia      Morbid obesity (H)      CARLOS treated with BiPAP              Past Surgical History:     I have reviewed this patient's surgical history and updated it with pertinent information if needed.  Past Surgical History:   Procedure Laterality Date     EXAM UNDER ANESTHESIA PELVIC N/A 11/14/2021    Procedure: Exam Under Anesthesia, Dilation and curettage,  placement of intrauterine device, pap smear;  Surgeon: Deedee Hess MD;  Location:  OR             Social History:            Family History:     I have reviewed this patient's family history and updated it with pertinent information if needed.   Family History   Adopted: Yes   Family history unknown: Yes            Allergies:      Allergies   Allergen Reactions     Penicillins Itching and Rash     Tolerated ceftriaxone 1/17/2020  Tolerated Zosyn 11/2022            Medications:     Cannot display prior to admission medications because the patient has not been admitted in this contact.             Physical Exam:   There were no vitals taken for this visit.  General Appearance: Alert and oriented x3, mild distress   HEENT: Anicteric sclera, MMM   Respiratory: Breathing comfortably on room air, lungs CTAB without wheezing or crackles   Cardiovascular: RRR, S1, S2. No murmur noted  GI: Abdomen soft, non-tender with active bowel sounds. No guarding or rebound  Lymph/Hematologic: No peripheral edema, distal pulses palpable    Skin: No rash or jaundice   Musculoskeletal: Moves all extremities   Neurologic: No focal deficits, CN II-XII grossly intact

## 2022-12-17 NOTE — PROGRESS NOTES
Care Management Discharge Note    Discharge Date: 12/17/2022       Discharge Disposition: Transitional Care    Discharge Services: Transportation and Rehab    Discharge DME: None    Discharge Transportation: [facility] Stretch Ride @ 5:00pm    Private pay costs discussed: transportation costs    PAS Confirmation Code: [id number]  Patient/family educated on Medicare website which has current facility and service quality ratings: yes    Education Provided on the Discharge Plan:  yes  Persons Notified of Discharge Plans: Pt, pts alok, bedside rn, charge rn, resident  Patient/Family in Agreement with the Plan: yes    Handoff Referral Completed: Yes    Additional Information:  SW was contacted by Komal from  TCU asking if pt was still medically ready for discharge. SW confirmed with resident that pt is medically ready to discharge to TCU. Komal stated they had a bed available and asked SW to set up a stretcher ride for 5:00pm. SW notified bedside rn and requested stat COVID test. SW met with pt at bedside who is agreeable to discharge plan and also reviewed and signed IMM. SW completed PCS form and placed in pts chart. SW notified pts Emelia dickinson, of discharge plans and provided her the phone number for  TCU. Emelia denied having any questions or concerns regarding discharge plan.     SW will continue following for any remaining discharge needs.       TAMMY Osman, CLAUDIA  12/17/2022  Weekend      Social Work and Care Management Department       SEARCHABLE in [employer] - search SOCIAL WORK       [place] (0800 - 1630) Saturday and Sunday     Units: 4A, 4C, & 4E Pager: [phone]     Units: 5A & 5B Pager: [phone]     Units: 6A & 6B   Pager: [phone]     Units: 6C & 6D Pager: [phone]     Units: 7A &7B  Pager: [phone]     Units: 7C, 7D, & 5C Pager: [phone]     Unit: [place] ED Pager: [phone]      [facility] [place] ([id number]) Saturday and Sunday      Units: 5 Ortho, 5  Med/Surg & WB ED  Pager:[phone]     Units: 6 Med/Surg, 8A, & 10A ICU  Pager: [phone]        After hours (1630 - 0000) Saturday & Sunday; ([id number]) Mon-Fri; ([phone]) [facility] Recognized Holidays     Units: ALL  Pager: [phone]

## 2022-12-18 NOTE — PROGRESS NOTES
CLINICAL NUTRITION SERVICES - ASSESSMENT NOTE     Nutrition Prescription    RECOMMENDATIONS FOR MDs/PROVIDERS TO ORDER:  None at this time    Malnutrition Status:    Unable to determine    Recommendations already ordered by Registered Dietitian (RD):  Please send strawberry ensure max at 10 am snack time    Future/Additional Recommendations:  Monitor patient weight, intakes  Monitor patient tolerance to supplement     REASON FOR ASSESSMENT  Arianna Siddiqi is a/an 62 year old female assessed by the dietitian for Nutrition risk screen- Positive and Provider Order - hyperkalemia, malnutrition    NUTRITION HISTORY  Patient is a 62 year old female who presents with respiratory failure secondary to fluid overload, prev intubated, morbid obesity, HTN, CARLOS, HFpeF, Afib , MAG, Factor IV , CKD , CAP. Patient last BM noted 12/16. Patient last seen by RD on 12/12; requesting ensure max strawberry @ 10 am. Pt reported her appetite to be improving at last RD visit. RD reentered supplement orders for patient.    CURRENT NUTRITION ORDERS  Diet: Orders Placed This Encounter      Regular Diet Adult      Intake/Tolerance: No recorded intakes in patient chart; per last RD note patient taking 25-50-75% of meals     LABS  Labs reviewed    MEDICATIONS  Medications reviewed    ANTHROPOMETRICS  Height: 0 cm (Data Unavailable)  Most Recent Weight: (!) 175.3 kg (386 lb 7.5 oz)    IBW: 50 kg  BMI: Obesity Grade III BMI >40  Weight History:   Wt Readings from Last 25 Encounters:   12/17/22 (!) 175.3 kg (386 lb 7.5 oz)   12/16/22 (!) 169.4 kg (373 lb 7.4 oz)   11/15/21 (!) 169.1 kg (372 lb 12.8 oz)   05/13/20 (!) 158.3 kg (349 lb)   05/04/20 (!) 158.8 kg (350 lb)   04/03/20 (!) 158.8 kg (350 lb)   02/21/20 (!) 163.2 kg (359 lb 11.2 oz)   02/15/20 (!) 159.2 kg (351 lb)   02/09/20 (!) 172.3 kg (379 lb 14.4 oz)   01/27/20 (!) 170.6 kg (376 lb 1.6 oz)   12/12/17 (!) 182.5 kg (402 lb 6.4 oz)   10/19/16 (!) 159.3 kg (351 lb 1.6 oz)   10/09/16 (!) 163.9  kg (361 lb 6.4 oz)   10/05/16 (!) 162 kg (357 lb 1.6 oz)   09/23/16 (!) 167.5 kg (369 lb 4.3 oz)       Dosing Weight: 50 kg IDW used    ASSESSED NUTRITION NEEDS  Estimated Energy Needs: 1,100-1,250 kcals/day (22-25 kcal/kg of IDW)  Justification: Obese  Estimated Protein Needs: 100-125 grams protein/day (2 - 2.5 grams of pro/kg)  Justification: Obesity guidelines  Estimated Fluid Needs:  1,100-1,250 mL/day (1 mL/kcal)   Justification: Per provider pending fluid status    PHYSICAL FINDINGS  See malnutrition section below.    MALNUTRITION  % Intake: Decreased intake does not meet criteria  % Weight Loss: Unable to assess  Subcutaneous Fat Loss: Unable to assess  Muscle Loss: Unable to assess  Fluid Accumulation/Edema: Moderate  Malnutrition Diagnosis: Unable to determine due to RD covering remotely; lack of NFPA and recent intake data.    NUTRITION DIAGNOSIS  Predicted inadequate oral intakes related to previous decreased appetite vs increased needs in acute illness AEB pt slow to improve appetite.     INTERVENTIONS  Implementation  Collaboration with other providers  Medical food supplement therapy     Goals  Patient to consume % of nutritionally adequate meal trays TID, or the equivalent with supplements/snacks.     Monitoring/Evaluation  Progress toward goals will be monitored and evaluated per protocol.    Dora Cruz RDN, ABRIL  Clinical Dietitian  Office: [phone]  Weekend pager: [phone]

## 2022-12-18 NOTE — PHARMACY-ADMISSION MEDICATION HISTORY
Please see Admission Medication History completed on 11/24/22 under previous encounter at [facility] for information regarding prior to admission medications.     Patient had cyclobenzaprine, tramadol, and Benadryl stopped during previous admission.    Juliette Quintero, KerwinD

## 2022-12-18 NOTE — PLAN OF CARE
Arianna Siddiqi   RM: [phone]  New admit, w/ Resp. Failure/CARLOS , on BIPAP at bedtime, can we have order.?  Thanks.   Noemi PRUITT U  [phone]    2142H: Order for BIPAP at bedtime placed by Dr. Anthony.

## 2022-12-18 NOTE — H&P
H&P by Ángela Graff MD was reviewed. I agree with assessment and plan as documented.       Grisel Castillo, CNP, APRN  Internal Medicine [facility]  Pager [phone]

## 2022-12-18 NOTE — H&P
[facility]    History and Physical - Hospitalist Service       Date of Admission:  12/17/2022    Assessment & Plan   Arianna Siddiqi is a 62 year old woman with a history of Arianna Siddiqi is a 62 year old female with a history of morbid obesity, CARLOS, hypertension, HFpEF, paroxysmal A. Fib (on apixiban), MAG, factor IV deficiency and CKD who was initially admitted to the ICU on 11/18 for hypercapnic respiratory failure likely due to pneumonia, necessitating intubation. 11/23 extubated to BiPAP due to chronic CO2 retention. Now with concerns for RV heart failure, pulmonary hypertension, volume overload, off pressors since 11/29 and transferred to general medicine 11/30. Readmitted to ICU 12/2 PM for worsening hypercapnia and acute encephalopathy with concern for need for intubation. However, mental status improved with better fitting BiPAP able to avoid intubation, although remains significantly hypercapnic. Transferred back to general medicine 12/3 for ongoing diuresis, respiratory support and goals of care conversations.     # Acute hypoxic and hypercarbic respiratory failure , currently on 2l of oxygen via NC.   # Pulmonary Hypertension  # Pulmonary edema/fluid overload   # CARLOS on CPAP, RT consulted for home CPAP setting   Patient extubated to BiPAP 11/23. CT PE/CAP 11/26/22 unremarkable this admission. Suspect likely due to atelectasis with underlying obesity hypoventilation contributing. Pt does not have any family locally that could bring in her home BIPAP. Does not use home oxygen.   - Wean O2 as tolerated- currently requiring 4 L   - suspect will improve with ongoing therapy  - encourage deep breathing  - Incentive spirometry q2h WA  - BIPAP with home settings - RT to supply when one becomes available. O2 4 liters at night until bipap available    - Albuterol INH every 6 hours PRN wheezing     # Hx HFpEF (EF 60 to 65% per echo on 11/26/2022)    # Decompensated right heart failure  # Fluid  overload  # Pulmonary hypertension  # Hypotension-resolved  Patient's Troup-Juaquin catheter measurements while in ICU consistent with volume overload and pulmonary hypertension.   Elevated BNP.  Normal cardiac output per swan Juaquin catheter (no suspicion for shunts which would artificially elevate these measurements). Known history of CARLOS and HFpEF. 11/25 BLE duplex US showed no above knee DVT. Became hypotensive 12/11 when sitting to standing. Hypovolemic due to overdiuresis. Recurred 12/15. TTE with collapsible IVC and RA pressure of 3, suggesting volume depletion. Tenuous volume status in the setting of HFpEF. Improved with 500 ml fluid bolus  - Bumex 1 mg (PTA 2 mg) per day- resumed 12/17  - continue PTA metoprolol succ  - Lisinopril restarted 12/14. Initially held on admission   - Daily weights, strict I and Ox   - Outpatient cardiology referral at discharge      # Non-oliguric Acute on chronic kidney injury   # Metabolic alkalosis-presumed contraction alkalosis vs chronic hypoventilation syndrome  # Hx CKD (baseline creatinine 1.1)  Likely a component of venous congestion due to fluid overload. Cr bumped to 1.5 today, given dizziness and hypotension last night, perhaps pre-renal. S/p 500 ml LR.  - Volume management per above  - BMP M,Th     # Vaginal Bleeding  Noted to have vaginal bleeding 12/6 during nursing cares (bright red blood on purewick), Hgb stable. Hx s/f acute onset vaginal bleeding on 11/12/21 s/p IUD placement as w/u negative for endometrial and cervical cancer. Discussed with gyn. Pelvic US with confirmation of IUD in correct spot.   - Outpatient OB/gyn referral at discharge from TCU  - CBC M,Th     # Acute encephalopathy - resolved  Iso critical illness and ICU delirium, exacerbated by hypercapnia   - PRN acetaminophen  - Melatonin 6 mg q HS  - discontinue PTA cyclobenzaprine, tramadol and benadryl     # Hx PAF (on apixaban PTA)  Presently in sinus rhythm.   - continue PTA amiodarone 200 mg daily     - PTA apixaban dose      # Hyperkalemia probably due to CKD and decreased renal excretion, ordered bmp, will monitor   # Hypomagnesia - resolved   Potassium 5.7 and Mg 2.2. Hx of CKD and Cr elevated   - BMP and magnesium M, Th  - Electrolyte replacement protocol  - discontinue potassium daily supplement     # CAP - resolved  Status post Ceftiaxone 7 day course (11/18-11/24).  Zosyn empiric treatment for VAP/HAP (11/25-11/30)     # Hx MAG  # Hx Factor VII deficiency  Seen outpatient by hematology has a history oral/IV iron supplementation  - PTA ferrous gluconate 324 mg   - PTA apixaban   - CBC M, Th     #Hx Gout  #Hx hemosiderin deposition BLE  11/18 uric acid 7.9.  - PTA allopurinol reduced to 100 mg given poor renal function     # Oral thrush   - Nystatin QID discontinued on 12/13     # Risk for malnutrition  # Hypoalbuminemia  # Morbid obesity  # Nausea and vomiting   # Constipation-resolved  # Diarrhea   - RD following  - Compazine and Zofran PRN  - Bowel regimen: Senna and Miralax PRN   - Regular diet     # Goals of Care  ICU consulted palliative care 11/30 to begin discussing pt's goals moving forward. Given her chronic lung disease 2/2 CARLOS, OHS, pHTN as well as her HFpEF with decompensated RV failure recurrent acute hypercarbic respiratory failure likely to occur with any pulmonary insult like another viral or bacterial. After transfer back to ICU 12/2 she began thinking about whether she would want to be re-intubated but wanted to talk with her family before deciding. She does not want a trach and we discussed the worry that if she ever were re-intubated there is a high likelihood we would not be able to extubate her without placing a trach.   - Palliative care consult, appreciate assistance  - Transitioned to DNI 12/3 after d/w palliative, wants to speak with her nieces prior to deciding to be no CPR   - Referral to palliative on DC per pt request  - her goals are fully restorative knowing she has  significant recovery needs to achieve her prior level of independence (not requiring any assitance at home)      CODE: DNI  Diet: Regular  DVT: DOAC  Indication for Psychotropic Medications: none  Pneumococcal Vaccination Status:   Disposition: Home with family        Clinically Significant Risk Factors Present on Admission        # Hyperkalemia: Highest K = 5.7 mmol/L in last 2 days, will monitor as appropriate        # Drug Induced Coagulation Defect: home medication list includes an anticoagulant medication    # Hypertension: home medication list includes antihypertensive(s)     # DMII: A1C = 6.7 % (Ref range: <5.7 %) within past 3 months            Disposition Plan home     Expected Discharge Date: 12/23/2022      Destination: home with help/services          The patient's care was discussed with the Patient.    Ángela Graff MD  Hospitalist Service  [facility]  Securely message with the Vocera Web Console (learn more here)  Text page via LogRhythm Paging/Directory       _____________________________________________________________________    Chief Complaint   Generalized weakness     History is obtained from the patient    History of Present Illness   Arianna Siddiqi is a 62 year old woman with a history of Arianna Siddiqi is a 62 year old female with a history of morbid obesity, CARLOS, hypertension, HFpEF, paroxysmal A. Fib (on apixiban), MAG, factor IV deficiency and CKD who was initially admitted to the ICU on 11/18 for hypercapnic respiratory failure likely due to pneumonia, necessitating intubation. 11/23 extubated to BiPAP due to chronic CO2 retention. Now with concerns for RV heart failure, pulmonary hypertension, volume overload, off pressors since 11/29 and transferred to general medicine 11/30. Readmitted to ICU 12/2 PM for worsening hypercapnia and acute encephalopathy with concern for need for intubation. However, mental status improved with better fitting BiPAP able to avoid  "intubation, although remains significantly hypercapnic. Transferred back to general medicine 12/3 for ongoing diuresis, respiratory support and goals of care conversations.     Pt states she did not sleep well last night without a bipap. She has family in [place]. No one local that could bring in home machine. She does not like the NC and felt \"like it was strangling\" her. Discussed different options for tonight like an oxymizer or oxymask. Requested a bariatric chair to sit in. Unable to ambulate long distances yet. Has a good appetite. No other concerns or questions.   Denies shortness of breath, chest pain, dyspnea, lightheadedness, dizziness, numbness, tingling. Denies n/v/d, constipation, abdominal pain. Denies joint pain, swelling, increased warmth. Denies feelings of anxiety or depression.   Review of Systems    The 10 point Review of Systems is negative other than noted in the HPI or here. Denies any SOB, no chest pain. Does not use oxygen at home.     Past Medical History    I have reviewed this patient's medical history and updated it with pertinent information if needed.   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  Past Surgical History:   Procedure Laterality Date     EXAM UNDER ANESTHESIA PELVIC N/A 11/14/2021    Procedure: Exam Under Anesthesia, Dilation and curettage,  placement of intrauterine device, pap smear;  Surgeon: Deedee Hess MD;  Location: [facility] OR       Social History   I have reviewed this patient's social history and updated it with pertinent information " if needed.  Social History     Tobacco Use     Smoking status: Former     Packs/day: 1.50     Years: 20.00     Pack years: 30.00     Types: Cigarettes     Quit date: 2002     Years since quittin.9     Smokeless tobacco: Never   Substance Use Topics     Alcohol use: No     Alcohol/week: 0.0 standard drinks     Drug use: No       Family History   No significant pertinent family history  Prior to Admission Medications   Prior to Admission Medications   Prescriptions Last Dose Informant Patient Reported? Taking?   Multiple Vitamins-Minerals (MULTIVITAMIN WOMEN PO)   Yes No   acetaminophen (TYLENOL) 500 MG tablet  Pharmacy No No   Sig: Take 1-2 tablets (500-1,000 mg) by mouth every 4 hours as needed for mild pain   allopurinol (ZYLOPRIM) 100 MG tablet   No No   Sig: Take 2 tablets (200 mg) by mouth daily *Monitoring lab - Uric Acid level every 12 months. You will not receive further refills until lab is scheduled. [phone]   alum & mag hydroxide-simethicone (MAALOX  ES) 400-400-40 MG/5ML SUSP suspension  Pharmacy No No   Sig: Take 15 mLs by mouth every 4 hours as needed for other (gastric distress.)   amiodarone (PACERONE) 200 MG tablet   No No   Si tablet (200 mg) by Oral or Feeding Tube route daily   apixaban ANTICOAGULANT (ELIQUIS ANTICOAGULANT) 5 MG tablet   No No   Sig: Take 1 tablet (5 mg) by mouth 2 times daily   atorvastatin (LIPITOR) 20 MG tablet   No No   Sig: Take 1 tablet (20 mg) by mouth daily   blood glucose monitoring (ACCU-CHEK NINA PLUS) meter device kit  Pharmacy No No   Sig: Use to test blood sugars 3 times daily or as directed.   blood glucose monitoring (SOFTCLIX) lancets  Pharmacy No No   Sig: Use to test blood sugar 3 times daily or as directed.   bumetanide (BUMEX) 1 MG tablet   No No   Sig: Take 1 tablet (1 mg) by mouth daily   empagliflozin (JARDIANCE) 10 MG TABS tablet   No No   Sig: Take 1 tablet (10 mg) by mouth daily   ferrous gluconate (FERGON) 324 (38 Fe) MG tablet   No  No   Sig: Take 1 tablet (324 mg) by mouth daily   lisinopril (ZESTRIL) 5 MG tablet   No No   Sig: Take 1 tablet (5 mg) by mouth daily   melatonin 3 MG tablet   No No   Sig: Take 1 tablet (3 mg) by mouth nightly as needed for sleep   metoprolol succinate ER (TOPROL XL) 100 MG 24 hr tablet   No No   Sig: Take 1 tablet (100 mg) by mouth daily   miconazole (MICRO GUARD) 2 % external powder   No No   Sig: Apply topically as needed for itching or other APPLY TO AFFECTED AREA TWICE A DAY *NOT COVERED*   ondansetron (ZOFRAN ODT) 4 MG ODT tab   No No   Sig: Take 1 tablet (4 mg) by mouth every 8 hours as needed for nausea or vomiting   polyethylene glycol (MIRALAX) packet  Pharmacy No No   Sig: Take 17 g by mouth daily as needed for constipation   sennosides (SENOKOT) 8.6 MG tablet  Pharmacy No No   Sig: Take 1 tablet by mouth 2 times daily as needed for constipation   triamcinolone (KENALOG) 0.1 % external cream   No No   Sig: Apply topically 2 times daily APPLY TO AFFECTED AREA      Facility-Administered Medications: None     Allergies   Allergies   Allergen Reactions     Penicillins Itching and Rash     Tolerated ceftriaxone 1/17/2020  Tolerated Zosyn 11/2022       Physical Exam   Vital Signs: Temp: (!) 96  F (35.6  C) Temp src: Oral BP: 105/58 Pulse: 66   Resp: 16 SpO2: 96 % O2 Device: Nasal cannula Oxygen Delivery: 3 LPM  Weight: 386 lbs 7.46 oz    General Appearance: Alert and oriented x3, mild distress   HEENT: Anicteric sclera, MMM   Respiratory: Breathing comfortably on room air, lungs CTAB without wheezing or crackles   Cardiovascular: RRR, S1, S2. No murmur noted  GI: Abdomen soft, non-tender with active bowel sounds. No guarding or rebound  Lymph/Hematologic: No peripheral edema, distal pulses palpable   Skin: No rash or jaundice   Musculoskeletal: Moves all extremities   Neurologic: No focal deficits, CN II-XII grossly intact  Data   Data reviewed today: I reviewed all medications, new labs and imaging results  over the last 24 hours. I personally reviewed no images or EKG's today.    Recent Labs   Lab 12/17/22  0801 12/16/22  0727 12/15/22  0919 12/15/22  0633 12/12/22  1007 12/12/22  0716   WBC  --  7.2 6.0  --   --  7.0   HGB  --  12.8 13.5  --   --  14.1   MCV  --  100 97  --   --  100   PLT  --  166 191  --   --  243    137  --  138   < >  --    POTASSIUM 5.7* 5.5*  --  5.0   < >  --    CHLORIDE 96* 95*  --  98   < >  --    CO2 26 33*  --  31*   < >  --    BUN 45.5* 43.3*  --  35.4*   < >  --    CR 1.18* 1.49*  --  0.98*   < >  --    ANIONGAP 16* 9  --  9   < >  --    KADEN 9.0 9.0  --  9.5   < >  --    * 104*  --  109*   < >  --     < > = values in this interval not displayed.     Most Recent 3 CBC's:Recent Labs   Lab Test 12/16/22  0727 12/15/22  0919 12/12/22  0716   WBC 7.2 6.0 7.0   HGB 12.8 13.5 14.1    97 100    191 243     Most Recent 3 BMP's:Recent Labs   Lab Test 12/17/22  0801 12/16/22  0727 12/15/22  0633    137 138   POTASSIUM 5.7* 5.5* 5.0   CHLORIDE 96* 95* 98   CO2 26 33* 31*   BUN 45.5* 43.3* 35.4*   CR 1.18* 1.49* 0.98*   ANIONGAP 16* 9 9   KADEN 9.0 9.0 9.5   * 104* 109*     Most Recent 2 LFT's:Recent Labs   Lab Test 12/05/22  0522 12/04/22  0550   AST 33 58*   ALT 16 24   ALKPHOS 99 93   BILITOTAL 0.6 0.5     Most Recent 3 INR's:Recent Labs   Lab Test 12/09/22  0658 12/08/22  0551 12/07/22  0537   INR 1.35* 1.40* 1.37*     Most Recent TSH and T4:Recent Labs   Lab Test 11/11/21  2335   TSH 2.07     Most Recent Urinalysis:Recent Labs   Lab Test 11/25/22  1818   COLOR Yellow   APPEARANCE Clear   URINEGLC Negative   URINEBILI Negative   URINEKETONE Negative   SG 1.017   UBLD Negative   URINEPH 5.0   PROTEIN 10*   NITRITE Negative   LEUKEST Negative   RBCU 5*   WBCU 1     N/A    \    N/A    /    N/A   N N/A    L N/A    N/A    N/A    N/A /   ------------------------------------ N/A   ALT N/A   AST N/A   AP N/A   ALB N/A   Ca N/A  N/A    N/A    N/A \    % RETIC N/A     LDH N/A  Troponin N/A    BNP N/A    CK N/A  INR N/A   PTT N/A    D-dimer N/A    Fibrinogen N/A    Antithrombin N/A  Ferritin N/A  CRP N/A    IL-6 N/A  No results found for this or any previous visit (from the past 24 hour(s)).

## 2022-12-18 NOTE — PLAN OF CARE
Patient is a 62 year old female  admitted to room 411.  Patient is alert and oriented X 4. See Epic for VS and assessment.  Patient is able to transfer with lift device /assist x 2. Patient was settled into their room, shown call light, tv, mealtimes etc. Oriented to unit. Will continue monitoring pain level and VS. Notifying MD with any concerns.  Follow MD orders for cares and medications.    Level of Schooling:technical  Ethnicity:Choose not to answer  Marital Status:  Dentures: No  Hearing Aid: No  Smoker:  No, quit 20 years ago  Glasses: Yes, for reading   Occupation:Retired   Falls 0-1 mo: 0 2-6 mo:0  Stairs prior function: Independent  Prior device use: Standard walker ( has 3 at home)  Advanced Care Directive Referral to Social Work?No

## 2022-12-18 NOTE — PROGRESS NOTES
12/18/22 1321   Appointment Info   Signing Clinician's Name / Credentials (PT) Adelia Dwyer DPT   Quick Adds   Quick Adds Certification       Present no   Living Environment   People in Home alone   Current Living Arrangements apartment  (3rd floor, elevators)   Home Accessibility no concerns   Transportation Anticipated health plan transportation   Living Environment Comments Lives in an apartment w/ elevator access in [place]. Niece and great niece in area and pt reports she would be able to ask for their assistance as needed.   Self-Care   Usual Activity Tolerance fair   Current Activity Tolerance poor   Regular Exercise Yes   Activity/Exercise Type walking  (walks around apartment)   Exercise Amount/Frequency 3-5 times/wk   Equipment Currently Used at Home walker, standard;other (see comments);shower chair  (three standard walkers)   Fall history within last six months no   Activity/Exercise/Self-Care Comment Pt reports limited activity at baseline with apartment mostly. IND w/ mobility and ADLs, will use FWW on occasion pending how she feels. Mostly gets limited by SOB. Hires help for laundry, cleaning and groceries.   General Information   Onset of Illness/Injury or Date of Surgery 12/17/22   Referring Physician Dr. Graff   Patient/Family Therapy Goals Statement (PT) to improve mobility to be able to go home   Pertinent History of Current Problem (include personal factors and/or comorbidities that impact the POC) admitted 2/2 hypercarbic respiratory failure, pulm HTN, fluid overload. history of morbid obesity, CARLOS, hypertension, HFpEF, paroxysmal A. Fib (on apixiban), MAG, factor IV deficiency and CKD   Existing Precautions/Restrictions fall;oxygen therapy device and L/min   Heart Disease Risk Factors Age;Medical history;Lack of physical activity;Overweight   General Observations 3LPM NC at rest   Cognition   Affect/Mental Status (Cognition) WNL   Orientation Status (Cognition)  oriented x 4   Follows Commands (Cognition) WNL   Pain Assessment   Patient Currently in Pain No   Integumentary/Edema   Integumentary/Edema Comments fibrotic edema, hard and indurated w/ sig hemosiderin staining in BLEs, appears baseline.   Posture    Posture Forward head position;Protracted shoulders;Kyphosis   Range of Motion (ROM)   ROM Comment WFL within soft tissue approximation in all joints   Strength (Manual Muscle Testing)   Strength Comments deconditioned but >3/5 for functional mobility in BLEs   Bed Mobility   Comment, (Bed Mobility) Shannon at trunk supine>sit w/ heavy use of bed rails. sit>supine modA of 2 to lift LEs   Transfers   Comment, (Transfers) Shannon of 2 from EOB to FWW   Gait/Stairs (Locomotion)   Comment, (Gait/Stairs) CGA w/ FWW 15'   Balance   Balance Comments SBA static sitting balance. CGA static standing balance at FWW. Needs heavy BUE support of FWW in all standing.   Sensory Examination   Sensory Perception patient reports no sensory changes   Coordination   Coordination no deficits were identified   Muscle Tone   Muscle Tone no deficits were identified   Clinical Impression   Criteria for Skilled Therapeutic Intervention Yes, treatment indicated   PT Diagnosis (PT) impaired functional mobility   Influenced by the following impairments medical status, comorbidities, activity tolerance, functional stregth   Functional limitations due to impairments decreased (I) w/ bed mobility, transfers, gait   Clinical Presentation (PT Evaluation Complexity) Evolving/Changing   Clinical Presentation Rationale current status, clinical judgement, PMH, home set up, medical comorbidities   Clinical Decision Making (Complexity) moderate complexity   Planned Therapy Interventions (PT) balance training;bed mobility training;gait training;home exercise program;patient/family education;strengthening;ROM (range of motion);transfer training;progressive activity/exercise;risk factor education;home program  guidelines   Anticipated Equipment Needs at Discharge (PT)   (TBD)   Risk & Benefits of therapy have been explained evaluation/treatment results reviewed;care plan/treatment goals reviewed;risks/benefits reviewed;current/potential barriers reviewed;participants voiced agreement with care plan;participants included;patient   Clinical Impression Comments Pt presents below functional baseline w/ deficits in activity tolerance, functional strength and balance. Will benefit from skilled PT to progress IND and facilitate safe home discharge.   PT Total Evaluation Time   PT Eval, Moderate Complexity Minutes (90536) 25   Therapy Certification   Start of care date 12/17/22   Certification date from 12/18/22   Certification date to 01/18/23   Medical Diagnosis see above for PMH and diagnosis   Physical Therapy Goals   PT Frequency 6x/week   PT Predicted Duration/Target Date for Goal Attainment 01/01/23   PT Goals Bed Mobility;Transfers;Gait;PT Goal 1;PT Goal 2;PT Goal 3   PT: Bed Mobility Supine to/from sit;Modified independent;Rolling;Bridging   PT: Transfers Modified independent;Sit to/from stand;Bed to/from chair;Assistive device   PT: Gait Modified independent;Assistive device;100 feet   PT: Perform aerobic activity with stable cardiovascular response 5 minutes;intermittent activity;ambulation   PT: Goal 1 Pt understanding of LE strenthening HEP to progress functional mobility independently.   PT: Goal 2 Pt SBA for car transfer to ensure community access.   Total Session Time   Total Session Time (sum of timed and untimed services) 25

## 2022-12-18 NOTE — PLAN OF CARE
Neuro:Patient is alert and oriented x4, able to make needs known. Uses call light appropriately. Denies numbness and tingling. Denies N/V.    Pain:Reported headache. Prn tylenol given with good effect.    CV:Denies dizziness and lightheadedness while in bed. Held Metoprolol and Lisinopril per ordered parameter. Generalized edema.     Pulmonary: Clear LS bilaterally. Infrequent non productive cough. Weaning O2.L/NC at the start of the shift. Down to 3L/NC O2 sats at 95%-97%. Humidifier ordered. Pt denies SOB, denies distress. IS given and encouraged pt to use it.  Contacted RT to bring BiPAP, was told hospital is out and will bring as soon as one is available. Per pt, pt uses own at home every night and no one is able to bring it from home. CN aware.     GI/: LBM 12/17/22 per pt. BS+, passing flatus. Pt denies s/s of constipation. Abdomen soft and non-tender. Voiding adequate amount. Using bedpan. Pt continue to have small Vignal bleeding, pt denies pain.     Skin: dry/cracked, scattered bruising. Old healing wound on R lowe shin. Foam dressing applied.     Activity: Up with assist of 2 and walker per PT. Pt did get up with PT this shift.     Replaced Potassium and magnesium per RN manage protocol.

## 2022-12-18 NOTE — PHARMACY-MEDICATION REGIMEN REVIEW
Pharmacy Medication Regimen Review  Arianna Siddiqi is a 62 year old female who is currently in the Transitional Care Unit.    Assessment: Upon review of the medications and patient chart the following irregularities were found: Medications without appropriate indications/durations: Consider adding a stop date for triamcinolone cream (2 week end date on 12/31/22) to avoid skin atrophy.  Other Recommendations: Patient was on melatonin 3 mg at bedtime PRN and daily renal multivitamin during previous admission, consider re-ordering for this admission.     Plan:   1. Consider adding a stop date for triamcinolone cream (2 week end date on 12/31/22) to avoid skin atrophy.  2. Patient was on melatonin 3 mg at bedtime PRN and daily renal multivitamin during previous admission, consider re-ordering for this admission.  3. Continue current medication plan.     Attending provider will be sent this note for review.  If there are any emergent issues noted above, pharmacist will contact provider directly by phone.      Pharmacy will periodically review the resident's medication regimen for any PRN medications not administered in > 72 hours and discontinue them. The pharmacist will discuss gradual dose reductions of psychopharmacologic medications with interdisciplinary team on a regular basis.    Please contact pharmacy if the above does not answer specific medication questions/concerns.  Juliette Quintero [facility] on 12/18/2022 at 12:08 PM    Background:  A pharmacist has reviewed all medications and pertinent medical history today.  Medications were reviewed for appropriate use and any irregularities found are listed with recommendations.      Current Facility-Administered Medications:      [START ON 12/20/2022] - Skin Test Reading -, , Does not apply, Q21 Days, Pennig, Grisel E, APRN CNP     acetaminophen (TYLENOL) tablet 500-1,000 mg, 500-1,000 mg, Oral, Q6H PRN, Pennig, Grisel E, APRN CNP, 1,000 mg at 12/18/22 1054     albuterol (PROVENTIL  HFA/VENTOLIN HFA) inhaler, 2 puff, Inhalation, Q6H PRN, Grisel Castillo APRN CNP     allopurinol (ZYLOPRIM) tablet 100 mg, 100 mg, Oral, Daily, Pippa Anthony MD, 100 mg at 12/18/22 1009     alum & mag hydroxide-simethicone (MAALOX) suspension 30 mL, 30 mL, Oral, Q4H PRN, Grisel aCstillo APRN CNP     amiodarone (PACERONE) tablet 200 mg, 200 mg, Oral or Feeding Tube, Daily, Grisel Castillo APRN CNP, 200 mg at 12/18/22 1009     apixaban ANTICOAGULANT (ELIQUIS) tablet 5 mg, 5 mg, Oral, BID, Grisel Castillo APRN CNP, 5 mg at 12/18/22 1009     atorvastatin (LIPITOR) tablet 20 mg, 20 mg, Oral, Daily, Grisel Castillo APRN CNP, 20 mg at 12/18/22 1009     bumetanide (BUMEX) tablet 1 mg, 1 mg, Oral, Daily, Pippa Anthony MD, 1 mg at 12/18/22 1008     empagliflozin (JARDIANCE) tablet 10 mg, 10 mg, Oral, Daily, Grisel Castillo APRN CNP, 10 mg at 12/18/22 1009     ferrous gluconate (FERGON) tablet 324 mg, 324 mg, Oral, Daily, Grisel Castillo APRN CNP, 324 mg at 12/18/22 1009     lisinopril (ZESTRIL) tablet 5 mg, 5 mg, Oral, Daily, Pippa Anthony MD     metoprolol succinate ER (TOPROL XL) 24 hr tablet 100 mg, 100 mg, Oral, Daily, Pippa Anthony MD     miconazole (MICATIN) 2 % powder, , Topical, BID, Grisel Castillo APRN CNP, Given at 12/18/22 1011     Nurse may request from Pharmacy a change of form of medication (e.g. Liquid to tablet)., , Does not apply, Continuous PRN, Grisel Castillo APRN CNP     ondansetron (ZOFRAN ODT) ODT tab 4 mg, 4 mg, Oral, Q8H PRN, Grisel Castillo APRN CNP     Patient is already receiving anticoagulation with heparin, enoxaparin (LOVENOX), warfarin (COUMADIN)  or other anticoagulant medication, , Does not apply, Continuous PRN, Grisel Castillo APRN CNP     polyethylene glycol (MIRALAX) Packet 17 g, 17 g, Oral, Daily PRN, Grisel Castillo APRN CNP     sennosides (SENOKOT) tablet 1 tablet, 1 tablet, Oral, BID PRN, Grisel Castillo APRN CNP     triamcinolone (KENALOG) 0.1 % cream, , Topical, BID, Grisel Castillo APRN  CNP, Given at 12/18/22 1011     tuberculin injection 5 Units, 5 Units, Intradermal, Q21 Days, Grisel Castillo APRN CNP, 5 Units at 12/18/22 1021  No current outpatient prescriptions on file.

## 2022-12-18 NOTE — PROGRESS NOTES
"   12/18/22 0805   Appointment Info   Signing Clinician's Name / Credentials (OT) Shruthi Jack, OTR/L  CBIS   Rehab Comments (OT) OT sindhu completed, treatment initiated   Living Environment   People in Home alone   Current Living Arrangements apartment  (3rd floor , elevator)   Home Accessibility no concerns   Transportation Anticipated   (TBD, chart review indicated health plan transportation)   Living Environment Comments OT: pt reports retired, previously worked at [employer] x30 years, lives in apt on 3rd floor w/ elevator in [place], does not drive, enjoys watching movies and listening to music, espeially [place] the musician and old [employer], pt reports being indep w/ adls/mobility and no use of AE for dressing or AD for mobility PTA, bathroom has std ht toilet,,walk in shower w/ shower chair and grabbar, hires laundry and cleaning, groceries and [employer] items delivered via \"help at your door\" organization, no pets, a niece and great niece in area.   Self-Care   Usual Activity Tolerance fair   Current Activity Tolerance poor   Regular Exercise   (walked circles in her apt)   Fall history within last six months no   General Information   Onset of Illness/Injury or Date of Surgery 11/17/22   Referring Physician CHAPO Graff   Additional Occupational Profile Info/Pertinent History of Current Problem per chart review:Arianna Siddiqi is a 62 year old female admitted on 11/17/2022. She has a history of morbid obesity, CARLOS, hypertension, HFpEF, paroxysmal A. Fib (on apixiban), MAG, factor IV deficiency and CKD who was initially admitted to the ICU on 11/18 for hypercapnic respiratory failure likely due to pneumonia, necessitating intubation. 11/23 extubated to BiPAP due to chronic CO2 retention. Now with concerns for RV heart failure, pulmonary hypertension, volume overload, off pressors since 11/29 and transferred to general medicine 11/30. Readmitted to ICU 12/2 PM for worsening hypercapnia and acute encephalopathy with concern " for need for intubation. However, mental status improved with better fitting BiPAP able to avoid intubation. Transferred back to general medicine 12/3 and has remained stable. Patient with tenuous volume status initially getting hypovolemic on PTA 2 mg bumex with addition of SGLT-2 inhibitor now on 1 mg. Has had ongoing oxygen needs however felt likely due to atelectasis and should improve with increased activity.critical illness myopathy and deconditioning   Existing Precautions/Restrictions fall;oxygen therapy device and L/min   Left Upper Extremity (Weight-bearing Status) full weight-bearing (FWB)   Right Upper Extremity (Weight-bearing Status) full weight-bearing (FWB)   Left Lower Extremity (Weight-bearing Status) full weight-bearing (FWB)   Right Lower Extremity (Weight-bearing Status) full weight-bearing (FWB)   Cognitive Status Examination   Orientation Status orientation to person, place and time   Cognitive Status Comments basic cog appears wfl   Visual Perception   Visual Acuity reading glasses   Pain Assessment   Patient Currently in Pain   (no c/o pain during OT eval, c/o SOB and fatigue)   Range of Motion Comprehensive   Comment, General Range of Motion BUE WFL AROM ;R dom   Strength Comprehensive (MMT)   Comment, General Manual Muscle Testing (MMT) Assessment OT: grossly 3+ but fatigues quickly   Bed Mobility   Comment (Bed Mobility) see gg codes   Transfers   Transfer Comments see gg codes   Activities of Daily Living   BADL Assessment/Intervention   (see gg codes)   Clinical Impression   Criteria for Skilled Therapeutic Interventions Met (OT) Yes, treatment indicated   OT Diagnosis decreased indep w/ adls and mobility   OT Problem List-Impairments impacting ADL problems related to;activity tolerance impaired;strength  (large habitus)   Assessment of Occupational Performance 3-5 Performance Deficits   Identified Performance Deficits drsg, bed mob, transfers, bathing, toileting   Planned Therapy  Interventions (OT) ADL retraining;IADL retraining;bed mobility training;strengthening;transfer training;progressive activity/exercise   Clinical Decision Making Complexity (OT) low complexity   Risk & Benefits of therapy have been explained evaluation/treatment results reviewed;care plan/treatment goals reviewed;risks/benefits reviewed;current/potential barriers reviewed;participants voiced agreement with care plan;participants included;patient   Clinical Impression Comments OT: pt lived alone PTA and indep w/ adls/mobiity w/out AE or assistive device, pt presents w/ decreased activty madalyn, generalized weakness, new use of oxygen and BP issues resulting in decreased safety and indep w/ adls and mobility. recommend cont skilled OT to maximize indep in adls/iadls/mobility and make appropriate recommend for AE and d/c plans/services.   OT Total Evaluation Time   OT Eval, Low Complexity Minutes (93956) 09   Therapy Certification   Start of Care Date 12/18/22   Certification date from 12/18/22   Certification date to 01/16/23   OT Goals   Therapy Frequency (OT) 6 times/wk   OT Predicted Duration/Target Date for Goal Attainment 01/12/23   OT Goals Hygiene/Grooming;Upper Body Dressing;Lower Body Dressing;Upper Body Bathing;Lower Body Bathing;Bed Mobility;Transfers;Toilet Transfer/Toileting   OT: Hygiene/Grooming modified independent;using adaptive equipment   OT: Upper Body Dressing Modified independent;using adaptive equipment   OT: Lower Body Dressing Modified independent;using adaptive equipment   OT: Upper Body Bathing Modified independent;using adaptive equipment   OT: Lower Body Bathing Modified independent;using adaptive equipment   OT: Bed Mobility Modified independent   OT: Transfer Modified independent;with assistive device   OT: Toilet Transfer/Toileting Modified independent;using adaptive equipment   Self-Care/Home Management   Self-Care/Home Mgmt/ADL, Compensatory, Meal Prep Minutes (05409) 55   Treatment  Detail/Skilled Intervention OT: provided pt educ on PLB and key concepts of Resp educ, introdued coordinating breathing w/ movements , disucssed AE and possible AE needs , edcu on approach to bed mob, adls and STS w/ current status for increased ease and success see gg codes   OT Discharge Planning   OT Plan OT: precautions: oxygen, falls, monitor BP in standing , Rx: pt wears only dresses/moo modesto at home and socks, focus on similar clothing and introdu sock aide and reacher, bed mob, STS w/ assist of 2 (progress to SPT once approp, currently [facility] lift ), sponge bath 12/19/gg codes for bathing   OT Discharge Recommendation (DC Rec) home with home care occupational therapy   Total Session Time   Timed Code Treatment Minutes 55   Total Session Time (sum of timed and untimed services) 70   Post Acute Settings Only   What unit is patient on? TCU   Bed Mobility: Turning side to side/Roll Left and Right   Describe Performance OT: sba to cga w/ bed rail   Bed Mobility: Sit to lying   Describe Performance OT: max A of 1, fatigue, assist to manage elsa LEs   Bed Mobility: Lying to sitting on the side of bed   Describe Performance OT: min A, HOB elevated into sitting /bed rail used, increased time/effort   Transfers: Sit to Stand   Describe Performance oT: max A of 2 w/ FWW and instructions for technique, once standing cga x1-1.5 min   Picking up Object   Reason Not Done Safety concerns   Upper Body Dressing   Describe Performance OT: set up gown change   Lower Body Dressing (Pants/Undergarments)   Describe Performance OT: pt declined, stated she doesnt wear pants at home and not always underwear at home   Lower Body Dressing (Putting On/Taking-Off Footwear)   Describe Performance OT; depend w/ socks (usually sits on chair at home to reach, unable to reach feet today)   Transfers: Toilet transfers   Describe Performance NT, clinical judgement antic depend w/ [facility] lift   Hygiene/Grooming   Describe Performance OT: set up    Oral Hygiene   Describe Performance OT declined, stated she will do later

## 2022-12-18 NOTE — PLAN OF CARE
Admission Notes:   Hx:Respiratory Failure secondary to fluid overload , was intubated in ICU, eventually managing on BIPAP due to Chronic 02 Retention   Other:  Morbid Obesity,CARLOS, HTN, HFpeF, Afib , MAG, Factor IV , CKD , CAP   Cardiac: BP stable, denies chest pain   Resp: 3-4 LPM by NC during daytime, BIPAP at night ( no available machine at this time per RT, on their waiting list for this ), comfortable at 4LPM   ( has own CPAP at home, no one can bring this here for her per patient)  GI:  Continent, uses bedpan , last BM: 12/16  :  Continent   Diet: Regular ( DM II , not on SSI, on  Jardiance)   LDA's: No IV's ,No drains   Skin: Redness to groin/abdominal folds           3+ BLE edema  Wounds: None                  Vaginal bleeding ( small amount) from IUD placed on 11/12 ( pelvic US done, negative for                 endometrial/vaginal Ca , IUD in correct spot)  Transfers: Lift, assist x 2  Pain: back pain, on Tylenol PRN   Other: CONFIDENTIAL ENCOUNTER: Niece:  Emelia (primary contact) , Marcela ( Emelia's daughter) , no other information about patient should be shared to others/ or family other than these two) .       Patient's most recent vital signs are:     Vital signs:  BP: 125/55  Temp: 96.4  HR: 65  RR: 18  SpO2: 94 %     Patient does not have new respiratory symptoms.  Patient does not have new sore throat.  Patient does not have a fever greater than 99.5.

## 2022-12-18 NOTE — CARE PLAN
RN: pt home bipap machine not here, she has been asked for family to bring it in. Presently no home bipap machines available. Called RT this morning to inquire, RT states she is on their list. They encourage for family to bring in her home bipap. RT states not emergency situation if holding Sats with oxygen use at night. Epic flow sheets show pt on 4 liters nasal cannula oxygen over night and no problems noted (sats 94-95%). Provider updated via epic sticky communication note.   RT states will deliver bipap machine when available.   PA updated and talked with pt. Pt only relative lives in [place] and unable to deliver pt own home bipap to the  Unit.

## 2022-12-18 NOTE — PLAN OF CARE
Discharge Planner Post-Acute Rehab PT:     Discharge Plan: home w/ HH PT    Precautions: fall, 3 LPM NC    Current Status:  Bed Mobility: Shannon supine>sit, modA of 2 sit>supine  Transfer: min-CGA of 2 sit<>stand and stand pivot w/ FWW  Gait: CGA 15' w/ FWW  Stairs: NT, not a functional goal for dc  Balance: SBA static sitting. Needs heavy BUE support for all standing balance w/ overall CGA.     Assessment:  Pt presents with gross deconditioning and decreased activity tolerance 2/2 prolonged hospitalization. Pt w/ baseline medical comorbidities impacting POC such as HF, high BMI. ELOS 2 weeks w/ goal for mod I mobility w/ FWW.     Other Barriers to Discharge (DME, Family Training, etc): has FWW, may need bed rail. Lives alone

## 2022-12-18 NOTE — CARE CONFERENCE
Patient is alert and oriented x4. Calm and cooperative with cares. Able to verbalized needs. Has mild virginal bleeding.  Pt  Stated that is was a bit difficult to fall asleep without her CPAP. but tolerated  4LPM 02 overnight. Remains on waiting list for a BIPAP. Pt denies SOB or CP this shift. Call light is within reach and will continue POC          Patient's most recent vital signs are:     Vital signs:  BP: 125/55  Temp: 96.4  HR: 65  RR: 18  SpO2: 94 %     Patient does not have new respiratory symptoms.  Patient does not have new sore throat.  Patient does not have a fever greater than 99.5.

## 2022-12-19 NOTE — PLAN OF CARE
Patient is alert and oriented x4. On O2 at 3 lpm via nasal cannula. Able to make needs known. On regular diet. Continent both bladder and bowel. Prefers to use bedpan. Assist of 2 with walker and gaitbelt on transfers. Patient states feeling of not satisfied using the Nasal cannula instead of CPAP. Patient is already on the waiting list for CPAP machine, still unavailable during shift. SPO2 level is at 94%. PRN tylenol 1000 mg given for pain management. PRN melatonin given before bedtime. Still with minimal amount of vaginal bleeding noted during perineal care. Sleeping in between cares. Safety rounds done. No acute event happen during shift. Continue with plan of care.      Patient's most recent vital signs are:     Vital signs:  BP: 114/54  Temp: 96.8  HR: 68  RR: 20  SpO2: 94 %     Patient does not have new respiratory symptoms.  Patient does not have new sore throat.  Patient does not have a fever greater than 99.5.            Nutrition Services

## 2022-12-19 NOTE — PROGRESS NOTES
Social Work: Initial Assessment with Discharge Plan    Patient Name: Arianna Siddiqi  : 1960  Age: 62 year old  MRN: [id number]  Completed assessment with: Chart review and pt interview.   Admitted to TCU: 22    Presenting Information   Date of SW assessment: 2022  Health Care Directive: Provided education  Primary Health Care Agent: Self   Secondary Health Care Agent: NOEMY   Living Situation: Pt lives alone in an apt. with elevator access  Previous Functional Status: IND with ADL's except Ambulation-walker. Dependent IADLs: Independent  DME available: walker, standard, shower chair, grab bar, tub/shower, grab bar, toilet, tub bench  Patient and family understanding of hospitalization: Appropriate and pleasant. Cultural/Language/Spiritual Considerations: Pt is a 62 y.o.  female, ,  English speaking [place].    Abuse concerns: None reported.  -------------------------------------------------------------------------------------------------------------  TRANSPORTATION:    Has lack of transportation kept you from medical appointments, meetings, work, or from getting things needed for daily living?  A. Yes, it has kept me from medical appointments or from getting my medications  B. Yes, it has kept me from non-medical meetings, appointments, work or from getting things that I need  C. No  X. Patient Unable to respond  Y. Patient declines to respond  -------------------------------------------------------------------------------------------------------------  Health Literacy:   How Often do you need to have someone help you when you read instructions, pamphlets, or other written material from your doctor or pharmacy?  0.       Never  1.       Rarely  2.       Sometimes  3.       Often  4.       Always  5.       Patient declines to respond  6.       Patient unable to  "respond  ------------------------------------------------------------------------------------------------------------   BIMS:  See flow sheet   Tell the pt : \"I am going to say three words for you to remember.  Please repeat the words after I have said all three.  The words are:Sock, Blue and Bed. Now tell me the three words.\"     Number of words repeated after the first attempt.  0: None   1: One  2: Two  3: Three  Ask the pt: \"Please tell me what year it is right now?\"  0: Missed by 5 >5 years or no answer   1: Missed by 2-5 years  2: Missed by a 1 year  3: Correct      Ask the pt: \"What month are we in right now?\"  0: Missed by > 1 month or no answer.  1: Missed by 6 days to 1 month  2: Accurate within 5 days.     Ask pt: \"what day of the week is today?\"  0: Incorrect day of the week.  1: Correct.     Ask pt:\" Let's go back to an earlier question.  What were those three words that I asked you to repeat?\" If unable to remember a word, give a cue (something to wear, a color, a piece of furniture) for that word.   Able to recall Sock.  0: No, could not recall.  1: Yes, after cueing (something to wear)  2: Yes, no cueing required.   Able to recall Blue.  0: No, could not recall.  1:Yes, after cueing (a color)  2:Yes, no cueing required.   Able to recall Bed.  0: No,  1: Yes, after cueing (a piece of furniture)  2: Yes, no cueing required.   -----------------------------------------------------------------------------------------------------------  CAM:  1.) Acute Onset/Fluctuating Course:  Is there evidence of an acute change in mental status from the patient's baseline?  0. No  1. Yes  2.) Inattention:  Does the patient have difficulty focusing attention, for example, being easily distractable, or having difficulty keeping track of what was being said?  0. No - Behavior not present  1. Yes - Behavior present  3.) Disorganized Thinking:  Is the patient's speech disorganized or incoherent, such as rambling or " irrelevant conversation, unclear or illogical flow of ideas, or unpredictable switching from subject to subject?  0. No - Behavior not present  1. Yes - Behavior present  4.) Altered level of consciousness:  Did the patient have altered level of consciousness as indicated by any of the following criteria?  0. No - Alert  1. Yes - Vigilant (hyperalert), lethargic (drowsy) , stupor (difficult to arouse) or comatose (unable to be aroused)  -------------------------------------------------------------------------------------------------------------  PHQ-9:   Over the last 2 weeks, have you been bothered by any of the following problems?     If symptom is present, then ask the patient:  About how often have you been bothered by this?   Symptom Presence                                              Symptom Frequency  0. No                                                                     0. Never or 1 day  1. Yes                                                                   1. 2-6 days (several days)  9. No Response                                                    2. 7-11 days (half or more of the days)                                                                                3. 12-14 days (nearly every day)       Present Frequency   A.       Little interest of pleasure doing things  0     B.       Feeling down, depressed, or hopeless  0     C.       Trouble falling or staying asleep, or sleeping too much  1  3   D.       Feeling tired or having little energy  1  3   E.       Poor appetite or overeating  0     F.        Feeling bad about yourself - or that you are a failure, or have let yourself or your family down  0     G.      Trouble concentrating on things, such as reading the newspaper or watching television  0     H.      Moving or speaking so slowly that other people could have noticed. Or the opposite - being so fidgety or restless that you have been moving around a lot more than usual  0     I.          Thoughts that you would be better off dead, or of hurting yourself in some way  0       -------------------------------------------------------------------------------------------------------------  SOCIAL ISOLATION  How often do you feel lonely or isolated form those around you?  0.       Never  1.       Rarely  2.       Sometimes  3.       Often  4.       Always  5.       Patient declines to respond  6.       Patient unable to respond  -------------------------------------------------------------------------------------------------------------    BIMS: Pt scored 13 on BIMS indicating cognition intact.  PHQ-9: Pt scored 6 on PHQ-9 indicating mild depressive symptoms.  However. It's due to pt's poor breathing at night.  According to pt.   PAS: confirmation number- [id number]  Has there been a level II screen?  No  Were there any recommendations in the screen? No  If yes, will the recommendations we incorporated into the Plan of Care?  N/A  Physical Health  Reason for admission: Arianna Siddiqi is a 62 year old female with a history of morbid obesity, CARLOS, hypertension, HFpEF, paroxysmal A. Fib (apixiban), MAG, factor IV deficiency and CKD who was admitted for hypercapnic respiratory failure likely due to pneumonia, necessitating intubation. 11/23 extubated to BiPAP due to chronic CO2 retention. Now with concerns for RV heart failure, pulmonary hypertension, volume overload.    Provider Information   Primary Care Physician:Gagan Quiles  [address], [facility] [address]  [phone]   : None reported.    Mental Health:   Diagnosis: Anxiety Depression  Current Support/Services: None reported.  Previous Services: None reported.   Services Needed/Recommended:SW offered  [place] and Health Psychology services while at [facility].    Substance Use:  Diagnosis: Pt smoked for 20 years.  Quite in 2002. NO drinking and no illegal drugs.   Current Support/Services: None reported.   Previous Services:  "None reported.   Services Needed/Recommended: N/A    Support System  Marital Status:  was  8 yrs.  No children.   Family support: Niece/Emelia she and pt talk \"maybe 3 or 4 times a year\" and grand niece  Pt and pt's sister had a \"falling out\" about a year ago and are not on speaking terms.  Other support available: Friends  Gaps in support system: None reported.     Community Resources  Current in home services: None reported.   Previous services: Pt reports previously having home care; uses \"senior\" transport services and grocery delivery.  Pt had [facility] three times.  Pt is not pleased with services.  Pt had a nurse and and an aid.  Pt would accept them again but not happy about it.     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  Pt can't keep paying co pay.  Behind in Bi pap now. Several people according to pt have tried to help with with this.  Pt has a large income.   Insurance: Medicare/ MEDICAID [place]      Discharge Plan   Patient and family discharge goal: Home   Provided Education on discharge plan: YES  Patient agreeable to discharge plan:  YES  A list of Medicare Certified Facilities was provided to the patient and/or family to encourage patient choice. Based on location and rating, patient would like referrals made to: N/A  General information regarding anticipated insurance coverage and possible out of pocket cost was discussed. Patient and patient's family are aware patient may incur the cost of transportation to the facility, pending insurance payment: YES  Barriers to discharge: TBD    Discharge Recommendations   Disposition:Home   Transportation Needs: Stretcher ride   Name of Transportation Company and Phone: [employer].  Pt understands pt will have to pay.     Additional comments   Pt pays for someone to do laundry and clean.  She cooks.  Pt " will accept info on Meals on Wheels but last time would have to pay co pay.  Doesn't have the money for that. Pt does store to door.  That is how pt gets groceries.     Needs w/c and cane.  Pt signed [id number] and DANYELL.      OSMEL Li   [facility], Transitional Care Unit   Social Work   [facility] [place] [address], [address]th [place] [address]  () [phone]

## 2022-12-19 NOTE — PLAN OF CARE
"   12/19/22 1700   Appointment Info   Signing Clinician's Name / Credentials (PT) Ryann Lennon DPT   Appointment Canceled Reason (PT) Patient declined   Appointment Cancel Comments (PT) PT: Nursing assist approached author earlier in the day to inform her that the pt declined therapy at scheduled time due to frustrations about eating and schedule changes. Author approached pt at scheduled time and pt explained that she has been having a lot of cramping and is in discomfort as well as she is frustrated about times being wrong on her board and not having eaten for a long time in anticipation of wrong therapy time. States that she is \"willing to work\" but would like to perform tomorrow.   Rehab Comments (PT) -45 minutes     "

## 2022-12-19 NOTE — PROGRESS NOTES
I did briefly see the pt this morning and discussed with RN about CPAP for CARLOS. RT consult for home cpap settings was placed. Did emphasize the importance of having CPAP to help her sleep and rest well at night.

## 2022-12-19 NOTE — PROGRESS NOTES
VS: /52 (BP Location: Left arm)   Pulse 65   Temp (!) 96.6  F (35.9  C) (Oral)   Resp 18   Wt (!) 175.3 kg (386 lb 7.5 oz)   SpO2 92%   BMI 70.69 kg/m       O2: 97% RA    Output: Voiding without difficulty    Last BM: 12/19   Activity: Very limited. Lift assist x2   Skin: CDI    Pain: Pt reports 10/10 abdominal cramping    CMS: A/Ox4   Dressing: NA   Diet: Regular thin liquid    LDA: NA   Plan: Continue with care    Additional Info:

## 2022-12-20 NOTE — PLAN OF CARE
Goal Outcome Evaluation:    Pt is alert and oriented x4, able to make her needs known. Pt on oxygen therapy at 3l/min via nc. Pt on regular diet thin fluids, continent of bowel/bladder. Pt is A2 with gaitbelt for transfers. Provider and nurse supervisor updated on the pt concerns about CPAP. Prn melatonin and zofran given per pt request. PRN tylenol given for abdominal discomfort with good effect, no vaginal bleeding reported this shift. Safety rounds completed. Will continue with poc.      Patient's most recent vital signs are:     Vital signs:  BP: 111/54  Temp: 95.7  HR: 62  RR: 18  SpO2: 96 %     Patient does not have new respiratory symptoms.  Patient does not have new sore throat.  Patient does not have a fever greater than 99.5.

## 2022-12-20 NOTE — PROGRESS NOTES
12/20/22 1515   Appointment Info   Signing Clinician's Name / Credentials (PT) Minal Cunha PTA   Appointment Canceled Reason (PT) Patient declined   Appointment Cancel Comments (PT) PT: -45 min d/t Pt declining d/t feeling dizzy, nauseaus. Nsg notified by NA. Had approached Pt earlier in day to verify that schedule was correct on white board per Pt request. Schedule was correct on white Board

## 2022-12-20 NOTE — CARE PLAN
Problem: Gas Exchange Impaired  Goal: Optimal Gas Exchange  Description: Assess and monitor airway, breathing and circulation for effective oxygenation and ventilation; consider oxygenation and ventilation parameters and goal.    Anticipate noninvasive and invasive monitoring (e.g., pulse oximetry, end-tidal carbon dioxide, blood gases, cardiovascular).    Maintain head of bed elevation with regular position changes to minimize ventilation-perfusion mismatch and breathlessness.    Provide oxygen therapy judiciously to avoid hyperoxemia; adjust to achieve oxygenation goal.    Monitor fluid balance closely to minimize the risk of fluid overload.    Outcome: Progressing     Problem: Nausea and Vomiting  Goal: Nausea and Vomiting Relief  Description: Adjust position to prevent aspiration.    Identify and address underlying cause.    Monitor intake, output and laboratory value trends; advocate for adjustment in treatment with imbalance.    Evaluate medication (addition, withdrawal, toxicity) as potential source or trigger, such as an opioid agent.    Administer antiemetic agent per prescribed regimen.      Outcome: Progressing     Problem: Diarrhea  Goal: Effective Diarrhea Management  Description: Provide fluid and electrolyte replacement to correct any imbalance through use of oral rehydration solution, nasogastric tube or intravenous fluid therapy.    Utilize skin protectant barrier to maintain skin integrity; cleanse gently, thoroughly and promptly after stooling; avoid alcohol-containing wipes.    Continue usual diet; encourage fluids (e.g., broth, soups, fruit juices).    Keep toilet, toileting devices and aids readily accessible and barrier-free to maintain safety.    Provide comfort measures and privacy.    Outcome: Progressing     Problem: Malnutrition  Goal: Improved Nutritional Intake  Description: Perform a nutrition assessment; include a nutrition-focused physical exam.    Determine calorie, protein,  vitamin, mineral and fluid requirements.    Assess for micronutrient deficiencies; supplement if depleted.    Assess need and assist with meal set-up and feeding.    Adjust diet or meal schedule based on preferences and tolerance.    Minimize unnecessary dietary restrictions to increase oral intake.      Outcome: Progressing     Problem: Fall Injury Risk  Goal: Absence of Fall and Fall-Related Injury  Description: Provide a safe, barrier-free environment that encourages independent activity.    Keep care area uncluttered and well-lighted.    Determine need for increased observation or monitoring.    Avoid use of devices that minimize mobility, such as restraints or indwelling urinary catheter.      Outcome: Progressing     Problem: Chronic Kidney Disease  Goal: Optimal Functional Ability  Description: Promote participation in regular daily and physical activity to minimize decline associated with disease process and inactivity; maintain an accessible environment to facilitate safe activity.    Evaluate functional mobility ability and safety; retrain in bed mobility, gait or transfers using assistive device, based on individualized need.    Encourage self-care performance to promote maximum independence in activities of daily living; provide cueing, encouragement, retraining, adaptive equipment and additional time if needed.    Evaluate and address body systems and performance deficits, such as strength, range of motion, balance and activity tolerance impairment.    Consider a muscle strengthening and exercise program, such as progressive resistive exercise and cardiovascular exercise.    Encourage use of energy conservation techniques, such as preplanning and pacing activity, balancing activity with periods of rest and positioning for optimal function.    Assess cognition and address impairments with interventions, such as orientation cues in the environment and memory aids or compensatory techniques  Outcome:  Progressing   Goal Outcome Evaluation: ongoing, new admission

## 2022-12-20 NOTE — PLAN OF CARE
Pt A&O x4. A of 2 w/ gait belt and walker. Cont. Had emesis x1, PRN Zofran given. Pt still having vaginal bleeding. Headache managed w/ PRN Tylenol. Continue w/ plan of care.     Patient's most recent vital signs are:      Vital signs:  /65 (BP Location: Left arm)   Pulse 80   Temp (!) 95.8  F (35.4  C) (Oral)   Resp 18   Wt (!) 175.3 kg (386 lb 7.5 oz)   SpO2 95%   BMI 70.69 kg/m       Patient does not have new respiratory symptoms.  Patient does not have new sore throat.  Patient does not have a fever greater than 99.5.

## 2022-12-20 NOTE — PLAN OF CARE
Patient is alert and oriented x4. On O2 at 3 lpm via nasal cannula. Able to make needs known. On regular diet. Continent both bladder and bowel. Assist of 2 with walker and gaitbelt on tranfer. Patient reports relief for the abdominal cramping she experienced during the previous shift. CPAP machine is still not available. Minimal vaginal bleeding still present during perineal care. PRN tylenol 1000 mg given for pain management. Sleeping in between cares. Safety rounds done. No acute event happen during shift. Continue with plan of care.      Patient's most recent vital signs are:     Vital signs:  BP: 111/54  Temp: 95.7  HR: 62  RR: 18  SpO2: 96 %     Patient does not have new respiratory symptoms.  Patient does not have new sore throat.  Patient does not have a fever greater than 99.5.

## 2022-12-21 PROBLEM — U07.1 COVID-19: Status: ACTIVE | Noted: 2022-01-01

## 2022-12-21 NOTE — PROGRESS NOTES
RT in room to place patient on hospital CPAP machine. Pt uses one at home but does not have home device.   Patient did not know home settings and RT/Pt titrated CPAP pressure up until it felt closer the one she had at home.    CPAP +10 with 3L O2 bleed

## 2022-12-21 NOTE — PLAN OF CARE
Goal Outcome Evaluation: Not met       Plan of Care Reviewed With: patient    Overall Patient Progress: decliningOverall Patient Progress: declining     Focus: Physio  D: Sleepy this morning. Had difficulty keeping saturations up last night. Needed 8 liters piped into her CPAP to keep saturations WNL. She has increased use of her accessory muscles this morning. States she is not feeling well. Tried to wean from CPAP but saturations went as low as 66% 4 liter nasal cannula. Arianna is a mouth breather. Her xray showed increased pulmonary edema. IV was placed and 40 mg of iv lasix administered. Oralia Ceja aware. She is currently covid positive. P: Continue to closely monitor. Will likely be transferred to [facility] when bed available or to the [facility].

## 2022-12-21 NOTE — PROGRESS NOTES
Occupational Therapy Discharge Summary    Reason for therapy discharge:    Discharge to ICU    Progress towards therapy goal(s). See goals on Care Plan in [facility] electronic health record for goal details.  Goals not met.  Barriers to achieving goals:   discharge from facility.    Therapy recommendation(s):    Continued therapy is recommended.  Rationale/Recommendations:  Following medical stability, pt will benefit from skilled OT to address ADL IND and safety with functional mobility to return to PLOF in home setting.

## 2022-12-21 NOTE — PROGRESS NOTES
12/21/22 1037   Appointment Info   Signing Clinician's Name / Credentials (PT) Minal Cunha PTA   Appointment Canceled Reason (PT) Medical status   Appointment Cancel Comments (PT) PT: -45 min d/t Pt being sent to ICU

## 2022-12-21 NOTE — PROVIDER NOTIFICATION
"Otolaryngology Progress Note    Subjective and Interval Events: Feeling good, excited to clamp today. Woke up with a headache but improving after caffeine pill. No leak symptoms.     O: /58 (BP Location: Left arm)   Pulse 65   Temp 97  F (36.1  C) (Oral)   Resp 18   Ht 1.626 m (5' 4\")   Wt 112.9 kg (248 lb 14.4 oz)   LMP 10/19/2008 (Approximate)   SpO2 99%   BMI 42.72 kg/m     General: resting comfortably and not in acute distress. Engaged in discussuin   Neuro: patient alert, oriented, and answering questions appropriately; HB I/VI; no spontaneous or gaze-evoked nystagmus; CN V1-V3 intact and equal bilaterally   HEENT: no active otorrhea, EAM is dry; MCF incision soft, mildly edematous; and well-approximated. Staples in place.    Pulmonary: breathing comfortably on room air without stridor or stertor   Drains: Lumbar drain open at shoulder height       Assessment & Plan: Marleen Mcfadden is a 56-year-old female with a past medical history of CHF, cardiomyopathy, possible SLE, HTN, and right encephalocele with CSF leak who is s/p right MCF approach to repair on 7/5 with RTOR on 7/8 for revision of repair.     - Continue sinonasal precautions, Robinol and Mucinex  - Continue bowel regimen  - Lumbar drain per NSG  - Contact otolaryngology on-call with questions or concerns    -- Patient and above plan to be discussed with Dr. Julio Cesar Chaparro MD   Otolaryngology-Head & Neck Surgery PGY2  Please contact ENT with questions by dialing [phone] and entering job code [phone] when prompted.    " RN paged [employer]ighter around 8802 d/t pt's complain of difficulty sleeping despite receiving Melatonin 3 mg tab at 8185. Received a call and order from , Favian VALDIVIA MD for extra dose of Melatonin  3 mg x 1. Carried out. Will monitor sleep.

## 2022-12-21 NOTE — PROGRESS NOTES
[facility]    Medicine Progress Note - Hospitalist Service    TCU day 4     Assessment & Plan   Arianna Siddiqi is a 62 year old female with history of morbid obesity, CARLOS on BIPAP, HTN, HFpEF, PAF on apixaban, MAG, Factor IV deficiency, and CKD who was recently admitted to ICU on 11/18 with acute hypercapnic respiratory failure requiring intubation. Extubated 11/23. New concerns for RV failure and pulmonary HTN. Readmitted to ICU on 12/2 for worsening hypercapnia and encephalopathy, which improved with better fitting BIPAP. Transferred to TCU for rehab.     Escalating O2 needs this AM and pt more somnolent.     # Acute hypoxic respiratory failure  # Chronic hypercapnic respiratory failure   Etiology of worsening hypoxia unclear. More somnolent today, concerning for worsening hypercapnia as well. Hx CARLOS. On BIPAP at baseline, however unable to get BIPAP machine at TCU. RT able to get CPAP yesterday. It is possible that hypoxia is due to respiratory depression in setting of somnolence from worsening hypercapnia and exacerbated by restrictive lung disease d/t body habitus. TTE from 11/26 showed LVEF 60-65% and PA systolic pressure of 32 mmHg. Repeat TTE from 12/15 with limited images showed EF 55-60%, dilated RV and moderately reduced RV function. Cannot r/o worsening pulmonary edema, aspiration, or infection.   - CXR stat  - CBC, BMP, BNP, VBG stat  - Continue CPAP, may need BIPAP if worsening hypercapnia  - Continue current diuretics. Will consider additional doses pending above.    # Acute metabolic encephalopathy -  Likely d/t worsening hypercapnia.   - BMP and VBG pending      **See prior notes for more extensive medical history and problem list.        Diet: Regular Diet Adult  Snacks/Supplements Adult: Ensure Max Protein (bariatric); Between Meals    DVT Prophylaxis: DOAC  Baig Catheter: Not present  Central Lines: None  Cardiac Monitoring: None  Code Status:   DNI, CPR OK    Disposition  Plan     Expected Discharge Date: 01/13/2023      Destination: home with help/services          The patient's care was discussed with the Attending Physician, Dr. Guevara and Bedside Nurse.    Walt Ceja PA-C  Hospitalist Service  [facility]  Securely message with the Vocera Web Console (learn more here)  Text page via [facility] Paging/Directory         Clinically Significant Risk Factors                       # DMII: A1C = 6.7 % (Ref range: <5.7 %) within past 3 months, PRESENT ON ADMISSION          ______________________________________________________________________    Interval History   Notified by RN that patient had increasing O2 needs this AM and more somnolent. Pt on CPAP overnight. Early this AM noted to be hypoxic and O2 increased from 4 L to 8 L with some improvement. Afebrile. Vitals otherwise stable.    Pt awakens to voice but falls back to sleep immediately.    Unable to obtain ROS due to somnolence.     Data reviewed today: I reviewed all medications, new labs and imaging results over the last 24 hours.     Physical Exam   Vital Signs: Temp: (!) 95.7  F (35.4  C) Temp src: Axillary BP: 128/67 Pulse: 68   Resp: 20 SpO2: 97 % O2 Device: BiPAP/CPAP Oxygen Delivery: 9 LPM  Weight: 386 lbs 7.46 oz  General Appearance:  RASS -2.    Respiratory:  Shallow respirations. Poor aeration but otherwise clear. No wheezing, rhonchi, or rales.    Cardiovascular:  RRR. S1,S2. No murmurs or gallops.   GI:  Obese. No obvious tenderness. +BS.   Extremity:  No pitting edema. Bilateral venous stasis changes.   Skin:  No visible rash.  Neuro:  Unable to assess d/t somnolence.       Data   Recent Labs   Lab 12/20/22  0802 12/19/22  0716 12/18/22  1549 12/17/22  0801    138 137 138   POTASSIUM 5.0 4.6 4.6 5.7*   CHLORIDE 100 100 99 96*   CO2 37* 34* 37* 26   ANIONGAP 1* 4 1* 16*   BUN 32* 28 34* 45.5*   CR 1.12* 0.88 0.94 1.18*   KADEN 9.5 9.4 9.4 9.0   MAG  --  2.2  --  2.2     Recent Labs   Lab  12/19/22  0716 12/16/22  0727 12/15/22  0919   WBC 6.1 7.2 6.0   RBC 4.57 4.52 4.81   HGB 13.0 12.8 13.5   HCT 44.0 45.4 46.7   MCV 96 100 97   MCH 28.4 28.3 28.1   MCHC 29.5* 28.2* 28.9*   RDW 17.1* 17.2* 17.1*    166 191     No lab results found in last 7 days.  No lab results found in last 7 days.   No lab results found in last 7 days.  No lab results found in last 7 days.  Recent Labs   Lab 12/20/22  0802 12/19/22  0716 12/18/22  1549 12/17/22  0801 12/16/22  0727 12/15/22  0633 12/14/22  1833   * 103* 173* 109* 104* 109* 111*

## 2022-12-21 NOTE — CARE PLAN
RN: Lab called with critical values from venous blood gas, call transferred to RENE Godoy and he will give further direction.

## 2022-12-21 NOTE — PHARMACY-ADMISSION MEDICATION HISTORY
Pharmacy medication reconciliation completed at [facility] on 11/24/22. Please see pharmacist/intern note from that date for additional details on prior to admission medications.    Last doses of medications are documented in the MAR. Allopurinol was added during the TCU admission.     Thank you,    Lauryn Redmond, PharmD

## 2022-12-21 NOTE — PLAN OF CARE
Physical Therapy Discharge Summary    Reason for therapy discharge:    Discharged to ICU    Progress towards therapy goal(s). See [facility] on [facility] for goal details.  Goals not met.  Barriers to achieving goals:   discharge from facility.    Therapy recommendation(s):    Continued therapy is recommended.  Rationale/Recommendations:  Once medically stable pt will benefit from skilled physical therapy to address functional mobility in order to return patient to prior level of function in her home.

## 2022-12-21 NOTE — H&P
[facility]   ICU H&P  12/21/2022      Date of Hospital Admission: December 21, 2022   Date of ICU Admission: December 21, 2022   Reason for Critical Care Admission: Respiratory Failure   Date of Service (when I saw the patient): 12/21/2022    ASSESSMENT: Arianna Siddiqi is a 62 year old female with history of morbid obesity, CARLOS on home BIPAP, HTN, HFpEF, PAF on apixaban, MAG, Factor VII deficiency, and CKD. She was recently admitted on the [place] for acute respiratory failure likely related HFpEF. She was intubated from 11/18-11/23. She was transferred to medicine floor on 11/30. She was maintained on BIPAP, however was readmitted to ICU on 12/2 for worsening hypercapnia and encephalopathy. She was transferred to TCU for rehabilitation on 12/18. She had noted increasing O2 needs 2/2 pulmonary edema and was subsequently found to be COVID positive. She is being admitted to the ICU for continuous BiPAP, COVID treatment, and aggressive diuresis          PLAN:     Neuro/Pain/Psych:  # Acute metabolic encephalopathy  # Thermodysregulation, hypothermia   - Monitor neurological status. Delirium prevention and precautions.   - Pain: PRN Tylenol supp until mentation improves         Pulmonary:  # Acute hypoxic respiratory failure  # Chronic hypercapnic respiratory failure   # COVID-19 pneumonia    # Pulmonary hypertension   # CARLOS, BiPAP dependent   -Pt DNI  -BiPAP/AVAPS   -Diuresis as below  -VBG pending   -COVID-19 positive 12/21. See ID section for treatment     Venous Blood Gas  Recent Labs   Lab 12/21/22  1332 12/21/22  0927   PHV 7.05* 7.10*   PCO2V >98* >98*   PO2V 95* 72*   O2PER 60 0     Called Em[facility] regarding her respiratory status. We are currently providing maximum support, other than intubation but pt DNI. She has a high likelihood for a respiratory and cardiac arrest. Given that her condition is respiratory in nature, if she were to arrest CPR would be futile  without intubation.         Cardiovascular:    #HFpEF  #A. Fib on chronic anticoagulation   - ECHO: 12/15. EF 55-60% right ventricle appears moderately dilated with  moderately reduced global function.  - BNP 12,571  - EKG ordered   - Monitor hemodynamic status.   - MAP>65  - Aggressive diuresis, received 40 mg lasix at 1000. Goal negative 1000 ml today. Will give additional 40 mg now   - Switch from PO metoprolol to IV  - Hold Apixaban and statin given NPO      GI/Nutrition:    # Risk for Pro/alfredo malnutrition   -NPO given mentation, ADAT once improved     /GYN:  #Vaginal Bleeding   - Hx of acute onset vaginal bleeding on 11/12/21 s/p IUD placement as w/u negative for endometrial and cervical cancer. Per report, increased vaginal bleeding, today noted to have a large clot pass with passage of IUD. (Confitmed IUD was removed and disposed off)   -OB/GYN consult.  Dicussed with them today, given tenuous respiratory status. Will hold on exam for now. Once more stable, will re-contact       Renal/ Fluids/Electrolytes:  Chronic kidney disease, stage 2 (mild)  Chronic kidney disease, stage 3 (moderate)  - Monitor function and electrolytes as needed with replacement per ICU protocols.   - Generally avoid nephrotoxic agents such as NSAID, IV contrast unless specifically required  - Adjust medications as needed for renal clearance  - Follow I/O's      Endocrine  - No management indication.     Infectious disease:   # COVID-19 pneumonia   # Recent CAP - Ceftiaxone 7 day course (11/18-11/24).    # Hypothermia   - Start remdesivir x 5 days   - Start decadron x 10 days   - No indications for antibiotics.   - Monitor fever curve and WBCs     Hematology:    # Hx MAG  # Hx Factor VII deficiency  Seen outpatient by hematology has a history oral/IV iron supplementation  - PTA ferrous gluconate 324 mg   - PTA apixaban, subbing Lovenox given NPO status         Musculoskeletal:  #Hx Gout  #Hx hemosiderin  deposition BLE  - PTA allopurinol reduced to 100 mg given poor renal function    # Weakness and deconditioning of chronic illness   - Physical and occupational therapy consults.        Code status: DNI confirmed with POLST.      General cares:  DVT Prophylaxis: Enoxaparin (Lovenox) SQ  GI Prophylaxis: Not indicated  Restraints: Restraints for medical healing needed: NO  Family update by me today: Yes   Current lines are required for patient management  Access:      Beny Nur NP    Time Spent on this Encounter   Billing:  I spent 45 minutes bedside and on the inpatient unit today managing the critical care of Arianna Siddiqi in relation to the issues listed in this note.    Code Status   DNI    Primary Care Physician   Gagan Quiles    Chief Complaint   Resp failure     History is obtained from the electronic health record    History of Present Illness    Arianna Siddiqi is a 62 year old female with history of morbid obesity, CARLOS on home BIPAP, HTN, HFpEF, PAF on apixaban, MAG, Factor VII deficiency, and CKD. She was recently admitted on the [place] for acute respiratory failure likely related HFpEF. She was intubated from 11/18-11/23. She was transferred to medicine floor on 11/30. She was maintained on BIPAP, however was readmitted to ICU on 12/2 for worsening hypercapnia and encephalopathy. She was transferred to TCU for rehabilitation on 12/18. She had noted increasing O2 needs 2/2 pulmonary edema and was subsequently found to be COVID positive. She is being admitted to the ICU for continuous BiPAP, COVID treatment, and aggressive diuresis       Past Medical History    I have reviewed this patient's medical history and updated it with pertinent information if needed.   Past Medical History:   Diagnosis Date     CHF (congestive heart failure) (H)      CKD (chronic kidney disease)      Compliance poor      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      Diabetes mellitus (H)      Gout      Hypertension      Insomnia      Morbid obesity (H)      CARLOS treated with BiPAP        Past Surgical History   I have reviewed this patient's surgical history and updated it with pertinent information if needed.  Past Surgical History:   Procedure Laterality Date     EXAM UNDER ANESTHESIA PELVIC N/A 2021    Procedure: Exam Under Anesthesia, Dilation and curettage,  placement of intrauterine device, pap smear;  Surgeon: Deedee Hess MD;  Location: [facility] OR       Prior to Admission Medications   Prior to Admission Medications   Prescriptions Last Dose Informant Patient Reported? Taking?   Multiple Vitamins-Minerals (MULTIVITAMIN WOMEN PO)   Yes No   acetaminophen (TYLENOL) 500 MG tablet  Pharmacy No No   Sig: Take 1-2 tablets (500-1,000 mg) by mouth every 4 hours as needed for mild pain   allopurinol (ZYLOPRIM) 100 MG tablet   No No   Sig: Take 1 tablet (100 mg) by mouth daily   alum & mag hydroxide-simethicone (MAALOX  ES) 400-400-40 MG/5ML SUSP suspension  Pharmacy No No   Sig: Take 15 mLs by mouth every 4 hours as needed for other (gastric distress.)   amiodarone (PACERONE) 200 MG tablet   No No   Si tablet (200 mg) by Oral or Feeding Tube route daily   apixaban ANTICOAGULANT (ELIQUIS ANTICOAGULANT) 5 MG tablet   No No   Sig: Take 1 tablet (5 mg) by mouth 2 times daily   atorvastatin (LIPITOR) 20 MG tablet   No No   Sig: Take 1 tablet (20 mg) by mouth daily   blood glucose monitoring (ACCU-CHEK NINA PLUS) meter device kit  Pharmacy No No   Sig: Use to test blood sugars 3 times daily or as directed.   blood glucose monitoring (SOFTCLIX) lancets  Pharmacy No No   Sig: Use to test blood sugar 3 times daily or as directed.   bumetanide (BUMEX) 1 MG tablet   No No   Sig: Take 1 tablet (1 mg) by mouth daily   empagliflozin (JARDIANCE) 10 MG TABS tablet   No No   Sig: Take 1 tablet (10 mg) by mouth daily   ferrous gluconate (FERGON) 324 (38 Fe) MG tablet   No No   Sig: Take 1  tablet (324 mg) by mouth daily   lisinopril (ZESTRIL) 5 MG tablet   No No   Sig: Take 1 tablet (5 mg) by mouth daily   melatonin 3 MG tablet   No No   Sig: Take 1 tablet (3 mg) by mouth nightly as needed for sleep   metoprolol succinate ER (TOPROL XL) 100 MG 24 hr tablet   No No   Sig: Take 1 tablet (100 mg) by mouth daily   miconazole (MICRO GUARD) 2 % external powder   No No   Sig: Apply topically as needed for itching or other APPLY TO AFFECTED AREA TWICE A DAY *NOT COVERED*   ondansetron (ZOFRAN ODT) 4 MG ODT tab   No No   Sig: Take 1 tablet (4 mg) by mouth every 8 hours as needed for nausea or vomiting   polyethylene glycol (MIRALAX) packet  Pharmacy No No   Sig: Take 17 g by mouth daily as needed for constipation   sennosides (SENOKOT) 8.6 MG tablet  Pharmacy No No   Sig: Take 1 tablet by mouth 2 times daily as needed for constipation   triamcinolone (KENALOG) 0.1 % external cream   No No   Sig: Apply topically 2 times daily APPLY TO AFFECTED AREA      Facility-Administered Medications: None     Allergies   Allergies   Allergen Reactions     Penicillins Itching and Rash     Tolerated ceftriaxone 1/17/2020  Tolerated Zosyn 11/2022       Social History   I have reviewed this patient's social history and updated it with pertinent information if needed. Arianna Siddiqi  reports that she quit smoking about 20 years ago. Her smoking use included cigarettes. She has a 30.00 pack-year smoking history. She has never used smokeless tobacco. She reports that she does not drink alcohol and does not use drugs.    Family History   I have reviewed this patient's family history and updated it with pertinent information if needed.   Family History   Adopted: Yes   Family history unknown: Yes       Review of Systems   Review of systems not obtained due to patient factors - intubation and sedation    Physical Exam       Temp  Min: 95.7  F (35.4  C)  Max: 95.7  F (35.4  C)                Vital Signs with Ranges  Temp:  [95.7  F  (35.4  C)] 95.7  F (35.4  C)  Pulse:  [68-74] 68  Resp:  [20-22] 20  BP: (122-128)/(45-67) 122/46  SpO2:  [86 %-97 %] 95 %  0 lbs 0 oz    Physical Exam  Vitals and nursing note reviewed.   Constitutional:       Appearance: She is ill-appearing and toxic-appearing.   Eyes:      Conjunctiva/sclera: Conjunctivae normal.      Pupils: Pupils are equal, round, and reactive to light.   Cardiovascular:      Rate and Rhythm: Normal rate. Rhythm irregular.   Pulmonary:      Effort: No respiratory distress.      Breath sounds: No wheezing.      Comments: Course throughout   Abdominal:      General: There is no distension.      Tenderness: There is no abdominal tenderness.   Musculoskeletal:         General: Swelling present.      Right lower leg: Edema present.      Left lower leg: Edema present.   Skin:     General: Skin is warm and dry.      Capillary Refill: Capillary refill takes less than 2 seconds.   Neurological:      Mental Status: She is lethargic.      GCS: GCS eye subscore is 1. GCS verbal subscore is 1. GCS motor subscore is 6.           Imaging personally reviewed:  ECG, CXR       Data   Results for orders placed or performed during the hospital encounter of 12/17/22 (from the past 24 hour(s))   Extra Tube    Narrative    The following orders were created for panel order Extra Tube.  Procedure                               Abnormality         Status                     ---------                               -----------         ------                     Extra Green Top (Lithium...[[id number]]                      Final result               Extra Purple Top Tube[[id number]]                            Final result                 Please view results for these tests on the individual orders.   Extra Green Top (Lithium Heparin) Tube   Result Value Ref Range    Hold Specimen JIC    Extra Purple Top Tube   Result Value Ref Range    Hold Specimen JIC    CRP inflammation   Result Value Ref Range    CRP Inflammation 33.0  (H) 0.0 - 8.0 mg/L   Hepatic panel   Result Value Ref Range    Bilirubin Total 0.2 0.2 - 1.3 mg/dL    Bilirubin Direct 0.1 0.0 - 0.2 mg/dL    Protein Total 8.5 6.8 - 8.8 g/dL    Albumin 2.5 (L) 3.4 - 5.0 g/dL    Alkaline Phosphatase 132 40 - 150 U/L    AST 22 0 - 45 U/L    ALT 25 0 - 50 U/L   XR Chest Port 1 View    Narrative    Exam:  Chest X-ray 12/21/2022 9:23 AM    History: worsening hypoxia    Comparison: 12/13/22    Findings:   Single portable AP view of the chest. Compared to prior study there is  lower lung volumes with increased bilateral diffuse mixed interstitial  and airspace opacities. Stable cardiomegaly. No pneumothorax. No  appreciable pleural effusion.      Impression    Impression:   1.  Increased pulmonary edema.    THEODORE YOUNGBLOOD MD         SYSTEM ID:  [id number]   Blood gas venous   Result Value Ref Range    pH Venous 7.10 (LL) 7.32 - 7.43    pCO2 Venous >98 (HH) 40 - 50 mm Hg    pO2 Venous 72 (H) 25 - 47 mm Hg    Bicarbonate Venous      Base Excess/Deficit (+/-)      FIO2 0    Nt probnp inpatient   Result Value Ref Range    N terminal Pro BNP Inpatient 12,571 (H) 0 - 900 pg/mL   Basic metabolic panel   Result Value Ref Range    Sodium 135 133 - 144 mmol/L    Potassium 5.3 3.4 - 5.3 mmol/L    Chloride 98 94 - 109 mmol/L    Carbon Dioxide (CO2) 34 (H) 20 - 32 mmol/L    Anion Gap 3 3 - 14 mmol/L    Urea Nitrogen 38 (H) 7 - 30 mg/dL    Creatinine 1.26 (H) 0.52 - 1.04 mg/dL    Calcium 9.5 8.5 - 10.1 mg/dL    Glucose 118 (H) 70 - 99 mg/dL    GFR Estimate 48 (L) >60 mL/min/1.73m2   Asymptomatic COVID-19 Virus (Coronavirus) by PCR Nasopharyngeal    Specimen: Nasopharyngeal; Swab   Result Value Ref Range    SARS CoV2 PCR Positive (A) Negative    Narrative    Testing was performed using the Xpert Xpress SARS-CoV-2 Assay on the Cepheid Gene-Xpert Instrument Systems. Additional information about this Emergency Use Authorization (EUA) assay can be found via the Lab Guide. This test should be ordered for the  detection of SARS-CoV-2 in individuals who meet SARS-CoV-2 clinical and/or epidemiological criteria as well as from individuals without symptoms or other reasons to suspect COVID-19. Test performance for asymptomatic patients has only been established in anterior nasal swab specimens. This test is for in vitro diagnostic use under the FDA EUA for laboratories certified under CLIA to perform high complexity testing. This test has not been FDA cleared or approved. A negative result does not rule out the presence of PCR inhibitors in the specimen or target RNA concentration below the limit of detection for the assay. The possibility of a false negative should be considered if the patient's recent exposure or clinical presentation suggests COVID-19. This test was validated by the [facility]. This laboratory is certified under the Clinical Laboratory Improvement Amendments (CLIA) as qualified to perform high complexity laboratory testing.     Glucose by meter   Result Value Ref Range    GLUCOSE BY METER POCT 121 (H) 70 - 99 mg/dL   *

## 2022-12-21 NOTE — PROGRESS NOTES
Brief Gynecology Note    Called to consult for patient Arianna Siddiqi who is a 63yo, currently admitted to ICU for acute respiratory failure related to heart failure. She is currently unstable, hypoxic on BiPAP, acidotic, hypothermic.     GYN team consulted for vaginal bleeding. She had heavy bleeding earlier today with expression of clot and IUD, IUD seen by hospitalist at TCU. Per RN report, bleeding over the last 1.5 hours included filling half a pad, she approximated 10cc. Hgb stable.     Patient has history of hyperplasia without atypia, diagnosed 1 year ago 11/14/2021 with D&C and IUD placement with Dr. Hess in setting of acute bleeding, Hgb of 3. She has had IUD in place since without follow up. She is on apixiban for afib.     If patient becomes stabilized from cardiorespiratory standpoint:     #Endometrial hyperplasia without atypia  #AUB secondary to anticoagulation  - Current bleeding is not significant, Hgb stable  - Consider pelvic ultrasound, nonurgent  - Consider Megace 20mg BID, could increase to 40mg BID for medical management of bleeding  - Eventually would need repeat endometrial sampling to ensure resolution of hyperplasia, nonurgent     Please reach out to GYN team in future if desiring GYN consult. Pager: [phone]    Micheline Rogers MD PGY4  Obstetrics & Gynecology  12/21/22

## 2022-12-21 NOTE — PLAN OF CARE
Goal Outcome Evaluation:         Pt A&O X4, able to make needs known, on continous 02@3L/min. Pt c/o generalized weakness, stayed in bed all shift, Had emesis 1x, sleeping on and off during shift. CPAP started by RT tonight. Given PRN Tylenol and melatonin at bedtime. Will continue with POC.                 Patient's most recent vital signs are:     Vital signs:  BP: 122/45  Temp: 95.7  HR: 74  RR: 22  SpO2: 95 %     Patient does not have new respiratory symptoms.  Patient does not have new sore throat.  Patient does not have a fever greater than 99.5.

## 2022-12-21 NOTE — PLAN OF CARE
Goal Outcome Evaluation:  Overall Patient Progress: declining     Pt assisted to wear CPAP at midnight after taking extra dose of Melatonin. See Provider Notification note. Pt very happy to finally have a machine available after waiting for 2 nights. Pt appear to be sleeping/resting  during rounds. Called around 0545 c/o R neck p;ain. Tylenol 1000 mg tab and warm packs given. RN noticed pt is breathing heavily. Pt easily awaken and able to answer questions and follows commands   but goes back to sleep. Denies SOB, Chest pain or difficulty breathing. Noted to be mouth breather. O2 flowmeter increased to 8-9 LPM to keep O2 sat= >90%. Called RT who came and see pt but unable to determine possible cause of desaturation. Continue to monitor and intervene as needed. Continue with plan of care.    Patient's most recent vital signs are:     Vital signs:  BP: 122/45  Temp: 95.7  HR: 74  RR: 22  SpO2: 93 %     Patient does not have new respiratory symptoms.  Patient does not have new sore throat.  Patient does not have a fever greater than 99.5.

## 2022-12-21 NOTE — DISCHARGE SUMMARY
[facility]  Hospitalist Discharge Summary      Date of Admission:  12/17/2022  Date of Discharge:  12/21/2022  Discharging Provider: Walt Ceja PA-C  Discharge Service: Hospitalist Service    Discharge Diagnoses   1. Acute hypoxic and acute-on-chronic hypercapnic respiratory failure, multifactorial   2. COVID-19 pneumonia  3. Acute metabolic encephalopathy  4. Acute on chronic HFpEF (LVEF ~60%)   5. Right heart failure   6. Pulmonary HTN   7. CARLOS, BIPAP dependent  8. Morbid obesity, probable restrictive lung disease  9. Recurrent vaginal bleeding  10. CKD stage II-III   11. PAF on apixaban  12. Hyperkalemia, resolved  13. Hypomagnesemia, resolved   14. MAG   15. Hx Factor VII deficiency   16. Gout  17. Malnutrition risk    Follow-ups Needed After Discharge   Pending hospital course      Discharge Disposition   Discharged to [facility] ICU  Condition at discharge: Guarded    Hospital Course   Arianna Siddiqi is a 62 year old female with history of morbid obesity, CARLOS on home BIPAP, HTN, HFpEF, PAF on apixaban, MAG, Factor VII deficiency, and CKD who was recently admitted to MICU at [facility] on 11/18/22 with acute hypercapnic respiratory failure requiring intubation. She was subsequently found to have evidence of RV failure and pulmonary HTN on repeat echo. She was treated for pulmonary edema/volume overload, as well as pneumonia. She was extubated on 11/23 and transferred to medicine floor on 11/30. She was maintained on BIPAP, however was readmitted to ICU on 12/2 for worsening hypercapnia and encephalopathy. She did not require intubation but improved with better fitting BIPAP mask. She transferred back to medicine on 12/3 for ongoing management. Palliative care was consulted for goals of care discussion and patient elected to be DNI (no further intubations or tracheostomy), but OK for CPR. Her hospital course was otherwise relatively uncomplicated.    She was transferred to TCU for  rehabilitation on 12/18. Unfortunately, family was unable to transport her BIPAP from home. There were no BIPAPs available for use. She was maintained on supplemental O2 (4 L NC) at night. No hypoxia noted at rest while awake, with SpO2 >95% on RA. Ultimately RT was able to get a CPAP on 12/20.    On the morning of 12/21 patient was noted to have increased O2 needs while on CPAP, requiring up to 8 L. RT evaluated patient at bedside and could not explain worsening hypoxia. CXR showed lower lung volumes and increased diffuse bilateral mixed interstitial airspace opacities. [place] to be pulmonary edema. NT-proBNP 12,571 (previously 14,308). Treated with Lasix 40mg IV x 1. Patient also more somnolent today, with worsening hypercapnia on VBG (pH 7.10, pCO2 >98). Routine COVID PCR from earlier in the day ultimately came back positive, which likely explains the acute change in status and CXR findings. Still cannot r/o component of fluid overload, although BNP improved from previous. High risk for further decompensation given morbid obesity, CARLOS, pulmonary HTN, RV failure, and HFpEF. Case discussed with medicine triage and ICU providers. Patient needs urgent BIPAP given her DNI status. ICU team able to send BIPAP pending transfer. Patient will discharge from TCU and transfer to [facility] ICU for ongoing management. Case was discussed with Warren Nur NP of ICU team. Remdesivir and Decadron were ordered but unclear if patient received prior to transfer.     Of note, patient has been having intermittent vaginal bleeding since 12/6. Patient was evaluated by OB/GYN in November 2021 for similar issues. Malignancy was ruled out. She subsequently had an IUD placed. Intermittent bleeding was discussed with OB/GYN on admission, who recommended pelvic US to ensure IUD placement. Pelvic US showed good position of IUD; no other acute abnormalities. On the day of discharge the patient passed some large clots and the IUD. This was  signed out to ICU team and would warrant an OB/GYN consultation.     Consultations This Hospital Stay   NUTRITION SERVICES ADULT IP CONSULT  PHYSICAL THERAPY ADULT IP CONSULT  OCCUPATIONAL THERAPY ADULT IP CONSULT  RESPIRATORY CARE IP CONSULT    Code Status   Special Code    Time Spent on this Encounter   I, Walt Ceja PA-C, personally saw the patient today and spent greater than 30 minutes discharging this patient.       Walt Ceja PA-C  [facility] [address] [address]  Phone: [phone]  ______________________________________________________________________    Physical Exam   Vital Signs: Temp: (!) 95.7  F (35.4  C) Temp src: Axillary BP: 122/46 Pulse: 68   Resp: 20 SpO2: 95 % O2 Device: BiPAP/CPAP Oxygen Delivery: 6 LPM  Weight: 386 lbs 7.46 oz  See exam from today's progress note.         Primary Care Physician   Gagan Quiles    Discharge Orders      Reason for your hospital stay    Admitted to [facility] for rehabilitation following admission for acute hypercapnic respiratory failure. Patient is BIPAP dependent but unable to transport home device to TCU. No BIPAP available at facility. Acute decompensation noted 12/21/22 with worsening hypoxia and hypercapnia. COVID positive. Started on BIPAP but respiratory status continued to decline. Patient is DNI. Case discussed with triage provider and ICU team. Will transfer to [facility] ICU for ongoing management.     Daily weights    Call Provider for weight gain of more than 2 pounds per day or 5 pounds per week.     Intake and output    Every shift     Follow Up and recommended labs and tests    Pending hospital course     Activity - Up with nursing assistance     Special Code     Droplet Isolation     Contact Isolation     Diet    Follow this diet upon discharge: Regular       Significant Results and Procedures   Recent Labs   Lab 12/21/22  0927 12/21/22  0920  12/20/22  0802 12/19/22  0716 12/18/22  1549 12/17/22  0801     --  138 138 137 138   POTASSIUM 5.3  --  5.0 4.6 4.6 5.7*   CHLORIDE 98  --  100 100 99 96*   CO2 34*  --  37* 34* 37* 26   ANIONGAP 3  --  1* 4 1* 16*   BUN 38*  --  32* 28 34* 45.5*   CR 1.26*  --  1.12* 0.88 0.94 1.18*   KADEN 9.5  --  9.5 9.4 9.4 9.0   MAG  --   --   --  2.2  --  2.2   PROTTOTAL  --  8.5  --   --   --   --    ALBUMIN  --  2.5*  --   --   --   --    BILITOTAL  --  0.2  --   --   --   --    ALKPHOS  --  132  --   --   --   --    AST  --  22  --   --   --   --    ALT  --  25  --   --   --   --      Recent Labs   Lab 12/19/22  0716 12/16/22  0727 12/15/22  0919   WBC 6.1 7.2 6.0   RBC 4.57 4.52 4.81   HGB 13.0 12.8 13.5   HCT 44.0 45.4 46.7   MCV 96 100 97   MCH 28.4 28.3 28.1   MCHC 29.5* 28.2* 28.9*   RDW 17.1* 17.2* 17.1*    166 191     No lab results found in last 7 days.  Recent Labs   Lab 12/21/22  0920   CRP 33.0*      No lab results found in last 7 days.  No lab results found in last 7 days.  Recent Labs   Lab 12/21/22  0927 12/20/22  0802 12/19/22  0716 12/18/22  1549 12/17/22  0801 12/16/22  0727 12/15/22  0633 12/14/22  1833   * 114* 103* 173* 109* 104* 109* 111*             Discharge Medications   Current Discharge Medication List      CONTINUE these medications which have CHANGED    Details   allopurinol (ZYLOPRIM) 100 MG tablet Take 1 tablet (100 mg) by mouth daily    Associated Diagnoses: Chronic gout without tophus, unspecified cause, unspecified site         CONTINUE these medications which have NOT CHANGED    Details   acetaminophen (TYLENOL) 500 MG tablet Take 1-2 tablets (500-1,000 mg) by mouth every 4 hours as needed for mild pain  Refills: 0    Associated Diagnoses: Arthritis      alum & mag hydroxide-simethicone (MAALOX  ES) 400-400-40 MG/5ML SUSP suspension Take 15 mLs by mouth every 4 hours as needed for other (gastric distress.)  Qty: 355 mL, Refills: 0    Associated Diagnoses: Dyspepsia       amiodarone (PACERONE) 200 MG tablet 1 tablet (200 mg) by Oral or Feeding Tube route daily  Qty: 30 tablet, Refills: 4    Associated Diagnoses: Atrial fibrillation, unspecified type (H)      apixaban ANTICOAGULANT (ELIQUIS ANTICOAGULANT) 5 MG tablet Take 1 tablet (5 mg) by mouth 2 times daily  Qty: 180 tablet, Refills: 3    Associated Diagnoses: PAF (paroxysmal atrial fibrillation) (H)      atorvastatin (LIPITOR) 20 MG tablet Take 1 tablet (20 mg) by mouth daily  Qty: 90 tablet, Refills: 3    Associated Diagnoses: Hyperlipidemia, unspecified hyperlipidemia type      blood glucose monitoring (ACCU-CHEK NINA PLUS) meter device kit Use to test blood sugars 3 times daily or as directed.  Qty: 1 kit, Refills: 0    Associated Diagnoses: Diabetes mellitus, type 2 (H)      blood glucose monitoring (SOFTCLIX) lancets Use to test blood sugar 3 times daily or as directed.  Qty: 300 each, Refills: 0    Associated Diagnoses: Type 2 diabetes mellitus with chronic kidney disease, without long-term current use of insulin, unspecified CKD stage (H)      bumetanide (BUMEX) 1 MG tablet Take 1 tablet (1 mg) by mouth daily    Associated Diagnoses: PAF (paroxysmal atrial fibrillation) (H); Essential hypertension      empagliflozin (JARDIANCE) 10 MG TABS tablet Take 1 tablet (10 mg) by mouth daily    Associated Diagnoses: Acute on chronic diastolic congestive heart failure (H)      ferrous gluconate (FERGON) 324 (38 Fe) MG tablet Take 1 tablet (324 mg) by mouth daily  Qty: 90 tablet, Refills: 3    Comments: DX Code Needed  REFILL NEEDED.  Associated Diagnoses: Iron deficiency      lisinopril (ZESTRIL) 5 MG tablet Take 1 tablet (5 mg) by mouth daily  Qty: 90 tablet, Refills: 3    Associated Diagnoses: Essential hypertension      melatonin 3 MG tablet Take 1 tablet (3 mg) by mouth nightly as needed for sleep    Associated Diagnoses: Insomnia, unspecified type      metoprolol succinate ER (TOPROL XL) 100 MG 24 hr tablet Take 1 tablet (100  mg) by mouth daily  Qty: 90 tablet, Refills: 3    Associated Diagnoses: PAF (paroxysmal atrial fibrillation) (H)      miconazole (MICRO GUARD) 2 % external powder Apply topically as needed for itching or other APPLY TO AFFECTED AREA TWICE A DAY *NOT COVERED*  Qty: 85 g, Refills: 5    Associated Diagnoses: Fungal infection      Multiple Vitamins-Minerals (MULTIVITAMIN WOMEN PO)       ondansetron (ZOFRAN ODT) 4 MG ODT tab Take 1 tablet (4 mg) by mouth every 8 hours as needed for nausea or vomiting  Qty: 20 tablet, Refills: 1    Comments: DX Code Needed  .R11.10  Associated Diagnoses: Vomiting, intractability of vomiting not specified, presence of nausea not specified, unspecified vomiting type      polyethylene glycol (MIRALAX) packet Take 17 g by mouth daily as needed for constipation  Qty: 30 packet, Refills: 1    Associated Diagnoses: Constipation, unspecified constipation type      sennosides (SENOKOT) 8.6 MG tablet Take 1 tablet by mouth 2 times daily as needed for constipation  Qty: 60 tablet, Refills: 0    Associated Diagnoses: Constipation, unspecified constipation type      triamcinolone (KENALOG) 0.1 % external cream Apply topically 2 times daily APPLY TO AFFECTED AREA  Qty: 30 g, Refills: 3    Associated Diagnoses: Rash           Allergies   Allergies   Allergen Reactions     Penicillins Itching and Rash     Tolerated ceftriaxone 1/17/2020  Tolerated Zosyn 11/2022

## 2022-12-21 NOTE — PLAN OF CARE
"Goal Outcome Evaluation:       Report given to ICU nurse. She is aware that she is covid positive and that she does not want to intubated but is ok with BIPAP, chest compressions, defibrillate and medications. New arm band \"Specials\" placed.                  "

## 2022-12-21 NOTE — PROGRESS NOTES
Patient admitted to ICU from rehab on BiPAP around 1145 in hypercapnic respiratory failure.  Patient was getting volumes of 100-300 mL.  Switched to AVAPS target Vt = 450, IPAP 20-40, peep +10, rate 16, 60%.  Patient's mouth agape and neither the largest full face mask or scuba mask big enough to cover lower lip to create adequate seal.  Chin strap made to keep mouth slightly closed so mask could create seal.  VBG taken with no improvement in CO2.  Vt increased to 550mL.  Patient somewhat dysychronous with BiPAP.  No more alterations can be made to assist the BiPAP work better.  Provider aware of challenges with mask and seal.  RT to follow.

## 2022-12-22 NOTE — PROGRESS NOTES
"Patient this AM BP 60/30s, unresponsive, and now having unequal pupils. Emelia Siddiqi contacted regarding clinical findings and overall prognosis. Emelia described that \"Arianna would not want further cares\" given the significance of clinical findings. Emelia describes wanting to place Arianna Siddiqi on comfort cares which includes DNR/DNI, stopping NiPPV, oxygen supplementation, vasoactive medications, and all other life sustaining measures. The comfort cares process was described to Emelia and she informed the ICU team that this is what Arianna would have wanted.   "

## 2022-12-22 NOTE — PLAN OF CARE
Major Shift Events:  Arrived to unit in respiratory distress, placed on 65% fiO2, has since been increased by 5% increments to now 100% FiO2 due to O2 <90%. Abdominal breathing, abnormal lung sounds. 40 mg iv lasix given without much output. Temp prob in antunez reading hypothermic, bear hugger applied on high heat, warm blankets placed without much help. Patient is clammy. Face and fingers appearing more cyanotic as time continues. Patient appears to be working hard for every breath, advocated for pain medications, one time order of morphine placed by APPs.    Vaginal bleeding noticed and continues, GYN consult in placed, they called and want to have pads weighed but we do not have scale.      Plan: DNR/ DNI. Continue Supportive cares.   For vital signs and complete assessments, please see documentation flowsheets.

## 2022-12-22 NOTE — DISCHARGE SUMMARY
Discharge Summary  [facility]  Critical Care Service    Date of Admission:  12/21/2022  Date of Discharge:  12/22/2022  Discharging Provider: ALMA Coto CNP  Date of Service (when I saw the patient): 12/22/22  Time of Death: 0826 12/22/2022    Discharge diagnosis:    Acute hypoxic and hypercarneic respiratory failure 2nd Covid-19 pneumonia complicated by multi-system organ failure    Hospital Course   Arianna Siddiqi was admitted on 12/21/2022.  The following problems were addressed during her hospitalization:    # Acute hypoxic respiratory failure  # Chronic hypercapnic respiratory failure   # Acute metabolic encephalopathy  # Pulmonary hypertension   # Septic Shock  # Acute on chronic kidney injury  # HFpEF  #Endometrial hyperplasia without atypia  #AUB secondary to anticoagulation    Arianna Siddiqi is a 62 year old female with history of morbid obesity, CARLOS on home BIPAP, HTN, HFpEF, PAF on apixaban, MAG, Factor VII deficiency, and CKD. She was recently admitted on the [facility] for acute respiratory failure likely related HFpEF. She was intubated from 11/18-11/23. She was transferred to medicine floor on 11/30. She was maintained on BIPAP, however was readmitted to ICU on 12/2 for worsening hypercapnia and encephalopathy. She was eventuay transferred to TCU for rehabilitation on 12/18. She had noted increasing O2 needs 2/2 pulmonary edema and was subsequently found to be COVID positive. She is being admitted to the ICU 12/21/22 for continuous BiPAP, COVID treatment, and aggressive diuresis.      Upon admission to the ICU the ICU team contacted Emelia regarding her respiratory status. Discussing providing maximum support, other than intubation; pt DNI. She has a high likelihood for a respiratory and cardiac arrest. Given that her condition is respiratory in nature, if she were to arrest CPR would be futile without intubation. Code status then changed to DNR/DNI.    Family was again  updated 22 AM regarding worsening clinical findings which includes hypoxemia despite maximal support of NiPPV and hypotension with worsening JASON and subsequent hyperkalemia. Patients primary decision maker was clear that she would not want dialysis, CPR, or intubation. Decision was made with family to transition to comfort care.     The patient was then transitioned to comfort care measures. Arianna was then liberated from NiPPV and other lifesaving measures with the goal of comfort measures. The patient time of death was 22 0826. Upon physical exams at time of death patient was unresponsive to painful stimuli, absent heart sounds auscultated for one minute, absent lungs sounds auscultated for one minute, no pulse, and absent corneal reflex. Emelia was then updated regarding patient condition and condolences were provided. Awaiting family guidance regarding disposition and autopsy. Emelia cited she would call back to hospital staff this afternoon with appropriate updates.         We appreciate the opportunity of caring for your patient.      ALMA Coto CNP        Code Status   DNR / DNI    Primary Care Physician   Gagan Quiles        Time Spent on this Encounter   I, ALMA Coto CNP, personally saw the patient today and spent greater than 30 minutes discharging this patient.    Discharge Disposition   Discharged to Hospital morgue  Condition at discharge:     Consultations This Hospital Stay   PHYSICAL THERAPY ADULT IP CONSULT  OCCUPATIONAL THERAPY ADULT IP CONSULT  OB GYN IP CONSULT    Discharge Orders   No discharge procedures on file.  Discharge Medications   Current Discharge Medication List      CONTINUE these medications which have NOT CHANGED    Details   allopurinol (ZYLOPRIM) 100 MG tablet Take 1 tablet (100 mg) by mouth daily    Associated Diagnoses: Chronic gout without tophus, unspecified cause, unspecified site      amiodarone (PACERONE) 200 MG tablet 1 tablet  (200 mg) by Oral or Feeding Tube route daily  Qty: 30 tablet, Refills: 4    Associated Diagnoses: Atrial fibrillation, unspecified type (H)      apixaban ANTICOAGULANT (ELIQUIS ANTICOAGULANT) 5 MG tablet Take 1 tablet (5 mg) by mouth 2 times daily  Qty: 180 tablet, Refills: 3    Associated Diagnoses: PAF (paroxysmal atrial fibrillation) (H)      atorvastatin (LIPITOR) 20 MG tablet Take 1 tablet (20 mg) by mouth daily  Qty: 90 tablet, Refills: 3    Associated Diagnoses: Hyperlipidemia, unspecified hyperlipidemia type      bumetanide (BUMEX) 1 MG tablet Take 1 tablet (1 mg) by mouth daily    Associated Diagnoses: PAF (paroxysmal atrial fibrillation) (H); Essential hypertension      empagliflozin (JARDIANCE) 10 MG TABS tablet Take 1 tablet (10 mg) by mouth daily    Associated Diagnoses: Acute on chronic diastolic congestive heart failure (H)      ferrous gluconate (FERGON) 324 (38 Fe) MG tablet Take 1 tablet (324 mg) by mouth daily  Qty: 90 tablet, Refills: 3    Comments: DX Code Needed  REFILL NEEDED.  Associated Diagnoses: Iron deficiency      lisinopril (ZESTRIL) 5 MG tablet Take 1 tablet (5 mg) by mouth daily  Qty: 90 tablet, Refills: 3    Associated Diagnoses: Essential hypertension      metoprolol succinate ER (TOPROL XL) 100 MG 24 hr tablet Take 1 tablet (100 mg) by mouth daily  Qty: 90 tablet, Refills: 3    Associated Diagnoses: PAF (paroxysmal atrial fibrillation) (H)      miconazole (MICRO GUARD) 2 % external powder Apply topically as needed for itching or other APPLY TO AFFECTED AREA TWICE A DAY *NOT COVERED*  Qty: 85 g, Refills: 5    Associated Diagnoses: Fungal infection      Multiple Vitamins-Minerals (MULTIVITAMIN WOMEN PO)       triamcinolone (KENALOG) 0.1 % external cream Apply topically 2 times daily APPLY TO AFFECTED AREA  Qty: 30 g, Refills: 3    Associated Diagnoses: Rash      acetaminophen (TYLENOL) 500 MG tablet Take 1-2 tablets (500-1,000 mg) by mouth every 4 hours as needed for mild  pain  Refills: 0    Associated Diagnoses: Arthritis      alum & mag hydroxide-simethicone (MAALOX  ES) 400-400-40 MG/5ML SUSP suspension Take 15 mLs by mouth every 4 hours as needed for other (gastric distress.)  Qty: 355 mL, Refills: 0    Associated Diagnoses: Dyspepsia      blood glucose monitoring (ACCU-CHEK NINA PLUS) meter device kit Use to test blood sugars 3 times daily or as directed.  Qty: 1 kit, Refills: 0    Associated Diagnoses: Diabetes mellitus, type 2 (H)      blood glucose monitoring (SOFTCLIX) lancets Use to test blood sugar 3 times daily or as directed.  Qty: 300 each, Refills: 0    Associated Diagnoses: Type 2 diabetes mellitus with chronic kidney disease, without long-term current use of insulin, unspecified CKD stage (H)      melatonin 3 MG tablet Take 1 tablet (3 mg) by mouth nightly as needed for sleep    Associated Diagnoses: Insomnia, unspecified type      ondansetron (ZOFRAN ODT) 4 MG ODT tab Take 1 tablet (4 mg) by mouth every 8 hours as needed for nausea or vomiting  Qty: 20 tablet, Refills: 1    Comments: DX Code Needed  .R11.10  Associated Diagnoses: Vomiting, intractability of vomiting not specified, presence of nausea not specified, unspecified vomiting type      polyethylene glycol (MIRALAX) packet Take 17 g by mouth daily as needed for constipation  Qty: 30 packet, Refills: 1    Associated Diagnoses: Constipation, unspecified constipation type      sennosides (SENOKOT) 8.6 MG tablet Take 1 tablet by mouth 2 times daily as needed for constipation  Qty: 60 tablet, Refills: 0    Associated Diagnoses: Constipation, unspecified constipation type           Allergies   Allergies   Allergen Reactions     Penicillins Itching and Rash     Tolerated ceftriaxone 1/17/2020  Tolerated Zosyn 11/2022

## 2022-12-22 NOTE — PROGRESS NOTES
12/21/22  [id number]  Admitted to ICU yesterday afternoon due to respiratory distress in setting of new COVID 19 infection among others.  Per her goals of care no CPR, DNR/DNI. She continues on BIPAP at max 100% FiO2 with sats mid 70s most of the night.  Potassium noted to be critically high at 7.3. Given Intravenous Insulin, calcium and D10 Overnight. K down to 6.4. Most recent recheck hemolyzed.   With JASON. Creatinine uptrending, with minimal urine output despite 500 mg Diuril and 200 mg of IV lasix overnight.  Has small vaginal bleeding noted with pericares.   Temp prob in antunez reading hypothermic at the beginning of shift, bear hugger continued on high heat. Temp normalized overnight with bear hugger turned off.  She is unresponsive and overall appears comfortable. PRN Morphine given X 1 overnight.    Continue the plan of care.

## 2022-12-22 NOTE — DEATH PRONOUNCEMENT
MD DEATH PRONOUNCEMENT    Called to pronounce Arianna Siddiqi dead.    Physical Exam: Unresponsive to noxious stimuli, Spontaneous respirations absent, Breath sounds absent, Carotid pulse absent, Heart sounds absent, Pupillary light reflex absent and Corneal blink reflex absent    Patient was pronounced dead at 0826 AM, 2022.    Preliminary Cause of Death: Acute hypoxic and hypercarbic respiratory failure/ARDS due to COVID-19 pneumonia     Principal Problem:    COVID-19       Infectious disease present?: YES    Communicable disease present? (examples: HIV, chicken pox, TB, Ebola, CJD) :  NO    Multi-drug resistant organism present? (example: MRSA): NO    Please consider an autopsy if any of the following exist:  NO Unexpected or unexplained death during or following any dental, medical, or surgical diagnostic treatment procedures.   NO Death of mother at or up to seven days after delivery.     NO All  and pediatric deaths.     NO Death where the cause is sufficiently obscure to delay completion of the death certificate.   NO Deaths in which autopsy would confirm a suspected illness/condition that would affect surviving family members or recipients of transplanted organs.     The following deaths must be reported to the 's Office:  NO A death that may be due entirely or in part to any factors other than natural disease (recent surgery, recent trauma, suspected abuse/neglect).   NO A death that may be an accident, suicide, or homicide.     NO Any sudden, unexpected death in which there is no prior history of significant heart disease or any other condition associated with sudden death.   NO A death under suspicious, unusual, or unexpected circumstances.    NO Any death which is apparently due to natural causes but in which the  does not have a personal physician familiar with the patient s medical history, social, or environmental situation or the circumstances of the terminal event.    NO Any death apparently due to Sudden Infant Death Syndrome.     NO Deaths that occur during, in association with, or as consequences of a diagnostic, therapeutic, or anesthetic procedure.   NO Any death in which a fracture of a major bone has occurred within the past (6) six months.   NO A death of persons note seen by their physician within 120 days of demise.     NO Any death in which the  was an inmate of a public institution or was in the custody of Law Enforcement personnel.   NO  All unexpected deaths of children   NO Solid organ donors   NO Unidentified bodies   NO Deaths of persons whose bodies are to be cremated or otherwise disposed of so that the bodies will later be unavailable for examination;   NO Deaths unattended by a physician outside of a licensed healthcare facility or licensed residential hospice program   NO Deaths occurring within 24 hours of arrival to a health care facility if death is unexpected.    NO Deaths associated with the decedent s employment.   NO Deaths attributed to acts of terrorism.   NO Any death in which there is uncertainty as to whether it is a medical examiner s care should be discussed with the medical investigator.        Body disposition: Body released to the morgue.

## 2022-12-22 NOTE — PROGRESS NOTES
"ICU cross cover    Arianna Siddiqi is a 62 year old female admitted to ICU 12/21/22 for acute hypoxic and hypercapnic respiratory failure 2/2 pulm edema and COVID-19. She is currently DNR/DNI on BiPAP 100% FiO2 with SpO2 84-88%.     VBG trend continues to demonstrate worsening respiratory acidosis 6.93/>98/50/-- (7.05/>98/95/--). She also had an incidentally noted evolving hyperkalemia, K+ 7.0 (5.3). Pt remains oligoanuric despite aggressive diuresis, only 385 ml of urine made today. Spoke with alok Kapoor at the bedside this evening. Given the progression of clinical and laboratory findings, I am concerned that Arianna will die overnight. Emelia expressed understanding and stated \"I just want her to be comfortable.\" However, Emelia was not interested in removing BiPAP, or transitioning to full comfort cares at this time. She did endorse HD would not be within Arianna's goals of care. Discussed continuing BiPAP, shifting K+, adding additional PRN morphine, and following labs overnight. Emelia expressed agreement with this plan.    Plan:   # Air hunger  # Acute hypoxic & hypercarbic respiratory failure 2/2 pulm edema and COVID-19  # Hyperkalemia in the setting of profound acidosis and likely evolving ARF   # Hypotension, likely 2/2 profound metabolic derangements   - Add morphine 1-2 mg q1h PRN  - Shift K+ --> Ca2+ gluc + IV insulin & Dextrose combo   - Trend VBG & K+ q4h starting at 0000   - Peripheral norepi to keep MAP > 65 mmHg, hold off on bolusing fluid d/t significant pulm edema   - Held remdesivir & lovenox given likely worsening of JASON   - Continue BiPAP, pt remains DNR & DNI    Dennise Shipman   "

## 2022-12-22 NOTE — PLAN OF CARE
AVAPS removed 0755, 4 mg IV morphine given per comfort cares. Monitor turned off and norepinephrine stopped.

## 2022-12-26 LAB
BACTERIA BLD CULT: NO GROWTH
BACTERIA BLD CULT: NO GROWTH

## 2023-01-05 DIAGNOSIS — E78.5 HYPERLIPIDEMIA, UNSPECIFIED HYPERLIPIDEMIA TYPE: ICD-10-CM

## 2023-01-05 DIAGNOSIS — I10 ESSENTIAL HYPERTENSION: ICD-10-CM

## 2023-01-05 DIAGNOSIS — I48.0 PAF (PAROXYSMAL ATRIAL FIBRILLATION) (H): ICD-10-CM

## 2023-01-05 RX ORDER — APIXABAN 5 MG/1
TABLET, FILM COATED ORAL
Qty: 180 TABLET | Refills: 3 | OUTPATIENT
Start: 2023-01-05

## 2023-01-05 RX ORDER — ATORVASTATIN CALCIUM 20 MG/1
TABLET, FILM COATED ORAL
Qty: 90 TABLET | Refills: 3 | OUTPATIENT
Start: 2023-01-05

## 2023-01-05 RX ORDER — LISINOPRIL 5 MG/1
TABLET ORAL
Qty: 90 TABLET | Refills: 3 | OUTPATIENT
Start: 2023-01-05

## 2023-05-05 NOTE — TELEPHONE ENCOUNTER
Patient called back and an appt was made to see Dr. Angulo sooner than the one on 3/13/19.  Patient relayed she can come to clinic on 1/30/19 @ 11:45AM.  RN offered her a visit on 1/2 ,1/9, also 1/16 but patient said she needs to arrange a ride and said she needs to do this 3 weeks in advance.  Pharmacy was called and RN relayed that patient has made an appt, and will be given limited refills, but if she no-shows clinic will not provide any further refills.    Rahel Ram RN on 12/19/2018 at 11:09 AM     no
